# Patient Record
Sex: FEMALE | Race: WHITE | NOT HISPANIC OR LATINO | Employment: OTHER | URBAN - METROPOLITAN AREA
[De-identification: names, ages, dates, MRNs, and addresses within clinical notes are randomized per-mention and may not be internally consistent; named-entity substitution may affect disease eponyms.]

---

## 2019-08-08 ENCOUNTER — HOSPITAL ENCOUNTER (EMERGENCY)
Facility: HOSPITAL | Age: 69
Discharge: HOME | End: 2019-08-08
Attending: EMERGENCY MEDICINE
Payer: MEDICARE

## 2019-08-08 VITALS
TEMPERATURE: 98 F | RESPIRATION RATE: 18 BRPM | DIASTOLIC BLOOD PRESSURE: 98 MMHG | BODY MASS INDEX: 24.84 KG/M2 | WEIGHT: 135 LBS | HEIGHT: 62 IN | OXYGEN SATURATION: 98 % | HEART RATE: 78 BPM | SYSTOLIC BLOOD PRESSURE: 174 MMHG

## 2019-08-08 DIAGNOSIS — R06.02 SHORTNESS OF BREATH: ICD-10-CM

## 2019-08-08 DIAGNOSIS — M54.2 NECK PAIN: ICD-10-CM

## 2019-08-08 PROCEDURE — 99283 EMERGENCY DEPT VISIT LOW MDM: CPT

## 2019-08-08 PROCEDURE — 93005 ELECTROCARDIOGRAM TRACING: CPT

## 2019-08-08 RX ORDER — LORAZEPAM 2 MG/ML
0.5 INJECTION INTRAMUSCULAR
Status: DISCONTINUED | OUTPATIENT
Start: 2019-08-08 | End: 2019-08-08 | Stop reason: HOSPADM

## 2019-08-08 RX ORDER — ACETAMINOPHEN 500 MG
1000 TABLET ORAL
Status: DISCONTINUED | OUTPATIENT
Start: 2019-08-08 | End: 2019-08-08 | Stop reason: HOSPADM

## 2019-08-08 NOTE — ED PROVIDER NOTES
Encounter Date: 8/8/2019       History     Chief Complaint   Patient presents with    Neck Pain     pt c/o L neck, L ankle pain. onset last pm. denies trauma. speech clear. skin w/dr/pk. rr even/unlab. maew     69-year-old female presented emergency department complaining of pain in the left side of the neck and also complains of slight pain in that left ankle area.  Patient denies any trauma.  Patient extremely anxious as her  is in the ICU after having a heart attack.  Patient denies any chest pain or shortness of breath.  Patient denies fever or chills or nausea or vomiting or abdominal pain or weakness or numbness.        Review of patient's allergies indicates:   Allergen Reactions    Demerol [meperidine]      Nausea vomiting, visual problem    Tomato (solanum lycopersicum)      Past Medical History:   Diagnosis Date    Martin esophagus     Colitis     COPD (chronic obstructive pulmonary disease)     Thyroid disease     Vocal cord polyps      Past Surgical History:   Procedure Laterality Date    BACK SURGERY      cervical    CHOLECYSTECTOMY  12-    Dr Price  [facility]    CHOLECYSTECTOMY, LAPAROSCOPIC N/A 12/20/2013    [facility]formed [facility] OR    EGD (ESOPHAGOGASTRODUODENOSCOPY) N/A 12/19/2013    Performed by Lena Smith [facility] at [facility] ENDO    FOOT SURGERY      HYSTERECTOMY      MICROLARYNGOSCOPY N/A 5/10/2016    [facility]    SALIVARY GLAND SURGERY       No family history on file.  Social History     Tobacco Use    Smoking status: Current Every Day Smoker     Packs/day: 1.00     Types: Cigarettes    Smokeless tobacco: Former User     Quit date: 5/1/2016   Substance Use Topics    Alcohol use: No    Drug use: No     Review of Systems   Constitutional: Negative.    HENT: Negative.    Eyes: Negative.    Respiratory: Negative.    Cardiovascular: Negative for chest pain.   Gastrointestinal: Negative.    Endocrine: Negative.     Genitourinary: Negative.    Musculoskeletal: Positive for neck pain.        Pain on the left side of the neck in the sternal mastoid area.  Slight pain in the left ankle area and the area of the soft tissues.  Medially.  No bony pain or tenderness.   Skin: Negative.    Allergic/Immunologic: Negative.    Neurological: Negative.    Hematological: Negative.    Psychiatric/Behavioral: Negative.    All other systems reviewed and are negative.      Physical Exam     Initial Vitals [08/08/19 1341]   BP Pulse Resp Temp SpO2   (!) 174/98 78 18 97.7 °F (36.5 °C) 98 %      MAP       --         Physical Exam    Nursing note and vitals reviewed.  Constitutional: She appears well-developed and well-nourished.   HENT:   Head: Normocephalic and atraumatic.   Nose: Nose normal.   Mouth/Throat: Oropharynx is clear and moist.   Eyes: Conjunctivae and EOM are normal. Pupils are equal, round, and reactive to light.   Neck: Normal range of motion. Neck supple. No thyromegaly present. No tracheal deviation present. No JVD present.   Pain and tenderness on the left lateral side of the neck in the sternal mastoid area and the left upper trapezius area.  Pain is reproducible by palpation and by movement of the head and neck.   Cardiovascular: Normal rate, regular rhythm, normal heart sounds and intact distal pulses.   No murmur heard.  Pulmonary/Chest: Breath sounds normal. No stridor. No respiratory distress. She has no wheezes. She has no rales.   Abdominal: Soft. Bowel sounds are normal.   Musculoskeletal: Normal range of motion. She exhibits no edema.   Minimal tenderness on the medial aspect of the left ankle inferior to the ankle joint.  Extremities neurovascularly intact.   Neurological: She is alert and oriented to person, place, and time. She has normal strength. No cranial nerve deficit. GCS score is 15. GCS eye subscore is 4. GCS verbal subscore is 5. GCS motor subscore is 6.   Skin: Skin is warm. Capillary refill takes less  than 2 seconds.   Psychiatric: She has a normal mood and affect. Thought content normal.         ED Course   Procedures  Labs Reviewed   CBC W/ AUTO DIFFERENTIAL   COMPREHENSIVE METABOLIC PANEL   TROPONIN I   B-TYPE NATRIURETIC PEPTIDE   PROTIME-INR     EKG Readings: (Independently Interpreted)   Initial Reading: No STEMI. Rhythm: Normal Sinus Rhythm. Ectopy: No Ectopy. Conduction: Normal.     ECG Results          EKG 12-lead (In process)  Result time 08/08/19 14:02:55    In process by Interface, [facility] (08/08/19 14:02:55)                 Narrative:    Test Reason : M54.2,    Vent. Rate : 073 BPM     Atrial Rate : 073 BPM     P-R Int : 156 ms          QRS Dur : 074 ms      QT Int : 434 ms       P-R-T Axes : 067 023 042 degrees     QTc Int : 478 ms    Normal sinus rhythm  Possible Left atrial enlargement  Low voltage QRS  Borderline Abnormal ECG  When compared with ECG of 03-MAY-2015 03:21,  Nonspecific T wave abnormality no longer evident in Anterior leads    Referred By: [id number]   SELF           Confirmed By:                             Imaging Results    None          Medical Decision Making:   Differential Diagnosis:   Patient presented to emergency department with neck pain which appears to be musculoskeletal pain and ankle pain appears to be musculoskeletal as well. No bony tenderness detected.  Given patient's age and risk factor screening cardiac workup to be done however patient refused any further workup and understands the risks and complications of leaving against medical advise and leaving against medical advice.  Patient likely has musculoskeletal pain and anxious as her  is in the ICU after a heart attack.  Patient advised to return to emergency department if symptoms do not improve.                      Clinical Impression:       ICD-10-CM ICD-9-CM   1. Neck pain M54.2 723.1   2. Shortness of breath R06.02 786.05                                Ayaka Mims MD  08/08/19 8154

## 2019-08-08 NOTE — DISCHARGE INSTRUCTIONS
Your leaving against medical advise and return to emergency department for worsening symptoms or any problems

## 2019-08-10 ENCOUNTER — HOSPITAL ENCOUNTER (EMERGENCY)
Facility: HOSPITAL | Age: 69
Discharge: LEFT BEFORE DISCHARGE | End: 2019-08-10
Attending: EMERGENCY MEDICINE
Payer: MEDICARE

## 2019-08-10 VITALS
TEMPERATURE: 98 F | HEART RATE: 69 BPM | RESPIRATION RATE: 18 BRPM | SYSTOLIC BLOOD PRESSURE: 185 MMHG | OXYGEN SATURATION: 98 % | DIASTOLIC BLOOD PRESSURE: 69 MMHG

## 2019-08-10 DIAGNOSIS — R07.9 CHEST PAIN: Primary | ICD-10-CM

## 2019-08-10 LAB
ALBUMIN SERPL BCP-MCNC: 4.2 G/DL (ref 3.5–5.2)
ALP SERPL-CCNC: 85 U/L (ref 55–135)
ALT SERPL W/O P-5'-P-CCNC: 13 U/L (ref 10–44)
ANION GAP SERPL CALC-SCNC: 8 MMOL/L (ref 8–16)
AST SERPL-CCNC: 19 U/L (ref 10–40)
BASOPHILS # BLD AUTO: 0.04 K/UL (ref 0–0.2)
BASOPHILS NFR BLD: 0.7 % (ref 0–1.9)
BILIRUB SERPL-MCNC: 0.4 MG/DL (ref 0.1–1)
BNP SERPL-MCNC: 25 PG/ML (ref 0–99)
BUN SERPL-MCNC: 15 MG/DL (ref 8–23)
CALCIUM SERPL-MCNC: 9.9 MG/DL (ref 8.7–10.5)
CHLORIDE SERPL-SCNC: 105 MMOL/L (ref 95–110)
CO2 SERPL-SCNC: 30 MMOL/L (ref 23–29)
CREAT SERPL-MCNC: 0.9 MG/DL (ref 0.5–1.4)
DIFFERENTIAL METHOD: ABNORMAL
EOSINOPHIL # BLD AUTO: 0.1 K/UL (ref 0–0.5)
EOSINOPHIL NFR BLD: 1.8 % (ref 0–8)
ERYTHROCYTE [DISTWIDTH] IN BLOOD BY AUTOMATED COUNT: 13 % (ref 11.5–14.5)
EST. GFR  (AFRICAN AMERICAN): >60 ML/MIN/1.73 M^2
EST. GFR  (NON AFRICAN AMERICAN): >60 ML/MIN/1.73 M^2
GLUCOSE SERPL-MCNC: 92 MG/DL (ref 70–110)
HCT VFR BLD AUTO: 46.1 % (ref 37–48.5)
HGB BLD-MCNC: 15.1 G/DL (ref 12–16)
IMM GRANULOCYTES # BLD AUTO: 0.02 K/UL (ref 0–0.04)
IMM GRANULOCYTES NFR BLD AUTO: 0.4 % (ref 0–0.5)
INR PPP: 1.1
LYMPHOCYTES # BLD AUTO: 1.7 K/UL (ref 1–4.8)
LYMPHOCYTES NFR BLD: 29.9 % (ref 18–48)
MCH RBC QN AUTO: 31.6 PG (ref 27–31)
MCHC RBC AUTO-ENTMCNC: 32.8 G/DL (ref 32–36)
MCV RBC AUTO: 96 FL (ref 82–98)
MONOCYTES # BLD AUTO: 0.5 K/UL (ref 0.3–1)
MONOCYTES NFR BLD: 8.7 % (ref 4–15)
NEUTROPHILS # BLD AUTO: 3.3 K/UL (ref 1.8–7.7)
NEUTROPHILS NFR BLD: 58.5 % (ref 38–73)
NRBC BLD-RTO: 0 /100 WBC
PLATELET # BLD AUTO: 181 K/UL (ref 150–350)
PMV BLD AUTO: 9.4 FL (ref 9.2–12.9)
POTASSIUM SERPL-SCNC: 3.5 MMOL/L (ref 3.5–5.1)
PROT SERPL-MCNC: 7.9 G/DL (ref 6–8.4)
PROTHROMBIN TIME: 13.7 SEC (ref 11.7–14)
RBC # BLD AUTO: 4.78 M/UL (ref 4–5.4)
SODIUM SERPL-SCNC: 143 MMOL/L (ref 136–145)
TROPONIN I SERPL DL<=0.01 NG/ML-MCNC: <0.03 NG/ML (ref 0.02–0.04)
WBC # BLD AUTO: 5.61 K/UL (ref 3.9–12.7)

## 2019-08-10 PROCEDURE — 93005 ELECTROCARDIOGRAM TRACING: CPT

## 2019-08-10 PROCEDURE — 25000003 PHARM REV CODE 250: Performed by: EMERGENCY MEDICINE

## 2019-08-10 PROCEDURE — 83880 ASSAY OF NATRIURETIC PEPTIDE: CPT

## 2019-08-10 PROCEDURE — 85610 PROTHROMBIN TIME: CPT

## 2019-08-10 PROCEDURE — 84484 ASSAY OF TROPONIN QUANT: CPT

## 2019-08-10 PROCEDURE — 99285 EMERGENCY DEPT VISIT HI MDM: CPT

## 2019-08-10 PROCEDURE — 80053 COMPREHEN METABOLIC PANEL: CPT

## 2019-08-10 PROCEDURE — 85025 COMPLETE CBC W/AUTO DIFF WBC: CPT

## 2019-08-10 RX ORDER — LORAZEPAM 0.5 MG/1
TABLET ORAL
COMMUNITY
End: 2019-09-19 | Stop reason: SDUPTHER

## 2019-08-10 RX ORDER — ASPIRIN 325 MG
325 TABLET ORAL
Status: COMPLETED | OUTPATIENT
Start: 2019-08-10 | End: 2019-08-10

## 2019-08-10 RX ADMIN — ASPIRIN 325 MG ORAL TABLET 325 MG: 325 PILL ORAL at 05:08

## 2019-08-10 RX ADMIN — ALUMINUM HYDROXIDE, MAGNESIUM HYDROXIDE, AND SIMETHICONE 50 ML: 200; 200; 20 SUSPENSION ORAL at 06:08

## 2019-08-10 NOTE — ED PROVIDER NOTES
Encounter Date: 8/10/2019       History     Chief Complaint   Patient presents with    Chest Pain     X 1 DAY     69-year-old female with a past medical history of COPD, hypertension presents today complaining of chest pain.  Patient reports that she has been under lot of stress lately, her  had a massive heart attack and is currently in the hospital.  Patient reports that she is having substernal chest pain located over the left precordium with radiation to the neck.  Patient rates pain as 4/10 when present but she reports currently no chest pain present.  Patient reports last stress test was 3 years ago performed by Dr. Camarillo        Review of patient's allergies indicates:   Allergen Reactions    Demerol [meperidine]      Nausea vomiting, visual problem    Tomato (solanum lycopersicum)      Past Medical History:   Diagnosis Date    Martin esophagus     Colitis     COPD (chronic obstructive pulmonary disease)     Thyroid disease     Vocal cord polyps      Past Surgical History:   Procedure Laterality Date    ABDOMINAL AORTIC ANEURYSM REPAIR      BACK SURGERY      cervical    [facility]LECYSTECT[facility]Y  12-    [facility]  [facility]    CHOLECYSTECTOMY, LAPAROSCOPIC N/A 12/20/2013    [facility]    EGD (ESOPHAGOGASTRODUODENOSCOPY) N/A 12/19/2013    Performed by [facility] Smith [facility] at [facility] ENDO    FOOT SURGERY      HYSTERECTOMY      MICROLARYNGOSCOPY N/A 5/10/2016    [facility]    SALIVARY GLAND SURGERY       No family history on file.  Social History     Tobacco Use    Smoking status: Current Every Day Smoker     Packs/day: 1.00     Types: Cigarettes    Smokeless tobacco: Former User     Quit date: 5/1/2016   Substance Use Topics    Alcohol use: No    Drug use: No     Review of Systems   Constitutional: Negative for fever.   HENT: Negative for congestion, rhinorrhea, sore throat and trouble swallowing.    Eyes: Negative for visual  disturbance.   Respiratory: Negative for cough, chest tightness, shortness of breath and wheezing.    Cardiovascular: Positive for chest pain. Negative for palpitations and leg swelling.   Gastrointestinal: Negative for abdominal distention, abdominal pain, constipation, diarrhea, nausea and vomiting.   Genitourinary: Negative for difficulty urinating, dysuria, flank pain and frequency.   Musculoskeletal: Negative for arthralgias, back pain, joint swelling and neck pain.   Skin: Negative for color change and rash.   Neurological: Negative for dizziness, syncope, speech difficulty, weakness, numbness and headaches.   All other systems reviewed and are negative.      Physical Exam     Initial Vitals   BP Pulse Resp Temp SpO2   08/10/19 1821 08/10/19 1756 08/10/19 1756 08/10/19 1821 08/10/19 1756   (!) 185/69 76 (!) 29 97.9 °F (36.6 °C) 96 %      MAP       --                Physical Exam    Nursing note and vitals reviewed.  Constitutional: She appears well-developed and well-nourished. She is not diaphoretic. No distress.   HENT:   Head: Normocephalic and atraumatic.   Right Ear: External ear normal.   Left Ear: External ear normal.   Nose: Nose normal.   Mouth/Throat: Oropharynx is clear and moist. No oropharyngeal exudate.   Eyes: Conjunctivae and EOM are normal. Pupils are equal, round, and reactive to light. Right eye exhibits no discharge. Left eye exhibits no discharge. No scleral icterus.   Neck: Normal range of motion. Neck supple. No thyromegaly present. No tracheal deviation present. No JVD present.   Cardiovascular: Normal rate, regular rhythm, normal heart sounds and intact distal pulses. Exam reveals no gallop and no friction rub.    No murmur heard.  Pulmonary/Chest: Breath sounds normal. No stridor. No respiratory distress. She has no wheezes. She has no rhonchi. She has no rales. She exhibits no tenderness.   Abdominal: Soft. Bowel sounds are normal. She exhibits no distension and no mass. There is no  tenderness. There is no rebound and no guarding.   Musculoskeletal: Normal range of motion. She exhibits no edema or tenderness.   Lymphadenopathy:     She has no cervical adenopathy.   Neurological: She is alert and oriented to person, place, and time. She has normal strength. She displays normal reflexes. No cranial nerve deficit or sensory deficit.   Skin: Skin is warm and dry. No rash and no abscess noted. No erythema. No pallor.         ED Course   Procedures  Labs Reviewed   CBC W/ AUTO DIFFERENTIAL - Abnormal; Notable for the following components:       Result Value    Mean Corpuscular Hemoglobin 31.6 (*)     All other components within normal limits   COMPREHENSIVE METABOLIC PANEL - Abnormal; Notable for the following components:    CO2 30 (*)     All other components within normal limits   B-TYPE NATRIURETIC PEPTIDE   TROPONIN I   PROTIME-INR          Imaging Results          X-Ray Chest PA And Lateral (Final result)  Result time 08/10/19 17:50:20    Final result by Phu Jensen Jr., MD (08/10/19 17:50:20)                 Impression:      Manifestation longstanding COPD with evidence of mild central vascular prominence.  Bibasilar atelectasis is noted and stable.      Electronically signed by: Phu Jensen MD  Date:    08/10/2019  Time:    17:50             Narrative:    EXAMINATION:  XR CHEST PA AND LATERAL    CLINICAL HISTORY:  Chest Pain;    TECHNIQUE:  PA and lateral views of the chest were performed.    COMPARISON:  Comparison study is 05/14/2019.    FINDINGS:  Hyperventilatory changes of the lungs are identified secondary to longstanding COPD.  Minimal central vascular prominence.  Bibasilar pulmonary scar formation is noted.  Descending thoracic aorta is tortuous.  No evidence of confluent infiltrate or significant pleural effusion.  The upper lungs are emphysematous.  The bones of the chest are normal in radiographic appearance.  Tracheal lumen midline.                                  Medical Decision Making:   History:   Old Medical Records: I decided to obtain old medical records.  Initial Assessment:   Emergent evaluation of a 69-year-old female presenting with chest pain patient has a history of hypertension and hyperlipidemia patient is a smoker patient has a family history of coronary disease, patient differential includes ACS, electrolyte abnormality, endocrine dysfunction, infection              Attending Attestation:             Attending ED Notes:   Patient's labs show no significant acute abnormality, patient is currently having no active chest pain however given her risk factors I advised patient that she would require admission to hospital for trending of cardiac enzymes however patient declined stating that she would rather go home.  Patient is alert and oriented times for she appears to have capacity to make her own medical decisions patient understands that by leaving there is a risk of death disability and deterioration.  Patient has elected to sign out of the hospital against medical advice she shows no signs of clinical intoxication and understands that she can return immediately to the ER for any worsening or for any further concerns.             Clinical Impression:       ICD-10-CM ICD-9-CM   1. Chest pain R07.9 786.50                                Joe Cadena MD  08/10/19 2040

## 2019-08-21 DIAGNOSIS — Z12.39 SCREENING BREAST EXAMINATION: ICD-10-CM

## 2019-08-21 DIAGNOSIS — M89.9 BONE DISEASE: Primary | ICD-10-CM

## 2019-09-11 ENCOUNTER — CLINICAL SUPPORT (OUTPATIENT)
Dept: URGENT CARE | Facility: CLINIC | Age: 69
End: 2019-09-11
Payer: MEDICARE

## 2019-09-11 ENCOUNTER — HOSPITAL ENCOUNTER (EMERGENCY)
Facility: HOSPITAL | Age: 69
Discharge: HOME | End: 2019-09-11
Admitting: EMERGENCY MEDICINE
Payer: MEDICARE

## 2019-09-11 ENCOUNTER — TELEPHONE (OUTPATIENT)
Dept: FAMILY MEDICINE | Facility: CLINIC | Age: 69
End: 2019-09-11

## 2019-09-11 VITALS
BODY MASS INDEX: 24.27 KG/M2 | OXYGEN SATURATION: 97 % | TEMPERATURE: 98 F | HEART RATE: 84 BPM | HEIGHT: 63 IN | RESPIRATION RATE: 12 BRPM | DIASTOLIC BLOOD PRESSURE: 90 MMHG | WEIGHT: 137 LBS | SYSTOLIC BLOOD PRESSURE: 161 MMHG

## 2019-09-11 VITALS
RESPIRATION RATE: 16 BRPM | WEIGHT: 137 LBS | BODY MASS INDEX: 25.21 KG/M2 | HEART RATE: 90 BPM | TEMPERATURE: 98 F | OXYGEN SATURATION: 99 % | HEIGHT: 62 IN | DIASTOLIC BLOOD PRESSURE: 82 MMHG | SYSTOLIC BLOOD PRESSURE: 164 MMHG
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
## 2019-09-11 PROCEDURE — 99204 PR OFFICE/OUTPT VISIT, NEW, LEVL IV, 45-59 MIN: ICD-10-PCS | Mod: 25,S$GLB,, | Performed by: NURSE PRACTITIONER

## 2019-09-11 PROCEDURE — 90714 TD VACCINE GREATER THAN OR EQUAL TO 7YO WITH PRESERVATIVE IM: ICD-10-PCS | Mod: S$GLB,,, | Performed by: NURSE PRACTITIONER

## 2019-09-11 PROCEDURE — 90714 TD VACC NO PRESV 7 YRS+ IM: CPT | Mod: S$GLB,,, | Performed by: NURSE PRACTITIONER

## 2019-09-11 PROCEDURE — 99281 EMR DPT VST MAYX REQ PHY/QHP: CPT

## 2019-09-11 PROCEDURE — 90471 TD VACCINE GREATER THAN OR EQUAL TO 7YO WITH PRESERVATIVE IM: ICD-10-PCS | Mod: S$GLB,,, | Performed by: NURSE PRACTITIONER

## 2019-09-11 PROCEDURE — 99204 OFFICE O/P NEW MOD 45 MIN: CPT | Mod: 25,S$GLB,, | Performed by: NURSE PRACTITIONER

## 2019-09-11 PROCEDURE — 90471 IMMUNIZATION ADMIN: CPT | Mod: S$GLB,,, | Performed by: NURSE PRACTITIONER

## 2019-09-11 RX ORDER — MUPIROCIN 20 MG/G
OINTMENT TOPICAL
Qty: 22 G | Refills: 1 | Status: SHIPPED | OUTPATIENT
Start: 2019-09-11 | End: 2020-02-21

## 2019-09-11 RX ORDER — AMOXICILLIN AND CLAVULANATE POTASSIUM 875; 125 MG/1; MG/1
1 TABLET, FILM COATED ORAL 2 TIMES DAILY
Qty: 20 TABLET | Refills: 0 | Status: SHIPPED | OUTPATIENT
Start: 2019-09-11 | End: 2019-09-21

## 2019-09-11 NOTE — PROGRESS NOTES
"Subjective:       Patient ID: Joslyn Dubon is a 69 y.o. female.    Vitals:  height is 5' 3" (1.6 m) and weight is 62.1 kg (137 lb). Her oral temperature is 98.1 °F (36.7 °C). Her blood pressure is 161/90 (abnormal) and her pulse is 84. Her respiration is 12 and oxygen saturation is 97%.     Chief Complaint: Animal Bite    Pt presents for cat bite to left lower leg. Pt states she was bit by her cat last night on the left leg. She states the cat is domesticated and stays inside. She is not sure of her TD status. She is having mild discomfort to the bite wound.       Constitution: Negative for chills, fatigue and fever.   HENT: Negative for congestion and sore throat.    Neck: Negative for painful lymph nodes.   Cardiovascular: Negative for chest pain and leg swelling.   Eyes: Negative for double vision and blurred vision.   Respiratory: Negative for cough and shortness of breath.    Gastrointestinal: Negative for nausea, vomiting and diarrhea.   Genitourinary: Negative for dysuria, frequency, urgency and history of kidney stones.   Musculoskeletal: Negative for joint pain, joint swelling, muscle cramps and muscle ache.   Skin: Positive for puncture wound. Negative for color change, pale, rash and bruising.   Allergic/Immunologic: Negative for seasonal allergies.   Neurological: Negative for dizziness, history of vertigo, light-headedness, passing out and headaches.   Hematologic/Lymphatic: Negative for swollen lymph nodes.   Psychiatric/Behavioral: Negative for nervous/anxious, sleep disturbance and depression. The patient is not nervous/anxious.        Objective:      Physical Exam   Constitutional: She is oriented to person, place, and time. She appears well-developed and well-nourished. She is cooperative.  Non-toxic appearance. She does not appear ill. No distress.   HENT:   Head: Normocephalic and atraumatic.   Right Ear: Hearing, tympanic membrane, external ear and ear canal normal.   Left Ear: Hearing, " tympanic membrane, external ear and ear canal normal.   Nose: Nose normal. No mucosal edema, rhinorrhea or nasal deformity. No epistaxis. Right sinus exhibits no maxillary sinus tenderness and no frontal sinus tenderness. Left sinus exhibits no maxillary sinus tenderness and no frontal sinus tenderness.   Mouth/Throat: Uvula is midline, oropharynx is clear and moist and mucous membranes are normal. No trismus in the jaw. Normal dentition. No uvula swelling. No posterior oropharyngeal erythema.   Eyes: Conjunctivae and lids are normal. Right eye exhibits no discharge. Left eye exhibits no discharge. No scleral icterus.   Sclera clear bilat   Neck: Trachea normal, normal range of motion, full passive range of motion without pain and phonation normal. Neck supple.   Cardiovascular: Normal rate, regular rhythm, normal heart sounds, intact distal pulses and normal pulses.   Pulmonary/Chest: Effort normal and breath sounds normal. No respiratory distress.   Abdominal: Soft. Normal appearance and bowel sounds are normal. She exhibits no distension, no pulsatile midline mass and no mass. There is no tenderness.   Musculoskeletal: Normal range of motion. She exhibits no edema or deformity.   Neurological: She is alert and oriented to person, place, and time. She exhibits normal muscle tone. Coordination normal.   Skin: Skin is warm, dry and intact. She is not diaphoretic. No pallor.   2 small superficial puncture wounds to left lateral lower leg, no erythema or drainage   Psychiatric: She has a normal mood and affect. Her speech is normal and behavior is normal. Judgment and thought content normal. Cognition and memory are normal.   Nursing note and vitals reviewed.      Assessment:       1. Cat bite, initial encounter        Plan:         Cat bite, initial encounter    Other orders  -     amoxicillin-clavulanate 875-125mg (AUGMENTIN) 875-125 mg per tablet; Take 1 tablet by mouth 2 (two) times daily. for 10 days  Dispense:  20 tablet; Refill: 0  -     mupirocin (BACTROBAN) 2 % ointment; Apply to affected area 3 times daily  Dispense: 22 g; Refill: 1  -     (In Office Administered) Td Vaccine

## 2019-09-11 NOTE — TELEPHONE ENCOUNTER
----- Message from Alyx Lechuga sent at 9/11/2019 12:26 PM CDT -----  vm- pt cat bit her and she needs a tetnus shot  [phone]

## 2019-09-11 NOTE — PATIENT INSTRUCTIONS
Cat Bite    A cat bite can cause a wound deep enough to break the skin. In such cases, the wound is cleaned and then sometimes closed. If the wound is closed it is usually not closed completely. This is so that fluid can drain if the wound becomes infected. Often the wound is left open to heal. In addition to wound care, a tetanus shot may be given, if needed.  Home care  · Wash your hands well with soap and warm water before and after caring for the wound. This helps lower the risk of infection.  · Care for the wound as directed. If a dressing was applied to the wound, be sure to change it as directed.  · If the wound bleeds, place a clean, soft cloth on the wound. Then firmly apply pressure until the bleeding stops. This may take up to 5 minutes. Do not release the pressure and look at the wound during this time.  · Always get medical attention for cat bites on the hand. They are highly likely to become infected.  · Most wounds heal within 10 days. But an infection can occur even with proper treatment. So be sure to check the wound daily for signs of infection (see below).  · Antibiotics may be prescribed. These help prevent or treat infection. If youre given antibiotics, take them as directed. Also be sure to complete the medicines.  Rabies prevention  Rabies is a virus that can be carried in certain animals. These can include domestic animals such as cats and dogs. Pets fully vaccinated against rabies (2 shots) are at very low risk of infection. But because human rabies is almost always fatal, any biting pet should be confined for 10 days as an extra precaution. In general, if there is a risk for rabies, the following steps may need to be taken:  · If someones pet cat has bitten you, it should be kept in a secure area for the next 10 days to watch for signs of illness. (If the pet owner wont allow this, contact your local animal control center.) If the cat becomes ill or dies during that time, contact your  local animal control center at once so the animal may be tested for rabies. If the cat stays healthy for the next 10 days, there is no danger of rabies in the animal or you.  · If a stray cat bit you, contact your local animal control center. They can give information on capture, quarantine, and animal rabies testing.  · If you cant find the animal that bit you in the next 2 days, and if rabies exists in your area, you may need to receive the rabies vaccine series. Call your healthcare provider right away. Or return to the emergency department promptly.  · All animal bites should be reported to the local animal control center. If you were not given a form to fill out, you can report this yourself.  Follow-up care  Follow up with your healthcare provider, or as directed.  When to seek medical advice  Call your healthcare provider right away if any of these occur:  · Signs of infection:  ¨ Spreading redness or warmth from the wound  ¨ Increased pain or swelling  ¨ Fever of 100.4ºF (38ºC) or higher, or as directed by your healthcare provider  ¨ Colored fluid or pus draining from the wound  ¨ Enlarged lymph nodes above the area that was bitten, such as lymph nodes in the armpit if you were bitten on the hand or arm. This may be a sign of cat-scratch disease (cat-scratch fever).  · Signs of rabies infection:  ¨ Headache  ¨ Confusion  ¨ Strange behavior  ¨ Increased salivating or drooling  ¨ Seizure  · Decreased ability to move any body part near the bite area  · Bleeding that can't be stopped after 5 minutes of firm pressure  Date Last Reviewed: 3/23/2015  © 4331-8644 [employer]. [address] [place] [address], [place] [address]. All rights reserved. This information is not intended as a substitute for professional medical care. Always follow your healthcare professional's instructions.

## 2019-09-14 ENCOUNTER — HOSPITAL ENCOUNTER (OUTPATIENT)
Facility: HOSPITAL | Age: 69
Discharge: LEFT BEFORE DISCHARGE | End: 2019-09-15
Attending: EMERGENCY MEDICINE | Admitting: FAMILY MEDICINE
Payer: MEDICARE

## 2019-09-14 DIAGNOSIS — R07.9 CHEST PAIN: ICD-10-CM

## 2019-09-14 PROBLEM — J44.9 COPD (CHRONIC OBSTRUCTIVE PULMONARY DISEASE): Chronic | Status: ACTIVE | Noted: 2019-09-14

## 2019-09-14 PROBLEM — E07.9 THYROID DISEASE: Chronic | Status: ACTIVE | Noted: 2019-09-14

## 2019-09-14 LAB
ALBUMIN SERPL BCP-MCNC: 3.8 G/DL (ref 3.5–5.2)
ALP SERPL-CCNC: 73 U/L (ref 55–135)
ALT SERPL W/O P-5'-P-CCNC: 14 U/L (ref 10–44)
ANION GAP SERPL CALC-SCNC: 9 MMOL/L (ref 8–16)
AST SERPL-CCNC: 19 U/L (ref 10–40)
BASOPHILS # BLD AUTO: 0.03 K/UL (ref 0–0.2)
BASOPHILS NFR BLD: 0.5 % (ref 0–1.9)
BILIRUB SERPL-MCNC: 0.6 MG/DL (ref 0.1–1)
BNP SERPL-MCNC: 28 PG/ML (ref 0–99)
BUN SERPL-MCNC: 15 MG/DL (ref 8–23)
CALCIUM SERPL-MCNC: 9.2 MG/DL (ref 8.7–10.5)
CHLORIDE SERPL-SCNC: 106 MMOL/L (ref 95–110)
CO2 SERPL-SCNC: 25 MMOL/L (ref 23–29)
CREAT SERPL-MCNC: 0.8 MG/DL (ref 0.5–1.4)
DIFFERENTIAL METHOD: ABNORMAL
EOSINOPHIL # BLD AUTO: 0.1 K/UL (ref 0–0.5)
EOSINOPHIL NFR BLD: 2.3 % (ref 0–8)
ERYTHROCYTE [DISTWIDTH] IN BLOOD BY AUTOMATED COUNT: 13.3 % (ref 11.5–14.5)
EST. GFR  (AFRICAN AMERICAN): >60 ML/MIN/1.73 M^2
EST. GFR  (NON AFRICAN AMERICAN): >60 ML/MIN/1.73 M^2
GLUCOSE SERPL-MCNC: 92 MG/DL (ref 70–110)
HCT VFR BLD AUTO: 43.1 % (ref 37–48.5)
HGB BLD-MCNC: 14.3 G/DL (ref 12–16)
IMM GRANULOCYTES # BLD AUTO: 0.01 K/UL (ref 0–0.04)
IMM GRANULOCYTES NFR BLD AUTO: 0.2 % (ref 0–0.5)
INR PPP: 1
LYMPHOCYTES # BLD AUTO: 1.9 K/UL (ref 1–4.8)
LYMPHOCYTES NFR BLD: 33.3 % (ref 18–48)
MCH RBC QN AUTO: 31.6 PG (ref 27–31)
MCHC RBC AUTO-ENTMCNC: 33.2 G/DL (ref 32–36)
MCV RBC AUTO: 95 FL (ref 82–98)
MONOCYTES # BLD AUTO: 0.5 K/UL (ref 0.3–1)
MONOCYTES NFR BLD: 9.4 % (ref 4–15)
NEUTROPHILS # BLD AUTO: 3.1 K/UL (ref 1.8–7.7)
NEUTROPHILS NFR BLD: 54.3 % (ref 38–73)
NRBC BLD-RTO: 0 /100 WBC
PLATELET # BLD AUTO: 171 K/UL (ref 150–350)
PMV BLD AUTO: 9.4 FL (ref 9.2–12.9)
POTASSIUM SERPL-SCNC: 3.7 MMOL/L (ref 3.5–5.1)
PROT SERPL-MCNC: 7.4 G/DL (ref 6–8.4)
PROTHROMBIN TIME: 13 SEC (ref 11.7–14)
RBC # BLD AUTO: 4.52 M/UL (ref 4–5.4)
SODIUM SERPL-SCNC: 140 MMOL/L (ref 136–145)
TROPONIN I SERPL DL<=0.01 NG/ML-MCNC: <0.03 NG/ML (ref 0.02–0.04)
WBC # BLD AUTO: 5.73 K/UL (ref 3.9–12.7)

## 2019-09-14 PROCEDURE — 93005 ELECTROCARDIOGRAM TRACING: CPT

## 2019-09-14 PROCEDURE — 84484 ASSAY OF TROPONIN QUANT: CPT

## 2019-09-14 PROCEDURE — 81001 URINALYSIS AUTO W/SCOPE: CPT

## 2019-09-14 PROCEDURE — 80053 COMPREHEN METABOLIC PANEL: CPT

## 2019-09-14 PROCEDURE — 85025 COMPLETE CBC W/AUTO DIFF WBC: CPT

## 2019-09-14 PROCEDURE — G0378 HOSPITAL OBSERVATION PER HR: HCPCS

## 2019-09-14 PROCEDURE — 83880 ASSAY OF NATRIURETIC PEPTIDE: CPT

## 2019-09-14 PROCEDURE — 99285 EMERGENCY DEPT VISIT HI MDM: CPT | Mod: 25

## 2019-09-14 PROCEDURE — 85610 PROTHROMBIN TIME: CPT

## 2019-09-14 PROCEDURE — 25000003 PHARM REV CODE 250: Performed by: NURSE PRACTITIONER

## 2019-09-14 RX ORDER — SODIUM,POTASSIUM PHOSPHATES 280-250MG
2 POWDER IN PACKET (EA) ORAL
Status: DISCONTINUED | OUTPATIENT
Start: 2019-09-15 | End: 2019-09-15 | Stop reason: HOSPADM

## 2019-09-14 RX ORDER — NAPROXEN SODIUM 220 MG/1
81 TABLET, FILM COATED ORAL DAILY
Status: DISCONTINUED | OUTPATIENT
Start: 2019-09-15 | End: 2019-09-15 | Stop reason: HOSPADM

## 2019-09-14 RX ORDER — GLUCAGON 1 MG
1 KIT INJECTION
Status: DISCONTINUED | OUTPATIENT
Start: 2019-09-15 | End: 2019-09-15 | Stop reason: HOSPADM

## 2019-09-14 RX ORDER — FAMOTIDINE 20 MG/1
20 TABLET, FILM COATED ORAL 2 TIMES DAILY
Status: DISCONTINUED | OUTPATIENT
Start: 2019-09-15 | End: 2019-09-15 | Stop reason: HOSPADM

## 2019-09-14 RX ORDER — LEVOTHYROXINE SODIUM 100 UG/1
100 TABLET ORAL
Status: DISCONTINUED | OUTPATIENT
Start: 2019-09-15 | End: 2019-09-15 | Stop reason: HOSPADM

## 2019-09-14 RX ORDER — SODIUM CHLORIDE 0.9 % (FLUSH) 0.9 %
2 SYRINGE (ML) INJECTION
Status: DISCONTINUED | OUTPATIENT
Start: 2019-09-15 | End: 2019-09-15 | Stop reason: HOSPADM

## 2019-09-14 RX ORDER — ENOXAPARIN SODIUM 100 MG/ML
40 INJECTION SUBCUTANEOUS EVERY 12 HOURS
Status: DISCONTINUED | OUTPATIENT
Start: 2019-09-15 | End: 2019-09-15

## 2019-09-14 RX ORDER — POLYETHYLENE GLYCOL 3350 17 G/17G
17 POWDER, FOR SOLUTION ORAL DAILY
Status: DISCONTINUED | OUTPATIENT
Start: 2019-09-15 | End: 2019-09-15 | Stop reason: HOSPADM

## 2019-09-14 RX ORDER — ACETAMINOPHEN 325 MG/1
650 TABLET ORAL EVERY 4 HOURS PRN
Status: DISCONTINUED | OUTPATIENT
Start: 2019-09-15 | End: 2019-09-15 | Stop reason: HOSPADM

## 2019-09-14 RX ORDER — ONDANSETRON 2 MG/ML
4 INJECTION INTRAMUSCULAR; INTRAVENOUS EVERY 8 HOURS PRN
Status: DISCONTINUED | OUTPATIENT
Start: 2019-09-15 | End: 2019-09-15 | Stop reason: HOSPADM

## 2019-09-14 RX ORDER — IBUPROFEN 200 MG
24 TABLET ORAL
Status: DISCONTINUED | OUTPATIENT
Start: 2019-09-15 | End: 2019-09-15 | Stop reason: HOSPADM

## 2019-09-14 RX ORDER — LISINOPRIL 10 MG/1
10 TABLET ORAL DAILY
Status: DISCONTINUED | OUTPATIENT
Start: 2019-09-15 | End: 2019-09-15 | Stop reason: HOSPADM

## 2019-09-14 RX ORDER — LANOLIN ALCOHOL/MO/W.PET/CERES
800 CREAM (GRAM) TOPICAL
Status: DISCONTINUED | OUTPATIENT
Start: 2019-09-15 | End: 2019-09-15 | Stop reason: HOSPADM

## 2019-09-14 RX ORDER — ACETAMINOPHEN 325 MG/1
650 TABLET ORAL EVERY 8 HOURS PRN
Status: DISCONTINUED | OUTPATIENT
Start: 2019-09-15 | End: 2019-09-15 | Stop reason: HOSPADM

## 2019-09-14 RX ORDER — ATORVASTATIN CALCIUM 40 MG/1
40 TABLET, FILM COATED ORAL DAILY
Status: DISCONTINUED | OUTPATIENT
Start: 2019-09-15 | End: 2019-09-15 | Stop reason: HOSPADM

## 2019-09-14 RX ORDER — NITROGLYCERIN 0.4 MG/1
0.4 TABLET SUBLINGUAL EVERY 5 MIN PRN
Status: DISCONTINUED | OUTPATIENT
Start: 2019-09-15 | End: 2019-09-15 | Stop reason: HOSPADM

## 2019-09-14 RX ORDER — ASPIRIN 325 MG
325 TABLET ORAL
Status: COMPLETED | OUTPATIENT
Start: 2019-09-14 | End: 2019-09-14

## 2019-09-14 RX ORDER — IPRATROPIUM BROMIDE AND ALBUTEROL SULFATE 2.5; .5 MG/3ML; MG/3ML
3 SOLUTION RESPIRATORY (INHALATION) EVERY 6 HOURS PRN
Status: DISCONTINUED | OUTPATIENT
Start: 2019-09-15 | End: 2019-09-14

## 2019-09-14 RX ORDER — POTASSIUM CHLORIDE 20 MEQ/15ML
40 SOLUTION ORAL
Status: DISCONTINUED | OUTPATIENT
Start: 2019-09-15 | End: 2019-09-15 | Stop reason: HOSPADM

## 2019-09-14 RX ORDER — IBUPROFEN 200 MG
16 TABLET ORAL
Status: DISCONTINUED | OUTPATIENT
Start: 2019-09-15 | End: 2019-09-15 | Stop reason: HOSPADM

## 2019-09-14 RX ORDER — LISINOPRIL 10 MG/1
10 TABLET ORAL DAILY PRN
COMMUNITY
End: 2020-02-21 | Stop reason: SDUPTHER

## 2019-09-14 RX ORDER — IPRATROPIUM BROMIDE AND ALBUTEROL SULFATE 2.5; .5 MG/3ML; MG/3ML
3 SOLUTION RESPIRATORY (INHALATION) EVERY 6 HOURS PRN
Status: DISCONTINUED | OUTPATIENT
Start: 2019-09-15 | End: 2019-09-15 | Stop reason: HOSPADM

## 2019-09-14 RX ADMIN — ASPIRIN 325 MG ORAL TABLET 325 MG: 325 PILL ORAL at 11:09

## 2019-09-15 ENCOUNTER — CLINICAL SUPPORT (OUTPATIENT)
Dept: CARDIOLOGY | Facility: HOSPITAL | Age: 69
End: 2019-09-15
Attending: FAMILY MEDICINE
Payer: MEDICARE

## 2019-09-15 VITALS — BODY MASS INDEX: 25.15 KG/M2 | HEIGHT: 62 IN | WEIGHT: 136.69 LBS

## 2019-09-15 VITALS
HEART RATE: 58 BPM | HEIGHT: 62 IN | TEMPERATURE: 98 F | SYSTOLIC BLOOD PRESSURE: 143 MMHG | WEIGHT: 136 LBS | DIASTOLIC BLOOD PRESSURE: 83 MMHG | OXYGEN SATURATION: 98 % | RESPIRATION RATE: 18 BRPM | BODY MASS INDEX: 25.03 KG/M2

## 2019-09-15 PROBLEM — Z72.0 TOBACCO ABUSE: Chronic | Status: ACTIVE | Noted: 2019-09-15

## 2019-09-15 PROBLEM — I10 HYPERTENSION: Chronic | Status: ACTIVE | Noted: 2019-09-15

## 2019-09-15 LAB
ANION GAP SERPL CALC-SCNC: 7 MMOL/L (ref 8–16)
BACTERIA #/AREA URNS HPF: NEGATIVE /HPF
BILIRUB UR QL STRIP: NEGATIVE
BSA FOR ECHO PROCEDURE: 1.65 M2
BUN SERPL-MCNC: 13 MG/DL (ref 8–23)
CALCIUM SERPL-MCNC: 8.9 MG/DL (ref 8.7–10.5)
CHLORIDE SERPL-SCNC: 107 MMOL/L (ref 95–110)
CHOLEST SERPL-MCNC: 156 MG/DL (ref 120–199)
CHOLEST/HDLC SERPL: 3.2 {RATIO} (ref 2–5)
CLARITY UR: CLEAR
CO2 SERPL-SCNC: 26 MMOL/L (ref 23–29)
COLOR UR: COLORLESS
CREAT SERPL-MCNC: 0.8 MG/DL (ref 0.5–1.4)
CV STRESS BASE HR: 64 BPM
DIASTOLIC BLOOD PRESSURE: 98 MMHG
ERYTHROCYTE [DISTWIDTH] IN BLOOD BY AUTOMATED COUNT: 13.2 % (ref 11.5–14.5)
EST. GFR  (AFRICAN AMERICAN): >60 ML/MIN/1.73 M^2
EST. GFR  (NON AFRICAN AMERICAN): >60 ML/MIN/1.73 M^2
GLUCOSE SERPL-MCNC: 94 MG/DL (ref 70–110)
GLUCOSE UR QL STRIP: NEGATIVE
HCT VFR BLD AUTO: 41.5 % (ref 37–48.5)
HDLC SERPL-MCNC: 49 MG/DL (ref 40–75)
HDLC SERPL: 31.4 % (ref 20–50)
HGB BLD-MCNC: 13.6 G/DL (ref 12–16)
HGB UR QL STRIP: ABNORMAL
HYALINE CASTS #/AREA URNS LPF: 1 /LPF
KETONES UR QL STRIP: NEGATIVE
LDLC SERPL CALC-MCNC: 94.2 MG/DL (ref 63–159)
LEUKOCYTE ESTERASE UR QL STRIP: NEGATIVE
MAGNESIUM SERPL-MCNC: 1.9 MG/DL (ref 1.6–2.6)
MCH RBC QN AUTO: 31.6 PG (ref 27–31)
MCHC RBC AUTO-ENTMCNC: 32.8 G/DL (ref 32–36)
MCV RBC AUTO: 96 FL (ref 82–98)
MICROSCOPIC COMMENT: ABNORMAL
NITRITE UR QL STRIP: NEGATIVE
NONHDLC SERPL-MCNC: 107 MG/DL
OHS CV CPX 85 PERCENT MAX PREDICTED HEART RATE MALE: 122
OHS CV CPX MAX PREDICTED HEART RATE: 144
OHS CV CPX PATIENT IS FEMALE: 1
OHS CV CPX PATIENT IS MALE: 0
OHS CV CPX PEAK DIASTOLIC BLOOD PRESSURE: 79 MMHG
OHS CV CPX PEAK HEAR RATE: 128 BPM
OHS CV CPX PEAK RATE PRESSURE PRODUCT: NORMAL
OHS CV CPX PEAK SYSTOLIC BLOOD PRESSURE: 160 MMHG
OHS CV CPX PERCENT MAX PREDICTED HEART RATE ACHIEVED: 89
OHS CV CPX RATE PRESSURE PRODUCT PRESENTING: NORMAL
PH UR STRIP: 7 [PH] (ref 5–8)
PHOSPHATE SERPL-MCNC: 3.6 MG/DL (ref 2.7–4.5)
PLATELET # BLD AUTO: 154 K/UL (ref 150–350)
PMV BLD AUTO: 9.5 FL (ref 9.2–12.9)
POTASSIUM SERPL-SCNC: 3.8 MMOL/L (ref 3.5–5.1)
PROT UR QL STRIP: NEGATIVE
RBC # BLD AUTO: 4.31 M/UL (ref 4–5.4)
RBC #/AREA URNS HPF: 5 /HPF (ref 0–4)
SODIUM SERPL-SCNC: 140 MMOL/L (ref 136–145)
SP GR UR STRIP: 1 (ref 1–1.03)
SQUAMOUS #/AREA URNS HPF: 1 /HPF
STRESS ECHO POST EXERCISE DUR MIN: 6 MINUTES
STRESS ECHO POST EXERCISE DUR SEC: 10 SECONDS
SYSTOLIC BLOOD PRESSURE: 160 MMHG
T4 FREE SERPL-MCNC: 1.11 NG/DL (ref 0.71–1.51)
TRIGL SERPL-MCNC: 64 MG/DL (ref 30–150)
TROPONIN I SERPL DL<=0.01 NG/ML-MCNC: <0.03 NG/ML (ref 0.02–0.04)
TROPONIN I SERPL DL<=0.01 NG/ML-MCNC: <0.03 NG/ML (ref 0.02–0.04)
TSH SERPL DL<=0.005 MIU/L-ACNC: 1.22 UIU/ML (ref 0.34–5.6)
URN SPEC COLLECT METH UR: ABNORMAL
UROBILINOGEN UR STRIP-ACNC: NEGATIVE EU/DL
WBC # BLD AUTO: 5.18 K/UL (ref 3.9–12.7)
WBC #/AREA URNS HPF: 1 /HPF (ref 0–5)

## 2019-09-15 PROCEDURE — G0378 HOSPITAL OBSERVATION PER HR: HCPCS

## 2019-09-15 PROCEDURE — 83735 ASSAY OF MAGNESIUM: CPT

## 2019-09-15 PROCEDURE — 99900035 HC TECH TIME PER 15 MIN (STAT)

## 2019-09-15 PROCEDURE — 84484 ASSAY OF TROPONIN QUANT: CPT | Mod: 91

## 2019-09-15 PROCEDURE — 84439 ASSAY OF FREE THYROXINE: CPT

## 2019-09-15 PROCEDURE — 25000003 PHARM REV CODE 250: Performed by: FAMILY MEDICINE

## 2019-09-15 PROCEDURE — 84100 ASSAY OF PHOSPHORUS: CPT

## 2019-09-15 PROCEDURE — 63600175 PHARM REV CODE 636 W HCPCS: Performed by: FAMILY MEDICINE

## 2019-09-15 PROCEDURE — 80048 BASIC METABOLIC PNL TOTAL CA: CPT

## 2019-09-15 PROCEDURE — 94761 N-INVAS EAR/PLS OXIMETRY MLT: CPT

## 2019-09-15 PROCEDURE — 93351 STRESS TTE COMPLETE: CPT

## 2019-09-15 PROCEDURE — 84443 ASSAY THYROID STIM HORMONE: CPT

## 2019-09-15 PROCEDURE — 83036 HEMOGLOBIN GLYCOSYLATED A1C: CPT

## 2019-09-15 PROCEDURE — 85027 COMPLETE CBC AUTOMATED: CPT

## 2019-09-15 PROCEDURE — 80061 LIPID PANEL: CPT

## 2019-09-15 PROCEDURE — 36415 COLL VENOUS BLD VENIPUNCTURE: CPT

## 2019-09-15 RX ORDER — ENOXAPARIN SODIUM 100 MG/ML
40 INJECTION SUBCUTANEOUS
Status: DISCONTINUED | OUTPATIENT
Start: 2019-09-15 | End: 2019-09-15 | Stop reason: HOSPADM

## 2019-09-15 RX ORDER — NITROGLYCERIN 0.4 MG/1
0.4 TABLET SUBLINGUAL ONCE
Status: COMPLETED | OUTPATIENT
Start: 2019-09-15 | End: 2019-09-15

## 2019-09-15 RX ORDER — DOBUTAMINE HYDROCHLORIDE 200 MG/100ML
10 INJECTION INTRAVENOUS CONTINUOUS
Status: DISCONTINUED | OUTPATIENT
Start: 2019-09-15 | End: 2019-09-16 | Stop reason: HOSPADM

## 2019-09-15 RX ORDER — LORAZEPAM 0.5 MG/1
0.5 TABLET ORAL ONCE
Status: COMPLETED | OUTPATIENT
Start: 2019-09-15 | End: 2019-09-15

## 2019-09-15 RX ORDER — LISINOPRIL 5 MG/1
5 TABLET ORAL DAILY
Status: DISCONTINUED | OUTPATIENT
Start: 2019-09-15 | End: 2019-09-15 | Stop reason: SDUPTHER

## 2019-09-15 RX ORDER — MORPHINE SULFATE 2 MG/ML
1 INJECTION, SOLUTION INTRAMUSCULAR; INTRAVENOUS EVERY 6 HOURS PRN
Status: DISCONTINUED | OUTPATIENT
Start: 2019-09-15 | End: 2019-09-15 | Stop reason: HOSPADM

## 2019-09-15 RX ADMIN — DOBUTAMINE IN DEXTROSE 10 MCG/KG/MIN: 200 INJECTION, SOLUTION INTRAVENOUS at 10:09

## 2019-09-15 RX ADMIN — LORAZEPAM 0.5 MG: 0.5 TABLET ORAL at 02:09

## 2019-09-15 RX ADMIN — ASPIRIN 81 MG 81 MG: 81 TABLET ORAL at 11:09

## 2019-09-15 RX ADMIN — NITROGLYCERIN 0.4 MG: 0.4 TABLET, ORALLY DISINTEGRATING SUBLINGUAL at 12:09

## 2019-09-15 RX ADMIN — LISINOPRIL 5 MG: 5 TABLET ORAL at 12:09

## 2019-09-15 RX ADMIN — LISINOPRIL 10 MG: 10 TABLET ORAL at 11:09

## 2019-09-15 RX ADMIN — ACETAMINOPHEN 650 MG: 325 TABLET ORAL at 12:09

## 2019-09-15 NOTE — NURSING
Notified Dr. Ortiz of pt leaving AMA. She stated she was told in heart center that her stress was negative. I explained to her that I had the MD paged but that we would get her out of here as soon as we can. She had her ride already here. She stated she was not going to wait and asked I remove her IV. Educated pt on the risk of not being evaluated and discharged properly with review of meds. She stated understanding of all possible risk of leaving.

## 2019-09-15 NOTE — H&P
[facility] Medicine  History & Physical    Patient Name: Joslyn Dubon  MRN: [id number]  Admission Date: 9/14/2019  Attending Physician: Jasbir Rutherford MD   Primary Care Provider: Jasbir Graham MD         Patient information was obtained from patient, relative(s) and ER records.     Subjective:     Principal Problem:Chest pain    Chief Complaint:   Chief Complaint   Patient presents with    Chest Pain     with hypertension    Shortness of Breath    Dizziness    Nausea        HPI: 69-year-old female history of COPD without need for inhalers, Martin's esophagus, hypertension, hypothyroidism comes in for chest pain. Squeezing-like chest pain for the past 4 weeks and has been coming to the ED for evaluation however left AGAINST MEDICAL ADVICE due to normal troponins and EKGs. Reports that her  was in the ICU and patient was to concerned about her  to care for herself at that time. Reports that she's been having substernal pressure-like chest pain rating to her neck into her face and feeling like she's having warmth in her face. No exacerbating or alleviating factors. Would occur randomly without cause. Stated that due to stresses from her  she thought it was related to that. Reports that today she continued to have another episode spontaneously and her daughter became concerned and brought her to the ED for further evaluation. Patient is agreeable to having further testing done at this time. Continues to have some chest pain.    In the ED, patient was found to have blood pressure 183/91. Initial trop neg and EKG unremarkable. Reports taking half her blood pressure medication today. Continues to have mild chest pain. Nitroglycerin has not been attempted for the patient yet. Patient received ASA.    Past Medical History:   Diagnosis Date    Martin esophagus     Colitis     COPD (chronic obstructive pulmonary disease)     Thyroid disease     Vocal cord polyps         Past Surgical History:   Procedure Laterality Date    ABDOMINAL AORTIC ANEURYSM REPAIR      BACK SURGERY      cervical    CHOLECYSTECTOMY  12-    Dr Price  [facility]    CHOLECYSTECTOMY, LAPAROSCOPIC N/A 12/20/2013    [facility]    EGD (ESOPHAGOGASTRODUODENOSCOPY) N/A 12/19/2013    [facility] by Lena Smith [facility] at [facility] ENDO    FOOT SURGERY      HYSTERECTOMY      MICROLARYNGOSCOPY N/A 5/10/2016    [facility]    SALIVARY GLAND SURGERY         Review of patient's allergies indicates:   Allergen Reactions    Demerol [meperidine]      Nausea vomiting, visual problem    Tomato (solanum lycopersicum)        No current facility-administered medications on file prior to encounter.      Current Outpatient Medications on File Prior to Encounter   Medication Sig    levothyroxine (SYNTHROID) 75 MCG tablet Take 100 mcg by mouth before breakfast.     lisinopril 10 MG tablet Take 10 mg by mouth once daily.     ranitidine (ZANTAC) 300 MG tablet Take 300 mg by mouth every evening.    amoxicillin-clavulanate 875-125mg (AUGMENTIN) 875-125 mg per tablet Take 1 tablet by mouth 2 (two) times daily. for 10 days    CALCIUM CARBONATE/VITAMIN D3 (CALCIUM 500 + D, D3, ORAL) Take 1 tablet by mouth once daily.    hydrocodone-acetaminophen 10-325mg (NORCO)  mg Tab Take by mouth every 6 (six) hours as needed.     LORazepam (ATIVAN) 0.5 MG tablet lorazepam 0.5 mg tablet   TAKE 1 TABLET TWICE A DAY AS NEEDED FOR ANXIETY    mupirocin (BACTROBAN) 2 % ointment Apply to affected area 3 times daily    omeprazole (PRILOSEC) 40 MG capsule Take 40 mg by mouth once daily.     Family History     None        Tobacco Use    Smoking status: Current Every Day Smoker     Packs/day: 1.00     Types: Cigarettes    Smokeless tobacco: Former User     Quit date: 5/1/2016   Substance and Sexual Activity    Alcohol use: No    Drug use: No    Sexual activity: Yes      Partners: Male     Review of Systems   Constitutional: Negative for chills, fatigue, fever and unexpected weight change.   HENT: Negative for ear pain, rhinorrhea, sneezing and sore throat.    Eyes: Negative for visual disturbance.   Respiratory: Negative for cough, chest tightness and shortness of breath.    Cardiovascular: Negative for chest pain.   Gastrointestinal: Negative for abdominal pain, constipation, diarrhea, nausea and vomiting.   Endocrine: Negative for polyuria.   Genitourinary: Negative for dysuria and hematuria.   Neurological: Negative for seizures and headaches.     Objective:     Vital Signs (Most Recent):  Temp: 98.2 °F (36.8 °C) (09/14/19 2207)  Pulse: 78 (09/14/19 2330)  Resp: (!) 21 (09/14/19 2330)  BP: (!) 184/99 (09/14/19 2330)  SpO2: 100 % (09/14/19 2330) Vital Signs (24h Range):  Temp:  [98.2 °F (36.8 °C)] 98.2 °F (36.8 °C)  Pulse:  [67-78] 78  Resp:  [17-21] 21  SpO2:  [96 %-100 %] 100 %  BP: (171-184)/(90-99) 184/99     Weight: 62.1 kg (137 lb)  Body mass index is 25.06 kg/m².    Physical Exam   Constitutional: She is oriented to person, place, and time. She appears well-developed and well-nourished. No distress.   HENT:   Head: Normocephalic and atraumatic.   Right Ear: External ear normal.   Left Ear: External ear normal.   Nose: Nose normal.   Mouth/Throat: Oropharynx is clear and moist. No oropharyngeal exudate.   Eyes: Pupils are equal, round, and reactive to light. Conjunctivae and EOM are normal. Right eye exhibits no discharge. Left eye exhibits no discharge. No scleral icterus.   Neck: Normal range of motion. Neck supple. No thyromegaly present.   Cardiovascular: Normal rate, regular rhythm, normal heart sounds and intact distal pulses. Exam reveals no gallop and no friction rub.   No murmur heard.  Pulmonary/Chest: Effort normal and breath sounds normal. No respiratory distress. She has no wheezes. She has no rales. She exhibits no tenderness.   Abdominal: Soft. Bowel sounds  are normal. She exhibits no distension and no mass. There is no tenderness. There is no rebound and no guarding. No hernia.   Musculoskeletal: Normal range of motion. She exhibits no edema or tenderness.   Lymphadenopathy:     She has no cervical adenopathy.   Neurological: She is alert and oriented to person, place, and time.   Skin: Skin is warm. She is not diaphoretic.   Psychiatric: She has a normal mood and affect. Her behavior is normal. Judgment and thought content normal.   Anxious appearing   Nursing note and vitals reviewed.        CRANIAL NERVES     CN III, IV, VI   Pupils are equal, round, and reactive to light.  Extraocular motions are normal.        Significant Labs:   CBC:   Recent Labs   Lab 09/14/19 2238   WBC 5.73   HGB 14.3   HCT 43.1        CMP:   Recent Labs   Lab 09/14/19 2238      K 3.7      CO2 25   GLU 92   BUN 15   CREATININE 0.8   CALCIUM 9.2   PROT 7.4   ALBUMIN 3.8   BILITOT 0.6   ALKPHOS 73   AST 19   ALT 14   ANIONGAP 9   EGFRNONAA >60.0     Cardiac Markers:   Recent Labs   Lab 09/14/19 2238   BNP 28     Troponin:   Recent Labs   Lab 09/14/19 2238   TROPONINI <0.030     EKG  My personal interpretation: No acute ST changes noted.    Assessment/Plan:     Active Hospital Problems    Diagnosis  POA    *Chest pain [R07.9]  Yes    Tobacco abuse [Z72.0]  Yes     Chronic    Hypertension [I10]  Yes     Chronic    COPD (chronic obstructive pulmonary disease) [J44.9]  Yes     Chronic    Thyroid disease [E07.9]  Yes     Chronic      Resolved Hospital Problems   No resolved problems to display.       * Chest pain  trend CE  ASA, statin  lipid, HbA1C  stress test in AM   home meds    Tobacco abuse  - 1/2 PPD, since 15 yo. 30 pack years  - cessation counseled      COPD (chronic obstructive pulmonary disease)  - comfortable on room air  - no inhalers at home      VTE Risk Mitigation (From admission, onward)        Ordered     enoxaparin injection 40 mg  Every 12 hours       09/14/19 2344     IP VTE HIGH RISK PATIENT  Once      09/14/19 2344             Jasbir Rutherford MD  Department of Hospital Medicine   [facility]  Date of service: 09/15/2019 12:05 AM

## 2019-09-15 NOTE — ED NOTES
Hospitalist Dr. Rutherford at  Bedside for exam and admission.  Pt remains very anxious.  C/o left chest pain 6/10 that now radiates to back.  SR on monitor.   Resps regular and not labored

## 2019-09-15 NOTE — ED PROVIDER NOTES
Encounter Date: 9/14/2019       History     Chief Complaint   Patient presents with    Chest Pain     with hypertension    Shortness of Breath    Dizziness    Nausea     HPI     Seen and evaluated.  Presented with a chief complaint of chest pain. Has not had a recent stress test.  Reports pain is substernal pressure and radiates up to her neck.  Symptoms of moderate persistent ongoing.  No alleviating or exacerbating factors.  No associated diaphoresis nausea vomiting.    Review of patient's allergies indicates:   Allergen Reactions    Demerol [meperidine]      Nausea vomiting, visual problem    Tomato (solanum lycopersicum)      Past Medical History:   Diagnosis Date    Martin esophagus     Colitis     COPD (chronic obstructive pulmonary disease)     Thyroid disease     Vocal cord polyps      Past Surgical History:   Procedure Laterality Date    ABDOMINAL AORTIC ANEURYSM REPAIR      BACK SURGERY      cervical    [facility]LECYST[facility]  12-    [facility]  [facility]    CHOLECYSTECTOMY, LAPAROSCOPIC N/A 12/20/2013    [facility] at [facility] OR    EGD (ESOPHAGOGASTRODUODENOSCOPY) N/A 12/19/2013    Performed by Lena Smith [facility] at [facility] ENDO    FOOT SURGERY      HYSTERECTOMY      MICROLARYNGOSCOPY N/A 5/10/2016    [facility] at [facility] OR    SALIVARY GLAND SURGERY       History reviewed. No pertinent family history.  Social History     Tobacco Use    Smoking status: Current Every Day Smoker     Packs/day: 1.00     Types: Cigarettes    Smokeless tobacco: Former User     Quit date: 5/1/2016   Substance Use Topics    Alcohol use: No    Drug use: No     Review of Systems   Constitutional: Negative for fever.   HENT: Negative for sore throat.    Respiratory: Negative for shortness of breath.    Cardiovascular: Negative for chest pain.   Gastrointestinal: Negative for nausea.   Genitourinary: Negative for dysuria.   Musculoskeletal: Negative for back pain.   Skin: Negative  for rash.   Neurological: Negative for weakness.   Hematological: Does not bruise/bleed easily.   All other systems reviewed and are negative.      Physical Exam     Initial Vitals [09/14/19 2207]   BP Pulse Resp Temp SpO2   (!) 183/91 67 20 98.2 °F (36.8 °C) 97 %      MAP       --         Physical Exam    Nursing note and vitals reviewed.  Constitutional: Vital signs are normal. She appears well-developed and well-nourished.   HENT:   Head: Normocephalic and atraumatic.   Eyes: Conjunctivae are normal.   Neck: Neck supple.   Cardiovascular: Normal rate and regular rhythm.   Pulmonary/Chest: Breath sounds normal. No respiratory distress.   Abdominal: Soft. Normal appearance.   Musculoskeletal: Normal range of motion.   Neurological: She is alert and oriented to person, place, and time.   Skin: Skin is warm and dry.   Psychiatric: She has a normal mood and affect.         ED Course   Procedures  Labs Reviewed   CBC W/ AUTO DIFFERENTIAL - Abnormal; Notable for the following components:       Result Value    Mean Corpuscular Hemoglobin 31.6 (*)     All other components within normal limits   URINALYSIS - Abnormal; Notable for the following components:    Color, UA Colorless (*)     Specific [place], UA 1.000 (*)     Occult Blood UA 1+ (*)     All other components within normal limits   URINALYSIS MICROSCOPIC - Abnormal; Notable for the following components:    RBC, UA 5 (*)     All other components within normal limits   COMPREHENSIVE METABOLIC PANEL   B-TYPE NATRIURETIC PEPTIDE   TROPONIN I   PROTIME-INR        ECG Results          EKG 12-lead (In process)  Result time 09/15/19 07:56:12    In process by Interface, Lab In [facility] (09/15/19 07:56:12)                 Narrative:    Test Reason : R07.9,    Vent. Rate : 067 BPM     Atrial Rate : 067 BPM     P-R Int : 156 ms          QRS Dur : 074 ms      QT Int : 408 ms       P-R-T Axes : 055 023 061 degrees     QTc Int : 431 ms    Normal sinus rhythm  Possible Left atrial  enlargement  Nonspecific T wave abnormality  Abnormal ECG  When compared with ECG of 10-AUG-2019 16:53,  Criteria for Inferior infarct are no longer Present    Referred By: [email or web address]   SELF           Confirmed By:                             Imaging Results          X-Ray Chest AP Portable (Final result)  Result time 09/15/19 05:40:42    Final result by Willian Barroso MD (09/15/19 05:40:42)                 Impression:      1. No acute radiographic abnormalities.      Electronically signed by: Willian Barroso MD  Date:    09/15/2019  Time:    05:40             Narrative:    EXAMINATION:  XR CHEST AP PORTABLE    CLINICAL HISTORY:  Chest pain    COMPARISON:  August 10, 2019    FINDINGS:  Heart size is normal.  The mediastinum is unremarkable.  The lungs are clear.                                 Medical Decision Making:   Initial Assessment:   Seen and evaluated.  Presented with chief complaint of chest pain.  Would have moderate to high heart score.  Considered observation given right cardiovascular disease risk.  Discussed care with hospital medicine they will observe the patient for further evaluation and treatment.  Considering stress test as well.                       Clinical Impression:       ICD-10-CM ICD-9-CM   1. Chest pain R07.9 786.50                                Tacos Esqueda Jr., MD  09/15/19 1758

## 2019-09-15 NOTE — ED NOTES
C/o left chest pressure 3/10.  Began at 7 pm tonight.   Does not radiate.  Denies SOB or nausea.  SR on monitor.  Color pink, warm, dry.  Resps regular and not labored.   Is anxious.  Family member at bedside

## 2019-09-15 NOTE — HPI
69-year-old female history of COPD without need for inhalers, Martin's esophagus, hypertension, hypothyroidism comes in for chest pain. Squeezing-like chest pain for the past 4 weeks and has been coming to the ED for evaluation however left AGAINST MEDICAL ADVICE due to normal troponins and EKGs. Reports that her  was in the ICU and patient was to concerned about her  to care for herself at that time. Reports that she's been having substernal pressure-like chest pain rating to her neck into her face and feeling like she's having warmth in her face. No exacerbating or alleviating factors. Would occur randomly without cause. Stated that due to stresses from her  she thought it was related to that. Reports that today she continued to have another episode spontaneously and her daughter became concerned and brought her to the ED for further evaluation. Patient is agreeable to having further testing done at this time. Continues to have some chest pain.    In the ED, patient was found to have blood pressure 183/91. Initial trop neg and EKG unremarkable. Reports taking half her blood pressure medication today. Continues to have mild chest pain. Nitroglycerin has not been attempted for the patient yet. Patient received ASA.

## 2019-09-15 NOTE — DISCHARGE SUMMARY
[facility] Medicine  Discharge Summary      Patient Name: Joslyn Dubon  MRN: [id number]  Admission Date: 9/14/2019  Hospital Length of Stay: 0 days  Discharge Date and Time: 9/15/2019 12:29 PM  Attending Physician: No att. providers found   Discharging Provider: Tara Ortiz DO  Primary Care Provider: Jasbir Graham MD      HPI:   69-year-old female history of COPD without need for inhalers, Martin's esophagus, hypertension, hypothyroidism comes in for chest pain. Squeezing-like chest pain for the past 4 weeks and has been coming to the ED for evaluation however left AGAINST MEDICAL ADVICE due to normal troponins and EKGs. Reports that her  was in the ICU and patient was to concerned about her  to care for herself at that time. Reports that she's been having substernal pressure-like chest pain rating to her neck into her face and feeling like she's having warmth in her face. No exacerbating or alleviating factors. Would occur randomly without cause. Stated that due to stresses from her  she thought it was related to that. Reports that today she continued to have another episode spontaneously and her daughter became concerned and brought her to the ED for further evaluation. Patient is agreeable to having further testing done at this time. Continues to have some chest pain.    In the ED, patient was found to have blood pressure 183/91. Initial trop neg and EKG unremarkable. Reports taking half her blood pressure medication today. Continues to have mild chest pain. Nitroglycerin has not been attempted for the patient yet. Patient received ASA.      Hospital Course:   Patient was admitted for chest pain workup.  Troponins and EKGs negative.  Patient was seen in the morning prior to stress test.  She endorsed anxiety about 's medical condition but had no other complaints.  And denied chest pain, chest pressure, palpitation, shortness of breath, nausea,  "vomiting, diaphoresis.  Patient then was taken for stress echo.  She left the hospital against medical advice (AMA) before negative stress echo findings could be discussed with patient.    Exam on day of discharge.  BP (!) 143/83   Pulse (!) 58   Temp 97.5 °F (36.4 °C) (Oral)   Resp 18   Ht 5' 2" (1.575 m)   Wt 61.7 kg (136 lb 0.4 oz)   SpO2 98%   Breastfeeding? No   BMI 24.88 kg/m²   Gen: nad, afvss  CV: rrr no mrg  Resp: CTA B/l  AB: +bs soft ntnd  Skin: dry, warm    Consults:   Consults (From admission, onward)        Status Ordering Provider     Inpatient consult to Hospitalist  Once     Provider:  Jasbir Rutherford MD    Acknowledged MASOUD VARGAS JR          No new Assessment & Plan notes have been filed under this hospital service since the last note was generated.  Service: Hospital Medicine    Final Active Diagnoses:    Diagnosis Date Noted POA    PRINCIPAL PROBLEM:  Chest pain [R07.9] 09/23/2013 Yes    Tobacco abuse [Z72.0] 09/15/2019 Yes     Chronic    Hypertension [I10] 09/15/2019 Yes     Chronic    COPD (chronic obstructive pulmonary disease) [J44.9] 09/14/2019 Yes     Chronic    Thyroid disease [E07.9] 09/14/2019 Yes     Chronic      Problems Resolved During this Admission:       Discharged Condition: against medical advice    Disposition: Left Against Medical Adv*    Follow Up:  Follow-up Information     Call Jasbir Graham MD.    Specialty:  Family Medicine  Contact information:  [address]  [address]  [place] [address]  [phone]                 Patient Instructions:   No discharge procedures on file.    Significant Diagnostic Studies: Labs:   Troponin   Recent Labs   Lab 09/15/19  0949   TROPONINI <0.030    and All labs within the past 24 hours have been reviewed    Pending Diagnostic Studies:     Procedure Component Value Units Date/Time    Hemoglobin A1c [[id number]] Collected:  09/15/19 0346    Order Status:  Sent Lab Status:  In process " Updated:  09/15/19 0428    Specimen:  Blood          Medications:  Reconciled Home Medications:      Medication List      CONTINUE taking these medications    amoxicillin-clavulanate 875-125mg 875-125 mg per tablet  Commonly known as:  AUGMENTIN  Take 1 tablet by mouth 2 (two) times daily. for 10 days     CALCIUM 500 + D (D3) ORAL  Take 1 tablet by mouth once daily.     HYDROcodone-acetaminophen  mg per tablet  Commonly known as:  NORCO  Take by mouth every 6 (six) hours as needed.     levothyroxine 75 MCG tablet  Commonly known as:  SYNTHROID  Take 100 mcg by mouth before breakfast.     lisinopril 10 MG tablet  Take 10 mg by mouth once daily.     LORazepam 0.5 MG tablet  Commonly known as:  ATIVAN  lorazepam 0.5 mg tablet   TAKE 1 TABLET TWICE A DAY AS NEEDED FOR ANXIETY     mupirocin 2 % ointment  Commonly known as:  BACTROBAN  Apply to affected area 3 times daily     omeprazole 40 MG capsule  Commonly known as:  PRILOSEC  Take 40 mg by mouth once daily.     ranitidine 300 MG tablet  Commonly known as:  ZANTAC  Take 300 mg by mouth every evening.            Indwelling Lines/Drains at time of discharge:   Lines/Drains/Airways          None          Time spent on the discharge of patient: 10 minutes  Patient was seen and examined on the date of discharge and determined to be suitable for discharge.         Tara Ortiz DO  Department of Hospital Medicine  [facility]

## 2019-09-15 NOTE — SUBJECTIVE & OBJECTIVE
Past Medical History:   Diagnosis Date    Martin esophagus     Colitis     COPD (chronic obstructive pulmonary disease)     Thyroid disease     Vocal cord polyps        Past Surgical History:   Procedure Laterality Date    ABDOMINAL AORTIC ANEURYSM REPAIR      BACK SURGERY      cervical    CHOLECYSTECTOMY  12-    Dr Albert ROSENT[facility]    CHOLECYSTECTOMY, LAPAROSCOPIC N/A 12/20/2013    Performed by Marcio Price [facility] at [facility] OR    EGD (ESOPHAGOGASTRODUODENOSCOPY) N/A 12/19/2013    Performed by Lena Smith MD at [facility] ENDO    FOOT SURGERY      HYSTERECTOMY      MICROLARYNGOSCOPY N/A 5/10/2016    [facility]    SALIVARY GLAND SURGERY         Review of patient's allergies indicates:   Allergen Reactions    Demerol [meperidine]      Nausea vomiting, visual problem    Tomato (solanum lycopersicum)        No current facility-administered medications on file prior to encounter.      Current Outpatient Medications on File Prior to Encounter   Medication Sig    levothyroxine (SYNTHROID) 75 MCG tablet Take 100 mcg by mouth before breakfast.     lisinopril 10 MG tablet Take 10 mg by mouth once daily.     ranitidine (ZANTAC) 300 MG tablet Take 300 mg by mouth every evening.    amoxicillin-clavulanate 875-125mg (AUGMENTIN) 875-125 mg per tablet Take 1 tablet by mouth 2 (two) times daily. for 10 days    CALCIUM CARBONATE/VITAMIN D3 (CALCIUM 500 + D, D3, ORAL) Take 1 tablet by mouth once daily.    hydrocodone-acetaminophen 10-325mg (NORCO)  mg Tab Take by mouth every 6 (six) hours as needed.     LORazepam (ATIVAN) 0.5 MG tablet lorazepam 0.5 mg tablet   TAKE 1 TABLET TWICE A DAY AS NEEDED FOR ANXIETY    mupirocin (BACTROBAN) 2 % ointment Apply to affected area 3 times daily    omeprazole (PRILOSEC) 40 MG capsule Take 40 mg by mouth once daily.     Family History     None        Tobacco Use    Smoking status: Current Every Day Smoker     Packs/day: 1.00     Types:  Cigarettes    Smokeless tobacco: Former User     Quit date: 5/1/2016   Substance and Sexual Activity    Alcohol use: No    Drug use: No    Sexual activity: Yes     Partners: Male     Review of Systems   Constitutional: Negative for chills, fatigue, fever and unexpected weight change.   HENT: Negative for ear pain, rhinorrhea, sneezing and sore throat.    Eyes: Negative for visual disturbance.   Respiratory: Negative for cough, chest tightness and shortness of breath.    Cardiovascular: Negative for chest pain.   Gastrointestinal: Negative for abdominal pain, constipation, diarrhea, nausea and vomiting.   Endocrine: Negative for polyuria.   Genitourinary: Negative for dysuria and hematuria.   Neurological: Negative for seizures and headaches.     Objective:     Vital Signs (Most Recent):  Temp: 98.2 °F (36.8 °C) (09/14/19 2207)  Pulse: 78 (09/14/19 2330)  Resp: (!) 21 (09/14/19 2330)  BP: (!) 184/99 (09/14/19 2330)  SpO2: 100 % (09/14/19 2330) Vital Signs (24h Range):  Temp:  [98.2 °F (36.8 °C)] 98.2 °F (36.8 °C)  Pulse:  [67-78] 78  Resp:  [17-21] 21  SpO2:  [96 %-100 %] 100 %  BP: (171-184)/(90-99) 184/99     Weight: 62.1 kg (137 lb)  Body mass index is 25.06 kg/m².    Physical Exam   Constitutional: She is oriented to person, place, and time. She appears well-developed and well-nourished. No distress.   HENT:   Head: Normocephalic and atraumatic.   Right Ear: External ear normal.   Left Ear: External ear normal.   Nose: Nose normal.   Mouth/Throat: Oropharynx is clear and moist. No oropharyngeal exudate.   Eyes: Pupils are equal, round, and reactive to light. Conjunctivae and EOM are normal. Right eye exhibits no discharge. Left eye exhibits no discharge. No scleral icterus.   Neck: Normal range of motion. Neck supple. No thyromegaly present.   Cardiovascular: Normal rate, regular rhythm, normal heart sounds and intact distal pulses. Exam reveals no gallop and no friction rub.   No murmur  heard.  Pulmonary/Chest: Effort normal and breath sounds normal. No respiratory distress. She has no wheezes. She has no rales. She exhibits no tenderness.   Abdominal: Soft. Bowel sounds are normal. She exhibits no distension and no mass. There is no tenderness. There is no rebound and no guarding. No hernia.   Musculoskeletal: Normal range of motion. She exhibits no edema or tenderness.   Lymphadenopathy:     She has no cervical adenopathy.   Neurological: She is alert and oriented to person, place, and time.   Skin: Skin is warm. She is not diaphoretic.   Psychiatric: She has a normal mood and affect. Her behavior is normal. Judgment and thought content normal.   Anxious appearing   Nursing note and vitals reviewed.        CRANIAL NERVES     CN III, IV, VI   Pupils are equal, round, and reactive to light.  Extraocular motions are normal.        Significant Labs:   CBC:   Recent Labs   Lab 09/14/19 2238   WBC 5.73   HGB 14.3   HCT 43.1        CMP:   Recent Labs   Lab 09/14/19 2238      K 3.7      CO2 25   GLU 92   BUN 15   CREATININE 0.8   CALCIUM 9.2   PROT 7.4   ALBUMIN 3.8   BILITOT 0.6   ALKPHOS 73   AST 19   ALT 14   ANIONGAP 9   EGFRNONAA >60.0     Cardiac Markers:   Recent Labs   Lab 09/14/19 2238   BNP 28     Troponin:   Recent Labs   Lab 09/14/19 2238   TROPONINI <0.030

## 2019-09-15 NOTE — NURSING NOTE
Pt attempted to walk on treadmill for stress echo, unable to achieve target hr due to right foot pain and dizziness; converted test to dobutamine stress echo

## 2019-09-15 NOTE — PLAN OF CARE
09/15/19 0732   Patient Assessment/Suction   Level of Consciousness (AVPU) alert   Respiratory Effort Normal;Unlabored   All Lung Fields Breath Sounds clear   PRE-TX-O2   O2 Device (Oxygen Therapy) room air   SpO2 98 %   Pulse Oximetry Type Intermittent   $ Pulse Oximetry - Multiple Charge Pulse Oximetry - Multiple   Pulse (!) 58   Resp 18   Aerosol Therapy   $ Aerosol Therapy Charges PRN treatment not required   Respiratory Treatment Status (SVN) PRN treatment not required

## 2019-09-16 LAB
ESTIMATED AVG GLUCOSE: 105 MG/DL (ref 68–131)
HBA1C MFR BLD HPLC: 5.3 % (ref 4.5–6.2)

## 2019-09-17 ENCOUNTER — CLINICAL SUPPORT (OUTPATIENT)
Dept: URGENT CARE | Facility: CLINIC | Age: 69
End: 2019-09-17
Payer: MEDICARE

## 2019-09-17 VITALS
HEART RATE: 77 BPM | RESPIRATION RATE: 18 BRPM | DIASTOLIC BLOOD PRESSURE: 103 MMHG | WEIGHT: 137 LBS | OXYGEN SATURATION: 94 % | HEIGHT: 62 IN | TEMPERATURE: 98 F | BODY MASS INDEX: 25.21 KG/M2 | SYSTOLIC BLOOD PRESSURE: 176 MMHG

## 2019-09-17 DIAGNOSIS — L03.114 CELLULITIS OF LEFT UPPER ARM: Primary | ICD-10-CM

## 2019-09-17 PROCEDURE — 99214 PR OFFICE/OUTPT VISIT, EST, LEVL IV, 30-39 MIN: ICD-10-PCS | Mod: S$GLB,,, | Performed by: NURSE PRACTITIONER

## 2019-09-17 PROCEDURE — 99214 OFFICE O/P EST MOD 30 MIN: CPT | Mod: S$GLB,,, | Performed by: NURSE PRACTITIONER

## 2019-09-17 RX ORDER — SULFAMETHOXAZOLE AND TRIMETHOPRIM 800; 160 MG/1; MG/1
1 TABLET ORAL 2 TIMES DAILY
Qty: 14 TABLET | Refills: 0 | Status: SHIPPED | OUTPATIENT
Start: 2019-09-17 | End: 2019-09-24

## 2019-09-17 NOTE — PROGRESS NOTES
"Subjective:       Patient ID: Joslyn Dubon is a 69 y.o. female.    Vitals:  height is 5' 2" (1.575 m) and weight is 62.1 kg (137 lb). Her oral temperature is 97.6 °F (36.4 °C). Her blood pressure is 176/103 (abnormal) and her pulse is 77. Her respiration is 18 and oxygen saturation is 94% (abnormal).     Chief Complaint: Rash    Pt reports getting a tetanus shot last week and noticed the redness and swelling today    Rash   This is a new problem. The current episode started today. The problem has been gradually worsening since onset. The rash is characterized by redness and swelling. She was exposed to nothing. Pertinent negatives include no congestion, cough, diarrhea, fatigue, fever, shortness of breath, sore throat or vomiting. Past treatments include nothing. The treatment provided no relief.       Constitution: Negative for chills, fatigue and fever.   HENT: Negative for congestion and sore throat.    Neck: Negative for painful lymph nodes.   Cardiovascular: Negative for chest pain and leg swelling.   Eyes: Negative for double vision and blurred vision.   Respiratory: Negative for cough and shortness of breath.    Gastrointestinal: Negative for nausea, vomiting and diarrhea.   Genitourinary: Negative for dysuria, frequency, urgency and history of kidney stones.   Musculoskeletal: Negative for joint pain, joint swelling, muscle cramps and muscle ache.   Skin: Positive for rash and erythema. Negative for color change, pale and bruising.   Allergic/Immunologic: Negative for seasonal allergies.   Neurological: Negative for dizziness, history of vertigo, light-headedness, passing out and headaches.   Hematologic/Lymphatic: Negative for swollen lymph nodes.   Psychiatric/Behavioral: Negative for nervous/anxious, sleep disturbance and depression. The patient is not nervous/anxious.        Objective:      Physical Exam   Constitutional: She is oriented to person, place, and time. She appears well-developed and " well-nourished. She is cooperative.  Non-toxic appearance. She does not appear ill. No distress.   HENT:   Head: Normocephalic and atraumatic.   Right Ear: Hearing, tympanic membrane, external ear and ear canal normal.   Left Ear: Hearing, tympanic membrane, external ear and ear canal normal.   Nose: Nose normal. No mucosal edema, rhinorrhea or nasal deformity. No epistaxis. Right sinus exhibits no maxillary sinus tenderness and no frontal sinus tenderness. Left sinus exhibits no maxillary sinus tenderness and no frontal sinus tenderness.   Mouth/Throat: Uvula is midline, oropharynx is clear and moist and mucous membranes are normal. No trismus in the jaw. Normal dentition. No uvula swelling. No posterior oropharyngeal erythema.   Eyes: Conjunctivae and lids are normal. Right eye exhibits no discharge. Left eye exhibits no discharge. No scleral icterus.   Sclera clear bilat   Neck: Trachea normal, normal range of motion, full passive range of motion without pain and phonation normal. Neck supple.   Cardiovascular: Normal rate, regular rhythm, normal heart sounds, intact distal pulses and normal pulses.   Pulmonary/Chest: Effort normal and breath sounds normal. No respiratory distress.   Abdominal: Soft. Normal appearance and bowel sounds are normal. She exhibits no distension, no pulsatile midline mass and no mass. There is no tenderness.   Musculoskeletal: Normal range of motion. She exhibits no edema or deformity.   Neurological: She is alert and oriented to person, place, and time. She exhibits normal muscle tone. Coordination normal.   Skin: Skin is warm, dry and intact. She is not diaphoretic. There is erythema. No pallor.        Psychiatric: She has a normal mood and affect. Her speech is normal and behavior is normal. Judgment and thought content normal. Cognition and memory are normal.   Nursing note and vitals reviewed.      Assessment:       1. Cellulitis of left upper arm        Plan:         Cellulitis  of left upper arm    Other orders  -     sulfamethoxazole-trimethoprim 800-160mg (BACTRIM DS) 800-160 mg Tab; Take 1 tablet by mouth 2 (two) times daily. for 7 days  Dispense: 14 tablet; Refill: 0

## 2019-09-18 NOTE — PATIENT INSTRUCTIONS
Cellulitis  Cellulitis is an infection of the deep layers of skin. A break in the skin, such as a cut or scratch, can let bacteria under the skin. If the bacteria get to deep layers of the skin, it can be serious. If not treated, cellulitis can get into the bloodstream and lymph nodes. The infection can then spread throughout the body. This causes serious illness.  Cellulitis causes the affected skin to become red, swollen, warm, and sore. The reddened areas have a visible border. An open sore may leak fluid (pus). You may have a fever, chills, and pain.  Cellulitis is treated with antibiotics taken for 7 to 10 days. An open sore may be cleaned and covered with cool wet gauze. Symptoms should get better 1 to 2 days after treatment is started. Make sure to take all the antibiotics for the full number of days until they are gone. Keep taking the medicine even if your symptoms go away.  Home care  Follow these tips:  · Limit the use of the part of your body with cellulitis.   · If the infection is on your leg, keep your leg raised while sitting. This will help to reduce swelling.  · Take all of the antibiotic medicine exactly as directed until it is gone. Do not miss any doses, especially during the first 7 days. Dont stop taking the medicine when your symptoms get better.  · Keep the affected area clean and dry.  · Wash your hands with soap and warm water before and after touching your skin. Anyone else who touches your skin should also wash his or her hands. Don't share towels.  Follow-up care  Follow up with your healthcare provider, or as advised. If your infection does not go away on the first antibiotic, your healthcare provider will prescribe a different one.  When to seek medical advice  Call your healthcare provider right away if any of these occur:  · Red areas that spread  · Swelling or pain that gets worse  · Fluid leaking from the skin (pus)  · Fever higher of 100.4º F (38.0º C) or higher after 2 days  on antibiotics  Date Last Reviewed: 9/1/2016  © 7383-0720 [employer] [address] [place] [address], [place] [address]. All rights reserved. This information is not intended as a substitute for professional medical care. Always follow your healthcare professional's instructions.

## 2019-09-19 ENCOUNTER — CLINICAL SUPPORT (OUTPATIENT)
Dept: CARDIAC REHAB | Facility: HOSPITAL | Age: 69
End: 2019-09-19
Payer: MEDICARE

## 2019-09-19 ENCOUNTER — OFFICE VISIT (OUTPATIENT)
Dept: FAMILY MEDICINE | Facility: CLINIC | Age: 69
End: 2019-09-19
Payer: MEDICARE

## 2019-09-19 VITALS
HEART RATE: 68 BPM | BODY MASS INDEX: 25.86 KG/M2 | SYSTOLIC BLOOD PRESSURE: 142 MMHG | DIASTOLIC BLOOD PRESSURE: 86 MMHG | OXYGEN SATURATION: 97 % | WEIGHT: 137 LBS | HEIGHT: 61 IN

## 2019-09-19 DIAGNOSIS — R07.9 CHEST PAIN, UNSPECIFIED TYPE: Primary | ICD-10-CM

## 2019-09-19 DIAGNOSIS — F41.9 ANXIETY: ICD-10-CM

## 2019-09-19 DIAGNOSIS — I10 ESSENTIAL HYPERTENSION: Chronic | ICD-10-CM

## 2019-09-19 DIAGNOSIS — E03.9 ACQUIRED HYPOTHYROIDISM: ICD-10-CM

## 2019-09-19 DIAGNOSIS — J44.9 COPD (CHRONIC OBSTRUCTIVE PULMONARY DISEASE): Primary | ICD-10-CM

## 2019-09-19 PROCEDURE — 99496 TRANSITIONAL CARE MANAGE SERVICE 7 DAY DISCHARGE: ICD-10-PCS | Mod: S$GLB,,, | Performed by: NURSE PRACTITIONER

## 2019-09-19 PROCEDURE — 1111F PR DISCHARGE MEDS RECONCILED W/ CURRENT OUTPATIENT MED LIST: ICD-10-PCS | Mod: S$GLB,,, | Performed by: NURSE PRACTITIONER

## 2019-09-19 PROCEDURE — 99496 TRANSJ CARE MGMT HIGH F2F 7D: CPT | Mod: S$GLB,,, | Performed by: NURSE PRACTITIONER

## 2019-09-19 PROCEDURE — G0424 PULMONARY REHAB W EXER: HCPCS

## 2019-09-19 PROCEDURE — 1111F DSCHRG MED/CURRENT MED MERGE: CPT | Mod: S$GLB,,, | Performed by: NURSE PRACTITIONER

## 2019-09-19 RX ORDER — LEVOTHYROXINE SODIUM 100 UG/1
100 TABLET ORAL
Qty: 90 TABLET | Refills: 1 | Status: SHIPPED | OUTPATIENT
Start: 2019-09-19 | End: 2019-10-16 | Stop reason: SDUPTHER

## 2019-09-19 RX ORDER — LEVOTHYROXINE SODIUM 100 UG/1
TABLET ORAL
COMMUNITY
End: 2019-09-19 | Stop reason: SDUPTHER

## 2019-09-19 RX ORDER — LORAZEPAM 0.5 MG/1
0.5 TABLET ORAL EVERY 12 HOURS PRN
Qty: 60 TABLET | Refills: 1 | Status: SHIPPED | OUTPATIENT
Start: 2019-09-19 | End: 2021-03-04 | Stop reason: SDUPTHER

## 2019-09-19 NOTE — PROGRESS NOTES
SUBJECTIVE:    Patient ID: Joslyn Dubon is a 69 y.o. female.    Chief Complaint: Hospital Follow Up (Hypertension)    Pt here for ER f/u- presented to [facility] ER on 9/14/19 with c/o's chest pain/tightness- this was actually the 3rd time she'd been to ER with similar c/o's however previous times she left AMA after admission was recommended. BP was elevated on admit and pt was c/oing of left upper chest tightness. This time she agreed to stay overnight and underwent stress test with Dr. Goodman which was negative. Actually left 9/15/19 before hospitalist could write discharge orders. No new meds on discharge.  Reports since discharge no further left CP- thinks it was GI related. States she's been compliant with taking her lisinopril. Her  has been attending cardiac rehab after recent MI/cardiac arrest and pt was talking with nurse at cardiac rehab- they have now gotten her approved for cardiac rehab which she just started. Reports her BP has improved after she exercises at rehab.  Reports few days ago developed redness/swelling to left upper arm- went to urgent care and diagnosed with early cellulitis- started on bactrim and reports redness/swelling has improved. No f/c or pain at site.  Admits still under a lot of stress and worry regarding 's illness- asking for refill of ativan to use prn.  Still smoking, about 1/3ppd-  has quit smoking but she's been unable to quit d/t her stress level      Admission on 09/14/2019, Discharged on 09/15/2019   Component Date Value Ref Range Status    WBC 09/14/2019 5.73  3.90 - 12.70 K/uL Final    RBC 09/14/2019 4.52  4.00 - 5.40 M/uL Final    Hemoglobin 09/14/2019 14.3  12.0 - 16.0 g/dL Final    Hematocrit 09/14/2019 43.1  37.0 - 48.5 % Final    Mean Corpuscular Volume 09/14/2019 95  82 - 98 fL Final    Mean Corpuscular Hemoglobin 09/14/2019 31.6* 27.0 - 31.0 pg Final    Mean Corpuscular Hemoglobin Conc 09/14/2019 33.2  32.0 - 36.0 g/dL Final    RDW  09/14/2019 13.3  11.5 - 14.5 % Final    Platelets 09/14/2019 171  150 - 350 K/uL Final    MPV 09/14/2019 9.4  9.2 - 12.9 fL Final    Immature Granulocytes 09/14/2019 0.2  0.0 - 0.5 % Final    Gran # (ANC) 09/14/2019 3.1  1.8 - 7.7 K/uL Final    Immature Grans (Abs) 09/14/2019 0.01  0.00 - 0.04 K/uL Final    Lymph # 09/14/2019 1.9  1.0 - 4.8 K/uL Final    Mono # 09/14/2019 0.5  0.3 - 1.0 K/uL Final    Eos # 09/14/2019 0.1  0.0 - 0.5 K/uL Final    Baso # 09/14/2019 0.03  0.00 - 0.20 K/uL Final    nRBC 09/14/2019 0  0 /100 WBC Final    Gran% 09/14/2019 54.3  38.0 - 73.0 % Final    Lymph% 09/14/2019 33.3  18.0 - 48.0 % Final    Mono% 09/14/2019 9.4  4.0 - 15.0 % Final    Eosinophil% 09/14/2019 2.3  0.0 - 8.0 % Final    Basophil% 09/14/2019 0.5  0.0 - 1.9 % Final    Differential Method 09/14/2019 Automated   Final    Sodium 09/14/2019 140  136 - 145 mmol/L Final    Potassium 09/14/2019 3.7  3.5 - 5.1 mmol/L Final    Chloride 09/14/2019 106  95 - 110 mmol/L Final    CO2 09/14/2019 25  23 - 29 mmol/L Final    Glucose 09/14/2019 92  70 - 110 mg/dL Final    BUN, Bld 09/14/2019 15  8 - 23 mg/dL Final    Creatinine 09/14/2019 0.8  0.5 - 1.4 mg/dL Final    Calcium 09/14/2019 9.2  8.7 - 10.5 mg/dL Final    Total Protein 09/14/2019 7.4  6.0 - 8.4 g/dL Final    Albumin 09/14/2019 3.8  3.5 - 5.2 g/dL Final    Total Bilirubin 09/14/2019 0.6  0.1 - 1.0 mg/dL Final    Alkaline Phosphatase 09/14/2019 73  55 - 135 U/L Final    AST 09/14/2019 19  10 - 40 U/L Final    ALT 09/14/2019 14  10 - 44 U/L Final    Anion Gap 09/14/2019 9  8 - 16 mmol/L Final    eGFR if African American 09/14/2019 >60.0  >60 mL/min/1.73 m^2 Final    eGFR if non African American 09/14/2019 >60.0  >60 mL/min/1.73 m^2 Final    BNP 09/14/2019 28  0 - 99 pg/mL Final    Troponin I 09/14/2019 <0.030  0.020 - 0.040 ng/mL Final    PT 09/14/2019 13.0  11.7 - 14.0 sec Final    INR 09/14/2019 1.0   Final    Hemoglobin A1C 09/15/2019 5.3   4.5 - 6.2 % Final    Estimated Avg Glucose 09/15/2019 105  68 - 131 mg/dL Final    TSH 09/15/2019 1.220  0.340 - 5.600 uIU/mL Final    Free T4 09/15/2019 1.11  0.71 - 1.51 ng/dL Final    BSA 09/15/2019 1.65  m2 Final    Systolic blood pressure 09/15/2019 160  mmHg Final    Diastolic blood pressure 09/15/2019 98  mmHg Final    HR at rest 09/15/2019 64  bpm Final    RPP 09/15/2019 10,240   Final    Peak HR 09/15/2019 128  bpm Final    Peak Systolic BP 09/15/2019 160  mmHg Final    Peak Diatolic BP 09/15/2019 79  mmHg Final    Peak RPP 09/15/2019 20,480   Final    Max Predicted HR 09/15/2019 144   Final    85% Max Predicted HR 09/15/2019 122   Final    % Max HR Achieved 09/15/2019 89   Final    OHS CV CPX PATIENT IS MALE 09/15/2019 0   Final    OHS CV CPX PATIENT IS FEMALE 09/15/2019 1   Final    Exercise duration (sec) 09/15/2019 10  seconds Final    Exercise duration (min) 09/15/2019 6  minutes Final    Specimen UA 09/14/2019 Urine, Clean Catch   Final    Color, UA 09/14/2019 Colorless* Yellow, Straw, Priya Final    Appearance, UA 09/14/2019 Clear  Clear Final    pH, UA 09/14/2019 7.0  5.0 - 8.0 Final    Specific Gravity, UA 09/14/2019 1.000* 1.005 - 1.030 Final    Protein, UA 09/14/2019 Negative  Negative Final    Glucose, UA 09/14/2019 Negative  Negative Final    Ketones, UA 09/14/2019 Negative  Negative Final    Bilirubin (UA) 09/14/2019 Negative  Negative Final    Occult Blood UA 09/14/2019 1+* Negative Final    Nitrite, UA 09/14/2019 Negative  Negative Final    Urobilinogen, UA 09/14/2019 Negative  Negative EU/dL Final    Leukocytes, UA 09/14/2019 Negative  Negative Final    RBC, UA 09/14/2019 5* 0 - 4 /hpf Final    WBC, UA 09/14/2019 1  0 - 5 /hpf Final    Bacteria 09/14/2019 Negative  None-Occ /hpf Final    Squam Epithel, UA 09/14/2019 1  /hpf Final    Hyaline Casts, UA 09/14/2019 1  0-1/lpf /lpf Final    Microscopic Comment 09/14/2019 SEE COMMENT   Final    Sodium  09/15/2019 140  136 - 145 mmol/L Final    Potassium 09/15/2019 3.8  3.5 - 5.1 mmol/L Final    Chloride 09/15/2019 107  95 - 110 mmol/L Final    CO2 09/15/2019 26  23 - 29 mmol/L Final    Glucose 09/15/2019 94  70 - 110 mg/dL Final    BUN, Bld 09/15/2019 13  8 - 23 mg/dL Final    Creatinine 09/15/2019 0.8  0.5 - 1.4 mg/dL Final    Calcium 09/15/2019 8.9  8.7 - 10.5 mg/dL Final    Anion Gap 09/15/2019 7* 8 - 16 mmol/L Final    eGFR if African American 09/15/2019 >60.0  >60 mL/min/1.73 m^2 Final    eGFR if non African American 09/15/2019 >60.0  >60 mL/min/1.73 m^2 Final    Magnesium 09/15/2019 1.9  1.6 - 2.6 mg/dL Final    Phosphorus 09/15/2019 3.6  2.7 - 4.5 mg/dL Final    WBC 09/15/2019 5.18  3.90 - 12.70 K/uL Final    RBC 09/15/2019 4.31  4.00 - 5.40 M/uL Final    Hemoglobin 09/15/2019 13.6  12.0 - 16.0 g/dL Final    Hematocrit 09/15/2019 41.5  37.0 - 48.5 % Final    Mean Corpuscular Volume 09/15/2019 96  82 - 98 fL Final    Mean Corpuscular Hemoglobin 09/15/2019 31.6* 27.0 - 31.0 pg Final    Mean Corpuscular Hemoglobin Conc 09/15/2019 32.8  32.0 - 36.0 g/dL Final    RDW 09/15/2019 13.2  11.5 - 14.5 % Final    Platelets 09/15/2019 154  150 - 350 K/uL Final    MPV 09/15/2019 9.5  9.2 - 12.9 fL Final    Troponin I 09/15/2019 <0.030  0.020 - 0.040 ng/mL Final    Cholesterol 09/15/2019 156  120 - 199 mg/dL Final    Triglycerides 09/15/2019 64  30 - 150 mg/dL Final    HDL 09/15/2019 49  40 - 75 mg/dL Final    LDL Cholesterol 09/15/2019 94.2  63.0 - 159.0 mg/dL Final    Hdl/Cholesterol Ratio 09/15/2019 31.4  20.0 - 50.0 % Final    Total Cholesterol/HDL Ratio 09/15/2019 3.2  2.0 - 5.0 Final    Non-HDL Cholesterol 09/15/2019 107  mg/dL Final    Troponin I 09/15/2019 <0.030  0.020 - 0.040 ng/mL Final   Admission on 08/10/2019, Discharged on 08/10/2019   Component Date Value Ref Range Status    WBC 08/10/2019 5.61  3.90 - 12.70 K/uL Final    RBC 08/10/2019 4.78  4.00 - 5.40 M/uL Final     Hemoglobin 08/10/2019 15.1  12.0 - 16.0 g/dL Final    Hematocrit 08/10/2019 46.1  37.0 - 48.5 % Final    Mean Corpuscular Volume 08/10/2019 96  82 - 98 fL Final    Mean Corpuscular Hemoglobin 08/10/2019 31.6* 27.0 - 31.0 pg Final    Mean Corpuscular Hemoglobin Conc 08/10/2019 32.8  32.0 - 36.0 g/dL Final    RDW 08/10/2019 13.0  11.5 - 14.5 % Final    Platelets 08/10/2019 181  150 - 350 K/uL Final    MPV 08/10/2019 9.4  9.2 - 12.9 fL Final    Immature Granulocytes 08/10/2019 0.4  0.0 - 0.5 % Final    Gran # (ANC) 08/10/2019 3.3  1.8 - 7.7 K/uL Final    Immature Grans (Abs) 08/10/2019 0.02  0.00 - 0.04 K/uL Final    Lymph # 08/10/2019 1.7  1.0 - 4.8 K/uL Final    Mono # 08/10/2019 0.5  0.3 - 1.0 K/uL Final    Eos # 08/10/2019 0.1  0.0 - 0.5 K/uL Final    Baso # 08/10/2019 0.04  0.00 - 0.20 K/uL Final    nRBC 08/10/2019 0  0 /100 WBC Final    Gran% 08/10/2019 58.5  38.0 - 73.0 % Final    Lymph% 08/10/2019 29.9  18.0 - 48.0 % Final    Mono% 08/10/2019 8.7  4.0 - 15.0 % Final    Eosinophil% 08/10/2019 1.8  0.0 - 8.0 % Final    Basophil% 08/10/2019 0.7  0.0 - 1.9 % Final    Differential Method 08/10/2019 Automated   Final    Sodium 08/10/2019 143  136 - 145 mmol/L Final    Potassium 08/10/2019 3.5  3.5 - 5.1 mmol/L Final    Chloride 08/10/2019 105  95 - 110 mmol/L Final    CO2 08/10/2019 30* 23 - 29 mmol/L Final    Glucose 08/10/2019 92  70 - 110 mg/dL Final    BUN, Bld 08/10/2019 15  8 - 23 mg/dL Final    Creatinine 08/10/2019 0.9  0.5 - 1.4 mg/dL Final    Calcium 08/10/2019 9.9  8.7 - 10.5 mg/dL Final    Total Protein 08/10/2019 7.9  6.0 - 8.4 g/dL Final    Albumin 08/10/2019 4.2  3.5 - 5.2 g/dL Final    Total Bilirubin 08/10/2019 0.4  0.1 - 1.0 mg/dL Final    Alkaline Phosphatase 08/10/2019 85  55 - 135 U/L Final    AST 08/10/2019 19  10 - 40 U/L Final    ALT 08/10/2019 13  10 - 44 U/L Final    Anion Gap 08/10/2019 8  8 - 16 mmol/L Final    eGFR if African American 08/10/2019 >60.0   >60 mL/min/1.73 m^2 Final    eGFR if non African American 08/10/2019 >60.0  >60 mL/min/1.73 m^2 Final    BNP 08/10/2019 25  0 - 99 pg/mL Final    Troponin I 08/10/2019 <0.030  0.020 - 0.040 ng/mL Final    PT 08/10/2019 13.7  11.7 - 14.0 sec Final    INR 08/10/2019 1.1   Final       Past Medical History:   Diagnosis Date    Martin esophagus     Colitis     COPD (chronic obstructive pulmonary disease)     Thyroid disease     Vocal cord polyps      Past Surgical History:   Procedure Laterality Date    ABDOMINAL AORTIC ANEURYSM REPAIR      BACK SURGERY      cervical    CHOLECYSTECTOMY  12-    Dr Price  [facility]    CHOLECYSTECTOMY, LAPAROSCOPIC N/A 12/20/2013    [facility]    EGD (ESOPHAGOGASTRODUODENOSCOPY) N/A 12/19/2013    Performed by Lena Smith [facility] at [facility] ENDO    FOOT SURGERY      HYSTERECTOMY      MICROLARYNGOSCOPY N/A 5/10/2016    [facility] OR    SALIVARY GLAND SURGERY       History reviewed. No pertinent family history.    Marital Status:   Alcohol History:  reports that she does not drink alcohol.  Tobacco History:  reports that she has been smoking cigarettes.  She has been smoking about 1.00 pack per day. She quit smokeless tobacco use about 3 years ago.  Drug History:  reports that she does not use drugs.    Review of patient's allergies indicates:   Allergen Reactions    Bisacodyl Nausea And Vomiting    Demerol [meperidine]      Nausea vomiting, visual problem    Sodium,potassium,mag sulfates Nausea And Vomiting       Current Outpatient Medications:     levothyroxine (SYNTHROID) 100 MCG tablet, Take 1 tablet (100 mcg total) by mouth before breakfast., Disp: 90 tablet, Rfl: 1    lisinopril 10 MG tablet, Take 10 mg by mouth once daily. , Disp: , Rfl:     LORazepam (ATIVAN) 0.5 MG tablet, Take 1 tablet (0.5 mg total) by mouth every 12 (twelve) hours as needed for Anxiety., Disp: 60 tablet, Rfl: 1     "ranitidine (ZANTAC) 300 MG tablet, Take 300 mg by mouth every evening., Disp: , Rfl:     sulfamethoxazole-trimethoprim 800-160mg (BACTRIM DS) 800-160 mg Tab, Take 1 tablet by mouth 2 (two) times daily. for 7 days, Disp: 14 tablet, Rfl: 0    amoxicillin-clavulanate 875-125mg (AUGMENTIN) 875-125 mg per tablet, Take 1 tablet by mouth 2 (two) times daily. for 10 days, Disp: 20 tablet, Rfl: 0    CALCIUM CARBONATE/VITAMIN D3 (CALCIUM 500 + D, D3, ORAL), Take 1 tablet by mouth once daily., Disp: , Rfl:     hydrocodone-acetaminophen 10-325mg (NORCO)  mg Tab, Take by mouth every 6 (six) hours as needed. , Disp: , Rfl:     mupirocin (BACTROBAN) 2 % ointment, Apply to affected area 3 times daily, Disp: 22 g, Rfl: 1    omeprazole (PRILOSEC) 40 MG capsule, Take 40 mg by mouth once daily., Disp: , Rfl:     Review of Systems   Constitutional: Negative for appetite change, chills and fever.   HENT: Negative for sore throat.    Respiratory: Negative for cough, shortness of breath and wheezing.    Cardiovascular: Positive for chest pain (see HPI). Negative for palpitations and leg swelling.   Gastrointestinal: Negative for abdominal pain, constipation and diarrhea.   Genitourinary: Negative for dysuria and hematuria.   Musculoskeletal: Positive for back pain (chronic lower back pain). Negative for gait problem.   Neurological: Negative for dizziness, syncope and numbness.   Psychiatric/Behavioral: Negative for dysphoric mood. The patient is nervous/anxious.           Objective:      Vitals:    09/19/19 1509 09/19/19 1547   BP: (!) 144/86 (!) 142/86   Pulse: 68    SpO2: 97%    Weight: 62.1 kg (137 lb)    Height: 5' 1" (1.549 m)      Physical Exam   Constitutional: She is oriented to person, place, and time. She appears well-developed and well-nourished.   HENT:   Head: Normocephalic and atraumatic.   Right Ear: Tympanic membrane and ear canal normal.   Left Ear: Tympanic membrane and ear canal normal.   Mouth/Throat: " Mucous membranes are normal. No posterior oropharyngeal erythema.   Neck: Neck supple. Carotid bruit is not present.   Cardiovascular: Normal rate and regular rhythm. Exam reveals no gallop and no friction rub.   No murmur heard.  Pulses:       Dorsalis pedis pulses are 2+ on the right side, and 2+ on the left side.   Pulmonary/Chest: No respiratory distress. She has no wheezes. She has no rales.   Slightly diminished otherwise clear     Abdominal: Soft.   Neurological: She is alert and oriented to person, place, and time. She has normal strength. Gait normal.   Skin: Skin is warm and dry. No rash noted. There is erythema (mild raised erythematous rash to left upper arm- distribution suggestive of possible contact rash d/t BP cuff).   Psychiatric: She has a normal mood and affect.   Nursing note and vitals reviewed.        Assessment:       1. Chest pain, unspecified type    2. Essential hypertension    3. Anxiety    4. Acquired hypothyroidism           Plan:       Chest pain, unspecified type  -stable/resolved since discharge    Essential hypertension  - BP still slightly above goal though improved- continue with cardiac rehab with BP monitoring    Anxiety  -     LORazepam (ATIVAN) 0.5 MG tablet; Take 1 tablet (0.5 mg total) by mouth every 12 (twelve) hours as needed for Anxiety.  Dispense: 60 tablet; Refill: 1  -pt cautioned on risks/side effects of benzo and stressed importance of taking sparingly and avoiding other sedating substances such as ETOH or pain med in combination    Acquired hypothyroidism  -     levothyroxine (SYNTHROID) 100 MCG tablet; Take 1 tablet (100 mcg total) by mouth before breakfast.  Dispense: 90 tablet; Refill: 1  -controlled on hospital labs    Follow up for as scheduled in Feb with Dr. Graham.        9/19/2019 Joslyn Lemus NP

## 2019-09-19 NOTE — PLAN OF CARE
09/19/19 1527   GILBERT Message   Medicare Outpatient and Observation Notification regarding financial responsibility Explained to patient/caregiver  (I made 1st attempt to get signature and [facility](10:45[facility]). I made 2nd attempt at 1430 and patient was gone. I called patient at [phone] and spoke with her. I eaxplained GILBERT and she verbalized understanding. )   Date GILBERT was signed 09/19/19   Time GILBERT was signed [id number]

## 2019-09-24 ENCOUNTER — CLINICAL SUPPORT (OUTPATIENT)
Dept: CARDIAC REHAB | Facility: HOSPITAL | Age: 69
End: 2019-09-24
Payer: MEDICARE

## 2019-09-24 DIAGNOSIS — J44.9 COPD (CHRONIC OBSTRUCTIVE PULMONARY DISEASE): Chronic | ICD-10-CM

## 2019-09-24 PROCEDURE — G0424 PULMONARY REHAB W EXER: HCPCS

## 2019-09-26 ENCOUNTER — CLINICAL SUPPORT (OUTPATIENT)
Dept: CARDIAC REHAB | Facility: HOSPITAL | Age: 69
End: 2019-09-26
Payer: MEDICARE

## 2019-09-26 DIAGNOSIS — J44.9 COPD (CHRONIC OBSTRUCTIVE PULMONARY DISEASE): Primary | ICD-10-CM

## 2019-09-26 PROCEDURE — G0424 PULMONARY REHAB W EXER: HCPCS

## 2019-10-03 ENCOUNTER — CLINICAL SUPPORT (OUTPATIENT)
Dept: CARDIAC REHAB | Facility: HOSPITAL | Age: 69
End: 2019-10-03
Payer: MEDICARE

## 2019-10-03 DIAGNOSIS — J44.9 COPD (CHRONIC OBSTRUCTIVE PULMONARY DISEASE): Primary | ICD-10-CM

## 2019-10-03 PROCEDURE — G0424 PULMONARY REHAB W EXER: HCPCS

## 2019-10-08 ENCOUNTER — CLINICAL SUPPORT (OUTPATIENT)
Dept: CARDIAC REHAB | Facility: HOSPITAL | Age: 69
End: 2019-10-08
Payer: MEDICARE

## 2019-10-08 DIAGNOSIS — J44.9 COPD (CHRONIC OBSTRUCTIVE PULMONARY DISEASE): Chronic | ICD-10-CM

## 2019-10-08 PROCEDURE — G0424 PULMONARY REHAB W EXER: HCPCS

## 2019-10-10 ENCOUNTER — CLINICAL SUPPORT (OUTPATIENT)
Dept: CARDIAC REHAB | Facility: HOSPITAL | Age: 69
End: 2019-10-10
Attending: FAMILY MEDICINE
Payer: MEDICARE

## 2019-10-10 DIAGNOSIS — J44.9 COPD (CHRONIC OBSTRUCTIVE PULMONARY DISEASE): Primary | ICD-10-CM

## 2019-10-10 PROCEDURE — G0424 PULMONARY REHAB W EXER: HCPCS

## 2019-10-16 ENCOUNTER — OFFICE VISIT (OUTPATIENT)
Dept: FAMILY MEDICINE | Facility: CLINIC | Age: 69
End: 2019-10-16
Payer: MEDICARE

## 2019-10-16 VITALS
WEIGHT: 136 LBS | SYSTOLIC BLOOD PRESSURE: 134 MMHG | BODY MASS INDEX: 25.68 KG/M2 | HEART RATE: 76 BPM | TEMPERATURE: 98 F | DIASTOLIC BLOOD PRESSURE: 84 MMHG | HEIGHT: 61 IN

## 2019-10-16 DIAGNOSIS — J06.9 UPPER RESPIRATORY TRACT INFECTION, UNSPECIFIED TYPE: Primary | ICD-10-CM

## 2019-10-16 DIAGNOSIS — E03.9 ACQUIRED HYPOTHYROIDISM: ICD-10-CM

## 2019-10-16 DIAGNOSIS — M51.36 DDD (DEGENERATIVE DISC DISEASE), LUMBAR: Primary | ICD-10-CM

## 2019-10-16 PROCEDURE — 99213 PR OFFICE/OUTPT VISIT, EST, LEVL III, 20-29 MIN: ICD-10-PCS | Mod: 25,S$GLB,, | Performed by: NURSE PRACTITIONER

## 2019-10-16 PROCEDURE — 96372 PR INJECTION,THERAP/PROPH/DIAG2ST, IM OR SUBCUT: ICD-10-PCS | Mod: S$GLB,,, | Performed by: NURSE PRACTITIONER

## 2019-10-16 PROCEDURE — 99213 OFFICE O/P EST LOW 20 MIN: CPT | Mod: 25,S$GLB,, | Performed by: NURSE PRACTITIONER

## 2019-10-16 PROCEDURE — 96372 THER/PROPH/DIAG INJ SC/IM: CPT | Mod: S$GLB,,, | Performed by: NURSE PRACTITIONER

## 2019-10-16 RX ORDER — DEXAMETHASONE SODIUM PHOSPHATE 4 MG/ML
8 INJECTION, SOLUTION INTRA-ARTICULAR; INTRALESIONAL; INTRAMUSCULAR; INTRAVENOUS; SOFT TISSUE ONCE
Status: COMPLETED | OUTPATIENT
Start: 2019-10-16 | End: 2019-10-16

## 2019-10-16 RX ORDER — HYDROCODONE BITARTRATE AND ACETAMINOPHEN 10; 325 MG/1; MG/1
1 TABLET ORAL EVERY 12 HOURS PRN
Qty: 30 TABLET | Refills: 0 | Status: SHIPPED | OUTPATIENT
Start: 2019-10-16 | End: 2020-02-21 | Stop reason: SDUPTHER

## 2019-10-16 RX ORDER — LEVOTHYROXINE SODIUM 100 UG/1
100 TABLET ORAL
Qty: 90 TABLET | Refills: 1 | Status: SHIPPED | OUTPATIENT
Start: 2019-10-16 | End: 2020-02-21 | Stop reason: SDUPTHER

## 2019-10-16 RX ORDER — FLUTICASONE PROPIONATE 50 MCG
1 SPRAY, SUSPENSION (ML) NASAL DAILY
Qty: 1 BOTTLE | Refills: 2 | Status: SHIPPED | OUTPATIENT
Start: 2019-10-16 | End: 2020-08-28

## 2019-10-16 RX ADMIN — DEXAMETHASONE SODIUM PHOSPHATE 8 MG: 4 INJECTION, SOLUTION INTRA-ARTICULAR; INTRALESIONAL; INTRAMUSCULAR; INTRAVENOUS; SOFT TISSUE at 09:10

## 2019-10-16 NOTE — PROGRESS NOTES
Patient ID: Joslyn Dubon is a 69 y.o. female.    Chief Complaint: Cough (x3 days// SW) and Sinus Problem    Pt here for sick visit- reports past several days has had increase in post nasal drip and frequent cough- clear sputum. Denies f/c or wheezing. States symptoms worse at night when she lays down. Mild sore throat and HA  Still smoking but less than 1/2ppd  Going to cardiac rehab and has been feeling good up until 3 days ago. States hasn't been taking lisinopril every day because BP was running on the low end and it was causing dizziness          Past Medical History:   Diagnosis Date    Martin esophagus     Colitis     COPD (chronic obstructive pulmonary disease)     Thyroid disease     Vocal cord polyps      Past Surgical History:   Procedure Laterality Date    ABDOMINAL AORTIC ANEURYSM REPAIR      BACK SURGERY      cervical    CHOLECYSTECTOMY  12-    Dr Albert CORLEY    FOOT SURGERY      HYSTERECTOMY      SALIVARY GLAND SURGERY           Tobacco History:  reports that she has been smoking cigarettes. She has been smoking about 1.00 pack per day. She quit smokeless tobacco use about 3 years ago.      Review of patient's allergies indicates:   Allergen Reactions    Bisacodyl Nausea And Vomiting     Other reaction(s): Vomiting    Demerol [meperidine]      Nausea vomiting, visual problem    Sodium,potassium,mag sulfates Nausea And Vomiting       Current Outpatient Medications:     levothyroxine (SYNTHROID) 100 MCG tablet, Take 1 tablet (100 mcg total) by mouth before breakfast., Disp: 90 tablet, Rfl: 1    CALCIUM CARBONATE/VITAMIN D3 (CALCIUM 500 + D, D3, ORAL), Take 1 tablet by mouth once daily., Disp: , Rfl:     fluticasone propionate (FLONASE) 50 mcg/actuation nasal spray, 1 spray (50 mcg total) by Each Nostril route once daily., Disp: 1 Bottle, Rfl: 2    hydrocodone-acetaminophen 10-325mg (NORCO)  mg Tab, Take by mouth every 6 (six) hours as needed. , Disp: , Rfl:      "lisinopril 10 MG tablet, Take 10 mg by mouth once daily. , Disp: , Rfl:     LORazepam (ATIVAN) 0.5 MG tablet, Take 1 tablet (0.5 mg total) by mouth every 12 (twelve) hours as needed for Anxiety., Disp: 60 tablet, Rfl: 1    mupirocin (BACTROBAN) 2 % ointment, Apply to affected area 3 times daily, Disp: 22 g, Rfl: 1    omeprazole (PRILOSEC) 40 MG capsule, Take 40 mg by mouth once daily., Disp: , Rfl:     ranitidine (ZANTAC) 300 MG tablet, Take 300 mg by mouth every evening., Disp: , Rfl:   No current facility-administered medications for this visit.     Review of Systems   Constitutional: Negative for chills and fever.   HENT: Positive for postnasal drip and sore throat. Negative for ear pain and trouble swallowing.    Respiratory: Positive for cough. Negative for shortness of breath and wheezing.    Cardiovascular: Negative for chest pain, palpitations and leg swelling.   Gastrointestinal: Negative for abdominal pain, nausea and vomiting.   Musculoskeletal: Positive for back pain.   Neurological: Positive for numbness (burning pain/numbness to right foot plantar aspect under 2nd,,3rd and 4th toes). Negative for dizziness.          Objective:      Vitals:    10/16/19 0841   BP: 134/84   Pulse: 76   Temp: 98.2 °F (36.8 °C)   Weight: 61.7 kg (136 lb)   Height: 5' 1" (1.549 m)     Physical Exam   Constitutional: She is oriented to person, place, and time. She appears well-developed and well-nourished.   HENT:   Right Ear: Tympanic membrane and ear canal normal.   Left Ear: Tympanic membrane and ear canal normal.   Mouth/Throat: Oropharynx is clear and moist. No oropharyngeal exudate.   Mild pharyngeal erythema     Cardiovascular: Normal rate and regular rhythm.   No murmur heard.  Pulmonary/Chest: Effort normal and breath sounds normal. She has no wheezes. She has no rales.   Abdominal: Soft. She exhibits no distension.   Lymphadenopathy:     She has no cervical adenopathy.   Neurological: She is alert and oriented " to person, place, and time.   Skin: Skin is warm and dry.         Assessment:       1. Upper respiratory tract infection, unspecified type    2. Acquired hypothyroidism           Plan:       Upper respiratory tract infection, unspecified type  -     fluticasone propionate (FLONASE) 50 mcg/actuation nasal spray; 1 spray (50 mcg total) by Each Nostril route once daily.  Dispense: 1 Bottle; Refill: 2  -     dexamethasone injection 8 mg  -pt advised likely viral, No symptoms to suggest bacterial infection.  Will treat with IM steroid and recommended over-the-counter antihistamine and will add nasal steroid.  Patient advised to call for any worsening or development of shortness of breath or wheezing    Acquired hypothyroidism  -     levothyroxine (SYNTHROID) 100 MCG tablet; Take 1 tablet (100 mcg total) by mouth before breakfast.  Dispense: 90 tablet; Refill: 1  Well controlled on last labs    Follow up if symptoms worsen or fail to improve, for as scheduled.        10/16/2019 Joslyn Lemus NP

## 2019-10-17 ENCOUNTER — TELEPHONE (OUTPATIENT)
Dept: FAMILY MEDICINE | Facility: CLINIC | Age: 69
End: 2019-10-17

## 2019-10-17 ENCOUNTER — HOSPITAL ENCOUNTER (EMERGENCY)
Facility: HOSPITAL | Age: 69
Discharge: HOME | End: 2019-10-17
Attending: EMERGENCY MEDICINE
Payer: MEDICARE

## 2019-10-17 VITALS
SYSTOLIC BLOOD PRESSURE: 162 MMHG | HEART RATE: 61 BPM | OXYGEN SATURATION: 98 % | TEMPERATURE: 98 F | HEIGHT: 62 IN | RESPIRATION RATE: 18 BRPM | BODY MASS INDEX: 25.03 KG/M2 | DIASTOLIC BLOOD PRESSURE: 81 MMHG | WEIGHT: 136 LBS

## 2019-10-17 DIAGNOSIS — T78.40XA ALLERGIC REACTION, INITIAL ENCOUNTER: Primary | ICD-10-CM

## 2019-10-17 DIAGNOSIS — R53.1 WEAKNESS: ICD-10-CM

## 2019-10-17 LAB
ALBUMIN SERPL BCP-MCNC: 3.7 G/DL (ref 3.5–5.2)
ALP SERPL-CCNC: 84 U/L (ref 55–135)
ALT SERPL W/O P-5'-P-CCNC: 13 U/L (ref 10–44)
ANION GAP SERPL CALC-SCNC: 9 MMOL/L (ref 8–16)
AST SERPL-CCNC: 19 U/L (ref 10–40)
BASOPHILS # BLD AUTO: 0.05 K/UL (ref 0–0.2)
BASOPHILS NFR BLD: 0.5 % (ref 0–1.9)
BILIRUB SERPL-MCNC: 0.6 MG/DL (ref 0.1–1)
BUN SERPL-MCNC: 16 MG/DL (ref 8–23)
CALCIUM SERPL-MCNC: 9.4 MG/DL (ref 8.7–10.5)
CHLORIDE SERPL-SCNC: 106 MMOL/L (ref 95–110)
CO2 SERPL-SCNC: 27 MMOL/L (ref 23–29)
CREAT SERPL-MCNC: 0.7 MG/DL (ref 0.5–1.4)
DIFFERENTIAL METHOD: ABNORMAL
EOSINOPHIL # BLD AUTO: 0.1 K/UL (ref 0–0.5)
EOSINOPHIL NFR BLD: 0.5 % (ref 0–8)
ERYTHROCYTE [DISTWIDTH] IN BLOOD BY AUTOMATED COUNT: 13.6 % (ref 11.5–14.5)
EST. GFR  (AFRICAN AMERICAN): >60 ML/MIN/1.73 M^2
EST. GFR  (NON AFRICAN AMERICAN): >60 ML/MIN/1.73 M^2
GLUCOSE SERPL-MCNC: 91 MG/DL (ref 70–110)
HCT VFR BLD AUTO: 45.1 % (ref 37–48.5)
HGB BLD-MCNC: 14.9 G/DL (ref 12–16)
IMM GRANULOCYTES # BLD AUTO: 0.02 K/UL (ref 0–0.04)
IMM GRANULOCYTES NFR BLD AUTO: 0.2 % (ref 0–0.5)
LYMPHOCYTES # BLD AUTO: 2.4 K/UL (ref 1–4.8)
LYMPHOCYTES NFR BLD: 23.1 % (ref 18–48)
MCH RBC QN AUTO: 31.8 PG (ref 27–31)
MCHC RBC AUTO-ENTMCNC: 33 G/DL (ref 32–36)
MCV RBC AUTO: 96 FL (ref 82–98)
MONOCYTES # BLD AUTO: 0.8 K/UL (ref 0.3–1)
MONOCYTES NFR BLD: 7.5 % (ref 4–15)
NEUTROPHILS # BLD AUTO: 7.2 K/UL (ref 1.8–7.7)
NEUTROPHILS NFR BLD: 68.2 % (ref 38–73)
NRBC BLD-RTO: 0 /100 WBC
PLATELET # BLD AUTO: 225 K/UL (ref 150–350)
PMV BLD AUTO: 9.3 FL (ref 9.2–12.9)
POTASSIUM SERPL-SCNC: 3.4 MMOL/L (ref 3.5–5.1)
PROT SERPL-MCNC: 7.7 G/DL (ref 6–8.4)
RBC # BLD AUTO: 4.69 M/UL (ref 4–5.4)
SODIUM SERPL-SCNC: 142 MMOL/L (ref 136–145)
TROPONIN I SERPL DL<=0.01 NG/ML-MCNC: <0.03 NG/ML (ref 0.02–0.04)
WBC # BLD AUTO: 10.53 K/UL (ref 3.9–12.7)

## 2019-10-17 PROCEDURE — 63600175 PHARM REV CODE 636 W HCPCS: Mod: GZ | Performed by: EMERGENCY MEDICINE

## 2019-10-17 PROCEDURE — 96375 TX/PRO/DX INJ NEW DRUG ADDON: CPT

## 2019-10-17 PROCEDURE — 85025 COMPLETE CBC W/AUTO DIFF WBC: CPT

## 2019-10-17 PROCEDURE — S0028 INJECTION, FAMOTIDINE, 20 MG: HCPCS | Performed by: EMERGENCY MEDICINE

## 2019-10-17 PROCEDURE — 96374 THER/PROPH/DIAG INJ IV PUSH: CPT

## 2019-10-17 PROCEDURE — 93005 ELECTROCARDIOGRAM TRACING: CPT

## 2019-10-17 PROCEDURE — 99284 EMERGENCY DEPT VISIT MOD MDM: CPT | Mod: 25

## 2019-10-17 PROCEDURE — 25000003 PHARM REV CODE 250: Performed by: EMERGENCY MEDICINE

## 2019-10-17 PROCEDURE — 80053 COMPREHEN METABOLIC PANEL: CPT

## 2019-10-17 PROCEDURE — 84484 ASSAY OF TROPONIN QUANT: CPT

## 2019-10-17 RX ORDER — DIPHENHYDRAMINE HYDROCHLORIDE 50 MG/ML
25 INJECTION INTRAMUSCULAR; INTRAVENOUS
Status: COMPLETED | OUTPATIENT
Start: 2019-10-17 | End: 2019-10-17

## 2019-10-17 RX ORDER — FAMOTIDINE 20 MG/50ML
20 INJECTION, SOLUTION INTRAVENOUS ONCE
Status: COMPLETED | OUTPATIENT
Start: 2019-10-17 | End: 2019-10-17

## 2019-10-17 RX ORDER — FAMOTIDINE 10 MG/ML
20 INJECTION INTRAVENOUS
Status: DISCONTINUED | OUTPATIENT
Start: 2019-10-17 | End: 2019-10-17

## 2019-10-17 RX ADMIN — DIPHENHYDRAMINE HYDROCHLORIDE 25 MG: 50 INJECTION INTRAMUSCULAR; INTRAVENOUS at 02:10

## 2019-10-17 RX ADMIN — SODIUM CHLORIDE 1000 ML: 0.9 INJECTION, SOLUTION INTRAVENOUS at 02:10

## 2019-10-17 RX ADMIN — FAMOTIDINE 20 MG: 20 INJECTION, SOLUTION INTRAVENOUS at 02:10

## 2019-10-17 NOTE — TELEPHONE ENCOUNTER
Received a call from the pt that the pharmacy will not fill her pain medication unless they receive a dx code.    Spoke with pharmacist and gave her the dx of M51.36. The insurance is not covering her the medication for more than 7 days.    Spoke with pt and let her know the above.

## 2019-10-17 NOTE — DISCHARGE INSTRUCTIONS
Stop using Flonase.  Take Pepcid and Benadryl as needed.  Drink lots of fluids.  Return to emergency department for worsening symptoms or any problems

## 2019-10-17 NOTE — ED NOTES
"Pt states went to MD yesterday for "sinus drip", was given steroid shot and Flonase. Didn't get much sleep last night due to "being wirey". After using Flonase today, got hot and "flushy" to face, took Benadryl 25MG and feels better but still a little flush.  "

## 2019-10-17 NOTE — ED PROVIDER NOTES
Encounter Date: 10/17/2019       History     Chief Complaint   Patient presents with    ALLERGIC RXN     GIVEN STEROID SHOT YESTERDAY, AND GIVEN FLONASE, TODAY PT STATES HER FACE IS ON FIRE. BENADRYL PTA     69-year-old female presented emergency department with flushing of the face and redness and itching of the face after using Flonase.  Patient received a steroid shot for sinusitis yesterday.  Patient start using Flonase and after that felt he diffuse redness and hives and patient took Benadryl prior to arrival.  Patient said she is already feeling slightly better since she took the Benadryl.  Patient was feeling anxious and agitated prior to taking Benadryl however she states she feels better at this time.  Patient denies any chest pain or abdominal pain or weakness or numbness.  Patient denies any difficulty breathing or swallowing.  Patient denies any swelling of the throat        Review of patient's allergies indicates:   Allergen Reactions    Bisacodyl Nausea And Vomiting     Other reaction(s): Vomiting    Demerol [meperidine]      Nausea vomiting, visual problem     Past Medical History:   Diagnosis Date    Martin esophagus     Colitis     COPD (chronic obstructive pulmonary disease)     Thyroid disease     Vocal cord polyps      Past Surgical History:   Procedure Laterality Date    ABDOMINAL AORTIC ANEURYSM REPAIR      BACK SURGERY      cervical    [facility]    FOOT SURGERY      HYSTERECTOMY      SALIVARY GLAND SURGERY       No family history on file.  Social History     Tobacco Use    Smoking status: Current Every Day Smoker     Packs/day: 1.00     Types: Cigarettes    Smokeless tobacco: Former User     Quit date: 5/1/2016   Substance Use Topics    Alcohol use: No    Drug use: No     Review of Systems   Constitutional: Negative.    HENT: Negative.    Eyes: Negative.    Respiratory: Negative.    Cardiovascular: Negative.  Negative for chest pain.    Gastrointestinal: Negative.    Endocrine: Negative.    Genitourinary: Negative.    Musculoskeletal: Negative.    Skin: Positive for rash.        Hives and redness on the face and upper body   Allergic/Immunologic: Negative.    Neurological: Negative.    Hematological: Negative.    Psychiatric/Behavioral: Negative.    All other systems reviewed and are negative.      Physical Exam     Initial Vitals [10/17/19 1250]   BP Pulse Resp Temp SpO2   (!) 185/87 67 18 98.4 °F (36.9 °C) 97 %      MAP       --         Physical Exam    Nursing note and vitals reviewed.  Constitutional: She appears well-developed and well-nourished.   HENT:   Head: Normocephalic and atraumatic.   Nose: Nose normal.   Mouth/Throat: Oropharynx is clear and moist.   Eyes: Conjunctivae and EOM are normal. Pupils are equal, round, and reactive to light.   Neck: Normal range of motion. Neck supple. No thyromegaly present. No tracheal deviation present. No JVD present.   Cardiovascular: Normal rate, regular rhythm, normal heart sounds and intact distal pulses.   No murmur heard.  Pulmonary/Chest: Breath sounds normal. No stridor. No respiratory distress. She has no wheezes. She has no rales.   Abdominal: Soft. Bowel sounds are normal.   Musculoskeletal: Normal range of motion. She exhibits no edema.   Neurological: She is alert and oriented to person, place, and time.   Skin: Skin is warm. Capillary refill takes less than 2 seconds. Rash noted.   Rash in have some face and upper body which is improving.  Blanchable rash.   Psychiatric: She has a normal mood and affect. Thought content normal.         ED Course   Procedures  Labs Reviewed   CBC W/ AUTO DIFFERENTIAL   COMPREHENSIVE METABOLIC PANEL   TROPONIN I     EKG Readings: (Independently Interpreted)   Initial Reading: No STEMI. Rhythm: Normal Sinus Rhythm. Ectopy: No Ectopy.     ECG Results          EKG 12-lead (In process)  Result time 10/17/19 14:18:33    In process by Interface, Lab In [facility]  (10/17/19 14:18:33)                 Narrative:    Test Reason : R53.1,    Vent. Rate : 060 BPM     Atrial Rate : 060 BPM     P-R Int : 154 ms          QRS Dur : 076 ms      QT Int : 458 ms       P-R-T Axes : 069 035 050 degrees     QTc Int : 458 ms    Normal sinus rhythm  Possible Inferior infarct ,age undetermined  Abnormal ECG  When compared with ECG of 14-SEP-2019 22:09,  No significant change was found    Referred By: [id number]   SELF           Confirmed By:                             Imaging Results    None          Medical Decision Making:   Differential Diagnosis:   69-year-old female presented emergency department after an allergic reaction likely to Flonase.  Patient has mild symptoms of sinusitis.  Patient does not have any maxillary tenderness.  Screening labs reviewed.  EKG unremarkable.  Patient already feeling better prior to arrival as she took Benadryl.  Patient was agitated all day yesterday after receiving a steroid shot.  Patient treated with Benadryl and Pepcid.  As feeling better discharged with instructions and follow up with primary care.  Patient advised to only use Benadryl and Pepcid as needed for sinus symptoms and also rash  Clinical Tests:   Lab Tests: Reviewed  Medical Tests: Reviewed                      Clinical Impression:       ICD-10-CM ICD-9-CM   1. Allergic reaction, initial encounter T78.40XA 995.3   2. Weakness R53.1 780.79                                Ayaka Mims MD  10/17/19 7767

## 2019-10-18 ENCOUNTER — HOSPITAL ENCOUNTER (EMERGENCY)
Facility: HOSPITAL | Age: 69
Discharge: HOME | End: 2019-10-19
Attending: EMERGENCY MEDICINE | Admitting: INTERNAL MEDICINE
Payer: MEDICARE

## 2019-10-18 DIAGNOSIS — R07.9 CHEST PAIN: ICD-10-CM

## 2019-10-18 DIAGNOSIS — I10 HYPERTENSION, UNSPECIFIED TYPE: ICD-10-CM

## 2019-10-18 DIAGNOSIS — R07.9 CHEST PAIN, UNSPECIFIED TYPE: Primary | ICD-10-CM

## 2019-10-18 LAB
BASOPHILS # BLD AUTO: 0.04 K/UL (ref 0–0.2)
BASOPHILS NFR BLD: 0.5 % (ref 0–1.9)
D DIMER PPP IA.FEU-MCNC: 1.82 UG/ML FEU
DIFFERENTIAL METHOD: ABNORMAL
EOSINOPHIL # BLD AUTO: 0.1 K/UL (ref 0–0.5)
EOSINOPHIL NFR BLD: 1.5 % (ref 0–8)
ERYTHROCYTE [DISTWIDTH] IN BLOOD BY AUTOMATED COUNT: 13.7 % (ref 11.5–14.5)
HCT VFR BLD AUTO: 44 % (ref 37–48.5)
HGB BLD-MCNC: 14.7 G/DL (ref 12–16)
IMM GRANULOCYTES # BLD AUTO: 0.04 K/UL (ref 0–0.04)
IMM GRANULOCYTES NFR BLD AUTO: 0.5 % (ref 0–0.5)
INR PPP: 1
LYMPHOCYTES # BLD AUTO: 2.6 K/UL (ref 1–4.8)
LYMPHOCYTES NFR BLD: 34.3 % (ref 18–48)
MCH RBC QN AUTO: 31.9 PG (ref 27–31)
MCHC RBC AUTO-ENTMCNC: 33.4 G/DL (ref 32–36)
MCV RBC AUTO: 95 FL (ref 82–98)
MONOCYTES # BLD AUTO: 0.7 K/UL (ref 0.3–1)
MONOCYTES NFR BLD: 8.8 % (ref 4–15)
NEUTROPHILS # BLD AUTO: 4.1 K/UL (ref 1.8–7.7)
NEUTROPHILS NFR BLD: 54.4 % (ref 38–73)
NRBC BLD-RTO: 0 /100 WBC
PLATELET # BLD AUTO: 210 K/UL (ref 150–350)
PMV BLD AUTO: 9.1 FL (ref 9.2–12.9)
PROTHROMBIN TIME: 12.5 SEC (ref 10.6–14.8)
RBC # BLD AUTO: 4.61 M/UL (ref 4–5.4)
WBC # BLD AUTO: 7.53 K/UL (ref 3.9–12.7)

## 2019-10-18 PROCEDURE — 99285 EMERGENCY DEPT VISIT HI MDM: CPT | Mod: 25

## 2019-10-18 PROCEDURE — 81001 URINALYSIS AUTO W/SCOPE: CPT

## 2019-10-18 PROCEDURE — 84439 ASSAY OF FREE THYROXINE: CPT

## 2019-10-18 PROCEDURE — 85610 PROTHROMBIN TIME: CPT

## 2019-10-18 PROCEDURE — 96365 THER/PROPH/DIAG IV INF INIT: CPT

## 2019-10-18 PROCEDURE — 84443 ASSAY THYROID STIM HORMONE: CPT

## 2019-10-18 PROCEDURE — 84484 ASSAY OF TROPONIN QUANT: CPT

## 2019-10-18 PROCEDURE — 85025 COMPLETE CBC W/AUTO DIFF WBC: CPT

## 2019-10-18 PROCEDURE — 83735 ASSAY OF MAGNESIUM: CPT

## 2019-10-18 PROCEDURE — 93005 ELECTROCARDIOGRAM TRACING: CPT

## 2019-10-18 PROCEDURE — 85379 FIBRIN DEGRADATION QUANT: CPT

## 2019-10-18 PROCEDURE — 80053 COMPREHEN METABOLIC PANEL: CPT

## 2019-10-18 PROCEDURE — 96375 TX/PRO/DX INJ NEW DRUG ADDON: CPT

## 2019-10-18 PROCEDURE — 83880 ASSAY OF NATRIURETIC PEPTIDE: CPT

## 2019-10-18 RX ORDER — ASPIRIN 325 MG
325 TABLET ORAL
Status: COMPLETED | OUTPATIENT
Start: 2019-10-18 | End: 2019-10-19

## 2019-10-19 VITALS
TEMPERATURE: 98 F | HEART RATE: 70 BPM | OXYGEN SATURATION: 97 % | SYSTOLIC BLOOD PRESSURE: 138 MMHG | RESPIRATION RATE: 16 BRPM | WEIGHT: 136 LBS | DIASTOLIC BLOOD PRESSURE: 77 MMHG | BODY MASS INDEX: 25.03 KG/M2 | HEIGHT: 62 IN

## 2019-10-19 LAB
ALBUMIN SERPL BCP-MCNC: 3.7 G/DL (ref 3.5–5.2)
ALP SERPL-CCNC: 85 U/L (ref 55–135)
ALT SERPL W/O P-5'-P-CCNC: 12 U/L (ref 10–44)
ANION GAP SERPL CALC-SCNC: 11 MMOL/L (ref 8–16)
AST SERPL-CCNC: 20 U/L (ref 10–40)
BACTERIA #/AREA URNS HPF: NEGATIVE /HPF
BILIRUB SERPL-MCNC: 0.6 MG/DL (ref 0.1–1)
BILIRUB UR QL STRIP: NEGATIVE
BNP SERPL-MCNC: 66 PG/ML (ref 0–99)
BUN SERPL-MCNC: 15 MG/DL (ref 8–23)
CALCIUM SERPL-MCNC: 8.8 MG/DL (ref 8.7–10.5)
CHLORIDE SERPL-SCNC: 106 MMOL/L (ref 95–110)
CLARITY UR: CLEAR
CO2 SERPL-SCNC: 24 MMOL/L (ref 23–29)
COLOR UR: YELLOW
CREAT SERPL-MCNC: 0.7 MG/DL (ref 0.5–1.4)
EST. GFR  (AFRICAN AMERICAN): >60 ML/MIN/1.73 M^2
EST. GFR  (NON AFRICAN AMERICAN): >60 ML/MIN/1.73 M^2
GLUCOSE SERPL-MCNC: 113 MG/DL (ref 70–110)
GLUCOSE UR QL STRIP: NEGATIVE
HGB UR QL STRIP: ABNORMAL
HYALINE CASTS #/AREA URNS LPF: 4 /LPF
KETONES UR QL STRIP: NEGATIVE
LEUKOCYTE ESTERASE UR QL STRIP: NEGATIVE
MAGNESIUM SERPL-MCNC: 1.9 MG/DL (ref 1.6–2.6)
MICROSCOPIC COMMENT: ABNORMAL
NITRITE UR QL STRIP: NEGATIVE
PH UR STRIP: 6 [PH] (ref 5–8)
POTASSIUM SERPL-SCNC: 3.4 MMOL/L (ref 3.5–5.1)
PROT SERPL-MCNC: 7.5 G/DL (ref 6–8.4)
PROT UR QL STRIP: NEGATIVE
RBC #/AREA URNS HPF: 24 /HPF (ref 0–4)
SODIUM SERPL-SCNC: 141 MMOL/L (ref 136–145)
SP GR UR STRIP: 1.02 (ref 1–1.03)
SQUAMOUS #/AREA URNS HPF: 12 /HPF
T4 FREE SERPL-MCNC: 0.91 NG/DL (ref 0.71–1.51)
TROPONIN I SERPL DL<=0.01 NG/ML-MCNC: <0.03 NG/ML (ref 0.02–0.04)
TSH SERPL DL<=0.005 MIU/L-ACNC: 8.67 UIU/ML (ref 0.34–5.6)
URN SPEC COLLECT METH UR: ABNORMAL
UROBILINOGEN UR STRIP-ACNC: NEGATIVE EU/DL
WBC #/AREA URNS HPF: 6 /HPF (ref 0–5)

## 2019-10-19 PROCEDURE — 25000003 PHARM REV CODE 250: Performed by: EMERGENCY MEDICINE

## 2019-10-19 PROCEDURE — 25500020 PHARM REV CODE 255: Performed by: EMERGENCY MEDICINE

## 2019-10-19 PROCEDURE — 63600175 PHARM REV CODE 636 W HCPCS: Performed by: EMERGENCY MEDICINE

## 2019-10-19 RX ORDER — ONDANSETRON 2 MG/ML
4 INJECTION INTRAMUSCULAR; INTRAVENOUS
Status: COMPLETED | OUTPATIENT
Start: 2019-10-19 | End: 2019-10-19

## 2019-10-19 RX ORDER — ACETAMINOPHEN 10 MG/ML
1000 INJECTION, SOLUTION INTRAVENOUS EVERY 8 HOURS
Status: DISCONTINUED | OUTPATIENT
Start: 2019-10-19 | End: 2019-10-19 | Stop reason: HOSPADM

## 2019-10-19 RX ADMIN — IOHEXOL 100 ML: 350 INJECTION, SOLUTION INTRAVENOUS at 12:10

## 2019-10-19 RX ADMIN — ONDANSETRON 4 MG: 2 INJECTION INTRAMUSCULAR; INTRAVENOUS at 01:10

## 2019-10-19 RX ADMIN — ACETAMINOPHEN 1000 MG: 10 INJECTION, SOLUTION INTRAVENOUS at 01:10

## 2019-10-19 RX ADMIN — ASPIRIN 325 MG ORAL TABLET 325 MG: 325 PILL ORAL at 12:10

## 2019-10-19 RX ADMIN — NITROGLYCERIN 1 INCH: 20 OINTMENT TOPICAL at 12:10

## 2019-10-19 NOTE — ED NOTES
CT scan is negative for PE.  Dr. Cadena at bedside to recheck.  Pt wishing to go home.    AMA form signed per patient to refuse admission to hospital for further treatment of her chest pain.   VS stable.  Is pain free now and less anxious.  Pt called  for ride home

## 2019-10-19 NOTE — ED NOTES
Dr. Cadena at bedside for exam.  EKG in progress.   SR on monitor.  Reports left chest pressure  5/10.  Is very anxious.  Denies SOB.

## 2019-10-19 NOTE — ED NOTES
Back from CT in stable condition.   SR on monitor.  Reports left chest pain still coming and going.  4/10 at this moment.   Also reports nausea but denies SOB.  Requesting med for headache.

## 2019-10-19 NOTE — ED PROVIDER NOTES
Encounter Date: 10/18/2019       History     Chief Complaint   Patient presents with    Chest Pain     c/o chest pain that started at 10 30 pm tonight with nausea and dizziness.   Is very anxious.   Seen here yesterday for allergic reaction to flonase.   Denies SOB     69 Year old female presents complaining of chest pain, patient also complains of nausea and dizziness, patient seen yesterday for hives secondary to allergic reaction to Flonase.  Patient reports that she received steroid shot and she believes this may be causing her blood pressure to be elevated.  Patient reports chest pain as substernal pressure located over the left precordium.  Patient rates pain as 4/10.  Patient denies weakness or numbness patient denies shortness of breath patient denies fever        Review of patient's allergies indicates:   Allergen Reactions    Bisacodyl Nausea And Vomiting     Other reaction(s): Vomiting    Demerol [meperidine]      Nausea vomiting, visual problem    Flonase [fluticasone propionate] Hives     Past Medical History:   Diagnosis Date    Anxiety     Martin esophagus     Colitis     COPD (chronic obstructive pulmonary disease)     GERD (gastroesophageal reflux disease)     Hypertension     Thyroid disease     Vocal cord polyps      Past Surgical History:   Procedure Laterality Date    ABDOMINAL AORTIC ANEURYSM REPAIR      BACK SURGERY      cervical    CHOLECYSTECTOMY  12-    Dr Albert CORLEY    FOOT SURGERY      HYSTERECTOMY      SALIVARY GLAND SURGERY       History reviewed. No pertinent family history.  Social History     Tobacco Use    Smoking status: Current Every Day Smoker     Packs/day: 0.50     Types: Cigarettes    Smokeless tobacco: Former User     Quit date: 5/1/2016   Substance Use Topics    Alcohol use: No    Drug use: No     Review of Systems   Constitutional: Negative for fever.   HENT: Negative for congestion, rhinorrhea, sore throat and trouble swallowing.    Eyes:  Negative for visual disturbance.   Respiratory: Negative for cough, chest tightness, shortness of breath and wheezing.    Cardiovascular: Positive for chest pain. Negative for palpitations and leg swelling.   Gastrointestinal: Negative for abdominal distention, abdominal pain, constipation, diarrhea, nausea and vomiting.   Genitourinary: Negative for difficulty urinating, dysuria, flank pain and frequency.   Musculoskeletal: Negative for arthralgias, back pain, joint swelling and neck pain.   Skin: Negative for color change and rash.   Neurological: Positive for dizziness. Negative for syncope, speech difficulty, weakness, numbness and headaches.   All other systems reviewed and are negative.      Physical Exam     Initial Vitals [10/18/19 2306]   BP Pulse Resp Temp SpO2   (!) 188/95 74 16 97.3 °F (36.3 °C) 97 %      MAP       --         Physical Exam    Nursing note and vitals reviewed.  Constitutional: She appears well-developed and well-nourished. She is not diaphoretic. No distress.   HENT:   Head: Normocephalic and atraumatic.   Right Ear: External ear normal.   Left Ear: External ear normal.   Nose: Nose normal.   Mouth/Throat: Oropharynx is clear and moist. No oropharyngeal exudate.   Eyes: Conjunctivae and EOM are normal. Pupils are equal, round, and reactive to light. Right eye exhibits no discharge. Left eye exhibits no discharge. No scleral icterus.   Neck: Normal range of motion. Neck supple. No thyromegaly present. No tracheal deviation present. No JVD present.   Cardiovascular: Normal rate, regular rhythm, normal heart sounds and intact distal pulses. Exam reveals no gallop and no friction rub.    No murmur heard.  Pulmonary/Chest: Breath sounds normal. No stridor. No respiratory distress. She has no wheezes. She has no rhonchi. She has no rales. She exhibits no tenderness.   Abdominal: Soft. Bowel sounds are normal. She exhibits no distension and no mass. There is no tenderness. There is no rebound  and no guarding.   Musculoskeletal: Normal range of motion. She exhibits no edema or tenderness.   Lymphadenopathy:     She has no cervical adenopathy.   Neurological: She is alert and oriented to person, place, and time. She has normal strength. She displays normal reflexes. No cranial nerve deficit or sensory deficit.   Skin: Skin is warm and dry. No rash and no abscess noted. No erythema. No pallor.         ED Course   Procedures  Labs Reviewed   CBC W/ AUTO DIFFERENTIAL - Abnormal; Notable for the following components:       Result Value    Mean Corpuscular Hemoglobin 31.9 (*)     MPV 9.1 (*)     All other components within normal limits   COMPREHENSIVE METABOLIC PANEL - Abnormal; Notable for the following components:    Potassium 3.4 (*)     Glucose 113 (*)     All other components within normal limits   D DIMER, QUANTITATIVE - Abnormal; Notable for the following components:    D-Dimer 1.82 (*)     All other components within normal limits   TSH - Abnormal; Notable for the following components:    TSH 8.670 (*)     All other components within normal limits   URINALYSIS - Abnormal; Notable for the following components:    Occult Blood UA 2+ (*)     All other components within normal limits   URINALYSIS MICROSCOPIC - Abnormal; Notable for the following components:    RBC, UA 24 (*)     WBC, UA 6 (*)     Hyaline Casts, UA 4 (*)     All other components within normal limits   B-TYPE NATRIURETIC PEPTIDE   MAGNESIUM   PROTIME-INR   TROPONIN I   T4, FREE          Imaging Results          CTA Chest Non-Coronary - PE Study (Final result)  Result time 10/19/19 01:03:32    Final result by Tomasz Blankenship MD (10/19/19 01:03:32)                 Narrative:        Exam: CTA OF THE CHEST WITH CONTRAST    Clinical data: Acute chest pain. PE suspected. Positive D-dimer. Intermediate  probability.    Technique: Axial CT angiography images through the lungs were acquired with  contrast and imaged using soft tissue and lung  algorithms. Coronal and 3D volume  reconstructions were performed. Reformatted/3D-MPR images were performed.  Radiation dose: CTDIvol = 19 mGy, DLP = 193.40 mGy x cm.    Prior studies: No prior studies submitted.    Findings:    Lungs: There is mild dependent atelectasis in both lower lobes (right more than  left). Mild bilateral emphysematous changes. No pulmonary infiltrate identified.  No pulmonary mass identified. No pleural effusions identified. No pneumothorax.  The airway is clear.    Soft Tissues: No mediastinal, axillary or supraclavicular adenopathy is  identified.    Vascular: No filling defect within the pulmonary arteries to the segmental  branch level.  Ectatic aorta. Grossly unremarkable sized heart.  No definite  abnormality seen on 3D reformatted images.    Bony structures: No acute or destructive abnormality    Upper Abdomen: Limited visualization of the solid upper abdominal organs is  grossly unremarkable.    IMPRESSION:    1. No evidence of pulmonary thromboembolism.  2. Mild dependent atelectasis in both lower lobes (right more than left).  3. Mild bilateral emphysematous changes.      Recommendation:  Follow up as clinically indicated.    All CT scans at this facility utilize dose modulation, iterative reconstruction,  and/or weight based dosing when appropriate to reduce radiation dose to as low  as reasonably achievable.        Electronically Signed by MATTHEW KRAFT MD at 10/19/2019 3:30:38 AM                             CT Head Without Contrast (Final result)  Result time 10/19/19 00:59:24    Final result by Polo Wallace MD (10/19/19 00:59:24)                 Narrative:        Exam: CT OF THE BRAIN WITHOUT CONTRAST    Clinical data: Headache, basilar or orbital.    Technique: Contiguous axial images are obtained from the skull base to vertex  without intravenous contrast.  Radiation dose: CTDIvol = 57.5 mGy, DLP = 918.6  mGy x cm.    Prior studies: No prior studies  submitted.    Findings:    No acute intracranial abnormality is present. No evidence of acute cortical  infarction, hemorrhage, mass or mass effect. No hydrocephalus or abnormal  extra-axial fluid collections are present. The posterior fossa is unremarkable.    The skull base and calvarium are intact. The included portions of the paranasal  sinuses and mastoid air cells are clear.    IMPRESSION:    No acute intracranial abnormality is present.    Recommendation: Follow up as clinically indicated.    All CT scans at this facility utilize dose modulation, iterative reconstruction,  and/or weight based dosing when appropriate to reduce radiation dose to as low  as reasonably achievable.      Electronically Signed by JEREL CALLOWAY MD at 10/19/2019 3:13:39 AM                             X-Ray Chest AP Portable (Final result)  Result time 10/18/19 23:47:44    Final result by Darian Mesa MD (10/18/19 23:47:44)                 Impression:      No acute pulmonary process.      Electronically signed by: Darian Mesa MD  Date:    10/18/2019  Time:    23:47             Narrative:    EXAMINATION:  XR CHEST AP PORTABLE    CLINICAL HISTORY:  chest pain;    COMPARISON:  09/14/2019    FINDINGS:  Cardiomediastinal silhouette is stable compared to prior.  No confluent airspace disease.  No large pleural effusion or pneumothorax.  No acute osseous abnormality.                                 Medical Decision Making:   History:   Old Medical Records: I decided to obtain old medical records.  Initial Assessment:   69-year-old female presents complaining of chest pain, lightheadedness and nausea differential diagnosis includes hypertensive urgency, hypertensive emergency, ACS, electrolyte abnormality, endocrine dysfunction, PE              Attending Attestation:             Attending ED Notes:   Patient has normal cardiac enzymes, patient however has positive D-dimer, CTA chest pending.  Blood pressure has decreased to  157/88 will continue to monitor closely    Patient's chest pain has resolved, CTA of chest shows no sign of PE, CT scan of head is within normal limits, patient advised admission to hospital given chest pain and likely hypertensive urgency however patient declined stating that she recently had a negative stress test and that she does not wish to stay in the hospital she reports that she is feeling better and would like to go home.  Patient is alert and oriented x4 patient appears to have capacity to make her own medical decisions patient shows no sign of clinical intoxication patient understands that by leaving there is a risk of death, disability, deterioration patient understands that she can change her mind at any time and return.  Patient is advised to follow up with PCP in the next 1-2 days for re-evaluation.  She is cautioned to return immediately to the ER for any worsening or further concerns.             Clinical Impression:       ICD-10-CM ICD-9-CM   1. Chest pain, unspecified type R07.9 786.50   2. Chest pain R07.9 786.50   3. Hypertension, unspecified type I10 401.9                                Joe Cadena MD  10/19/19 0243

## 2019-10-21 ENCOUNTER — TELEPHONE (OUTPATIENT)
Dept: FAMILY MEDICINE | Facility: CLINIC | Age: 69
End: 2019-10-21

## 2019-10-21 NOTE — TELEPHONE ENCOUNTER
----- Message from Khang Davila sent at 10/21/2019 10:52 AM CDT -----  Contact: Joslyn Dubon  Pt went to ER Friday and Saturday at [facility] and would like to get scheduled for a follow up appt.   Pt# [phone]

## 2019-10-29 ENCOUNTER — CLINICAL SUPPORT (OUTPATIENT)
Dept: CARDIAC REHAB | Facility: HOSPITAL | Age: 69
End: 2019-10-29
Attending: FAMILY MEDICINE
Payer: MEDICARE

## 2019-10-29 DIAGNOSIS — J44.9 COPD (CHRONIC OBSTRUCTIVE PULMONARY DISEASE): Chronic | ICD-10-CM

## 2019-10-29 PROCEDURE — G0424 PULMONARY REHAB W EXER: HCPCS

## 2019-11-07 ENCOUNTER — CLINICAL SUPPORT (OUTPATIENT)
Dept: CARDIAC REHAB | Facility: HOSPITAL | Age: 69
End: 2019-11-07
Attending: FAMILY MEDICINE
Payer: MEDICARE

## 2019-11-07 DIAGNOSIS — J44.9 COPD (CHRONIC OBSTRUCTIVE PULMONARY DISEASE): Primary | ICD-10-CM

## 2019-11-07 PROCEDURE — G0424 PULMONARY REHAB W EXER: HCPCS

## 2019-11-14 DIAGNOSIS — Z12.31 VISIT FOR SCREENING MAMMOGRAM: Primary | ICD-10-CM

## 2020-01-17 ENCOUNTER — CLINICAL SUPPORT (OUTPATIENT)
Dept: FAMILY MEDICINE | Facility: CLINIC | Age: 70
End: 2020-01-17
Payer: MEDICARE

## 2020-01-17 ENCOUNTER — HOSPITAL ENCOUNTER (EMERGENCY)
Facility: HOSPITAL | Age: 70
Discharge: HOME | End: 2020-01-18
Attending: EMERGENCY MEDICINE
Payer: MEDICARE

## 2020-01-17 DIAGNOSIS — Z23 NEED FOR VACCINATION: ICD-10-CM

## 2020-01-17 DIAGNOSIS — Z23 NEED FOR IMMUNIZATION AGAINST INFLUENZA: Primary | ICD-10-CM

## 2020-01-17 DIAGNOSIS — R68.89 RIGORS: Primary | ICD-10-CM

## 2020-01-17 DIAGNOSIS — M79.10 MYALGIA: ICD-10-CM

## 2020-01-17 PROCEDURE — G0009 PNEUMOCOCCAL POLYSACCHARIDE VACCINE 23-VALENT =>2YO SQ IM: ICD-10-PCS | Mod: S$GLB,,, | Performed by: FAMILY MEDICINE

## 2020-01-17 PROCEDURE — 99283 EMERGENCY DEPT VISIT LOW MDM: CPT

## 2020-01-17 PROCEDURE — G0008 FLU VACCINE - HIGH DOSE (65+) PRESERVATIVE FREE IM: ICD-10-PCS | Mod: S$GLB,,, | Performed by: FAMILY MEDICINE

## 2020-01-17 PROCEDURE — 90662 FLU VACCINE - HIGH DOSE (65+) PRESERVATIVE FREE IM: ICD-10-PCS | Mod: S$GLB,,, | Performed by: FAMILY MEDICINE

## 2020-01-17 PROCEDURE — G0009 ADMIN PNEUMOCOCCAL VACCINE: HCPCS | Mod: S$GLB,,, | Performed by: FAMILY MEDICINE

## 2020-01-17 PROCEDURE — 90732 PPSV23 VACC 2 YRS+ SUBQ/IM: CPT | Mod: S$GLB,,, | Performed by: FAMILY MEDICINE

## 2020-01-17 PROCEDURE — 90732 PNEUMOCOCCAL POLYSACCHARIDE VACCINE 23-VALENT =>2YO SQ IM: ICD-10-PCS | Mod: S$GLB,,, | Performed by: FAMILY MEDICINE

## 2020-01-17 PROCEDURE — G0008 ADMIN INFLUENZA VIRUS VAC: HCPCS | Mod: S$GLB,,, | Performed by: FAMILY MEDICINE

## 2020-01-17 PROCEDURE — 90662 IIV NO PRSV INCREASED AG IM: CPT | Mod: S$GLB,,, | Performed by: FAMILY MEDICINE

## 2020-01-18 VITALS
TEMPERATURE: 98 F | HEIGHT: 62 IN | WEIGHT: 142 LBS | DIASTOLIC BLOOD PRESSURE: 94 MMHG | BODY MASS INDEX: 26.13 KG/M2 | OXYGEN SATURATION: 99 % | HEART RATE: 104 BPM | SYSTOLIC BLOOD PRESSURE: 177 MMHG | RESPIRATION RATE: 20 BRPM

## 2020-01-18 PROCEDURE — 25000003 PHARM REV CODE 250: Performed by: EMERGENCY MEDICINE

## 2020-01-18 RX ORDER — ONDANSETRON 4 MG/1
4 TABLET, ORALLY DISINTEGRATING ORAL
Status: COMPLETED | OUTPATIENT
Start: 2020-01-18 | End: 2020-01-18

## 2020-01-18 RX ORDER — NAPROXEN 250 MG/1
500 TABLET ORAL
Status: COMPLETED | OUTPATIENT
Start: 2020-01-18 | End: 2020-01-18

## 2020-01-18 RX ORDER — NAPROXEN 500 MG/1
500 TABLET ORAL 2 TIMES DAILY WITH MEALS
Qty: 10 TABLET | Refills: 0 | Status: SHIPPED | OUTPATIENT
Start: 2020-01-18 | End: 2020-01-23

## 2020-01-18 RX ADMIN — NAPROXEN 500 MG: 250 TABLET ORAL at 12:01

## 2020-01-18 RX ADMIN — ONDANSETRON 4 MG: 4 TABLET, ORALLY DISINTEGRATING ORAL at 12:01

## 2020-01-18 NOTE — ED NOTES
Pt presents to ED with shakes, left leg achy and arms achy x few hours after getting flu shot this morning   No other sx    got same shot and having same sx per pt     resp even and easy   Skin warm and dry

## 2020-01-18 NOTE — ED PROVIDER NOTES
Encounter Date: 1/17/2020    SCRIBE #1 NOTE: Laisha CARMONA, rabia scribing for, and in the presence of, Dr. Hitchcock.       History     Chief Complaint   Patient presents with    Medication Reaction     Received flu shot and pnemonia shot today, woke up with body shakes and R leg pain       Time seen by provider: 12:09 AM on 01/18/2020    Joslyn Dubon is a 69 y.o. female  who presents to the ED with c/o body shakes and left leg pain after receiving an influenza and pneumonia vaccination in her arms earlier today. She states her left leg pain starts at her knee and radiates down the front of her leg, to her toes. She denies having any of this prior to tonight. She notes tingling in her toes, nausea that has since resolved. No leg swelling, hx of DVT. She is not on blood thinners. Patient smokes 0.5 ppd. PMHx of COPD, anxiety, sciatic, and HTN. PSHx of abdominal aortic aneurysm repair. Drug allergy to Bisacodyl.     The history is provided by the patient.     Review of patient's allergies indicates:   Allergen Reactions    Bisacodyl Nausea And Vomiting     Other reaction(s): Vomiting    Demerol [meperidine]      Nausea vomiting, visual problem    Flonase [fluticasone propionate] Hives     Past Medical History:   Diagnosis Date    Anxiety     Martin esophagus     Colitis     COPD (chronic obstructive pulmonary disease)     GERD (gastroesophageal reflux disease)     Hypertension     Thyroid disease     Vocal cord polyps      Past Surgical History:   Procedure Laterality Date    ABDOMINAL AORTIC ANEURYSM REPAIR      BACK SURGERY      cervical    CHOLECYSTECTOMY  12-    Dr Price  [facility]    FOOT SURGERY      HYSTERECTOMY      SALIVARY GLAND SURGERY       No family history on file.  Social History     Tobacco Use    Smoking status: Current Every Day Smoker     Packs/day: 0.50     Types: Cigarettes    Smokeless tobacco: Former User     Quit date: 5/1/2016   Substance Use Topics    Alcohol use:  No    Drug use: No     Review of Systems   Constitutional: Negative for fever.   HENT: Negative for congestion.    Eyes: Negative for visual disturbance.   Respiratory: Negative for wheezing.    Cardiovascular: Negative for chest pain.   Gastrointestinal: Negative for abdominal pain and nausea.   Genitourinary: Negative for dysuria.   Musculoskeletal: Positive for myalgias. Negative for joint swelling.   Skin: Negative for rash.   Neurological: Negative for syncope.   Hematological: Does not bruise/bleed easily.   Psychiatric/Behavioral: Negative for confusion.       Physical Exam     Initial Vitals [01/17/20 2346]   BP Pulse Resp Temp SpO2   (!) 190/99 104 20 (!) 94.4 °F (34.7 °C) 99 %      MAP       --         Physical Exam    Nursing note and vitals reviewed.  Constitutional: She appears well-nourished.   HENT:   Head: Normocephalic and atraumatic.   Eyes: Conjunctivae and EOM are normal.   Neck: Normal range of motion. Neck supple. No thyroid mass present.   Cardiovascular: Normal rate, regular rhythm and normal heart sounds. Exam reveals no gallop and no friction rub.    No murmur heard.  Pulmonary/Chest: Breath sounds normal. She has no wheezes. She has no rhonchi. She has no rales.   Abdominal: Soft. Normal appearance and bowel sounds are normal. There is no tenderness.   Neurological: She is alert and oriented to person, place, and time. She has normal strength. No cranial nerve deficit or sensory deficit.   Skin: Skin is warm and dry. No rash noted. No erythema.   Psychiatric: She has a normal mood and affect. Her speech is normal. Cognition and memory are normal.         ED Course   Procedures  Labs Reviewed - No data to display       Imaging Results    None          Medical Decision Making:   History:   Old Medical Records: I decided to obtain old medical records.  ED Management:  This patient was interviewed and assessed emergently.  I suspect she may be having a reaction to multiple immunizations  earlier today.  At this time she denies any chest pain or difficulty breathing.  She was provided Naprosyn and Zofran with improvement. She had a stable period of ED observation and I do not think additional lab testing or imaging warranted for his brief period of transient complaint.  I have low suspicion for coincidental contributing pathology including DVT, PE, atypical ACS, anaphylaxis, anaphylactoid reaction.  She is asked to monitor symptoms at home and follow up with his primary care doctor as soon as possible regarding further resolution of symptoms. She is asked return to the ER immediately for any new, concerning, or worsening symptoms.  Patient was agreeable with this plan for follow-up and was discharged in stable condition.            Scribe Attestation:   Scribe #1: I performed the above scribed service and the documentation accurately describes the services I performed. I attest to the accuracy of the note.                   I, Dr. Steve Hitchcock, personally performed the services described in this documentation. All medical record entries made by the scribe were at my direction and in my presence.  I have reviewed the chart and agree that the record reflects my personal performance and is accurate and complete. Steve Hitchcock MD.  6:29 AM 01/18/2020          Clinical Impression:       ICD-10-CM ICD-9-CM   1. Rigors R68.89 780.99   2. Myalgia M79.10 729.1         Disposition:   Disposition: Discharged  Condition: Stable                     Steve Hitchcock MD  01/18/20 0629

## 2020-02-04 DIAGNOSIS — Z12.31 ENCOUNTER FOR SCREENING MAMMOGRAM FOR MALIGNANT NEOPLASM OF BREAST: Primary | ICD-10-CM

## 2020-02-12 ENCOUNTER — HOSPITAL ENCOUNTER (OUTPATIENT)
Dept: RADIOLOGY | Facility: HOSPITAL | Age: 70
Discharge: HOME | End: 2020-02-12
Attending: FAMILY MEDICINE
Payer: MEDICARE

## 2020-02-12 VITALS — HEIGHT: 62 IN | BODY MASS INDEX: 25.97 KG/M2 | WEIGHT: 141.13 LBS

## 2020-02-12 DIAGNOSIS — I10 ESSENTIAL HYPERTENSION: ICD-10-CM

## 2020-02-12 DIAGNOSIS — E03.9 ACQUIRED HYPOTHYROIDISM: Primary | ICD-10-CM

## 2020-02-12 DIAGNOSIS — Z12.31 ENCOUNTER FOR SCREENING MAMMOGRAM FOR MALIGNANT NEOPLASM OF BREAST: ICD-10-CM

## 2020-02-12 PROCEDURE — 77067 SCR MAMMO BI INCL CAD: CPT | Mod: TC,PO

## 2020-02-17 ENCOUNTER — TELEPHONE (OUTPATIENT)
Dept: FAMILY MEDICINE | Facility: CLINIC | Age: 70
End: 2020-02-17

## 2020-02-17 NOTE — TELEPHONE ENCOUNTER
----- Message from Jasbir Graham MD sent at 2/15/2020  8:17 PM CST -----  Call patient.  Mammogram was normal.  Repeat mammogram in 1 year.

## 2020-02-18 LAB
ALBUMIN SERPL-MCNC: 4.3 G/DL (ref 3.6–5.1)
ALBUMIN/GLOB SERPL: 1.2 (CALC) (ref 1–2.5)
ALP SERPL-CCNC: 100 U/L (ref 37–153)
ALT SERPL-CCNC: 13 U/L (ref 6–29)
AST SERPL-CCNC: 19 U/L (ref 10–35)
BILIRUB SERPL-MCNC: 0.6 MG/DL (ref 0.2–1.2)
BUN SERPL-MCNC: 14 MG/DL (ref 7–25)
BUN/CREAT SERPL: NORMAL (CALC) (ref 6–22)
CALCIUM SERPL-MCNC: 10 MG/DL (ref 8.6–10.4)
CHLORIDE SERPL-SCNC: 103 MMOL/L (ref 98–110)
CHOLEST SERPL-MCNC: 189 MG/DL
CHOLEST/HDLC SERPL: 3.2 (CALC)
CO2 SERPL-SCNC: 29 MMOL/L (ref 20–32)
CREAT SERPL-MCNC: 0.8 MG/DL (ref 0.6–0.93)
GFRSERPLBLD MDRD-ARVRAT: 74 ML/MIN/1.73M2
GLOBULIN SER CALC-MCNC: 3.5 G/DL (CALC) (ref 1.9–3.7)
GLUCOSE SERPL-MCNC: 90 MG/DL (ref 65–99)
HDLC SERPL-MCNC: 59 MG/DL
LDLC SERPL CALC-MCNC: 109 MG/DL (CALC)
NONHDLC SERPL-MCNC: 130 MG/DL (CALC)
POTASSIUM SERPL-SCNC: 5.2 MMOL/L (ref 3.5–5.3)
PROT SERPL-MCNC: 7.8 G/DL (ref 6.1–8.1)
SODIUM SERPL-SCNC: 138 MMOL/L (ref 135–146)
TRIGL SERPL-MCNC: 105 MG/DL
TSH SERPL-ACNC: 0.46 MIU/L (ref 0.4–4.5)

## 2020-02-21 ENCOUNTER — OFFICE VISIT (OUTPATIENT)
Dept: FAMILY MEDICINE | Facility: CLINIC | Age: 70
End: 2020-02-21
Payer: MEDICARE

## 2020-02-21 VITALS
DIASTOLIC BLOOD PRESSURE: 70 MMHG | BODY MASS INDEX: 26.13 KG/M2 | SYSTOLIC BLOOD PRESSURE: 108 MMHG | HEIGHT: 62 IN | HEART RATE: 64 BPM | WEIGHT: 142 LBS

## 2020-02-21 DIAGNOSIS — E03.9 ACQUIRED HYPOTHYROIDISM: Primary | ICD-10-CM

## 2020-02-21 DIAGNOSIS — E07.9 THYROID DISEASE: Chronic | ICD-10-CM

## 2020-02-21 DIAGNOSIS — J43.1 PANLOBULAR EMPHYSEMA: Chronic | ICD-10-CM

## 2020-02-21 DIAGNOSIS — K21.9 GASTROESOPHAGEAL REFLUX DISEASE WITHOUT ESOPHAGITIS: ICD-10-CM

## 2020-02-21 DIAGNOSIS — F41.9 ANXIETY: ICD-10-CM

## 2020-02-21 DIAGNOSIS — M51.36 DDD (DEGENERATIVE DISC DISEASE), LUMBAR: ICD-10-CM

## 2020-02-21 DIAGNOSIS — I10 ESSENTIAL HYPERTENSION: ICD-10-CM

## 2020-02-21 DIAGNOSIS — Z72.0 TOBACCO ABUSE: Chronic | ICD-10-CM

## 2020-02-21 PROCEDURE — 99214 OFFICE O/P EST MOD 30 MIN: CPT | Mod: S$GLB,,, | Performed by: FAMILY MEDICINE

## 2020-02-21 PROCEDURE — 3078F DIAST BP <80 MM HG: CPT | Mod: S$GLB,,, | Performed by: FAMILY MEDICINE

## 2020-02-21 PROCEDURE — 1101F PT FALLS ASSESS-DOCD LE1/YR: CPT | Mod: S$GLB,,, | Performed by: FAMILY MEDICINE

## 2020-02-21 PROCEDURE — 1159F MED LIST DOCD IN RCRD: CPT | Mod: S$GLB,,, | Performed by: FAMILY MEDICINE

## 2020-02-21 PROCEDURE — 1159F PR MEDICATION LIST DOCUMENTED IN MEDICAL RECORD: ICD-10-PCS | Mod: S$GLB,,, | Performed by: FAMILY MEDICINE

## 2020-02-21 PROCEDURE — 99214 PR OFFICE/OUTPT VISIT, EST, LEVL IV, 30-39 MIN: ICD-10-PCS | Mod: S$GLB,,, | Performed by: FAMILY MEDICINE

## 2020-02-21 PROCEDURE — 3078F PR MOST RECENT DIASTOLIC BLOOD PRESSURE < 80 MM HG: ICD-10-PCS | Mod: S$GLB,,, | Performed by: FAMILY MEDICINE

## 2020-02-21 PROCEDURE — 3074F SYST BP LT 130 MM HG: CPT | Mod: S$GLB,,, | Performed by: FAMILY MEDICINE

## 2020-02-21 PROCEDURE — 1101F PR PT FALLS ASSESS DOC 0-1 FALLS W/OUT INJ PAST YR: ICD-10-PCS | Mod: S$GLB,,, | Performed by: FAMILY MEDICINE

## 2020-02-21 PROCEDURE — 3074F PR MOST RECENT SYSTOLIC BLOOD PRESSURE < 130 MM HG: ICD-10-PCS | Mod: S$GLB,,, | Performed by: FAMILY MEDICINE

## 2020-02-21 RX ORDER — LISINOPRIL 10 MG/1
10 TABLET ORAL DAILY PRN
Qty: 90 TABLET | Refills: 1 | Status: ON HOLD | OUTPATIENT
Start: 2020-02-21 | End: 2020-12-05 | Stop reason: SDUPTHER

## 2020-02-21 RX ORDER — LEVOTHYROXINE SODIUM 100 UG/1
100 TABLET ORAL
Qty: 90 TABLET | Refills: 1 | Status: SHIPPED | OUTPATIENT
Start: 2020-02-21 | End: 2020-08-28 | Stop reason: SDUPTHER

## 2020-02-21 RX ORDER — HYDROCODONE BITARTRATE AND ACETAMINOPHEN 10; 325 MG/1; MG/1
1 TABLET ORAL EVERY 12 HOURS PRN
Qty: 30 TABLET | Refills: 0 | Status: SHIPPED | OUTPATIENT
Start: 2020-02-21 | End: 2020-02-26

## 2020-02-21 RX ORDER — OMEPRAZOLE 40 MG/1
40 CAPSULE, DELAYED RELEASE ORAL DAILY PRN
Qty: 90 CAPSULE | Refills: 1 | Status: SHIPPED | OUTPATIENT
Start: 2020-02-21 | End: 2020-08-28

## 2020-02-21 NOTE — PROGRESS NOTES
SUBJECTIVE:    Patient ID: Joslyn Dubon is a 69 y.o. female.    Chief Complaint: Follow-up (brought 2 bottles // states she only needs refills on those. 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  She states her back feels much better since she retired and is no longer standing up 8 hr a day.  Her left sciatica still flares up occasionally.  She rarely takes any hydrocodone and does break them in half when new needed.  She is active and is able to do house chores, grocery shopping, and baby-sitting her great grandchildren.  This requires occasional heavy lifting.    She takes her lisinopril every other day and her blood pressures are still maintained well.  She had a mammogram recently which was normal..  She declines to do any further colonoscopies as she had an allergic reaction to the bowel prep on her last 1.  She agrees to do stool for blood testing.      Orders Only on 02/12/2020   Component Date Value Ref Range Status    Cholesterol 02/17/2020 189  <200 mg/dL Final    HDL 02/17/2020 59  > OR = 50 mg/dL Final    Triglycerides 02/17/2020 105  <150 mg/dL Final    LDL Cholesterol 02/17/2020 109* mg/dL (calc) Final    Hdl/Cholesterol Ratio 02/17/2020 3.2  <5.0 (calc) Final    Non HDL Chol. (LDL+VLDL) 02/17/2020 130* <130 mg/dL (calc) Final    Glucose 02/17/2020 90  65 - 99 mg/dL Final    BUN, Bld 02/17/2020 14  7 - 25 mg/dL Final    Creatinine 02/17/2020 0.80  0.60 - 0.93 mg/dL Final    eGFR if non African American 02/17/2020 74  > OR = 60 mL/min/1.73m2 Final    eGFR if  02/17/2020 86  > OR = 60 mL/min/1.73m2 Final    BUN/Creatinine Ratio 02/17/2020 NOT APPLICABLE  6 - 22 (calc) Final    Sodium 02/17/2020 138  135 - 146 mmol/L Final    Potassium 02/17/2020 5.2  3.5 - 5.3 mmol/L Final    Chloride 02/17/2020 103  98 - 110 mmol/L Final    CO2 02/17/2020 29  20 - 32 mmol/L Final    Calcium 02/17/2020 10.0  8.6 - 10.4  mg/dL Final    Total Protein 02/17/2020 7.8  6.1 - 8.1 g/dL Final    Albumin 02/17/2020 4.3  3.6 - 5.1 g/dL Final    Globulin, Total 02/17/2020 3.5  1.9 - 3.7 g/dL (calc) Final    Albumin/Globulin Ratio 02/17/2020 1.2  1.0 - 2.5 (calc) Final    Total Bilirubin 02/17/2020 0.6  0.2 - 1.2 mg/dL Final    Alkaline Phosphatase 02/17/2020 100  37 - 153 U/L Final    AST 02/17/2020 19  10 - 35 U/L Final    ALT 02/17/2020 13  6 - 29 U/L Final    TSH w/reflex to FT4 02/17/2020 0.46  0.40 - 4.50 mIU/L Final   Admission on 10/18/2019, Discharged on 10/19/2019   Component Date Value Ref Range Status    BNP 10/18/2019 66  0 - 99 pg/mL Final    WBC 10/18/2019 7.53  3.90 - 12.70 K/uL Final    RBC 10/18/2019 4.61  4.00 - 5.40 M/uL Final    Hemoglobin 10/18/2019 14.7  12.0 - 16.0 g/dL Final    Hematocrit 10/18/2019 44.0  37.0 - 48.5 % Final    Mean Corpuscular Volume 10/18/2019 95  82 - 98 fL Final    Mean Corpuscular Hemoglobin 10/18/2019 31.9* 27.0 - 31.0 pg Final    Mean Corpuscular Hemoglobin Conc 10/18/2019 33.4  32.0 - 36.0 g/dL Final    RDW 10/18/2019 13.7  11.5 - 14.5 % Final    Platelets 10/18/2019 210  150 - 350 K/uL Final    MPV 10/18/2019 9.1* 9.2 - 12.9 fL Final    Immature Granulocytes 10/18/2019 0.5  0.0 - 0.5 % Final    Gran # (ANC) 10/18/2019 4.1  1.8 - 7.7 K/uL Final    Immature Grans (Abs) 10/18/2019 0.04  0.00 - 0.04 K/uL Final    Lymph # 10/18/2019 2.6  1.0 - 4.8 K/uL Final    Mono # 10/18/2019 0.7  0.3 - 1.0 K/uL Final    Eos # 10/18/2019 0.1  0.0 - 0.5 K/uL Final    Baso # 10/18/2019 0.04  0.00 - 0.20 K/uL Final    nRBC 10/18/2019 0  0 /100 WBC Final    Gran% 10/18/2019 54.4  38.0 - 73.0 % Final    Lymph% 10/18/2019 34.3  18.0 - 48.0 % Final    Mono% 10/18/2019 8.8  4.0 - 15.0 % Final    Eosinophil% 10/18/2019 1.5  0.0 - 8.0 % Final    Basophil% 10/18/2019 0.5  0.0 - 1.9 % Final    Differential Method 10/18/2019 Automated   Final    Sodium 10/18/2019 141  136 - 145 mmol/L Final     Potassium 10/18/2019 3.4* 3.5 - 5.1 mmol/L Final    Chloride 10/18/2019 106  95 - 110 mmol/L Final    CO2 10/18/2019 24  23 - 29 mmol/L Final    Glucose 10/18/2019 113* 70 - 110 mg/dL Final    BUN, Bld 10/18/2019 15  8 - 23 mg/dL Final    Creatinine 10/18/2019 0.7  0.5 - 1.4 mg/dL Final    Calcium 10/18/2019 8.8  8.7 - 10.5 mg/dL Final    Total Protein 10/18/2019 7.5  6.0 - 8.4 g/dL Final    Albumin 10/18/2019 3.7  3.5 - 5.2 g/dL Final    Total Bilirubin 10/18/2019 0.6  0.1 - 1.0 mg/dL Final    Alkaline Phosphatase 10/18/2019 85  55 - 135 U/L Final    AST 10/18/2019 20  10 - 40 U/L Final    ALT 10/18/2019 12  10 - 44 U/L Final    Anion Gap 10/18/2019 11  8 - 16 mmol/L Final    eGFR if African American 10/18/2019 >60.0  >60 mL/min/1.73 m^2 Final    eGFR if non African American 10/18/2019 >60.0  >60 mL/min/1.73 m^2 Final    D-Dimer 10/18/2019 1.82* <0.50 ug/mL FEU Final    Magnesium 10/18/2019 1.9  1.6 - 2.6 mg/dL Final    PT 10/18/2019 12.5  10.6 - 14.8 sec Final    INR 10/18/2019 1.0   Final    Troponin I 10/18/2019 <0.030  0.020 - 0.040 ng/mL Final    TSH 10/18/2019 8.670* 0.340 - 5.600 uIU/mL Final    Specimen UA 10/18/2019 Urine, Clean Catch   Final    Color, UA 10/18/2019 Yellow  Yellow, Straw, Priya Final    Appearance, UA 10/18/2019 Clear  Clear Final    pH, UA 10/18/2019 6.0  5.0 - 8.0 Final    Specific [place], UA 10/18/2019 1.020  1.005 - 1.030 Final    Protein, UA 10/18/2019 Negative  Negative Final    Glucose, UA 10/18/2019 Negative  Negative Final    Ketones, UA 10/18/2019 Negative  Negative Final    Bilirubin (UA) 10/18/2019 Negative  Negative Final    Occult Blood UA 10/18/2019 2+* Negative Final    Nitrite, UA 10/18/2019 Negative  Negative Final    Urobilinogen, UA 10/18/2019 Negative  Negative EU/dL Final    Leukocytes, UA 10/18/2019 Negative  Negative Final    Free T4 10/18/2019 0.91  0.71 - 1.51 ng/dL Final    RBC, UA 10/18/2019 24* 0 - 4 /hpf Final    WBC,  UA 10/18/2019 6* 0 - 5 /hpf Final    Bacteria 10/18/2019 Negative  None-Occ /hpf Final    Squam Epithel, UA 10/18/2019 12  /hpf Final    Hyaline Casts, UA 10/18/2019 4* 0-1/lpf /lpf Final    Microscopic Comment 10/18/2019 SEE COMMENT   Final   Admission on 10/17/2019, Discharged on 10/17/2019   Component Date Value Ref Range Status    WBC 10/17/2019 10.53  3.90 - 12.70 K/uL Final    RBC 10/17/2019 4.69  4.00 - 5.40 M/uL Final    Hemoglobin 10/17/2019 14.9  12.0 - 16.0 g/dL Final    Hematocrit 10/17/2019 45.1  37.0 - 48.5 % Final    Mean Corpuscular Volume 10/17/2019 96  82 - 98 fL Final    Mean Corpuscular Hemoglobin 10/17/2019 31.8* 27.0 - 31.0 pg Final    Mean Corpuscular Hemoglobin Conc 10/17/2019 33.0  32.0 - 36.0 g/dL Final    RDW 10/17/2019 13.6  11.5 - 14.5 % Final    Platelets 10/17/2019 225  150 - 350 K/uL Final    MPV 10/17/2019 9.3  9.2 - 12.9 fL Final    Immature Granulocytes 10/17/2019 0.2  0.0 - 0.5 % Final    Gran # (ANC) 10/17/2019 7.2  1.8 - 7.7 K/uL Final    Immature Grans (Abs) 10/17/2019 0.02  0.00 - 0.04 K/uL Final    Lymph # 10/17/2019 2.4  1.0 - 4.8 K/uL Final    Mono # 10/17/2019 0.8  0.3 - 1.0 K/uL Final    Eos # 10/17/2019 0.1  0.0 - 0.5 K/uL Final    Baso # 10/17/2019 0.05  0.00 - 0.20 K/uL Final    nRBC 10/17/2019 0  0 /100 WBC Final    Gran% 10/17/2019 68.2  38.0 - 73.0 % Final    Lymph% 10/17/2019 23.1  18.0 - 48.0 % Final    Mono% 10/17/2019 7.5  4.0 - 15.0 % Final    Eosinophil% 10/17/2019 0.5  0.0 - 8.0 % Final    Basophil% 10/17/2019 0.5  0.0 - 1.9 % Final    Differential Method 10/17/2019 Automated   Final    Sodium 10/17/2019 142  136 - 145 mmol/L Final    Potassium 10/17/2019 3.4* 3.5 - 5.1 mmol/L Final    Chloride 10/17/2019 106  95 - 110 mmol/L Final    CO2 10/17/2019 27  23 - 29 mmol/L Final    Glucose 10/17/2019 91  70 - 110 mg/dL Final    BUN, Bld 10/17/2019 16  8 - 23 mg/dL Final    Creatinine 10/17/2019 0.7  0.5 - 1.4 mg/dL Final     Calcium 10/17/2019 9.4  8.7 - 10.5 mg/dL Final    Total Protein 10/17/2019 7.7  6.0 - 8.4 g/dL Final    Albumin 10/17/2019 3.7  3.5 - 5.2 g/dL Final    Total Bilirubin 10/17/2019 0.6  0.1 - 1.0 mg/dL Final    Alkaline Phosphatase 10/17/2019 84  55 - 135 U/L Final    AST 10/17/2019 19  10 - 40 U/L Final    ALT 10/17/2019 13  10 - 44 U/L Final    Anion Gap 10/17/2019 9  8 - 16 mmol/L Final    eGFR if African American 10/17/2019 >60.0  >60 mL/min/1.73 m^2 Final    eGFR if non African American 10/17/2019 >60.0  >60 mL/min/1.73 m^2 Final    Troponin I 10/17/2019 <0.030  0.020 - 0.040 ng/mL Final   Admission on 09/14/2019, Discharged on 09/15/2019   Component Date Value Ref Range Status    WBC 09/14/2019 5.73  3.90 - 12.70 K/uL Final    RBC 09/14/2019 4.52  4.00 - 5.40 M/uL Final    Hemoglobin 09/14/2019 14.3  12.0 - 16.0 g/dL Final    Hematocrit 09/14/2019 43.1  37.0 - 48.5 % Final    Mean Corpuscular Volume 09/14/2019 95  82 - 98 fL Final    Mean Corpuscular Hemoglobin 09/14/2019 31.6* 27.0 - 31.0 pg Final    Mean Corpuscular Hemoglobin Conc 09/14/2019 33.2  32.0 - 36.0 g/dL Final    RDW 09/14/2019 13.3  11.5 - 14.5 % Final    Platelets 09/14/2019 171  150 - 350 K/uL Final    MPV 09/14/2019 9.4  9.2 - 12.9 fL Final    Immature Granulocytes 09/14/2019 0.2  0.0 - 0.5 % Final    Gran # (ANC) 09/14/2019 3.1  1.8 - 7.7 K/uL Final    Immature Grans (Abs) 09/14/2019 0.01  0.00 - 0.04 K/uL Final    Lymph # 09/14/2019 1.9  1.0 - 4.8 K/uL Final    Mono # 09/14/2019 0.5  0.3 - 1.0 K/uL Final    Eos # 09/14/2019 0.1  0.0 - 0.5 K/uL Final    Baso # 09/14/2019 0.03  0.00 - 0.20 K/uL Final    nRBC 09/14/2019 0  0 /100 WBC Final    Gran% 09/14/2019 54.3  38.0 - 73.0 % Final    Lymph% 09/14/2019 33.3  18.0 - 48.0 % Final    Mono% 09/14/2019 9.4  4.0 - 15.0 % Final    Eosinophil% 09/14/2019 2.3  0.0 - 8.0 % Final    Basophil% 09/14/2019 0.5  0.0 - 1.9 % Final    Differential Method 09/14/2019 Automated    Final    Sodium 09/14/2019 140  136 - 145 mmol/L Final    Potassium 09/14/2019 3.7  3.5 - 5.1 mmol/L Final    Chloride 09/14/2019 106  95 - 110 mmol/L Final    CO2 09/14/2019 25  23 - 29 mmol/L Final    Glucose 09/14/2019 92  70 - 110 mg/dL Final    BUN, Bld 09/14/2019 15  8 - 23 mg/dL Final    Creatinine 09/14/2019 0.8  0.5 - 1.4 mg/dL Final    Calcium 09/14/2019 9.2  8.7 - 10.5 mg/dL Final    Total Protein 09/14/2019 7.4  6.0 - 8.4 g/dL Final    Albumin 09/14/2019 3.8  3.5 - 5.2 g/dL Final    Total Bilirubin 09/14/2019 0.6  0.1 - 1.0 mg/dL Final    Alkaline Phosphatase 09/14/2019 73  55 - 135 U/L Final    AST 09/14/2019 19  10 - 40 U/L Final    ALT 09/14/2019 14  10 - 44 U/L Final    Anion Gap 09/14/2019 9  8 - 16 mmol/L Final    eGFR if African American 09/14/2019 >60.0  >60 mL/min/1.73 m^2 Final    eGFR if non African American 09/14/2019 >60.0  >60 mL/min/1.73 m^2 Final    BNP 09/14/2019 28  0 - 99 pg/mL Final    Troponin I 09/14/2019 <0.030  0.020 - 0.040 ng/mL Final    PT 09/14/2019 13.0  11.7 - 14.0 sec Final    INR 09/14/2019 1.0   Final    Hemoglobin A1C 09/15/2019 5.3  4.5 - 6.2 % Final    Estimated Avg Glucose 09/15/2019 105  68 - 131 mg/dL Final    TSH 09/15/2019 1.220  0.340 - 5.600 uIU/mL Final    Free T4 09/15/2019 1.11  0.71 - 1.51 ng/dL Final    BSA 09/15/2019 1.65  m2 Final    Systolic blood pressure 09/15/2019 160  mmHg Final    Diastolic blood pressure 09/15/2019 98  mmHg Final    HR at rest 09/15/2019 64  bpm Final    RPP 09/15/2019 10,240   Final    Peak HR 09/15/2019 128  bpm Final    Peak Systolic BP 09/15/2019 160  mmHg Final    Peak Diatolic BP 09/15/2019 79  mmHg Final    Peak RPP 09/15/2019 20,480   Final    Max Predicted HR 09/15/2019 144   Final    85% Max Predicted HR 09/15/2019 122   Final    % Max HR Achieved 09/15/2019 89   Final    OHS CV CPX PATIENT IS MALE 09/15/2019 0   Final    OHS CV CPX PATIENT IS FEMALE 09/15/2019 1   Final    Exercise  duration (sec) 09/15/2019 10  seconds Final    Exercise duration (min) 09/15/2019 6  minutes Final    Specimen UA 09/14/2019 Urine, Clean Catch   Final    Color, UA 09/14/2019 Colorless* Yellow, Straw, Priya Final    Appearance, UA 09/14/2019 Clear  Clear Final    pH, UA 09/14/2019 7.0  5.0 - 8.0 Final    Specific Gravity, UA 09/14/2019 1.000* 1.005 - 1.030 Final    Protein, UA 09/14/2019 Negative  Negative Final    Glucose, UA 09/14/2019 Negative  Negative Final    Ketones, UA 09/14/2019 Negative  Negative Final    Bilirubin (UA) 09/14/2019 Negative  Negative Final    Occult Blood UA 09/14/2019 1+* Negative Final    Nitrite, UA 09/14/2019 Negative  Negative Final    Urobilinogen, UA 09/14/2019 Negative  Negative EU/dL Final    Leukocytes, UA 09/14/2019 Negative  Negative Final    RBC, UA 09/14/2019 5* 0 - 4 /hpf Final    WBC, UA 09/14/2019 1  0 - 5 /hpf Final    Bacteria 09/14/2019 Negative  None-Occ /hpf Final    Squam Epithel, UA 09/14/2019 1  /hpf Final    Hyaline Casts, UA 09/14/2019 1  0-1/lpf /lpf Final    Microscopic Comment 09/14/2019 SEE COMMENT   Final    Sodium 09/15/2019 140  136 - 145 mmol/L Final    Potassium 09/15/2019 3.8  3.5 - 5.1 mmol/L Final    Chloride 09/15/2019 107  95 - 110 mmol/L Final    CO2 09/15/2019 26  23 - 29 mmol/L Final    Glucose 09/15/2019 94  70 - 110 mg/dL Final    BUN, Bld 09/15/2019 13  8 - 23 mg/dL Final    Creatinine 09/15/2019 0.8  0.5 - 1.4 mg/dL Final    Calcium 09/15/2019 8.9  8.7 - 10.5 mg/dL Final    Anion Gap 09/15/2019 7* 8 - 16 mmol/L Final    eGFR if African American 09/15/2019 >60.0  >60 mL/min/1.73 m^2 Final    eGFR if non African American 09/15/2019 >60.0  >60 mL/min/1.73 m^2 Final    Magnesium 09/15/2019 1.9  1.6 - 2.6 mg/dL Final    Phosphorus 09/15/2019 3.6  2.7 - 4.5 mg/dL Final    WBC 09/15/2019 5.18  3.90 - 12.70 K/uL Final    RBC 09/15/2019 4.31  4.00 - 5.40 M/uL Final    Hemoglobin 09/15/2019 13.6  12.0 - 16.0 g/dL  Final    Hematocrit 09/15/2019 41.5  37.0 - 48.5 % Final    Mean Corpuscular Volume 09/15/2019 96  82 - 98 fL Final    Mean Corpuscular Hemoglobin 09/15/2019 31.6* 27.0 - 31.0 pg Final    Mean Corpuscular Hemoglobin Conc 09/15/2019 32.8  32.0 - 36.0 g/dL Final    RDW 09/15/2019 13.2  11.5 - 14.5 % Final    Platelets 09/15/2019 154  150 - 350 K/uL Final    MPV 09/15/2019 9.5  9.2 - 12.9 fL Final    Troponin I 09/15/2019 <0.030  0.020 - 0.040 ng/mL Final    Cholesterol 09/15/2019 156  120 - 199 mg/dL Final    Triglycerides 09/15/2019 64  30 - 150 mg/dL Final    HDL 09/15/2019 49  40 - 75 mg/dL Final    LDL Cholesterol 09/15/2019 94.2  63.0 - 159.0 mg/dL Final    Hdl/Cholesterol Ratio 09/15/2019 31.4  20.0 - 50.0 % Final    Total Cholesterol/HDL Ratio 09/15/2019 3.2  2.0 - 5.0 Final    Non-HDL Cholesterol 09/15/2019 107  mg/dL Final    Troponin I 09/15/2019 <0.030  0.020 - 0.040 ng/mL Final       Past Medical History:   Diagnosis Date    Anxiety     Martin esophagus     Colitis     COPD (chronic obstructive pulmonary disease)     GERD (gastroesophageal reflux disease)     Hypertension     Thyroid disease     Vocal cord polyps      Past Surgical History:   Procedure Laterality Date    ABDOMINAL AORTIC ANEURYSM REPAIR      BACK SURGERY      cervical    CHOLECYSTECTOMY  12-    Dr Price  [facility]    FOOT SURGERY      HYSTERECTOMY      SALIVARY GLAND SURGERY       History reviewed. No pertinent family history.    Marital Status:   Alcohol History:  reports that she does not drink alcohol.  Tobacco History:  reports that she has been smoking cigarettes. She has been smoking about 0.50 packs per day. She quit smokeless tobacco use about 3 years ago.  Drug History:  reports that she does not use drugs.    Review of patient's allergies indicates:   Allergen Reactions    Bisacodyl Nausea And Vomiting     Other reaction(s): Vomiting    Demerol [meperidine]      Nausea vomiting, visual  problem    Flonase [fluticasone propionate] Hives       Current Outpatient Medications:     levothyroxine (SYNTHROID) 100 MCG tablet, Take 1 tablet (100 mcg total) by mouth before breakfast., Disp: 90 tablet, Rfl: 1    lisinopril 10 MG tablet, Take 1 tablet (10 mg total) by mouth daily as needed (SBP>160)., Disp: 90 tablet, Rfl: 1    fluticasone propionate (FLONASE) 50 mcg/actuation nasal spray, 1 spray (50 mcg total) by Each Nostril route once daily., Disp: 1 Bottle, Rfl: 2    HYDROcodone-acetaminophen (NORCO)  mg per tablet, Take 1 tablet by mouth every 12 (twelve) hours as needed., Disp: 30 tablet, Rfl: 0    LORazepam (ATIVAN) 0.5 MG tablet, Take 1 tablet (0.5 mg total) by mouth every 12 (twelve) hours as needed for Anxiety. (Patient not taking: Reported on 2/21/2020), Disp: 60 tablet, Rfl: 1    omeprazole (PRILOSEC) 40 MG capsule, Take 1 capsule (40 mg total) by mouth daily as needed., Disp: 90 capsule, Rfl: 1    Review of Systems   Constitutional: Negative for appetite change, chills, fatigue, fever and unexpected weight change.   HENT: Negative for congestion (sinus  congestion), ear pain, sinus pain, sore throat and trouble swallowing.    Eyes: Negative for pain, discharge and visual disturbance.   Respiratory: Negative for apnea, cough (clear phlegm ), shortness of breath and wheezing.         Smokes 1/2 ppd   Cardiovascular: Positive for palpitations. Negative for chest pain and leg swelling.   Gastrointestinal: Negative for abdominal pain, blood in stool, constipation, diarrhea, nausea and vomiting.   Endocrine: Negative for heat intolerance, polydipsia and polyuria.   Genitourinary: Negative for difficulty urinating, dyspareunia, dysuria, frequency, hematuria and menstrual problem.        Nocturia  2-3 x  nite   Musculoskeletal: Negative for arthralgias, back pain (occas  flares up .uses  tylenol or  Norco..no longer  needs Reba's), gait problem, joint swelling and myalgias.  "  Allergic/Immunologic: Negative for environmental allergies, food allergies and immunocompromised state.   Neurological: Negative for dizziness, tremors, seizures, numbness (left  sciatica occas) and headaches.   Psychiatric/Behavioral: Negative for behavioral problems, confusion, hallucinations and suicidal ideas. The patient is not nervous/anxious.           Objective:      Vitals:    02/21/20 0937   BP: 108/70   Pulse: 64   Weight: 64.4 kg (142 lb)   Height: 5' 2" (1.575 m)     Body mass index is 25.97 kg/m².  Physical Exam   Constitutional: She is oriented to person, place, and time. She appears well-developed and well-nourished.   Elderly female in no apparent distress   HENT:   Head: Normocephalic and atraumatic.   Right Ear: External ear normal.   Left Ear: External ear normal.   Nose: Nose normal.   Mouth/Throat: Oropharynx is clear and moist.   Eyes: Pupils are equal, round, and reactive to light. EOM are normal.   Neck: Normal range of motion. Neck supple. Carotid bruit is not present. No thyromegaly present.   Cardiovascular: Normal rate, regular rhythm, normal heart sounds and intact distal pulses.   No murmur heard.  Pulmonary/Chest: Effort normal and breath sounds normal. She has no wheezes. She has no rales.   Abdominal: Soft. Bowel sounds are normal. She exhibits no distension. There is no hepatosplenomegaly. There is no tenderness.   Musculoskeletal: Normal range of motion. She exhibits no tenderness or deformity.        Lumbar back: Normal. She exhibits no pain and no spasm.   Bends 60 degrees at  waist, shoulders have good range of motion, knees good range of motion without crepitance.  She walks with no limp.   Lymphadenopathy:     She has no cervical adenopathy.   Neurological: She is alert and oriented to person, place, and time. No cranial nerve deficit. Coordination normal.   Skin: Skin is warm and dry. No rash noted.   Psychiatric: She has a normal mood and affect. Her behavior is normal. " Judgment and thought content normal.   Nursing note and vitals reviewed.        Assessment:       1. Acquired hypothyroidism    2. Anxiety    3. Gastroesophageal reflux disease without esophagitis    4. DDD (degenerative disc disease), lumbar    5. Essential hypertension    6. Panlobular emphysema    7. Thyroid disease    8. Tobacco abuse         Plan:       Acquired hypothyroidism  -     levothyroxine (SYNTHROID) 100 MCG tablet; Take 1 tablet (100 mcg total) by mouth before breakfast.  Dispense: 90 tablet; Refill: 1  TSH is at goal on current regimen.  Anxiety    Gastroesophageal reflux disease without esophagitis  -     omeprazole (PRILOSEC) 40 MG capsule; Take 1 capsule (40 mg total) by mouth daily as needed.  Dispense: 90 capsule; Refill: 1  Refill omeprazole for p.r.n. use  DDD (degenerative disc disease), lumbar  -     HYDROcodone-acetaminophen (NORCO)  mg per tablet; Take 1 tablet by mouth every 12 (twelve) hours as needed.  Dispense: 30 tablet; Refill: 0  Refill hydrocodone  Essential hypertension  -     lisinopril 10 MG tablet; Take 1 tablet (10 mg total) by mouth daily as needed (SBP>160).  Dispense: 90 tablet; Refill: 1  Continue blood pressure medication every other day.  Continue to monitor your blood pressure at home  Panlobular emphysema  Patient advised smoking cessation or reduction is advisable.  Her  had stop smoking and she is making efforts to do so  Thyroid disease    Tobacco abuse  Still smokes half pack cigarettes daily.  I suggested the smoking cessation program offered by the hospital.    Follow up in about 6 months (around 8/21/2020).

## 2020-02-24 ENCOUNTER — HOSPITAL ENCOUNTER (EMERGENCY)
Facility: HOSPITAL | Age: 70
Discharge: LEFT BEFORE DISCHARGE | End: 2020-02-25
Attending: EMERGENCY MEDICINE
Payer: MEDICARE

## 2020-02-24 DIAGNOSIS — R42 DIZZINESS: ICD-10-CM

## 2020-02-24 DIAGNOSIS — R42 VERTIGO: Primary | ICD-10-CM

## 2020-02-24 LAB
ANION GAP SERPL CALC-SCNC: 10 MMOL/L (ref 8–16)
BASOPHILS # BLD AUTO: 0.03 K/UL (ref 0–0.2)
BASOPHILS NFR BLD: 0.5 % (ref 0–1.9)
BUN SERPL-MCNC: 13 MG/DL (ref 8–23)
CALCIUM SERPL-MCNC: 9.8 MG/DL (ref 8.7–10.5)
CHLORIDE SERPL-SCNC: 110 MMOL/L (ref 95–110)
CO2 SERPL-SCNC: 21 MMOL/L (ref 23–29)
CREAT SERPL-MCNC: 0.8 MG/DL (ref 0.5–1.4)
DIFFERENTIAL METHOD: ABNORMAL
EOSINOPHIL # BLD AUTO: 0.2 K/UL (ref 0–0.5)
EOSINOPHIL NFR BLD: 2.5 % (ref 0–8)
ERYTHROCYTE [DISTWIDTH] IN BLOOD BY AUTOMATED COUNT: 12.6 % (ref 11.5–14.5)
EST. GFR  (AFRICAN AMERICAN): >60 ML/MIN/1.73 M^2
EST. GFR  (NON AFRICAN AMERICAN): >60 ML/MIN/1.73 M^2
GLUCOSE SERPL-MCNC: 98 MG/DL (ref 70–110)
HCT VFR BLD AUTO: 47.6 % (ref 37–48.5)
HGB BLD-MCNC: 15.8 G/DL (ref 12–16)
IMM GRANULOCYTES # BLD AUTO: 0.01 K/UL (ref 0–0.04)
LYMPHOCYTES # BLD AUTO: 2.3 K/UL (ref 1–4.8)
LYMPHOCYTES NFR BLD: 35.8 % (ref 18–48)
MCH RBC QN AUTO: 32.2 PG (ref 27–31)
MCHC RBC AUTO-ENTMCNC: 33.2 G/DL (ref 32–36)
MCV RBC AUTO: 97 FL (ref 82–98)
MONOCYTES # BLD AUTO: 0.5 K/UL (ref 0.3–1)
MONOCYTES NFR BLD: 8.5 % (ref 4–15)
NEUTROPHILS # BLD AUTO: 3.3 K/UL (ref 1.8–7.7)
NEUTROPHILS NFR BLD: 52.5 % (ref 38–73)
NRBC BLD-RTO: 0 /100 WBC
PLATELET # BLD AUTO: 180 K/UL (ref 150–350)
PMV BLD AUTO: 9.3 FL (ref 9.2–12.9)
POTASSIUM SERPL-SCNC: 4 MMOL/L (ref 3.5–5.1)
RBC # BLD AUTO: 4.9 M/UL (ref 4–5.4)
SODIUM SERPL-SCNC: 141 MMOL/L (ref 136–145)
WBC # BLD AUTO: 6.36 K/UL (ref 3.9–12.7)

## 2020-02-24 PROCEDURE — 85025 COMPLETE CBC W/AUTO DIFF WBC: CPT

## 2020-02-24 PROCEDURE — 36415 COLL VENOUS BLD VENIPUNCTURE: CPT

## 2020-02-24 PROCEDURE — 93010 EKG 12-LEAD: ICD-10-PCS | Mod: ,,, | Performed by: INTERNAL MEDICINE

## 2020-02-24 PROCEDURE — 80048 BASIC METABOLIC PNL TOTAL CA: CPT

## 2020-02-24 PROCEDURE — 93005 ELECTROCARDIOGRAM TRACING: CPT

## 2020-02-24 PROCEDURE — 99284 EMERGENCY DEPT VISIT MOD MDM: CPT | Mod: 25

## 2020-02-24 PROCEDURE — 93010 ELECTROCARDIOGRAM REPORT: CPT | Mod: ,,, | Performed by: INTERNAL MEDICINE

## 2020-02-24 NOTE — LETTER
Patient: Joslyn Dubon  YOB: 1950  Date: 2/25/2020 Time: 1:14 AM  Location: [facility]    Leaving the [facility] Against Medical Advice    Chart #:[id number]    This will certify that I, the undersigned,    ______________________________________________________________________    A patient in the above named medical center, having requested discharge and removal from the medical [place] against the advice of my attending physician(s), hereby release [facility], its physicians, officers and employees, severally and individually, from any and all liability of any nature whatsoever for any injury or harm or complication of any kind that may result directly or indirectly, by reason of my terminating my stay as a patient at [facility] and my departure from [facility], and hereby waive any and all rights of action I may now have or later acquire as a result of my voluntary departure from [facility] and the termination of my stay as a patient therein.    This release is made with the full knowledge of the danger that may result from the action which I am taking.      Date:_______________________                         ___________________________                                                                                    Patient/Legal Representative    Witness:        ____________________________                          ___________________________  Nurse                                                                        Physician

## 2020-02-25 VITALS
HEART RATE: 64 BPM | RESPIRATION RATE: 20 BRPM | TEMPERATURE: 97 F | DIASTOLIC BLOOD PRESSURE: 82 MMHG | BODY MASS INDEX: 25.97 KG/M2 | OXYGEN SATURATION: 96 % | WEIGHT: 142 LBS | SYSTOLIC BLOOD PRESSURE: 174 MMHG

## 2020-02-25 RX ORDER — MECLIZINE HYDROCHLORIDE 25 MG/1
25 TABLET ORAL 3 TIMES DAILY PRN
Qty: 8 TABLET | Refills: 0 | Status: SHIPPED | OUTPATIENT
Start: 2020-02-25 | End: 2020-08-28

## 2020-02-25 NOTE — ED PROVIDER NOTES
Encounter Date: 2/24/2020    SCRIBE #1 NOTE: I, Aisha Bentley, am scribing for, and in the presence of, Macario Santana MD.       History     Chief Complaint   Patient presents with    Dizziness     with headache, started today     Time seen by provider: 10:15 PM on 02/24/2020    Joslyn Dubon is a 69 y.o. female with a history of COPD, anxiety, and hypertension who presents to the ED with an onset of dizziness and hypertension. She states that her blood pressure was WNL x 12 hours PTA when she took her medication. She notes that x 4 hours PTA she felt dizzy upon standing, described as a near syncopal epidode, and her bp was elevated at home. She adds that she felt well throughout the say but experienced a gradual headache and increased anxiety x 4 hours PTA. She adds that she took two 600mg Tylenol with some relief of headache symptoms. She notes that her left arm feels heavy but denies symptoms of weakness, chest pain, vomiting, or any other symptoms at this time. PMHx also includes chronic back pain, hypothyroidism, and current smoke (.5 ppd). PSHx includes AAA repair but no neuro procedures.     The history is provided by the patient.     Review of patient's allergies indicates:   Allergen Reactions    Bisacodyl Nausea And Vomiting     Other reaction(s): Vomiting    Demerol [meperidine]      Nausea vomiting, visual problem    Flonase [fluticasone propionate] Hives     Past Medical History:   Diagnosis Date    Anxiety     Martin esophagus     Colitis     COPD (chronic obstructive pulmonary disease)     GERD (gastroesophageal reflux disease)     Hypertension     Thyroid disease     Vocal cord polyps      Past Surgical History:   Procedure Laterality Date    ABDOMINAL AORTIC ANEURYSM REPAIR      BACK SURGERY      cervical    CHOLECYSTECTOMY  12-    Dr Albert CORLEY    FOOT SURGERY      HYSTERECTOMY      SALIVARY GLAND SURGERY       History reviewed. No pertinent family history.  Social  History     Tobacco Use    Smoking status: Current Every Day Smoker     Packs/day: 0.50     Types: Cigarettes    Smokeless tobacco: Former User     Quit date: 5/1/2016   Substance Use Topics    Alcohol use: No    Drug use: No     Review of Systems   Constitutional: Negative for fever.   HENT: Negative for sore throat.    Respiratory: Negative for shortness of breath.    Cardiovascular: Negative for chest pain.   Gastrointestinal: Positive for nausea. Negative for vomiting.   Genitourinary: Negative for dysuria.   Musculoskeletal: Negative for back pain.   Skin: Negative for rash.   Neurological: Positive for dizziness and headaches. Negative for syncope and weakness.   Hematological: Does not bruise/bleed easily.       Physical Exam     Initial Vitals [02/24/20 2154]   BP Pulse Resp Temp SpO2   (!) 185/96 83 20 97.4 °F (36.3 °C) 96 %      MAP       --         Physical Exam    Nursing note and vitals reviewed.  Constitutional: She appears well-developed and well-nourished. She is not diaphoretic. No distress.   Patient able to walk with out assistance. No ataxia.    HENT:   Head: Normocephalic and atraumatic.   Mouth/Throat: Oropharynx is clear and moist.   Eyes: Conjunctivae and EOM are normal. Pupils are equal, round, and reactive to light.   Neck: Normal range of motion. Neck supple.   Cardiovascular: Normal rate, regular rhythm, normal heart sounds and intact distal pulses. Exam reveals no gallop and no friction rub.    No murmur heard.  Pulmonary/Chest: Breath sounds normal. She has no wheezes. She has no rhonchi. She has no rales.   Abdominal: Soft. Bowel sounds are normal. She exhibits no distension. There is no tenderness.   Musculoskeletal: Normal range of motion.   Neurological: She is alert and oriented to person, place, and time. She has normal strength. No cranial nerve deficit or sensory deficit. She displays a negative Romberg sign. GCS eye subscore is 4. GCS verbal subscore is 5. GCS motor  subscore is 6.   Positive [place]-Hallpike Maneuver on the right. Affordable hints exam with pt able to remain focused on midline. Cranial nerves II through XII grossly intact. 5/5 motor strength to all 4 extremities. Sensation is intact. Finger-to-nose intact. Speech and cognition is normal. No focal neurologic deficit.     Skin: Skin is warm. No rash noted. No erythema.   Psychiatric: She has a normal mood and affect. Her speech is normal.         ED Course   Procedures  Labs Reviewed   CBC W/ AUTO DIFFERENTIAL - Abnormal; Notable for the following components:       Result Value    Mean Corpuscular Hemoglobin 32.2 (*)     All other components within normal limits   BASIC METABOLIC PANEL - Abnormal; Notable for the following components:    CO2 21 (*)     All other components within normal limits          Imaging Results          CT Head Without Contrast (Final result)  Result time 02/24/20 23:20:41    Final result by Ace Roman MD (02/24/20 23:20:41)                 Impression:      No acute intracranial process.  MRI may be obtained for further evaluation.    Changes of chronic small vessel ischemic disease and cerebral volume loss.      Electronically signed by: Ace Roman MD  Date:    02/24/2020  Time:    23:20             Narrative:    EXAMINATION:  CT HEAD WITHOUT CONTRAST    CLINICAL HISTORY:  Dizziness;    TECHNIQUE:  Low dose axial images were obtained through the head.  Coronal and sagittal reformations were also performed. Contrast was not administered.    COMPARISON:  CT head dated 10/19/2019.    FINDINGS:  The subcutaneous tissues are unremarkable.  The bony calvarium is intact.  The paranasal sinuses are unremarkable.  The mastoid air cells are clear.  The orbits and intraorbital contents are unremarkable.    The craniocervical junction is unremarkable.  The midline structures are unremarkable.  There are no extra-axial fluid collections.  There is no evidence of intracranial hemorrhage.  The  ventricles and sulci are prominent, suggestive of cerebral volume loss.  There are scattered hypodensities within the periventricular and subcortical white matter.  The gray-white differentiation is maintained.  There is no evidence of mass effect.                            (radiology reading, visualized by me)       Medical Decision Making:   History:   Old Medical Records: I decided to obtain old medical records.  Independently Interpreted Test(s):   I have ordered and independently interpreted EKG Reading(s) - see prior notes  Clinical Tests:   Lab Tests: Ordered and Reviewed  Radiological Study: Ordered and Reviewed  Medical Tests: Ordered and Reviewed            Scribe Attestation:   Scribe #1: I performed the above scribed service and the documentation accurately describes the services I performed. I attest to the accuracy of the note.    I, Dr. Macario Santana, personally performed the services described in this documentation. All medical record entries made by the scribe were at my direction and in my presence.  I have reviewed the chart and agree that the record reflects my personal performance and is accurate and complete. Macario Santana MD.  3:06 AM 02/25/2020    Joslyn Dubon is a 69 y.o. female presenting with vertigo.  Patient has positive [place]-Hallpike on the right. HINTS exam is equivocal with patient recommended further observation and MRI to rule out central neurologic process such as posterior CVA.  She is able to walk without obvious ataxia but has multiple risk factors including hypertension and tobacco use.  Peripheral cause of vertigo considered with ENT follow-up discussed.  Patient was fully informed of the risks of missed CVA as well as the need for admission.  She did ultimately agree after lengthy discussion only to change her mind once again after assessment by Dru Daniel from Internal Medicine.  Patient did have concerns about claustrophobia and potentially plan MRI for the  morning I fully addressed by discussing potential anxiolysis prior to procedure.    Advised patient that they need admission to the hospital.  Patient refuses admission to the hospital.  Patient is alert and oriented to person, place, and situation.  I explained the risks of worsening condition possibly leading to death in layman's terms to the patient.  Patient voices these risks back to me.   Patient is not altered and is not under the influence.  Patient is welcome to return to the hospital at any time if he chooses to do so.  I will discharge the patient AGAINST MEDICAL ADVICE.    I have recommended close PCP reassessment in the next 1-2 days as well as referral for outpatient ENT follow-up as well.  Meclizine as needed prescribed for home for symptomatic therapy.  Return precautions also reviewed in detail.        ED Course as of Feb 25 0013 Mon Feb 24, 2020 2325 EKG:  NSR, rate of 64, normal intervals.  No arrhythmia.  There are no acute ST or T wave changes suggestive of acute ischemia or infarction.      [MR]      ED Course User Index  [MR] Macario Santana MD          Clinical Impression:       ICD-10-CM ICD-9-CM   1. Vertigo R42 780.4   2. Dizziness R42 780.4         Disposition:   Disposition: Admitted                     Macario Santana MD  02/25/20 3038

## 2020-02-25 NOTE — ED NOTES
"Pt. Requests to leave AMA, physician educated pt. And  on risks, pt. And barb verbalize understanding, AMA signed, pt. States "I no longer feel dizzy"., NADN, will continue to monitor.  "

## 2020-02-26 ENCOUNTER — TELEPHONE (OUTPATIENT)
Dept: FAMILY MEDICINE | Facility: CLINIC | Age: 70
End: 2020-02-26

## 2020-02-26 DIAGNOSIS — M51.36 DDD (DEGENERATIVE DISC DISEASE), LUMBAR: ICD-10-CM

## 2020-02-26 RX ORDER — HYDROCODONE BITARTRATE AND ACETAMINOPHEN 10; 325 MG/1; MG/1
1 TABLET ORAL EVERY 12 HOURS PRN
Qty: 14 TABLET | Refills: 0 | Status: SHIPPED | OUTPATIENT
Start: 2020-02-26 | End: 2020-06-17 | Stop reason: SDUPTHER

## 2020-02-26 NOTE — TELEPHONE ENCOUNTER
Spoke with the pharmacist, states she has not had this filled since October so they needed a dx code. And acute or chronic. I let them know per OV note that it is for chronic sciatica, used PRN. Spoke with pt and let her know.

## 2020-02-26 NOTE — TELEPHONE ENCOUNTER
----- Message from Juana Emmanuel sent at 2/26/2020 10:24 AM CST -----  Pt is saying [facility] has an issue with her hydrocodone with the pt getting them   Pt [phone]

## 2020-02-26 NOTE — TELEPHONE ENCOUNTER
Pt called back and LMOR letting me know that the insurance would only pay for a 14 day supply, she offered to pay cash but they refused. Stated the needed a new rx.

## 2020-04-28 ENCOUNTER — TELEPHONE (OUTPATIENT)
Dept: FAMILY MEDICINE | Facility: CLINIC | Age: 70
End: 2020-04-28

## 2020-04-28 DIAGNOSIS — Z12.9 SCREENING FOR CANCER: ICD-10-CM

## 2020-04-28 DIAGNOSIS — Z87.891 PERSONAL HISTORY OF NICOTINE DEPENDENCE: ICD-10-CM

## 2020-04-28 DIAGNOSIS — R91.1 PULMONARY NODULE: Primary | ICD-10-CM

## 2020-04-28 NOTE — TELEPHONE ENCOUNTER
Spoke to patient that LDCT is due. She said she would rather hold off on this test for now, but to send order and she will schedule for the end of next month. Also sent new link for portal per pt request and informed her of option for VV from [employer] [facility] if need be. Updated remind me.  Order pended. Please answer questions and sign order.

## 2020-04-28 NOTE — TELEPHONE ENCOUNTER
----- Message from [facility], RT sent at 3/23/2020  3:33 PM CDT -----  Regarding: From [facility] action: LDCT due  From [facility] action dated 3/21/2019: Repeat LDCT lung CA screening due in 1 year. Postponed 1 month due to Covid-19

## 2020-06-01 ENCOUNTER — HOSPITAL ENCOUNTER (OUTPATIENT)
Dept: RADIOLOGY | Facility: HOSPITAL | Age: 70
Discharge: HOME | End: 2020-06-01
Attending: FAMILY MEDICINE
Payer: MEDICARE

## 2020-06-01 DIAGNOSIS — Z12.9 SCREENING FOR CANCER: ICD-10-CM

## 2020-06-01 DIAGNOSIS — Z87.891 PERSONAL HISTORY OF NICOTINE DEPENDENCE: ICD-10-CM

## 2020-06-01 PROCEDURE — G0297 LDCT FOR LUNG CA SCREEN: HCPCS | Mod: TC,PO

## 2020-06-08 ENCOUNTER — TELEPHONE (OUTPATIENT)
Dept: FAMILY MEDICINE | Facility: CLINIC | Age: 70
End: 2020-06-08

## 2020-06-08 NOTE — TELEPHONE ENCOUNTER
----- Message from Jasbir Graham MD sent at 6/6/2020  7:51 PM CDT -----  Call patient.  Her CT scan of the chest shows that her tiny pulmonary nodules have not changed in size.  This is a good sign.  We can discuss repeating the CT scan in 1 year.

## 2020-06-08 NOTE — PROGRESS NOTES
Spoke to patient with results verbatim per Dr Graham. Verbalized understanding. Remind me created. Patient is inquiring about the cystic lesion mentioned on her report in her liver. Wants to know if needs to see anyone regarding this. The report said this is a chronic lesion.

## 2020-06-08 NOTE — PROGRESS NOTES
"Spoke to patient's  with information verbatim per Dr Graham. He will let Ms Jorgensen know.    Maria Del Carmen Randall MA 6 minutes ago (4:42 PM)  Per Dr. Graham "liver cyst is okay. Not a problem for her. No need for specialist for cyst."      "

## 2020-06-17 DIAGNOSIS — M51.36 DDD (DEGENERATIVE DISC DISEASE), LUMBAR: ICD-10-CM

## 2020-06-17 RX ORDER — HYDROCODONE BITARTRATE AND ACETAMINOPHEN 10; 325 MG/1; MG/1
1 TABLET ORAL EVERY 12 HOURS PRN
Qty: 14 TABLET | Refills: 0 | Status: SHIPPED | OUTPATIENT
Start: 2020-06-17 | End: 2020-07-20 | Stop reason: SDUPTHER

## 2020-06-17 NOTE — TELEPHONE ENCOUNTER
"----- Message from Aline Koenig MA sent at 6/17/2020  9:58 AM CDT -----  VM @ 9:43a - Pt left a message requesting that Dr. Graham call her in some "pain pills".  Stated they "are building a house and believe she pulled something and now can't move." Please contact pt to advise.    Pt - [phone]    "

## 2020-06-17 NOTE — TELEPHONE ENCOUNTER
Spoke with pt, left side of her neck, shoulder, and back is hurting very bad after building and painting. She is out of hydrocodone - last filled in March and only takes PRN. States she understands she will only be able to get a 7 day supply due to insurance and states that is fine with her.     Printed.

## 2020-07-20 ENCOUNTER — TELEPHONE (OUTPATIENT)
Dept: FAMILY MEDICINE | Facility: CLINIC | Age: 70
End: 2020-07-20

## 2020-07-20 DIAGNOSIS — M51.36 DDD (DEGENERATIVE DISC DISEASE), LUMBAR: ICD-10-CM

## 2020-07-20 RX ORDER — HYDROCODONE BITARTRATE AND ACETAMINOPHEN 10; 325 MG/1; MG/1
1 TABLET ORAL EVERY 12 HOURS PRN
Qty: 50 TABLET | Refills: 0 | Status: SHIPPED | OUTPATIENT
Start: 2020-07-20 | End: 2020-08-28 | Stop reason: SDUPTHER

## 2020-07-20 NOTE — TELEPHONE ENCOUNTER
----- Message from Brit Grimm sent at 7/20/2020 11:41 AM CDT -----    11:37   The patient is having severe back pain. She had a pain prescription # 14. Can she get another pain prescription. [facility] on [address]. Pt's # [phone] gh

## 2020-07-20 NOTE — TELEPHONE ENCOUNTER
"Spoke with pt, states this is the same issue that she has been having for years but she stopped getting the shots with the back doctor because they weren't really working. She needed an operation but she was refusing due to her age. She has been overdoing it at home with moving and stuff so she is in a lot more pain that usual. She cannot straighten up or bend. She does plan on making another appt with the back dr for another shot at some point even though it was not working because she doesn't know what else to do. States she has just been "having bad days" and would like a prescription sent but will come into the office if she has to. She would like Dr. Graham to look at this message not Joslyn just because he knows her case and knows that she doesn't ask for pain meds unless she really needs them.  "

## 2020-08-19 ENCOUNTER — TELEPHONE (OUTPATIENT)
Dept: FAMILY MEDICINE | Facility: CLINIC | Age: 70
End: 2020-08-19

## 2020-08-19 DIAGNOSIS — E07.9 THYROID DISEASE: ICD-10-CM

## 2020-08-19 DIAGNOSIS — I10 ESSENTIAL HYPERTENSION: ICD-10-CM

## 2020-08-19 DIAGNOSIS — Z79.899 ENCOUNTER FOR LONG-TERM (CURRENT) USE OF OTHER MEDICATIONS: ICD-10-CM

## 2020-08-19 DIAGNOSIS — Z00.00 ROUTINE GENERAL MEDICAL EXAMINATION AT A HEALTH CARE FACILITY: Primary | ICD-10-CM

## 2020-08-19 NOTE — TELEPHONE ENCOUNTER
----- Message from Alyx Lechuga sent at 8/19/2020  9:58 AM CDT -----  - pt would like to talk to nurse about her appt on the 28th of august. Would like to know if she needs lab work done  [phone]

## 2020-08-24 ENCOUNTER — TELEPHONE (OUTPATIENT)
Dept: FAMILY MEDICINE | Facility: CLINIC | Age: 70
End: 2020-08-24

## 2020-08-24 NOTE — TELEPHONE ENCOUNTER
----- Message from Alyx Lechuga sent at 8/24/2020  1:02 PM CDT -----  - pt has an appt on Friday at 9 and she wants to come in the office not virtual, she is having problems with her foot  [phone]

## 2020-08-24 NOTE — TELEPHONE ENCOUNTER
"Spoke with pt, wants to come into the office for Dr. Graham to take a look at her feet. She also mentioned having some chest "discomfort" which she believes is acid reflux. I asked about any further sx. Pt denies other sx, denies SOB. Pt aware if she starts developing any other sx, trouble breathing, or pains in her chest to call here to be seen by someone else sooner or to head to the ER if after hours.  "

## 2020-08-26 ENCOUNTER — HOSPITAL ENCOUNTER (EMERGENCY)
Facility: HOSPITAL | Age: 70
Discharge: LEFT BEFORE DISCHARGE | End: 2020-08-26
Attending: EMERGENCY MEDICINE
Payer: MEDICARE

## 2020-08-26 VITALS
RESPIRATION RATE: 18 BRPM | HEIGHT: 62 IN | BODY MASS INDEX: 23.92 KG/M2 | HEART RATE: 64 BPM | DIASTOLIC BLOOD PRESSURE: 82 MMHG | SYSTOLIC BLOOD PRESSURE: 169 MMHG | OXYGEN SATURATION: 96 % | TEMPERATURE: 98 F | WEIGHT: 130 LBS

## 2020-08-26 DIAGNOSIS — M79.89 LEG SWELLING: ICD-10-CM

## 2020-08-26 DIAGNOSIS — Z53.29 LEFT AGAINST MEDICAL ADVICE: Primary | ICD-10-CM

## 2020-08-26 DIAGNOSIS — R07.9 CHEST PAIN: ICD-10-CM

## 2020-08-26 LAB
ALBUMIN SERPL BCP-MCNC: 3.7 G/DL (ref 3.5–5.2)
ALBUMIN SERPL-MCNC: 4.3 G/DL (ref 3.6–5.1)
ALBUMIN/CREAT UR: 11 MCG/MG CREAT
ALBUMIN/GLOB SERPL: 1.2 (CALC) (ref 1–2.5)
ALP SERPL-CCNC: 92 U/L (ref 55–135)
ALP SERPL-CCNC: 94 U/L (ref 37–153)
ALT SERPL W/O P-5'-P-CCNC: 18 U/L (ref 10–44)
ALT SERPL-CCNC: 15 U/L (ref 6–29)
AMPHET+METHAMPHET UR QL: NEGATIVE
ANION GAP SERPL CALC-SCNC: 9 MMOL/L (ref 8–16)
APTT PPP: 27.5 SEC (ref 23.6–33.3)
AST SERPL-CCNC: 17 U/L (ref 10–35)
AST SERPL-CCNC: 20 U/L (ref 10–40)
BACTERIA #/AREA URNS HPF: NEGATIVE /HPF
BARBITURATES UR QL SCN>200 NG/ML: NEGATIVE
BASOPHILS # BLD AUTO: 0.04 K/UL (ref 0–0.2)
BASOPHILS NFR BLD: 0.6 % (ref 0–1.9)
BENZODIAZ UR QL SCN>200 NG/ML: NEGATIVE
BILIRUB SERPL-MCNC: 0.5 MG/DL (ref 0.2–1.2)
BILIRUB SERPL-MCNC: 0.6 MG/DL (ref 0.1–1)
BILIRUB UR QL STRIP: NEGATIVE
BNP SERPL-MCNC: 43 PG/ML (ref 0–99)
BUN SERPL-MCNC: 12 MG/DL (ref 7–25)
BUN SERPL-MCNC: 14 MG/DL (ref 8–23)
BUN/CREAT SERPL: NORMAL (CALC) (ref 6–22)
BZE UR QL SCN: NEGATIVE
CALCIUM SERPL-MCNC: 10 MG/DL (ref 8.6–10.4)
CALCIUM SERPL-MCNC: 9.5 MG/DL (ref 8.7–10.5)
CANNABINOIDS UR QL SCN: NEGATIVE
CHLORIDE SERPL-SCNC: 103 MMOL/L (ref 95–110)
CHLORIDE SERPL-SCNC: 105 MMOL/L (ref 98–110)
CHOLEST SERPL-MCNC: 208 MG/DL
CHOLEST/HDLC SERPL: 3.5 (CALC)
CK SERPL-CCNC: 62 U/L (ref 20–180)
CLARITY UR: CLEAR
CO2 SERPL-SCNC: 28 MMOL/L (ref 23–29)
CO2 SERPL-SCNC: 31 MMOL/L (ref 20–32)
COLOR UR: COLORLESS
CREAT SERPL-MCNC: 0.85 MG/DL (ref 0.6–0.93)
CREAT SERPL-MCNC: 1 MG/DL (ref 0.5–1.4)
CREAT UR-MCNC: 27 MG/DL (ref 15–325)
CREAT UR-MCNC: 89 MG/DL (ref 20–275)
DIFFERENTIAL METHOD: ABNORMAL
EOSINOPHIL # BLD AUTO: 0.1 K/UL (ref 0–0.5)
EOSINOPHIL NFR BLD: 1.7 % (ref 0–8)
ERYTHROCYTE [DISTWIDTH] IN BLOOD BY AUTOMATED COUNT: 13.3 % (ref 11.5–14.5)
EST. GFR  (AFRICAN AMERICAN): >60 ML/MIN/1.73 M^2
EST. GFR  (NON AFRICAN AMERICAN): 56.4 ML/MIN/1.73 M^2
GFRSERPLBLD MDRD-ARVRAT: 68 ML/MIN/1.73M2
GLOBULIN SER CALC-MCNC: 3.5 G/DL (CALC) (ref 1.9–3.7)
GLUCOSE SERPL-MCNC: 86 MG/DL (ref 65–99)
GLUCOSE SERPL-MCNC: 98 MG/DL (ref 70–110)
GLUCOSE UR QL STRIP: NEGATIVE
HCT VFR BLD AUTO: 45.9 % (ref 37–48.5)
HDLC SERPL-MCNC: 59 MG/DL
HGB BLD-MCNC: 15.3 G/DL (ref 12–16)
HGB UR QL STRIP: ABNORMAL
HYALINE CASTS #/AREA URNS LPF: 5 /LPF
IMM GRANULOCYTES # BLD AUTO: 0.02 K/UL (ref 0–0.04)
IMM GRANULOCYTES NFR BLD AUTO: 0.3 % (ref 0–0.5)
INR PPP: 1
KETONES UR QL STRIP: NEGATIVE
LDLC SERPL CALC-MCNC: 129 MG/DL (CALC)
LEUKOCYTE ESTERASE UR QL STRIP: NEGATIVE
LIPASE SERPL-CCNC: 51 U/L (ref 4–60)
LYMPHOCYTES # BLD AUTO: 2.5 K/UL (ref 1–4.8)
LYMPHOCYTES NFR BLD: 37.3 % (ref 18–48)
MAGNESIUM SERPL-MCNC: 1.9 MG/DL (ref 1.6–2.6)
MCH RBC QN AUTO: 31.9 PG (ref 27–31)
MCHC RBC AUTO-ENTMCNC: 33.3 G/DL (ref 32–36)
MCV RBC AUTO: 96 FL (ref 82–98)
MICROALBUMIN UR-MCNC: 1 MG/DL
MICROSCOPIC COMMENT: ABNORMAL
MONOCYTES # BLD AUTO: 0.6 K/UL (ref 0.3–1)
MONOCYTES NFR BLD: 8.8 % (ref 4–15)
NEUTROPHILS # BLD AUTO: 3.4 K/UL (ref 1.8–7.7)
NEUTROPHILS NFR BLD: 51.3 % (ref 38–73)
NITRITE UR QL STRIP: NEGATIVE
NONHDLC SERPL-MCNC: 149 MG/DL (CALC)
NRBC BLD-RTO: 0 /100 WBC
OPIATES UR QL SCN: NORMAL
PCP UR QL SCN>25 NG/ML: NEGATIVE
PH UR STRIP: 6 [PH] (ref 5–8)
PLATELET # BLD AUTO: 193 K/UL (ref 150–350)
PMV BLD AUTO: 9.4 FL (ref 9.2–12.9)
POTASSIUM SERPL-SCNC: 3.9 MMOL/L (ref 3.5–5.1)
POTASSIUM SERPL-SCNC: 4.7 MMOL/L (ref 3.5–5.3)
PROT SERPL-MCNC: 7.1 G/DL (ref 6–8.4)
PROT SERPL-MCNC: 7.8 G/DL (ref 6.1–8.1)
PROT UR QL STRIP: NEGATIVE
PROTHROMBIN TIME: 13.1 SEC (ref 10.6–14.8)
RBC # BLD AUTO: 4.8 M/UL (ref 4–5.4)
RBC #/AREA URNS HPF: 5 /HPF (ref 0–4)
SARS-COV-2 RDRP RESP QL NAA+PROBE: NEGATIVE
SODIUM SERPL-SCNC: 140 MMOL/L (ref 136–145)
SODIUM SERPL-SCNC: 143 MMOL/L (ref 135–146)
SP GR UR STRIP: 1 (ref 1–1.03)
SQUAMOUS #/AREA URNS HPF: 4 /HPF
TOXICOLOGY INFORMATION: NORMAL
TRIGL SERPL-MCNC: 96 MG/DL
TROPONIN I SERPL DL<=0.01 NG/ML-MCNC: <0.03 NG/ML
TSH SERPL DL<=0.005 MIU/L-ACNC: 3.06 UIU/ML (ref 0.34–5.6)
URN SPEC COLLECT METH UR: ABNORMAL
UROBILINOGEN UR STRIP-ACNC: NEGATIVE EU/DL
WBC # BLD AUTO: 6.62 K/UL (ref 3.9–12.7)
WBC #/AREA URNS HPF: 3 /HPF (ref 0–5)

## 2020-08-26 PROCEDURE — 83880 ASSAY OF NATRIURETIC PEPTIDE: CPT

## 2020-08-26 PROCEDURE — C9113 INJ PANTOPRAZOLE SODIUM, VIA: HCPCS | Performed by: EMERGENCY MEDICINE

## 2020-08-26 PROCEDURE — 63600175 PHARM REV CODE 636 W HCPCS: Performed by: EMERGENCY MEDICINE

## 2020-08-26 PROCEDURE — 25000003 PHARM REV CODE 250: Performed by: EMERGENCY MEDICINE

## 2020-08-26 PROCEDURE — 82550 ASSAY OF CK (CPK): CPT

## 2020-08-26 PROCEDURE — 83690 ASSAY OF LIPASE: CPT

## 2020-08-26 PROCEDURE — 84484 ASSAY OF TROPONIN QUANT: CPT

## 2020-08-26 PROCEDURE — 85610 PROTHROMBIN TIME: CPT

## 2020-08-26 PROCEDURE — 83735 ASSAY OF MAGNESIUM: CPT

## 2020-08-26 PROCEDURE — 99285 EMERGENCY DEPT VISIT HI MDM: CPT | Mod: 25

## 2020-08-26 PROCEDURE — 93005 ELECTROCARDIOGRAM TRACING: CPT | Performed by: INTERNAL MEDICINE

## 2020-08-26 PROCEDURE — 80307 DRUG TEST PRSMV CHEM ANLYZR: CPT

## 2020-08-26 PROCEDURE — 84443 ASSAY THYROID STIM HORMONE: CPT

## 2020-08-26 PROCEDURE — 85730 THROMBOPLASTIN TIME PARTIAL: CPT

## 2020-08-26 PROCEDURE — 81001 URINALYSIS AUTO W/SCOPE: CPT

## 2020-08-26 PROCEDURE — 80053 COMPREHEN METABOLIC PANEL: CPT

## 2020-08-26 PROCEDURE — 85025 COMPLETE CBC W/AUTO DIFF WBC: CPT

## 2020-08-26 PROCEDURE — U0002 COVID-19 LAB TEST NON-CDC: HCPCS

## 2020-08-26 PROCEDURE — 96374 THER/PROPH/DIAG INJ IV PUSH: CPT

## 2020-08-26 RX ORDER — PANTOPRAZOLE SODIUM 40 MG/10ML
40 INJECTION, POWDER, LYOPHILIZED, FOR SOLUTION INTRAVENOUS
Status: COMPLETED | OUTPATIENT
Start: 2020-08-26 | End: 2020-08-26

## 2020-08-26 RX ORDER — CALCIUM CARBONATE 200(500)MG
500 TABLET,CHEWABLE ORAL
Status: COMPLETED | OUTPATIENT
Start: 2020-08-26 | End: 2020-08-26

## 2020-08-26 RX ORDER — MAG HYDROX/ALUMINUM HYD/SIMETH 200-200-20
30 SUSPENSION, ORAL (FINAL DOSE FORM) ORAL
Status: COMPLETED | OUTPATIENT
Start: 2020-08-26 | End: 2020-08-26

## 2020-08-26 RX ORDER — MAG HYDROX/ALUMINUM HYD/SIMETH 200-200-20
5 SUSPENSION, ORAL (FINAL DOSE FORM) ORAL
Status: DISCONTINUED | OUTPATIENT
Start: 2020-08-26 | End: 2020-08-26

## 2020-08-26 RX ADMIN — NITROGLYCERIN 0.5 INCH: 20 OINTMENT TOPICAL at 02:08

## 2020-08-26 RX ADMIN — PANTOPRAZOLE SODIUM 40 MG: 40 INJECTION, POWDER, LYOPHILIZED, FOR SOLUTION INTRAVENOUS at 01:08

## 2020-08-26 RX ADMIN — ALUMINUM HYDROXIDE, MAGNESIUM HYDROXIDE, AND SIMETHICONE 30 ML: 200; 200; 20 SUSPENSION ORAL at 01:08

## 2020-08-26 RX ADMIN — CALCIUM CARBONATE (ANTACID) CHEW TAB 500 MG 500 MG: 500 CHEW TAB at 02:08

## 2020-08-26 NOTE — DISCHARGE INSTRUCTIONS
You are leaving against medical advice.  You are risking heart attack, chronic pain, brain damage, paralysis, permanent disability, blood vessel  damage and death.  Please return at any time if you change your mind regarding our care and recommendations.  Please follow-up closely outpatient with your cardiologist, gastroenterologist and primary care physician.

## 2020-08-26 NOTE — ED PROVIDER NOTES
Encounter Date: 8/26/2020       History     Chief Complaint   Patient presents with    Chest Pain    Shortness of Breath     This is a 70-year-old female who presents complaining of chest discomfort.  The patient states that over the past several days she has been having intermittent burning discomfort to the anterior chest.  The pain has radiated at times into the throat area.  She feels like she is having acid reflux and the food is slightly regurgitating up at times.  She states the symptoms are worse at night with lying flat and do improve when she sits up.  Food does make the symptoms worse and not eating causes some improvement.  She has not been taking her antacid medicine on a regular basis.  She denies any pleuritic pain or discomfort.  She denies any abdominal pain vomiting or diarrhea.  She denies any melena hematochezia or GI bleeding.  She denies any coughing fever chills body aches or any loss of sense of taste or smell.  Symptoms have been moderate in intensity.  She also reports she has had intermittent right lower extremity swelling in the ankle area but denies any specific pain or injury.  She does not have any current pain or swelling in that area.  She denies any other problems or complaints.        Review of patient's allergies indicates:   Allergen Reactions    Bisacodyl Nausea And Vomiting     Other reaction(s): Vomiting    Demerol [meperidine]      Nausea vomiting, visual problem    Flonase [fluticasone propionate] Hives     Past Medical History:   Diagnosis Date    Anxiety     Martin esophagus     Colitis     COPD (chronic obstructive pulmonary disease)     GERD (gastroesophageal reflux disease)     Hypertension     Thyroid disease     Vocal cord polyps      Past Surgical History:   Procedure Laterality Date    ABDOMINAL AORTIC ANEURYSM REPAIR      BACK SURGERY      cervical    CHOLECYSTECTOMY  12-    Dr Albert CORLEY    FOOT SURGERY      HYSTERECTOMY      SALIVARY  GLAND SURGERY       No family history on file.  Social History     Tobacco Use    Smoking status: Current Every Day Smoker     Packs/day: 0.50     Types: Cigarettes    Smokeless tobacco: Former User     Quit date: 5/1/2016   Substance Use Topics    Alcohol use: No    Drug use: No     Review of Systems   Constitutional: Negative.  Negative for activity change, appetite change, chills, fatigue and fever.   HENT: Negative.  Negative for congestion, dental problem, ear pain, rhinorrhea, sinus pressure, sinus pain, sore throat and trouble swallowing.    Eyes: Negative.  Negative for photophobia, pain, redness and visual disturbance.   Respiratory: Positive for shortness of breath. Negative for cough, chest tightness and wheezing.    Cardiovascular: Positive for chest pain. Negative for palpitations and leg swelling.   Gastrointestinal: Positive for nausea. Negative for abdominal distention, abdominal pain, anal bleeding, blood in stool, constipation, diarrhea and vomiting.   Endocrine: Negative.    Genitourinary: Negative.  Negative for decreased urine volume, difficulty urinating, dysuria, flank pain, frequency, hematuria, pelvic pain and urgency.   Musculoskeletal: Negative.  Negative for arthralgias, back pain, gait problem, joint swelling, myalgias, neck pain and neck stiffness.   Skin: Negative.  Negative for color change, pallor and rash.   Neurological: Negative.  Negative for dizziness, tremors, seizures, syncope, facial asymmetry, speech difficulty, weakness, light-headedness, numbness and headaches.   Hematological: Negative.  Does not bruise/bleed easily.   Psychiatric/Behavioral: Negative.  Negative for confusion.   All other systems reviewed and are negative.      Physical Exam     Initial Vitals [08/26/20 0042]   BP Pulse Resp Temp SpO2   (!) 196/96 74 18 97.7 °F (36.5 °C) 97 %      MAP       --         Physical Exam    Nursing note and vitals reviewed.  Constitutional: She is active and cooperative.   Non-toxic appearance. She does not have a sickly appearance. She does not appear ill. No distress.   HENT:   Head: Normocephalic and atraumatic.   Right Ear: Tympanic membrane normal.   Left Ear: Tympanic membrane normal.   Nose: Nose normal.   Mouth/Throat: Uvula is midline, oropharynx is clear and moist and mucous membranes are normal. No oral lesions. No uvula swelling. No oropharyngeal exudate, posterior oropharyngeal edema or posterior oropharyngeal erythema.   Eyes: Conjunctivae, EOM and lids are normal. Pupils are equal, round, and reactive to light. No scleral icterus.   Neck: Trachea normal, normal range of motion, full passive range of motion without pain and phonation normal. Neck supple. No thyroid mass present. No stridor present. No spinous process tenderness and no muscular tenderness present. No edema, no erythema and normal range of motion present. No neck rigidity. No JVD present.   Cardiovascular: Normal rate, regular rhythm, normal heart sounds, intact distal pulses and normal pulses. Exam reveals no gallop and no friction rub.    No murmur heard.  Pulmonary/Chest: Effort normal and breath sounds normal. No accessory muscle usage. No tachypnea. No respiratory distress. She has no wheezes. She has no rhonchi. She has no rales.   Abdominal: Soft. Normal appearance and bowel sounds are normal. She exhibits no shifting dullness, no distension, no abdominal bruit, no pulsatile midline mass and no mass. There is no hepatosplenomegaly. There is no abdominal tenderness. There is no rigidity, no rebound, no guarding, no CVA tenderness, no tenderness at McBurney's point and negative Christianson's sign. No hernia.   Abdomen is completely soft and nontender throughout on several reassessments.   Musculoskeletal: Normal range of motion. No tenderness or edema.      Comments: Pulses are 2+ throughout, cap refill is less than 2 sec throughout, extremities are nontender throughout with full range of motion. There is  no spinal tenderness to palpation.   Neurological: She is alert and oriented to person, place, and time. She has normal strength. She displays normal reflexes. No cranial nerve deficit or sensory deficit.   No focal deficits.   Skin: Skin is warm, dry and intact. Capillary refill takes less than 2 seconds. No ecchymosis, no petechiae and no rash noted. No erythema. No pallor.   Psychiatric: She has a normal mood and affect. Her speech is normal and behavior is normal. Judgment and thought content normal. Cognition and memory are normal.         ED Course   Procedures  Labs Reviewed   CBC W/ AUTO DIFFERENTIAL - Abnormal; Notable for the following components:       Result Value    Mean Corpuscular Hemoglobin 31.9 (*)     All other components within normal limits   COMPREHENSIVE METABOLIC PANEL - Abnormal; Notable for the following components:    eGFR if non  56.4 (*)     All other components within normal limits   URINALYSIS - Abnormal; Notable for the following components:    Color, UA Colorless (*)     Occult Blood UA 2+ (*)     All other components within normal limits    Narrative:     Specimen Source->Urine   URINALYSIS MICROSCOPIC - Abnormal; Notable for the following components:    RBC, UA 5 (*)     Hyaline Casts, UA 5 (*)     All other components within normal limits    Narrative:     Specimen Source->Urine   APTT   B-TYPE NATRIURETIC PEPTIDE   DRUG SCREEN PANEL, URINE EMERGENCY    Narrative:     Specimen Source->Urine   LIPASE   MAGNESIUM   PROTIME-INR   TROPONIN I   TSH   CK   SARS-COV-2 RNA AMPLIFICATION, QUAL          Imaging Results          US Lower Extremity Veins Bilateral (Final result)  Result time 08/26/20 01:18:00    Final result by Jessica Corral MD (08/26/20 01:18:00)                 Narrative:    EXAM:  US Duplex Bilateral Lower Extremities Veins    CLINICAL HISTORY:  The patient is 70 years old and is Female; bilateral lower extremity swelling x2 days    TECHNIQUE:  Real-time  duplex ultrasound scan of the bilateral lower extremity veins integrating B-mode two-dimensional vascular structure, Doppler spectral analysis, color flow Doppler imaging and compression.    COMPARISON:  No relevant prior studies available.    FINDINGS:  RIGHT DEEP VEINS:  Unremarkable.  No DVT in the right common femoral, femoral, proximal deep femoral or popliteal veins.  The veins demonstrate normal color flow, are normally compressible, with normal phasic flow and/or augmentation response.  RIGHT SUPERFICIAL VEINS:  Unremarkable.  No thrombus in the visualized right great saphenous vein.    LEFT DEEP VEINS:  Unremarkable.  No DVT in the left common femoral, femoral, proximal deep femoral or popliteal veins.  The veins demonstrate normal color flow, are normally compressible, with normal phasic flow and/or augmentation response.  LEFT SUPERFICIAL VEINS:  Unremarkable.  No thrombus in the visualized left great saphenous vein.    SOFT TISSUES:  No acute findings.  No popliteal cyst.    IMPRESSION:  Normal bilateral lower extremity duplex venous ultrasound.    Electronically signed by:  Jessica Corral MD  8/26/2020 2:02 AM CDT Workstation: [id number]                             X-Ray Chest 1 View (In process)                  Medical Decision Making:   Clinical Tests:   Lab Tests: Reviewed  Radiological Study: Reviewed  Medical Tests: Reviewed  ED Management:  The patient feels much better.  She has had complete relief of her symptoms.  This did seem to correspond with administration of nitroglycerin paste.  She stated that she got moderate improvement with Protonix and the Maalox but the last treatment modalities seem to be most beneficial.  Abdominal reassessed is soft and nontender in all quadrants.  There is no distension.  She does have a history of an abdominal aortic aneurysm with repair in the past.  She states the symptoms are dissimilar to those previous symptoms.  She denies melena or hematochezia.  She  denies syncope or near syncope.  I have advised the need for admission and further diagnostic testing.  She is refusing admission.  She is awake alert and oriented x4.  She has a normal mental status exam.  She has no evidence of intoxication.  She is demonstrating adequate decision making capabilities.  Risk of leaving against medical advice have been explained in detail including heart attack, permanent disability, chronic pain, brain damage and death.  Patient voices understanding.  I have urged the patient to return at any time if she changes her mind regarding our care.  Have also urged her to follow up closely outpatient with her primary care physician, GI physician Dr. Smith as well as Cardiology.  The patient is leaving against medical advice and is demonstrating adequate decision making capabilities to make this decision and voices understanding the risk.                                 Clinical Impression:       ICD-10-CM ICD-9-CM   1. Left against medical advice  Z53.20 V64.2   2. Chest pain  R07.9 786.50   3. Leg swelling  M79.89 729.81                                Sanjuana Rodríguez MD  08/26/20 0322

## 2020-08-27 ENCOUNTER — TELEPHONE (OUTPATIENT)
Dept: FAMILY MEDICINE | Facility: CLINIC | Age: 70
End: 2020-08-27

## 2020-08-27 NOTE — TELEPHONE ENCOUNTER
----- Message from Brit Grimm sent at 8/27/2020  2:11 PM CDT -----  VM 1:58 Returning a call pt's # [phone] GH

## 2020-08-27 NOTE — TELEPHONE ENCOUNTER
I dont see where anyone called her but she does have an appt tomorrow. Maybe they were calling to confirm her appt.

## 2020-08-28 ENCOUNTER — OFFICE VISIT (OUTPATIENT)
Dept: FAMILY MEDICINE | Facility: CLINIC | Age: 70
End: 2020-08-28
Payer: MEDICARE

## 2020-08-28 VITALS
BODY MASS INDEX: 26.13 KG/M2 | WEIGHT: 142 LBS | HEART RATE: 76 BPM | SYSTOLIC BLOOD PRESSURE: 128 MMHG | DIASTOLIC BLOOD PRESSURE: 88 MMHG | HEIGHT: 62 IN

## 2020-08-28 DIAGNOSIS — M51.36 DDD (DEGENERATIVE DISC DISEASE), LUMBAR: Primary | ICD-10-CM

## 2020-08-28 DIAGNOSIS — E07.9 THYROID DISEASE: Chronic | ICD-10-CM

## 2020-08-28 DIAGNOSIS — I10 ESSENTIAL HYPERTENSION: Chronic | ICD-10-CM

## 2020-08-28 DIAGNOSIS — K22.70 BARRETT'S ESOPHAGUS WITHOUT DYSPLASIA: ICD-10-CM

## 2020-08-28 DIAGNOSIS — Z78.0 MENOPAUSE: ICD-10-CM

## 2020-08-28 DIAGNOSIS — K21.9 GASTROESOPHAGEAL REFLUX DISEASE WITHOUT ESOPHAGITIS: ICD-10-CM

## 2020-08-28 DIAGNOSIS — E03.9 ACQUIRED HYPOTHYROIDISM: ICD-10-CM

## 2020-08-28 DIAGNOSIS — Z72.0 TOBACCO ABUSE: Chronic | ICD-10-CM

## 2020-08-28 DIAGNOSIS — J43.1 PANLOBULAR EMPHYSEMA: Chronic | ICD-10-CM

## 2020-08-28 PROCEDURE — 99214 PR OFFICE/OUTPT VISIT, EST, LEVL IV, 30-39 MIN: ICD-10-PCS | Mod: S$GLB,,, | Performed by: FAMILY MEDICINE

## 2020-08-28 PROCEDURE — 3074F SYST BP LT 130 MM HG: CPT | Mod: S$GLB,,, | Performed by: FAMILY MEDICINE

## 2020-08-28 PROCEDURE — 3079F PR MOST RECENT DIASTOLIC BLOOD PRESSURE 80-89 MM HG: ICD-10-PCS | Mod: S$GLB,,, | Performed by: FAMILY MEDICINE

## 2020-08-28 PROCEDURE — 3008F PR BODY MASS INDEX (BMI) DOCUMENTED: ICD-10-PCS | Mod: S$GLB,,, | Performed by: FAMILY MEDICINE

## 2020-08-28 PROCEDURE — 1101F PR PT FALLS ASSESS DOC 0-1 FALLS W/OUT INJ PAST YR: ICD-10-PCS | Mod: S$GLB,,, | Performed by: FAMILY MEDICINE

## 2020-08-28 PROCEDURE — 3079F DIAST BP 80-89 MM HG: CPT | Mod: S$GLB,,, | Performed by: FAMILY MEDICINE

## 2020-08-28 PROCEDURE — 3074F PR MOST RECENT SYSTOLIC BLOOD PRESSURE < 130 MM HG: ICD-10-PCS | Mod: S$GLB,,, | Performed by: FAMILY MEDICINE

## 2020-08-28 PROCEDURE — 3008F BODY MASS INDEX DOCD: CPT | Mod: S$GLB,,, | Performed by: FAMILY MEDICINE

## 2020-08-28 PROCEDURE — 1159F MED LIST DOCD IN RCRD: CPT | Mod: S$GLB,,, | Performed by: FAMILY MEDICINE

## 2020-08-28 PROCEDURE — 1101F PT FALLS ASSESS-DOCD LE1/YR: CPT | Mod: S$GLB,,, | Performed by: FAMILY MEDICINE

## 2020-08-28 PROCEDURE — 99214 OFFICE O/P EST MOD 30 MIN: CPT | Mod: S$GLB,,, | Performed by: FAMILY MEDICINE

## 2020-08-28 PROCEDURE — 1159F PR MEDICATION LIST DOCUMENTED IN MEDICAL RECORD: ICD-10-PCS | Mod: S$GLB,,, | Performed by: FAMILY MEDICINE

## 2020-08-28 RX ORDER — RABEPRAZOLE SODIUM 20 MG/1
20 TABLET, DELAYED RELEASE ORAL DAILY
COMMUNITY
Start: 2020-08-26 | End: 2020-08-28 | Stop reason: SDUPTHER

## 2020-08-28 RX ORDER — RABEPRAZOLE SODIUM 20 MG/1
20 TABLET, DELAYED RELEASE ORAL DAILY
Qty: 90 TABLET | Refills: 1 | Status: ON HOLD | OUTPATIENT
Start: 2020-08-28 | End: 2020-12-04

## 2020-08-28 RX ORDER — LEVOTHYROXINE SODIUM 100 UG/1
100 TABLET ORAL
Qty: 90 TABLET | Refills: 1 | Status: SHIPPED | OUTPATIENT
Start: 2020-08-28 | End: 2021-03-04 | Stop reason: SDUPTHER

## 2020-08-28 RX ORDER — HYDROCODONE BITARTRATE AND ACETAMINOPHEN 10; 325 MG/1; MG/1
1 TABLET ORAL EVERY 12 HOURS PRN
Qty: 50 TABLET | Refills: 0 | Status: SHIPPED | OUTPATIENT
Start: 2020-08-28 | End: 2021-03-04 | Stop reason: SDUPTHER

## 2020-08-28 NOTE — PROGRESS NOTES
SUBJECTIVE:    Patient ID: Joslyn Dubon is a 70 y.o. female.    Chief Complaint: Follow-up (6mth, brought bottles, DEXA ordered, right foot swollen// SW)    This 70-year-old female complains of back pain and soreness after moving heavy furniture and moving to a new home recently.  She was seen in the emergency room within the last month for burning in her chest.  Her troponins and EKGs were negative.  Her leg ultrasound was negative for DVT.  It was thought she had GERD or anxiety.  She was placed on AcipHex and is doing much better.  SHe has been diagnosed with Martin's esophagus by her GI physician.    She complains of feet hurting and significant calluses on the bottoms of her feet.  She wears sandals a lot and has difficulty finding shoes to fit her feet.    She has significant anxiety and stress with the recent move and downsizing her home.  She uses p.r.n. lorazepam.      Admission on 08/26/2020, Discharged on 08/26/2020   Component Date Value Ref Range Status    aPTT 08/26/2020 27.5  23.6 - 33.3 sec Final    BNP 08/26/2020 43  0 - 99 pg/mL Final    WBC 08/26/2020 6.62  3.90 - 12.70 K/uL Final    RBC 08/26/2020 4.80  4.00 - 5.40 M/uL Final    Hemoglobin 08/26/2020 15.3  12.0 - 16.0 g/dL Final    Hematocrit 08/26/2020 45.9  37.0 - 48.5 % Final    Mean Corpuscular Volume 08/26/2020 96  82 - 98 fL Final    Mean Corpuscular Hemoglobin 08/26/2020 31.9* 27.0 - 31.0 pg Final    Mean Corpuscular Hemoglobin Conc 08/26/2020 33.3  32.0 - 36.0 g/dL Final    RDW 08/26/2020 13.3  11.5 - 14.5 % Final    Platelets 08/26/2020 193  150 - 350 K/uL Final    MPV 08/26/2020 9.4  9.2 - 12.9 fL Final    Immature Granulocytes 08/26/2020 0.3  0.0 - 0.5 % Final    Gran # (ANC) 08/26/2020 3.4  1.8 - 7.7 K/uL Final    Immature Grans (Abs) 08/26/2020 0.02  0.00 - 0.04 K/uL Final    Lymph # 08/26/2020 2.5  1.0 - 4.8 K/uL Final    Mono # 08/26/2020 0.6  0.3 - 1.0 K/uL Final    Eos # 08/26/2020 0.1  0.0 - 0.5 K/uL  Final    Baso # 08/26/2020 0.04  0.00 - 0.20 K/uL Final    nRBC 08/26/2020 0  0 /100 WBC Final    Gran% 08/26/2020 51.3  38.0 - 73.0 % Final    Lymph% 08/26/2020 37.3  18.0 - 48.0 % Final    Mono% 08/26/2020 8.8  4.0 - 15.0 % Final    Eosinophil% 08/26/2020 1.7  0.0 - 8.0 % Final    Basophil% 08/26/2020 0.6  0.0 - 1.9 % Final    Differential Method 08/26/2020 Automated   Final    Sodium 08/26/2020 140  136 - 145 mmol/L Final    Potassium 08/26/2020 3.9  3.5 - 5.1 mmol/L Final    Chloride 08/26/2020 103  95 - 110 mmol/L Final    CO2 08/26/2020 28  23 - 29 mmol/L Final    Glucose 08/26/2020 98  70 - 110 mg/dL Final    BUN, Bld 08/26/2020 14  8 - 23 mg/dL Final    Creatinine 08/26/2020 1.0  0.5 - 1.4 mg/dL Final    Calcium 08/26/2020 9.5  8.7 - 10.5 mg/dL Final    Total Protein 08/26/2020 7.1  6.0 - 8.4 g/dL Final    Albumin 08/26/2020 3.7  3.5 - 5.2 g/dL Final    Total Bilirubin 08/26/2020 0.6  0.1 - 1.0 mg/dL Final    Alkaline Phosphatase 08/26/2020 92  55 - 135 U/L Final    AST 08/26/2020 20  10 - 40 U/L Final    ALT 08/26/2020 18  10 - 44 U/L Final    Anion Gap 08/26/2020 9  8 - 16 mmol/L Final    eGFR if African American 08/26/2020 >60.0  >60 mL/min/1.73 m^2 Final    eGFR if non African American 08/26/2020 56.4* >60 mL/min/1.73 m^2 Final    Benzodiazepines 08/26/2020 Negative   Final    Cocaine (Metab.) 08/26/2020 Negative   Final    Opiate Scrn, Ur 08/26/2020 Presumptive Positive   Final    Barbiturate Screen, Ur 08/26/2020 Negative   Final    Amphetamine Screen, Ur 08/26/2020 Negative   Final    THC 08/26/2020 Negative   Final    Phencyclidine 08/26/2020 Negative   Final    Creatinine, Random Ur 08/26/2020 27.0  15.0 - 325.0 mg/dL Final    Toxicology Information 08/26/2020 SEE COMMENT   Final    Lipase 08/26/2020 51  4 - 60 U/L Final    Magnesium 08/26/2020 1.9  1.6 - 2.6 mg/dL Final    PT 08/26/2020 13.1  10.6 - 14.8 sec Final    INR 08/26/2020 1.0   Final    Troponin I  08/26/2020 <0.030  <=0.040 ng/mL Final    TSH 08/26/2020 3.060  0.340 - 5.600 uIU/mL Final    Specimen UA 08/26/2020 Urine, Clean Catch   Final    Color, UA 08/26/2020 Colorless* Yellow, Straw, Priya Final    Appearance, UA 08/26/2020 Clear  Clear Final    pH, UA 08/26/2020 6.0  5.0 - 8.0 Final    Specific [place], UA 08/26/2020 1.005  1.005 - 1.030 Final    Protein, UA 08/26/2020 Negative  Negative Final    Glucose, UA 08/26/2020 Negative  Negative Final    Ketones, UA 08/26/2020 Negative  Negative Final    Bilirubin (UA) 08/26/2020 Negative  Negative Final    Occult Blood UA 08/26/2020 2+* Negative Final    Nitrite, UA 08/26/2020 Negative  Negative Final    Urobilinogen, UA 08/26/2020 Negative  Negative EU/dL Final    Leukocytes, UA 08/26/2020 Negative  Negative Final    CPK 08/26/2020 62  20 - 180 U/L Final    SARS-CoV-2 RNA, Amplification, Qual 08/26/2020 Negative  Negative Final    RBC, UA 08/26/2020 5* 0 - 4 /hpf Final    WBC, UA 08/26/2020 3  0 - 5 /hpf Final    Bacteria 08/26/2020 Negative  None-Occ /hpf Final    Squam Epithel, UA 08/26/2020 4  /hpf Final    Hyaline Casts, UA 08/26/2020 5* 0-1/lpf /lpf Final    Microscopic Comment 08/26/2020 SEE COMMENT   Final   Telephone on 08/19/2020   Component Date Value Ref Range Status    Glucose 08/25/2020 86  65 - 99 mg/dL Final    BUN, Bld 08/25/2020 12  7 - 25 mg/dL Final    Creatinine 08/25/2020 0.85  0.60 - 0.93 mg/dL Final    eGFR if non African American 08/25/2020 68  > OR = 60 mL/min/1.73m2 Final    eGFR if African American 08/25/2020 79  > OR = 60 mL/min/1.73m2 Final    BUN/Creatinine Ratio 08/25/2020 NOT APPLICABLE  6 - 22 (calc) Final    Sodium 08/25/2020 143  135 - 146 mmol/L Final    Potassium 08/25/2020 4.7  3.5 - 5.3 mmol/L Final    Chloride 08/25/2020 105  98 - 110 mmol/L Final    CO2 08/25/2020 31  20 - 32 mmol/L Final    Calcium 08/25/2020 10.0  8.6 - 10.4 mg/dL Final    Total Protein 08/25/2020 7.8  6.1 - 8.1 g/dL  Final    Albumin 08/25/2020 4.3  3.6 - 5.1 g/dL Final    Globulin, Total 08/25/2020 3.5  1.9 - 3.7 g/dL (calc) Final    Albumin/Globulin Ratio 08/25/2020 1.2  1.0 - 2.5 (calc) Final    Total Bilirubin 08/25/2020 0.5  0.2 - 1.2 mg/dL Final    Alkaline Phosphatase 08/25/2020 94  37 - 153 U/L Final    AST 08/25/2020 17  10 - 35 U/L Final    ALT 08/25/2020 15  6 - 29 U/L Final    Cholesterol 08/25/2020 208* <200 mg/dL Final    HDL 08/25/2020 59  > OR = 50 mg/dL Final    Triglycerides 08/25/2020 96  <150 mg/dL Final    LDL Cholesterol 08/25/2020 129* mg/dL (calc) Final    Hdl/Cholesterol Ratio 08/25/2020 3.5  <5.0 (calc) Final    Non HDL Chol. (LDL+VLDL) 08/25/2020 149* <130 mg/dL (calc) Final    Creatinine, Random Ur 08/25/2020 89  20 - 275 mg/dL Final    Microalb, Ur 08/25/2020 1.0  See Note: mg/dL Final    Microalb Creat Ratio 08/25/2020 11  <30 mcg/mg creat Final       Past Medical History:   Diagnosis Date    Anxiety     Martin esophagus     Colitis     COPD (chronic obstructive pulmonary disease)     GERD (gastroesophageal reflux disease)     Hypertension     Thyroid disease     Vocal cord polyps      Social History     Socioeconomic History    Marital status:      Spouse name: Not on file    Number of children: Not on file    Years of education: Not on file    Highest education level: Not on file   Occupational History    Not on file   Social Needs    Financial resource strain: Not on file    Food insecurity     Worry: Not on file     Inability: Not on file    Transportation needs     Medical: Not on file     Non-medical: Not on file   Tobacco Use    Smoking status: Current Every Day Smoker     Packs/day: 0.50     Types: Cigarettes    Smokeless tobacco: Former User     Quit date: 5/1/2016   Substance and Sexual Activity    Alcohol use: No    Drug use: No    Sexual activity: Yes     Partners: Male   Lifestyle    Physical activity     Days per week: Not on file      Minutes per session: Not on file    Stress: Not at all   Relationships    Social connections     Talks on phone: Not on file     Gets together: Not on file     Attends Hinduism service: Not on file     Active member of club or organization: Not on file     Attends meetings of clubs or organizations: Not on file     Relationship status: Not on file   Other Topics Concern    Not on file   Social History Narrative    Not on file     Past Surgical History:   Procedure Laterality Date    ABDOMINAL AORTIC ANEURYSM REPAIR      BACK SURGERY      cervical    CHOLECYSTECTOMY  12-    Dr Albert [facility]    FOOT SURGERY      HYSTERECTOMY      SALIVARY GLAND SURGERY       History reviewed. No pertinent family history.    Review of patient's allergies indicates:   Allergen Reactions    Bisacodyl Nausea And Vomiting     Other reaction(s): Vomiting    Demerol [meperidine]      Nausea vomiting, visual problem    Flonase [fluticasone propionate] Hives       Current Outpatient Medications:     HYDROcodone-acetaminophen (NORCO)  mg per tablet, Take 1 tablet by mouth every 12 (twelve) hours as needed., Disp: 50 tablet, Rfl: 0    levothyroxine (SYNTHROID) 100 MCG tablet, Take 1 tablet (100 mcg total) by mouth before breakfast., Disp: 90 tablet, Rfl: 1    lisinopril 10 MG tablet, Take 1 tablet (10 mg total) by mouth daily as needed (SBP>160)., Disp: 90 tablet, Rfl: 1    LORazepam (ATIVAN) 0.5 MG tablet, Take 1 tablet (0.5 mg total) by mouth every 12 (twelve) hours as needed for Anxiety., Disp: 60 tablet, Rfl: 1    RABEprazole (ACIPHEX) 20 mg tablet, Take 1 tablet (20 mg total) by mouth once daily., Disp: 90 tablet, Rfl: 1    Review of Systems   Constitutional: Negative for appetite change, chills, fatigue, fever and unexpected weight change.   HENT: Negative for congestion, ear pain, sinus pain, sore throat and trouble swallowing.    Eyes: Negative for pain, discharge and visual disturbance.   Respiratory:  "Negative for apnea, cough, shortness of breath and wheezing.    Cardiovascular: Negative for chest pain, palpitations and leg swelling.   Gastrointestinal: Negative for abdominal pain, blood in stool, constipation, diarrhea, nausea and vomiting.        Has significant GERD and Martin's esophagus.  AcipHex at 20 mg daily has been started   Endocrine: Negative for heat intolerance, polydipsia and polyuria.   Genitourinary: Negative for difficulty urinating, dyspareunia, dysuria, frequency, hematuria and menstrual problem.        Slower  Flow , cant  Empty completely.   Musculoskeletal: Negative for arthralgias, back pain (back pains , treated  w/ hydrocodone/ left sised  sciatica  worse than right ), gait problem, joint swelling and myalgias.   Allergic/Immunologic: Negative for environmental allergies, food allergies and immunocompromised state.   Neurological: Negative for dizziness, tremors, seizures, numbness and headaches.   Psychiatric/Behavioral: Negative for behavioral problems, confusion, hallucinations and suicidal ideas. The patient is not nervous/anxious.           Objective:      Vitals:    08/28/20 0851   BP: 128/88   Pulse: 76   Weight: 64.4 kg (142 lb)   Height: 5' 2" (1.575 m)     Physical Exam  Vitals signs and nursing note reviewed.   Constitutional:       Appearance: She is well-developed.   HENT:      Head: Normocephalic and atraumatic.      Right Ear: External ear normal.      Left Ear: External ear normal.      Nose: Nose normal.   Eyes:      Pupils: Pupils are equal, round, and reactive to light.   Neck:      Musculoskeletal: Normal range of motion and neck supple.      Thyroid: No thyromegaly.      Vascular: No carotid bruit.   Cardiovascular:      Rate and Rhythm: Normal rate and regular rhythm.      Heart sounds: Normal heart sounds. No murmur.   Pulmonary:      Effort: Pulmonary effort is normal.      Breath sounds: Normal breath sounds. No wheezing or rales.   Abdominal:      General: " Bowel sounds are normal. There is no distension.      Palpations: Abdomen is soft.      Tenderness: There is no abdominal tenderness.   Musculoskeletal: Normal range of motion.         General: No tenderness or deformity.      Lumbar back: Normal. She exhibits no pain and no spasm.      Comments: Bends 60 degrees at  waist, lower lumbar muscles are tender to palpation are tight with spasm.  She has a slow steady gait.  Knees and shoulders have full range of motion.   Feet:      Right foot:      Skin integrity: Callus present.      Left foot:      Skin integrity: Callus present.   Lymphadenopathy:      Cervical: No cervical adenopathy.   Skin:     General: Skin is warm and dry.      Findings: No rash.   Neurological:      Mental Status: She is alert and oriented to person, place, and time.      Cranial Nerves: No cranial nerve deficit.      Coordination: Coordination normal.      Gait: Gait normal.   Psychiatric:         Behavior: Behavior normal.         Thought Content: Thought content normal.         Judgment: Judgment normal.           Assessment:       1. DDD (degenerative disc disease), lumbar    2. Acquired hypothyroidism    3. Menopause    4. Martin's esophagus without dysplasia    5. Panlobular emphysema    6. Essential hypertension    7. Thyroid disease    8. Gastroesophageal reflux disease without esophagitis    9. Tobacco abuse         Plan:       DDD (degenerative disc disease), lumbar  -     HYDROcodone-acetaminophen (NORCO)  mg per tablet; Take 1 tablet by mouth every 12 (twelve) hours as needed.  Dispense: 50 tablet; Refill: 0  Recent moved to a smaller home has flared upper lumbar disc disease.  Will refill her hydrocodone.  Acquired hypothyroidism  -     levothyroxine (SYNTHROID) 100 MCG tablet; Take 1 tablet (100 mcg total) by mouth before breakfast.  Dispense: 90 tablet; Refill: 1  Continue levothyroxine  Menopause  -     DXA Bone Density Spine And Hip; Future; Expected date:  08/28/2020  DEXA scan ordered  Martin's esophagus without dysplasia  -     RABEprazole (ACIPHEX) 20 mg tablet; Take 1 tablet (20 mg total) by mouth once daily.  Dispense: 90 tablet; Refill: 1  Stop omeprazole and  change to rabeprazole  Panlobular emphysema  COPD stable  Essential hypertension  Blood pressure well controlled with current regimen  Thyroid disease    Gastroesophageal reflux disease without esophagitis    Tobacco abuse  Still smoking    No follow-ups on file.        8/28/2020 Jasbir Graham

## 2020-09-11 ENCOUNTER — TELEPHONE (OUTPATIENT)
Dept: FAMILY MEDICINE | Facility: CLINIC | Age: 70
End: 2020-09-11

## 2020-09-11 NOTE — TELEPHONE ENCOUNTER
----- Message from Zoë Connolly sent at 9/11/2020 10:51 AM CDT -----  Regarding: Dexa  Good morning.  We have attempted to schedule patient regarding her Dexa 5 times, negative contact.

## 2020-09-11 NOTE — TELEPHONE ENCOUNTER
Spoke to pt regarding message below. Pt stated she hasn't received a call that she will give them a call today to schedule

## 2020-10-08 ENCOUNTER — CLINICAL SUPPORT (OUTPATIENT)
Dept: FAMILY MEDICINE | Facility: CLINIC | Age: 70
End: 2020-10-08
Payer: MEDICARE

## 2020-10-08 DIAGNOSIS — Z23 NEED FOR INFLUENZA VACCINATION: Primary | ICD-10-CM

## 2020-10-08 PROCEDURE — 90662 FLU VACCINE - QUADRIVALENT - HIGH DOSE (65+) PRESERVATIVE FREE IM: ICD-10-PCS | Mod: S$GLB,,, | Performed by: FAMILY MEDICINE

## 2020-10-08 PROCEDURE — G0008 ADMIN INFLUENZA VIRUS VAC: HCPCS | Mod: S$GLB,,, | Performed by: FAMILY MEDICINE

## 2020-10-08 PROCEDURE — 90662 IIV NO PRSV INCREASED AG IM: CPT | Mod: S$GLB,,, | Performed by: FAMILY MEDICINE

## 2020-10-08 PROCEDURE — G0008 FLU VACCINE - QUADRIVALENT - HIGH DOSE (65+) PRESERVATIVE FREE IM: ICD-10-PCS | Mod: S$GLB,,, | Performed by: FAMILY MEDICINE

## 2020-11-10 ENCOUNTER — HOSPITAL ENCOUNTER (EMERGENCY)
Facility: HOSPITAL | Age: 70
Discharge: HOME | End: 2020-11-10
Attending: EMERGENCY MEDICINE
Payer: MEDICARE

## 2020-11-10 VITALS
BODY MASS INDEX: 26.13 KG/M2 | RESPIRATION RATE: 16 BRPM | HEART RATE: 74 BPM | DIASTOLIC BLOOD PRESSURE: 88 MMHG | OXYGEN SATURATION: 98 % | HEIGHT: 62 IN | WEIGHT: 142 LBS | SYSTOLIC BLOOD PRESSURE: 174 MMHG | TEMPERATURE: 99 F

## 2020-11-10 DIAGNOSIS — R20.0 LIP NUMBNESS: Primary | ICD-10-CM

## 2020-11-10 PROCEDURE — 99281 EMR DPT VST MAYX REQ PHY/QHP: CPT

## 2020-11-10 NOTE — ED PROVIDER NOTES
Encounter Date: 11/10/2020       History     Chief Complaint   Patient presents with    Mouth Injury     tingling to her lips and tongue this am, resolved now     70-year-old female has a history of COPD, GERD, hypertension, anxiety, colitis Martin's esophagitis, presents with a history that this morning after sitting on her porch and drinking coffee she developed numbness and tingling to her lips and the tip of her tongue.  At no point did she have trouble speaking or swallowing.  There was no history of any swelling to the tongue or lips.  The patient had not taken her normal medication thus far today.  There was no associated headache.  She denies any complaints of any extremity numbness or weakness.  She denies any difficulty walking.  No complaints of any visual changes.  Patient states that the symptoms seemingly lasted 5 minutes and resolved and was gone when she came to the hospital.  At no point did she have any neck pain, chest pain, palpitations, shortness of breath.  No nausea vomiting.  Bowel or bladder functions unremarkable.        Review of patient's allergies indicates:   Allergen Reactions    Bisacodyl Nausea And Vomiting     Other reaction(s): Vomiting    Demerol [meperidine]      Nausea vomiting, visual problem    Flonase [fluticasone propionate] Hives     Past Medical History:   Diagnosis Date    Anxiety     Martin esophagus     Colitis     COPD (chronic obstructive pulmonary disease)     GERD (gastroesophageal reflux disease)     Hypertension     Thyroid disease     Vocal cord polyps      Past Surgical History:   Procedure Laterality Date    ABDOMINAL AORTIC ANEURYSM REPAIR      BACK SURGERY      cervical    CHOLECYSTECTOMY  12-    Dr Albert CORLEY    FOOT SURGERY      HYSTERECTOMY      SALIVARY GLAND SURGERY       No family history on file.  Social History     Tobacco Use    Smoking status: Current Every Day Smoker     Packs/day: 0.50     Types: Cigarettes     Smokeless tobacco: Former User     Quit date: 5/1/2016   Substance Use Topics    Alcohol use: No    Drug use: No     Review of Systems   Constitutional: Negative for activity change, appetite change, diaphoresis and fever.   HENT: Negative for drooling, facial swelling, rhinorrhea, sinus pressure, sinus pain, sore throat and trouble swallowing.    Eyes: Negative for pain and visual disturbance.   Respiratory: Negative for cough, chest tightness, shortness of breath and wheezing.    Cardiovascular: Negative for chest pain and palpitations.   Gastrointestinal: Negative for abdominal distention, abdominal pain, constipation, diarrhea, nausea and vomiting.   Genitourinary: Negative for difficulty urinating and dysuria.   Musculoskeletal: Negative for back pain, gait problem, myalgias and neck pain.   Skin: Negative for rash.   Neurological: Positive for light-headedness and numbness. Negative for dizziness, tremors, seizures, syncope, facial asymmetry, speech difficulty, weakness and headaches.   Hematological: Does not bruise/bleed easily.   Psychiatric/Behavioral: Negative for confusion and sleep disturbance.   All other systems reviewed and are negative.      Physical Exam     Initial Vitals [11/10/20 0700]   BP Pulse Resp Temp SpO2   (!) 198/100 78 20 97.7 °F (36.5 °C) 100 %      MAP       --         Physical Exam    Vitals reviewed.  Constitutional: She appears well-developed and well-nourished. She is not diaphoretic. No distress.   HENT:   Head: Normocephalic and atraumatic.   Right Ear: External ear normal.   Left Ear: External ear normal.   Nose: Nose normal.   Mouth/Throat: Oropharynx is clear and moist. No oropharyngeal exudate.   Gingivitis noted in the mandible.  No evidence of any tongue or lip swelling.  No discoloration as previously alluded to.  Oropharynx is clear.   Eyes: Conjunctivae are normal. Pupils are equal, round, and reactive to light. Right eye exhibits no discharge. Left eye exhibits no  discharge. No scleral icterus.   Neck: Normal range of motion. Neck supple. No JVD present.   Cardiovascular: Normal rate, regular rhythm, normal heart sounds and intact distal pulses. Exam reveals no gallop and no friction rub.    No murmur heard.  Pulmonary/Chest: Breath sounds normal. No respiratory distress. She has no wheezes. She has no rhonchi. She has no rales. She exhibits no tenderness.   Abdominal: Soft. Bowel sounds are normal. She exhibits no distension. There is no abdominal tenderness. There is no rebound and no guarding.   Musculoskeletal: Normal range of motion. No tenderness or edema.   Lymphadenopathy:     She has no cervical adenopathy.   Neurological: She is alert and oriented to person, place, and time. She has normal strength. No cranial nerve deficit or sensory deficit. GCS score is 15. GCS eye subscore is 4. GCS verbal subscore is 5. GCS motor subscore is 6.   No drift.  Heel-shin testing is normal.  Motor strength is symmetrical.  Cranial nerves intact.   Skin: Skin is warm and dry. Capillary refill takes less than 2 seconds. No rash noted. No erythema. No pallor.   Psychiatric: She has a normal mood and affect. Her behavior is normal. Judgment and thought content normal.         ED Course   Procedures  Labs Reviewed - No data to display       Imaging Results    None                       Attending Attestation:             Attending ED Notes:   This patient who has a history of hypertension which is poorly controlled for when recheck several times in the ED it was persistently high, at this time has a completely normal neurological exam.  Patient is not demonstrating any evidence of any angioedema as may be expected from taking lisinopril.  Her symptoms are not consistent with a CVA.  She is advised to follow-up with her primary physician and return to the ER if symptoms recur.  She is also advised to take her blood pressure medication daily as opposed to every other day.  He is also  advised to take 1 baby aspirin daily.                    Clinical Impression:     ICD-10-CM ICD-9-CM   1. Lip numbness  R20.0 782.0                          ED Disposition Condition    Discharge Stable        ED Prescriptions     None        Follow-up Information     Follow up With Specialties Details Why Contact Info    Jasbir Graham MD Family Medicine In 2 days For re-evaluation [address]  [address]  [facility] [address]  [phone]                                         Misael Valdez Jr., MD  11/10/20 0754

## 2020-11-10 NOTE — ED NOTES
" MASK IN PLACE.  PRIVATE ROOM. EVEN AND NON LABORED RESPIRATIONS.  AIRWAY CLEAR.  PULSES REGULAR.  < 3" CAPILLARY REFILL. SKIN WDI.  MAEW.  NON DISTENDED ABDOMEN. ALERT, ORIENTED AND AMBULATORY. NAD AT THIS TIME.  NO MOUTH NUMBNESS OR TINGLING AT THIS TIME. CALL LIGHT IN REACH.  "

## 2020-11-10 NOTE — DISCHARGE INSTRUCTIONS
Take your blood pressure medication daily.  Take a baby aspirin daily.  Return to the emergency room immediately if numbness or swelling of lips and tongue occurs.  Watch for any headache, extremity numbness or weakness, visual changes.

## 2020-12-04 ENCOUNTER — HOSPITAL ENCOUNTER (OUTPATIENT)
Facility: HOSPITAL | Age: 70
Discharge: HOME | End: 2020-12-05
Attending: EMERGENCY MEDICINE | Admitting: INTERNAL MEDICINE
Payer: MEDICARE

## 2020-12-04 DIAGNOSIS — K21.9 GASTROESOPHAGEAL REFLUX DISEASE WITHOUT ESOPHAGITIS: Primary | ICD-10-CM

## 2020-12-04 DIAGNOSIS — R07.9 CHEST PAIN: ICD-10-CM

## 2020-12-04 DIAGNOSIS — I10 ESSENTIAL HYPERTENSION: ICD-10-CM

## 2020-12-04 LAB
ALBUMIN SERPL BCP-MCNC: 4 G/DL (ref 3.5–5.2)
ALP SERPL-CCNC: 82 U/L (ref 55–135)
ALT SERPL W/O P-5'-P-CCNC: 21 U/L (ref 10–44)
ANION GAP SERPL CALC-SCNC: 8 MMOL/L (ref 8–16)
AST SERPL-CCNC: 22 U/L (ref 10–40)
BASOPHILS # BLD AUTO: 0.03 K/UL (ref 0–0.2)
BASOPHILS NFR BLD: 0.5 % (ref 0–1.9)
BILIRUB SERPL-MCNC: 0.5 MG/DL (ref 0.1–1)
BNP SERPL-MCNC: 36 PG/ML (ref 0–99)
BUN SERPL-MCNC: 16 MG/DL (ref 8–23)
CALCIUM SERPL-MCNC: 9.7 MG/DL (ref 8.7–10.5)
CHLORIDE SERPL-SCNC: 106 MMOL/L (ref 95–110)
CO2 SERPL-SCNC: 27 MMOL/L (ref 23–29)
CREAT SERPL-MCNC: 0.8 MG/DL (ref 0.5–1.4)
CREAT SERPL-MCNC: 0.8 MG/DL (ref 0.5–1.4)
DIFFERENTIAL METHOD: ABNORMAL
EOSINOPHIL # BLD AUTO: 0.1 K/UL (ref 0–0.5)
EOSINOPHIL NFR BLD: 2.4 % (ref 0–8)
ERYTHROCYTE [DISTWIDTH] IN BLOOD BY AUTOMATED COUNT: 13.4 % (ref 11.5–14.5)
EST. GFR  (AFRICAN AMERICAN): >60 ML/MIN/1.73 M^2
EST. GFR  (NON AFRICAN AMERICAN): >60 ML/MIN/1.73 M^2
GLUCOSE SERPL-MCNC: 146 MG/DL (ref 70–110)
HCT VFR BLD AUTO: 44.2 % (ref 37–48.5)
HGB BLD-MCNC: 14.4 G/DL (ref 12–16)
IMM GRANULOCYTES # BLD AUTO: 0.02 K/UL (ref 0–0.04)
IMM GRANULOCYTES NFR BLD AUTO: 0.4 % (ref 0–0.5)
INR PPP: 1.1
LYMPHOCYTES # BLD AUTO: 2 K/UL (ref 1–4.8)
LYMPHOCYTES NFR BLD: 36.8 % (ref 18–48)
MAGNESIUM SERPL-MCNC: 2 MG/DL (ref 1.6–2.6)
MCH RBC QN AUTO: 31.5 PG (ref 27–31)
MCHC RBC AUTO-ENTMCNC: 32.6 G/DL (ref 32–36)
MCV RBC AUTO: 97 FL (ref 82–98)
MONOCYTES # BLD AUTO: 0.4 K/UL (ref 0.3–1)
MONOCYTES NFR BLD: 8.1 % (ref 4–15)
NEUTROPHILS # BLD AUTO: 2.8 K/UL (ref 1.8–7.7)
NEUTROPHILS NFR BLD: 51.8 % (ref 38–73)
NRBC BLD-RTO: 0 /100 WBC
PHOSPHATE SERPL-MCNC: 3.1 MG/DL (ref 2.7–4.5)
PLATELET # BLD AUTO: 197 K/UL (ref 150–350)
PMV BLD AUTO: 9.2 FL (ref 9.2–12.9)
POTASSIUM SERPL-SCNC: 3.6 MMOL/L (ref 3.5–5.1)
PROT SERPL-MCNC: 7.1 G/DL (ref 6–8.4)
PROTHROMBIN TIME: 13.5 SEC (ref 10.6–14.8)
RBC # BLD AUTO: 4.57 M/UL (ref 4–5.4)
SAMPLE: NORMAL
SARS-COV-2 RDRP RESP QL NAA+PROBE: NEGATIVE
SODIUM SERPL-SCNC: 141 MMOL/L (ref 136–145)
TROPONIN I SERPL DL<=0.01 NG/ML-MCNC: <0.03 NG/ML
TROPONIN I SERPL DL<=0.01 NG/ML-MCNC: <0.03 NG/ML
WBC # BLD AUTO: 5.46 K/UL (ref 3.9–12.7)

## 2020-12-04 PROCEDURE — 63600175 PHARM REV CODE 636 W HCPCS: Performed by: EMERGENCY MEDICINE

## 2020-12-04 PROCEDURE — 96374 THER/PROPH/DIAG INJ IV PUSH: CPT

## 2020-12-04 PROCEDURE — 84100 ASSAY OF PHOSPHORUS: CPT

## 2020-12-04 PROCEDURE — G0378 HOSPITAL OBSERVATION PER HR: HCPCS

## 2020-12-04 PROCEDURE — 93010 ELECTROCARDIOGRAM REPORT: CPT | Mod: ,,, | Performed by: INTERNAL MEDICINE

## 2020-12-04 PROCEDURE — 84484 ASSAY OF TROPONIN QUANT: CPT | Mod: 91

## 2020-12-04 PROCEDURE — 96372 THER/PROPH/DIAG INJ SC/IM: CPT | Mod: 59

## 2020-12-04 PROCEDURE — 80053 COMPREHEN METABOLIC PANEL: CPT

## 2020-12-04 PROCEDURE — 63600175 PHARM REV CODE 636 W HCPCS: Performed by: INTERNAL MEDICINE

## 2020-12-04 PROCEDURE — 96375 TX/PRO/DX INJ NEW DRUG ADDON: CPT | Mod: 59

## 2020-12-04 PROCEDURE — 83735 ASSAY OF MAGNESIUM: CPT

## 2020-12-04 PROCEDURE — 99285 EMERGENCY DEPT VISIT HI MDM: CPT | Mod: 25

## 2020-12-04 PROCEDURE — U0002 COVID-19 LAB TEST NON-CDC: HCPCS

## 2020-12-04 PROCEDURE — 36415 COLL VENOUS BLD VENIPUNCTURE: CPT

## 2020-12-04 PROCEDURE — 84484 ASSAY OF TROPONIN QUANT: CPT

## 2020-12-04 PROCEDURE — 25500020 PHARM REV CODE 255: Performed by: EMERGENCY MEDICINE

## 2020-12-04 PROCEDURE — 85025 COMPLETE CBC W/AUTO DIFF WBC: CPT

## 2020-12-04 PROCEDURE — 85610 PROTHROMBIN TIME: CPT

## 2020-12-04 PROCEDURE — 83880 ASSAY OF NATRIURETIC PEPTIDE: CPT

## 2020-12-04 PROCEDURE — 25000003 PHARM REV CODE 250: Performed by: EMERGENCY MEDICINE

## 2020-12-04 PROCEDURE — 93005 ELECTROCARDIOGRAM TRACING: CPT | Performed by: INTERNAL MEDICINE

## 2020-12-04 PROCEDURE — 93010 EKG 12-LEAD: ICD-10-PCS | Mod: ,,, | Performed by: INTERNAL MEDICINE

## 2020-12-04 PROCEDURE — C9113 INJ PANTOPRAZOLE SODIUM, VIA: HCPCS | Performed by: INTERNAL MEDICINE

## 2020-12-04 PROCEDURE — 25000003 PHARM REV CODE 250: Performed by: INTERNAL MEDICINE

## 2020-12-04 RX ORDER — IBUPROFEN 200 MG
16 TABLET ORAL
Status: DISCONTINUED | OUTPATIENT
Start: 2020-12-04 | End: 2020-12-05 | Stop reason: HOSPADM

## 2020-12-04 RX ORDER — LEVOTHYROXINE SODIUM 100 UG/1
100 TABLET ORAL
Status: DISCONTINUED | OUTPATIENT
Start: 2020-12-05 | End: 2020-12-05 | Stop reason: HOSPADM

## 2020-12-04 RX ORDER — GLUCAGON 1 MG
1 KIT INJECTION
Status: DISCONTINUED | OUTPATIENT
Start: 2020-12-04 | End: 2020-12-05 | Stop reason: HOSPADM

## 2020-12-04 RX ORDER — OMEPRAZOLE 40 MG/1
40 CAPSULE, DELAYED RELEASE ORAL DAILY
Status: ON HOLD | COMMUNITY
End: 2020-12-05 | Stop reason: SDUPTHER

## 2020-12-04 RX ORDER — SODIUM CHLORIDE 0.9 % (FLUSH) 0.9 %
10 SYRINGE (ML) INJECTION EVERY 6 HOURS PRN
Status: DISCONTINUED | OUTPATIENT
Start: 2020-12-04 | End: 2020-12-05 | Stop reason: HOSPADM

## 2020-12-04 RX ORDER — PANTOPRAZOLE SODIUM 40 MG/10ML
40 INJECTION, POWDER, LYOPHILIZED, FOR SOLUTION INTRAVENOUS ONCE
Status: COMPLETED | OUTPATIENT
Start: 2020-12-04 | End: 2020-12-04

## 2020-12-04 RX ORDER — ASPIRIN 81 MG/1
81 TABLET ORAL DAILY
COMMUNITY
End: 2021-10-12

## 2020-12-04 RX ORDER — IBUPROFEN 200 MG
24 TABLET ORAL
Status: DISCONTINUED | OUTPATIENT
Start: 2020-12-04 | End: 2020-12-05 | Stop reason: HOSPADM

## 2020-12-04 RX ORDER — ACETAMINOPHEN 325 MG/1
650 TABLET ORAL EVERY 4 HOURS PRN
Status: DISCONTINUED | OUTPATIENT
Start: 2020-12-04 | End: 2020-12-05 | Stop reason: HOSPADM

## 2020-12-04 RX ORDER — NAPROXEN SODIUM 220 MG/1
81 TABLET, FILM COATED ORAL DAILY
Status: DISCONTINUED | OUTPATIENT
Start: 2020-12-04 | End: 2020-12-05 | Stop reason: HOSPADM

## 2020-12-04 RX ORDER — HYDROCODONE BITARTRATE AND ACETAMINOPHEN 5; 325 MG/1; MG/1
1 TABLET ORAL EVERY 6 HOURS PRN
Status: DISCONTINUED | OUTPATIENT
Start: 2020-12-04 | End: 2020-12-05 | Stop reason: HOSPADM

## 2020-12-04 RX ORDER — MORPHINE SULFATE 4 MG/ML
4 INJECTION, SOLUTION INTRAMUSCULAR; INTRAVENOUS
Status: COMPLETED | OUTPATIENT
Start: 2020-12-04 | End: 2020-12-04

## 2020-12-04 RX ORDER — ASPIRIN 325 MG
325 TABLET ORAL
Status: COMPLETED | OUTPATIENT
Start: 2020-12-04 | End: 2020-12-04

## 2020-12-04 RX ORDER — ONDANSETRON 2 MG/ML
4 INJECTION INTRAMUSCULAR; INTRAVENOUS
Status: COMPLETED | OUTPATIENT
Start: 2020-12-04 | End: 2020-12-04

## 2020-12-04 RX ORDER — PANTOPRAZOLE SODIUM 40 MG/1
40 TABLET, DELAYED RELEASE ORAL DAILY
Status: DISCONTINUED | OUTPATIENT
Start: 2020-12-05 | End: 2020-12-05 | Stop reason: HOSPADM

## 2020-12-04 RX ORDER — LISINOPRIL 10 MG/1
10 TABLET ORAL DAILY PRN
Status: DISCONTINUED | OUTPATIENT
Start: 2020-12-04 | End: 2020-12-05 | Stop reason: HOSPADM

## 2020-12-04 RX ORDER — ONDANSETRON 4 MG/1
8 TABLET, ORALLY DISINTEGRATING ORAL EVERY 8 HOURS PRN
Status: DISCONTINUED | OUTPATIENT
Start: 2020-12-04 | End: 2020-12-05 | Stop reason: HOSPADM

## 2020-12-04 RX ORDER — ENOXAPARIN SODIUM 100 MG/ML
40 INJECTION SUBCUTANEOUS
Status: DISCONTINUED | OUTPATIENT
Start: 2020-12-04 | End: 2020-12-05 | Stop reason: HOSPADM

## 2020-12-04 RX ORDER — TALC
9 POWDER (GRAM) TOPICAL NIGHTLY PRN
Status: DISCONTINUED | OUTPATIENT
Start: 2020-12-04 | End: 2020-12-05 | Stop reason: HOSPADM

## 2020-12-04 RX ORDER — ROSUVASTATIN CALCIUM 20 MG/1
20 TABLET, COATED ORAL NIGHTLY
Status: DISCONTINUED | OUTPATIENT
Start: 2020-12-04 | End: 2020-12-05 | Stop reason: HOSPADM

## 2020-12-04 RX ADMIN — NITROGLYCERIN 1 INCH: 20 OINTMENT TOPICAL at 04:12

## 2020-12-04 RX ADMIN — MORPHINE SULFATE 4 MG: 4 INJECTION INTRAVENOUS at 04:12

## 2020-12-04 RX ADMIN — ASPIRIN 325 MG ORAL TABLET 325 MG: 325 PILL ORAL at 04:12

## 2020-12-04 RX ADMIN — ONDANSETRON 4 MG: 2 INJECTION INTRAMUSCULAR; INTRAVENOUS at 04:12

## 2020-12-04 RX ADMIN — DICYCLOMINE HYDROCHLORIDE: 10 SOLUTION ORAL at 05:12

## 2020-12-04 RX ADMIN — PANTOPRAZOLE SODIUM 40 MG: 40 INJECTION, POWDER, LYOPHILIZED, FOR SOLUTION INTRAVENOUS at 10:12

## 2020-12-04 RX ADMIN — ONDANSETRON 8 MG: 4 TABLET, ORALLY DISINTEGRATING ORAL at 11:12

## 2020-12-04 RX ADMIN — ENOXAPARIN SODIUM 40 MG: 40 INJECTION SUBCUTANEOUS at 10:12

## 2020-12-04 RX ADMIN — IOHEXOL 100 ML: 350 INJECTION, SOLUTION INTRAVENOUS at 04:12

## 2020-12-04 RX ADMIN — ROSUVASTATIN CALCIUM 20 MG: 20 TABLET, FILM COATED ORAL at 10:12

## 2020-12-04 NOTE — ED PROVIDER NOTES
Encounter Date: 12/4/2020       History     Chief Complaint   Patient presents with    Chest Pain     Patient presents complaining of chest pain.  Patient describes substernal chest discomfort that radiates to her back.  Symptoms have been present intermittently for last 2-3 days.  Nothing really makes it better or worse.  She has not had this before.        Review of patient's allergies indicates:   Allergen Reactions    Bisacodyl Nausea And Vomiting     Other reaction(s): Vomiting    Demerol [meperidine]      Nausea vomiting, visual problem    Flonase [fluticasone propionate] Hives     Past Medical History:   Diagnosis Date    Anxiety     Martin esophagus     Colitis     COPD (chronic obstructive pulmonary disease)     GERD (gastroesophageal reflux disease)     Hypertension     Thyroid disease     Vocal cord polyps      Past Surgical History:   Procedure Laterality Date    ABDOMINAL AORTIC ANEURYSM REPAIR      BACK SURGERY      cervical    [facility]  12[facility]    FOOT SURGERY      HYSTERECTOMY      SALIVARY GLAND SURGERY       No family history on file.  Social History     Tobacco Use    Smoking status: Current Every Day Smoker     Packs/day: 0.50     Types: Cigarettes    Smokeless tobacco: Former User     Quit date: 5/1/2016   Substance Use Topics    Alcohol use: No    Drug use: No     Review of Systems   Cardiovascular: Positive for chest pain.   All other systems reviewed and are negative.      Physical Exam     Initial Vitals   BP Pulse Resp Temp SpO2   12/04/20 1551 12/04/20 1551 12/04/20 1551 12/04/20 1551 12/04/20 1556   (!) 208/93 75 (!) 24 97.6 °F (36.4 °C) 96 %      MAP       --                Physical Exam    Nursing note and vitals reviewed.  Constitutional: She appears well-developed and well-nourished.   HENT:   Head: Normocephalic and atraumatic.   Mouth/Throat: Oropharynx is clear and moist.   Eyes: EOM are normal.   Neck: Normal range of motion. Neck  supple.   Cardiovascular: Normal rate, regular rhythm, normal heart sounds and intact distal pulses.   Pulmonary/Chest: Breath sounds normal. No respiratory distress.   Abdominal: Soft.   Musculoskeletal: Normal range of motion.   Neurological: She is alert and oriented to person, place, and time. She has normal strength.   Skin: Skin is warm and dry. Capillary refill takes less than 2 seconds.   Psychiatric: She has a normal mood and affect. Her behavior is normal. Judgment and thought content normal.         ED Course   Procedures  Labs Reviewed   COMPREHENSIVE METABOLIC PANEL - Abnormal; Notable for the following components:       Result Value    Glucose 146 (*)     All other components within normal limits   CBC W/ AUTO DIFFERENTIAL - Abnormal; Notable for the following components:    MCH 31.5 (*)     All other components within normal limits   MAGNESIUM   PHOSPHORUS   PROTIME-INR   TROPONIN I   B-TYPE NATRIURETIC PEPTIDE   SARS-COV-2 RNA AMPLIFICATION, QUAL   ISTAT CREATININE   POCT CREATININE          Imaging Results          CTA Chest Non-Coronary (PE Study) (Final result)  Result time 12/04/20 16:59:15    Final result by Darian Mesa MD (12/04/20 16:59:15)                 Narrative:    CMS MANDATED QUALITY DATA - CT RADIATION  436    All CT scans at this facility utilize dose modulation, iterative  reconstruction, and/or weight based dosing when appropriate to reduce  radiation dose to as low as reasonably achievable.    Coronal reformatted MIP images were created on an independent  workstation, with images stored in the patient's permanent electronic  medical record.    CTA CHEST NON CORONARY    CLINICAL HISTORY:  70 years Female Aortic disease, nontraumatic    COMPARISON: Chest radiograph same day. CTA chest June 1, 2020    FINDINGS: Thoracic aorta is normal in caliber. No evidence of aortic  dissection. Atherosclerotic calcification and small amount of mural  thrombus involving the aortic arch. No  central pulmonary embolism is  evident.    Central airways are patent. Esophagus is nondilated size mediastinal  lymph nodes.    Mild basilar atelectasis/scarring involving both lungs. Upper lobe  predominant centrilobular emphysematous changes. No confluent airspace  disease. No pleural effusion or pneumothorax.    Bone window images demonstrate no acute or aggressive osseous  abnormality.    Images through the upper abdomen demonstrate a hepatic cyst on prior  cholecystectomy. Calcified granuloma within the spleen. No acute  abdominal pathology. There is some mural thrombus within the abdominal  aorta, incompletely visualized.    IMPRESSION:    Negative for aortic dissection.    No central pulmonary embolism.    No acute pulmonary process.    Atherosclerotic disease of the aorta.    Emphysema.    Electronically Signed by Darian PICKENS on 12/4/2020 5:13 PM                             X-ray Chest AP Portable (Final result)  Result time 12/04/20 16:32:28    Final result by Yokasta Cowan MD (12/04/20 16:32:28)                 Impression:      No acute cardiopulmonary abnormality.      Electronically signed by: Yokasta Cowan MD  Date:    12/04/2020  Time:    16:32             Narrative:    EXAMINATION:  XR CHEST AP PORTABLE    FINDINGS:  Portable chest at 16:36 hours is compared to 08/26/2020 shows normal cardiomediastinal silhouette.    Lungs are clear. Pulmonary vasculature is normal. No acute osseous abnormality.                                 Medical Decision Making:   Initial Assessment:   Patient in no apparent distress  Differential Diagnosis:   Considerations include aortic dissection, pulmonary embolism, acute coronary syndrome, musculoskeletal pain, reflux, esophagitis  Independently Interpreted Test(s):   I have ordered and independently interpreted EKG Reading(s) - see summary below       <> Summary of EKG Reading(s): EKG is regular rate and rhythm no ST changes  Clinical Tests:   Lab  Tests: Ordered and Reviewed  Radiological Study: Ordered and Reviewed  Medical Tests: Reviewed and Ordered  ED Management:  Screening labs are normal.  CTA without evidence of dissection or embolism.  EKG without ST changes.  Will admit patient for further cardiac evaluation and treatment.  She remains clinically stable.                             Clinical Impression:       ICD-10-CM ICD-9-CM   1. Chest pain  R07.9 786.50                          ED Disposition Condition    Admit                             Joe Kuhn MD  12/04/20 8174

## 2020-12-05 VITALS
BODY MASS INDEX: 26.57 KG/M2 | WEIGHT: 144.38 LBS | TEMPERATURE: 98 F | HEART RATE: 63 BPM | OXYGEN SATURATION: 96 % | RESPIRATION RATE: 16 BRPM | SYSTOLIC BLOOD PRESSURE: 145 MMHG | DIASTOLIC BLOOD PRESSURE: 67 MMHG | HEIGHT: 62 IN

## 2020-12-05 PROBLEM — F41.9 ANXIETY: Chronic | Status: ACTIVE | Noted: 2020-12-05

## 2020-12-05 PROBLEM — I71.40 AAA (ABDOMINAL AORTIC ANEURYSM): Chronic | Status: ACTIVE | Noted: 2020-12-05

## 2020-12-05 LAB
ALBUMIN SERPL BCP-MCNC: 3.7 G/DL (ref 3.5–5.2)
ALP SERPL-CCNC: 72 U/L (ref 55–135)
ALT SERPL W/O P-5'-P-CCNC: 18 U/L (ref 10–44)
ANION GAP SERPL CALC-SCNC: 5 MMOL/L (ref 8–16)
AST SERPL-CCNC: 19 U/L (ref 10–40)
BASOPHILS # BLD AUTO: 0.02 K/UL (ref 0–0.2)
BASOPHILS NFR BLD: 0.4 % (ref 0–1.9)
BILIRUB SERPL-MCNC: 0.7 MG/DL (ref 0.1–1)
BUN SERPL-MCNC: 13 MG/DL (ref 8–23)
CALCIUM SERPL-MCNC: 9.1 MG/DL (ref 8.7–10.5)
CHLORIDE SERPL-SCNC: 106 MMOL/L (ref 95–110)
CHOLEST SERPL-MCNC: 180 MG/DL (ref 120–199)
CHOLEST/HDLC SERPL: 3.9 {RATIO} (ref 2–5)
CO2 SERPL-SCNC: 28 MMOL/L (ref 23–29)
CREAT SERPL-MCNC: 0.9 MG/DL (ref 0.5–1.4)
DIFFERENTIAL METHOD: ABNORMAL
EOSINOPHIL # BLD AUTO: 0.1 K/UL (ref 0–0.5)
EOSINOPHIL NFR BLD: 2.8 % (ref 0–8)
ERYTHROCYTE [DISTWIDTH] IN BLOOD BY AUTOMATED COUNT: 13.5 % (ref 11.5–14.5)
EST. GFR  (AFRICAN AMERICAN): >60 ML/MIN/1.73 M^2
EST. GFR  (NON AFRICAN AMERICAN): >60 ML/MIN/1.73 M^2
GLUCOSE SERPL-MCNC: 90 MG/DL (ref 70–110)
HCT VFR BLD AUTO: 41.7 % (ref 37–48.5)
HDLC SERPL-MCNC: 46 MG/DL (ref 40–75)
HDLC SERPL: 25.6 % (ref 20–50)
HGB BLD-MCNC: 13.9 G/DL (ref 12–16)
IMM GRANULOCYTES # BLD AUTO: 0.01 K/UL (ref 0–0.04)
IMM GRANULOCYTES NFR BLD AUTO: 0.2 % (ref 0–0.5)
LDLC SERPL CALC-MCNC: 113 MG/DL (ref 63–159)
LYMPHOCYTES # BLD AUTO: 1.5 K/UL (ref 1–4.8)
LYMPHOCYTES NFR BLD: 29.8 % (ref 18–48)
MAGNESIUM SERPL-MCNC: 2 MG/DL (ref 1.6–2.6)
MCH RBC QN AUTO: 32.3 PG (ref 27–31)
MCHC RBC AUTO-ENTMCNC: 33.3 G/DL (ref 32–36)
MCV RBC AUTO: 97 FL (ref 82–98)
MONOCYTES # BLD AUTO: 0.4 K/UL (ref 0.3–1)
MONOCYTES NFR BLD: 7.9 % (ref 4–15)
NEUTROPHILS # BLD AUTO: 2.9 K/UL (ref 1.8–7.7)
NEUTROPHILS NFR BLD: 58.9 % (ref 38–73)
NONHDLC SERPL-MCNC: 134 MG/DL
NRBC BLD-RTO: 0 /100 WBC
PLATELET # BLD AUTO: 200 K/UL (ref 150–350)
PMV BLD AUTO: 9.1 FL (ref 9.2–12.9)
POTASSIUM SERPL-SCNC: 4.1 MMOL/L (ref 3.5–5.1)
PROT SERPL-MCNC: 6.9 G/DL (ref 6–8.4)
RBC # BLD AUTO: 4.31 M/UL (ref 4–5.4)
SODIUM SERPL-SCNC: 139 MMOL/L (ref 136–145)
TRIGL SERPL-MCNC: 105 MG/DL (ref 30–150)
TROPONIN I SERPL DL<=0.01 NG/ML-MCNC: <0.03 NG/ML
TROPONIN I SERPL DL<=0.01 NG/ML-MCNC: <0.03 NG/ML
WBC # BLD AUTO: 4.93 K/UL (ref 3.9–12.7)

## 2020-12-05 PROCEDURE — 25000003 PHARM REV CODE 250: Performed by: INTERNAL MEDICINE

## 2020-12-05 PROCEDURE — 80053 COMPREHEN METABOLIC PANEL: CPT

## 2020-12-05 PROCEDURE — 99900035 HC TECH TIME PER 15 MIN (STAT)

## 2020-12-05 PROCEDURE — 83735 ASSAY OF MAGNESIUM: CPT

## 2020-12-05 PROCEDURE — 94761 N-INVAS EAR/PLS OXIMETRY MLT: CPT

## 2020-12-05 PROCEDURE — 36415 COLL VENOUS BLD VENIPUNCTURE: CPT

## 2020-12-05 PROCEDURE — G0378 HOSPITAL OBSERVATION PER HR: HCPCS

## 2020-12-05 PROCEDURE — 85025 COMPLETE CBC W/AUTO DIFF WBC: CPT

## 2020-12-05 PROCEDURE — 84484 ASSAY OF TROPONIN QUANT: CPT

## 2020-12-05 PROCEDURE — 83036 HEMOGLOBIN GLYCOSYLATED A1C: CPT

## 2020-12-05 PROCEDURE — 80061 LIPID PANEL: CPT

## 2020-12-05 RX ORDER — ONDANSETRON 4 MG/1
4 TABLET, ORALLY DISINTEGRATING ORAL EVERY 8 HOURS PRN
Qty: 21 TABLET | Refills: 0 | Status: SHIPPED | OUTPATIENT
Start: 2020-12-05 | End: 2023-05-31 | Stop reason: SDUPTHER

## 2020-12-05 RX ORDER — SUCRALFATE 1 G/10ML
1 SUSPENSION ORAL
Qty: 1200 ML | Refills: 0 | Status: SHIPPED | OUTPATIENT
Start: 2020-12-05 | End: 2020-12-07

## 2020-12-05 RX ORDER — LORAZEPAM 2 MG/ML
0.5 INJECTION INTRAMUSCULAR EVERY 12 HOURS PRN
Status: DISCONTINUED | OUTPATIENT
Start: 2020-12-05 | End: 2020-12-05 | Stop reason: HOSPADM

## 2020-12-05 RX ORDER — MAG HYDROX/ALUMINUM HYD/SIMETH 200-200-20
30 SUSPENSION, ORAL (FINAL DOSE FORM) ORAL
Status: DISCONTINUED | OUTPATIENT
Start: 2020-12-05 | End: 2020-12-05 | Stop reason: HOSPADM

## 2020-12-05 RX ORDER — OMEPRAZOLE 40 MG/1
40 CAPSULE, DELAYED RELEASE ORAL
Qty: 60 CAPSULE | Refills: 0 | Status: SHIPPED | OUTPATIENT
Start: 2020-12-05 | End: 2021-06-01

## 2020-12-05 RX ORDER — LISINOPRIL 10 MG/1
20 TABLET ORAL DAILY
Start: 2020-12-05 | End: 2021-03-04 | Stop reason: SDUPTHER

## 2020-12-05 RX ADMIN — DICYCLOMINE HYDROCHLORIDE 50 ML: 10 SOLUTION ORAL at 10:12

## 2020-12-05 RX ADMIN — ALUMINUM HYDROXIDE, MAGNESIUM HYDROXIDE, AND SIMETHICONE 30 ML: 200; 200; 20 SUSPENSION ORAL at 10:12

## 2020-12-05 RX ADMIN — PANTOPRAZOLE SODIUM 40 MG: 40 TABLET, DELAYED RELEASE ORAL at 10:12

## 2020-12-05 RX ADMIN — ASPIRIN 81 MG: 81 TABLET, CHEWABLE ORAL at 10:12

## 2020-12-05 RX ADMIN — LEVOTHYROXINE SODIUM 100 MCG: 100 TABLET ORAL at 05:12

## 2020-12-05 RX ADMIN — ACETAMINOPHEN 650 MG: 325 TABLET, FILM COATED ORAL at 10:12

## 2020-12-05 NOTE — PLAN OF CARE
12/05/20 0908   GILBERT Message   Medicare Outpatient and Observation Notification regarding financial responsibility Given to patient/caregiver;Explained to patient/caregiver;Signed/date by patient/caregiver   Date GILBERT was signed 12/05/20   Time GILBERT was signed 0908

## 2020-12-05 NOTE — PLAN OF CARE
Chart reviewed no needs identified.     12/05/20 1012   Final Note   Assessment Type Final Discharge Note   Anticipated Discharge Disposition Home

## 2020-12-05 NOTE — DISCHARGE SUMMARY
[facility]  Discharge Summary      Patient Name: Joslyn Dubon  MRN: [id number]  Admission Date: 12/4/2020  Hospital Length of Stay: 0 days  Discharge Date and Time:  12/05/2020 10:18 AM  Attending Physician: Corin Doyle MD   Discharging Provider: Corin Doyle MD  Primary Care Provider: Jasbir Graham MD      HPI:   HPI as per MD Christnia  Joslyn Dubon is a 70 y.o. White female with known history of chronic tobacco abuse, GERD, hypertension  who presnted with a primary complaint of Right sided Chest Pain. Patient pain started 3 days ago but today it was worst. She describes the intensity as 10/10. It radiates to her back. Denies any worsening or relieving factors. She has history of heart burn which is uncontrolled. She attributes it to heart burn but denies having similar chest pain before. She had stress echo in [facility] in 2019 which was negative. Patient denies change in vision, hearing, headache, fever, cough, congestion, runny nose, shortness of breath, palpitations, abdominal pain, diarrhea, constipation, vomiting, dysuria, hematuria, joint pain and back pain.      I have reviewed the labs and imaging obtained in emergency room. Patient CBC was unremarkable. CMP was unremarkable. BNP and troponin within reference ranges. I have reviewed the EKG and does not show new ischemic changes. No concerning finding on chest x ray.      Decision to admit was taken and patient was informed about the plan of care.     * No surgery found *      Hospital Course:   Assumed care of this patient on 12/05/20. 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 Smith with last endoscopy about 5 years ago, history of AAA repair. She states she has chronic severe GERD however yesterday developed left sided chest discomfort radiating through  to her back in the scapula area and this was new and prompted ED presentation. She reports last year her  had ' gas pain' and he had cardiac arrest soon after hospital admission and needing stents and therefore she was concerned. Does admit to increase stress socially as well. In the ED blood pressure was noted to be elevated, she states she does take lisinopril, has a blood pressure monitor at home and is controlled at home. Feels it was elevated due to severe pain.  Labs with BNP 36, serial troponin negative, cardiac telemetry with sinus rhythm, had stress echocardiogram 2019 with EF of 60% with EKG and echo portions no evidence of ischemia.  CT presentation without any dissection PE.  EKG no new ischemic changes.  She was initially admitted for ACS.  On 12/05 blood pressure better controlled states chest discomfort is resolved, still having severe reflux symptoms and feels if she belches she would feel much better states this is her baseline eager for discharge.  Did discuss with patient given severe symptoms GERD prior GI history with early changes H pylori current smoker would recommend that she follow-up with her gastroenterology for repeat endoscopy further evaluation.  Did also discussed with patient continue to her blood pressure home potential continued good control with outpatient follow-up.  Medically stable discharge.  Discharge plan medication follow-up as well as precautions were reviewed.  Objection for discharge.  All questions were addressed.      Discharge examination   Sitting in bed, alert and oriented, slightly anxious appearing, no reproducible wall tenderness to palpation, regular heart rhythm, abdomen soft and non tender     Consults:   Consults (From admission, onward)        Status Ordering Provider     Inpatient consult to Cardiology  Once     Provider:  MD Edith Medina MUHAMMAD F.     Inpatient consult to Hospitalist  Once     Provider:  Jesse GERARD  MD [place]    Acknowledged NOEMÍ WALLIS          No new Assessment & Plan notes have been filed under this hospital service since the last note was generated.  Service: Hospital Medicine    Final Active Diagnoses:    Diagnosis Date Noted POA    PRINCIPAL PROBLEM:  Gastroesophageal reflux disease without esophagitis [K21.9] 02/21/2020 Yes    Anxiety [F41.9] 12/05/2020 Yes     Chronic    History of AAA repair [I71.4] 12/05/2020 Yes     Chronic    Hypertension [I10] 09/15/2019 Yes     Chronic    Tobacco abuse [Z72.0] 09/15/2019 Yes     Chronic    COPD (chronic obstructive pulmonary disease) [J44.9] 09/14/2019 Yes     Chronic    Thyroid disease [E07.9] 09/14/2019 Yes     Chronic      Problems Resolved During this Admission:    Diagnosis Date Noted Date Resolved POA    Chest pain [R07.9] 09/23/2013 12/05/2020 Yes       Discharged Condition: good    Disposition: Home or Self Care    Follow Up:  Follow-up Information     Lena Smith MD. Schedule an appointment as soon as possible for a visit in 1 week.    Specialty: Gastroenterology  Why: post hospital follow up, endoscopy  Contact information:  [address] [facility]  [place] [address]  [phone]             Jasbir Graham MD. Schedule an appointment as soon as possible for a visit in 1 week.    Specialty: Family Medicine  Why: post hospital follow up  Contact information:  [address]  [address]  [facility]  [place] [address]  [phone]                 Patient Instructions:      Diet Cardiac     Notify your health care provider if you experience any of the following:  persistent nausea and vomiting or diarrhea     Notify your health care provider if you experience any of the following:  temperature >100.4     Notify your health care provider if you experience any of the following:  severe uncontrolled pain     Notify your health care provider if you experience any of the following:  difficulty breathing or increased cough      Activity as tolerated       Significant Diagnostic Studies: Labs:   BMP:   Recent Labs   Lab 12/04/20  1618 12/05/20  0700   * 90    139   K 3.6 4.1    106   CO2 27 28   BUN 16 13   CREATININE 0.8 0.9   CALCIUM 9.7 9.1   MG 2.0 2.0   , CMP   Recent Labs   Lab 12/04/20  1618 12/05/20  0700    139   K 3.6 4.1    106   CO2 27 28   * 90   BUN 16 13   CREATININE 0.8 0.9   CALCIUM 9.7 9.1   PROT 7.1 6.9   ALBUMIN 4.0 3.7   BILITOT 0.5 0.7   ALKPHOS 82 72   AST 22 19   ALT 21 18   ANIONGAP 8 5*   ESTGFRAFRICA >60.0 >60.0   EGFRNONAA >60.0 >60.0   , CBC   Recent Labs   Lab 12/04/20  1618 12/05/20  0700   WBC 5.46 4.93   HGB 14.4 13.9   HCT 44.2 41.7    200   , INR   Lab Results   Component Value Date    INR 1.1 12/04/2020    INR 1.0 08/26/2020    INR 1.0 10/18/2019   , Lipid Panel   Lab Results   Component Value Date    CHOL 180 12/05/2020    HDL 46 12/05/2020    LDLCALC 113.0 12/05/2020    TRIG 105 12/05/2020    CHOLHDL 25.6 12/05/2020   , Troponin   Recent Labs   Lab 12/04/20  1818 12/04/20  2349 12/05/20  0700   TROPONINI <0.030 <0.030 <0.030   , A1C: No results for input(s): HGBA1C in the last 4320 hours. and All labs within the past 24 hours have been reviewed    Pending Diagnostic Studies:     Procedure Component Value Units Date/Time    Hemoglobin A1c [[id number]] Collected: 12/05/20 0700    Order Status: Sent Lab Status: In process Updated: 12/05/20 0710    Specimen: Blood     Narrative:      Collection has been rescheduled by SAMIRA at 12/05/2020 06:52 Reason:   Unable to collect    Troponin I [[id number]]     Order Status: Sent Lab Status: No result     Specimen: Blood        Cta Chest Non-coronary (pe Study)    Result Date: 12/4/2020  CMS MANDATED QUALITY DATA - CT RADIATION  436 All CT scans at this facility utilize dose modulation, iterative reconstruction, and/or weight based dosing when appropriate to reduce radiation dose to as low as reasonably achievable. Coronal  reformatted MIP images were created on an independent workstation, with images stored in the patient's permanent electronic medical record. CTA CHEST NON CORONARY CLINICAL HISTORY: 70 years Female Aortic disease, nontraumatic COMPARISON: Chest radiograph same day. CTA chest June 1, 2020 FINDINGS: Thoracic aorta is normal in caliber. No evidence of aortic dissection. Atherosclerotic calcification and small amount of mural thrombus involving the aortic arch. No central pulmonary embolism is evident. Central airways are patent. Esophagus is nondilated size mediastinal lymph nodes. Mild basilar atelectasis/scarring involving both lungs. Upper lobe predominant centrilobular emphysematous changes. No confluent airspace disease. No pleural effusion or pneumothorax. Bone window images demonstrate no acute or aggressive osseous abnormality. Images through the upper abdomen demonstrate a hepatic cyst on prior cholecystectomy. Calcified granuloma within the spleen. No acute abdominal pathology. There is some mural thrombus within the abdominal aorta, incompletely visualized. IMPRESSION: Negative for aortic dissection. No central pulmonary embolism. No acute pulmonary process. Atherosclerotic disease of the aorta. Emphysema. Electronically Signed by Darian PICKENS on 12/4/2020 5:13 PM    X-ray Chest Ap Portable    Result Date: 12/4/2020  EXAMINATION: XR CHEST AP PORTABLE FINDINGS: Portable chest at 16:36 hours is compared to 08/26/2020 shows normal cardiomediastinal silhouette. Lungs are clear. Pulmonary vasculature is normal. No acute osseous abnormality.     No acute cardiopulmonary abnormality. Electronically signed by: Yokasta Cowan MD Date:    12/04/2020 Time:    16:32    Medications:  Reconciled Home Medications:      Medication List      START taking these medications    ondansetron 4 MG Tbdl  Commonly known as: ZOFRAN-ODT  Take 1 tablet (4 mg total) by mouth every 8 (eight) hours as needed.     sucralfate 100  mg/mL suspension  Commonly known as: CARAFATE  Take 10 mLs (1 g total) by mouth 4 (four) times daily before meals and nightly.        CHANGE how you take these medications    omeprazole 40 MG capsule  Commonly known as: PRILOSEC  Take 1 capsule (40 mg total) by mouth 2 (two) times daily before meals.  What changed: when to take this        CONTINUE taking these medications    aspirin 81 MG EC tablet  Commonly known as: ECOTRIN  Take 81 mg by mouth once daily.     aspirin-sod bicarb-citric acid 324 mg Tbef  Commonly known as: GAYLA-SELTZER  Take 325 mg by mouth every 6 (six) hours as needed.     HYDROcodone-acetaminophen  mg per tablet  Commonly known as: NORCO  Take 1 tablet by mouth every 12 (twelve) hours as needed.     levothyroxine 100 MCG tablet  Commonly known as: SYNTHROID  Take 1 tablet (100 mcg total) by mouth before breakfast.     lisinopriL 10 MG tablet  Take 2 tablets (20 mg total) by mouth once daily.     LORazepam 0.5 MG tablet  Commonly known as: ATIVAN  Take 1 tablet (0.5 mg total) by mouth every 12 (twelve) hours as needed for Anxiety.            Indwelling Lines/Drains at time of discharge:   Lines/Drains/Airways     None                 Time spent on the discharge of patient: 31 minutes  Patient was seen and examined on the date of discharge and determined to be suitable for discharge.         Corin Doyle MD  Department of Hospital Medicine  [facility]

## 2020-12-05 NOTE — PROGRESS NOTES
No c/o chest pain only nausea this shift. PT rested after zofran 8mg odt was administered. New iv site obtained d/t pain/edema/difficulty flushing. No acute events overnight. Cheyenne AmezquitaRN

## 2020-12-05 NOTE — HOSPITAL COURSE
Assumed care of this patient on 12/05/20. 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 Smith with last endoscopy about 5 years ago, history of AAA repair. She states she has chronic severe GERD however yesterday developed left sided chest discomfort radiating through to her back in the scapula area and this was new and prompted ED presentation. She reports last year her  had ' gas pain' and he had cardiac arrest soon after hospital admission and needing stents and therefore she was concerned. Does admit to increase stress socially as well. In the ED blood pressure was noted to be elevated, she states she does take lisinopril, has a blood pressure monitor at home and is controlled at home. Feels it was elevated due to severe pain.  Labs with BNP 36, serial troponin negative, cardiac telemetry with sinus rhythm, had stress echocardiogram 2019 with EF of 60% with EKG and echo portions no evidence of ischemia.  CT presentation without any dissection PE.  EKG no new ischemic changes.  She was initially admitted for ACS.  On 12/05 blood pressure better controlled states chest discomfort is resolved, still having severe reflux symptoms and feels if she belches she would feel much better states this is her baseline eager for discharge.  Did discuss with patient given severe symptoms GERD prior GI history with early changes H pylori current smoker would recommend that she follow-up with her gastroenterology for repeat endoscopy further evaluation.  Did also discussed with patient continue to her blood pressure home potential continued good control with outpatient follow-up.  Medically stable discharge.  Discharge plan medication follow-up as well as precautions were reviewed.  Objection for discharge.  All questions were addressed.      Discharge examination   Sitting in bed, alert and  oriented, slightly anxious appearing, no reproducible wall tenderness to palpation, regular heart rhythm, abdomen soft and non tender

## 2020-12-05 NOTE — NURSING
Discharge instructions, educational information, and medication changes discussed with pt. Pt verbalized understanding. Stable, no acute distress. Pt states chest discomfort has majorly improved after mylanta administration. Discussed at length that pt should f/u with Dr. Smith. Pt verbalized understanding. Pt refused transport by wheelchair off unit. Pt stated that her daughter-in-law will drive her home. Pt ambulated off unit.

## 2020-12-05 NOTE — HPI
HPI as per MD Christina  Joslyn Dubon is a 70 y.o. White female with known history of chronic tobacco abuse, GERD, hypertension  who presnted with a primary complaint of Right sided Chest Pain. Patient pain started 3 days ago but today it was worst. She describes the intensity as 10/10. It radiates to her back. Denies any worsening or relieving factors. She has history of heart burn which is uncontrolled. She attributes it to heart burn but denies having similar chest pain before. She had stress echo in [facility] in 2019 which was negative. Patient denies change in vision, hearing, headache, fever, cough, congestion, runny nose, shortness of breath, palpitations, abdominal pain, diarrhea, constipation, vomiting, dysuria, hematuria, joint pain and back pain.      I have reviewed the labs and imaging obtained in emergency room. Patient CBC was unremarkable. CMP was unremarkable. BNP and troponin within reference ranges. I have reviewed the EKG and does not show new ischemic changes. No concerning finding on chest x ray.      Decision to admit was taken and patient was informed about the plan of care.

## 2020-12-05 NOTE — H&P
[facility] Medicine History & Physical Examination   Patient Name: Joslyn Dubon  MRN: [id number]  Patient Class: OP- Observation   Admission Date: 12/4/2020  3:45 PM  Length of Stay: 0  Attending Physician: Jesse Christina MD  Primary Care Provider: Jasbir Graham MD  Face-to-Face encounter date: 12/04/2020  Code Status: Full code  Chief Complaint: Chest Pain        Patient information was obtained from patient, past medical records and ER records.   HISTORY OF PRESENT ILLNESS:   Joslyn Dubon is a 70 y.o. White female with known history of chronic tobacco abuse, GERD, hypertension  who presnted with a primary complaint of Right sided Chest Pain. Patient pain started 3 days ago but today it was worst. She describes the intensity as 10/10. It radiates to her back. Denies any worsening or relieving factors. She has history of heart burn which is uncontrolled. She attributes it to heart burn but denies having similar chest pain before. [facility] which was [facility]. Patient denies change in vision, hearing, headache, fever, cough, congestion, runny nose, shortness of breath, palpitations, abdominal pain, diarrhea, constipation, vomiting, dysuria, hematuria, joint pain and back pain.     I have reviewed the labs and imaging obtained in emergency room. Patient CBC was unremarkable. CMP was unremarkable. BNP and troponin within reference ranges. I have reviewed the EKG and does not show new ischemic changes. No concerning finding on chest x ray.     Decision to admit was taken and patient was informed about the plan of care.   REVIEW OF SYSTEMS:   10 Point Review of System was performed and was found to be negative except for that mentioned already in the HPI above.     PAST MEDICAL HISTORY:     Past Medical History:   Diagnosis Date    Anxiety     Martin esophagus     Colitis     COPD (chronic obstructive pulmonary disease)     GERD  (gastroesophageal reflux disease)     Hypertension     Thyroid disease     Vocal cord polyps        PAST SURGICAL HISTORY:     Past Surgical History:   Procedure Laterality Date    ABDOMINAL AORTIC ANEURYSM REPAIR      BACK SURGERY      cervical    CHOLECYSTECTOMY  12-    Dr Albert CORLEY    FOOT SURGERY      HYSTERECTOMY      SALIVARY GLAND SURGERY         ALLERGIES:   Bisacodyl, Demerol [meperidine], and Flonase [fluticasone propionate]    FAMILY HISTORY:   Reviewed and not pertinent    SOCIAL HISTORY:     Social History     Tobacco Use    Smoking status: Current Every Day Smoker     Packs/day: 0.50     Types: Cigarettes    Smokeless tobacco: Former User     Quit date: 5/1/2016   Substance Use Topics    Alcohol use: No        Social History     Substance and Sexual Activity   Sexual Activity Yes    Partners: Male        HOME MEDICATIONS:     Prior to Admission medications    Medication Sig Start Date End Date Taking? Authorizing Provider   aspirin (ECOTRIN) 81 MG EC tablet Take 81 mg by mouth once daily.   Yes Historical Provider   aspirin-sod bicarb-citric acid (GAYLA-SELTZER) 324 mg TbEF Take 325 mg by mouth every 6 (six) hours as needed.   Yes Historical Provider   HYDROcodone-acetaminophen (NORCO)  mg per tablet Take 1 tablet by mouth every 12 (twelve) hours as needed. 8/28/20  Yes Jasbir Graham MD   levothyroxine (SYNTHROID) 100 MCG tablet Take 1 tablet (100 mcg total) by mouth before breakfast. 8/28/20  Yes Jasbir Graham MD   lisinopril 10 MG tablet Take 1 tablet (10 mg total) by mouth daily as needed (SBP>160). 2/21/20  Yes Jasbir Graham MD   omeprazole (PRILOSEC) 40 MG capsule Take 40 mg by mouth once daily.   Yes Historical Provider   RABEprazole (ACIPHEX) 20 mg tablet Take 1 tablet (20 mg total) by mouth once daily. 8/28/20  Yes Jasbir Graham MD   LORazepam (ATIVAN) 0.5 MG tablet Take 1 tablet (0.5 mg total) by mouth every 12 (twelve) hours as needed for Anxiety.  "9/19/19   Joslyn Lemus NP         PHYSICAL EXAM:   BP (!) 169/94   Pulse 65   Temp 97.6 °F (36.4 °C)   Resp (!) 22   Ht 5' 2" (1.575 m)   Wt 63.5 kg (140 lb)   SpO2 95%   BMI 25.61 kg/m²   Vitals Reviewed  General appearance: Well-developed, well-nourished, White female in no apparent distress.  Skin: No Rash.   Neuro: Motor and sensory exams grossly intact. Good tone. Power in all 4 extremities 5/5.   HENT: Atraumatic head. Moist mucous membranes of oral cavity.  Eyes: Normal extraocular movements.   Neck: Supple. No evidence of lymphadenopathy. No thyroidomegaly.  Lungs: Clear to auscultation bilaterally. No wheezing present.   Heart: Regular rate and rhythm. S1 and S2 present with no murmurs/gallop/rub. No pedal edema. No JVD present.   Abdomen: Soft, non-distended, non-tender. No rebound tenderness/guarding. No masses or organomegaly. Bowel sounds are normal. Bladder is not palpable.   Extremities: No cyanosis, clubbing.  Psych/mental status: Alert and oriented. Cooperative. Responds appropriately to questions.   EMERGENCY DEPARTMENT LABS AND IMAGING:     Labs Reviewed   COMPREHENSIVE METABOLIC PANEL - Abnormal; Notable for the following components:       Result Value    Glucose 146 (*)     All other components within normal limits   CBC W/ AUTO DIFFERENTIAL - Abnormal; Notable for the following components:    MCH 31.5 (*)     All other components within normal limits   MAGNESIUM   PHOSPHORUS   PROTIME-INR   TROPONIN I   B-TYPE NATRIURETIC PEPTIDE   SARS-COV-2 RNA AMPLIFICATION, QUAL   TROPONIN I   ISTAT CREATININE   POCT CREATININE       CTA Chest Non-Coronary (PE Study)   Final Result      X-ray Chest AP Portable   Final Result      No acute cardiopulmonary abnormality.         Electronically signed by: Yokasta Cowan MD   Date:    12/04/2020   Time:    16:32      NM Myocardial Perfusion Spect Multi Pharmacologic    (Results Pending)       ASSESSMENT & PLAN:   Joslyn CASH Jagdish is a 70 y.o. " female admitted for    1. Chest pain: Appears abdominal in etiology likely GERD related however pain radiating to the back concerns me and warrants further testing. Orders as following.    Admit to Telemetry  Adult diet for now  NPO at midnight  Serial trop x 3, EKG in am  Cardiology consultation  ASA/Statin/ Nitro if needed  Lovenox for DVT prophylaxis  HbA1c and lipid panel next am  Possible discharge after negative stress test    I have reviewed the laboratory and the EKG myself and it does not show ischemic changes.    2. GERD/Barette Esophagus: IV Protonix    3. COPD: not in exacerbation    4. Hypertension: resume home meds    DVT Prophylaxis: will be placed on lovenox DVT prophylaxis and will be advised to be as mobile as possible and sit in a chair as tolerated.   ________________________________________________________________________________    Discharge Planning and Disposition: No mobility needs. Ambulating well.   Face-to-Face encounter date: 12/04/2020  Encounter included review of the medical records, interviewing and examining the patient face-to-face, discussion with family and other health care providers including emergency medicine physician, admission orders, interpreting lab/test results and formulating a plan of care.   Medical Decision Making during this encounter was  [_] Low Complexity  [_] Moderate Complexity  [x] High Complexity  _________________________________________________________________________________    INPATIENT LIST OF MEDICATIONS     Current Facility-Administered Medications:     aspirin chewable tablet 81 mg, 81 mg, Oral, Daily, Jesse Christina MD    pantoprazole injection 40 mg, 40 mg, Intravenous, Once, Jesse Christina MD    rosuvastatin tablet 20 mg, 20 mg, Oral, QHS, Jesse Christina MD    Current Outpatient Medications:     aspirin (ECOTRIN) 81 MG EC tablet, Take 81 mg by mouth once daily., Disp: , Rfl:     aspirin-sod bicarb-citric acid (JUVENTINO) 324  mg TbEF, Take 325 mg by mouth every 6 (six) hours as needed., Disp: , Rfl:     HYDROcodone-acetaminophen (NORCO)  mg per tablet, Take 1 tablet by mouth every 12 (twelve) hours as needed., Disp: 50 tablet, Rfl: 0    levothyroxine (SYNTHROID) 100 MCG tablet, Take 1 tablet (100 mcg total) by mouth before breakfast., Disp: 90 tablet, Rfl: 1    lisinopril 10 MG tablet, Take 1 tablet (10 mg total) by mouth daily as needed (SBP>160)., Disp: 90 tablet, Rfl: 1    omeprazole (PRILOSEC) 40 MG capsule, Take 40 mg by mouth once daily., Disp: , Rfl:     RABEprazole (ACIPHEX) 20 mg tablet, Take 1 tablet (20 mg total) by mouth once daily., Disp: 90 tablet, Rfl: 1    LORazepam (ATIVAN) 0.5 MG tablet, Take 1 tablet (0.5 mg total) by mouth every 12 (twelve) hours as needed for Anxiety., Disp: 60 tablet, Rfl: 1      Scheduled Meds:   aspirin  81 mg Oral Daily    pantoprazole  40 mg Intravenous Once    rosuvastatin  20 mg Oral QHS     Continuous Infusions:  PRN Meds:.      Jesse Christina  [facility] Hospitalist  12/04/2020

## 2020-12-05 NOTE — PLAN OF CARE
12/05/20 0911   PRE-TX-O2   O2 Device (Oxygen Therapy) room air   SpO2 96 %   Pulse Oximetry Type Intermittent   $ Pulse Oximetry - Multiple Charge Pulse Oximetry - Multiple   Pulse 63   Resp 16   Respiratory Evaluation   $ Care Plan Tech Time 15 min

## 2020-12-07 ENCOUNTER — TELEPHONE (OUTPATIENT)
Dept: FAMILY MEDICINE | Facility: CLINIC | Age: 70
End: 2020-12-07

## 2020-12-07 LAB
ESTIMATED AVG GLUCOSE: 108 MG/DL (ref 68–131)
HBA1C MFR BLD HPLC: 5.4 % (ref 4.5–6.2)

## 2020-12-07 RX ORDER — SUCRALFATE 1 G/1
1 TABLET ORAL 4 TIMES DAILY
Qty: 120 TABLET | Refills: 1 | Status: SHIPPED | OUTPATIENT
Start: 2020-12-07 | End: 2021-01-06

## 2020-12-07 NOTE — TELEPHONE ENCOUNTER
----- Message from Khang Davila sent at 12/7/2020  4:16 PM CST -----  Regarding: Rx alternative  Contact: Joslyn Denson w/ [facility] d/c'd from [facility] and one of her meds was too expensive so an alternative for her would be sucralfate 1gm capsules for 4 times a day instead of the suspension . [facility] will cover the capsules.   Send to [facility] on [address] # [phone] ext. [phone]

## 2020-12-08 ENCOUNTER — OFFICE VISIT (OUTPATIENT)
Dept: FAMILY MEDICINE | Facility: CLINIC | Age: 70
End: 2020-12-08
Payer: MEDICARE

## 2020-12-08 VITALS
BODY MASS INDEX: 27.23 KG/M2 | HEART RATE: 76 BPM | SYSTOLIC BLOOD PRESSURE: 138 MMHG | HEIGHT: 62 IN | WEIGHT: 148 LBS | DIASTOLIC BLOOD PRESSURE: 80 MMHG

## 2020-12-08 DIAGNOSIS — Z72.0 TOBACCO ABUSE: Chronic | ICD-10-CM

## 2020-12-08 DIAGNOSIS — F41.9 ANXIETY: Chronic | ICD-10-CM

## 2020-12-08 DIAGNOSIS — I10 ESSENTIAL HYPERTENSION: Chronic | ICD-10-CM

## 2020-12-08 DIAGNOSIS — I71.40 ABDOMINAL AORTIC ANEURYSM (AAA) WITHOUT RUPTURE: Chronic | ICD-10-CM

## 2020-12-08 DIAGNOSIS — J43.1 PANLOBULAR EMPHYSEMA: Chronic | ICD-10-CM

## 2020-12-08 DIAGNOSIS — Z09 HOSPITAL DISCHARGE FOLLOW-UP: ICD-10-CM

## 2020-12-08 DIAGNOSIS — K21.9 GASTROESOPHAGEAL REFLUX DISEASE WITHOUT ESOPHAGITIS: Primary | ICD-10-CM

## 2020-12-08 PROCEDURE — 99496 TRANSITIONAL CARE MANAGE SERVICE 7 DAY DISCHARGE: ICD-10-PCS | Mod: S$GLB,,, | Performed by: FAMILY MEDICINE

## 2020-12-08 PROCEDURE — 3008F PR BODY MASS INDEX (BMI) DOCUMENTED: ICD-10-PCS | Mod: S$GLB,,, | Performed by: FAMILY MEDICINE

## 2020-12-08 PROCEDURE — 99496 TRANSJ CARE MGMT HIGH F2F 7D: CPT | Mod: S$GLB,,, | Performed by: FAMILY MEDICINE

## 2020-12-08 PROCEDURE — 3008F BODY MASS INDEX DOCD: CPT | Mod: S$GLB,,, | Performed by: FAMILY MEDICINE

## 2020-12-08 NOTE — PROGRESS NOTES
This is a hospital follow-up visit for 70-year-old female that was admitted [facility] with chest pain.  She was under extreme amount of stress taking care of her  who is quite immobile from a knee injury.  She felt stressed about all the extra work she was doing around the house.  Was drinking extra milk at night to settle her stomach and GERD down.  She smokes half pack cigarettes daily.  She had a full cardiac workup at the hospital and returned negative for cardiac disease.  It was felt GERD was playing a component and was sent home on increased omeprazole 40 mg b.i.d..  Hospital course is as follows:    SUBJECTIVE:    Patient ID: Joslyn Dubon is a 70 y.o. female.    Chief Complaint: Hospital Follow Up (no bottles, C-scope declined// SW)    This is a hospital follow-up visit for 70-year-old female admitted [facility] with chest pain.  Cardiac workup was done and came back negative for coronary artery disease.  Hospital course is as follows:    Hospital Course:   Assumed care of this patient on 12/05/20. 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 Smith with last endoscopy about 5 years ago, history of AAA repair. She states she has chronic severe GERD however yesterday developed left sided chest discomfort radiating through to her back in the scapula area and this was new and prompted ED presentation. She reports last year her  had ' gas pain' and he had cardiac arrest soon after hospital admission and needing stents and therefore she was concerned. Does admit to increase stress socially as well. In the ED blood pressure was noted to be elevated, she states she does take lisinopril, has a blood pressure monitor at home and is controlled at home. Feels it was elevated due to severe pain.  Labs with BNP 36, serial troponin negative, cardiac telemetry with sinus rhythm, had stress  echocardiogram 2019 with EF of 60% with EKG and echo portions no evidence of ischemia.  CT presentation without any dissection PE.  EKG no new ischemic changes.  She was initially admitted for ACS.  On 12/05 blood pressure better controlled states chest discomfort is resolved, still having severe reflux symptoms and feels if she belches she would feel much better states this is her baseline eager for discharge.  Did discuss with patient given severe symptoms GERD prior GI history with early changes H pylori current smoker would recommend that she follow-up with her gastroenterology for repeat endoscopy further evaluation.  Did also discussed with patient continue to her blood pressure home potential continued good control with outpatient follow-up.  Medically stable discharge.  Discharge plan medication follow-up as well as precautions were reviewed.  Objection for discharge.  All questions were addressed.      She takes lots of Tums when she has GERD.  Hiatal hernia was discovered on her CTA of chest.      Admission on 12/04/2020, Discharged on 12/05/2020   Component Date Value Ref Range Status    Sodium 12/04/2020 141  136 - 145 mmol/L Final    Potassium 12/04/2020 3.6  3.5 - 5.1 mmol/L Final    Chloride 12/04/2020 106  95 - 110 mmol/L Final    CO2 12/04/2020 27  23 - 29 mmol/L Final    Glucose 12/04/2020 146* 70 - 110 mg/dL Final    BUN 12/04/2020 16  8 - 23 mg/dL Final    Creatinine 12/04/2020 0.8  0.5 - 1.4 mg/dL Final    Calcium 12/04/2020 9.7  8.7 - 10.5 mg/dL Final    Total Protein 12/04/2020 7.1  6.0 - 8.4 g/dL Final    Albumin 12/04/2020 4.0  3.5 - 5.2 g/dL Final    Total Bilirubin 12/04/2020 0.5  0.1 - 1.0 mg/dL Final    Alkaline Phosphatase 12/04/2020 82  55 - 135 U/L Final    AST 12/04/2020 22  10 - 40 U/L Final    ALT 12/04/2020 21  10 - 44 U/L Final    Anion Gap 12/04/2020 8  8 - 16 mmol/L Final    eGFR if African American 12/04/2020 >60.0  >60 mL/min/1.73 m^2 Final    eGFR if non   12/04/2020 >60.0  >60 mL/min/1.73 m^2 Final    Magnesium 12/04/2020 2.0  1.6 - 2.6 mg/dL Final    Phosphorus 12/04/2020 3.1  2.7 - 4.5 mg/dL Final    WBC 12/04/2020 5.46  3.90 - 12.70 K/uL Final    RBC 12/04/2020 4.57  4.00 - 5.40 M/uL Final    Hemoglobin 12/04/2020 14.4  12.0 - 16.0 g/dL Final    Hematocrit 12/04/2020 44.2  37.0 - 48.5 % Final    MCV 12/04/2020 97  82 - 98 fL Final    MCH 12/04/2020 31.5* 27.0 - 31.0 pg Final    MCHC 12/04/2020 32.6  32.0 - 36.0 g/dL Final    RDW 12/04/2020 13.4  11.5 - 14.5 % Final    Platelets 12/04/2020 197  150 - 350 K/uL Final    MPV 12/04/2020 9.2  9.2 - 12.9 fL Final    Immature Granulocytes 12/04/2020 0.4  0.0 - 0.5 % Final    Gran # (ANC) 12/04/2020 2.8  1.8 - 7.7 K/uL Final    Immature Grans (Abs) 12/04/2020 0.02  0.00 - 0.04 K/uL Final    Lymph # 12/04/2020 2.0  1.0 - 4.8 K/uL Final    Mono # 12/04/2020 0.4  0.3 - 1.0 K/uL Final    Eos # 12/04/2020 0.1  0.0 - 0.5 K/uL Final    Baso # 12/04/2020 0.03  0.00 - 0.20 K/uL Final    nRBC 12/04/2020 0  0 /100 WBC Final    Gran % 12/04/2020 51.8  38.0 - 73.0 % Final    Lymph % 12/04/2020 36.8  18.0 - 48.0 % Final    Mono % 12/04/2020 8.1  4.0 - 15.0 % Final    Eosinophil % 12/04/2020 2.4  0.0 - 8.0 % Final    Basophil % 12/04/2020 0.5  0.0 - 1.9 % Final    Differential Method 12/04/2020 Automated   Final    PT 12/04/2020 13.5  10.6 - 14.8 sec Final    INR 12/04/2020 1.1   Final    Troponin I 12/04/2020 <0.030  <=0.040 ng/mL Final    BNP 12/04/2020 36  0 - 99 pg/mL Final    SARS-CoV-2 RNA, Amplification, Qual 12/04/2020 Negative  Negative Final    POC Creatinine 12/04/2020 0.8  0.5 - 1.4 mg/dL Final    Sample 12/04/2020 VENOUS   Final    Troponin I 12/04/2020 <0.030  <=0.040 ng/mL Final    Troponin I 12/04/2020 <0.030  <=0.040 ng/mL Final    Sodium 12/05/2020 139  136 - 145 mmol/L Final    Potassium 12/05/2020 4.1  3.5 - 5.1 mmol/L Final    Chloride 12/05/2020 106  95 - 110  mmol/L Final    CO2 12/05/2020 28  23 - 29 mmol/L Final    Glucose 12/05/2020 90  70 - 110 mg/dL Final    BUN 12/05/2020 13  8 - 23 mg/dL Final    Creatinine 12/05/2020 0.9  0.5 - 1.4 mg/dL Final    Calcium 12/05/2020 9.1  8.7 - 10.5 mg/dL Final    Total Protein 12/05/2020 6.9  6.0 - 8.4 g/dL Final    Albumin 12/05/2020 3.7  3.5 - 5.2 g/dL Final    Total Bilirubin 12/05/2020 0.7  0.1 - 1.0 mg/dL Final    Alkaline Phosphatase 12/05/2020 72  55 - 135 U/L Final    AST 12/05/2020 19  10 - 40 U/L Final    ALT 12/05/2020 18  10 - 44 U/L Final    Anion Gap 12/05/2020 5* 8 - 16 mmol/L Final    eGFR if African American 12/05/2020 >60.0  >60 mL/min/1.73 m^2 Final    eGFR if non African American 12/05/2020 >60.0  >60 mL/min/1.73 m^2 Final    Magnesium 12/05/2020 2.0  1.6 - 2.6 mg/dL Final    Cholesterol 12/05/2020 180  120 - 199 mg/dL Final    Triglycerides 12/05/2020 105  30 - 150 mg/dL Final    HDL 12/05/2020 46  40 - 75 mg/dL Final    LDL Cholesterol 12/05/2020 113.0  63.0 - 159.0 mg/dL Final    HDL/Cholesterol Ratio 12/05/2020 25.6  20.0 - 50.0 % Final    Total Cholesterol/HDL Ratio 12/05/2020 3.9  2.0 - 5.0 Final    Non-HDL Cholesterol 12/05/2020 134  mg/dL Final    Hemoglobin A1C 12/05/2020 5.4  4.5 - 6.2 % Final    Estimated Avg Glucose 12/05/2020 108  68 - 131 mg/dL Final    WBC 12/05/2020 4.93  3.90 - 12.70 K/uL Final    RBC 12/05/2020 4.31  4.00 - 5.40 M/uL Final    Hemoglobin 12/05/2020 13.9  12.0 - 16.0 g/dL Final    Hematocrit 12/05/2020 41.7  37.0 - 48.5 % Final    MCV 12/05/2020 97  82 - 98 fL Final    MCH 12/05/2020 32.3* 27.0 - 31.0 pg Final    MCHC 12/05/2020 33.3  32.0 - 36.0 g/dL Final    RDW 12/05/2020 13.5  11.5 - 14.5 % Final    Platelets 12/05/2020 200  150 - 350 K/uL Final    MPV 12/05/2020 9.1* 9.2 - 12.9 fL Final    Immature Granulocytes 12/05/2020 0.2  0.0 - 0.5 % Final    Gran # (ANC) 12/05/2020 2.9  1.8 - 7.7 K/uL Final    Immature Grans (Abs) 12/05/2020 0.01   0.00 - 0.04 K/uL Final    Lymph # 12/05/2020 1.5  1.0 - 4.8 K/uL Final    Mono # 12/05/2020 0.4  0.3 - 1.0 K/uL Final    Eos # 12/05/2020 0.1  0.0 - 0.5 K/uL Final    Baso # 12/05/2020 0.02  0.00 - 0.20 K/uL Final    nRBC 12/05/2020 0  0 /100 WBC Final    Gran % 12/05/2020 58.9  38.0 - 73.0 % Final    Lymph % 12/05/2020 29.8  18.0 - 48.0 % Final    Mono % 12/05/2020 7.9  4.0 - 15.0 % Final    Eosinophil % 12/05/2020 2.8  0.0 - 8.0 % Final    Basophil % 12/05/2020 0.4  0.0 - 1.9 % Final    Differential Method 12/05/2020 Automated   Final    Troponin I 12/05/2020 <0.030  <=0.040 ng/mL Final   Admission on 08/26/2020, Discharged on 08/26/2020   Component Date Value Ref Range Status    aPTT 08/26/2020 27.5  23.6 - 33.3 sec Final    BNP 08/26/2020 43  0 - 99 pg/mL Final    WBC 08/26/2020 6.62  3.90 - 12.70 K/uL Final    RBC 08/26/2020 4.80  4.00 - 5.40 M/uL Final    Hemoglobin 08/26/2020 15.3  12.0 - 16.0 g/dL Final    Hematocrit 08/26/2020 45.9  37.0 - 48.5 % Final    MCV 08/26/2020 96  82 - 98 fL Final    MCH 08/26/2020 31.9* 27.0 - 31.0 pg Final    MCHC 08/26/2020 33.3  32.0 - 36.0 g/dL Final    RDW 08/26/2020 13.3  11.5 - 14.5 % Final    Platelets 08/26/2020 193  150 - 350 K/uL Final    MPV 08/26/2020 9.4  9.2 - 12.9 fL Final    Immature Granulocytes 08/26/2020 0.3  0.0 - 0.5 % Final    Gran # (ANC) 08/26/2020 3.4  1.8 - 7.7 K/uL Final    Immature Grans (Abs) 08/26/2020 0.02  0.00 - 0.04 K/uL Final    Lymph # 08/26/2020 2.5  1.0 - 4.8 K/uL Final    Mono # 08/26/2020 0.6  0.3 - 1.0 K/uL Final    Eos # 08/26/2020 0.1  0.0 - 0.5 K/uL Final    Baso # 08/26/2020 0.04  0.00 - 0.20 K/uL Final    nRBC 08/26/2020 0  0 /100 WBC Final    Gran % 08/26/2020 51.3  38.0 - 73.0 % Final    Lymph % 08/26/2020 37.3  18.0 - 48.0 % Final    Mono % 08/26/2020 8.8  4.0 - 15.0 % Final    Eosinophil % 08/26/2020 1.7  0.0 - 8.0 % Final    Basophil % 08/26/2020 0.6  0.0 - 1.9 % Final    Differential Method  08/26/2020 Automated   Final    Sodium 08/26/2020 140  136 - 145 mmol/L Final    Potassium 08/26/2020 3.9  3.5 - 5.1 mmol/L Final    Chloride 08/26/2020 103  95 - 110 mmol/L Final    CO2 08/26/2020 28  23 - 29 mmol/L Final    Glucose 08/26/2020 98  70 - 110 mg/dL Final    BUN 08/26/2020 14  8 - 23 mg/dL Final    Creatinine 08/26/2020 1.0  0.5 - 1.4 mg/dL Final    Calcium 08/26/2020 9.5  8.7 - 10.5 mg/dL Final    Total Protein 08/26/2020 7.1  6.0 - 8.4 g/dL Final    Albumin 08/26/2020 3.7  3.5 - 5.2 g/dL Final    Total Bilirubin 08/26/2020 0.6  0.1 - 1.0 mg/dL Final    Alkaline Phosphatase 08/26/2020 92  55 - 135 U/L Final    AST 08/26/2020 20  10 - 40 U/L Final    ALT 08/26/2020 18  10 - 44 U/L Final    Anion Gap 08/26/2020 9  8 - 16 mmol/L Final    eGFR if African American 08/26/2020 >60.0  >60 mL/min/1.73 m^2 Final    eGFR if non African American 08/26/2020 56.4* >60 mL/min/1.73 m^2 Final    Benzodiazepines 08/26/2020 Negative   Final    Cocaine (Metab.) 08/26/2020 Negative   Final    Opiate Scrn, Ur 08/26/2020 Presumptive Positive   Final    Barbiturate Screen, Ur 08/26/2020 Negative   Final    Amphetamine Screen, Ur 08/26/2020 Negative   Final    THC 08/26/2020 Negative   Final    Phencyclidine 08/26/2020 Negative   Final    Creatinine, Urine 08/26/2020 27.0  15.0 - 325.0 mg/dL Final    Toxicology Information 08/26/2020 SEE COMMENT   Final    Lipase 08/26/2020 51  4 - 60 U/L Final    Magnesium 08/26/2020 1.9  1.6 - 2.6 mg/dL Final    PT 08/26/2020 13.1  10.6 - 14.8 sec Final    INR 08/26/2020 1.0   Final    Troponin I 08/26/2020 <0.030  <=0.040 ng/mL Final    TSH 08/26/2020 3.060  0.340 - 5.600 uIU/mL Final    Specimen UA 08/26/2020 Urine, Clean Catch   Final    Color, UA 08/26/2020 Colorless* Yellow, Straw, Priya Final    Appearance, UA 08/26/2020 Clear  Clear Final    pH, UA 08/26/2020 6.0  5.0 - 8.0 Final    Specific Morris, UA 08/26/2020 1.005  1.005 - 1.030 Final     Protein, UA 08/26/2020 Negative  Negative Final    Glucose, UA 08/26/2020 Negative  Negative Final    Ketones, UA 08/26/2020 Negative  Negative Final    Bilirubin (UA) 08/26/2020 Negative  Negative Final    Occult Blood UA 08/26/2020 2+* Negative Final    Nitrite, UA 08/26/2020 Negative  Negative Final    Urobilinogen, UA 08/26/2020 Negative  Negative EU/dL Final    Leukocytes, UA 08/26/2020 Negative  Negative Final    CPK 08/26/2020 62  20 - 180 U/L Final    SARS-CoV-2 RNA, Amplification, Qual 08/26/2020 Negative  Negative Final    RBC, UA 08/26/2020 5* 0 - 4 /hpf Final    WBC, UA 08/26/2020 3  0 - 5 /hpf Final    Bacteria 08/26/2020 Negative  None-Occ /hpf Final    Squam Epithel, UA 08/26/2020 4  /hpf Final    Hyaline Casts, UA 08/26/2020 5* 0-1/lpf /lpf Final    Microscopic Comment 08/26/2020 SEE COMMENT   Final   Telephone on 08/19/2020   Component Date Value Ref Range Status    Glucose 08/25/2020 86  65 - 99 mg/dL Final    BUN 08/25/2020 12  7 - 25 mg/dL Final    Creatinine 08/25/2020 0.85  0.60 - 0.93 mg/dL Final    eGFR if non African American 08/25/2020 68  > OR = 60 mL/min/1.73m2 Final    eGFR if African American 08/25/2020 79  > OR = 60 mL/min/1.73m2 Final    BUN/Creatinine Ratio 08/25/2020 NOT APPLICABLE  6 - 22 (calc) Final    Sodium 08/25/2020 143  135 - 146 mmol/L Final    Potassium 08/25/2020 4.7  3.5 - 5.3 mmol/L Final    Chloride 08/25/2020 105  98 - 110 mmol/L Final    CO2 08/25/2020 31  20 - 32 mmol/L Final    Calcium 08/25/2020 10.0  8.6 - 10.4 mg/dL Final    Total Protein 08/25/2020 7.8  6.1 - 8.1 g/dL Final    Albumin 08/25/2020 4.3  3.6 - 5.1 g/dL Final    Globulin, Total 08/25/2020 3.5  1.9 - 3.7 g/dL (calc) Final    Albumin/Globulin Ratio 08/25/2020 1.2  1.0 - 2.5 (calc) Final    Total Bilirubin 08/25/2020 0.5  0.2 - 1.2 mg/dL Final    Alkaline Phosphatase 08/25/2020 94  37 - 153 U/L Final    AST 08/25/2020 17  10 - 35 U/L Final    ALT 08/25/2020 15  6 - 29  U/L Final    Cholesterol 08/25/2020 208* <200 mg/dL Final    HDL 08/25/2020 59  > OR = 50 mg/dL Final    Triglycerides 08/25/2020 96  <150 mg/dL Final    LDL Cholesterol 08/25/2020 129* mg/dL (calc) Final    HDL/Cholesterol Ratio 08/25/2020 3.5  <5.0 (calc) Final    Non HDL Chol. (LDL+VLDL) 08/25/2020 149* <130 mg/dL (calc) Final    Creatinine, Urine 08/25/2020 89  20 - 275 mg/dL Final    Microalb, Ur 08/25/2020 1.0  See Note: mg/dL Final    Microalb/Creat Ratio 08/25/2020 11  <30 mcg/mg creat Final       Past Medical History:   Diagnosis Date    Anxiety     Martin esophagus     Colitis     COPD (chronic obstructive pulmonary disease)     GERD (gastroesophageal reflux disease)     Hypertension     Thyroid disease     Vocal cord polyps      Social History     Socioeconomic History    Marital status:      Spouse name: Not on file    Number of children: Not on file    Years of education: Not on file    Highest education level: Not on file   Occupational History    Not on file   Social Needs    Financial resource strain: Not on file    Food insecurity     Worry: Not on file     Inability: Not on file    Transportation needs     Medical: Not on file     Non-medical: Not on file   Tobacco Use    Smoking status: Current Every Day Smoker     Packs/day: 0.50     Types: Cigarettes    Smokeless tobacco: Former User     Quit date: 5/1/2016   Substance and Sexual Activity    Alcohol use: No    Drug use: No    Sexual activity: Yes     Partners: Male   Lifestyle    Physical activity     Days per week: Not on file     Minutes per session: Not on file    Stress: Not at all   Relationships    Social connections     Talks on phone: Not on file     Gets together: Not on file     Attends Confucianism service: Not on file     Active member of club or organization: Not on file     Attends meetings of clubs or organizations: Not on file     Relationship status: Not on file   Other Topics Concern     Not on file   Social History Narrative    Not on file     Past Surgical History:   Procedure Laterality Date    ABDOMINAL AORTIC ANEURYSM REPAIR      BACK SURGERY      cervical    [facility]  12[facility]    FOOT SURGERY      HYSTERECTOMY      SALIVARY GLAND SURGERY       History reviewed. No pertinent family history.    Review of patient's allergies indicates:   Allergen Reactions    Bisacodyl Nausea And Vomiting     Other reaction(s): Vomiting    Demerol [meperidine]      Nausea vomiting, visual problem    Flonase [fluticasone propionate] Hives       Current Outpatient Medications:     aspirin (ECOTRIN) 81 MG EC tablet, Take 81 mg by mouth once daily., Disp: , Rfl:     aspirin-sod bicarb-citric acid (GAYLA-SELTZER) 324 mg TbEF, Take 325 mg by mouth every 6 (six) hours as needed., Disp: , Rfl:     HYDROcodone-acetaminophen (NORCO)  mg per tablet, Take 1 tablet by mouth every 12 (twelve) hours as needed., Disp: 50 tablet, Rfl: 0    levothyroxine (SYNTHROID) 100 MCG tablet, Take 1 tablet (100 mcg total) by mouth before breakfast., Disp: 90 tablet, Rfl: 1    lisinopriL 10 MG tablet, Take 2 tablets (20 mg total) by mouth once daily., Disp: , Rfl:     LORazepam (ATIVAN) 0.5 MG tablet, Take 1 tablet (0.5 mg total) by mouth every 12 (twelve) hours as needed for Anxiety., Disp: 60 tablet, Rfl: 1    omeprazole (PRILOSEC) 40 MG capsule, Take 1 capsule (40 mg total) by mouth 2 (two) times daily before meals., Disp: 60 capsule, Rfl: 0    ondansetron (ZOFRAN-ODT) 4 MG TbDL, Take 1 tablet (4 mg total) by mouth every 8 (eight) hours as needed., Disp: 21 tablet, Rfl: 0    sucralfate (CARAFATE) 1 gram tablet, Take 1 tablet (1 g total) by mouth 4 (four) times daily., Disp: 120 tablet, Rfl: 1    Review of Systems   Constitutional: Negative for appetite change, chills, fatigue, fever and unexpected weight change.   HENT: Negative for congestion, ear pain, sinus pain, sore throat and trouble  "swallowing.    Eyes: Negative for pain, discharge and visual disturbance.   Respiratory: Positive for chest tightness. Negative for apnea, cough, shortness of breath and wheezing.         Smokes half pack cigarettes daily   Cardiovascular: Positive for chest pain. Negative for palpitations and leg swelling.   Gastrointestinal: Negative for abdominal pain, blood in stool, constipation, diarrhea, nausea and vomiting.        Has significant GERD and hiatal hernia   Endocrine: Negative for heat intolerance, polydipsia and polyuria.   Genitourinary: Negative for difficulty urinating, dyspareunia, dysuria, frequency, hematuria and menstrual problem.   Musculoskeletal: Negative for arthralgias, back pain, gait problem, joint swelling and myalgias.   Allergic/Immunologic: Negative for environmental allergies, food allergies and immunocompromised state.   Neurological: Negative for dizziness, tremors, seizures, numbness and headaches.   Psychiatric/Behavioral: Negative for behavioral problems, confusion, hallucinations and suicidal ideas. The patient is not nervous/anxious.           Objective:      Vitals:    12/08/20 1202   BP: 138/80   Pulse: 76   Weight: 67.1 kg (148 lb)   Height: 5' 2" (1.575 m)     Physical Exam  Vitals signs and nursing note reviewed.   Constitutional:       Appearance: She is well-developed.   HENT:      Head: Normocephalic and atraumatic.      Right Ear: External ear normal.      Left Ear: External ear normal.      Nose: Nose normal.   Eyes:      Pupils: Pupils are equal, round, and reactive to light.   Neck:      Musculoskeletal: Normal range of motion and neck supple.      Thyroid: No thyromegaly.      Vascular: No carotid bruit.   Cardiovascular:      Rate and Rhythm: Normal rate and regular rhythm.      Heart sounds: Normal heart sounds. No murmur.   Pulmonary:      Effort: Pulmonary effort is normal.      Breath sounds: Normal breath sounds. No wheezing or rales.   Abdominal:      General: " Bowel sounds are normal. There is no distension.      Palpations: Abdomen is soft.      Tenderness: There is no abdominal tenderness.   Musculoskeletal: Normal range of motion.         General: No tenderness or deformity.      Lumbar back: Normal. She exhibits no pain and no spasm.      Comments: Bends 90 degrees at  waist   Lymphadenopathy:      Cervical: No cervical adenopathy.   Skin:     General: Skin is warm and dry.      Findings: No rash.   Neurological:      Mental Status: She is alert and oriented to person, place, and time.      Cranial Nerves: No cranial nerve deficit.      Coordination: Coordination normal.   Psychiatric:         Behavior: Behavior normal.         Thought Content: Thought content normal.         Judgment: Judgment normal.           Assessment:       1. Gastroesophageal reflux disease without esophagitis    2. Hospital discharge follow-up    3. Anxiety    4. Panlobular emphysema    5. Essential hypertension    6. Abdominal aortic aneurysm (AAA) without rupture    7. Tobacco abuse         Plan:       Gastroesophageal reflux disease without esophagitis  Used omeprazole 40 mg b.i.d.  Hospital discharge follow-up  All hospital medications reconciled with home medications  Anxiety  Patient under extra stress due to additional demands of work at home  Panlobular emphysema  Advised to reduce smoking  Essential hypertension  Blood pressure well controlled at this time  Abdominal aortic aneurysm (AAA) without rupture  Dr. Bill Cornelius is following this  Tobacco abuse  Advised to reduce and see smoking    No follow-ups on file.        12/8/2020 Jasbir Graham

## 2021-01-08 ENCOUNTER — TELEPHONE (OUTPATIENT)
Dept: FAMILY MEDICINE | Facility: CLINIC | Age: 71
End: 2021-01-08

## 2021-01-19 ENCOUNTER — OFFICE VISIT (OUTPATIENT)
Dept: FAMILY MEDICINE | Facility: CLINIC | Age: 71
End: 2021-01-19
Payer: MEDICARE

## 2021-01-19 VITALS
HEART RATE: 72 BPM | WEIGHT: 147 LBS | SYSTOLIC BLOOD PRESSURE: 138 MMHG | OXYGEN SATURATION: 98 % | HEIGHT: 62 IN | DIASTOLIC BLOOD PRESSURE: 88 MMHG | BODY MASS INDEX: 27.05 KG/M2

## 2021-01-19 DIAGNOSIS — Z01.818 PREOPERATIVE CLEARANCE: Primary | ICD-10-CM

## 2021-01-19 DIAGNOSIS — I10 ESSENTIAL HYPERTENSION: ICD-10-CM

## 2021-01-19 PROCEDURE — 1159F MED LIST DOCD IN RCRD: CPT | Mod: S$GLB,,, | Performed by: NURSE PRACTITIONER

## 2021-01-19 PROCEDURE — 3008F PR BODY MASS INDEX (BMI) DOCUMENTED: ICD-10-PCS | Mod: S$GLB,,, | Performed by: NURSE PRACTITIONER

## 2021-01-19 PROCEDURE — 99213 OFFICE O/P EST LOW 20 MIN: CPT | Mod: S$GLB,,, | Performed by: NURSE PRACTITIONER

## 2021-01-19 PROCEDURE — 3079F DIAST BP 80-89 MM HG: CPT | Mod: S$GLB,,, | Performed by: NURSE PRACTITIONER

## 2021-01-19 PROCEDURE — 3075F SYST BP GE 130 - 139MM HG: CPT | Mod: S$GLB,,, | Performed by: NURSE PRACTITIONER

## 2021-01-19 PROCEDURE — 3079F PR MOST RECENT DIASTOLIC BLOOD PRESSURE 80-89 MM HG: ICD-10-PCS | Mod: S$GLB,,, | Performed by: NURSE PRACTITIONER

## 2021-01-19 PROCEDURE — 99213 PR OFFICE/OUTPT VISIT, EST, LEVL III, 20-29 MIN: ICD-10-PCS | Mod: S$GLB,,, | Performed by: NURSE PRACTITIONER

## 2021-01-19 PROCEDURE — 3008F BODY MASS INDEX DOCD: CPT | Mod: S$GLB,,, | Performed by: NURSE PRACTITIONER

## 2021-01-19 PROCEDURE — 1159F PR MEDICATION LIST DOCUMENTED IN MEDICAL RECORD: ICD-10-PCS | Mod: S$GLB,,, | Performed by: NURSE PRACTITIONER

## 2021-01-19 PROCEDURE — 3075F PR MOST RECENT SYSTOLIC BLOOD PRESS GE 130-139MM HG: ICD-10-PCS | Mod: S$GLB,,, | Performed by: NURSE PRACTITIONER

## 2021-01-22 ENCOUNTER — IMMUNIZATION (OUTPATIENT)
Dept: FAMILY MEDICINE | Facility: CLINIC | Age: 71
End: 2021-01-22
Payer: MEDICARE

## 2021-01-22 DIAGNOSIS — Z23 NEED FOR VACCINATION: Primary | ICD-10-CM

## 2021-01-22 PROCEDURE — 0001A COVID-19, MRNA, LNP-S, PF, 30 MCG/0.3 ML DOSE VACCINE: CPT | Mod: CV19,,, | Performed by: FAMILY MEDICINE

## 2021-01-22 PROCEDURE — 91300 COVID-19, MRNA, LNP-S, PF, 30 MCG/0.3 ML DOSE VACCINE: ICD-10-PCS | Mod: ,,, | Performed by: FAMILY MEDICINE

## 2021-01-22 PROCEDURE — 0001A COVID-19, MRNA, LNP-S, PF, 30 MCG/0.3 ML DOSE VACCINE: ICD-10-PCS | Mod: CV19,,, | Performed by: FAMILY MEDICINE

## 2021-01-22 PROCEDURE — 91300 COVID-19, MRNA, LNP-S, PF, 30 MCG/0.3 ML DOSE VACCINE: CPT | Mod: ,,, | Performed by: FAMILY MEDICINE

## 2021-01-28 ENCOUNTER — TELEPHONE (OUTPATIENT)
Dept: FAMILY MEDICINE | Facility: CLINIC | Age: 71
End: 2021-01-28

## 2021-02-12 ENCOUNTER — IMMUNIZATION (OUTPATIENT)
Dept: FAMILY MEDICINE | Facility: CLINIC | Age: 71
End: 2021-02-12
Payer: MEDICARE

## 2021-02-12 DIAGNOSIS — Z23 NEED FOR VACCINATION: Primary | ICD-10-CM

## 2021-02-12 PROCEDURE — 0002A COVID-19, MRNA, LNP-S, PF, 30 MCG/0.3 ML DOSE VACCINE: ICD-10-PCS | Mod: CV19,,, | Performed by: FAMILY MEDICINE

## 2021-02-12 PROCEDURE — 91300 COVID-19, MRNA, LNP-S, PF, 30 MCG/0.3 ML DOSE VACCINE: CPT | Mod: ,,, | Performed by: FAMILY MEDICINE

## 2021-02-12 PROCEDURE — 0002A COVID-19, MRNA, LNP-S, PF, 30 MCG/0.3 ML DOSE VACCINE: CPT | Mod: CV19,,, | Performed by: FAMILY MEDICINE

## 2021-02-12 PROCEDURE — 91300 COVID-19, MRNA, LNP-S, PF, 30 MCG/0.3 ML DOSE VACCINE: ICD-10-PCS | Mod: ,,, | Performed by: FAMILY MEDICINE

## 2021-02-23 ENCOUNTER — TELEPHONE (OUTPATIENT)
Dept: FAMILY MEDICINE | Facility: CLINIC | Age: 71
End: 2021-02-23

## 2021-03-04 ENCOUNTER — TELEPHONE (OUTPATIENT)
Dept: FAMILY MEDICINE | Facility: CLINIC | Age: 71
End: 2021-03-04

## 2021-03-04 ENCOUNTER — OFFICE VISIT (OUTPATIENT)
Dept: FAMILY MEDICINE | Facility: CLINIC | Age: 71
End: 2021-03-04
Payer: MEDICARE

## 2021-03-04 VITALS
BODY MASS INDEX: 28.13 KG/M2 | HEART RATE: 72 BPM | DIASTOLIC BLOOD PRESSURE: 84 MMHG | HEIGHT: 61 IN | SYSTOLIC BLOOD PRESSURE: 138 MMHG | WEIGHT: 149 LBS

## 2021-03-04 DIAGNOSIS — J43.1 PANLOBULAR EMPHYSEMA: ICD-10-CM

## 2021-03-04 DIAGNOSIS — F41.9 ANXIETY: ICD-10-CM

## 2021-03-04 DIAGNOSIS — I10 ESSENTIAL HYPERTENSION: ICD-10-CM

## 2021-03-04 DIAGNOSIS — M51.36 DDD (DEGENERATIVE DISC DISEASE), LUMBAR: ICD-10-CM

## 2021-03-04 DIAGNOSIS — L84 FOOT CALLUS: ICD-10-CM

## 2021-03-04 DIAGNOSIS — I71.40 ABDOMINAL AORTIC ANEURYSM (AAA) WITHOUT RUPTURE: ICD-10-CM

## 2021-03-04 DIAGNOSIS — E03.9 ACQUIRED HYPOTHYROIDISM: ICD-10-CM

## 2021-03-04 DIAGNOSIS — E07.9 THYROID DISEASE: Chronic | ICD-10-CM

## 2021-03-04 DIAGNOSIS — K21.9 GASTROESOPHAGEAL REFLUX DISEASE WITHOUT ESOPHAGITIS: ICD-10-CM

## 2021-03-04 DIAGNOSIS — Z72.0 TOBACCO ABUSE: Chronic | ICD-10-CM

## 2021-03-04 DIAGNOSIS — Z12.11 SPECIAL SCREENING FOR MALIGNANT NEOPLASMS, COLON: ICD-10-CM

## 2021-03-04 DIAGNOSIS — M51.16 LUMBAR DISC DISEASE WITH RADICULOPATHY: Primary | ICD-10-CM

## 2021-03-04 DIAGNOSIS — Z78.0 MENOPAUSE: ICD-10-CM

## 2021-03-04 DIAGNOSIS — Z12.31 OTHER SCREENING MAMMOGRAM: ICD-10-CM

## 2021-03-04 PROCEDURE — 3008F PR BODY MASS INDEX (BMI) DOCUMENTED: ICD-10-PCS | Mod: S$GLB,,, | Performed by: FAMILY MEDICINE

## 2021-03-04 PROCEDURE — 1159F MED LIST DOCD IN RCRD: CPT | Mod: S$GLB,,, | Performed by: FAMILY MEDICINE

## 2021-03-04 PROCEDURE — 99214 PR OFFICE/OUTPT VISIT, EST, LEVL IV, 30-39 MIN: ICD-10-PCS | Mod: S$GLB,,, | Performed by: FAMILY MEDICINE

## 2021-03-04 PROCEDURE — 3075F SYST BP GE 130 - 139MM HG: CPT | Mod: S$GLB,,, | Performed by: FAMILY MEDICINE

## 2021-03-04 PROCEDURE — 1159F PR MEDICATION LIST DOCUMENTED IN MEDICAL RECORD: ICD-10-PCS | Mod: S$GLB,,, | Performed by: FAMILY MEDICINE

## 2021-03-04 PROCEDURE — 3079F DIAST BP 80-89 MM HG: CPT | Mod: S$GLB,,, | Performed by: FAMILY MEDICINE

## 2021-03-04 PROCEDURE — 99214 OFFICE O/P EST MOD 30 MIN: CPT | Mod: S$GLB,,, | Performed by: FAMILY MEDICINE

## 2021-03-04 PROCEDURE — 3079F PR MOST RECENT DIASTOLIC BLOOD PRESSURE 80-89 MM HG: ICD-10-PCS | Mod: S$GLB,,, | Performed by: FAMILY MEDICINE

## 2021-03-04 PROCEDURE — 1125F AMNT PAIN NOTED PAIN PRSNT: CPT | Mod: S$GLB,,, | Performed by: FAMILY MEDICINE

## 2021-03-04 PROCEDURE — 3075F PR MOST RECENT SYSTOLIC BLOOD PRESS GE 130-139MM HG: ICD-10-PCS | Mod: S$GLB,,, | Performed by: FAMILY MEDICINE

## 2021-03-04 PROCEDURE — 3008F BODY MASS INDEX DOCD: CPT | Mod: S$GLB,,, | Performed by: FAMILY MEDICINE

## 2021-03-04 PROCEDURE — 1125F PR PAIN SEVERITY QUANTIFIED, PAIN PRESENT: ICD-10-PCS | Mod: S$GLB,,, | Performed by: FAMILY MEDICINE

## 2021-03-04 RX ORDER — LISINOPRIL 10 MG/1
10 TABLET ORAL DAILY
Qty: 90 TABLET | Refills: 1 | Status: SHIPPED | OUTPATIENT
Start: 2021-03-04 | End: 2021-05-13 | Stop reason: SDUPTHER

## 2021-03-04 RX ORDER — SUCRALFATE 1 G/1
TABLET ORAL
Status: ON HOLD | COMMUNITY
Start: 2021-01-06 | End: 2021-05-12 | Stop reason: HOSPADM

## 2021-03-04 RX ORDER — LEVOTHYROXINE SODIUM 100 UG/1
100 TABLET ORAL
Qty: 90 TABLET | Refills: 1 | Status: SHIPPED | OUTPATIENT
Start: 2021-03-04 | End: 2021-05-13 | Stop reason: SDUPTHER

## 2021-03-04 RX ORDER — HYDROCODONE BITARTRATE AND ACETAMINOPHEN 10; 325 MG/1; MG/1
1 TABLET ORAL EVERY 12 HOURS PRN
Qty: 50 TABLET | Refills: 0 | Status: SHIPPED | OUTPATIENT
Start: 2021-03-04 | End: 2021-05-13 | Stop reason: SDUPTHER

## 2021-03-04 RX ORDER — LORAZEPAM 0.5 MG/1
0.5 TABLET ORAL EVERY 12 HOURS PRN
Qty: 60 TABLET | Refills: 1 | Status: SHIPPED | OUTPATIENT
Start: 2021-03-04 | End: 2021-06-01

## 2021-03-17 ENCOUNTER — TELEPHONE (OUTPATIENT)
Dept: FAMILY MEDICINE | Facility: CLINIC | Age: 71
End: 2021-03-17

## 2021-05-10 ENCOUNTER — HOSPITAL ENCOUNTER (EMERGENCY)
Facility: HOSPITAL | Age: 71
Discharge: LEFT BEFORE DISCHARGE | End: 2021-05-10
Attending: EMERGENCY MEDICINE
Payer: MEDICARE

## 2021-05-10 ENCOUNTER — TELEPHONE (OUTPATIENT)
Dept: FAMILY MEDICINE | Facility: CLINIC | Age: 71
End: 2021-05-10

## 2021-05-10 VITALS
OXYGEN SATURATION: 96 % | TEMPERATURE: 98 F | HEART RATE: 67 BPM | RESPIRATION RATE: 16 BRPM | HEIGHT: 62 IN | DIASTOLIC BLOOD PRESSURE: 91 MMHG | WEIGHT: 147 LBS | SYSTOLIC BLOOD PRESSURE: 182 MMHG | BODY MASS INDEX: 27.05 KG/M2

## 2021-05-10 DIAGNOSIS — Z53.29 LEFT AGAINST MEDICAL ADVICE: Primary | ICD-10-CM

## 2021-05-10 DIAGNOSIS — R07.9 CHEST PAIN: ICD-10-CM

## 2021-05-10 LAB
ALBUMIN SERPL BCP-MCNC: 3.9 G/DL (ref 3.5–5.2)
ALP SERPL-CCNC: 87 U/L (ref 55–135)
ALT SERPL W/O P-5'-P-CCNC: 16 U/L (ref 10–44)
ANION GAP SERPL CALC-SCNC: 11 MMOL/L (ref 8–16)
AST SERPL-CCNC: 20 U/L (ref 10–40)
BASOPHILS # BLD AUTO: 0.02 K/UL (ref 0–0.2)
BASOPHILS NFR BLD: 0.4 % (ref 0–1.9)
BILIRUB SERPL-MCNC: 0.7 MG/DL (ref 0.1–1)
BNP SERPL-MCNC: 33 PG/ML (ref 0–99)
BUN SERPL-MCNC: 16 MG/DL (ref 8–23)
CALCIUM SERPL-MCNC: 9.3 MG/DL (ref 8.7–10.5)
CHLORIDE SERPL-SCNC: 105 MMOL/L (ref 95–110)
CO2 SERPL-SCNC: 25 MMOL/L (ref 23–29)
CREAT SERPL-MCNC: 0.8 MG/DL (ref 0.5–1.4)
DIFFERENTIAL METHOD: ABNORMAL
EOSINOPHIL # BLD AUTO: 0.1 K/UL (ref 0–0.5)
EOSINOPHIL NFR BLD: 1.7 % (ref 0–8)
ERYTHROCYTE [DISTWIDTH] IN BLOOD BY AUTOMATED COUNT: 13.2 % (ref 11.5–14.5)
EST. GFR  (AFRICAN AMERICAN): >60 ML/MIN/1.73 M^2
EST. GFR  (NON AFRICAN AMERICAN): >60 ML/MIN/1.73 M^2
GLUCOSE SERPL-MCNC: 104 MG/DL (ref 70–110)
HCT VFR BLD AUTO: 45.2 % (ref 37–48.5)
HGB BLD-MCNC: 15.1 G/DL (ref 12–16)
IMM GRANULOCYTES # BLD AUTO: 0.01 K/UL (ref 0–0.04)
IMM GRANULOCYTES NFR BLD AUTO: 0.2 % (ref 0–0.5)
INR PPP: 1
LYMPHOCYTES # BLD AUTO: 1.6 K/UL (ref 1–4.8)
LYMPHOCYTES NFR BLD: 31.3 % (ref 18–48)
MCH RBC QN AUTO: 31.3 PG (ref 27–31)
MCHC RBC AUTO-ENTMCNC: 33.4 G/DL (ref 32–36)
MCV RBC AUTO: 94 FL (ref 82–98)
MONOCYTES # BLD AUTO: 0.5 K/UL (ref 0.3–1)
MONOCYTES NFR BLD: 9 % (ref 4–15)
NEUTROPHILS # BLD AUTO: 3 K/UL (ref 1.8–7.7)
NEUTROPHILS NFR BLD: 57.4 % (ref 38–73)
NRBC BLD-RTO: 0 /100 WBC
PLATELET # BLD AUTO: 227 K/UL (ref 150–450)
PMV BLD AUTO: 9.1 FL (ref 9.2–12.9)
POTASSIUM SERPL-SCNC: 3.9 MMOL/L (ref 3.5–5.1)
PROT SERPL-MCNC: 7.8 G/DL (ref 6–8.4)
PROTHROMBIN TIME: 12.9 SEC (ref 11.8–14.3)
RBC # BLD AUTO: 4.83 M/UL (ref 4–5.4)
SARS-COV-2 RDRP RESP QL NAA+PROBE: NEGATIVE
SODIUM SERPL-SCNC: 141 MMOL/L (ref 136–145)
TROPONIN I SERPL DL<=0.01 NG/ML-MCNC: <0.03 NG/ML
WBC # BLD AUTO: 5.2 K/UL (ref 3.9–12.7)

## 2021-05-10 PROCEDURE — 99285 EMERGENCY DEPT VISIT HI MDM: CPT | Mod: 25

## 2021-05-10 PROCEDURE — 93010 ELECTROCARDIOGRAM REPORT: CPT | Mod: ,,, | Performed by: INTERNAL MEDICINE

## 2021-05-10 PROCEDURE — 85610 PROTHROMBIN TIME: CPT | Performed by: EMERGENCY MEDICINE

## 2021-05-10 PROCEDURE — 63600175 PHARM REV CODE 636 W HCPCS: Performed by: EMERGENCY MEDICINE

## 2021-05-10 PROCEDURE — 96374 THER/PROPH/DIAG INJ IV PUSH: CPT | Mod: 59

## 2021-05-10 PROCEDURE — 85025 COMPLETE CBC W/AUTO DIFF WBC: CPT | Performed by: EMERGENCY MEDICINE

## 2021-05-10 PROCEDURE — 84484 ASSAY OF TROPONIN QUANT: CPT | Performed by: EMERGENCY MEDICINE

## 2021-05-10 PROCEDURE — 83880 ASSAY OF NATRIURETIC PEPTIDE: CPT | Performed by: EMERGENCY MEDICINE

## 2021-05-10 PROCEDURE — C9113 INJ PANTOPRAZOLE SODIUM, VIA: HCPCS | Performed by: EMERGENCY MEDICINE

## 2021-05-10 PROCEDURE — 25000003 PHARM REV CODE 250: Performed by: EMERGENCY MEDICINE

## 2021-05-10 PROCEDURE — 93005 ELECTROCARDIOGRAM TRACING: CPT | Performed by: INTERNAL MEDICINE

## 2021-05-10 PROCEDURE — 80053 COMPREHEN METABOLIC PANEL: CPT | Performed by: EMERGENCY MEDICINE

## 2021-05-10 PROCEDURE — U0002 COVID-19 LAB TEST NON-CDC: HCPCS | Performed by: EMERGENCY MEDICINE

## 2021-05-10 PROCEDURE — 93010 EKG 12-LEAD: ICD-10-PCS | Mod: ,,, | Performed by: INTERNAL MEDICINE

## 2021-05-10 PROCEDURE — 25500020 PHARM REV CODE 255: Performed by: EMERGENCY MEDICINE

## 2021-05-10 RX ORDER — PANTOPRAZOLE SODIUM 40 MG/10ML
40 INJECTION, POWDER, LYOPHILIZED, FOR SOLUTION INTRAVENOUS
Status: COMPLETED | OUTPATIENT
Start: 2021-05-10 | End: 2021-05-10

## 2021-05-10 RX ORDER — NAPROXEN SODIUM 220 MG/1
324 TABLET, FILM COATED ORAL ONCE
Status: COMPLETED | OUTPATIENT
Start: 2021-05-10 | End: 2021-05-10

## 2021-05-10 RX ADMIN — ASPIRIN 324 MG: 81 TABLET, CHEWABLE ORAL at 04:05

## 2021-05-10 RX ADMIN — PANTOPRAZOLE SODIUM 40 MG: 40 INJECTION, POWDER, LYOPHILIZED, FOR SOLUTION INTRAVENOUS at 05:05

## 2021-05-10 RX ADMIN — NITROGLYCERIN 1 INCH: 20 OINTMENT TOPICAL at 05:05

## 2021-05-10 RX ADMIN — IOHEXOL 100 ML: 350 INJECTION, SOLUTION INTRAVENOUS at 06:05

## 2021-05-11 ENCOUNTER — TELEPHONE (OUTPATIENT)
Dept: FAMILY MEDICINE | Facility: CLINIC | Age: 71
End: 2021-05-11

## 2021-05-12 ENCOUNTER — HOSPITAL ENCOUNTER (OUTPATIENT)
Facility: HOSPITAL | Age: 71
Discharge: HOME | End: 2021-05-12
Attending: EMERGENCY MEDICINE | Admitting: INTERNAL MEDICINE
Payer: MEDICARE

## 2021-05-12 ENCOUNTER — TELEPHONE (OUTPATIENT)
Dept: FAMILY MEDICINE | Facility: CLINIC | Age: 71
End: 2021-05-12

## 2021-05-12 VITALS
DIASTOLIC BLOOD PRESSURE: 87 MMHG | SYSTOLIC BLOOD PRESSURE: 163 MMHG | OXYGEN SATURATION: 94 % | HEIGHT: 62 IN | HEART RATE: 62 BPM | BODY MASS INDEX: 27.79 KG/M2 | RESPIRATION RATE: 17 BRPM | WEIGHT: 151 LBS | TEMPERATURE: 98 F

## 2021-05-12 DIAGNOSIS — I10 ESSENTIAL HYPERTENSION: ICD-10-CM

## 2021-05-12 DIAGNOSIS — R07.9 CHEST PAIN: Primary | ICD-10-CM

## 2021-05-12 DIAGNOSIS — I10 HYPERTENSION, UNSPECIFIED TYPE: ICD-10-CM

## 2021-05-12 LAB
ALBUMIN SERPL BCP-MCNC: 4.1 G/DL (ref 3.5–5.2)
ALP SERPL-CCNC: 91 U/L (ref 55–135)
ALT SERPL W/O P-5'-P-CCNC: 17 U/L (ref 10–44)
ANION GAP SERPL CALC-SCNC: 10 MMOL/L (ref 8–16)
AST SERPL-CCNC: 20 U/L (ref 10–40)
BASOPHILS # BLD AUTO: 0.04 K/UL (ref 0–0.2)
BASOPHILS NFR BLD: 0.6 % (ref 0–1.9)
BILIRUB SERPL-MCNC: 0.8 MG/DL (ref 0.1–1)
BNP SERPL-MCNC: 38 PG/ML (ref 0–99)
BUN SERPL-MCNC: 15 MG/DL (ref 8–23)
CALCIUM SERPL-MCNC: 9.4 MG/DL (ref 8.7–10.5)
CHLORIDE SERPL-SCNC: 108 MMOL/L (ref 95–110)
CO2 SERPL-SCNC: 22 MMOL/L (ref 23–29)
CREAT SERPL-MCNC: 0.7 MG/DL (ref 0.5–1.4)
DIFFERENTIAL METHOD: ABNORMAL
EOSINOPHIL # BLD AUTO: 0.1 K/UL (ref 0–0.5)
EOSINOPHIL NFR BLD: 1.4 % (ref 0–8)
ERYTHROCYTE [DISTWIDTH] IN BLOOD BY AUTOMATED COUNT: 13.2 % (ref 11.5–14.5)
EST. GFR  (AFRICAN AMERICAN): >60 ML/MIN/1.73 M^2
EST. GFR  (NON AFRICAN AMERICAN): >60 ML/MIN/1.73 M^2
GLUCOSE SERPL-MCNC: 96 MG/DL (ref 70–110)
HCT VFR BLD AUTO: 45.2 % (ref 37–48.5)
HGB BLD-MCNC: 15.1 G/DL (ref 12–16)
IMM GRANULOCYTES # BLD AUTO: 0.01 K/UL (ref 0–0.04)
IMM GRANULOCYTES NFR BLD AUTO: 0.1 % (ref 0–0.5)
INR PPP: 0.9
LIPASE SERPL-CCNC: 35 U/L (ref 4–60)
LYMPHOCYTES # BLD AUTO: 2.3 K/UL (ref 1–4.8)
LYMPHOCYTES NFR BLD: 32.5 % (ref 18–48)
MCH RBC QN AUTO: 31.1 PG (ref 27–31)
MCHC RBC AUTO-ENTMCNC: 33.4 G/DL (ref 32–36)
MCV RBC AUTO: 93 FL (ref 82–98)
MONOCYTES # BLD AUTO: 0.6 K/UL (ref 0.3–1)
MONOCYTES NFR BLD: 8.1 % (ref 4–15)
NEUTROPHILS # BLD AUTO: 4 K/UL (ref 1.8–7.7)
NEUTROPHILS NFR BLD: 57.3 % (ref 38–73)
NRBC BLD-RTO: 0 /100 WBC
PLATELET # BLD AUTO: 220 K/UL (ref 150–450)
PMV BLD AUTO: 9.4 FL (ref 9.2–12.9)
POTASSIUM SERPL-SCNC: 4.5 MMOL/L (ref 3.5–5.1)
PROT SERPL-MCNC: 8 G/DL (ref 6–8.4)
PROTHROMBIN TIME: 12.1 SEC (ref 11.8–14.3)
RBC # BLD AUTO: 4.86 M/UL (ref 4–5.4)
SODIUM SERPL-SCNC: 140 MMOL/L (ref 136–145)
TROPONIN I SERPL DL<=0.01 NG/ML-MCNC: <0.03 NG/ML
WBC # BLD AUTO: 6.95 K/UL (ref 3.9–12.7)

## 2021-05-12 PROCEDURE — 93010 ELECTROCARDIOGRAM REPORT: CPT | Mod: ,,, | Performed by: INTERNAL MEDICINE

## 2021-05-12 PROCEDURE — 25000003 PHARM REV CODE 250: Performed by: STUDENT IN AN ORGANIZED HEALTH CARE EDUCATION/TRAINING PROGRAM

## 2021-05-12 PROCEDURE — 36415 COLL VENOUS BLD VENIPUNCTURE: CPT | Performed by: NURSE PRACTITIONER

## 2021-05-12 PROCEDURE — C9113 INJ PANTOPRAZOLE SODIUM, VIA: HCPCS | Performed by: NURSE PRACTITIONER

## 2021-05-12 PROCEDURE — 96375 TX/PRO/DX INJ NEW DRUG ADDON: CPT

## 2021-05-12 PROCEDURE — 25000003 PHARM REV CODE 250: Performed by: EMERGENCY MEDICINE

## 2021-05-12 PROCEDURE — G0378 HOSPITAL OBSERVATION PER HR: HCPCS

## 2021-05-12 PROCEDURE — 85025 COMPLETE CBC W/AUTO DIFF WBC: CPT | Performed by: STUDENT IN AN ORGANIZED HEALTH CARE EDUCATION/TRAINING PROGRAM

## 2021-05-12 PROCEDURE — 93010 EKG 12-LEAD: ICD-10-PCS | Mod: ,,, | Performed by: INTERNAL MEDICINE

## 2021-05-12 PROCEDURE — 25000003 PHARM REV CODE 250: Performed by: INTERNAL MEDICINE

## 2021-05-12 PROCEDURE — 25000003 PHARM REV CODE 250: Performed by: NURSE PRACTITIONER

## 2021-05-12 PROCEDURE — 63600175 PHARM REV CODE 636 W HCPCS: Performed by: NURSE PRACTITIONER

## 2021-05-12 PROCEDURE — 85610 PROTHROMBIN TIME: CPT | Performed by: STUDENT IN AN ORGANIZED HEALTH CARE EDUCATION/TRAINING PROGRAM

## 2021-05-12 PROCEDURE — 63600175 PHARM REV CODE 636 W HCPCS: Performed by: STUDENT IN AN ORGANIZED HEALTH CARE EDUCATION/TRAINING PROGRAM

## 2021-05-12 PROCEDURE — 84484 ASSAY OF TROPONIN QUANT: CPT | Mod: 91 | Performed by: STUDENT IN AN ORGANIZED HEALTH CARE EDUCATION/TRAINING PROGRAM

## 2021-05-12 PROCEDURE — 99285 EMERGENCY DEPT VISIT HI MDM: CPT | Mod: 25

## 2021-05-12 PROCEDURE — 96374 THER/PROPH/DIAG INJ IV PUSH: CPT

## 2021-05-12 PROCEDURE — 83880 ASSAY OF NATRIURETIC PEPTIDE: CPT | Performed by: STUDENT IN AN ORGANIZED HEALTH CARE EDUCATION/TRAINING PROGRAM

## 2021-05-12 PROCEDURE — 80053 COMPREHEN METABOLIC PANEL: CPT | Performed by: STUDENT IN AN ORGANIZED HEALTH CARE EDUCATION/TRAINING PROGRAM

## 2021-05-12 PROCEDURE — 84484 ASSAY OF TROPONIN QUANT: CPT | Mod: 91 | Performed by: NURSE PRACTITIONER

## 2021-05-12 PROCEDURE — 25000242 PHARM REV CODE 250 ALT 637 W/ HCPCS: Performed by: STUDENT IN AN ORGANIZED HEALTH CARE EDUCATION/TRAINING PROGRAM

## 2021-05-12 PROCEDURE — 93005 ELECTROCARDIOGRAM TRACING: CPT | Performed by: INTERNAL MEDICINE

## 2021-05-12 PROCEDURE — 83690 ASSAY OF LIPASE: CPT | Performed by: NURSE PRACTITIONER

## 2021-05-12 RX ORDER — ROSUVASTATIN CALCIUM 40 MG/1
40 TABLET, COATED ORAL NIGHTLY
Qty: 90 TABLET | Refills: 3 | Status: SHIPPED | OUTPATIENT
Start: 2021-05-12 | End: 2021-06-01

## 2021-05-12 RX ORDER — MORPHINE SULFATE 4 MG/ML
4 INJECTION, SOLUTION INTRAMUSCULAR; INTRAVENOUS
Status: COMPLETED | OUTPATIENT
Start: 2021-05-12 | End: 2021-05-12

## 2021-05-12 RX ORDER — NAPROXEN SODIUM 220 MG/1
324 TABLET, FILM COATED ORAL ONCE
Status: DISCONTINUED | OUTPATIENT
Start: 2021-05-12 | End: 2021-05-12

## 2021-05-12 RX ORDER — NITROGLYCERIN 0.4 MG/1
0.4 TABLET SUBLINGUAL EVERY 5 MIN PRN
Status: DISCONTINUED | OUTPATIENT
Start: 2021-05-12 | End: 2021-05-12 | Stop reason: HOSPADM

## 2021-05-12 RX ORDER — ASPIRIN 325 MG
325 TABLET ORAL
Status: COMPLETED | OUTPATIENT
Start: 2021-05-12 | End: 2021-05-12

## 2021-05-12 RX ORDER — MORPHINE SULFATE 2 MG/ML
1 INJECTION, SOLUTION INTRAMUSCULAR; INTRAVENOUS EVERY 6 HOURS PRN
Status: DISCONTINUED | OUTPATIENT
Start: 2021-05-12 | End: 2021-05-12

## 2021-05-12 RX ORDER — NITROGLYCERIN 0.4 MG/1
0.4 TABLET SUBLINGUAL EVERY 5 MIN PRN
Status: DISCONTINUED | OUTPATIENT
Start: 2021-05-12 | End: 2021-05-12

## 2021-05-12 RX ORDER — SUCRALFATE 1 G/1
1 TABLET ORAL
Status: DISCONTINUED | OUTPATIENT
Start: 2021-05-12 | End: 2021-05-12 | Stop reason: HOSPADM

## 2021-05-12 RX ORDER — SUCRALFATE 1 G/1
1 TABLET ORAL
Qty: 120 TABLET | Refills: 0 | Status: SHIPPED | OUTPATIENT
Start: 2021-05-12 | End: 2021-06-11

## 2021-05-12 RX ORDER — LISINOPRIL 5 MG/1
10 TABLET ORAL DAILY
Status: DISCONTINUED | OUTPATIENT
Start: 2021-05-12 | End: 2021-05-12 | Stop reason: HOSPADM

## 2021-05-12 RX ORDER — LORAZEPAM 0.5 MG/1
0.5 TABLET ORAL EVERY 12 HOURS PRN
Status: DISCONTINUED | OUTPATIENT
Start: 2021-05-12 | End: 2021-05-12 | Stop reason: HOSPADM

## 2021-05-12 RX ORDER — METOPROLOL TARTRATE 25 MG/1
25 TABLET, FILM COATED ORAL 2 TIMES DAILY
Qty: 60 TABLET | Refills: 11 | Status: SHIPPED | OUTPATIENT
Start: 2021-05-12 | End: 2021-05-14 | Stop reason: SDUPTHER

## 2021-05-12 RX ORDER — ASPIRIN 81 MG/1
81 TABLET ORAL DAILY
Status: DISCONTINUED | OUTPATIENT
Start: 2021-05-12 | End: 2021-05-12

## 2021-05-12 RX ORDER — ASPIRIN 325 MG
325 TABLET ORAL DAILY
Status: DISCONTINUED | OUTPATIENT
Start: 2021-05-13 | End: 2021-05-12 | Stop reason: HOSPADM

## 2021-05-12 RX ORDER — METOPROLOL TARTRATE 25 MG/1
25 TABLET, FILM COATED ORAL 2 TIMES DAILY
Status: DISCONTINUED | OUTPATIENT
Start: 2021-05-12 | End: 2021-05-12 | Stop reason: HOSPADM

## 2021-05-12 RX ORDER — ONDANSETRON 4 MG/1
4 TABLET, ORALLY DISINTEGRATING ORAL ONCE
Status: COMPLETED | OUTPATIENT
Start: 2021-05-12 | End: 2021-05-12

## 2021-05-12 RX ORDER — LEVOTHYROXINE SODIUM 100 UG/1
100 TABLET ORAL
Status: DISCONTINUED | OUTPATIENT
Start: 2021-05-12 | End: 2021-05-12 | Stop reason: HOSPADM

## 2021-05-12 RX ORDER — PANTOPRAZOLE SODIUM 40 MG/10ML
40 INJECTION, POWDER, LYOPHILIZED, FOR SOLUTION INTRAVENOUS DAILY
Status: DISCONTINUED | OUTPATIENT
Start: 2021-05-12 | End: 2021-05-12

## 2021-05-12 RX ORDER — HYDROCODONE BITARTRATE AND ACETAMINOPHEN 10; 325 MG/1; MG/1
1 TABLET ORAL EVERY 12 HOURS PRN
Status: DISCONTINUED | OUTPATIENT
Start: 2021-05-12 | End: 2021-05-12 | Stop reason: HOSPADM

## 2021-05-12 RX ORDER — ROSUVASTATIN CALCIUM 20 MG/1
40 TABLET, COATED ORAL NIGHTLY
Status: DISCONTINUED | OUTPATIENT
Start: 2021-05-12 | End: 2021-05-12 | Stop reason: HOSPADM

## 2021-05-12 RX ORDER — PANTOPRAZOLE SODIUM 40 MG/10ML
40 INJECTION, POWDER, LYOPHILIZED, FOR SOLUTION INTRAVENOUS 2 TIMES DAILY
Status: DISCONTINUED | OUTPATIENT
Start: 2021-05-12 | End: 2021-05-12 | Stop reason: HOSPADM

## 2021-05-12 RX ADMIN — ASPIRIN 325 MG ORAL TABLET 325 MG: 325 PILL ORAL at 02:05

## 2021-05-12 RX ADMIN — SUCRALFATE 1 G: 1 TABLET ORAL at 08:05

## 2021-05-12 RX ADMIN — SUCRALFATE 1 G: 1 TABLET ORAL at 11:05

## 2021-05-12 RX ADMIN — ONDANSETRON 4 MG: 4 TABLET, ORALLY DISINTEGRATING ORAL at 12:05

## 2021-05-12 RX ADMIN — DICYCLOMINE HYDROCHLORIDE 50 ML: 10 SOLUTION ORAL at 08:05

## 2021-05-12 RX ADMIN — DICYCLOMINE HYDROCHLORIDE 50 ML: 10 SOLUTION ORAL at 03:05

## 2021-05-12 RX ADMIN — PANTOPRAZOLE SODIUM 40 MG: 40 INJECTION, POWDER, FOR SOLUTION INTRAVENOUS at 05:05

## 2021-05-12 RX ADMIN — LEVOTHYROXINE SODIUM 100 MCG: 100 TABLET ORAL at 08:05

## 2021-05-12 RX ADMIN — NITROGLYCERIN 0.4 MG: 0.4 TABLET SUBLINGUAL at 02:05

## 2021-05-12 RX ADMIN — NITROGLYCERIN 0.4 MG: 0.4 TABLET SUBLINGUAL at 05:05

## 2021-05-12 RX ADMIN — LISINOPRIL 10 MG: 5 TABLET ORAL at 08:05

## 2021-05-12 RX ADMIN — MORPHINE SULFATE 4 MG: 4 INJECTION INTRAVENOUS at 04:05

## 2021-05-13 ENCOUNTER — OFFICE VISIT (OUTPATIENT)
Dept: FAMILY MEDICINE | Facility: CLINIC | Age: 71
End: 2021-05-13
Payer: MEDICARE

## 2021-05-13 VITALS
BODY MASS INDEX: 27.79 KG/M2 | DIASTOLIC BLOOD PRESSURE: 98 MMHG | WEIGHT: 151 LBS | SYSTOLIC BLOOD PRESSURE: 170 MMHG | HEIGHT: 62 IN

## 2021-05-13 DIAGNOSIS — Z12.31 SCREENING MAMMOGRAM FOR HIGH-RISK PATIENT: ICD-10-CM

## 2021-05-13 DIAGNOSIS — E03.9 ACQUIRED HYPOTHYROIDISM: ICD-10-CM

## 2021-05-13 DIAGNOSIS — R07.89 CHEST WALL PAIN: Primary | ICD-10-CM

## 2021-05-13 DIAGNOSIS — K21.00 GASTROESOPHAGEAL REFLUX DISEASE WITH ESOPHAGITIS WITHOUT HEMORRHAGE: ICD-10-CM

## 2021-05-13 DIAGNOSIS — I10 ESSENTIAL HYPERTENSION: ICD-10-CM

## 2021-05-13 DIAGNOSIS — Z09 HOSPITAL DISCHARGE FOLLOW-UP: ICD-10-CM

## 2021-05-13 DIAGNOSIS — Z12.11 SPECIAL SCREENING FOR MALIGNANT NEOPLASMS, COLON: ICD-10-CM

## 2021-05-13 DIAGNOSIS — Z78.0 MENOPAUSE: ICD-10-CM

## 2021-05-13 DIAGNOSIS — M51.36 DDD (DEGENERATIVE DISC DISEASE), LUMBAR: ICD-10-CM

## 2021-05-13 PROCEDURE — 3008F PR BODY MASS INDEX (BMI) DOCUMENTED: ICD-10-PCS | Mod: S$GLB,,, | Performed by: FAMILY MEDICINE

## 2021-05-13 PROCEDURE — 3077F SYST BP >= 140 MM HG: CPT | Mod: S$GLB,,, | Performed by: FAMILY MEDICINE

## 2021-05-13 PROCEDURE — 3080F PR MOST RECENT DIASTOLIC BLOOD PRESSURE >= 90 MM HG: ICD-10-PCS | Mod: S$GLB,,, | Performed by: FAMILY MEDICINE

## 2021-05-13 PROCEDURE — 3288F PR FALLS RISK ASSESSMENT DOCUMENTED: ICD-10-PCS | Mod: S$GLB,,, | Performed by: FAMILY MEDICINE

## 2021-05-13 PROCEDURE — 1101F PT FALLS ASSESS-DOCD LE1/YR: CPT | Mod: S$GLB,,, | Performed by: FAMILY MEDICINE

## 2021-05-13 PROCEDURE — 3077F PR MOST RECENT SYSTOLIC BLOOD PRESSURE >= 140 MM HG: ICD-10-PCS | Mod: S$GLB,,, | Performed by: FAMILY MEDICINE

## 2021-05-13 PROCEDURE — 3288F FALL RISK ASSESSMENT DOCD: CPT | Mod: S$GLB,,, | Performed by: FAMILY MEDICINE

## 2021-05-13 PROCEDURE — 99214 OFFICE O/P EST MOD 30 MIN: CPT | Mod: S$GLB,,, | Performed by: FAMILY MEDICINE

## 2021-05-13 PROCEDURE — 99214 PR OFFICE/OUTPT VISIT, EST, LEVL IV, 30-39 MIN: ICD-10-PCS | Mod: S$GLB,,, | Performed by: FAMILY MEDICINE

## 2021-05-13 PROCEDURE — 3080F DIAST BP >= 90 MM HG: CPT | Mod: S$GLB,,, | Performed by: FAMILY MEDICINE

## 2021-05-13 PROCEDURE — 3008F BODY MASS INDEX DOCD: CPT | Mod: S$GLB,,, | Performed by: FAMILY MEDICINE

## 2021-05-13 PROCEDURE — 1101F PR PT FALLS ASSESS DOC 0-1 FALLS W/OUT INJ PAST YR: ICD-10-PCS | Mod: S$GLB,,, | Performed by: FAMILY MEDICINE

## 2021-05-13 RX ORDER — LEVOTHYROXINE SODIUM 100 UG/1
100 TABLET ORAL
Qty: 90 TABLET | Refills: 1 | Status: SHIPPED | OUTPATIENT
Start: 2021-05-13 | End: 2021-09-29 | Stop reason: SDUPTHER

## 2021-05-13 RX ORDER — HYDROCODONE BITARTRATE AND ACETAMINOPHEN 10; 325 MG/1; MG/1
1 TABLET ORAL EVERY 12 HOURS PRN
Qty: 50 TABLET | Refills: 0 | Status: SHIPPED | OUTPATIENT
Start: 2021-05-13 | End: 2021-09-21 | Stop reason: SDUPTHER

## 2021-05-13 RX ORDER — METHYLPREDNISOLONE 4 MG/1
TABLET ORAL
Qty: 1 PACKAGE | Refills: 0 | Status: SHIPPED | OUTPATIENT
Start: 2021-05-13 | End: 2021-06-01

## 2021-05-13 RX ORDER — PANTOPRAZOLE SODIUM 40 MG/1
40 TABLET, DELAYED RELEASE ORAL 2 TIMES DAILY
Qty: 60 TABLET | Refills: 2 | Status: SHIPPED | OUTPATIENT
Start: 2021-05-13 | End: 2021-09-29 | Stop reason: SDUPTHER

## 2021-05-13 RX ORDER — LISINOPRIL 10 MG/1
10 TABLET ORAL DAILY
Qty: 90 TABLET | Refills: 1 | Status: SHIPPED | OUTPATIENT
Start: 2021-05-13 | End: 2021-06-01 | Stop reason: SDUPTHER

## 2021-05-14 RX ORDER — METOPROLOL TARTRATE 25 MG/1
25 TABLET, FILM COATED ORAL 2 TIMES DAILY
Qty: 60 TABLET | Refills: 2 | Status: SHIPPED | OUTPATIENT
Start: 2021-05-14 | End: 2021-08-19 | Stop reason: SDUPTHER

## 2021-05-19 ENCOUNTER — TELEPHONE (OUTPATIENT)
Dept: FAMILY MEDICINE | Facility: CLINIC | Age: 71
End: 2021-05-19

## 2021-05-24 RX ORDER — ONDANSETRON 4 MG/1
4 TABLET, ORALLY DISINTEGRATING ORAL EVERY 12 HOURS PRN
Qty: 30 TABLET | Refills: 1 | Status: SHIPPED | OUTPATIENT
Start: 2021-05-24 | End: 2021-10-12

## 2021-06-01 ENCOUNTER — OFFICE VISIT (OUTPATIENT)
Dept: FAMILY MEDICINE | Facility: CLINIC | Age: 71
End: 2021-06-01
Payer: MEDICARE

## 2021-06-01 VITALS
DIASTOLIC BLOOD PRESSURE: 86 MMHG | HEIGHT: 62 IN | WEIGHT: 149 LBS | HEART RATE: 60 BPM | SYSTOLIC BLOOD PRESSURE: 142 MMHG | BODY MASS INDEX: 27.42 KG/M2

## 2021-06-01 DIAGNOSIS — M54.6 ACUTE RIGHT-SIDED THORACIC BACK PAIN: ICD-10-CM

## 2021-06-01 DIAGNOSIS — E03.9 ACQUIRED HYPOTHYROIDISM: ICD-10-CM

## 2021-06-01 DIAGNOSIS — K21.00 GASTROESOPHAGEAL REFLUX DISEASE WITH ESOPHAGITIS WITHOUT HEMORRHAGE: ICD-10-CM

## 2021-06-01 DIAGNOSIS — I10 ESSENTIAL HYPERTENSION: Primary | ICD-10-CM

## 2021-06-01 DIAGNOSIS — J43.9 PULMONARY EMPHYSEMA, UNSPECIFIED EMPHYSEMA TYPE: ICD-10-CM

## 2021-06-01 PROCEDURE — 1159F PR MEDICATION LIST DOCUMENTED IN MEDICAL RECORD: ICD-10-PCS | Mod: S$GLB,,, | Performed by: NURSE PRACTITIONER

## 2021-06-01 PROCEDURE — 3288F FALL RISK ASSESSMENT DOCD: CPT | Mod: S$GLB,,, | Performed by: NURSE PRACTITIONER

## 2021-06-01 PROCEDURE — 1159F MED LIST DOCD IN RCRD: CPT | Mod: S$GLB,,, | Performed by: NURSE PRACTITIONER

## 2021-06-01 PROCEDURE — 1101F PR PT FALLS ASSESS DOC 0-1 FALLS W/OUT INJ PAST YR: ICD-10-PCS | Mod: S$GLB,,, | Performed by: NURSE PRACTITIONER

## 2021-06-01 PROCEDURE — 3288F PR FALLS RISK ASSESSMENT DOCUMENTED: ICD-10-PCS | Mod: S$GLB,,, | Performed by: NURSE PRACTITIONER

## 2021-06-01 PROCEDURE — 99214 PR OFFICE/OUTPT VISIT, EST, LEVL IV, 30-39 MIN: ICD-10-PCS | Mod: S$GLB,,, | Performed by: NURSE PRACTITIONER

## 2021-06-01 PROCEDURE — 3077F SYST BP >= 140 MM HG: CPT | Mod: S$GLB,,, | Performed by: NURSE PRACTITIONER

## 2021-06-01 PROCEDURE — 1170F FXNL STATUS ASSESSED: CPT | Mod: S$GLB,,, | Performed by: NURSE PRACTITIONER

## 2021-06-01 PROCEDURE — 3008F BODY MASS INDEX DOCD: CPT | Mod: S$GLB,,, | Performed by: NURSE PRACTITIONER

## 2021-06-01 PROCEDURE — 1101F PT FALLS ASSESS-DOCD LE1/YR: CPT | Mod: S$GLB,,, | Performed by: NURSE PRACTITIONER

## 2021-06-01 PROCEDURE — 1125F PR PAIN SEVERITY QUANTIFIED, PAIN PRESENT: ICD-10-PCS | Mod: S$GLB,,, | Performed by: NURSE PRACTITIONER

## 2021-06-01 PROCEDURE — 3079F PR MOST RECENT DIASTOLIC BLOOD PRESSURE 80-89 MM HG: ICD-10-PCS | Mod: S$GLB,,, | Performed by: NURSE PRACTITIONER

## 2021-06-01 PROCEDURE — 3079F DIAST BP 80-89 MM HG: CPT | Mod: S$GLB,,, | Performed by: NURSE PRACTITIONER

## 2021-06-01 PROCEDURE — 99214 OFFICE O/P EST MOD 30 MIN: CPT | Mod: S$GLB,,, | Performed by: NURSE PRACTITIONER

## 2021-06-01 PROCEDURE — 3008F PR BODY MASS INDEX (BMI) DOCUMENTED: ICD-10-PCS | Mod: S$GLB,,, | Performed by: NURSE PRACTITIONER

## 2021-06-01 PROCEDURE — 1125F AMNT PAIN NOTED PAIN PRSNT: CPT | Mod: S$GLB,,, | Performed by: NURSE PRACTITIONER

## 2021-06-01 PROCEDURE — 1170F PR FUNCTIONAL STATUS ASSESSED: ICD-10-PCS | Mod: S$GLB,,, | Performed by: NURSE PRACTITIONER

## 2021-06-01 PROCEDURE — 3077F PR MOST RECENT SYSTOLIC BLOOD PRESSURE >= 140 MM HG: ICD-10-PCS | Mod: S$GLB,,, | Performed by: NURSE PRACTITIONER

## 2021-06-01 RX ORDER — LISINOPRIL 10 MG/1
10 TABLET ORAL 2 TIMES DAILY
Qty: 180 TABLET | Refills: 1
Start: 2021-06-01 | End: 2021-09-29 | Stop reason: SDUPTHER

## 2021-06-07 ENCOUNTER — OFFICE VISIT (OUTPATIENT)
Dept: FAMILY MEDICINE | Facility: CLINIC | Age: 71
End: 2021-06-07
Payer: MEDICARE

## 2021-06-07 ENCOUNTER — TELEPHONE (OUTPATIENT)
Dept: FAMILY MEDICINE | Facility: CLINIC | Age: 71
End: 2021-06-07

## 2021-06-07 VITALS
WEIGHT: 149 LBS | DIASTOLIC BLOOD PRESSURE: 98 MMHG | BODY MASS INDEX: 27.42 KG/M2 | HEART RATE: 84 BPM | SYSTOLIC BLOOD PRESSURE: 152 MMHG | HEIGHT: 62 IN

## 2021-06-07 DIAGNOSIS — N64.4 BREAST PAIN, RIGHT: Primary | ICD-10-CM

## 2021-06-07 DIAGNOSIS — M54.14 THORACIC RADICULOPATHY: ICD-10-CM

## 2021-06-07 PROCEDURE — 99213 OFFICE O/P EST LOW 20 MIN: CPT | Mod: S$GLB,,, | Performed by: NURSE PRACTITIONER

## 2021-06-07 PROCEDURE — 1125F PR PAIN SEVERITY QUANTIFIED, PAIN PRESENT: ICD-10-PCS | Mod: S$GLB,,, | Performed by: NURSE PRACTITIONER

## 2021-06-07 PROCEDURE — 3008F BODY MASS INDEX DOCD: CPT | Mod: S$GLB,,, | Performed by: NURSE PRACTITIONER

## 2021-06-07 PROCEDURE — 3008F PR BODY MASS INDEX (BMI) DOCUMENTED: ICD-10-PCS | Mod: S$GLB,,, | Performed by: NURSE PRACTITIONER

## 2021-06-07 PROCEDURE — 1125F AMNT PAIN NOTED PAIN PRSNT: CPT | Mod: S$GLB,,, | Performed by: NURSE PRACTITIONER

## 2021-06-07 PROCEDURE — 1101F PR PT FALLS ASSESS DOC 0-1 FALLS W/OUT INJ PAST YR: ICD-10-PCS | Mod: S$GLB,,, | Performed by: NURSE PRACTITIONER

## 2021-06-07 PROCEDURE — 1159F MED LIST DOCD IN RCRD: CPT | Mod: S$GLB,,, | Performed by: NURSE PRACTITIONER

## 2021-06-07 PROCEDURE — 99213 PR OFFICE/OUTPT VISIT, EST, LEVL III, 20-29 MIN: ICD-10-PCS | Mod: S$GLB,,, | Performed by: NURSE PRACTITIONER

## 2021-06-07 PROCEDURE — 1101F PT FALLS ASSESS-DOCD LE1/YR: CPT | Mod: S$GLB,,, | Performed by: NURSE PRACTITIONER

## 2021-06-07 PROCEDURE — 1159F PR MEDICATION LIST DOCUMENTED IN MEDICAL RECORD: ICD-10-PCS | Mod: S$GLB,,, | Performed by: NURSE PRACTITIONER

## 2021-06-07 PROCEDURE — 3288F FALL RISK ASSESSMENT DOCD: CPT | Mod: S$GLB,,, | Performed by: NURSE PRACTITIONER

## 2021-06-07 PROCEDURE — 3288F PR FALLS RISK ASSESSMENT DOCUMENTED: ICD-10-PCS | Mod: S$GLB,,, | Performed by: NURSE PRACTITIONER

## 2021-06-07 RX ORDER — GABAPENTIN 100 MG/1
CAPSULE ORAL
Qty: 60 CAPSULE | Refills: 2 | Status: SHIPPED | OUTPATIENT
Start: 2021-06-07 | End: 2021-10-12

## 2021-06-08 ENCOUNTER — TELEPHONE (OUTPATIENT)
Dept: FAMILY MEDICINE | Facility: CLINIC | Age: 71
End: 2021-06-08

## 2021-06-14 ENCOUNTER — TELEPHONE (OUTPATIENT)
Dept: FAMILY MEDICINE | Facility: CLINIC | Age: 71
End: 2021-06-14

## 2021-06-16 ENCOUNTER — TELEPHONE (OUTPATIENT)
Dept: FAMILY MEDICINE | Facility: CLINIC | Age: 71
End: 2021-06-16

## 2021-07-06 ENCOUNTER — OFFICE VISIT (OUTPATIENT)
Dept: FAMILY MEDICINE | Facility: CLINIC | Age: 71
End: 2021-07-06
Payer: MEDICARE

## 2021-07-06 ENCOUNTER — TELEPHONE (OUTPATIENT)
Dept: FAMILY MEDICINE | Facility: CLINIC | Age: 71
End: 2021-07-06

## 2021-07-06 VITALS
HEART RATE: 76 BPM | WEIGHT: 149 LBS | DIASTOLIC BLOOD PRESSURE: 78 MMHG | OXYGEN SATURATION: 96 % | SYSTOLIC BLOOD PRESSURE: 130 MMHG | BODY MASS INDEX: 27.42 KG/M2 | HEIGHT: 62 IN

## 2021-07-06 DIAGNOSIS — J43.1 PANLOBULAR EMPHYSEMA: Primary | Chronic | ICD-10-CM

## 2021-07-06 DIAGNOSIS — Z72.0 TOBACCO ABUSE: Chronic | ICD-10-CM

## 2021-07-06 DIAGNOSIS — I10 ESSENTIAL HYPERTENSION: Chronic | ICD-10-CM

## 2021-07-06 DIAGNOSIS — K21.9 GASTROESOPHAGEAL REFLUX DISEASE WITHOUT ESOPHAGITIS: ICD-10-CM

## 2021-07-06 DIAGNOSIS — M51.16 LUMBAR DISC DISEASE WITH RADICULOPATHY: ICD-10-CM

## 2021-07-06 DIAGNOSIS — G62.9 NEUROPATHY: ICD-10-CM

## 2021-07-06 DIAGNOSIS — E07.9 THYROID DISEASE: Chronic | ICD-10-CM

## 2021-07-06 DIAGNOSIS — I71.40 ABDOMINAL AORTIC ANEURYSM (AAA) WITHOUT RUPTURE: Chronic | ICD-10-CM

## 2021-07-06 PROCEDURE — 3075F PR MOST RECENT SYSTOLIC BLOOD PRESS GE 130-139MM HG: ICD-10-PCS | Mod: S$GLB,,, | Performed by: FAMILY MEDICINE

## 2021-07-06 PROCEDURE — 99214 PR OFFICE/OUTPT VISIT, EST, LEVL IV, 30-39 MIN: ICD-10-PCS | Mod: S$GLB,,, | Performed by: FAMILY MEDICINE

## 2021-07-06 PROCEDURE — 3078F PR MOST RECENT DIASTOLIC BLOOD PRESSURE < 80 MM HG: ICD-10-PCS | Mod: S$GLB,,, | Performed by: FAMILY MEDICINE

## 2021-07-06 PROCEDURE — 3078F DIAST BP <80 MM HG: CPT | Mod: S$GLB,,, | Performed by: FAMILY MEDICINE

## 2021-07-06 PROCEDURE — 1159F MED LIST DOCD IN RCRD: CPT | Mod: S$GLB,,, | Performed by: FAMILY MEDICINE

## 2021-07-06 PROCEDURE — 1159F PR MEDICATION LIST DOCUMENTED IN MEDICAL RECORD: ICD-10-PCS | Mod: S$GLB,,, | Performed by: FAMILY MEDICINE

## 2021-07-06 PROCEDURE — 3008F PR BODY MASS INDEX (BMI) DOCUMENTED: ICD-10-PCS | Mod: S$GLB,,, | Performed by: FAMILY MEDICINE

## 2021-07-06 PROCEDURE — 3008F BODY MASS INDEX DOCD: CPT | Mod: S$GLB,,, | Performed by: FAMILY MEDICINE

## 2021-07-06 PROCEDURE — 3075F SYST BP GE 130 - 139MM HG: CPT | Mod: S$GLB,,, | Performed by: FAMILY MEDICINE

## 2021-07-06 PROCEDURE — 99214 OFFICE O/P EST MOD 30 MIN: CPT | Mod: S$GLB,,, | Performed by: FAMILY MEDICINE

## 2021-08-18 ENCOUNTER — TELEPHONE (OUTPATIENT)
Dept: FAMILY MEDICINE | Facility: CLINIC | Age: 71
End: 2021-08-18

## 2021-08-19 RX ORDER — METOPROLOL TARTRATE 25 MG/1
25 TABLET, FILM COATED ORAL 2 TIMES DAILY
Qty: 60 TABLET | Refills: 2 | Status: SHIPPED | OUTPATIENT
Start: 2021-08-19 | End: 2021-09-29 | Stop reason: SDUPTHER

## 2021-08-25 ENCOUNTER — HOSPITAL ENCOUNTER (OUTPATIENT)
Dept: RADIOLOGY | Facility: HOSPITAL | Age: 71
Discharge: HOME | End: 2021-08-25
Attending: NURSE PRACTITIONER
Payer: MEDICARE

## 2021-08-25 DIAGNOSIS — N64.4 BREAST PAIN, RIGHT: ICD-10-CM

## 2021-08-25 PROCEDURE — 77066 DX MAMMO INCL CAD BI: CPT | Mod: TC,PO

## 2021-09-21 DIAGNOSIS — M51.36 DDD (DEGENERATIVE DISC DISEASE), LUMBAR: ICD-10-CM

## 2021-09-21 RX ORDER — HYDROCODONE BITARTRATE AND ACETAMINOPHEN 10; 325 MG/1; MG/1
1 TABLET ORAL EVERY 12 HOURS PRN
Qty: 50 TABLET | Refills: 0 | Status: SHIPPED | OUTPATIENT
Start: 2021-09-21 | End: 2022-01-06 | Stop reason: SDUPTHER

## 2021-09-29 ENCOUNTER — TELEPHONE (OUTPATIENT)
Dept: FAMILY MEDICINE | Facility: CLINIC | Age: 71
End: 2021-09-29

## 2021-09-29 ENCOUNTER — OFFICE VISIT (OUTPATIENT)
Dept: FAMILY MEDICINE | Facility: CLINIC | Age: 71
End: 2021-09-29
Payer: MEDICARE

## 2021-09-29 VITALS
DIASTOLIC BLOOD PRESSURE: 94 MMHG | WEIGHT: 150 LBS | SYSTOLIC BLOOD PRESSURE: 160 MMHG | HEART RATE: 72 BPM | BODY MASS INDEX: 27.6 KG/M2 | HEIGHT: 62 IN

## 2021-09-29 DIAGNOSIS — I10 UNCONTROLLED HYPERTENSION: Primary | ICD-10-CM

## 2021-09-29 DIAGNOSIS — K21.00 GASTROESOPHAGEAL REFLUX DISEASE WITH ESOPHAGITIS WITHOUT HEMORRHAGE: ICD-10-CM

## 2021-09-29 DIAGNOSIS — E03.9 ACQUIRED HYPOTHYROIDISM: ICD-10-CM

## 2021-09-29 DIAGNOSIS — R05.9 COUGH: ICD-10-CM

## 2021-09-29 LAB
CTP QC/QA: YES
EKG 12-LEAD: NORMAL
PR INTERVAL: NORMAL
PRT AXES: NORMAL
QRS DURATION: NORMAL
QT/QTC: NORMAL
SARS-COV-2 RDRP RESP QL NAA+PROBE: NEGATIVE
VENTRICULAR RATE: NORMAL

## 2021-09-29 PROCEDURE — 1159F PR MEDICATION LIST DOCUMENTED IN MEDICAL RECORD: ICD-10-PCS | Mod: S$GLB,,, | Performed by: NURSE PRACTITIONER

## 2021-09-29 PROCEDURE — 93000 POCT EKG 12-LEAD: ICD-10-PCS | Mod: S$GLB,,, | Performed by: NURSE PRACTITIONER

## 2021-09-29 PROCEDURE — 3080F PR MOST RECENT DIASTOLIC BLOOD PRESSURE >= 90 MM HG: ICD-10-PCS | Mod: S$GLB,,, | Performed by: NURSE PRACTITIONER

## 2021-09-29 PROCEDURE — 4010F PR ACE/ARB THEARPY RXD/TAKEN: ICD-10-PCS | Mod: S$GLB,,, | Performed by: NURSE PRACTITIONER

## 2021-09-29 PROCEDURE — 1160F RVW MEDS BY RX/DR IN RCRD: CPT | Mod: S$GLB,,, | Performed by: NURSE PRACTITIONER

## 2021-09-29 PROCEDURE — U0002: ICD-10-PCS | Mod: QW,S$GLB,, | Performed by: NURSE PRACTITIONER

## 2021-09-29 PROCEDURE — 3077F PR MOST RECENT SYSTOLIC BLOOD PRESSURE >= 140 MM HG: ICD-10-PCS | Mod: S$GLB,,, | Performed by: NURSE PRACTITIONER

## 2021-09-29 PROCEDURE — 3080F DIAST BP >= 90 MM HG: CPT | Mod: S$GLB,,, | Performed by: NURSE PRACTITIONER

## 2021-09-29 PROCEDURE — 99214 OFFICE O/P EST MOD 30 MIN: CPT | Mod: 25,S$GLB,, | Performed by: NURSE PRACTITIONER

## 2021-09-29 PROCEDURE — U0002 COVID-19 LAB TEST NON-CDC: HCPCS | Mod: QW,S$GLB,, | Performed by: NURSE PRACTITIONER

## 2021-09-29 PROCEDURE — 99214 PR OFFICE/OUTPT VISIT, EST, LEVL IV, 30-39 MIN: ICD-10-PCS | Mod: 25,S$GLB,, | Performed by: NURSE PRACTITIONER

## 2021-09-29 PROCEDURE — 1159F MED LIST DOCD IN RCRD: CPT | Mod: S$GLB,,, | Performed by: NURSE PRACTITIONER

## 2021-09-29 PROCEDURE — 4010F ACE/ARB THERAPY RXD/TAKEN: CPT | Mod: S$GLB,,, | Performed by: NURSE PRACTITIONER

## 2021-09-29 PROCEDURE — 3008F BODY MASS INDEX DOCD: CPT | Mod: S$GLB,,, | Performed by: NURSE PRACTITIONER

## 2021-09-29 PROCEDURE — 1160F PR REVIEW ALL MEDS BY PRESCRIBER/CLIN PHARMACIST DOCUMENTED: ICD-10-PCS | Mod: S$GLB,,, | Performed by: NURSE PRACTITIONER

## 2021-09-29 PROCEDURE — 3077F SYST BP >= 140 MM HG: CPT | Mod: S$GLB,,, | Performed by: NURSE PRACTITIONER

## 2021-09-29 PROCEDURE — 3008F PR BODY MASS INDEX (BMI) DOCUMENTED: ICD-10-PCS | Mod: S$GLB,,, | Performed by: NURSE PRACTITIONER

## 2021-09-29 PROCEDURE — 93000 ELECTROCARDIOGRAM COMPLETE: CPT | Mod: S$GLB,,, | Performed by: NURSE PRACTITIONER

## 2021-09-29 RX ORDER — METOPROLOL TARTRATE 25 MG/1
25 TABLET, FILM COATED ORAL 2 TIMES DAILY
Qty: 180 TABLET | Refills: 1 | Status: SHIPPED | OUTPATIENT
Start: 2021-09-29 | End: 2022-01-06 | Stop reason: SDUPTHER

## 2021-09-29 RX ORDER — LISINOPRIL 20 MG/1
20 TABLET ORAL 2 TIMES DAILY
Qty: 180 TABLET | Refills: 1 | Status: SHIPPED | OUTPATIENT
Start: 2021-09-29 | End: 2022-01-06 | Stop reason: SDUPTHER

## 2021-09-29 RX ORDER — PANTOPRAZOLE SODIUM 40 MG/1
40 TABLET, DELAYED RELEASE ORAL 2 TIMES DAILY
Qty: 180 TABLET | Refills: 1 | Status: SHIPPED | OUTPATIENT
Start: 2021-09-29 | End: 2022-01-06 | Stop reason: SDUPTHER

## 2021-09-29 RX ORDER — LEVOTHYROXINE SODIUM 100 UG/1
100 TABLET ORAL
Qty: 90 TABLET | Refills: 1 | Status: SHIPPED | OUTPATIENT
Start: 2021-09-29 | End: 2022-01-06 | Stop reason: SDUPTHER

## 2021-10-05 LAB
ALBUMIN SERPL-MCNC: 4.1 G/DL (ref 3.6–5.1)
ALBUMIN/CREAT UR: 9 MCG/MG CREAT
ALBUMIN/GLOB SERPL: 1.3 (CALC) (ref 1–2.5)
ALP SERPL-CCNC: 83 U/L (ref 37–153)
ALT SERPL-CCNC: 15 U/L (ref 6–29)
AST SERPL-CCNC: 18 U/L (ref 10–35)
BASOPHILS # BLD AUTO: 42 CELLS/UL (ref 0–200)
BASOPHILS NFR BLD AUTO: 0.8 %
BILIRUB SERPL-MCNC: 0.5 MG/DL (ref 0.2–1.2)
BUN SERPL-MCNC: 15 MG/DL (ref 7–25)
BUN/CREAT SERPL: NORMAL (CALC) (ref 6–22)
CALCIUM SERPL-MCNC: 9.5 MG/DL (ref 8.6–10.4)
CHLORIDE SERPL-SCNC: 103 MMOL/L (ref 98–110)
CHOLEST SERPL-MCNC: 194 MG/DL
CHOLEST/HDLC SERPL: 3.5 (CALC)
CO2 SERPL-SCNC: 29 MMOL/L (ref 20–32)
CREAT SERPL-MCNC: 0.84 MG/DL (ref 0.6–0.93)
CREAT UR-MCNC: 113 MG/DL (ref 20–275)
EOSINOPHIL # BLD AUTO: 88 CELLS/UL (ref 15–500)
EOSINOPHIL NFR BLD AUTO: 1.7 %
ERYTHROCYTE [DISTWIDTH] IN BLOOD BY AUTOMATED COUNT: 13 % (ref 11–15)
GLOBULIN SER CALC-MCNC: 3.2 G/DL (CALC) (ref 1.9–3.7)
GLUCOSE SERPL-MCNC: 84 MG/DL (ref 65–99)
HCT VFR BLD AUTO: 46.5 % (ref 35–45)
HDLC SERPL-MCNC: 56 MG/DL
HGB BLD-MCNC: 15.3 G/DL (ref 11.7–15.5)
LDLC SERPL CALC-MCNC: 118 MG/DL (CALC)
LYMPHOCYTES # BLD AUTO: 1726 CELLS/UL (ref 850–3900)
LYMPHOCYTES NFR BLD AUTO: 33.2 %
MCH RBC QN AUTO: 31.6 PG (ref 27–33)
MCHC RBC AUTO-ENTMCNC: 32.9 G/DL (ref 32–36)
MCV RBC AUTO: 96.1 FL (ref 80–100)
MICROALBUMIN UR-MCNC: 1 MG/DL
MONOCYTES # BLD AUTO: 390 CELLS/UL (ref 200–950)
MONOCYTES NFR BLD AUTO: 7.5 %
NEUTROPHILS # BLD AUTO: 2954 CELLS/UL (ref 1500–7800)
NEUTROPHILS NFR BLD AUTO: 56.8 %
NONHDLC SERPL-MCNC: 138 MG/DL (CALC)
PLATELET # BLD AUTO: 194 THOUSAND/UL (ref 140–400)
PMV BLD REES-ECKER: 9.7 FL (ref 7.5–12.5)
POTASSIUM SERPL-SCNC: 4.2 MMOL/L (ref 3.5–5.3)
PROT SERPL-MCNC: 7.3 G/DL (ref 6.1–8.1)
RBC # BLD AUTO: 4.84 MILLION/UL (ref 3.8–5.1)
SODIUM SERPL-SCNC: 140 MMOL/L (ref 135–146)
TRIGL SERPL-MCNC: 95 MG/DL
TSH SERPL-ACNC: 1.44 MIU/L (ref 0.4–4.5)
WBC # BLD AUTO: 5.2 THOUSAND/UL (ref 3.8–10.8)

## 2021-10-12 ENCOUNTER — OFFICE VISIT (OUTPATIENT)
Dept: FAMILY MEDICINE | Facility: CLINIC | Age: 71
End: 2021-10-12
Payer: MEDICARE

## 2021-10-12 VITALS
HEART RATE: 60 BPM | BODY MASS INDEX: 27.79 KG/M2 | DIASTOLIC BLOOD PRESSURE: 86 MMHG | SYSTOLIC BLOOD PRESSURE: 132 MMHG | WEIGHT: 151 LBS | HEIGHT: 62 IN

## 2021-10-12 DIAGNOSIS — G62.9 NEUROPATHY: ICD-10-CM

## 2021-10-12 DIAGNOSIS — Z51.81 ENCOUNTER FOR THERAPEUTIC DRUG MONITORING: ICD-10-CM

## 2021-10-12 DIAGNOSIS — M51.16 LUMBAR DISC DISEASE WITH RADICULOPATHY: Primary | ICD-10-CM

## 2021-10-12 DIAGNOSIS — K21.9 GASTROESOPHAGEAL REFLUX DISEASE WITHOUT ESOPHAGITIS: ICD-10-CM

## 2021-10-12 DIAGNOSIS — J43.1 PANLOBULAR EMPHYSEMA: Chronic | ICD-10-CM

## 2021-10-12 DIAGNOSIS — F41.9 ANXIETY: Chronic | ICD-10-CM

## 2021-10-12 DIAGNOSIS — Z23 NEED FOR INFLUENZA VACCINATION: ICD-10-CM

## 2021-10-12 DIAGNOSIS — Z12.11 SPECIAL SCREENING FOR MALIGNANT NEOPLASMS, COLON: ICD-10-CM

## 2021-10-12 DIAGNOSIS — Z79.899 ENCOUNTER FOR LONG-TERM (CURRENT) USE OF OTHER MEDICATIONS: ICD-10-CM

## 2021-10-12 DIAGNOSIS — Z78.0 MENOPAUSE: ICD-10-CM

## 2021-10-12 DIAGNOSIS — I10 PRIMARY HYPERTENSION: ICD-10-CM

## 2021-10-12 PROCEDURE — 3288F PR FALLS RISK ASSESSMENT DOCUMENTED: ICD-10-PCS | Mod: S$GLB,,, | Performed by: FAMILY MEDICINE

## 2021-10-12 PROCEDURE — 4010F PR ACE/ARB THEARPY RXD/TAKEN: ICD-10-PCS | Mod: S$GLB,,, | Performed by: FAMILY MEDICINE

## 2021-10-12 PROCEDURE — 99214 PR OFFICE/OUTPT VISIT, EST, LEVL IV, 30-39 MIN: ICD-10-PCS | Mod: 25,S$GLB,, | Performed by: FAMILY MEDICINE

## 2021-10-12 PROCEDURE — 1159F PR MEDICATION LIST DOCUMENTED IN MEDICAL RECORD: ICD-10-PCS | Mod: S$GLB,,, | Performed by: FAMILY MEDICINE

## 2021-10-12 PROCEDURE — 4010F ACE/ARB THERAPY RXD/TAKEN: CPT | Mod: S$GLB,,, | Performed by: FAMILY MEDICINE

## 2021-10-12 PROCEDURE — 3288F FALL RISK ASSESSMENT DOCD: CPT | Mod: S$GLB,,, | Performed by: FAMILY MEDICINE

## 2021-10-12 PROCEDURE — 3075F SYST BP GE 130 - 139MM HG: CPT | Mod: S$GLB,,, | Performed by: FAMILY MEDICINE

## 2021-10-12 PROCEDURE — 3008F PR BODY MASS INDEX (BMI) DOCUMENTED: ICD-10-PCS | Mod: S$GLB,,, | Performed by: FAMILY MEDICINE

## 2021-10-12 PROCEDURE — 3079F PR MOST RECENT DIASTOLIC BLOOD PRESSURE 80-89 MM HG: ICD-10-PCS | Mod: S$GLB,,, | Performed by: FAMILY MEDICINE

## 2021-10-12 PROCEDURE — 1101F PR PT FALLS ASSESS DOC 0-1 FALLS W/OUT INJ PAST YR: ICD-10-PCS | Mod: S$GLB,,, | Performed by: FAMILY MEDICINE

## 2021-10-12 PROCEDURE — 3008F BODY MASS INDEX DOCD: CPT | Mod: S$GLB,,, | Performed by: FAMILY MEDICINE

## 2021-10-12 PROCEDURE — 3075F PR MOST RECENT SYSTOLIC BLOOD PRESS GE 130-139MM HG: ICD-10-PCS | Mod: S$GLB,,, | Performed by: FAMILY MEDICINE

## 2021-10-12 PROCEDURE — 3066F NEPHROPATHY DOC TX: CPT | Mod: S$GLB,,, | Performed by: FAMILY MEDICINE

## 2021-10-12 PROCEDURE — 3061F NEG MICROALBUMINURIA REV: CPT | Mod: S$GLB,,, | Performed by: FAMILY MEDICINE

## 2021-10-12 PROCEDURE — 1159F MED LIST DOCD IN RCRD: CPT | Mod: S$GLB,,, | Performed by: FAMILY MEDICINE

## 2021-10-12 PROCEDURE — G0008 FLU VACCINE - QUADRIVALENT - HIGH DOSE (65+) PRESERVATIVE FREE IM: ICD-10-PCS | Mod: S$GLB,,, | Performed by: FAMILY MEDICINE

## 2021-10-12 PROCEDURE — 3079F DIAST BP 80-89 MM HG: CPT | Mod: S$GLB,,, | Performed by: FAMILY MEDICINE

## 2021-10-12 PROCEDURE — 99214 OFFICE O/P EST MOD 30 MIN: CPT | Mod: 25,S$GLB,, | Performed by: FAMILY MEDICINE

## 2021-10-12 PROCEDURE — 90662 IIV NO PRSV INCREASED AG IM: CPT | Mod: S$GLB,,, | Performed by: FAMILY MEDICINE

## 2021-10-12 PROCEDURE — 3066F PR DOCUMENTATION OF TREATMENT FOR NEPHROPATHY: ICD-10-PCS | Mod: S$GLB,,, | Performed by: FAMILY MEDICINE

## 2021-10-12 PROCEDURE — 1126F PR PAIN SEVERITY QUANTIFIED, NO PAIN PRESENT: ICD-10-PCS | Mod: S$GLB,,, | Performed by: FAMILY MEDICINE

## 2021-10-12 PROCEDURE — 1160F RVW MEDS BY RX/DR IN RCRD: CPT | Mod: S$GLB,,, | Performed by: FAMILY MEDICINE

## 2021-10-12 PROCEDURE — 90662 FLU VACCINE - QUADRIVALENT - HIGH DOSE (65+) PRESERVATIVE FREE IM: ICD-10-PCS | Mod: S$GLB,,, | Performed by: FAMILY MEDICINE

## 2021-10-12 PROCEDURE — 1101F PT FALLS ASSESS-DOCD LE1/YR: CPT | Mod: S$GLB,,, | Performed by: FAMILY MEDICINE

## 2021-10-12 PROCEDURE — 3061F PR NEG MICROALBUMINURIA RESULT DOCUMENTED/REVIEW: ICD-10-PCS | Mod: S$GLB,,, | Performed by: FAMILY MEDICINE

## 2021-10-12 PROCEDURE — 1160F PR REVIEW ALL MEDS BY PRESCRIBER/CLIN PHARMACIST DOCUMENTED: ICD-10-PCS | Mod: S$GLB,,, | Performed by: FAMILY MEDICINE

## 2021-10-12 PROCEDURE — 1126F AMNT PAIN NOTED NONE PRSNT: CPT | Mod: S$GLB,,, | Performed by: FAMILY MEDICINE

## 2021-10-12 PROCEDURE — G0008 ADMIN INFLUENZA VIRUS VAC: HCPCS | Mod: S$GLB,,, | Performed by: FAMILY MEDICINE

## 2021-10-13 PROBLEM — I10 PRIMARY HYPERTENSION: Status: ACTIVE | Noted: 2019-09-15

## 2021-10-13 LAB
ALBUMIN/CREAT UR: 11 MCG/MG CREAT
CREAT UR-MCNC: 165 MG/DL (ref 20–275)
MICROALBUMIN UR-MCNC: 1.8 MG/DL

## 2021-10-15 ENCOUNTER — HOSPITAL ENCOUNTER (OUTPATIENT)
Dept: RADIOLOGY | Facility: HOSPITAL | Age: 71
Discharge: HOME | End: 2021-10-15
Attending: FAMILY MEDICINE
Payer: MEDICARE

## 2021-10-15 DIAGNOSIS — Z78.0 MENOPAUSE: ICD-10-CM

## 2021-10-15 PROCEDURE — 77080 DXA BONE DENSITY AXIAL: CPT | Mod: TC,PO

## 2021-10-18 ENCOUNTER — TELEPHONE (OUTPATIENT)
Dept: FAMILY MEDICINE | Facility: CLINIC | Age: 71
End: 2021-10-18

## 2021-11-22 ENCOUNTER — TELEPHONE (OUTPATIENT)
Dept: FAMILY MEDICINE | Facility: CLINIC | Age: 71
End: 2021-11-22
Payer: MEDICARE

## 2021-11-22 ENCOUNTER — HOSPITAL ENCOUNTER (EMERGENCY)
Facility: HOSPITAL | Age: 71
Discharge: HOME | End: 2021-11-22
Attending: EMERGENCY MEDICINE
Payer: MEDICARE

## 2021-11-22 VITALS
HEIGHT: 62 IN | BODY MASS INDEX: 27.6 KG/M2 | TEMPERATURE: 98 F | SYSTOLIC BLOOD PRESSURE: 146 MMHG | OXYGEN SATURATION: 98 % | HEART RATE: 55 BPM | WEIGHT: 150 LBS | RESPIRATION RATE: 22 BRPM | DIASTOLIC BLOOD PRESSURE: 73 MMHG

## 2021-11-22 DIAGNOSIS — R07.9 CHEST PAIN: ICD-10-CM

## 2021-11-22 DIAGNOSIS — R07.9 CHEST PAIN, UNSPECIFIED TYPE: Primary | ICD-10-CM

## 2021-11-22 DIAGNOSIS — I10 HYPERTENSION, UNSPECIFIED TYPE: ICD-10-CM

## 2021-11-22 LAB
ALBUMIN SERPL BCP-MCNC: 4.1 G/DL (ref 3.5–5.2)
ALP SERPL-CCNC: 76 U/L (ref 55–135)
ALT SERPL W/O P-5'-P-CCNC: 16 U/L (ref 10–44)
ANION GAP SERPL CALC-SCNC: 11 MMOL/L (ref 8–16)
AST SERPL-CCNC: 21 U/L (ref 10–40)
BASOPHILS # BLD AUTO: 0.04 K/UL (ref 0–0.2)
BASOPHILS NFR BLD: 0.5 % (ref 0–1.9)
BILIRUB SERPL-MCNC: 0.8 MG/DL (ref 0.1–1)
BNP SERPL-MCNC: 55 PG/ML (ref 0–99)
BUN SERPL-MCNC: 18 MG/DL (ref 8–23)
CALCIUM SERPL-MCNC: 9.5 MG/DL (ref 8.7–10.5)
CHLORIDE SERPL-SCNC: 102 MMOL/L (ref 95–110)
CO2 SERPL-SCNC: 23 MMOL/L (ref 23–29)
CREAT SERPL-MCNC: 1 MG/DL (ref 0.5–1.4)
DIFFERENTIAL METHOD: ABNORMAL
EOSINOPHIL # BLD AUTO: 0.1 K/UL (ref 0–0.5)
EOSINOPHIL NFR BLD: 1.9 % (ref 0–8)
ERYTHROCYTE [DISTWIDTH] IN BLOOD BY AUTOMATED COUNT: 13.5 % (ref 11.5–14.5)
EST. GFR  (AFRICAN AMERICAN): >60 ML/MIN/1.73 M^2
EST. GFR  (NON AFRICAN AMERICAN): 56 ML/MIN/1.73 M^2
GLUCOSE SERPL-MCNC: 93 MG/DL (ref 70–110)
HCT VFR BLD AUTO: 46.8 % (ref 37–48.5)
HGB BLD-MCNC: 15.5 G/DL (ref 12–16)
IMM GRANULOCYTES # BLD AUTO: 0.02 K/UL (ref 0–0.04)
IMM GRANULOCYTES NFR BLD AUTO: 0.3 % (ref 0–0.5)
INR PPP: 1
LYMPHOCYTES # BLD AUTO: 3.2 K/UL (ref 1–4.8)
LYMPHOCYTES NFR BLD: 42.1 % (ref 18–48)
MCH RBC QN AUTO: 31.8 PG (ref 27–31)
MCHC RBC AUTO-ENTMCNC: 33.1 G/DL (ref 32–36)
MCV RBC AUTO: 96 FL (ref 82–98)
MONOCYTES # BLD AUTO: 0.7 K/UL (ref 0.3–1)
MONOCYTES NFR BLD: 9.6 % (ref 4–15)
NEUTROPHILS # BLD AUTO: 3.4 K/UL (ref 1.8–7.7)
NEUTROPHILS NFR BLD: 45.6 % (ref 38–73)
NRBC BLD-RTO: 0 /100 WBC
PLATELET # BLD AUTO: 181 K/UL (ref 150–450)
PMV BLD AUTO: 9.6 FL (ref 9.2–12.9)
POTASSIUM SERPL-SCNC: 4.1 MMOL/L (ref 3.5–5.1)
PROT SERPL-MCNC: 8.1 G/DL (ref 6–8.4)
PROTHROMBIN TIME: 12.4 SEC (ref 11.4–13.7)
RBC # BLD AUTO: 4.88 M/UL (ref 4–5.4)
SODIUM SERPL-SCNC: 136 MMOL/L (ref 136–145)
TROPONIN I SERPL DL<=0.01 NG/ML-MCNC: <0.03 NG/ML
WBC # BLD AUTO: 7.48 K/UL (ref 3.9–12.7)

## 2021-11-22 PROCEDURE — 99284 EMERGENCY DEPT VISIT MOD MDM: CPT | Mod: 25

## 2021-11-22 PROCEDURE — 93010 EKG 12-LEAD: ICD-10-PCS | Mod: ,,, | Performed by: INTERNAL MEDICINE

## 2021-11-22 PROCEDURE — 80053 COMPREHEN METABOLIC PANEL: CPT | Performed by: EMERGENCY MEDICINE

## 2021-11-22 PROCEDURE — 25000003 PHARM REV CODE 250: Performed by: EMERGENCY MEDICINE

## 2021-11-22 PROCEDURE — 84484 ASSAY OF TROPONIN QUANT: CPT | Performed by: EMERGENCY MEDICINE

## 2021-11-22 PROCEDURE — 85025 COMPLETE CBC W/AUTO DIFF WBC: CPT | Performed by: EMERGENCY MEDICINE

## 2021-11-22 PROCEDURE — 85610 PROTHROMBIN TIME: CPT | Performed by: EMERGENCY MEDICINE

## 2021-11-22 PROCEDURE — 83880 ASSAY OF NATRIURETIC PEPTIDE: CPT | Performed by: EMERGENCY MEDICINE

## 2021-11-22 PROCEDURE — 93010 ELECTROCARDIOGRAM REPORT: CPT | Mod: ,,, | Performed by: INTERNAL MEDICINE

## 2021-11-22 PROCEDURE — 93005 ELECTROCARDIOGRAM TRACING: CPT | Performed by: INTERNAL MEDICINE

## 2021-11-22 RX ORDER — LORAZEPAM 2 MG/ML
0.5 INJECTION INTRAMUSCULAR
Status: DISCONTINUED | OUTPATIENT
Start: 2021-11-22 | End: 2021-11-22 | Stop reason: HOSPADM

## 2021-11-22 RX ORDER — NICARDIPINE HYDROCHLORIDE 0.2 MG/ML
2.5 INJECTION INTRAVENOUS CONTINUOUS
Status: DISCONTINUED | OUTPATIENT
Start: 2021-11-22 | End: 2021-11-22 | Stop reason: HOSPADM

## 2021-11-22 RX ORDER — NAPROXEN SODIUM 220 MG/1
324 TABLET, FILM COATED ORAL ONCE
Status: DISCONTINUED | OUTPATIENT
Start: 2021-11-22 | End: 2021-11-22

## 2021-11-22 RX ORDER — ASPIRIN 325 MG
325 TABLET ORAL
Status: COMPLETED | OUTPATIENT
Start: 2021-11-22 | End: 2021-11-22

## 2021-11-22 RX ADMIN — ASPIRIN 325 MG ORAL TABLET 325 MG: 325 PILL ORAL at 01:11

## 2021-11-22 RX ADMIN — DICYCLOMINE HYDROCHLORIDE 50 ML: 10 SOLUTION ORAL at 01:11

## 2021-11-23 ENCOUNTER — TELEPHONE (OUTPATIENT)
Dept: CARDIOLOGY | Facility: CLINIC | Age: 71
End: 2021-11-23

## 2021-11-23 ENCOUNTER — OFFICE VISIT (OUTPATIENT)
Dept: CARDIOLOGY | Facility: CLINIC | Age: 71
End: 2021-11-23
Payer: MEDICARE

## 2021-11-23 VITALS
BODY MASS INDEX: 27.79 KG/M2 | WEIGHT: 151 LBS | HEIGHT: 62 IN | HEART RATE: 85 BPM | OXYGEN SATURATION: 98 % | RESPIRATION RATE: 16 BRPM | SYSTOLIC BLOOD PRESSURE: 118 MMHG | DIASTOLIC BLOOD PRESSURE: 64 MMHG

## 2021-11-23 DIAGNOSIS — K21.9 GASTROESOPHAGEAL REFLUX DISEASE WITHOUT ESOPHAGITIS: ICD-10-CM

## 2021-11-23 DIAGNOSIS — R94.31 NONSPECIFIC ABNORMAL ELECTROCARDIOGRAM (ECG) (EKG): ICD-10-CM

## 2021-11-23 DIAGNOSIS — R07.2 PRECORDIAL PAIN: Primary | ICD-10-CM

## 2021-11-23 DIAGNOSIS — F17.200 CURRENT SMOKER: ICD-10-CM

## 2021-11-23 DIAGNOSIS — I10 ESSENTIAL HYPERTENSION: ICD-10-CM

## 2021-11-23 PROCEDURE — 3066F PR DOCUMENTATION OF TREATMENT FOR NEPHROPATHY: ICD-10-PCS | Mod: CPTII,S$GLB,, | Performed by: INTERNAL MEDICINE

## 2021-11-23 PROCEDURE — 99205 OFFICE O/P NEW HI 60 MIN: CPT | Mod: S$GLB,,, | Performed by: INTERNAL MEDICINE

## 2021-11-23 PROCEDURE — 99205 PR OFFICE/OUTPT VISIT, NEW, LEVL V, 60-74 MIN: ICD-10-PCS | Mod: S$GLB,,, | Performed by: INTERNAL MEDICINE

## 2021-11-23 PROCEDURE — 4010F PR ACE/ARB THEARPY RXD/TAKEN: ICD-10-PCS | Mod: CPTII,S$GLB,, | Performed by: INTERNAL MEDICINE

## 2021-11-23 PROCEDURE — 93000 EKG 12-LEAD: ICD-10-PCS | Mod: S$GLB,,, | Performed by: INTERNAL MEDICINE

## 2021-11-23 PROCEDURE — 3061F PR NEG MICROALBUMINURIA RESULT DOCUMENTED/REVIEW: ICD-10-PCS | Mod: CPTII,S$GLB,, | Performed by: INTERNAL MEDICINE

## 2021-11-23 PROCEDURE — 3061F NEG MICROALBUMINURIA REV: CPT | Mod: CPTII,S$GLB,, | Performed by: INTERNAL MEDICINE

## 2021-11-23 PROCEDURE — 4010F ACE/ARB THERAPY RXD/TAKEN: CPT | Mod: CPTII,S$GLB,, | Performed by: INTERNAL MEDICINE

## 2021-11-23 PROCEDURE — 93000 ELECTROCARDIOGRAM COMPLETE: CPT | Mod: S$GLB,,, | Performed by: INTERNAL MEDICINE

## 2021-11-23 PROCEDURE — 3066F NEPHROPATHY DOC TX: CPT | Mod: CPTII,S$GLB,, | Performed by: INTERNAL MEDICINE

## 2021-11-24 DIAGNOSIS — I71.40 ABDOMINAL AORTIC ANEURYSM (AAA) WITHOUT RUPTURE: Primary | ICD-10-CM

## 2021-12-06 ENCOUNTER — HOSPITAL ENCOUNTER (OUTPATIENT)
Dept: RADIOLOGY | Facility: HOSPITAL | Age: 71
Discharge: HOME | End: 2021-12-06
Attending: PHYSICIAN ASSISTANT
Payer: MEDICARE

## 2021-12-06 ENCOUNTER — TELEPHONE (OUTPATIENT)
Dept: FAMILY MEDICINE | Facility: CLINIC | Age: 71
End: 2021-12-06
Payer: MEDICARE

## 2021-12-06 ENCOUNTER — OFFICE VISIT (OUTPATIENT)
Dept: FAMILY MEDICINE | Facility: CLINIC | Age: 71
End: 2021-12-06
Payer: MEDICARE

## 2021-12-06 VITALS
DIASTOLIC BLOOD PRESSURE: 80 MMHG | SYSTOLIC BLOOD PRESSURE: 134 MMHG | HEART RATE: 72 BPM | OXYGEN SATURATION: 97 % | TEMPERATURE: 98 F | HEIGHT: 62 IN | WEIGHT: 151 LBS | BODY MASS INDEX: 27.79 KG/M2

## 2021-12-06 DIAGNOSIS — E07.9 THYROID DISEASE: Chronic | ICD-10-CM

## 2021-12-06 DIAGNOSIS — Z72.0 TOBACCO ABUSE: Chronic | ICD-10-CM

## 2021-12-06 DIAGNOSIS — G62.9 NEUROPATHY: ICD-10-CM

## 2021-12-06 DIAGNOSIS — R05.9 COUGH IN ADULT: ICD-10-CM

## 2021-12-06 DIAGNOSIS — M51.16 LUMBAR DISC DISEASE WITH RADICULOPATHY: ICD-10-CM

## 2021-12-06 DIAGNOSIS — K21.9 GASTROESOPHAGEAL REFLUX DISEASE WITHOUT ESOPHAGITIS: ICD-10-CM

## 2021-12-06 DIAGNOSIS — J44.9 CHRONIC OBSTRUCTIVE PULMONARY DISEASE, UNSPECIFIED COPD TYPE: Chronic | ICD-10-CM

## 2021-12-06 DIAGNOSIS — J40 BRONCHITIS: ICD-10-CM

## 2021-12-06 DIAGNOSIS — Z20.822 COVID-19 RULED OUT: ICD-10-CM

## 2021-12-06 DIAGNOSIS — F41.9 ANXIETY: Chronic | ICD-10-CM

## 2021-12-06 DIAGNOSIS — I10 ESSENTIAL HYPERTENSION: ICD-10-CM

## 2021-12-06 DIAGNOSIS — J32.4 PANSINUSITIS, UNSPECIFIED CHRONICITY: Primary | ICD-10-CM

## 2021-12-06 LAB
CTP QC/QA: YES
SARS-COV-2 RDRP RESP QL NAA+PROBE: NEGATIVE

## 2021-12-06 PROCEDURE — 96372 THER/PROPH/DIAG INJ SC/IM: CPT | Mod: S$GLB,,, | Performed by: PHYSICIAN ASSISTANT

## 2021-12-06 PROCEDURE — 3066F PR DOCUMENTATION OF TREATMENT FOR NEPHROPATHY: ICD-10-PCS | Mod: S$GLB,,, | Performed by: PHYSICIAN ASSISTANT

## 2021-12-06 PROCEDURE — 4010F PR ACE/ARB THEARPY RXD/TAKEN: ICD-10-PCS | Mod: S$GLB,,, | Performed by: PHYSICIAN ASSISTANT

## 2021-12-06 PROCEDURE — U0002: ICD-10-PCS | Mod: QW,S$GLB,, | Performed by: PHYSICIAN ASSISTANT

## 2021-12-06 PROCEDURE — 99214 OFFICE O/P EST MOD 30 MIN: CPT | Mod: 25,S$GLB,, | Performed by: PHYSICIAN ASSISTANT

## 2021-12-06 PROCEDURE — 71046 X-RAY EXAM CHEST 2 VIEWS: CPT | Mod: TC,PO

## 2021-12-06 PROCEDURE — 3066F NEPHROPATHY DOC TX: CPT | Mod: S$GLB,,, | Performed by: PHYSICIAN ASSISTANT

## 2021-12-06 PROCEDURE — 96372 PR INJECTION,THERAP/PROPH/DIAG2ST, IM OR SUBCUT: ICD-10-PCS | Mod: S$GLB,,, | Performed by: PHYSICIAN ASSISTANT

## 2021-12-06 PROCEDURE — 3061F PR NEG MICROALBUMINURIA RESULT DOCUMENTED/REVIEW: ICD-10-PCS | Mod: S$GLB,,, | Performed by: PHYSICIAN ASSISTANT

## 2021-12-06 PROCEDURE — 99214 PR OFFICE/OUTPT VISIT, EST, LEVL IV, 30-39 MIN: ICD-10-PCS | Mod: 25,S$GLB,, | Performed by: PHYSICIAN ASSISTANT

## 2021-12-06 PROCEDURE — U0002 COVID-19 LAB TEST NON-CDC: HCPCS | Mod: QW,S$GLB,, | Performed by: PHYSICIAN ASSISTANT

## 2021-12-06 PROCEDURE — 4010F ACE/ARB THERAPY RXD/TAKEN: CPT | Mod: S$GLB,,, | Performed by: PHYSICIAN ASSISTANT

## 2021-12-06 PROCEDURE — 3061F NEG MICROALBUMINURIA REV: CPT | Mod: S$GLB,,, | Performed by: PHYSICIAN ASSISTANT

## 2021-12-06 RX ORDER — BENZONATATE 100 MG/1
100 CAPSULE ORAL 3 TIMES DAILY PRN
Qty: 30 CAPSULE | Refills: 0 | Status: SHIPPED | OUTPATIENT
Start: 2021-12-06 | End: 2021-12-16

## 2021-12-06 RX ORDER — DEXAMETHASONE SODIUM PHOSPHATE 4 MG/ML
8 INJECTION, SOLUTION INTRA-ARTICULAR; INTRALESIONAL; INTRAMUSCULAR; INTRAVENOUS; SOFT TISSUE
Status: COMPLETED | OUTPATIENT
Start: 2021-12-06 | End: 2021-12-06

## 2021-12-06 RX ORDER — DOXYCYCLINE 100 MG/1
100 CAPSULE ORAL 2 TIMES DAILY
Qty: 20 CAPSULE | Refills: 0 | Status: SHIPPED | OUTPATIENT
Start: 2021-12-06 | End: 2021-12-16

## 2021-12-06 RX ADMIN — DEXAMETHASONE SODIUM PHOSPHATE 8 MG: 4 INJECTION, SOLUTION INTRA-ARTICULAR; INTRALESIONAL; INTRAMUSCULAR; INTRAVENOUS; SOFT TISSUE at 12:12

## 2021-12-07 ENCOUNTER — HOSPITAL ENCOUNTER (EMERGENCY)
Facility: HOSPITAL | Age: 71
Discharge: HOME | End: 2021-12-08
Attending: EMERGENCY MEDICINE
Payer: MEDICARE

## 2021-12-07 DIAGNOSIS — T78.40XA ALLERGIC REACTION, INITIAL ENCOUNTER: Primary | ICD-10-CM

## 2021-12-07 PROCEDURE — 25000003 PHARM REV CODE 250: Performed by: EMERGENCY MEDICINE

## 2021-12-07 PROCEDURE — 96374 THER/PROPH/DIAG INJ IV PUSH: CPT

## 2021-12-07 PROCEDURE — 96375 TX/PRO/DX INJ NEW DRUG ADDON: CPT

## 2021-12-07 PROCEDURE — 99284 EMERGENCY DEPT VISIT MOD MDM: CPT | Mod: 25

## 2021-12-07 PROCEDURE — 63600175 PHARM REV CODE 636 W HCPCS: Performed by: EMERGENCY MEDICINE

## 2021-12-07 RX ORDER — DIPHENHYDRAMINE HYDROCHLORIDE 50 MG/ML
25 INJECTION INTRAMUSCULAR; INTRAVENOUS
Status: COMPLETED | OUTPATIENT
Start: 2021-12-07 | End: 2021-12-07

## 2021-12-07 RX ORDER — FAMOTIDINE 10 MG/ML
20 INJECTION INTRAVENOUS
Status: COMPLETED | OUTPATIENT
Start: 2021-12-07 | End: 2021-12-07

## 2021-12-07 RX ADMIN — DIPHENHYDRAMINE HYDROCHLORIDE 25 MG: 50 INJECTION INTRAMUSCULAR; INTRAVENOUS at 11:12

## 2021-12-07 RX ADMIN — FAMOTIDINE 20 MG: 10 INJECTION INTRAVENOUS at 11:12

## 2021-12-08 ENCOUNTER — TELEPHONE (OUTPATIENT)
Dept: FAMILY MEDICINE | Facility: CLINIC | Age: 71
End: 2021-12-08
Payer: MEDICARE

## 2021-12-08 VITALS
BODY MASS INDEX: 27.6 KG/M2 | HEART RATE: 58 BPM | WEIGHT: 150 LBS | TEMPERATURE: 98 F | OXYGEN SATURATION: 98 % | HEIGHT: 62 IN | DIASTOLIC BLOOD PRESSURE: 77 MMHG | RESPIRATION RATE: 22 BRPM | SYSTOLIC BLOOD PRESSURE: 169 MMHG

## 2021-12-08 RX ORDER — GUAIFENESIN/DEXTROMETHORPHAN 100-10MG/5
5 SYRUP ORAL 4 TIMES DAILY PRN
Qty: 120 ML | Refills: 0 | Status: SHIPPED | OUTPATIENT
Start: 2021-12-08 | End: 2021-12-18

## 2021-12-08 RX ORDER — EPINEPHRINE 0.3 MG/.3ML
1 INJECTION SUBCUTANEOUS
Qty: 1 EACH | Refills: 1 | Status: SHIPPED | OUTPATIENT
Start: 2021-12-08 | End: 2023-07-17 | Stop reason: SDUPTHER

## 2022-01-03 ENCOUNTER — CLINICAL SUPPORT (OUTPATIENT)
Dept: CARDIOLOGY | Facility: HOSPITAL | Age: 72
End: 2022-01-03
Attending: INTERNAL MEDICINE
Payer: MEDICARE

## 2022-01-03 ENCOUNTER — HOSPITAL ENCOUNTER (OUTPATIENT)
Facility: HOSPITAL | Age: 72
Discharge: HOME | End: 2022-01-03
Attending: EMERGENCY MEDICINE | Admitting: INTERNAL MEDICINE
Payer: MEDICARE

## 2022-01-03 ENCOUNTER — HOSPITAL ENCOUNTER (OUTPATIENT)
Dept: RADIOLOGY | Facility: HOSPITAL | Age: 72
Discharge: HOME | End: 2022-01-03
Attending: INTERNAL MEDICINE
Payer: MEDICARE

## 2022-01-03 VITALS
HEART RATE: 62 BPM | OXYGEN SATURATION: 95 % | RESPIRATION RATE: 20 BRPM | BODY MASS INDEX: 28.11 KG/M2 | TEMPERATURE: 98 F | SYSTOLIC BLOOD PRESSURE: 173 MMHG | WEIGHT: 152.75 LBS | DIASTOLIC BLOOD PRESSURE: 83 MMHG | HEIGHT: 62 IN

## 2022-01-03 DIAGNOSIS — R07.9 CHEST PAIN: ICD-10-CM

## 2022-01-03 DIAGNOSIS — R07.2 PRECORDIAL PAIN: Primary | ICD-10-CM

## 2022-01-03 LAB
ALBUMIN SERPL BCP-MCNC: 4 G/DL (ref 3.5–5.2)
ALP SERPL-CCNC: 81 U/L (ref 55–135)
ALT SERPL W/O P-5'-P-CCNC: 23 U/L (ref 10–44)
ANION GAP SERPL CALC-SCNC: 11 MMOL/L (ref 8–16)
AST SERPL-CCNC: 22 U/L (ref 10–40)
BASOPHILS # BLD AUTO: 0.04 K/UL (ref 0–0.2)
BASOPHILS NFR BLD: 0.7 % (ref 0–1.9)
BILIRUB SERPL-MCNC: 0.7 MG/DL (ref 0.1–1)
BNP SERPL-MCNC: 94 PG/ML (ref 0–99)
BUN SERPL-MCNC: 13 MG/DL (ref 8–23)
CALCIUM SERPL-MCNC: 9.4 MG/DL (ref 8.7–10.5)
CHLORIDE SERPL-SCNC: 105 MMOL/L (ref 95–110)
CK MB SERPL-MCNC: 1.4 NG/ML (ref 0.1–6.5)
CO2 SERPL-SCNC: 25 MMOL/L (ref 23–29)
CREAT SERPL-MCNC: 0.9 MG/DL (ref 0.5–1.4)
CV STRESS BASE HR: 59 BPM
DIASTOLIC BLOOD PRESSURE: 95 MMHG
DIFFERENTIAL METHOD: ABNORMAL
EOSINOPHIL # BLD AUTO: 0.2 K/UL (ref 0–0.5)
EOSINOPHIL NFR BLD: 2.8 % (ref 0–8)
ERYTHROCYTE [DISTWIDTH] IN BLOOD BY AUTOMATED COUNT: 13 % (ref 11.5–14.5)
EST. GFR  (AFRICAN AMERICAN): >60 ML/MIN/1.73 M^2
EST. GFR  (NON AFRICAN AMERICAN): >60 ML/MIN/1.73 M^2
GLUCOSE SERPL-MCNC: 98 MG/DL (ref 70–110)
HCT VFR BLD AUTO: 45.5 % (ref 37–48.5)
HGB BLD-MCNC: 15.4 G/DL (ref 12–16)
IMM GRANULOCYTES # BLD AUTO: 0.02 K/UL (ref 0–0.04)
IMM GRANULOCYTES NFR BLD AUTO: 0.4 % (ref 0–0.5)
INR PPP: 1.1
LYMPHOCYTES # BLD AUTO: 1.8 K/UL (ref 1–4.8)
LYMPHOCYTES NFR BLD: 32 % (ref 18–48)
MCH RBC QN AUTO: 32.3 PG (ref 27–31)
MCHC RBC AUTO-ENTMCNC: 33.8 G/DL (ref 32–36)
MCV RBC AUTO: 95 FL (ref 82–98)
MONOCYTES # BLD AUTO: 0.5 K/UL (ref 0.3–1)
MONOCYTES NFR BLD: 8.2 % (ref 4–15)
NEUTROPHILS # BLD AUTO: 3.2 K/UL (ref 1.8–7.7)
NEUTROPHILS NFR BLD: 55.9 % (ref 38–73)
NRBC BLD-RTO: 0 /100 WBC
OHS CV CPX 1 MINUTE RECOVERY HEART RATE: 102 BPM
OHS CV CPX 85 PERCENT MAX PREDICTED HEART RATE MALE: 120
OHS CV CPX MAX PREDICTED HEART RATE: 142
OHS CV CPX PATIENT IS FEMALE: 1
OHS CV CPX PATIENT IS MALE: 0
OHS CV CPX PEAK DIASTOLIC BLOOD PRESSURE: 114 MMHG
OHS CV CPX PEAK HEAR RATE: 104 BPM
OHS CV CPX PEAK RATE PRESSURE PRODUCT: NORMAL
OHS CV CPX PEAK SYSTOLIC BLOOD PRESSURE: 162 MMHG
OHS CV CPX PERCENT MAX PREDICTED HEART RATE ACHIEVED: 73
OHS CV CPX RATE PRESSURE PRODUCT PRESENTING: 8201
PLATELET # BLD AUTO: 212 K/UL (ref 150–450)
PMV BLD AUTO: 9.4 FL (ref 9.2–12.9)
POTASSIUM SERPL-SCNC: 3.9 MMOL/L (ref 3.5–5.1)
PROT SERPL-MCNC: 7.5 G/DL (ref 6–8.4)
PROTHROMBIN TIME: 13.1 SEC (ref 11.4–13.7)
RBC # BLD AUTO: 4.77 M/UL (ref 4–5.4)
SARS-COV-2 RDRP RESP QL NAA+PROBE: NEGATIVE
SODIUM SERPL-SCNC: 141 MMOL/L (ref 136–145)
SYSTOLIC BLOOD PRESSURE: 139 MMHG
TROPONIN I SERPL DL<=0.01 NG/ML-MCNC: <0.03 NG/ML
WBC # BLD AUTO: 5.71 K/UL (ref 3.9–12.7)

## 2022-01-03 PROCEDURE — 63600175 PHARM REV CODE 636 W HCPCS: Performed by: INTERNAL MEDICINE

## 2022-01-03 PROCEDURE — 82553 CREATINE MB FRACTION: CPT | Performed by: INTERNAL MEDICINE

## 2022-01-03 PROCEDURE — 93010 EKG 12-LEAD: ICD-10-PCS | Mod: ,,, | Performed by: INTERNAL MEDICINE

## 2022-01-03 PROCEDURE — 83880 ASSAY OF NATRIURETIC PEPTIDE: CPT | Performed by: EMERGENCY MEDICINE

## 2022-01-03 PROCEDURE — G0378 HOSPITAL OBSERVATION PER HR: HCPCS

## 2022-01-03 PROCEDURE — 93016 CV STRESS TEST SUPVJ ONLY: CPT | Mod: ,,, | Performed by: INTERNAL MEDICINE

## 2022-01-03 PROCEDURE — 93016 NUCLEAR STRESS TEST (CUPID ONLY): ICD-10-PCS | Mod: ,,, | Performed by: INTERNAL MEDICINE

## 2022-01-03 PROCEDURE — 25000003 PHARM REV CODE 250: Performed by: INTERNAL MEDICINE

## 2022-01-03 PROCEDURE — 84484 ASSAY OF TROPONIN QUANT: CPT | Mod: 91 | Performed by: INTERNAL MEDICINE

## 2022-01-03 PROCEDURE — 80053 COMPREHEN METABOLIC PANEL: CPT | Performed by: EMERGENCY MEDICINE

## 2022-01-03 PROCEDURE — 93018 CV STRESS TEST I&R ONLY: CPT | Mod: ,,, | Performed by: INTERNAL MEDICINE

## 2022-01-03 PROCEDURE — 84484 ASSAY OF TROPONIN QUANT: CPT | Mod: 91 | Performed by: EMERGENCY MEDICINE

## 2022-01-03 PROCEDURE — 93010 ELECTROCARDIOGRAM REPORT: CPT | Mod: ,,, | Performed by: INTERNAL MEDICINE

## 2022-01-03 PROCEDURE — 93005 ELECTROCARDIOGRAM TRACING: CPT | Performed by: INTERNAL MEDICINE

## 2022-01-03 PROCEDURE — 96374 THER/PROPH/DIAG INJ IV PUSH: CPT

## 2022-01-03 PROCEDURE — U0002 COVID-19 LAB TEST NON-CDC: HCPCS | Performed by: EMERGENCY MEDICINE

## 2022-01-03 PROCEDURE — 84484 ASSAY OF TROPONIN QUANT: CPT | Performed by: EMERGENCY MEDICINE

## 2022-01-03 PROCEDURE — 85610 PROTHROMBIN TIME: CPT | Performed by: EMERGENCY MEDICINE

## 2022-01-03 PROCEDURE — 99285 EMERGENCY DEPT VISIT HI MDM: CPT | Mod: 25

## 2022-01-03 PROCEDURE — 85025 COMPLETE CBC W/AUTO DIFF WBC: CPT | Performed by: EMERGENCY MEDICINE

## 2022-01-03 PROCEDURE — A9502 TC99M TETROFOSMIN: HCPCS

## 2022-01-03 PROCEDURE — 93018 NUCLEAR STRESS TEST (CUPID ONLY): ICD-10-PCS | Mod: ,,, | Performed by: INTERNAL MEDICINE

## 2022-01-03 PROCEDURE — 93017 CV STRESS TEST TRACING ONLY: CPT

## 2022-01-03 PROCEDURE — 25000003 PHARM REV CODE 250: Performed by: EMERGENCY MEDICINE

## 2022-01-03 RX ORDER — ALPRAZOLAM 0.5 MG/1
0.5 TABLET ORAL 3 TIMES DAILY PRN
Status: DISCONTINUED | OUTPATIENT
Start: 2022-01-03 | End: 2022-01-03 | Stop reason: HOSPADM

## 2022-01-03 RX ORDER — ASPIRIN 325 MG
325 TABLET ORAL
Status: COMPLETED | OUTPATIENT
Start: 2022-01-03 | End: 2022-01-03

## 2022-01-03 RX ORDER — REGADENOSON 0.08 MG/ML
0.4 INJECTION, SOLUTION INTRAVENOUS
Status: COMPLETED | OUTPATIENT
Start: 2022-01-03 | End: 2022-01-03

## 2022-01-03 RX ORDER — PANTOPRAZOLE SODIUM 40 MG/1
40 TABLET, DELAYED RELEASE ORAL 2 TIMES DAILY
Status: DISCONTINUED | OUTPATIENT
Start: 2022-01-03 | End: 2022-01-03 | Stop reason: HOSPADM

## 2022-01-03 RX ORDER — ACETAMINOPHEN 325 MG/1
650 TABLET ORAL EVERY 4 HOURS PRN
Status: DISCONTINUED | OUTPATIENT
Start: 2022-01-03 | End: 2022-01-03 | Stop reason: HOSPADM

## 2022-01-03 RX ORDER — MAG HYDROX/ALUMINUM HYD/SIMETH 200-200-20
30 SUSPENSION, ORAL (FINAL DOSE FORM) ORAL EVERY 6 HOURS PRN
Status: DISCONTINUED | OUTPATIENT
Start: 2022-01-03 | End: 2022-01-03

## 2022-01-03 RX ORDER — NITROGLYCERIN 0.4 MG/1
0.4 TABLET SUBLINGUAL EVERY 5 MIN PRN
Status: DISCONTINUED | OUTPATIENT
Start: 2022-01-03 | End: 2022-01-03 | Stop reason: HOSPADM

## 2022-01-03 RX ORDER — HYOSCYAMINE SULFATE 0.5 MG/ML
0.25 INJECTION, SOLUTION SUBCUTANEOUS ONCE
Status: COMPLETED | OUTPATIENT
Start: 2022-01-03 | End: 2022-01-03

## 2022-01-03 RX ORDER — ENOXAPARIN SODIUM 100 MG/ML
40 INJECTION SUBCUTANEOUS EVERY 24 HOURS
Status: DISCONTINUED | OUTPATIENT
Start: 2022-01-03 | End: 2022-01-03 | Stop reason: HOSPADM

## 2022-01-03 RX ORDER — SODIUM CHLORIDE 9 MG/ML
INJECTION, SOLUTION INTRAVENOUS
Status: COMPLETED | OUTPATIENT
Start: 2022-01-03 | End: 2022-01-03

## 2022-01-03 RX ORDER — MORPHINE SULFATE 2 MG/ML
2 INJECTION, SOLUTION INTRAMUSCULAR; INTRAVENOUS EVERY 4 HOURS PRN
Status: DISCONTINUED | OUTPATIENT
Start: 2022-01-03 | End: 2022-01-03 | Stop reason: HOSPADM

## 2022-01-03 RX ORDER — HYDROCODONE BITARTRATE AND ACETAMINOPHEN 10; 325 MG/1; MG/1
1 TABLET ORAL EVERY 8 HOURS PRN
Status: DISCONTINUED | OUTPATIENT
Start: 2022-01-03 | End: 2022-01-03

## 2022-01-03 RX ORDER — HYDROCODONE BITARTRATE AND ACETAMINOPHEN 10; 325 MG/1; MG/1
1 TABLET ORAL EVERY 8 HOURS PRN
Status: DISCONTINUED | OUTPATIENT
Start: 2022-01-03 | End: 2022-01-03 | Stop reason: HOSPADM

## 2022-01-03 RX ORDER — LISINOPRIL 20 MG/1
20 TABLET ORAL 2 TIMES DAILY
Status: DISCONTINUED | OUTPATIENT
Start: 2022-01-03 | End: 2022-01-03 | Stop reason: HOSPADM

## 2022-01-03 RX ORDER — MAG HYDROX/ALUMINUM HYD/SIMETH 200-200-20
30 SUSPENSION, ORAL (FINAL DOSE FORM) ORAL ONCE AS NEEDED
COMMUNITY

## 2022-01-03 RX ORDER — METOPROLOL TARTRATE 25 MG/1
25 TABLET, FILM COATED ORAL 2 TIMES DAILY
Status: DISCONTINUED | OUTPATIENT
Start: 2022-01-03 | End: 2022-01-03 | Stop reason: HOSPADM

## 2022-01-03 RX ORDER — HYDROCHLOROTHIAZIDE 25 MG/1
25 TABLET ORAL ONCE
Status: DISCONTINUED | OUTPATIENT
Start: 2022-01-03 | End: 2022-01-03 | Stop reason: HOSPADM

## 2022-01-03 RX ORDER — MAG HYDROX/ALUMINUM HYD/SIMETH 200-200-20
30 SUSPENSION, ORAL (FINAL DOSE FORM) ORAL EVERY 6 HOURS PRN
Status: DISCONTINUED | OUTPATIENT
Start: 2022-01-03 | End: 2022-01-03 | Stop reason: HOSPADM

## 2022-01-03 RX ORDER — SUCRALFATE 1 G/10ML
1 SUSPENSION ORAL EVERY 6 HOURS
Status: DISCONTINUED | OUTPATIENT
Start: 2022-01-03 | End: 2022-01-03 | Stop reason: HOSPADM

## 2022-01-03 RX ADMIN — DICYCLOMINE HYDROCHLORIDE 50 ML: 10 SOLUTION ORAL at 01:01

## 2022-01-03 RX ADMIN — REGADENOSON 0.4 MG: 0.08 INJECTION, SOLUTION INTRAVENOUS at 11:01

## 2022-01-03 RX ADMIN — ACETAMINOPHEN 650 MG: 325 TABLET, FILM COATED ORAL at 10:01

## 2022-01-03 RX ADMIN — ASPIRIN 325 MG ORAL TABLET 325 MG: 325 PILL ORAL at 06:01

## 2022-01-03 RX ADMIN — PANTOPRAZOLE SODIUM 40 MG: 40 TABLET, DELAYED RELEASE ORAL at 06:01

## 2022-01-03 RX ADMIN — NITROGLYCERIN 1 INCH: 20 OINTMENT TOPICAL at 06:01

## 2022-01-03 RX ADMIN — HYOSCYAMINE SULFATE 0.25 MG: 0.5 INJECTION, SOLUTION SUBCUTANEOUS at 01:01

## 2022-01-03 RX ADMIN — METOPROLOL TARTRATE 25 MG: 25 TABLET, FILM COATED ORAL at 01:01

## 2022-01-03 RX ADMIN — LISINOPRIL 20 MG: 20 TABLET ORAL at 01:01

## 2022-01-03 RX ADMIN — SODIUM CHLORIDE: 0.9 INJECTION, SOLUTION INTRAVENOUS at 06:01

## 2022-01-03 RX ADMIN — SUCRALFATE 1 G: 1 SUSPENSION ORAL at 06:01

## 2022-01-03 NOTE — ED PROVIDER NOTES
Encounter Date: 1/3/2022       History     Chief Complaint   Patient presents with    Chest Pain     Lt sided, woke her from sleep around 4am.  Described as pressure.  Associated burning sensation to Lt neck    Nausea     71-year-old female who has a history of COPD, GERD, colitis, hypertension, anxiety and Martin's esophagus, presents emergency room with complaints of having intermittent bouts of precordial chest pain that she describes as pressure-like in nature for the last 2 months.  The patient states the pain is not necessarily associated with exertion.  She has had some radiation to the left neck.  The patient's pain is described as pressure-like in nature.  She does smoke a half pack cigarettes daily.  She is not diabetic.  She has not taken aspirin or nitroglycerin.  Patient does admit to nausea associated with the shortness of breath.  She had seen Dr. Banegas in the past and had a scheduled for workup in February of this year.  Her only other significant history is that she had a AAA repair by Dr. Cornelius.        Review of patient's allergies indicates:   Allergen Reactions    Bisacodyl Nausea And Vomiting     Other reaction(s): Vomiting    Demerol [meperidine]      Nausea vomiting, visual problem    Flonase [fluticasone propionate] Hives    Morpholine analogues Other (See Comments)     hypotension     Past Medical History:   Diagnosis Date    Anxiety     Martin esophagus     Colitis     COPD (chronic obstructive pulmonary disease)     GERD (gastroesophageal reflux disease)     Hypertension     Thyroid disease     Vocal cord polyps      Past Surgical History:   Procedure Laterality Date    ABDOMINAL AORTIC ANEURYSM REPAIR      BACK SURGERY      cervical    CATARACT EXTRACTION Right 02/2021    CHOLECYSTECTOMY  12-    Dr Albert CORLEY    FOOT SURGERY      HYSTERECTOMY      SALIVARY GLAND SURGERY       Family History   Problem Relation Age of Onset    Cancer Mother     Heart  failure Father     Emphysema Sister     No Known Problems Brother     Cancer Sister      Social History     Tobacco Use    Smoking status: Current Every Day Smoker     Packs/day: 0.50     Types: Cigarettes    Smokeless tobacco: Former User     Quit date: 5/1/2016   Substance Use Topics    Alcohol use: Never    Drug use: Never     Review of Systems   Constitutional: Negative for diaphoresis.   HENT: Negative for congestion, sore throat and trouble swallowing.    Respiratory: Positive for shortness of breath. Negative for cough.    Cardiovascular: Positive for chest pain. Negative for palpitations and leg swelling.   Gastrointestinal: Negative for abdominal pain, nausea and vomiting.   Genitourinary: Negative for difficulty urinating.   Musculoskeletal: Positive for neck pain. Negative for back pain.   Skin: Negative.    Neurological: Negative for dizziness and syncope.   All other systems reviewed and are negative.      Physical Exam     Initial Vitals [01/03/22 0547]   BP Pulse Resp Temp SpO2   (!) 184/89 68 (!) 22 97.7 °F (36.5 °C) 97 %      MAP       --         Physical Exam    Vitals reviewed.  Constitutional: She appears well-developed and well-nourished. She is not diaphoretic. No distress.   Anxious and apparently uncomfortable   HENT:   Head: Normocephalic and atraumatic.   Nose: Nose normal.   Mouth/Throat: Oropharynx is clear and moist. No oropharyngeal exudate.   Eyes: Conjunctivae are normal. Pupils are equal, round, and reactive to light. Right eye exhibits no discharge. Left eye exhibits no discharge. No scleral icterus.   Neck: Neck supple. No thyromegaly present. No tracheal deviation present. No JVD present.   Normal range of motion.  Cardiovascular: Normal rate, regular rhythm, normal heart sounds and intact distal pulses. Exam reveals no gallop and no friction rub.    No murmur heard.  Pulmonary/Chest: Breath sounds normal. No stridor. No respiratory distress. She has no wheezes. She has no  rhonchi. She has no rales. She exhibits no tenderness.   Abdominal: Abdomen is soft. Bowel sounds are normal. She exhibits no distension and no mass. There is no abdominal tenderness. There is no rebound and no guarding.   Musculoskeletal:         General: No tenderness or edema. Normal range of motion.      Cervical back: Normal range of motion and neck supple.     Lymphadenopathy:     She has no cervical adenopathy.   Neurological: She is alert and oriented to person, place, and time. She has normal strength. GCS score is 15. GCS eye subscore is 4. GCS verbal subscore is 5. GCS motor subscore is 6.   Skin: Skin is warm and dry. Capillary refill takes less than 2 seconds. No rash noted. No erythema. No pallor.   Psychiatric: She has a normal mood and affect. Her behavior is normal. Judgment and thought content normal.         ED Course   Procedures  Labs Reviewed   CBC W/ AUTO DIFFERENTIAL - Abnormal; Notable for the following components:       Result Value    MCH 32.3 (*)     All other components within normal limits   COMPREHENSIVE METABOLIC PANEL   TROPONIN I   PROTIME-INR   SARS-COV-2 RNA AMPLIFICATION, QUAL   B-TYPE NATRIURETIC PEPTIDE   TROPONIN I   CK-MB   TROPONIN I   CK-MB   TROPONIN I          Imaging Results          NM Myocardial Perfusion Spect Multi Pharmacologic (Final result)  Result time 01/03/22 13:10:53    Final result by Diego Orr IV, MD (01/03/22 13:10:53)                 Narrative:    Myocardial Perfusion Imaging with SPECT Gated acquisition and Ejection Fraction single day protocol    CLINICAL INFORMATION:  Chest pain.    PROCEDURE:    Lexiscan is utilized as a pharmacologic stress agent. At peak stress, 26.2 millicuries of technetium Myoview were administered intravenously.  The rest portion of the study is accomplished on the same day, utilizing 10.2 millicuries of technetium Myoview intravenously.    FINDINGS:    There is minimal decrease in tracer accumulation that the ventricular  apex, matched on rest and stress imaging. The distribution of tracer activity appears otherwise unremarkable. There are no findings of pharmacologically induced reversibility.  The chamber size is within normal limits.  There are no wall motion abnormalities and the estimated ejection fraction is 90%.    IMPRESSION:    1.  NO FINDINGS OF PHARMACOLOGICALLY INDUCED REVERSIBILITY.    2.  ESTIMATED EJECTION FRACTION OF 90%.    Electronically signed by:  Diego Orr MD  1/3/2022 1:10 PM CST Workstation: [id number]                             X-Ray Chest AP Portable (Final result)  Result time 01/03/22 06:12:05    Final result by Diego Orr IV, MD (01/03/22 06:12:05)                 Narrative:    Chest, single view    HISTORY: Chest pain.    Comparison 12/6/2021.    The cardiac silhouette and pulmonary vasculature are within normal limits. There is arteriosclerosis and mild tortuosity of the aorta and branches.    The lungs are appropriately expanded. There are no confluent areas of airspace disease or significant volume loss. There is no effusion. Monitoring electrodes overlie the chest.    IMPRESSION:    No acute cardiopulmonary disease.    Electronically signed by:  Diego Orr MD  1/3/2022 6:12 AM CST Workstation: [id number]                               Medications   aluminum-magnesium hydroxide-simethicone 200-200-20 mg/5 mL suspension 30 mL (has no administration in time range)   lisinopriL tablet 20 mg (20 mg Oral Given 1/3/22 1314)   metoprolol tartrate (LOPRESSOR) tablet 25 mg (25 mg Oral Given 1/3/22 1314)   pantoprazole EC tablet 40 mg (has no administration in time range)   acetaminophen tablet 650 mg (650 mg Oral Given 1/3/22 1033)   enoxaparin injection 40 mg (has no administration in time range)   sucralfate 100 mg/mL suspension 1 g (has no administration in time range)   nitroGLYCERIN SL tablet 0.4 mg (has no administration in time range)   morphine injection 2 mg (has no administration in time  range)   ALPRAZolam tablet 0.5 mg (has no administration in time range)   HYDROcodone-acetaminophen  mg per tablet 1 tablet (has no administration in time range)   aspirin tablet 325 mg (325 mg Oral Given 1/3/22 0640)   nitroGLYCERIN 2% TD oint ointment 1 inch (1 inch Topical (Top) Given 1/3/22 0640)   0.9%  NaCl infusion ( Intravenous New Bag 1/3/22 0640)   GI cocktail (mylanta 30 mL, LIDOcaine 2 % viscous 10 mL, dicyclomine 10 mL) 50 mL (50 mLs Oral Given 1/3/22 1314)   hyoscyamine injection 0.25 mg (0.25 mg Intravenous Given 1/3/22 1315)                Attending Attestation:             Attending ED Notes:   This 71-year-old female who has a history of cardiac risk factors, presented with complaints of precordial chest pain with radiation to the left neck, at this time has unremarkable chest x-ray.  Screening labs were reviewed and the initial troponin is normal.  During the ED course the patient's EKG was reviewed no evidence of any acute ischemia injury.  During the course the patient received aspirin topical nitrates with resolution of her chest pain.  In light of those findings the patient is having a 2nd troponin ordered but hospital Medicine is consulted for an observation admission.               Clinical Impression:   Final diagnoses:  [R07.9] Chest pain  [R07.2] Precordial pain (Primary)          ED Disposition Condition    Observation               Misael Valdez Jr., MD  01/03/22 5180

## 2022-01-03 NOTE — ASSESSMENT & PLAN NOTE
Serial cardiac enzymes/EKG/tele Monitoring  Lexiscan stress Test  Has to r/o GI causes  Add carafate with PPI  Maalox PRN basis

## 2022-01-03 NOTE — H&P
Formerly [facility] - Emergency Dept  Hospital Medicine  History & Physical    Patient Name: Joslyn Dubon  MRN: [id number]  Patient Class: OP- Observation  Admission Date: 1/3/2022  Attending Physician: Jeffrey Cabello MD   Primary Care Provider: Jasbir Graham MD         Patient information was obtained from patient and ER records.     Subjective:     Principal Problem:Chest pain    Chief Complaint:   Chief Complaint   Patient presents with    Chest Pain     Lt sided, woke her from sleep around 4am.  Described as pressure.  Associated burning sensation to Lt neck    Nausea        HPI: 71 year old patient getting admitted with chest pain  Pt has been suffering from severe chest pain and abdominal pain for months  Describes as burning /precordial area with no aggravating /Relieving Factors  Today pain radiated to L Jaw area and pt came to ER and got admitted       Past Medical History:   Diagnosis Date    Anxiety     Martin esophagus     Colitis     COPD (chronic obstructive pulmonary disease)     GERD (gastroesophageal reflux disease)     Hypertension     Thyroid disease     Vocal cord polyps        Past Surgical History:   Procedure Laterality Date    ABDOMINAL AORTIC ANEURYSM REPAIR      BACK SURGERY      cervical    CATARACT EXTRACTION Right 02/2021    CHOLECYSTECTOMY  12-    Dr Price  [facility]    FOOT SURGERY      HYSTERECTOMY      SALIVARY GLAND SURGERY         Review of patient's allergies indicates:   Allergen Reactions    Bisacodyl Nausea And Vomiting     Other reaction(s): Vomiting    Demerol [meperidine]      Nausea vomiting, visual problem    Flonase [fluticasone propionate] Hives    Morpholine analogues Other (See Comments)     hypotension       No current facility-administered medications on file prior to encounter.     Current Outpatient Medications on File Prior to Encounter   Medication Sig    aluminum-magnesium hydroxide-simethicone (MAALOX) 200-200-20 mg/5 mL Susp  Take 30 mLs by mouth once as needed.    HYDROcodone-acetaminophen (NORCO)  mg per tablet Take 1 tablet by mouth every 12 (twelve) hours as needed.    levothyroxine (SYNTHROID) 100 MCG tablet Take 1 tablet (100 mcg total) by mouth before breakfast.    lisinopriL (PRINIVIL,ZESTRIL) 20 MG tablet Take 1 tablet (20 mg total) by mouth 2 (two) times daily.    metoprolol tartrate (LOPRESSOR) 25 MG tablet Take 1 tablet (25 mg total) by mouth 2 (two) times daily.    pantoprazole (PROTONIX) 40 MG tablet Take 1 tablet (40 mg total) by mouth 2 (two) times daily.    simethicone (GAS-X ORAL) Take 1 tablet by mouth as needed.    EPINEPHrine (EPIPEN) 0.3 mg/0.3 mL AtIn Inject 0.3 mLs (0.3 mg total) into the muscle as needed (anaphylaxis).    ondansetron (ZOFRAN-ODT) 4 MG TbDL Take 1 tablet (4 mg total) by mouth every 8 (eight) hours as needed.     Family History     Problem Relation (Age of Onset)    Cancer Mother, Sister    Emphysema Sister    Heart failure Father    No Known Problems Brother        Tobacco Use    Smoking status: Current Every Day Smoker     Packs/day: 0.50     Types: Cigarettes    Smokeless tobacco: Former User     Quit date: 5/1/2016   Substance and Sexual Activity    Alcohol use: Never    Drug use: Never    Sexual activity: Yes     Partners: Male     Review of Systems   Constitutional: Negative for activity change and appetite change.   HENT: Negative for congestion and dental problem.    Eyes: Negative for discharge and itching.   Respiratory: Negative for shortness of breath.    Cardiovascular: Positive for chest pain.   Gastrointestinal: Negative for abdominal distention and abdominal pain.   Endocrine: Negative for cold intolerance.   Genitourinary: Negative for difficulty urinating and dysuria.   Musculoskeletal: Negative for arthralgias and back pain.   Skin: Negative for color change.   Neurological: Negative for dizziness and facial asymmetry.   Hematological: Negative for adenopathy.    Psychiatric/Behavioral: Negative for agitation and behavioral problems.     Objective:     Vital Signs (Most Recent):  Temp: 97.7 °F (36.5 °C) (01/03/22 0547)  Pulse: (!) 58 (01/03/22 1030)  Resp: (!) 21 (01/03/22 1030)  BP: (!) 163/88 (01/03/22 1314)  SpO2: 97 % (01/03/22 1030) Vital Signs (24h Range):  Temp:  [97.7 °F (36.5 °C)] 97.7 °F (36.5 °C)  Pulse:  [58-68] 58  Resp:  [14-22] 21  SpO2:  [95 %-97 %] 97 %  BP: (141-184)/(71-89) 163/88     Weight: 68 kg (150 lb)  Body mass index is 27.44 kg/m².    Physical Exam  Vitals and nursing note reviewed.   Constitutional:       General: She is not in acute distress.  HENT:      Head: Atraumatic.      Right Ear: External ear normal.      Left Ear: External ear normal.      Nose: Nose normal.      Mouth/Throat:      Mouth: Mucous membranes are moist.   Eyes:      General: No scleral icterus.     Extraocular Movements: EOM normal.   Cardiovascular:      Rate and Rhythm: Normal rate.   Pulmonary:      Effort: Pulmonary effort is normal.   Abdominal:      General: There is no distension.   Musculoskeletal:         General: No edema. Normal range of motion.      Cervical back: Normal range of motion.   Skin:     General: Skin is warm.   Neurological:      Mental Status: She is alert and oriented to person, place, and time.   Psychiatric:         Mood and Affect: Mood and affect normal.         Behavior: Behavior normal.           CRANIAL NERVES     CN III, IV, VI   Extraocular motions are normal.        Significant Labs:   All pertinent labs within the past 24 hours have been reviewed.  CBC:   Recent Labs   Lab 01/03/22  0552   WBC 5.71   HGB 15.4   HCT 45.5        CMP:   Recent Labs   Lab 01/03/22  0552      K 3.9      CO2 25   GLU 98   BUN 13   CREATININE 0.9   CALCIUM 9.4   PROT 7.5   ALBUMIN 4.0   BILITOT 0.7   ALKPHOS 81   AST 22   ALT 23   ANIONGAP 11   EGFRNONAA >60.0       Significant Imaging: I have reviewed all pertinent imaging results/findings  within the past 24 hours.    Assessment/Plan:     * Chest pain  Serial cardiac enzymes/EKG/tele Monitoring  Lexiscan stress Test  Has to r/o GI causes  Add carafate with PPI  Maalox PRN basis      Anxiety  Xanax PRN basis      Primary hypertension  Continue antihypertensives      COPD (chronic obstructive pulmonary disease)  Stable         VTE Risk Mitigation (From admission, onward)         Ordered     enoxaparin injection 40 mg  Daily         01/03/22 1012     IP VTE HIGH RISK PATIENT  Once         01/03/22 1012     Place sequential compression device  Until discontinued         01/03/22 1012                   Jeffrey Cabello MD  Department of Hospital Medicine   Formerly [facility], later [facility] - Emergency Dept

## 2022-01-03 NOTE — SUBJECTIVE & OBJECTIVE
Past Medical History:   Diagnosis Date    Anxiety     Martin esophagus     Colitis     COPD (chronic obstructive pulmonary disease)     GERD (gastroesophageal reflux disease)     Hypertension     Thyroid disease     Vocal cord polyps        Past Surgical History:   Procedure Laterality Date    ABDOMINAL AORTIC ANEURYSM REPAIR      BACK SURGERY      cervical    CATARACT EXTRACTION Right 02/2021    [facility]    FOOT SURGERY      HYSTERECTOMY      SALIVARY GLAND SURGERY         Review of patient's allergies indicates:   Allergen Reactions    Bisacodyl Nausea And Vomiting     Other reaction(s): Vomiting    Demerol [meperidine]      Nausea vomiting, visual problem    Flonase [fluticasone propionate] Hives    Morpholine analogues Other (See Comments)     hypotension       No current facility-administered medications on file prior to encounter.     Current Outpatient Medications on File Prior to Encounter   Medication Sig    aluminum-magnesium hydroxide-simethicone (MAALOX) 200-200-20 mg/5 mL Susp Take 30 mLs by mouth once as needed.    HYDROcodone-acetaminophen (NORCO)  mg per tablet Take 1 tablet by mouth every 12 (twelve) hours as needed.    levothyroxine (SYNTHROID) 100 MCG tablet Take 1 tablet (100 mcg total) by mouth before breakfast.    lisinopriL (PRINIVIL,ZESTRIL) 20 MG tablet Take 1 tablet (20 mg total) by mouth 2 (two) times daily.    metoprolol tartrate (LOPRESSOR) 25 MG tablet Take 1 tablet (25 mg total) by mouth 2 (two) times daily.    pantoprazole (PROTONIX) 40 MG tablet Take 1 tablet (40 mg total) by mouth 2 (two) times daily.    simethicone (GAS-X ORAL) Take 1 tablet by mouth as needed.    EPINEPHrine (EPIPEN) 0.3 mg/0.3 mL AtIn Inject 0.3 mLs (0.3 mg total) into the muscle as needed (anaphylaxis).    ondansetron (ZOFRAN-ODT) 4 MG TbDL Take 1 tablet (4 mg total) by mouth every 8 (eight) hours as needed.     Family History     Problem  Relation (Age of Onset)    Cancer Mother, Sister    Emphysema Sister    Heart failure Father    No Known Problems Brother        Tobacco Use    Smoking status: Current Every Day Smoker     Packs/day: 0.50     Types: Cigarettes    Smokeless tobacco: Former User     Quit date: 5/1/2016   Substance and Sexual Activity    Alcohol use: Never    Drug use: Never    Sexual activity: Yes     Partners: Male     Review of Systems   Constitutional: Negative for activity change and appetite change.   HENT: Negative for congestion and dental problem.    Eyes: Negative for discharge and itching.   Respiratory: Negative for shortness of breath.    Cardiovascular: Positive for chest pain.   Gastrointestinal: Negative for abdominal distention and abdominal pain.   Endocrine: Negative for cold intolerance.   Genitourinary: Negative for difficulty urinating and dysuria.   Musculoskeletal: Negative for arthralgias and back pain.   Skin: Negative for color change.   Neurological: Negative for dizziness and facial asymmetry.   Hematological: Negative for adenopathy.   Psychiatric/Behavioral: Negative for agitation and behavioral problems.     Objective:     Vital Signs (Most Recent):  Temp: 97.7 °F (36.5 °C) (01/03/22 0547)  Pulse: (!) 58 (01/03/22 1030)  Resp: (!) 21 (01/03/22 1030)  BP: (!) 163/88 (01/03/22 1314)  SpO2: 97 % (01/03/22 1030) Vital Signs (24h Range):  Temp:  [97.7 °F (36.5 °C)] 97.7 °F (36.5 °C)  Pulse:  [58-68] 58  Resp:  [14-22] 21  SpO2:  [95 %-97 %] 97 %  BP: (141-184)/(71-89) 163/88     Weight: 68 kg (150 lb)  Body mass index is 27.44 kg/m².    Physical Exam  Vitals and nursing note reviewed.   Constitutional:       General: She is not in acute distress.  HENT:      Head: Atraumatic.      Right Ear: External ear normal.      Left Ear: External ear normal.      Nose: Nose normal.      Mouth/Throat:      Mouth: Mucous membranes are moist.   Eyes:      General: No scleral icterus.     Extraocular Movements: EOM  normal.   Cardiovascular:      Rate and Rhythm: Normal rate.   Pulmonary:      Effort: Pulmonary effort is normal.   Abdominal:      General: There is no distension.   Musculoskeletal:         General: No edema. Normal range of motion.      Cervical back: Normal range of motion.   Skin:     General: Skin is warm.   Neurological:      Mental Status: She is alert and oriented to person, place, and time.   Psychiatric:         Mood and Affect: Mood and affect normal.         Behavior: Behavior normal.           CRANIAL NERVES     CN III, IV, VI   Extraocular motions are normal.        Significant Labs:   All pertinent labs within the past 24 hours have been reviewed.  CBC:   Recent Labs   Lab 01/03/22  0552   WBC 5.71   HGB 15.4   HCT 45.5        CMP:   Recent Labs   Lab 01/03/22  0552      K 3.9      CO2 25   GLU 98   BUN 13   CREATININE 0.9   CALCIUM 9.4   PROT 7.5   ALBUMIN 4.0   BILITOT 0.7   ALKPHOS 81   AST 22   ALT 23   ANIONGAP 11   EGFRNONAA >60.0       Significant Imaging: I have reviewed all pertinent imaging results/findings within the past 24 hours.

## 2022-01-03 NOTE — HPI
71 year old patient getting admitted with chest pain  Pt has been suffering from severe chest pain and abdominal pain for months  Describes as burning /precordial area with no aggravating /Relieving Factors  Today pain radiated to L Jaw area and pt came to ER and got admitted

## 2022-01-04 NOTE — HOSPITAL COURSE
Pt admitted with chest pain  Serial Cardiac enzymes/EKG/tele Monitoring were all normal  Lexiscan stress Myoview was normal  Pts Symptoms most likely from GI tract and she will benefit from having OP appt with GI MD  Later pt was discharged to home

## 2022-01-04 NOTE — PLAN OF CARE
01/03/22 2142   Final Note   Assessment Type Final Discharge Note   Anticipated Discharge Disposition Home   What phone number can be called within the next 1-3 days to see how you are doing after discharge? [phone]       Discharge orders reviewed. No case management/discharge planning needs noted.

## 2022-01-04 NOTE — DISCHARGE SUMMARY
[facility] Medicine  Discharge Summary      Patient Name: Joslyn Dubon  MRN: [id number]  Patient Class: OP- Observation  Admission Date: 1/3/2022  Hospital Length of Stay: 0 days  Discharge Date and Time:  01/03/2022 8:25 PM  Attending Physician: No att. providers found   Discharging Provider: Jeffrey Cabello MD  Primary Care Provider: Jasbir Graham MD      HPI:   71 year old patient getting admitted with chest pain  Pt has been suffering from severe chest pain and abdominal pain for months  Describes as burning /precordial area with no aggravating /Relieving Factors  Today pain radiated to L Jaw area and pt came to ER and got admitted       * No surgery found *      Hospital Course:   Pt admitted with chest pain  Serial Cardiac enzymes/EKG/tele Monitoring were all normal  Lexiscan stress Myoview was normal  Pts Symptoms most likely from GI tract and she will benefit from having OP appt with GI MD  Later pt was discharged to home        Goals of Care Treatment Preferences:  Code Status: Full Code      Consults:   Consults (From admission, onward)        Status Ordering Provider     Inpatient consult to Hospitalist  Once        Provider:  Jeffrye Cabello MD    Acknowledged JEFFREY CABELLO          No new Assessment & Plan notes have been filed under this hospital service since the last note was generated.  Service: Hospital Medicine    Final Active Diagnoses:    Diagnosis Date Noted POA    Anxiety [F41.9] 12/05/2020 Yes     Chronic    Primary hypertension [I10] 09/15/2019 Yes    COPD (chronic obstructive pulmonary disease) [J44.9] 09/14/2019 Yes     Chronic      Problems Resolved During this Admission:    Diagnosis Date Noted Date Resolved POA    PRINCIPAL PROBLEM:  Chest pain [R07.9] 05/12/2021 01/03/2022 Yes       Discharged Condition: good    Disposition: Home or Self Care    Follow Up:   Follow-up Information     Jasbir Graham MD In 1 week.    Specialties: Family Medicine, Home Health  Services, Hospice Services  Contact information:  [address] [facility]  [address]  [facility]  [place] [address]  [phone]                       Patient Instructions:   No discharge procedures on file.    Significant Diagnostic Studies: Labs:   CMP   Recent Labs   Lab 01/03/22  0552      K 3.9      CO2 25   GLU 98   BUN 13   CREATININE 0.9   CALCIUM 9.4   PROT 7.5   ALBUMIN 4.0   BILITOT 0.7   ALKPHOS 81   AST 22   ALT 23   ANIONGAP 11   ESTGFRAFRICA >60.0   EGFRNONAA >60.0    and CBC   Recent Labs   Lab 01/03/22  0552   WBC 5.71   HGB 15.4   HCT 45.5          Pending Diagnostic Studies:     None         Medications:  Reconciled Home Medications:      Medication List      CONTINUE taking these medications    aluminum-magnesium hydroxide-simethicone 200-200-20 mg/5 mL Susp  Commonly known as: MAALOX  Take 30 mLs by mouth once as needed.     EPINEPHrine 0.3 mg/0.3 mL Atin  Commonly known as: EPIPEN  Inject 0.3 mLs (0.3 mg total) into the muscle as needed (anaphylaxis).     GAS-X ORAL  Take 1 tablet by mouth as needed.     HYDROcodone-acetaminophen  mg per tablet  Commonly known as: NORCO  Take 1 tablet by mouth every 12 (twelve) hours as needed.     levothyroxine 100 MCG tablet  Commonly known as: SYNTHROID  Take 1 tablet (100 mcg total) by mouth before breakfast.     lisinopriL 20 MG tablet  Commonly known as: PRINIVIL,ZESTRIL  Take 1 tablet (20 mg total) by mouth 2 (two) times daily.     metoprolol tartrate 25 MG tablet  Commonly known as: LOPRESSOR  Take 1 tablet (25 mg total) by mouth 2 (two) times daily.     ondansetron 4 MG Tbdl  Commonly known as: ZOFRAN-ODT  Take 1 tablet (4 mg total) by mouth every 8 (eight) hours as needed.     pantoprazole 40 MG tablet  Commonly known as: PROTONIX  Take 1 tablet (40 mg total) by mouth 2 (two) times daily.            Indwelling Lines/Drains at time of discharge:   Lines/Drains/Airways     None               Physical Exam    Cardiovascular: Normal rate.   Neurological: She is alert.     Time spent on the discharge of patient: 23  minutes         Jeffrey Cabello MD  Department of Hospital Medicine  [facility]

## 2022-01-05 ENCOUNTER — TELEPHONE (OUTPATIENT)
Dept: FAMILY MEDICINE | Facility: CLINIC | Age: 72
End: 2022-01-05
Payer: MEDICARE

## 2022-01-05 ENCOUNTER — PATIENT OUTREACH (OUTPATIENT)
Dept: FAMILY MEDICINE | Facility: CLINIC | Age: 72
End: 2022-01-05
Payer: MEDICARE

## 2022-01-05 NOTE — TELEPHONE ENCOUNTER
Call patient - needs post-hospital phone call within 2 business days and hospital follow up visit scheduled within 7-14 days.

## 2022-01-05 NOTE — PROGRESS NOTES
Discharge Information     Discharge Date:   1/3/21  Primary Discharge Diagnosis: chest pain      Discharge Summary:  Reviewed      Medication & Order Review     Were medication changes made or new medications added?   Yes    If so, has the patient filled the prescriptions?  Yes     Was Home Health ordered? No    If so, has Home Health contacted patient and/or initiated services?  No    Name of Home Health Agency? N/A    Durable Medical Equipment ordered?  No     If so, has the DME provider contacted patient and delivered equipment?  N/A    Follow Up               Any problems since discharge? No    How is the patient feeling since returning home?      Have you set up recommended follow up appointments? cardiology    Schedule Hospital Follow-up appointment within 7-14 days (preferably 7).      Notes:  Spoke with pt, not having chest pain anymore but states she is still having some issues she will discuss with Dr. Graham tomorrow.       Maria Del Carmen Randall

## 2022-01-06 ENCOUNTER — OFFICE VISIT (OUTPATIENT)
Dept: FAMILY MEDICINE | Facility: CLINIC | Age: 72
End: 2022-01-06
Payer: MEDICARE

## 2022-01-06 VITALS
WEIGHT: 153 LBS | HEIGHT: 62 IN | SYSTOLIC BLOOD PRESSURE: 130 MMHG | BODY MASS INDEX: 28.16 KG/M2 | DIASTOLIC BLOOD PRESSURE: 82 MMHG | HEART RATE: 68 BPM

## 2022-01-06 DIAGNOSIS — M51.36 DDD (DEGENERATIVE DISC DISEASE), LUMBAR: ICD-10-CM

## 2022-01-06 DIAGNOSIS — Z72.0 TOBACCO ABUSE: Chronic | ICD-10-CM

## 2022-01-06 DIAGNOSIS — R07.89 CHEST PRESSURE: Primary | ICD-10-CM

## 2022-01-06 DIAGNOSIS — I10 UNCONTROLLED HYPERTENSION: ICD-10-CM

## 2022-01-06 DIAGNOSIS — Z79.899 ENCOUNTER FOR LONG-TERM (CURRENT) USE OF OTHER MEDICATIONS: ICD-10-CM

## 2022-01-06 DIAGNOSIS — K21.9 GASTROESOPHAGEAL REFLUX DISEASE WITHOUT ESOPHAGITIS: ICD-10-CM

## 2022-01-06 DIAGNOSIS — I10 PRIMARY HYPERTENSION: ICD-10-CM

## 2022-01-06 DIAGNOSIS — I71.40 ABDOMINAL AORTIC ANEURYSM (AAA) WITHOUT RUPTURE: Chronic | ICD-10-CM

## 2022-01-06 DIAGNOSIS — Z51.81 ENCOUNTER FOR THERAPEUTIC DRUG MONITORING: ICD-10-CM

## 2022-01-06 DIAGNOSIS — J44.9 CHRONIC OBSTRUCTIVE PULMONARY DISEASE, UNSPECIFIED COPD TYPE: Chronic | ICD-10-CM

## 2022-01-06 DIAGNOSIS — K21.00 GASTROESOPHAGEAL REFLUX DISEASE WITH ESOPHAGITIS WITHOUT HEMORRHAGE: ICD-10-CM

## 2022-01-06 DIAGNOSIS — M51.16 LUMBAR DISC DISEASE WITH RADICULOPATHY: ICD-10-CM

## 2022-01-06 DIAGNOSIS — E03.9 ACQUIRED HYPOTHYROIDISM: ICD-10-CM

## 2022-01-06 PROCEDURE — 3075F SYST BP GE 130 - 139MM HG: CPT | Mod: S$GLB,,, | Performed by: FAMILY MEDICINE

## 2022-01-06 PROCEDURE — 3288F FALL RISK ASSESSMENT DOCD: CPT | Mod: S$GLB,,, | Performed by: FAMILY MEDICINE

## 2022-01-06 PROCEDURE — 4010F ACE/ARB THERAPY RXD/TAKEN: CPT | Mod: S$GLB,,, | Performed by: FAMILY MEDICINE

## 2022-01-06 PROCEDURE — 1159F PR MEDICATION LIST DOCUMENTED IN MEDICAL RECORD: ICD-10-PCS | Mod: S$GLB,,, | Performed by: FAMILY MEDICINE

## 2022-01-06 PROCEDURE — 3288F PR FALLS RISK ASSESSMENT DOCUMENTED: ICD-10-PCS | Mod: S$GLB,,, | Performed by: FAMILY MEDICINE

## 2022-01-06 PROCEDURE — 1101F PR PT FALLS ASSESS DOC 0-1 FALLS W/OUT INJ PAST YR: ICD-10-PCS | Mod: S$GLB,,, | Performed by: FAMILY MEDICINE

## 2022-01-06 PROCEDURE — 1159F MED LIST DOCD IN RCRD: CPT | Mod: S$GLB,,, | Performed by: FAMILY MEDICINE

## 2022-01-06 PROCEDURE — 3079F DIAST BP 80-89 MM HG: CPT | Mod: S$GLB,,, | Performed by: FAMILY MEDICINE

## 2022-01-06 PROCEDURE — 3075F PR MOST RECENT SYSTOLIC BLOOD PRESS GE 130-139MM HG: ICD-10-PCS | Mod: S$GLB,,, | Performed by: FAMILY MEDICINE

## 2022-01-06 PROCEDURE — 3079F PR MOST RECENT DIASTOLIC BLOOD PRESSURE 80-89 MM HG: ICD-10-PCS | Mod: S$GLB,,, | Performed by: FAMILY MEDICINE

## 2022-01-06 PROCEDURE — 3008F PR BODY MASS INDEX (BMI) DOCUMENTED: ICD-10-PCS | Mod: S$GLB,,, | Performed by: FAMILY MEDICINE

## 2022-01-06 PROCEDURE — 99495 TRANSJ CARE MGMT MOD F2F 14D: CPT | Mod: S$GLB,,, | Performed by: FAMILY MEDICINE

## 2022-01-06 PROCEDURE — 4010F PR ACE/ARB THEARPY RXD/TAKEN: ICD-10-PCS | Mod: S$GLB,,, | Performed by: FAMILY MEDICINE

## 2022-01-06 PROCEDURE — 3008F BODY MASS INDEX DOCD: CPT | Mod: S$GLB,,, | Performed by: FAMILY MEDICINE

## 2022-01-06 PROCEDURE — 99495 TCM SERVICES (MODERATE COMPLEXITY): ICD-10-PCS | Mod: S$GLB,,, | Performed by: FAMILY MEDICINE

## 2022-01-06 PROCEDURE — 1101F PT FALLS ASSESS-DOCD LE1/YR: CPT | Mod: S$GLB,,, | Performed by: FAMILY MEDICINE

## 2022-01-06 RX ORDER — LISINOPRIL 20 MG/1
20 TABLET ORAL 2 TIMES DAILY
Qty: 180 TABLET | Refills: 1 | Status: SHIPPED | OUTPATIENT
Start: 2022-01-06 | End: 2022-04-07 | Stop reason: SDUPTHER

## 2022-01-06 RX ORDER — METOPROLOL TARTRATE 25 MG/1
25 TABLET, FILM COATED ORAL 2 TIMES DAILY
Qty: 180 TABLET | Refills: 1 | Status: SHIPPED | OUTPATIENT
Start: 2022-01-06 | End: 2022-04-07 | Stop reason: SDUPTHER

## 2022-01-06 RX ORDER — LEVOTHYROXINE SODIUM 100 UG/1
100 TABLET ORAL
Qty: 90 TABLET | Refills: 1 | Status: SHIPPED | OUTPATIENT
Start: 2022-01-06 | End: 2022-04-07 | Stop reason: SDUPTHER

## 2022-01-06 RX ORDER — PANTOPRAZOLE SODIUM 40 MG/1
40 TABLET, DELAYED RELEASE ORAL 2 TIMES DAILY
Qty: 180 TABLET | Refills: 1 | Status: SHIPPED | OUTPATIENT
Start: 2022-01-06 | End: 2023-07-17

## 2022-01-06 RX ORDER — HYDROCODONE BITARTRATE AND ACETAMINOPHEN 10; 325 MG/1; MG/1
1 TABLET ORAL EVERY 12 HOURS PRN
Qty: 50 TABLET | Refills: 0 | Status: SHIPPED | OUTPATIENT
Start: 2022-01-06 | End: 2022-04-07 | Stop reason: SDUPTHER

## 2022-01-06 RX ORDER — ONDANSETRON 4 MG/1
4 TABLET, FILM COATED ORAL EVERY 8 HOURS PRN
Qty: 30 TABLET | Refills: 3 | Status: SHIPPED | OUTPATIENT
Start: 2022-01-06 | End: 2022-07-29 | Stop reason: SDUPTHER

## 2022-01-07 LAB
AMPHETAMINES UR QL: NEGATIVE NG/ML
BARBITURATES UR QL: NEGATIVE NG/ML
BENZODIAZ UR QL: NEGATIVE NG/ML
BZE UR QL: NEGATIVE NG/ML
CREAT UR-MCNC: 40.8 MG/DL
METHADONE UR QL: NEGATIVE NG/ML
NOTES AND COMMENTS: NORMAL
OPIATES UR QL: NEGATIVE NG/ML
OXIDANTS UR QL: NEGATIVE MCG/ML
OXYCODONE UR QL: NEGATIVE NG/ML
PCP UR QL: NEGATIVE NG/ML
PH UR: 5.4 [PH] (ref 4.5–9)

## 2022-01-07 NOTE — PROGRESS NOTES
SUBJECTIVE:    Patient ID: Joslyn Dubon is a 71 y.o. female.    Chief Complaint: Hospital Follow Up (No bottles // UDS ordered //discuss Colonoscopy//need refills //abc)    This 71-year-old female is here for hospital follow-up visit.  She had hospital admission when she had left-sided chest pain and left neck pains.  She was admitted and had a cardiac workup.  Troponins were negative nuclear stress test came back negative for ischemia.  Chest pain was nonexertional but severe.  She has no shortness of breath.    Hospital Course:   Pt admitted with chest pain  Serial Cardiac enzymes/EKG/tele Monitoring were all normal  Lexiscan stress Myoview was normal  Pts Symptoms most likely from GI tract and she will benefit from having OP appt with GI MD  Later pt was discharged to home      She recently had a abdominal aneurysm ultrasound and was stable according to Dr. Cornelius.  She saw Dr. Banegas 1 month ago for cardiac evaluation.    She had ER visits with a red swollen face when on doxycycline and or Decadron.    He has not been taking her pantoprazole for a while.  She has been using Maalox for GERD.      Admit Date: 1/3/22   Discharge Date: 1/3/22  Discharge Facility: Hospital    Medication Reconciliation:  Medications changed/added/deleted.  Pantoprazole 40 mg b.i.d.  New Prescriptions filled after discharge: yes  Discharge summary reviewed:  yes  Pending test results at discharge reviewed:   no  Follow up appointments scheduled:  yes   with Gastroenterology   Follow up labs/tests ordered:   no  Home Health ordered on discharge:   no  Home Health company name:   DME ordered at discharge:   not applicable  How patient is feeling since discharge from the hospital?  Patient still has intermittent chest pain.     Patient follow up phone call documented on separate encounter.    Admission on 01/03/2022, Discharged on 01/03/2022   Component Date Value Ref Range Status    WBC 01/03/2022 5.71  3.90 - 12.70 K/uL Final     RBC 01/03/2022 4.77  4.00 - 5.40 M/uL Final    Hemoglobin 01/03/2022 15.4  12.0 - 16.0 g/dL Final    Hematocrit 01/03/2022 45.5  37.0 - 48.5 % Final    MCV 01/03/2022 95  82 - 98 fL Final    MCH 01/03/2022 32.3* 27.0 - 31.0 pg Final    MCHC 01/03/2022 33.8  32.0 - 36.0 g/dL Final    RDW 01/03/2022 13.0  11.5 - 14.5 % Final    Platelets 01/03/2022 212  150 - 450 K/uL Final    MPV 01/03/2022 9.4  9.2 - 12.9 fL Final    Immature Granulocytes 01/03/2022 0.4  0.0 - 0.5 % Final    Gran # (ANC) 01/03/2022 3.2  1.8 - 7.7 K/uL Final    Immature Grans (Abs) 01/03/2022 0.02  0.00 - 0.04 K/uL Final    Lymph # 01/03/2022 1.8  1.0 - 4.8 K/uL Final    Mono # 01/03/2022 0.5  0.3 - 1.0 K/uL Final    Eos # 01/03/2022 0.2  0.0 - 0.5 K/uL Final    Baso # 01/03/2022 0.04  0.00 - 0.20 K/uL Final    nRBC 01/03/2022 0  0 /100 WBC Final    Gran % 01/03/2022 55.9  38.0 - 73.0 % Final    Lymph % 01/03/2022 32.0  18.0 - 48.0 % Final    Mono % 01/03/2022 8.2  4.0 - 15.0 % Final    Eosinophil % 01/03/2022 2.8  0.0 - 8.0 % Final    Basophil % 01/03/2022 0.7  0.0 - 1.9 % Final    Differential Method 01/03/2022 Automated   Final    Sodium 01/03/2022 141  136 - 145 mmol/L Final    Potassium 01/03/2022 3.9  3.5 - 5.1 mmol/L Final    Chloride 01/03/2022 105  95 - 110 mmol/L Final    CO2 01/03/2022 25  23 - 29 mmol/L Final    Glucose 01/03/2022 98  70 - 110 mg/dL Final    BUN 01/03/2022 13  8 - 23 mg/dL Final    Creatinine 01/03/2022 0.9  0.5 - 1.4 mg/dL Final    Calcium 01/03/2022 9.4  8.7 - 10.5 mg/dL Final    Total Protein 01/03/2022 7.5  6.0 - 8.4 g/dL Final    Albumin 01/03/2022 4.0  3.5 - 5.2 g/dL Final    Total Bilirubin 01/03/2022 0.7  0.1 - 1.0 mg/dL Final    Alkaline Phosphatase 01/03/2022 81  55 - 135 U/L Final    AST 01/03/2022 22  10 - 40 U/L Final    ALT 01/03/2022 23  10 - 44 U/L Final    Anion Gap 01/03/2022 11  8 - 16 mmol/L Final    eGFR if African American 01/03/2022 >60.0  >60 mL/min/1.73  m^2 Final    eGFR if non African American 01/03/2022 >60.0  >60 mL/min/1.73 m^2 Final    Troponin I 01/03/2022 <0.030  <=0.040 ng/mL Final    PT 01/03/2022 13.1  11.4 - 13.7 sec Final    INR 01/03/2022 1.1   Final    SARS-CoV-2 RNA, Amplification, Qual 01/03/2022 Negative  Negative Final    BNP 01/03/2022 94  0 - 99 pg/mL Final    Troponin I 01/03/2022 <0.030  <=0.040 ng/mL Final    CPK MB 01/03/2022 1.4  0.1 - 6.5 ng/mL Final    85% Max Predicted HR 01/03/2022 120   Final    Max Predicted HR 01/03/2022 142   Final    OHS CV CPX PATIENT IS MALE 01/03/2022 0.0   Final    OHS CV CPX PATIENT IS FEMALE 01/03/2022 1.0   Final    HR at rest 01/03/2022 59  bpm Final    Systolic blood pressure 01/03/2022 139  mmHg Final    Diastolic blood pressure 01/03/2022 95  mmHg Final    RPP 01/03/2022 8,201   Final    Peak HR 01/03/2022 104  bpm Final    Peak Systolic BP 01/03/2022 162  mmHg Final    Peak Diatolic BP 01/03/2022 114  mmHg Final    Peak RPP 01/03/2022 16,848   Final    % Max HR Achieved 01/03/2022 73   Final    1 Minute Recovery HR 01/03/2022 102  bpm Final    Troponin I 01/03/2022 <0.030  <=0.040 ng/mL Final   Office Visit on 12/06/2021   Component Date Value Ref Range Status    POC Rapid COVID 12/06/2021 Negative  Negative Final     Acceptable 12/06/2021 Yes   Final   Admission on 11/22/2021, Discharged on 11/22/2021   Component Date Value Ref Range Status    WBC 11/22/2021 7.48  3.90 - 12.70 K/uL Final    RBC 11/22/2021 4.88  4.00 - 5.40 M/uL Final    Hemoglobin 11/22/2021 15.5  12.0 - 16.0 g/dL Final    Hematocrit 11/22/2021 46.8  37.0 - 48.5 % Final    MCV 11/22/2021 96  82 - 98 fL Final    MCH 11/22/2021 31.8* 27.0 - 31.0 pg Final    MCHC 11/22/2021 33.1  32.0 - 36.0 g/dL Final    RDW 11/22/2021 13.5  11.5 - 14.5 % Final    Platelets 11/22/2021 181  150 - 450 K/uL Final    MPV 11/22/2021 9.6  9.2 - 12.9 fL Final    Immature Granulocytes 11/22/2021 0.3  0.0 - 0.5 %  Final    Gran # (ANC) 11/22/2021 3.4  1.8 - 7.7 K/uL Final    Immature Grans (Abs) 11/22/2021 0.02  0.00 - 0.04 K/uL Final    Lymph # 11/22/2021 3.2  1.0 - 4.8 K/uL Final    Mono # 11/22/2021 0.7  0.3 - 1.0 K/uL Final    Eos # 11/22/2021 0.1  0.0 - 0.5 K/uL Final    Baso # 11/22/2021 0.04  0.00 - 0.20 K/uL Final    nRBC 11/22/2021 0  0 /100 WBC Final    Gran % 11/22/2021 45.6  38.0 - 73.0 % Final    Lymph % 11/22/2021 42.1  18.0 - 48.0 % Final    Mono % 11/22/2021 9.6  4.0 - 15.0 % Final    Eosinophil % 11/22/2021 1.9  0.0 - 8.0 % Final    Basophil % 11/22/2021 0.5  0.0 - 1.9 % Final    Differential Method 11/22/2021 Automated   Final    Sodium 11/22/2021 136  136 - 145 mmol/L Final    Potassium 11/22/2021 4.1  3.5 - 5.1 mmol/L Final    Chloride 11/22/2021 102  95 - 110 mmol/L Final    CO2 11/22/2021 23  23 - 29 mmol/L Final    Glucose 11/22/2021 93  70 - 110 mg/dL Final    BUN 11/22/2021 18  8 - 23 mg/dL Final    Creatinine 11/22/2021 1.0  0.5 - 1.4 mg/dL Final    Calcium 11/22/2021 9.5  8.7 - 10.5 mg/dL Final    Total Protein 11/22/2021 8.1  6.0 - 8.4 g/dL Final    Albumin 11/22/2021 4.1  3.5 - 5.2 g/dL Final    Total Bilirubin 11/22/2021 0.8  0.1 - 1.0 mg/dL Final    Alkaline Phosphatase 11/22/2021 76  55 - 135 U/L Final    AST 11/22/2021 21  10 - 40 U/L Final    ALT 11/22/2021 16  10 - 44 U/L Final    Anion Gap 11/22/2021 11  8 - 16 mmol/L Final    eGFR if African American 11/22/2021 >60.0  >60 mL/min/1.73 m^2 Final    eGFR if non  11/22/2021 56.0* >60 mL/min/1.73 m^2 Final    BNP 11/22/2021 55  0 - 99 pg/mL Final    Troponin I 11/22/2021 <0.030  <=0.040 ng/mL Final    PT 11/22/2021 12.4  11.4 - 13.7 sec Final    INR 11/22/2021 1.0   Final   Office Visit on 10/12/2021   Component Date Value Ref Range Status    Amphetamines 01/06/2022 NEGATIVE  <500 ng/mL Final    Barbiturates 01/06/2022 NEGATIVE  <300 ng/mL Final    Benzodiazepines 01/06/2022 NEGATIVE  <100  ng/mL Final    Cocaine Metabolites 01/06/2022 NEGATIVE  <150 ng/mL Final    Methadone 01/06/2022 NEGATIVE  <100 ng/mL Final    Opiates 01/06/2022 NEGATIVE  <100 ng/mL Final    Oxycodone 01/06/2022 NEGATIVE  <100 ng/mL Final    Phencyclidine 01/06/2022 NEGATIVE  <25 ng/mL Final    Creatinine 01/06/2022 40.8  > or = 20.0 mg/dL Final    pH 01/06/2022 5.4  4.5 - 9.0 Final    Oxidants, Urine (Tox) 01/06/2022 NEGATIVE  <200 mcg/mL Final    Notes and Comments 01/06/2022    Final   Office Visit on 09/29/2021   Component Date Value Ref Range Status    POC Rapid COVID 09/29/2021 Negative  Negative Final     Acceptable 09/29/2021 Yes   Final       Past Medical History:   Diagnosis Date    Anxiety     Martin esophagus     Colitis     COPD (chronic obstructive pulmonary disease)     GERD (gastroesophageal reflux disease)     Hypertension     Thyroid disease     Vocal cord polyps      Past Surgical History:   Procedure Laterality Date    ABDOMINAL AORTIC ANEURYSM REPAIR      BACK SURGERY      cervical    CATARACT EXTRACTION Right 02/2021    CHOLECYST[facility]  12-    [facility]    FOOT SURGERY      HYSTERECTOMY      SALIVARY GLAND SURGERY       Family History   Problem Relation Age of Onset    Cancer Mother     Heart failure Father     Emphysema Sister     No Known Problems Brother     Cancer Sister        Marital Status:   Alcohol History:  reports no history of alcohol use.  Tobacco History:  reports that she has been smoking cigarettes. She has been smoking about 0.50 packs per day. She quit smokeless tobacco use about 5 years ago.  Drug History:  reports no history of drug use.    Review of patient's allergies indicates:   Allergen Reactions    Bisacodyl Nausea And Vomiting     Other reaction(s): Vomiting    Demerol [meperidine]      Nausea vomiting, visual problem    Doxycycline Hives    Flonase [fluticasone propionate] Hives    Morpholine analogues Other  (See Comments)     hypotension       Current Outpatient Medications:     simethicone (GAS-X ORAL), Take 1 tablet by mouth as needed., Disp: , Rfl:     aluminum-magnesium hydroxide-simethicone (MAALOX) 200-200-20 mg/5 mL Susp, Take 30 mLs by mouth once as needed., Disp: , Rfl:     EPINEPHrine (EPIPEN) 0.3 mg/0.3 mL AtIn, Inject 0.3 mLs (0.3 mg total) into the muscle as needed (anaphylaxis). (Patient not taking: Reported on 1/6/2022), Disp: 1 each, Rfl: 1    HYDROcodone-acetaminophen (NORCO)  mg per tablet, Take 1 tablet by mouth every 12 (twelve) hours as needed., Disp: 50 tablet, Rfl: 0    levothyroxine (SYNTHROID) 100 MCG tablet, Take 1 tablet (100 mcg total) by mouth before breakfast., Disp: 90 tablet, Rfl: 1    lisinopriL (PRINIVIL,ZESTRIL) 20 MG tablet, Take 1 tablet (20 mg total) by mouth 2 (two) times daily., Disp: 180 tablet, Rfl: 1    metoprolol tartrate (LOPRESSOR) 25 MG tablet, Take 1 tablet (25 mg total) by mouth 2 (two) times daily., Disp: 180 tablet, Rfl: 1    ondansetron (ZOFRAN) 4 MG tablet, Take 1 tablet (4 mg total) by mouth every 8 (eight) hours as needed for Nausea., Disp: 30 tablet, Rfl: 3    ondansetron (ZOFRAN-ODT) 4 MG TbDL, Take 1 tablet (4 mg total) by mouth every 8 (eight) hours as needed. (Patient not taking: Reported on 1/6/2022), Disp: 21 tablet, Rfl: 0    pantoprazole (PROTONIX) 40 MG tablet, Take 1 tablet (40 mg total) by mouth 2 (two) times daily., Disp: 180 tablet, Rfl: 1    Review of Systems   Constitutional: Negative for appetite change, chills, fatigue, fever and unexpected weight change.   HENT: Negative for congestion, ear pain, sinus pain, sore throat and trouble swallowing.    Eyes: Negative for pain, discharge and visual disturbance.   Respiratory: Negative for apnea, cough, shortness of breath and wheezing.    Cardiovascular: Positive for chest pain. Negative for palpitations and leg swelling.   Gastrointestinal: Negative for abdominal pain, blood in stool,  "constipation, diarrhea, nausea and vomiting.   Endocrine: Negative for heat intolerance, polydipsia and polyuria.   Genitourinary: Negative for difficulty urinating, dyspareunia, dysuria, frequency, hematuria and menstrual problem.   Musculoskeletal: Positive for back pain, neck pain and neck stiffness. Negative for arthralgias, gait problem, joint swelling and myalgias.   Allergic/Immunologic: Negative for environmental allergies, food allergies and immunocompromised state.   Neurological: Negative for dizziness, tremors, seizures, numbness and headaches.   Psychiatric/Behavioral: Negative for behavioral problems, confusion, hallucinations and suicidal ideas. The patient is not nervous/anxious.         Objective:      Vitals:    01/06/22 0935   BP: 130/82   Pulse: 68   Weight: 69.4 kg (153 lb)   Height: 5' 2" (1.575 m)     Physical Exam  Vitals and nursing note reviewed.   Constitutional:       General: She is not in acute distress.     Appearance: Normal appearance. She is well-developed and well-nourished. She is not toxic-appearing.   HENT:      Head: Normocephalic and atraumatic.      Right Ear: Tympanic membrane and external ear normal.      Left Ear: Tympanic membrane and external ear normal.      Nose: Nose normal.      Mouth/Throat:      Mouth: Oropharynx is clear and moist.   Eyes:      Extraocular Movements: EOM normal.      Pupils: Pupils are equal, round, and reactive to light.   Neck:      Thyroid: No thyromegaly.      Vascular: No carotid bruit.      Comments: Full range of motion of the neck.  No pain with motion  Cardiovascular:      Rate and Rhythm: Normal rate and regular rhythm.      Pulses: Intact distal pulses.      Heart sounds: Normal heart sounds. No murmur heard.      Pulmonary:      Effort: Pulmonary effort is normal.      Breath sounds: Normal breath sounds. No wheezing or rales.   Abdominal:      General: Bowel sounds are normal. There is no distension.      Palpations: Abdomen is soft. " There is no hepatosplenomegaly.      Tenderness: There is no abdominal tenderness.   Musculoskeletal:         General: No tenderness or deformity. Normal range of motion.      Cervical back: Normal range of motion and neck supple.      Lumbar back: Normal. No pain or spasms.      Comments: Bends 90 degrees at  waist, shoulders knees have good range of motion no pitting edema to lower extremities   Lymphadenopathy:      Cervical: No cervical adenopathy.   Skin:     General: Skin is warm and dry.      Findings: No rash.   Neurological:      Mental Status: She is alert and oriented to person, place, and time.      Cranial Nerves: No cranial nerve deficit.      Coordination: Coordination normal.   Psychiatric:         Mood and Affect: Mood and affect normal.         Behavior: Behavior normal.         Thought Content: Thought content normal.         Judgment: Judgment normal.         Assessment:       1. Chest pressure    2. Encounter for long-term (current) use of other medications    3. Encounter for therapeutic drug monitoring    4. Lumbar disc disease with radiculopathy    5. DDD (degenerative disc disease), lumbar    6. Acquired hypothyroidism    7. Uncontrolled hypertension    8. Gastroesophageal reflux disease with esophagitis without hemorrhage    9. Chronic obstructive pulmonary disease, unspecified COPD type    10. Abdominal aortic aneurysm (AAA) without rupture    11. Primary hypertension    12. Gastroesophageal reflux disease without esophagitis    13. Tobacco abuse         Plan:       Chest pressure  After negative cardiac workup in the hospital, it is likely that her chest pain is coming from esophageal sources.  She has restart her pantoprazole 40 mg b.i.d. and refer back to Dr. Smith for EGD and evaluation.  Encounter for long-term (current) use of other medications  -     Cancel: DRUG MONITOR, PANEL 4, W/CONF, URINE  -     DRUG MONITOR, PANEL 4, W/CONF, URINE; Future; Expected date:  01/06/2022    Encounter for therapeutic drug monitoring  -     Cancel: DRUG MONITOR, PANEL 4, W/CONF, URINE  -     DRUG MONITOR, PANEL 4, W/CONF, URINE; Future; Expected date: 01/06/2022    Lumbar disc disease with radiculopathy  -     Cancel: DRUG MONITOR, PANEL 4, W/CONF, URINE  -     DRUG MONITOR, PANEL 4, W/CONF, URINE; Future; Expected date: 01/06/2022    DDD (degenerative disc disease), lumbar  -     HYDROcodone-acetaminophen (NORCO)  mg per tablet; Take 1 tablet by mouth every 12 (twelve) hours as needed.  Dispense: 50 tablet; Refill: 0    Acquired hypothyroidism  Comments:  requesting refill, pt advised she needs to have labs drawn before f/u visit in 2 weeks  Orders:  -     levothyroxine (SYNTHROID) 100 MCG tablet; Take 1 tablet (100 mcg total) by mouth before breakfast.  Dispense: 90 tablet; Refill: 1        Orders:  -     lisinopriL (PRINIVIL,ZESTRIL) 20 MG tablet; Take 1 tablet (20 mg total) by mouth 2 (two) times daily.  Dispense: 180 tablet; Refill: 1  -     metoprolol tartrate (LOPRESSOR) 25 MG tablet; Take 1 tablet (25 mg total) by mouth 2 (two) times daily.  Dispense: 180 tablet; Refill: 1    Gastroesophageal reflux disease with esophagitis without hemorrhage  Comments:  stable  Orders:  -     pantoprazole (PROTONIX) 40 MG tablet; Take 1 tablet (40 mg total) by mouth 2 (two) times daily.  Dispense: 180 tablet; Refill: 1  -     ondansetron (ZOFRAN) 4 MG tablet; Take 1 tablet (4 mg total) by mouth every 8 (eight) hours as needed for Nausea.  Dispense: 30 tablet; Refill: 3    Chronic obstructive pulmonary disease, unspecified COPD type    Abdominal aortic aneurysm (AAA) without rupture   Stable per a recent ultrasound  Primary hypertension  Blood pressure normal today  Gastroesophageal reflux disease without esophagitis  Pantoprazole 40 mg b.i.d.  Tobacco abuse  Still smoking case only    Follow up in about 3 months (around 4/6/2022), or gerd.

## 2022-01-10 ENCOUNTER — TELEPHONE (OUTPATIENT)
Dept: FAMILY MEDICINE | Facility: CLINIC | Age: 72
End: 2022-01-10
Payer: MEDICARE

## 2022-01-10 NOTE — TELEPHONE ENCOUNTER
----- Message from Jasbir Graham MD sent at 1/9/2022  4:12 PM CST -----  Urine drug screen found no pain medicine in your system.  Continue current medications

## 2022-02-02 ENCOUNTER — OFFICE VISIT (OUTPATIENT)
Dept: FAMILY MEDICINE | Facility: CLINIC | Age: 72
End: 2022-02-02
Payer: MEDICARE

## 2022-02-02 ENCOUNTER — TELEPHONE (OUTPATIENT)
Dept: FAMILY MEDICINE | Facility: CLINIC | Age: 72
End: 2022-02-02
Payer: MEDICARE

## 2022-02-02 VITALS
TEMPERATURE: 98 F | OXYGEN SATURATION: 97 % | BODY MASS INDEX: 28.46 KG/M2 | SYSTOLIC BLOOD PRESSURE: 124 MMHG | HEART RATE: 66 BPM | HEIGHT: 62 IN | DIASTOLIC BLOOD PRESSURE: 60 MMHG | WEIGHT: 154.63 LBS

## 2022-02-02 DIAGNOSIS — H00.019 HORDEOLUM EXTERNUM, UNSPECIFIED LATERALITY: Primary | ICD-10-CM

## 2022-02-02 DIAGNOSIS — K12.0 APHTHOUS ULCER OF MOUTH: ICD-10-CM

## 2022-02-02 DIAGNOSIS — I10 PRIMARY HYPERTENSION: ICD-10-CM

## 2022-02-02 DIAGNOSIS — K21.9 GASTROESOPHAGEAL REFLUX DISEASE WITHOUT ESOPHAGITIS: ICD-10-CM

## 2022-02-02 DIAGNOSIS — J44.9 CHRONIC OBSTRUCTIVE PULMONARY DISEASE, UNSPECIFIED COPD TYPE: ICD-10-CM

## 2022-02-02 DIAGNOSIS — E03.9 ACQUIRED HYPOTHYROIDISM: ICD-10-CM

## 2022-02-02 DIAGNOSIS — M51.36 DDD (DEGENERATIVE DISC DISEASE), LUMBAR: ICD-10-CM

## 2022-02-02 PROCEDURE — 4010F PR ACE/ARB THEARPY RXD/TAKEN: ICD-10-PCS | Mod: S$GLB,,, | Performed by: PHYSICIAN ASSISTANT

## 2022-02-02 PROCEDURE — 4010F ACE/ARB THERAPY RXD/TAKEN: CPT | Mod: S$GLB,,, | Performed by: PHYSICIAN ASSISTANT

## 2022-02-02 PROCEDURE — 1101F PR PT FALLS ASSESS DOC 0-1 FALLS W/OUT INJ PAST YR: ICD-10-PCS | Mod: S$GLB,,, | Performed by: PHYSICIAN ASSISTANT

## 2022-02-02 PROCEDURE — 3008F BODY MASS INDEX DOCD: CPT | Mod: S$GLB,,, | Performed by: PHYSICIAN ASSISTANT

## 2022-02-02 PROCEDURE — 1101F PT FALLS ASSESS-DOCD LE1/YR: CPT | Mod: S$GLB,,, | Performed by: PHYSICIAN ASSISTANT

## 2022-02-02 PROCEDURE — 3074F PR MOST RECENT SYSTOLIC BLOOD PRESSURE < 130 MM HG: ICD-10-PCS | Mod: S$GLB,,, | Performed by: PHYSICIAN ASSISTANT

## 2022-02-02 PROCEDURE — 3288F PR FALLS RISK ASSESSMENT DOCUMENTED: ICD-10-PCS | Mod: S$GLB,,, | Performed by: PHYSICIAN ASSISTANT

## 2022-02-02 PROCEDURE — 3078F PR MOST RECENT DIASTOLIC BLOOD PRESSURE < 80 MM HG: ICD-10-PCS | Mod: S$GLB,,, | Performed by: PHYSICIAN ASSISTANT

## 2022-02-02 PROCEDURE — 99214 PR OFFICE/OUTPT VISIT, EST, LEVL IV, 30-39 MIN: ICD-10-PCS | Mod: S$GLB,,, | Performed by: PHYSICIAN ASSISTANT

## 2022-02-02 PROCEDURE — 1159F PR MEDICATION LIST DOCUMENTED IN MEDICAL RECORD: ICD-10-PCS | Mod: S$GLB,,, | Performed by: PHYSICIAN ASSISTANT

## 2022-02-02 PROCEDURE — 3078F DIAST BP <80 MM HG: CPT | Mod: S$GLB,,, | Performed by: PHYSICIAN ASSISTANT

## 2022-02-02 PROCEDURE — 99214 OFFICE O/P EST MOD 30 MIN: CPT | Mod: S$GLB,,, | Performed by: PHYSICIAN ASSISTANT

## 2022-02-02 PROCEDURE — 3288F FALL RISK ASSESSMENT DOCD: CPT | Mod: S$GLB,,, | Performed by: PHYSICIAN ASSISTANT

## 2022-02-02 PROCEDURE — 1160F RVW MEDS BY RX/DR IN RCRD: CPT | Mod: S$GLB,,, | Performed by: PHYSICIAN ASSISTANT

## 2022-02-02 PROCEDURE — 1159F MED LIST DOCD IN RCRD: CPT | Mod: S$GLB,,, | Performed by: PHYSICIAN ASSISTANT

## 2022-02-02 PROCEDURE — 3074F SYST BP LT 130 MM HG: CPT | Mod: S$GLB,,, | Performed by: PHYSICIAN ASSISTANT

## 2022-02-02 PROCEDURE — 1160F PR REVIEW ALL MEDS BY PRESCRIBER/CLIN PHARMACIST DOCUMENTED: ICD-10-PCS | Mod: S$GLB,,, | Performed by: PHYSICIAN ASSISTANT

## 2022-02-02 PROCEDURE — 3008F PR BODY MASS INDEX (BMI) DOCUMENTED: ICD-10-PCS | Mod: S$GLB,,, | Performed by: PHYSICIAN ASSISTANT

## 2022-02-02 RX ORDER — CEPHALEXIN 500 MG/1
500 TABLET ORAL 2 TIMES DAILY
Qty: 10 TABLET | Refills: 0 | Status: SHIPPED | OUTPATIENT
Start: 2022-02-02 | End: 2022-02-07

## 2022-02-02 RX ORDER — OMEPRAZOLE 40 MG/1
40 CAPSULE, DELAYED RELEASE ORAL EVERY MORNING
COMMUNITY
End: 2023-03-31 | Stop reason: SDUPTHER

## 2022-02-02 NOTE — PATIENT INSTRUCTIONS
Patient Education       Stye Discharge Instructions   About this topic   A stye is a bump on the eyelid near your eyelashes that is red and hurts. The bump is caused by a small gland that gets irritated and often infected. You can have a stye on the outside or inside of your eyelid. The stye can cause your eye to:  · Be watery  · Feel scratchy  · Have drainage  · Form a crust  · Be sensitive to light  What care is needed at home?   · Ask your doctor what you need to do when you go home. Make sure you ask questions if you do not understand everything the doctor says.  · Use a warm compress to the eye for 15 minutes 4 times a day to help drainage and ease discomfort. The compress should be as warm as you can tolerate comfortably. Be careful not to burn your eye.  · Do not squeeze or try to drain or pop a stye. This can infect the whole eyelid or other parts of the eye.  · Be sure to wash your hands before and after touching your eye.  · Wear glasses instead of contact lens while you have a stye. Ask your doctor if you need to sterilize your contact lens or get new ones after you have a stye.  · Dont wear eye makeup when you have a stye.  What follow-up care is needed?   · Your doctor may ask you to come back to the office to check on your progress. Be sure to keep these visits.  What drugs may be needed?   Most of the time, drugs are not needed. In some cases, the doctor may order drugs to:  · Help healing  · Prevent or heal an infection  What problems could happen?   · More severe infection  · Surgery if the problem does not go away  What can be done to prevent this health problem?   · Use a no tears shampoo and warm water to wash your eyelids with care.  · Replace old make up. Throw away mascara, liquid eyeliner, and eye shadow 3 months after first using them.  · Remove makeup before sleep each night.  · Do not share towels or washcloths.  · Wash hands before putting in and taking out contact lenses  When do I need  to call the doctor?   · Signs of infection. These include a fever of 100.4°F (38°C) or higher; chills.  · Swelling and redness around one or both eyes  · More eye drainage  · Hard to see or move one or both eyes  · Pain and discomfort around the eyes  · Increased tears for more than a week  Teach Back: Helping You Understand   The Teach Back Method helps you understand the information we are giving you. After you talk with the staff, tell them in your own words what you learned. This helps to make sure the staff has described each thing clearly. It also helps to explain things that may have been confusing. Before going home, make sure you can do these:  · I can tell you about my condition  · I can tell you how to care for my eye.  · I can tell you what I will do if I have swelling or redness around my eye, more eye drainage, or I cant see clearly.  Where can I learn more?   [employer]  https://familydoctor.org/condition/sty/   [facility] Choices  https://www.nhs.uk/conditions/stye/   Last Reviewed Date   2020-04-30  Consumer Information Use and Disclaimer   This information is not specific medical advice and does not replace information you receive from your health care provider. This is only a brief summary of general information. It does NOT include all information about conditions, illnesses, injuries, tests, procedures, treatments, therapies, discharge instructions or life-style choices that may apply to you. You must talk with your health care provider for complete information about your health and treatment options. This information should not be used to decide whether or not to accept your health care providers advice, instructions or recommendations. Only your health care provider has the knowledge and training to provide advice that is right for you.  Copyright   Copyright © 2021 [employer] and its affiliates and/or licensors. All rights reserved.

## 2022-02-02 NOTE — TELEPHONE ENCOUNTER
----- Message from Laquita Barillas sent at 2/2/2022  8:09 AM CST -----  VM 02/02/22@8:04 AM:patient called and stated that she would like to be seen today she stated that she has a stye on both eyes and also she has ulcers under her bottom lip and she is not sure what is going on please give her a call at [phone]

## 2022-02-02 NOTE — PROGRESS NOTES
"  SUBJECTIVE:    Patient ID: Joslyn Dubon is a 71 y.o. female.    Chief Complaint: Stye (No bottles, reviewed from list, stye on both eyes x 2 weeks, ulcer inside of lip x 1 day//dp)    Pt is a 71 y.o. female who presents today for a sick visit with 2 CC's. First, she reports a "style" located on the upper eyelid of both eyes. The right eye style came on acutely 2 weeks ago and has been constantly present since. The left eye style erupted this morning and was noticed upon wakening. She has been applying OTC "style medication t.i.d" and doing hot compresses t.i.d. She denies any visual field deficients, eye pain, or eye discharge.    Next, she reports an "ulcer" located on the inside of the upper lip that started acutely this morning as well. The ulcer is accompanied with an intermitted stinging sensation that is exacerbated when eating. She has not attempted anything at this time to alleviate it. She denies any mouth trauma such as eating or drinking hot foods.     Admission on 01/03/2022, Discharged on 01/03/2022   Component Date Value Ref Range Status    WBC 01/03/2022 5.71  3.90 - 12.70 K/uL Final    RBC 01/03/2022 4.77  4.00 - 5.40 M/uL Final    Hemoglobin 01/03/2022 15.4  12.0 - 16.0 g/dL Final    Hematocrit 01/03/2022 45.5  37.0 - 48.5 % Final    MCV 01/03/2022 95  82 - 98 fL Final    MCH 01/03/2022 32.3* 27.0 - 31.0 pg Final    MCHC 01/03/2022 33.8  32.0 - 36.0 g/dL Final    RDW 01/03/2022 13.0  11.5 - 14.5 % Final    Platelets 01/03/2022 212  150 - 450 K/uL Final    MPV 01/03/2022 9.4  9.2 - 12.9 fL Final    Immature Granulocytes 01/03/2022 0.4  0.0 - 0.5 % Final    Gran # (ANC) 01/03/2022 3.2  1.8 - 7.7 K/uL Final    Immature Grans (Abs) 01/03/2022 0.02  0.00 - 0.04 K/uL Final    Lymph # 01/03/2022 1.8  1.0 - 4.8 K/uL Final    Mono # 01/03/2022 0.5  0.3 - 1.0 K/uL Final    Eos # 01/03/2022 0.2  0.0 - 0.5 K/uL Final    Baso # 01/03/2022 0.04  0.00 - 0.20 K/uL Final    nRBC 01/03/2022 0 "  0 /100 WBC Final    Gran % 01/03/2022 55.9  38.0 - 73.0 % Final    Lymph % 01/03/2022 32.0  18.0 - 48.0 % Final    Mono % 01/03/2022 8.2  4.0 - 15.0 % Final    Eosinophil % 01/03/2022 2.8  0.0 - 8.0 % Final    Basophil % 01/03/2022 0.7  0.0 - 1.9 % Final    Differential Method 01/03/2022 Automated   Final    Sodium 01/03/2022 141  136 - 145 mmol/L Final    Potassium 01/03/2022 3.9  3.5 - 5.1 mmol/L Final    Chloride 01/03/2022 105  95 - 110 mmol/L Final    CO2 01/03/2022 25  23 - 29 mmol/L Final    Glucose 01/03/2022 98  70 - 110 mg/dL Final    BUN 01/03/2022 13  8 - 23 mg/dL Final    Creatinine 01/03/2022 0.9  0.5 - 1.4 mg/dL Final    Calcium 01/03/2022 9.4  8.7 - 10.5 mg/dL Final    Total Protein 01/03/2022 7.5  6.0 - 8.4 g/dL Final    Albumin 01/03/2022 4.0  3.5 - 5.2 g/dL Final    Total Bilirubin 01/03/2022 0.7  0.1 - 1.0 mg/dL Final    Alkaline Phosphatase 01/03/2022 81  55 - 135 U/L Final    AST 01/03/2022 22  10 - 40 U/L Final    ALT 01/03/2022 23  10 - 44 U/L Final    Anion Gap 01/03/2022 11  8 - 16 mmol/L Final    eGFR if African American 01/03/2022 >60.0  >60 mL/min/1.73 m^2 Final    eGFR if non African American 01/03/2022 >60.0  >60 mL/min/1.73 m^2 Final    Troponin I 01/03/2022 <0.030  <=0.040 ng/mL Final    PT 01/03/2022 13.1  11.4 - 13.7 sec Final    INR 01/03/2022 1.1   Final    SARS-CoV-2 RNA, Amplification, Qual 01/03/2022 Negative  Negative Final    BNP 01/03/2022 94  0 - 99 pg/mL Final    Troponin I 01/03/2022 <0.030  <=0.040 ng/mL Final    CPK MB 01/03/2022 1.4  0.1 - 6.5 ng/mL Final    85% Max Predicted HR 01/03/2022 120   Final    Max Predicted HR 01/03/2022 142   Final    OHS CV CPX PATIENT IS MALE 01/03/2022 0.0   Final    OHS CV CPX PATIENT IS FEMALE 01/03/2022 1.0   Final    HR at rest 01/03/2022 59  bpm Final    Systolic blood pressure 01/03/2022 139  mmHg Final    Diastolic blood pressure 01/03/2022 95  mmHg Final    RPP 01/03/2022 8,201   Final     Peak HR 01/03/2022 104  bpm Final    Peak Systolic BP 01/03/2022 162  mmHg Final    Peak Diatolic BP 01/03/2022 114  mmHg Final    Peak RPP 01/03/2022 16,848   Final    % Max HR Achieved 01/03/2022 73   Final    1 Minute Recovery HR 01/03/2022 102  bpm Final    Troponin I 01/03/2022 <0.030  <=0.040 ng/mL Final   Office Visit on 12/06/2021   Component Date Value Ref Range Status    POC Rapid COVID 12/06/2021 Negative  Negative Final     Acceptable 12/06/2021 Yes   Final   Admission on 11/22/2021, Discharged on 11/22/2021   Component Date Value Ref Range Status    WBC 11/22/2021 7.48  3.90 - 12.70 K/uL Final    RBC 11/22/2021 4.88  4.00 - 5.40 M/uL Final    Hemoglobin 11/22/2021 15.5  12.0 - 16.0 g/dL Final    Hematocrit 11/22/2021 46.8  37.0 - 48.5 % Final    MCV 11/22/2021 96  82 - 98 fL Final    MCH 11/22/2021 31.8* 27.0 - 31.0 pg Final    MCHC 11/22/2021 33.1  32.0 - 36.0 g/dL Final    RDW 11/22/2021 13.5  11.5 - 14.5 % Final    Platelets 11/22/2021 181  150 - 450 K/uL Final    MPV 11/22/2021 9.6  9.2 - 12.9 fL Final    Immature Granulocytes 11/22/2021 0.3  0.0 - 0.5 % Final    Gran # (ANC) 11/22/2021 3.4  1.8 - 7.7 K/uL Final    Immature Grans (Abs) 11/22/2021 0.02  0.00 - 0.04 K/uL Final    Lymph # 11/22/2021 3.2  1.0 - 4.8 K/uL Final    Mono # 11/22/2021 0.7  0.3 - 1.0 K/uL Final    Eos # 11/22/2021 0.1  0.0 - 0.5 K/uL Final    Baso # 11/22/2021 0.04  0.00 - 0.20 K/uL Final    nRBC 11/22/2021 0  0 /100 WBC Final    Gran % 11/22/2021 45.6  38.0 - 73.0 % Final    Lymph % 11/22/2021 42.1  18.0 - 48.0 % Final    Mono % 11/22/2021 9.6  4.0 - 15.0 % Final    Eosinophil % 11/22/2021 1.9  0.0 - 8.0 % Final    Basophil % 11/22/2021 0.5  0.0 - 1.9 % Final    Differential Method 11/22/2021 Automated   Final    Sodium 11/22/2021 136  136 - 145 mmol/L Final    Potassium 11/22/2021 4.1  3.5 - 5.1 mmol/L Final    Chloride 11/22/2021 102  95 - 110 mmol/L Final    CO2  11/22/2021 23  23 - 29 mmol/L Final    Glucose 11/22/2021 93  70 - 110 mg/dL Final    BUN 11/22/2021 18  8 - 23 mg/dL Final    Creatinine 11/22/2021 1.0  0.5 - 1.4 mg/dL Final    Calcium 11/22/2021 9.5  8.7 - 10.5 mg/dL Final    Total Protein 11/22/2021 8.1  6.0 - 8.4 g/dL Final    Albumin 11/22/2021 4.1  3.5 - 5.2 g/dL Final    Total Bilirubin 11/22/2021 0.8  0.1 - 1.0 mg/dL Final    Alkaline Phosphatase 11/22/2021 76  55 - 135 U/L Final    AST 11/22/2021 21  10 - 40 U/L Final    ALT 11/22/2021 16  10 - 44 U/L Final    Anion Gap 11/22/2021 11  8 - 16 mmol/L Final    eGFR if African American 11/22/2021 >60.0  >60 mL/min/1.73 m^2 Final    eGFR if non  11/22/2021 56.0* >60 mL/min/1.73 m^2 Final    BNP 11/22/2021 55  0 - 99 pg/mL Final    Troponin I 11/22/2021 <0.030  <=0.040 ng/mL Final    PT 11/22/2021 12.4  11.4 - 13.7 sec Final    INR 11/22/2021 1.0   Final   Office Visit on 10/12/2021   Component Date Value Ref Range Status    Amphetamines 01/06/2022 NEGATIVE  <500 ng/mL Final    Barbiturates 01/06/2022 NEGATIVE  <300 ng/mL Final    Benzodiazepines 01/06/2022 NEGATIVE  <100 ng/mL Final    Cocaine Metabolites 01/06/2022 NEGATIVE  <150 ng/mL Final    Methadone 01/06/2022 NEGATIVE  <100 ng/mL Final    Opiates 01/06/2022 NEGATIVE  <100 ng/mL Final    Oxycodone 01/06/2022 NEGATIVE  <100 ng/mL Final    Phencyclidine 01/06/2022 NEGATIVE  <25 ng/mL Final    Creatinine 01/06/2022 40.8  > or = 20.0 mg/dL Final    pH 01/06/2022 5.4  4.5 - 9.0 Final    Oxidants, Urine (Tox) 01/06/2022 NEGATIVE  <200 mcg/mL Final    Notes and Comments 01/06/2022    Final   Office Visit on 09/29/2021   Component Date Value Ref Range Status    POC Rapid COVID 09/29/2021 Negative  Negative Final     Acceptable 09/29/2021 Yes   Final       Past Medical History:   Diagnosis Date    Anxiety     Martin esophagus     Colitis     COPD (chronic obstructive pulmonary disease)     GERD  (gastroesophageal reflux disease)     Hypertension     Thyroid disease     Vocal cord polyps      Past Surgical History:   Procedure Laterality Date    ABDOMINAL AORTIC ANEURYSM REPAIR      BACK SURGERY      cervical    CATARACT EXTRACTION Right 02/2021    CHOLECYSTECTOMY  12-    Dr Price  [facility]    FOOT SURGERY      HYSTERECTOMY      SALIVARY GLAND SURGERY       Family History   Problem Relation Age of Onset    Cancer Mother     Heart failure Father     Emphysema Sister     No Known Problems Brother     Cancer Sister        Marital Status:   Alcohol History:  reports no history of alcohol use.  Tobacco History:  reports that she has been smoking cigarettes. She has been smoking about 0.50 packs per day. She quit smokeless tobacco use about 5 years ago.  Drug History:  reports no history of drug use.    Health Maintenance Topics with due status: Not Due       Topic Last Completion Date    TETANUS VACCINE 09/11/2019    Mammogram 08/25/2021    Lipid Panel 10/04/2021    DEXA SCAN 10/15/2021     Immunization History   Administered Date(s) Administered    COVID-19, MRNA, LN-S, PF (Pfizer) (Purple Cap) 01/22/2021, 02/12/2021    Influenza - High Dose - PF (65 years and older) 09/23/2015, 10/18/2016, 09/21/2017, 01/17/2020    Influenza - Quadrivalent - High Dose - PF (65 years and older) 10/08/2020, 10/12/2021    Influenza - Trivalent (ADULT) 09/23/2014    Pneumococcal Conjugate - 13 Valent 10/18/2016    Pneumococcal Polysaccharide - 23 Valent 01/17/2020    Td (ADULT) 09/11/2019       Review of patient's allergies indicates:   Allergen Reactions    Bisacodyl Nausea And Vomiting     Other reaction(s): Vomiting    Demerol [meperidine]      Nausea vomiting, visual problem    Doxycycline Hives    Flonase [fluticasone propionate] Hives    Morphine     Morpholine analogues Other (See Comments)     hypotension       Current Outpatient Medications:     aluminum-magnesium  hydroxide-simethicone (MAALOX) 200-200-20 mg/5 mL Susp, Take 30 mLs by mouth once as needed., Disp: , Rfl:     HYDROcodone-acetaminophen (NORCO)  mg per tablet, Take 1 tablet by mouth every 12 (twelve) hours as needed., Disp: 50 tablet, Rfl: 0    levothyroxine (SYNTHROID) 100 MCG tablet, Take 1 tablet (100 mcg total) by mouth before breakfast., Disp: 90 tablet, Rfl: 1    lisinopriL (PRINIVIL,ZESTRIL) 20 MG tablet, Take 1 tablet (20 mg total) by mouth 2 (two) times daily., Disp: 180 tablet, Rfl: 1    metoprolol tartrate (LOPRESSOR) 25 MG tablet, Take 1 tablet (25 mg total) by mouth 2 (two) times daily., Disp: 180 tablet, Rfl: 1    omeprazole (PRILOSEC) 40 MG capsule, omeprazole 40 mg capsule,delayed release  Take 1 capsule every day by oral route in the morning for 30 days., Disp: , Rfl:     ondansetron (ZOFRAN) 4 MG tablet, Take 1 tablet (4 mg total) by mouth every 8 (eight) hours as needed for Nausea., Disp: 30 tablet, Rfl: 3    ondansetron (ZOFRAN-ODT) 4 MG TbDL, Take 1 tablet (4 mg total) by mouth every 8 (eight) hours as needed., Disp: 21 tablet, Rfl: 0    pantoprazole (PROTONIX) 40 MG tablet, Take 1 tablet (40 mg total) by mouth 2 (two) times daily., Disp: 180 tablet, Rfl: 1    simethicone (GAS-X ORAL), Take 1 tablet by mouth as needed., Disp: , Rfl:     (Magic mouthwash) 1:1:1 diphenhydramine(Benadryl) 12.5mg/5ml liq, aluminum & magnesium hydroxide-simethicone (Maalox), LIDOcaine viscous 2%, Swish and spit 5 mLs every 4 (four) hours as needed. for mouth sores, Disp: 450 mL, Rfl: 0    cephalexin (KEFTAB) 500 mg tablet, Take 1 tablet (500 mg total) by mouth 2 (two) times a day. for 5 days, Disp: 10 tablet, Rfl: 0    EPINEPHrine (EPIPEN) 0.3 mg/0.3 mL AtIn, Inject 0.3 mLs (0.3 mg total) into the muscle as needed (anaphylaxis). (Patient not taking: Reported on 2/2/2022), Disp: 1 each, Rfl: 1    Review of Systems   Constitutional: Negative for activity change, chills, fatigue and fever.   HENT:  "Positive for mouth sores ("Ulcer on the inside of the upper lip."). Negative for congestion, ear discharge, ear pain and sore throat.    Eyes: Negative for pain, discharge, redness, itching and visual disturbance.        "A stye on both eyes."   Respiratory: Negative for cough, shortness of breath and wheezing.    Cardiovascular: Negative for chest pain and palpitations.   Gastrointestinal: Negative for abdominal pain, constipation, diarrhea, nausea and vomiting.   Skin: Negative for rash.   Neurological: Negative for dizziness, syncope, weakness, light-headedness and headaches.          Objective:      Vitals:    02/02/22 1132   BP: 124/60   Pulse: 66   Temp: 98.3 °F (36.8 °C)   SpO2: 97%   Weight: 70.1 kg (154 lb 9.6 oz)   Height: 5' 2" (1.575 m)     Physical Exam  Vitals and nursing note reviewed.   Constitutional:       General: She is not in acute distress.     Appearance: Normal appearance. She is not ill-appearing, toxic-appearing or diaphoretic.   HENT:      Head: Normocephalic and atraumatic.      Right Ear: Tympanic membrane, ear canal and external ear normal. There is no impacted cerumen.      Left Ear: Tympanic membrane, ear canal and external ear normal. There is no impacted cerumen.      Nose: Nose normal. No rhinorrhea.      Mouth/Throat:      Lips: Pink. No lesions.      Mouth: Mucous membranes are moist. Oral lesions present.      Dentition: Has dentures. No gingival swelling or gum lesions.      Tongue: No lesions. Tongue does not deviate from midline.      Palate: No mass and lesions.      Pharynx: Oropharynx is clear. Uvula midline. No pharyngeal swelling, oropharyngeal exudate or posterior oropharyngeal erythema.      Tonsils: No tonsillar exudate.      Comments: 5mm aphthous ulcer noted on the inside of the upper lip.  Slight TTP with a tongue depressor.   No bleeding, drainage, or fluctuance noted.  Eyes:      General: No visual field deficit or scleral icterus.        Right eye: Hordeolum " present. No discharge.         Left eye: Hordeolum present.No discharge.      Extraocular Movements: Extraocular movements intact.      Right eye: Normal extraocular motion.      Left eye: Normal extraocular motion.      Conjunctiva/sclera: Conjunctivae normal.      Right eye: Right conjunctiva is not injected. No exudate or hemorrhage.     Left eye: Left conjunctiva is not injected. No exudate or hemorrhage.    Cardiovascular:      Rate and Rhythm: Normal rate and regular rhythm.      Pulses: Normal pulses.      Heart sounds: Normal heart sounds. No murmur heard.  No friction rub. No gallop.    Pulmonary:      Effort: Pulmonary effort is normal. No respiratory distress.      Breath sounds: Normal breath sounds. No wheezing, rhonchi or rales.   Abdominal:      General: There is no distension.      Palpations: Abdomen is soft.      Tenderness: There is no abdominal tenderness. There is no guarding or rebound.   Musculoskeletal:      Cervical back: Normal range of motion and neck supple.      Right lower leg: No edema.      Left lower leg: No edema.   Skin:     General: Skin is warm and dry.      Capillary Refill: Capillary refill takes less than 2 seconds.      Coloration: Skin is not jaundiced.      Findings: No rash.   Neurological:      Mental Status: She is alert. Mental status is at baseline.      Cranial Nerves: No cranial nerve deficit.      Gait: Gait normal.   Psychiatric:         Mood and Affect: Mood normal.         Behavior: Behavior normal. Behavior is cooperative.           Assessment:       1. Hordeolum externum, unspecified laterality    2. Aphthous ulcer of mouth    3. Primary hypertension    4. DDD (degenerative disc disease), lumbar    5. Acquired hypothyroidism    6. Gastroesophageal reflux disease without esophagitis    7. Chronic obstructive pulmonary disease, unspecified COPD type           Plan:       Hordeolum externum, unspecified laterality  Comments:  Due to prolong course of 2 weeks,  start Cephalexin 500mg b.i.d x 5 days.  Continue warm compresses frequently.   She reports having an appt with her ENT this coming Friday - continue to follow up as scheduled.   Orders:  -     cephalexin (KEFTAB) 500 mg tablet; Take 1 tablet (500 mg total) by mouth 2 (two) times a day. for 5 days  Dispense: 10 tablet; Refill: 0    Aphthous ulcer of mouth  Comments:  Start Magic mouthwash q.4h  Avoid hot foods and liquids.   Orders:  -     (Magic mouthwash) 1:1:1 diphenhydramine(Benadryl) 12.5mg/5ml liq, aluminum & magnesium hydroxide-simethicone (Maalox), LIDOcaine viscous 2%; Swish and spit 5 mLs every 4 (four) hours as needed. for mouth sores  Dispense: 450 mL; Refill: 0    Primary hypertension  Comments:  BP today: 124/60 mmHg. Currently stable and well controlled. Continue as is.    DDD (degenerative disc disease), lumbar    Acquired hypothyroidism    Gastroesophageal reflux disease without esophagitis    Chronic obstructive pulmonary disease, unspecified COPD type      Follow up if symptoms worsen or fail to improve, for Stye and Aphthous Ulcer.        2/2/2022 Jax García PA-C

## 2022-02-08 DIAGNOSIS — K11.22 ACUTE RECURRENT SIALOADENITIS: Primary | ICD-10-CM

## 2022-02-15 ENCOUNTER — TELEPHONE (OUTPATIENT)
Dept: FAMILY MEDICINE | Facility: CLINIC | Age: 72
End: 2022-02-15
Payer: MEDICARE

## 2022-02-15 ENCOUNTER — TELEPHONE (OUTPATIENT)
Dept: CARDIOLOGY | Facility: CLINIC | Age: 72
End: 2022-02-15
Payer: MEDICARE

## 2022-02-15 NOTE — TELEPHONE ENCOUNTER
----- Message from Alyx Lechuga sent at 2/15/2022  3:53 PM CST -----  Vm- pt is having problems with urination. Went to er but it was to crowded. Her back is hurting by her kidney. Not sure is she has an infection. The eye doctor gave her some antibiotics and she is not sure if they will work for that. Can she come do a ua or get in tomorrow   [phone]

## 2022-02-15 NOTE — TELEPHONE ENCOUNTER
----- Message from Sandrita Pink sent at 2/15/2022  9:58 AM CST -----  Type: Needs Medical Advice  Who Called:  Patient  Best Call Back Number:   Additional Information: Calling to cancel all of her tests today as she was told she does not need them.

## 2022-02-16 ENCOUNTER — OFFICE VISIT (OUTPATIENT)
Dept: FAMILY MEDICINE | Facility: CLINIC | Age: 72
End: 2022-02-16
Payer: MEDICARE

## 2022-02-16 VITALS
WEIGHT: 156 LBS | HEIGHT: 62 IN | SYSTOLIC BLOOD PRESSURE: 116 MMHG | BODY MASS INDEX: 28.71 KG/M2 | HEART RATE: 64 BPM | DIASTOLIC BLOOD PRESSURE: 82 MMHG | TEMPERATURE: 98 F

## 2022-02-16 DIAGNOSIS — N20.0 RENAL STONE: ICD-10-CM

## 2022-02-16 DIAGNOSIS — R39.9 UTI SYMPTOMS: Primary | ICD-10-CM

## 2022-02-16 DIAGNOSIS — N30.01 ACUTE CYSTITIS WITH HEMATURIA: ICD-10-CM

## 2022-02-16 LAB
BILIRUB UR QL STRIP: NEGATIVE
GLUCOSE UR QL STRIP: NEGATIVE
KETONES UR QL STRIP: NEGATIVE
LEUKOCYTE ESTERASE UR QL STRIP: NEGATIVE
PH, POC UA: 5.5
POC BLOOD, URINE: POSITIVE
POC NITRATES, URINE: NEGATIVE
PROT UR QL STRIP: NEGATIVE
SP GR UR STRIP: 1.01 (ref 1–1.03)
UROBILINOGEN UR STRIP-ACNC: NEGATIVE (ref 0.1–1.1)

## 2022-02-16 PROCEDURE — 1160F PR REVIEW ALL MEDS BY PRESCRIBER/CLIN PHARMACIST DOCUMENTED: ICD-10-PCS | Mod: S$GLB,,, | Performed by: NURSE PRACTITIONER

## 2022-02-16 PROCEDURE — 3008F PR BODY MASS INDEX (BMI) DOCUMENTED: ICD-10-PCS | Mod: S$GLB,,, | Performed by: NURSE PRACTITIONER

## 2022-02-16 PROCEDURE — 3288F PR FALLS RISK ASSESSMENT DOCUMENTED: ICD-10-PCS | Mod: S$GLB,,, | Performed by: NURSE PRACTITIONER

## 2022-02-16 PROCEDURE — 81003 POCT URINALYSIS, DIPSTICK, AUTOMATED, W/O SCOPE: ICD-10-PCS | Mod: QW,S$GLB,, | Performed by: NURSE PRACTITIONER

## 2022-02-16 PROCEDURE — 3008F BODY MASS INDEX DOCD: CPT | Mod: S$GLB,,, | Performed by: NURSE PRACTITIONER

## 2022-02-16 PROCEDURE — 3079F PR MOST RECENT DIASTOLIC BLOOD PRESSURE 80-89 MM HG: ICD-10-PCS | Mod: S$GLB,,, | Performed by: NURSE PRACTITIONER

## 2022-02-16 PROCEDURE — 99213 OFFICE O/P EST LOW 20 MIN: CPT | Mod: S$GLB,,, | Performed by: NURSE PRACTITIONER

## 2022-02-16 PROCEDURE — 4010F PR ACE/ARB THEARPY RXD/TAKEN: ICD-10-PCS | Mod: S$GLB,,, | Performed by: NURSE PRACTITIONER

## 2022-02-16 PROCEDURE — 1101F PR PT FALLS ASSESS DOC 0-1 FALLS W/OUT INJ PAST YR: ICD-10-PCS | Mod: S$GLB,,, | Performed by: NURSE PRACTITIONER

## 2022-02-16 PROCEDURE — 3074F SYST BP LT 130 MM HG: CPT | Mod: S$GLB,,, | Performed by: NURSE PRACTITIONER

## 2022-02-16 PROCEDURE — 81003 URINALYSIS AUTO W/O SCOPE: CPT | Mod: QW,S$GLB,, | Performed by: NURSE PRACTITIONER

## 2022-02-16 PROCEDURE — 3288F FALL RISK ASSESSMENT DOCD: CPT | Mod: S$GLB,,, | Performed by: NURSE PRACTITIONER

## 2022-02-16 PROCEDURE — 3079F DIAST BP 80-89 MM HG: CPT | Mod: S$GLB,,, | Performed by: NURSE PRACTITIONER

## 2022-02-16 PROCEDURE — 1159F MED LIST DOCD IN RCRD: CPT | Mod: S$GLB,,, | Performed by: NURSE PRACTITIONER

## 2022-02-16 PROCEDURE — 99213 PR OFFICE/OUTPT VISIT, EST, LEVL III, 20-29 MIN: ICD-10-PCS | Mod: S$GLB,,, | Performed by: NURSE PRACTITIONER

## 2022-02-16 PROCEDURE — 3074F PR MOST RECENT SYSTOLIC BLOOD PRESSURE < 130 MM HG: ICD-10-PCS | Mod: S$GLB,,, | Performed by: NURSE PRACTITIONER

## 2022-02-16 PROCEDURE — 1101F PT FALLS ASSESS-DOCD LE1/YR: CPT | Mod: S$GLB,,, | Performed by: NURSE PRACTITIONER

## 2022-02-16 PROCEDURE — 4010F ACE/ARB THERAPY RXD/TAKEN: CPT | Mod: S$GLB,,, | Performed by: NURSE PRACTITIONER

## 2022-02-16 PROCEDURE — 1159F PR MEDICATION LIST DOCUMENTED IN MEDICAL RECORD: ICD-10-PCS | Mod: S$GLB,,, | Performed by: NURSE PRACTITIONER

## 2022-02-16 PROCEDURE — 1160F RVW MEDS BY RX/DR IN RCRD: CPT | Mod: S$GLB,,, | Performed by: NURSE PRACTITIONER

## 2022-02-16 RX ORDER — DOXYCYCLINE HYCLATE 100 MG
100 TABLET ORAL 2 TIMES DAILY
COMMUNITY
Start: 2022-02-14 | End: 2023-07-17

## 2022-02-16 RX ORDER — CIPROFLOXACIN 500 MG/1
500 TABLET ORAL EVERY 12 HOURS
Qty: 10 TABLET | Refills: 0 | Status: SHIPPED | OUTPATIENT
Start: 2022-02-16 | End: 2022-02-21

## 2022-02-16 NOTE — PROGRESS NOTES
SUBJECTIVE:    Patient ID: Joslyn Dubon is a 71 y.o. female.    Chief Complaint: Urinary Tract Infection (No bottles// C/o urinary urgency and frequency. Lower back pain since Sunday-MJ)    Pt presents with c/o UTI symptoms. Started over the weekend with right sided back pain and urinary hesitancy. Started drinking more water and hesitancy has improved though does have urgency, frequency, and decreased output. Still with back pain. She does also report a history of kidney stones that were passable and did not need intervention. She denies any fevers or chills.         Office Visit on 02/16/2022   Component Date Value Ref Range Status    POC Blood, Urine 02/16/2022 Positive* Negative Final    POC Bilirubin, Urine 02/16/2022 Negative  Negative Final    POC Urobilinogen, Urine 02/16/2022 Negative  0.1 - 1.1 Final    POC Ketones, Urine 02/16/2022 Negative  Negative Final    POC Protein, Urine 02/16/2022 Negative  Negative Final    POC Nitrates, Urine 02/16/2022 Negative  Negative Final    POC Glucose, Urine 02/16/2022 Negative  Negative Final    pH, UA 02/16/2022 5.5   Final    POC Specific Gravity, Urine 02/16/2022 1.015  1.003 - 1.029 Final    POC Leukocytes, Urine 02/16/2022 Negative  Negative Final   Admission on 01/03/2022, Discharged on 01/03/2022   Component Date Value Ref Range Status    WBC 01/03/2022 5.71  3.90 - 12.70 K/uL Final    RBC 01/03/2022 4.77  4.00 - 5.40 M/uL Final    Hemoglobin 01/03/2022 15.4  12.0 - 16.0 g/dL Final    Hematocrit 01/03/2022 45.5  37.0 - 48.5 % Final    MCV 01/03/2022 95  82 - 98 fL Final    MCH 01/03/2022 32.3* 27.0 - 31.0 pg Final    MCHC 01/03/2022 33.8  32.0 - 36.0 g/dL Final    RDW 01/03/2022 13.0  11.5 - 14.5 % Final    Platelets 01/03/2022 212  150 - 450 K/uL Final    MPV 01/03/2022 9.4  9.2 - 12.9 fL Final    Immature Granulocytes 01/03/2022 0.4  0.0 - 0.5 % Final    Gran # (ANC) 01/03/2022 3.2  1.8 - 7.7 K/uL Final    Immature Grans (Abs)  01/03/2022 0.02  0.00 - 0.04 K/uL Final    Lymph # 01/03/2022 1.8  1.0 - 4.8 K/uL Final    Mono # 01/03/2022 0.5  0.3 - 1.0 K/uL Final    Eos # 01/03/2022 0.2  0.0 - 0.5 K/uL Final    Baso # 01/03/2022 0.04  0.00 - 0.20 K/uL Final    nRBC 01/03/2022 0  0 /100 WBC Final    Gran % 01/03/2022 55.9  38.0 - 73.0 % Final    Lymph % 01/03/2022 32.0  18.0 - 48.0 % Final    Mono % 01/03/2022 8.2  4.0 - 15.0 % Final    Eosinophil % 01/03/2022 2.8  0.0 - 8.0 % Final    Basophil % 01/03/2022 0.7  0.0 - 1.9 % Final    Differential Method 01/03/2022 Automated   Final    Sodium 01/03/2022 141  136 - 145 mmol/L Final    Potassium 01/03/2022 3.9  3.5 - 5.1 mmol/L Final    Chloride 01/03/2022 105  95 - 110 mmol/L Final    CO2 01/03/2022 25  23 - 29 mmol/L Final    Glucose 01/03/2022 98  70 - 110 mg/dL Final    BUN 01/03/2022 13  8 - 23 mg/dL Final    Creatinine 01/03/2022 0.9  0.5 - 1.4 mg/dL Final    Calcium 01/03/2022 9.4  8.7 - 10.5 mg/dL Final    Total Protein 01/03/2022 7.5  6.0 - 8.4 g/dL Final    Albumin 01/03/2022 4.0  3.5 - 5.2 g/dL Final    Total Bilirubin 01/03/2022 0.7  0.1 - 1.0 mg/dL Final    Alkaline Phosphatase 01/03/2022 81  55 - 135 U/L Final    AST 01/03/2022 22  10 - 40 U/L Final    ALT 01/03/2022 23  10 - 44 U/L Final    Anion Gap 01/03/2022 11  8 - 16 mmol/L Final    eGFR if African American 01/03/2022 >60.0  >60 mL/min/1.73 m^2 Final    eGFR if non African American 01/03/2022 >60.0  >60 mL/min/1.73 m^2 Final    Troponin I 01/03/2022 <0.030  <=0.040 ng/mL Final    PT 01/03/2022 13.1  11.4 - 13.7 sec Final    INR 01/03/2022 1.1   Final    SARS-CoV-2 RNA, Amplification, Qual 01/03/2022 Negative  Negative Final    BNP 01/03/2022 94  0 - 99 pg/mL Final    Troponin I 01/03/2022 <0.030  <=0.040 ng/mL Final    CPK MB 01/03/2022 1.4  0.1 - 6.5 ng/mL Final    85% Max Predicted HR 01/03/2022 120   Final    Max Predicted HR 01/03/2022 142   Final    OHS CV CPX PATIENT IS MALE  01/03/2022 0.0   Final    [facility] CV CPX PATIENT IS FEMALE 01/03/2022 1.0   Final    HR at rest 01/03/2022 59  bpm Final    Systolic blood pressure 01/03/2022 139  mmHg Final    Diastolic blood pressure 01/03/2022 95  mmHg Final    RPP 01/03/2022 8,201   Final    Peak HR 01/03/2022 104  bpm Final    Peak Systolic BP 01/03/2022 162  mmHg Final    Peak Diatolic BP 01/03/2022 114  mmHg Final    Peak RPP 01/03/2022 16,848   Final    % Max HR Achieved 01/03/2022 73   Final    1 Minute Recovery HR 01/03/2022 102  bpm Final    Troponin I 01/03/2022 <0.030  <=0.040 ng/mL Final   Office Visit on 12/06/2021   Component Date Value Ref Range Status    POC Rapid COVID 12/06/2021 Negative  Negative Final     Acceptable 12/06/2021 Yes   Final   Admission on 11/22/2021, Discharged on 11/22/2021   Component Date Value Ref Range Status    WBC 11/22/2021 7.48  3.90 - 12.70 K/uL Final    RBC 11/22/2021 4.88  4.00 - 5.40 M/uL Final    Hemoglobin 11/22/2021 15.5  12.0 - 16.0 g/dL Final    Hematocrit 11/22/2021 46.8  37.0 - 48.5 % Final    MCV 11/22/2021 96  82 - 98 fL Final    MCH 11/22/2021 31.8* 27.0 - 31.0 pg Final    MCHC 11/22/2021 33.1  32.0 - 36.0 g/dL Final    RDW 11/22/2021 13.5  11.5 - 14.5 % Final    Platelets 11/22/2021 181  150 - 450 K/uL Final    MPV 11/22/2021 9.6  9.2 - 12.9 fL Final    Immature Granulocytes 11/22/2021 0.3  0.0 - 0.5 % Final    Gran # (ANC) 11/22/2021 3.4  1.8 - 7.7 K/uL Final    Immature Grans (Abs) 11/22/2021 0.02  0.00 - 0.04 K/uL Final    Lymph # 11/22/2021 3.2  1.0 - 4.8 K/uL Final    Mono # 11/22/2021 0.7  0.3 - 1.0 K/uL Final    Eos # 11/22/2021 0.1  0.0 - 0.5 K/uL Final    Baso # 11/22/2021 0.04  0.00 - 0.20 K/uL Final    nRBC 11/22/2021 0  0 /100 WBC Final    Gran % 11/22/2021 45.6  38.0 - 73.0 % Final    Lymph % 11/22/2021 42.1  18.0 - 48.0 % Final    Mono % 11/22/2021 9.6  4.0 - 15.0 % Final    Eosinophil % 11/22/2021 1.9  0.0 - 8.0 % Final     Basophil % 11/22/2021 0.5  0.0 - 1.9 % Final    Differential Method 11/22/2021 Automated   Final    Sodium 11/22/2021 136  136 - 145 mmol/L Final    Potassium 11/22/2021 4.1  3.5 - 5.1 mmol/L Final    Chloride 11/22/2021 102  95 - 110 mmol/L Final    CO2 11/22/2021 23  23 - 29 mmol/L Final    Glucose 11/22/2021 93  70 - 110 mg/dL Final    BUN 11/22/2021 18  8 - 23 mg/dL Final    Creatinine 11/22/2021 1.0  0.5 - 1.4 mg/dL Final    Calcium 11/22/2021 9.5  8.7 - 10.5 mg/dL Final    Total Protein 11/22/2021 8.1  6.0 - 8.4 g/dL Final    Albumin 11/22/2021 4.1  3.5 - 5.2 g/dL Final    Total Bilirubin 11/22/2021 0.8  0.1 - 1.0 mg/dL Final    Alkaline Phosphatase 11/22/2021 76  55 - 135 U/L Final    AST 11/22/2021 21  10 - 40 U/L Final    ALT 11/22/2021 16  10 - 44 U/L Final    Anion Gap 11/22/2021 11  8 - 16 mmol/L Final    eGFR if African American 11/22/2021 >60.0  >60 mL/min/1.73 m^2 Final    eGFR if non  11/22/2021 56.0* >60 mL/min/1.73 m^2 Final    BNP 11/22/2021 55  0 - 99 pg/mL Final    Troponin I 11/22/2021 <0.030  <=0.040 ng/mL Final    PT 11/22/2021 12.4  11.4 - 13.7 sec Final    INR 11/22/2021 1.0   Final   Office Visit on 10/12/2021   Component Date Value Ref Range Status    Amphetamines 01/06/2022 NEGATIVE  <500 ng/mL Final    Barbiturates 01/06/2022 NEGATIVE  <300 ng/mL Final    Benzodiazepines 01/06/2022 NEGATIVE  <100 ng/mL Final    Cocaine Metabolites 01/06/2022 NEGATIVE  <150 ng/mL Final    Methadone 01/06/2022 NEGATIVE  <100 ng/mL Final    Opiates 01/06/2022 NEGATIVE  <100 ng/mL Final    Oxycodone 01/06/2022 NEGATIVE  <100 ng/mL Final    Phencyclidine 01/06/2022 NEGATIVE  <25 ng/mL Final    Creatinine 01/06/2022 40.8  > or = 20.0 mg/dL Final    pH 01/06/2022 5.4  4.5 - 9.0 Final    Oxidants, Urine (Tox) 01/06/2022 NEGATIVE  <200 mcg/mL Final    Notes and Comments 01/06/2022    Final   Office Visit on 09/29/2021   Component Date Value Ref Range Status     POC Rapid COVID 09/29/2021 Negative  Negative Final     Acceptable 09/29/2021 Yes   Final       Past Medical History:   Diagnosis Date    Anxiety     Martin esophagus     Colitis     COPD (chronic obstructive pulmonary disease)     GERD (gastroesophageal reflux disease)     Hypertension     Thyroid disease     Vocal cord polyps      Past Surgical History:   Procedure Laterality Date    ABDOMINAL AORTIC ANEURYSM REPAIR      BACK SURGERY      cervical    CATARACT EXTRACTION Right 02/2021    CHOLECYSTECTOMY  12-    Dr Price  [facility]    FOOT SURGERY      HYSTERECTOMY      SALIVARY GLAND SURGERY       Family History   Problem Relation Age of Onset    Cancer Mother     Heart failure Father     Emphysema Sister     No Known Problems Brother     Cancer Sister        Marital Status:   Alcohol History:  reports no history of alcohol use.  Tobacco History:  reports that she has been smoking cigarettes. She has been smoking about 0.50 packs per day. She quit smokeless tobacco use about 5 years ago.  Drug History:  reports no history of drug use.    Review of patient's allergies indicates:   Allergen Reactions    Bisacodyl Nausea And Vomiting     Other reaction(s): Vomiting    Demerol [meperidine]      Nausea vomiting, visual problem    Doxycycline Hives    Flonase [fluticasone propionate] Hives    Morphine     Morpholine analogues Other (See Comments)     hypotension       Current Outpatient Medications:     (Magic mouthwash) 1:1:1 diphenhydramine(Benadryl) 12.5mg/5ml liq, aluminum & magnesium hydroxide-simethicone (Maalox), LIDOcaine viscous 2%, Swish and spit 5 mLs every 4 (four) hours as needed. for mouth sores, Disp: 450 mL, Rfl: 0    aluminum-magnesium hydroxide-simethicone (MAALOX) 200-200-20 mg/5 mL Susp, Take 30 mLs by mouth once as needed., Disp: , Rfl:     ciprofloxacin HCl (CIPRO) 500 MG tablet, Take 1 tablet (500 mg total) by mouth every 12 (twelve) hours. for  5 days, Disp: 10 tablet, Rfl: 0    doxycycline (VIBRA-TABS) 100 MG tablet, Take 100 mg by mouth 2 (two) times daily., Disp: , Rfl:     EPINEPHrine (EPIPEN) 0.3 mg/0.3 mL AtIn, Inject 0.3 mLs (0.3 mg total) into the muscle as needed (anaphylaxis). (Patient not taking: Reported on 2/2/2022), Disp: 1 each, Rfl: 1    HYDROcodone-acetaminophen (NORCO)  mg per tablet, Take 1 tablet by mouth every 12 (twelve) hours as needed., Disp: 50 tablet, Rfl: 0    levothyroxine (SYNTHROID) 100 MCG tablet, Take 1 tablet (100 mcg total) by mouth before breakfast., Disp: 90 tablet, Rfl: 1    lisinopriL (PRINIVIL,ZESTRIL) 20 MG tablet, Take 1 tablet (20 mg total) by mouth 2 (two) times daily., Disp: 180 tablet, Rfl: 1    metoprolol tartrate (LOPRESSOR) 25 MG tablet, Take 1 tablet (25 mg total) by mouth 2 (two) times daily., Disp: 180 tablet, Rfl: 1    omeprazole (PRILOSEC) 40 MG capsule, omeprazole 40 mg capsule,delayed release  Take 1 capsule every day by oral route in the morning for 30 days., Disp: , Rfl:     ondansetron (ZOFRAN) 4 MG tablet, Take 1 tablet (4 mg total) by mouth every 8 (eight) hours as needed for Nausea., Disp: 30 tablet, Rfl: 3    ondansetron (ZOFRAN-ODT) 4 MG TbDL, Take 1 tablet (4 mg total) by mouth every 8 (eight) hours as needed., Disp: 21 tablet, Rfl: 0    pantoprazole (PROTONIX) 40 MG tablet, Take 1 tablet (40 mg total) by mouth 2 (two) times daily., Disp: 180 tablet, Rfl: 1    simethicone (GAS-X ORAL), Take 1 tablet by mouth as needed., Disp: , Rfl:     Review of Systems   Constitutional: Negative for activity change, fatigue and fever.   HENT: Negative.    Respiratory: Negative for cough and shortness of breath.    Cardiovascular: Negative.    Gastrointestinal: Negative.    Genitourinary: Positive for decreased urine volume, dysuria, flank pain, frequency and urgency.   Musculoskeletal: Positive for back pain.   Neurological: Negative.           Objective:      Vitals:    02/16/22 0901   BP:  "116/82   Pulse: 64   Temp: 98 °F (36.7 °C)   Weight: 70.8 kg (156 lb)   Height: 5' 2" (1.575 m)     Body mass index is 28.53 kg/m².  Physical Exam  Constitutional:       Appearance: Normal appearance.   HENT:      Head: Normocephalic and atraumatic.   Cardiovascular:      Rate and Rhythm: Normal rate.      Pulses: Normal pulses.   Pulmonary:      Effort: Pulmonary effort is normal. No respiratory distress.   Skin:     General: Skin is warm.   Neurological:      Mental Status: She is alert and oriented to person, place, and time.   Psychiatric:         Mood and Affect: Mood normal.           Assessment:       1. UTI symptoms    2. Acute cystitis with hematuria    3. Renal stone         Plan:       UTI symptoms  -     POCT Urinalysis, Dipstick, Automated, W/O Scope  -     Urine culture; Future; Expected date: 02/16/2022    Acute cystitis with hematuria  -     ciprofloxacin HCl (CIPRO) 500 MG tablet; Take 1 tablet (500 mg total) by mouth every 12 (twelve) hours. for 5 days  Dispense: 10 tablet; Refill: 0    Renal stone  -     US Retroperitoneal Complete; Future; Expected date: 02/16/2022    Pt currently on Doxycycline for stye. Will order Cipro but hold off until US completed. UA negative except for gross hematuria. Given complaints, cannot r/o renal stones. Will get US and go from there.     Follow up if symptoms worsen or fail to improve.              "

## 2022-02-17 ENCOUNTER — HOSPITAL ENCOUNTER (OUTPATIENT)
Dept: RADIOLOGY | Facility: HOSPITAL | Age: 72
Discharge: HOME | End: 2022-02-17
Attending: NURSE PRACTITIONER
Payer: MEDICARE

## 2022-02-17 ENCOUNTER — TELEPHONE (OUTPATIENT)
Dept: FAMILY MEDICINE | Facility: CLINIC | Age: 72
End: 2022-02-17
Payer: MEDICARE

## 2022-02-17 DIAGNOSIS — N20.0 RENAL STONE: ICD-10-CM

## 2022-02-17 DIAGNOSIS — R10.11 RIGHT UPPER QUADRANT ABDOMINAL PAIN: ICD-10-CM

## 2022-02-17 PROCEDURE — 76770 US EXAM ABDO BACK WALL COMP: CPT | Mod: TC,PO

## 2022-02-17 NOTE — TELEPHONE ENCOUNTER
Hold off on antibiotic. I still think it's a stone causing it but it's not showing up on the US so I need a CT to confirm because depending on size I need to get her to Urology

## 2022-02-17 NOTE — TELEPHONE ENCOUNTER
Spoke to pt verbatim per Omayra. Pt is very concerned. Pt states she still has pain in her back. Pt want to know if she should take the antibiotic, what could cause the dilation, how should she fix it, etc. Please advise.

## 2022-02-17 NOTE — TELEPHONE ENCOUNTER
----- Message from Alyx Lechuga sent at 2/17/2022  2:01 PM CST -----  - pt would like to talk to nurse to let her know what the results of her u/s was. She is not sure if she should start the medication or not   [phone]

## 2022-02-18 NOTE — TELEPHONE ENCOUNTER
Order is placed. She can reach out to them for sooner appointment. Also her urine to grow back some bacteria so I want her to start the Cipro.

## 2022-02-18 NOTE — TELEPHONE ENCOUNTER
Pt is currently taking Doxy due to a stye on her eye. She would like to know if she should stop the doxy to take the cipro.

## 2022-02-19 LAB — BACTERIA UR CULT: ABNORMAL

## 2022-02-21 ENCOUNTER — TELEPHONE (OUTPATIENT)
Dept: FAMILY MEDICINE | Facility: CLINIC | Age: 72
End: 2022-02-21
Payer: MEDICARE

## 2022-02-21 DIAGNOSIS — N30.01 ACUTE CYSTITIS WITH HEMATURIA: Primary | ICD-10-CM

## 2022-02-21 NOTE — TELEPHONE ENCOUNTER
----- Message from Alyx Lechuga sent at 2/21/2022  3:20 PM CST -----  Vm- pt was given cipro and she broke out in hives and very sick. Would like to know what to do        none

## 2022-02-21 NOTE — TELEPHONE ENCOUNTER
Spoke to pt. She states she was given cipro Friday, she started with Hives, and SOB. She has stopped antibiotic. She would like a different abx.

## 2022-02-22 RX ORDER — AMOXICILLIN AND CLAVULANATE POTASSIUM 875; 125 MG/1; MG/1
1 TABLET, FILM COATED ORAL 2 TIMES DAILY
Qty: 14 TABLET | Refills: 0 | Status: SHIPPED | OUTPATIENT
Start: 2022-02-22 | End: 2022-03-01

## 2022-02-22 NOTE — TELEPHONE ENCOUNTER
Spoke to pt. She is agreeable to Augmentin. Pt also thinks she passed 3 tiny stones she seen in urine. States side pain has resolved.

## 2022-03-30 ENCOUNTER — TELEPHONE (OUTPATIENT)
Dept: FAMILY MEDICINE | Facility: CLINIC | Age: 72
End: 2022-03-30
Payer: MEDICARE

## 2022-03-30 NOTE — TELEPHONE ENCOUNTER
----- Message from Laquita Barillas sent at 3/30/2022  9:37 AM CDT -----  Patient would like to know if she need to have labs done before her appointment please give her a call at [phone]

## 2022-04-07 ENCOUNTER — OFFICE VISIT (OUTPATIENT)
Dept: FAMILY MEDICINE | Facility: CLINIC | Age: 72
End: 2022-04-07
Payer: MEDICARE

## 2022-04-07 VITALS
DIASTOLIC BLOOD PRESSURE: 80 MMHG | HEIGHT: 62 IN | WEIGHT: 157 LBS | BODY MASS INDEX: 28.89 KG/M2 | HEART RATE: 60 BPM | SYSTOLIC BLOOD PRESSURE: 130 MMHG

## 2022-04-07 DIAGNOSIS — Z72.0 TOBACCO ABUSE: Chronic | ICD-10-CM

## 2022-04-07 DIAGNOSIS — I10 PRIMARY HYPERTENSION: ICD-10-CM

## 2022-04-07 DIAGNOSIS — M51.16 LUMBAR DISC DISEASE WITH RADICULOPATHY: Primary | ICD-10-CM

## 2022-04-07 DIAGNOSIS — M51.36 DDD (DEGENERATIVE DISC DISEASE), LUMBAR: ICD-10-CM

## 2022-04-07 DIAGNOSIS — I10 ESSENTIAL HYPERTENSION: ICD-10-CM

## 2022-04-07 DIAGNOSIS — E03.9 ACQUIRED HYPOTHYROIDISM: ICD-10-CM

## 2022-04-07 DIAGNOSIS — I10 UNCONTROLLED HYPERTENSION: ICD-10-CM

## 2022-04-07 DIAGNOSIS — I71.40 ABDOMINAL AORTIC ANEURYSM (AAA) WITHOUT RUPTURE: Chronic | ICD-10-CM

## 2022-04-07 DIAGNOSIS — F41.9 ANXIETY: Chronic | ICD-10-CM

## 2022-04-07 DIAGNOSIS — K21.9 GASTROESOPHAGEAL REFLUX DISEASE WITHOUT ESOPHAGITIS: ICD-10-CM

## 2022-04-07 DIAGNOSIS — F17.200 CURRENT SMOKER: ICD-10-CM

## 2022-04-07 PROCEDURE — 3075F SYST BP GE 130 - 139MM HG: CPT | Mod: S$GLB,,, | Performed by: FAMILY MEDICINE

## 2022-04-07 PROCEDURE — 4010F PR ACE/ARB THEARPY RXD/TAKEN: ICD-10-PCS | Mod: S$GLB,,, | Performed by: FAMILY MEDICINE

## 2022-04-07 PROCEDURE — 3288F PR FALLS RISK ASSESSMENT DOCUMENTED: ICD-10-PCS | Mod: S$GLB,,, | Performed by: FAMILY MEDICINE

## 2022-04-07 PROCEDURE — 3288F FALL RISK ASSESSMENT DOCD: CPT | Mod: S$GLB,,, | Performed by: FAMILY MEDICINE

## 2022-04-07 PROCEDURE — 3075F PR MOST RECENT SYSTOLIC BLOOD PRESS GE 130-139MM HG: ICD-10-PCS | Mod: S$GLB,,, | Performed by: FAMILY MEDICINE

## 2022-04-07 PROCEDURE — 3079F DIAST BP 80-89 MM HG: CPT | Mod: S$GLB,,, | Performed by: FAMILY MEDICINE

## 2022-04-07 PROCEDURE — 1159F PR MEDICATION LIST DOCUMENTED IN MEDICAL RECORD: ICD-10-PCS | Mod: S$GLB,,, | Performed by: FAMILY MEDICINE

## 2022-04-07 PROCEDURE — 1101F PR PT FALLS ASSESS DOC 0-1 FALLS W/OUT INJ PAST YR: ICD-10-PCS | Mod: S$GLB,,, | Performed by: FAMILY MEDICINE

## 2022-04-07 PROCEDURE — 1101F PT FALLS ASSESS-DOCD LE1/YR: CPT | Mod: S$GLB,,, | Performed by: FAMILY MEDICINE

## 2022-04-07 PROCEDURE — 3008F BODY MASS INDEX DOCD: CPT | Mod: S$GLB,,, | Performed by: FAMILY MEDICINE

## 2022-04-07 PROCEDURE — 4010F ACE/ARB THERAPY RXD/TAKEN: CPT | Mod: S$GLB,,, | Performed by: FAMILY MEDICINE

## 2022-04-07 PROCEDURE — 3008F PR BODY MASS INDEX (BMI) DOCUMENTED: ICD-10-PCS | Mod: S$GLB,,, | Performed by: FAMILY MEDICINE

## 2022-04-07 PROCEDURE — 99214 PR OFFICE/OUTPT VISIT, EST, LEVL IV, 30-39 MIN: ICD-10-PCS | Mod: S$GLB,,, | Performed by: FAMILY MEDICINE

## 2022-04-07 PROCEDURE — 99214 OFFICE O/P EST MOD 30 MIN: CPT | Mod: S$GLB,,, | Performed by: FAMILY MEDICINE

## 2022-04-07 PROCEDURE — 3079F PR MOST RECENT DIASTOLIC BLOOD PRESSURE 80-89 MM HG: ICD-10-PCS | Mod: S$GLB,,, | Performed by: FAMILY MEDICINE

## 2022-04-07 PROCEDURE — 1159F MED LIST DOCD IN RCRD: CPT | Mod: S$GLB,,, | Performed by: FAMILY MEDICINE

## 2022-04-07 RX ORDER — LEVOTHYROXINE SODIUM 100 UG/1
100 TABLET ORAL
Qty: 90 TABLET | Refills: 1 | Status: SHIPPED | OUTPATIENT
Start: 2022-04-07 | End: 2023-01-31 | Stop reason: SDUPTHER

## 2022-04-07 RX ORDER — METOPROLOL TARTRATE 25 MG/1
25 TABLET, FILM COATED ORAL 2 TIMES DAILY
Qty: 180 TABLET | Refills: 1 | Status: SHIPPED | OUTPATIENT
Start: 2022-04-07 | End: 2022-12-07 | Stop reason: SDUPTHER

## 2022-04-07 RX ORDER — LISINOPRIL 20 MG/1
20 TABLET ORAL 2 TIMES DAILY
Qty: 180 TABLET | Refills: 1 | Status: SHIPPED | OUTPATIENT
Start: 2022-04-07 | End: 2023-01-31 | Stop reason: SDUPTHER

## 2022-04-07 RX ORDER — HYDROCODONE BITARTRATE AND ACETAMINOPHEN 10; 325 MG/1; MG/1
1 TABLET ORAL EVERY 12 HOURS PRN
Qty: 50 TABLET | Refills: 0 | Status: SHIPPED | OUTPATIENT
Start: 2022-04-07 | End: 2022-07-29 | Stop reason: SDUPTHER

## 2022-04-07 NOTE — PROGRESS NOTES
SUBJECTIVE:    Patient ID: Joslyn Dubon is a 71 y.o. female.    Chief Complaint: Follow-up (No bottles // chronic back pain // Mammogram done //declined Colonoscopy //abc ) and Medication Refill    71-year-old female here for six-month checkup.  She she complains of left hip pains.  It throbs like a toothache when she is active.  She is not limping.  She does have leg cramps on some nights.    She knows she has a bump on her right upper eyelid.  Ophthalmology is Dr. Carissa Ng.  She has an appointment on 04/14/2022    She is interested in stopping smoking.      Office Visit on 02/16/2022   Component Date Value Ref Range Status    POC Blood, Urine 02/16/2022 Positive (A) Negative Final    POC Bilirubin, Urine 02/16/2022 Negative  Negative Final    POC Urobilinogen, Urine 02/16/2022 Negative  0.1 - 1.1 Final    POC Ketones, Urine 02/16/2022 Negative  Negative Final    POC Protein, Urine 02/16/2022 Negative  Negative Final    POC Nitrates, Urine 02/16/2022 Negative  Negative Final    POC Glucose, Urine 02/16/2022 Negative  Negative Final    pH, UA 02/16/2022 5.5   Final    POC Specific Gravity, Urine 02/16/2022 1.015  1.003 - 1.029 Final    POC Leukocytes, Urine 02/16/2022 Negative  Negative Final    Urine Culture, Routine 02/16/2022  (A)  Final   Admission on 01/03/2022, Discharged on 01/03/2022   Component Date Value Ref Range Status    WBC 01/03/2022 5.71  3.90 - 12.70 K/uL Final    RBC 01/03/2022 4.77  4.00 - 5.40 M/uL Final    Hemoglobin 01/03/2022 15.4  12.0 - 16.0 g/dL Final    Hematocrit 01/03/2022 45.5  37.0 - 48.5 % Final    MCV 01/03/2022 95  82 - 98 fL Final    MCH 01/03/2022 32.3 (A) 27.0 - 31.0 pg Final    MCHC 01/03/2022 33.8  32.0 - 36.0 g/dL Final    RDW 01/03/2022 13.0  11.5 - 14.5 % Final    Platelets 01/03/2022 212  150 - 450 K/uL Final    MPV 01/03/2022 9.4  9.2 - 12.9 fL Final    Immature Granulocytes 01/03/2022 0.4  0.0 - 0.5 % Final    Gran # (ANC) 01/03/2022  3.2  1.8 - 7.7 K/uL Final    Immature Grans (Abs) 01/03/2022 0.02  0.00 - 0.04 K/uL Final    Lymph # 01/03/2022 1.8  1.0 - 4.8 K/uL Final    Mono # 01/03/2022 0.5  0.3 - 1.0 K/uL Final    Eos # 01/03/2022 0.2  0.0 - 0.5 K/uL Final    Baso # 01/03/2022 0.04  0.00 - 0.20 K/uL Final    nRBC 01/03/2022 0  0 /100 WBC Final    Gran % 01/03/2022 55.9  38.0 - 73.0 % Final    Lymph % 01/03/2022 32.0  18.0 - 48.0 % Final    Mono % 01/03/2022 8.2  4.0 - 15.0 % Final    Eosinophil % 01/03/2022 2.8  0.0 - 8.0 % Final    Basophil % 01/03/2022 0.7  0.0 - 1.9 % Final    Differential Method 01/03/2022 Automated   Final    Sodium 01/03/2022 141  136 - 145 mmol/L Final    Potassium 01/03/2022 3.9  3.5 - 5.1 mmol/L Final    Chloride 01/03/2022 105  95 - 110 mmol/L Final    CO2 01/03/2022 25  23 - 29 mmol/L Final    Glucose 01/03/2022 98  70 - 110 mg/dL Final    BUN 01/03/2022 13  8 - 23 mg/dL Final    Creatinine 01/03/2022 0.9  0.5 - 1.4 mg/dL Final    Calcium 01/03/2022 9.4  8.7 - 10.5 mg/dL Final    Total Protein 01/03/2022 7.5  6.0 - 8.4 g/dL Final    Albumin 01/03/2022 4.0  3.5 - 5.2 g/dL Final    Total Bilirubin 01/03/2022 0.7  0.1 - 1.0 mg/dL Final    Alkaline Phosphatase 01/03/2022 81  55 - 135 U/L Final    AST 01/03/2022 22  10 - 40 U/L Final    ALT 01/03/2022 23  10 - 44 U/L Final    Anion Gap 01/03/2022 11  8 - 16 mmol/L Final    eGFR if African American 01/03/2022 >60.0  >60 mL/min/1.73 m^2 Final    eGFR if non African American 01/03/2022 >60.0  >60 mL/min/1.73 m^2 Final    Troponin I 01/03/2022 <0.030  <=0.040 ng/mL Final    PT 01/03/2022 13.1  11.4 - 13.7 sec Final    INR 01/03/2022 1.1   Final    SARS-CoV-2 RNA, Amplification, Qual 01/03/2022 Negative  Negative Final    BNP 01/03/2022 94  0 - 99 pg/mL Final    Troponin I 01/03/2022 <0.030  <=0.040 ng/mL Final    CPK MB 01/03/2022 1.4  0.1 - 6.5 ng/mL Final    85% Max Predicted HR 01/03/2022 120   Final    Max Predicted HR 01/03/2022  142   Final    OHS CV CPX PATIENT IS MALE 01/03/2022 0.0   Final    OHS CV CPX PATIENT IS FEMALE 01/03/2022 1.0   Final    HR at rest 01/03/2022 59  bpm Final    Systolic blood pressure 01/03/2022 139  mmHg Final    Diastolic blood pressure 01/03/2022 95  mmHg Final    RPP 01/03/2022 8,201   Final    Peak HR 01/03/2022 104  bpm Final    Peak Systolic BP 01/03/2022 162  mmHg Final    Peak Diatolic BP 01/03/2022 114  mmHg Final    Peak RPP 01/03/2022 16,848   Final    % Max HR Achieved 01/03/2022 73   Final    1 Minute Recovery HR 01/03/2022 102  bpm Final    Troponin I 01/03/2022 <0.030  <=0.040 ng/mL Final   Office Visit on 12/06/2021   Component Date Value Ref Range Status    POC Rapid COVID 12/06/2021 Negative  Negative Final     Acceptable 12/06/2021 Yes   Final   Admission on 11/22/2021, Discharged on 11/22/2021   Component Date Value Ref Range Status    WBC 11/22/2021 7.48  3.90 - 12.70 K/uL Final    RBC 11/22/2021 4.88  4.00 - 5.40 M/uL Final    Hemoglobin 11/22/2021 15.5  12.0 - 16.0 g/dL Final    Hematocrit 11/22/2021 46.8  37.0 - 48.5 % Final    MCV 11/22/2021 96  82 - 98 fL Final    MCH 11/22/2021 31.8 (A) 27.0 - 31.0 pg Final    MCHC 11/22/2021 33.1  32.0 - 36.0 g/dL Final    RDW 11/22/2021 13.5  11.5 - 14.5 % Final    Platelets 11/22/2021 181  150 - 450 K/uL Final    MPV 11/22/2021 9.6  9.2 - 12.9 fL Final    Immature Granulocytes 11/22/2021 0.3  0.0 - 0.5 % Final    Gran # (ANC) 11/22/2021 3.4  1.8 - 7.7 K/uL Final    Immature Grans (Abs) 11/22/2021 0.02  0.00 - 0.04 K/uL Final    Lymph # 11/22/2021 3.2  1.0 - 4.8 K/uL Final    Mono # 11/22/2021 0.7  0.3 - 1.0 K/uL Final    Eos # 11/22/2021 0.1  0.0 - 0.5 K/uL Final    Baso # 11/22/2021 0.04  0.00 - 0.20 K/uL Final    nRBC 11/22/2021 0  0 /100 WBC Final    Gran % 11/22/2021 45.6  38.0 - 73.0 % Final    Lymph % 11/22/2021 42.1  18.0 - 48.0 % Final    Mono % 11/22/2021 9.6  4.0 - 15.0 % Final     Eosinophil % 11/22/2021 1.9  0.0 - 8.0 % Final    Basophil % 11/22/2021 0.5  0.0 - 1.9 % Final    Differential Method 11/22/2021 Automated   Final    Sodium 11/22/2021 136  136 - 145 mmol/L Final    Potassium 11/22/2021 4.1  3.5 - 5.1 mmol/L Final    Chloride 11/22/2021 102  95 - 110 mmol/L Final    CO2 11/22/2021 23  23 - 29 mmol/L Final    Glucose 11/22/2021 93  70 - 110 mg/dL Final    BUN 11/22/2021 18  8 - 23 mg/dL Final    Creatinine 11/22/2021 1.0  0.5 - 1.4 mg/dL Final    Calcium 11/22/2021 9.5  8.7 - 10.5 mg/dL Final    Total Protein 11/22/2021 8.1  6.0 - 8.4 g/dL Final    Albumin 11/22/2021 4.1  3.5 - 5.2 g/dL Final    Total Bilirubin 11/22/2021 0.8  0.1 - 1.0 mg/dL Final    Alkaline Phosphatase 11/22/2021 76  55 - 135 U/L Final    AST 11/22/2021 21  10 - 40 U/L Final    ALT 11/22/2021 16  10 - 44 U/L Final    Anion Gap 11/22/2021 11  8 - 16 mmol/L Final    eGFR if African American 11/22/2021 >60.0  >60 mL/min/1.73 m^2 Final    eGFR if non African American 11/22/2021 56.0 (A) >60 mL/min/1.73 m^2 Final    BNP 11/22/2021 55  0 - 99 pg/mL Final    Troponin I 11/22/2021 <0.030  <=0.040 ng/mL Final    PT 11/22/2021 12.4  11.4 - 13.7 sec Final    INR 11/22/2021 1.0   Final   Office Visit on 10/12/2021   Component Date Value Ref Range Status    Amphetamines 01/06/2022 NEGATIVE  <500 ng/mL Final    Barbiturates 01/06/2022 NEGATIVE  <300 ng/mL Final    Benzodiazepines 01/06/2022 NEGATIVE  <100 ng/mL Final    Cocaine Metabolites 01/06/2022 NEGATIVE  <150 ng/mL Final    Methadone 01/06/2022 NEGATIVE  <100 ng/mL Final    Opiates 01/06/2022 NEGATIVE  <100 ng/mL Final    Oxycodone 01/06/2022 NEGATIVE  <100 ng/mL Final    Phencyclidine 01/06/2022 NEGATIVE  <25 ng/mL Final    Creatinine 01/06/2022 40.8  > or = 20.0 mg/dL Final    pH 01/06/2022 5.4  4.5 - 9.0 Final    Oxidants, Urine (Tox) 01/06/2022 NEGATIVE  <200 mcg/mL Final    Notes and Comments 01/06/2022    Final       Past Medical  History:   Diagnosis Date    Anxiety     Martin esophagus     Colitis     COPD (chronic obstructive pulmonary disease)     GERD (gastroesophageal reflux disease)     Hypertension     Thyroid disease     Vocal cord polyps      Social History     Socioeconomic History    Marital status:    Tobacco Use    Smoking status: Current Every Day Smoker     Packs/day: 0.50     Types: Cigarettes    Smokeless tobacco: Former User     Quit date: 5/1/2016   Substance and Sexual Activity    Alcohol use: Never    Drug use: Never    Sexual activity: Yes     Partners: Male     Past Surgical History:   Procedure Laterality Date    ABDOMINAL AORTIC ANEURYSM REPAIR      BACK SURGERY      cervical    CATARACT EXTRACTION Right 02/2021    [facility]  12[facility]    FOOT SURGERY      HYSTERECTOMY      SALIVARY GLAND SURGERY       Family History   Problem Relation Age of Onset    Cancer Mother     Heart failure Father     Emphysema Sister     No Known Problems Brother     Cancer Sister        Review of patient's allergies indicates:   Allergen Reactions    Bisacodyl Nausea And Vomiting     Other reaction(s): Vomiting    Cipro [ciprofloxacin hcl]     Demerol [meperidine]      Nausea vomiting, visual problem    Doxycycline Hives    Flonase [fluticasone propionate] Hives    Morphine     Morpholine analogues Other (See Comments)     hypotension       Current Outpatient Medications:     aluminum-magnesium hydroxide-simethicone (MAALOX) 200-200-20 mg/5 mL Susp, Take 30 mLs by mouth once as needed., Disp: , Rfl:     omeprazole (PRILOSEC) 40 MG capsule, omeprazole 40 mg capsule,delayed release  Take 1 capsule every day by oral route in the morning for 30 days., Disp: , Rfl:     ondansetron (ZOFRAN) 4 MG tablet, Take 1 tablet (4 mg total) by mouth every 8 (eight) hours as needed for Nausea., Disp: 30 tablet, Rfl: 3    ondansetron (ZOFRAN-ODT) 4 MG TbDL, Take 1 tablet (4 mg total) by  mouth every 8 (eight) hours as needed., Disp: 21 tablet, Rfl: 0    simethicone (GAS-X ORAL), Take 1 tablet by mouth as needed., Disp: , Rfl:     (Magic mouthwash) 1:1:1 diphenhydramine(Benadryl) 12.5mg/5ml liq, aluminum & magnesium hydroxide-simethicone (Maalox), LIDOcaine viscous 2%, Swish and spit 5 mLs every 4 (four) hours as needed. for mouth sores (Patient not taking: Reported on 4/7/2022), Disp: 450 mL, Rfl: 0    doxycycline (VIBRA-TABS) 100 MG tablet, Take 100 mg by mouth 2 (two) times daily., Disp: , Rfl:     EPINEPHrine (EPIPEN) 0.3 mg/0.3 mL AtIn, Inject 0.3 mLs (0.3 mg total) into the muscle as needed (anaphylaxis). (Patient not taking: No sig reported), Disp: 1 each, Rfl: 1    HYDROcodone-acetaminophen (NORCO)  mg per tablet, Take 1 tablet by mouth every 12 (twelve) hours as needed., Disp: 50 tablet, Rfl: 0    levothyroxine (SYNTHROID) 100 MCG tablet, Take 1 tablet (100 mcg total) by mouth before breakfast., Disp: 90 tablet, Rfl: 1    lisinopriL (PRINIVIL,ZESTRIL) 20 MG tablet, Take 1 tablet (20 mg total) by mouth 2 (two) times daily., Disp: 180 tablet, Rfl: 1    metoprolol tartrate (LOPRESSOR) 25 MG tablet, Take 1 tablet (25 mg total) by mouth 2 (two) times daily., Disp: 180 tablet, Rfl: 1    pantoprazole (PROTONIX) 40 MG tablet, Take 1 tablet (40 mg total) by mouth 2 (two) times daily. (Patient not taking: Reported on 4/7/2022), Disp: 180 tablet, Rfl: 1    Review of Systems   Constitutional: Negative for appetite change, chills, fatigue, fever and unexpected weight change.   HENT: Negative for congestion, ear pain, sinus pain, sore throat and trouble swallowing.    Eyes: Negative for pain, discharge and visual disturbance.   Respiratory: Negative for apnea, cough, shortness of breath and wheezing.    Cardiovascular: Positive for chest pain (gas pains occas). Negative for palpitations and leg swelling.   Gastrointestinal: Negative for abdominal pain, blood in stool, constipation,  "diarrhea, nausea and vomiting.   Endocrine: Negative for heat intolerance, polydipsia and polyuria.   Genitourinary: Positive for dysuria (UTI last month, improved  w/ antibiotics). Negative for difficulty urinating, dyspareunia, frequency, hematuria and menstrual problem.   Musculoskeletal: Positive for back pain. Negative for gait problem, joint swelling and myalgias. Arthralgias: left  hip pains  w/ inactivity,reclining.   Skin:        Rt upper lid chalazion   Allergic/Immunologic: Negative for environmental allergies, food allergies and immunocompromised state.   Neurological: Negative for dizziness, tremors, seizures, numbness and headaches.   Psychiatric/Behavioral: Negative for behavioral problems, confusion, hallucinations and suicidal ideas. The patient is not nervous/anxious.           Objective:      Vitals:    04/07/22 0923   BP: 130/80   Pulse: 60   Weight: 71.2 kg (157 lb)   Height: 5' 2" (1.575 m)     Physical Exam  Vitals and nursing note reviewed.   Constitutional:       General: She is not in acute distress.     Appearance: Normal appearance. She is well-developed and normal weight. She is not toxic-appearing.   HENT:      Head: Normocephalic and atraumatic.      Right Ear: Tympanic membrane and external ear normal.      Left Ear: Tympanic membrane and external ear normal.      Nose: Nose normal.      Mouth/Throat:      Pharynx: Oropharynx is clear.   Eyes:      Pupils: Pupils are equal, round, and reactive to light.   Neck:      Thyroid: No thyromegaly.      Vascular: No carotid bruit.   Cardiovascular:      Rate and Rhythm: Normal rate and regular rhythm.      Heart sounds: Normal heart sounds. No murmur heard.  Pulmonary:      Effort: Pulmonary effort is normal.      Breath sounds: Normal breath sounds. No wheezing or rales.   Abdominal:      General: Bowel sounds are normal. There is no distension.      Palpations: Abdomen is soft.      Tenderness: There is no abdominal tenderness. "   Musculoskeletal:         General: No tenderness or deformity. Normal range of motion.      Cervical back: Normal range of motion and neck supple.      Lumbar back: Normal. No spasms.      Comments: Bends 45 degrees at  waist, shoulders have good range of motion knees are crepitant bilaterally.  No pitting edema to lower extremities.   Lymphadenopathy:      Cervical: No cervical adenopathy.   Skin:     General: Skin is warm and dry.      Findings: No rash.   Neurological:      Mental Status: She is alert and oriented to person, place, and time.      Cranial Nerves: No cranial nerve deficit.      Coordination: Coordination normal.   Psychiatric:         Behavior: Behavior normal.         Thought Content: Thought content normal.         Judgment: Judgment normal.           Assessment:       1. Lumbar disc disease with radiculopathy    2. DDD (degenerative disc disease), lumbar    3. Acquired hypothyroidism    4. Uncontrolled hypertension    5. Anxiety    6. Abdominal aortic aneurysm (AAA) without rupture    7. Essential hypertension    8. Primary hypertension    9. Tobacco abuse    10. Current smoker    11. Gastroesophageal reflux disease without esophagitis         Plan:       Lumbar disc disease with radiculopathy  Will continue current pain medications with Norco  mg  DDD (degenerative disc disease), lumbar  -     HYDROcodone-acetaminophen (NORCO)  mg per tablet; Take 1 tablet by mouth every 12 (twelve) hours as needed.  Dispense: 50 tablet; Refill: 0    Acquired hypothyroidism  Comments:  requesting refill, pt advised she needs to have labs drawn before f/u visit in 3 months  Orders:  -     levothyroxine (SYNTHROID) 100 MCG tablet; Take 1 tablet (100 mcg total) by mouth before breakfast.  Dispense: 90 tablet; Refill: 1  -     TSH w/reflex to FT4; Future; Expected date: 04/07/2022    Uncontrolled hypertension    Orders:  -     lisinopriL (PRINIVIL,ZESTRIL) 20 MG tablet; Take 1 tablet (20 mg total) by  mouth 2 (two) times daily.  Dispense: 180 tablet; Refill: 1  -     metoprolol tartrate (LOPRESSOR) 25 MG tablet; Take 1 tablet (25 mg total) by mouth 2 (two) times daily.  Dispense: 180 tablet; Refill: 1  -     CBC Auto Differential; Future; Expected date: 04/07/2022  -     Comprehensive Metabolic Panel; Future; Expected date: 04/07/2022  -     Lipid Panel; Future; Expected date: 04/07/2022  -     TSH w/reflex to FT4; Future; Expected date: 04/07/2022  Patient has been very compliant with her medications and now her blood pressure looks normal.  Anxiety    Abdominal aortic aneurysm (AAA) without rupture    Essential hypertension    Primary hypertension  Labs due in 3 months.  Tobacco abuse    Current smoker  Patient will consider using Chantix in the near future.  Gastroesophageal reflux disease without esophagitis  Consider using tonic water at night for leg cramps.    Follow up in about 3 months (around 7/7/2022).        4/10/2022 Jasbir Graham

## 2022-07-18 ENCOUNTER — TELEPHONE (OUTPATIENT)
Dept: FAMILY MEDICINE | Facility: CLINIC | Age: 72
End: 2022-07-18

## 2022-07-22 LAB
ALBUMIN SERPL-MCNC: 4 G/DL (ref 3.6–5.1)
ALBUMIN/GLOB SERPL: 1.3 (CALC) (ref 1–2.5)
ALP SERPL-CCNC: 84 U/L (ref 37–153)
ALT SERPL-CCNC: 16 U/L (ref 6–29)
AST SERPL-CCNC: 19 U/L (ref 10–35)
BASOPHILS # BLD AUTO: 40 CELLS/UL (ref 0–200)
BASOPHILS NFR BLD AUTO: 0.8 %
BILIRUB SERPL-MCNC: 0.5 MG/DL (ref 0.2–1.2)
BUN SERPL-MCNC: 15 MG/DL (ref 7–25)
BUN/CREAT SERPL: NORMAL (CALC) (ref 6–22)
CALCIUM SERPL-MCNC: 9.9 MG/DL (ref 8.6–10.4)
CHLORIDE SERPL-SCNC: 104 MMOL/L (ref 98–110)
CHOLEST SERPL-MCNC: 200 MG/DL
CHOLEST/HDLC SERPL: 3.7 (CALC)
CO2 SERPL-SCNC: 28 MMOL/L (ref 20–32)
CREAT SERPL-MCNC: 0.87 MG/DL (ref 0.6–1)
EGFR: 70 ML/MIN/1.73M2
EOSINOPHIL # BLD AUTO: 100 CELLS/UL (ref 15–500)
EOSINOPHIL NFR BLD AUTO: 2 %
ERYTHROCYTE [DISTWIDTH] IN BLOOD BY AUTOMATED COUNT: 13.1 % (ref 11–15)
GLOBULIN SER CALC-MCNC: 3.2 G/DL (CALC) (ref 1.9–3.7)
GLUCOSE SERPL-MCNC: 91 MG/DL (ref 65–99)
HCT VFR BLD AUTO: 47.4 % (ref 35–45)
HDLC SERPL-MCNC: 54 MG/DL
HGB BLD-MCNC: 15.4 G/DL (ref 11.7–15.5)
LDLC SERPL CALC-MCNC: 120 MG/DL (CALC)
LYMPHOCYTES # BLD AUTO: 1690 CELLS/UL (ref 850–3900)
LYMPHOCYTES NFR BLD AUTO: 33.8 %
MCH RBC QN AUTO: 31.5 PG (ref 27–33)
MCHC RBC AUTO-ENTMCNC: 32.5 G/DL (ref 32–36)
MCV RBC AUTO: 96.9 FL (ref 80–100)
MONOCYTES # BLD AUTO: 430 CELLS/UL (ref 200–950)
MONOCYTES NFR BLD AUTO: 8.6 %
NEUTROPHILS # BLD AUTO: 2740 CELLS/UL (ref 1500–7800)
NEUTROPHILS NFR BLD AUTO: 54.8 %
NONHDLC SERPL-MCNC: 146 MG/DL (CALC)
PLATELET # BLD AUTO: 204 THOUSAND/UL (ref 140–400)
PMV BLD REES-ECKER: 9.8 FL (ref 7.5–12.5)
POTASSIUM SERPL-SCNC: 4.7 MMOL/L (ref 3.5–5.3)
PROT SERPL-MCNC: 7.2 G/DL (ref 6.1–8.1)
RBC # BLD AUTO: 4.89 MILLION/UL (ref 3.8–5.1)
SODIUM SERPL-SCNC: 140 MMOL/L (ref 135–146)
TRIGL SERPL-MCNC: 151 MG/DL
TSH SERPL-ACNC: 0.59 MIU/L (ref 0.4–4.5)
WBC # BLD AUTO: 5 THOUSAND/UL (ref 3.8–10.8)

## 2022-07-29 ENCOUNTER — OFFICE VISIT (OUTPATIENT)
Dept: FAMILY MEDICINE | Facility: CLINIC | Age: 72
End: 2022-07-29
Payer: MEDICARE

## 2022-07-29 VITALS
SYSTOLIC BLOOD PRESSURE: 128 MMHG | DIASTOLIC BLOOD PRESSURE: 76 MMHG | BODY MASS INDEX: 29.19 KG/M2 | WEIGHT: 158.63 LBS | HEIGHT: 62 IN | OXYGEN SATURATION: 95 % | HEART RATE: 60 BPM

## 2022-07-29 DIAGNOSIS — M51.16 LUMBAR DISC DISEASE WITH RADICULOPATHY: Primary | ICD-10-CM

## 2022-07-29 DIAGNOSIS — G62.9 NEUROPATHY: ICD-10-CM

## 2022-07-29 DIAGNOSIS — I10 ESSENTIAL HYPERTENSION: ICD-10-CM

## 2022-07-29 DIAGNOSIS — F41.9 ANXIETY: ICD-10-CM

## 2022-07-29 DIAGNOSIS — K21.00 GASTROESOPHAGEAL REFLUX DISEASE WITH ESOPHAGITIS WITHOUT HEMORRHAGE: ICD-10-CM

## 2022-07-29 DIAGNOSIS — Z12.31 OTHER SCREENING MAMMOGRAM: ICD-10-CM

## 2022-07-29 DIAGNOSIS — I10 PRIMARY HYPERTENSION: ICD-10-CM

## 2022-07-29 DIAGNOSIS — Z12.31 SCREENING MAMMOGRAM FOR BREAST CANCER: ICD-10-CM

## 2022-07-29 DIAGNOSIS — J44.1 CHRONIC OBSTRUCTIVE PULMONARY DISEASE WITH ACUTE EXACERBATION: ICD-10-CM

## 2022-07-29 DIAGNOSIS — M51.36 DDD (DEGENERATIVE DISC DISEASE), LUMBAR: ICD-10-CM

## 2022-07-29 DIAGNOSIS — K21.9 GASTROESOPHAGEAL REFLUX DISEASE WITHOUT ESOPHAGITIS: ICD-10-CM

## 2022-07-29 DIAGNOSIS — E07.9 THYROID DISEASE: Chronic | ICD-10-CM

## 2022-07-29 DIAGNOSIS — I71.40 ABDOMINAL AORTIC ANEURYSM (AAA) WITHOUT RUPTURE: Chronic | ICD-10-CM

## 2022-07-29 DIAGNOSIS — R60.9 EDEMA, UNSPECIFIED TYPE: ICD-10-CM

## 2022-07-29 DIAGNOSIS — F17.200 CURRENT SMOKER: ICD-10-CM

## 2022-07-29 PROCEDURE — 3074F SYST BP LT 130 MM HG: CPT | Mod: CPTII,S$GLB,, | Performed by: FAMILY MEDICINE

## 2022-07-29 PROCEDURE — 99214 OFFICE O/P EST MOD 30 MIN: CPT | Mod: S$GLB,,, | Performed by: FAMILY MEDICINE

## 2022-07-29 PROCEDURE — 3288F FALL RISK ASSESSMENT DOCD: CPT | Mod: CPTII,S$GLB,, | Performed by: FAMILY MEDICINE

## 2022-07-29 PROCEDURE — 3074F PR MOST RECENT SYSTOLIC BLOOD PRESSURE < 130 MM HG: ICD-10-PCS | Mod: CPTII,S$GLB,, | Performed by: FAMILY MEDICINE

## 2022-07-29 PROCEDURE — 3288F PR FALLS RISK ASSESSMENT DOCUMENTED: ICD-10-PCS | Mod: CPTII,S$GLB,, | Performed by: FAMILY MEDICINE

## 2022-07-29 PROCEDURE — 99214 PR OFFICE/OUTPT VISIT, EST, LEVL IV, 30-39 MIN: ICD-10-PCS | Mod: S$GLB,,, | Performed by: FAMILY MEDICINE

## 2022-07-29 PROCEDURE — 3008F PR BODY MASS INDEX (BMI) DOCUMENTED: ICD-10-PCS | Mod: CPTII,S$GLB,, | Performed by: FAMILY MEDICINE

## 2022-07-29 PROCEDURE — 1159F PR MEDICATION LIST DOCUMENTED IN MEDICAL RECORD: ICD-10-PCS | Mod: CPTII,S$GLB,, | Performed by: FAMILY MEDICINE

## 2022-07-29 PROCEDURE — 1101F PT FALLS ASSESS-DOCD LE1/YR: CPT | Mod: CPTII,S$GLB,, | Performed by: FAMILY MEDICINE

## 2022-07-29 PROCEDURE — 4010F ACE/ARB THERAPY RXD/TAKEN: CPT | Mod: CPTII,S$GLB,, | Performed by: FAMILY MEDICINE

## 2022-07-29 PROCEDURE — 4010F PR ACE/ARB THEARPY RXD/TAKEN: ICD-10-PCS | Mod: CPTII,S$GLB,, | Performed by: FAMILY MEDICINE

## 2022-07-29 PROCEDURE — 3078F PR MOST RECENT DIASTOLIC BLOOD PRESSURE < 80 MM HG: ICD-10-PCS | Mod: CPTII,S$GLB,, | Performed by: FAMILY MEDICINE

## 2022-07-29 PROCEDURE — 1159F MED LIST DOCD IN RCRD: CPT | Mod: CPTII,S$GLB,, | Performed by: FAMILY MEDICINE

## 2022-07-29 PROCEDURE — 3008F BODY MASS INDEX DOCD: CPT | Mod: CPTII,S$GLB,, | Performed by: FAMILY MEDICINE

## 2022-07-29 PROCEDURE — 3078F DIAST BP <80 MM HG: CPT | Mod: CPTII,S$GLB,, | Performed by: FAMILY MEDICINE

## 2022-07-29 PROCEDURE — 1101F PR PT FALLS ASSESS DOC 0-1 FALLS W/OUT INJ PAST YR: ICD-10-PCS | Mod: CPTII,S$GLB,, | Performed by: FAMILY MEDICINE

## 2022-07-29 RX ORDER — HYDROCODONE BITARTRATE AND ACETAMINOPHEN 10; 325 MG/1; MG/1
1 TABLET ORAL EVERY 12 HOURS PRN
Qty: 50 TABLET | Refills: 0 | Status: SHIPPED | OUTPATIENT
Start: 2022-07-29 | End: 2023-01-31 | Stop reason: SDUPTHER

## 2022-07-29 RX ORDER — ALPRAZOLAM 0.25 MG/1
0.25 TABLET ORAL 2 TIMES DAILY PRN
Qty: 30 TABLET | Refills: 1 | Status: SHIPPED | OUTPATIENT
Start: 2022-07-29 | End: 2024-02-15

## 2022-07-29 RX ORDER — HYDROCHLOROTHIAZIDE 25 MG/1
25 TABLET ORAL DAILY PRN
Qty: 30 TABLET | Refills: 3 | Status: SHIPPED | OUTPATIENT
Start: 2022-07-29 | End: 2023-01-31 | Stop reason: SDUPTHER

## 2022-07-29 RX ORDER — ONDANSETRON 4 MG/1
4 TABLET, FILM COATED ORAL EVERY 8 HOURS PRN
Qty: 30 TABLET | Refills: 3 | Status: SHIPPED | OUTPATIENT
Start: 2022-07-29 | End: 2023-08-22 | Stop reason: SDUPTHER

## 2022-07-29 NOTE — PROGRESS NOTES
SUBJECTIVE:    Patient ID: Joslyn Dubon is a 72 y.o. female.    Chief Complaint: Follow-up (No bottles//Pt is here for a 3 month check up and medication refill//Pt want to discuss swelling in her right foot x 3 months.//Decline colonoscopy.//Pt states she had a  mammogram done this year with [facility].//ANNA )    This 72-year-old female is here for her 3 month checkup.  She complains of significant back aches after house cleaning.  She has to rest for 3 days after a 1 day of work around the house.  Hydrocodone seems to help manage the pain.  She does complain of left thighs sciatica.    Her feet are getting puffy right greater than left foot.  She does not take any diuretics.    She continues to smoke half pack cigarettes per day and has shortness of breath and occasional wheezing.  She would like to resume her Trelegy inhaler.      Office Visit on 04/07/2022   Component Date Value Ref Range Status    WBC 07/21/2022 5.0  3.8 - 10.8 Thousand/uL Final    RBC 07/21/2022 4.89  3.80 - 5.10 Million/uL Final    Hemoglobin 07/21/2022 15.4  11.7 - 15.5 g/dL Final    Hematocrit 07/21/2022 47.4 (A) 35.0 - 45.0 % Final    MCV 07/21/2022 96.9  80.0 - 100.0 fL Final    MCH 07/21/2022 31.5  27.0 - 33.0 pg Final    MCHC 07/21/2022 32.5  32.0 - 36.0 g/dL Final    RDW 07/21/2022 13.1  11.0 - 15.0 % Final    Platelets 07/21/2022 204  140 - 400 Thousand/uL Final    MPV 07/21/2022 9.8  7.5 - 12.5 fL Final    Neutrophils, Abs 07/21/2022 2,740  1,500 - 7,800 cells/uL Final    Lymph # 07/21/2022 1,690  850 - 3,900 cells/uL Final    Mono # 07/21/2022 430  200 - 950 cells/uL Final    Eos # 07/21/2022 100  15 - 500 cells/uL Final    Baso # 07/21/2022 40  0 - 200 cells/uL Final    Neutrophils Relative 07/21/2022 54.8  % Final    Lymph % 07/21/2022 33.8  % Final    Mono % 07/21/2022 8.6  % Final    Eosinophil % 07/21/2022 2.0  % Final    Basophil % 07/21/2022 0.8  % Final    Glucose 07/21/2022 91  65 - 99 mg/dL Final     BUN 07/21/2022 15  7 - 25 mg/dL Final    Creatinine 07/21/2022 0.87  0.60 - 1.00 mg/dL Final    EGFR 07/21/2022 70  > OR = 60 mL/min/1.73m2 Final    BUN/Creatinine Ratio 07/21/2022 NOT APPLICABLE  6 - 22 (calc) Final    Sodium 07/21/2022 140  135 - 146 mmol/L Final    Potassium 07/21/2022 4.7  3.5 - 5.3 mmol/L Final    Chloride 07/21/2022 104  98 - 110 mmol/L Final    CO2 07/21/2022 28  20 - 32 mmol/L Final    Calcium 07/21/2022 9.9  8.6 - 10.4 mg/dL Final    Total Protein 07/21/2022 7.2  6.1 - 8.1 g/dL Final    Albumin 07/21/2022 4.0  3.6 - 5.1 g/dL Final    Globulin, Total 07/21/2022 3.2  1.9 - 3.7 g/dL (calc) Final    Albumin/Globulin Ratio 07/21/2022 1.3  1.0 - 2.5 (calc) Final    Total Bilirubin 07/21/2022 0.5  0.2 - 1.2 mg/dL Final    Alkaline Phosphatase 07/21/2022 84  37 - 153 U/L Final    AST 07/21/2022 19  10 - 35 U/L Final    ALT 07/21/2022 16  6 - 29 U/L Final    Cholesterol 07/21/2022 200 (A) <200 mg/dL Final    HDL 07/21/2022 54  > OR = 50 mg/dL Final    Triglycerides 07/21/2022 151 (A) <150 mg/dL Final    LDL Cholesterol 07/21/2022 120 (A) mg/dL (calc) Final    HDL/Cholesterol Ratio 07/21/2022 3.7  <5.0 (calc) Final    Non HDL Chol. (LDL+VLDL) 07/21/2022 146 (A) <130 mg/dL (calc) Final    TSH w/reflex to FT4 07/21/2022 0.59  0.40 - 4.50 mIU/L Final   Office Visit on 02/16/2022   Component Date Value Ref Range Status    POC Blood, Urine 02/16/2022 Positive (A) Negative Final    POC Bilirubin, Urine 02/16/2022 Negative  Negative Final    POC Urobilinogen, Urine 02/16/2022 Negative  0.1 - 1.1 Final    POC Ketones, Urine 02/16/2022 Negative  Negative Final    POC Protein, Urine 02/16/2022 Negative  Negative Final    POC Nitrates, Urine 02/16/2022 Negative  Negative Final    POC Glucose, Urine 02/16/2022 Negative  Negative Final    pH, UA 02/16/2022 5.5   Final    POC Specific Gravity, Urine 02/16/2022 1.015  1.003 - 1.029 Final    POC Leukocytes, Urine 02/16/2022 Negative   Negative Final    Urine Culture, Routine 02/16/2022  (A)  Final       Past Medical History:   Diagnosis Date    Anxiety     Martin esophagus     Colitis     COPD (chronic obstructive pulmonary disease)     GERD (gastroesophageal reflux disease)     Hypertension     Thyroid disease     Vocal cord polyps      Social History     Socioeconomic History    Marital status:    Tobacco Use    Smoking status: Current Every Day Smoker     Packs/day: 0.50     Types: Cigarettes    Smokeless tobacco: Former User     Quit date: 5/1/2016   Substance and Sexual Activity    Alcohol use: Never    Drug use: Never    Sexual activity: Yes     Partners: Male     Past Surgical History:   Procedure Laterality Date    ABDOMINAL AORTIC ANEURYSM REPAIR      BACK SURGERY      cervical    CATARACT EXTRACTION Right 02/2021    CHOLECYSTECTOMY  12-    Dr Pirce  [facility]    FOOT SURGERY      HYSTERECTOMY      SALIVARY GLAND SURGERY       Family History   Problem Relation Age of Onset    Cancer Mother     Heart failure Father     Emphysema Sister     No Known Problems Brother     Cancer Sister        Review of patient's allergies indicates:   Allergen Reactions    Bisacodyl Nausea And Vomiting     Other reaction(s): Vomiting    Cipro [ciprofloxacin hcl]     Demerol [meperidine]      Nausea vomiting, visual problem    Doxycycline Hives    Flonase [fluticasone propionate] Hives    Morphine     Morpholine analogues Other (See Comments)     hypotension       Current Outpatient Medications:     (Magic mouthwash) 1:1:1 diphenhydramine(Benadryl) 12.5mg/5ml liq, aluminum & magnesium hydroxide-simethicone (Maalox), LIDOcaine viscous 2%, Swish and spit 5 mLs every 4 (four) hours as needed. for mouth sores (Patient not taking: Reported on 4/7/2022), Disp: 450 mL, Rfl: 0    ALPRAZolam (XANAX) 0.25 MG tablet, Take 1 tablet (0.25 mg total) by mouth 2 (two) times daily as needed for Anxiety., Disp: 30 tablet, Rfl: 1     aluminum-magnesium hydroxide-simethicone (MAALOX) 200-200-20 mg/5 mL Susp, Take 30 mLs by mouth once as needed., Disp: , Rfl:     doxycycline (VIBRA-TABS) 100 MG tablet, Take 100 mg by mouth 2 (two) times daily., Disp: , Rfl:     EPINEPHrine (EPIPEN) 0.3 mg/0.3 mL AtIn, Inject 0.3 mLs (0.3 mg total) into the muscle as needed (anaphylaxis). (Patient not taking: No sig reported), Disp: 1 each, Rfl: 1    hydroCHLOROthiazide (HYDRODIURIL) 25 MG tablet, Take 1 tablet (25 mg total) by mouth daily as needed (fluid)., Disp: 30 tablet, Rfl: 3    HYDROcodone-acetaminophen (NORCO)  mg per tablet, Take 1 tablet by mouth every 12 (twelve) hours as needed., Disp: 50 tablet, Rfl: 0    levothyroxine (SYNTHROID) 100 MCG tablet, Take 1 tablet (100 mcg total) by mouth before breakfast., Disp: 90 tablet, Rfl: 1    lisinopriL (PRINIVIL,ZESTRIL) 20 MG tablet, Take 1 tablet (20 mg total) by mouth 2 (two) times daily., Disp: 180 tablet, Rfl: 1    metoprolol tartrate (LOPRESSOR) 25 MG tablet, Take 1 tablet (25 mg total) by mouth 2 (two) times daily., Disp: 180 tablet, Rfl: 1    omeprazole (PRILOSEC) 40 MG capsule, omeprazole 40 mg capsule,delayed release  Take 1 capsule every day by oral route in the morning for 30 days., Disp: , Rfl:     ondansetron (ZOFRAN) 4 MG tablet, Take 1 tablet (4 mg total) by mouth every 8 (eight) hours as needed for Nausea., Disp: 30 tablet, Rfl: 3    ondansetron (ZOFRAN-ODT) 4 MG TbDL, Take 1 tablet (4 mg total) by mouth every 8 (eight) hours as needed., Disp: 21 tablet, Rfl: 0    pantoprazole (PROTONIX) 40 MG tablet, Take 1 tablet (40 mg total) by mouth 2 (two) times daily. (Patient not taking: Reported on 4/7/2022), Disp: 180 tablet, Rfl: 1    simethicone (GAS-X ORAL), Take 1 tablet by mouth as needed., Disp: , Rfl:     Review of Systems   Constitutional: Negative for appetite change, chills, fatigue, fever and unexpected weight change.   HENT: Negative for congestion, ear pain, sinus pain,  "sore throat and trouble swallowing.    Eyes: Negative for pain, discharge and visual disturbance.   Respiratory: Positive for shortness of breath. Negative for apnea, cough and wheezing.    Cardiovascular: Negative for chest pain, palpitations and leg swelling.   Gastrointestinal: Negative for abdominal pain, blood in stool, constipation, diarrhea, nausea and vomiting.   Endocrine: Negative for heat intolerance, polydipsia and polyuria.   Genitourinary: Negative for difficulty urinating, dyspareunia, dysuria, frequency, hematuria and menstrual problem.   Musculoskeletal: Positive for arthralgias and back pain. Negative for gait problem, joint swelling and myalgias.   Allergic/Immunologic: Negative for environmental allergies, food allergies and immunocompromised state.   Neurological: Positive for numbness (left  sciatica). Negative for dizziness, tremors, seizures and headaches.   Psychiatric/Behavioral: Positive for sleep disturbance. Negative for behavioral problems, confusion, hallucinations and suicidal ideas. The patient is nervous/anxious.           Objective:      Vitals:    07/29/22 0906   BP: 128/76   Pulse: 60   SpO2: 95%   Weight: 71.9 kg (158 lb 9.6 oz)   Height: 5' 2" (1.575 m)     Physical Exam  Vitals and nursing note reviewed.   Constitutional:       General: She is not in acute distress.     Appearance: Normal appearance. She is well-developed. She is not toxic-appearing.   HENT:      Head: Normocephalic and atraumatic.      Right Ear: Tympanic membrane and external ear normal.      Left Ear: Tympanic membrane and external ear normal.      Nose: Nose normal.      Mouth/Throat:      Pharynx: Oropharynx is clear.   Eyes:      Pupils: Pupils are equal, round, and reactive to light.   Neck:      Thyroid: No thyromegaly.      Vascular: No carotid bruit.   Cardiovascular:      Rate and Rhythm: Normal rate and regular rhythm.      Heart sounds: Normal heart sounds. No murmur heard.  Pulmonary:      " Effort: Pulmonary effort is normal.      Breath sounds: Wheezing (Mild expiratory wheezes bilaterally.) present. No rales.   Abdominal:      General: Bowel sounds are normal. There is no distension.      Palpations: Abdomen is soft.      Tenderness: There is no abdominal tenderness.   Musculoskeletal:         General: No tenderness or deformity. Normal range of motion.      Cervical back: Normal range of motion and neck supple.      Lumbar back: Normal. No spasms.      Comments: Bends 60 degrees at  Waist, back muscles are somewhat stiff and tight.  Knees have good range of motion shoulders good range of motion no crepitance.  Trace pedal edema on the right foot   Lymphadenopathy:      Cervical: No cervical adenopathy.   Skin:     General: Skin is warm and dry.      Findings: No rash.   Neurological:      Mental Status: She is alert and oriented to person, place, and time. Mental status is at baseline.      Cranial Nerves: No cranial nerve deficit.      Coordination: Coordination normal.   Psychiatric:         Mood and Affect: Mood normal.         Behavior: Behavior normal.         Thought Content: Thought content normal.         Judgment: Judgment normal.           Assessment:       1. Lumbar disc disease with radiculopathy    2. Screening mammogram for breast cancer    3. DDD (degenerative disc disease), lumbar    4. Gastroesophageal reflux disease with esophagitis without hemorrhage    5. Anxiety    6. Edema, unspecified type    7. Other screening mammogram    8. Neuropathy    9. Chronic obstructive pulmonary disease with acute exacerbation    10. Essential hypertension    11. Abdominal aortic aneurysm (AAA) without rupture    12. Primary hypertension    13. Thyroid disease    14. Gastroesophageal reflux disease without esophagitis    15. Current smoker         Plan:       Lumbar disc disease with radiculopathy  Continue p.r.n. hydrocodone for pain.  She does not want to go back to epidural steroids and physical  therapy at this time.  Screening mammogram for breast cancer  Mammogram ordered  DDD (degenerative disc disease), lumbar  -     HYDROcodone-acetaminophen (NORCO)  mg per tablet; Take 1 tablet by mouth every 12 (twelve) hours as needed.  Dispense: 50 tablet; Refill: 0    Gastroesophageal reflux disease with esophagitis without hemorrhage  Comments:  stable  Orders:  -     ondansetron (ZOFRAN) 4 MG tablet; Take 1 tablet (4 mg total) by mouth every 8 (eight) hours as needed for Nausea.  Dispense: 30 tablet; Refill: 3    Anxiety  -     ALPRAZolam (XANAX) 0.25 MG tablet; Take 1 tablet (0.25 mg total) by mouth 2 (two) times daily as needed for Anxiety.  Dispense: 30 tablet; Refill: 1  Trial of alprazolam for anxiety  Edema, unspecified type  -     hydroCHLOROthiazide (HYDRODIURIL) 25 MG tablet; Take 1 tablet (25 mg total) by mouth daily as needed (fluid).  Dispense: 30 tablet; Refill: 3  Add HCTZ 25 mg p.r.n. fluid  Other screening mammogram  -     Mammo Digital Screening Bilat w/ Man; Future; Expected date: 07/30/2022    Neuropathy    Chronic obstructive pulmonary disease with acute exacerbation  Restart Trelegy inhaler 1 puff daily  Essential hypertension  Continue current medications  Abdominal aortic aneurysm (AAA) without rupture  Cholesterol 200 HDL 54  .   Primary hypertension    Thyroid disease    Gastroesophageal reflux disease without esophagitis    Current smoker  Encourage patient to reduce of her quit smoking    Follow up in about 6 months (around 1/29/2023).        7/30/2022 Jasbir Graham

## 2022-07-30 PROBLEM — J44.1 CHRONIC OBSTRUCTIVE PULMONARY DISEASE WITH ACUTE EXACERBATION: Status: ACTIVE | Noted: 2019-09-14

## 2022-08-26 ENCOUNTER — HOSPITAL ENCOUNTER (OUTPATIENT)
Dept: RADIOLOGY | Facility: HOSPITAL | Age: 72
Discharge: HOME | End: 2022-08-26
Attending: FAMILY MEDICINE
Payer: MEDICARE

## 2022-08-26 DIAGNOSIS — Z12.31 OTHER SCREENING MAMMOGRAM: ICD-10-CM

## 2022-08-26 PROCEDURE — 77067 SCR MAMMO BI INCL CAD: CPT | Mod: TC,PO

## 2022-08-29 ENCOUNTER — TELEPHONE (OUTPATIENT)
Dept: FAMILY MEDICINE | Facility: CLINIC | Age: 72
End: 2022-08-29

## 2022-08-29 NOTE — TELEPHONE ENCOUNTER
Spoke to patient with normal mammogram result and will repeat in 1 year. Verbalized understanding.

## 2022-08-29 NOTE — TELEPHONE ENCOUNTER
----- Message from Jasbir Graham MD sent at 8/27/2022  4:55 PM CDT -----  Call patient.  Mammogram was normal.  Repeat mammogram in 1 year.

## 2022-10-24 ENCOUNTER — CLINICAL SUPPORT (OUTPATIENT)
Dept: FAMILY MEDICINE | Facility: CLINIC | Age: 72
End: 2022-10-24
Payer: MEDICARE

## 2022-10-24 DIAGNOSIS — Z23 NEED FOR INFLUENZA VACCINATION: Primary | ICD-10-CM

## 2022-10-24 PROCEDURE — 90662 FLU VACCINE - QUADRIVALENT - HIGH DOSE (65+) PRESERVATIVE FREE IM: ICD-10-PCS | Mod: S$GLB,,, | Performed by: FAMILY MEDICINE

## 2022-10-24 PROCEDURE — 90662 IIV NO PRSV INCREASED AG IM: CPT | Mod: S$GLB,,, | Performed by: FAMILY MEDICINE

## 2022-10-24 PROCEDURE — G0008 FLU VACCINE - QUADRIVALENT - HIGH DOSE (65+) PRESERVATIVE FREE IM: ICD-10-PCS | Mod: S$GLB,,, | Performed by: FAMILY MEDICINE

## 2022-10-24 PROCEDURE — G0008 ADMIN INFLUENZA VIRUS VAC: HCPCS | Mod: S$GLB,,, | Performed by: FAMILY MEDICINE

## 2022-12-07 DIAGNOSIS — I10 UNCONTROLLED HYPERTENSION: ICD-10-CM

## 2022-12-07 RX ORDER — METOPROLOL TARTRATE 25 MG/1
25 TABLET, FILM COATED ORAL 2 TIMES DAILY
Qty: 180 TABLET | Refills: 1 | Status: SHIPPED | OUTPATIENT
Start: 2022-12-07 | End: 2023-01-31 | Stop reason: SDUPTHER

## 2022-12-07 NOTE — TELEPHONE ENCOUNTER
----- Message from Laquita Barillas sent at 12/7/2022  2:37 PM CST -----  Patient called and stated that she need a refill of her metoprolol tartrate called into [facility] on [address] if any questions please give her a call at [phone]

## 2023-01-18 ENCOUNTER — TELEPHONE (OUTPATIENT)
Dept: FAMILY MEDICINE | Facility: CLINIC | Age: 73
End: 2023-01-18

## 2023-01-18 DIAGNOSIS — I10 ESSENTIAL HYPERTENSION: ICD-10-CM

## 2023-01-18 DIAGNOSIS — E07.9 THYROID DISEASE: ICD-10-CM

## 2023-01-18 DIAGNOSIS — Z79.899 ENCOUNTER FOR LONG-TERM (CURRENT) USE OF OTHER MEDICATIONS: Primary | ICD-10-CM

## 2023-01-21 LAB
ALBUMIN SERPL-MCNC: 3.9 G/DL (ref 3.6–5.1)
ALBUMIN/CREAT UR: 4 MCG/MG CREAT
ALBUMIN/GLOB SERPL: 1.1 (CALC) (ref 1–2.5)
ALP SERPL-CCNC: 89 U/L (ref 37–153)
ALT SERPL-CCNC: 16 U/L (ref 6–29)
APPEARANCE UR: CLEAR
AST SERPL-CCNC: 17 U/L (ref 10–35)
BACTERIA #/AREA URNS HPF: ABNORMAL /HPF
BACTERIA UR CULT: ABNORMAL
BASOPHILS # BLD AUTO: 30 CELLS/UL (ref 0–200)
BASOPHILS NFR BLD AUTO: 0.6 %
BILIRUB SERPL-MCNC: 0.4 MG/DL (ref 0.2–1.2)
BILIRUB UR QL STRIP: NEGATIVE
BUN SERPL-MCNC: 15 MG/DL (ref 7–25)
BUN/CREAT SERPL: NORMAL (CALC) (ref 6–22)
CALCIUM SERPL-MCNC: 9.5 MG/DL (ref 8.6–10.4)
CHLORIDE SERPL-SCNC: 105 MMOL/L (ref 98–110)
CHOLEST SERPL-MCNC: 199 MG/DL
CHOLEST/HDLC SERPL: 3.5 (CALC)
CO2 SERPL-SCNC: 29 MMOL/L (ref 20–32)
COLOR UR: YELLOW
CREAT SERPL-MCNC: 0.79 MG/DL (ref 0.6–1)
CREAT UR-MCNC: 107 MG/DL (ref 20–275)
EGFR: 78 ML/MIN/1.73M2
EOSINOPHIL # BLD AUTO: 130 CELLS/UL (ref 15–500)
EOSINOPHIL NFR BLD AUTO: 2.6 %
ERYTHROCYTE [DISTWIDTH] IN BLOOD BY AUTOMATED COUNT: 13 % (ref 11–15)
GLOBULIN SER CALC-MCNC: 3.4 G/DL (CALC) (ref 1.9–3.7)
GLUCOSE SERPL-MCNC: 85 MG/DL (ref 65–99)
GLUCOSE UR QL STRIP: NEGATIVE
HCT VFR BLD AUTO: 46.9 % (ref 35–45)
HDLC SERPL-MCNC: 57 MG/DL
HGB BLD-MCNC: 15.9 G/DL (ref 11.7–15.5)
HGB UR QL STRIP: ABNORMAL
HYALINE CASTS #/AREA URNS LPF: ABNORMAL /LPF
KETONES UR QL STRIP: NEGATIVE
LDLC SERPL CALC-MCNC: 116 MG/DL (CALC)
LEUKOCYTE ESTERASE UR QL STRIP: NEGATIVE
LYMPHOCYTES # BLD AUTO: 2055 CELLS/UL (ref 850–3900)
LYMPHOCYTES NFR BLD AUTO: 41.1 %
MCH RBC QN AUTO: 32.1 PG (ref 27–33)
MCHC RBC AUTO-ENTMCNC: 33.9 G/DL (ref 32–36)
MCV RBC AUTO: 94.7 FL (ref 80–100)
MICROALBUMIN UR-MCNC: 0.4 MG/DL
MONOCYTES # BLD AUTO: 400 CELLS/UL (ref 200–950)
MONOCYTES NFR BLD AUTO: 8 %
NEUTROPHILS # BLD AUTO: 2385 CELLS/UL (ref 1500–7800)
NEUTROPHILS NFR BLD AUTO: 47.7 %
NITRITE UR QL STRIP: NEGATIVE
NONHDLC SERPL-MCNC: 142 MG/DL (CALC)
PH UR STRIP: 5.5 [PH] (ref 5–8)
PLATELET # BLD AUTO: 196 THOUSAND/UL (ref 140–400)
PMV BLD REES-ECKER: 10 FL (ref 7.5–12.5)
POTASSIUM SERPL-SCNC: 4.2 MMOL/L (ref 3.5–5.3)
PROT SERPL-MCNC: 7.3 G/DL (ref 6.1–8.1)
PROT UR QL STRIP: NEGATIVE
RBC # BLD AUTO: 4.95 MILLION/UL (ref 3.8–5.1)
RBC #/AREA URNS HPF: ABNORMAL /HPF
SERVICE CMNT-IMP: ABNORMAL
SODIUM SERPL-SCNC: 141 MMOL/L (ref 135–146)
SP GR UR STRIP: 1.02 (ref 1–1.03)
SQUAMOUS #/AREA URNS HPF: ABNORMAL /HPF
T4 FREE SERPL-MCNC: 1.5 NG/DL (ref 0.8–1.8)
TRIGL SERPL-MCNC: 138 MG/DL
TSH SERPL-ACNC: 0.25 MIU/L (ref 0.4–4.5)
WBC # BLD AUTO: 5 THOUSAND/UL (ref 3.8–10.8)
WBC #/AREA URNS HPF: ABNORMAL /HPF

## 2023-01-23 ENCOUNTER — TELEPHONE (OUTPATIENT)
Dept: FAMILY MEDICINE | Facility: CLINIC | Age: 73
End: 2023-01-23

## 2023-01-31 ENCOUNTER — OFFICE VISIT (OUTPATIENT)
Dept: FAMILY MEDICINE | Facility: CLINIC | Age: 73
End: 2023-01-31
Payer: MEDICARE

## 2023-01-31 VITALS
SYSTOLIC BLOOD PRESSURE: 132 MMHG | HEART RATE: 65 BPM | HEIGHT: 62 IN | WEIGHT: 160 LBS | BODY MASS INDEX: 29.44 KG/M2 | DIASTOLIC BLOOD PRESSURE: 88 MMHG

## 2023-01-31 DIAGNOSIS — R60.9 EDEMA, UNSPECIFIED TYPE: ICD-10-CM

## 2023-01-31 DIAGNOSIS — M51.36 DDD (DEGENERATIVE DISC DISEASE), LUMBAR: ICD-10-CM

## 2023-01-31 DIAGNOSIS — F17.200 CURRENT SMOKER: ICD-10-CM

## 2023-01-31 DIAGNOSIS — G62.9 NEUROPATHY: ICD-10-CM

## 2023-01-31 DIAGNOSIS — I10 UNCONTROLLED HYPERTENSION: Primary | ICD-10-CM

## 2023-01-31 DIAGNOSIS — I10 ESSENTIAL HYPERTENSION: ICD-10-CM

## 2023-01-31 DIAGNOSIS — E03.9 ACQUIRED HYPOTHYROIDISM: ICD-10-CM

## 2023-01-31 DIAGNOSIS — I71.40 ABDOMINAL AORTIC ANEURYSM (AAA) WITHOUT RUPTURE, UNSPECIFIED PART: Chronic | ICD-10-CM

## 2023-01-31 DIAGNOSIS — I10 PRIMARY HYPERTENSION: ICD-10-CM

## 2023-01-31 DIAGNOSIS — L84 FOOT CALLUS: ICD-10-CM

## 2023-01-31 DIAGNOSIS — M51.16 LUMBAR DISC DISEASE WITH RADICULOPATHY: ICD-10-CM

## 2023-01-31 DIAGNOSIS — H00.11 CHALAZION OF RIGHT UPPER EYELID: ICD-10-CM

## 2023-01-31 DIAGNOSIS — J44.1 CHRONIC OBSTRUCTIVE PULMONARY DISEASE WITH ACUTE EXACERBATION: ICD-10-CM

## 2023-01-31 DIAGNOSIS — F41.9 ANXIETY: ICD-10-CM

## 2023-01-31 PROCEDURE — 3066F PR DOCUMENTATION OF TREATMENT FOR NEPHROPATHY: ICD-10-PCS | Mod: CPTII,S$GLB,, | Performed by: FAMILY MEDICINE

## 2023-01-31 PROCEDURE — 99214 PR OFFICE/OUTPT VISIT, EST, LEVL IV, 30-39 MIN: ICD-10-PCS | Mod: S$GLB,,, | Performed by: FAMILY MEDICINE

## 2023-01-31 PROCEDURE — 3079F PR MOST RECENT DIASTOLIC BLOOD PRESSURE 80-89 MM HG: ICD-10-PCS | Mod: CPTII,S$GLB,, | Performed by: FAMILY MEDICINE

## 2023-01-31 PROCEDURE — 3079F DIAST BP 80-89 MM HG: CPT | Mod: CPTII,S$GLB,, | Performed by: FAMILY MEDICINE

## 2023-01-31 PROCEDURE — 3008F BODY MASS INDEX DOCD: CPT | Mod: CPTII,S$GLB,, | Performed by: FAMILY MEDICINE

## 2023-01-31 PROCEDURE — 3066F NEPHROPATHY DOC TX: CPT | Mod: CPTII,S$GLB,, | Performed by: FAMILY MEDICINE

## 2023-01-31 PROCEDURE — 1159F MED LIST DOCD IN RCRD: CPT | Mod: CPTII,S$GLB,, | Performed by: FAMILY MEDICINE

## 2023-01-31 PROCEDURE — 4010F ACE/ARB THERAPY RXD/TAKEN: CPT | Mod: CPTII,S$GLB,, | Performed by: FAMILY MEDICINE

## 2023-01-31 PROCEDURE — 3008F PR BODY MASS INDEX (BMI) DOCUMENTED: ICD-10-PCS | Mod: CPTII,S$GLB,, | Performed by: FAMILY MEDICINE

## 2023-01-31 PROCEDURE — 1159F PR MEDICATION LIST DOCUMENTED IN MEDICAL RECORD: ICD-10-PCS | Mod: CPTII,S$GLB,, | Performed by: FAMILY MEDICINE

## 2023-01-31 PROCEDURE — 3075F PR MOST RECENT SYSTOLIC BLOOD PRESS GE 130-139MM HG: ICD-10-PCS | Mod: CPTII,S$GLB,, | Performed by: FAMILY MEDICINE

## 2023-01-31 PROCEDURE — 99214 OFFICE O/P EST MOD 30 MIN: CPT | Mod: S$GLB,,, | Performed by: FAMILY MEDICINE

## 2023-01-31 PROCEDURE — 3075F SYST BP GE 130 - 139MM HG: CPT | Mod: CPTII,S$GLB,, | Performed by: FAMILY MEDICINE

## 2023-01-31 PROCEDURE — 3061F PR NEG MICROALBUMINURIA RESULT DOCUMENTED/REVIEW: ICD-10-PCS | Mod: CPTII,S$GLB,, | Performed by: FAMILY MEDICINE

## 2023-01-31 PROCEDURE — 3061F NEG MICROALBUMINURIA REV: CPT | Mod: CPTII,S$GLB,, | Performed by: FAMILY MEDICINE

## 2023-01-31 PROCEDURE — 4010F PR ACE/ARB THEARPY RXD/TAKEN: ICD-10-PCS | Mod: CPTII,S$GLB,, | Performed by: FAMILY MEDICINE

## 2023-01-31 RX ORDER — LISINOPRIL 20 MG/1
20 TABLET ORAL 2 TIMES DAILY
Qty: 180 TABLET | Refills: 3 | Status: SHIPPED | OUTPATIENT
Start: 2023-01-31 | End: 2023-10-18 | Stop reason: SDUPTHER

## 2023-01-31 RX ORDER — ALPRAZOLAM 0.25 MG/1
0.25 TABLET ORAL 2 TIMES DAILY PRN
Qty: 30 TABLET | Refills: 1 | Status: CANCELLED | OUTPATIENT
Start: 2023-01-31 | End: 2023-03-02

## 2023-01-31 RX ORDER — HYDROCODONE BITARTRATE AND ACETAMINOPHEN 10; 325 MG/1; MG/1
1 TABLET ORAL EVERY 12 HOURS PRN
Qty: 50 TABLET | Refills: 0 | Status: SHIPPED | OUTPATIENT
Start: 2023-01-31 | End: 2023-05-31 | Stop reason: SDUPTHER

## 2023-01-31 RX ORDER — LEVOTHYROXINE SODIUM 100 UG/1
100 TABLET ORAL
Qty: 90 TABLET | Refills: 3 | Status: SHIPPED | OUTPATIENT
Start: 2023-01-31 | End: 2023-03-31 | Stop reason: SDUPTHER

## 2023-01-31 RX ORDER — METOPROLOL TARTRATE 25 MG/1
25 TABLET, FILM COATED ORAL 2 TIMES DAILY
Qty: 180 TABLET | Refills: 3 | Status: SHIPPED | OUTPATIENT
Start: 2023-01-31 | End: 2023-11-27 | Stop reason: SDUPTHER

## 2023-01-31 RX ORDER — HYDROCHLOROTHIAZIDE 25 MG/1
25 TABLET ORAL DAILY PRN
Qty: 30 TABLET | Refills: 3 | Status: SHIPPED | OUTPATIENT
Start: 2023-01-31 | End: 2023-11-27 | Stop reason: SDUPTHER

## 2023-01-31 NOTE — PROGRESS NOTES
SUBJECTIVE:    Patient ID: Joslyn Dubon is a 72 y.o. female.    Chief Complaint: Hypothyroidism (No bottles, chronic back , arms pain, declined Colonoscopy (age), Lab-results,abc )    72-year-old female has being seeing Dr. Mindy Ng for lancing of several chalazion to her right upper lid.  Doxycycline was prescribed because severe GI upset.  She was unable to tolerate it .    She complains of right shoulder and upper arm pain greater than left arm pain after mopping dusting and cleaning the house.  She does not get chest pain or shortness of breath walk cleaning the house.  She has daily back aches and intermittent gait problems.  Hydrocodone  taken p.r.n. severe pain    She does not drink alcohol, she smokes half a pack cigarettes daily.    2017-colonoscopy with Dr. Smith-patient states he was a difficult colonoscopy and she declines any further attempts colonoscopy.  EGD is planned for February with Dr. Smith for her hiatal hernia.  History of Martin's esophagus.    Complains of painful feet while walking due to callus problems.    History of microscopic hematuria and blood per vaginal area years ago          Telephone on 01/18/2023   Component Date Value Ref Range Status    WBC 01/20/2023 5.0  3.8 - 10.8 Thousand/uL Final    RBC 01/20/2023 4.95  3.80 - 5.10 Million/uL Final    Hemoglobin 01/20/2023 15.9 (H)  11.7 - 15.5 g/dL Final    Hematocrit 01/20/2023 46.9 (H)  35.0 - 45.0 % Final    MCV 01/20/2023 94.7  80.0 - 100.0 fL Final    MCH 01/20/2023 32.1  27.0 - 33.0 pg Final    MCHC 01/20/2023 33.9  32.0 - 36.0 g/dL Final    RDW 01/20/2023 13.0  11.0 - 15.0 % Final    Platelets 01/20/2023 196  140 - 400 Thousand/uL Final    MPV 01/20/2023 10.0  7.5 - 12.5 fL Final    Neutrophils, Abs 01/20/2023 2,385  1,500 - 7,800 cells/uL Final    Lymph # 01/20/2023 2,055  850 - 3,900 cells/uL Final    Mono # 01/20/2023 400  200 - 950 cells/uL Final    Eos # 01/20/2023 130  15 - 500 cells/uL Final    Baso #  01/20/2023 30  0 - 200 cells/uL Final    Neutrophils Relative 01/20/2023 47.7  % Final    Lymph % 01/20/2023 41.1  % Final    Mono % 01/20/2023 8.0  % Final    Eosinophil % 01/20/2023 2.6  % Final    Basophil % 01/20/2023 0.6  % Final    Glucose 01/20/2023 85  65 - 99 mg/dL Final    BUN 01/20/2023 15  7 - 25 mg/dL Final    Creatinine 01/20/2023 0.79  0.60 - 1.00 mg/dL Final    eGFR 01/20/2023 78  > OR = 60 mL/min/1.73m2 Final    BUN/Creatinine Ratio 01/20/2023 NOT APPLICABLE  6 - 22 (calc) Final    Sodium 01/20/2023 141  135 - 146 mmol/L Final    Potassium 01/20/2023 4.2  3.5 - 5.3 mmol/L Final    Chloride 01/20/2023 105  98 - 110 mmol/L Final    CO2 01/20/2023 29  20 - 32 mmol/L Final    Calcium 01/20/2023 9.5  8.6 - 10.4 mg/dL Final    Total Protein 01/20/2023 7.3  6.1 - 8.1 g/dL Final    Albumin 01/20/2023 3.9  3.6 - 5.1 g/dL Final    Globulin, Total 01/20/2023 3.4  1.9 - 3.7 g/dL (calc) Final    Albumin/Globulin Ratio 01/20/2023 1.1  1.0 - 2.5 (calc) Final    Total Bilirubin 01/20/2023 0.4  0.2 - 1.2 mg/dL Final    Alkaline Phosphatase 01/20/2023 89  37 - 153 U/L Final    AST 01/20/2023 17  10 - 35 U/L Final    ALT 01/20/2023 16  6 - 29 U/L Final    Cholesterol 01/20/2023 199  <200 mg/dL Final    HDL 01/20/2023 57  > OR = 50 mg/dL Final    Triglycerides 01/20/2023 138  <150 mg/dL Final    LDL Cholesterol 01/20/2023 116 (H)  mg/dL (calc) Final    HDL/Cholesterol Ratio 01/20/2023 3.5  <5.0 (calc) Final    Non HDL Chol. (LDL+VLDL) 01/20/2023 142 (H)  <130 mg/dL (calc) Final    Creatinine, Urine 01/20/2023 107  20 - 275 mg/dL Final    Microalb, Ur 01/20/2023 0.4  See Note: mg/dL Final    Microalb/Creat Ratio 01/20/2023 4  <30 mcg/mg creat Final    TSH w/reflex to FT4 01/20/2023 0.25 (L)  0.40 - 4.50 mIU/L Final    T4, Free 01/20/2023 1.5  0.8 - 1.8 ng/dL Final    Color, UA 01/20/2023 YELLOW  YELLOW Final    Appearance, UA 01/20/2023 CLEAR  CLEAR Final    Specific Gravity, UA 01/20/2023 1.021  1.001 - 1.035 Final     pH, UA 01/20/2023 5.5  5.0 - 8.0 Final    Glucose, UA 01/20/2023 NEGATIVE  NEGATIVE Final    Bilirubin, UA 01/20/2023 NEGATIVE  NEGATIVE Final    Ketones, UA 01/20/2023 NEGATIVE  NEGATIVE Final    Occult Blood UA 01/20/2023 TRACE (A)  NEGATIVE Final    Protein, UA 01/20/2023 NEGATIVE  NEGATIVE Final    Nitrite, UA 01/20/2023 NEGATIVE  NEGATIVE Final    Leukocytes, UA 01/20/2023 NEGATIVE  NEGATIVE Final    WBC Casts, UA 01/20/2023 NONE SEEN  < OR = 5 /HPF Final    RBC Casts, UA 01/20/2023 3-10 (A)  < OR = 2 /HPF Final    Squam Epithel, UA 01/20/2023 6-10 (A)  < OR = 5 /HPF Final    Bacteria, UA 01/20/2023 MANY (A)  NONE SEEN /HPF Final    Hyaline Casts, UA 01/20/2023 NONE SEEN  NONE SEEN /LPF Final    Service Cmt: 01/20/2023    Final    Reflexive Urine Culture 01/20/2023    Final       Past Medical History:   Diagnosis Date    Anxiety     Martin esophagus     Colitis     COPD (chronic obstructive pulmonary disease)     GERD (gastroesophageal reflux disease)     Hypertension     Thyroid disease     Vocal cord polyps      Social History     Socioeconomic History    Marital status:    Tobacco Use    Smoking status: Every Day     Packs/day: 0.50     Types: Cigarettes    Smokeless tobacco: Former     Quit date: 5/1/2016   Substance and Sexual Activity    Alcohol use: Never    Drug use: Never    Sexual activity: Yes     Partners: Male     Past Surgical History:   Procedure Laterality Date    ABDOMINAL AORTIC ANEURYSM REPAIR      BACK SURGERY      cervical    CATARACT EXTRACTION Right 02/2021    CHOLECYSTECTOMY  12-    Dr Price  [facility]    FOOT SURGERY      HYSTERECTOMY      SALIVARY GLAND SURGERY       Family History   Problem Relation Age of Onset    Cancer Mother     Heart failure Father     Emphysema Sister     No Known Problems Brother     Cancer Sister        Review of patient's allergies indicates:   Allergen Reactions    Bisacodyl Nausea And Vomiting     Other reaction(s): Vomiting    Cipro  [ciprofloxacin hcl]     Demerol [meperidine]      Nausea vomiting, visual problem    Doxycycline Hives    Flonase [fluticasone propionate] Hives    Morphine     Morpholine analogues Other (See Comments)     hypotension       Current Outpatient Medications:     ALPRAZolam (XANAX) 0.25 MG tablet, Take 1 tablet (0.25 mg total) by mouth 2 (two) times daily as needed for Anxiety., Disp: 30 tablet, Rfl: 1    omeprazole (PRILOSEC) 40 MG capsule, omeprazole 40 mg capsule,delayed release  Take 1 capsule every day by oral route in the morning for 30 days., Disp: , Rfl:     ondansetron (ZOFRAN) 4 MG tablet, Take 1 tablet (4 mg total) by mouth every 8 (eight) hours as needed for Nausea., Disp: 30 tablet, Rfl: 3    simethicone (GAS-X ORAL), Take 1 tablet by mouth as needed., Disp: , Rfl:     (Magic mouthwash) 1:1:1 diphenhydramine(Benadryl) 12.5mg/5ml liq, aluminum & magnesium hydroxide-simethicone (Maalox), LIDOcaine viscous 2%, Swish and spit 5 mLs every 4 (four) hours as needed. for mouth sores (Patient not taking: Reported on 4/7/2022), Disp: 450 mL, Rfl: 0    aluminum-magnesium hydroxide-simethicone (MAALOX) 200-200-20 mg/5 mL Susp, Take 30 mLs by mouth once as needed., Disp: , Rfl:     doxycycline (VIBRA-TABS) 100 MG tablet, Take 100 mg by mouth 2 (two) times daily., Disp: , Rfl:     EPINEPHrine (EPIPEN) 0.3 mg/0.3 mL AtIn, Inject 0.3 mLs (0.3 mg total) into the muscle as needed (anaphylaxis). (Patient not taking: No sig reported), Disp: 1 each, Rfl: 1    hydroCHLOROthiazide (HYDRODIURIL) 25 MG tablet, Take 1 tablet (25 mg total) by mouth daily as needed (fluid)., Disp: 30 tablet, Rfl: 3    HYDROcodone-acetaminophen (NORCO)  mg per tablet, Take 1 tablet by mouth every 12 (twelve) hours as needed., Disp: 50 tablet, Rfl: 0    levothyroxine (SYNTHROID) 100 MCG tablet, Take 1 tablet (100 mcg total) by mouth before breakfast., Disp: 90 tablet, Rfl: 3    lisinopriL (PRINIVIL,ZESTRIL) 20 MG tablet, Take 1 tablet (20 mg  "total) by mouth 2 (two) times daily., Disp: 180 tablet, Rfl: 3    metoprolol tartrate (LOPRESSOR) 25 MG tablet, Take 1 tablet (25 mg total) by mouth 2 (two) times daily., Disp: 180 tablet, Rfl: 3    ondansetron (ZOFRAN-ODT) 4 MG TbDL, Take 1 tablet (4 mg total) by mouth every 8 (eight) hours as needed., Disp: 21 tablet, Rfl: 0    pantoprazole (PROTONIX) 40 MG tablet, Take 1 tablet (40 mg total) by mouth 2 (two) times daily. (Patient not taking: Reported on 4/7/2022), Disp: 180 tablet, Rfl: 1    Review of Systems   Constitutional:  Negative for appetite change, chills, fatigue, fever and unexpected weight change.   HENT:  Negative for congestion, ear pain, sinus pain, sore throat and trouble swallowing.    Eyes:  Negative for pain, discharge and visual disturbance.   Respiratory:  Negative for apnea, cough, shortness of breath and wheezing.    Cardiovascular:  Negative for chest pain, palpitations and leg swelling.   Gastrointestinal:  Negative for abdominal pain, blood in stool, constipation, diarrhea, nausea and vomiting.   Endocrine: Negative for heat intolerance, polydipsia and polyuria.   Genitourinary:  Negative for difficulty urinating, dyspareunia, dysuria, frequency, hematuria and menstrual problem.   Musculoskeletal:  Positive for arthralgias and back pain. Negative for gait problem, joint swelling and myalgias.   Skin:         Calluses noted under the right foot   Allergic/Immunologic: Negative for environmental allergies, food allergies and immunocompromised state.   Neurological:  Negative for dizziness, tremors, seizures, numbness and headaches.   Psychiatric/Behavioral:  Negative for behavioral problems, confusion, hallucinations and suicidal ideas. The patient is nervous/anxious.         Objective:      Vitals:    01/31/23 1019   BP: 132/88   Pulse: 65   Weight: 72.6 kg (160 lb)   Height: 5' 2" (1.575 m)     Physical Exam  Vitals and nursing note reviewed.   Constitutional:       General: She is not " in acute distress.     Appearance: Normal appearance. She is well-developed. She is not toxic-appearing.   HENT:      Head: Normocephalic and atraumatic.      Right Ear: Tympanic membrane and external ear normal.      Left Ear: Tympanic membrane and external ear normal.      Nose: Nose normal.   Eyes:      Pupils: Pupils are equal, round, and reactive to light.   Neck:      Thyroid: No thyromegaly.      Vascular: No carotid bruit.   Cardiovascular:      Rate and Rhythm: Normal rate and regular rhythm.      Heart sounds: Normal heart sounds. No murmur heard.  Pulmonary:      Effort: Pulmonary effort is normal.      Breath sounds: Normal breath sounds. No wheezing or rales.   Abdominal:      General: Bowel sounds are normal. There is no distension.      Palpations: Abdomen is soft.      Tenderness: There is no abdominal tenderness.   Musculoskeletal:         General: No tenderness or deformity. Normal range of motion.      Cervical back: Normal range of motion and neck supple.      Lumbar back: Normal. No spasms.      Comments: Bends 60 degrees at  waist, tight lumbar muscles to palpation.  Shoulders have good range of motion today without difficulty.  Knees are crepitant bilaterally.  No pitting edema to lower extremities.   Lymphadenopathy:      Cervical: No cervical adenopathy.   Skin:     General: Skin is warm and dry.      Findings: No rash.      Comments: She does have thick calluses under 2 spots of her right foot   Neurological:      Mental Status: She is alert and oriented to person, place, and time. Mental status is at baseline.      Cranial Nerves: No cranial nerve deficit.      Coordination: Coordination normal.      Gait: Gait abnormal.   Psychiatric:         Mood and Affect: Mood normal.         Behavior: Behavior normal.         Thought Content: Thought content normal.         Judgment: Judgment normal.         Assessment:       1. Uncontrolled hypertension    2. Anxiety    3. Acquired hypothyroidism     4. DDD (degenerative disc disease), lumbar    5. Edema, unspecified type    6. Chalazion of right upper eyelid    7. Lumbar disc disease with radiculopathy    8. Neuropathy    9. Foot callus    10. Chronic obstructive pulmonary disease with acute exacerbation    11. Essential hypertension    12. Abdominal aortic aneurysm (AAA) without rupture, unspecified part    13. Primary hypertension    14. Current smoker           Plan:       Uncontrolled hypertension  Comments:    Orders:  -     metoprolol tartrate (LOPRESSOR) 25 MG tablet; Take 1 tablet (25 mg total) by mouth 2 (two) times daily.  Dispense: 180 tablet; Refill: 3  -     lisinopriL (PRINIVIL,ZESTRIL) 20 MG tablet; Take 1 tablet (20 mg total) by mouth 2 (two) times daily.  Dispense: 180 tablet; Refill: 3  Pressure well controlled today  Anxiety  Uses Xanax rarely  Acquired hypothyroidism  Comments:  requesting refill, pt advised she needs to have labs drawn before f/u visit in 2 weeks  Orders:  -     levothyroxine (SYNTHROID) 100 MCG tablet; Take 1 tablet (100 mcg total) by mouth before breakfast.  Dispense: 90 tablet; Refill: 3  TSH slightly low but stable.  DDD (degenerative disc disease), lumbar  -     HYDROcodone-acetaminophen (NORCO)  mg per tablet; Take 1 tablet by mouth every 12 (twelve) hours as needed.  Dispense: 50 tablet; Refill: 0  Refill hydrocodone for p.r.n. use  Edema, unspecified type  -     hydroCHLOROthiazide (HYDRODIURIL) 25 MG tablet; Take 1 tablet (25 mg total) by mouth daily as needed (fluid).  Dispense: 30 tablet; Refill: 3    Chalazion of right upper eyelid  Followed by Dr. Ng  Lumbar disc disease with radiculopathy  Managing well conservatively  Neuropathy    Foot callus  Recommend trimming the forefoot calluses down his lows possible  Chronic obstructive pulmonary disease with acute exacerbation    Essential hypertension    Abdominal aortic aneurysm (AAA) without rupture, unspecified part    Primary  hypertension    Current smoker  Patient advised to reduce her smoking.  Patient recommended to get the new COVID vaccine  Follow up in about 6 months (around 7/31/2023).        1/31/2023 Jasbir Graham

## 2023-03-27 ENCOUNTER — HOSPITAL ENCOUNTER (EMERGENCY)
Facility: HOSPITAL | Age: 73
Discharge: HOME | End: 2023-03-27
Attending: EMERGENCY MEDICINE
Payer: MEDICARE

## 2023-03-27 VITALS
DIASTOLIC BLOOD PRESSURE: 74 MMHG | HEIGHT: 62 IN | OXYGEN SATURATION: 96 % | BODY MASS INDEX: 29.44 KG/M2 | HEART RATE: 61 BPM | TEMPERATURE: 98 F | RESPIRATION RATE: 17 BRPM | SYSTOLIC BLOOD PRESSURE: 153 MMHG | WEIGHT: 160 LBS

## 2023-03-27 DIAGNOSIS — R07.9 CHEST PAIN, UNSPECIFIED TYPE: Primary | ICD-10-CM

## 2023-03-27 DIAGNOSIS — R07.9 CHEST PAIN: ICD-10-CM

## 2023-03-27 LAB
ALBUMIN SERPL BCP-MCNC: 3.9 G/DL (ref 3.5–5.2)
ALP SERPL-CCNC: 78 U/L (ref 55–135)
ALT SERPL W/O P-5'-P-CCNC: 22 U/L (ref 10–44)
ANION GAP SERPL CALC-SCNC: 11 MMOL/L (ref 8–16)
AST SERPL-CCNC: 32 U/L (ref 10–40)
BASOPHILS # BLD AUTO: 0.03 K/UL (ref 0–0.2)
BASOPHILS NFR BLD: 0.5 % (ref 0–1.9)
BILIRUB SERPL-MCNC: 0.9 MG/DL (ref 0.1–1)
BNP SERPL-MCNC: 29 PG/ML (ref 0–99)
BUN SERPL-MCNC: 15 MG/DL (ref 8–23)
CALCIUM SERPL-MCNC: 9.3 MG/DL (ref 8.7–10.5)
CHLORIDE SERPL-SCNC: 103 MMOL/L (ref 95–110)
CO2 SERPL-SCNC: 25 MMOL/L (ref 23–29)
CREAT SERPL-MCNC: 0.9 MG/DL (ref 0.5–1.4)
DIFFERENTIAL METHOD: ABNORMAL
EOSINOPHIL # BLD AUTO: 0.1 K/UL (ref 0–0.5)
EOSINOPHIL NFR BLD: 2 % (ref 0–8)
ERYTHROCYTE [DISTWIDTH] IN BLOOD BY AUTOMATED COUNT: 13.5 % (ref 11.5–14.5)
EST. GFR  (NO RACE VARIABLE): >60 ML/MIN/1.73 M^2
GLUCOSE SERPL-MCNC: 96 MG/DL (ref 70–110)
HCT VFR BLD AUTO: 45.7 % (ref 37–48.5)
HGB BLD-MCNC: 15.1 G/DL (ref 12–16)
IMM GRANULOCYTES # BLD AUTO: 0.02 K/UL (ref 0–0.04)
IMM GRANULOCYTES NFR BLD AUTO: 0.3 % (ref 0–0.5)
INR PPP: 1 (ref 0.8–1.2)
LYMPHOCYTES # BLD AUTO: 2.8 K/UL (ref 1–4.8)
LYMPHOCYTES NFR BLD: 43.7 % (ref 18–48)
MCH RBC QN AUTO: 31.7 PG (ref 27–31)
MCHC RBC AUTO-ENTMCNC: 33 G/DL (ref 32–36)
MCV RBC AUTO: 96 FL (ref 82–98)
MONOCYTES # BLD AUTO: 0.6 K/UL (ref 0.3–1)
MONOCYTES NFR BLD: 9.3 % (ref 4–15)
NEUTROPHILS # BLD AUTO: 2.9 K/UL (ref 1.8–7.7)
NEUTROPHILS NFR BLD: 44.2 % (ref 38–73)
NRBC BLD-RTO: 0 /100 WBC
PLATELET # BLD AUTO: 192 K/UL (ref 150–450)
PMV BLD AUTO: 8.9 FL (ref 9.2–12.9)
POTASSIUM SERPL-SCNC: 4.2 MMOL/L (ref 3.5–5.1)
PROT SERPL-MCNC: 7.4 G/DL (ref 6–8.4)
PROTHROMBIN TIME: 10.3 SEC (ref 9–12.5)
RBC # BLD AUTO: 4.76 M/UL (ref 4–5.4)
SODIUM SERPL-SCNC: 139 MMOL/L (ref 136–145)
TROPONIN I SERPL HS-MCNC: 3.9 PG/ML (ref 0–14.9)
WBC # BLD AUTO: 6.45 K/UL (ref 3.9–12.7)

## 2023-03-27 PROCEDURE — 99284 EMERGENCY DEPT VISIT MOD MDM: CPT | Mod: 25

## 2023-03-27 PROCEDURE — 85025 COMPLETE CBC W/AUTO DIFF WBC: CPT

## 2023-03-27 PROCEDURE — 83880 ASSAY OF NATRIURETIC PEPTIDE: CPT

## 2023-03-27 PROCEDURE — 93010 EKG 12-LEAD: ICD-10-PCS | Mod: ,,, | Performed by: INTERNAL MEDICINE

## 2023-03-27 PROCEDURE — 84484 ASSAY OF TROPONIN QUANT: CPT

## 2023-03-27 PROCEDURE — 93005 ELECTROCARDIOGRAM TRACING: CPT | Performed by: INTERNAL MEDICINE

## 2023-03-27 PROCEDURE — 80053 COMPREHEN METABOLIC PANEL: CPT

## 2023-03-27 PROCEDURE — 85610 PROTHROMBIN TIME: CPT

## 2023-03-27 PROCEDURE — 93010 ELECTROCARDIOGRAM REPORT: CPT | Mod: ,,, | Performed by: INTERNAL MEDICINE

## 2023-03-27 NOTE — ED PROVIDER NOTES
Encounter Date: 3/27/2023       History     Chief Complaint   Patient presents with    Chest Pain     Pt describes pain as sharp shooting intermittent pain that woke her from sleep about an hour ago     Chief complaint is chest pain.  The patient describes a sharp pain to the mid left sternal that woke her from sleep.  She would salsa last night went to bed actually took 2 Gas-X pills thinking she may have some problems.  She arrived here and after the pain was there proximally 2225 minutes the pain went away.  She now feels fine and wants to go home.  She does have a history of hypertension smoking and family history.      Review of patient's allergies indicates:   Allergen Reactions    Bisacodyl Nausea And Vomiting     Other reaction(s): Vomiting    Cipro [ciprofloxacin hcl]     Demerol [meperidine]      Nausea vomiting, visual problem    Doxycycline Hives    Flonase [fluticasone propionate] Hives    Morphine     Morpholine analogues Other (See Comments)     hypotension     Past Medical History:   Diagnosis Date    Anxiety     Martin esophagus     Colitis     COPD (chronic obstructive pulmonary disease)     GERD (gastroesophageal reflux disease)     Hypertension     Thyroid disease     Vocal cord polyps      Past Surgical History:   Procedure Laterality Date    ABDOMINAL AORTIC ANEURYSM REPAIR      BACK SURGERY      cervical    CATARACT EXTRACTION Right 02/2021    CHOLECYSTECTOMY  12-    [facility]    FOOT SURGERY      HYSTERECTOMY      SALIVARY GLAND SURGERY       Family History   Problem Relation Age of Onset    Cancer Mother     Heart failure Father     Emphysema Sister     No Known Problems Brother     Cancer Sister      Social History     Tobacco Use    Smoking status: Every Day     Packs/day: 0.50     Types: Cigarettes    Smokeless tobacco: Former     Quit date: 5/1/2016   Substance Use Topics    Alcohol use: Never    Drug use: Never     Review of Systems   Constitutional:  Negative for chills  and fever.   HENT:  Negative for ear pain, rhinorrhea and sore throat.    Eyes:  Negative for pain and visual disturbance.   Respiratory:  Negative for cough and shortness of breath.    Cardiovascular:  Positive for chest pain. Negative for palpitations.   Gastrointestinal:  Negative for abdominal pain, constipation, diarrhea, nausea and vomiting.   Genitourinary:  Negative for dysuria, frequency, hematuria and urgency.   Musculoskeletal:  Negative for back pain, joint swelling and myalgias.   Skin:  Negative for rash.   Neurological:  Negative for dizziness, seizures, weakness and headaches.   Psychiatric/Behavioral:  Negative for dysphoric mood. The patient is not nervous/anxious.      Physical Exam     Initial Vitals [03/27/23 0058]   BP Pulse Resp Temp SpO2   (!) 192/98 63 16 97.6 °F (36.4 °C) 97 %      MAP       --         Physical Exam    Nursing note and vitals reviewed.  Constitutional: She appears well-developed and well-nourished.   HENT:   Head: Normocephalic and atraumatic.   Eyes: Conjunctivae, EOM and lids are normal. Pupils are equal, round, and reactive to light.   Neck: Trachea normal. Neck supple. No thyroid mass and no thyromegaly present.   Normal range of motion.  Cardiovascular:  Normal rate, regular rhythm and normal heart sounds.           Pulmonary/Chest: Effort normal and breath sounds normal.   Abdominal: Abdomen is soft. There is no abdominal tenderness.   Musculoskeletal:         General: Normal range of motion.      Cervical back: Normal range of motion and neck supple.     Neurological: She is alert and oriented to person, place, and time. She has normal strength and normal reflexes. No cranial nerve deficit or sensory deficit.   Skin: Skin is warm and dry.   Psychiatric: She has a normal mood and affect. Her speech is normal and behavior is normal. Judgment and thought content normal.       ED Course   Procedures  Labs Reviewed   CBC W/ AUTO DIFFERENTIAL - Abnormal; Notable for the  following components:       Result Value    MCH 31.7 (*)     MPV 8.9 (*)     All other components within normal limits   COMPREHENSIVE METABOLIC PANEL   B-TYPE NATRIURETIC PEPTIDE   TROPONIN I HIGH SENSITIVITY   PROTIME-INR        ECG Results              EKG 12-lead (Final result)  Result time 03/28/23 12:57:16      Final result by Interface, Lab In [facility] (03/28/23 12:57:16)                   Narrative:    Test Reason : R07.9,    Vent. Rate : 063 BPM     Atrial Rate : 063 BPM     P-R Int : 170 ms          QRS Dur : 074 ms      QT Int : 440 ms       P-R-T Axes : 068 029 070 degrees     QTc Int : 450 ms    Normal sinus rhythm  Low voltage QRS  Borderline Abnormal ECG  When compared with ECG of 03-JAN-2022 05:40,  No significant change was found  Confirmed by Navarro Banegas MD ([id number]) on 3/28/2023 12:57:07 PM    Referred By: [email or web address]   SELF           Confirmed By:Navarro Banegas MD                                  Imaging Results              X-Ray Chest AP Portable (Final result)  Result time 03/27/23 07:29:58      Final result by Willian Barroso MD (03/27/23 07:29:58)                   Narrative:    Chest single view    CLINICAL DATA: Chest pain    FINDINGS: AP view is compared to January 2022. Heart size is normal. Aortic arch is mildly ectatic, chronic and unchanged. No infiltrates or effusions are identified. Osseous structures are unremarkable.    IMPRESSION:  1. No acute process.    Electronically signed by:  Willian Barroso MD  3/27/2023 7:29 AM CDT Workstation: [id number]                                     Medications - No data to display  Medical Decision Making:   ED Management:  The patient has chest pain differential diagnosis includes unstable angina cardiac arrhythmia was acute myocardial infarction pericarditis aortic dissection pulmonary embolus GERD pneumothorax peptic ulcer disease pancreatitis gallbladder days hepatitis among others.    The patient does not want wait for 2nd troponin and she  was sign an AMA form.                        Clinical Impression:   Final diagnoses:  [R07.9] Chest pain  [R07.9] Chest pain, unspecified type (Primary)        ED Disposition Condition    AMA Stable                Perez Aguero MD  03/28/23 0891

## 2023-03-31 DIAGNOSIS — E03.9 ACQUIRED HYPOTHYROIDISM: Primary | ICD-10-CM

## 2023-03-31 DIAGNOSIS — K21.00 GASTROESOPHAGEAL REFLUX DISEASE WITH ESOPHAGITIS WITHOUT HEMORRHAGE: ICD-10-CM

## 2023-03-31 RX ORDER — LEVOTHYROXINE SODIUM 100 UG/1
100 TABLET ORAL
Qty: 90 TABLET | Refills: 3 | Status: SHIPPED | OUTPATIENT
Start: 2023-03-31 | End: 2023-11-27

## 2023-03-31 RX ORDER — OMEPRAZOLE 40 MG/1
40 CAPSULE, DELAYED RELEASE ORAL EVERY MORNING
Qty: 90 CAPSULE | Refills: 3 | Status: SHIPPED | OUTPATIENT
Start: 2023-03-31 | End: 2023-11-27 | Stop reason: SDUPTHER

## 2023-03-31 NOTE — TELEPHONE ENCOUNTER
----- Message from Alyx Lechuga sent at 3/31/2023 11:07 AM CDT -----  - pt needs refill on levothyroxine and omeprazole   [phone]

## 2023-05-02 ENCOUNTER — HOSPITAL ENCOUNTER (EMERGENCY)
Facility: HOSPITAL | Age: 73
Discharge: HOME | End: 2023-05-02
Attending: EMERGENCY MEDICINE
Payer: MEDICARE

## 2023-05-02 VITALS
SYSTOLIC BLOOD PRESSURE: 171 MMHG | HEART RATE: 58 BPM | OXYGEN SATURATION: 97 % | DIASTOLIC BLOOD PRESSURE: 84 MMHG | TEMPERATURE: 98 F | BODY MASS INDEX: 28.72 KG/M2 | RESPIRATION RATE: 18 BRPM | WEIGHT: 157 LBS

## 2023-05-02 DIAGNOSIS — R10.12 LEFT UPPER QUADRANT PAIN: ICD-10-CM

## 2023-05-02 DIAGNOSIS — R10.9 LEFT FLANK PAIN: Primary | ICD-10-CM

## 2023-05-02 LAB
ALBUMIN SERPL BCP-MCNC: 3.9 G/DL (ref 3.5–5.2)
ALP SERPL-CCNC: 79 U/L (ref 55–135)
ALT SERPL W/O P-5'-P-CCNC: 23 U/L (ref 10–44)
ANION GAP SERPL CALC-SCNC: 6 MMOL/L (ref 8–16)
AST SERPL-CCNC: 26 U/L (ref 10–40)
BACTERIA #/AREA URNS HPF: NEGATIVE /HPF
BASOPHILS # BLD AUTO: 0.03 K/UL (ref 0–0.2)
BASOPHILS NFR BLD: 0.6 % (ref 0–1.9)
BILIRUB SERPL-MCNC: 0.8 MG/DL (ref 0.1–1)
BILIRUB UR QL STRIP: NEGATIVE
BUN SERPL-MCNC: 13 MG/DL (ref 8–23)
CALCIUM SERPL-MCNC: 9.7 MG/DL (ref 8.7–10.5)
CHLORIDE SERPL-SCNC: 106 MMOL/L (ref 95–110)
CLARITY UR: ABNORMAL
CO2 SERPL-SCNC: 27 MMOL/L (ref 23–29)
COLOR UR: YELLOW
CREAT SERPL-MCNC: 0.8 MG/DL (ref 0.5–1.4)
DIFFERENTIAL METHOD: ABNORMAL
EOSINOPHIL # BLD AUTO: 0.1 K/UL (ref 0–0.5)
EOSINOPHIL NFR BLD: 1.5 % (ref 0–8)
ERYTHROCYTE [DISTWIDTH] IN BLOOD BY AUTOMATED COUNT: 12.9 % (ref 11.5–14.5)
EST. GFR  (NO RACE VARIABLE): >60 ML/MIN/1.73 M^2
GLUCOSE SERPL-MCNC: 99 MG/DL (ref 70–110)
GLUCOSE UR QL STRIP: NEGATIVE
HCT VFR BLD AUTO: 46 % (ref 37–48.5)
HGB BLD-MCNC: 15.1 G/DL (ref 12–16)
HGB UR QL STRIP: ABNORMAL
HYALINE CASTS #/AREA URNS LPF: 8 /LPF
IMM GRANULOCYTES # BLD AUTO: 0.02 K/UL (ref 0–0.04)
IMM GRANULOCYTES NFR BLD AUTO: 0.4 % (ref 0–0.5)
KETONES UR QL STRIP: NEGATIVE
LEUKOCYTE ESTERASE UR QL STRIP: NEGATIVE
LYMPHOCYTES # BLD AUTO: 1.9 K/UL (ref 1–4.8)
LYMPHOCYTES NFR BLD: 35.1 % (ref 18–48)
MCH RBC QN AUTO: 31.7 PG (ref 27–31)
MCHC RBC AUTO-ENTMCNC: 32.8 G/DL (ref 32–36)
MCV RBC AUTO: 97 FL (ref 82–98)
MICROSCOPIC COMMENT: ABNORMAL
MONOCYTES # BLD AUTO: 0.5 K/UL (ref 0.3–1)
MONOCYTES NFR BLD: 8.5 % (ref 4–15)
NEUTROPHILS # BLD AUTO: 2.9 K/UL (ref 1.8–7.7)
NEUTROPHILS NFR BLD: 53.9 % (ref 38–73)
NITRITE UR QL STRIP: NEGATIVE
NRBC BLD-RTO: 0 /100 WBC
PH UR STRIP: 8 [PH] (ref 5–8)
PLATELET # BLD AUTO: 200 K/UL (ref 150–450)
PMV BLD AUTO: 9.2 FL (ref 9.2–12.9)
POTASSIUM SERPL-SCNC: 4.3 MMOL/L (ref 3.5–5.1)
PROT SERPL-MCNC: 8 G/DL (ref 6–8.4)
PROT UR QL STRIP: NEGATIVE
RBC # BLD AUTO: 4.76 M/UL (ref 4–5.4)
RBC #/AREA URNS HPF: 12 /HPF (ref 0–4)
SODIUM SERPL-SCNC: 139 MMOL/L (ref 136–145)
SP GR UR STRIP: 1.01 (ref 1–1.03)
SQUAMOUS #/AREA URNS HPF: 9 /HPF
URN SPEC COLLECT METH UR: ABNORMAL
UROBILINOGEN UR STRIP-ACNC: NEGATIVE EU/DL
WBC # BLD AUTO: 5.44 K/UL (ref 3.9–12.7)
WBC #/AREA URNS HPF: 2 /HPF (ref 0–5)

## 2023-05-02 PROCEDURE — 99285 EMERGENCY DEPT VISIT HI MDM: CPT | Mod: 25

## 2023-05-02 PROCEDURE — 80053 COMPREHEN METABOLIC PANEL: CPT | Performed by: EMERGENCY MEDICINE

## 2023-05-02 PROCEDURE — 63600175 PHARM REV CODE 636 W HCPCS: Performed by: EMERGENCY MEDICINE

## 2023-05-02 PROCEDURE — 96374 THER/PROPH/DIAG INJ IV PUSH: CPT

## 2023-05-02 PROCEDURE — 36415 COLL VENOUS BLD VENIPUNCTURE: CPT | Performed by: EMERGENCY MEDICINE

## 2023-05-02 PROCEDURE — 81001 URINALYSIS AUTO W/SCOPE: CPT | Performed by: EMERGENCY MEDICINE

## 2023-05-02 PROCEDURE — 85025 COMPLETE CBC W/AUTO DIFF WBC: CPT | Performed by: EMERGENCY MEDICINE

## 2023-05-02 PROCEDURE — 96375 TX/PRO/DX INJ NEW DRUG ADDON: CPT

## 2023-05-02 RX ORDER — KETOROLAC TROMETHAMINE 30 MG/ML
15 INJECTION, SOLUTION INTRAMUSCULAR; INTRAVENOUS
Status: COMPLETED | OUTPATIENT
Start: 2023-05-02 | End: 2023-05-02

## 2023-05-02 RX ORDER — ONDANSETRON 2 MG/ML
4 INJECTION INTRAMUSCULAR; INTRAVENOUS
Status: COMPLETED | OUTPATIENT
Start: 2023-05-02 | End: 2023-05-02

## 2023-05-02 RX ADMIN — ONDANSETRON 4 MG: 2 INJECTION INTRAMUSCULAR; INTRAVENOUS at 11:05

## 2023-05-02 RX ADMIN — KETOROLAC TROMETHAMINE 15 MG: 30 INJECTION, SOLUTION INTRAMUSCULAR; INTRAVENOUS at 11:05

## 2023-05-02 NOTE — DISCHARGE INSTRUCTIONS
Watch for any fever, nausea ,vomiting, worsening pain, bloating, bloody urine, painful urination, diarrhea, shortness of breath and chest pain.  Continue your analgesics.  Please return to the emergency room if symptoms do not improve or worsen

## 2023-05-02 NOTE — ED PROVIDER NOTES
Encounter Date: 5/2/2023       History     Chief Complaint   Patient presents with    Flank Pain     Left flank pain since 5 am today     72-year-old female presents emergency room accompanied by  with a history that she has COPD, GERD, hypertension, thyroid disease, colitis, Martin's esophagus and anxiety, presents with complaints of having pain in her left flank and left upper quadrant since a proximally 5:00 a.m. this morning.  Patient states she is had nausea with dry heaves but no actual vomiting.  Later this morning at 9:00 a.m. she was able to eat grits and eggs.  She also took Pepto-Bismol.  Patient states that the pain is worsened when she is sitting.  She did have a normal stool this morning she described as light brown in appearance.  She denies any dark or bloody urine.  She has had remote history of kidney stones.  Patient admits some difficulty initiating her urinary stream but denies any dysuria.  She additionally states that she is had a AAA that was stented by Dr. Cornelius.  Smokes a half pack cigarettes daily and has allergies to opiates.    Review of patient's allergies indicates:   Allergen Reactions    Bisacodyl Nausea And Vomiting     Other reaction(s): Vomiting    Cipro [ciprofloxacin hcl]     Demerol [meperidine]      Nausea vomiting, visual problem    Doxycycline Hives    Flonase [fluticasone propionate] Hives    Morphine     Morpholine analogues Other (See Comments)     hypotension     Past Medical History:   Diagnosis Date    Anxiety     Martin esophagus     Colitis     COPD (chronic obstructive pulmonary disease)     GERD (gastroesophageal reflux disease)     Hypertension     Thyroid disease     Vocal cord polyps      Past Surgical History:   Procedure Laterality Date    ABDOMINAL AORTIC ANEURYSM REPAIR      BACK SURGERY      cervical    CATARACT EXTRACTION Right 02/2021    CHOLECYSTECTOMY  12-    Dr Albert CORLEY    FOOT SURGERY      HYSTERECTOMY      SALIVARY GLAND SURGERY        Family History   Problem Relation Age of Onset    Cancer Mother     Heart failure Father     Emphysema Sister     No Known Problems Brother     Cancer Sister      Social History     Tobacco Use    Smoking status: Every Day     Packs/day: 0.50     Types: Cigarettes    Smokeless tobacco: Former     Quit date: 5/1/2016   Substance Use Topics    Alcohol use: Never    Drug use: Never     Review of Systems   Constitutional:  Negative for chills, diaphoresis and fever.   HENT: Negative.  Negative for sore throat.    Respiratory: Negative.  Negative for shortness of breath.    Cardiovascular: Negative.  Negative for chest pain.   Gastrointestinal:  Positive for abdominal pain and nausea. Negative for abdominal distention, constipation, diarrhea and vomiting.   Genitourinary:  Positive for flank pain. Negative for dysuria, frequency and hematuria.   Musculoskeletal:  Negative for back pain and neck pain.   Skin:  Negative for color change, pallor and rash.   Neurological:  Negative for weakness, light-headedness and headaches.   Hematological:  Does not bruise/bleed easily.   All other systems reviewed and are negative.    Physical Exam     Initial Vitals [05/02/23 1039]   BP Pulse Resp Temp SpO2   (!) 151/90 62 18 97.7 °F (36.5 °C) 96 %      MAP       --         Physical Exam    Vitals reviewed.  Constitutional: She appears well-developed and well-nourished. She is not diaphoretic. She appears distressed.   HENT:   Head: Normocephalic and atraumatic.   Nose: Nose normal.   Mouth/Throat: Oropharynx is clear and moist. No oropharyngeal exudate.   Eyes: Conjunctivae are normal. Pupils are equal, round, and reactive to light.   Neck: Neck supple. No JVD present.   Normal range of motion.  Cardiovascular:  Normal rate, regular rhythm, normal heart sounds and intact distal pulses.     Exam reveals no gallop and no friction rub.       No murmur heard.  Pulmonary/Chest: Breath sounds normal. No respiratory distress. She has no  wheezes. She has no rhonchi. She has no rales. She exhibits no tenderness.   Abdominal: Abdomen is soft. Bowel sounds are normal. She exhibits no distension. There is no abdominal tenderness. There is no rebound and no guarding.   Musculoskeletal:         General: No tenderness or edema. Normal range of motion.      Cervical back: Normal range of motion and neck supple.     Lymphadenopathy:     She has no cervical adenopathy.   Neurological: She is alert and oriented to person, place, and time. She has normal strength. GCS score is 15. GCS eye subscore is 4. GCS verbal subscore is 5. GCS motor subscore is 6.   Skin: Skin is warm and dry. Capillary refill takes less than 2 seconds. No rash noted. No erythema. No pallor.   Psychiatric: She has a normal mood and affect. Her behavior is normal. Judgment and thought content normal.       ED Course   Procedures  Labs Reviewed   CBC W/ AUTO DIFFERENTIAL   COMPREHENSIVE METABOLIC PANEL   URINALYSIS, REFLEX TO URINE CULTURE          Imaging Results              CT Renal Stone Study ABD Pelvis WO (Final result)  Result time 05/02/23 12:28:52      Final result by Bill Gómez MD (05/02/23 12:28:52)                   Narrative:    CMS MANDATED QUALITY DATA-CT RADIATION DOSE-436  All CT scans at this facility use dose modulation, iterative reconstruction, and or weight-based dosing when appropriate to reduce radiation dose to as low as reasonably achievable. Unless otherwise stated, incidental findings do not require dedicated follow-up imaging.    HISTORY: Left upper quadrant pain    FINDINGS: Noncontrast axial images were obtained. Nonenhanced study is tailored for the detection of urolithiasis, and is insensitive for abnormalities of the solid organs, vasculature and hollow viscera.  Comparison to multiple prior exams.    CT ABDOMEN: Linear subsegmental atelectasis involves both lung bases, which are otherwise clear. There is a circumscribed hepatic hypodensity reflecting  simple cyst, with the unenhanced liver otherwise unremarkable. There are cholecystectomy clips.    The unenhanced spleen is normal in size, and contains coarse calcification, otherwise unremarkable. The unenhanced pancreas, adrenal glands and kidneys are unremarkable, with no renal or ureteral calculi, or hydroureteronephrosis. There are stable postoperative changes of aortic aneurysm repair with aortoiliac endograft. The proximal abdominal aorta is ectatic, unchanged.    There is no bowel obstruction, ascites, or intraperitoneal free air. There are scattered colon diverticula. The appendix is normal.    CT PELVIS: There are a few sigmoid colon diverticula, without evidence of diverticulitis. The unenhanced rectum and urinary bladder are unremarkable, with no distal ureteral or bladder calculi. The uterus is surgically absent. There is no pelvic free fluid, with no enlarged pelvic or inguinal lymph nodes.    The unenhanced extraperitoneal soft tissues are unremarkable. There are no acute fractures or destructive osseous lesions, with intervertebral disc space narrowing and facet arthropathy in the spine.    IMPRESSION:  1. Negative for renal or ureteral calculi, or hydroureteronephrosis.  2. Additional observations as described    Electronically signed by:  Bill Gómez MD  5/2/2023 12:28 PM CDT Workstation: [id number]                                     X-Ray Chest 1 View (Final result)  Result time 05/02/23 12:15:38      Final result by Yokasta Cowan MD (05/02/23 12:15:38)                   Narrative:    XR CHEST 1 VIEW    CLINICAL HISTORY:  72 years Female left upper quadrant pain    COMPARISON: 3/27/2023    FINDINGS: Cardiomediastinal silhouette is within normal limits. Lungs are normally expanded with no airspace consolidation. No pleural effusion or pneumothorax. No acute osseous abnormality.    IMPRESSION: No acute pulmonary process.    Electronically signed by:  Yokasta Cowan MD  5/2/2023 12:15 PM  CDT Workstation: [id number]                                     Medications   ondansetron injection 4 mg (4 mg Intravenous Given 5/2/23 1150)   ketorolac injection 15 mg (15 mg Intravenous Given 5/2/23 1151)                Attending Attestation:             Attending ED Notes:   ED course and MDM:  This 72-year-old female who presented primarily with left upper quadrant abdominal pain left flank pain, had a plain CT of the abdomen and pelvis which did not show any evidence of any obvious etiology to her complaints.  There was no evidence of any stones.  There was diverticulosis but nothing to suggest diverticulitis.  No comments as to any abnormal bowel gas pattern.  During the ED course I did speak to Dr. Mullen, radiology asked as to whether or not a contrast study at this time would be of benefit he said that it would be doubtful.  Screening labs obtained including urinalysis which had 12 red cells per high-power field in 2 white cells CBC had a white count of 5.44 and an H&H of 15 and 46.  Has a normal differential.  Chemistries were normal.  During the ED course the patient had a saline lock established was given 15 mg of Toradol with little or no improvement.  The patient was reassessed and still had some left upper quadrant tenderness but also tenderness in the left flank and paravertebral muscle area.  It was explained to the patient that there is no obvious discernible cause for his symptoms and not suggest an observation admission for further evaluation yet she declines stating that she wants to go home and take analgesics that she has at her residence.    Differential diagnosis includes splenic infarct, radiculopathy from upper lumbar lower thoracic disc, bowel obstruction, diverticular disease, renal calculi with ureterolithiasis.    The patient is counseled as well as  that if her symptoms worsen or persist that she needs to return for further evaluation and potential admission.                  Clinical Impression:   Final diagnoses:  [R10.12] Left upper quadrant pain  [R10.9] Left flank pain (Primary)        ED Disposition Condition    Discharge Stable          ED Prescriptions    None       Follow-up Information    None          Misael Valdez Jr., MD  05/02/23 1280

## 2023-05-31 DIAGNOSIS — M51.36 DDD (DEGENERATIVE DISC DISEASE), LUMBAR: ICD-10-CM

## 2023-05-31 RX ORDER — HYDROCODONE BITARTRATE AND ACETAMINOPHEN 10; 325 MG/1; MG/1
1 TABLET ORAL EVERY 12 HOURS PRN
Qty: 50 TABLET | Refills: 0 | Status: SHIPPED | OUTPATIENT
Start: 2023-05-31 | End: 2023-08-02 | Stop reason: SDUPTHER

## 2023-05-31 RX ORDER — ONDANSETRON 4 MG/1
4 TABLET, ORALLY DISINTEGRATING ORAL EVERY 8 HOURS PRN
Qty: 21 TABLET | Refills: 0 | Status: SHIPPED | OUTPATIENT
Start: 2023-05-31 | End: 2023-10-18 | Stop reason: SDUPTHER

## 2023-05-31 NOTE — TELEPHONE ENCOUNTER
Spoke with patient who states she fell on Saturday. States her knee is getting better where it is busted but she is having pain in her back now because she pulled something. She generally gets a PRN pain medication at her visits and she is just wanting to know if it can be sent early since she is pain.

## 2023-05-31 NOTE — TELEPHONE ENCOUNTER
----- Message from James Youngblood MA sent at 5/31/2023 10:25 AM CDT -----  Regarding: med request  Pt calling for a pain med and zofran because she fell Saturday while gardening and she pulled her back and skinned her knee. [phone]

## 2023-06-13 ENCOUNTER — PATIENT OUTREACH (OUTPATIENT)
Dept: ADMINISTRATIVE | Facility: HOSPITAL | Age: 73
End: 2023-06-13
Payer: MEDICARE

## 2023-06-13 ENCOUNTER — PATIENT MESSAGE (OUTPATIENT)
Dept: ADMINISTRATIVE | Facility: HOSPITAL | Age: 73
End: 2023-06-13
Payer: MEDICARE

## 2023-06-13 NOTE — PROGRESS NOTES
Tobacco History needs to be reviewed with patient.  Is patient still a current smoker.  6/13/23LEFT MESSAGE TO RETURN CALL  ACTIVE PORTAL MESSAGE SENT    Smoking:   Never, Former, Every Day, Some Days, Light Smoker  Types: Cigarettes, Pipe, Cigars, Vaping with nicotine, Vaping without nicotine  Smokeless Tobacco: Never, Former, Current, Unknown    Population Health Chart Review & Patient Outreach Details:     Reason for Outreach Encounter:     [x]  Non-Compliant Report   []  Payor Report ([employer], PHN, BCBS, MSSP, MCIP, [facility], etc.)   []  Pre-Visit Chart Review     Updates Requested / Reviewed:     []  Care Everywhere    []     []  External Sources ([facility], [employer], [employer], etc.)   []  Care Team Updated    Patient Outreach Method:    [x]  Telephone Outreach Completed   [] Successful   [x] Left Voicemail   [] Unable to Contact (wrong number, no voicemail)  [x]  [facility] Outreach Sent  []  Letter Outreach Mailed  []  Fax Sent for External Records  []  External Records Upload    Health Maintenance Topics Addressed and Outreach Outcomes / Actions Taken:        []      Breast Cancer Screening []  Mammo Scheduled      []  External Records Requested     []  Added Reminder to Complete to Upcoming Primary Care Appt Notes     []  Patient Declined     []  Patient Will Call Back to Schedule     []  Patient Will Schedule with External Provider / Order Routed if Applicable             []       Cervical Cancer Screening []  Pap Scheduled      []  External Records Requested     []  Added Reminder to Complete to Upcoming Primary Care Appt Notes     []  Patient Declined     []  Patient Will Call Back to Schedule     []  Patient Will Schedule with External Provider               []          Colorectal Cancer Screening []  Colonoscopy Case Request or Referral Placed     []  External Records Requested     []  Added Reminder to Complete to Upcoming Primary Care Appt Notes     []  Patient Declined     []  Patient Will Call  Back to Schedule     []  Patient Will Schedule with External Provider     []  Fit Kit Mailed (add the SmartPhrase under additional notes)     []  Reminded Patient to Complete Home Test             []      Diabetic Eye Exam []  Eye Camera Scheduled or Optometry Referral Placed     []  External Records Requested     []  Added Reminder to Complete to Upcoming Primary Care Appt Notes     []  Patient Declined     []  Patient Will Call Back to Schedule     []  Patient Will Schedule with External Provider             []      Blood Pressure Control []  Primary Care Follow Up Visit Scheduled     []  Remote Blood Pressure Reading Captured     []  Added Reminder to Complete to Upcoming Primary Care Appt Notes     []  Patient Declined     []  Patient Will Call Back / Patient Will Send Portal Message with Reading     []  Patient Will Call Back to Schedule Provider Visit             []       HbA1c & Other Labs []  Lab Appt Scheduled for Due Labs     []  Primary Care Follow Up Visit Scheduled      []  Reminded Patient to Complete Home Test     []  Added Reminder to Complete to Upcoming Primary Care Appt Notes     []  Patient Declined     []  Patient Will Call Back to Schedule     []  Patient Will Schedule with External Provider / Order Routed if Applicable           []    Schedule Primary Care Appt []  Primary Care Appt Scheduled     []  Patient Declined     []  Patient Will Call Back to Schedule     []  Pt Established with External Provider & Updated Care Team             []      Medication Adherence []  Primary Care Appointment Scheduled     []  Added Reminder to Upcoming Primary Care Appt Notes     []  Patient Reminded to  Prescription     []  Patient Declined, Provider Notified if Needed     []  Sent Provider Message to Review and/or Add Exclusion to Problem List             []      Osteoporosis Screening []  DXA Appointment Scheduled     []  External Records Requested     []  Added Reminder to Complete to Upcoming  Primary Care Appt Notes     []  Patient Declined     []  Patient Will Call Back to Schedule     []  Patient Will Schedule with External Provider / Order Routed if Applicable     Additional Care Coordinator Notes:         Further Action Needed If Patient Returns Outreach:

## 2023-06-22 ENCOUNTER — HOSPITAL ENCOUNTER (OUTPATIENT)
Facility: HOSPITAL | Age: 73
Discharge: LEFT BEFORE DISCHARGE | End: 2023-06-22
Attending: STUDENT IN AN ORGANIZED HEALTH CARE EDUCATION/TRAINING PROGRAM | Admitting: INTERNAL MEDICINE
Payer: MEDICARE

## 2023-06-22 VITALS
WEIGHT: 157 LBS | TEMPERATURE: 98 F | SYSTOLIC BLOOD PRESSURE: 159 MMHG | HEART RATE: 65 BPM | RESPIRATION RATE: 16 BRPM | BODY MASS INDEX: 28.72 KG/M2 | OXYGEN SATURATION: 98 % | DIASTOLIC BLOOD PRESSURE: 74 MMHG

## 2023-06-22 DIAGNOSIS — R07.9 CHEST PAIN: ICD-10-CM

## 2023-06-22 LAB
ALBUMIN SERPL BCP-MCNC: 3.9 G/DL (ref 3.5–5.2)
ALP SERPL-CCNC: 81 U/L (ref 55–135)
ALT SERPL W/O P-5'-P-CCNC: 25 U/L (ref 10–44)
ANION GAP SERPL CALC-SCNC: 7 MMOL/L (ref 8–16)
AST SERPL-CCNC: 23 U/L (ref 10–40)
BASOPHILS # BLD AUTO: 0.02 K/UL (ref 0–0.2)
BASOPHILS NFR BLD: 0.3 % (ref 0–1.9)
BILIRUB SERPL-MCNC: 0.5 MG/DL (ref 0.1–1)
BNP SERPL-MCNC: 23 PG/ML (ref 0–99)
BUN SERPL-MCNC: 19 MG/DL (ref 8–23)
CALCIUM SERPL-MCNC: 9.5 MG/DL (ref 8.7–10.5)
CHLORIDE SERPL-SCNC: 108 MMOL/L (ref 95–110)
CO2 SERPL-SCNC: 26 MMOL/L (ref 23–29)
CREAT SERPL-MCNC: 0.9 MG/DL (ref 0.5–1.4)
DIFFERENTIAL METHOD: ABNORMAL
EOSINOPHIL # BLD AUTO: 0.1 K/UL (ref 0–0.5)
EOSINOPHIL NFR BLD: 1.8 % (ref 0–8)
ERYTHROCYTE [DISTWIDTH] IN BLOOD BY AUTOMATED COUNT: 13.3 % (ref 11.5–14.5)
EST. GFR  (NO RACE VARIABLE): >60 ML/MIN/1.73 M^2
GLUCOSE SERPL-MCNC: 100 MG/DL (ref 70–110)
HCT VFR BLD AUTO: 45.8 % (ref 37–48.5)
HGB BLD-MCNC: 15.2 G/DL (ref 12–16)
IMM GRANULOCYTES # BLD AUTO: 0.02 K/UL (ref 0–0.04)
IMM GRANULOCYTES NFR BLD AUTO: 0.3 % (ref 0–0.5)
INR PPP: 0.9 (ref 0.8–1.2)
LIPASE SERPL-CCNC: 48 U/L (ref 4–60)
LYMPHOCYTES # BLD AUTO: 2 K/UL (ref 1–4.8)
LYMPHOCYTES NFR BLD: 28.1 % (ref 18–48)
MCH RBC QN AUTO: 31.6 PG (ref 27–31)
MCHC RBC AUTO-ENTMCNC: 33.2 G/DL (ref 32–36)
MCV RBC AUTO: 95 FL (ref 82–98)
MONOCYTES # BLD AUTO: 0.6 K/UL (ref 0.3–1)
MONOCYTES NFR BLD: 7.7 % (ref 4–15)
NEUTROPHILS # BLD AUTO: 4.5 K/UL (ref 1.8–7.7)
NEUTROPHILS NFR BLD: 61.8 % (ref 38–73)
NRBC BLD-RTO: 0 /100 WBC
PLATELET # BLD AUTO: 189 K/UL (ref 150–450)
PMV BLD AUTO: 9 FL (ref 9.2–12.9)
POTASSIUM SERPL-SCNC: 4.1 MMOL/L (ref 3.5–5.1)
PROT SERPL-MCNC: 7.7 G/DL (ref 6–8.4)
PROTHROMBIN TIME: 10.5 SEC (ref 9–12.5)
RBC # BLD AUTO: 4.81 M/UL (ref 4–5.4)
SODIUM SERPL-SCNC: 141 MMOL/L (ref 136–145)
TROPONIN I SERPL HS-MCNC: 7.8 PG/ML (ref 0–14.9)
TROPONIN I SERPL HS-MCNC: 9.1 PG/ML (ref 0–14.9)
WBC # BLD AUTO: 7.26 K/UL (ref 3.9–12.7)

## 2023-06-22 PROCEDURE — 85025 COMPLETE CBC W/AUTO DIFF WBC: CPT

## 2023-06-22 PROCEDURE — 99285 EMERGENCY DEPT VISIT HI MDM: CPT | Mod: 25

## 2023-06-22 PROCEDURE — 93010 ELECTROCARDIOGRAM REPORT: CPT | Mod: ,,, | Performed by: SPECIALIST

## 2023-06-22 PROCEDURE — G0378 HOSPITAL OBSERVATION PER HR: HCPCS

## 2023-06-22 PROCEDURE — 85610 PROTHROMBIN TIME: CPT

## 2023-06-22 PROCEDURE — 83690 ASSAY OF LIPASE: CPT | Performed by: STUDENT IN AN ORGANIZED HEALTH CARE EDUCATION/TRAINING PROGRAM

## 2023-06-22 PROCEDURE — 25000003 PHARM REV CODE 250: Performed by: STUDENT IN AN ORGANIZED HEALTH CARE EDUCATION/TRAINING PROGRAM

## 2023-06-22 PROCEDURE — 84484 ASSAY OF TROPONIN QUANT: CPT | Mod: 91

## 2023-06-22 PROCEDURE — 93010 EKG 12-LEAD: ICD-10-PCS | Mod: ,,, | Performed by: SPECIALIST

## 2023-06-22 PROCEDURE — 80053 COMPREHEN METABOLIC PANEL: CPT

## 2023-06-22 PROCEDURE — 96374 THER/PROPH/DIAG INJ IV PUSH: CPT | Mod: 59

## 2023-06-22 PROCEDURE — 25500020 PHARM REV CODE 255: Performed by: STUDENT IN AN ORGANIZED HEALTH CARE EDUCATION/TRAINING PROGRAM

## 2023-06-22 PROCEDURE — 96375 TX/PRO/DX INJ NEW DRUG ADDON: CPT

## 2023-06-22 PROCEDURE — 84484 ASSAY OF TROPONIN QUANT: CPT | Performed by: STUDENT IN AN ORGANIZED HEALTH CARE EDUCATION/TRAINING PROGRAM

## 2023-06-22 PROCEDURE — 83880 ASSAY OF NATRIURETIC PEPTIDE: CPT

## 2023-06-22 PROCEDURE — 93005 ELECTROCARDIOGRAM TRACING: CPT | Performed by: SPECIALIST

## 2023-06-22 PROCEDURE — 63600175 PHARM REV CODE 636 W HCPCS: Performed by: STUDENT IN AN ORGANIZED HEALTH CARE EDUCATION/TRAINING PROGRAM

## 2023-06-22 RX ORDER — GLUCAGON 1 MG
1 KIT INJECTION
Status: DISCONTINUED | OUTPATIENT
Start: 2023-06-22 | End: 2023-06-22 | Stop reason: HOSPADM

## 2023-06-22 RX ORDER — IBUPROFEN 200 MG
16 TABLET ORAL
Status: DISCONTINUED | OUTPATIENT
Start: 2023-06-22 | End: 2023-06-22 | Stop reason: HOSPADM

## 2023-06-22 RX ORDER — MAG HYDROX/ALUMINUM HYD/SIMETH 200-200-20
30 SUSPENSION, ORAL (FINAL DOSE FORM) ORAL ONCE
Status: COMPLETED | OUTPATIENT
Start: 2023-06-22 | End: 2023-06-22

## 2023-06-22 RX ORDER — LANOLIN ALCOHOL/MO/W.PET/CERES
800 CREAM (GRAM) TOPICAL
Status: DISCONTINUED | OUTPATIENT
Start: 2023-06-22 | End: 2023-06-22 | Stop reason: HOSPADM

## 2023-06-22 RX ORDER — HEPARIN SODIUM 5000 [USP'U]/ML
5000 INJECTION, SOLUTION INTRAVENOUS; SUBCUTANEOUS EVERY 8 HOURS
Status: DISCONTINUED | OUTPATIENT
Start: 2023-06-22 | End: 2023-06-22 | Stop reason: HOSPADM

## 2023-06-22 RX ORDER — ONDANSETRON 2 MG/ML
4 INJECTION INTRAMUSCULAR; INTRAVENOUS
Status: COMPLETED | OUTPATIENT
Start: 2023-06-22 | End: 2023-06-22

## 2023-06-22 RX ORDER — SODIUM CHLORIDE 0.9 % (FLUSH) 0.9 %
10 SYRINGE (ML) INJECTION EVERY 12 HOURS PRN
Status: DISCONTINUED | OUTPATIENT
Start: 2023-06-22 | End: 2023-06-22 | Stop reason: HOSPADM

## 2023-06-22 RX ORDER — NALOXONE HCL 0.4 MG/ML
0.02 VIAL (ML) INJECTION
Status: DISCONTINUED | OUTPATIENT
Start: 2023-06-22 | End: 2023-06-22 | Stop reason: HOSPADM

## 2023-06-22 RX ORDER — FAMOTIDINE 10 MG/ML
20 INJECTION INTRAVENOUS
Status: COMPLETED | OUTPATIENT
Start: 2023-06-22 | End: 2023-06-22

## 2023-06-22 RX ORDER — IBUPROFEN 200 MG
24 TABLET ORAL
Status: DISCONTINUED | OUTPATIENT
Start: 2023-06-22 | End: 2023-06-22 | Stop reason: HOSPADM

## 2023-06-22 RX ADMIN — FAMOTIDINE 20 MG: 10 INJECTION, SOLUTION INTRAVENOUS at 02:06

## 2023-06-22 RX ADMIN — ALUMINUM HYDROXIDE, MAGNESIUM HYDROXIDE, AND SIMETHICONE 30 ML: 200; 200; 20 SUSPENSION ORAL at 02:06

## 2023-06-22 RX ADMIN — ONDANSETRON 4 MG: 2 INJECTION INTRAMUSCULAR; INTRAVENOUS at 02:06

## 2023-06-22 RX ADMIN — IOHEXOL 100 ML: 350 INJECTION, SOLUTION INTRAVENOUS at 03:06

## 2023-06-22 NOTE — Clinical Note
Diagnosis: Chest pain [038799]   Future Attending Provider: ROSENDO BARRY [[id number]]   Admitting Provider:: ROSENDO BARRY [[id number]]

## 2023-06-22 NOTE — ED PROVIDER NOTES
"Encounter Date: 6/22/2023       History     Chief Complaint   Patient presents with    Chest Pain     Began tonight. "Pressure", radiating to R arm     72-year-old female with history of COPD thyroid disease, abdominal aortic aneurysm repair presents for acute onset left-sided chest pain radiating down the left arm along with left leg pain awoke her from sleep last night around midnight.  She denies nausea or vomiting.  She denies fevers or chills is constant, nothing makes better or worse.  She denies cough, fevers or chills.  She denies shortness of breath.  She is not tried anything for the pain.    Review of patient's allergies indicates:   Allergen Reactions    Bisacodyl Nausea And Vomiting     Other reaction(s): Vomiting    Cipro [ciprofloxacin hcl]     Demerol [meperidine]      Nausea vomiting, visual problem    Doxycycline Hives    Flonase [fluticasone propionate] Hives    Morphine     Morpholine analogues Other (See Comments)     hypotension     Past Medical History:   Diagnosis Date    Anxiety     Martin esophagus     Colitis     COPD (chronic obstructive pulmonary disease)     GERD (gastroesophageal reflux disease)     Hypertension     Thyroid disease     Vocal cord polyps      Past Surgical History:   Procedure Laterality Date    ABDOMINAL AORTIC ANEURYSM REPAIR      BACK SURGERY      cervical    CATARACT EXTRACTION Right 02/2021    CHOLECYSTECTOMY  12-    Dr Price  [facility]    FOOT SURGERY      HYSTERECTOMY      SALIVARY GLAND SURGERY       Family History   Problem Relation Age of Onset    Cancer Mother     Heart failure Father     Emphysema Sister     No Known Problems Brother     Cancer Sister      Social History     Tobacco Use    Smoking status: Every Day     Packs/day: 0.50     Types: Cigarettes    Smokeless tobacco: Former     Quit date: 5/1/2016   Substance Use Topics    Alcohol use: Never    Drug use: Never     Review of Systems   Constitutional:  Negative for activity change, appetite " change, chills, fever and unexpected weight change.   HENT:  Negative for dental problem and drooling.    Eyes:  Negative for discharge and itching.   Respiratory:  Positive for chest tightness. Negative for cough, shortness of breath, wheezing and stridor.    Cardiovascular:  Positive for chest pain. Negative for palpitations and leg swelling.   Gastrointestinal:  Negative for abdominal distention, abdominal pain, diarrhea and nausea.   Genitourinary:  Negative for difficulty urinating, dysuria, frequency and urgency.   Musculoskeletal:  Negative for back pain, gait problem and joint swelling.   Neurological:  Negative for dizziness, syncope, numbness and headaches.   Psychiatric/Behavioral:  Negative for agitation, behavioral problems and confusion.      Physical Exam     Initial Vitals [06/22/23 0047]   BP Pulse Resp Temp SpO2   (!) 166/79 63 19 98.6 °F (37 °C) 95 %      MAP       --         Physical Exam    Nursing note and vitals reviewed.  Constitutional: She appears well-developed and well-nourished. She is not diaphoretic.   Uncomfortable appearing female   HENT:   Head: Normocephalic and atraumatic.   Mouth/Throat: Oropharynx is clear and moist.   Eyes: EOM are normal. Pupils are equal, round, and reactive to light. Right eye exhibits no discharge. Left eye exhibits no discharge.   Neck: No tracheal deviation present.   Normal range of motion.  Cardiovascular:  Normal rate, regular rhythm and intact distal pulses.           Pulmonary/Chest: No respiratory distress. She has no wheezes. She exhibits no tenderness.   Abdominal: Abdomen is soft. She exhibits no distension. There is no abdominal tenderness.   Musculoskeletal:         General: No tenderness or edema. Normal range of motion.      Cervical back: Normal range of motion.      Comments: Distal pulses 2+ bilaterally     Neurological: She is alert and oriented to person, place, and time. She has normal strength. No cranial nerve deficit or sensory  deficit. GCS eye subscore is 4. GCS verbal subscore is 5. GCS motor subscore is 6.   Skin: Skin is warm and dry. No rash noted.   Psychiatric: She has a normal mood and affect. Her behavior is normal. Thought content normal.       ED Course   Procedures  Labs Reviewed   CBC W/ AUTO DIFFERENTIAL - Abnormal; Notable for the following components:       Result Value    MCH 31.6 (*)     MPV 9.0 (*)     All other components within normal limits   COMPREHENSIVE METABOLIC PANEL - Abnormal; Notable for the following components:    Anion Gap 7 (*)     All other components within normal limits   B-TYPE NATRIURETIC PEPTIDE   TROPONIN I HIGH SENSITIVITY   PROTIME-INR   LIPASE   TROPONIN I HIGH SENSITIVITY   ISTAT CHEM8          Imaging Results              CTA Chest Abdomen Non Coronary (Final result)  Result time 06/22/23 03:52:38      Final result by Hermes Garcia MD (06/22/23 03:52:38)                   Narrative:    EXAM DESCRIPTION: CTA CHEST ABDOMEN NON CORONARY (XPD) 6/22/2023 3:46 AM CDT    CLINICAL HISTORY: 72 years, Female, Aortic aneurysm, known or suspected    COMPARISON: 5/2/2023.    PROCEDURE:    Multiple transaxial tomograms of the thoracic and abdominal aorta from the lung apex to iliac crests utilizing 1 mm slice thickness at 1 mm interval reconstruction after the administration of large bolus of IV contrast for complete opacification of the thoracic, abdominal aorta and proximal iliac arteries.  2-D and 3-D multiplanar reformats, volume rendering technique and maximum intensity projection images were generated and reviewed.  This exam was performed according to our departmental dose-optimization protocol, which includes automated exposure control, adjustment of the mA and/or kV according to patient size and/or use of iterative reconstruction technique.    FINDINGS:  Thoracic aorta:  The thoracic aorta demonstrate intimal calcification involving the aortic arch and portions of the descending thoracic aorta.  The ascending thoracic aorta demonstrate to be within normal limits. No evidence for significant aneurysm. The descending thoracic aorta proximal aspect measuring 2.9 x 2.8 cm on axial image 141. There is normal branching pattern of the great vessels with no evidence for significant stenosis/or occlusion.  Abdominal aorta:  The proximal aspect of the abdominal aorta demonstrated presence of aneurysm measuring 3.7 x 3.7 cm on image 297. There is mural thrombus. There is a status post endograft aortic stent with a no evidence for significant endoleak.  The celiac trunk demonstrate to be patent. Superior mesenteric artery demonstrate to be patent. There is stenosis at the origin of the right renal artery of approximately 60-70%. The left renal artery demonstrate to be unremarkable.  Proximal aspect of both iliac arteries demonstrate to be within normal limits.  Chest:  The lung parenchyma demonstrate minimal dependent atelectatic changes. Mild elevation of the right hemidiaphragm. No significant pulmonary nodules and/or masses.  The trachea mainstem bronchus demonstrate to be unremarkable. There is no pleural effusions. The heart is normal in size. There are no significant coronary artery calcifications. The There is minimal trace of pericardial effusion. The central portions of the pulmonary arteries demonstrate normal opacification with no evidence for significant filling defects that will suggest pulmonary embolus.  There is no significant mediastinal and/or hilar lymphadenopathy. The axillary regions demonstrate to be clear. The bone windows demonstrate no significant skeletal lesions.  Abdomen and pelvis:  The liver demonstrate the presence of subcapsular anterior segment right hepatic lobe cyst measuring 2.8 cm on image 278. Otherwise the liver, lytic the spleen pancreas and adrenal glands demonstrate to be unremarkable.  There is status post cholecystectomy.  The kidneys demonstrate normal uptake of contrast  media. No evidence for nephrolithiasis/or hydronephrosis. The stomach, visualized portions of the small bowel and large bowel demonstrate to unremarkable.  There is no retroperitoneal lymphadenopathy. There is no evidence for significant ascites. Bone windows demonstrate to unremarkable.    IMPRESSION:  Status post endograft aortic stent with a no evidence for significant endoleak. The thoracic aorta demonstrate no evidence for significant aneurysm or dissection.    60-70% stenosis at the origin of the right renal artery.    Minimal trace of pericardial effusion.    2.8 cm right hepatic lobe cyst.    Status post cholecystectomy.    Electronically signed by:  Hermes Garcia MD  6/22/2023 3:52 AM CDT Workstation: [id number]                                     X-Ray Chest AP Portable (In process)                      Medications   ondansetron injection 4 mg (4 mg Intravenous Given 6/22/23 0217)   famotidine (PF) injection 20 mg (20 mg Intravenous Given 6/22/23 0217)   aluminum-magnesium hydroxide-simethicone 200-200-20 mg/5 mL suspension 30 mL (30 mLs Oral Given 6/22/23 0217)   iohexoL (OMNIPAQUE 350) injection 100 mL (100 mLs Intravenous Given 6/22/23 0317)     Medical Decision Making:   Initial Assessment:   72-year-old female with history of abdominal aortic aneurysm status post repair, CAD presents now for left-sided chest pain ran down the left arm and left leg pain vitals notable for hypertension, otherwise within normal limits.  Exam is notable for equal 2+ bilateral upper and lower extremity pulses, normal heart and lung sounds.  Initial EKG without acute ischemic changes, initial troponin within normal limits.  BNP within normal limits, doubt acute heart failure.  CTA chest abdomen pelvis notable for trace pericardial effusion without evidence of thoracic aortic aneurysm min notable for prior endovascular repair of her abdominal aortic aneurysm.  Chest x-ray without acute cardiopulmonary abnormality.  CBC and  CMP within normal limits.  Patient's pain controlled with morphine, Pepcid comes Zofran.  Initial troponin within will get repeat troponin likely admit to hospital medicine for high risk chest pain.  Care assumed by Dr. Ordaz pending repeat tropes.           ED Course as of 06/22/23 0400   Thu Jun 22, 2023   0215 I reviewed the CXR independently of the radiologist.     Chest x-ray was negative for acute cardiopulmonary abnormalities, infiltrates or bony abnormalities.     [BS]   0355 EKG with regular rate, regular rhythm, normal axis, no acute ST elevations or depressions, normal [place], QRS and QT interval. Interpreted by me.   [BS]   0356 CBC auto differential(!) [BS]   0356 Lipase [BS]   0356 Brain natriuretic peptide [BS]   0356 Protime-INR [BS]   0356 Troponin I High Sensitivity #1 [BS]   0356 Comprehensive metabolic panel(!) [BS]   0357 IMPRESSION:  Status post endograft aortic stent with a no evidence for significant endoleak. The thoracic aorta demonstrate no evidence for significant aneurysm or dissection.     60-70% stenosis at the origin of the right renal artery.     Minimal trace of pericardial effusion.     2.8 cm right hepatic lobe cyst.     Status post cholecystectomy. [BS]      ED Course User Index  [BS] Jose Eduardo Zeng MD                 Clinical Impression:   Final diagnoses:  [R07.9] Chest pain               Jose Eduardo Zeng MD  06/22/23 0400

## 2023-06-30 NOTE — PROGRESS NOTES
Called by ER to admit patient. Orders for admit placed prior to seeing patient. Pt lefts AMA prior to my assessment. ER physician Dr Ordaz counseled pt and had pt sign AMA form. Please refer to the scanned AMA form in the computer. No H&P required.

## 2023-07-17 ENCOUNTER — TELEPHONE (OUTPATIENT)
Dept: FAMILY MEDICINE | Facility: CLINIC | Age: 73
End: 2023-07-17

## 2023-07-17 ENCOUNTER — OFFICE VISIT (OUTPATIENT)
Dept: FAMILY MEDICINE | Facility: CLINIC | Age: 73
End: 2023-07-17
Payer: MEDICARE

## 2023-07-17 VITALS
DIASTOLIC BLOOD PRESSURE: 80 MMHG | WEIGHT: 160 LBS | HEART RATE: 81 BPM | HEIGHT: 60 IN | SYSTOLIC BLOOD PRESSURE: 130 MMHG | BODY MASS INDEX: 31.41 KG/M2 | OXYGEN SATURATION: 97 %

## 2023-07-17 DIAGNOSIS — I10 ESSENTIAL HYPERTENSION: Primary | ICD-10-CM

## 2023-07-17 DIAGNOSIS — F17.200 CURRENT SMOKER: ICD-10-CM

## 2023-07-17 DIAGNOSIS — G44.1 OTHER VASCULAR HEADACHE: ICD-10-CM

## 2023-07-17 DIAGNOSIS — R09.89 OTHER SPECIFIED SYMPTOMS AND SIGNS INVOLVING THE CIRCULATORY AND RESPIRATORY SYSTEMS: ICD-10-CM

## 2023-07-17 DIAGNOSIS — Z87.892 HISTORY OF ANAPHYLAXIS: ICD-10-CM

## 2023-07-17 DIAGNOSIS — R42 DIZZINESS: ICD-10-CM

## 2023-07-17 PROCEDURE — 3061F NEG MICROALBUMINURIA REV: CPT | Mod: CPTII,S$GLB,,

## 2023-07-17 PROCEDURE — 1101F PR PT FALLS ASSESS DOC 0-1 FALLS W/OUT INJ PAST YR: ICD-10-PCS | Mod: CPTII,S$GLB,,

## 2023-07-17 PROCEDURE — 1101F PT FALLS ASSESS-DOCD LE1/YR: CPT | Mod: CPTII,S$GLB,,

## 2023-07-17 PROCEDURE — 4010F PR ACE/ARB THEARPY RXD/TAKEN: ICD-10-PCS | Mod: CPTII,S$GLB,,

## 2023-07-17 PROCEDURE — 4010F ACE/ARB THERAPY RXD/TAKEN: CPT | Mod: CPTII,S$GLB,,

## 2023-07-17 PROCEDURE — 3079F DIAST BP 80-89 MM HG: CPT | Mod: CPTII,S$GLB,,

## 2023-07-17 PROCEDURE — 3288F PR FALLS RISK ASSESSMENT DOCUMENTED: ICD-10-PCS | Mod: CPTII,S$GLB,,

## 2023-07-17 PROCEDURE — 1160F PR REVIEW ALL MEDS BY PRESCRIBER/CLIN PHARMACIST DOCUMENTED: ICD-10-PCS | Mod: CPTII,S$GLB,,

## 2023-07-17 PROCEDURE — 3075F PR MOST RECENT SYSTOLIC BLOOD PRESS GE 130-139MM HG: ICD-10-PCS | Mod: CPTII,S$GLB,,

## 2023-07-17 PROCEDURE — 1160F RVW MEDS BY RX/DR IN RCRD: CPT | Mod: CPTII,S$GLB,,

## 2023-07-17 PROCEDURE — 1159F PR MEDICATION LIST DOCUMENTED IN MEDICAL RECORD: ICD-10-PCS | Mod: CPTII,S$GLB,,

## 2023-07-17 PROCEDURE — 1159F MED LIST DOCD IN RCRD: CPT | Mod: CPTII,S$GLB,,

## 2023-07-17 PROCEDURE — 3075F SYST BP GE 130 - 139MM HG: CPT | Mod: CPTII,S$GLB,,

## 2023-07-17 PROCEDURE — 99214 OFFICE O/P EST MOD 30 MIN: CPT | Mod: S$GLB,,,

## 2023-07-17 PROCEDURE — 99214 PR OFFICE/OUTPT VISIT, EST, LEVL IV, 30-39 MIN: ICD-10-PCS | Mod: S$GLB,,,

## 2023-07-17 PROCEDURE — 3061F PR NEG MICROALBUMINURIA RESULT DOCUMENTED/REVIEW: ICD-10-PCS | Mod: CPTII,S$GLB,,

## 2023-07-17 PROCEDURE — 3066F PR DOCUMENTATION OF TREATMENT FOR NEPHROPATHY: ICD-10-PCS | Mod: CPTII,S$GLB,,

## 2023-07-17 PROCEDURE — 3066F NEPHROPATHY DOC TX: CPT | Mod: CPTII,S$GLB,,

## 2023-07-17 PROCEDURE — 3288F FALL RISK ASSESSMENT DOCD: CPT | Mod: CPTII,S$GLB,,

## 2023-07-17 PROCEDURE — 3079F PR MOST RECENT DIASTOLIC BLOOD PRESSURE 80-89 MM HG: ICD-10-PCS | Mod: CPTII,S$GLB,,

## 2023-07-17 RX ORDER — CARBOXYMETHYLCELLULOSE SODIUM AND GLYCERIN 5; 9 MG/ML; MG/ML
1 SOLUTION/ DROPS OPHTHALMIC 4 TIMES DAILY
COMMUNITY
Start: 2023-04-24

## 2023-07-17 NOTE — TELEPHONE ENCOUNTER
----- Message from Laquita Barillas sent at 7/17/2023  9:44 AM CDT -----  Patient called and stated that her blood pressure has been spiking for the last three days and also she has a big bruise  from her left wrist and now it has gone up her arm. She is not sure what happen and she is concern. She would like to see if she can come in. Please give her a call at [phone]

## 2023-07-19 RX ORDER — CLONIDINE HYDROCHLORIDE 0.1 MG/1
TABLET ORAL
Qty: 30 TABLET | Refills: 3 | Status: SHIPPED | OUTPATIENT
Start: 2023-07-19 | End: 2024-02-15

## 2023-07-19 RX ORDER — EPINEPHRINE 0.3 MG/.3ML
1 INJECTION SUBCUTANEOUS
Qty: 1 EACH | Refills: 1 | Status: SHIPPED | OUTPATIENT
Start: 2023-07-19 | End: 2023-10-18 | Stop reason: SDUPTHER

## 2023-07-19 RX ORDER — EPINEPHRINE 0.3 MG/.3ML
1 INJECTION SUBCUTANEOUS
Qty: 1 EACH | Refills: 1 | Status: SHIPPED | OUTPATIENT
Start: 2023-07-19 | End: 2023-07-19

## 2023-07-19 NOTE — ADDENDUM NOTE
Addended by: MCKAYLA BROWN on: 7/19/2023 06:50 PM     Modules accepted: Orders, Level of Service

## 2023-07-19 NOTE — PROGRESS NOTES
SUBJECTIVE:    Patient ID: Joslyn Dubon is a 73 y.o. female.    Chief Complaint: Hypertension (Blood pressure elevated over past 3 days in evening/ rash on left wrist that's radiating upward for 1.5 mos had pain in ring finger and pinky finger not itching/patient states when had CT abd was told there is something in her kidneys/states when blood pressure gets elevated eyes burn and ears feel like they are on fire feels flushed in the face, has felt this after she eats shrimp sometimes//mp)    73-year-old established female patient presents to clinic today with complaint that she feels like her blood pressure has been elevated at home.  She does get elevated readings at home however her blood pressure cuff has not been calibrated with cuff here at the office or evaluated by staff.  Patient also states that she has been having episodes where her face and ears turn red and flushed and her eyes feel like they are hot only after she eats shrimp.  Does not matter how shrimp is prepared can be fried or boiled.  But if she eats shrimp she gets these symptoms and feels like her blood pressure goes up.  Patient denies problems breathing denies swelling in face tongue or throat.  Patient denies having trouble talking.  No wheezing.  Patient also reports that she has not been taking her hydrochlorothiazide because she thought that it was only a fluid pill.  Patient is a current smoker.  Patient does see Dr. Cornelius and Dr. Navarro Banegas.  Patient has a history of a AAA.    Hypertension  Associated symptoms include headaches. Pertinent negatives include no chest pain, palpitations or shortness of breath.     Admission on 06/22/2023, Discharged on 06/22/2023   Component Date Value Ref Range Status    WBC 06/22/2023 7.26  3.90 - 12.70 K/uL Final    RBC 06/22/2023 4.81  4.00 - 5.40 M/uL Final    Hemoglobin 06/22/2023 15.2  12.0 - 16.0 g/dL Final    Hematocrit 06/22/2023 45.8  37.0 - 48.5 % Final    MCV 06/22/2023 95  82 - 98  fL Final    MCH 06/22/2023 31.6 (H)  27.0 - 31.0 pg Final    MCHC 06/22/2023 33.2  32.0 - 36.0 g/dL Final    RDW 06/22/2023 13.3  11.5 - 14.5 % Final    Platelets 06/22/2023 189  150 - 450 K/uL Final    MPV 06/22/2023 9.0 (L)  9.2 - 12.9 fL Final    Immature Granulocytes 06/22/2023 0.3  0.0 - 0.5 % Final    Gran # (ANC) 06/22/2023 4.5  1.8 - 7.7 K/uL Final    Immature Grans (Abs) 06/22/2023 0.02  0.00 - 0.04 K/uL Final    Lymph # 06/22/2023 2.0  1.0 - 4.8 K/uL Final    Mono # 06/22/2023 0.6  0.3 - 1.0 K/uL Final    Eos # 06/22/2023 0.1  0.0 - 0.5 K/uL Final    Baso # 06/22/2023 0.02  0.00 - 0.20 K/uL Final    nRBC 06/22/2023 0  0 /100 WBC Final    Gran % 06/22/2023 61.8  38.0 - 73.0 % Final    Lymph % 06/22/2023 28.1  18.0 - 48.0 % Final    Mono % 06/22/2023 7.7  4.0 - 15.0 % Final    Eosinophil % 06/22/2023 1.8  0.0 - 8.0 % Final    Basophil % 06/22/2023 0.3  0.0 - 1.9 % Final    Differential Method 06/22/2023 Automated   Final    Sodium 06/22/2023 141  136 - 145 mmol/L Final    Potassium 06/22/2023 4.1  3.5 - 5.1 mmol/L Final    Chloride 06/22/2023 108  95 - 110 mmol/L Final    CO2 06/22/2023 26  23 - 29 mmol/L Final    Glucose 06/22/2023 100  70 - 110 mg/dL Final    BUN 06/22/2023 19  8 - 23 mg/dL Final    Creatinine 06/22/2023 0.9  0.5 - 1.4 mg/dL Final    Calcium 06/22/2023 9.5  8.7 - 10.5 mg/dL Final    Total Protein 06/22/2023 7.7  6.0 - 8.4 g/dL Final    Albumin 06/22/2023 3.9  3.5 - 5.2 g/dL Final    Total Bilirubin 06/22/2023 0.5  0.1 - 1.0 mg/dL Final    Alkaline Phosphatase 06/22/2023 81  55 - 135 U/L Final    AST 06/22/2023 23  10 - 40 U/L Final    ALT 06/22/2023 25  10 - 44 U/L Final    eGFR 06/22/2023 >60.0  >60 mL/min/1.73 m^2 Final    Anion Gap 06/22/2023 7 (L)  8 - 16 mmol/L Final    BNP 06/22/2023 23  0 - 99 pg/mL Final    Troponin I High Sensitivity 06/22/2023 9.1  0.0 - 14.9 pg/mL Final    Prothrombin Time 06/22/2023 10.5  9.0 - 12.5 sec Final    INR 06/22/2023 0.9  0.8 - 1.2 Final    Lipase  06/22/2023 48  4 - 60 U/L Final    Troponin I High Sensitivity 06/22/2023 7.8  0.0 - 14.9 pg/mL Final   Admission on 05/02/2023, Discharged on 05/02/2023   Component Date Value Ref Range Status    Specimen UA 05/02/2023 Urine, Clean Catch   Final    Color, UA 05/02/2023 Yellow  Yellow, Straw, Priya Final    Appearance, UA 05/02/2023 Hazy (A)  Clear Final    pH, UA 05/02/2023 8.0  5.0 - 8.0 Final    Specific Gravity, UA 05/02/2023 1.015  1.005 - 1.030 Final    Protein, UA 05/02/2023 Negative  Negative Final    Glucose, UA 05/02/2023 Negative  Negative Final    Ketones, UA 05/02/2023 Negative  Negative Final    Bilirubin (UA) 05/02/2023 Negative  Negative Final    Occult Blood UA 05/02/2023 1+ (A)  Negative Final    Nitrite, UA 05/02/2023 Negative  Negative Final    Urobilinogen, UA 05/02/2023 Negative  Negative EU/dL Final    Leukocytes, UA 05/02/2023 Negative  Negative Final    WBC 05/02/2023 5.44  3.90 - 12.70 K/uL Final    RBC 05/02/2023 4.76  4.00 - 5.40 M/uL Final    Hemoglobin 05/02/2023 15.1  12.0 - 16.0 g/dL Final    Hematocrit 05/02/2023 46.0  37.0 - 48.5 % Final    MCV 05/02/2023 97  82 - 98 fL Final    MCH 05/02/2023 31.7 (H)  27.0 - 31.0 pg Final    MCHC 05/02/2023 32.8  32.0 - 36.0 g/dL Final    RDW 05/02/2023 12.9  11.5 - 14.5 % Final    Platelets 05/02/2023 200  150 - 450 K/uL Final    MPV 05/02/2023 9.2  9.2 - 12.9 fL Final    Immature Granulocytes 05/02/2023 0.4  0.0 - 0.5 % Final    Gran # (ANC) 05/02/2023 2.9  1.8 - 7.7 K/uL Final    Immature Grans (Abs) 05/02/2023 0.02  0.00 - 0.04 K/uL Final    Lymph # 05/02/2023 1.9  1.0 - 4.8 K/uL Final    Mono # 05/02/2023 0.5  0.3 - 1.0 K/uL Final    Eos # 05/02/2023 0.1  0.0 - 0.5 K/uL Final    Baso # 05/02/2023 0.03  0.00 - 0.20 K/uL Final    nRBC 05/02/2023 0  0 /100 WBC Final    Gran % 05/02/2023 53.9  38.0 - 73.0 % Final    Lymph % 05/02/2023 35.1  18.0 - 48.0 % Final    Mono % 05/02/2023 8.5  4.0 - 15.0 % Final    Eosinophil % 05/02/2023 1.5  0.0 - 8.0  % Final    Basophil % 05/02/2023 0.6  0.0 - 1.9 % Final    Differential Method 05/02/2023 Automated   Final    Sodium 05/02/2023 139  136 - 145 mmol/L Final    Potassium 05/02/2023 4.3  3.5 - 5.1 mmol/L Final    Chloride 05/02/2023 106  95 - 110 mmol/L Final    CO2 05/02/2023 27  23 - 29 mmol/L Final    Glucose 05/02/2023 99  70 - 110 mg/dL Final    BUN 05/02/2023 13  8 - 23 mg/dL Final    Creatinine 05/02/2023 0.8  0.5 - 1.4 mg/dL Final    Calcium 05/02/2023 9.7  8.7 - 10.5 mg/dL Final    Total Protein 05/02/2023 8.0  6.0 - 8.4 g/dL Final    Albumin 05/02/2023 3.9  3.5 - 5.2 g/dL Final    Total Bilirubin 05/02/2023 0.8  0.1 - 1.0 mg/dL Final    Alkaline Phosphatase 05/02/2023 79  55 - 135 U/L Final    AST 05/02/2023 26  10 - 40 U/L Final    ALT 05/02/2023 23  10 - 44 U/L Final    Anion Gap 05/02/2023 6 (L)  8 - 16 mmol/L Final    eGFR 05/02/2023 >60.0  >60 mL/min/1.73 m^2 Final    RBC, UA 05/02/2023 12 (H)  0 - 4 /hpf Final    WBC, UA 05/02/2023 2  0 - 5 /hpf Final    Bacteria 05/02/2023 Negative  None-Occ /hpf Final    Squam Epithel, UA 05/02/2023 9  /hpf Final    Hyaline Casts, UA 05/02/2023 8 (A)  0-1/lpf /lpf Final    Microscopic Comment 05/02/2023 SEE COMMENT   Final   Admission on 03/27/2023, Discharged on 03/27/2023   Component Date Value Ref Range Status    WBC 03/27/2023 6.45  3.90 - 12.70 K/uL Final    RBC 03/27/2023 4.76  4.00 - 5.40 M/uL Final    Hemoglobin 03/27/2023 15.1  12.0 - 16.0 g/dL Final    Hematocrit 03/27/2023 45.7  37.0 - 48.5 % Final    MCV 03/27/2023 96  82 - 98 fL Final    MCH 03/27/2023 31.7 (H)  27.0 - 31.0 pg Final    MCHC 03/27/2023 33.0  32.0 - 36.0 g/dL Final    RDW 03/27/2023 13.5  11.5 - 14.5 % Final    Platelets 03/27/2023 192  150 - 450 K/uL Final    MPV 03/27/2023 8.9 (L)  9.2 - 12.9 fL Final    Immature Granulocytes 03/27/2023 0.3  0.0 - 0.5 % Final    Gran # (ANC) 03/27/2023 2.9  1.8 - 7.7 K/uL Final    Immature Grans (Abs) 03/27/2023 0.02  0.00 - 0.04 K/uL Final    Lymph #  03/27/2023 2.8  1.0 - 4.8 K/uL Final    Mono # 03/27/2023 0.6  0.3 - 1.0 K/uL Final    Eos # 03/27/2023 0.1  0.0 - 0.5 K/uL Final    Baso # 03/27/2023 0.03  0.00 - 0.20 K/uL Final    nRBC 03/27/2023 0  0 /100 WBC Final    Gran % 03/27/2023 44.2  38.0 - 73.0 % Final    Lymph % 03/27/2023 43.7  18.0 - 48.0 % Final    Mono % 03/27/2023 9.3  4.0 - 15.0 % Final    Eosinophil % 03/27/2023 2.0  0.0 - 8.0 % Final    Basophil % 03/27/2023 0.5  0.0 - 1.9 % Final    Differential Method 03/27/2023 Automated   Final    Sodium 03/27/2023 139  136 - 145 mmol/L Final    Potassium 03/27/2023 4.2  3.5 - 5.1 mmol/L Final    Chloride 03/27/2023 103  95 - 110 mmol/L Final    CO2 03/27/2023 25  23 - 29 mmol/L Final    Glucose 03/27/2023 96  70 - 110 mg/dL Final    BUN 03/27/2023 15  8 - 23 mg/dL Final    Creatinine 03/27/2023 0.9  0.5 - 1.4 mg/dL Final    Calcium 03/27/2023 9.3  8.7 - 10.5 mg/dL Final    Total Protein 03/27/2023 7.4  6.0 - 8.4 g/dL Final    Albumin 03/27/2023 3.9  3.5 - 5.2 g/dL Final    Total Bilirubin 03/27/2023 0.9  0.1 - 1.0 mg/dL Final    Alkaline Phosphatase 03/27/2023 78  55 - 135 U/L Final    AST 03/27/2023 32  10 - 40 U/L Final    ALT 03/27/2023 22  10 - 44 U/L Final    Anion Gap 03/27/2023 11  8 - 16 mmol/L Final    eGFR 03/27/2023 >60.0  >60 mL/min/1.73 m^2 Final    BNP 03/27/2023 29  0 - 99 pg/mL Final    Troponin I High Sensitivity 03/27/2023 3.9  0.0 - 14.9 pg/mL Final    Prothrombin Time 03/27/2023 10.3  9.0 - 12.5 sec Final    INR 03/27/2023 1.0  0.8 - 1.2 Final   Telephone on 01/18/2023   Component Date Value Ref Range Status    WBC 01/20/2023 5.0  3.8 - 10.8 Thousand/uL Final    RBC 01/20/2023 4.95  3.80 - 5.10 Million/uL Final    Hemoglobin 01/20/2023 15.9 (H)  11.7 - 15.5 g/dL Final    Hematocrit 01/20/2023 46.9 (H)  35.0 - 45.0 % Final    MCV 01/20/2023 94.7  80.0 - 100.0 fL Final    MCH 01/20/2023 32.1  27.0 - 33.0 pg Final    MCHC 01/20/2023 33.9  32.0 - 36.0 g/dL Final    RDW 01/20/2023 13.0  11.0  - 15.0 % Final    Platelets 01/20/2023 196  140 - 400 Thousand/uL Final    MPV 01/20/2023 10.0  7.5 - 12.5 fL Final    Neutrophils, Abs 01/20/2023 2,385  1,500 - 7,800 cells/uL Final    Lymph # 01/20/2023 2,055  850 - 3,900 cells/uL Final    Mono # 01/20/2023 400  200 - 950 cells/uL Final    Eos # 01/20/2023 130  15 - 500 cells/uL Final    Baso # 01/20/2023 30  0 - 200 cells/uL Final    Neutrophils Relative 01/20/2023 47.7  % Final    Lymph % 01/20/2023 41.1  % Final    Mono % 01/20/2023 8.0  % Final    Eosinophil % 01/20/2023 2.6  % Final    Basophil % 01/20/2023 0.6  % Final    Glucose 01/20/2023 85  65 - 99 mg/dL Final    BUN 01/20/2023 15  7 - 25 mg/dL Final    Creatinine 01/20/2023 0.79  0.60 - 1.00 mg/dL Final    eGFR 01/20/2023 78  > OR = 60 mL/min/1.73m2 Final    BUN/Creatinine Ratio 01/20/2023 NOT APPLICABLE  6 - 22 (calc) Final    Sodium 01/20/2023 141  135 - 146 mmol/L Final    Potassium 01/20/2023 4.2  3.5 - 5.3 mmol/L Final    Chloride 01/20/2023 105  98 - 110 mmol/L Final    CO2 01/20/2023 29  20 - 32 mmol/L Final    Calcium 01/20/2023 9.5  8.6 - 10.4 mg/dL Final    Total Protein 01/20/2023 7.3  6.1 - 8.1 g/dL Final    Albumin 01/20/2023 3.9  3.6 - 5.1 g/dL Final    Globulin, Total 01/20/2023 3.4  1.9 - 3.7 g/dL (calc) Final    Albumin/Globulin Ratio 01/20/2023 1.1  1.0 - 2.5 (calc) Final    Total Bilirubin 01/20/2023 0.4  0.2 - 1.2 mg/dL Final    Alkaline Phosphatase 01/20/2023 89  37 - 153 U/L Final    AST 01/20/2023 17  10 - 35 U/L Final    ALT 01/20/2023 16  6 - 29 U/L Final    Cholesterol 01/20/2023 199  <200 mg/dL Final    HDL 01/20/2023 57  > OR = 50 mg/dL Final    Triglycerides 01/20/2023 138  <150 mg/dL Final    LDL Cholesterol 01/20/2023 116 (H)  mg/dL (calc) Final    HDL/Cholesterol Ratio 01/20/2023 3.5  <5.0 (calc) Final    Non HDL Chol. (LDL+VLDL) 01/20/2023 142 (H)  <130 mg/dL (calc) Final    Creatinine, Urine 01/20/2023 107  20 - 275 mg/dL Final    Microalb, Ur 01/20/2023 0.4  See Note:  mg/dL Final    Microalb/Creat Ratio 01/20/2023 4  <30 mcg/mg creat Final    TSH w/reflex to FT4 01/20/2023 0.25 (L)  0.40 - 4.50 mIU/L Final    T4, Free 01/20/2023 1.5  0.8 - 1.8 ng/dL Final    Color, UA 01/20/2023 YELLOW  YELLOW Final    Appearance, UA 01/20/2023 CLEAR  CLEAR Final    Specific Gravity, UA 01/20/2023 1.021  1.001 - 1.035 Final    pH, UA 01/20/2023 5.5  5.0 - 8.0 Final    Glucose, UA 01/20/2023 NEGATIVE  NEGATIVE Final    Bilirubin, UA 01/20/2023 NEGATIVE  NEGATIVE Final    Ketones, UA 01/20/2023 NEGATIVE  NEGATIVE Final    Occult Blood UA 01/20/2023 TRACE (A)  NEGATIVE Final    Protein, UA 01/20/2023 NEGATIVE  NEGATIVE Final    Nitrite, UA 01/20/2023 NEGATIVE  NEGATIVE Final    Leukocytes, UA 01/20/2023 NEGATIVE  NEGATIVE Final    WBC Casts, UA 01/20/2023 NONE SEEN  < OR = 5 /HPF Final    RBC Casts, UA 01/20/2023 3-10 (A)  < OR = 2 /HPF Final    Squam Epithel, UA 01/20/2023 6-10 (A)  < OR = 5 /HPF Final    Bacteria, UA 01/20/2023 MANY (A)  NONE SEEN /HPF Final    Hyaline Casts, UA 01/20/2023 NONE SEEN  NONE SEEN /LPF Final    Service Cmt: 01/20/2023    Final    Reflexive Urine Culture 01/20/2023    Final       Past Medical History:   Diagnosis Date    Anxiety     Martin esophagus     Colitis     COPD (chronic obstructive pulmonary disease)     GERD (gastroesophageal reflux disease)     Hypertension     Thyroid disease     Vocal cord polyps      Social History     Socioeconomic History    Marital status:    Tobacco Use    Smoking status: Every Day     Packs/day: 0.50     Types: Cigarettes    Smokeless tobacco: Former     Quit date: 5/1/2016   Substance and Sexual Activity    Alcohol use: Never    Drug use: Never    Sexual activity: Yes     Partners: Male     Past Surgical History:   Procedure Laterality Date    ABDOMINAL AORTIC ANEURYSM REPAIR      BACK SURGERY      cervical    CATARACT EXTRACTION Right 02/2021    CHOLECYSTECTOMY  12-    Dr Price  [facility]    FOOT SURGERY      HYSTERECTOMY       SALIVARY GLAND SURGERY       Family History   Problem Relation Age of Onset    Cancer Mother     Heart failure Father     Emphysema Sister     No Known Problems Brother     Cancer Sister        Review of patient's allergies indicates:   Allergen Reactions    Bisacodyl Nausea And Vomiting     Other reaction(s): Vomiting    Cipro [ciprofloxacin hcl]     Demerol [meperidine]      Nausea vomiting, visual problem    Doxycycline Hives    Flonase [fluticasone propionate] Hives    Morphine     Morpholine analogues Other (See Comments)     hypotension       Current Outpatient Medications:     HYDROcodone-acetaminophen (NORCO)  mg per tablet, Take 1 tablet by mouth every 12 (twelve) hours as needed., Disp: 50 tablet, Rfl: 0    levothyroxine (SYNTHROID) 100 MCG tablet, Take 1 tablet (100 mcg total) by mouth before breakfast., Disp: 90 tablet, Rfl: 3    lisinopriL (PRINIVIL,ZESTRIL) 20 MG tablet, Take 1 tablet (20 mg total) by mouth 2 (two) times daily., Disp: 180 tablet, Rfl: 3    metoprolol tartrate (LOPRESSOR) 25 MG tablet, Take 1 tablet (25 mg total) by mouth 2 (two) times daily., Disp: 180 tablet, Rfl: 3    omeprazole (PRILOSEC) 40 MG capsule, Take 1 capsule (40 mg total) by mouth every morning., Disp: 90 capsule, Rfl: 3    ondansetron (ZOFRAN) 4 MG tablet, Take 1 tablet (4 mg total) by mouth every 8 (eight) hours as needed for Nausea., Disp: 30 tablet, Rfl: 3    ondansetron (ZOFRAN-ODT) 4 MG TbDL, Take 1 tablet (4 mg total) by mouth every 8 (eight) hours as needed., Disp: 21 tablet, Rfl: 0    REFRESH OPTIVE 0.5-0.9 % Drop, Place 1 drop into both eyes 4 (four) times daily., Disp: , Rfl:     simethicone (GAS-X ORAL), Take 1 tablet by mouth as needed., Disp: , Rfl:     ALPRAZolam (XANAX) 0.25 MG tablet, Take 1 tablet (0.25 mg total) by mouth 2 (two) times daily as needed for Anxiety., Disp: 30 tablet, Rfl: 1    aluminum-magnesium hydroxide-simethicone (MAALOX) 200-200-20 mg/5 mL Susp, Take 30 mLs by mouth once as  needed., Disp: , Rfl:     cloNIDine (CATAPRES) 0.1 MG tablet, Take one tablet as needed up to 3 times a day if blood pressure is greater than 170/110, Disp: 30 tablet, Rfl: 3    EPINEPHrine (EPIPEN) 0.3 mg/0.3 mL AtIn, Inject 0.3 mLs (0.3 mg total) into the muscle as needed (anaphylaxis)., Disp: 1 each, Rfl: 1    hydroCHLOROthiazide (HYDRODIURIL) 25 MG tablet, Take 1 tablet (25 mg total) by mouth daily as needed (fluid). (Patient not taking: Reported on 7/17/2023), Disp: 30 tablet, Rfl: 3    Review of Systems   Constitutional:  Negative for activity change, appetite change, chills, fatigue and fever.   HENT:  Negative for facial swelling, sore throat, trouble swallowing and voice change.    Eyes:  Negative for pain and visual disturbance.   Respiratory:  Negative for cough, shortness of breath and wheezing.    Cardiovascular:  Negative for chest pain, palpitations and leg swelling.   Gastrointestinal:  Negative for abdominal pain, constipation, diarrhea, nausea and vomiting.   Integumentary:  Positive for color change. Negative for rash.   Allergic/Immunologic: Negative for food allergies.   Neurological:  Positive for dizziness, light-headedness and headaches. Negative for syncope, speech difficulty, weakness and numbness.         Objective:      Vitals:    07/17/23 1433   BP: 130/80   Pulse: 81   SpO2: 97%   Weight: 72.6 kg (160 lb)   Height: 5' (1.524 m)     Physical Exam  Vitals and nursing note reviewed.   Constitutional:       General: She is not in acute distress.     Appearance: Normal appearance. She is well-developed.   HENT:      Head: Normocephalic and atraumatic.      Nose: Nose normal.      Mouth/Throat:      Mouth: Mucous membranes are moist.      Pharynx: Oropharynx is clear.   Eyes:      General:         Right eye: No discharge.         Left eye: No discharge.      Conjunctiva/sclera: Conjunctivae normal.      Pupils: Pupils are equal, round, and reactive to light.   Neck:      Thyroid: No  thyromegaly.      Vascular: No carotid bruit.   Cardiovascular:      Rate and Rhythm: Normal rate and regular rhythm.      Pulses: Normal pulses.      Heart sounds: Normal heart sounds. No murmur heard.  Pulmonary:      Effort: Pulmonary effort is normal.      Breath sounds: Normal breath sounds. No wheezing or rales.   Abdominal:      General: Bowel sounds are normal. There is no distension.      Palpations: Abdomen is soft.      Tenderness: There is no abdominal tenderness.   Musculoskeletal:      Cervical back: Neck supple.      Lumbar back: Normal. No spasms.      Right lower leg: No edema.      Left lower leg: No edema.      Comments: Bends 90 degrees at  waist   Skin:     General: Skin is warm and dry.      Capillary Refill: Capillary refill takes less than 2 seconds.      Coloration: Skin is not jaundiced or pale.      Findings: No erythema or rash.   Neurological:      General: No focal deficit present.      Mental Status: She is alert and oriented to person, place, and time.      Cranial Nerves: No cranial nerve deficit.      Coordination: Coordination normal.   Psychiatric:         Mood and Affect: Mood normal.         Behavior: Behavior normal.         Assessment:       1. Anxiety    2. Essential hypertension    3. Current smoker    4. Other vascular headache    5. Dizziness    6. History of anaphylaxis    7. Other specified symptoms and signs involving the circulatory and respiratory systems         Plan:          Essential hypertension  Well-controlled in office 130/80 today.  Patient states that she does not feel like it is well controlled at home.  She is very concerned and afraid that she might have a stroke.  Patient does see a cardiologist and a vascular specialist.  Patient does have significant cardiac history.  Discussed warning signs of stroke.  Discussed appropriate ranges for blood pressure.  Discussed plan of care.  Patient verbalizes understanding and agrees with plan.  -     US Carotid  Bilateral; Future; Expected date: 07/26/2023  -     cloNIDine (CATAPRES) 0.1 MG tablet; Take one tablet as needed up to 3 times a day if blood pressure is greater than 170/110  Dispense: 30 tablet; Refill: 3    Current smoker  Patient states that she does not think she will ever be ready to quit.  -     US Carotid Bilateral; Future; Expected date: 07/26/2023    Other vascular headache  -     US Carotid Bilateral; Future; Expected date: 07/26/2023    Dizziness  -     US Carotid Bilateral; Future; Expected date: 07/26/2023    History of anaphylaxis  -     EPINEPHrine (EPIPEN) 0.3 mg/0.3 mL AtIn; Inject 0.3 mLs (0.3 mg total) into the muscle as needed (anaphylaxis).  Dispense: 1 each; Refill: 1    Other specified symptoms and signs involving the circulatory and respiratory systems  Patient becomes very flush and hot, states her skin turns red when her blood pressure is elevated  -     US Carotid Bilateral; Future; Expected date: 07/26/2023      Follow up if symptoms worsen or fail to improve.        7/19/2023 Alyx Charles

## 2023-07-20 ENCOUNTER — TELEPHONE (OUTPATIENT)
Dept: FAMILY MEDICINE | Facility: CLINIC | Age: 73
End: 2023-07-20

## 2023-07-26 ENCOUNTER — TELEPHONE (OUTPATIENT)
Dept: FAMILY MEDICINE | Facility: CLINIC | Age: 73
End: 2023-07-26

## 2023-07-26 ENCOUNTER — HOSPITAL ENCOUNTER (OUTPATIENT)
Dept: RADIOLOGY | Facility: HOSPITAL | Age: 73
Discharge: HOME | End: 2023-07-26
Payer: MEDICARE

## 2023-07-26 DIAGNOSIS — F17.200 CURRENT SMOKER: ICD-10-CM

## 2023-07-26 DIAGNOSIS — R09.89 OTHER SPECIFIED SYMPTOMS AND SIGNS INVOLVING THE CIRCULATORY AND RESPIRATORY SYSTEMS: ICD-10-CM

## 2023-07-26 DIAGNOSIS — I10 ESSENTIAL HYPERTENSION: ICD-10-CM

## 2023-07-26 DIAGNOSIS — G44.1 OTHER VASCULAR HEADACHE: ICD-10-CM

## 2023-07-26 DIAGNOSIS — R42 DIZZINESS: ICD-10-CM

## 2023-07-26 PROCEDURE — 93880 EXTRACRANIAL BILAT STUDY: CPT | Mod: TC

## 2023-07-26 NOTE — TELEPHONE ENCOUNTER
----- Message from WOLFGANG Larkin sent at 7/26/2023  4:41 PM CDT -----  Please let Mrs. Dubon know that her carotid ultrasound was negative for blockages and it appears she is getting good blood flow to her brain. Check on how her blood pressure is doing. If she is still having symptoms or has concerns, I want her to follow up with Dr. Cornelius or with her cardiologist. Thanks.

## 2023-07-26 NOTE — PROGRESS NOTES
Please let Mrs. Dubon know that her carotid ultrasound was negative for blockages and it appears she is getting good blood flow to her brain. Check on how her blood pressure is doing. If she is still having symptoms or has concerns, I want her to follow up with Dr. Cornelius or with her cardiologist. Thanks.

## 2023-07-26 NOTE — TELEPHONE ENCOUNTER
Spoke to pt verbatim per Alyx. Pt voiced  understanding. Pt states BP today has been great 125/84, 113/82. Pt states she has an upcoming appt on Tuesday with Dr. Graham. Pt states fluid pill was making her dizzy. States she thinks it was causing her BP to bottom out. Pt states since stopping the HCTZ she has felt much better.

## 2023-07-27 LAB
CHOLEST SERPL-MCNC: 193 MG/DL
CHOLEST/HDLC SERPL: 3.9 (CALC)
HDLC SERPL-MCNC: 49 MG/DL
LDLC SERPL CALC-MCNC: 119 MG/DL (CALC)
NONHDLC SERPL-MCNC: 144 MG/DL (CALC)
T4 FREE SERPL-MCNC: 1.5 NG/DL (ref 0.8–1.8)
TRIGL SERPL-MCNC: 140 MG/DL
TSH SERPL-ACNC: 0.2 MIU/L (ref 0.4–4.5)

## 2023-08-02 ENCOUNTER — OFFICE VISIT (OUTPATIENT)
Dept: FAMILY MEDICINE | Facility: CLINIC | Age: 73
End: 2023-08-02
Payer: MEDICARE

## 2023-08-02 VITALS
HEART RATE: 60 BPM | HEIGHT: 60 IN | WEIGHT: 160 LBS | DIASTOLIC BLOOD PRESSURE: 86 MMHG | SYSTOLIC BLOOD PRESSURE: 120 MMHG | BODY MASS INDEX: 31.41 KG/M2

## 2023-08-02 DIAGNOSIS — E07.9 THYROID DISEASE: Chronic | ICD-10-CM

## 2023-08-02 DIAGNOSIS — M51.36 DDD (DEGENERATIVE DISC DISEASE), LUMBAR: ICD-10-CM

## 2023-08-02 DIAGNOSIS — Z12.31 OTHER SCREENING MAMMOGRAM: ICD-10-CM

## 2023-08-02 DIAGNOSIS — F17.200 CURRENT SMOKER: ICD-10-CM

## 2023-08-02 DIAGNOSIS — Z72.0 TOBACCO ABUSE: Chronic | ICD-10-CM

## 2023-08-02 DIAGNOSIS — K21.9 GASTROESOPHAGEAL REFLUX DISEASE WITHOUT ESOPHAGITIS: Primary | ICD-10-CM

## 2023-08-02 DIAGNOSIS — M51.16 LUMBAR DISC DISEASE WITH RADICULOPATHY: ICD-10-CM

## 2023-08-02 DIAGNOSIS — I71.40 ABDOMINAL AORTIC ANEURYSM (AAA) WITHOUT RUPTURE, UNSPECIFIED PART: Chronic | ICD-10-CM

## 2023-08-02 DIAGNOSIS — G62.9 NEUROPATHY: ICD-10-CM

## 2023-08-02 DIAGNOSIS — J44.1 CHRONIC OBSTRUCTIVE PULMONARY DISEASE WITH ACUTE EXACERBATION: ICD-10-CM

## 2023-08-02 DIAGNOSIS — F41.9 ANXIETY: Chronic | ICD-10-CM

## 2023-08-02 DIAGNOSIS — I10 PRIMARY HYPERTENSION: ICD-10-CM

## 2023-08-02 PROCEDURE — 99214 PR OFFICE/OUTPT VISIT, EST, LEVL IV, 30-39 MIN: ICD-10-PCS | Mod: S$GLB,,, | Performed by: FAMILY MEDICINE

## 2023-08-02 PROCEDURE — 1159F MED LIST DOCD IN RCRD: CPT | Mod: CPTII,S$GLB,, | Performed by: FAMILY MEDICINE

## 2023-08-02 PROCEDURE — 1159F PR MEDICATION LIST DOCUMENTED IN MEDICAL RECORD: ICD-10-PCS | Mod: CPTII,S$GLB,, | Performed by: FAMILY MEDICINE

## 2023-08-02 PROCEDURE — 1101F PT FALLS ASSESS-DOCD LE1/YR: CPT | Mod: CPTII,S$GLB,, | Performed by: FAMILY MEDICINE

## 2023-08-02 PROCEDURE — 4010F ACE/ARB THERAPY RXD/TAKEN: CPT | Mod: CPTII,S$GLB,, | Performed by: FAMILY MEDICINE

## 2023-08-02 PROCEDURE — 3288F FALL RISK ASSESSMENT DOCD: CPT | Mod: CPTII,S$GLB,, | Performed by: FAMILY MEDICINE

## 2023-08-02 PROCEDURE — 3074F PR MOST RECENT SYSTOLIC BLOOD PRESSURE < 130 MM HG: ICD-10-PCS | Mod: CPTII,S$GLB,, | Performed by: FAMILY MEDICINE

## 2023-08-02 PROCEDURE — 99214 OFFICE O/P EST MOD 30 MIN: CPT | Mod: S$GLB,,, | Performed by: FAMILY MEDICINE

## 2023-08-02 PROCEDURE — 4010F PR ACE/ARB THEARPY RXD/TAKEN: ICD-10-PCS | Mod: CPTII,S$GLB,, | Performed by: FAMILY MEDICINE

## 2023-08-02 PROCEDURE — 3066F PR DOCUMENTATION OF TREATMENT FOR NEPHROPATHY: ICD-10-PCS | Mod: CPTII,S$GLB,, | Performed by: FAMILY MEDICINE

## 2023-08-02 PROCEDURE — 3074F SYST BP LT 130 MM HG: CPT | Mod: CPTII,S$GLB,, | Performed by: FAMILY MEDICINE

## 2023-08-02 PROCEDURE — 3079F PR MOST RECENT DIASTOLIC BLOOD PRESSURE 80-89 MM HG: ICD-10-PCS | Mod: CPTII,S$GLB,, | Performed by: FAMILY MEDICINE

## 2023-08-02 PROCEDURE — 1101F PR PT FALLS ASSESS DOC 0-1 FALLS W/OUT INJ PAST YR: ICD-10-PCS | Mod: CPTII,S$GLB,, | Performed by: FAMILY MEDICINE

## 2023-08-02 PROCEDURE — 3079F DIAST BP 80-89 MM HG: CPT | Mod: CPTII,S$GLB,, | Performed by: FAMILY MEDICINE

## 2023-08-02 PROCEDURE — 3061F PR NEG MICROALBUMINURIA RESULT DOCUMENTED/REVIEW: ICD-10-PCS | Mod: CPTII,S$GLB,, | Performed by: FAMILY MEDICINE

## 2023-08-02 PROCEDURE — 3061F NEG MICROALBUMINURIA REV: CPT | Mod: CPTII,S$GLB,, | Performed by: FAMILY MEDICINE

## 2023-08-02 PROCEDURE — 3288F PR FALLS RISK ASSESSMENT DOCUMENTED: ICD-10-PCS | Mod: CPTII,S$GLB,, | Performed by: FAMILY MEDICINE

## 2023-08-02 PROCEDURE — 3066F NEPHROPATHY DOC TX: CPT | Mod: CPTII,S$GLB,, | Performed by: FAMILY MEDICINE

## 2023-08-02 RX ORDER — HYDROCODONE BITARTRATE AND ACETAMINOPHEN 10; 325 MG/1; MG/1
1 TABLET ORAL EVERY 12 HOURS PRN
Qty: 50 TABLET | Refills: 0 | Status: SHIPPED | OUTPATIENT
Start: 2023-08-02 | End: 2023-11-27 | Stop reason: SDUPTHER

## 2023-08-02 RX ORDER — FAMOTIDINE 20 MG/1
20 TABLET, FILM COATED ORAL NIGHTLY
Qty: 90 TABLET | Refills: 3 | Status: SHIPPED | OUTPATIENT
Start: 2023-08-02 | End: 2023-11-27 | Stop reason: SDUPTHER

## 2023-08-03 ENCOUNTER — TELEPHONE (OUTPATIENT)
Dept: FAMILY MEDICINE | Facility: CLINIC | Age: 73
End: 2023-08-03

## 2023-08-03 NOTE — PROGRESS NOTES
SUBJECTIVE:    Patient ID: Joslyn Dubon is a 73 y.o. female.    Chief Complaint: Hypothyroidism (No bottles, declined colonoscopy, abc )    73-year-old female with severe GERD and has left chest pain intermittently.  She has pantoprazole 40 mg daily but she finds omeprazole 40 mg daily plus Mylanta works just as well.  EGD showed small hiatal hernia in the past.  Schatzki's ring was present also.    She has some left knee trauma when she fell 2 months ago on some cement.    Smokes half a pack of cigarettes daily.  No significant alcohol.  Frustrated about inability to lose weight.  She is gained about 30 lb in last 3 years.  She states she eats 2 meals per day.  No exercise due to arthritis.    Carotid ultrasound shows no blockages in the carotids    2017-colonoscopy with Dr. [facility] 3 years, she declines any further colonoscopies.    August 2022-mammogram was normal    Her bladder has incomplete emptying and she has nocturia 2-3 times per night    Her back hurts daily, takes half tablet Norco p.r.n..  History of lumbar disc disease and arthritis        Admission on 06/22/2023, Discharged on 06/22/2023   Component Date Value Ref Range Status    WBC 06/22/2023 7.26  3.90 - 12.70 K/uL Final    RBC 06/22/2023 4.81  4.00 - 5.40 M/uL Final    Hemoglobin 06/22/2023 15.2  12.0 - 16.0 g/dL Final    Hematocrit 06/22/2023 45.8  37.0 - 48.5 % Final    MCV 06/22/2023 95  82 - 98 fL Final    MCH 06/22/2023 31.6 (H)  27.0 - 31.0 pg Final    MCHC 06/22/2023 33.2  32.0 - 36.0 g/dL Final    RDW 06/22/2023 13.3  11.5 - 14.5 % Final    Platelets 06/22/2023 189  150 - 450 K/uL Final    MPV 06/22/2023 9.0 (L)  9.2 - 12.9 fL Final    Immature Granulocytes 06/22/2023 0.3  0.0 - 0.5 % Final    Gran # (ANC) 06/22/2023 4.5  1.8 - 7.7 K/uL Final    Immature Grans (Abs) 06/22/2023 0.02  0.00 - 0.04 K/uL Final    Lymph # 06/22/2023 2.0  1.0 - 4.8 K/uL Final    Mono # 06/22/2023 0.6  0.3 - 1.0 K/uL Final    Eos # 06/22/2023 0.1  0.0 -  0.5 K/uL Final    Baso # 06/22/2023 0.02  0.00 - 0.20 K/uL Final    nRBC 06/22/2023 0  0 /100 WBC Final    Gran % 06/22/2023 61.8  38.0 - 73.0 % Final    Lymph % 06/22/2023 28.1  18.0 - 48.0 % Final    Mono % 06/22/2023 7.7  4.0 - 15.0 % Final    Eosinophil % 06/22/2023 1.8  0.0 - 8.0 % Final    Basophil % 06/22/2023 0.3  0.0 - 1.9 % Final    Differential Method 06/22/2023 Automated   Final    Sodium 06/22/2023 141  136 - 145 mmol/L Final    Potassium 06/22/2023 4.1  3.5 - 5.1 mmol/L Final    Chloride 06/22/2023 108  95 - 110 mmol/L Final    CO2 06/22/2023 26  23 - 29 mmol/L Final    Glucose 06/22/2023 100  70 - 110 mg/dL Final    BUN 06/22/2023 19  8 - 23 mg/dL Final    Creatinine 06/22/2023 0.9  0.5 - 1.4 mg/dL Final    Calcium 06/22/2023 9.5  8.7 - 10.5 mg/dL Final    Total Protein 06/22/2023 7.7  6.0 - 8.4 g/dL Final    Albumin 06/22/2023 3.9  3.5 - 5.2 g/dL Final    Total Bilirubin 06/22/2023 0.5  0.1 - 1.0 mg/dL Final    Alkaline Phosphatase 06/22/2023 81  55 - 135 U/L Final    AST 06/22/2023 23  10 - 40 U/L Final    ALT 06/22/2023 25  10 - 44 U/L Final    eGFR 06/22/2023 >60.0  >60 mL/min/1.73 m^2 Final    Anion Gap 06/22/2023 7 (L)  8 - 16 mmol/L Final    BNP 06/22/2023 23  0 - 99 pg/mL Final    Troponin I High Sensitivity 06/22/2023 9.1  0.0 - 14.9 pg/mL Final    Prothrombin Time 06/22/2023 10.5  9.0 - 12.5 sec Final    INR 06/22/2023 0.9  0.8 - 1.2 Final    Lipase 06/22/2023 48  4 - 60 U/L Final    Troponin I High Sensitivity 06/22/2023 7.8  0.0 - 14.9 pg/mL Final   Admission on 05/02/2023, Discharged on 05/02/2023   Component Date Value Ref Range Status    Specimen UA 05/02/2023 Urine, Clean Catch   Final    Color, UA 05/02/2023 Yellow  Yellow, Straw, Priya Final    Appearance, UA 05/02/2023 Hazy (A)  Clear Final    pH, UA 05/02/2023 8.0  5.0 - 8.0 Final    Specific Gravity, UA 05/02/2023 1.015  1.005 - 1.030 Final    Protein, UA 05/02/2023 Negative  Negative Final    Glucose, UA 05/02/2023 Negative   Negative Final    Ketones, UA 05/02/2023 Negative  Negative Final    Bilirubin (UA) 05/02/2023 Negative  Negative Final    Occult Blood UA 05/02/2023 1+ (A)  Negative Final    Nitrite, UA 05/02/2023 Negative  Negative Final    Urobilinogen, UA 05/02/2023 Negative  Negative EU/dL Final    Leukocytes, UA 05/02/2023 Negative  Negative Final    WBC 05/02/2023 5.44  3.90 - 12.70 K/uL Final    RBC 05/02/2023 4.76  4.00 - 5.40 M/uL Final    Hemoglobin 05/02/2023 15.1  12.0 - 16.0 g/dL Final    Hematocrit 05/02/2023 46.0  37.0 - 48.5 % Final    MCV 05/02/2023 97  82 - 98 fL Final    MCH 05/02/2023 31.7 (H)  27.0 - 31.0 pg Final    MCHC 05/02/2023 32.8  32.0 - 36.0 g/dL Final    RDW 05/02/2023 12.9  11.5 - 14.5 % Final    Platelets 05/02/2023 200  150 - 450 K/uL Final    MPV 05/02/2023 9.2  9.2 - 12.9 fL Final    Immature Granulocytes 05/02/2023 0.4  0.0 - 0.5 % Final    Gran # (ANC) 05/02/2023 2.9  1.8 - 7.7 K/uL Final    Immature Grans (Abs) 05/02/2023 0.02  0.00 - 0.04 K/uL Final    Lymph # 05/02/2023 1.9  1.0 - 4.8 K/uL Final    Mono # 05/02/2023 0.5  0.3 - 1.0 K/uL Final    Eos # 05/02/2023 0.1  0.0 - 0.5 K/uL Final    Baso # 05/02/2023 0.03  0.00 - 0.20 K/uL Final    nRBC 05/02/2023 0  0 /100 WBC Final    Gran % 05/02/2023 53.9  38.0 - 73.0 % Final    Lymph % 05/02/2023 35.1  18.0 - 48.0 % Final    Mono % 05/02/2023 8.5  4.0 - 15.0 % Final    Eosinophil % 05/02/2023 1.5  0.0 - 8.0 % Final    Basophil % 05/02/2023 0.6  0.0 - 1.9 % Final    Differential Method 05/02/2023 Automated   Final    Sodium 05/02/2023 139  136 - 145 mmol/L Final    Potassium 05/02/2023 4.3  3.5 - 5.1 mmol/L Final    Chloride 05/02/2023 106  95 - 110 mmol/L Final    CO2 05/02/2023 27  23 - 29 mmol/L Final    Glucose 05/02/2023 99  70 - 110 mg/dL Final    BUN 05/02/2023 13  8 - 23 mg/dL Final    Creatinine 05/02/2023 0.8  0.5 - 1.4 mg/dL Final    Calcium 05/02/2023 9.7  8.7 - 10.5 mg/dL Final    Total Protein 05/02/2023 8.0  6.0 - 8.4 g/dL Final     Albumin 05/02/2023 3.9  3.5 - 5.2 g/dL Final    Total Bilirubin 05/02/2023 0.8  0.1 - 1.0 mg/dL Final    Alkaline Phosphatase 05/02/2023 79  55 - 135 U/L Final    AST 05/02/2023 26  10 - 40 U/L Final    ALT 05/02/2023 23  10 - 44 U/L Final    Anion Gap 05/02/2023 6 (L)  8 - 16 mmol/L Final    eGFR 05/02/2023 >60.0  >60 mL/min/1.73 m^2 Final    RBC, UA 05/02/2023 12 (H)  0 - 4 /hpf Final    WBC, UA 05/02/2023 2  0 - 5 /hpf Final    Bacteria 05/02/2023 Negative  None-Occ /hpf Final    Squam Epithel, UA 05/02/2023 9  /hpf Final    Hyaline Casts, UA 05/02/2023 8 (A)  0-1/lpf /lpf Final    Microscopic Comment 05/02/2023 SEE COMMENT   Final   Admission on 03/27/2023, Discharged on 03/27/2023   Component Date Value Ref Range Status    WBC 03/27/2023 6.45  3.90 - 12.70 K/uL Final    RBC 03/27/2023 4.76  4.00 - 5.40 M/uL Final    Hemoglobin 03/27/2023 15.1  12.0 - 16.0 g/dL Final    Hematocrit 03/27/2023 45.7  37.0 - 48.5 % Final    MCV 03/27/2023 96  82 - 98 fL Final    MCH 03/27/2023 31.7 (H)  27.0 - 31.0 pg Final    MCHC 03/27/2023 33.0  32.0 - 36.0 g/dL Final    RDW 03/27/2023 13.5  11.5 - 14.5 % Final    Platelets 03/27/2023 192  150 - 450 K/uL Final    MPV 03/27/2023 8.9 (L)  9.2 - 12.9 fL Final    Immature Granulocytes 03/27/2023 0.3  0.0 - 0.5 % Final    Gran # (ANC) 03/27/2023 2.9  1.8 - 7.7 K/uL Final    Immature Grans (Abs) 03/27/2023 0.02  0.00 - 0.04 K/uL Final    Lymph # 03/27/2023 2.8  1.0 - 4.8 K/uL Final    Mono # 03/27/2023 0.6  0.3 - 1.0 K/uL Final    Eos # 03/27/2023 0.1  0.0 - 0.5 K/uL Final    Baso # 03/27/2023 0.03  0.00 - 0.20 K/uL Final    nRBC 03/27/2023 0  0 /100 WBC Final    Gran % 03/27/2023 44.2  38.0 - 73.0 % Final    Lymph % 03/27/2023 43.7  18.0 - 48.0 % Final    Mono % 03/27/2023 9.3  4.0 - 15.0 % Final    Eosinophil % 03/27/2023 2.0  0.0 - 8.0 % Final    Basophil % 03/27/2023 0.5  0.0 - 1.9 % Final    Differential Method 03/27/2023 Automated   Final    Sodium 03/27/2023 139  136 - 145  mmol/L Final    Potassium 03/27/2023 4.2  3.5 - 5.1 mmol/L Final    Chloride 03/27/2023 103  95 - 110 mmol/L Final    CO2 03/27/2023 25  23 - 29 mmol/L Final    Glucose 03/27/2023 96  70 - 110 mg/dL Final    BUN 03/27/2023 15  8 - 23 mg/dL Final    Creatinine 03/27/2023 0.9  0.5 - 1.4 mg/dL Final    Calcium 03/27/2023 9.3  8.7 - 10.5 mg/dL Final    Total Protein 03/27/2023 7.4  6.0 - 8.4 g/dL Final    Albumin 03/27/2023 3.9  3.5 - 5.2 g/dL Final    Total Bilirubin 03/27/2023 0.9  0.1 - 1.0 mg/dL Final    Alkaline Phosphatase 03/27/2023 78  55 - 135 U/L Final    AST 03/27/2023 32  10 - 40 U/L Final    ALT 03/27/2023 22  10 - 44 U/L Final    Anion Gap 03/27/2023 11  8 - 16 mmol/L Final    eGFR 03/27/2023 >60.0  >60 mL/min/1.73 m^2 Final    BNP 03/27/2023 29  0 - 99 pg/mL Final    Troponin I High Sensitivity 03/27/2023 3.9  0.0 - 14.9 pg/mL Final    Prothrombin Time 03/27/2023 10.3  9.0 - 12.5 sec Final    INR 03/27/2023 1.0  0.8 - 1.2 Final       Past Medical History:   Diagnosis Date    Anxiety     Martin esophagus     Colitis     COPD (chronic obstructive pulmonary disease)     GERD (gastroesophageal reflux disease)     Hypertension     Thyroid disease     Vocal cord polyps      Social History     Socioeconomic History    Marital status:    Tobacco Use    Smoking status: Every Day     Current packs/day: 0.50     Types: Cigarettes    Smokeless tobacco: Former     Quit date: 5/1/2016   Substance and Sexual Activity    Alcohol use: Never    Drug use: Never    Sexual activity: Yes     Partners: Male     Social Determinants of Health     Stress: No Stress Concern Present (9/19/2019)    [employer] - Occupational Stress Questionnaire     Feeling of Stress : Not at all     Past Surgical History:   Procedure Laterality Date    ABDOMINAL AORTIC ANEURYSM REPAIR      BACK SURGERY      cervical    CATARACT EXTRACTION Right 02/2021    CHOLECYSTECTOMY  12-    Dr Price  [facility]    FOOT  SURGERY      HYSTERECTOMY      SALIVARY GLAND SURGERY       Family History   Problem Relation Age of Onset    Cancer Mother     Heart failure Father     Emphysema Sister     No Known Problems Brother     Cancer Sister        The CVD Risk score (KAREN'Agoino, et al., 2008) failed to calculate for the following reasons:    The 2008 CVD risk score is only valid for ages 30 to 74    Tests to Keep You Healthy    Colon Cancer Screening: ORDERED  Last Blood Pressure <= 139/89 (8/2/2023): Yes  Tobacco Cessation: NO      Review of patient's allergies indicates:   Allergen Reactions    Bisacodyl Nausea And Vomiting     Other reaction(s): Vomiting    Cipro [ciprofloxacin hcl]     Demerol [meperidine]      Nausea vomiting, visual problem    Doxycycline Hives    Flonase [fluticasone propionate] Hives    Morphine     Morpholine analogues Other (See Comments)     hypotension       Current Outpatient Medications:     aluminum-magnesium hydroxide-simethicone (MAALOX) 200-200-20 mg/5 mL Susp, Take 30 mLs by mouth once as needed., Disp: , Rfl:     cloNIDine (CATAPRES) 0.1 MG tablet, Take one tablet as needed up to 3 times a day if blood pressure is greater than 170/110, Disp: 30 tablet, Rfl: 3    hydroCHLOROthiazide (HYDRODIURIL) 25 MG tablet, Take 1 tablet (25 mg total) by mouth daily as needed (fluid)., Disp: 30 tablet, Rfl: 3    levothyroxine (SYNTHROID) 100 MCG tablet, Take 1 tablet (100 mcg total) by mouth before breakfast., Disp: 90 tablet, Rfl: 3    lisinopriL (PRINIVIL,ZESTRIL) 20 MG tablet, Take 1 tablet (20 mg total) by mouth 2 (two) times daily., Disp: 180 tablet, Rfl: 3    metoprolol tartrate (LOPRESSOR) 25 MG tablet, Take 1 tablet (25 mg total) by mouth 2 (two) times daily., Disp: 180 tablet, Rfl: 3    omeprazole (PRILOSEC) 40 MG capsule, Take 1 capsule (40 mg total) by mouth every morning., Disp: 90 capsule, Rfl: 3    ondansetron (ZOFRAN) 4 MG tablet, Take 1 tablet (4 mg total) by mouth every 8 (eight) hours as needed for  Nausea., Disp: 30 tablet, Rfl: 3    ondansetron (ZOFRAN-ODT) 4 MG TbDL, Take 1 tablet (4 mg total) by mouth every 8 (eight) hours as needed., Disp: 21 tablet, Rfl: 0    REFRESH OPTIVE 0.5-0.9 % Drop, Place 1 drop into both eyes 4 (four) times daily., Disp: , Rfl:     simethicone (GAS-X ORAL), Take 1 tablet by mouth as needed., Disp: , Rfl:     ALPRAZolam (XANAX) 0.25 MG tablet, Take 1 tablet (0.25 mg total) by mouth 2 (two) times daily as needed for Anxiety., Disp: 30 tablet, Rfl: 1    EPINEPHrine (EPIPEN) 0.3 mg/0.3 mL AtIn, Inject 0.3 mLs (0.3 mg total) into the muscle as needed (anaphylaxis). (Patient not taking: Reported on 8/2/2023), Disp: 1 each, Rfl: 1    famotidine (PEPCID) 20 MG tablet, Take 1 tablet (20 mg total) by mouth every evening., Disp: 90 tablet, Rfl: 3    HYDROcodone-acetaminophen (NORCO)  mg per tablet, Take 1 tablet by mouth every 12 (twelve) hours as needed., Disp: 50 tablet, Rfl: 0    Review of Systems   Constitutional:  Negative for appetite change, chills, fatigue, fever and unexpected weight change.   HENT:  Negative for ear discharge and ear pain.    Eyes:  Negative for pain, discharge and visual disturbance.   Respiratory:  Negative for apnea, cough, shortness of breath and wheezing.    Cardiovascular:  Positive for chest pain. Negative for palpitations and leg swelling.   Gastrointestinal:  Positive for reflux. Negative for abdominal pain, blood in stool, constipation, diarrhea, nausea and vomiting.   Endocrine: Negative for cold intolerance, heat intolerance and polydipsia.   Genitourinary:  Positive for nocturia (2-3 times per night). Negative for bladder incontinence, dysuria, hematuria and urgency.   Musculoskeletal:  Positive for arthralgias (Left knee pain) and back pain. Negative for gait problem, joint swelling and myalgias.   Neurological:  Negative for dizziness, seizures and numbness.   Psychiatric/Behavioral:  Negative for behavioral problems, hallucinations and sleep  disturbance. The patient is not nervous/anxious.            Objective:      Vitals:    08/02/23 0830   BP: 120/86   Pulse: 60   Weight: 72.6 kg (160 lb)   Height: 5' (1.524 m)     Physical Exam  Vitals and nursing note reviewed.   Constitutional:       General: She is not in acute distress.     Appearance: She is well-developed. She is obese. She is not toxic-appearing.   HENT:      Head: Normocephalic and atraumatic.      Right Ear: Tympanic membrane and external ear normal.      Left Ear: Tympanic membrane and external ear normal.      Nose: Nose normal.      Mouth/Throat:      Pharynx: Oropharynx is clear.   Eyes:      Pupils: Pupils are equal, round, and reactive to light.   Neck:      Thyroid: No thyromegaly.      Vascular: No carotid bruit.   Cardiovascular:      Rate and Rhythm: Normal rate and regular rhythm.      Heart sounds: Normal heart sounds. No murmur heard.  Pulmonary:      Effort: Pulmonary effort is normal.      Breath sounds: Normal breath sounds. No wheezing or rales.   Abdominal:      General: Bowel sounds are normal. There is no distension.      Palpations: Abdomen is soft.      Tenderness: There is no abdominal tenderness.   Musculoskeletal:         General: No tenderness or deformity. Normal range of motion.      Cervical back: Normal range of motion and neck supple.      Lumbar back: Normal. No spasms.      Comments: Bends 90 degrees at  waist, shoulders have good range of motion, knees are crepitant bilaterally.  No pitting edema to lower extremities, slow antalgic gait   Lymphadenopathy:      Cervical: No cervical adenopathy.   Skin:     General: Skin is warm and dry.      Findings: No rash.   Neurological:      Mental Status: She is alert and oriented to person, place, and time.      Cranial Nerves: No cranial nerve deficit.      Coordination: Coordination normal.      Gait: Gait abnormal.   Psychiatric:         Mood and Affect: Mood normal.         Behavior: Behavior normal.          Thought Content: Thought content normal.         Judgment: Judgment normal.           Assessment:       1. Gastroesophageal reflux disease without esophagitis    2. DDD (degenerative disc disease), lumbar    3. Other screening mammogram    4. Lumbar disc disease with radiculopathy    5. Neuropathy    6. Anxiety    7. Chronic obstructive pulmonary disease with acute exacerbation    8. Abdominal aortic aneurysm (AAA) without rupture, unspecified part    9. Primary hypertension    10. Thyroid disease    11. Tobacco abuse    12. Current smoker         Plan:       Gastroesophageal reflux disease without esophagitis  -     famotidine (PEPCID) 20 MG tablet; Take 1 tablet (20 mg total) by mouth every evening.  Dispense: 90 tablet; Refill: 3  Continue omeprazole 40 mg q.a.m., add famotidine 20 mg evening.  Mylanta Okay also  DDD (degenerative disc disease), lumbar  -     HYDROcodone-acetaminophen (NORCO)  mg per tablet; Take 1 tablet by mouth every 12 (twelve) hours as needed.  Dispense: 50 tablet; Refill: 0  Refill Norco 10/325 mg  Other screening mammogram  -     Mammo Digital Screening Bilat w/ Man; Future; Expected date: 09/02/2023  Mammogram due in September  Lumbar disc disease with radiculopathy    Neuropathy    Anxiety    Chronic obstructive pulmonary disease with acute exacerbation    Abdominal aortic aneurysm (AAA) without rupture, unspecified part    Primary hypertension  Blood pressure well controlled, cholesterol 193 HDL 49    Thyroid disease    Tobacco abuse  Patient encouraged to reduce her smoking  Current smoker      Follow up in about 6 months (around 2/2/2024), or OA.        8/2/2023 Jasbir Graham

## 2023-08-03 NOTE — TELEPHONE ENCOUNTER
----- Message from Laquita Barillas sent at 8/3/2023 10:28 AM CDT -----  SIMRAN : patient called and stated that she needed to speak to Saroj, I advised that I can help her and she stated to just have Saroj call her back at [phone]. Gave the message to Saroj in the front office. No patient call back for this message

## 2023-08-16 ENCOUNTER — TELEPHONE (OUTPATIENT)
Dept: DERMATOLOGY | Facility: CLINIC | Age: 73
End: 2023-08-16

## 2023-08-16 NOTE — TELEPHONE ENCOUNTER
----- Message from Alyx Daniel sent at 8/16/2023  8:58 AM CDT -----  Contact: self  Type:  Sooner Appointment Request    Caller is requesting a sooner appointment.  Caller declined first available appointment listed below.  Caller will not accept being placed on the waitlist and is requesting a message be sent to doctor.    Name of Caller:  pt  When is the first available appointment?  N/a  Symptoms:  rash on leg and ankle and it is spreading has had over a month  Best Call Back Number:  [phone]   Additional Information:  please advise

## 2023-08-21 ENCOUNTER — TELEPHONE (OUTPATIENT)
Dept: FAMILY MEDICINE | Facility: CLINIC | Age: 73
End: 2023-08-21

## 2023-08-21 NOTE — TELEPHONE ENCOUNTER
----- Message from Laquita Barillas sent at 8/21/2023  8:55 AM CDT -----  SIMRAN : patient called and just wanted to thank everyone for helping her get her diabetic supplies. No patient call back needed for this message.

## 2023-08-22 ENCOUNTER — TELEPHONE (OUTPATIENT)
Dept: FAMILY MEDICINE | Facility: CLINIC | Age: 73
End: 2023-08-22

## 2023-08-22 ENCOUNTER — OFFICE VISIT (OUTPATIENT)
Dept: FAMILY MEDICINE | Facility: CLINIC | Age: 73
End: 2023-08-22
Attending: FAMILY MEDICINE
Payer: MEDICARE

## 2023-08-22 VITALS
SYSTOLIC BLOOD PRESSURE: 126 MMHG | HEIGHT: 60 IN | BODY MASS INDEX: 31.41 KG/M2 | HEART RATE: 70 BPM | DIASTOLIC BLOOD PRESSURE: 80 MMHG | WEIGHT: 160 LBS

## 2023-08-22 DIAGNOSIS — I10 PRIMARY HYPERTENSION: ICD-10-CM

## 2023-08-22 DIAGNOSIS — U07.1 COVID: Primary | ICD-10-CM

## 2023-08-22 DIAGNOSIS — R11.0 NAUSEA: ICD-10-CM

## 2023-08-22 DIAGNOSIS — L20.84 INTRINSIC ECZEMA: ICD-10-CM

## 2023-08-22 DIAGNOSIS — M51.16 LUMBAR DISC DISEASE WITH RADICULOPATHY: ICD-10-CM

## 2023-08-22 DIAGNOSIS — K21.00 GASTROESOPHAGEAL REFLUX DISEASE WITH ESOPHAGITIS WITHOUT HEMORRHAGE: ICD-10-CM

## 2023-08-22 DIAGNOSIS — T46.4X5A ACE-INHIBITOR COUGH: ICD-10-CM

## 2023-08-22 DIAGNOSIS — R05.8 ACE-INHIBITOR COUGH: ICD-10-CM

## 2023-08-22 LAB
CTP QC/QA: YES
SARS-COV-2 RDRP RESP QL NAA+PROBE: POSITIVE

## 2023-08-22 PROCEDURE — 3066F PR DOCUMENTATION OF TREATMENT FOR NEPHROPATHY: ICD-10-PCS | Mod: CPTII,S$GLB,, | Performed by: FAMILY MEDICINE

## 2023-08-22 PROCEDURE — 1159F MED LIST DOCD IN RCRD: CPT | Mod: CPTII,S$GLB,, | Performed by: FAMILY MEDICINE

## 2023-08-22 PROCEDURE — 99214 PR OFFICE/OUTPT VISIT, EST, LEVL IV, 30-39 MIN: ICD-10-PCS | Mod: S$GLB,,, | Performed by: FAMILY MEDICINE

## 2023-08-22 PROCEDURE — 3074F PR MOST RECENT SYSTOLIC BLOOD PRESSURE < 130 MM HG: ICD-10-PCS | Mod: CPTII,S$GLB,, | Performed by: FAMILY MEDICINE

## 2023-08-22 PROCEDURE — 87635 SARS-COV-2 COVID-19 AMP PRB: CPT | Mod: QW,S$GLB,, | Performed by: FAMILY MEDICINE

## 2023-08-22 PROCEDURE — 3061F NEG MICROALBUMINURIA REV: CPT | Mod: CPTII,S$GLB,, | Performed by: FAMILY MEDICINE

## 2023-08-22 PROCEDURE — 4010F ACE/ARB THERAPY RXD/TAKEN: CPT | Mod: CPTII,S$GLB,, | Performed by: FAMILY MEDICINE

## 2023-08-22 PROCEDURE — 99214 OFFICE O/P EST MOD 30 MIN: CPT | Mod: S$GLB,,, | Performed by: FAMILY MEDICINE

## 2023-08-22 PROCEDURE — 3079F PR MOST RECENT DIASTOLIC BLOOD PRESSURE 80-89 MM HG: ICD-10-PCS | Mod: CPTII,S$GLB,, | Performed by: FAMILY MEDICINE

## 2023-08-22 PROCEDURE — 3061F PR NEG MICROALBUMINURIA RESULT DOCUMENTED/REVIEW: ICD-10-PCS | Mod: CPTII,S$GLB,, | Performed by: FAMILY MEDICINE

## 2023-08-22 PROCEDURE — 1159F PR MEDICATION LIST DOCUMENTED IN MEDICAL RECORD: ICD-10-PCS | Mod: CPTII,S$GLB,, | Performed by: FAMILY MEDICINE

## 2023-08-22 PROCEDURE — 3066F NEPHROPATHY DOC TX: CPT | Mod: CPTII,S$GLB,, | Performed by: FAMILY MEDICINE

## 2023-08-22 PROCEDURE — 3079F DIAST BP 80-89 MM HG: CPT | Mod: CPTII,S$GLB,, | Performed by: FAMILY MEDICINE

## 2023-08-22 PROCEDURE — 3074F SYST BP LT 130 MM HG: CPT | Mod: CPTII,S$GLB,, | Performed by: FAMILY MEDICINE

## 2023-08-22 PROCEDURE — 4010F PR ACE/ARB THEARPY RXD/TAKEN: ICD-10-PCS | Mod: CPTII,S$GLB,, | Performed by: FAMILY MEDICINE

## 2023-08-22 PROCEDURE — 87635: ICD-10-PCS | Mod: QW,S$GLB,, | Performed by: FAMILY MEDICINE

## 2023-08-22 RX ORDER — NIRMATRELVIR AND RITONAVIR 300-100 MG
KIT ORAL
Qty: 30 TABLET | Refills: 0 | Status: ON HOLD | OUTPATIENT
Start: 2023-08-22 | End: 2024-01-06

## 2023-08-22 RX ORDER — LOSARTAN POTASSIUM 100 MG/1
100 TABLET ORAL DAILY
Qty: 90 TABLET | Refills: 3 | Status: ON HOLD | OUTPATIENT
Start: 2023-08-22 | End: 2024-01-06

## 2023-08-22 RX ORDER — CLOBETASOL PROPIONATE 0.5 MG/G
CREAM TOPICAL 2 TIMES DAILY
Qty: 45 G | Refills: 2 | Status: ON HOLD | OUTPATIENT
Start: 2023-08-22 | End: 2024-01-06

## 2023-08-22 RX ORDER — ONDANSETRON 4 MG/1
4 TABLET, FILM COATED ORAL EVERY 8 HOURS PRN
Qty: 30 TABLET | Refills: 3 | Status: SHIPPED | OUTPATIENT
Start: 2023-08-22 | End: 2023-11-27 | Stop reason: SDUPTHER

## 2023-08-22 NOTE — TELEPHONE ENCOUNTER
Spoke with maye, pharmacist at [facility]. Pt has ax to some corticosteroids, but was prescribed Clobetasol. Wanted to make sure of to fill. Per Joslyn guo to fill. Maye notified.

## 2023-08-22 NOTE — PROGRESS NOTES
SUBJECTIVE:    Patient ID: Joslyn Dubon is a 73 y.o. female.    Chief Complaint: Rash (No bottles, nausea since yesterday, headache, declined colonoscopy, rash lower and upper extremities for one month, C-19 ordered, abc )    This 73-year-old female is feeling under the weather.  She has body aches shortness of breath and a slight cough at night.  Her  had fever and diarrhea that started a few days ago.  They have not tested for COVID.    For several weeks she is had a itchy rash to her ankles arms and legs.  Applying cortisone 10 on it.  She does sit out in the sun every day to smoke cigarettes.  She smokes 1 pack cigarettes daily.    She does take lisinopril 20 mg b.i.d. for her hypertension.        Admission on 06/22/2023, Discharged on 06/22/2023   Component Date Value Ref Range Status    WBC 06/22/2023 7.26  3.90 - 12.70 K/uL Final    RBC 06/22/2023 4.81  4.00 - 5.40 M/uL Final    Hemoglobin 06/22/2023 15.2  12.0 - 16.0 g/dL Final    Hematocrit 06/22/2023 45.8  37.0 - 48.5 % Final    MCV 06/22/2023 95  82 - 98 fL Final    MCH 06/22/2023 31.6 (H)  27.0 - 31.0 pg Final    MCHC 06/22/2023 33.2  32.0 - 36.0 g/dL Final    RDW 06/22/2023 13.3  11.5 - 14.5 % Final    Platelets 06/22/2023 189  150 - 450 K/uL Final    MPV 06/22/2023 9.0 (L)  9.2 - 12.9 fL Final    Immature Granulocytes 06/22/2023 0.3  0.0 - 0.5 % Final    Gran # (ANC) 06/22/2023 4.5  1.8 - 7.7 K/uL Final    Immature Grans (Abs) 06/22/2023 0.02  0.00 - 0.04 K/uL Final    Lymph # 06/22/2023 2.0  1.0 - 4.8 K/uL Final    Mono # 06/22/2023 0.6  0.3 - 1.0 K/uL Final    Eos # 06/22/2023 0.1  0.0 - 0.5 K/uL Final    Baso # 06/22/2023 0.02  0.00 - 0.20 K/uL Final    nRBC 06/22/2023 0  0 /100 WBC Final    Gran % 06/22/2023 61.8  38.0 - 73.0 % Final    Lymph % 06/22/2023 28.1  18.0 - 48.0 % Final    Mono % 06/22/2023 7.7  4.0 - 15.0 % Final    Eosinophil % 06/22/2023 1.8  0.0 - 8.0 % Final    Basophil % 06/22/2023 0.3  0.0 - 1.9 % Final     Differential Method 06/22/2023 Automated   Final    Sodium 06/22/2023 141  136 - 145 mmol/L Final    Potassium 06/22/2023 4.1  3.5 - 5.1 mmol/L Final    Chloride 06/22/2023 108  95 - 110 mmol/L Final    CO2 06/22/2023 26  23 - 29 mmol/L Final    Glucose 06/22/2023 100  70 - 110 mg/dL Final    BUN 06/22/2023 19  8 - 23 mg/dL Final    Creatinine 06/22/2023 0.9  0.5 - 1.4 mg/dL Final    Calcium 06/22/2023 9.5  8.7 - 10.5 mg/dL Final    Total Protein 06/22/2023 7.7  6.0 - 8.4 g/dL Final    Albumin 06/22/2023 3.9  3.5 - 5.2 g/dL Final    Total Bilirubin 06/22/2023 0.5  0.1 - 1.0 mg/dL Final    Alkaline Phosphatase 06/22/2023 81  55 - 135 U/L Final    AST 06/22/2023 23  10 - 40 U/L Final    ALT 06/22/2023 25  10 - 44 U/L Final    eGFR 06/22/2023 >60.0  >60 mL/min/1.73 m^2 Final    Anion Gap 06/22/2023 7 (L)  8 - 16 mmol/L Final    BNP 06/22/2023 23  0 - 99 pg/mL Final    Troponin I High Sensitivity 06/22/2023 9.1  0.0 - 14.9 pg/mL Final    Prothrombin Time 06/22/2023 10.5  9.0 - 12.5 sec Final    INR 06/22/2023 0.9  0.8 - 1.2 Final    Lipase 06/22/2023 48  4 - 60 U/L Final    Troponin I High Sensitivity 06/22/2023 7.8  0.0 - 14.9 pg/mL Final   Admission on 05/02/2023, Discharged on 05/02/2023   Component Date Value Ref Range Status    Specimen UA 05/02/2023 Urine, Clean Catch   Final    Color, UA 05/02/2023 Yellow  Yellow, Straw, Priya Final    Appearance, UA 05/02/2023 Hazy (A)  Clear Final    pH, UA 05/02/2023 8.0  5.0 - 8.0 Final    Specific Gravity, UA 05/02/2023 1.015  1.005 - 1.030 Final    Protein, UA 05/02/2023 Negative  Negative Final    Glucose, UA 05/02/2023 Negative  Negative Final    Ketones, UA 05/02/2023 Negative  Negative Final    Bilirubin (UA) 05/02/2023 Negative  Negative Final    Occult Blood UA 05/02/2023 1+ (A)  Negative Final    Nitrite, UA 05/02/2023 Negative  Negative Final    Urobilinogen, UA 05/02/2023 Negative  Negative EU/dL Final    Leukocytes, UA 05/02/2023 Negative  Negative Final    WBC  05/02/2023 5.44  3.90 - 12.70 K/uL Final    RBC 05/02/2023 4.76  4.00 - 5.40 M/uL Final    Hemoglobin 05/02/2023 15.1  12.0 - 16.0 g/dL Final    Hematocrit 05/02/2023 46.0  37.0 - 48.5 % Final    MCV 05/02/2023 97  82 - 98 fL Final    MCH 05/02/2023 31.7 (H)  27.0 - 31.0 pg Final    MCHC 05/02/2023 32.8  32.0 - 36.0 g/dL Final    RDW 05/02/2023 12.9  11.5 - 14.5 % Final    Platelets 05/02/2023 200  150 - 450 K/uL Final    MPV 05/02/2023 9.2  9.2 - 12.9 fL Final    Immature Granulocytes 05/02/2023 0.4  0.0 - 0.5 % Final    Gran # (ANC) 05/02/2023 2.9  1.8 - 7.7 K/uL Final    Immature Grans (Abs) 05/02/2023 0.02  0.00 - 0.04 K/uL Final    Lymph # 05/02/2023 1.9  1.0 - 4.8 K/uL Final    Mono # 05/02/2023 0.5  0.3 - 1.0 K/uL Final    Eos # 05/02/2023 0.1  0.0 - 0.5 K/uL Final    Baso # 05/02/2023 0.03  0.00 - 0.20 K/uL Final    nRBC 05/02/2023 0  0 /100 WBC Final    Gran % 05/02/2023 53.9  38.0 - 73.0 % Final    Lymph % 05/02/2023 35.1  18.0 - 48.0 % Final    Mono % 05/02/2023 8.5  4.0 - 15.0 % Final    Eosinophil % 05/02/2023 1.5  0.0 - 8.0 % Final    Basophil % 05/02/2023 0.6  0.0 - 1.9 % Final    Differential Method 05/02/2023 Automated   Final    Sodium 05/02/2023 139  136 - 145 mmol/L Final    Potassium 05/02/2023 4.3  3.5 - 5.1 mmol/L Final    Chloride 05/02/2023 106  95 - 110 mmol/L Final    CO2 05/02/2023 27  23 - 29 mmol/L Final    Glucose 05/02/2023 99  70 - 110 mg/dL Final    BUN 05/02/2023 13  8 - 23 mg/dL Final    Creatinine 05/02/2023 0.8  0.5 - 1.4 mg/dL Final    Calcium 05/02/2023 9.7  8.7 - 10.5 mg/dL Final    Total Protein 05/02/2023 8.0  6.0 - 8.4 g/dL Final    Albumin 05/02/2023 3.9  3.5 - 5.2 g/dL Final    Total Bilirubin 05/02/2023 0.8  0.1 - 1.0 mg/dL Final    Alkaline Phosphatase 05/02/2023 79  55 - 135 U/L Final    AST 05/02/2023 26  10 - 40 U/L Final    ALT 05/02/2023 23  10 - 44 U/L Final    Anion Gap 05/02/2023 6 (L)  8 - 16 mmol/L Final    eGFR 05/02/2023 >60.0  >60 mL/min/1.73 m^2 Final     RBC, UA 05/02/2023 12 (H)  0 - 4 /hpf Final    WBC, UA 05/02/2023 2  0 - 5 /hpf Final    Bacteria 05/02/2023 Negative  None-Occ /hpf Final    Squam Epithel, UA 05/02/2023 9  /hpf Final    Hyaline Casts, UA 05/02/2023 8 (A)  0-1/lpf /lpf Final    Microscopic Comment 05/02/2023 SEE COMMENT   Final   Admission on 03/27/2023, Discharged on 03/27/2023   Component Date Value Ref Range Status    WBC 03/27/2023 6.45  3.90 - 12.70 K/uL Final    RBC 03/27/2023 4.76  4.00 - 5.40 M/uL Final    Hemoglobin 03/27/2023 15.1  12.0 - 16.0 g/dL Final    Hematocrit 03/27/2023 45.7  37.0 - 48.5 % Final    MCV 03/27/2023 96  82 - 98 fL Final    MCH 03/27/2023 31.7 (H)  27.0 - 31.0 pg Final    MCHC 03/27/2023 33.0  32.0 - 36.0 g/dL Final    RDW 03/27/2023 13.5  11.5 - 14.5 % Final    Platelets 03/27/2023 192  150 - 450 K/uL Final    MPV 03/27/2023 8.9 (L)  9.2 - 12.9 fL Final    Immature Granulocytes 03/27/2023 0.3  0.0 - 0.5 % Final    Gran # (ANC) 03/27/2023 2.9  1.8 - 7.7 K/uL Final    Immature Grans (Abs) 03/27/2023 0.02  0.00 - 0.04 K/uL Final    Lymph # 03/27/2023 2.8  1.0 - 4.8 K/uL Final    Mono # 03/27/2023 0.6  0.3 - 1.0 K/uL Final    Eos # 03/27/2023 0.1  0.0 - 0.5 K/uL Final    Baso # 03/27/2023 0.03  0.00 - 0.20 K/uL Final    nRBC 03/27/2023 0  0 /100 WBC Final    Gran % 03/27/2023 44.2  38.0 - 73.0 % Final    Lymph % 03/27/2023 43.7  18.0 - 48.0 % Final    Mono % 03/27/2023 9.3  4.0 - 15.0 % Final    Eosinophil % 03/27/2023 2.0  0.0 - 8.0 % Final    Basophil % 03/27/2023 0.5  0.0 - 1.9 % Final    Differential Method 03/27/2023 Automated   Final    Sodium 03/27/2023 139  136 - 145 mmol/L Final    Potassium 03/27/2023 4.2  3.5 - 5.1 mmol/L Final    Chloride 03/27/2023 103  95 - 110 mmol/L Final    CO2 03/27/2023 25  23 - 29 mmol/L Final    Glucose 03/27/2023 96  70 - 110 mg/dL Final    BUN 03/27/2023 15  8 - 23 mg/dL Final    Creatinine 03/27/2023 0.9  0.5 - 1.4 mg/dL Final    Calcium 03/27/2023 9.3  8.7 - 10.5 mg/dL Final     Total Protein 03/27/2023 7.4  6.0 - 8.4 g/dL Final    Albumin 03/27/2023 3.9  3.5 - 5.2 g/dL Final    Total Bilirubin 03/27/2023 0.9  0.1 - 1.0 mg/dL Final    Alkaline Phosphatase 03/27/2023 78  55 - 135 U/L Final    AST 03/27/2023 32  10 - 40 U/L Final    ALT 03/27/2023 22  10 - 44 U/L Final    Anion Gap 03/27/2023 11  8 - 16 mmol/L Final    eGFR 03/27/2023 >60.0  >60 mL/min/1.73 m^2 Final    BNP 03/27/2023 29  0 - 99 pg/mL Final    Troponin I High Sensitivity 03/27/2023 3.9  0.0 - 14.9 pg/mL Final    Prothrombin Time 03/27/2023 10.3  9.0 - 12.5 sec Final    INR 03/27/2023 1.0  0.8 - 1.2 Final       Past Medical History:   Diagnosis Date    Anxiety     Martin esophagus     Colitis     COPD (chronic obstructive pulmonary disease)     GERD (gastroesophageal reflux disease)     Hypertension     Thyroid disease     Vocal cord polyps      Social History     Socioeconomic History    Marital status:    Tobacco Use    Smoking status: Every Day     Current packs/day: 0.50     Types: Cigarettes    Smokeless tobacco: Former     Quit date: 5/1/2016   Substance and Sexual Activity    Alcohol use: Never    Drug use: Never    Sexual activity: Yes     Partners: Male     Social Determinants of Health     Stress: No Stress Concern Present (9/19/2019)    [employer] [employer] - Occupational Stress Questionnaire     Feeling of Stress : Not at all     Past Surgical History:   Procedure Laterality Date    ABDOMINAL AORTIC ANEURYSM REPAIR      BACK SURGERY      cervical    CATARACT EXTRACTION Right 02/2021    CHOLECYST[facility]OMY  12-    [facility]    FOOT SURGERY      HYSTERECTOMY      SALIVARY GLAND SURGERY       Family History   Problem Relation Age of Onset    Cancer Mother     Heart failure Father     Emphysema Sister     No Known Problems Brother     Cancer Sister        The CVD Risk score (KAREN'Agostino, et al., 2008) failed to calculate for the following reasons:    The 2008 CVD risk score is only  valid for ages 30 to 74    Tests to Keep You Healthy    Colon Cancer Screening: ORDERED  Last Blood Pressure <= 139/89 (8/22/2023): Yes  Tobacco Cessation: NO      Review of patient's allergies indicates:   Allergen Reactions    Bisacodyl Nausea And Vomiting     Other reaction(s): Vomiting    Cipro [ciprofloxacin hcl]     Demerol [meperidine]      Nausea vomiting, visual problem    Doxycycline Hives    Flonase [fluticasone propionate] Hives    Morphine     Morpholine analogues Other (See Comments)     hypotension       Current Outpatient Medications:     aluminum-magnesium hydroxide-simethicone (MAALOX) 200-200-20 mg/5 mL Susp, Take 30 mLs by mouth once as needed., Disp: , Rfl:     cloNIDine (CATAPRES) 0.1 MG tablet, Take one tablet as needed up to 3 times a day if blood pressure is greater than 170/110, Disp: 30 tablet, Rfl: 3    famotidine (PEPCID) 20 MG tablet, Take 1 tablet (20 mg total) by mouth every evening., Disp: 90 tablet, Rfl: 3    hydroCHLOROthiazide (HYDRODIURIL) 25 MG tablet, Take 1 tablet (25 mg total) by mouth daily as needed (fluid)., Disp: 30 tablet, Rfl: 3    HYDROcodone-acetaminophen (NORCO)  mg per tablet, Take 1 tablet by mouth every 12 (twelve) hours as needed., Disp: 50 tablet, Rfl: 0    levothyroxine (SYNTHROID) 100 MCG tablet, Take 1 tablet (100 mcg total) by mouth before breakfast., Disp: 90 tablet, Rfl: 3    lisinopriL (PRINIVIL,ZESTRIL) 20 MG tablet, Take 1 tablet (20 mg total) by mouth 2 (two) times daily., Disp: 180 tablet, Rfl: 3    metoprolol tartrate (LOPRESSOR) 25 MG tablet, Take 1 tablet (25 mg total) by mouth 2 (two) times daily., Disp: 180 tablet, Rfl: 3    omeprazole (PRILOSEC) 40 MG capsule, Take 1 capsule (40 mg total) by mouth every morning., Disp: 90 capsule, Rfl: 3    ondansetron (ZOFRAN-ODT) 4 MG TbDL, Take 1 tablet (4 mg total) by mouth every 8 (eight) hours as needed., Disp: 21 tablet, Rfl: 0    REFRESH OPTIVE 0.5-0.9 % Drop, Place 1 drop into both eyes 4 (four)  times daily., Disp: , Rfl:     simethicone (GAS-X ORAL), Take 1 tablet by mouth as needed., Disp: , Rfl:     ALPRAZolam (XANAX) 0.25 MG tablet, Take 1 tablet (0.25 mg total) by mouth 2 (two) times daily as needed for Anxiety., Disp: 30 tablet, Rfl: 1    clobetasoL (TEMOVATE) 0.05 % cream, Apply topically 2 (two) times daily., Disp: 45 g, Rfl: 2    EPINEPHrine (EPIPEN) 0.3 mg/0.3 mL AtIn, Inject 0.3 mLs (0.3 mg total) into the muscle as needed (anaphylaxis). (Patient not taking: Reported on 8/2/2023), Disp: 1 each, Rfl: 1    losartan (COZAAR) 100 MG tablet, Take 1 tablet (100 mg total) by mouth once daily., Disp: 90 tablet, Rfl: 3    nirmatrelvir-ritonavir (PAXLOVID) 300 mg (150 mg x 2)-100 mg copackaged tablets (EUA), Take 3 tablets by mouth 2 (two) times daily. Each dose contains 2 nirmatrelvir (pink tablets) and 1 ritonavir (white tablet). Take all 3 tablets together, Disp: 30 tablet, Rfl: 0    ondansetron (ZOFRAN) 4 MG tablet, Take 1 tablet (4 mg total) by mouth every 8 (eight) hours as needed for Nausea., Disp: 30 tablet, Rfl: 3    Review of Systems   Constitutional:  Positive for fatigue. Negative for appetite change, chills, fever and unexpected weight change.   HENT:  Negative for ear discharge and ear pain.    Eyes:  Negative for pain, discharge and visual disturbance.   Respiratory:  Positive for cough and shortness of breath. Negative for apnea and wheezing.    Cardiovascular:  Negative for chest pain, palpitations and leg swelling.   Gastrointestinal:  Negative for abdominal pain, blood in stool, constipation, diarrhea, nausea, vomiting and reflux.   Endocrine: Negative for cold intolerance, heat intolerance and polydipsia.   Genitourinary:  Negative for bladder incontinence, dysuria, hematuria, nocturia and urgency.   Musculoskeletal:  Positive for arthralgias. Negative for gait problem, joint swelling and myalgias.   Integumentary:  Positive for rash.   Neurological:  Negative for dizziness, seizures  and numbness.   Psychiatric/Behavioral:  Negative for behavioral problems and hallucinations. The patient is not nervous/anxious.            Objective:      Vitals:    08/22/23 1344   BP: 126/80   Pulse: 70   Weight: 72.6 kg (160 lb)   Height: 5' (1.524 m)     Physical Exam  Vitals and nursing note reviewed.   Constitutional:       General: She is not in acute distress.     Appearance: She is well-developed. She is obese. She is not toxic-appearing.   HENT:      Head: Normocephalic and atraumatic.      Right Ear: Tympanic membrane and external ear normal.      Left Ear: Tympanic membrane and external ear normal.      Nose: Nose normal.      Mouth/Throat:      Pharynx: Oropharynx is clear.   Eyes:      Pupils: Pupils are equal, round, and reactive to light.   Neck:      Thyroid: No thyromegaly.      Vascular: No carotid bruit.   Cardiovascular:      Rate and Rhythm: Normal rate and regular rhythm.      Heart sounds: Normal heart sounds. No murmur heard.  Pulmonary:      Effort: Pulmonary effort is normal.      Breath sounds: Normal breath sounds. No wheezing or rales.   Abdominal:      General: Bowel sounds are normal. There is no distension.      Palpations: Abdomen is soft.      Tenderness: There is no abdominal tenderness.   Musculoskeletal:         General: No tenderness or deformity. Normal range of motion.      Cervical back: Normal range of motion and neck supple.      Lumbar back: Normal. No spasms.      Comments: Bends 90 degrees at  waist   Lymphadenopathy:      Cervical: No cervical adenopathy.   Skin:     General: Skin is warm and dry.      Findings: No rash.      Comments: Patient has a macular rash involving her elbows, wrists, medial ankles   Neurological:      Mental Status: She is alert and oriented to person, place, and time.      Cranial Nerves: No cranial nerve deficit.      Coordination: Coordination normal.   Psychiatric:         Behavior: Behavior normal.         Thought Content: Thought  content normal.         Judgment: Judgment normal.           Assessment:       1. COVID    2. Gastroesophageal reflux disease with esophagitis without hemorrhage    3. Primary hypertension    4. Lumbar disc disease with radiculopathy    5. Intrinsic eczema    6. Nausea    7. ACE-inhibitor cough         Plan:       COVID  -     nirmatrelvir-ritonavir (PAXLOVID) 300 mg (150 mg x 2)-100 mg copackaged tablets (EUA); Take 3 tablets by mouth 2 (two) times daily. Each dose contains 2 nirmatrelvir (pink tablets) and 1 ritonavir (white tablet). Take all 3 tablets together  Dispense: 30 tablet; Refill: 0  -     POCT COVID-19 Rapid Screening; Future; Expected date: 08/22/2023  COVID test was positive will treat with Paxlovid.  Gastroesophageal reflux disease with esophagitis without hemorrhage  Comments:  stable  Orders:  -     ondansetron (ZOFRAN) 4 MG tablet; Take 1 tablet (4 mg total) by mouth every 8 (eight) hours as needed for Nausea.  Dispense: 30 tablet; Refill: 3    Primary hypertension  -     losartan (COZAAR) 100 MG tablet; Take 1 tablet (100 mg total) by mouth once daily.  Dispense: 90 tablet; Refill: 3  Will switch lisinopril to losartan to avoid the cough side effects  Lumbar disc disease with radiculopathy    Intrinsic eczema  -     clobetasoL (TEMOVATE) 0.05 % cream; Apply topically 2 (two) times daily.  Dispense: 45 g; Refill: 2  Had clobetasol cream he is intolerant of oral or parenteral steroids, add Benadryl for itching, avoid heat or sun  Nausea  -     Cancel: POCT COVID-19 Rapid Screening; Future; Expected date: 08/22/2023    ACE-inhibitor cough  Try losartan instead of lisinopril    No follow-ups on file.        8/22/2023 Jasbir Graham

## 2023-08-22 NOTE — TELEPHONE ENCOUNTER
----- Message from Laquita Barillas sent at 8/22/2023  2:31 PM CDT -----  Maye with  [facility]'s pharmacy called and stated that she need to speak to the nurse about a drug allergy on the patient medication please give her a call at [phone]

## 2023-08-22 NOTE — TELEPHONE ENCOUNTER
----- Message from Laquita Barillas sent at 8/22/2023  8:21 AM CDT -----  Patient called and stated that she called and left a message about being seen for a rash that is all over and she never received a call back. Patient stated that she has to come in because she is itching all over and she does not know what it is,please give her a call at [phone]

## 2023-09-05 ENCOUNTER — HOSPITAL ENCOUNTER (EMERGENCY)
Facility: HOSPITAL | Age: 73
Discharge: HOME | End: 2023-09-05
Attending: EMERGENCY MEDICINE
Payer: MEDICARE

## 2023-09-05 VITALS
WEIGHT: 159 LBS | HEIGHT: 60 IN | OXYGEN SATURATION: 96 % | SYSTOLIC BLOOD PRESSURE: 127 MMHG | BODY MASS INDEX: 31.22 KG/M2 | HEART RATE: 57 BPM | TEMPERATURE: 98 F | RESPIRATION RATE: 16 BRPM | DIASTOLIC BLOOD PRESSURE: 63 MMHG

## 2023-09-05 DIAGNOSIS — R07.9 CHEST PAIN: ICD-10-CM

## 2023-09-05 DIAGNOSIS — M54.12 CERVICAL RADICULOPATHY: Primary | ICD-10-CM

## 2023-09-05 LAB
ALBUMIN SERPL BCP-MCNC: 3.8 G/DL (ref 3.5–5.2)
ALP SERPL-CCNC: 85 U/L (ref 55–135)
ALT SERPL W/O P-5'-P-CCNC: 23 U/L (ref 10–44)
ANION GAP SERPL CALC-SCNC: 5 MMOL/L (ref 8–16)
AST SERPL-CCNC: 19 U/L (ref 10–40)
BASOPHILS # BLD AUTO: 0.03 K/UL (ref 0–0.2)
BASOPHILS NFR BLD: 0.4 % (ref 0–1.9)
BILIRUB SERPL-MCNC: 0.6 MG/DL (ref 0.1–1)
BNP SERPL-MCNC: 38 PG/ML (ref 0–99)
BNP SERPL-MCNC: 38 PG/ML (ref 0–99)
BUN SERPL-MCNC: 14 MG/DL (ref 8–23)
CALCIUM SERPL-MCNC: 9.4 MG/DL (ref 8.7–10.5)
CHLORIDE SERPL-SCNC: 108 MMOL/L (ref 95–110)
CO2 SERPL-SCNC: 25 MMOL/L (ref 23–29)
CREAT SERPL-MCNC: 0.8 MG/DL (ref 0.5–1.4)
DIFFERENTIAL METHOD: ABNORMAL
EOSINOPHIL # BLD AUTO: 0.1 K/UL (ref 0–0.5)
EOSINOPHIL NFR BLD: 1 % (ref 0–8)
ERYTHROCYTE [DISTWIDTH] IN BLOOD BY AUTOMATED COUNT: 13.2 % (ref 11.5–14.5)
EST. GFR  (NO RACE VARIABLE): >60 ML/MIN/1.73 M^2
GLUCOSE SERPL-MCNC: 97 MG/DL (ref 70–110)
HCT VFR BLD AUTO: 45.3 % (ref 37–48.5)
HGB BLD-MCNC: 15.2 G/DL (ref 12–16)
IMM GRANULOCYTES # BLD AUTO: 0.02 K/UL (ref 0–0.04)
IMM GRANULOCYTES NFR BLD AUTO: 0.3 % (ref 0–0.5)
INR PPP: 0.9 (ref 0.8–1.2)
LYMPHOCYTES # BLD AUTO: 1.7 K/UL (ref 1–4.8)
LYMPHOCYTES NFR BLD: 24.6 % (ref 18–48)
MAGNESIUM SERPL-MCNC: 2 MG/DL (ref 1.6–2.6)
MCH RBC QN AUTO: 31.7 PG (ref 27–31)
MCHC RBC AUTO-ENTMCNC: 33.6 G/DL (ref 32–36)
MCV RBC AUTO: 94 FL (ref 82–98)
MONOCYTES # BLD AUTO: 0.6 K/UL (ref 0.3–1)
MONOCYTES NFR BLD: 9.4 % (ref 4–15)
NEUTROPHILS # BLD AUTO: 4.4 K/UL (ref 1.8–7.7)
NEUTROPHILS NFR BLD: 64.3 % (ref 38–73)
NRBC BLD-RTO: 0 /100 WBC
PLATELET # BLD AUTO: 243 K/UL (ref 150–450)
PMV BLD AUTO: 9.1 FL (ref 9.2–12.9)
POTASSIUM SERPL-SCNC: 4.2 MMOL/L (ref 3.5–5.1)
PROT SERPL-MCNC: 8.1 G/DL (ref 6–8.4)
PROTHROMBIN TIME: 10.3 SEC (ref 9–12.5)
RBC # BLD AUTO: 4.8 M/UL (ref 4–5.4)
SODIUM SERPL-SCNC: 138 MMOL/L (ref 136–145)
TROPONIN I SERPL HS-MCNC: 7.3 PG/ML (ref 0–14.9)
WBC # BLD AUTO: 6.79 K/UL (ref 3.9–12.7)

## 2023-09-05 PROCEDURE — 93005 ELECTROCARDIOGRAM TRACING: CPT | Performed by: GENERAL PRACTICE

## 2023-09-05 PROCEDURE — 84484 ASSAY OF TROPONIN QUANT: CPT

## 2023-09-05 PROCEDURE — 85025 COMPLETE CBC W/AUTO DIFF WBC: CPT

## 2023-09-05 PROCEDURE — 83735 ASSAY OF MAGNESIUM: CPT | Performed by: EMERGENCY MEDICINE

## 2023-09-05 PROCEDURE — 93010 ELECTROCARDIOGRAM REPORT: CPT | Mod: ,,, | Performed by: GENERAL PRACTICE

## 2023-09-05 PROCEDURE — 85610 PROTHROMBIN TIME: CPT

## 2023-09-05 PROCEDURE — 80053 COMPREHEN METABOLIC PANEL: CPT

## 2023-09-05 PROCEDURE — 99284 EMERGENCY DEPT VISIT MOD MDM: CPT | Mod: 25

## 2023-09-05 PROCEDURE — 93010 EKG 12-LEAD: ICD-10-PCS | Mod: ,,, | Performed by: GENERAL PRACTICE

## 2023-09-05 PROCEDURE — 96375 TX/PRO/DX INJ NEW DRUG ADDON: CPT

## 2023-09-05 PROCEDURE — 25000003 PHARM REV CODE 250: Performed by: EMERGENCY MEDICINE

## 2023-09-05 PROCEDURE — 83880 ASSAY OF NATRIURETIC PEPTIDE: CPT

## 2023-09-05 PROCEDURE — 63600175 PHARM REV CODE 636 W HCPCS: Performed by: EMERGENCY MEDICINE

## 2023-09-05 PROCEDURE — 99900035 HC TECH TIME PER 15 MIN (STAT)

## 2023-09-05 PROCEDURE — 96365 THER/PROPH/DIAG IV INF INIT: CPT

## 2023-09-05 RX ORDER — PREDNISONE 20 MG/1
40 TABLET ORAL DAILY
Qty: 10 TABLET | Refills: 0 | Status: SHIPPED | OUTPATIENT
Start: 2023-09-05 | End: 2023-09-10

## 2023-09-05 RX ORDER — IPRATROPIUM BROMIDE AND ALBUTEROL SULFATE 2.5; .5 MG/3ML; MG/3ML
3 SOLUTION RESPIRATORY (INHALATION)
Status: DISCONTINUED | OUTPATIENT
Start: 2023-09-05 | End: 2023-09-05 | Stop reason: HOSPADM

## 2023-09-05 RX ORDER — METHYLPREDNISOLONE SOD SUCC 125 MG
125 VIAL (EA) INJECTION
Status: COMPLETED | OUTPATIENT
Start: 2023-09-05 | End: 2023-09-05

## 2023-09-05 RX ORDER — HYDROMORPHONE HYDROCHLORIDE 1 MG/ML
0.5 INJECTION, SOLUTION INTRAMUSCULAR; INTRAVENOUS; SUBCUTANEOUS
Status: COMPLETED | OUTPATIENT
Start: 2023-09-05 | End: 2023-09-05

## 2023-09-05 RX ORDER — ONDANSETRON 2 MG/ML
4 INJECTION INTRAMUSCULAR; INTRAVENOUS
Status: COMPLETED | OUTPATIENT
Start: 2023-09-05 | End: 2023-09-05

## 2023-09-05 RX ADMIN — ONDANSETRON 4 MG: 2 INJECTION INTRAMUSCULAR; INTRAVENOUS at 09:09

## 2023-09-05 RX ADMIN — PROMETHAZINE HYDROCHLORIDE 25 MG: 25 INJECTION INTRAMUSCULAR; INTRAVENOUS at 10:09

## 2023-09-05 RX ADMIN — METHYLPREDNISOLONE SODIUM SUCCINATE 125 MG: 125 INJECTION, POWDER, FOR SOLUTION INTRAMUSCULAR; INTRAVENOUS at 09:09

## 2023-09-05 RX ADMIN — HYDROMORPHONE HYDROCHLORIDE 0.5 MG: 0.5 INJECTION, SOLUTION INTRAMUSCULAR; INTRAVENOUS; SUBCUTANEOUS at 09:09

## 2023-09-06 ENCOUNTER — TELEPHONE (OUTPATIENT)
Dept: FAMILY MEDICINE | Facility: CLINIC | Age: 73
End: 2023-09-06

## 2023-09-06 NOTE — TELEPHONE ENCOUNTER
----- Message from Alyx Lechuga sent at 9/6/2023  3:17 PM CDT -----  Vm-2:16- pt is calling sade back about a f/u with dr. Graham   [phone]

## 2023-09-06 NOTE — TELEPHONE ENCOUNTER
----- Message from Saroj Russell sent at 9/6/2023 11:40 AM CDT -----  Ot needs emergency room 9/5 follow up appt [phone]

## 2023-09-07 ENCOUNTER — OFFICE VISIT (OUTPATIENT)
Dept: FAMILY MEDICINE | Facility: CLINIC | Age: 73
End: 2023-09-07
Attending: FAMILY MEDICINE
Payer: MEDICARE

## 2023-09-07 VITALS
HEART RATE: 68 BPM | SYSTOLIC BLOOD PRESSURE: 138 MMHG | DIASTOLIC BLOOD PRESSURE: 86 MMHG | WEIGHT: 160 LBS | HEIGHT: 60 IN | BODY MASS INDEX: 31.41 KG/M2

## 2023-09-07 DIAGNOSIS — I10 PRIMARY HYPERTENSION: ICD-10-CM

## 2023-09-07 DIAGNOSIS — F17.200 CURRENT SMOKER: ICD-10-CM

## 2023-09-07 DIAGNOSIS — M54.12 CERVICAL RADICULOPATHY: Primary | ICD-10-CM

## 2023-09-07 DIAGNOSIS — M51.16 LUMBAR DISC DISEASE WITH RADICULOPATHY: ICD-10-CM

## 2023-09-07 DIAGNOSIS — G62.9 NEUROPATHY: ICD-10-CM

## 2023-09-07 PROCEDURE — 1159F MED LIST DOCD IN RCRD: CPT | Mod: CPTII,S$GLB,, | Performed by: FAMILY MEDICINE

## 2023-09-07 PROCEDURE — 3075F SYST BP GE 130 - 139MM HG: CPT | Mod: CPTII,S$GLB,, | Performed by: FAMILY MEDICINE

## 2023-09-07 PROCEDURE — 99213 OFFICE O/P EST LOW 20 MIN: CPT | Mod: S$GLB,,, | Performed by: FAMILY MEDICINE

## 2023-09-07 PROCEDURE — 3079F DIAST BP 80-89 MM HG: CPT | Mod: CPTII,S$GLB,, | Performed by: FAMILY MEDICINE

## 2023-09-07 PROCEDURE — 3066F PR DOCUMENTATION OF TREATMENT FOR NEPHROPATHY: ICD-10-PCS | Mod: CPTII,S$GLB,, | Performed by: FAMILY MEDICINE

## 2023-09-07 PROCEDURE — 3061F NEG MICROALBUMINURIA REV: CPT | Mod: CPTII,S$GLB,, | Performed by: FAMILY MEDICINE

## 2023-09-07 PROCEDURE — 3288F FALL RISK ASSESSMENT DOCD: CPT | Mod: CPTII,S$GLB,, | Performed by: FAMILY MEDICINE

## 2023-09-07 PROCEDURE — 4010F PR ACE/ARB THEARPY RXD/TAKEN: ICD-10-PCS | Mod: CPTII,S$GLB,, | Performed by: FAMILY MEDICINE

## 2023-09-07 PROCEDURE — 1101F PR PT FALLS ASSESS DOC 0-1 FALLS W/OUT INJ PAST YR: ICD-10-PCS | Mod: CPTII,S$GLB,, | Performed by: FAMILY MEDICINE

## 2023-09-07 PROCEDURE — 1101F PT FALLS ASSESS-DOCD LE1/YR: CPT | Mod: CPTII,S$GLB,, | Performed by: FAMILY MEDICINE

## 2023-09-07 PROCEDURE — 3079F PR MOST RECENT DIASTOLIC BLOOD PRESSURE 80-89 MM HG: ICD-10-PCS | Mod: CPTII,S$GLB,, | Performed by: FAMILY MEDICINE

## 2023-09-07 PROCEDURE — 3075F PR MOST RECENT SYSTOLIC BLOOD PRESS GE 130-139MM HG: ICD-10-PCS | Mod: CPTII,S$GLB,, | Performed by: FAMILY MEDICINE

## 2023-09-07 PROCEDURE — 3066F NEPHROPATHY DOC TX: CPT | Mod: CPTII,S$GLB,, | Performed by: FAMILY MEDICINE

## 2023-09-07 PROCEDURE — 3288F PR FALLS RISK ASSESSMENT DOCUMENTED: ICD-10-PCS | Mod: CPTII,S$GLB,, | Performed by: FAMILY MEDICINE

## 2023-09-07 PROCEDURE — 4010F ACE/ARB THERAPY RXD/TAKEN: CPT | Mod: CPTII,S$GLB,, | Performed by: FAMILY MEDICINE

## 2023-09-07 PROCEDURE — 99213 PR OFFICE/OUTPT VISIT, EST, LEVL III, 20-29 MIN: ICD-10-PCS | Mod: S$GLB,,, | Performed by: FAMILY MEDICINE

## 2023-09-07 PROCEDURE — 1159F PR MEDICATION LIST DOCUMENTED IN MEDICAL RECORD: ICD-10-PCS | Mod: CPTII,S$GLB,, | Performed by: FAMILY MEDICINE

## 2023-09-07 PROCEDURE — 3061F PR NEG MICROALBUMINURIA RESULT DOCUMENTED/REVIEW: ICD-10-PCS | Mod: CPTII,S$GLB,, | Performed by: FAMILY MEDICINE

## 2023-09-08 NOTE — PROGRESS NOTES
SUBJECTIVE:    Patient ID: Joslyn Dubon is a 73 y.o. female.    Chief Complaint: Neck Pain (Left side neck pain, chest pain ER F/U , no bottles, declined colonoscopy, x-chest results and EKG results, (normal)abc )    73-year-old female who was seen in the emergency room recently with severe left chest and shoulder pains.  It occurred after a several day history of heavy home cleaning including mopping sweeping cleaning ceiling fans and climbing on ladders.  Troponin levels were negative in the ER.  She has a history of a negative nuclear stress test within the last year.  She was treated with IV Solu-Medrol and pain medications and sent home on prednisone tablets.  She is now feeling much better and has very little pain in her neck or her shoulder.    She was positive for COVID 18 days ago.  Cough and congestion have now resolved.  Paxlovid worked well.        Admission on 09/05/2023, Discharged on 09/05/2023   Component Date Value Ref Range Status    WBC 09/05/2023 6.79  3.90 - 12.70 K/uL Final    RBC 09/05/2023 4.80  4.00 - 5.40 M/uL Final    Hemoglobin 09/05/2023 15.2  12.0 - 16.0 g/dL Final    Hematocrit 09/05/2023 45.3  37.0 - 48.5 % Final    MCV 09/05/2023 94  82 - 98 fL Final    MCH 09/05/2023 31.7 (H)  27.0 - 31.0 pg Final    MCHC 09/05/2023 33.6  32.0 - 36.0 g/dL Final    RDW 09/05/2023 13.2  11.5 - 14.5 % Final    Platelets 09/05/2023 243  150 - 450 K/uL Final    MPV 09/05/2023 9.1 (L)  9.2 - 12.9 fL Final    Immature Granulocytes 09/05/2023 0.3  0.0 - 0.5 % Final    Gran # (ANC) 09/05/2023 4.4  1.8 - 7.7 K/uL Final    Immature Grans (Abs) 09/05/2023 0.02  0.00 - 0.04 K/uL Final    Lymph # 09/05/2023 1.7  1.0 - 4.8 K/uL Final    Mono # 09/05/2023 0.6  0.3 - 1.0 K/uL Final    Eos # 09/05/2023 0.1  0.0 - 0.5 K/uL Final    Baso # 09/05/2023 0.03  0.00 - 0.20 K/uL Final    nRBC 09/05/2023 0  0 /100 WBC Final    Gran % 09/05/2023 64.3  38.0 - 73.0 % Final    Lymph % 09/05/2023 24.6  18.0 - 48.0 % Final     Mono % 09/05/2023 9.4  4.0 - 15.0 % Final    Eosinophil % 09/05/2023 1.0  0.0 - 8.0 % Final    Basophil % 09/05/2023 0.4  0.0 - 1.9 % Final    Differential Method 09/05/2023 Automated   Final    Sodium 09/05/2023 138  136 - 145 mmol/L Final    Potassium 09/05/2023 4.2  3.5 - 5.1 mmol/L Final    Chloride 09/05/2023 108  95 - 110 mmol/L Final    CO2 09/05/2023 25  23 - 29 mmol/L Final    Glucose 09/05/2023 97  70 - 110 mg/dL Final    BUN 09/05/2023 14  8 - 23 mg/dL Final    Creatinine 09/05/2023 0.8  0.5 - 1.4 mg/dL Final    Calcium 09/05/2023 9.4  8.7 - 10.5 mg/dL Final    Total Protein 09/05/2023 8.1  6.0 - 8.4 g/dL Final    Albumin 09/05/2023 3.8  3.5 - 5.2 g/dL Final    Total Bilirubin 09/05/2023 0.6  0.1 - 1.0 mg/dL Final    Alkaline Phosphatase 09/05/2023 85  55 - 135 U/L Final    AST 09/05/2023 19  10 - 40 U/L Final    ALT 09/05/2023 23  10 - 44 U/L Final    eGFR 09/05/2023 >60.0  >60 mL/min/1.73 m^2 Final    Anion Gap 09/05/2023 5 (L)  8 - 16 mmol/L Final    BNP 09/05/2023 38  0 - 99 pg/mL Final    Troponin I High Sensitivity 09/05/2023 7.3  0.0 - 14.9 pg/mL Final    Prothrombin Time 09/05/2023 10.3  9.0 - 12.5 sec Final    INR 09/05/2023 0.9  0.8 - 1.2 Final    Magnesium 09/05/2023 2.0  1.6 - 2.6 mg/dL Final    BNP 09/05/2023 38  0 - 99 pg/mL Final   Office Visit on 08/22/2023   Component Date Value Ref Range Status    POC Rapid COVID 08/22/2023 Positive (A)  Negative Final     Acceptable 08/22/2023 Yes   Final   Admission on 06/22/2023, Discharged on 06/22/2023   Component Date Value Ref Range Status    WBC 06/22/2023 7.26  3.90 - 12.70 K/uL Final    RBC 06/22/2023 4.81  4.00 - 5.40 M/uL Final    Hemoglobin 06/22/2023 15.2  12.0 - 16.0 g/dL Final    Hematocrit 06/22/2023 45.8  37.0 - 48.5 % Final    MCV 06/22/2023 95  82 - 98 fL Final    MCH 06/22/2023 31.6 (H)  27.0 - 31.0 pg Final    MCHC 06/22/2023 33.2  32.0 - 36.0 g/dL Final    RDW 06/22/2023 13.3  11.5 - 14.5 % Final    Platelets  06/22/2023 189  150 - 450 K/uL Final    MPV 06/22/2023 9.0 (L)  9.2 - 12.9 fL Final    Immature Granulocytes 06/22/2023 0.3  0.0 - 0.5 % Final    Gran # (ANC) 06/22/2023 4.5  1.8 - 7.7 K/uL Final    Immature Grans (Abs) 06/22/2023 0.02  0.00 - 0.04 K/uL Final    Lymph # 06/22/2023 2.0  1.0 - 4.8 K/uL Final    Mono # 06/22/2023 0.6  0.3 - 1.0 K/uL Final    Eos # 06/22/2023 0.1  0.0 - 0.5 K/uL Final    Baso # 06/22/2023 0.02  0.00 - 0.20 K/uL Final    nRBC 06/22/2023 0  0 /100 WBC Final    Gran % 06/22/2023 61.8  38.0 - 73.0 % Final    Lymph % 06/22/2023 28.1  18.0 - 48.0 % Final    Mono % 06/22/2023 7.7  4.0 - 15.0 % Final    Eosinophil % 06/22/2023 1.8  0.0 - 8.0 % Final    Basophil % 06/22/2023 0.3  0.0 - 1.9 % Final    Differential Method 06/22/2023 Automated   Final    Sodium 06/22/2023 141  136 - 145 mmol/L Final    Potassium 06/22/2023 4.1  3.5 - 5.1 mmol/L Final    Chloride 06/22/2023 108  95 - 110 mmol/L Final    CO2 06/22/2023 26  23 - 29 mmol/L Final    Glucose 06/22/2023 100  70 - 110 mg/dL Final    BUN 06/22/2023 19  8 - 23 mg/dL Final    Creatinine 06/22/2023 0.9  0.5 - 1.4 mg/dL Final    Calcium 06/22/2023 9.5  8.7 - 10.5 mg/dL Final    Total Protein 06/22/2023 7.7  6.0 - 8.4 g/dL Final    Albumin 06/22/2023 3.9  3.5 - 5.2 g/dL Final    Total Bilirubin 06/22/2023 0.5  0.1 - 1.0 mg/dL Final    Alkaline Phosphatase 06/22/2023 81  55 - 135 U/L Final    AST 06/22/2023 23  10 - 40 U/L Final    ALT 06/22/2023 25  10 - 44 U/L Final    eGFR 06/22/2023 >60.0  >60 mL/min/1.73 m^2 Final    Anion Gap 06/22/2023 7 (L)  8 - 16 mmol/L Final    BNP 06/22/2023 23  0 - 99 pg/mL Final    Troponin I High Sensitivity 06/22/2023 9.1  0.0 - 14.9 pg/mL Final    Prothrombin Time 06/22/2023 10.5  9.0 - 12.5 sec Final    INR 06/22/2023 0.9  0.8 - 1.2 Final    Lipase 06/22/2023 48  4 - 60 U/L Final    Troponin I High Sensitivity 06/22/2023 7.8  0.0 - 14.9 pg/mL Final   Admission on 05/02/2023, Discharged on 05/02/2023   Component  Date Value Ref Range Status    Specimen UA 05/02/2023 Urine, Clean Catch   Final    Color, UA 05/02/2023 Yellow  Yellow, Straw, Priya Final    Appearance, UA 05/02/2023 Hazy (A)  Clear Final    pH, UA 05/02/2023 8.0  5.0 - 8.0 Final    Specific Gravity, UA 05/02/2023 1.015  1.005 - 1.030 Final    Protein, UA 05/02/2023 Negative  Negative Final    Glucose, UA 05/02/2023 Negative  Negative Final    Ketones, UA 05/02/2023 Negative  Negative Final    Bilirubin (UA) 05/02/2023 Negative  Negative Final    Occult Blood UA 05/02/2023 1+ (A)  Negative Final    Nitrite, UA 05/02/2023 Negative  Negative Final    Urobilinogen, UA 05/02/2023 Negative  Negative EU/dL Final    Leukocytes, UA 05/02/2023 Negative  Negative Final    WBC 05/02/2023 5.44  3.90 - 12.70 K/uL Final    RBC 05/02/2023 4.76  4.00 - 5.40 M/uL Final    Hemoglobin 05/02/2023 15.1  12.0 - 16.0 g/dL Final    Hematocrit 05/02/2023 46.0  37.0 - 48.5 % Final    MCV 05/02/2023 97  82 - 98 fL Final    MCH 05/02/2023 31.7 (H)  27.0 - 31.0 pg Final    MCHC 05/02/2023 32.8  32.0 - 36.0 g/dL Final    RDW 05/02/2023 12.9  11.5 - 14.5 % Final    Platelets 05/02/2023 200  150 - 450 K/uL Final    MPV 05/02/2023 9.2  9.2 - 12.9 fL Final    Immature Granulocytes 05/02/2023 0.4  0.0 - 0.5 % Final    Gran # (ANC) 05/02/2023 2.9  1.8 - 7.7 K/uL Final    Immature Grans (Abs) 05/02/2023 0.02  0.00 - 0.04 K/uL Final    Lymph # 05/02/2023 1.9  1.0 - 4.8 K/uL Final    Mono # 05/02/2023 0.5  0.3 - 1.0 K/uL Final    Eos # 05/02/2023 0.1  0.0 - 0.5 K/uL Final    Baso # 05/02/2023 0.03  0.00 - 0.20 K/uL Final    nRBC 05/02/2023 0  0 /100 WBC Final    Gran % 05/02/2023 53.9  38.0 - 73.0 % Final    Lymph % 05/02/2023 35.1  18.0 - 48.0 % Final    Mono % 05/02/2023 8.5  4.0 - 15.0 % Final    Eosinophil % 05/02/2023 1.5  0.0 - 8.0 % Final    Basophil % 05/02/2023 0.6  0.0 - 1.9 % Final    Differential Method 05/02/2023 Automated   Final    Sodium 05/02/2023 139  136 - 145 mmol/L Final     Potassium 05/02/2023 4.3  3.5 - 5.1 mmol/L Final    Chloride 05/02/2023 106  95 - 110 mmol/L Final    CO2 05/02/2023 27  23 - 29 mmol/L Final    Glucose 05/02/2023 99  70 - 110 mg/dL Final    BUN 05/02/2023 13  8 - 23 mg/dL Final    Creatinine 05/02/2023 0.8  0.5 - 1.4 mg/dL Final    Calcium 05/02/2023 9.7  8.7 - 10.5 mg/dL Final    Total Protein 05/02/2023 8.0  6.0 - 8.4 g/dL Final    Albumin 05/02/2023 3.9  3.5 - 5.2 g/dL Final    Total Bilirubin 05/02/2023 0.8  0.1 - 1.0 mg/dL Final    Alkaline Phosphatase 05/02/2023 79  55 - 135 U/L Final    AST 05/02/2023 26  10 - 40 U/L Final    ALT 05/02/2023 23  10 - 44 U/L Final    Anion Gap 05/02/2023 6 (L)  8 - 16 mmol/L Final    eGFR 05/02/2023 >60.0  >60 mL/min/1.73 m^2 Final    RBC, UA 05/02/2023 12 (H)  0 - 4 /hpf Final    WBC, UA 05/02/2023 2  0 - 5 /hpf Final    Bacteria 05/02/2023 Negative  None-Occ /hpf Final    Squam Epithel, UA 05/02/2023 9  /hpf Final    Hyaline Casts, UA 05/02/2023 8 (A)  0-1/lpf /lpf Final    Microscopic Comment 05/02/2023 SEE COMMENT   Final   Admission on 03/27/2023, Discharged on 03/27/2023   Component Date Value Ref Range Status    WBC 03/27/2023 6.45  3.90 - 12.70 K/uL Final    RBC 03/27/2023 4.76  4.00 - 5.40 M/uL Final    Hemoglobin 03/27/2023 15.1  12.0 - 16.0 g/dL Final    Hematocrit 03/27/2023 45.7  37.0 - 48.5 % Final    MCV 03/27/2023 96  82 - 98 fL Final    MCH 03/27/2023 31.7 (H)  27.0 - 31.0 pg Final    MCHC 03/27/2023 33.0  32.0 - 36.0 g/dL Final    RDW 03/27/2023 13.5  11.5 - 14.5 % Final    Platelets 03/27/2023 192  150 - 450 K/uL Final    MPV 03/27/2023 8.9 (L)  9.2 - 12.9 fL Final    Immature Granulocytes 03/27/2023 0.3  0.0 - 0.5 % Final    Gran # (ANC) 03/27/2023 2.9  1.8 - 7.7 K/uL Final    Immature Grans (Abs) 03/27/2023 0.02  0.00 - 0.04 K/uL Final    Lymph # 03/27/2023 2.8  1.0 - 4.8 K/uL Final    Mono # 03/27/2023 0.6  0.3 - 1.0 K/uL Final    Eos # 03/27/2023 0.1  0.0 - 0.5 K/uL Final    Baso # 03/27/2023 0.03  0.00  - 0.20 K/uL Final    nRBC 03/27/2023 0  0 /100 WBC Final    Gran % 03/27/2023 44.2  38.0 - 73.0 % Final    Lymph % 03/27/2023 43.7  18.0 - 48.0 % Final    Mono % 03/27/2023 9.3  4.0 - 15.0 % Final    Eosinophil % 03/27/2023 2.0  0.0 - 8.0 % Final    Basophil % 03/27/2023 0.5  0.0 - 1.9 % Final    Differential Method 03/27/2023 Automated   Final    Sodium 03/27/2023 139  136 - 145 mmol/L Final    Potassium 03/27/2023 4.2  3.5 - 5.1 mmol/L Final    Chloride 03/27/2023 103  95 - 110 mmol/L Final    CO2 03/27/2023 25  23 - 29 mmol/L Final    Glucose 03/27/2023 96  70 - 110 mg/dL Final    BUN 03/27/2023 15  8 - 23 mg/dL Final    Creatinine 03/27/2023 0.9  0.5 - 1.4 mg/dL Final    Calcium 03/27/2023 9.3  8.7 - 10.5 mg/dL Final    Total Protein 03/27/2023 7.4  6.0 - 8.4 g/dL Final    Albumin 03/27/2023 3.9  3.5 - 5.2 g/dL Final    Total Bilirubin 03/27/2023 0.9  0.1 - 1.0 mg/dL Final    Alkaline Phosphatase 03/27/2023 78  55 - 135 U/L Final    AST 03/27/2023 32  10 - 40 U/L Final    ALT 03/27/2023 22  10 - 44 U/L Final    Anion Gap 03/27/2023 11  8 - 16 mmol/L Final    eGFR 03/27/2023 >60.0  >60 mL/min/1.73 m^2 Final    BNP 03/27/2023 29  0 - 99 pg/mL Final    Troponin I High Sensitivity 03/27/2023 3.9  0.0 - 14.9 pg/mL Final    Prothrombin Time 03/27/2023 10.3  9.0 - 12.5 sec Final    INR 03/27/2023 1.0  0.8 - 1.2 Final       Past Medical History:   Diagnosis Date    Anxiety     Martin esophagus     Colitis     COPD (chronic obstructive pulmonary disease)     GERD (gastroesophageal reflux disease)     Hypertension     Thyroid disease     Vocal cord polyps      Social History     Socioeconomic History    Marital status:    Tobacco Use    Smoking status: Every Day     Current packs/day: 0.50     Types: Cigarettes    Smokeless tobacco: Former     Quit date: 5/1/2016   Substance and Sexual Activity    Alcohol use: Never    Drug use: Never    Sexual activity: Yes     Partners: Male     Social Determinants of Health      Stress: No Stress Concern Present (9/19/2019)    [employer] - Occupational Stress Questionnaire     Feeling of Stress : Not at all     Past Surgical History:   Procedure Laterality Date    ABDOMINAL AORTIC ANEURYSM REPAIR      BACK SURGERY      cervical    CATARACT EXTRACTION Right 02/2021    CHOLECYSTECTOMY  12-    [facility]    FOOT SURGERY      HYSTERECTOMY      SALIVARY GLAND SURGERY       Family History   Problem Relation Age of Onset    Cancer Mother     Heart failure Father     Emphysema Sister     No Known Problems Brother     Cancer Sister        The CVD Risk score (Betsey, et al., 2008) failed to calculate for the following reasons:    The 2008 CVD risk score is only valid for ages 30 to 74    Tests to Keep You Healthy    Colon Cancer Screening: ORDERED  Last Blood Pressure <= 139/89 (9/7/2023): Yes  Tobacco Cessation: NO      Review of patient's allergies indicates:   Allergen Reactions    Bisacodyl Nausea And Vomiting     Other reaction(s): Vomiting    Cipro [ciprofloxacin hcl]     Demerol [meperidine]      Nausea vomiting, visual problem    Doxycycline Hives    Flonase [fluticasone propionate] Hives    Morphine     Morpholine analogues Other (See Comments)     hypotension       Current Outpatient Medications:     aluminum-magnesium hydroxide-simethicone (MAALOX) 200-200-20 mg/5 mL Susp, Take 30 mLs by mouth once as needed., Disp: , Rfl:     clobetasoL (TEMOVATE) 0.05 % cream, Apply topically 2 (two) times daily., Disp: 45 g, Rfl: 2    cloNIDine (CATAPRES) 0.1 MG tablet, Take one tablet as needed up to 3 times a day if blood pressure is greater than 170/110, Disp: 30 tablet, Rfl: 3    famotidine (PEPCID) 20 MG tablet, Take 1 tablet (20 mg total) by mouth every evening., Disp: 90 tablet, Rfl: 3    hydroCHLOROthiazide (HYDRODIURIL) 25 MG tablet, Take 1 tablet (25 mg total) by mouth daily as needed (fluid)., Disp: 30 tablet, Rfl: 3     HYDROcodone-acetaminophen (NORCO)  mg per tablet, Take 1 tablet by mouth every 12 (twelve) hours as needed., Disp: 50 tablet, Rfl: 0    levothyroxine (SYNTHROID) 100 MCG tablet, Take 1 tablet (100 mcg total) by mouth before breakfast., Disp: 90 tablet, Rfl: 3    lisinopriL (PRINIVIL,ZESTRIL) 20 MG tablet, Take 1 tablet (20 mg total) by mouth 2 (two) times daily., Disp: 180 tablet, Rfl: 3    losartan (COZAAR) 100 MG tablet, Take 1 tablet (100 mg total) by mouth once daily., Disp: 90 tablet, Rfl: 3    metoprolol tartrate (LOPRESSOR) 25 MG tablet, Take 1 tablet (25 mg total) by mouth 2 (two) times daily., Disp: 180 tablet, Rfl: 3    nirmatrelvir-ritonavir (PAXLOVID) 300 mg (150 mg x 2)-100 mg copackaged tablets (EUA), Take 3 tablets by mouth 2 (two) times daily. Each dose contains 2 nirmatrelvir (pink tablets) and 1 ritonavir (white tablet). Take all 3 tablets together, Disp: 30 tablet, Rfl: 0    omeprazole (PRILOSEC) 40 MG capsule, Take 1 capsule (40 mg total) by mouth every morning., Disp: 90 capsule, Rfl: 3    ondansetron (ZOFRAN) 4 MG tablet, Take 1 tablet (4 mg total) by mouth every 8 (eight) hours as needed for Nausea., Disp: 30 tablet, Rfl: 3    ondansetron (ZOFRAN-ODT) 4 MG TbDL, Take 1 tablet (4 mg total) by mouth every 8 (eight) hours as needed., Disp: 21 tablet, Rfl: 0    predniSONE (DELTASONE) 20 MG tablet, Take 2 tablets (40 mg total) by mouth once daily. for 5 days, Disp: 10 tablet, Rfl: 0    REFRESH OPTIVE 0.5-0.9 % Drop, Place 1 drop into both eyes 4 (four) times daily., Disp: , Rfl:     simethicone (GAS-X ORAL), Take 1 tablet by mouth as needed., Disp: , Rfl:     ALPRAZolam (XANAX) 0.25 MG tablet, Take 1 tablet (0.25 mg total) by mouth 2 (two) times daily as needed for Anxiety., Disp: 30 tablet, Rfl: 1    EPINEPHrine (EPIPEN) 0.3 mg/0.3 mL AtIn, Inject 0.3 mLs (0.3 mg total) into the muscle as needed (anaphylaxis). (Patient not taking: Reported on 9/7/2023), Disp: 1 each, Rfl: 1    Review of  Systems        Objective:      Vitals:    09/07/23 0955   BP: 138/86   Pulse: 68   Weight: 72.6 kg (160 lb)   Height: 5' (1.524 m)     Physical Exam  Vitals and nursing note reviewed.   Constitutional:       General: She is not in acute distress.     Appearance: She is well-developed. She is obese. She is not toxic-appearing.   HENT:      Head: Normocephalic and atraumatic.      Right Ear: Tympanic membrane and external ear normal.      Left Ear: Tympanic membrane and external ear normal.      Nose: Nose normal.      Mouth/Throat:      Pharynx: Oropharynx is clear.   Eyes:      Pupils: Pupils are equal, round, and reactive to light.   Neck:      Thyroid: No thyromegaly.      Vascular: No carotid bruit.      Comments: Neck has full range of motion without any pains or spasms.  Cardiovascular:      Rate and Rhythm: Normal rate and regular rhythm.      Heart sounds: Normal heart sounds. No murmur heard.  Pulmonary:      Effort: Pulmonary effort is normal.      Breath sounds: Normal breath sounds. No wheezing or rales.   Abdominal:      General: Bowel sounds are normal. There is no distension.      Palpations: Abdomen is soft.      Tenderness: There is no abdominal tenderness.   Musculoskeletal:         General: No tenderness or deformity. Normal range of motion.      Cervical back: Normal range of motion and neck supple.      Lumbar back: Normal. No spasms.      Comments: Bends 90 degrees at  waist, tender to palpation of the left trapezius muscles and intrascapular muscles.  Left Shoulder has full range of motion at this time.   Lymphadenopathy:      Cervical: No cervical adenopathy.   Skin:     General: Skin is warm and dry.      Findings: No rash.   Neurological:      Mental Status: She is alert and oriented to person, place, and time. Mental status is at baseline.      Cranial Nerves: No cranial nerve deficit.      Coordination: Coordination normal.   Psychiatric:         Behavior: Behavior normal.         Thought  Content: Thought content normal.         Judgment: Judgment normal.           Assessment:       1. Cervical radiculopathy    2. Lumbar disc disease with radiculopathy    3. Neuropathy    4. Primary hypertension    5. Current smoker         Plan:       Cervical radiculopathy  Patient had a flare-up of cervical radiculopathy due to extreme overuse of her neck and shoulder muscles while working overhead on the ceiling fan.  Steroids have provided good pain relief.  Okay to discontinue the prednisone.  Lumbar disc disease with radiculopathy  Chronic low back pains intermittently  Neuropathy    Primary hypertension  Blood pressure well controlled today  Current smoker  Patient encouraged to reduce smoking    Follow up in about 5 months (around 2/7/2024).        9/7/2023 Jasbir Graham

## 2023-09-29 ENCOUNTER — HOSPITAL ENCOUNTER (OUTPATIENT)
Dept: RADIOLOGY | Facility: HOSPITAL | Age: 73
Discharge: HOME | End: 2023-09-29
Attending: FAMILY MEDICINE
Payer: MEDICARE

## 2023-09-29 DIAGNOSIS — Z12.31 OTHER SCREENING MAMMOGRAM: ICD-10-CM

## 2023-09-29 PROCEDURE — 77067 SCR MAMMO BI INCL CAD: CPT | Mod: TC,PO

## 2023-10-02 ENCOUNTER — TELEPHONE (OUTPATIENT)
Dept: FAMILY MEDICINE | Facility: CLINIC | Age: 73
End: 2023-10-02

## 2023-10-02 NOTE — TELEPHONE ENCOUNTER
Called patient in regards to recent mammogram results per Dr Graham , verbalized to patient mammogram is normal. Patient verbalized understanding.

## 2023-10-10 RX ORDER — HYDROXYZINE HYDROCHLORIDE 25 MG/1
25 TABLET, FILM COATED ORAL 2 TIMES DAILY PRN
Qty: 30 TABLET | Refills: 0 | Status: ON HOLD | OUTPATIENT
Start: 2023-10-10 | End: 2024-01-06

## 2023-10-10 NOTE — TELEPHONE ENCOUNTER
----- Message from Alyx Lechuga sent at 10/10/2023 10:59 AM CDT -----  Starting 3 days ago pt is breaking out in hives all over. She has not idea what is happening or what she came in contact with. She is not having trouble breathing but it is getting worse   [phone]

## 2023-10-10 NOTE — TELEPHONE ENCOUNTER
Spoke with patient who states she has a rash for the last 3 days. States when she saw Dr. Graham she had a rash on her legs. Now it's on her whole body. She is wondering if this could be a side effect from covid a few weeks ago. She doesn't know what would be causing this. She stays up all night itching and the cream Dr. Graham gave her isn't working. States it starts off as a bruise and then the bruise turns into a rash. It's going up on her neck. It goes away and then comes back. Reports she didn't change anything in her routine except she did change her dryer sheets to the ones that help with pet hair but she doesn't remember if this started before or after.     Printed

## 2023-10-10 NOTE — TELEPHONE ENCOUNTER
"Per Dr. Graham "try hydroxyzine 25mg po BID prn hives #30" Spoke with patient and let him know.   "

## 2023-10-12 ENCOUNTER — HOSPITAL ENCOUNTER (EMERGENCY)
Facility: HOSPITAL | Age: 73
Discharge: HOME | End: 2023-10-12
Attending: EMERGENCY MEDICINE
Payer: MEDICARE

## 2023-10-12 ENCOUNTER — TELEPHONE (OUTPATIENT)
Dept: ALLERGY | Facility: CLINIC | Age: 73
End: 2023-10-12
Payer: MEDICARE

## 2023-10-12 ENCOUNTER — TELEPHONE (OUTPATIENT)
Dept: FAMILY MEDICINE | Facility: CLINIC | Age: 73
End: 2023-10-12

## 2023-10-12 VITALS
WEIGHT: 150 LBS | RESPIRATION RATE: 18 BRPM | SYSTOLIC BLOOD PRESSURE: 178 MMHG | HEART RATE: 64 BPM | OXYGEN SATURATION: 98 % | TEMPERATURE: 98 F | DIASTOLIC BLOOD PRESSURE: 80 MMHG | BODY MASS INDEX: 29.45 KG/M2 | HEIGHT: 60 IN

## 2023-10-12 VITALS
RESPIRATION RATE: 16 BRPM | TEMPERATURE: 98 F | HEIGHT: 60 IN | BODY MASS INDEX: 31.41 KG/M2 | HEART RATE: 78 BPM | DIASTOLIC BLOOD PRESSURE: 95 MMHG | SYSTOLIC BLOOD PRESSURE: 187 MMHG | OXYGEN SATURATION: 96 % | WEIGHT: 160 LBS

## 2023-10-12 DIAGNOSIS — T78.40XA ALLERGIC REACTION, INITIAL ENCOUNTER: Primary | ICD-10-CM

## 2023-10-12 DIAGNOSIS — R21 RASH AND NONSPECIFIC SKIN ERUPTION: Primary | ICD-10-CM

## 2023-10-12 DIAGNOSIS — L50.9 HIVES: ICD-10-CM

## 2023-10-12 DIAGNOSIS — R21 RASH: Primary | ICD-10-CM

## 2023-10-12 PROCEDURE — 99282 EMERGENCY DEPT VISIT SF MDM: CPT

## 2023-10-12 PROCEDURE — 99283 EMERGENCY DEPT VISIT LOW MDM: CPT

## 2023-10-12 PROCEDURE — 25000003 PHARM REV CODE 250: Performed by: NURSE PRACTITIONER

## 2023-10-12 PROCEDURE — 25000003 PHARM REV CODE 250: Performed by: EMERGENCY MEDICINE

## 2023-10-12 RX ORDER — DIPHENHYDRAMINE HCL 25 MG
25 CAPSULE ORAL
Status: COMPLETED | OUTPATIENT
Start: 2023-10-12 | End: 2023-10-12

## 2023-10-12 RX ORDER — FAMOTIDINE 20 MG/1
20 TABLET, FILM COATED ORAL
Status: COMPLETED | OUTPATIENT
Start: 2023-10-12 | End: 2023-10-12

## 2023-10-12 RX ORDER — EPINEPHRINE 0.3 MG/.3ML
1 INJECTION SUBCUTANEOUS
Qty: 1 EACH | Refills: 3 | Status: SHIPPED | OUTPATIENT
Start: 2023-10-12 | End: 2024-10-11

## 2023-10-12 RX ORDER — DIPHENHYDRAMINE HCL 25 MG
25 CAPSULE ORAL EVERY 6 HOURS
Qty: 8 CAPSULE | Refills: 0 | Status: SHIPPED | OUTPATIENT
Start: 2023-10-12 | End: 2023-10-14

## 2023-10-12 RX ADMIN — DIPHENHYDRAMINE HYDROCHLORIDE 25 MG: 25 CAPSULE ORAL at 02:10

## 2023-10-12 RX ADMIN — DIPHENHYDRAMINE HYDROCHLORIDE 25 MG: 25 CAPSULE ORAL at 11:10

## 2023-10-12 RX ADMIN — FAMOTIDINE 20 MG: 20 TABLET ORAL at 11:10

## 2023-10-12 NOTE — DISCHARGE INSTRUCTIONS
Take the medication as prescribed.  Someone will call you with an appointment in the next 48 hours.  If you do not hear from them tomorrow by noon I would call for an appointment myself.  Return to the ED for any worsening of symptoms or any other concerns.

## 2023-10-12 NOTE — TELEPHONE ENCOUNTER
----- Message from Brit Grimm sent at 10/12/2023  3:11 PM CDT -----  The patient decided to go to the [facility] ER. All they could give her is benadryl. She is allergic to steroids. She broke out in a rash on her chest and her stomach. She called the  . [facility] is not taking new patients. She is leaving 11/08/2023. She needs an allergist that can see her ASAP and that is on her insurance. The rash is all over her body and its spreading to her face. Pt's # [phone] GH

## 2023-10-12 NOTE — TELEPHONE ENCOUNTER
----- Message from Chris Youngblood sent at 10/12/2023  1:56 PM CDT -----  -Pt called at 1:15pm and asked for Maria Del Carmen to give her a call because the pill is not working and she's itching all over and it's getting bad.  [phone]

## 2023-10-12 NOTE — ED PROVIDER NOTES
Encounter Date: 10/12/2023       History     Chief Complaint   Patient presents with    Rash     Total body rash with itching x 2 weeks unrelieved with previously ordered meds.      Presents with generalized rash.  Onset today.  Patient called her doctor and was given Atarax.  She says it does not stop the itching.  Patient states she is allergic to steroids as she breaks out in a rash.  She denies difficulty swallowing or breathing.      Review of patient's allergies indicates:   Allergen Reactions    Bisacodyl Nausea And Vomiting     Other reaction(s): Vomiting    Cipro [ciprofloxacin hcl]     Demerol [meperidine]      Nausea vomiting, visual problem    Doxycycline Hives    Flonase [fluticasone propionate] Hives    Morphine     Morpholine analogues Other (See Comments)     hypotension     Past Medical History:   Diagnosis Date    Anxiety     Martin esophagus     Colitis     COPD (chronic obstructive pulmonary disease)     GERD (gastroesophageal reflux disease)     Hypertension     Thyroid disease     Vocal cord polyps      Past Surgical History:   Procedure Laterality Date    ABDOMINAL AORTIC ANEURYSM REPAIR      BACK SURGERY      cervical    CATARACT EXTRACTION Right 02/2021    CHOLECYSTECTOMY  12-    Dr Price  [facility]    FOOT SURGERY      HYSTERECTOMY      SALIVARY GLAND SURGERY       Family History   Problem Relation Age of Onset    Cancer Mother     Heart failure Father     Emphysema Sister     No Known Problems Brother     Cancer Sister      Social History     Tobacco Use    Smoking status: Every Day     Current packs/day: 0.50     Types: Cigarettes    Smokeless tobacco: Former     Quit date: 5/1/2016   Substance Use Topics    Alcohol use: Never    Drug use: Never     Review of Systems   Constitutional:  Negative for fever.   Respiratory:  Negative for cough, shortness of breath and wheezing.    Cardiovascular:  Negative for chest pain, palpitations and leg swelling.   Gastrointestinal:  Negative for  abdominal pain, diarrhea, nausea and vomiting.   Genitourinary:  Negative for dysuria.   Musculoskeletal:  Negative for back pain.   Skin:  Positive for rash.   Neurological:  Negative for weakness.       Physical Exam     Initial Vitals [10/12/23 1406]   BP Pulse Resp Temp SpO2   (!) 187/95 78 16 98.3 °F (36.8 °C) 96 %      MAP       --         Physical Exam    Constitutional: She appears well-developed and well-nourished.   HENT:   Head: Normocephalic and atraumatic.   Mouth/Throat: Oropharynx is clear and moist.   Airway patent.   Eyes: Conjunctivae are normal.   Neck: Neck supple.   Normal range of motion.  Cardiovascular:  Normal rate, regular rhythm and normal heart sounds.           Pulmonary/Chest: Breath sounds normal. No respiratory distress. She has no wheezes. She has no rhonchi.   Musculoskeletal:         General: Normal range of motion.      Cervical back: Normal range of motion and neck supple.     Neurological: She is alert and oriented to person, place, and time. No sensory deficit. GCS score is 15. GCS eye subscore is 4. GCS verbal subscore is 5. GCS motor subscore is 6.   Skin: Skin is warm and dry. Capillary refill takes less than 2 seconds. Rash noted.   There is a flat rash that it not hives.  Is generalized worse under her breast.  It is not yeast.  Patient reports itching.   Psychiatric: She has a normal mood and affect. Thought content normal.         ED Course   Procedures  Labs Reviewed - No data to display       Imaging Results    None          Medications   diphenhydrAMINE capsule 25 mg (25 mg Oral Given 10/12/23 1416)     Medical Decision Making  Presents with a rash.  Generalized throughout her body.  She denies difficulty swallowing or breathing.  Onset today.  She called her primary care doctor who gave her Atarax.  This is not stop the itching.  Patient reports she is allergic to steroids as she has received them in the past and broke out in a rash.    Amount and/or Complexity of  Data Reviewed  Discussion of management or test interpretation with external provider(s): Patient was given Benadryl.  She is been instructed to take the Benadryl prescription every 6 hours for the next 48 hours.  I have given her an appointment with Dr. Villar the allergist.  At no time while in the ED did this patient appear to be in any acute distress.  She continues to itch.    Risk  OTC drugs.                               Clinical Impression:   Final diagnoses:  [R21] Rash and nonspecific skin eruption (Primary)        ED Disposition Condition    Discharge Stable          ED Prescriptions       Medication Sig Dispense Start Date End Date Auth. Provider    diphenhydrAMINE (BENADRYL) 25 mg capsule Take 1 capsule (25 mg total) by mouth every 6 (six) hours. for 2 days 8 capsule 10/12/2023 10/14/2023 Randi Laguna NP          Follow-up Information       Follow up With Specialties Details Why Contact Info    Alyx Florian MD Allergy, Allergy and Immunology, Immunology, Pediatric Allergy, Pediatric Immunology In 2 days  [address] [facility]  [address] [place] [id number]  [phone]               Randi Laguna NP  10/12/23 2601

## 2023-10-13 NOTE — ED PROVIDER NOTES
Encounter Date: 10/12/2023       History     Chief Complaint   Patient presents with    Allergic Reaction     Pt states she was in the ed earlier for allergic reaction with hives, was dc with benadryl. Pt states she has hives that have not gotten any worse on her entire body, pt states she now feels as though her throat is closing, unable to swallow. Pt states she took dose of 25mg benadryl at 2030.  Pt states she is having pain in her joints. Pt has no sob or trouble speaking      Emergent evaluation of a 73-year-old female with history of Martin's esophagus, colitis, COPD, ongoing tobacco use, GERD, hypertension, hypothyroidism, vocal cord polyps, and anxiety who presents to the ER for 2nd time today due to hives.  Patient reports for 3 weeks she is been having hives to lower extremities she was seen by her primary care physician was given Atarax which she was taking twice daily as well as a topical steroid and anti-itch creams.  She reports that symptoms have worsened and she is now having a rash to the torso breasts and arms and is having arthralgias she was seen earlier today she has been taking Benadryl 25 mg every 6 hours and last took it at 2:30 p.m. on ER at home around 830 she took her 25 mg dose and 30 minutes later felt that she was having throat swelling and had difficulty swallowing she was able to tolerate her secretions she reports this lasted 1 hour and now has completely resolve the rash and hives are still present with pruritus.  She denies nausea vomiting diarrhea abdominal pain weakness dizziness lightheadedness.  She reports she can not take steroids due to breaking on a rash twice.  She was refused epinephrine pen in the past.  She was called a allergist but does not have an appointment till November  She believes she may have an allergy to a change in her drier sheets which she has changed back to her original drier sheets      Review of patient's allergies indicates:   Allergen Reactions     Bisacodyl Nausea And Vomiting     Other reaction(s): Vomiting    Cipro [ciprofloxacin hcl]     Demerol [meperidine]      Nausea vomiting, visual problem    Doxycycline Hives    Flonase [fluticasone propionate] Hives    Morphine     Morpholine analogues Other (See Comments)     hypotension     Past Medical History:   Diagnosis Date    Anxiety     Martin esophagus     Colitis     COPD (chronic obstructive pulmonary disease)     GERD (gastroesophageal reflux disease)     Hypertension     Thyroid disease     Vocal cord polyps      Past Surgical History:   Procedure Laterality Date    ABDOMINAL AORTIC ANEURYSM REPAIR      BACK SURGERY      cervical    CATARACT EXTRACTION Right 02/2021    CHO[facility]CYSTECT[facility]  12-    [facility]    FOOT SURGERY      HYSTERECTOMY      SALIVARY GLAND SURGERY       Family History   Problem Relation Age of Onset    Cancer Mother     Heart failure Father     Emphysema Sister     No Known Problems Brother     Cancer Sister      Social History     Tobacco Use    Smoking status: Every Day     Current packs/day: 0.50     Types: Cigarettes    Smokeless tobacco: Former     Quit date: 5/1/2016   Substance Use Topics    Alcohol use: Never    Drug use: Never     Review of Systems   Constitutional:  Negative for activity change, appetite change, chills, diaphoresis, fatigue and fever.   HENT:  Positive for sore throat. Negative for congestion, drooling, postnasal drip, rhinorrhea, sinus pressure and sinus pain.    Respiratory:  Negative for cough, chest tightness, shortness of breath, wheezing and stridor.    Cardiovascular:  Negative for chest pain and palpitations.   Gastrointestinal:  Negative for abdominal distention, abdominal pain, diarrhea, nausea and vomiting.   Genitourinary:  Negative for flank pain.   Musculoskeletal:  Positive for arthralgias. Negative for back pain, myalgias, neck pain and neck stiffness.   Skin:  Negative for rash.   Neurological:  Negative for dizziness, syncope,  weakness, light-headedness and headaches.   Hematological:  Does not bruise/bleed easily.   Psychiatric/Behavioral:  Negative for confusion. The patient is not nervous/anxious.    All other systems reviewed and are negative.      Physical Exam     Initial Vitals   BP Pulse Resp Temp SpO2   10/12/23 2221 10/12/23 2218 10/12/23 2218 10/12/23 2218 10/12/23 2218   (!) 183/109 60 18 97.5 °F (36.4 °C) 96 %      MAP       --                Physical Exam    Nursing note and vitals reviewed.  Constitutional: She appears well-developed and well-nourished. She is not diaphoretic. No distress.   HENT:   Head: Normocephalic and atraumatic.   Right Ear: External ear normal.   Left Ear: External ear normal.   Nose: Nose normal.   Mouth/Throat: Oropharynx is clear and moist.   Mildly hoarse voice.  No tongue lip intraoral uvular or posterior pharyngeal swelling mild cobblestoning of posterior pharynx   Eyes: Conjunctivae and EOM are normal. Pupils are equal, round, and reactive to light.   Neck: Neck supple. No tracheal deviation present.   Normal range of motion.  Cardiovascular:  Normal rate, regular rhythm, normal heart sounds and intact distal pulses.     Exam reveals no gallop and no friction rub.       No murmur heard.  Pulmonary/Chest: Breath sounds normal. No stridor. No respiratory distress. She has no wheezes. She has no rhonchi. She has no rales. She exhibits no tenderness.   Sats 99% on room air respirations 18 clear breath sounds bilaterally   Abdominal: Abdomen is soft. Bowel sounds are normal. She exhibits no distension and no mass. There is no abdominal tenderness. There is no rebound and no guarding.   Musculoskeletal:         General: No edema. Normal range of motion.      Cervical back: Normal range of motion and neck supple.     Neurological: She is alert and oriented to person, place, and time. She has normal strength. No cranial nerve deficit or sensory deficit.   Skin: Skin is warm and dry. Rash noted. No  erythema. No pallor.   Patient has urticaria to bilateral lower extremities upper extremities abdominal wall low back and breaths   Psychiatric: She has a normal mood and affect. Her behavior is normal. Judgment and thought content normal.         ED Course   Procedures  Labs Reviewed - No data to display       Imaging Results    None          Medications   famotidine tablet 20 mg (has no administration in time range)   diphenhydrAMINE capsule 25 mg (has no administration in time range)     Medical Decision Making  Emergent evaluation of a 73-year-old female with history of Martin's esophagus, colitis, COPD, ongoing tobacco use, GERD, hypertension, hypothyroidism, vocal cord polyps, and anxiety who presents to the ER for 2nd time today due to hives.  Patient reports for 3 weeks she is been having hives to lower extremities she was seen by her primary care physician was given Atarax which she was taking twice daily as well as a topical steroid and anti-itch creams.  She reports that symptoms have worsened and she is now having a rash to the torso breasts and arms she was seen earlier today she has been taking Benadryl 25 mg every 6 hours and last took it at 2:30 p.m. on ER at home around 830 she took her 25 mg dose and 30 minutes later felt that she was having throat swelling and had difficulty swallowing she was able to tolerate her secretions she reports this lasted 1 hour and now has completely resolve the rash and hives are still present with pruritus.  She denies nausea vomiting diarrhea abdominal pain weakness dizziness lightheadedness.  She reports she can not take steroids due to breaking on a rash twice.  She was refused epinephrine pen in the past.  She was called a allergist but does not have an appointment till November  She believes she may have an allergy to a change in her drier sheets which she has changed back to her original drier sheets  On physical exam patient was awake alert oriented with  normal oxygen saturation of 99% clear breath sounds bilaterally respirations 18 mildly hoarse voice patient has history of vocal cord polyps but she reports she feels her voice is more hoarse than usual.  She was also chronic smoker.  Mild cobblestoning of posterior pharynx but no intraoral swelling.  Able to tolerate secretions.  She reports her symptoms have improved.  Soft nontender abdomen a seen erythematous rash to the upper and lower extremities as well as chest bear areas of erythema sitter mildly raised on the chest wall but not typical hives.  MDM    Patient presents for emergent evaluation of acute recurrent allergic reaction with hives arthralgias and throat swelling that is now resolved that poses a threat to life and/or bodily function.   Differential diagnosis includes but was not limited to angioedema, allergic reaction, anaphylaxis.   In the ED patient found to have acute allergic reaction unknown source with hives and arthralgias.      Discharge MDM     Patient was managed in the ED with oral Benadryl 25 mg and Pepcid 20 mg patient will take 50 mg of Benadryl at home every 6 hours which is increased from 25 mg and Pepcid twice daily patient was still refusing steroids and does not want to use an epinephrine pen due to the cost but I have told her I will give her a good Rx card and will write epinephrine which she should consider filling for severe allergic reactions  The response to treatment was good.    Patient was discharged in stable condition.  Detailed return precautions discussed.  Patient was told to follow up with primary care physician or specialist based on their diagnosis  Candy Malcolm MD      Amount and/or Complexity of Data Reviewed  External Data Reviewed: notes.    Risk  OTC drugs.  Prescription drug management.                               Clinical Impression:   Final diagnoses:  [T78.40XA] Allergic reaction, initial encounter (Primary)  [L50.9] Hives        ED Disposition  Condition    Discharge Stable          ED Prescriptions       Medication Sig Dispense Start Date End Date Auth. Provider    EPINEPHrine (EPIPEN) 0.3 mg/0.3 mL AtIn Inject 0.3 mLs (0.3 mg total) into the muscle as needed (Severe allergic reaction symptoms such as shortness of breath, tongue or throat swelling, weakness dizziness severe nausea vomiting). 1 each 10/12/2023 10/11/2024 Candy Malcolm MD          Follow-up Information       Follow up With Specialties Details Why Contact Info Additional Information    Ruchi Araujo MD Allergy, Allergy and Immunology Schedule an appointment as soon as possible for a visit  To see if you can get a closer appointment than in November with alternate allergist [address] [facility] [address]  [facility] [address]  [phone]       [facility] - Emergency Dept Emergency Medicine Go to  If symptoms worsen [address] [facility] [id number]  [phone] 1st floor             Candy Malcolm MD  10/12/23 2062

## 2023-10-18 ENCOUNTER — HOSPITAL ENCOUNTER (INPATIENT)
Facility: HOSPITAL | Age: 73
LOS: 2 days | Discharge: HOME | DRG: 916 | End: 2023-10-20
Attending: STUDENT IN AN ORGANIZED HEALTH CARE EDUCATION/TRAINING PROGRAM | Admitting: STUDENT IN AN ORGANIZED HEALTH CARE EDUCATION/TRAINING PROGRAM
Payer: MEDICARE

## 2023-10-18 DIAGNOSIS — T78.2XXA ANAPHYLACTIC SHOCK: ICD-10-CM

## 2023-10-18 DIAGNOSIS — T78.2XXA ANAPHYLAXIS, INITIAL ENCOUNTER: Primary | ICD-10-CM

## 2023-10-18 LAB
ALBUMIN SERPL BCP-MCNC: 3.4 G/DL (ref 3.5–5.2)
ALLENS TEST: ABNORMAL
ALP SERPL-CCNC: 74 U/L (ref 55–135)
ALT SERPL W/O P-5'-P-CCNC: 17 U/L (ref 10–44)
ANION GAP SERPL CALC-SCNC: 7 MMOL/L (ref 8–16)
AST SERPL-CCNC: 23 U/L (ref 10–40)
BASOPHILS # BLD AUTO: 0.03 K/UL (ref 0–0.2)
BASOPHILS NFR BLD: 0.4 % (ref 0–1.9)
BILIRUB SERPL-MCNC: 0.6 MG/DL (ref 0.1–1)
BUN SERPL-MCNC: 12 MG/DL (ref 8–23)
CALCIUM SERPL-MCNC: 8.9 MG/DL (ref 8.7–10.5)
CHLORIDE SERPL-SCNC: 106 MMOL/L (ref 95–110)
CHOLEST SERPL-MCNC: 151 MG/DL (ref 120–199)
CHOLEST/HDLC SERPL: 4 {RATIO} (ref 2–5)
CO2 SERPL-SCNC: 23 MMOL/L (ref 23–29)
CREAT SERPL-MCNC: 0.7 MG/DL (ref 0.5–1.4)
CREAT SERPL-MCNC: 0.9 MG/DL (ref 0.5–1.4)
DELSYS: ABNORMAL
DIFFERENTIAL METHOD: ABNORMAL
EOSINOPHIL # BLD AUTO: 0.1 K/UL (ref 0–0.5)
EOSINOPHIL NFR BLD: 1.1 % (ref 0–8)
EP: 5
ERYTHROCYTE [DISTWIDTH] IN BLOOD BY AUTOMATED COUNT: 13.4 % (ref 11.5–14.5)
ERYTHROCYTE [SEDIMENTATION RATE] IN BLOOD BY WESTERGREN METHOD: 18 MM/H
EST. GFR  (NO RACE VARIABLE): >60 ML/MIN/1.73 M^2
FIO2: 40
GLUCOSE SERPL-MCNC: 123 MG/DL (ref 70–110)
GLUCOSE SERPL-MCNC: 98 MG/DL (ref 70–110)
HCO3 UR-SCNC: 25.2 MMOL/L (ref 24–28)
HCT VFR BLD AUTO: 46.8 % (ref 37–48.5)
HDLC SERPL-MCNC: 38 MG/DL (ref 40–75)
HDLC SERPL: 25.2 % (ref 20–50)
HGB BLD-MCNC: 16 G/DL (ref 12–16)
IMM GRANULOCYTES # BLD AUTO: 0.02 K/UL (ref 0–0.04)
IMM GRANULOCYTES NFR BLD AUTO: 0.3 % (ref 0–0.5)
INR PPP: 0.9 (ref 0.8–1.2)
IP: 10
LDLC SERPL CALC-MCNC: 90.8 MG/DL (ref 63–159)
LYMPHOCYTES # BLD AUTO: 2.3 K/UL (ref 1–4.8)
LYMPHOCYTES NFR BLD: 30.3 % (ref 18–48)
MCH RBC QN AUTO: 32.6 PG (ref 27–31)
MCHC RBC AUTO-ENTMCNC: 34.2 G/DL (ref 32–36)
MCV RBC AUTO: 95 FL (ref 82–98)
MODE: ABNORMAL
MONOCYTES # BLD AUTO: 0.6 K/UL (ref 0.3–1)
MONOCYTES NFR BLD: 8.5 % (ref 4–15)
NEUTROPHILS # BLD AUTO: 4.4 K/UL (ref 1.8–7.7)
NEUTROPHILS NFR BLD: 59.4 % (ref 38–73)
NONHDLC SERPL-MCNC: 113 MG/DL
NRBC BLD-RTO: 0 /100 WBC
PCO2 BLDA: 45.4 MMHG (ref 35–45)
PH SMN: 7.35 [PH] (ref 7.35–7.45)
PLATELET # BLD AUTO: 192 K/UL (ref 150–450)
PMV BLD AUTO: 9.4 FL (ref 9.2–12.9)
PO2 BLDA: 87 MMHG (ref 40–60)
POC BE: 0 MMOL/L
POC SATURATED O2: 96 % (ref 95–100)
POC TCO2: 27 MMOL/L (ref 24–29)
POTASSIUM SERPL-SCNC: 4.2 MMOL/L (ref 3.5–5.1)
PROT SERPL-MCNC: 6.4 G/DL (ref 6–8.4)
PROTHROMBIN TIME: 10.4 SEC (ref 9–12.5)
RBC # BLD AUTO: 4.91 M/UL (ref 4–5.4)
SAMPLE: ABNORMAL
SAMPLE: NORMAL
SITE: ABNORMAL
SODIUM SERPL-SCNC: 136 MMOL/L (ref 136–145)
SPONT RATE: 21
T4 FREE SERPL-MCNC: 1.4 NG/DL (ref 0.71–1.51)
TRIGL SERPL-MCNC: 111 MG/DL (ref 30–150)
TROPONIN I SERPL HS-MCNC: 7.4 PG/ML (ref 0–14.9)
TSH SERPL DL<=0.005 MIU/L-ACNC: 0.19 UIU/ML (ref 0.34–5.6)
WBC # BLD AUTO: 7.43 K/UL (ref 3.9–12.7)

## 2023-10-18 PROCEDURE — 83520 IMMUNOASSAY QUANT NOS NONAB: CPT | Performed by: STUDENT IN AN ORGANIZED HEALTH CARE EDUCATION/TRAINING PROGRAM

## 2023-10-18 PROCEDURE — 84439 ASSAY OF FREE THYROXINE: CPT | Performed by: STUDENT IN AN ORGANIZED HEALTH CARE EDUCATION/TRAINING PROGRAM

## 2023-10-18 PROCEDURE — 85610 PROTHROMBIN TIME: CPT | Performed by: STUDENT IN AN ORGANIZED HEALTH CARE EDUCATION/TRAINING PROGRAM

## 2023-10-18 PROCEDURE — 82962 GLUCOSE BLOOD TEST: CPT

## 2023-10-18 PROCEDURE — 96374 THER/PROPH/DIAG INJ IV PUSH: CPT

## 2023-10-18 PROCEDURE — 20000000 HC ICU ROOM

## 2023-10-18 PROCEDURE — 99291 CRITICAL CARE FIRST HOUR: CPT

## 2023-10-18 PROCEDURE — 63600175 PHARM REV CODE 636 W HCPCS: Performed by: STUDENT IN AN ORGANIZED HEALTH CARE EDUCATION/TRAINING PROGRAM

## 2023-10-18 PROCEDURE — 99900035 HC TECH TIME PER 15 MIN (STAT)

## 2023-10-18 PROCEDURE — 27000221 HC OXYGEN, UP TO 24 HOURS

## 2023-10-18 PROCEDURE — 93010 ELECTROCARDIOGRAM REPORT: CPT | Mod: ,,, | Performed by: INTERNAL MEDICINE

## 2023-10-18 PROCEDURE — 25000242 PHARM REV CODE 250 ALT 637 W/ HCPCS: Performed by: STUDENT IN AN ORGANIZED HEALTH CARE EDUCATION/TRAINING PROGRAM

## 2023-10-18 PROCEDURE — 99900031 HC PATIENT EDUCATION (STAT)

## 2023-10-18 PROCEDURE — 80053 COMPREHEN METABOLIC PANEL: CPT | Performed by: STUDENT IN AN ORGANIZED HEALTH CARE EDUCATION/TRAINING PROGRAM

## 2023-10-18 PROCEDURE — 93005 ELECTROCARDIOGRAM TRACING: CPT | Performed by: INTERNAL MEDICINE

## 2023-10-18 PROCEDURE — 85025 COMPLETE CBC W/AUTO DIFF WBC: CPT | Performed by: STUDENT IN AN ORGANIZED HEALTH CARE EDUCATION/TRAINING PROGRAM

## 2023-10-18 PROCEDURE — 80061 LIPID PANEL: CPT | Performed by: STUDENT IN AN ORGANIZED HEALTH CARE EDUCATION/TRAINING PROGRAM

## 2023-10-18 PROCEDURE — 94799 UNLISTED PULMONARY SVC/PX: CPT

## 2023-10-18 PROCEDURE — 94640 AIRWAY INHALATION TREATMENT: CPT

## 2023-10-18 PROCEDURE — 84443 ASSAY THYROID STIM HORMONE: CPT | Performed by: STUDENT IN AN ORGANIZED HEALTH CARE EDUCATION/TRAINING PROGRAM

## 2023-10-18 PROCEDURE — 94761 N-INVAS EAR/PLS OXIMETRY MLT: CPT

## 2023-10-18 PROCEDURE — 94660 CPAP INITIATION&MGMT: CPT

## 2023-10-18 PROCEDURE — 84484 ASSAY OF TROPONIN QUANT: CPT | Performed by: STUDENT IN AN ORGANIZED HEALTH CARE EDUCATION/TRAINING PROGRAM

## 2023-10-18 PROCEDURE — 82803 BLOOD GASES ANY COMBINATION: CPT

## 2023-10-18 PROCEDURE — 96375 TX/PRO/DX INJ NEW DRUG ADDON: CPT

## 2023-10-18 PROCEDURE — 25000003 PHARM REV CODE 250: Performed by: STUDENT IN AN ORGANIZED HEALTH CARE EDUCATION/TRAINING PROGRAM

## 2023-10-18 PROCEDURE — 25500020 PHARM REV CODE 255: Performed by: STUDENT IN AN ORGANIZED HEALTH CARE EDUCATION/TRAINING PROGRAM

## 2023-10-18 PROCEDURE — 93010 EKG 12-LEAD: ICD-10-PCS | Mod: ,,, | Performed by: INTERNAL MEDICINE

## 2023-10-18 PROCEDURE — 96372 THER/PROPH/DIAG INJ SC/IM: CPT | Performed by: STUDENT IN AN ORGANIZED HEALTH CARE EDUCATION/TRAINING PROGRAM

## 2023-10-18 RX ORDER — EPINEPHRINE 0.3 MG/.3ML
0.3 INJECTION SUBCUTANEOUS
Status: COMPLETED | OUTPATIENT
Start: 2023-10-18 | End: 2023-10-18

## 2023-10-18 RX ORDER — EPINEPHRINE 0.3 MG/.3ML
INJECTION SUBCUTANEOUS
Status: DISPENSED
Start: 2023-10-18 | End: 2023-10-19

## 2023-10-18 RX ORDER — ENOXAPARIN SODIUM 100 MG/ML
40 INJECTION SUBCUTANEOUS EVERY 24 HOURS
Status: DISCONTINUED | OUTPATIENT
Start: 2023-10-19 | End: 2023-10-20 | Stop reason: HOSPADM

## 2023-10-18 RX ORDER — LEVOTHYROXINE SODIUM 100 UG/1
100 TABLET ORAL
Status: DISCONTINUED | OUTPATIENT
Start: 2023-10-19 | End: 2023-10-20 | Stop reason: HOSPADM

## 2023-10-18 RX ORDER — DEXAMETHASONE SODIUM PHOSPHATE 4 MG/ML
8 INJECTION, SOLUTION INTRA-ARTICULAR; INTRALESIONAL; INTRAMUSCULAR; INTRAVENOUS; SOFT TISSUE
Status: COMPLETED | OUTPATIENT
Start: 2023-10-18 | End: 2023-10-18

## 2023-10-18 RX ORDER — DIPHENHYDRAMINE HYDROCHLORIDE 50 MG/ML
50 INJECTION INTRAMUSCULAR; INTRAVENOUS
Status: COMPLETED | OUTPATIENT
Start: 2023-10-18 | End: 2023-10-18

## 2023-10-18 RX ORDER — IPRATROPIUM BROMIDE AND ALBUTEROL SULFATE 2.5; .5 MG/3ML; MG/3ML
3 SOLUTION RESPIRATORY (INHALATION) CONTINUOUS
Status: DISCONTINUED | OUTPATIENT
Start: 2023-10-18 | End: 2023-10-19

## 2023-10-18 RX ORDER — IPRATROPIUM BROMIDE AND ALBUTEROL SULFATE 2.5; .5 MG/3ML; MG/3ML
3 SOLUTION RESPIRATORY (INHALATION)
Status: COMPLETED | OUTPATIENT
Start: 2023-10-18 | End: 2023-10-18

## 2023-10-18 RX ORDER — ACETAMINOPHEN 325 MG/1
650 TABLET ORAL EVERY 8 HOURS PRN
Status: DISCONTINUED | OUTPATIENT
Start: 2023-10-18 | End: 2023-10-20 | Stop reason: HOSPADM

## 2023-10-18 RX ORDER — ONDANSETRON 2 MG/ML
4 INJECTION INTRAMUSCULAR; INTRAVENOUS EVERY 8 HOURS PRN
Status: DISCONTINUED | OUTPATIENT
Start: 2023-10-18 | End: 2023-10-20 | Stop reason: HOSPADM

## 2023-10-18 RX ORDER — HYDROXYZINE HYDROCHLORIDE 25 MG/1
25 TABLET, FILM COATED ORAL 2 TIMES DAILY PRN
Status: DISCONTINUED | OUTPATIENT
Start: 2023-10-18 | End: 2023-10-20 | Stop reason: HOSPADM

## 2023-10-18 RX ORDER — TALC
6 POWDER (GRAM) TOPICAL NIGHTLY PRN
Status: DISCONTINUED | OUTPATIENT
Start: 2023-10-18 | End: 2023-10-20 | Stop reason: HOSPADM

## 2023-10-18 RX ORDER — LORAZEPAM 2 MG/ML
1 INJECTION INTRAMUSCULAR
Status: COMPLETED | OUTPATIENT
Start: 2023-10-18 | End: 2023-10-18

## 2023-10-18 RX ORDER — FAMOTIDINE 20 MG/1
20 TABLET, FILM COATED ORAL DAILY
Status: DISCONTINUED | OUTPATIENT
Start: 2023-10-19 | End: 2023-10-19

## 2023-10-18 RX ORDER — FAMOTIDINE 10 MG/ML
20 INJECTION INTRAVENOUS
Status: COMPLETED | OUTPATIENT
Start: 2023-10-18 | End: 2023-10-18

## 2023-10-18 RX ORDER — IPRATROPIUM BROMIDE AND ALBUTEROL SULFATE 2.5; .5 MG/3ML; MG/3ML
SOLUTION RESPIRATORY (INHALATION)
Status: DISPENSED
Start: 2023-10-18 | End: 2023-10-19

## 2023-10-18 RX ORDER — LEVOCETIRIZINE DIHYDROCHLORIDE 5 MG/1
5 TABLET, FILM COATED ORAL NIGHTLY
COMMUNITY
Start: 2023-10-17

## 2023-10-18 RX ORDER — SODIUM CHLORIDE 0.9 % (FLUSH) 0.9 %
10 SYRINGE (ML) INJECTION
Status: DISCONTINUED | OUTPATIENT
Start: 2023-10-18 | End: 2023-10-20 | Stop reason: HOSPADM

## 2023-10-18 RX ORDER — PREDNISONE 20 MG/1
20 TABLET ORAL 2 TIMES DAILY
COMMUNITY
Start: 2023-10-17 | End: 2023-11-27 | Stop reason: SDUPTHER

## 2023-10-18 RX ORDER — METOPROLOL TARTRATE 25 MG/1
25 TABLET, FILM COATED ORAL 2 TIMES DAILY
Status: DISCONTINUED | OUTPATIENT
Start: 2023-10-18 | End: 2023-10-20 | Stop reason: HOSPADM

## 2023-10-18 RX ADMIN — IPRATROPIUM BROMIDE AND ALBUTEROL SULFATE 3 ML: 2.5; .5 SOLUTION RESPIRATORY (INHALATION) at 07:10

## 2023-10-18 RX ADMIN — DIPHENHYDRAMINE HYDROCHLORIDE 50 MG: 50 INJECTION INTRAMUSCULAR; INTRAVENOUS at 06:10

## 2023-10-18 RX ADMIN — DEXAMETHASONE SODIUM PHOSPHATE 8 MG: 4 INJECTION, SOLUTION INTRA-ARTICULAR; INTRALESIONAL; INTRAMUSCULAR; INTRAVENOUS; SOFT TISSUE at 06:10

## 2023-10-18 RX ADMIN — LORAZEPAM 1 MG: 2 INJECTION INTRAMUSCULAR; INTRAVENOUS at 06:10

## 2023-10-18 RX ADMIN — IOHEXOL 100 ML: 350 INJECTION, SOLUTION INTRAVENOUS at 07:10

## 2023-10-18 RX ADMIN — EPINEPHRINE 0.3 MG: 0.3 INJECTION SUBCUTANEOUS at 07:10

## 2023-10-18 RX ADMIN — FAMOTIDINE 20 MG: 10 INJECTION INTRAVENOUS at 06:10

## 2023-10-18 RX ADMIN — SODIUM CHLORIDE 1000 ML: 0.9 INJECTION, SOLUTION INTRAVENOUS at 09:10

## 2023-10-18 NOTE — HPI
72 yo female w/ PMHx of Tobacco abuse, Anxiety COPD,Neuropathy, HTN who presents w/ reported acute onset dysphagia, LLE numbness and OS blurry vision.   LKW 17:47.

## 2023-10-18 NOTE — ED PROVIDER NOTES
Encounter Date: 10/18/2023       History     Chief Complaint   Patient presents with    Allergic Reaction     HPI  73 year old F w/ PMH of Martin's esophagus, colitis, COPD not on home oxygen, ongoing tobacco use, GERD, hypertension, hypothyroidism, vocal cord polyps, anxiety, and hx of endograft aortic stent who presents to [facility] ED secondary to allergic reaction evaluation.    Patient reports that about 1 hour prior to arrival she has had difficulty swallowing. She also endorses headache, left eye blurry and left lower extremity numbness.    Patient reports that she has a rash in her body that has been progressing upwards. The rash is pruritic and erythematous. Patient denies taking any new medications, drugs, animal exposure or hx of any food allergies. No new foods. No new skin detergents, soaps, or cosmetics. Patient denies syncope, chest pain, nausea, vomiting, diarrhea. Patient reports she is anxious.     Review of patient's allergies indicates:   Allergen Reactions    Bisacodyl Nausea And Vomiting     Other reaction(s): Vomiting    Cipro [ciprofloxacin hcl]     Demerol [meperidine]      Nausea vomiting, visual problem    Doxycycline Hives    Flonase [fluticasone propionate] Hives    Iodinated contrast media Hives     hypotension    Morphine     Morpholine analogues Other (See Comments)     hypotension     Past Medical History:   Diagnosis Date    Anxiety     Martin esophagus     Colitis     COPD (chronic obstructive pulmonary disease)     GERD (gastroesophageal reflux disease)     Hypertension     Thyroid disease     Vocal cord polyps      Past Surgical History:   Procedure Laterality Date    ABDOMINAL AORTIC ANEURYSM REPAIR      BACK SURGERY      cervical    CATARACT EXTRACTION Right 02/2021    CHOLECYSTECTOMY  12-    Dr Albert CORLEY    FOOT SURGERY      HYSTERECTOMY      SALIVARY GLAND SURGERY       Family History   Problem Relation Age of Onset    Cancer Mother     Heart failure Father      Emphysema Sister     No Known Problems Brother     Cancer Sister      Social History     Tobacco Use    Smoking status: Every Day     Current packs/day: 0.50     Types: Cigarettes    Smokeless tobacco: Former     Quit date: 5/1/2016   Substance Use Topics    Alcohol use: Never    Drug use: Never     Review of Systems   HENT:  Positive for trouble swallowing. Negative for voice change.    Eyes:  Positive for visual disturbance.   Respiratory:  Positive for shortness of breath and wheezing.    Cardiovascular:  Positive for palpitations. Negative for chest pain.   Gastrointestinal:  Negative for abdominal pain, diarrhea, nausea and vomiting.   Musculoskeletal:  Negative for arthralgias and back pain.   Neurological:  Positive for numbness and headaches. Negative for weakness.   Psychiatric/Behavioral:  Negative for agitation and confusion.        Physical Exam     Initial Vitals   BP Pulse Resp Temp SpO2   10/18/23 1837 10/18/23 1837 10/18/23 1837 10/18/23 2114 10/18/23 1837   (!) 178/90 (!) 59 (!) 24 97.7 °F (36.5 °C) 98 %      MAP       --                Physical Exam    Nursing note and vitals reviewed.  Constitutional: She appears well-developed.   HENT:   Head: Normocephalic and atraumatic.   Eyes: EOM are normal.   Patent oropharynx. Tongue is normal size.    Cardiovascular:  Regular rhythm.           tachycardic   Pulmonary/Chest: No stridor. She exhibits no tenderness.   Abdominal: Abdomen is soft. There is no rebound and no guarding.   Musculoskeletal:         General: No edema. Normal range of motion.     Neurological: She is oriented to person, place, and time.   CN 2-12 intact  Sensation equal in bilateral UE, sensation less in LLE compared to RLE  Strength 5/5 on handgrip bilaterally and 5/5 in bilateral LE  Alert and oriented to person, place, and time  Cerebellar testing with slight tremors on left only   Skin: Rash noted.   Erythematous, pruritic rash noted in bilateral UE and LE involving the chest    Psychiatric:   Anxious appearing         ED Course   Critical Care    Date/Time: 10/18/2023 7:13 PM    Performed by: Jose Eduardo Zeng MD  Authorized by: Jose Eduardo Zeng MD  Direct patient critical care time: 15 minutes  Additional history critical care time: 5 minutes  Ordering / reviewing critical care time: 5 minutes  Documentation critical care time: 5 minutes  Total critical care time (exclusive of procedural time) : 30 minutes  Critical care was necessary to treat or prevent imminent or life-threatening deterioration of the following conditions: shock.  Critical care was time spent personally by me on the following activities: examination of patient, obtaining history from patient or surrogate, ordering and performing treatments and interventions, ordering and review of laboratory studies, ordering and review of radiographic studies, pulse oximetry and re-evaluation of patient's condition.        Labs Reviewed   CBC W/ AUTO DIFFERENTIAL - Abnormal; Notable for the following components:       Result Value    MCH 32.6 (*)     All other components within normal limits   COMPREHENSIVE METABOLIC PANEL - Abnormal; Notable for the following components:    Glucose 123 (*)     Albumin 3.4 (*)     Anion Gap 7 (*)     All other components within normal limits    Narrative:     Recoll. [id number] by AS2 at 10/18/2023 19:33, reason: QNS. Nat   TSH - Abnormal; Notable for the following components:    TSH 0.193 (*)     All other components within normal limits    Narrative:     Recoll. [id number] by AS2 at 10/18/2023 19:33, reason: QNS. Nat   LIPID PANEL - Abnormal; Notable for the following components:    HDL 38 (*)     All other components within normal limits    Narrative:     Recoll. [id number] by AS2 at 10/18/2023 19:33, reason: QNS. Nat   ISTAT PROCEDURE - Abnormal; Notable for the following components:    POC PCO2 45.4 (*)     POC PO2 87 (*)     All other components within normal limits   PROTIME-INR    TROPONIN I HIGH SENSITIVITY   T4, FREE    Narrative:     Recoll. [id number] by AS2 at 10/18/2023 19:33, reason: QNS. Nat   TRYPTASE   ISTAT CREATININE   POCT GLUCOSE   POCT GLUCOSE MONITORING CONTINUOUS     EKG Readings: (Independently Interpreted)   66 bpn, NSR, NAD, no pr prolongation, QRS widening, or Qtc prolongation. No acute ischemic findings.        Imaging Results              CTA Head and Neck (xpd) (Final result)  Result time 10/18/23 20:18:48      Final result by Lew Duarte MD (10/18/23 20:18:48)                   Narrative:      EXAM: CTA HEAD AND NECK (XPD)    HISTORY: Neuro deficit, acute, stroke suspected    COMPARISON: None    TECHNIQUE: Multiple axial 1 mm thick images were obtained through the neck and head during rapid IV injection of contrast. Coronal and sagittal MIP reconstructions were produced.    This exam was performed according to our departmental dose-optimization program, which includes automated exposure control, adjustment of the mA and/or kV according to patient size and/or use of iterative reconstruction technique.    Unless otherwise stated, incidental findings do not require dedicated follow-up imaging.    FINDINGS: There is normal branching of the great vessels from the aortic arch that are all widely patent. The there are no stenoses within either common or external or internal carotid artery in the neck. The carotid bifurcations are unremarkable. The vertebral arteries are codominant disc, and are both widely patent throughout their course in the neck and head. There is no evidence of carotid or vertebral artery dissection. Both vertebral arteries supply the basilar artery which is well-developed and widely patent. The there is fetal origin of the left posterior cerebral artery. No stenoses are evident within either posterior cerebral artery. There is minimal calcified atherosclerotic plaque within the internal carotid arteries at the level of the carotid siphons  that results in no luminal narrowing. There are no large vessel significant stenoses. No focal intracranial occlusions are evident. There are no obvious aneurysms. No foci of abnormal enhancement are evident within the brain parenchyma. There are no neck masses. There is no cervical lymphadenopathy. The left subclavian gland appears surgically absent.    IMPRESSION:   Unremarkable CTA of the head and neck. No stenoses are identified in either common or external or internal carotid artery or within either vertebral artery. There are no intracranial occlusions or significant large vessel stenoses.    Electronically signed by:  Lew Duarte MD  10/18/2023 08:18 PM CDT Workstation: [id number]                                     CT HEAD FOR STROKE (Final result)  Result time 10/18/23 19:43:29      Final result by eLw Duarte MD (10/18/23 19:43:29)                   Narrative:      EXAM: CT HEAD FOR STROKE    HISTORY: Neuro deficit, acute, stroke suspected    COMPARISON: CT scan of the head dated 10/19/2019    TECHNIQUE: Multiple axial 3 mm thick images were acquired from the base of the skull to the vertex without IV contrast. The brain and ventricular system are structurally normal.    This exam was performed according to our departmental dose-optimization program, which includes automated exposure control, adjustment of the mA and/or kV according to patient size and/or use of iterative reconstruction technique.    Unless otherwise stated, incidental findings do not require dedicated follow-up imaging.    FINDINGS: The brain and ventricular system are structurally normal. There is no evidence of acute infarct or hemorrhage. There is no mass effect. There is no cerebral edema. There is patchy ill-defined diminished attenuation in the white matter of both cerebral hemispheres that appear somewhat more prominent than on the previous CT scan. This is consistent with sequela of moderate small vessel chronic  ischemic change. Bone window images demonstrate no osseous abnormalities. The paranasal sinuses and mastoid air cells and middle ear cavities are well aerated    IMPRESSION:   Evidence of moderate small vessel chronic ischemic change as described. No definite acute intracranial abnormality is identified.    Electronically signed by:  Lew Duarte MD  10/18/2023 07:43 PM CDT Workstation: [id number]                                     Medications   albuterol-ipratropium (DUO-NEB) 2.5 mg-0.5 mg/3 mL nebulizer solution (  Canceled Entry 10/18/23 1930)   albuterol-ipratropium 2.5 mg-0.5 mg/3 mL nebulizer solution 3 mL ( Nebulization Canceled Entry 10/18/23 2030)   famotidine tablet 20 mg (has no administration in time range)   hydrOXYzine HCL tablet 25 mg (has no administration in time range)   levothyroxine tablet 100 mcg (has no administration in time range)   metoprolol tartrate (LOPRESSOR) tablet 25 mg (has no administration in time range)   sodium chloride 0.9% flush 10 mL (has no administration in time range)   melatonin tablet 6 mg (has no administration in time range)   enoxaparin injection 40 mg (has no administration in time range)   acetaminophen tablet 650 mg (has no administration in time range)   ondansetron injection 4 mg (has no administration in time range)   acetaminophen tablet 650 mg (has no administration in time range)   methylPREDNISolone sodium succinate injection 40 mg (has no administration in time range)   famotidine (PF) injection 20 mg (20 mg Intravenous Given 10/18/23 1845)   diphenhydrAMINE injection 50 mg (50 mg Intravenous Given 10/18/23 1841)   dexAMETHasone injection 8 mg (8 mg Intravenous Given 10/18/23 1842)   LORazepam injection 1 mg (1 mg Intravenous Given 10/18/23 1847)   iohexoL (OMNIPAQUE 350) injection 100 mL (100 mLs Intravenous Given 10/18/23 1917)   albuterol-ipratropium 2.5 mg-0.5 mg/3 mL nebulizer solution 3 mL (3 mLs Nebulization Given 10/18/23 1930)   EPINEPHrine  (EPIPEN) 0.3 mg/0.3 mL pen injection 0.3 mg (0.3 mg Intramuscular Given 10/18/23 1920)   sodium chloride 0.9% bolus 1,000 mL 1,000 mL (1,000 mLs Intravenous New Bag 10/18/23 2109)     Medical Decision Making  Amount and/or Complexity of Data Reviewed  Labs: ordered.  Radiology: ordered.    Risk  Prescription drug management.  Decision regarding hospitalization.               ED Course as of 10/18/23 2222   Wed Oct 18, 2023   1939 Patient returned from CT angiogram, with worsening wheezing, diffuse rash, pruritus and hypotension consistent with anaphylactic shock.  Patient given 0.3 epi IM, with improvement in blood pressure, started on continuous DuoNebs.  Prior to CT, patient treated with steroids, Benadryl, Pepcid for allergic reaction [BS]   2014 Patient reassessed. Wheezing has improved drastically. Patient still mentating well. Able to follow command, perform thumbs up and wiggles toes on command. [JN]   2033  Unremarkable CTA of the head and neck. No stenoses are identified in either common or external or internal carotid artery or within either vertebral artery. There are no intracranial occlusions or significant large vessel stenoses. [JN]      ED Course User Index  [BS] Jose Eduardo Zeng MD  [JN] Hermes Rutherford MD            73 year old F w/ PMH of Martin's esophagus, colitis, COPD not on home oxygen, ongoing tobacco use, GERD, hypertension, hypothyroidism, vocal cord polyps, and anxiety who presents to [facility] ED secondary to allergic reaction evaluation    Upon chart review:  Patient presented to the ED on 10/12/23 secondary to complaints of hives, patient presented 2 times that day.Patient also reported throat swelling and difficulty swallowing then too.Patient received oral benadryl and pepcid . Patient was discharged to  home w/ rx for epipen.   Vitals notable for: bradycardic, tachypneic, elevated blood pressures, sattinga t 98% RA POCT 98  Physical exam notable for: as above  Ddx include  but not limited to: anaphylaxis, COPD, stroke/TIA, hypoglycemia, panic attack    Basic labs ordered. Patient stroke activated. CTH and CTA H&N ordered. Decadron 8 mg, bendaryl 50, pepcid 20 mg, and duonebs ordered. Case discussed with . No epi at this time. Ativan 1 mg ordered for anxiety.    Hermes Rutherford  Emergency medicine PGY3    Upon return form her CTHN, patient complained for worsening breathing. Oropharyx inspected an again see no enlarge/swollen tongue. Posterior oropharyx remains patent w/ no tongue enlargement. Patient with worsening erythematous skin findings, worsening wheezing R>L with warm extremities. Blood pressure immediately cycled revealing blood pressures in the 90s. Patient still mentation appropriately. 1L of NS immediately infused and IM epinephrine 0.3 mg immediately given with positive response of systolic's of 160s. Given concern for possible air trapping and issued with ventilation given wheezing and hx of COPD, VBG ordered, Bipap, continuous nebulizer, and end tidal capnography ordered to monitor and assist in ventilation.     Upon chart review, the patient has received IV contrast in the past, CTA Chest Abdomen on 23 and on 5/10/2021-->unlikely due to contrast.     Workup notable for:  No leukocytosis.  VB.35/45.4/87/25.2-->no acidosis  H/H stable at 16/46.8 Plt wnl  INR wnl  No major electroltye abnormality  Glucose wnl  No WEN  No elevated liver enzymes  LDL elevated at 111  Total cholesterol elevated at 151  HDL low at 38  HST wnl  TSH low, T4 wnl    Imaging:  CTH   -Evidence of moderate small vessel chronic ischemic change as described. No definite acute intracranial abnormality is identified.     CTA H&N:   -Unremarkable CTA of the head and neck. No stenoses are identified in either common or external or internal carotid artery or within either vertebral artery. There are no intracranial occlusions or significant large vessel stenoses.    Given that the patient is  in anaphylactic shock secondary to unknown substance, patient to be admitted to ICU for further management and workup.    Hermes Rutherford  Emergency medicine PGY3         I have reviewed the case with my resident and agree with the history, review of systems, physical exam, assessment, and plan of care as documented. I have also physically saw the patient and performed an independent HPI and exam. I was immediately available for any procedures.  In short, the patient presented 2/2 allergic reaction which developed into anaphylactic shock.  It is unclear what caused the anaphylaxis.  She has a history of hives, recurrent, ongoing for weeks without clear etiology.  She was given Ativan, Decadron, albuterol, IV contrast here.  Earlier today, she an egg roll from a [employer] restaurant and toast.  Anaphylaxis treated with IM epi with improvement in hypotension, Solu-Medrol and continuous DuoNebs for diffuse wheezing, patient's symptoms improved and she was admitted to ICU for anaphylaxis.    Jose Eduardo Zeng    Clinical Impression:   Final diagnoses:  [T78.2XXA] Anaphylaxis, initial encounter (Primary)  [T78.2XXA] Anaphylactic shock       ED Disposition Condition    Admit Stable                Hermes Rutherford MD  Resident  10/18/23 2102       Jose Eduardo Zeng MD  10/18/23 4488

## 2023-10-19 PROBLEM — J44.9 COPD (CHRONIC OBSTRUCTIVE PULMONARY DISEASE): Status: ACTIVE | Noted: 2019-09-14

## 2023-10-19 PROBLEM — H53.8 BLURRY VISION, LEFT EYE: Status: ACTIVE | Noted: 2023-10-19

## 2023-10-19 LAB
ALBUMIN SERPL BCP-MCNC: 3.5 G/DL (ref 3.5–5.2)
ALP SERPL-CCNC: 76 U/L (ref 55–135)
ALT SERPL W/O P-5'-P-CCNC: 15 U/L (ref 10–44)
ANION GAP SERPL CALC-SCNC: 6 MMOL/L (ref 8–16)
AST SERPL-CCNC: 16 U/L (ref 10–40)
BASOPHILS # BLD AUTO: 0 K/UL (ref 0–0.2)
BASOPHILS NFR BLD: 0 % (ref 0–1.9)
BILIRUB SERPL-MCNC: 0.5 MG/DL (ref 0.1–1)
BUN SERPL-MCNC: 10 MG/DL (ref 8–23)
CALCIUM SERPL-MCNC: 8.8 MG/DL (ref 8.7–10.5)
CHLORIDE SERPL-SCNC: 108 MMOL/L (ref 95–110)
CO2 SERPL-SCNC: 23 MMOL/L (ref 23–29)
CREAT SERPL-MCNC: 0.7 MG/DL (ref 0.5–1.4)
DIFFERENTIAL METHOD: ABNORMAL
EOSINOPHIL # BLD AUTO: 0 K/UL (ref 0–0.5)
EOSINOPHIL NFR BLD: 0 % (ref 0–8)
ERYTHROCYTE [DISTWIDTH] IN BLOOD BY AUTOMATED COUNT: 13.3 % (ref 11.5–14.5)
EST. GFR  (NO RACE VARIABLE): >60 ML/MIN/1.73 M^2
GLUCOSE SERPL-MCNC: 149 MG/DL (ref 70–110)
HCT VFR BLD AUTO: 41 % (ref 37–48.5)
HGB BLD-MCNC: 13.8 G/DL (ref 12–16)
IMM GRANULOCYTES # BLD AUTO: 0.03 K/UL (ref 0–0.04)
IMM GRANULOCYTES NFR BLD AUTO: 0.5 % (ref 0–0.5)
LYMPHOCYTES # BLD AUTO: 0.6 K/UL (ref 1–4.8)
LYMPHOCYTES NFR BLD: 9.2 % (ref 18–48)
MCH RBC QN AUTO: 32.6 PG (ref 27–31)
MCHC RBC AUTO-ENTMCNC: 33.7 G/DL (ref 32–36)
MCV RBC AUTO: 97 FL (ref 82–98)
MONOCYTES # BLD AUTO: 0 K/UL (ref 0.3–1)
MONOCYTES NFR BLD: 0.7 % (ref 4–15)
NEUTROPHILS # BLD AUTO: 5.4 K/UL (ref 1.8–7.7)
NEUTROPHILS NFR BLD: 89.6 % (ref 38–73)
NRBC BLD-RTO: 0 /100 WBC
PLATELET # BLD AUTO: 165 K/UL (ref 150–450)
PMV BLD AUTO: 9.5 FL (ref 9.2–12.9)
POTASSIUM SERPL-SCNC: 4 MMOL/L (ref 3.5–5.1)
PROT SERPL-MCNC: 6.5 G/DL (ref 6–8.4)
RBC # BLD AUTO: 4.23 M/UL (ref 4–5.4)
SODIUM SERPL-SCNC: 137 MMOL/L (ref 136–145)
WBC # BLD AUTO: 6.06 K/UL (ref 3.9–12.7)

## 2023-10-19 PROCEDURE — 25000003 PHARM REV CODE 250: Performed by: INTERNAL MEDICINE

## 2023-10-19 PROCEDURE — 36415 COLL VENOUS BLD VENIPUNCTURE: CPT | Performed by: STUDENT IN AN ORGANIZED HEALTH CARE EDUCATION/TRAINING PROGRAM

## 2023-10-19 PROCEDURE — 94640 AIRWAY INHALATION TREATMENT: CPT

## 2023-10-19 PROCEDURE — 94799 UNLISTED PULMONARY SVC/PX: CPT

## 2023-10-19 PROCEDURE — 25000003 PHARM REV CODE 250: Performed by: STUDENT IN AN ORGANIZED HEALTH CARE EDUCATION/TRAINING PROGRAM

## 2023-10-19 PROCEDURE — 94761 N-INVAS EAR/PLS OXIMETRY MLT: CPT

## 2023-10-19 PROCEDURE — 92610 EVALUATE SWALLOWING FUNCTION: CPT

## 2023-10-19 PROCEDURE — 80053 COMPREHEN METABOLIC PANEL: CPT | Performed by: STUDENT IN AN ORGANIZED HEALTH CARE EDUCATION/TRAINING PROGRAM

## 2023-10-19 PROCEDURE — 94760 N-INVAS EAR/PLS OXIMETRY 1: CPT

## 2023-10-19 PROCEDURE — 99900035 HC TECH TIME PER 15 MIN (STAT)

## 2023-10-19 PROCEDURE — 25000242 PHARM REV CODE 250 ALT 637 W/ HCPCS: Performed by: INTERNAL MEDICINE

## 2023-10-19 PROCEDURE — 85025 COMPLETE CBC W/AUTO DIFF WBC: CPT | Performed by: STUDENT IN AN ORGANIZED HEALTH CARE EDUCATION/TRAINING PROGRAM

## 2023-10-19 PROCEDURE — 63600175 PHARM REV CODE 636 W HCPCS: Performed by: STUDENT IN AN ORGANIZED HEALTH CARE EDUCATION/TRAINING PROGRAM

## 2023-10-19 PROCEDURE — 20000000 HC ICU ROOM

## 2023-10-19 PROCEDURE — 99900031 HC PATIENT EDUCATION (STAT)

## 2023-10-19 RX ORDER — CETIRIZINE HYDROCHLORIDE 10 MG/1
10 TABLET ORAL DAILY
Status: DISCONTINUED | OUTPATIENT
Start: 2023-10-19 | End: 2023-10-20 | Stop reason: HOSPADM

## 2023-10-19 RX ORDER — FAMOTIDINE 20 MG/1
20 TABLET, FILM COATED ORAL 2 TIMES DAILY
Status: DISCONTINUED | OUTPATIENT
Start: 2023-10-19 | End: 2023-10-20 | Stop reason: HOSPADM

## 2023-10-19 RX ORDER — PREDNISONE 20 MG/1
60 TABLET ORAL DAILY
Status: DISCONTINUED | OUTPATIENT
Start: 2023-10-20 | End: 2023-10-20 | Stop reason: HOSPADM

## 2023-10-19 RX ORDER — LEVALBUTEROL INHALATION SOLUTION 0.63 MG/3ML
0.63 SOLUTION RESPIRATORY (INHALATION) EVERY 6 HOURS PRN
Status: DISCONTINUED | OUTPATIENT
Start: 2023-10-19 | End: 2023-10-20 | Stop reason: HOSPADM

## 2023-10-19 RX ORDER — MUPIROCIN 20 MG/G
OINTMENT TOPICAL 2 TIMES DAILY
Status: DISCONTINUED | OUTPATIENT
Start: 2023-10-19 | End: 2023-10-20 | Stop reason: HOSPADM

## 2023-10-19 RX ADMIN — LEVALBUTEROL HYDROCHLORIDE 0.63 MG: 0.63 SOLUTION RESPIRATORY (INHALATION) at 10:10

## 2023-10-19 RX ADMIN — METOPROLOL TARTRATE 25 MG: 25 TABLET, FILM COATED ORAL at 09:10

## 2023-10-19 RX ADMIN — MUPIROCIN 1 G: 20 OINTMENT TOPICAL at 11:10

## 2023-10-19 RX ADMIN — ENOXAPARIN SODIUM 40 MG: 40 INJECTION SUBCUTANEOUS at 06:10

## 2023-10-19 RX ADMIN — MUPIROCIN 1 G: 20 OINTMENT TOPICAL at 09:10

## 2023-10-19 RX ADMIN — FAMOTIDINE 20 MG: 20 TABLET ORAL at 09:10

## 2023-10-19 RX ADMIN — METHYLPREDNISOLONE SODIUM SUCCINATE 40 MG: 40 INJECTION, POWDER, FOR SOLUTION INTRAMUSCULAR; INTRAVENOUS at 11:10

## 2023-10-19 RX ADMIN — CETIRIZINE HYDROCHLORIDE 10 MG: 10 TABLET, FILM COATED ORAL at 06:10

## 2023-10-19 RX ADMIN — METHYLPREDNISOLONE SODIUM SUCCINATE 40 MG: 40 INJECTION, POWDER, FOR SOLUTION INTRAMUSCULAR; INTRAVENOUS at 06:10

## 2023-10-19 RX ADMIN — LEVALBUTEROL HYDROCHLORIDE 0.63 MG: 0.63 SOLUTION RESPIRATORY (INHALATION) at 08:10

## 2023-10-19 RX ADMIN — ACETAMINOPHEN 650 MG: 325 TABLET ORAL at 09:10

## 2023-10-19 RX ADMIN — METHYLPREDNISOLONE SODIUM SUCCINATE 40 MG: 40 INJECTION, POWDER, FOR SOLUTION INTRAMUSCULAR; INTRAVENOUS at 12:10

## 2023-10-19 NOTE — PROGRESS NOTES
[facility] Medicine  Progress Note    Patient name: Joslyn Dubon  MRN: [id number]  Admit Date: 10/18/2023   LOS: 1 day     SUBJECTIVE:     Principal problem: Anaphylaxis    Interval History: Joslyn Dubon is a 73 y.o.  female with known history of anxiety, copd, gerd, htn, hypothyroid, barrets esophagus who came in with rash, difficulty swallowing and anxiety.  Initially code stroke given report of left eye vision change, report of numbness to LLE (she denies to me presently) and difficulty swallowing. She got Dexamethasone, Benadryl, Solumedrol in preparation for contrasted CTA head and neck as part of CVA workup.  When she returned from CTA head and neck, she hasd worsening wheezing, rash, pruritus and ED note documents hypotnesion and thus she was treated with 0.3mg IM Epi given convern for Anaphylaxis. She has been having a rash for 3+ weeks and is undergoing an outpatient workup for it.  She was prescribed prednisone and epi pen but has not filled these prescriptions over concerns for reactions (reports rash started after being on steroids). Rash started on lower extremities, has improved there, but has migrated to  upper extremities, chest, face.  She had wheezing earlier today but that has since resolved.  She reports benadryl makes the rash worse and has declined benadryl. She presently is on Zyrtec, Pepcid, solumedrol.  I have transitioned solumedrol to prednisone and will monitor her overnight and ensure she will get an epi pen prior to discharge. It is unclear of me exactly what is the etiology of her symptoms.  I'm not sure if her swallowing difficulty was an early sign of allergic reaction and impending anaphylaxis.  I'm not sure if the contrast dye tipped her over or just a coincidence.  She is still having left eye blurry vision though tells me she has also been told she has a cataract in that eye. She is agreeable for MRI brain in the AM to complete CVA workup.  No  focal deficits on exam other than blurry vision.     Hospital Course:    Scheduled Meds:   cetirizine  10 mg Oral Daily    enoxparin  40 mg Subcutaneous Q24H (prophylaxis, 1700)    famotidine  20 mg Oral BID    levothyroxine  100 mcg Oral Before breakfast    metoprolol tartrate  25 mg Oral BID    mupirocin   Nasal BID    [START ON 10/20/2023] predniSONE  60 mg Oral Daily     Continuous Infusions:  PRN Meds:acetaminophen, acetaminophen, hydrOXYzine HCL, levalbuterol, melatonin, ondansetron, sodium chloride 0.9%    Review of patient's allergies indicates:   Allergen Reactions    Bisacodyl Nausea And Vomiting     Other reaction(s): Vomiting    Cipro [ciprofloxacin hcl]     Demerol [meperidine]      Nausea vomiting, visual problem    Doxycycline Hives    Flonase [fluticasone propionate] Hives    Iodinated contrast media Hives     hypotension    Morphine     Morpholine analogues Other (See Comments)     hypotension       Review of Systems: As per interval history    OBJECTIVE:     Vital Signs (Most Recent)  Temp: 97.6 °F (36.4 °C) (10/19/23 1501)  Pulse: 82 (10/19/23 1615)  Resp: (!) 45 (10/19/23 1545)  BP: 138/70 (10/19/23 1600)  SpO2: (!) 94 % (10/19/23 1615)    Vital Signs Range (Last 24H):  Temp:  [97.6 °F (36.4 °C)-98.1 °F (36.7 °C)]   Pulse:  [65-88]   Resp:  [11-45]   BP: ()/(51-92)   SpO2:  [92 %-100 %]     I & O (Last 24H):  Intake/Output Summary (Last 24 hours) at 10/19/2023 1849  Last data filed at 10/19/2023 1825  Gross per 24 hour   Intake 1479 ml   Output 1325 ml   Net 154 ml       Physical Exam:    Vitals and nursing note reviewed.     Constitutional:       General: Not in acute distress.     Appearance: Well-developed.   HENT:      Head: Normocephalic and atraumatic.   Eyes:      Pupils: Pupils are equal, round, and reactive to light.   Cardiovascular:      Rate and Rhythm: Regular rhythm.   Pulmonary:      Effort: Pulmonary effort is normal.      Breath sounds: Normal breath sounds. No wheezing.    Abdominal:      General: There is no distension.      Palpations: Abdomen is soft.      Tenderness: There is no abdominal tenderness. There is no guarding or rebound.   Musculoskeletal:         General: Normal range of motion.      Cervical back: Normal range of motion.   Skin:     Findings: Erythematous rash to upper arms, chest, neck, cheeks  Neurological:      Mental Status: Alert and oriented to person, place, and time.      Blurry vision left eye     Laboratory:  I have reviewed all pertinent lab results within the past 24 hours.  CBC:   Recent Labs   Lab 10/19/23  0331   WBC 6.06   RBC 4.23   HGB 13.8   HCT 41.0      MCV 97   MCH 32.6*   MCHC 33.7     CMP:   Recent Labs   Lab 10/19/23  0331   *   CALCIUM 8.8   ALBUMIN 3.5   PROT 6.5      K 4.0   CO2 23      BUN 10   CREATININE 0.7   ALKPHOS 76   ALT 15   AST 16   BILITOT 0.5     Microbiology Results (last 7 days)       ** No results found for the last 168 hours. **            Diagnostic Results:      ASSESSMENT/PLAN:         Active Hospital Problems    Diagnosis  POA    *Anaphylaxis [T78.2XXA]  Yes    Blurry vision, left eye [H53.8]  Yes    Rash [R21]  Yes    Essential hypertension [I10]  Yes    Anxiety [F41.9]  Yes     Chronic    Gastroesophageal reflux disease without esophagitis [K21.9]  Yes    Thyroid disease [E07.9]  Yes     Chronic    COPD (chronic obstructive pulmonary disease) [J44.9]  Unknown      Resolved Hospital Problems   No resolved problems to display.         Plan:     -Plan per HPI  -I have transitioned solumedrol to prednisone to see how she will tolerate with plans to discharge on prednisone taper if she does tolerate it well.  Reports worsening rash to steroids in the past.  -Will continue zyrtec, pepcid and will plan to discharge on this. Declines Benadryl as she reports it makes the rash worse.  -Agreeable to MRI of the brain in the AM to complete stroke workup.  There is the continued left eye blurriness.  If  this is negative, she will follow up with her ophthalmologist.   -Nebs ordered for wheezing.  Not currently wheezing on my exam  -Will refer back to AI.  CM consulted to see if we can get her an earlier appointment.  -If she continues to do well, possibly home tomorrow.  I ideally would like to make sure she can secure an epi pen prior to discharge.        VTE Risk Mitigation (From admission, onward)           Ordered     enoxaparin injection 40 mg  Every 24 hours         10/18/23 2041     IP VTE HIGH RISK PATIENT  Once         10/18/23 2041     Place sequential compression device  Until discontinued         10/18/23 2041                      Department Hospital Medicine  [facility]  Kalee Farrell MD  Date of service: 10/19/2023

## 2023-10-19 NOTE — PLAN OF CARE
Problem: SLP  Goal: SLP Goal  Description: 1) Pt will consume Regular (IDDSI 7) textures and  Thin liquids (IDDSI 0) with functional oral phase and no overt s/s penetration/aspiration.    Outcome: Ongoing, Progressing    Clinical swallowing evaluation completed. All po trials consumed with functional oral phase and no overt s/s airway compromise. Missing lower partial. REC Soft & Bite-Sized (IDDSI 6) textures with thin liquids. Will follow and advance diet as tolerated.

## 2023-10-19 NOTE — PLAN OF CARE
Plan of Care and Education reviewed with patient and spouse. Pt evaluated by speech this afternoon and Level 6 diet ordered.   Problem: Adult Inpatient Plan of Care  Goal: Plan of Care Review  Outcome: Ongoing, Progressing  Goal: Patient-Specific Goal (Individualized)  Outcome: Ongoing, Progressing  Goal: Absence of Hospital-Acquired Illness or Injury  Outcome: Ongoing, Progressing  Goal: Optimal Comfort and Wellbeing  Outcome: Ongoing, Progressing  Goal: Readiness for Transition of Care  Outcome: Ongoing, Progressing     Problem: Skin Injury Risk Increased  Goal: Skin Health and Integrity  Outcome: Ongoing, Progressing

## 2023-10-19 NOTE — CONSULTS
74 yo female w/ PMHx of Tobacco abuse, Anxiety COPD,Neuropathy, HTN who presents w/ reported acute onset dysphagia, LLE numbness and OS blurry vision.   LKW 17:47.  Consult received at 18:48. Called cart at 19:00 - patient still in CTH/CTA H/N, nurse states that they would call back once patient is back in the room.   No call back received - appears patient is now being treated for anaphylaxis.     Anny Lopez MD  Vascular Neurology

## 2023-10-19 NOTE — H&P
Patient Name: Joslyn Dubon  MRN: [id number]  Patient Class: IP- Inpatient   Admission Date: 10/18/2023  6:20 PM  Attending Physician: Srinivas Mcnamara MD  Primary Care Provider: Jasbir Graham MD  Face-to-Face encounter date: 10/18/2023  Code Status: Full  MPOA:  Chief Complaint: Allergic Reaction        HISTORY OF PRESENT ILLNESS:     Joslyn Dubon is a 73 y.o.  female with known history of anxiety, copd, gerd, htn, hypothyroid, barrets esophagus who came in with rash, difficulty swallowing and anxiety.  Initially code stroke, received ativan for MRI, became hypotensive with wheezing after and now requiring bipap.  On my exam, she is resting with eyes closed and nods yes or no to questions.  Most of the history is provided by her .  He says she has recently developed a red, pruritic rash that started to her lower extremities, progressed to her abdomen and arms.  She was seen by an allergist and so far testing has been unrevealing.  Patient denies chest pain, palpitations, abdominal pain, nausea, vomiting, diarrhea, constipation,  hematochezia, hematemesis, fever, cough, congestion, runny nose, changes in vision, hearing, numbness, tingling, headache, focal weakness, dysuria, frequency, hematuria.  History was obtained from the patient and collateral obtained from the family present at the bedside and ER physician Sign-out.     REVIEW OF SYSTEMS:     10 Point Review of System was performed and was found to be negative except for that mentioned already in the HPI above.     PAST MEDICAL HISTORY:     Past Medical History:   Diagnosis Date    Anxiety     Martin esophagus     Colitis     COPD (chronic obstructive pulmonary disease)     GERD (gastroesophageal reflux disease)     Hypertension     Thyroid disease     Vocal cord polyps        PAST SURGICAL HISTORY:     Past Surgical History:   Procedure Laterality Date    ABDOMINAL AORTIC ANEURYSM REPAIR      BACK SURGERY      cervical    CATARACT  EXTRACTION Right 02/2021    CHOLECYSTECTOMY  12-    Dr Albert CORLEY    FOOT SURGERY      HYSTERECTOMY      SALIVARY GLAND SURGERY         ALLERGIES:   Bisacodyl, Cipro [ciprofloxacin hcl], Demerol [meperidine], Doxycycline, Flonase [fluticasone propionate], Iodinated contrast media, Morphine, and Morpholine analogues    FAMILY HISTORY:     Family History   Problem Relation Age of Onset    Cancer Mother     Heart failure Father     Emphysema Sister     No Known Problems Brother     Cancer Sister        SOCIAL HISTORY:     Social History     Tobacco Use    Smoking status: Every Day     Current packs/day: 0.50     Types: Cigarettes    Smokeless tobacco: Former     Quit date: 5/1/2016   Substance Use Topics    Alcohol use: Never        Social History     Substance and Sexual Activity   Sexual Activity Yes    Partners: Male        HOME MEDICATIONS:     Prior to Admission medications    Medication Sig Start Date End Date Taking? Authorizing Provider   ALPRAZolam (XANAX) 0.25 MG tablet Take 1 tablet (0.25 mg total) by mouth 2 (two) times daily as needed for Anxiety. 7/29/22 1/31/23  Jasbir Graham MD   aluminum-magnesium hydroxide-simethicone (MAALOX) 200-200-20 mg/5 mL Susp Take 30 mLs by mouth once as needed.    Provider, Cecily   clobetasoL (TEMOVATE) 0.05 % cream Apply topically 2 (two) times daily. 8/22/23   Jasbir Graham MD   cloNIDine (CATAPRES) 0.1 MG tablet Take one tablet as needed up to 3 times a day if blood pressure is greater than 170/110 7/19/23   Alyx Charles APRN-CNP   EPINEPHrine (EPIPEN) 0.3 mg/0.3 mL AtIn Inject 0.3 mLs (0.3 mg total) into the muscle as needed (anaphylaxis).  Patient not taking: Reported on 9/7/2023 7/19/23 7/18/24  Alyx Charles APRN-CNP   EPINEPHrine (EPIPEN) 0.3 mg/0.3 mL AtIn Inject 0.3 mLs (0.3 mg total) into the muscle as needed (Severe allergic reaction symptoms such as shortness of breath, tongue or throat swelling, weakness dizziness severe nausea  vomiting). 10/12/23 10/11/24  Candy Malcolm MD   famotidine (PEPCID) 20 MG tablet Take 1 tablet (20 mg total) by mouth every evening. 8/2/23 8/1/24  Jasbir Graham MD   hydroCHLOROthiazide (HYDRODIURIL) 25 MG tablet Take 1 tablet (25 mg total) by mouth daily as needed (fluid). 1/31/23 1/31/24  Jasbir Graham MD   HYDROcodone-acetaminophen (NORCO)  mg per tablet Take 1 tablet by mouth every 12 (twelve) hours as needed. 8/2/23   Jasbir Graham MD   hydrOXYzine HCL (ATARAX) 25 MG tablet Take 1 tablet (25 mg total) by mouth 2 (two) times daily as needed for Itching. 10/10/23   Jasbir Graham MD   levothyroxine (SYNTHROID) 100 MCG tablet Take 1 tablet (100 mcg total) by mouth before breakfast. 3/31/23   Jasbir Graham MD   lisinopriL (PRINIVIL,ZESTRIL) 20 MG tablet Take 1 tablet (20 mg total) by mouth 2 (two) times daily. 1/31/23   Jasbir Graham MD   losartan (COZAAR) 100 MG tablet Take 1 tablet (100 mg total) by mouth once daily. 8/22/23 8/21/24  Jasbir Graham MD   metoprolol tartrate (LOPRESSOR) 25 MG tablet Take 1 tablet (25 mg total) by mouth 2 (two) times daily. 1/31/23 1/31/24  Jasbir Graham MD   nirmatrelvir-ritonavir (PAXLOVID) 300 mg (150 mg x 2)-100 mg copackaged tablets (EUA) Take 3 tablets by mouth 2 (two) times daily. Each dose contains 2 nirmatrelvir (pink tablets) and 1 ritonavir (white tablet). Take all 3 tablets together 8/22/23   Jasbir Graham MD   omeprazole (PRILOSEC) 40 MG capsule Take 1 capsule (40 mg total) by mouth every morning. 3/31/23   Jasbir Graham MD   ondansetron (ZOFRAN) 4 MG tablet Take 1 tablet (4 mg total) by mouth every 8 (eight) hours as needed for Nausea. 8/22/23   Jasbir Graham MD   ondansetron (ZOFRAN-ODT) 4 MG TbDL Take 1 tablet (4 mg total) by mouth every 8 (eight) hours as needed. 5/31/23   Jasbir Graham MD   REFRESH OPTIVE 0.5-0.9 % Drop Place 1 drop into both eyes 4 (four) times daily. 4/24/23   Provider,  Historical   simethicone (GAS-X ORAL) Take 1 tablet by mouth as needed.    Provider, Historical         PHYSICAL EXAM:     BP (!) 148/79   Pulse 79   Resp 16   Ht 5' (1.524 m)   Wt 72.6 kg (160 lb)   SpO2 100%   BMI 31.25 kg/m²   Vitals Reviewed    General appearance: bipap, sleepy  Skin: red splotchy rash around ankles, hands and forearms, neck and chest is a little red  Neuro: Motor and sensory exams grossly intact.   HENT: Atraumatic head. Moist mucous membranes of oral cavity.  Eyes: Normal extraocular movements.   Lungs: wheezing and crackles present bilaterally.   Heart: Regular rate and rhythm. No pedal edema. No JVD present.   Abdomen: Soft, non-distended, non-tender. No rebound tenderness/guarding. Bowel sounds are normal.   Extremities: No cyanosis, clubbing.  Psych/mental status: drowsy, oriented. Cooperative. Responds appropriately to questions.       EMERGENCY DEPARTMENT LABS AND IMAGING:     Labs Reviewed   CBC W/ AUTO DIFFERENTIAL - Abnormal; Notable for the following components:       Result Value    MCH 32.6 (*)     All other components within normal limits   ISTAT PROCEDURE - Abnormal; Notable for the following components:    POC PCO2 45.4 (*)     POC PO2 87 (*)     All other components within normal limits   PROTIME-INR   TROPONIN I HIGH SENSITIVITY   COMPREHENSIVE METABOLIC PANEL   TSH   LIPID PANEL   TRYPTASE   ISTAT CREATININE   POCT GLUCOSE   POCT GLUCOSE MONITORING CONTINUOUS       CTA Head and Neck (xpd)   Final Result      CT HEAD FOR STROKE   Final Result          ASSESSMENT & PLAN:     Joslyn Dubon is a 73 y.o. female admitted for concern for anaphylactic reacrtion    Active Hospital Problems    Diagnosis    *Anaphylaxis        Plan    Erythematous pruritic rash  Difficulty swallowing  ?anaphylaxis   COPD  Rash is not new and she is being evaluated by allergy outpt  Unclear if difficulty swallowing was allergy related  She has a history of anxiety, no redness erythema or  stridor  on initial ER exam  Stroke activated for swallowing difficulty and CTA oredered  Lorazepam IV given prior to CTA  She was more red and flushed after CTA  She was also hypotensive and wheezing  Suspected anaphylaxis in ER  Treated with steroids, benadryl, epi IM  Bp improved  Placed on bipap  Will monitor in ICU overnight  Has a hx of copd, which could be etiology of wheezing  Continue duonebs  Solumedrol 40 mg IV q8      HTN  Hold bp meds for now    Hypothyroid  Continue synthroid    GERD  Battets esophagus  Continue pepcid 20 mg bid  ?reason for difficulty swallowing   Possibly some reflux while in CTA leading to wheezing and shortness of breath  Speech therapy eval once off bipap  Follow up with GI      Diet: NPO    DVT Prophylaxis: low molecular weight heparin and Encourage ambulation. OOB as tolerated.     Discharge Planning and Disposition:  Home with assistance from family    Expected LOS: 1-2    ________________________________________________________________________________    INPATIENT LIST OF MEDICATIONS     Current Facility-Administered Medications:     acetaminophen tablet 650 mg, 650 mg, Oral, Q8H PRN, Srinivas Mcnamara MD    acetaminophen tablet 650 mg, 650 mg, Oral, Q8H PRN, Srinivas Mcnamara MD    albuterol-ipratropium (DUO-NEB) 2.5 mg-0.5 mg/3 mL nebulizer solution, , , ,     albuterol-ipratropium 2.5 mg-0.5 mg/3 mL nebulizer solution 3 mL, 3 mL, Nebulization, Continuous, Hermes Rutherford MD    [START ON 10/19/2023] enoxaparin injection 40 mg, 40 mg, Subcutaneous, Q24H (prophylaxis, 1700), Srinivas Mcnamara MD    [START ON 10/19/2023] famotidine tablet 20 mg, 20 mg, Oral, Daily, Srinivas Mcnamara MD    hydrOXYzine HCL tablet 25 mg, 25 mg, Oral, BID PRN, Srinivas Mcnamara MD    [START ON 10/19/2023] levothyroxine tablet 100 mcg, 100 mcg, Oral, Before breakfast, Srinivas Mcnamara MD    melatonin tablet 6 mg, 6 mg, Oral, Nightly PRN, Srinivas Mcnamara  MD SUSAN    metoprolol tartrate (LOPRESSOR) tablet 25 mg, 25 mg, Oral, BID, Srinivas Mcnamara MD    ondansetron injection 4 mg, 4 mg, Intravenous, Q8H PRN, Srinivas Mcnamara MD    sodium chloride 0.9% bolus 1,000 mL 1,000 mL, 1,000 mL, Intravenous, Once, Hermes Rutherford MD    sodium chloride 0.9% flush 10 mL, 10 mL, Intravenous, PRN, Srinivas Mcnamara MD    Current Outpatient Medications:     ALPRAZolam (XANAX) 0.25 MG tablet, Take 1 tablet (0.25 mg total) by mouth 2 (two) times daily as needed for Anxiety., Disp: 30 tablet, Rfl: 1    aluminum-magnesium hydroxide-simethicone (MAALOX) 200-200-20 mg/5 mL Susp, Take 30 mLs by mouth once as needed., Disp: , Rfl:     clobetasoL (TEMOVATE) 0.05 % cream, Apply topically 2 (two) times daily., Disp: 45 g, Rfl: 2    cloNIDine (CATAPRES) 0.1 MG tablet, Take one tablet as needed up to 3 times a day if blood pressure is greater than 170/110, Disp: 30 tablet, Rfl: 3    EPINEPHrine (EPIPEN) 0.3 mg/0.3 mL AtIn, Inject 0.3 mLs (0.3 mg total) into the muscle as needed (anaphylaxis). (Patient not taking: Reported on 9/7/2023), Disp: 1 each, Rfl: 1    EPINEPHrine (EPIPEN) 0.3 mg/0.3 mL AtIn, Inject 0.3 mLs (0.3 mg total) into the muscle as needed (Severe allergic reaction symptoms such as shortness of breath, tongue or throat swelling, weakness dizziness severe nausea vomiting)., Disp: 1 each, Rfl: 3    famotidine (PEPCID) 20 MG tablet, Take 1 tablet (20 mg total) by mouth every evening., Disp: 90 tablet, Rfl: 3    hydroCHLOROthiazide (HYDRODIURIL) 25 MG tablet, Take 1 tablet (25 mg total) by mouth daily as needed (fluid)., Disp: 30 tablet, Rfl: 3    HYDROcodone-acetaminophen (NORCO)  mg per tablet, Take 1 tablet by mouth every 12 (twelve) hours as needed., Disp: 50 tablet, Rfl: 0    hydrOXYzine HCL (ATARAX) 25 MG tablet, Take 1 tablet (25 mg total) by mouth 2 (two) times daily as needed for Itching., Disp: 30 tablet, Rfl: 0    levothyroxine (SYNTHROID) 100 MCG  tablet, Take 1 tablet (100 mcg total) by mouth before breakfast., Disp: 90 tablet, Rfl: 3    lisinopriL (PRINIVIL,ZESTRIL) 20 MG tablet, Take 1 tablet (20 mg total) by mouth 2 (two) times daily., Disp: 180 tablet, Rfl: 3    losartan (COZAAR) 100 MG tablet, Take 1 tablet (100 mg total) by mouth once daily., Disp: 90 tablet, Rfl: 3    metoprolol tartrate (LOPRESSOR) 25 MG tablet, Take 1 tablet (25 mg total) by mouth 2 (two) times daily., Disp: 180 tablet, Rfl: 3    nirmatrelvir-ritonavir (PAXLOVID) 300 mg (150 mg x 2)-100 mg copackaged tablets (EUA), Take 3 tablets by mouth 2 (two) times daily. Each dose contains 2 nirmatrelvir (pink tablets) and 1 ritonavir (white tablet). Take all 3 tablets together, Disp: 30 tablet, Rfl: 0    omeprazole (PRILOSEC) 40 MG capsule, Take 1 capsule (40 mg total) by mouth every morning., Disp: 90 capsule, Rfl: 3    ondansetron (ZOFRAN) 4 MG tablet, Take 1 tablet (4 mg total) by mouth every 8 (eight) hours as needed for Nausea., Disp: 30 tablet, Rfl: 3    ondansetron (ZOFRAN-ODT) 4 MG TbDL, Take 1 tablet (4 mg total) by mouth every 8 (eight) hours as needed., Disp: 21 tablet, Rfl: 0    REFRESH OPTIVE 0.5-0.9 % Drop, Place 1 drop into both eyes 4 (four) times daily., Disp: , Rfl:     simethicone (GAS-X ORAL), Take 1 tablet by mouth as needed., Disp: , Rfl:       Scheduled Meds:   albuterol-ipratropium        [START ON 10/19/2023] enoxparin  40 mg Subcutaneous Q24H (prophylaxis, 1700)    [START ON 10/19/2023] famotidine  20 mg Oral Daily    [START ON 10/19/2023] levothyroxine  100 mcg Oral Before breakfast    metoprolol tartrate  25 mg Oral BID    sodium chloride 0.9%  1,000 mL Intravenous Once     Continuous Infusions:   albuterol-ipratropium       PRN Meds:.acetaminophen, acetaminophen, albuterol-ipratropium, hydrOXYzine HCL, melatonin, ondansetron, sodium chloride 0.9%      Srinivas Mcnamara  [facility] Hospitalist  10/18/2023

## 2023-10-19 NOTE — NURSING
Nurses Note -- 4 Eyes      10/18/2023   22:00 PM      Skin assessed during: Admit      [x] No Altered Skin Integrity Present    []Prevention Measures Documented      [] Yes- Altered Skin Integrity Present or Discovered   [] LDA Added if Not in Epic (Describe Wound)   [] New Altered Skin Integrity was Present on Admit and Documented in LDA   [] Wound Image Taken    Wound Care Consulted? No    Attending Nurse:  Pretty Langford RN/Staff Member:  Noni

## 2023-10-19 NOTE — CARE UPDATE
10/18/23 2330   Patient Assessment/Suction   Level of Consciousness (AVPU) alert   Respiratory Effort Normal;Unlabored   Expansion/Accessory Muscles/Retractions no use of accessory muscles   All Lung Fields Breath Sounds coarse   Rhythm/Pattern, Respiratory unlabored   PRE-TX-O2   Device (Oxygen Therapy) BIPAP   $ Is the patient on Low Flow Oxygen? Yes   Oxygen Concentration (%) 40   SpO2 97 %   Pulse Oximetry Type Continuous   $ Pulse Oximetry - Multiple Charge Pulse Oximetry - Multiple   Pulse 75   Resp (!) 23   Ready to Wean/Extubation Screen   FIO2<=50 (chart decimal) 0.4   Preset CPAP/BiPAP Settings   Mode Of Delivery BiPAP   $ Is patient using? Yes   Size of Mask Small   Sized Appropriately? Yes   Equipment Type V60   Ipap 10   EPAP (cm H2O) 5   Pressure Support (cm H2O) 5   Set Rate (Breaths/Min) 18   ITime (sec) 1   Rise Time (sec) 3   Patient CPAP/BiPAP Settings   RR Total (Breaths/Min) 23   Tidal Volume (mL) 481   VE Minute Ventilation (L/min) 11 L/min   Peak Inspiratory Pressure (cm H2O) 12   TiTOT (%) 50   Total Leak (L/Min) 10   Patient Trigger - ST Mode Only (%) 47   CPAP/BiPAP Alarms   High Pressure (cm H2O) 40   Minute Ventilation (L/Min) 3   High RR (breaths/min) 40   Low RR (breaths/min) 8   Apnea (Sec) 20   Education   $ Education BiPAP;15 min   Respiratory Evaluation   $ Care Plan Tech Time 15 min   $ Eval/Re-eval Charges Evaluation

## 2023-10-19 NOTE — PLAN OF CARE
Counts include 234 beds at the [facility]  Initial Discharge Assessment       Primary Care Provider: Jasbir Graham MD    Admission Diagnosis: Anaphylaxis, initial encounter [T78.2XXA]    Admission Date: 10/18/2023  Expected Discharge Date:     Transition of Care Barriers: None      Assessment completed at bedside.  Advanced directives not addressed at this time.  Patient intends to discharge home where she denies HH, Dialysis, coumadin clinc and use of DME. No needs identified.     Payor: [employer] MANAGED MEDICARE / Plan: Mimvi 65 / Product Type: Medicare Advantage /     Extended Emergency Contact Information  Primary Emergency Contact: Mark Dubon  Address: [address] [place] [address]           [place] [address] [facility]  Home Phone: [phone]  Mobile Phone: [phone]  Relation: Spouse   needed? No    Discharge Plan A: Home  Discharge Plan B: Home with family      [id number] - [place] - [address] [facility] [address]  [address] [place] [address]  Phone: [phone] Fax: [phone]      Initial Assessment (most recent)       Adult Discharge Assessment - 10/19/23 1045          Discharge Assessment    Assessment Type Discharge Planning Assessment     Confirmed/corrected address, phone number and insurance Yes     Confirmed Demographics Correct on Facesheet     Source of Information patient     When was your last doctors appointment? --   3 months ago    Communicated TINO with patient/caregiver Yes     Reason For Admission anaphylaxis     People in Home spouse     Facility Arrived From: home     Do you expect to return to your current living situation? Yes     Do you have help at home or someone to help you manage your care at home? Yes     Who are your caregiver(s) and their phone number(s)? Mark Dubon (Spouse)   [phone] (     Prior to hospitilization cognitive status: Alert/Oriented     Current cognitive  status: Alert/Oriented     Equipment Currently Used at Home none     Readmission within 30 days? No     Patient currently being followed by outpatient case management? No     Do you currently have service(s) that help you manage your care at home? No     Do you take prescription medications? Yes     Do you have prescription coverage? Yes     Coverage PHN     Do you have any problems affording any of your prescribed medications? No     Is the patient taking medications as prescribed? yes     Who is going to help you get home at discharge? Mark Dubon (Spouse)   [phone] (     How do you get to doctors appointments? car, drives self     Are you on dialysis? No     Do you take coumadin? No     DME Needed Upon Discharge  none     Discharge Plan discussed with: Spouse/sig other;Patient     Name(s) and Number(s) Mark Dubon (Spouse)   [phone] (     Transition of Care Barriers None     Discharge Plan A Home     Discharge Plan B Home with family

## 2023-10-19 NOTE — PT/OT/SLP EVAL
Speech Language Pathology Evaluation  Bedside Swallow    Patient Name:  Joslyn Dubon   MRN:  [id number]  Admitting Diagnosis: Anaphylaxis    Recommendations:                 General Recommendations:   Dysphagia management  Diet recommendations:  Soft & Bite Sized Diet - IDDSI Level 6, Thin   Aspiration Precautions:  alternate bites/sips as needed, small bites/sips, upright position with all intake, remain upright at least 30 minutes after meals    General Precautions: Standard, aspiration, fall  Communication strategies:  none    Assessment:   Clinical swallowing evaluation completed. Pt presents with chronic dysphonia with h/o vocal cord polyps; current smoker. Oropharyngeal swallow judged to be WFL. All po trials consumed with functional oral phase and no overt s/s airway compromise. Missing lower partial. REC Soft & Bite-Sized (IDDSI 6) textures with thin liquids. Will follow and advance diet as tolerated.     History:     PER HPI: Joslyn Dubon is a 73 y.o.  female with known history of anxiety, copd, gerd, htn, hypothyroid, barrets esophagus who came in with rash, difficulty swallowing and anxiety.  Initially code stroke, received ativan for MRI, became hypotensive with wheezing after and now requiring bipap.  On my exam, she is resting with eyes closed and nods yes or no to questions.  Most of the history is provided by her .  He says she has recently developed a red, pruritic rash that started to her lower extremities, progressed to her abdomen and arms.  She was seen by an allergist and so far testing has been unrevealing.  Patient denies chest pain, palpitations, abdominal pain, nausea, vomiting, diarrhea, constipation,  hematochezia, hematemesis, fever, cough, congestion, runny nose, changes in vision, hearing, numbness, tingling, headache, focal weakness, dysuria, frequency, hematuria.  History was obtained from the patient and collateral obtained from the family present at the bedside and ER  physician Sign-out.     Past Medical History:   Diagnosis Date    Anxiety     Martin esophagus     Colitis     COPD (chronic obstructive pulmonary disease)     GERD (gastroesophageal reflux disease)     Hypertension     Thyroid disease     Vocal cord polyps        Past Surgical History:   Procedure Laterality Date    ABDOMINAL AORTIC ANEURYSM REPAIR      BACK SURGERY      cervical    CATARACT EXTRACTION Right 02/2021    CHOLECYSTECTOMY  12-    Dr Albert CORLEY    FOOT SURGERY      HYSTERECTOMY      SALIVARY GLAND SURGERY         Social History: Patient lives with .    Prior Intubation HX:  NA    Modified Barium Swallow: NA    Imaging:  Imaging Results              CTA Head and Neck (xpd) (Final result)  Result time 10/18/23 20:18:48      Final result by Lew Duarte MD (10/18/23 20:18:48)                   Narrative:      EXAM: CTA HEAD AND NECK (XPD)    HISTORY: Neuro deficit, acute, stroke suspected    COMPARISON: None    TECHNIQUE: Multiple axial 1 mm thick images were obtained through the neck and head during rapid IV injection of contrast. Coronal and sagittal MIP reconstructions were produced.    This exam was performed according to our departmental dose-optimization program, which includes automated exposure control, adjustment of the mA and/or kV according to patient size and/or use of iterative reconstruction technique.    Unless otherwise stated, incidental findings do not require dedicated follow-up imaging.    FINDINGS: There is normal branching of the great vessels from the aortic arch that are all widely patent. The there are no stenoses within either common or external or internal carotid artery in the neck. The carotid bifurcations are unremarkable. The vertebral arteries are codominant disc, and are both widely patent throughout their course in the neck and head. There is no evidence of carotid or vertebral artery dissection. Both vertebral arteries supply the basilar artery  which is well-developed and widely patent. The there is fetal origin of the left posterior cerebral artery. No stenoses are evident within either posterior cerebral artery. There is minimal calcified atherosclerotic plaque within the internal carotid arteries at the level of the carotid siphons that results in no luminal narrowing. There are no large vessel significant stenoses. No focal intracranial occlusions are evident. There are no obvious aneurysms. No foci of abnormal enhancement are evident within the brain parenchyma. There are no neck masses. There is no cervical lymphadenopathy. The left subclavian gland appears surgically absent.    IMPRESSION:   Unremarkable CTA of the head and neck. No stenoses are identified in either common or external or internal carotid artery or within either vertebral artery. There are no intracranial occlusions or significant large vessel stenoses.    Electronically signed by:  Lew Duarte MD  10/18/2023 08:18 PM CDT Workstation: [id number]                                     CT HEAD FOR STROKE (Final result)  Result time 10/18/23 19:43:29      Final result by Lew Duarte MD (10/18/23 19:43:29)                   Narrative:      EXAM: CT HEAD FOR STROKE    HISTORY: Neuro deficit, acute, stroke suspected    COMPARISON: CT scan of the head dated 10/19/2019    TECHNIQUE: Multiple axial 3 mm thick images were acquired from the base of the skull to the vertex without IV contrast. The brain and ventricular system are structurally normal.    This exam was performed according to our departmental dose-optimization program, which includes automated exposure control, adjustment of the mA and/or kV according to patient size and/or use of iterative reconstruction technique.    Unless otherwise stated, incidental findings do not require dedicated follow-up imaging.    FINDINGS: The brain and ventricular system are structurally normal. There is no evidence of acute infarct or  "hemorrhage. There is no mass effect. There is no cerebral edema. There is patchy ill-defined diminished attenuation in the white matter of both cerebral hemispheres that appear somewhat more prominent than on the previous CT scan. This is consistent with sequela of moderate small vessel chronic ischemic change. Bone window images demonstrate no osseous abnormalities. The paranasal sinuses and mastoid air cells and middle ear cavities are well aerated    IMPRESSION:   Evidence of moderate small vessel chronic ischemic change as described. No definite acute intracranial abnormality is identified.    Electronically signed by:  Lew Duarte MD  10/18/2023 07:43 PM CDT Workstation: [id number]                                     Prior diet: Regular/thin.      Subjective     "This is my normal voice."  "I just feel exhausted."     Pain/Comfort:  Pain Rating 1: 0/10    Respiratory Status: Nasal cannula, flow 2 L/min    Objective:   Pt seen in ICU for clinical swallow evaluation. She is AAOx4 and cooperative.  at bedside. She reports acute onset of dysphagia yesterday evening; now resolved. Pt denies prior h/o dysphagia. +h/o vocal cord polyps (2016).     Oral Musculature Evaluation  Oral Musculature: WFL  Dentition: upper dentures (scattered lower dentition. Wears lower partial, but not available at time of evaluation)  Secretion Management: adequate  Mucosal Quality: adequate  Mandibular Strength and Mobility: WFL  Oral Labial Strength and Mobility: WFL  Lingual Strength and Mobility: WFL  Velar Elevation: WFL  Volitional Cough: adequate  Volitional Swallow: able to palpate laryngeal rise  Voice Prior to PO Intake: harsh; chronic. H/O vocal cord polyps. Current smoker.    Bedside Swallow Eval:   Consistencies Assessed:  Thin liquids --via tsp, cup and straw  Puree --applesauce  Mixed consistencies --diced peaches  Solids -devon cracker      Oral Phase:   WFL    Pharyngeal Phase:   Mild globus sensation following " devon curry across 1 of 2 trials  no overt clinical signs/symptoms of aspiration    Compensatory Strategies  Alteration of bites/sips effective in eliminating globus sensation    Treatment: Pt/family educated re: results/recs of evaluation, SLP role and POC. Receptive to information provided.     Goals:   Multidisciplinary Problems       SLP Goals          Problem: SLP    Goal Priority Disciplines Outcome   SLP Goal     SLP Ongoing, Progressing   Description: 1) Pt will consume Regular (IDDSI 7) textures and  Thin liquids (IDDSI 0) with functional oral phase and no overt s/s penetration/aspiration.                         Plan:     Patient to be seen:  3 x/week   Plan of Care expires:  10/26/23  Plan of Care reviewed with:  patient, spouse   SLP Follow-Up:  Yes       Discharge recommendations:  No Therapy Indicated   Barriers to Discharge:  None    Time Tracking:     SLP Treatment Date:   10/19/23  Speech Start Time:  1014  Speech Stop Time:  1026     Speech Total Time (min):  12 min    Billable Minutes: Eval Swallow and Oral Function 12 and Total Time 12    10/19/2023

## 2023-10-19 NOTE — PLAN OF CARE
10/19/23 1127   Post-Acute Status   Hospital Resources/Appts/Education Provided Appointments scheduled and added to AVS     PCP follow up scheduled ant on AVS.

## 2023-10-19 NOTE — PHARMACY MED REC
"              .    Admission Medication History     The home medication history was taken by Claudette Roa.    You may go to "Admission" then "Reconcile Home Medications" tabs to review and/or act upon these items.     The home medication list has been updated by the Pharmacy department.   Please read ALL comments highlighted in yellow.   Please address this information as you see fit.    Feel free to contact us if you have any questions or require assistance.        Medications listed below were obtained from: Patient/family and Analytic software- [employer]  No current facility-administered medications on file prior to encounter.     Current Outpatient Medications on File Prior to Encounter   Medication Sig Dispense Refill    ALPRAZolam (XANAX) 0.25 MG tablet Take 1 tablet (0.25 mg total) by mouth 2 (two) times daily as needed for Anxiety. 30 tablet 1    aluminum-magnesium hydroxide-simethicone (MAALOX) 200-200-20 mg/5 mL Susp Take 30 mLs by mouth once as needed.      clobetasoL (TEMOVATE) 0.05 % cream Apply topically 2 (two) times daily. (Patient taking differently: Apply 1 g topically 2 (two) times daily.) 45 g 2    cloNIDine (CATAPRES) 0.1 MG tablet Take one tablet as needed up to 3 times a day if blood pressure is greater than 170/110 (Patient taking differently: Take 0.1 mg by mouth every 8 (eight) hours as needed. Take one tablet as needed up to 3 times a day if blood pressure is greater than 170/110) 30 tablet 3    famotidine (PEPCID) 20 MG tablet Take 1 tablet (20 mg total) by mouth every evening. 90 tablet 3    hydroCHLOROthiazide (HYDRODIURIL) 25 MG tablet Take 1 tablet (25 mg total) by mouth daily as needed (fluid). 30 tablet 3    HYDROcodone-acetaminophen (NORCO)  mg per tablet Take 1 tablet by mouth every 12 (twelve) hours as needed. (Patient taking differently: Take 1 tablet by mouth every 12 (twelve) hours as needed for Pain.) 50 tablet 0    hydrOXYzine HCL (ATARAX) 25 MG tablet Take 1 tablet " (25 mg total) by mouth 2 (two) times daily as needed for Itching. 30 tablet 0    levocetirizine (XYZAL) 5 MG tablet Take 5 mg by mouth every evening.      levothyroxine (SYNTHROID) 100 MCG tablet Take 1 tablet (100 mcg total) by mouth before breakfast. 90 tablet 3    losartan (COZAAR) 100 MG tablet Take 1 tablet (100 mg total) by mouth once daily. 90 tablet 3    metoprolol tartrate (LOPRESSOR) 25 MG tablet Take 1 tablet (25 mg total) by mouth 2 (two) times daily. 180 tablet 3    omeprazole (PRILOSEC) 40 MG capsule Take 1 capsule (40 mg total) by mouth every morning. 90 capsule 3    ondansetron (ZOFRAN) 4 MG tablet Take 1 tablet (4 mg total) by mouth every 8 (eight) hours as needed for Nausea. 30 tablet 3    REFRESH OPTIVE 0.5-0.9 % Drop Place 1 drop into both eyes 4 (four) times daily.      simethicone (GAS-X ORAL) Take 1 tablet by mouth as needed.      EPINEPHrine (EPIPEN) 0.3 mg/0.3 mL AtIn Inject 0.3 mLs (0.3 mg total) into the muscle as needed (Severe allergic reaction symptoms such as shortness of breath, tongue or throat swelling, weakness dizziness severe nausea vomiting). (Patient not taking: Reported on 10/18/2023) 1 each 3    nirmatrelvir-ritonavir (PAXLOVID) 300 mg (150 mg x 2)-100 mg copackaged tablets (EUA) Take 3 tablets by mouth 2 (two) times daily. Each dose contains 2 nirmatrelvir (pink tablets) and 1 ritonavir (white tablet). Take all 3 tablets together 30 tablet 0    predniSONE (DELTASONE) 20 MG tablet Take 20 mg by mouth 2 (two) times daily.      [DISCONTINUED] EPINEPHrine (EPIPEN) 0.3 mg/0.3 mL AtIn Inject 0.3 mLs (0.3 mg total) into the muscle as needed (anaphylaxis). 1 each 1    [DISCONTINUED] lisinopriL (PRINIVIL,ZESTRIL) 20 MG tablet Take 1 tablet (20 mg total) by mouth 2 (two) times daily. 180 tablet 3    [DISCONTINUED] ondansetron (ZOFRAN-ODT) 4 MG TbDL Take 1 tablet (4 mg total) by mouth every 8 (eight) hours as needed. 21 tablet 0       Potential issues to be addressed PRIOR TO  DISCHARGE  Patient reported not taking the following medications: (Epipen & Prednisone). These medications remain on the home medication list. Please address accordingly.     Claudette Roa  EXT 1921

## 2023-10-20 ENCOUNTER — TELEPHONE (OUTPATIENT)
Dept: FAMILY MEDICINE | Facility: CLINIC | Age: 73
End: 2023-10-20

## 2023-10-20 ENCOUNTER — CLINICAL SUPPORT (OUTPATIENT)
Dept: SMOKING CESSATION | Facility: CLINIC | Age: 73
End: 2023-10-20
Payer: COMMERCIAL

## 2023-10-20 VITALS
TEMPERATURE: 98 F | BODY MASS INDEX: 31.77 KG/M2 | WEIGHT: 161.81 LBS | RESPIRATION RATE: 18 BRPM | OXYGEN SATURATION: 99 % | HEART RATE: 74 BPM | DIASTOLIC BLOOD PRESSURE: 77 MMHG | SYSTOLIC BLOOD PRESSURE: 137 MMHG | HEIGHT: 60 IN

## 2023-10-20 DIAGNOSIS — F17.200 NICOTINE DEPENDENCE: Primary | ICD-10-CM

## 2023-10-20 LAB
ALBUMIN SERPL BCP-MCNC: 3.5 G/DL (ref 3.5–5.2)
ALP SERPL-CCNC: 77 U/L (ref 55–135)
ALT SERPL W/O P-5'-P-CCNC: 16 U/L (ref 10–44)
ANION GAP SERPL CALC-SCNC: 6 MMOL/L (ref 8–16)
AST SERPL-CCNC: 28 U/L (ref 10–40)
BASOPHILS # BLD AUTO: 0.02 K/UL (ref 0–0.2)
BASOPHILS NFR BLD: 0.1 % (ref 0–1.9)
BILIRUB SERPL-MCNC: 0.4 MG/DL (ref 0.1–1)
BUN SERPL-MCNC: 19 MG/DL (ref 8–23)
CALCIUM SERPL-MCNC: 9.3 MG/DL (ref 8.7–10.5)
CHLORIDE SERPL-SCNC: 108 MMOL/L (ref 95–110)
CO2 SERPL-SCNC: 24 MMOL/L (ref 23–29)
CREAT SERPL-MCNC: 0.8 MG/DL (ref 0.5–1.4)
DIFFERENTIAL METHOD: ABNORMAL
EOSINOPHIL # BLD AUTO: 0 K/UL (ref 0–0.5)
EOSINOPHIL NFR BLD: 0 % (ref 0–8)
ERYTHROCYTE [DISTWIDTH] IN BLOOD BY AUTOMATED COUNT: 13.6 % (ref 11.5–14.5)
EST. GFR  (NO RACE VARIABLE): >60 ML/MIN/1.73 M^2
GLUCOSE SERPL-MCNC: 120 MG/DL (ref 70–110)
HCT VFR BLD AUTO: 38.8 % (ref 37–48.5)
HGB BLD-MCNC: 13 G/DL (ref 12–16)
IMM GRANULOCYTES # BLD AUTO: 0.06 K/UL (ref 0–0.04)
IMM GRANULOCYTES NFR BLD AUTO: 0.4 % (ref 0–0.5)
LYMPHOCYTES # BLD AUTO: 1.7 K/UL (ref 1–4.8)
LYMPHOCYTES NFR BLD: 10.8 % (ref 18–48)
MCH RBC QN AUTO: 32.2 PG (ref 27–31)
MCHC RBC AUTO-ENTMCNC: 33.5 G/DL (ref 32–36)
MCV RBC AUTO: 96 FL (ref 82–98)
MONOCYTES # BLD AUTO: 1.1 K/UL (ref 0.3–1)
MONOCYTES NFR BLD: 6.8 % (ref 4–15)
NEUTROPHILS # BLD AUTO: 12.5 K/UL (ref 1.8–7.7)
NEUTROPHILS NFR BLD: 81.9 % (ref 38–73)
NRBC BLD-RTO: 0 /100 WBC
PLATELET # BLD AUTO: 177 K/UL (ref 150–450)
PMV BLD AUTO: 9.7 FL (ref 9.2–12.9)
POTASSIUM SERPL-SCNC: 4.1 MMOL/L (ref 3.5–5.1)
PROT SERPL-MCNC: 6.5 G/DL (ref 6–8.4)
RBC # BLD AUTO: 4.04 M/UL (ref 4–5.4)
SODIUM SERPL-SCNC: 138 MMOL/L (ref 136–145)
WBC # BLD AUTO: 15.33 K/UL (ref 3.9–12.7)

## 2023-10-20 PROCEDURE — 25000003 PHARM REV CODE 250: Performed by: INTERNAL MEDICINE

## 2023-10-20 PROCEDURE — 99406 BEHAV CHNG SMOKING 3-10 MIN: CPT

## 2023-10-20 PROCEDURE — 25000003 PHARM REV CODE 250: Performed by: STUDENT IN AN ORGANIZED HEALTH CARE EDUCATION/TRAINING PROGRAM

## 2023-10-20 PROCEDURE — 63600175 PHARM REV CODE 636 W HCPCS: Performed by: INTERNAL MEDICINE

## 2023-10-20 PROCEDURE — 85025 COMPLETE CBC W/AUTO DIFF WBC: CPT | Performed by: STUDENT IN AN ORGANIZED HEALTH CARE EDUCATION/TRAINING PROGRAM

## 2023-10-20 PROCEDURE — 36415 COLL VENOUS BLD VENIPUNCTURE: CPT | Performed by: STUDENT IN AN ORGANIZED HEALTH CARE EDUCATION/TRAINING PROGRAM

## 2023-10-20 PROCEDURE — 99407 PR TOBACCO USE CESSATION INTENSIVE >10 MINUTES: ICD-10-PCS | Mod: S$GLB,,, | Performed by: INTERNAL MEDICINE

## 2023-10-20 PROCEDURE — 80053 COMPREHEN METABOLIC PANEL: CPT | Performed by: STUDENT IN AN ORGANIZED HEALTH CARE EDUCATION/TRAINING PROGRAM

## 2023-10-20 PROCEDURE — 94761 N-INVAS EAR/PLS OXIMETRY MLT: CPT

## 2023-10-20 PROCEDURE — 99407 BEHAV CHNG SMOKING > 10 MIN: CPT | Mod: S$GLB,,, | Performed by: INTERNAL MEDICINE

## 2023-10-20 RX ORDER — CETIRIZINE HYDROCHLORIDE 10 MG/1
10 TABLET ORAL DAILY
Refills: 0 | Status: ON HOLD
Start: 2023-10-21 | End: 2024-01-06

## 2023-10-20 RX ADMIN — METOPROLOL TARTRATE 25 MG: 25 TABLET, FILM COATED ORAL at 08:10

## 2023-10-20 RX ADMIN — LEVOTHYROXINE SODIUM 100 MCG: 0.1 TABLET ORAL at 05:10

## 2023-10-20 RX ADMIN — CETIRIZINE HYDROCHLORIDE 10 MG: 10 TABLET, FILM COATED ORAL at 08:10

## 2023-10-20 RX ADMIN — MUPIROCIN 1 G: 20 OINTMENT TOPICAL at 08:10

## 2023-10-20 RX ADMIN — PREDNISONE 60 MG: 20 TABLET ORAL at 08:10

## 2023-10-20 RX ADMIN — FAMOTIDINE 20 MG: 20 TABLET ORAL at 08:10

## 2023-10-20 NOTE — NURSING TRANSFER
Nursing Transfer Note      10/20/2023   5:58 AM    Nurse giving handoff:PIERRE Clarke  Nurse receiving handoff:PIERRE Lundberg    Reason patient is being transferred: Stepdown    Transfer To: Cardio    Transfer via wheelchair    Transfer with cardiac monitoring    Transported by PIERRE Clarke    Transfer Vital Signs:  Blood Pressure:154/70  Heart Rate:80  O2:100  Temperature:98.3  Respirations:16    Telemetry: Telemetry  no  Order for Tele Monitor? No    Additional Lines: Oxygen    4eyes on Skin: yes    Medicines sent: yes    Any special needs or follow-up needed: no    Patient belongings transferred with patient: Yes    Chart send with patient: Yes    Notified: spouse    Patient reassessed at: 10/20/23 0600 (date, time)  1  Upon arrival to floor: cardiac monitor applied, patient oriented to room, call bell in reach, and bed in lowest position

## 2023-10-20 NOTE — HOSPITAL COURSE
Joslyn Dubon is a 73 y.o.  female with known history of anxiety, copd, gerd, htn, hypothyroid, barrets esophagus who came in with rash, difficulty swallowing and anxiety.  Initially code stroke given report of left eye vision change, report of numbness to LLE (she denies to me presently) and difficulty swallowing. She got Dexamethasone, Benadryl, Solumedrol in preparation for contrasted CTA head and neck as part of CVA workup.  When she returned from CTA head and neck, she hasd worsening wheezing, rash, pruritus and ED note documents hypotnesion and thus she was treated with 0.3mg IM Epi given convern for Anaphylaxis. She has been having a rash for 3+ weeks and is undergoing an outpatient workup for it.  She was prescribed prednisone and epi pen but has not filled these prescriptions over concerns for reactions (reports rash started after being on steroids). Rash started on lower extremities, has improved there, but has migrated to  upper extremities, chest, face.  She had wheezing earlier today but that has since resolved.  She reports benadryl makes the rash worse and has declined benadryl. She presently is on Zyrtec, Pepcid, solumedrol.  I have transitioned solumedrol to prednisone and will monitor her overnight and ensure she will get an epi pen prior to discharge. It is unclear of me exactly what is the etiology of her symptoms.  I'm not sure if her swallowing difficulty was an early sign of allergic reaction and impending anaphylaxis.  I'm not sure if the contrast dye tipped her over or just a coincidence or the inciting event for suspected anaphylaxis.  She is still having left eye blurry vision intermittently though tells me she has also been told she has a cataract in that eye. She is agreeable for MRI brain in the AM to complete CVA workup.  No focal deficits on exam other than blurry vision. 10/20 Feels well, does not wish to wait for the MRI as vision has normalized and would like to go home. She  looks great on exam and I think this is reasonable for her to follow up with ophthalmology. She already has Rx for prednisone and is on pepcid and has OTC zyrtec at home. She will not require any Rx for me. She will contact her AI MD to make them aware of this admission and see if she needs a sooner follow up.  Her  has obtained the epi pen and we will instruct them on how to administer it.  Examined on day of discharge and alert, NAD, comfortable respirations on room air. Return precautions given.

## 2023-10-20 NOTE — DISCHARGE INSTRUCTIONS
Follow Epi Pen administration instructions if allergic reaction happens.   Nurse demonstrated how to use epi pen as well as printed instructions with diagrams on how to administer injection if allergic reaction happens.

## 2023-10-20 NOTE — TELEPHONE ENCOUNTER
----- Message from Alyx Lechuga sent at 10/20/2023 11:12 AM CDT -----  Pt got discharged today from [facility] and needs a [facility]   [phone]

## 2023-10-20 NOTE — NURSING
Patients monitor box removed and returned. Patient had no Ivs in place that needed to be removed, was refusing. Reviewed discharge AVS instructions patient has no further questions. This nurse demonstrated how to properly administer EPI Pen in case of emergency to patient and patients spouse, they both properly demonstrated how to administer epi pen. Patient refused to be transferred via wheelchair for discharge. Patient walked with spouse out of hospital for discharge without difficulty.

## 2023-10-20 NOTE — CARE UPDATE
10/19/23 2022   Patient Assessment/Suction   Level of Consciousness (AVPU) alert   Respiratory Effort Unlabored   Expansion/Accessory Muscles/Retractions no use of accessory muscles   All Lung Fields Breath Sounds wheezes, expiratory   Rhythm/Pattern, Respiratory no shortness of breath reported   Cough Frequency infrequent   Cough Type no productive sputum   PRE-TX-O2   Device (Oxygen Therapy) room air   SpO2 96 %   Pulse Oximetry Type Continuous   $ Pulse Oximetry - Multiple Charge Pulse Oximetry - Multiple   Pulse 82   Resp 15   Positioning   Head of Bed (HOB) Positioning HOB elevated;HOB at 30 degrees   Aerosol Therapy   $ Aerosol Therapy Charges Aerosol Treatment   Daily Review of Necessity (SVN) completed   Respiratory Treatment Status (SVN) given   Treatment Route (SVN) mask   Patient Position (SVN) semi-Feliciano's   Post Treatment Assessment (SVN) breath sounds improved   Signs of Intolerance (SVN) none   Breath Sounds Post-Respiratory Treatment   Throughout All Fields Post-Treatment All Fields   Throughout All Fields Post-Treatment aeration increased   Post-treatment Heart Rate (beats/min) 77   Post-treatment Resp Rate (breaths/min) 20   Education   $ Education Bronchodilator;15 min   Respiratory Evaluation   $ Care Plan Tech Time 15 min   $ Eval/Re-eval Charges Re-evaluation

## 2023-10-20 NOTE — TELEPHONE ENCOUNTER
She's already scheduled next week. Since she was just discharged reminder created to f/u with patient on Monday.

## 2023-10-20 NOTE — PLAN OF CARE
10/20/23 0856   Patient Assessment/Suction   Level of Consciousness (AVPU) alert   Respiratory Effort Unlabored   PRE-TX-O2   Device (Oxygen Therapy) room air   Pulse Oximetry Type Intermittent   $ Pulse Oximetry - Multiple Charge Pulse Oximetry - Multiple   Pulse 74   Resp 18

## 2023-10-20 NOTE — PLAN OF CARE
10/20/23 1029   Final Note   Assessment Type Final Discharge Note   Anticipated Discharge Disposition Home   What phone number can be called within the next 1-3 days to see how you are doing after discharge? [phone]   Post-Acute Status   Discharge Delays None known at this time     Patient cleared for discharge from case management standpoint.    Follow up appointments scheduled and added to AVS.    Chart and discharge orders reviewed.  Patient discharged home with no further case management needs.

## 2023-10-20 NOTE — PROGRESS NOTES
10/20/23 1030   Tobacco Cessation Intervention   Do you use any type of tobacco product? Yes   Are you interested in quitting use of tobacco products? Ready to quit   Are you interested in Nicotine Replacement for symptom relief? Yes   $ Smoking Cessation Charges Smoking Cessation - Intermediate (Non-CTTS)

## 2023-10-20 NOTE — DISCHARGE SUMMARY
[facility]  Discharge Summary      Patient Name: Joslyn Dubon  MRN: [id number]  ROSSY: [id number]  Patient Class: IP- Inpatient  Admission Date: 10/18/2023  Hospital Length of Stay: 2 days  Discharge Date and Time: 10/20/2023 11:01 AM  Attending Physician: No att. providers found   Discharging Provider: Kalee Farrell MD  Primary Care Provider: Jasbir Graham MD    Primary Care Team: Networked reference to record PCT     HPI:   No notes on file    * No surgery found *      Hospital Course:   Joslyn Dubon is a 73 y.o.  female with known history of anxiety, copd, gerd, htn, hypothyroid, barrets esophagus who came in with rash, difficulty swallowing and anxiety.  Initially code stroke given report of left eye vision change, report of numbness to LLE (she denies to me presently) and difficulty swallowing. She got Dexamethasone, Benadryl, Solumedrol in preparation for contrasted CTA head and neck as part of CVA workup.  When she returned from CTA head and neck, she hasd worsening wheezing, rash, pruritus and ED note documents hypotnesion and thus she was treated with 0.3mg IM Epi given convern for Anaphylaxis. She has been having a rash for 3+ weeks and is undergoing an outpatient workup for it.  She was prescribed prednisone and epi pen but has not filled these prescriptions over concerns for reactions (reports rash started after being on steroids). Rash started on lower extremities, has improved there, but has migrated to  upper extremities, chest, face.  She had wheezing earlier today but that has since resolved.  She reports benadryl makes the rash worse and has declined benadryl. She presently is on Zyrtec, Pepcid, solumedrol.  I have transitioned solumedrol to prednisone and will monitor her overnight and ensure she will get an epi pen prior to discharge. It is unclear of me exactly what is the etiology of her symptoms.  I'm not sure if her swallowing difficulty was an  early sign of allergic reaction and impending anaphylaxis.  I'm not sure if the contrast dye tipped her over or just a coincidence or the inciting event for suspected anaphylaxis.  She is still having left eye blurry vision intermittently though tells me she has also been told she has a cataract in that eye. She is agreeable for MRI brain in the AM to complete CVA workup.  No focal deficits on exam other than blurry vision. 10/20 Feels well, does not wish to wait for the MRI as vision has normalized and would like to go home. She looks great on exam and I think this is reasonable for her to follow up with ophthalmology. She already has Rx for prednisone and is on pepcid and has OTC zyrtec at home. She will not require any Rx for me. She will contact her AI MD to make them aware of this admission and see if she needs a sooner follow up.  Her  has obtained the epi pen and we will instruct them on how to administer it.  Examined on day of discharge and alert, NAD, comfortable respirations on room air. Return precautions given.       Goals of Care Treatment Preferences:  Code Status: Full Code      Consults:   Consults (From admission, onward)        Status Ordering Provider     Consult to Telemedicine - Acute Stroke  Once        Provider:  (Not yet assigned)    Completed KENDAL PENA          No new Assessment & Plan notes have been filed under this hospital service since the last note was generated.  Service: Hospital Medicine    Final Active Diagnoses:    Diagnosis Date Noted POA    PRINCIPAL PROBLEM:  Anaphylaxis [T78.2XXA] 10/18/2023 Yes    Blurry vision, left eye [H53.8] 10/19/2023 Yes    Rash [R21] 10/12/2023 Yes    Essential hypertension [I10] 11/23/2021 Yes    Anxiety [F41.9] 12/05/2020 Yes     Chronic    Gastroesophageal reflux disease without esophagitis [K21.9] 02/21/2020 Yes    Thyroid disease [E07.9] 09/14/2019 Yes     Chronic    COPD (chronic obstructive pulmonary disease) [J44.9]  09/14/2019 Yes      Problems Resolved During this Admission:       Discharged Condition: good    Disposition: Home or Self Care    Follow Up:   Follow-up Information     Jasbir Graham MD. Go on 10/25/2023.    Specialties: Family Medicine, Home Health Services, Hospice Services  Why: 11:40 AM, Hospital follow up  Contact information:  [address] [facility]  [address]  [facility] [address]  [phone]             Allergist Immunologist Follow up.    Why: Please keep follow up appointment                     Patient Instructions:      Diet Cardiac     Notify your health care provider if you experience any of the following:  difficulty breathing or increased cough     Notify your health care provider if you experience any of the following:  worsening rash     Notify your health care provider if you experience any of the following:  persistent dizziness, light-headedness, or visual disturbances     Activity as tolerated       Significant Diagnostic Studies: Labs:   CMP   Recent Labs   Lab 10/18/23  2004 10/19/23  0331 10/20/23  0322    137 138   K 4.2 4.0 4.1    108 108   CO2 23 23 24   * 149* 120*   BUN 12 10 19   CREATININE 0.7 0.7 0.8   CALCIUM 8.9 8.8 9.3   PROT 6.4 6.5 6.5   ALBUMIN 3.4* 3.5 3.5   BILITOT 0.6 0.5 0.4   ALKPHOS 74 76 77   AST 23 16 28   ALT 17 15 16   ANIONGAP 7* 6* 6*    and CBC   Recent Labs   Lab 10/18/23  1848 10/19/23  0331 10/20/23  0322   WBC 7.43 6.06 15.33*   HGB 16.0 13.8 13.0   HCT 46.8 41.0 38.8    165 177       Pending Diagnostic Studies:     Procedure Component Value Units Date/Time    Tryptase [[id number]] Collected: 10/18/23 2109    Order Status: Sent Lab Status: In process Updated: 10/18/23 2114    Specimen: Blood          Medications:  Reconciled Home Medications:      Medication List      START taking these medications    cetirizine 10 MG tablet  Commonly known as: ZYRTEC  Take 1 tablet (10 mg total) by mouth once daily.  Start  taking on: October 21, 2023        CHANGE how you take these medications    cloNIDine 0.1 MG tablet  Commonly known as: CATAPRES  Take one tablet as needed up to 3 times a day if blood pressure is greater than 170/110  What changed:   · how much to take  · how to take this  · when to take this  · reasons to take this        CONTINUE taking these medications    ALPRAZolam 0.25 MG tablet  Commonly known as: XANAX  Take 1 tablet (0.25 mg total) by mouth 2 (two) times daily as needed for Anxiety.     aluminum-magnesium hydroxide-simethicone 200-200-20 mg/5 mL Susp  Commonly known as: MAALOX  Take 30 mLs by mouth once as needed.     clobetasoL 0.05 % cream  Commonly known as: TEMOVATE  Apply topically 2 (two) times daily.     famotidine 20 MG tablet  Commonly known as: PEPCID  Take 1 tablet (20 mg total) by mouth every evening.     GAS-X ORAL  Take 1 tablet by mouth as needed.     hydroCHLOROthiazide 25 MG tablet  Commonly known as: HYDRODIURIL  Take 1 tablet (25 mg total) by mouth daily as needed (fluid).     HYDROcodone-acetaminophen  mg per tablet  Commonly known as: NORCO  Take 1 tablet by mouth every 12 (twelve) hours as needed.     hydrOXYzine HCL 25 MG tablet  Commonly known as: ATARAX  Take 1 tablet (25 mg total) by mouth 2 (two) times daily as needed for Itching.     levocetirizine 5 MG tablet  Commonly known as: XYZAL  Take 5 mg by mouth every evening.     levothyroxine 100 MCG tablet  Commonly known as: SYNTHROID  Take 1 tablet (100 mcg total) by mouth before breakfast.     losartan 100 MG tablet  Commonly known as: COZAAR  Take 1 tablet (100 mg total) by mouth once daily.     metoprolol tartrate 25 MG tablet  Commonly known as: LOPRESSOR  Take 1 tablet (25 mg total) by mouth 2 (two) times daily.     omeprazole 40 MG capsule  Commonly known as: PRILOSEC  Take 1 capsule (40 mg total) by mouth every morning.     ondansetron 4 MG tablet  Commonly known as: ZOFRAN  Take 1 tablet (4 mg total) by mouth every 8  (eight) hours as needed for Nausea.     PAXLOVID 300 mg (150 mg x 2)-100 mg copackaged tablets (EUA)  Generic drug: nirmatrelvir-ritonavir  Take 3 tablets by mouth 2 (two) times daily. Each dose contains 2 nirmatrelvir (pink tablets) and 1 ritonavir (white tablet). Take all 3 tablets together     predniSONE 20 MG tablet  Commonly known as: DELTASONE  Take 20 mg by mouth 2 (two) times daily.     REFRESH OPTIVE 0.5-0.9 % Drop  Generic drug: carboxymethylcellulose-glycern  Place 1 drop into both eyes 4 (four) times daily.        ASK your doctor about these medications    EPINEPHrine 0.3 mg/0.3 mL Atin  Commonly known as: EPIPEN  Inject 0.3 mLs (0.3 mg total) into the muscle as needed (Severe allergic reaction symptoms such as shortness of breath, tongue or throat swelling, weakness dizziness severe nausea vomiting).            Indwelling Lines/Drains at time of discharge:   Lines/Drains/Airways     None                 Time spent on the discharge of patient: 32 minutes         Kalee Farrell MD  Department of Hospital Medicine  [facility]

## 2023-10-20 NOTE — CARE UPDATE
10/19/23 2110   Patient Assessment/Suction   Level of Consciousness (AVPU) alert   Respiratory Effort Normal;Unlabored   Expansion/Accessory Muscles/Retractions no use of accessory muscles   All Lung Fields Breath Sounds diminished   Rhythm/Pattern, Respiratory no shortness of breath reported   Cough Frequency no cough   PRE-TX-O2   Device (Oxygen Therapy) room air   SpO2 96 %   Pulse Oximetry Type Continuous   $ Pulse Oximetry - Single Charge Pulse Oximetry - Single   $ Pulse Oximetry - Multiple Charge Pulse Oximetry - Multiple   Pulse 84   Resp (!) 25   Positioning HOB elevated 45 degrees   Positioning   Body Position position changed independently   Head of Bed (HOB) Positioning HOB at 30-45 degrees   Aerosol Therapy   $ Aerosol Therapy Charges PRN treatment not required   Preset CPAP/BiPAP Settings   Mode Of Delivery BiPAP   $ Is patient using? No/refused   Equipment Type V60   Education   $ Education Bronchodilator;15 min   Respiratory Evaluation   $ Care Plan Tech Time 15 min   $ Eval/Re-eval Charges Re-evaluation

## 2023-10-22 ENCOUNTER — HOSPITAL ENCOUNTER (EMERGENCY)
Facility: HOSPITAL | Age: 73
Discharge: LEFT BEFORE DISCHARGE | End: 2023-10-22
Attending: EMERGENCY MEDICINE
Payer: MEDICARE

## 2023-10-22 VITALS
DIASTOLIC BLOOD PRESSURE: 76 MMHG | BODY MASS INDEX: 31.61 KG/M2 | WEIGHT: 161 LBS | TEMPERATURE: 98 F | HEART RATE: 59 BPM | OXYGEN SATURATION: 96 % | HEIGHT: 60 IN | SYSTOLIC BLOOD PRESSURE: 178 MMHG | RESPIRATION RATE: 21 BRPM

## 2023-10-22 DIAGNOSIS — M54.50 LOW BACK PAIN, UNSPECIFIED BACK PAIN LATERALITY, UNSPECIFIED CHRONICITY, UNSPECIFIED WHETHER SCIATICA PRESENT: Primary | ICD-10-CM

## 2023-10-22 DIAGNOSIS — Z53.29 LEFT AGAINST MEDICAL ADVICE: ICD-10-CM

## 2023-10-22 LAB
ALBUMIN SERPL BCP-MCNC: 4 G/DL (ref 3.5–5.2)
ALP SERPL-CCNC: 80 U/L (ref 55–135)
ALT SERPL W/O P-5'-P-CCNC: 23 U/L (ref 10–44)
ANION GAP SERPL CALC-SCNC: 5 MMOL/L (ref 8–16)
AST SERPL-CCNC: 22 U/L (ref 10–40)
BACTERIA #/AREA URNS HPF: NEGATIVE /HPF
BASOPHILS # BLD AUTO: 0.02 K/UL (ref 0–0.2)
BASOPHILS NFR BLD: 0.2 % (ref 0–1.9)
BILIRUB SERPL-MCNC: 0.3 MG/DL (ref 0.1–1)
BILIRUB UR QL STRIP: NEGATIVE
BUN SERPL-MCNC: 17 MG/DL (ref 8–23)
CALCIUM SERPL-MCNC: 9.1 MG/DL (ref 8.7–10.5)
CHLORIDE SERPL-SCNC: 107 MMOL/L (ref 95–110)
CLARITY UR: CLEAR
CO2 SERPL-SCNC: 25 MMOL/L (ref 23–29)
COLOR UR: YELLOW
CREAT SERPL-MCNC: 0.8 MG/DL (ref 0.5–1.4)
DIFFERENTIAL METHOD: ABNORMAL
EOSINOPHIL # BLD AUTO: 0 K/UL (ref 0–0.5)
EOSINOPHIL NFR BLD: 0.1 % (ref 0–8)
ERYTHROCYTE [DISTWIDTH] IN BLOOD BY AUTOMATED COUNT: 13.4 % (ref 11.5–14.5)
EST. GFR  (NO RACE VARIABLE): >60 ML/MIN/1.73 M^2
GLUCOSE SERPL-MCNC: 118 MG/DL (ref 70–110)
GLUCOSE UR QL STRIP: NEGATIVE
HCT VFR BLD AUTO: 42.6 % (ref 37–48.5)
HGB BLD-MCNC: 14.3 G/DL (ref 12–16)
HGB UR QL STRIP: ABNORMAL
HYALINE CASTS #/AREA URNS LPF: 5 /LPF
IMM GRANULOCYTES # BLD AUTO: 0.06 K/UL (ref 0–0.04)
IMM GRANULOCYTES NFR BLD AUTO: 0.6 % (ref 0–0.5)
KETONES UR QL STRIP: NEGATIVE
LEUKOCYTE ESTERASE UR QL STRIP: NEGATIVE
LYMPHOCYTES # BLD AUTO: 1.2 K/UL (ref 1–4.8)
LYMPHOCYTES NFR BLD: 12 % (ref 18–48)
MCH RBC QN AUTO: 32 PG (ref 27–31)
MCHC RBC AUTO-ENTMCNC: 33.6 G/DL (ref 32–36)
MCV RBC AUTO: 95 FL (ref 82–98)
MICROSCOPIC COMMENT: ABNORMAL
MONOCYTES # BLD AUTO: 0.5 K/UL (ref 0.3–1)
MONOCYTES NFR BLD: 4.5 % (ref 4–15)
NEUTROPHILS # BLD AUTO: 8.4 K/UL (ref 1.8–7.7)
NEUTROPHILS NFR BLD: 82.6 % (ref 38–73)
NITRITE UR QL STRIP: NEGATIVE
NRBC BLD-RTO: 0 /100 WBC
PH UR STRIP: 6 [PH] (ref 5–8)
PLATELET # BLD AUTO: 182 K/UL (ref 150–450)
PMV BLD AUTO: 9.8 FL (ref 9.2–12.9)
POTASSIUM SERPL-SCNC: 3.7 MMOL/L (ref 3.5–5.1)
PROT SERPL-MCNC: 7.3 G/DL (ref 6–8.4)
PROT UR QL STRIP: NEGATIVE
RBC # BLD AUTO: 4.47 M/UL (ref 4–5.4)
RBC #/AREA URNS HPF: 4 /HPF (ref 0–4)
SODIUM SERPL-SCNC: 137 MMOL/L (ref 136–145)
SP GR UR STRIP: 1.01 (ref 1–1.03)
SQUAMOUS #/AREA URNS HPF: 3 /HPF
TRYPTASE SERPL-MCNC: 26.7 UG/L (ref 2.2–13.2)
URN SPEC COLLECT METH UR: ABNORMAL
UROBILINOGEN UR STRIP-ACNC: NEGATIVE EU/DL
WBC # BLD AUTO: 10.2 K/UL (ref 3.9–12.7)
WBC #/AREA URNS HPF: 1 /HPF (ref 0–5)

## 2023-10-22 PROCEDURE — 85025 COMPLETE CBC W/AUTO DIFF WBC: CPT | Performed by: EMERGENCY MEDICINE

## 2023-10-22 PROCEDURE — 80053 COMPREHEN METABOLIC PANEL: CPT | Performed by: EMERGENCY MEDICINE

## 2023-10-22 PROCEDURE — 51798 US URINE CAPACITY MEASURE: CPT

## 2023-10-22 PROCEDURE — 81001 URINALYSIS AUTO W/SCOPE: CPT | Performed by: EMERGENCY MEDICINE

## 2023-10-22 PROCEDURE — 51701 INSERT BLADDER CATHETER: CPT

## 2023-10-22 PROCEDURE — 99284 EMERGENCY DEPT VISIT MOD MDM: CPT | Mod: 25

## 2023-10-22 RX ORDER — FENTANYL CITRATE 50 UG/ML
0.25 INJECTION, SOLUTION INTRAMUSCULAR; INTRAVENOUS ONCE
Status: DISCONTINUED | OUTPATIENT
Start: 2023-10-22 | End: 2023-10-22 | Stop reason: HOSPADM

## 2023-10-22 RX ORDER — ONDANSETRON 2 MG/ML
4 INJECTION INTRAMUSCULAR; INTRAVENOUS
Status: DISCONTINUED | OUTPATIENT
Start: 2023-10-22 | End: 2023-10-22 | Stop reason: HOSPADM

## 2023-10-22 NOTE — ED NOTES
Notified JACKSON Meléndez, that pt refused medication for pain and wanted to leave d/t negative CT for renal stones.

## 2023-10-22 NOTE — ED PROVIDER NOTES
"Encounter Date: 10/22/2023       History     Chief Complaint   Patient presents with    Back Pain     Started this am while she was attempting to urinate, pain is lower back that radiates down her legs     73-year-old female presents emergency department past medical history includes Martin's esophagus, COPD, hypertension, previous abdominal aortic aneurysm repair, previous back surgery states that she recently was seen admitted in the hospital for an allergic reaction she was discharged recently on steroids she states that she is feel better regarding that however states that she went to the restroom this morning to urinate and when she got up off the toilet she developed a severe pain to her right lower back that radiated to her right lower abdomen and is concerned that she may have a "kidney stone".      Review of patient's allergies indicates:   Allergen Reactions    Bisacodyl Nausea And Vomiting     Other reaction(s): Vomiting    Cipro [ciprofloxacin hcl]     Demerol [meperidine]      Nausea vomiting, visual problem    Doxycycline Hives    Flonase [fluticasone propionate] Hives    Iodinated contrast media Hives     hypotension    Morphine     Morpholine analogues Other (See Comments)     hypotension     Past Medical History:   Diagnosis Date    Anxiety     Martin esophagus     Colitis     COPD (chronic obstructive pulmonary disease)     GERD (gastroesophageal reflux disease)     Hypertension     Thyroid disease     Vocal cord polyps      Past Surgical History:   Procedure Laterality Date    ABDOMINAL AORTIC ANEURYSM REPAIR      BACK SURGERY      cervical    CATARACT EXTRACTION Right 02/2021    CHOLECYSTECTOMY  12-    Dr Albert CORLEY    FOOT SURGERY      HYSTERECTOMY      SALIVARY GLAND SURGERY       Family History   Problem Relation Age of Onset    Cancer Mother     Heart failure Father     Emphysema Sister     No Known Problems Brother     Cancer Sister      Social History     Tobacco Use    Smoking " status: Every Day     Current packs/day: 0.50     Types: Cigarettes    Smokeless tobacco: Former     Quit date: 5/1/2016    Tobacco comments:     Pt has smoked since 14 years old. Smokes around .5ppd. Inpatient smoking education done 10/20.   Substance Use Topics    Alcohol use: Never    Drug use: Never     Review of Systems   Constitutional: Negative.    HENT: Negative.     Respiratory: Negative.     Cardiovascular: Negative.    Gastrointestinal: Negative.    Genitourinary: Negative.    Musculoskeletal:  Positive for back pain.   Neurological: Negative.    Hematological: Negative.    Psychiatric/Behavioral: Negative.     All other systems reviewed and are negative.      Physical Exam     Initial Vitals [10/22/23 1132]   BP Pulse Resp Temp SpO2   (!) 172/88 60 20 98.1 °F (36.7 °C) 98 %      MAP       --         Physical Exam    Nursing note and vitals reviewed.  Constitutional: She appears well-developed and well-nourished.   HENT:   Head: Normocephalic and atraumatic.   Cardiovascular:  Normal rate, regular rhythm, normal heart sounds and intact distal pulses.           Pulmonary/Chest: Breath sounds normal.   Abdominal: Abdomen is soft.   Musculoskeletal:      Comments: Pain to the right lower back no obvious rash or vesicular lesions noted negative straight leg raise bilaterally     Neurological: She is alert and oriented to person, place, and time.         ED Course   Procedures  Labs Reviewed   CBC W/ AUTO DIFFERENTIAL - Abnormal; Notable for the following components:       Result Value    MCH 32.0 (*)     Immature Granulocytes 0.6 (*)     Gran # (ANC) 8.4 (*)     Immature Grans (Abs) 0.06 (*)     Gran % 82.6 (*)     Lymph % 12.0 (*)     All other components within normal limits   COMPREHENSIVE METABOLIC PANEL - Abnormal; Notable for the following components:    Glucose 118 (*)     Anion Gap 5 (*)     All other components within normal limits   URINALYSIS, REFLEX TO URINE CULTURE - Abnormal; Notable for the  following components:    Occult Blood UA 1+ (*)     All other components within normal limits    Narrative:     Specimen Source->Urine   URINALYSIS MICROSCOPIC - Abnormal; Notable for the following components:    Hyaline Casts, UA 5 (*)     All other components within normal limits    Narrative:     Specimen Source->Urine          Imaging Results              CT Renal Stone Study ABD Pelvis WO (Final result)  Result time 10/22/23 13:53:53      Final result by Yokasta Cowan MD (10/22/23 13:53:53)                   Narrative:    CMS MANDATED QUALITY DATA - CT RADIATION - 436    All CT scans at this facility utilize dose modulation, iterative reconstruction, and/or weight based dosing when appropriate to reduce radiation dose to as low as reasonably achievable.    HISTORY: Flank pain    COMPARISON: 6/22/2023    FINDINGS: Noncontrast axial images were obtained. Nonenhanced study is tailored for the detection of urolithiasis, and is insensitive for abnormalities of the solid organs, vasculature and hollow viscera.    CT ABDOMEN: There is atelectasis in the lung bases. There is no pericardial effusion.  Liver: Normal in size and contour. There is a stable 3 cm cyst within the left lobe of the liver. There is no biliary duct dilatation  Gallbladder is absent  Spleen is normal in size. There is a focal splenic granuloma.  Pancreas is normal  Adrenal glands are normal  Kidneys: Symmetric in size without hydronephrosis or calculi. There is no perinephric stranding. The ureters are normal in course and caliber.    Bowel: There are no thick-walled or dilated bowel loops. There is colonic diverticulosis without diverticulitis  There is no mesenteric or retroperitoneal adenopathy.  There is an endovascular stent within the infrarenal aorta and iliac arteries.. The stent measures 2.7 x 2.6 cm maximum diameter.  Stable 3.6 x 3.6 cm aneurysm of the proximal abdominal aorta.    CT PELVIS: The bladder is normal. The uterus is  "absent.  There are degenerative changes of the spine.  The musculature is unremarkable. The abdominal wall is normal.    IMPRESSION: No evidence of renal or ureteral stones    Colonic diverticulosis without diverticulitis    Prior cholecystectomy    Stable 3.6 cm proximal abdominal aortic aneurysm with endovascular stent within the infrarenal aorta and iliac arteries    Degenerative changes of the spine    Electronically signed by:  Yokasta Cowan MD  10/22/2023 01:53 PM CDT Workstation: [id number]                                     Medications   fentaNYL 50 mcg/mL injection 0.25 mcg (0.25 mcg Intravenous Not Given 10/22/23 1500)   ondansetron injection 4 mg (4 mg Intravenous Not Given 10/22/23 1500)     Medical Decision Making  73-year-old female presents emergency department past medical history includes Martin's esophagus, COPD, hypertension, previous abdominal aortic aneurysm repair, previous back surgery states that she recently was seen admitted in the hospital for an allergic reaction she was discharged recently on steroids she states that she is feel better regarding that however states that she went to the restroom this morning to urinate and when she got up off the toilet she developed a severe pain to her right lower back that radiated to her right lower abdomen and is concerned that she may have a "kidney stone".    Considerations include but not limited to UTI, ureterolithiasis, pyelonephritis, musculoskeletal strain    73-year-old female presents emergency department secondary to right flank pain that radiated to her right lower abdomen while getting up from the toilet earlier today.  Patient's labs were performed and unremarkable urinalysis reveals 1+ occult blood therefore CT imaging performed without contrast results include,No evidence of renal or ureteral stones.      Colonic diverticulosis without diverticulitis     Prior cholecystectomy     Stable 3.6 cm proximal abdominal aortic aneurysm " with endovascular stent within the infrarenal aorta and iliac arteries     Degenerative changes of the spine    Patient states that she got her results on her my chart angel and told the nurse that she did not wish to stay any longer and wanted to sign out against medical advice patient understands that she may return to the emergency department she changes her mind regarding further evaluation.  Patient was offered IV pain medicine however declines    Amount and/or Complexity of Data Reviewed  Labs: ordered.  Radiology: ordered.    Risk  Prescription drug management.                               Clinical Impression:   Final diagnoses:  [M54.50] Low back pain, unspecified back pain laterality, unspecified chronicity, unspecified whether sciatica present (Primary)  [Z53.29] Left against medical advice        ED Disposition Condition    AMA                 Rika Palomo FNP  10/22/23 1759       Rika Palomo FNP  10/22/23 1759

## 2023-10-23 ENCOUNTER — PATIENT OUTREACH (OUTPATIENT)
Dept: FAMILY MEDICINE | Facility: CLINIC | Age: 73
End: 2023-10-23

## 2023-10-23 ENCOUNTER — TELEPHONE (OUTPATIENT)
Dept: FAMILY MEDICINE | Facility: CLINIC | Age: 73
End: 2023-10-23

## 2023-10-23 NOTE — PROGRESS NOTES
"    Discharge Information     Discharge Date:   10/20    Primary Discharge Diagnosis:  anaphylaxis    Discharge Summary:  Reviewed      Medication & Order Review     Were medication changes made or new medications added?   Yes    If so, has the patient filled the prescriptions?  Yes     Was Home Health ordered? No    If so, has Home Health contacted patient and/or initiated services?  No    Name of Home Health Agency? N/A    Durable Medical Equipment ordered?  No     If so, has the DME provider contacted patient and delivered equipment?  N/A    Follow Up               Any problems since discharge? Yes    How is the patient feeling since returning home?      Have you set up recommended follow up appointments?  (cardiology, surgery, etc.)    Schedule Hospital Follow-up appointment within 7-14 days (preferably 7).      Notes:       Spoke with patient who states she feels "okay" since getting out of the hospital. She did end up going back to the ER but left AMA because "it's a joke, but anyway." States she is going to get scheduled with the allergist because they did tests she hasn't gotten the results back. She is scheduled with Dr. Graham on Wednesday and will bring medications.       Maria Del Carmen Randall     "

## 2023-10-23 NOTE — TELEPHONE ENCOUNTER
"Spoke with patient who states she feels "okay" since getting out of the hospital. She did end up going back to the ER but left AMA because "it's a joke, but anyway." States she is going to get scheduled with the allergist because they did tests she hasn't gotten the results back. She is scheduled with Dr. Graham on Wednesday and will bring medications.   "

## 2023-10-23 NOTE — TELEPHONE ENCOUNTER
----- Message from Maria Del Carmen Randall MA sent at 10/20/2023 11:19 AM CDT -----  10/20/23 Call patient for HFU

## 2023-10-25 ENCOUNTER — OFFICE VISIT (OUTPATIENT)
Dept: FAMILY MEDICINE | Facility: CLINIC | Age: 73
End: 2023-10-25
Attending: FAMILY MEDICINE
Payer: MEDICARE

## 2023-10-25 VITALS
DIASTOLIC BLOOD PRESSURE: 96 MMHG | HEART RATE: 80 BPM | BODY MASS INDEX: 31.02 KG/M2 | HEIGHT: 60 IN | WEIGHT: 158 LBS | SYSTOLIC BLOOD PRESSURE: 166 MMHG

## 2023-10-25 DIAGNOSIS — G62.9 NEUROPATHY: ICD-10-CM

## 2023-10-25 DIAGNOSIS — Z09 HOSPITAL DISCHARGE FOLLOW-UP: ICD-10-CM

## 2023-10-25 DIAGNOSIS — K21.9 GASTROESOPHAGEAL REFLUX DISEASE WITHOUT ESOPHAGITIS: ICD-10-CM

## 2023-10-25 DIAGNOSIS — I10 UNCONTROLLED HYPERTENSION: ICD-10-CM

## 2023-10-25 DIAGNOSIS — T78.2XXS ANAPHYLAXIS, SEQUELA: Primary | ICD-10-CM

## 2023-10-25 DIAGNOSIS — M51.16 LUMBAR DISC DISEASE WITH RADICULOPATHY: ICD-10-CM

## 2023-10-25 DIAGNOSIS — I10 ESSENTIAL HYPERTENSION: ICD-10-CM

## 2023-10-25 DIAGNOSIS — J44.1 CHRONIC OBSTRUCTIVE PULMONARY DISEASE WITH ACUTE EXACERBATION: ICD-10-CM

## 2023-10-25 DIAGNOSIS — I71.40 ABDOMINAL AORTIC ANEURYSM (AAA) WITHOUT RUPTURE, UNSPECIFIED PART: Chronic | ICD-10-CM

## 2023-10-25 PROCEDURE — 3066F PR DOCUMENTATION OF TREATMENT FOR NEPHROPATHY: ICD-10-PCS | Mod: CPTII,S$GLB,, | Performed by: FAMILY MEDICINE

## 2023-10-25 PROCEDURE — 4010F ACE/ARB THERAPY RXD/TAKEN: CPT | Mod: CPTII,S$GLB,, | Performed by: FAMILY MEDICINE

## 2023-10-25 PROCEDURE — 3288F PR FALLS RISK ASSESSMENT DOCUMENTED: ICD-10-PCS | Mod: CPTII,S$GLB,, | Performed by: FAMILY MEDICINE

## 2023-10-25 PROCEDURE — 3061F PR NEG MICROALBUMINURIA RESULT DOCUMENTED/REVIEW: ICD-10-PCS | Mod: CPTII,S$GLB,, | Performed by: FAMILY MEDICINE

## 2023-10-25 PROCEDURE — 3080F DIAST BP >= 90 MM HG: CPT | Mod: CPTII,S$GLB,, | Performed by: FAMILY MEDICINE

## 2023-10-25 PROCEDURE — 1101F PR PT FALLS ASSESS DOC 0-1 FALLS W/OUT INJ PAST YR: ICD-10-PCS | Mod: CPTII,S$GLB,, | Performed by: FAMILY MEDICINE

## 2023-10-25 PROCEDURE — 99496 TRANSITIONAL CARE MANAGE SERVICE 7 DAY DISCHARGE: ICD-10-PCS | Mod: S$GLB,,, | Performed by: FAMILY MEDICINE

## 2023-10-25 PROCEDURE — 3066F NEPHROPATHY DOC TX: CPT | Mod: CPTII,S$GLB,, | Performed by: FAMILY MEDICINE

## 2023-10-25 PROCEDURE — 1101F PT FALLS ASSESS-DOCD LE1/YR: CPT | Mod: CPTII,S$GLB,, | Performed by: FAMILY MEDICINE

## 2023-10-25 PROCEDURE — 3288F FALL RISK ASSESSMENT DOCD: CPT | Mod: CPTII,S$GLB,, | Performed by: FAMILY MEDICINE

## 2023-10-25 PROCEDURE — 1159F PR MEDICATION LIST DOCUMENTED IN MEDICAL RECORD: ICD-10-PCS | Mod: CPTII,S$GLB,, | Performed by: FAMILY MEDICINE

## 2023-10-25 PROCEDURE — 3077F SYST BP >= 140 MM HG: CPT | Mod: CPTII,S$GLB,, | Performed by: FAMILY MEDICINE

## 2023-10-25 PROCEDURE — 1159F MED LIST DOCD IN RCRD: CPT | Mod: CPTII,S$GLB,, | Performed by: FAMILY MEDICINE

## 2023-10-25 PROCEDURE — 1111F DSCHRG MED/CURRENT MED MERGE: CPT | Mod: CPTII,S$GLB,, | Performed by: FAMILY MEDICINE

## 2023-10-25 PROCEDURE — 1111F PR DISCHARGE MEDS RECONCILED W/ CURRENT OUTPATIENT MED LIST: ICD-10-PCS | Mod: CPTII,S$GLB,, | Performed by: FAMILY MEDICINE

## 2023-10-25 PROCEDURE — 4010F PR ACE/ARB THEARPY RXD/TAKEN: ICD-10-PCS | Mod: CPTII,S$GLB,, | Performed by: FAMILY MEDICINE

## 2023-10-25 PROCEDURE — 99496 TRANSJ CARE MGMT HIGH F2F 7D: CPT | Mod: S$GLB,,, | Performed by: FAMILY MEDICINE

## 2023-10-25 PROCEDURE — 3080F PR MOST RECENT DIASTOLIC BLOOD PRESSURE >= 90 MM HG: ICD-10-PCS | Mod: CPTII,S$GLB,, | Performed by: FAMILY MEDICINE

## 2023-10-25 PROCEDURE — 3077F PR MOST RECENT SYSTOLIC BLOOD PRESSURE >= 140 MM HG: ICD-10-PCS | Mod: CPTII,S$GLB,, | Performed by: FAMILY MEDICINE

## 2023-10-25 PROCEDURE — 3061F NEG MICROALBUMINURIA REV: CPT | Mod: CPTII,S$GLB,, | Performed by: FAMILY MEDICINE

## 2023-10-25 RX ORDER — AMLODIPINE BESYLATE 5 MG/1
5 TABLET ORAL DAILY
Qty: 30 TABLET | Refills: 5 | Status: SHIPPED | OUTPATIENT
Start: 2023-10-25 | End: 2023-11-27 | Stop reason: SDUPTHER

## 2023-10-25 RX ORDER — LEVALBUTEROL TARTRATE 45 UG/1
2 AEROSOL, METERED ORAL EVERY 6 HOURS PRN
Qty: 15 G | Refills: 4 | Status: SHIPPED | OUTPATIENT
Start: 2023-10-25 | End: 2024-10-24

## 2023-10-26 NOTE — PROGRESS NOTES
"    Discharge Information     Discharge Date:   10/20    Primary Discharge Diagnosis:  anaphylaxis    Discharge Summary:  Reviewed      Medication & Order Review     Were medication changes made or new medications added?   Yes    If so, has the patient filled the prescriptions?  Yes     Was Home Health ordered? No    If so, has Home Health contacted patient and/or initiated services?  No    Name of Home Health Agency? N/A    Durable Medical Equipment ordered?  No     If so, has the DME provider contacted patient and delivered equipment?  N/A    Follow Up               Any problems since discharge? Yes    How is the patient feeling since returning home?      Have you set up recommended follow up appointments?  (cardiology, surgery, etc.)    Schedule Hospital Follow-up appointment within 7-14 days (preferably 7).      Notes:       Spoke with patient who states she feels "okay" since getting out of the hospital. She did end up going back to the ER but left AMA because "it's a joke, but anyway." States she is going to get scheduled with the allergist because they did tests she hasn't gotten the results back. She is scheduled with Dr. Graham on Wednesday and will bring medications.       Jasbir Graham   SUBJECTIVE:    Patient ID: Joslyn Dubon is a 73 y.o. female.    Chief Complaint: Hospital Follow Up (Brought bottles, upper extremities bruises from the hospital,  CT-test results, EKG-result, review Lab-results, abc/)    This 73-year-old female is here for hospital follow-up visit.  She had an anaphylactic reaction to IV contrast dye which required an ICU stay.    Hospital Course:   Joslyn Dubon is a 73 y.o.  female with known history of anxiety, copd, gerd, htn, hypothyroid, barrets esophagus who came in with rash, difficulty swallowing and anxiety.  Initially code stroke given report of left eye vision change, report of numbness to LLE (she denies to me presently) and difficulty swallowing. She got " Dexamethasone, Benadryl, Solumedrol in preparation for contrasted CTA head and neck as part of CVA workup.  When she returned from CTA head and neck, she hasd worsening wheezing, rash, pruritus and ED note documents hypotnesion and thus she was treated with 0.3mg IM Epi given convern for Anaphylaxis. She has been having a rash for 3+ weeks and is undergoing an outpatient workup for it.  She was prescribed prednisone and epi pen but has not filled these prescriptions over concerns for reactions (reports rash started after being on steroids). Rash started on lower extremities, has improved there, but has migrated to  upper extremities, chest, face.  She had wheezing earlier today but that has since resolved.  She reports benadryl makes the rash worse and has declined benadryl. She presently is on Zyrtec, Pepcid, solumedrol.  I have transitioned solumedrol to prednisone and will monitor her overnight and ensure she will get an epi pen prior to discharge. It is unclear of me exactly what is the etiology of her symptoms.  I'm not sure if her swallowing difficulty was an early sign of allergic reaction and impending anaphylaxis.  I'm not sure if the contrast dye tipped her over or just a coincidence or the inciting event for suspected anaphylaxis.  She is still having left eye blurry vision intermittently though tells me she has also been told she has a cataract in that eye. She is agreeable for MRI brain in the AM to complete CVA workup.  No focal deficits on exam other than blurry vision. 10/20 Feels well, does not wish to wait for the MRI as vision has normalized and would like to go home. She looks great on exam and I think this is reasonable for her to follow up with ophthalmology. She already has Rx for prednisone and is on pepcid and has OTC zyrtec at home. She will not require any Rx for me. She will contact her AI MD to make them aware of this admission and see if she needs a sooner follow up.  Her  has  obtained the epi pen and we will instruct them on how to administer it.  Examined on day of discharge and alert, NAD, comfortable respirations on room air. Return precautions given.      Patient states that her rash and throat swelling resolved quickly with use of EpiPen.  She now has an EpiPen at home.  She was in the hospital with severe right flank pain and had 1+ blood in her urine.  CT scan was negative for kidney stones how.  Her pain resolved spontaneously.  She has a history of renal stones in the past.    Left eye has decreased vision.    She has an allergy office visit planned with  in [place].    Blood pressure still running rather high.        Admit Date: 10/22/23   Discharge Date: 10/22/23    Patient follow up phone call documented on separate encounter.      Admission on 10/22/2023, Discharged on 10/22/2023   Component Date Value Ref Range Status    WBC 10/22/2023 10.20  3.90 - 12.70 K/uL Final    RBC 10/22/2023 4.47  4.00 - 5.40 M/uL Final    Hemoglobin 10/22/2023 14.3  12.0 - 16.0 g/dL Final    Hematocrit 10/22/2023 42.6  37.0 - 48.5 % Final    MCV 10/22/2023 95  82 - 98 fL Final    MCH 10/22/2023 32.0 (H)  27.0 - 31.0 pg Final    MCHC 10/22/2023 33.6  32.0 - 36.0 g/dL Final    RDW 10/22/2023 13.4  11.5 - 14.5 % Final    Platelets 10/22/2023 182  150 - 450 K/uL Final    MPV 10/22/2023 9.8  9.2 - 12.9 fL Final    Immature Granulocytes 10/22/2023 0.6 (H)  0.0 - 0.5 % Final    Gran # (ANC) 10/22/2023 8.4 (H)  1.8 - 7.7 K/uL Final    Immature Grans (Abs) 10/22/2023 0.06 (H)  0.00 - 0.04 K/uL Final    Lymph # 10/22/2023 1.2  1.0 - 4.8 K/uL Final    Mono # 10/22/2023 0.5  0.3 - 1.0 K/uL Final    Eos # 10/22/2023 0.0  0.0 - 0.5 K/uL Final    Baso # 10/22/2023 0.02  0.00 - 0.20 K/uL Final    nRBC 10/22/2023 0  0 /100 WBC Final    Gran % 10/22/2023 82.6 (H)  38.0 - 73.0 % Final    Lymph % 10/22/2023 12.0 (L)  18.0 - 48.0 % Final    Mono % 10/22/2023 4.5  4.0 - 15.0 % Final    Eosinophil %  10/22/2023 0.1  0.0 - 8.0 % Final    Basophil % 10/22/2023 0.2  0.0 - 1.9 % Final    Differential Method 10/22/2023 Automated   Final    Sodium 10/22/2023 137  136 - 145 mmol/L Final    Potassium 10/22/2023 3.7  3.5 - 5.1 mmol/L Final    Chloride 10/22/2023 107  95 - 110 mmol/L Final    CO2 10/22/2023 25  23 - 29 mmol/L Final    Glucose 10/22/2023 118 (H)  70 - 110 mg/dL Final    BUN 10/22/2023 17  8 - 23 mg/dL Final    Creatinine 10/22/2023 0.8  0.5 - 1.4 mg/dL Final    Calcium 10/22/2023 9.1  8.7 - 10.5 mg/dL Final    Total Protein 10/22/2023 7.3  6.0 - 8.4 g/dL Final    Albumin 10/22/2023 4.0  3.5 - 5.2 g/dL Final    Total Bilirubin 10/22/2023 0.3  0.1 - 1.0 mg/dL Final    Alkaline Phosphatase 10/22/2023 80  55 - 135 U/L Final    AST 10/22/2023 22  10 - 40 U/L Final    ALT 10/22/2023 23  10 - 44 U/L Final    eGFR 10/22/2023 >60.0  >60 mL/min/1.73 m^2 Final    Anion Gap 10/22/2023 5 (L)  8 - 16 mmol/L Final    Specimen UA 10/22/2023 Urine, Clean Catch   Final    Color, UA 10/22/2023 Yellow  Yellow, Straw, Priya Final    Appearance, UA 10/22/2023 Clear  Clear Final    pH, UA 10/22/2023 6.0  5.0 - 8.0 Final    Specific Gravity, UA 10/22/2023 1.015  1.005 - 1.030 Final    Protein, UA 10/22/2023 Negative  Negative Final    Glucose, UA 10/22/2023 Negative  Negative Final    Ketones, UA 10/22/2023 Negative  Negative Final    Bilirubin (UA) 10/22/2023 Negative  Negative Final    Occult Blood UA 10/22/2023 1+ (A)  Negative Final    Nitrite, UA 10/22/2023 Negative  Negative Final    Urobilinogen, UA 10/22/2023 Negative  Negative EU/dL Final    Leukocytes, UA 10/22/2023 Negative  Negative Final    RBC, UA 10/22/2023 4  0 - 4 /hpf Final    WBC, UA 10/22/2023 1  0 - 5 /hpf Final    Bacteria 10/22/2023 Negative  None-Occ /hpf Final    Squam Epithel, UA 10/22/2023 3  /hpf Final    Hyaline Casts, UA 10/22/2023 5 (A)  0-1/lpf /lpf Final    Microscopic Comment 10/22/2023 SEE COMMENT   Final   Admission on 10/18/2023, Discharged on  10/20/2023   Component Date Value Ref Range Status    WBC 10/18/2023 7.43  3.90 - 12.70 K/uL Final    RBC 10/18/2023 4.91  4.00 - 5.40 M/uL Final    Hemoglobin 10/18/2023 16.0  12.0 - 16.0 g/dL Final    Hematocrit 10/18/2023 46.8  37.0 - 48.5 % Final    MCV 10/18/2023 95  82 - 98 fL Final    MCH 10/18/2023 32.6 (H)  27.0 - 31.0 pg Final    MCHC 10/18/2023 34.2  32.0 - 36.0 g/dL Final    RDW 10/18/2023 13.4  11.5 - 14.5 % Final    Platelets 10/18/2023 192  150 - 450 K/uL Final    MPV 10/18/2023 9.4  9.2 - 12.9 fL Final    Immature Granulocytes 10/18/2023 0.3  0.0 - 0.5 % Final    Gran # (ANC) 10/18/2023 4.4  1.8 - 7.7 K/uL Final    Immature Grans (Abs) 10/18/2023 0.02  0.00 - 0.04 K/uL Final    Lymph # 10/18/2023 2.3  1.0 - 4.8 K/uL Final    Mono # 10/18/2023 0.6  0.3 - 1.0 K/uL Final    Eos # 10/18/2023 0.1  0.0 - 0.5 K/uL Final    Baso # 10/18/2023 0.03  0.00 - 0.20 K/uL Final    nRBC 10/18/2023 0  0 /100 WBC Final    Gran % 10/18/2023 59.4  38.0 - 73.0 % Final    Lymph % 10/18/2023 30.3  18.0 - 48.0 % Final    Mono % 10/18/2023 8.5  4.0 - 15.0 % Final    Eosinophil % 10/18/2023 1.1  0.0 - 8.0 % Final    Basophil % 10/18/2023 0.4  0.0 - 1.9 % Final    Differential Method 10/18/2023 Automated   Final    Sodium 10/18/2023 136  136 - 145 mmol/L Final    Potassium 10/18/2023 4.2  3.5 - 5.1 mmol/L Final    Chloride 10/18/2023 106  95 - 110 mmol/L Final    CO2 10/18/2023 23  23 - 29 mmol/L Final    Glucose 10/18/2023 123 (H)  70 - 110 mg/dL Final    BUN 10/18/2023 12  8 - 23 mg/dL Final    Creatinine 10/18/2023 0.7  0.5 - 1.4 mg/dL Final    Calcium 10/18/2023 8.9  8.7 - 10.5 mg/dL Final    Total Protein 10/18/2023 6.4  6.0 - 8.4 g/dL Final    Albumin 10/18/2023 3.4 (L)  3.5 - 5.2 g/dL Final    Total Bilirubin 10/18/2023 0.6  0.1 - 1.0 mg/dL Final    Alkaline Phosphatase 10/18/2023 74  55 - 135 U/L Final    AST 10/18/2023 23  10 - 40 U/L Final    ALT 10/18/2023 17  10 - 44 U/L Final    eGFR 10/18/2023 >60.0  >60  mL/min/1.73 m^2 Final    Anion Gap 10/18/2023 7 (L)  8 - 16 mmol/L Final    Prothrombin Time 10/18/2023 10.4  9.0 - 12.5 sec Final    INR 10/18/2023 0.9  0.8 - 1.2 Final    TSH 10/18/2023 0.193 (L)  0.340 - 5.600 uIU/mL Final    Cholesterol 10/18/2023 151  120 - 199 mg/dL Final    Triglycerides 10/18/2023 111  30 - 150 mg/dL Final    HDL 10/18/2023 38 (L)  40 - 75 mg/dL Final    LDL Cholesterol 10/18/2023 90.8  63.0 - 159.0 mg/dL Final    HDL/Cholesterol Ratio 10/18/2023 25.2  20.0 - 50.0 % Final    Total Cholesterol/HDL Ratio 10/18/2023 4.0  2.0 - 5.0 Final    Non-HDL Cholesterol 10/18/2023 113  mg/dL Final    Troponin I High Sensitivity 10/18/2023 7.4  0.0 - 14.9 pg/mL Final    POC Creatinine 10/18/2023 0.9  0.5 - 1.4 mg/dL Final    Sample 10/18/2023 VENOUS   Final    POC Glucose 10/18/2023 98  70 - 110 Final    POC PH 10/18/2023 7.352  7.35 - 7.45 Final    POC PCO2 10/18/2023 45.4 (H)  35 - 45 mmHg Final    POC PO2 10/18/2023 87 (HH)  40 - 60 mmHg Final    POC HCO3 10/18/2023 25.2  24 - 28 mmol/L Final    POC BE 10/18/2023 0  -2 to 2 mmol/L Final    POC SATURATED O2 10/18/2023 96  95 - 100 % Final    POC TCO2 10/18/2023 27  24 - 29 mmol/L Final    Rate 10/18/2023 18   Final    Sample 10/18/2023 VENOUS   Final    Site 10/18/2023 Other   Final    Allens Test 10/18/2023 N/A   Final    DelSys 10/18/2023 CPAP/BiPAP   Final    Mode 10/18/2023 BiPAP   Final    FiO2 10/18/2023 40   Final    Spont Rate 10/18/2023 21   Final    IP 10/18/2023 10   Final    EP 10/18/2023 5   Final    Tryptase 10/18/2023 26.7 (H)  2.2 - 13.2 ug/L Final    Free T4 10/18/2023 1.40  0.71 - 1.51 ng/dL Final    WBC 10/19/2023 6.06  3.90 - 12.70 K/uL Final    RBC 10/19/2023 4.23  4.00 - 5.40 M/uL Final    Hemoglobin 10/19/2023 13.8  12.0 - 16.0 g/dL Final    Hematocrit 10/19/2023 41.0  37.0 - 48.5 % Final    MCV 10/19/2023 97  82 - 98 fL Final    MCH 10/19/2023 32.6 (H)  27.0 - 31.0 pg Final    MCHC 10/19/2023 33.7  32.0 - 36.0 g/dL Final    RDW  10/19/2023 13.3  11.5 - 14.5 % Final    Platelets 10/19/2023 165  150 - 450 K/uL Final    MPV 10/19/2023 9.5  9.2 - 12.9 fL Final    Immature Granulocytes 10/19/2023 0.5  0.0 - 0.5 % Final    Gran # (ANC) 10/19/2023 5.4  1.8 - 7.7 K/uL Final    Immature Grans (Abs) 10/19/2023 0.03  0.00 - 0.04 K/uL Final    Lymph # 10/19/2023 0.6 (L)  1.0 - 4.8 K/uL Final    Mono # 10/19/2023 0.0 (L)  0.3 - 1.0 K/uL Final    Eos # 10/19/2023 0.0  0.0 - 0.5 K/uL Final    Baso # 10/19/2023 0.00  0.00 - 0.20 K/uL Final    nRBC 10/19/2023 0  0 /100 WBC Final    Gran % 10/19/2023 89.6 (H)  38.0 - 73.0 % Final    Lymph % 10/19/2023 9.2 (L)  18.0 - 48.0 % Final    Mono % 10/19/2023 0.7 (L)  4.0 - 15.0 % Final    Eosinophil % 10/19/2023 0.0  0.0 - 8.0 % Final    Basophil % 10/19/2023 0.0  0.0 - 1.9 % Final    Differential Method 10/19/2023 Automated   Final    Sodium 10/19/2023 137  136 - 145 mmol/L Final    Potassium 10/19/2023 4.0  3.5 - 5.1 mmol/L Final    Chloride 10/19/2023 108  95 - 110 mmol/L Final    CO2 10/19/2023 23  23 - 29 mmol/L Final    Glucose 10/19/2023 149 (H)  70 - 110 mg/dL Final    BUN 10/19/2023 10  8 - 23 mg/dL Final    Creatinine 10/19/2023 0.7  0.5 - 1.4 mg/dL Final    Calcium 10/19/2023 8.8  8.7 - 10.5 mg/dL Final    Total Protein 10/19/2023 6.5  6.0 - 8.4 g/dL Final    Albumin 10/19/2023 3.5  3.5 - 5.2 g/dL Final    Total Bilirubin 10/19/2023 0.5  0.1 - 1.0 mg/dL Final    Alkaline Phosphatase 10/19/2023 76  55 - 135 U/L Final    AST 10/19/2023 16  10 - 40 U/L Final    ALT 10/19/2023 15  10 - 44 U/L Final    eGFR 10/19/2023 >60.0  >60 mL/min/1.73 m^2 Final    Anion Gap 10/19/2023 6 (L)  8 - 16 mmol/L Final    WBC 10/20/2023 15.33 (H)  3.90 - 12.70 K/uL Final    RBC 10/20/2023 4.04  4.00 - 5.40 M/uL Final    Hemoglobin 10/20/2023 13.0  12.0 - 16.0 g/dL Final    Hematocrit 10/20/2023 38.8  37.0 - 48.5 % Final    MCV 10/20/2023 96  82 - 98 fL Final    MCH 10/20/2023 32.2 (H)  27.0 - 31.0 pg Final    MCHC 10/20/2023  33.5  32.0 - 36.0 g/dL Final    RDW 10/20/2023 13.6  11.5 - 14.5 % Final    Platelets 10/20/2023 177  150 - 450 K/uL Final    MPV 10/20/2023 9.7  9.2 - 12.9 fL Final    Immature Granulocytes 10/20/2023 0.4  0.0 - 0.5 % Final    Gran # (ANC) 10/20/2023 12.5 (H)  1.8 - 7.7 K/uL Final    Immature Grans (Abs) 10/20/2023 0.06 (H)  0.00 - 0.04 K/uL Final    Lymph # 10/20/2023 1.7  1.0 - 4.8 K/uL Final    Mono # 10/20/2023 1.1 (H)  0.3 - 1.0 K/uL Final    Eos # 10/20/2023 0.0  0.0 - 0.5 K/uL Final    Baso # 10/20/2023 0.02  0.00 - 0.20 K/uL Final    nRBC 10/20/2023 0  0 /100 WBC Final    Gran % 10/20/2023 81.9 (H)  38.0 - 73.0 % Final    Lymph % 10/20/2023 10.8 (L)  18.0 - 48.0 % Final    Mono % 10/20/2023 6.8  4.0 - 15.0 % Final    Eosinophil % 10/20/2023 0.0  0.0 - 8.0 % Final    Basophil % 10/20/2023 0.1  0.0 - 1.9 % Final    Differential Method 10/20/2023 Automated   Final    Sodium 10/20/2023 138  136 - 145 mmol/L Final    Potassium 10/20/2023 4.1  3.5 - 5.1 mmol/L Final    Chloride 10/20/2023 108  95 - 110 mmol/L Final    CO2 10/20/2023 24  23 - 29 mmol/L Final    Glucose 10/20/2023 120 (H)  70 - 110 mg/dL Final    BUN 10/20/2023 19  8 - 23 mg/dL Final    Creatinine 10/20/2023 0.8  0.5 - 1.4 mg/dL Final    Calcium 10/20/2023 9.3  8.7 - 10.5 mg/dL Final    Total Protein 10/20/2023 6.5  6.0 - 8.4 g/dL Final    Albumin 10/20/2023 3.5  3.5 - 5.2 g/dL Final    Total Bilirubin 10/20/2023 0.4  0.1 - 1.0 mg/dL Final    Alkaline Phosphatase 10/20/2023 77  55 - 135 U/L Final    AST 10/20/2023 28  10 - 40 U/L Final    ALT 10/20/2023 16  10 - 44 U/L Final    eGFR 10/20/2023 >60.0  >60 mL/min/1.73 m^2 Final    Anion Gap 10/20/2023 6 (L)  8 - 16 mmol/L Final   Admission on 09/05/2023, Discharged on 09/05/2023   Component Date Value Ref Range Status    WBC 09/05/2023 6.79  3.90 - 12.70 K/uL Final    RBC 09/05/2023 4.80  4.00 - 5.40 M/uL Final    Hemoglobin 09/05/2023 15.2  12.0 - 16.0 g/dL Final    Hematocrit 09/05/2023 45.3  37.0  - 48.5 % Final    MCV 09/05/2023 94  82 - 98 fL Final    MCH 09/05/2023 31.7 (H)  27.0 - 31.0 pg Final    MCHC 09/05/2023 33.6  32.0 - 36.0 g/dL Final    RDW 09/05/2023 13.2  11.5 - 14.5 % Final    Platelets 09/05/2023 243  150 - 450 K/uL Final    MPV 09/05/2023 9.1 (L)  9.2 - 12.9 fL Final    Immature Granulocytes 09/05/2023 0.3  0.0 - 0.5 % Final    Gran # (ANC) 09/05/2023 4.4  1.8 - 7.7 K/uL Final    Immature Grans (Abs) 09/05/2023 0.02  0.00 - 0.04 K/uL Final    Lymph # 09/05/2023 1.7  1.0 - 4.8 K/uL Final    Mono # 09/05/2023 0.6  0.3 - 1.0 K/uL Final    Eos # 09/05/2023 0.1  0.0 - 0.5 K/uL Final    Baso # 09/05/2023 0.03  0.00 - 0.20 K/uL Final    nRBC 09/05/2023 0  0 /100 WBC Final    Gran % 09/05/2023 64.3  38.0 - 73.0 % Final    Lymph % 09/05/2023 24.6  18.0 - 48.0 % Final    Mono % 09/05/2023 9.4  4.0 - 15.0 % Final    Eosinophil % 09/05/2023 1.0  0.0 - 8.0 % Final    Basophil % 09/05/2023 0.4  0.0 - 1.9 % Final    Differential Method 09/05/2023 Automated   Final    Sodium 09/05/2023 138  136 - 145 mmol/L Final    Potassium 09/05/2023 4.2  3.5 - 5.1 mmol/L Final    Chloride 09/05/2023 108  95 - 110 mmol/L Final    CO2 09/05/2023 25  23 - 29 mmol/L Final    Glucose 09/05/2023 97  70 - 110 mg/dL Final    BUN 09/05/2023 14  8 - 23 mg/dL Final    Creatinine 09/05/2023 0.8  0.5 - 1.4 mg/dL Final    Calcium 09/05/2023 9.4  8.7 - 10.5 mg/dL Final    Total Protein 09/05/2023 8.1  6.0 - 8.4 g/dL Final    Albumin 09/05/2023 3.8  3.5 - 5.2 g/dL Final    Total Bilirubin 09/05/2023 0.6  0.1 - 1.0 mg/dL Final    Alkaline Phosphatase 09/05/2023 85  55 - 135 U/L Final    AST 09/05/2023 19  10 - 40 U/L Final    ALT 09/05/2023 23  10 - 44 U/L Final    eGFR 09/05/2023 >60.0  >60 mL/min/1.73 m^2 Final    Anion Gap 09/05/2023 5 (L)  8 - 16 mmol/L Final    BNP 09/05/2023 38  0 - 99 pg/mL Final    Troponin I High Sensitivity 09/05/2023 7.3  0.0 - 14.9 pg/mL Final    Prothrombin Time 09/05/2023 10.3  9.0 - 12.5 sec Final    INR  09/05/2023 0.9  0.8 - 1.2 Final    Magnesium 09/05/2023 2.0  1.6 - 2.6 mg/dL Final    BNP 09/05/2023 38  0 - 99 pg/mL Final   Office Visit on 08/22/2023   Component Date Value Ref Range Status    POC Rapid COVID 08/22/2023 Positive (A)  Negative Final     Acceptable 08/22/2023 Yes   Final   Admission on 06/22/2023, Discharged on 06/22/2023   Component Date Value Ref Range Status    WBC 06/22/2023 7.26  3.90 - 12.70 K/uL Final    RBC 06/22/2023 4.81  4.00 - 5.40 M/uL Final    Hemoglobin 06/22/2023 15.2  12.0 - 16.0 g/dL Final    Hematocrit 06/22/2023 45.8  37.0 - 48.5 % Final    MCV 06/22/2023 95  82 - 98 fL Final    MCH 06/22/2023 31.6 (H)  27.0 - 31.0 pg Final    MCHC 06/22/2023 33.2  32.0 - 36.0 g/dL Final    RDW 06/22/2023 13.3  11.5 - 14.5 % Final    Platelets 06/22/2023 189  150 - 450 K/uL Final    MPV 06/22/2023 9.0 (L)  9.2 - 12.9 fL Final    Immature Granulocytes 06/22/2023 0.3  0.0 - 0.5 % Final    Gran # (ANC) 06/22/2023 4.5  1.8 - 7.7 K/uL Final    Immature Grans (Abs) 06/22/2023 0.02  0.00 - 0.04 K/uL Final    Lymph # 06/22/2023 2.0  1.0 - 4.8 K/uL Final    Mono # 06/22/2023 0.6  0.3 - 1.0 K/uL Final    Eos # 06/22/2023 0.1  0.0 - 0.5 K/uL Final    Baso # 06/22/2023 0.02  0.00 - 0.20 K/uL Final    nRBC 06/22/2023 0  0 /100 WBC Final    Gran % 06/22/2023 61.8  38.0 - 73.0 % Final    Lymph % 06/22/2023 28.1  18.0 - 48.0 % Final    Mono % 06/22/2023 7.7  4.0 - 15.0 % Final    Eosinophil % 06/22/2023 1.8  0.0 - 8.0 % Final    Basophil % 06/22/2023 0.3  0.0 - 1.9 % Final    Differential Method 06/22/2023 Automated   Final    Sodium 06/22/2023 141  136 - 145 mmol/L Final    Potassium 06/22/2023 4.1  3.5 - 5.1 mmol/L Final    Chloride 06/22/2023 108  95 - 110 mmol/L Final    CO2 06/22/2023 26  23 - 29 mmol/L Final    Glucose 06/22/2023 100  70 - 110 mg/dL Final    BUN 06/22/2023 19  8 - 23 mg/dL Final    Creatinine 06/22/2023 0.9  0.5 - 1.4 mg/dL Final    Calcium 06/22/2023 9.5  8.7 - 10.5 mg/dL  Final    Total Protein 06/22/2023 7.7  6.0 - 8.4 g/dL Final    Albumin 06/22/2023 3.9  3.5 - 5.2 g/dL Final    Total Bilirubin 06/22/2023 0.5  0.1 - 1.0 mg/dL Final    Alkaline Phosphatase 06/22/2023 81  55 - 135 U/L Final    AST 06/22/2023 23  10 - 40 U/L Final    ALT 06/22/2023 25  10 - 44 U/L Final    eGFR 06/22/2023 >60.0  >60 mL/min/1.73 m^2 Final    Anion Gap 06/22/2023 7 (L)  8 - 16 mmol/L Final    BNP 06/22/2023 23  0 - 99 pg/mL Final    Troponin I High Sensitivity 06/22/2023 9.1  0.0 - 14.9 pg/mL Final    Prothrombin Time 06/22/2023 10.5  9.0 - 12.5 sec Final    INR 06/22/2023 0.9  0.8 - 1.2 Final    Lipase 06/22/2023 48  4 - 60 U/L Final    Troponin I High Sensitivity 06/22/2023 7.8  0.0 - 14.9 pg/mL Final   Admission on 05/02/2023, Discharged on 05/02/2023   Component Date Value Ref Range Status    Specimen UA 05/02/2023 Urine, Clean Catch   Final    Color, UA 05/02/2023 Yellow  Yellow, Straw, Priya Final    Appearance, UA 05/02/2023 Hazy (A)  Clear Final    pH, UA 05/02/2023 8.0  5.0 - 8.0 Final    Specific Gravity, UA 05/02/2023 1.015  1.005 - 1.030 Final    Protein, UA 05/02/2023 Negative  Negative Final    Glucose, UA 05/02/2023 Negative  Negative Final    Ketones, UA 05/02/2023 Negative  Negative Final    Bilirubin (UA) 05/02/2023 Negative  Negative Final    Occult Blood UA 05/02/2023 1+ (A)  Negative Final    Nitrite, UA 05/02/2023 Negative  Negative Final    Urobilinogen, UA 05/02/2023 Negative  Negative EU/dL Final    Leukocytes, UA 05/02/2023 Negative  Negative Final    WBC 05/02/2023 5.44  3.90 - 12.70 K/uL Final    RBC 05/02/2023 4.76  4.00 - 5.40 M/uL Final    Hemoglobin 05/02/2023 15.1  12.0 - 16.0 g/dL Final    Hematocrit 05/02/2023 46.0  37.0 - 48.5 % Final    MCV 05/02/2023 97  82 - 98 fL Final    MCH 05/02/2023 31.7 (H)  27.0 - 31.0 pg Final    MCHC 05/02/2023 32.8  32.0 - 36.0 g/dL Final    RDW 05/02/2023 12.9  11.5 - 14.5 % Final    Platelets 05/02/2023 200  150 - 450 K/uL Final    MPV  05/02/2023 9.2  9.2 - 12.9 fL Final    Immature Granulocytes 05/02/2023 0.4  0.0 - 0.5 % Final    Gran # (ANC) 05/02/2023 2.9  1.8 - 7.7 K/uL Final    Immature Grans (Abs) 05/02/2023 0.02  0.00 - 0.04 K/uL Final    Lymph # 05/02/2023 1.9  1.0 - 4.8 K/uL Final    Mono # 05/02/2023 0.5  0.3 - 1.0 K/uL Final    Eos # 05/02/2023 0.1  0.0 - 0.5 K/uL Final    Baso # 05/02/2023 0.03  0.00 - 0.20 K/uL Final    nRBC 05/02/2023 0  0 /100 WBC Final    Gran % 05/02/2023 53.9  38.0 - 73.0 % Final    Lymph % 05/02/2023 35.1  18.0 - 48.0 % Final    Mono % 05/02/2023 8.5  4.0 - 15.0 % Final    Eosinophil % 05/02/2023 1.5  0.0 - 8.0 % Final    Basophil % 05/02/2023 0.6  0.0 - 1.9 % Final    Differential Method 05/02/2023 Automated   Final    Sodium 05/02/2023 139  136 - 145 mmol/L Final    Potassium 05/02/2023 4.3  3.5 - 5.1 mmol/L Final    Chloride 05/02/2023 106  95 - 110 mmol/L Final    CO2 05/02/2023 27  23 - 29 mmol/L Final    Glucose 05/02/2023 99  70 - 110 mg/dL Final    BUN 05/02/2023 13  8 - 23 mg/dL Final    Creatinine 05/02/2023 0.8  0.5 - 1.4 mg/dL Final    Calcium 05/02/2023 9.7  8.7 - 10.5 mg/dL Final    Total Protein 05/02/2023 8.0  6.0 - 8.4 g/dL Final    Albumin 05/02/2023 3.9  3.5 - 5.2 g/dL Final    Total Bilirubin 05/02/2023 0.8  0.1 - 1.0 mg/dL Final    Alkaline Phosphatase 05/02/2023 79  55 - 135 U/L Final    AST 05/02/2023 26  10 - 40 U/L Final    ALT 05/02/2023 23  10 - 44 U/L Final    Anion Gap 05/02/2023 6 (L)  8 - 16 mmol/L Final    eGFR 05/02/2023 >60.0  >60 mL/min/1.73 m^2 Final    RBC, UA 05/02/2023 12 (H)  0 - 4 /hpf Final    WBC, UA 05/02/2023 2  0 - 5 /hpf Final    Bacteria 05/02/2023 Negative  None-Occ /hpf Final    Squam Epithel, UA 05/02/2023 9  /hpf Final    Hyaline Casts, UA 05/02/2023 8 (A)  0-1/lpf /lpf Final    Microscopic Comment 05/02/2023 SEE COMMENT   Final       Past Medical History:   Diagnosis Date    Anxiety     Martin esophagus     Colitis     COPD (chronic obstructive pulmonary  disease)     GERD (gastroesophageal reflux disease)     Hypertension     Thyroid disease     Vocal cord polyps      Past Surgical History:   Procedure Laterality Date    ABDOMINAL AORTIC ANEURYSM REPAIR      BACK SURGERY      cervical    CATARACT EXTRACTION Right 02/2021    CHOLECYSTECTOMY  12-    Dr Albert CORLEY    FOOT SURGERY      HYSTERECTOMY      SALIVARY GLAND SURGERY       Family History   Problem Relation Age of Onset    Cancer Mother     Heart failure Father     Emphysema Sister     No Known Problems Brother     Cancer Sister        Marital Status:   Alcohol History:  reports no history of alcohol use.  Tobacco History:  reports that she has been smoking cigarettes. She quit smokeless tobacco use about 7 years ago.  Drug History:  reports no history of drug use.    Review of patient's allergies indicates:   Allergen Reactions    Bisacodyl Nausea And Vomiting     Other reaction(s): Vomiting    Cipro [ciprofloxacin hcl]     Demerol [meperidine]      Nausea vomiting, visual problem    Doxycycline Hives    Flonase [fluticasone propionate] Hives    Iodinated contrast media Hives     hypotension    Morphine     Morpholine analogues Other (See Comments)     hypotension       Current Outpatient Medications:     aluminum-magnesium hydroxide-simethicone (MAALOX) 200-200-20 mg/5 mL Susp, Take 30 mLs by mouth once as needed., Disp: , Rfl:     cetirizine (ZYRTEC) 10 MG tablet, Take 1 tablet (10 mg total) by mouth once daily., Disp: , Rfl: 0    clobetasoL (TEMOVATE) 0.05 % cream, Apply topically 2 (two) times daily. (Patient taking differently: Apply 1 g topically 2 (two) times daily.), Disp: 45 g, Rfl: 2    cloNIDine (CATAPRES) 0.1 MG tablet, Take one tablet as needed up to 3 times a day if blood pressure is greater than 170/110 (Patient taking differently: Take 0.1 mg by mouth every 8 (eight) hours as needed. Take one tablet as needed up to 3 times a day if blood pressure is greater than 170/110), Disp: 30  tablet, Rfl: 3    famotidine (PEPCID) 20 MG tablet, Take 1 tablet (20 mg total) by mouth every evening., Disp: 90 tablet, Rfl: 3    hydroCHLOROthiazide (HYDRODIURIL) 25 MG tablet, Take 1 tablet (25 mg total) by mouth daily as needed (fluid)., Disp: 30 tablet, Rfl: 3    HYDROcodone-acetaminophen (NORCO)  mg per tablet, Take 1 tablet by mouth every 12 (twelve) hours as needed. (Patient taking differently: Take 1 tablet by mouth every 12 (twelve) hours as needed for Pain.), Disp: 50 tablet, Rfl: 0    hydrOXYzine HCL (ATARAX) 25 MG tablet, Take 1 tablet (25 mg total) by mouth 2 (two) times daily as needed for Itching., Disp: 30 tablet, Rfl: 0    levocetirizine (XYZAL) 5 MG tablet, Take 5 mg by mouth every evening., Disp: , Rfl:     levothyroxine (SYNTHROID) 100 MCG tablet, Take 1 tablet (100 mcg total) by mouth before breakfast., Disp: 90 tablet, Rfl: 3    losartan (COZAAR) 100 MG tablet, Take 1 tablet (100 mg total) by mouth once daily., Disp: 90 tablet, Rfl: 3    metoprolol tartrate (LOPRESSOR) 25 MG tablet, Take 1 tablet (25 mg total) by mouth 2 (two) times daily., Disp: 180 tablet, Rfl: 3    nirmatrelvir-ritonavir (PAXLOVID) 300 mg (150 mg x 2)-100 mg copackaged tablets (EUA), Take 3 tablets by mouth 2 (two) times daily. Each dose contains 2 nirmatrelvir (pink tablets) and 1 ritonavir (white tablet). Take all 3 tablets together, Disp: 30 tablet, Rfl: 0    omeprazole (PRILOSEC) 40 MG capsule, Take 1 capsule (40 mg total) by mouth every morning., Disp: 90 capsule, Rfl: 3    ondansetron (ZOFRAN) 4 MG tablet, Take 1 tablet (4 mg total) by mouth every 8 (eight) hours as needed for Nausea., Disp: 30 tablet, Rfl: 3    REFRESH OPTIVE 0.5-0.9 % Drop, Place 1 drop into both eyes 4 (four) times daily., Disp: , Rfl:     simethicone (GAS-X ORAL), Take 1 tablet by mouth as needed., Disp: , Rfl:     ALPRAZolam (XANAX) 0.25 MG tablet, Take 1 tablet (0.25 mg total) by mouth 2 (two) times daily as needed for Anxiety., Disp: 30  tablet, Rfl: 1    amLODIPine (NORVASC) 5 MG tablet, Take 1 tablet (5 mg total) by mouth once daily. For blood pressure, Disp: 30 tablet, Rfl: 5    EPINEPHrine (EPIPEN) 0.3 mg/0.3 mL AtIn, Inject 0.3 mLs (0.3 mg total) into the muscle as needed (Severe allergic reaction symptoms such as shortness of breath, tongue or throat swelling, weakness dizziness severe nausea vomiting). (Patient not taking: Reported on 10/18/2023), Disp: 1 each, Rfl: 3    levalbuterol (XOPENEX HFA) 45 mcg/actuation inhaler, Inhale 2 puffs into the lungs every 6 (six) hours as needed for Wheezing. Rescue, Disp: 15 g, Rfl: 4    predniSONE (DELTASONE) 20 MG tablet, Take 20 mg by mouth 2 (two) times daily., Disp: , Rfl:     Review of Systems   Constitutional:  Negative for appetite change, chills, fatigue, fever and unexpected weight change.   HENT:  Negative for ear discharge and ear pain.    Eyes:  Negative for pain, discharge and visual disturbance.   Respiratory:  Positive for wheezing. Negative for apnea, cough and shortness of breath.    Cardiovascular:  Negative for chest pain, palpitations and leg swelling.   Gastrointestinal:  Negative for abdominal pain, blood in stool, constipation, diarrhea, nausea, vomiting and reflux.   Endocrine: Negative for cold intolerance, heat intolerance and polydipsia.   Genitourinary:  Negative for bladder incontinence, dysuria, hematuria, nocturia and urgency.   Musculoskeletal:  Negative for gait problem, joint swelling and myalgias.   Allergic/Immunologic:        Had anaphylactic reaction with rashes and throat swelling, wheezing, responded well to EpiPen   Neurological:  Negative for dizziness, seizures and numbness.   Psychiatric/Behavioral:  Negative for behavioral problems and hallucinations. The patient is nervous/anxious.          Objective:      Vitals:    10/25/23 1152 10/25/23 1153   BP: (!) 170/100 (!) 166/96   Pulse: 80    Weight: 71.7 kg (158 lb)    Height: 5' (1.524 m)      Physical  Exam  Vitals and nursing note reviewed.   Constitutional:       General: She is not in acute distress.     Appearance: She is well-developed. She is obese. She is not toxic-appearing.   HENT:      Head: Normocephalic and atraumatic.      Right Ear: Tympanic membrane and external ear normal.      Left Ear: Tympanic membrane and external ear normal.      Nose: Nose normal.   Eyes:      Pupils: Pupils are equal, round, and reactive to light.   Neck:      Thyroid: No thyromegaly.      Vascular: No carotid bruit.   Cardiovascular:      Rate and Rhythm: Normal rate and regular rhythm.      Heart sounds: Normal heart sounds. No murmur heard.  Pulmonary:      Effort: Pulmonary effort is normal.      Breath sounds: Wheezing (mild expiratory wheezing) present. No rales.   Abdominal:      General: Bowel sounds are normal. There is no distension.      Palpations: Abdomen is soft.      Tenderness: There is no abdominal tenderness.   Musculoskeletal:         General: No tenderness or deformity. Normal range of motion.      Cervical back: Normal range of motion and neck supple.      Lumbar back: Normal. No spasms.      Comments: Bends 90 degrees at  waist   Lymphadenopathy:      Cervical: No cervical adenopathy.   Skin:     General: Skin is warm and dry.      Findings: No rash.   Neurological:      Mental Status: She is alert and oriented to person, place, and time.      Cranial Nerves: No cranial nerve deficit.      Coordination: Coordination normal.   Psychiatric:         Behavior: Behavior normal.         Thought Content: Thought content normal.         Judgment: Judgment normal.         Assessment:       1. Anaphylaxis, sequela    2. Uncontrolled hypertension    3. Chronic obstructive pulmonary disease with acute exacerbation    4. Lumbar disc disease with radiculopathy    5. Neuropathy    6. Essential hypertension    7. Abdominal aortic aneurysm (AAA) without rupture, unspecified part    8. Gastroesophageal reflux disease  without esophagitis    9. Hospital discharge follow-up         Plan:       Anaphylaxis, sequela  Rash and swelling in the throat is now resolved.  She has an EpiPen at home for emergencies.  Uncontrolled hypertension  -     amLODIPine (NORVASC) 5 MG tablet; Take 1 tablet (5 mg total) by mouth once daily. For blood pressure  Dispense: 30 tablet; Refill: 5  Add amlodipine 5 mg daily.  RTC 4 weeks for blood pressure recheck  Chronic obstructive pulmonary disease with acute exacerbation  -     levalbuterol (XOPENEX HFA) 45 mcg/actuation inhaler; Inhale 2 puffs into the lungs every 6 (six) hours as needed for Wheezing. Rescue  Dispense: 15 g; Refill: 4  Xopenex inhaler for wheezing  Lumbar disc disease with radiculopathy    Neuropathy    Essential hypertension    Abdominal aortic aneurysm (AAA) without rupture, unspecified part    Gastroesophageal reflux disease without esophagitis    Hospital discharge follow-up  Home medicine reconciled with Hospital Medicine    Follow up in about 4 weeks (around 11/22/2023), or htn.

## 2023-11-01 ENCOUNTER — TELEPHONE (OUTPATIENT)
Dept: FAMILY MEDICINE | Facility: CLINIC | Age: 73
End: 2023-11-01

## 2023-11-01 NOTE — TELEPHONE ENCOUNTER
----- Message from Alyx Lechuga sent at 11/1/2023 10:16 AM CDT -----  Vm- 10:08-pt would like to talk to chelly as soon as she can. She is still having problems   [phone]

## 2023-11-07 ENCOUNTER — TELEPHONE (OUTPATIENT)
Dept: FAMILY MEDICINE | Facility: CLINIC | Age: 73
End: 2023-11-07

## 2023-11-07 NOTE — TELEPHONE ENCOUNTER
Spoke with patient and let her know she doesn't need any labs but she states she thought Dr. Graham was going to recheck her thyroid levels. They were just done 2 weeks ago in the hospital. Normal T4. TSH was 0.193    Printed

## 2023-11-07 NOTE — TELEPHONE ENCOUNTER
----- Message from Brit Grimm sent at 11/7/2023 10:33 AM CST -----  The patient has an appointment on 11/27/2023 with Dr. Graham.  She is asking does she need labs? Pt's # [phone] GH

## 2023-11-07 NOTE — TELEPHONE ENCOUNTER
"Spoke to pts  and let him know per dr. Graham " Needs to wait until Dec to repeat TSH' pt  verbalized understanding   "

## 2023-11-27 ENCOUNTER — OFFICE VISIT (OUTPATIENT)
Dept: FAMILY MEDICINE | Facility: CLINIC | Age: 73
End: 2023-11-27
Attending: FAMILY MEDICINE
Payer: MEDICARE

## 2023-11-27 VITALS
HEIGHT: 60 IN | WEIGHT: 162 LBS | BODY MASS INDEX: 31.8 KG/M2 | HEART RATE: 69 BPM | DIASTOLIC BLOOD PRESSURE: 76 MMHG | SYSTOLIC BLOOD PRESSURE: 122 MMHG

## 2023-11-27 DIAGNOSIS — J44.1 CHRONIC OBSTRUCTIVE PULMONARY DISEASE WITH ACUTE EXACERBATION: Primary | ICD-10-CM

## 2023-11-27 DIAGNOSIS — I10 UNCONTROLLED HYPERTENSION: ICD-10-CM

## 2023-11-27 DIAGNOSIS — F41.9 ANXIETY: ICD-10-CM

## 2023-11-27 DIAGNOSIS — K21.00 GASTROESOPHAGEAL REFLUX DISEASE WITH ESOPHAGITIS WITHOUT HEMORRHAGE: ICD-10-CM

## 2023-11-27 DIAGNOSIS — K21.9 GASTROESOPHAGEAL REFLUX DISEASE WITHOUT ESOPHAGITIS: ICD-10-CM

## 2023-11-27 DIAGNOSIS — M51.36 DDD (DEGENERATIVE DISC DISEASE), LUMBAR: ICD-10-CM

## 2023-11-27 DIAGNOSIS — R60.9 EDEMA, UNSPECIFIED TYPE: ICD-10-CM

## 2023-11-27 DIAGNOSIS — M51.16 LUMBAR DISC DISEASE WITH RADICULOPATHY: ICD-10-CM

## 2023-11-27 DIAGNOSIS — E03.9 ACQUIRED HYPOTHYROIDISM: ICD-10-CM

## 2023-11-27 DIAGNOSIS — J20.9 ACUTE BRONCHITIS, UNSPECIFIED ORGANISM: ICD-10-CM

## 2023-11-27 DIAGNOSIS — I10 PRIMARY HYPERTENSION: ICD-10-CM

## 2023-11-27 DIAGNOSIS — I10 ESSENTIAL HYPERTENSION: ICD-10-CM

## 2023-11-27 DIAGNOSIS — F17.200 CURRENT SMOKER: ICD-10-CM

## 2023-11-27 PROCEDURE — 99214 PR OFFICE/OUTPT VISIT, EST, LEVL IV, 30-39 MIN: ICD-10-PCS | Mod: S$GLB,,, | Performed by: FAMILY MEDICINE

## 2023-11-27 PROCEDURE — 3078F PR MOST RECENT DIASTOLIC BLOOD PRESSURE < 80 MM HG: ICD-10-PCS | Mod: CPTII,S$GLB,, | Performed by: FAMILY MEDICINE

## 2023-11-27 PROCEDURE — 3066F NEPHROPATHY DOC TX: CPT | Mod: CPTII,S$GLB,, | Performed by: FAMILY MEDICINE

## 2023-11-27 PROCEDURE — 4010F PR ACE/ARB THEARPY RXD/TAKEN: ICD-10-PCS | Mod: CPTII,S$GLB,, | Performed by: FAMILY MEDICINE

## 2023-11-27 PROCEDURE — 4010F ACE/ARB THERAPY RXD/TAKEN: CPT | Mod: CPTII,S$GLB,, | Performed by: FAMILY MEDICINE

## 2023-11-27 PROCEDURE — 1101F PT FALLS ASSESS-DOCD LE1/YR: CPT | Mod: CPTII,S$GLB,, | Performed by: FAMILY MEDICINE

## 2023-11-27 PROCEDURE — 1159F MED LIST DOCD IN RCRD: CPT | Mod: CPTII,S$GLB,, | Performed by: FAMILY MEDICINE

## 2023-11-27 PROCEDURE — 3074F SYST BP LT 130 MM HG: CPT | Mod: CPTII,S$GLB,, | Performed by: FAMILY MEDICINE

## 2023-11-27 PROCEDURE — 3288F FALL RISK ASSESSMENT DOCD: CPT | Mod: CPTII,S$GLB,, | Performed by: FAMILY MEDICINE

## 2023-11-27 PROCEDURE — 1101F PR PT FALLS ASSESS DOC 0-1 FALLS W/OUT INJ PAST YR: ICD-10-PCS | Mod: CPTII,S$GLB,, | Performed by: FAMILY MEDICINE

## 2023-11-27 PROCEDURE — 3061F NEG MICROALBUMINURIA REV: CPT | Mod: CPTII,S$GLB,, | Performed by: FAMILY MEDICINE

## 2023-11-27 PROCEDURE — 3078F DIAST BP <80 MM HG: CPT | Mod: CPTII,S$GLB,, | Performed by: FAMILY MEDICINE

## 2023-11-27 PROCEDURE — 3288F PR FALLS RISK ASSESSMENT DOCUMENTED: ICD-10-PCS | Mod: CPTII,S$GLB,, | Performed by: FAMILY MEDICINE

## 2023-11-27 PROCEDURE — 3066F PR DOCUMENTATION OF TREATMENT FOR NEPHROPATHY: ICD-10-PCS | Mod: CPTII,S$GLB,, | Performed by: FAMILY MEDICINE

## 2023-11-27 PROCEDURE — 3074F PR MOST RECENT SYSTOLIC BLOOD PRESSURE < 130 MM HG: ICD-10-PCS | Mod: CPTII,S$GLB,, | Performed by: FAMILY MEDICINE

## 2023-11-27 PROCEDURE — 3061F PR NEG MICROALBUMINURIA RESULT DOCUMENTED/REVIEW: ICD-10-PCS | Mod: CPTII,S$GLB,, | Performed by: FAMILY MEDICINE

## 2023-11-27 PROCEDURE — 1159F PR MEDICATION LIST DOCUMENTED IN MEDICAL RECORD: ICD-10-PCS | Mod: CPTII,S$GLB,, | Performed by: FAMILY MEDICINE

## 2023-11-27 PROCEDURE — 99214 OFFICE O/P EST MOD 30 MIN: CPT | Mod: S$GLB,,, | Performed by: FAMILY MEDICINE

## 2023-11-27 RX ORDER — LEVALBUTEROL TARTRATE 45 UG/1
2 AEROSOL, METERED ORAL EVERY 6 HOURS PRN
Qty: 15 G | Refills: 4 | Status: CANCELLED | OUTPATIENT
Start: 2023-11-27 | End: 2024-11-26

## 2023-11-27 RX ORDER — FAMOTIDINE 20 MG/1
20 TABLET, FILM COATED ORAL NIGHTLY
Qty: 90 TABLET | Refills: 3 | Status: SHIPPED | OUTPATIENT
Start: 2023-11-27 | End: 2024-02-15 | Stop reason: HOSPADM

## 2023-11-27 RX ORDER — AMLODIPINE BESYLATE 5 MG/1
5 TABLET ORAL DAILY
Qty: 30 TABLET | Refills: 5 | Status: SHIPPED | OUTPATIENT
Start: 2023-11-27 | End: 2024-11-26

## 2023-11-27 RX ORDER — LEVOTHYROXINE SODIUM 88 UG/1
88 TABLET ORAL
Qty: 30 TABLET | Refills: 3 | Status: SHIPPED | OUTPATIENT
Start: 2023-11-27 | End: 2024-11-26

## 2023-11-27 RX ORDER — HYDROCODONE BITARTRATE AND ACETAMINOPHEN 10; 325 MG/1; MG/1
1 TABLET ORAL EVERY 12 HOURS PRN
Qty: 50 TABLET | Refills: 0 | Status: SHIPPED | OUTPATIENT
Start: 2023-11-27 | End: 2024-02-27 | Stop reason: SDUPTHER

## 2023-11-27 RX ORDER — LEVOTHYROXINE SODIUM 100 UG/1
100 TABLET ORAL
Qty: 90 TABLET | Refills: 3 | Status: CANCELLED | OUTPATIENT
Start: 2023-11-27

## 2023-11-27 RX ORDER — AMOXICILLIN AND CLAVULANATE POTASSIUM 875; 125 MG/1; MG/1
1 TABLET, FILM COATED ORAL 2 TIMES DAILY
Qty: 14 TABLET | Refills: 0 | Status: ON HOLD | OUTPATIENT
Start: 2023-11-27 | End: 2024-01-06

## 2023-11-27 RX ORDER — HYDROXYZINE HYDROCHLORIDE 25 MG/1
25 TABLET, FILM COATED ORAL 2 TIMES DAILY PRN
Qty: 30 TABLET | Refills: 0 | Status: CANCELLED | OUTPATIENT
Start: 2023-11-27

## 2023-11-27 RX ORDER — ONDANSETRON 4 MG/1
4 TABLET, FILM COATED ORAL EVERY 8 HOURS PRN
Qty: 30 TABLET | Refills: 3 | Status: SHIPPED | OUTPATIENT
Start: 2023-11-27

## 2023-11-27 RX ORDER — ALPRAZOLAM 0.25 MG/1
0.25 TABLET ORAL 2 TIMES DAILY PRN
Qty: 30 TABLET | Refills: 1 | Status: CANCELLED | OUTPATIENT
Start: 2023-11-27 | End: 2023-12-27

## 2023-11-27 RX ORDER — METOPROLOL TARTRATE 25 MG/1
25 TABLET, FILM COATED ORAL 2 TIMES DAILY
Qty: 180 TABLET | Refills: 3 | Status: SHIPPED | OUTPATIENT
Start: 2023-11-27 | End: 2024-11-26

## 2023-11-27 RX ORDER — OMEPRAZOLE 40 MG/1
40 CAPSULE, DELAYED RELEASE ORAL EVERY MORNING
Qty: 90 CAPSULE | Refills: 3 | Status: SHIPPED | OUTPATIENT
Start: 2023-11-27

## 2023-11-27 RX ORDER — PREDNISONE 20 MG/1
20 TABLET ORAL 2 TIMES DAILY
Qty: 30 TABLET | Refills: 1 | Status: ON HOLD | OUTPATIENT
Start: 2023-11-27 | End: 2024-01-06

## 2023-11-27 RX ORDER — HYDROCHLOROTHIAZIDE 25 MG/1
25 TABLET ORAL DAILY PRN
Qty: 30 TABLET | Refills: 3 | Status: ON HOLD | OUTPATIENT
Start: 2023-11-27 | End: 2024-01-06

## 2023-11-28 NOTE — PROGRESS NOTES
SUBJECTIVE:    Patient ID: Joslyn Dubon is a 73 y.o. female.    Chief Complaint: Sinus Problem (No bottles, cough for 3 days, off/on, need refills, abc )    This 73-year-old female presents with a several day history of cough sinus congestion and pressure.  Her cough is productive of yellow sputum.  She feels quite fatigued.  Her grandkids are all were sick and she took care of them.  Tylenol seems to help her symptoms.  She is coughing and wheezing quite a bit.    Hypothyroidism-, having trouble cutting her levothyroxine 100 mg in half so would like a lower dose    Blood pressure well controlled currently    Still smoking half a pack of cigarettes daily.        Admission on 10/22/2023, Discharged on 10/22/2023   Component Date Value Ref Range Status    WBC 10/22/2023 10.20  3.90 - 12.70 K/uL Final    RBC 10/22/2023 4.47  4.00 - 5.40 M/uL Final    Hemoglobin 10/22/2023 14.3  12.0 - 16.0 g/dL Final    Hematocrit 10/22/2023 42.6  37.0 - 48.5 % Final    MCV 10/22/2023 95  82 - 98 fL Final    MCH 10/22/2023 32.0 (H)  27.0 - 31.0 pg Final    MCHC 10/22/2023 33.6  32.0 - 36.0 g/dL Final    RDW 10/22/2023 13.4  11.5 - 14.5 % Final    Platelets 10/22/2023 182  150 - 450 K/uL Final    MPV 10/22/2023 9.8  9.2 - 12.9 fL Final    Immature Granulocytes 10/22/2023 0.6 (H)  0.0 - 0.5 % Final    Gran # (ANC) 10/22/2023 8.4 (H)  1.8 - 7.7 K/uL Final    Immature Grans (Abs) 10/22/2023 0.06 (H)  0.00 - 0.04 K/uL Final    Lymph # 10/22/2023 1.2  1.0 - 4.8 K/uL Final    Mono # 10/22/2023 0.5  0.3 - 1.0 K/uL Final    Eos # 10/22/2023 0.0  0.0 - 0.5 K/uL Final    Baso # 10/22/2023 0.02  0.00 - 0.20 K/uL Final    nRBC 10/22/2023 0  0 /100 WBC Final    Gran % 10/22/2023 82.6 (H)  38.0 - 73.0 % Final    Lymph % 10/22/2023 12.0 (L)  18.0 - 48.0 % Final    Mono % 10/22/2023 4.5  4.0 - 15.0 % Final    Eosinophil % 10/22/2023 0.1  0.0 - 8.0 % Final    Basophil % 10/22/2023 0.2  0.0 - 1.9 % Final    Differential Method 10/22/2023 Automated    Final    Sodium 10/22/2023 137  136 - 145 mmol/L Final    Potassium 10/22/2023 3.7  3.5 - 5.1 mmol/L Final    Chloride 10/22/2023 107  95 - 110 mmol/L Final    CO2 10/22/2023 25  23 - 29 mmol/L Final    Glucose 10/22/2023 118 (H)  70 - 110 mg/dL Final    BUN 10/22/2023 17  8 - 23 mg/dL Final    Creatinine 10/22/2023 0.8  0.5 - 1.4 mg/dL Final    Calcium 10/22/2023 9.1  8.7 - 10.5 mg/dL Final    Total Protein 10/22/2023 7.3  6.0 - 8.4 g/dL Final    Albumin 10/22/2023 4.0  3.5 - 5.2 g/dL Final    Total Bilirubin 10/22/2023 0.3  0.1 - 1.0 mg/dL Final    Alkaline Phosphatase 10/22/2023 80  55 - 135 U/L Final    AST 10/22/2023 22  10 - 40 U/L Final    ALT 10/22/2023 23  10 - 44 U/L Final    eGFR 10/22/2023 >60.0  >60 mL/min/1.73 m^2 Final    Anion Gap 10/22/2023 5 (L)  8 - 16 mmol/L Final    Specimen UA 10/22/2023 Urine, Clean Catch   Final    Color, UA 10/22/2023 Yellow  Yellow, Straw, Priya Final    Appearance, UA 10/22/2023 Clear  Clear Final    pH, UA 10/22/2023 6.0  5.0 - 8.0 Final    Specific Gravity, UA 10/22/2023 1.015  1.005 - 1.030 Final    Protein, UA 10/22/2023 Negative  Negative Final    Glucose, UA 10/22/2023 Negative  Negative Final    Ketones, UA 10/22/2023 Negative  Negative Final    Bilirubin (UA) 10/22/2023 Negative  Negative Final    Occult Blood UA 10/22/2023 1+ (A)  Negative Final    Nitrite, UA 10/22/2023 Negative  Negative Final    Urobilinogen, UA 10/22/2023 Negative  Negative EU/dL Final    Leukocytes, UA 10/22/2023 Negative  Negative Final    RBC, UA 10/22/2023 4  0 - 4 /hpf Final    WBC, UA 10/22/2023 1  0 - 5 /hpf Final    Bacteria 10/22/2023 Negative  None-Occ /hpf Final    Squam Epithel, UA 10/22/2023 3  /hpf Final    Hyaline Casts, UA 10/22/2023 5 (A)  0-1/lpf /lpf Final    Microscopic Comment 10/22/2023 SEE COMMENT   Final   Admission on 10/18/2023, Discharged on 10/20/2023   Component Date Value Ref Range Status    WBC 10/18/2023 7.43  3.90 - 12.70 K/uL Final    RBC 10/18/2023 4.91   4.00 - 5.40 M/uL Final    Hemoglobin 10/18/2023 16.0  12.0 - 16.0 g/dL Final    Hematocrit 10/18/2023 46.8  37.0 - 48.5 % Final    MCV 10/18/2023 95  82 - 98 fL Final    MCH 10/18/2023 32.6 (H)  27.0 - 31.0 pg Final    MCHC 10/18/2023 34.2  32.0 - 36.0 g/dL Final    RDW 10/18/2023 13.4  11.5 - 14.5 % Final    Platelets 10/18/2023 192  150 - 450 K/uL Final    MPV 10/18/2023 9.4  9.2 - 12.9 fL Final    Immature Granulocytes 10/18/2023 0.3  0.0 - 0.5 % Final    Gran # (ANC) 10/18/2023 4.4  1.8 - 7.7 K/uL Final    Immature Grans (Abs) 10/18/2023 0.02  0.00 - 0.04 K/uL Final    Lymph # 10/18/2023 2.3  1.0 - 4.8 K/uL Final    Mono # 10/18/2023 0.6  0.3 - 1.0 K/uL Final    Eos # 10/18/2023 0.1  0.0 - 0.5 K/uL Final    Baso # 10/18/2023 0.03  0.00 - 0.20 K/uL Final    nRBC 10/18/2023 0  0 /100 WBC Final    Gran % 10/18/2023 59.4  38.0 - 73.0 % Final    Lymph % 10/18/2023 30.3  18.0 - 48.0 % Final    Mono % 10/18/2023 8.5  4.0 - 15.0 % Final    Eosinophil % 10/18/2023 1.1  0.0 - 8.0 % Final    Basophil % 10/18/2023 0.4  0.0 - 1.9 % Final    Differential Method 10/18/2023 Automated   Final    Sodium 10/18/2023 136  136 - 145 mmol/L Final    Potassium 10/18/2023 4.2  3.5 - 5.1 mmol/L Final    Chloride 10/18/2023 106  95 - 110 mmol/L Final    CO2 10/18/2023 23  23 - 29 mmol/L Final    Glucose 10/18/2023 123 (H)  70 - 110 mg/dL Final    BUN 10/18/2023 12  8 - 23 mg/dL Final    Creatinine 10/18/2023 0.7  0.5 - 1.4 mg/dL Final    Calcium 10/18/2023 8.9  8.7 - 10.5 mg/dL Final    Total Protein 10/18/2023 6.4  6.0 - 8.4 g/dL Final    Albumin 10/18/2023 3.4 (L)  3.5 - 5.2 g/dL Final    Total Bilirubin 10/18/2023 0.6  0.1 - 1.0 mg/dL Final    Alkaline Phosphatase 10/18/2023 74  55 - 135 U/L Final    AST 10/18/2023 23  10 - 40 U/L Final    ALT 10/18/2023 17  10 - 44 U/L Final    eGFR 10/18/2023 >60.0  >60 mL/min/1.73 m^2 Final    Anion Gap 10/18/2023 7 (L)  8 - 16 mmol/L Final    Prothrombin Time 10/18/2023 10.4  9.0 - 12.5 sec Final     INR 10/18/2023 0.9  0.8 - 1.2 Final    TSH 10/18/2023 0.193 (L)  0.340 - 5.600 uIU/mL Final    Cholesterol 10/18/2023 151  120 - 199 mg/dL Final    Triglycerides 10/18/2023 111  30 - 150 mg/dL Final    HDL 10/18/2023 38 (L)  40 - 75 mg/dL Final    LDL Cholesterol 10/18/2023 90.8  63.0 - 159.0 mg/dL Final    HDL/Cholesterol Ratio 10/18/2023 25.2  20.0 - 50.0 % Final    Total Cholesterol/HDL Ratio 10/18/2023 4.0  2.0 - 5.0 Final    Non-HDL Cholesterol 10/18/2023 113  mg/dL Final    Troponin I High Sensitivity 10/18/2023 7.4  0.0 - 14.9 pg/mL Final    POC Creatinine 10/18/2023 0.9  0.5 - 1.4 mg/dL Final    Sample 10/18/2023 VENOUS   Final    POC Glucose 10/18/2023 98  70 - 110 Final    POC PH 10/18/2023 7.352  7.35 - 7.45 Final    POC PCO2 10/18/2023 45.4 (H)  35 - 45 mmHg Final    POC PO2 10/18/2023 87 (HH)  40 - 60 mmHg Final    POC HCO3 10/18/2023 25.2  24 - 28 mmol/L Final    POC BE 10/18/2023 0  -2 to 2 mmol/L Final    POC SATURATED O2 10/18/2023 96  95 - 100 % Final    POC TCO2 10/18/2023 27  24 - 29 mmol/L Final    Rate 10/18/2023 18   Final    Sample 10/18/2023 VENOUS   Final    Site 10/18/2023 Other   Final    Allens Test 10/18/2023 N/A   Final    DelSys 10/18/2023 CPAP/BiPAP   Final    Mode 10/18/2023 BiPAP   Final    FiO2 10/18/2023 40   Final    Spont Rate 10/18/2023 21   Final    IP 10/18/2023 10   Final    EP 10/18/2023 5   Final    Tryptase 10/18/2023 26.7 (H)  2.2 - 13.2 ug/L Final    Free T4 10/18/2023 1.40  0.71 - 1.51 ng/dL Final    WBC 10/19/2023 6.06  3.90 - 12.70 K/uL Final    RBC 10/19/2023 4.23  4.00 - 5.40 M/uL Final    Hemoglobin 10/19/2023 13.8  12.0 - 16.0 g/dL Final    Hematocrit 10/19/2023 41.0  37.0 - 48.5 % Final    MCV 10/19/2023 97  82 - 98 fL Final    MCH 10/19/2023 32.6 (H)  27.0 - 31.0 pg Final    MCHC 10/19/2023 33.7  32.0 - 36.0 g/dL Final    RDW 10/19/2023 13.3  11.5 - 14.5 % Final    Platelets 10/19/2023 165  150 - 450 K/uL Final    MPV 10/19/2023 9.5  9.2 - 12.9 fL Final     Immature Granulocytes 10/19/2023 0.5  0.0 - 0.5 % Final    Gran # (ANC) 10/19/2023 5.4  1.8 - 7.7 K/uL Final    Immature Grans (Abs) 10/19/2023 0.03  0.00 - 0.04 K/uL Final    Lymph # 10/19/2023 0.6 (L)  1.0 - 4.8 K/uL Final    Mono # 10/19/2023 0.0 (L)  0.3 - 1.0 K/uL Final    Eos # 10/19/2023 0.0  0.0 - 0.5 K/uL Final    Baso # 10/19/2023 0.00  0.00 - 0.20 K/uL Final    nRBC 10/19/2023 0  0 /100 WBC Final    Gran % 10/19/2023 89.6 (H)  38.0 - 73.0 % Final    Lymph % 10/19/2023 9.2 (L)  18.0 - 48.0 % Final    Mono % 10/19/2023 0.7 (L)  4.0 - 15.0 % Final    Eosinophil % 10/19/2023 0.0  0.0 - 8.0 % Final    Basophil % 10/19/2023 0.0  0.0 - 1.9 % Final    Differential Method 10/19/2023 Automated   Final    Sodium 10/19/2023 137  136 - 145 mmol/L Final    Potassium 10/19/2023 4.0  3.5 - 5.1 mmol/L Final    Chloride 10/19/2023 108  95 - 110 mmol/L Final    CO2 10/19/2023 23  23 - 29 mmol/L Final    Glucose 10/19/2023 149 (H)  70 - 110 mg/dL Final    BUN 10/19/2023 10  8 - 23 mg/dL Final    Creatinine 10/19/2023 0.7  0.5 - 1.4 mg/dL Final    Calcium 10/19/2023 8.8  8.7 - 10.5 mg/dL Final    Total Protein 10/19/2023 6.5  6.0 - 8.4 g/dL Final    Albumin 10/19/2023 3.5  3.5 - 5.2 g/dL Final    Total Bilirubin 10/19/2023 0.5  0.1 - 1.0 mg/dL Final    Alkaline Phosphatase 10/19/2023 76  55 - 135 U/L Final    AST 10/19/2023 16  10 - 40 U/L Final    ALT 10/19/2023 15  10 - 44 U/L Final    eGFR 10/19/2023 >60.0  >60 mL/min/1.73 m^2 Final    Anion Gap 10/19/2023 6 (L)  8 - 16 mmol/L Final    WBC 10/20/2023 15.33 (H)  3.90 - 12.70 K/uL Final    RBC 10/20/2023 4.04  4.00 - 5.40 M/uL Final    Hemoglobin 10/20/2023 13.0  12.0 - 16.0 g/dL Final    Hematocrit 10/20/2023 38.8  37.0 - 48.5 % Final    MCV 10/20/2023 96  82 - 98 fL Final    MCH 10/20/2023 32.2 (H)  27.0 - 31.0 pg Final    MCHC 10/20/2023 33.5  32.0 - 36.0 g/dL Final    RDW 10/20/2023 13.6  11.5 - 14.5 % Final    Platelets 10/20/2023 177  150 - 450 K/uL Final    MPV  10/20/2023 9.7  9.2 - 12.9 fL Final    Immature Granulocytes 10/20/2023 0.4  0.0 - 0.5 % Final    Gran # (ANC) 10/20/2023 12.5 (H)  1.8 - 7.7 K/uL Final    Immature Grans (Abs) 10/20/2023 0.06 (H)  0.00 - 0.04 K/uL Final    Lymph # 10/20/2023 1.7  1.0 - 4.8 K/uL Final    Mono # 10/20/2023 1.1 (H)  0.3 - 1.0 K/uL Final    Eos # 10/20/2023 0.0  0.0 - 0.5 K/uL Final    Baso # 10/20/2023 0.02  0.00 - 0.20 K/uL Final    nRBC 10/20/2023 0  0 /100 WBC Final    Gran % 10/20/2023 81.9 (H)  38.0 - 73.0 % Final    Lymph % 10/20/2023 10.8 (L)  18.0 - 48.0 % Final    Mono % 10/20/2023 6.8  4.0 - 15.0 % Final    Eosinophil % 10/20/2023 0.0  0.0 - 8.0 % Final    Basophil % 10/20/2023 0.1  0.0 - 1.9 % Final    Differential Method 10/20/2023 Automated   Final    Sodium 10/20/2023 138  136 - 145 mmol/L Final    Potassium 10/20/2023 4.1  3.5 - 5.1 mmol/L Final    Chloride 10/20/2023 108  95 - 110 mmol/L Final    CO2 10/20/2023 24  23 - 29 mmol/L Final    Glucose 10/20/2023 120 (H)  70 - 110 mg/dL Final    BUN 10/20/2023 19  8 - 23 mg/dL Final    Creatinine 10/20/2023 0.8  0.5 - 1.4 mg/dL Final    Calcium 10/20/2023 9.3  8.7 - 10.5 mg/dL Final    Total Protein 10/20/2023 6.5  6.0 - 8.4 g/dL Final    Albumin 10/20/2023 3.5  3.5 - 5.2 g/dL Final    Total Bilirubin 10/20/2023 0.4  0.1 - 1.0 mg/dL Final    Alkaline Phosphatase 10/20/2023 77  55 - 135 U/L Final    AST 10/20/2023 28  10 - 40 U/L Final    ALT 10/20/2023 16  10 - 44 U/L Final    eGFR 10/20/2023 >60.0  >60 mL/min/1.73 m^2 Final    Anion Gap 10/20/2023 6 (L)  8 - 16 mmol/L Final   Admission on 09/05/2023, Discharged on 09/05/2023   Component Date Value Ref Range Status    WBC 09/05/2023 6.79  3.90 - 12.70 K/uL Final    RBC 09/05/2023 4.80  4.00 - 5.40 M/uL Final    Hemoglobin 09/05/2023 15.2  12.0 - 16.0 g/dL Final    Hematocrit 09/05/2023 45.3  37.0 - 48.5 % Final    MCV 09/05/2023 94  82 - 98 fL Final    MCH 09/05/2023 31.7 (H)  27.0 - 31.0 pg Final    MCHC 09/05/2023 33.6   32.0 - 36.0 g/dL Final    RDW 09/05/2023 13.2  11.5 - 14.5 % Final    Platelets 09/05/2023 243  150 - 450 K/uL Final    MPV 09/05/2023 9.1 (L)  9.2 - 12.9 fL Final    Immature Granulocytes 09/05/2023 0.3  0.0 - 0.5 % Final    Gran # (ANC) 09/05/2023 4.4  1.8 - 7.7 K/uL Final    Immature Grans (Abs) 09/05/2023 0.02  0.00 - 0.04 K/uL Final    Lymph # 09/05/2023 1.7  1.0 - 4.8 K/uL Final    Mono # 09/05/2023 0.6  0.3 - 1.0 K/uL Final    Eos # 09/05/2023 0.1  0.0 - 0.5 K/uL Final    Baso # 09/05/2023 0.03  0.00 - 0.20 K/uL Final    nRBC 09/05/2023 0  0 /100 WBC Final    Gran % 09/05/2023 64.3  38.0 - 73.0 % Final    Lymph % 09/05/2023 24.6  18.0 - 48.0 % Final    Mono % 09/05/2023 9.4  4.0 - 15.0 % Final    Eosinophil % 09/05/2023 1.0  0.0 - 8.0 % Final    Basophil % 09/05/2023 0.4  0.0 - 1.9 % Final    Differential Method 09/05/2023 Automated   Final    Sodium 09/05/2023 138  136 - 145 mmol/L Final    Potassium 09/05/2023 4.2  3.5 - 5.1 mmol/L Final    Chloride 09/05/2023 108  95 - 110 mmol/L Final    CO2 09/05/2023 25  23 - 29 mmol/L Final    Glucose 09/05/2023 97  70 - 110 mg/dL Final    BUN 09/05/2023 14  8 - 23 mg/dL Final    Creatinine 09/05/2023 0.8  0.5 - 1.4 mg/dL Final    Calcium 09/05/2023 9.4  8.7 - 10.5 mg/dL Final    Total Protein 09/05/2023 8.1  6.0 - 8.4 g/dL Final    Albumin 09/05/2023 3.8  3.5 - 5.2 g/dL Final    Total Bilirubin 09/05/2023 0.6  0.1 - 1.0 mg/dL Final    Alkaline Phosphatase 09/05/2023 85  55 - 135 U/L Final    AST 09/05/2023 19  10 - 40 U/L Final    ALT 09/05/2023 23  10 - 44 U/L Final    eGFR 09/05/2023 >60.0  >60 mL/min/1.73 m^2 Final    Anion Gap 09/05/2023 5 (L)  8 - 16 mmol/L Final    BNP 09/05/2023 38  0 - 99 pg/mL Final    Troponin I High Sensitivity 09/05/2023 7.3  0.0 - 14.9 pg/mL Final    Prothrombin Time 09/05/2023 10.3  9.0 - 12.5 sec Final    INR 09/05/2023 0.9  0.8 - 1.2 Final    Magnesium 09/05/2023 2.0  1.6 - 2.6 mg/dL Final    BNP 09/05/2023 38  0 - 99 pg/mL Final    Office Visit on 08/22/2023   Component Date Value Ref Range Status    POC Rapid COVID 08/22/2023 Positive (A)  Negative Final     Acceptable 08/22/2023 Yes   Final   Admission on 06/22/2023, Discharged on 06/22/2023   Component Date Value Ref Range Status    WBC 06/22/2023 7.26  3.90 - 12.70 K/uL Final    RBC 06/22/2023 4.81  4.00 - 5.40 M/uL Final    Hemoglobin 06/22/2023 15.2  12.0 - 16.0 g/dL Final    Hematocrit 06/22/2023 45.8  37.0 - 48.5 % Final    MCV 06/22/2023 95  82 - 98 fL Final    MCH 06/22/2023 31.6 (H)  27.0 - 31.0 pg Final    MCHC 06/22/2023 33.2  32.0 - 36.0 g/dL Final    RDW 06/22/2023 13.3  11.5 - 14.5 % Final    Platelets 06/22/2023 189  150 - 450 K/uL Final    MPV 06/22/2023 9.0 (L)  9.2 - 12.9 fL Final    Immature Granulocytes 06/22/2023 0.3  0.0 - 0.5 % Final    Gran # (ANC) 06/22/2023 4.5  1.8 - 7.7 K/uL Final    Immature Grans (Abs) 06/22/2023 0.02  0.00 - 0.04 K/uL Final    Lymph # 06/22/2023 2.0  1.0 - 4.8 K/uL Final    Mono # 06/22/2023 0.6  0.3 - 1.0 K/uL Final    Eos # 06/22/2023 0.1  0.0 - 0.5 K/uL Final    Baso # 06/22/2023 0.02  0.00 - 0.20 K/uL Final    nRBC 06/22/2023 0  0 /100 WBC Final    Gran % 06/22/2023 61.8  38.0 - 73.0 % Final    Lymph % 06/22/2023 28.1  18.0 - 48.0 % Final    Mono % 06/22/2023 7.7  4.0 - 15.0 % Final    Eosinophil % 06/22/2023 1.8  0.0 - 8.0 % Final    Basophil % 06/22/2023 0.3  0.0 - 1.9 % Final    Differential Method 06/22/2023 Automated   Final    Sodium 06/22/2023 141  136 - 145 mmol/L Final    Potassium 06/22/2023 4.1  3.5 - 5.1 mmol/L Final    Chloride 06/22/2023 108  95 - 110 mmol/L Final    CO2 06/22/2023 26  23 - 29 mmol/L Final    Glucose 06/22/2023 100  70 - 110 mg/dL Final    BUN 06/22/2023 19  8 - 23 mg/dL Final    Creatinine 06/22/2023 0.9  0.5 - 1.4 mg/dL Final    Calcium 06/22/2023 9.5  8.7 - 10.5 mg/dL Final    Total Protein 06/22/2023 7.7  6.0 - 8.4 g/dL Final    Albumin 06/22/2023 3.9  3.5 - 5.2 g/dL Final    Total  Bilirubin 06/22/2023 0.5  0.1 - 1.0 mg/dL Final    Alkaline Phosphatase 06/22/2023 81  55 - 135 U/L Final    AST 06/22/2023 23  10 - 40 U/L Final    ALT 06/22/2023 25  10 - 44 U/L Final    eGFR 06/22/2023 >60.0  >60 mL/min/1.73 m^2 Final    Anion Gap 06/22/2023 7 (L)  8 - 16 mmol/L Final    BNP 06/22/2023 23  0 - 99 pg/mL Final    Troponin I High Sensitivity 06/22/2023 9.1  0.0 - 14.9 pg/mL Final    Prothrombin Time 06/22/2023 10.5  9.0 - 12.5 sec Final    INR 06/22/2023 0.9  0.8 - 1.2 Final    Lipase 06/22/2023 48  4 - 60 U/L Final    Troponin I High Sensitivity 06/22/2023 7.8  0.0 - 14.9 pg/mL Final       Past Medical History:   Diagnosis Date    Anxiety     Martin esophagus     Colitis     COPD (chronic obstructive pulmonary disease)     GERD (gastroesophageal reflux disease)     Hypertension     Thyroid disease     Vocal cord polyps      Social History     Socioeconomic History    Marital status:    Tobacco Use    Smoking status: Every Day     Current packs/day: 0.50     Types: Cigarettes    Smokeless tobacco: Former     Quit date: 5/1/2016    Tobacco comments:     Pt has smoked since 14 years old. Smokes around .5ppd. Inpatient smoking education done 10/20.   Substance and Sexual Activity    Alcohol use: Never    Drug use: Never    Sexual activity: Yes     Partners: Male     Social Determinants of Health     Stress: No Stress Concern Present (9/19/2019)    [employer] - Occupational Stress Questionnaire     Feeling of Stress : Not at all     Past Surgical History:   Procedure Laterality Date    ABDOMINAL AORTIC ANEURYSM REPAIR      BACK SURGERY      cervical    CATARACT EXTRACTION Right 02/2021    CHOLECYSTECTOMY  12-    Dr Price  [facility]    FOOT SURGERY      HYSTERECTOMY      SALIVARY GLAND SURGERY       Family History   Problem Relation Age of Onset    Cancer Mother     Heart failure Father     Emphysema Sister     No Known Problems Brother     Cancer Sister        The  CVD Risk score (Betsey, et al., 2008) failed to calculate for the following reasons:    The 2008 CVD risk score is only valid for ages 30 to 74    Tests to Keep You Healthy    Colon Cancer Screening: ORDERED  Last Blood Pressure <= 139/89 (11/27/2023): Yes  Tobacco Cessation: NO      Review of patient's allergies indicates:   Allergen Reactions    Bisacodyl Nausea And Vomiting     Other reaction(s): Vomiting    Cipro [ciprofloxacin hcl]     Demerol [meperidine]      Nausea vomiting, visual problem    Doxycycline Hives    Flonase [fluticasone propionate] Hives    Iodinated contrast media Hives     hypotension    Morphine     Morpholine analogues Other (See Comments)     hypotension       Current Outpatient Medications:     ALPRAZolam (XANAX) 0.25 MG tablet, Take 1 tablet (0.25 mg total) by mouth 2 (two) times daily as needed for Anxiety., Disp: 30 tablet, Rfl: 1    aluminum-magnesium hydroxide-simethicone (MAALOX) 200-200-20 mg/5 mL Susp, Take 30 mLs by mouth once as needed., Disp: , Rfl:     cetirizine (ZYRTEC) 10 MG tablet, Take 1 tablet (10 mg total) by mouth once daily., Disp: , Rfl: 0    clobetasoL (TEMOVATE) 0.05 % cream, Apply topically 2 (two) times daily. (Patient taking differently: Apply 1 g topically 2 (two) times daily.), Disp: 45 g, Rfl: 2    cloNIDine (CATAPRES) 0.1 MG tablet, Take one tablet as needed up to 3 times a day if blood pressure is greater than 170/110 (Patient taking differently: Take 0.1 mg by mouth every 8 (eight) hours as needed. Take one tablet as needed up to 3 times a day if blood pressure is greater than 170/110), Disp: 30 tablet, Rfl: 3    EPINEPHrine (EPIPEN) 0.3 mg/0.3 mL AtIn, Inject 0.3 mLs (0.3 mg total) into the muscle as needed (Severe allergic reaction symptoms such as shortness of breath, tongue or throat swelling, weakness dizziness severe nausea vomiting)., Disp: 1 each, Rfl: 3    hydrOXYzine HCL (ATARAX) 25 MG tablet, Take 1 tablet (25 mg total) by mouth 2 (two)  times daily as needed for Itching., Disp: 30 tablet, Rfl: 0    levalbuterol (XOPENEX HFA) 45 mcg/actuation inhaler, Inhale 2 puffs into the lungs every 6 (six) hours as needed for Wheezing. Rescue, Disp: 15 g, Rfl: 4    levocetirizine (XYZAL) 5 MG tablet, Take 5 mg by mouth every evening., Disp: , Rfl:     nirmatrelvir-ritonavir (PAXLOVID) 300 mg (150 mg x 2)-100 mg copackaged tablets (EUA), Take 3 tablets by mouth 2 (two) times daily. Each dose contains 2 nirmatrelvir (pink tablets) and 1 ritonavir (white tablet). Take all 3 tablets together, Disp: 30 tablet, Rfl: 0    REFRESH OPTIVE 0.5-0.9 % Drop, Place 1 drop into both eyes 4 (four) times daily., Disp: , Rfl:     simethicone (GAS-X ORAL), Take 1 tablet by mouth as needed., Disp: , Rfl:     amLODIPine (NORVASC) 5 MG tablet, Take 1 tablet (5 mg total) by mouth once daily. For blood pressure, Disp: 30 tablet, Rfl: 5    amoxicillin-clavulanate 875-125mg (AUGMENTIN) 875-125 mg per tablet, Take 1 tablet by mouth 2 (two) times daily., Disp: 14 tablet, Rfl: 0    famotidine (PEPCID) 20 MG tablet, Take 1 tablet (20 mg total) by mouth every evening., Disp: 90 tablet, Rfl: 3    hydroCHLOROthiazide (HYDRODIURIL) 25 MG tablet, Take 1 tablet (25 mg total) by mouth daily as needed (fluid)., Disp: 30 tablet, Rfl: 3    HYDROcodone-acetaminophen (NORCO)  mg per tablet, Take 1 tablet by mouth every 12 (twelve) hours as needed for Pain., Disp: 50 tablet, Rfl: 0    levothyroxine (SYNTHROID) 88 MCG tablet, Take 1 tablet (88 mcg total) by mouth before breakfast., Disp: 30 tablet, Rfl: 3    losartan (COZAAR) 100 MG tablet, Take 1 tablet (100 mg total) by mouth once daily., Disp: 90 tablet, Rfl: 3    metoprolol tartrate (LOPRESSOR) 25 MG tablet, Take 1 tablet (25 mg total) by mouth 2 (two) times daily., Disp: 180 tablet, Rfl: 3    omeprazole (PRILOSEC) 40 MG capsule, Take 1 capsule (40 mg total) by mouth every morning., Disp: 90 capsule, Rfl: 3    ondansetron (ZOFRAN) 4 MG tablet,  Take 1 tablet (4 mg total) by mouth every 8 (eight) hours as needed for Nausea., Disp: 30 tablet, Rfl: 3    predniSONE (DELTASONE) 20 MG tablet, Take 1 tablet (20 mg total) by mouth 2 (two) times daily., Disp: 30 tablet, Rfl: 1    Review of Systems   Constitutional:  Positive for fatigue. Negative for appetite change, chills, fever and unexpected weight change.   HENT:  Positive for nasal congestion, postnasal drip, sinus pressure/congestion and sore throat. Negative for ear discharge and ear pain.    Eyes:  Negative for pain, discharge and visual disturbance.   Respiratory:  Positive for cough and wheezing. Negative for apnea and shortness of breath.    Cardiovascular:  Negative for chest pain, palpitations and leg swelling.   Gastrointestinal:  Negative for abdominal pain, blood in stool, constipation, diarrhea, nausea, vomiting and reflux.   Endocrine: Negative for cold intolerance, heat intolerance and polydipsia.   Genitourinary:  Negative for bladder incontinence, dysuria, hematuria, nocturia and urgency.   Musculoskeletal:  Negative for gait problem, joint swelling and myalgias.   Neurological:  Negative for dizziness, seizures and numbness.   Psychiatric/Behavioral:  Negative for behavioral problems and hallucinations. The patient is not nervous/anxious.            Objective:      Vitals:    11/27/23 1338   BP: 122/76   Pulse: 69   Weight: 73.5 kg (162 lb)   Height: 5' (1.524 m)     Physical Exam  Vitals and nursing note reviewed.   Constitutional:       General: She is not in acute distress.     Appearance: She is well-developed. She is obese. She is not toxic-appearing.   HENT:      Head: Normocephalic and atraumatic.      Right Ear: Tympanic membrane and external ear normal.      Left Ear: Tympanic membrane and external ear normal.      Nose: Congestion (very swollen and congested nasal turbinates) present.   Eyes:      Pupils: Pupils are equal, round, and reactive to light.   Neck:      Thyroid: No  thyromegaly.      Vascular: No carotid bruit.   Cardiovascular:      Rate and Rhythm: Normal rate and regular rhythm.      Heart sounds: Normal heart sounds. No murmur heard.  Pulmonary:      Effort: Pulmonary effort is normal.      Breath sounds: Wheezing (bilateral expiratory wheezes in the bases.) present. No rales.   Abdominal:      General: Bowel sounds are normal. There is no distension.      Palpations: Abdomen is soft.      Tenderness: There is no abdominal tenderness.   Musculoskeletal:         General: No tenderness or deformity. Normal range of motion.      Cervical back: Normal range of motion and neck supple.      Lumbar back: Normal. No spasms.      Comments: Bends 90 degrees at  waist   Lymphadenopathy:      Cervical: No cervical adenopathy.   Skin:     General: Skin is warm and dry.      Findings: No rash.   Neurological:      Mental Status: She is alert and oriented to person, place, and time.      Cranial Nerves: No cranial nerve deficit.      Coordination: Coordination normal.   Psychiatric:         Behavior: Behavior normal.         Thought Content: Thought content normal.         Judgment: Judgment normal.           Assessment:       1. Chronic obstructive pulmonary disease with acute exacerbation    2. Uncontrolled hypertension    3. Gastroesophageal reflux disease without esophagitis    4. Edema, unspecified type    5. DDD (degenerative disc disease), lumbar    6. Acquired hypothyroidism    7. Primary hypertension    8. Uncontrolled hypertension    9. Gastroesophageal reflux disease with esophagitis without hemorrhage    10. Gastroesophageal reflux disease with esophagitis without hemorrhage    11. Anxiety    12. Acute bronchitis, unspecified organism    13. Lumbar disc disease with radiculopathy    14. Essential hypertension    15. Current smoker         Plan:       Chronic obstructive pulmonary disease with acute exacerbation  Will treat acute flare-up with Augmentin 875 mg b.i.d. resume  prednisone 20 mg b.i.d., Trelegy inhaler 1 puff daily.  Uncontrolled hypertension  -     amLODIPine (NORVASC) 5 MG tablet; Take 1 tablet (5 mg total) by mouth once daily. For blood pressure  Dispense: 30 tablet; Refill: 5  -     metoprolol tartrate (LOPRESSOR) 25 MG tablet; Take 1 tablet (25 mg total) by mouth 2 (two) times daily.  Dispense: 180 tablet; Refill: 3  Blood pressure well controlled  Gastroesophageal reflux disease without esophagitis  -     famotidine (PEPCID) 20 MG tablet; Take 1 tablet (20 mg total) by mouth every evening.  Dispense: 90 tablet; Refill: 3    Edema, unspecified type  -     hydroCHLOROthiazide (HYDRODIURIL) 25 MG tablet; Take 1 tablet (25 mg total) by mouth daily as needed (fluid).  Dispense: 30 tablet; Refill: 3    DDD (degenerative disc disease), lumbar  -     HYDROcodone-acetaminophen (NORCO)  mg per tablet; Take 1 tablet by mouth every 12 (twelve) hours as needed for Pain.  Dispense: 50 tablet; Refill: 0    Acquired hypothyroidism  Comments:  requesting refill, pt advised she needs to have labs drawn before f/u visit in 2 weeks  Orders:  -     levothyroxine (SYNTHROID) 88 MCG tablet; Take 1 tablet (88 mcg total) by mouth before breakfast.  Dispense: 30 tablet; Refill: 3  Reduce levothyroxine to 88 mcg daily,  Primary hypertension    Uncontrolled hypertension    Orders:  -     amLODIPine (NORVASC) 5 MG tablet; Take 1 tablet (5 mg total) by mouth once daily. For blood pressure  Dispense: 30 tablet; Refill: 5  -     metoprolol tartrate (LOPRESSOR) 25 MG tablet; Take 1 tablet (25 mg total) by mouth 2 (two) times daily.  Dispense: 180 tablet; Refill: 3    Gastroesophageal reflux disease with esophagitis without hemorrhage  -     omeprazole (PRILOSEC) 40 MG capsule; Take 1 capsule (40 mg total) by mouth every morning.  Dispense: 90 capsule; Refill: 3  -     ondansetron (ZOFRAN) 4 MG tablet; Take 1 tablet (4 mg total) by mouth every 8 (eight) hours as needed for Nausea.  Dispense: 30  tablet; Refill: 3    Gastroesophageal reflux disease with esophagitis without hemorrhage  Comments:  stable  Orders:  -     omeprazole (PRILOSEC) 40 MG capsule; Take 1 capsule (40 mg total) by mouth every morning.  Dispense: 90 capsule; Refill: 3  -     ondansetron (ZOFRAN) 4 MG tablet; Take 1 tablet (4 mg total) by mouth every 8 (eight) hours as needed for Nausea.  Dispense: 30 tablet; Refill: 3    Anxiety    Acute bronchitis, unspecified organism  -     predniSONE (DELTASONE) 20 MG tablet; Take 1 tablet (20 mg total) by mouth 2 (two) times daily.  Dispense: 30 tablet; Refill: 1  -     amoxicillin-clavulanate 875-125mg (AUGMENTIN) 875-125 mg per tablet; Take 1 tablet by mouth 2 (two) times daily.  Dispense: 14 tablet; Refill: 0    Lumbar disc disease with radiculopathy    Essential hypertension    Current smoker      Follow up in about 3 months (around 2/27/2024), or copd.        11/27/2023 Jasbir Graham

## 2023-12-11 RX ORDER — FLUCONAZOLE 100 MG/1
100 TABLET ORAL DAILY
Qty: 7 TABLET | Refills: 0 | Status: SHIPPED | OUTPATIENT
Start: 2023-12-11

## 2023-12-11 NOTE — TELEPHONE ENCOUNTER
----- Message from Alyx Lechuga sent at 12/11/2023 10:38 AM CST -----  Vm- 9:52-pt would like something called in for itching in her vagina   [phone]

## 2023-12-12 ENCOUNTER — TELEPHONE (OUTPATIENT)
Dept: OBSTETRICS AND GYNECOLOGY | Facility: CLINIC | Age: 73
End: 2023-12-12
Payer: MEDICARE

## 2023-12-12 NOTE — TELEPHONE ENCOUNTER
----- Message from Akanksha Winter sent at 12/12/2023 12:05 PM CST -----  Regarding: sooner apt  Contact: patient  Type:  Sooner Appointment Request    Caller is requesting a sooner appointment.  Caller declined first available appointment listed below.  Caller will not accept being placed on the waitlist and is requesting a message be sent to doctor.    Name of Caller:  patient  When is the first available appointment?    Symptoms:  reaction to medication possible yeast infection/ found lump  Would the patient rather a call back or a response via [facility]? Call back   Best Call Back Number:  [phone]    Additional Information:  needs to be seen before January please call to schedule thanks!

## 2023-12-12 NOTE — TELEPHONE ENCOUNTER
Contacted pt to schedule appt with Dr. Douglass. Pt states she has a lump about the size of a red grape and possible yeast infection. Appt set for tomorrow 12/13/23 @ 9:40 am. Pt verbalized understanding.

## 2023-12-13 ENCOUNTER — TELEPHONE (OUTPATIENT)
Dept: OBSTETRICS AND GYNECOLOGY | Facility: CLINIC | Age: 73
End: 2023-12-13
Payer: MEDICARE

## 2023-12-13 ENCOUNTER — OFFICE VISIT (OUTPATIENT)
Dept: OBSTETRICS AND GYNECOLOGY | Facility: CLINIC | Age: 73
End: 2023-12-13
Payer: MEDICARE

## 2023-12-13 VITALS
SYSTOLIC BLOOD PRESSURE: 126 MMHG | DIASTOLIC BLOOD PRESSURE: 80 MMHG | HEIGHT: 60 IN | BODY MASS INDEX: 31.77 KG/M2 | WEIGHT: 161.81 LBS

## 2023-12-13 DIAGNOSIS — N95.2 ATROPHIC VAGINITIS: ICD-10-CM

## 2023-12-13 DIAGNOSIS — N81.11 CYSTOCELE, MIDLINE: ICD-10-CM

## 2023-12-13 DIAGNOSIS — N76.0 ACUTE VAGINITIS: Primary | ICD-10-CM

## 2023-12-13 PROCEDURE — 99204 PR OFFICE/OUTPT VISIT, NEW, LEVL IV, 45-59 MIN: ICD-10-PCS | Mod: S$GLB,,, | Performed by: OBSTETRICS & GYNECOLOGY

## 2023-12-13 PROCEDURE — 99999 PR PBB SHADOW E&M-EST. PATIENT-LVL IV: CPT | Mod: PBBFAC,,, | Performed by: OBSTETRICS & GYNECOLOGY

## 2023-12-13 PROCEDURE — 99214 OFFICE O/P EST MOD 30 MIN: CPT | Mod: PBBFAC,PO | Performed by: OBSTETRICS & GYNECOLOGY

## 2023-12-13 PROCEDURE — 3061F PR NEG MICROALBUMINURIA RESULT DOCUMENTED/REVIEW: ICD-10-PCS | Mod: ,,, | Performed by: OBSTETRICS & GYNECOLOGY

## 2023-12-13 PROCEDURE — 99999 PR PBB SHADOW E&M-EST. PATIENT-LVL IV: ICD-10-PCS | Mod: PBBFAC,,, | Performed by: OBSTETRICS & GYNECOLOGY

## 2023-12-13 PROCEDURE — 3066F PR DOCUMENTATION OF TREATMENT FOR NEPHROPATHY: ICD-10-PCS | Mod: CPTII,S$GLB,, | Performed by: OBSTETRICS & GYNECOLOGY

## 2023-12-13 PROCEDURE — 4010F PR ACE/ARB THEARPY RXD/TAKEN: ICD-10-PCS | Mod: CPTII,S$GLB,, | Performed by: OBSTETRICS & GYNECOLOGY

## 2023-12-13 PROCEDURE — 4010F ACE/ARB THERAPY RXD/TAKEN: CPT | Mod: CPTII,S$GLB,, | Performed by: OBSTETRICS & GYNECOLOGY

## 2023-12-13 PROCEDURE — 3066F NEPHROPATHY DOC TX: CPT | Mod: CPTII,S$GLB,, | Performed by: OBSTETRICS & GYNECOLOGY

## 2023-12-13 PROCEDURE — 3061F NEG MICROALBUMINURIA REV: CPT | Mod: ,,, | Performed by: OBSTETRICS & GYNECOLOGY

## 2023-12-13 PROCEDURE — 99204 OFFICE O/P NEW MOD 45 MIN: CPT | Mod: S$GLB,,, | Performed by: OBSTETRICS & GYNECOLOGY

## 2023-12-13 RX ORDER — ESTRADIOL 10 UG/1
10 INSERT VAGINAL
Qty: 24 TABLET | Refills: 3 | Status: SHIPPED | OUTPATIENT
Start: 2023-12-14 | End: 2024-02-15

## 2023-12-13 NOTE — PROGRESS NOTES
"  Subjective:   Chief Complaint:  Vulvar Rash       Patient ID: Joslyn Dubon is a  73 y.o. female.    HRT: None  History of AUGUSTINE with conservation of the ovaries.    Date: 2023    The patient presents today due to the following:  Over the last few weeks she reports a vaginal rash.  This was treated with oral fluconazole by her primary care physician.  In addition, two days ago she palpated a "grape size cyst" at the vaginal introitus.    No pain it was palpated only.  She has had a previous hysterectomy.  No vaginal bleeding.    No history of abnormal Pap smears.    GYN & OB History  No LMP recorded. Patient has had a hysterectomy.     Date of Last Pap: No result found  OB History          2    Para   2    Term   2            AB        Living   2         SAB        IAB        Ectopic        Multiple        Live Births               Obstetric Comments   Vaginal delivery x 2               Allergies:   Review of patient's allergies indicates:   Allergen Reactions    Bisacodyl Nausea And Vomiting     Other reaction(s): Vomiting    Cipro [ciprofloxacin hcl]     Demerol [meperidine]      Nausea vomiting, visual problem    Doxycycline Hives    Flonase [fluticasone propionate] Hives    Iodinated contrast media Hives     hypotension    Morphine     Morpholine analogues Other (See Comments)     hypotension       Past Medical History:   Diagnosis Date    Anxiety     Martin esophagus     Colitis     COPD (chronic obstructive pulmonary disease)     GERD (gastroesophageal reflux disease)     Hypertension     Thyroid disease     Vocal cord polyps        Past Surgical History:   Procedure Laterality Date    ABDOMINAL AORTIC ANEURYSM REPAIR      BACK SURGERY      cervical    CATARACT EXTRACTION Right 2021    CHOLECYSTECTOMY  2013    Dr Price  [facility]    FOOT SURGERY      HYSTERECTOMY      AUGUSTINE for AUB with consevation ovaries    SALIVARY GLAND SURGERY         Medications    Current Outpatient " Medications:     ALPRAZolam (XANAX) 0.25 MG tablet, Take 1 tablet (0.25 mg total) by mouth 2 (two) times daily as needed for Anxiety., Disp: 30 tablet, Rfl: 1    aluminum-magnesium hydroxide-simethicone (MAALOX) 200-200-20 mg/5 mL Susp, Take 30 mLs by mouth once as needed., Disp: , Rfl:     amLODIPine (NORVASC) 5 MG tablet, Take 1 tablet (5 mg total) by mouth once daily. For blood pressure, Disp: 30 tablet, Rfl: 5    cloNIDine (CATAPRES) 0.1 MG tablet, Take one tablet as needed up to 3 times a day if blood pressure is greater than 170/110 (Patient taking differently: Take 0.1 mg by mouth every 8 (eight) hours as needed. Take one tablet as needed up to 3 times a day if blood pressure is greater than 170/110), Disp: 30 tablet, Rfl: 3    EPINEPHrine (EPIPEN) 0.3 mg/0.3 mL AtIn, Inject 0.3 mLs (0.3 mg total) into the muscle as needed (Severe allergic reaction symptoms such as shortness of breath, tongue or throat swelling, weakness dizziness severe nausea vomiting)., Disp: 1 each, Rfl: 3    famotidine (PEPCID) 20 MG tablet, Take 1 tablet (20 mg total) by mouth every evening., Disp: 90 tablet, Rfl: 3    fluconazole (DIFLUCAN) 100 MG tablet, Take 1 tablet (100 mg total) by mouth once daily., Disp: 7 tablet, Rfl: 0    HYDROcodone-acetaminophen (NORCO)  mg per tablet, Take 1 tablet by mouth every 12 (twelve) hours as needed for Pain., Disp: 50 tablet, Rfl: 0    levalbuterol (XOPENEX HFA) 45 mcg/actuation inhaler, Inhale 2 puffs into the lungs every 6 (six) hours as needed for Wheezing. Rescue, Disp: 15 g, Rfl: 4    levothyroxine (SYNTHROID) 88 MCG tablet, Take 1 tablet (88 mcg total) by mouth before breakfast., Disp: 30 tablet, Rfl: 3    metoprolol tartrate (LOPRESSOR) 25 MG tablet, Take 1 tablet (25 mg total) by mouth 2 (two) times daily., Disp: 180 tablet, Rfl: 3    omeprazole (PRILOSEC) 40 MG capsule, Take 1 capsule (40 mg total) by mouth every morning., Disp: 90 capsule, Rfl: 3    ondansetron (ZOFRAN) 4 MG tablet,  Take 1 tablet (4 mg total) by mouth every 8 (eight) hours as needed for Nausea., Disp: 30 tablet, Rfl: 3    REFRESH OPTIVE 0.5-0.9 % Drop, Place 1 drop into both eyes 4 (four) times daily., Disp: , Rfl:     amoxicillin-clavulanate 875-125mg (AUGMENTIN) 875-125 mg per tablet, Take 1 tablet by mouth 2 (two) times daily. (Patient not taking: Reported on 12/13/2023), Disp: 14 tablet, Rfl: 0    cetirizine (ZYRTEC) 10 MG tablet, Take 1 tablet (10 mg total) by mouth once daily. (Patient not taking: Reported on 12/13/2023), Disp: , Rfl: 0    clobetasoL (TEMOVATE) 0.05 % cream, Apply topically 2 (two) times daily. (Patient not taking: Reported on 12/13/2023), Disp: 45 g, Rfl: 2    estradioL (VAGIFEM) 10 mcg Tab, Place 1 tablet (10 mcg total) vaginally twice a week. Start with one tablet per vagina q.h.s. x7 than one tablet twice weekly, Disp: 24 tablet, Rfl: 3    hydroCHLOROthiazide (HYDRODIURIL) 25 MG tablet, Take 1 tablet (25 mg total) by mouth daily as needed (fluid). (Patient not taking: Reported on 12/13/2023), Disp: 30 tablet, Rfl: 3    hydrOXYzine HCL (ATARAX) 25 MG tablet, Take 1 tablet (25 mg total) by mouth 2 (two) times daily as needed for Itching. (Patient not taking: Reported on 12/13/2023), Disp: 30 tablet, Rfl: 0    levocetirizine (XYZAL) 5 MG tablet, Take 5 mg by mouth every evening., Disp: , Rfl:     losartan (COZAAR) 100 MG tablet, Take 1 tablet (100 mg total) by mouth once daily. (Patient not taking: Reported on 12/13/2023), Disp: 90 tablet, Rfl: 3    nirmatrelvir-ritonavir (PAXLOVID) 300 mg (150 mg x 2)-100 mg copackaged tablets (EUA), Take 3 tablets by mouth 2 (two) times daily. Each dose contains 2 nirmatrelvir (pink tablets) and 1 ritonavir (white tablet). Take all 3 tablets together (Patient not taking: Reported on 12/13/2023), Disp: 30 tablet, Rfl: 0    predniSONE (DELTASONE) 20 MG tablet, Take 1 tablet (20 mg total) by mouth 2 (two) times daily. (Patient not taking: Reported on 12/13/2023), Disp: 30  tablet, Rfl: 1    simethicone (GAS-X ORAL), Take 1 tablet by mouth as needed., Disp: , Rfl:      Social History     Tobacco Use    Smoking status: Every Day     Current packs/day: 0.50     Types: Cigarettes    Smokeless tobacco: Former     Quit date: 5/1/2016    Tobacco comments:     Pt has smoked since 14 years old. Smokes around .5ppd. Inpatient smoking education done 10/20.   Substance Use Topics    Alcohol use: Never    Drug use: Never       Family History   Problem Relation Age of Onset    Cancer Mother     Heart failure Father     Emphysema Sister     No Known Problems Brother     Cancer Sister        Review of Systems (at today's evaluation)  Review of Systems   Constitutional:  Negative for fever and unexpected weight change.   Respiratory: Negative.     Cardiovascular:  Negative for chest pain and palpitations.   Gastrointestinal:  Negative for nausea and vomiting.   Genitourinary:  Negative for dysuria, hematuria and urgency.        Gyn as per HPI   Neurological:  Negative for headaches.        Objective:      Vitals:    12/13/23 0939   BP: 126/80     Physical Exam:   Constitutional: She appears well-developed and well-nourished.    HENT:   Head: Normocephalic.     Neck: No thyroid mass present.    Cardiovascular:  Normal rate, regular rhythm and normal heart sounds.             Pulmonary/Chest: Effort normal and breath sounds normal.        Abdominal: Soft. There is no abdominal tenderness.     Genitourinary:    Inguinal canal, right adnexa and left adnexa normal.      Pelvic exam was performed with patient supine.   The external female genitalia was normal.   No external genitalia lesions identified,Right adnexum displays no tenderness and no fullness. Left adnexum displays no tenderness and no fullness. There is erythema and cystocele (On evaluation the palpable abnormality appears to be a grade 1-2 midline cystocele.) in the vagina. No tenderness or bleeding in the vagina. Vaginal atrophy noted. Cervix  is absent.Uterus is absent. Normal urethral meatus.Urethra findings: no tendernessBladder findings: no bladder tenderness          Musculoskeletal:      Right lower leg: No edema.      Left lower leg: No edema.      Lymphadenopathy:     She has no cervical adenopathy. No inguinal adenopathy noted on the right or left side.    Neurological: She is alert.    Skin: Skin is warm and dry.    Psychiatric: Mood normal.         Assessment:        1. Acute vaginitis    2. Cystocele, midline    3. Atrophic vaginitis        Plan:      Acute vaginitis    Cystocele, midline    Atrophic vaginitis  -     estradioL (VAGIFEM) 10 mcg Tab; Place 1 tablet (10 mcg total) vaginally twice a week. Start with one tablet per vagina q.h.s. x7 than one tablet twice weekly  Dispense: 24 tablet; Refill: 3       Follow up in about 3 months (around 3/13/2024) for Follow-up on today's evaluation, as needed / for any GYN related issues.     The above was reviewed discussed with the patient.    We discussed her issues and findings on physical exam.  I explained the fact that a cyst or palpable masses actually a small to medium sized anterior cystocele.  The etiology of cystocele some pelvic relaxation were discussed.  The patient does not feel that intervention such as pessary or surgery as needed for the cystocele.      At this time we will initiate therapy with vaginal estrogen to improve the vaginal environment and hopefully prevent further vaginal issues.  The pros, cons, risks, benefits, alternatives and indications of the medication(s) prescribed, as well as appropriate use and potential side effects were discussed.  Oncologic and cardiovascular issues associated hormone replacement therapy were discussed.    The patient's questions were answered, and she is in agreement with the current plan.     Abrahan Douglass MD  Department OBGYN  [facility]

## 2023-12-13 NOTE — TELEPHONE ENCOUNTER
----- Message from Nabil Manzanares sent at 12/13/2023  1:07 PM CST -----  Contact: pt  Type: Needs Medical Advice  Who Called:  pt  Best Call Back Number: [phone]    Additional Information: Pt is calling the office regarding prescription it is too expensive pt states she doesn't think she going to take it.Please call back and advise.

## 2023-12-13 NOTE — TELEPHONE ENCOUNTER
Contacted pt and she states the hormones are too expensive.  She also said she thought about it and she would like to hold off on using any hormones at this time. Advised pt to contact us if she changes her mind and she voiced understanding.

## 2023-12-28 PROBLEM — F17.200 CURRENT SMOKER: Status: RESOLVED | Noted: 2021-11-23 | Resolved: 2023-12-28

## 2023-12-28 PROBLEM — R21 RASH: Status: RESOLVED | Noted: 2023-10-12 | Resolved: 2023-12-28

## 2023-12-28 PROBLEM — H00.11 CHALAZION OF RIGHT UPPER EYELID: Status: RESOLVED | Noted: 2023-01-31 | Resolved: 2023-12-28

## 2023-12-28 PROBLEM — H53.8 BLURRY VISION, LEFT EYE: Status: RESOLVED | Noted: 2023-10-19 | Resolved: 2023-12-28

## 2023-12-28 PROBLEM — T78.2XXA ANAPHYLAXIS: Status: RESOLVED | Noted: 2023-10-18 | Resolved: 2023-12-28

## 2024-01-01 RX ORDER — METHYLPREDNISOLONE 4 MG/1
TABLET ORAL
Qty: 21 EACH | Refills: 0 | Status: SHIPPED | OUTPATIENT
Start: 2024-01-01 | End: 2024-01-01

## 2024-01-03 ENCOUNTER — HOSPITAL ENCOUNTER (EMERGENCY)
Facility: HOSPITAL | Age: 74
Discharge: HOME | End: 2024-01-03
Attending: EMERGENCY MEDICINE
Payer: MEDICARE

## 2024-01-03 ENCOUNTER — TELEPHONE (OUTPATIENT)
Dept: FAMILY MEDICINE | Facility: CLINIC | Age: 74
End: 2024-01-03
Payer: MEDICARE

## 2024-01-03 VITALS
WEIGHT: 161.38 LBS | SYSTOLIC BLOOD PRESSURE: 140 MMHG | TEMPERATURE: 99 F | OXYGEN SATURATION: 95 % | BODY MASS INDEX: 31.52 KG/M2 | DIASTOLIC BLOOD PRESSURE: 85 MMHG | HEART RATE: 80 BPM | RESPIRATION RATE: 20 BRPM

## 2024-01-03 DIAGNOSIS — J44.1 COPD EXACERBATION: Primary | ICD-10-CM

## 2024-01-03 DIAGNOSIS — J44.1 COPD EXACERBATION: ICD-10-CM

## 2024-01-03 DIAGNOSIS — J10.1 INFLUENZA A: Primary | ICD-10-CM

## 2024-01-03 LAB
ALBUMIN SERPL BCP-MCNC: 3.7 G/DL (ref 3.5–5.2)
ALP SERPL-CCNC: 88 U/L (ref 55–135)
ALT SERPL W/O P-5'-P-CCNC: 27 U/L (ref 10–44)
ANION GAP SERPL CALC-SCNC: 10 MMOL/L (ref 8–16)
AST SERPL-CCNC: 39 U/L (ref 10–40)
BASOPHILS # BLD AUTO: 0.01 K/UL (ref 0–0.2)
BASOPHILS NFR BLD: 0.3 % (ref 0–1.9)
BILIRUB SERPL-MCNC: 0.5 MG/DL (ref 0.1–1)
BUN SERPL-MCNC: 18 MG/DL (ref 8–23)
CALCIUM SERPL-MCNC: 9.1 MG/DL (ref 8.7–10.5)
CHLORIDE SERPL-SCNC: 104 MMOL/L (ref 95–110)
CO2 SERPL-SCNC: 23 MMOL/L (ref 23–29)
CREAT SERPL-MCNC: 1 MG/DL (ref 0.5–1.4)
DIFFERENTIAL METHOD BLD: ABNORMAL
EOSINOPHIL # BLD AUTO: 0 K/UL (ref 0–0.5)
EOSINOPHIL NFR BLD: 0 % (ref 0–8)
ERYTHROCYTE [DISTWIDTH] IN BLOOD BY AUTOMATED COUNT: 13.5 % (ref 11.5–14.5)
EST. GFR  (NO RACE VARIABLE): 59 ML/MIN/1.73 M^2
GLUCOSE SERPL-MCNC: 99 MG/DL (ref 70–110)
HCT VFR BLD AUTO: 41.7 % (ref 37–48.5)
HGB BLD-MCNC: 14.1 G/DL (ref 12–16)
IMM GRANULOCYTES # BLD AUTO: 0.01 K/UL (ref 0–0.04)
IMM GRANULOCYTES NFR BLD AUTO: 0.3 % (ref 0–0.5)
INFLUENZA A, MOLECULAR: POSITIVE
INFLUENZA B, MOLECULAR: NEGATIVE
LYMPHOCYTES # BLD AUTO: 0.8 K/UL (ref 1–4.8)
LYMPHOCYTES NFR BLD: 22.1 % (ref 18–48)
MCH RBC QN AUTO: 31.8 PG (ref 27–31)
MCHC RBC AUTO-ENTMCNC: 33.8 G/DL (ref 32–36)
MCV RBC AUTO: 94 FL (ref 82–98)
MONOCYTES # BLD AUTO: 0.5 K/UL (ref 0.3–1)
MONOCYTES NFR BLD: 15 % (ref 4–15)
NEUTROPHILS # BLD AUTO: 2.2 K/UL (ref 1.8–7.7)
NEUTROPHILS NFR BLD: 62.3 % (ref 38–73)
NRBC BLD-RTO: 0 /100 WBC
PLATELET # BLD AUTO: 136 K/UL (ref 150–450)
PMV BLD AUTO: 9.5 FL (ref 9.2–12.9)
POTASSIUM SERPL-SCNC: 3.8 MMOL/L (ref 3.5–5.1)
PROT SERPL-MCNC: 7.7 G/DL (ref 6–8.4)
RBC # BLD AUTO: 4.43 M/UL (ref 4–5.4)
RSV AG SPEC QL IA: NEGATIVE
SARS-COV-2 RDRP RESP QL NAA+PROBE: NEGATIVE
SODIUM SERPL-SCNC: 137 MMOL/L (ref 136–145)
SPECIMEN SOURCE: ABNORMAL
SPECIMEN SOURCE: NORMAL
WBC # BLD AUTO: 3.53 K/UL (ref 3.9–12.7)

## 2024-01-03 PROCEDURE — 63600175 PHARM REV CODE 636 W HCPCS: Performed by: EMERGENCY MEDICINE

## 2024-01-03 PROCEDURE — 87502 INFLUENZA DNA AMP PROBE: CPT | Performed by: EMERGENCY MEDICINE

## 2024-01-03 PROCEDURE — 99285 EMERGENCY DEPT VISIT HI MDM: CPT | Mod: 25

## 2024-01-03 PROCEDURE — 36415 COLL VENOUS BLD VENIPUNCTURE: CPT | Performed by: EMERGENCY MEDICINE

## 2024-01-03 PROCEDURE — 27000221 HC OXYGEN, UP TO 24 HOURS

## 2024-01-03 PROCEDURE — 25000242 PHARM REV CODE 250 ALT 637 W/ HCPCS: Performed by: EMERGENCY MEDICINE

## 2024-01-03 PROCEDURE — 96374 THER/PROPH/DIAG INJ IV PUSH: CPT

## 2024-01-03 PROCEDURE — 94640 AIRWAY INHALATION TREATMENT: CPT | Mod: XB

## 2024-01-03 PROCEDURE — 87634 RSV DNA/RNA AMP PROBE: CPT | Performed by: EMERGENCY MEDICINE

## 2024-01-03 PROCEDURE — 25000003 PHARM REV CODE 250: Performed by: EMERGENCY MEDICINE

## 2024-01-03 PROCEDURE — 85025 COMPLETE CBC W/AUTO DIFF WBC: CPT | Performed by: EMERGENCY MEDICINE

## 2024-01-03 PROCEDURE — 80053 COMPREHEN METABOLIC PANEL: CPT | Performed by: EMERGENCY MEDICINE

## 2024-01-03 PROCEDURE — U0002 COVID-19 LAB TEST NON-CDC: HCPCS | Performed by: EMERGENCY MEDICINE

## 2024-01-03 RX ORDER — OSELTAMIVIR PHOSPHATE 75 MG/1
75 CAPSULE ORAL 2 TIMES DAILY
Qty: 10 CAPSULE | Refills: 0 | Status: ON HOLD | OUTPATIENT
Start: 2024-01-03 | End: 2024-01-08

## 2024-01-03 RX ORDER — IPRATROPIUM BROMIDE AND ALBUTEROL SULFATE 2.5; .5 MG/3ML; MG/3ML
3 SOLUTION RESPIRATORY (INHALATION) EVERY 6 HOURS PRN
Qty: 30 EACH | Refills: 0 | Status: SHIPPED | OUTPATIENT
Start: 2024-01-03 | End: 2024-01-23

## 2024-01-03 RX ORDER — IPRATROPIUM BROMIDE AND ALBUTEROL SULFATE 2.5; .5 MG/3ML; MG/3ML
3 SOLUTION RESPIRATORY (INHALATION)
Status: COMPLETED | OUTPATIENT
Start: 2024-01-03 | End: 2024-01-03

## 2024-01-03 RX ORDER — ONDANSETRON 2 MG/ML
4 INJECTION INTRAMUSCULAR; INTRAVENOUS
Status: COMPLETED | OUTPATIENT
Start: 2024-01-03 | End: 2024-01-03

## 2024-01-03 RX ADMIN — SODIUM CHLORIDE 500 ML: 9 INJECTION, SOLUTION INTRAVENOUS at 01:01

## 2024-01-03 RX ADMIN — IPRATROPIUM BROMIDE AND ALBUTEROL SULFATE 3 ML: 2.5; .5 SOLUTION RESPIRATORY (INHALATION) at 01:01

## 2024-01-03 RX ADMIN — ONDANSETRON 4 MG: 2 INJECTION INTRAMUSCULAR; INTRAVENOUS at 01:01

## 2024-01-03 NOTE — TELEPHONE ENCOUNTER
Left voice message.   Contacting patient to inquire if she has a nebulizer already at home and to get scheduled for an emergency room follow up appointment.

## 2024-01-03 NOTE — TELEPHONE ENCOUNTER
Alyx Lechuga Staff  Caller: Unspecified (Today, 11:31 AM)  Pt is calling back  [phone]    Spoke with patient and states she does not have a nebulizer and just has the medication. Patient does not know where to send the nebulizer too or preference. Patient has been scheduled for emergency room follow up.     Nebulizer pended for provider review.

## 2024-01-03 NOTE — ED PROVIDER NOTES
Encounter Date: 1/3/2024       History     Chief Complaint   Patient presents with    Shortness of Breath      This patient has a longstanding history of COPD and was exposed to her grandchild positive for influenza.  Patient presents to the emergency department with no reported fever but complains of cough and shortness of breath.  She denies any other symptoms at this time.    The history is provided by the patient.     Review of patient's allergies indicates:   Allergen Reactions    Bisacodyl Nausea And Vomiting     Other reaction(s): Vomiting    Cipro [ciprofloxacin hcl]     Demerol [meperidine]      Nausea vomiting, visual problem    Doxycycline Hives    Flonase [fluticasone propionate] Hives    Iodinated contrast media Hives     hypotension    Morphine     Morpholine analogues Other (See Comments)     hypotension     Past Medical History:   Diagnosis Date    Anxiety     Martin esophagus     Colitis     COPD (chronic obstructive pulmonary disease)     GERD (gastroesophageal reflux disease)     Hypertension     Thyroid disease     Vocal cord polyps      Past Surgical History:   Procedure Laterality Date    ABDOMINAL AORTIC ANEURYSM REPAIR      BACK SURGERY      cervical    CATARACT EXTRACTION Right 02/2021    CHOLECYSTECTOMY  12/20/2013    Dr [facility]    FOOT SURGERY      HYSTERECTOMY  1979    AUGUSTINE for AUB with consevation ovaries    SALIVARY GLAND SURGERY       Family History   Problem Relation Age of Onset    Cancer Mother     Heart failure Father     Emphysema Sister     No Known Problems Brother     Cancer Sister      Social History     Tobacco Use    Smoking status: Every Day     Current packs/day: 0.50     Types: Cigarettes    Smokeless tobacco: Former     Quit date: 5/1/2016    Tobacco comments:     Pt has smoked since 14 years old. Smokes around .5ppd. Inpatient smoking education done 10/20.   Substance Use Topics    Alcohol use: Never    Drug use: Never     Review of Systems   Constitutional:   Positive for fatigue.   HENT: Negative.     Eyes: Negative.    Respiratory:  Positive for cough and shortness of breath.    Cardiovascular: Negative.    Gastrointestinal: Negative.    Endocrine: Negative.    Genitourinary: Negative.    Musculoskeletal: Negative.    Skin: Negative.    Allergic/Immunologic: Negative.    Neurological: Negative.    Hematological: Negative.    Psychiatric/Behavioral: Negative.     All other systems reviewed and are negative.      Physical Exam     Initial Vitals [01/03/24 0031]   BP Pulse Resp Temp SpO2   (!) 140/85 87 (!) 24 99.2 °F (37.3 °C) (!) 91 %      MAP       --         Physical Exam    Nursing note and vitals reviewed.  Constitutional: Vital signs are normal. She appears well-developed. She is active and cooperative.   HENT:   Head: Normocephalic and atraumatic.   Right Ear: Tympanic membrane normal.   Left Ear: Tympanic membrane normal.   Eyes: Conjunctivae, EOM and lids are normal. Pupils are equal, round, and reactive to light.   Neck: Trachea normal and phonation normal. Neck supple. No thyroid mass present. No stridor present.   Normal range of motion.   Full passive range of motion without pain.     Cardiovascular:  Normal rate, regular rhythm, S1 normal, S2 normal, normal heart sounds, intact distal pulses and normal pulses.           Pulmonary/Chest: Effort normal.   Abdominal: Abdomen is soft and protuberant. Bowel sounds are normal. There is no abdominal tenderness.   Musculoskeletal:         General: Normal range of motion.      Cervical back: Full passive range of motion without pain, normal range of motion and neck supple.     Lymphadenopathy:     She has no axillary adenopathy.   Neurological: She is alert and oriented to person, place, and time.   Skin: Skin is warm, dry and intact.   Psychiatric: She has a normal mood and affect. Her speech is normal and behavior is normal. Judgment and thought content normal. Cognition and memory are normal.         ED Course    Procedures  Labs Reviewed   INFLUENZA A & B BY MOLECULAR - Abnormal; Notable for the following components:       Result Value    Influenza A, Molecular Positive (*)     All other components within normal limits    Narrative:      flu a  critical result(s) called and verbal readback obtained from   jacqueline marquez  by CPW1 01/03/2024 01:35   CBC W/ AUTO DIFFERENTIAL - Abnormal; Notable for the following components:    WBC 3.53 (*)     MCH 31.8 (*)     Platelets 136 (*)     Lymph # 0.8 (*)     All other components within normal limits   COMPREHENSIVE METABOLIC PANEL - Abnormal; Notable for the following components:    eGFR 59 (*)     All other components within normal limits   SARS-COV-2 RNA AMPLIFICATION, QUAL   RSV ANTIGEN DETECTION          Imaging Results              X-Ray Chest AP Portable (In process)                   X-Rays:   Independently Interpreted Readings:   Other Readings:   Chest x-ray per my interpretation is unremarkable.    Medications   sodium chloride 0.9% bolus 500 mL 500 mL (500 mLs Intravenous New Bag 1/3/24 0110)   albuterol-ipratropium 2.5 mg-0.5 mg/3 mL nebulizer solution 3 mL (3 mLs Nebulization Given by Other 1/3/24 0121)   ondansetron injection 4 mg (4 mg Intravenous Given 1/3/24 0141)     Medical Decision Making    Patient received a nebulizer treatment.  This patient presented to the emergency department with upper respiratory symptomatology.  The patient's differential included allergic, viral and bacterial etiologies.  Testing was done via swab to confirm presence or absence of treatable URI symptoms to include strep, influenza, and COVID-19 if necessarily ordered.  The patient was educated on there diagnoses and provided with medications to treat their illness.     Amount and/or Complexity of Data Reviewed  Labs:  Decision-making details documented in ED Course.  Radiology:  Decision-making details documented in ED Course.                     Results for orders placed or performed  during the hospital encounter of 01/03/24   Influenza A & B by Molecular    Specimen: Nasopharyngeal Swab   Result Value Ref Range    Influenza A, Molecular Positive (AA) Negative    Influenza B, Molecular Negative Negative    Flu A & B Source Nasal swab    CBC auto differential   Result Value Ref Range    WBC 3.53 (L) 3.90 - 12.70 K/uL    RBC 4.43 4.00 - 5.40 M/uL    Hemoglobin 14.1 12.0 - 16.0 g/dL    Hematocrit 41.7 37.0 - 48.5 %    MCV 94 82 - 98 fL    MCH 31.8 (H) 27.0 - 31.0 pg    MCHC 33.8 32.0 - 36.0 g/dL    RDW 13.5 11.5 - 14.5 %    Platelets 136 (L) 150 - 450 K/uL    MPV 9.5 9.2 - 12.9 fL    Immature Granulocytes 0.3 0.0 - 0.5 %    Gran # (ANC) 2.2 1.8 - 7.7 K/uL    Immature Grans (Abs) 0.01 0.00 - 0.04 K/uL    Lymph # 0.8 (L) 1.0 - 4.8 K/uL    Mono # 0.5 0.3 - 1.0 K/uL    Eos # 0.0 0.0 - 0.5 K/uL    Baso # 0.01 0.00 - 0.20 K/uL    nRBC 0 0 /100 WBC    Gran % 62.3 38.0 - 73.0 %    Lymph % 22.1 18.0 - 48.0 %    Mono % 15.0 4.0 - 15.0 %    Eosinophil % 0.0 0.0 - 8.0 %    Basophil % 0.3 0.0 - 1.9 %    Differential Method Automated    Comprehensive metabolic panel   Result Value Ref Range    Sodium 137 136 - 145 mmol/L    Potassium 3.8 3.5 - 5.1 mmol/L    Chloride 104 95 - 110 mmol/L    CO2 23 23 - 29 mmol/L    Glucose 99 70 - 110 mg/dL    BUN 18 8 - 23 mg/dL    Creatinine 1.0 0.5 - 1.4 mg/dL    Calcium 9.1 8.7 - 10.5 mg/dL    Total Protein 7.7 6.0 - 8.4 g/dL    Albumin 3.7 3.5 - 5.2 g/dL    Total Bilirubin 0.5 0.1 - 1.0 mg/dL    Alkaline Phosphatase 88 55 - 135 U/L    AST 39 10 - 40 U/L    ALT 27 10 - 44 U/L    eGFR 59 (A) >60 mL/min/1.73 m^2    Anion Gap 10 8 - 16 mmol/L   COVID-19 Rapid Screening   Result Value Ref Range    SARS-CoV-2 RNA, Amplification, Qual Negative Negative   RSV Antigen Detection Nasopharyngeal Swab   Result Value Ref Range    RSV Source Nasopharyngeal Swab     RSV Ag by Molecular Method Negative Negative                        Clinical Impression:  Final diagnoses:  [J10.1] Influenza A  (Primary)  [J44.1] COPD exacerbation          ED Disposition Condition    Discharge Stable          ED Prescriptions       Medication Sig Dispense Start Date End Date Auth. Provider    albuterol-ipratropium (DUO-NEB) 2.5 mg-0.5 mg/3 mL nebulizer solution Take 3 mLs by nebulization every 6 (six) hours as needed for Wheezing. Rescue 30 each 1/3/2024 -- Jasbir Flood MD    oseltamivir (TAMIFLU) 75 MG capsule Take 1 capsule (75 mg total) by mouth 2 (two) times daily. for 5 days 10 capsule 1/3/2024 1/8/2024 Jasbir Flood MD          Follow-up Information       Follow up With Specialties Details Why Contact Info    Jasbir Graham MD Family Medicine, Home Health Services, Hospice Services Schedule an appointment as soon as possible for a visit in 3 days  [address] [facility]  [address] [place] [id number]  [phone]               Jasbir Flood MD  01/03/24 0201

## 2024-01-03 NOTE — TELEPHONE ENCOUNTER
----- Message from Alyx Lechuga sent at 1/3/2024 10:24 AM CST -----  Pt was in the er last night and needs a nebulizer sent in   [phone]

## 2024-01-03 NOTE — TELEPHONE ENCOUNTER
----- Message from Radha Garcia sent at 1/3/2024 10:13 AM CST -----  Pt needs to schedule a HFU, discharged for the hospital today at 1am.   [phone]

## 2024-01-04 NOTE — PROVIDER PROGRESS NOTES - EMERGENCY DEPT.
Encounter Date: 1/3/2024    ED Physician Progress Notes        Physician Note:   Spoke with Mrs. Dubon would and advise her of the 1.7 cm lesion on her chest x-ray and need for follow-up with Dr. Graham.

## 2024-01-05 ENCOUNTER — TELEPHONE (OUTPATIENT)
Dept: FAMILY MEDICINE | Facility: CLINIC | Age: 74
End: 2024-01-05
Payer: MEDICARE

## 2024-01-05 NOTE — TELEPHONE ENCOUNTER
----- Message from Alyx Lechuga sent at 1/5/2024  8:10 AM CST -----  Pt does not want to see alyx. She wants to see rissa as the radiologist called her and let her know there is a spot on her lungs   [phone]

## 2024-01-06 ENCOUNTER — HOSPITAL ENCOUNTER (OUTPATIENT)
Facility: HOSPITAL | Age: 74
Discharge: HOME | End: 2024-01-08
Attending: EMERGENCY MEDICINE | Admitting: HOSPITALIST
Payer: MEDICARE

## 2024-01-06 DIAGNOSIS — R09.02 HYPOXIA: ICD-10-CM

## 2024-01-06 DIAGNOSIS — J44.1 COPD EXACERBATION: Primary | ICD-10-CM

## 2024-01-06 PROBLEM — E87.6 HYPOKALEMIA: Status: ACTIVE | Noted: 2024-01-06

## 2024-01-06 PROBLEM — J10.1 INFLUENZA A: Status: ACTIVE | Noted: 2024-01-06

## 2024-01-06 PROBLEM — E03.9 ACQUIRED HYPOTHYROIDISM: Status: ACTIVE | Noted: 2024-01-06

## 2024-01-06 LAB
ALBUMIN SERPL BCP-MCNC: 3.2 G/DL (ref 3.5–5.2)
ALP SERPL-CCNC: 75 U/L (ref 55–135)
ALT SERPL W/O P-5'-P-CCNC: 21 U/L (ref 10–44)
ANION GAP SERPL CALC-SCNC: 11 MMOL/L (ref 8–16)
AST SERPL-CCNC: 37 U/L (ref 10–40)
BASOPHILS NFR BLD: 0 % (ref 0–1.9)
BILIRUB SERPL-MCNC: 0.3 MG/DL (ref 0.1–1)
BUN SERPL-MCNC: 12 MG/DL (ref 8–23)
CALCIUM SERPL-MCNC: 8.9 MG/DL (ref 8.7–10.5)
CHLORIDE SERPL-SCNC: 107 MMOL/L (ref 95–110)
CO2 SERPL-SCNC: 24 MMOL/L (ref 23–29)
CREAT SERPL-MCNC: 0.8 MG/DL (ref 0.5–1.4)
DIFFERENTIAL METHOD BLD: ABNORMAL
EOSINOPHIL NFR BLD: 0 % (ref 0–8)
ERYTHROCYTE [DISTWIDTH] IN BLOOD BY AUTOMATED COUNT: 13.6 % (ref 11.5–14.5)
EST. GFR  (NO RACE VARIABLE): >60 ML/MIN/1.73 M^2
GLUCOSE SERPL-MCNC: 95 MG/DL (ref 70–110)
HCT VFR BLD AUTO: 41.4 % (ref 37–48.5)
HGB BLD-MCNC: 13.6 G/DL (ref 12–16)
IMM GRANULOCYTES # BLD AUTO: ABNORMAL K/UL
IMM GRANULOCYTES NFR BLD AUTO: ABNORMAL %
LYMPHOCYTES NFR BLD: 51 % (ref 18–48)
MCH RBC QN AUTO: 31.5 PG (ref 27–31)
MCHC RBC AUTO-ENTMCNC: 32.9 G/DL (ref 32–36)
MCV RBC AUTO: 96 FL (ref 82–98)
MONOCYTES NFR BLD: 20 % (ref 4–15)
NEUTROPHILS NFR BLD: 29 % (ref 38–73)
NRBC BLD-RTO: 0 /100 WBC
PLATELET # BLD AUTO: 124 K/UL (ref 150–450)
PLATELET BLD QL SMEAR: ABNORMAL
PMV BLD AUTO: 9.8 FL (ref 9.2–12.9)
POTASSIUM SERPL-SCNC: 3.4 MMOL/L (ref 3.5–5.1)
PROT SERPL-MCNC: 7 G/DL (ref 6–8.4)
RBC # BLD AUTO: 4.32 M/UL (ref 4–5.4)
SODIUM SERPL-SCNC: 142 MMOL/L (ref 136–145)
WBC # BLD AUTO: 2.58 K/UL (ref 3.9–12.7)

## 2024-01-06 PROCEDURE — 63700000 PHARM REV CODE 250 ALT 637 W/O HCPCS: Performed by: NURSE PRACTITIONER

## 2024-01-06 PROCEDURE — 36415 COLL VENOUS BLD VENIPUNCTURE: CPT | Performed by: EMERGENCY MEDICINE

## 2024-01-06 PROCEDURE — 27000221 HC OXYGEN, UP TO 24 HOURS

## 2024-01-06 PROCEDURE — 63600175 PHARM REV CODE 636 W HCPCS: Performed by: NURSE PRACTITIONER

## 2024-01-06 PROCEDURE — 99900035 HC TECH TIME PER 15 MIN (STAT)

## 2024-01-06 PROCEDURE — 25000003 PHARM REV CODE 250

## 2024-01-06 PROCEDURE — 25000003 PHARM REV CODE 250: Performed by: NURSE PRACTITIONER

## 2024-01-06 PROCEDURE — 99285 EMERGENCY DEPT VISIT HI MDM: CPT | Mod: 25

## 2024-01-06 PROCEDURE — G0378 HOSPITAL OBSERVATION PER HR: HCPCS

## 2024-01-06 PROCEDURE — 94640 AIRWAY INHALATION TREATMENT: CPT | Mod: XB

## 2024-01-06 PROCEDURE — 96366 THER/PROPH/DIAG IV INF ADDON: CPT

## 2024-01-06 PROCEDURE — 63600175 PHARM REV CODE 636 W HCPCS: Performed by: EMERGENCY MEDICINE

## 2024-01-06 PROCEDURE — 94761 N-INVAS EAR/PLS OXIMETRY MLT: CPT | Mod: XB

## 2024-01-06 PROCEDURE — 96367 TX/PROPH/DG ADDL SEQ IV INF: CPT

## 2024-01-06 PROCEDURE — 25000242 PHARM REV CODE 250 ALT 637 W/ HCPCS

## 2024-01-06 PROCEDURE — 85007 BL SMEAR W/DIFF WBC COUNT: CPT | Performed by: EMERGENCY MEDICINE

## 2024-01-06 PROCEDURE — 94640 AIRWAY INHALATION TREATMENT: CPT

## 2024-01-06 PROCEDURE — 96365 THER/PROPH/DIAG IV INF INIT: CPT

## 2024-01-06 PROCEDURE — 96375 TX/PRO/DX INJ NEW DRUG ADDON: CPT

## 2024-01-06 PROCEDURE — 25000242 PHARM REV CODE 250 ALT 637 W/ HCPCS: Performed by: EMERGENCY MEDICINE

## 2024-01-06 PROCEDURE — 25000242 PHARM REV CODE 250 ALT 637 W/ HCPCS: Performed by: HOSPITALIST

## 2024-01-06 PROCEDURE — 25000003 PHARM REV CODE 250: Performed by: EMERGENCY MEDICINE

## 2024-01-06 PROCEDURE — 80053 COMPREHEN METABOLIC PANEL: CPT | Performed by: EMERGENCY MEDICINE

## 2024-01-06 PROCEDURE — 85027 COMPLETE CBC AUTOMATED: CPT | Performed by: EMERGENCY MEDICINE

## 2024-01-06 PROCEDURE — 63600175 PHARM REV CODE 636 W HCPCS

## 2024-01-06 PROCEDURE — 25000242 PHARM REV CODE 250 ALT 637 W/ HCPCS: Performed by: NURSE PRACTITIONER

## 2024-01-06 RX ORDER — DEXAMETHASONE SODIUM PHOSPHATE 4 MG/ML
8 INJECTION, SOLUTION INTRA-ARTICULAR; INTRALESIONAL; INTRAMUSCULAR; INTRAVENOUS; SOFT TISSUE
Status: DISCONTINUED | OUTPATIENT
Start: 2024-01-06 | End: 2024-01-06

## 2024-01-06 RX ORDER — IPRATROPIUM BROMIDE AND ALBUTEROL SULFATE 2.5; .5 MG/3ML; MG/3ML
9 SOLUTION RESPIRATORY (INHALATION) CONTINUOUS
Status: DISCONTINUED | OUTPATIENT
Start: 2024-01-06 | End: 2024-01-06

## 2024-01-06 RX ORDER — POTASSIUM CHLORIDE 20 MEQ/1
40 TABLET, EXTENDED RELEASE ORAL ONCE
Status: COMPLETED | OUTPATIENT
Start: 2024-01-06 | End: 2024-01-06

## 2024-01-06 RX ORDER — LANOLIN ALCOHOL/MO/W.PET/CERES
800 CREAM (GRAM) TOPICAL
Status: DISCONTINUED | OUTPATIENT
Start: 2024-01-06 | End: 2024-01-08 | Stop reason: HOSPADM

## 2024-01-06 RX ORDER — PANTOPRAZOLE SODIUM 40 MG/1
40 TABLET, DELAYED RELEASE ORAL DAILY
Status: DISCONTINUED | OUTPATIENT
Start: 2024-01-06 | End: 2024-01-08 | Stop reason: HOSPADM

## 2024-01-06 RX ORDER — HYDROCODONE BITARTRATE AND ACETAMINOPHEN 10; 325 MG/1; MG/1
1 TABLET ORAL EVERY 12 HOURS PRN
Status: DISCONTINUED | OUTPATIENT
Start: 2024-01-06 | End: 2024-01-08 | Stop reason: HOSPADM

## 2024-01-06 RX ORDER — LEVALBUTEROL INHALATION SOLUTION 0.63 MG/3ML
0.63 SOLUTION RESPIRATORY (INHALATION) EVERY 6 HOURS
Status: DISCONTINUED | OUTPATIENT
Start: 2024-01-06 | End: 2024-01-08 | Stop reason: HOSPADM

## 2024-01-06 RX ORDER — METOPROLOL TARTRATE 25 MG/1
25 TABLET, FILM COATED ORAL 2 TIMES DAILY
Status: DISCONTINUED | OUTPATIENT
Start: 2024-01-06 | End: 2024-01-08 | Stop reason: HOSPADM

## 2024-01-06 RX ORDER — CETIRIZINE HYDROCHLORIDE 5 MG/1
5 TABLET ORAL DAILY
Status: DISCONTINUED | OUTPATIENT
Start: 2024-01-06 | End: 2024-01-08 | Stop reason: HOSPADM

## 2024-01-06 RX ORDER — AMLODIPINE BESYLATE 5 MG/1
5 TABLET ORAL DAILY
Status: DISCONTINUED | OUTPATIENT
Start: 2024-01-06 | End: 2024-01-08 | Stop reason: HOSPADM

## 2024-01-06 RX ORDER — LEVOTHYROXINE SODIUM 88 UG/1
88 TABLET ORAL
Status: DISCONTINUED | OUTPATIENT
Start: 2024-01-06 | End: 2024-01-08 | Stop reason: HOSPADM

## 2024-01-06 RX ORDER — SODIUM CHLORIDE 0.9 % (FLUSH) 0.9 %
3 SYRINGE (ML) INJECTION
Status: DISCONTINUED | OUTPATIENT
Start: 2024-01-06 | End: 2024-01-08 | Stop reason: HOSPADM

## 2024-01-06 RX ORDER — ARFORMOTEROL TARTRATE 15 UG/2ML
15 SOLUTION RESPIRATORY (INHALATION) 2 TIMES DAILY
Status: DISCONTINUED | OUTPATIENT
Start: 2024-01-06 | End: 2024-01-07

## 2024-01-06 RX ORDER — TALC
6 POWDER (GRAM) TOPICAL NIGHTLY PRN
Status: DISCONTINUED | OUTPATIENT
Start: 2024-01-06 | End: 2024-01-08 | Stop reason: HOSPADM

## 2024-01-06 RX ORDER — FLUCONAZOLE 100 MG/1
100 TABLET ORAL DAILY
Status: DISCONTINUED | OUTPATIENT
Start: 2024-01-06 | End: 2024-01-08 | Stop reason: HOSPADM

## 2024-01-06 RX ORDER — AMOXICILLIN 250 MG
1 CAPSULE ORAL 2 TIMES DAILY PRN
Status: DISCONTINUED | OUTPATIENT
Start: 2024-01-06 | End: 2024-01-08 | Stop reason: HOSPADM

## 2024-01-06 RX ORDER — ONDANSETRON 2 MG/ML
4 INJECTION INTRAMUSCULAR; INTRAVENOUS EVERY 12 HOURS PRN
Status: DISCONTINUED | OUTPATIENT
Start: 2024-01-06 | End: 2024-01-08 | Stop reason: HOSPADM

## 2024-01-06 RX ORDER — ALPRAZOLAM 0.25 MG/1
0.25 TABLET ORAL 2 TIMES DAILY PRN
Status: DISCONTINUED | OUTPATIENT
Start: 2024-01-06 | End: 2024-01-08 | Stop reason: HOSPADM

## 2024-01-06 RX ORDER — ACETAMINOPHEN 325 MG/1
650 TABLET ORAL EVERY 6 HOURS PRN
Status: DISCONTINUED | OUTPATIENT
Start: 2024-01-06 | End: 2024-01-08 | Stop reason: HOSPADM

## 2024-01-06 RX ORDER — FAMOTIDINE 20 MG/1
20 TABLET, FILM COATED ORAL NIGHTLY
Status: DISCONTINUED | OUTPATIENT
Start: 2024-01-06 | End: 2024-01-08 | Stop reason: HOSPADM

## 2024-01-06 RX ORDER — PROCHLORPERAZINE EDISYLATE 5 MG/ML
5 INJECTION INTRAMUSCULAR; INTRAVENOUS EVERY 6 HOURS PRN
Status: DISCONTINUED | OUTPATIENT
Start: 2024-01-06 | End: 2024-01-08 | Stop reason: HOSPADM

## 2024-01-06 RX ORDER — MAGNESIUM SULFATE 1 G/100ML
1 INJECTION INTRAVENOUS
Status: COMPLETED | OUTPATIENT
Start: 2024-01-06 | End: 2024-01-06

## 2024-01-06 RX ORDER — DIPHENHYDRAMINE HYDROCHLORIDE 50 MG/ML
25 INJECTION, SOLUTION INTRAMUSCULAR; INTRAVENOUS EVERY 6 HOURS PRN
Status: DISCONTINUED | OUTPATIENT
Start: 2024-01-06 | End: 2024-01-08 | Stop reason: HOSPADM

## 2024-01-06 RX ORDER — OSELTAMIVIR PHOSPHATE 75 MG/1
75 CAPSULE ORAL 2 TIMES DAILY
Status: DISCONTINUED | OUTPATIENT
Start: 2024-01-06 | End: 2024-01-08 | Stop reason: HOSPADM

## 2024-01-06 RX ORDER — IPRATROPIUM BROMIDE AND ALBUTEROL SULFATE 2.5; .5 MG/3ML; MG/3ML
3 SOLUTION RESPIRATORY (INHALATION)
Status: DISCONTINUED | OUTPATIENT
Start: 2024-01-06 | End: 2024-01-06 | Stop reason: SINTOL

## 2024-01-06 RX ORDER — LEVALBUTEROL INHALATION SOLUTION 0.63 MG/3ML
0.63 SOLUTION RESPIRATORY (INHALATION) EVERY 6 HOURS
Status: DISCONTINUED | OUTPATIENT
Start: 2024-01-06 | End: 2024-01-06

## 2024-01-06 RX ORDER — GUAIFENESIN 600 MG/1
600 TABLET, EXTENDED RELEASE ORAL 2 TIMES DAILY
Status: DISCONTINUED | OUTPATIENT
Start: 2024-01-06 | End: 2024-01-08 | Stop reason: HOSPADM

## 2024-01-06 RX ADMIN — METOPROLOL TARTRATE 25 MG: 25 TABLET, FILM COATED ORAL at 10:01

## 2024-01-06 RX ADMIN — PANTOPRAZOLE SODIUM 40 MG: 40 TABLET, DELAYED RELEASE ORAL at 12:01

## 2024-01-06 RX ADMIN — GUAIFENESIN 600 MG: 600 TABLET, EXTENDED RELEASE ORAL at 02:01

## 2024-01-06 RX ADMIN — ONDANSETRON 4 MG: 2 INJECTION INTRAMUSCULAR; INTRAVENOUS at 10:01

## 2024-01-06 RX ADMIN — OSELTAMIVIR PHOSPHATE 75 MG: 75 CAPSULE ORAL at 06:01

## 2024-01-06 RX ADMIN — PIPERACILLIN AND TAZOBACTAM 4.5 G: 4; .5 INJECTION, POWDER, LYOPHILIZED, FOR SOLUTION INTRAVENOUS; PARENTERAL at 10:01

## 2024-01-06 RX ADMIN — LEVALBUTEROL 0.63 MG: 0.63 SOLUTION RESPIRATORY (INHALATION) at 01:01

## 2024-01-06 RX ADMIN — GUAIFENESIN 600 MG: 600 TABLET, EXTENDED RELEASE ORAL at 10:01

## 2024-01-06 RX ADMIN — METOPROLOL TARTRATE 25 MG: 25 TABLET, FILM COATED ORAL at 09:01

## 2024-01-06 RX ADMIN — LEVOTHYROXINE SODIUM 88 MCG: 0.09 TABLET ORAL at 09:01

## 2024-01-06 RX ADMIN — ARFORMOTEROL TARTRATE 15 MCG: 15 SOLUTION RESPIRATORY (INHALATION) at 07:01

## 2024-01-06 RX ADMIN — HYDROCODONE BITARTRATE AND ACETAMINOPHEN 1 TABLET: 10; 325 TABLET ORAL at 06:01

## 2024-01-06 RX ADMIN — IPRATROPIUM BROMIDE AND ALBUTEROL SULFATE 3 ML: 2.5; .5 SOLUTION RESPIRATORY (INHALATION) at 07:01

## 2024-01-06 RX ADMIN — OSELTAMIVIR PHOSPHATE 75 MG: 75 CAPSULE ORAL at 10:01

## 2024-01-06 RX ADMIN — POTASSIUM CHLORIDE 40 MEQ: 1500 TABLET, EXTENDED RELEASE ORAL at 02:01

## 2024-01-06 RX ADMIN — IPRATROPIUM BROMIDE AND ALBUTEROL SULFATE 9 ML: 2.5; .5 SOLUTION RESPIRATORY (INHALATION) at 04:01

## 2024-01-06 RX ADMIN — PIPERACILLIN AND TAZOBACTAM 4.5 G: 4; .5 INJECTION, POWDER, LYOPHILIZED, FOR SOLUTION INTRAVENOUS; PARENTERAL at 12:01

## 2024-01-06 RX ADMIN — AMLODIPINE BESYLATE 5 MG: 5 TABLET ORAL at 09:01

## 2024-01-06 RX ADMIN — LACTOBACILLUS TAB 2 TABLET: TAB at 02:01

## 2024-01-06 RX ADMIN — CETIRIZINE HYDROCHLORIDE 5 MG: 5 TABLET, FILM COATED ORAL at 09:01

## 2024-01-06 RX ADMIN — MAGNESIUM SULFATE 1 G: 1 INJECTION INTRAVENOUS at 05:01

## 2024-01-06 RX ADMIN — FLUCONAZOLE 100 MG: 100 TABLET ORAL at 02:01

## 2024-01-06 RX ADMIN — LEVALBUTEROL 0.63 MG: 0.63 SOLUTION RESPIRATORY (INHALATION) at 07:01

## 2024-01-06 RX ADMIN — FAMOTIDINE 20 MG: 20 TABLET, FILM COATED ORAL at 10:01

## 2024-01-06 NOTE — ASSESSMENT & PLAN NOTE
Patient's COPD is with exacerbation noted by continued dyspnea, use of accessory muscles for breathing, and worsening of baseline hypoxia currently.  Patient is currently on COPD Pathway. Continue scheduled inhalers Antibiotics and Supplemental oxygen and monitor respiratory status closely.   -----------------------------------------------------------------------------------------------  Did not tolerate azithromycin.  Allergic to Cipro and doxycycline.  Continue IV Zosyn and nebulizers  Add Pulmicort b.i.d.  Continue supplemental O2, weaning as tolerated  No steroids due to intolerance  Change nebulizer to Xopenex due to not tolerating albuterol  Start Mucinex b.i.d.  Monitor

## 2024-01-06 NOTE — ASSESSMENT & PLAN NOTE
Patient has hypokalemia which is Acute and currently uncontrolled. Most recent potassium levels reviewed-   Lab Results   Component Value Date    K 3.4 (L) 01/06/2024   . Will continue potassium replacement per protocol and recheck repeat levels after replacement completed.

## 2024-01-06 NOTE — ASSESSMENT & PLAN NOTE
Patient has chronic hypothyroidism. TFTs reviewed- Shows subclinical hypothyroidism    Lab Results   Component Value Date    TSH 0.193 (L) 10/18/2023    FREET4 1.40 10/18/2023      Will continue chronic levothyroxine and adjust for and clinical changes.

## 2024-01-06 NOTE — HPI
Ms. Dubon is a 73-year-old female with a past medical history of COPD, HTN, GERD, anxiety, Martin's esophagus, and hypothyroidism.  She presented to the ED with complaints of dyspnea.  It started about 3 days ago, when she was also diagnosed with the flu at that time.  She was prescribed Tamiflu however she did not take it because it causes nausea.  The SOB got progressively worse so she came to the hospital.  While in the ED, she was noted to be hypoxic and positive influenza A.  She was placed on supplemental O2, given a nebulizer treatment, and started on Tamiflu.  Her chest x-ray was unremarkable.  She is being admitted for COPD exacerbation.  IV azithromycin was added to her treatment regimen, but it had to be stopped when she developed redness and itching near IV site.  So, she was switched to IV Zosyn.  She states that she is currently breathing better as long as she does not move because then she gets short of breath.  Associated symptoms include a wet cough, feels like she has thick secretions that are unable to come up in her throat.  She reports recently taken amoxicillin, and tolerating, but had to also take Diflucan due to getting a yeast infection.  She states that she is unable to tolerate steroids and gets hives from them.  She currently complains of feeling jittery from the nebulizer treatments.  She also complained of not being able to sleep last night due to the jitteriness.  She denies ever having lung problems and denies having COPD.  She states that she has never had problems with her lungs are breathing until recently.  She is ex-smoker.  She currently denies dizziness, chest pain, palpitations, N/V, numbness, problems walking.

## 2024-01-06 NOTE — ASSESSMENT & PLAN NOTE
Chronic, controlled. Latest blood pressure and vitals reviewed-     Temp:  [97.7 °F (36.5 °C)-98.2 °F (36.8 °C)]   Pulse:  [54-88]   Resp:  [18-28]   BP: (101-163)/(61-83)   SpO2:  [88 %-100 %] .   Home meds for hypertension were reviewed and noted below.   Hypertension Medications               amLODIPine (NORVASC) 5 MG tablet Take 1 tablet (5 mg total) by mouth once daily. For blood pressure    metoprolol tartrate (LOPRESSOR) 25 MG tablet Take 1 tablet (25 mg total) by mouth 2 (two) times daily.            While in the hospital, will manage blood pressure as follows; Continue home antihypertensive regimen    Will utilize p.r.n. blood pressure medication only if patient's blood pressure greater than 180/110 and she develops symptoms such as worsening chest pain or shortness of breath.

## 2024-01-06 NOTE — PLAN OF CARE
[facility] - ED  Initial Discharge Assessment       Primary Care Provider: Jasbir Graham MD    Admission Diagnosis: COPD exacerbation [J44.1]    Admission Date: 1/6/2024  Expected Discharge Date:     Assessment completed with pt and Spouse Clearance Jagdish [phone]  at bedside. Pt confirmed home address and has a home nebulizer. Pt denied HH and confirmed PCP, insurance and pharmacy as [facility]. Pts discharge plan is home and spouse will provide transport. Pt signed observation moon form. CM following for additional needs.   Transition of Care Barriers: None    Payor: [employer] M[employer] [facility] / Plan: [employer] CHOICES / Product Type: Medicare Advantage /     Extended Emergency Contact Information  Primary Emergency Contact: Mark Dubon  Address: [address]           [place], [place] [address] [place] [address]  Home Phone: [phone]  Mobile Phone: [phone]  Relation: Spouse   needed? No    Discharge Plan A: Home with family  Discharge Plan B: Home with family, Home Health      [id number] - [place] - [address] [facility] [address]  [address] [facility] [address]  Phone: [phone] Fax: [phone]    [facility] - [place] - [address]  [address] [facility] [address]  Phone: [phone] Fax: [phone]      Initial Assessment (most recent)       Adult Discharge Assessment - 01/06/24 0933          Discharge Assessment    Assessment Type Discharge Planning Assessment     Confirmed/corrected address, phone number and insurance Yes     Confirmed Demographics Correct on Facesheet     Source of Information patient;family     Does patient/caregiver understand observation status Yes     Communicated TINO with patient/caregiver Yes     Reason For Admission COPD     People in Home spouse     Facility Arrived From: Home     Do you expect to return to your current living  situation? Yes     Do you have help at home or someone to help you manage your care at home? Yes     Who are your caregiver(s) and their phone number(s)? Spouse Clearance Jagdish [phone]     Prior to hospitilization cognitive status: Alert/Oriented     Current cognitive status: Alert/Oriented     Walking or Climbing Stairs Difficulty no     Dressing/Bathing Difficulty no     Home Layout Able to live on 1st floor     Equipment Currently Used at Home nebulizer     Readmission within 30 days? No     Patient currently being followed by outpatient case management? No     Do you currently have service(s) that help you manage your care at home? No     Do you take prescription medications? Yes     Do you have prescription coverage? Yes     Do you have any problems affording any of your prescribed medications? No     Is the patient taking medications as prescribed? yes     Who is going to help you get home at discharge? Spouse Clearance [place] [phone]     How do you get to doctors appointments? car, drives self;family or friend will provide     Are you on dialysis? No     Do you take coumadin? No     Discharge Plan A Home with family     Discharge Plan B Home with family;Home Health     DME Needed Upon Discharge  none     Discharge Plan discussed with: Spouse/sig other;Patient     Name(s) and Number(s) Spouse Clearance Jagdish [phone]     Transition of Care Barriers None        Housing Stability    In the last 12 months, was there a time when you were not able to pay the mortgage or rent on time? No     In the last 12 months, was there a time when you did not have a steady place to sleep or slept in a shelter (including now)? No        Transportation Needs    In the past 12 months, has lack of transportation kept you from medical appointments or from getting medications? No     In the past 12 months, has lack of transportation kept you from meetings, work, or from getting things needed for daily living?  No        Food Insecurity    Within the past 12 months, you worried that your food would run out before you got the money to buy more. Never true     Within the past 12 months, the food you bought just didn't last and you didn't have money to get more. Never true        Social Connections    Are you , , , , never , or living with a partner?         OTHER    Name(s) of People in Home Spouse Clearance Jagdish [phone]

## 2024-01-06 NOTE — ED PROVIDER NOTES
Encounter Date: 1/6/2024       History     Chief Complaint   Patient presents with    Shortness of Breath     Chief complaint:  Shortness of breath    HPI:  73-year-old female presents with a 3 day history of progressively worsening shortness of breath.  She was diagnosed with influenza 3 days ago but did not start Tamiflu because it causes nausea.  She has a remote history of COPD and recently stopped smoking.  She denies any chest discomfort.  She has no history is CHF but does have a history of hypertension.  Past medical history is also significant for anxiety, Martin's esophagus, colitis, thyroid disease and vocal cord polyps.  She has had a AAA repair and back surgery.      Review of patient's allergies indicates:   Allergen Reactions    Bisacodyl Nausea And Vomiting     Other reaction(s): Vomiting    Azithromycin Hives    Cipro [ciprofloxacin hcl]     Demerol [meperidine]      Nausea vomiting, visual problem    Doxycycline Hives    Flonase [fluticasone propionate] Hives    Iodinated contrast media Hives     hypotension    Morphine     Morpholine analogues Other (See Comments)     hypotension     Past Medical History:   Diagnosis Date    Anxiety     Martin esophagus     Colitis     COPD (chronic obstructive pulmonary disease)     GERD (gastroesophageal reflux disease)     Hypertension     Thyroid disease     Vocal cord polyps      Past Surgical History:   Procedure Laterality Date    ABDOMINAL AORTIC ANEURYSM REPAIR      BACK SURGERY      cervical    CATARACT EXTRACTION Right 02/2021    CHOLECYSTECTOMY  12/20/2013    Dr Price  [facility]    FOOT SURGERY      HYSTERECTOMY  1979    AUGUSTINE for AUB with consevation ovaries    SALIVARY GLAND SURGERY       Family History   Problem Relation Age of Onset    Cancer Mother     Heart failure Father     Emphysema Sister     No Known Problems Brother     Cancer Sister      Social History     Tobacco Use    Smoking status: Every Day     Current packs/day: 0.50     Types:  Cigarettes    Smokeless tobacco: Former     Quit date: 5/1/2016    Tobacco comments:     Pt has smoked since 14 years old. Smokes around .5ppd. Inpatient smoking education done 10/20.   Substance Use Topics    Alcohol use: Never    Drug use: Never     Review of Systems   Constitutional:  Negative for activity change, appetite change, chills, fatigue and fever.   Eyes:  Negative for visual disturbance.   Respiratory:  Positive for cough, shortness of breath and wheezing. Negative for apnea.    Cardiovascular:  Negative for chest pain and palpitations.   Gastrointestinal:  Negative for abdominal distention and abdominal pain.   Genitourinary:  Negative for difficulty urinating.   Musculoskeletal:  Negative for neck pain.   Skin:  Negative for pallor and rash.   Neurological:  Negative for headaches.   Hematological:  Does not bruise/bleed easily.   Psychiatric/Behavioral:  Negative for agitation.        Physical Exam     Initial Vitals   BP Pulse Resp Temp SpO2   01/06/24 0430 01/06/24 0430 01/06/24 0430 01/06/24 0438 01/06/24 0430   (!) 163/74 (!) 54 (!) 26 97.7 °F (36.5 °C) (!) 88 %      MAP       --                Physical Exam    Nursing note and vitals reviewed.  Constitutional: She appears well-developed and well-nourished.   HENT:   Head: Normocephalic and atraumatic.   Eyes: Conjunctivae are normal.   Neck: Neck supple.   Normal range of motion.  Cardiovascular:  Normal rate, regular rhythm and normal heart sounds.     Exam reveals no gallop and no friction rub.       No murmur heard.  Pulmonary/Chest: Effort normal. No respiratory distress. She has wheezes. She has rhonchi. She has no rales.   Abdominal: Abdomen is soft. She exhibits no distension. There is no abdominal tenderness.   Musculoskeletal:         General: Normal range of motion.      Cervical back: Normal range of motion and neck supple.     Neurological: She is alert and oriented to person, place, and time.   Skin: Skin is warm and dry. No  erythema.   Psychiatric: She has a normal mood and affect.         ED Course   Critical Care    Date/Time: 1/6/2024 4:20 AM    Performed by: Addy Hilton III, MD  Authorized by: Addy Hilton III, MD  Direct patient critical care time: 120 minutes  Total critical care time (exclusive of procedural time) : 120 minutes  Critical care was necessary to treat or prevent imminent or life-threatening deterioration of the following conditions: respiratory failure.  Critical care was time spent personally by me on the following activities: development of treatment plan with patient or surrogate, discussions with primary provider, evaluation of patient's response to treatment, examination of patient, obtaining history from patient or surrogate, ordering and performing treatments and interventions, ordering and review of laboratory studies, ordering and review of radiographic studies, pulse oximetry, re-evaluation of patient's condition and review of old charts.        Labs Reviewed   CBC W/ AUTO DIFFERENTIAL - Abnormal; Notable for the following components:       Result Value    WBC 2.58 (*)     MCH 31.5 (*)     Platelets 124 (*)     Gran % 29.0 (*)     Lymph % 51.0 (*)     Mono % 20.0 (*)     Platelet Estimate Decreased (*)     All other components within normal limits   COMPREHENSIVE METABOLIC PANEL - Abnormal; Notable for the following components:    Potassium 3.4 (*)     Albumin 3.2 (*)     All other components within normal limits          Imaging Results              X-Ray Chest AP Portable (Final result)  Result time 01/06/24 08:21:26      Final result by Bill Dejesus Jr., MD (01/06/24 08:21:26)                   Impression:      Mild cardiomegaly.  1.8 cm soft tissue density nodule of the left upper lobe.  This may represent an intrapulmonary coin lesion and CT of the chest is recommended.      Electronically signed by: Bill Dejesus MD  Date:    01/06/2024  Time:    08:21               Narrative:     EXAMINATION:  XR CHEST AP PORTABLE    CLINICAL HISTORY:  Hypoxemia    TECHNIQUE:  Single frontal view of the chest was performed.    COMPARISON:  Chest x-ray of January 3, 2024    FINDINGS:  There is mild cardiomegaly.  An intrapulmonary infiltrate or pulmonary edema is not seen.  There remains a 1.8 cm soft tissue density nodule in the left upper lobe and of CT of the chest is again recommended to rule out or in a coin lesion.  The rest the lung fields are clear.  No pneumothorax seen.                                       Medications   oseltamivir capsule 75 mg (75 mg Oral Given 1/6/24 0604)   ALPRAZolam tablet 0.25 mg (has no administration in time range)   amLODIPine tablet 5 mg (5 mg Oral Given 1/6/24 0937)   famotidine tablet 20 mg (has no administration in time range)   cetirizine tablet 5 mg (5 mg Oral Given 1/6/24 0923)   levothyroxine tablet 88 mcg (88 mcg Oral Given 1/6/24 0923)   metoprolol tartrate (LOPRESSOR) tablet 25 mg (25 mg Oral Given 1/6/24 0922)   sodium chloride 0.9% flush 3 mL (has no administration in time range)   ondansetron injection 4 mg (has no administration in time range)   prochlorperazine injection Soln 5 mg (has no administration in time range)   senna-docusate 8.6-50 mg per tablet 1 tablet (has no administration in time range)   piperacillin-tazobactam (ZOSYN) 4.5 g in dextrose 5 % in water (D5W) 100 mL IVPB (MB+) (0 g Intravenous Stopped 1/6/24 1645)   diphenhydrAMINE injection 25 mg (has no administration in time range)   pantoprazole EC tablet 40 mg (40 mg Oral Given 1/6/24 1245)   HYDROcodone-acetaminophen  mg per tablet 1 tablet (1 tablet Oral Given 1/6/24 1804)   guaiFENesin 12 hr tablet 600 mg (600 mg Oral Given 1/6/24 1425)   acetaminophen tablet 650 mg (has no administration in time range)   melatonin tablet 6 mg (has no administration in time range)   fluconazole tablet 100 mg (100 mg Oral Given 1/6/24 1425)   Lactobacillus acidoph-L.bulgar 1 million cell tablet  2 tablet (2 tablets Oral Given 1/6/24 1425)   arformoteroL nebulizer solution 15 mcg (has no administration in time range)   potassium bicarbonate disintegrating tablet 50 mEq (has no administration in time range)   potassium bicarbonate disintegrating tablet 35 mEq (has no administration in time range)   potassium bicarbonate disintegrating tablet 60 mEq (has no administration in time range)   magnesium oxide tablet 800 mg (has no administration in time range)   magnesium oxide tablet 800 mg (has no administration in time range)   levalbuterol nebulizer solution 0.63 mg (0.63 mg Nebulization Incomplete 1/6/24 1946)   magnesium sulfate in dextrose IVPB (premix) 1 g (0 g Intravenous Stopped 1/6/24 0610)   potassium chloride SA CR tablet 40 mEq (40 mEq Oral Given 1/6/24 1425)     Medical Decision Making  73-year-old female presents with a 3 day history of progressively worsening shortness of breath.  Physical exam is consistent with a COPD exacerbation.  Chest x-ray independently interpreted by me fails to identify any evidence of pneumonia or congestive heart failure.  She was recently diagnosed with influenza but did not take prescribed Tamiflu.  She has also refused steroids.  She will be admitted for supplemental oxygen and bronchodilators.    Amount and/or Complexity of Data Reviewed  Labs: ordered.  Radiology: ordered.    Risk  Prescription drug management.  Decision regarding hospitalization.               ED Course as of 01/06/24 2005   Sat Jan 06, 2024   0716 73-year-old female with influenza and likely COPD admitted by Dr. Hilton.  Care transferred at 7:00 a.m.. [JS]      ED Course User Index  [JS] Hermes Zaragoza MD                           Clinical Impression:  Final diagnoses:  [R09.02] Hypoxia  [J44.1] COPD exacerbation (Primary)          ED Disposition Condition    Observation                 Addy Hilton III, MD  01/06/24 7025       Addy Hilton III, MD  01/06/24 4494       Addy Hilton  ISAK BOLAÑOS MD  01/06/24 2005

## 2024-01-06 NOTE — H&P
[facility]  History & Physical    Patient Name: Joslyn Dubon  MRN: [id number]  Patient Class: OP- Observation  Admission Date: 1/6/2024  Attending Physician: Donna Fischer MD   Primary Care Provider: Jasbir Graham MD         Patient information was obtained from patient, past medical records, and ER records.     Subjective:     Principal Problem:COPD exacerbation    Chief Complaint:   Chief Complaint   Patient presents with    Shortness of Breath        HPI: Ms. Dubon is a 73-year-old female with a past medical history of COPD, HTN, GERD, anxiety, Martin's esophagus, and hypothyroidism.  She presented to the ED with complaints of dyspnea.  It started about 3 days ago, when she was also diagnosed with the flu at that time.  She was prescribed Tamiflu however she did not take it because it causes nausea.  The SOB got progressively worse so she came to the hospital.  While in the ED, she was noted to be hypoxic and positive influenza A.  She was placed on supplemental O2, given a nebulizer treatment, and started on Tamiflu.  Her chest x-ray was unremarkable.  She is being admitted for COPD exacerbation.  IV azithromycin was added to her treatment regimen, but it had to be stopped when she developed redness and itching near IV site.  So, she was switched to IV Zosyn.  She states that she is currently breathing better as long as she does not move because then she gets short of breath.  Associated symptoms include a wet cough, feels like she has thick secretions that are unable to come up in her throat.  She reports recently taken amoxicillin, and tolerating, but had to also take Diflucan due to getting a yeast infection.  She states that she is unable to tolerate steroids and gets hives from them.  She currently complains of feeling jittery from the nebulizer treatments.  She also complained of not being able to sleep last night due to the jitteriness.  She  denies ever having lung problems and denies having COPD.  She states that she has never had problems with her lungs are breathing until recently.  She is ex-smoker.  She currently denies dizziness, chest pain, palpitations, N/V, numbness, problems walking.    Past Medical History:   Diagnosis Date    Anxiety     Martin esophagus     Colitis     COPD (chronic obstructive pulmonary disease)     GERD (gastroesophageal reflux disease)     Hypertension     Thyroid disease     Vocal cord polyps        Past Surgical History:   Procedure Laterality Date    ABDOMINAL AORTIC ANEURYSM REPAIR      BACK SURGERY      cervical    CATARACT EXTRACTION Right 02/2021    CHOLECYSTECTOMY  12/20/2013    Dr Price  [facility]    FOOT SURGERY      HYSTERECTOMY  1979    AUGUSTINE for AUB with consevation ovaries    SALIVARY GLAND SURGERY         Review of patient's allergies indicates:   Allergen Reactions    Bisacodyl Nausea And Vomiting     Other reaction(s): Vomiting    Azithromycin Hives    Cipro [ciprofloxacin hcl]     Demerol [meperidine]      Nausea vomiting, visual problem    Doxycycline Hives    Flonase [fluticasone propionate] Hives    Iodinated contrast media Hives     hypotension    Morphine     Morpholine analogues Other (See Comments)     hypotension       No current facility-administered medications on file prior to encounter.     Current Outpatient Medications on File Prior to Encounter   Medication Sig    albuterol-ipratropium (DUO-NEB) 2.5 mg-0.5 mg/3 mL nebulizer solution Take 3 mLs by nebulization every 6 (six) hours as needed for Wheezing. Rescue    ALPRAZolam (XANAX) 0.25 MG tablet Take 1 tablet (0.25 mg total) by mouth 2 (two) times daily as needed for Anxiety.    aluminum-magnesium hydroxide-simethicone (MAALOX) 200-200-20 mg/5 mL Susp Take 30 mLs by mouth once as needed.    amLODIPine (NORVASC) 5 MG tablet Take 1 tablet (5 mg total) by mouth once daily. For blood pressure    amoxicillin-clavulanate 875-125mg (AUGMENTIN) 875-125  mg per tablet Take 1 tablet by mouth 2 (two) times daily. (Patient not taking: Reported on 12/13/2023)    cetirizine (ZYRTEC) 10 MG tablet Take 1 tablet (10 mg total) by mouth once daily. (Patient not taking: Reported on 12/13/2023)    clobetasoL (TEMOVATE) 0.05 % cream Apply topically 2 (two) times daily. (Patient not taking: Reported on 12/13/2023)    cloNIDine (CATAPRES) 0.1 MG tablet Take one tablet as needed up to 3 times a day if blood pressure is greater than 170/110 (Patient taking differently: Take 0.1 mg by mouth every 8 (eight) hours as needed. Take one tablet as needed up to 3 times a day if blood pressure is greater than 170/110)    EPINEPHrine (EPIPEN) 0.3 mg/0.3 mL AtIn Inject 0.3 mLs (0.3 mg total) into the muscle as needed (Severe allergic reaction symptoms such as shortness of breath, tongue or throat swelling, weakness dizziness severe nausea vomiting).    estradioL (VAGIFEM) 10 mcg Tab Place 1 tablet (10 mcg total) vaginally twice a week. Start with one tablet per vagina q.h.s. x7 than one tablet twice weekly    famotidine (PEPCID) 20 MG tablet Take 1 tablet (20 mg total) by mouth every evening.    fluconazole (DIFLUCAN) 100 MG tablet Take 1 tablet (100 mg total) by mouth once daily.    hydroCHLOROthiazide (HYDRODIURIL) 25 MG tablet Take 1 tablet (25 mg total) by mouth daily as needed (fluid). (Patient not taking: Reported on 12/13/2023)    HYDROcodone-acetaminophen (NORCO)  mg per tablet Take 1 tablet by mouth every 12 (twelve) hours as needed for Pain.    hydrOXYzine HCL (ATARAX) 25 MG tablet Take 1 tablet (25 mg total) by mouth 2 (two) times daily as needed for Itching. (Patient not taking: Reported on 12/13/2023)    levalbuterol (XOPENEX HFA) 45 mcg/actuation inhaler Inhale 2 puffs into the lungs every 6 (six) hours as needed for Wheezing. Rescue    levocetirizine (XYZAL) 5 MG tablet Take 5 mg by mouth every evening.    levothyroxine (SYNTHROID) 88 MCG tablet Take 1 tablet (88 mcg  total) by mouth before breakfast.    losartan (COZAAR) 100 MG tablet Take 1 tablet (100 mg total) by mouth once daily. (Patient not taking: Reported on 12/13/2023)    metoprolol tartrate (LOPRESSOR) 25 MG tablet Take 1 tablet (25 mg total) by mouth 2 (two) times daily.    nirmatrelvir-ritonavir (PAXLOVID) 300 mg (150 mg x 2)-100 mg copackaged tablets (EUA) Take 3 tablets by mouth 2 (two) times daily. Each dose contains 2 nirmatrelvir (pink tablets) and 1 ritonavir (white tablet). Take all 3 tablets together (Patient not taking: Reported on 12/13/2023)    omeprazole (PRILOSEC) 40 MG capsule Take 1 capsule (40 mg total) by mouth every morning.    ondansetron (ZOFRAN) 4 MG tablet Take 1 tablet (4 mg total) by mouth every 8 (eight) hours as needed for Nausea.    oseltamivir (TAMIFLU) 75 MG capsule Take 1 capsule (75 mg total) by mouth 2 (two) times daily. for 5 days    predniSONE (DELTASONE) 20 MG tablet Take 1 tablet (20 mg total) by mouth 2 (two) times daily. (Patient not taking: Reported on 12/13/2023)    REFRESH OPTIVE 0.5-0.9 % Drop Place 1 drop into both eyes 4 (four) times daily.    simethicone (GAS-X ORAL) Take 1 tablet by mouth as needed.     Family History       Problem Relation (Age of Onset)    Cancer Mother, Sister    Emphysema Sister    Heart failure Father    No Known Problems Brother          Tobacco Use    Smoking status: Every Day     Current packs/day: 0.50     Types: Cigarettes    Smokeless tobacco: Former     Quit date: 5/1/2016    Tobacco comments:     Pt has smoked since 14 years old. Smokes around .5ppd. Inpatient smoking education done 10/20.   Substance and Sexual Activity    Alcohol use: Never    Drug use: Never    Sexual activity: Yes     Partners: Male     Review of Systems   Constitutional:  Positive for activity change. Negative for chills and fatigue.   HENT:  Positive for voice change (Hoarseness). Negative for congestion, sinus pressure and trouble swallowing.    Respiratory:  Positive  for cough and shortness of breath. Negative for chest tightness.    Cardiovascular:  Negative for chest pain, palpitations and leg swelling.   Gastrointestinal:  Negative for abdominal pain, constipation, diarrhea, nausea and vomiting.   Genitourinary:  Negative for decreased urine volume, dysuria and hematuria.   Musculoskeletal:  Negative for arthralgias, back pain, gait problem and myalgias.   Skin:  Negative for color change and wound.   Neurological:  Negative for dizziness, speech difficulty, weakness and numbness.   Psychiatric/Behavioral:  Negative for agitation, confusion, hallucinations and sleep disturbance. The patient is nervous/anxious.         Jittery     Objective:     Vital Signs (Most Recent):  Temp: 98.2 °F (36.8 °C) (01/06/24 1257)  Pulse: 77 (01/06/24 1257)  Resp: 20 (01/06/24 1257)  BP: 133/74 (01/06/24 1257)  SpO2: (!) 94 % (01/06/24 1257) Vital Signs (24h Range):  Temp:  [97.7 °F (36.5 °C)-98.2 °F (36.8 °C)] 98.2 °F (36.8 °C)  Pulse:  [54-88] 77  Resp:  [18-28] 20  SpO2:  [88 %-100 %] 94 %  BP: (101-163)/(61-83) 133/74     Weight: 75.3 kg (166 lb 0.1 oz)  Body mass index is 30.36 kg/m².     Physical Exam  Constitutional:       General: She is not in acute distress.  HENT:      Head: Normocephalic and atraumatic.      Nose: Nose normal.      Mouth/Throat:      Mouth: Mucous membranes are moist.      Pharynx: Oropharynx is clear.   Eyes:      Extraocular Movements: Extraocular movements intact.      Conjunctiva/sclera: Conjunctivae normal.      Pupils: Pupils are equal, round, and reactive to light.   Cardiovascular:      Rate and Rhythm: Normal rate and regular rhythm.      Pulses: Normal pulses.      Heart sounds: Normal heart sounds.   Pulmonary:      Effort: Respiratory distress (Mild dyspnea with talking) present.      Breath sounds: Decreased air movement (Throughout) present.      Comments: Increased tubular breath sounds.  Wearing O2 via NC  Abdominal:      General: Abdomen is flat.  Bowel sounds are normal. There is no distension.      Palpations: Abdomen is soft.      Tenderness: There is no abdominal tenderness. There is no guarding.   Musculoskeletal:         General: No swelling or tenderness. Normal range of motion.      Cervical back: Normal range of motion and neck supple. No tenderness.      Right lower leg: No edema.      Left lower leg: No edema.   Skin:     General: Skin is warm and dry.      Capillary Refill: Capillary refill takes less than 2 seconds.      Findings: No lesion.   Neurological:      General: No focal deficit present.      Mental Status: She is alert and oriented to person, place, and time.   Psychiatric:         Mood and Affect: Mood is anxious.         Behavior: Behavior normal.         Thought Content: Thought content normal.         Judgment: Judgment normal.              CRANIAL NERVES     CN III, IV, VI   Pupils are equal, round, and reactive to light.       Significant Labs: All pertinent labs within the past 24 hours have been reviewed.  CBC:   Recent Labs   Lab 01/06/24  0501   WBC 2.58*   HGB 13.6   HCT 41.4   *     CMP:   Recent Labs   Lab 01/06/24  0501      K 3.4*      CO2 24   GLU 95   BUN 12   CREATININE 0.8   CALCIUM 8.9   PROT 7.0   ALBUMIN 3.2*   BILITOT 0.3   ALKPHOS 75   AST 37   ALT 21   ANIONGAP 11     nfluenza A, Molecular    1/3/24 Positive Panic      X-Ray Chest AP Portable  Narrative: EXAMINATION:  XR CHEST AP PORTABLE    CLINICAL HISTORY:  Hypoxemia    TECHNIQUE:  Single frontal view of the chest was performed.    COMPARISON:  Chest x-ray of January 3, 2024    FINDINGS:  There is mild cardiomegaly.  An intrapulmonary infiltrate or pulmonary edema is not seen.  There remains a 1.8 cm soft tissue density nodule in the left upper lobe and of CT of the chest is again recommended to rule out or in a coin lesion.  The rest the lung fields are clear.  No pneumothorax seen.  Impression: Mild cardiomegaly.  1.8 cm soft tissue  density nodule of the left upper lobe.  This may represent an intrapulmonary coin lesion and CT of the chest is recommended.    Electronically signed by: Bill Dejesus MD  Date:    01/06/2024  Time:    08:21      Significant Imaging: I have reviewed all pertinent imaging results/findings within the past 24 hours.  Assessment/Plan:     * COPD exacerbation  Patient's COPD is with exacerbation noted by continued dyspnea, use of accessory muscles for breathing, and worsening of baseline hypoxia currently.  Patient is currently on COPD Pathway. Continue scheduled inhalers Antibiotics and Supplemental oxygen and monitor respiratory status closely.   -----------------------------------------------------------------------------------------------  Did not tolerate azithromycin.  Allergic to Cipro and doxycycline.  Continue IV Zosyn and nebulizers  Add Pulmicort b.i.d.  Continue supplemental O2, weaning as tolerated  No steroids due to intolerance  Change nebulizer to Xopenex due to not tolerating albuterol  Start Mucinex b.i.d.  Monitor    Influenza A  Continue Tamiflu b.i.d.  Maintain isolation precautions      Hypokalemia  Patient has hypokalemia which is Acute and currently uncontrolled. Most recent potassium levels reviewed-   Lab Results   Component Value Date    K 3.4 (L) 01/06/2024   . Will continue potassium replacement per protocol and recheck repeat levels after replacement completed.     Acquired hypothyroidism  Patient has chronic hypothyroidism. TFTs reviewed- Shows subclinical hypothyroidism    Lab Results   Component Value Date    TSH 0.193 (L) 10/18/2023    FREET4 1.40 10/18/2023      Will continue chronic levothyroxine and adjust for and clinical changes.          Essential hypertension  Chronic, controlled. Latest blood pressure and vitals reviewed-     Temp:  [97.7 °F (36.5 °C)-98.2 °F (36.8 °C)]   Pulse:  [54-88]   Resp:  [18-28]   BP: (101-163)/(61-83)   SpO2:  [88 %-100 %] .   Home meds for  hypertension were reviewed and noted below.   Hypertension Medications               amLODIPine (NORVASC) 5 MG tablet Take 1 tablet (5 mg total) by mouth once daily. For blood pressure    metoprolol tartrate (LOPRESSOR) 25 MG tablet Take 1 tablet (25 mg total) by mouth 2 (two) times daily.            While in the hospital, will manage blood pressure as follows; Continue home antihypertensive regimen    Will utilize p.r.n. blood pressure medication only if patient's blood pressure greater than 180/110 and she develops symptoms such as worsening chest pain or shortness of breath.    Anxiety  Chronic/currently uncontrolled, possibly due to albuterol nebulizer treatments  Continue home Xanax PRN  Monitor      Tobacco abuse  Quit <1 month ago  Encouraged to continue smoking cessation        VTE Risk Mitigation (From admission, onward)           Ordered     IP VTE HIGH RISK PATIENT  Once         01/06/24 1412     Reason for No Pharmacological VTE Prophylaxis  Once        Question:  Reasons:  Answer:  Thrombocytopenia    01/06/24 1412     Place sequential compression device  Until discontinued         01/06/24 0629                         On 01/06/2024, patient should be placed in hospital observation services under my care in collaboration with [facility].           Mary Dooley NP  Department of Hospital Medicine  [facility]/Surg

## 2024-01-06 NOTE — ED NOTES
Report called Taj MORRIS on 3rd floor. Pt transporting via stretcher to [phone] on tele [phone] with O2 @[facility].

## 2024-01-06 NOTE — PLAN OF CARE
01/06/24 0936   GILBERT Message   Medicare Outpatient and Observation Notification regarding financial responsibility Given to patient/caregiver;Signed/date by patient/caregiver;Explained to patient/caregiver   Date GILBERT was signed 01/06/24   Time GILBERT was signed 0936

## 2024-01-06 NOTE — SUBJECTIVE & OBJECTIVE
Past Medical History:   Diagnosis Date    Anxiety     Martin esophagus     Colitis     COPD (chronic obstructive pulmonary disease)     GERD (gastroesophageal reflux disease)     Hypertension     Thyroid disease     Vocal cord polyps        Past Surgical History:   Procedure Laterality Date    ABDOMINAL AORTIC ANEURYSM REPAIR      BACK SURGERY      cervical    CATARACT EXTRACTION Right 02/2021    CHOLECYSTECTOMY  12/20/2013    Dr Price  [facility]    FOOT SURGERY      HYSTERECTOMY  1979    AUGUSTINE for AUB with consevation ovaries    SALIVARY GLAND SURGERY         Review of patient's allergies indicates:   Allergen Reactions    Bisacodyl Nausea And Vomiting     Other reaction(s): Vomiting    Azithromycin Hives    Cipro [ciprofloxacin hcl]     Demerol [meperidine]      Nausea vomiting, visual problem    Doxycycline Hives    Flonase [fluticasone propionate] Hives    Iodinated contrast media Hives     hypotension    Morphine     Morpholine analogues Other (See Comments)     hypotension       No current facility-administered medications on file prior to encounter.     Current Outpatient Medications on File Prior to Encounter   Medication Sig    albuterol-ipratropium (DUO-NEB) 2.5 mg-0.5 mg/3 mL nebulizer solution Take 3 mLs by nebulization every 6 (six) hours as needed for Wheezing. Rescue    ALPRAZolam (XANAX) 0.25 MG tablet Take 1 tablet (0.25 mg total) by mouth 2 (two) times daily as needed for Anxiety.    aluminum-magnesium hydroxide-simethicone (MAALOX) 200-200-20 mg/5 mL Susp Take 30 mLs by mouth once as needed.    amLODIPine (NORVASC) 5 MG tablet Take 1 tablet (5 mg total) by mouth once daily. For blood pressure    amoxicillin-clavulanate 875-125mg (AUGMENTIN) 875-125 mg per tablet Take 1 tablet by mouth 2 (two) times daily. (Patient not taking: Reported on 12/13/2023)    cetirizine (ZYRTEC) 10 MG tablet Take 1 tablet (10 mg total) by mouth once daily. (Patient not taking: Reported on 12/13/2023)    clobetasoL (TEMOVATE)  0.05 % cream Apply topically 2 (two) times daily. (Patient not taking: Reported on 12/13/2023)    cloNIDine (CATAPRES) 0.1 MG tablet Take one tablet as needed up to 3 times a day if blood pressure is greater than 170/110 (Patient taking differently: Take 0.1 mg by mouth every 8 (eight) hours as needed. Take one tablet as needed up to 3 times a day if blood pressure is greater than 170/110)    EPINEPHrine (EPIPEN) 0.3 mg/0.3 mL AtIn Inject 0.3 mLs (0.3 mg total) into the muscle as needed (Severe allergic reaction symptoms such as shortness of breath, tongue or throat swelling, weakness dizziness severe nausea vomiting).    estradioL (VAGIFEM) 10 mcg Tab Place 1 tablet (10 mcg total) vaginally twice a week. Start with one tablet per vagina q.h.s. x7 than one tablet twice weekly    famotidine (PEPCID) 20 MG tablet Take 1 tablet (20 mg total) by mouth every evening.    fluconazole (DIFLUCAN) 100 MG tablet Take 1 tablet (100 mg total) by mouth once daily.    hydroCHLOROthiazide (HYDRODIURIL) 25 MG tablet Take 1 tablet (25 mg total) by mouth daily as needed (fluid). (Patient not taking: Reported on 12/13/2023)    HYDROcodone-acetaminophen (NORCO)  mg per tablet Take 1 tablet by mouth every 12 (twelve) hours as needed for Pain.    hydrOXYzine HCL (ATARAX) 25 MG tablet Take 1 tablet (25 mg total) by mouth 2 (two) times daily as needed for Itching. (Patient not taking: Reported on 12/13/2023)    levalbuterol (XOPENEX HFA) 45 mcg/actuation inhaler Inhale 2 puffs into the lungs every 6 (six) hours as needed for Wheezing. Rescue    levocetirizine (XYZAL) 5 MG tablet Take 5 mg by mouth every evening.    levothyroxine (SYNTHROID) 88 MCG tablet Take 1 tablet (88 mcg total) by mouth before breakfast.    losartan (COZAAR) 100 MG tablet Take 1 tablet (100 mg total) by mouth once daily. (Patient not taking: Reported on 12/13/2023)    metoprolol tartrate (LOPRESSOR) 25 MG tablet Take 1 tablet (25 mg total) by mouth 2 (two) times  daily.    nirmatrelvir-ritonavir (PAXLOVID) 300 mg (150 mg x 2)-100 mg copackaged tablets (EUA) Take 3 tablets by mouth 2 (two) times daily. Each dose contains 2 nirmatrelvir (pink tablets) and 1 ritonavir (white tablet). Take all 3 tablets together (Patient not taking: Reported on 12/13/2023)    omeprazole (PRILOSEC) 40 MG capsule Take 1 capsule (40 mg total) by mouth every morning.    ondansetron (ZOFRAN) 4 MG tablet Take 1 tablet (4 mg total) by mouth every 8 (eight) hours as needed for Nausea.    oseltamivir (TAMIFLU) 75 MG capsule Take 1 capsule (75 mg total) by mouth 2 (two) times daily. for 5 days    predniSONE (DELTASONE) 20 MG tablet Take 1 tablet (20 mg total) by mouth 2 (two) times daily. (Patient not taking: Reported on 12/13/2023)    REFRESH OPTIVE 0.5-0.9 % Drop Place 1 drop into both eyes 4 (four) times daily.    simethicone (GAS-X ORAL) Take 1 tablet by mouth as needed.     Family History       Problem Relation (Age of Onset)    Cancer Mother, Sister    Emphysema Sister    Heart failure Father    No Known Problems Brother          Tobacco Use    Smoking status: Every Day     Current packs/day: 0.50     Types: Cigarettes    Smokeless tobacco: Former     Quit date: 5/1/2016    Tobacco comments:     Pt has smoked since 14 years old. Smokes around .5ppd. Inpatient smoking education done 10/20.   Substance and Sexual Activity    Alcohol use: Never    Drug use: Never    Sexual activity: Yes     Partners: Male     Review of Systems   Constitutional:  Positive for activity change. Negative for chills and fatigue.   HENT:  Positive for voice change (Hoarseness). Negative for congestion, sinus pressure and trouble swallowing.    Respiratory:  Positive for cough and shortness of breath. Negative for chest tightness.    Cardiovascular:  Negative for chest pain, palpitations and leg swelling.   Gastrointestinal:  Negative for abdominal pain, constipation, diarrhea, nausea and vomiting.   Genitourinary:  Negative  for decreased urine volume, dysuria and hematuria.   Musculoskeletal:  Negative for arthralgias, back pain, gait problem and myalgias.   Skin:  Negative for color change and wound.   Neurological:  Negative for dizziness, speech difficulty, weakness and numbness.   Psychiatric/Behavioral:  Negative for agitation, confusion, hallucinations and sleep disturbance. The patient is nervous/anxious.         Jittery     Objective:     Vital Signs (Most Recent):  Temp: 98.2 °F (36.8 °C) (01/06/24 1257)  Pulse: 77 (01/06/24 1257)  Resp: 20 (01/06/24 1257)  BP: 133/74 (01/06/24 1257)  SpO2: (!) 94 % (01/06/24 1257) Vital Signs (24h Range):  Temp:  [97.7 °F (36.5 °C)-98.2 °F (36.8 °C)] 98.2 °F (36.8 °C)  Pulse:  [54-88] 77  Resp:  [18-28] 20  SpO2:  [88 %-100 %] 94 %  BP: (101-163)/(61-83) 133/74     Weight: 75.3 kg (166 lb 0.1 oz)  Body mass index is 30.36 kg/m².     Physical Exam  Constitutional:       General: She is not in acute distress.  HENT:      Head: Normocephalic and atraumatic.      Nose: Nose normal.      Mouth/Throat:      Mouth: Mucous membranes are moist.      Pharynx: Oropharynx is clear.   Eyes:      Extraocular Movements: Extraocular movements intact.      Conjunctiva/sclera: Conjunctivae normal.      Pupils: Pupils are equal, round, and reactive to light.   Cardiovascular:      Rate and Rhythm: Normal rate and regular rhythm.      Pulses: Normal pulses.      Heart sounds: Normal heart sounds.   Pulmonary:      Effort: Respiratory distress (Mild dyspnea with talking) present.      Breath sounds: Decreased air movement (Throughout) present.      Comments: Increased tubular breath sounds.  Wearing O2 via NC  Abdominal:      General: Abdomen is flat. Bowel sounds are normal. There is no distension.      Palpations: Abdomen is soft.      Tenderness: There is no abdominal tenderness. There is no guarding.   Musculoskeletal:         General: No swelling or tenderness. Normal range of motion.      Cervical back:  Normal range of motion and neck supple. No tenderness.      Right lower leg: No edema.      Left lower leg: No edema.   Skin:     General: Skin is warm and dry.      Capillary Refill: Capillary refill takes less than 2 seconds.      Findings: No lesion.   Neurological:      General: No focal deficit present.      Mental Status: She is alert and oriented to person, place, and time.   Psychiatric:         Mood and Affect: Mood is anxious.         Behavior: Behavior normal.         Thought Content: Thought content normal.         Judgment: Judgment normal.              CRANIAL NERVES     CN III, IV, VI   Pupils are equal, round, and reactive to light.       Significant Labs: All pertinent labs within the past 24 hours have been reviewed.  CBC:   Recent Labs   Lab 01/06/24  0501   WBC 2.58*   HGB 13.6   HCT 41.4   *     CMP:   Recent Labs   Lab 01/06/24  0501      K 3.4*      CO2 24   GLU 95   BUN 12   CREATININE 0.8   CALCIUM 8.9   PROT 7.0   ALBUMIN 3.2*   BILITOT 0.3   ALKPHOS 75   AST 37   ALT 21   ANIONGAP 11     nfluenza A, Molecular    1/3/24 Positive Panic      X-Ray Chest AP Portable  Narrative: EXAMINATION:  XR CHEST AP PORTABLE    CLINICAL HISTORY:  Hypoxemia    TECHNIQUE:  Single frontal view of the chest was performed.    COMPARISON:  Chest x-ray of January 3, 2024    FINDINGS:  There is mild cardiomegaly.  An intrapulmonary infiltrate or pulmonary edema is not seen.  There remains a 1.8 cm soft tissue density nodule in the left upper lobe and of CT of the chest is again recommended to rule out or in a coin lesion.  The rest the lung fields are clear.  No pneumothorax seen.  Impression: Mild cardiomegaly.  1.8 cm soft tissue density nodule of the left upper lobe.  This may represent an intrapulmonary coin lesion and CT of the chest is recommended.    Electronically signed by: Bill Dejesus MD  Date:    01/06/2024  Time:    08:21      Significant Imaging: I have reviewed all pertinent  imaging results/findings within the past 24 hours.

## 2024-01-06 NOTE — ASSESSMENT & PLAN NOTE
Chronic/currently uncontrolled, possibly due to albuterol nebulizer treatments  Continue home Xanax PRN  Monitor

## 2024-01-06 NOTE — PLAN OF CARE
Plan of care reviewed with patient. Patient verbalized complete understanding. Afebrile throughout shift. Antibiotics administered as scheduled. Isolation precautions maintained. All fall precautions maintained. Bed in lowest position, locked, call light within reach. Side rails up x's 2. Slip resistant socks maintained.

## 2024-01-07 PROBLEM — J96.01 ACUTE HYPOXIC RESPIRATORY FAILURE: Status: ACTIVE | Noted: 2024-01-07

## 2024-01-07 PROBLEM — R91.1 COIN LESION OF LUNG: Status: ACTIVE | Noted: 2024-01-07

## 2024-01-07 PROBLEM — R91.8 MASS OF UPPER LOBE OF LEFT LUNG: Status: ACTIVE | Noted: 2024-01-07

## 2024-01-07 LAB
ANION GAP SERPL CALC-SCNC: 10 MMOL/L (ref 8–16)
BASOPHILS NFR BLD: 0 % (ref 0–1.9)
BUN SERPL-MCNC: 9 MG/DL (ref 8–23)
CALCIUM SERPL-MCNC: 8.3 MG/DL (ref 8.7–10.5)
CHLORIDE SERPL-SCNC: 108 MMOL/L (ref 95–110)
CO2 SERPL-SCNC: 25 MMOL/L (ref 23–29)
CREAT SERPL-MCNC: 0.8 MG/DL (ref 0.5–1.4)
DIFFERENTIAL METHOD BLD: ABNORMAL
EOSINOPHIL NFR BLD: 0 % (ref 0–8)
ERYTHROCYTE [DISTWIDTH] IN BLOOD BY AUTOMATED COUNT: 13.7 % (ref 11.5–14.5)
EST. GFR  (NO RACE VARIABLE): >60 ML/MIN/1.73 M^2
GLUCOSE SERPL-MCNC: 87 MG/DL (ref 70–110)
HCT VFR BLD AUTO: 39 % (ref 37–48.5)
HGB BLD-MCNC: 12.9 G/DL (ref 12–16)
IMM GRANULOCYTES # BLD AUTO: ABNORMAL K/UL
IMM GRANULOCYTES NFR BLD AUTO: ABNORMAL %
LYMPHOCYTES NFR BLD: 50 % (ref 18–48)
MAGNESIUM SERPL-MCNC: 1.9 MG/DL (ref 1.6–2.6)
MCH RBC QN AUTO: 31.7 PG (ref 27–31)
MCHC RBC AUTO-ENTMCNC: 33.1 G/DL (ref 32–36)
MCV RBC AUTO: 96 FL (ref 82–98)
MONOCYTES NFR BLD: 11 % (ref 4–15)
NEUTROPHILS NFR BLD: 39 % (ref 38–73)
NRBC BLD-RTO: 0 /100 WBC
PHOSPHATE SERPL-MCNC: 2.6 MG/DL (ref 2.7–4.5)
PLATELET # BLD AUTO: 107 K/UL (ref 150–450)
PLATELET BLD QL SMEAR: ABNORMAL
PMV BLD AUTO: 9.9 FL (ref 9.2–12.9)
POTASSIUM SERPL-SCNC: 3.7 MMOL/L (ref 3.5–5.1)
PROCALCITONIN SERPL IA-MCNC: 0.04 NG/ML
RBC # BLD AUTO: 4.07 M/UL (ref 4–5.4)
SODIUM SERPL-SCNC: 143 MMOL/L (ref 136–145)
WBC # BLD AUTO: 2.69 K/UL (ref 3.9–12.7)

## 2024-01-07 PROCEDURE — 80048 BASIC METABOLIC PNL TOTAL CA: CPT

## 2024-01-07 PROCEDURE — 25000242 PHARM REV CODE 250 ALT 637 W/ HCPCS: Performed by: INTERNAL MEDICINE

## 2024-01-07 PROCEDURE — 27000221 HC OXYGEN, UP TO 24 HOURS

## 2024-01-07 PROCEDURE — 96376 TX/PRO/DX INJ SAME DRUG ADON: CPT

## 2024-01-07 PROCEDURE — 63600175 PHARM REV CODE 636 W HCPCS

## 2024-01-07 PROCEDURE — 83735 ASSAY OF MAGNESIUM: CPT

## 2024-01-07 PROCEDURE — 85007 BL SMEAR W/DIFF WBC COUNT: CPT

## 2024-01-07 PROCEDURE — 97530 THERAPEUTIC ACTIVITIES: CPT

## 2024-01-07 PROCEDURE — G0378 HOSPITAL OBSERVATION PER HR: HCPCS

## 2024-01-07 PROCEDURE — 25000003 PHARM REV CODE 250: Performed by: NURSE PRACTITIONER

## 2024-01-07 PROCEDURE — 36415 COLL VENOUS BLD VENIPUNCTURE: CPT

## 2024-01-07 PROCEDURE — 85027 COMPLETE CBC AUTOMATED: CPT

## 2024-01-07 PROCEDURE — 25000003 PHARM REV CODE 250

## 2024-01-07 PROCEDURE — 63600175 PHARM REV CODE 636 W HCPCS: Performed by: NURSE PRACTITIONER

## 2024-01-07 PROCEDURE — 96366 THER/PROPH/DIAG IV INF ADDON: CPT

## 2024-01-07 PROCEDURE — 84145 PROCALCITONIN (PCT): CPT | Performed by: NURSE PRACTITIONER

## 2024-01-07 PROCEDURE — 25000242 PHARM REV CODE 250 ALT 637 W/ HCPCS: Performed by: NURSE PRACTITIONER

## 2024-01-07 PROCEDURE — 25000003 PHARM REV CODE 250: Performed by: EMERGENCY MEDICINE

## 2024-01-07 PROCEDURE — 36415 COLL VENOUS BLD VENIPUNCTURE: CPT | Performed by: NURSE PRACTITIONER

## 2024-01-07 PROCEDURE — 97161 PT EVAL LOW COMPLEX 20 MIN: CPT

## 2024-01-07 PROCEDURE — 63700000 PHARM REV CODE 250 ALT 637 W/O HCPCS: Performed by: NURSE PRACTITIONER

## 2024-01-07 PROCEDURE — 25000242 PHARM REV CODE 250 ALT 637 W/ HCPCS: Performed by: HOSPITALIST

## 2024-01-07 PROCEDURE — 94761 N-INVAS EAR/PLS OXIMETRY MLT: CPT

## 2024-01-07 PROCEDURE — 99204 OFFICE O/P NEW MOD 45 MIN: CPT | Mod: ,,, | Performed by: INTERNAL MEDICINE

## 2024-01-07 PROCEDURE — 84100 ASSAY OF PHOSPHORUS: CPT

## 2024-01-07 PROCEDURE — 94640 AIRWAY INHALATION TREATMENT: CPT | Mod: XB

## 2024-01-07 RX ORDER — IPRATROPIUM BROMIDE 0.5 MG/2.5ML
0.5 SOLUTION RESPIRATORY (INHALATION) EVERY 6 HOURS
Status: DISCONTINUED | OUTPATIENT
Start: 2024-01-07 | End: 2024-01-08 | Stop reason: HOSPADM

## 2024-01-07 RX ORDER — SODIUM,POTASSIUM PHOSPHATES 280-250MG
2 POWDER IN PACKET (EA) ORAL
Status: DISCONTINUED | OUTPATIENT
Start: 2024-01-07 | End: 2024-01-08 | Stop reason: HOSPADM

## 2024-01-07 RX ADMIN — METOPROLOL TARTRATE 25 MG: 25 TABLET, FILM COATED ORAL at 08:01

## 2024-01-07 RX ADMIN — POTASSIUM & SODIUM PHOSPHATES POWDER PACK 280-160-250 MG 2 PACKET: 280-160-250 PACK at 08:01

## 2024-01-07 RX ADMIN — AMLODIPINE BESYLATE 5 MG: 5 TABLET ORAL at 08:01

## 2024-01-07 RX ADMIN — LEVALBUTEROL 0.63 MG: 0.63 SOLUTION RESPIRATORY (INHALATION) at 08:01

## 2024-01-07 RX ADMIN — LEVALBUTEROL 0.63 MG: 0.63 SOLUTION RESPIRATORY (INHALATION) at 12:01

## 2024-01-07 RX ADMIN — LEVALBUTEROL 0.63 MG: 0.63 SOLUTION RESPIRATORY (INHALATION) at 07:01

## 2024-01-07 RX ADMIN — OSELTAMIVIR PHOSPHATE 75 MG: 75 CAPSULE ORAL at 08:01

## 2024-01-07 RX ADMIN — FLUCONAZOLE 100 MG: 100 TABLET ORAL at 08:01

## 2024-01-07 RX ADMIN — CETIRIZINE HYDROCHLORIDE 5 MG: 5 TABLET, FILM COATED ORAL at 08:01

## 2024-01-07 RX ADMIN — ONDANSETRON 4 MG: 2 INJECTION INTRAMUSCULAR; INTRAVENOUS at 11:01

## 2024-01-07 RX ADMIN — PANTOPRAZOLE SODIUM 40 MG: 40 TABLET, DELAYED RELEASE ORAL at 08:01

## 2024-01-07 RX ADMIN — ACETAMINOPHEN 650 MG: 325 TABLET ORAL at 05:01

## 2024-01-07 RX ADMIN — GUAIFENESIN 600 MG: 600 TABLET, EXTENDED RELEASE ORAL at 08:01

## 2024-01-07 RX ADMIN — ARFORMOTEROL TARTRATE 15 MCG: 15 SOLUTION RESPIRATORY (INHALATION) at 07:01

## 2024-01-07 RX ADMIN — LACTOBACILLUS TAB 2 TABLET: TAB at 08:01

## 2024-01-07 RX ADMIN — PIPERACILLIN AND TAZOBACTAM 4.5 G: 4; .5 INJECTION, POWDER, LYOPHILIZED, FOR SOLUTION INTRAVENOUS; PARENTERAL at 05:01

## 2024-01-07 RX ADMIN — LEVALBUTEROL 0.63 MG: 0.63 SOLUTION RESPIRATORY (INHALATION) at 01:01

## 2024-01-07 RX ADMIN — ACETAMINOPHEN 650 MG: 325 TABLET ORAL at 04:01

## 2024-01-07 RX ADMIN — FAMOTIDINE 20 MG: 20 TABLET, FILM COATED ORAL at 08:01

## 2024-01-07 RX ADMIN — LEVOTHYROXINE SODIUM 88 MCG: 0.09 TABLET ORAL at 05:01

## 2024-01-07 RX ADMIN — IPRATROPIUM BROMIDE 0.5 MG: 0.5 SOLUTION RESPIRATORY (INHALATION) at 08:01

## 2024-01-07 RX ADMIN — PIPERACILLIN AND TAZOBACTAM 4.5 G: 4; .5 INJECTION, POWDER, LYOPHILIZED, FOR SOLUTION INTRAVENOUS; PARENTERAL at 01:01

## 2024-01-07 NOTE — CARE UPDATE
01/07/24 0721   Patient Assessment/Suction   Level of Consciousness (AVPU) alert   Respiratory Effort Normal   Expansion/Accessory Muscles/Retractions expansion symmetric   ELOY Breath Sounds coarse   LLL Breath Sounds wheezes, expiratory;diminished   RUL Breath Sounds coarse   RML Breath Sounds coarse   RLL Breath Sounds wheezes, expiratory;diminished   Rhythm/Pattern, Respiratory pattern regular   Cough Frequency frequent   Cough Type good;congested;nonproductive   PRE-TX-O2   Device (Oxygen Therapy) nasal cannula   $ Is the patient on Low Flow Oxygen? Yes   Flow (L/min) 2   SpO2 98 %   Pulse Oximetry Type Intermittent   $ Pulse Oximetry - Multiple Charge Pulse Oximetry - Multiple   Pulse 72   Resp 18   Aerosol Therapy   $ Aerosol Therapy Charges Aerosol Treatment   Daily Review of Necessity (SVN) completed   Respiratory Treatment Status (SVN) given   Treatment Route (SVN) mask   Patient Position (SVN) semi-Feliciano's   Signs of Intolerance (SVN) none   Breath Sounds Post-Respiratory Treatment   Throughout All Fields Post-Treatment aeration increased;wheezes, expiratory   Post-treatment Heart Rate (beats/min) 71   Post-treatment Resp Rate (breaths/min) 18

## 2024-01-07 NOTE — SUBJECTIVE & OBJECTIVE
Interval History:  Sitting up in bed, awake and alert.  Breathing is better today, but still gets significant shortness of breath with exertion and requiring supplemental O2.  Feels overall weaker today. Tolerating her prescribed meds without side effects.  We will get chest CT to further evaluate coin lesion.      Review of Systems   Constitutional:  Positive for fatigue. Negative for activity change and chills.   HENT:  Positive for voice change (Reports hoarseness started about 2 days ago).    Respiratory:  Positive for cough, chest tightness and shortness of breath.         Still gets short of breath with minimal exertion   Cardiovascular:  Negative for chest pain, palpitations and leg swelling.   Gastrointestinal:  Negative for abdominal pain, constipation, diarrhea, nausea and vomiting.   Genitourinary:  Negative for decreased urine volume, dysuria and hematuria.   Musculoskeletal:  Negative for arthralgias, back pain, gait problem and myalgias.   Skin:  Negative for color change and wound.   Neurological:  Positive for weakness (Generalized). Negative for dizziness, speech difficulty and numbness.   Psychiatric/Behavioral:  Negative for agitation, confusion, hallucinations and sleep disturbance.      Objective:     Vital Signs (Most Recent):  Temp: 97.6 °F (36.4 °C) (01/07/24 0344)  Pulse: 72 (01/07/24 0721)  Resp: 18 (01/07/24 0721)  BP: 121/72 (01/07/24 0344)  SpO2: 98 % (01/07/24 0721) Vital Signs (24h Range):  Temp:  [97.4 °F (36.3 °C)-98.2 °F (36.8 °C)] 97.6 °F (36.4 °C)  Pulse:  [53-82] 72  Resp:  [16-20] 18  SpO2:  [93 %-100 %] 98 %  BP: (113-133)/(72-82) 121/72     Weight: 75.3 kg (166 lb 0.1 oz)  Body mass index is 30.36 kg/m².    Intake/Output Summary (Last 24 hours) at 1/7/2024 1010  Last data filed at 1/7/2024 0620  Gross per 24 hour   Intake 400 ml   Output 500 ml   Net -100 ml         Physical Exam  Constitutional:       General: She is not in acute distress.  HENT:      Head: Normocephalic and  atraumatic.      Nose: Nose normal.      Mouth/Throat:      Mouth: Mucous membranes are moist.      Pharynx: Oropharynx is clear.   Eyes:      Extraocular Movements: Extraocular movements intact.      Conjunctiva/sclera: Conjunctivae normal.      Pupils: Pupils are equal, round, and reactive to light.   Cardiovascular:      Rate and Rhythm: Normal rate and regular rhythm.      Pulses: Normal pulses.      Heart sounds: Normal heart sounds.   Pulmonary:      Effort: Pulmonary effort is normal. No respiratory distress.      Breath sounds: Wheezing (Bilateral end-expiratory wheezes) present.      Comments: Better air movement today.  Wearing supplemental O2 via NC.  Abdominal:      General: Abdomen is flat. Bowel sounds are normal. There is no distension.      Palpations: Abdomen is soft.      Tenderness: There is no abdominal tenderness. There is no guarding.   Musculoskeletal:         General: No swelling or tenderness. Normal range of motion.      Cervical back: Normal range of motion and neck supple. No tenderness.      Right lower leg: No edema.      Left lower leg: No edema.   Skin:     General: Skin is warm and dry.      Capillary Refill: Capillary refill takes less than 2 seconds.      Findings: No lesion.   Neurological:      General: No focal deficit present.      Mental Status: She is alert and oriented to person, place, and time.   Psychiatric:         Mood and Affect: Mood normal.         Behavior: Behavior normal.         Thought Content: Thought content normal.         Judgment: Judgment normal.             Significant Labs: All pertinent labs within the past 24 hours have been reviewed.  CBC:   Recent Labs   Lab 01/06/24  0501 01/07/24  0442   WBC 2.58* 2.69*   HGB 13.6 12.9   HCT 41.4 39.0   * 107*     CMP:   Recent Labs   Lab 01/06/24  0501 01/07/24  0442    143   K 3.4* 3.7    108   CO2 24 25   GLU 95 87   BUN 12 9   CREATININE 0.8 0.8   CALCIUM 8.9 8.3*   PROT 7.0  --    ALBUMIN  3.2*  --    BILITOT 0.3  --    ALKPHOS 75  --    AST 37  --    ALT 21  --    ANIONGAP 11 10     CT Chest Without Contrast  Narrative: EXAMINATION:  CT CHEST WITHOUT CONTRAST    CLINICAL HISTORY:  Abnormal xray - lung nodule, < 1 cm, mod-high risk;Soft tissue mass, chest, US/xray nondiagnostic;1.8 cm soft tissue density nodule of the left upper lobe.;    TECHNIQUE:  Low dose axial images, sagittal and coronal reformations were obtained from the thoracic inlet to the lung bases. Contrast was not administered.    COMPARISON:  CTA chest of June 22, 2023    FINDINGS:  The heart and great vessels are of normal size and contour.  Enlargement or aneurysm is not seen.  A right pretracheal lymph node has a short axis of 7 mm which is within limits.  Adenopathy is not demonstrated.    There is a 1.8 cm mildly spiculated soft tissue density mass of the left upper lobe peripherally best seen series 4 image 155.  In retrospect this measured 3 point 2 mm on the prior CTA of June 22, 2023.  This is consistent with a lung neoplasm.  Other intrapulmonary masses are not seen.  There is bronchiectasis and atelectasis identified in the right lower lung field.  Strands of atelectasis are seen at both lung bases.  Another soft tissue density mass in the lung fields is not seen.  There is atelectasis of the left lower lobe around the ectatic aorta.    The adrenal glands are not enlarged.  A water density cyst is noted of the right lobe of the liver measuring 3.2 cm.  Impression: A 1.8 cm mildly spiculated mass is identified in the left upper lobe corresponding to the chest x-ray findings.  This is consistent with a lung neoplasm.  This should be accessible to percutaneous biopsy.  There is bronchiectasis and atelectasis identified in the posterior segment of the right lower lobe.  There is additional atelectasis adjacent to the ectatic descending thoracic aorta.  A stable cyst of the right lobe of the liver is noted.    This report was  flagged in Epic as abnormal.    Electronically signed by: Bill Dejesus MD  Date:    01/07/2024  Time:    12:43      Significant Imaging: I have reviewed all pertinent imaging results/findings within the past 24 hours.

## 2024-01-07 NOTE — ASSESSMENT & PLAN NOTE
She reports this morning that she quit about 1 week ago  Encouraged to continue smoking cessation

## 2024-01-07 NOTE — PLAN OF CARE
Plan of care reviewed with patient. Patient verbalized complete understanding. Afebrile throughout shift. Antibiotics administered as scheduled. Isolation precautions maintained. Currently on 2L oxygen nasal cannula. All fall precautions maintained. Bed in lowest position, locked, call light within reach. Side rails up x's 2. Slip resistant socks maintained.

## 2024-01-07 NOTE — ASSESSMENT & PLAN NOTE
Patient with Hypoxic Respiratory failure which is Acute.  she is not on home oxygen. Supplemental oxygen was provided and noted- Oxygen Concentration (%):  [28] 28    .   Signs/symptoms of respiratory failure include- tachypnea, increased work of breathing, respiratory distress, and wheezing. Contributing diagnoses includes - COPD and influenza infection  Labs and images were reviewed. Patient Has not had a recent ABG. Will treat underlying causes and adjust management of respiratory failure as follows-     Due to COPD exacerbation/ See COPD exacerbation A/P  We will need desat study prior to discharge to evaluate for home O2 need

## 2024-01-07 NOTE — ASSESSMENT & PLAN NOTE
Patient's COPD is with exacerbation noted by continued dyspnea, use of accessory muscles for breathing, and worsening of baseline hypoxia currently.  Patient is currently on COPD Pathway. Continue scheduled inhalers Antibiotics and Supplemental oxygen and monitor respiratory status closely.   -----------------------------------------------------------------------------------------------  Did not tolerate azithromycin.  Allergic to Cipro and doxycycline.  Continue IV Zosyn, Xopenex nebulizers (does not tolerate albuterol), Pulmicort b.i.d., and Mucinex b.i.d.  Continue supplemental O2, weaning as tolerated  No steroids due to intolerance  Obtain home nebulizer machine

## 2024-01-07 NOTE — HOSPITAL COURSE
Ms. Jagdish cook was treated with scheduled nebulizers, supplemental O2, and IV Zosyn for her COPD exacerbation.  She was noted to have a coin lesion on her CXR, so a chest CT was obtained which showed a 1.8 cm mildly spiculated mass in the left upper lobe corresponding to the chest x-ray findings and is consistent with a lung neoplasm, so pulmonology was consulted for an evaluation and recommendations.  Her overall condition and and she reports that she is breathing much better today, and she has been discharged to home today in stable condition.  Antibiotics were discontinued per pulmonologist.  She had a desat study done and does not qualify for home O2.  She will continue Tamiflu, for which she already has at home.  She will follow up with pulmonology for her COPD management and the lung mass.  She will continue nebulizers at home.  Attempt was made to get her a home nebulizer treatment while here however insurance required a monthly rental fee, so she will obtain her own nebulizer machine and will use her granddaughters in the meantime.  She did not have any adverse events while here.

## 2024-01-07 NOTE — PLAN OF CARE
Problem: Respiratory Compromise COPD (Chronic Obstructive Pulmonary Disease)  Goal: Effective Oxygenation and Ventilation  Outcome: Ongoing, Progressing     Problem: Infection  Goal: Absence of Infection Signs and Symptoms  1/6/2024 2335 by Staci Rodriguez RN  Outcome: Ongoing, Progressing  1/6/2024 2335 by Staci Rodriguez RN  Outcome: Ongoing, Progressing     Problem: Activity and Energy Impairment (Anxiety Signs/Symptoms)  Goal: Optimized Energy Level (Anxiety Signs/Symptoms)  Outcome: Ongoing, Progressing     Problem: Somatic Disturbance (Anxiety Signs/Symptoms)  Goal: Improved Somatic Symptoms (Anxiety Signs/Symptoms)  Outcome: Ongoing, Progressing     Pt remained stable overnight. Respirations even and unlabored on 2L 02 per n/c with no s/s distress noted. Breathing treatments given scheduled. IVF/antibiotics administered per MAR. Calm and cooperative. Safety and rounding maintained throughout shift.

## 2024-01-07 NOTE — ASSESSMENT & PLAN NOTE
Chronic, controlled. Latest blood pressure and vitals reviewed-     Temp:  [97.4 °F (36.3 °C)-98 °F (36.7 °C)]   Pulse:  [53-82]   Resp:  [16-20]   BP: (113-125)/(72-82)   SpO2:  [93 %-100 %] .   Home meds for hypertension were reviewed and noted below.   Hypertension Medications               amLODIPine (NORVASC) 5 MG tablet Take 1 tablet (5 mg total) by mouth once daily. For blood pressure    metoprolol tartrate (LOPRESSOR) 25 MG tablet Take 1 tablet (25 mg total) by mouth 2 (two) times daily.            While in the hospital, will manage blood pressure as follows; Continue home antihypertensive regimen    Will utilize p.r.n. blood pressure medication only if patient's blood pressure greater than 180/110 and she develops symptoms such as worsening chest pain or shortness of breath.

## 2024-01-07 NOTE — PT/OT/SLP EVAL
Physical Therapy Evaluation    Patient Name:  Joslyn Dubon   MRN:  [id number]    Recommendations:     Discharge Recommendations: Low Intensity Therapy   Discharge Equipment Recommendations: none (has cane and walker (rollator))     Assessment:     Joslyn Dubon is a 73 y.o. female admitted with a medical diagnosis of COPD exacerbation.  She presents with the following impairments/functional limitations: weakness, impaired endurance, impaired balance, gait instability, impaired functional mobility, decreased lower extremity function, impaired cardiopulmonary response to activity  .    Rehab Prognosis: Good; patient would benefit from acute skilled PT services to address these deficits and reach maximum level of function.    Recent Surgery: * No surgery found *      Plan:     During this hospitalization, patient to be seen 6 x/week to address the identified rehab impairments via therapeutic activities, gait training, therapeutic exercises and progress toward the following goals:    Plan of Care Expires:  01/15/24    Subjective     Patient/Family Comments/goals: agrees to work with PT, feels more steady using walker    Living Environment:  House with spouse, 5 steps (with rail) to enter  Prior to admission, patients level of function was independent without AD.  Equipment used at home: none.  DME owned (not currently used):  quad cane, rollator .  Upon discharge, patient will have assistance from family.    Objective:     Communicated with RN (Taj) prior to session.  Patient found HOB elevated with peripheral IV, bed alarm, PureWick, oxygen  upon PT entry to room.    General Precautions: Standard, fall, droplet  Orthopedic Precautions:N/A   Braces: N/A  Respiratory Status: Nasal cannula, flow 2 L/min    Functional Mobility training:  Bed Mobility:     Rolling Left:  stand by assistance  Supine to Sit: stand by assistance  Transfers:     Sit to Stand:  contact guard assistance with rolling walker and x 2  reps  Bed to Chair: contact guard assistance using  Step Transfer (initially declined need for walker)  Gait: 3' x 1 55' x 1 with RW with CGA      AM-PAC 6 CLICK MOBILITY  Total Score:18       Treatment & Education:  Mobility training as above with cues for technique  SEATED: LAQ, ankle DF/PF, x 10 reps each    Patient left up in chair with all lines intact, call button in reach, chair alarm on, RN (Taj) notified, and O2 to wall via nc .    GOALS:   Multidisciplinary Problems       Physical Therapy Goals          Problem: Physical Therapy    Goal Priority Disciplines Outcome Goal Variances Interventions   Physical Therapy Goal     PT, PT/OT Ongoing, Progressing     Description: Goals to be met by: 01/15/2024     Patient will increase functional independence with mobility by performin). Supine to sit with Modified [place]  2). Sit to supine with Modified [place]  3). Sit to stand transfer with Modified [place]  4). Gait  x > 150 feet with Modified [place] using Rolling Walker.                          History:     Past Medical History:   Diagnosis Date    Anxiety     Martin esophagus     Colitis     COPD (chronic obstructive pulmonary disease)     GERD (gastroesophageal reflux disease)     Hypertension     Thyroid disease     Vocal cord polyps        Past Surgical History:   Procedure Laterality Date    ABDOMINAL AORTIC ANEURYSM REPAIR      BACK SURGERY      cervical    CATARACT EXTRACTION Right 2021    CHOLECYSTECTOMY  2013    Dr Price  [facility]    FOOT SURGERY      HYSTERECTOMY  1979    AUGUSTINE for AUB with consevation ovaries    SALIVARY GLAND SURGERY         Time Tracking:     PT Received On: 24  PT Start Time: 1037     PT Stop Time: 1058  PT Total Time (min): 21 min     Billable Minutes: Evaluation 10 and Therapeutic Activity 11      2024

## 2024-01-07 NOTE — CONSULTS
2024      Admit Date: 2024  Joslyn Dubon  New Patient Consult    Chief Complaint   Patient presents with    Shortness of Breath       History of Present Illness:  Pt is a 74 yo female with COPD, HTN, GERD, solorzano esophagus, anxiety and hypothyroidism who presented to the ED with dyspnea. She is being treated for influenza A and COPD exacerbation. Pulmonary is consulted for lung nodule. Pt c/o flu like symptoms onset  with weakness and fatigue. She went to the ED on wed and was sent home with tamiflu and nebulizer treatments. Fri 4am pt woke up unable to breathe so returned to ED. She is feeling slightly improved now but still wheezing and requiring nc oxygen. She is a smoker, 1/2 ppd since age 14. She has had one episode of bronchitis in past w/ wheezing but denies any dx of copd. Since having covid 3 mos ago she has noticed more respiratory difficulties. She is able to be active climbing steps in the stadium [place] [employer]. 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 Her sister had lung disease due to smoking,  from pancreatic cancer. Pt states she had prior nodule on her lung 5 yrs ago.      PFSH:  Past Medical History:   Diagnosis Date    Anxiety     Solorzano esophagus     Colitis     COPD (chronic obstructive pulmonary disease)     GERD (gastroesophageal reflux disease)     Hypertension     Thyroid disease     Vocal cord polyps      Past Surgical History:   Procedure Laterality Date    ABDOMINAL AORTIC ANEURYSM REPAIR      BACK SURGERY      cervical    CATARACT EXTRACTION Right 2021    CHOLECYSTECTOMY  2013    Dr Albert CORLEY    FOOT SURGERY      HYSTERECTOMY      AUGUSTINE for AUB with consevation ovaries    SALIVARY GLAND SURGERY       Social History     Tobacco Use    Smoking status: Every Day     Current packs/day: 0.50     Types: Cigarettes    Smokeless tobacco: Former     Quit date: 2016    Tobacco comments:     Pt has smoked since 14 years old. Smokes around .5ppd.  "Inpatient smoking education done 10/20.   Substance Use Topics    Alcohol use: Never    Drug use: Never     Family History   Problem Relation Age of Onset    Cancer Mother     Heart failure Father     Emphysema Sister     No Known Problems Brother     Cancer Sister      Review of patient's allergies indicates:   Allergen Reactions    Bisacodyl Nausea And Vomiting     Other reaction(s): Vomiting    Azithromycin Hives    Cipro [ciprofloxacin hcl]     Demerol [meperidine]      Nausea vomiting, visual problem    Doxycycline Hives    Flonase [fluticasone propionate] Hives    Iodinated contrast media Hives     hypotension    Morphine     Morpholine analogues Other (See Comments)     hypotension       Performance Status:Performance Status:The patient's activity level is no limits with regular activity.    Review of Systems:  a review of eleven systems covering constitutional, Psych, Eye, HEENT, Respiratory, Cardiac, GI, , Musculoskeletal, Endocrine, Dermatologicwas negative except the above mentioned abnormalities and for any pertinent findings as listed below:  Wt is stable         Exam:Comprehensive exam done. /78   Pulse 60   Temp 98 °F (36.7 °C)   Resp 18   Ht 5' 2" (1.575 m)   Wt 75.3 kg (166 lb 0.1 oz)   SpO2 95%   BMI 30.36 kg/m²   Exam included Vitals as listed, and patient's appearance and affect and alertness and mood, oral exam for yeast and hygiene and pharynx lesions and Mallapatti (M) score, neck with inspection for jvd and masses and thyroid abnormalities and lymph nodes (supraclavicular and infraclavicular nodes also examined and noted if abn), chest exam included symmetry and effort and fremitus and percussion and auscultation, cardiac exam included rhythm and gallops and murmur and rubs and jvd and edema, abdominal exam for mass and hepatosplenomegaly and tenderness and hernias and bowel sounds, Musculoskeletal exam with muscle tone and posture and mobility/gait and  strenght, and skin " "for rashes and cyanosis and pallor and turgor, extremity for clubbing.  Findings were normal except as listed below:  Awake, alert, no distress  Very pleasant and talkative  HR regular  Breath sounds bilateral scattered wheezes  Abd soft nontender  No edema/clubbing    Radiographs reviewed: view by direct vision   CT chest 1/7/24- ELOY lobulated lung nodule 1.8cm growing compared to 6/2023 when it was 3mm      Labs     Recent Labs   Lab 01/07/24  0442   WBC 2.69*   HGB 12.9   HCT 39.0   *     Recent Labs   Lab 01/07/24  0442 01/07/24  1251     --    K 3.7  --      --    CO2 25  --    BUN 9  --    CREATININE 0.8  --    GLU 87  --    CALCIUM 8.3*  --    MG 1.9  --    PHOS 2.6*  --    PROCAL  --  0.04   No results for input(s): "PH", "PCO2", "PO2", "HCO3" in the last 24 hours.  Microbiology Results (last 7 days)       ** No results found for the last 168 hours. **            Impression:  Active Hospital Problems    Diagnosis  POA    *COPD exacerbation [J44.1]  Yes    Mass of upper lobe of left lung [R91.8]  Yes    Acute hypoxic respiratory failure [J96.01]  Yes    Influenza A [J10.1]  Yes    Acquired hypothyroidism [E03.9]  Yes    Essential hypertension [I10]  Yes    Anxiety [F41.9]  Yes     Chronic    Tobacco abuse [Z72.0]  Yes     Chronic      Resolved Hospital Problems   No resolved problems to display.               Plan:   Influenza with acute bronchitis  Possible COPD  RLL atelectasis  Smoker  Left upper lobe lung nodule concerning for malignancy  Leukopenia  thrombocytopenia    - continue inhaled bronchodilators  - supplemental O2 to maintain sats >88%  - continue tamiflu  - deescalate zosyn- negative procalcitonin  - consider dc home tomorrow if stable  - ELOY nodule needs biopsy outpatient after acute illness resolved- I will order and f/u    Alyx Hilton MD  Pulmonary & Critical Care Medicine      "

## 2024-01-07 NOTE — PLAN OF CARE
Problem: Physical Therapy  Goal: Physical Therapy Goal  Description: Goals to be met by: 01/15/2024     Patient will increase functional independence with mobility by performin). Supine to sit with Modified [place]  2). Sit to supine with Modified [place]  3). Sit to stand transfer with Modified [place]  4). Gait  x > 150 feet with Modified [place] using Rolling Walker.     Outcome: Ongoing, Progressing

## 2024-01-07 NOTE — PROGRESS NOTES
Pending sale to [facility]  Progress Note    Patient Name: Joslyn Dubon  MRN: [id number]  Patient Class: OP- Observation   Admission Date: 1/6/2024  Length of Stay: 0 days  Attending Physician: Donna Fischer MD  Primary Care Provider: Jasbir Graham MD        Subjective:     Principal Problem:COPD exacerbation        HPI:  Ms. Dubon is a 73-year-old female with a past medical history of COPD, HTN, GERD, anxiety, Martin's esophagus, and hypothyroidism.  She presented to the ED with complaints of dyspnea.  It started about 3 days ago, when she was also diagnosed with the flu at that time.  She was prescribed Tamiflu however she did not take it because it causes nausea.  The SOB got progressively worse so she came to the hospital.  While in the ED, she was noted to be hypoxic and positive influenza A.  She was placed on supplemental O2, given a nebulizer treatment, and started on Tamiflu.  Her chest x-ray was unremarkable.  She is being admitted for COPD exacerbation.  IV azithromycin was added to her treatment regimen, but it had to be stopped when she developed redness and itching near IV site.  So, she was switched to IV Zosyn.  She states that she is currently breathing better as long as she does not move because then she gets short of breath.  Associated symptoms include a wet cough, feels like she has thick secretions that are unable to come up in her throat.  She reports recently taken amoxicillin, and tolerating, but had to also take Diflucan due to getting a yeast infection.  She states that she is unable to tolerate steroids and gets hives from them.  She currently complains of feeling jittery from the nebulizer treatments.  She also complained of not being able to sleep last night due to the jitteriness.  She denies ever having lung problems and denies having COPD.  She states that she has never had problems with her lungs are breathing until recently.  She is  ex-smoker.  She currently denies dizziness, chest pain, palpitations, N/V, numbness, problems walking.    Overview/Hospital Course:  No notes on file    Interval History:  Sitting up in bed, awake and alert.  Breathing is better today, but still gets significant shortness of breath with exertion and requiring supplemental O2.  Feels overall weaker today. Tolerating her prescribed meds without side effects.  We will get chest CT to further evaluate coin lesion.      Review of Systems   Constitutional:  Positive for fatigue. Negative for activity change and chills.   HENT:  Positive for voice change (Reports hoarseness started about 2 days ago).    Respiratory:  Positive for cough, chest tightness and shortness of breath.         Still gets short of breath with minimal exertion   Cardiovascular:  Negative for chest pain, palpitations and leg swelling.   Gastrointestinal:  Negative for abdominal pain, constipation, diarrhea, nausea and vomiting.   Genitourinary:  Negative for decreased urine volume, dysuria and hematuria.   Musculoskeletal:  Negative for arthralgias, back pain, gait problem and myalgias.   Skin:  Negative for color change and wound.   Neurological:  Positive for weakness (Generalized). Negative for dizziness, speech difficulty and numbness.   Psychiatric/Behavioral:  Negative for agitation, confusion, hallucinations and sleep disturbance.      Objective:     Vital Signs (Most Recent):  Temp: 97.6 °F (36.4 °C) (01/07/24 0344)  Pulse: 72 (01/07/24 0721)  Resp: 18 (01/07/24 0721)  BP: 121/72 (01/07/24 0344)  SpO2: 98 % (01/07/24 0721) Vital Signs (24h Range):  Temp:  [97.4 °F (36.3 °C)-98.2 °F (36.8 °C)] 97.6 °F (36.4 °C)  Pulse:  [53-82] 72  Resp:  [16-20] 18  SpO2:  [93 %-100 %] 98 %  BP: (113-133)/(72-82) 121/72     Weight: 75.3 kg (166 lb 0.1 oz)  Body mass index is 30.36 kg/m².    Intake/Output Summary (Last 24 hours) at 1/7/2024 1010  Last data filed at 1/7/2024 0620  Gross per 24 hour   Intake 400  ml   Output 500 ml   Net -100 ml         Physical Exam  Constitutional:       General: She is not in acute distress.  HENT:      Head: Normocephalic and atraumatic.      Nose: Nose normal.      Mouth/Throat:      Mouth: Mucous membranes are moist.      Pharynx: Oropharynx is clear.   Eyes:      Extraocular Movements: Extraocular movements intact.      Conjunctiva/sclera: Conjunctivae normal.      Pupils: Pupils are equal, round, and reactive to light.   Cardiovascular:      Rate and Rhythm: Normal rate and regular rhythm.      Pulses: Normal pulses.      Heart sounds: Normal heart sounds.   Pulmonary:      Effort: Pulmonary effort is normal. No respiratory distress.      Breath sounds: Wheezing (Bilateral end-expiratory wheezes) present.      Comments: Better air movement today.  Wearing supplemental O2 via NC.  Abdominal:      General: Abdomen is flat. Bowel sounds are normal. There is no distension.      Palpations: Abdomen is soft.      Tenderness: There is no abdominal tenderness. There is no guarding.   Musculoskeletal:         General: No swelling or tenderness. Normal range of motion.      Cervical back: Normal range of motion and neck supple. No tenderness.      Right lower leg: No edema.      Left lower leg: No edema.   Skin:     General: Skin is warm and dry.      Capillary Refill: Capillary refill takes less than 2 seconds.      Findings: No lesion.   Neurological:      General: No focal deficit present.      Mental Status: She is alert and oriented to person, place, and time.   Psychiatric:         Mood and Affect: Mood normal.         Behavior: Behavior normal.         Thought Content: Thought content normal.         Judgment: Judgment normal.             Significant Labs: All pertinent labs within the past 24 hours have been reviewed.  CBC:   Recent Labs   Lab 01/06/24  0501 01/07/24  0442   WBC 2.58* 2.69*   HGB 13.6 12.9   HCT 41.4 39.0   * 107*     CMP:   Recent Labs   Lab 01/06/24  0501  01/07/24  0442    143   K 3.4* 3.7    108   CO2 24 25   GLU 95 87   BUN 12 9   CREATININE 0.8 0.8   CALCIUM 8.9 8.3*   PROT 7.0  --    ALBUMIN 3.2*  --    BILITOT 0.3  --    ALKPHOS 75  --    AST 37  --    ALT 21  --    ANIONGAP 11 10     CT Chest Without Contrast  Narrative: EXAMINATION:  CT CHEST WITHOUT CONTRAST    CLINICAL HISTORY:  Abnormal xray - lung nodule, < 1 cm, mod-high risk;Soft tissue mass, chest, US/xray nondiagnostic;1.8 cm soft tissue density nodule of the left upper lobe.;    TECHNIQUE:  Low dose axial images, sagittal and coronal reformations were obtained from the thoracic inlet to the lung bases. Contrast was not administered.    COMPARISON:  CTA chest of June 22, 2023    FINDINGS:  The heart and great vessels are of normal size and contour.  Enlargement or aneurysm is not seen.  A right pretracheal lymph node has a short axis of 7 mm which is within limits.  Adenopathy is not demonstrated.    There is a 1.8 cm mildly spiculated soft tissue density mass of the left upper lobe peripherally best seen series 4 image 155.  In retrospect this measured 3 point 2 mm on the prior CTA of June 22, 2023.  This is consistent with a lung neoplasm.  Other intrapulmonary masses are not seen.  There is bronchiectasis and atelectasis identified in the right lower lung field.  Strands of atelectasis are seen at both lung bases.  Another soft tissue density mass in the lung fields is not seen.  There is atelectasis of the left lower lobe around the ectatic aorta.    The adrenal glands are not enlarged.  A water density cyst is noted of the right lobe of the liver measuring 3.2 cm.  Impression: A 1.8 cm mildly spiculated mass is identified in the left upper lobe corresponding to the chest x-ray findings.  This is consistent with a lung neoplasm.  This should be accessible to percutaneous biopsy.  There is bronchiectasis and atelectasis identified in the posterior segment of the right lower lobe.  There  is additional atelectasis adjacent to the ectatic descending thoracic aorta.  A stable cyst of the right lobe of the liver is noted.    This report was flagged in Epic as abnormal.    Electronically signed by: Bill Dejesus MD  Date:    01/07/2024  Time:    12:43      Significant Imaging: I have reviewed all pertinent imaging results/findings within the past 24 hours.    Assessment/Plan:      * COPD exacerbation  Patient's COPD is with exacerbation noted by continued dyspnea, use of accessory muscles for breathing, and worsening of baseline hypoxia currently.  Patient is currently on COPD Pathway. Continue scheduled inhalers Antibiotics and Supplemental oxygen and monitor respiratory status closely.   -----------------------------------------------------------------------------------------------  Did not tolerate azithromycin.  Allergic to Cipro and doxycycline.  Continue IV Zosyn, Xopenex nebulizers (does not tolerate albuterol), Pulmicort b.i.d., and Mucinex b.i.d.  Continue supplemental O2, weaning as tolerated  No steroids due to intolerance  Obtain home nebulizer machine    Acute hypoxic respiratory failure  Patient with Hypoxic Respiratory failure which is Acute.  she is not on home oxygen. Supplemental oxygen was provided and noted- Oxygen Concentration (%):  [28] 28    .   Signs/symptoms of respiratory failure include- tachypnea, increased work of breathing, respiratory distress, and wheezing. Contributing diagnoses includes - COPD and influenza infection  Labs and images were reviewed. Patient Has not had a recent ABG. Will treat underlying causes and adjust management of respiratory failure as follows-     Due to COPD exacerbation/ See COPD exacerbation A/P  We will need desat study prior to discharge to evaluate for home O2 need    Influenza A  Continue Tamiflu b.i.d.  Maintain isolation precautions      Mass of upper lobe of left lung  Confirmed on chest CT  Consult  pulmonology      Hypokalemia  Patient has hypokalemia which is Acute and currently uncontrolled. Most recent potassium levels reviewed-   Lab Results   Component Value Date    K 3.4 (L) 01/06/2024   . Will continue potassium replacement per protocol and recheck repeat levels after replacement completed.     Acquired hypothyroidism  Patient has chronic hypothyroidism. TFTs reviewed- Shows subclinical hypothyroidism    Lab Results   Component Value Date    TSH 0.193 (L) 10/18/2023    FREET4 1.40 10/18/2023      Will continue chronic levothyroxine and adjust for and clinical changes.          Essential hypertension  Chronic, controlled. Latest blood pressure and vitals reviewed-     Temp:  [97.4 °F (36.3 °C)-98 °F (36.7 °C)]   Pulse:  [53-82]   Resp:  [16-20]   BP: (113-125)/(72-82)   SpO2:  [93 %-100 %] .   Home meds for hypertension were reviewed and noted below.   Hypertension Medications               amLODIPine (NORVASC) 5 MG tablet Take 1 tablet (5 mg total) by mouth once daily. For blood pressure    metoprolol tartrate (LOPRESSOR) 25 MG tablet Take 1 tablet (25 mg total) by mouth 2 (two) times daily.            While in the hospital, will manage blood pressure as follows; Continue home antihypertensive regimen    Will utilize p.r.n. blood pressure medication only if patient's blood pressure greater than 180/110 and she develops symptoms such as worsening chest pain or shortness of breath.    Anxiety  Chronic/better today  Continue home Xanax PRN  Monitor      Tobacco abuse  She reports this morning that she quit about 1 week ago  Encouraged to continue smoking cessation        VTE Risk Mitigation (From admission, onward)           Ordered     IP VTE HIGH RISK PATIENT  Once         01/06/24 1412     Reason for No Pharmacological VTE Prophylaxis  Once        Question:  Reasons:  Answer:  Thrombocytopenia    01/06/24 1412     Place sequential compression device  Until discontinued         01/06/24 5540                     Discharge Planning   TINO: 1/7/2024     Code Status: Full Code   Is the patient medically ready for discharge?:     Reason for patient still in hospital (select all that apply): Patient unstable, Patient trending condition, and Treatment  Discharge Plan A: Home with family                  Mary Dooley NP  Department of Hospital Medicine   [facility]/Surg

## 2024-01-07 NOTE — PLAN OF CARE
Nebulizer orders entered by JACKSON Hernandez     Per Da with DME- pt does not want it, she will purchase one out of pocket else where. She does not want to rent it.. But insurance rent the nebs for 13 mths and then it becomes the pts.     JACKSON Hernandez updated that pt will private purchase nebulizer else where.    01/07/24 1051   Post-Acute Status   Post-Acute Authorization HME   Post-Acute Placement Status Set-up Complete/Auth obtained

## 2024-01-08 VITALS
BODY MASS INDEX: 30.55 KG/M2 | DIASTOLIC BLOOD PRESSURE: 74 MMHG | HEIGHT: 62 IN | TEMPERATURE: 98 F | OXYGEN SATURATION: 94 % | HEART RATE: 62 BPM | RESPIRATION RATE: 18 BRPM | WEIGHT: 166 LBS | SYSTOLIC BLOOD PRESSURE: 149 MMHG

## 2024-01-08 DIAGNOSIS — R91.1 LUNG NODULE: Primary | ICD-10-CM

## 2024-01-08 LAB
ANION GAP SERPL CALC-SCNC: 10 MMOL/L (ref 8–16)
BASOPHILS # BLD AUTO: 0.01 K/UL (ref 0–0.2)
BASOPHILS NFR BLD: 0.3 % (ref 0–1.9)
BUN SERPL-MCNC: 8 MG/DL (ref 8–23)
CALCIUM SERPL-MCNC: 8.6 MG/DL (ref 8.7–10.5)
CHLORIDE SERPL-SCNC: 105 MMOL/L (ref 95–110)
CO2 SERPL-SCNC: 25 MMOL/L (ref 23–29)
CREAT SERPL-MCNC: 0.8 MG/DL (ref 0.5–1.4)
DIFFERENTIAL METHOD BLD: ABNORMAL
EOSINOPHIL # BLD AUTO: 0 K/UL (ref 0–0.5)
EOSINOPHIL NFR BLD: 0.6 % (ref 0–8)
ERYTHROCYTE [DISTWIDTH] IN BLOOD BY AUTOMATED COUNT: 13.5 % (ref 11.5–14.5)
EST. GFR  (NO RACE VARIABLE): >60 ML/MIN/1.73 M^2
GLUCOSE SERPL-MCNC: 87 MG/DL (ref 70–110)
HCT VFR BLD AUTO: 38.9 % (ref 37–48.5)
HGB BLD-MCNC: 12.7 G/DL (ref 12–16)
IMM GRANULOCYTES # BLD AUTO: 0 K/UL (ref 0–0.04)
IMM GRANULOCYTES NFR BLD AUTO: 0 % (ref 0–0.5)
LYMPHOCYTES # BLD AUTO: 1.4 K/UL (ref 1–4.8)
LYMPHOCYTES NFR BLD: 45.8 % (ref 18–48)
MAGNESIUM SERPL-MCNC: 1.9 MG/DL (ref 1.6–2.6)
MCH RBC QN AUTO: 31.5 PG (ref 27–31)
MCHC RBC AUTO-ENTMCNC: 32.6 G/DL (ref 32–36)
MCV RBC AUTO: 97 FL (ref 82–98)
MONOCYTES # BLD AUTO: 0.3 K/UL (ref 0.3–1)
MONOCYTES NFR BLD: 9.7 % (ref 4–15)
NEUTROPHILS # BLD AUTO: 1.4 K/UL (ref 1.8–7.7)
NEUTROPHILS NFR BLD: 43.6 % (ref 38–73)
NRBC BLD-RTO: 0 /100 WBC
PHOSPHATE SERPL-MCNC: 2.1 MG/DL (ref 2.7–4.5)
PLATELET # BLD AUTO: 113 K/UL (ref 150–450)
PMV BLD AUTO: 9.9 FL (ref 9.2–12.9)
POTASSIUM SERPL-SCNC: 3.8 MMOL/L (ref 3.5–5.1)
RBC # BLD AUTO: 4.03 M/UL (ref 4–5.4)
SODIUM SERPL-SCNC: 140 MMOL/L (ref 136–145)
WBC # BLD AUTO: 3.1 K/UL (ref 3.9–12.7)

## 2024-01-08 PROCEDURE — 94618 PULMONARY STRESS TESTING: CPT

## 2024-01-08 PROCEDURE — 80048 BASIC METABOLIC PNL TOTAL CA: CPT

## 2024-01-08 PROCEDURE — 25000003 PHARM REV CODE 250: Performed by: NURSE PRACTITIONER

## 2024-01-08 PROCEDURE — 97530 THERAPEUTIC ACTIVITIES: CPT | Mod: CQ

## 2024-01-08 PROCEDURE — 83735 ASSAY OF MAGNESIUM: CPT

## 2024-01-08 PROCEDURE — 94761 N-INVAS EAR/PLS OXIMETRY MLT: CPT | Mod: XB

## 2024-01-08 PROCEDURE — 84100 ASSAY OF PHOSPHORUS: CPT

## 2024-01-08 PROCEDURE — G0378 HOSPITAL OBSERVATION PER HR: HCPCS

## 2024-01-08 PROCEDURE — 85025 COMPLETE CBC W/AUTO DIFF WBC: CPT

## 2024-01-08 PROCEDURE — 99406 BEHAV CHNG SMOKING 3-10 MIN: CPT

## 2024-01-08 PROCEDURE — 25000003 PHARM REV CODE 250

## 2024-01-08 PROCEDURE — 25000242 PHARM REV CODE 250 ALT 637 W/ HCPCS: Performed by: HOSPITALIST

## 2024-01-08 PROCEDURE — 94640 AIRWAY INHALATION TREATMENT: CPT | Mod: XB

## 2024-01-08 PROCEDURE — 25000003 PHARM REV CODE 250: Performed by: EMERGENCY MEDICINE

## 2024-01-08 PROCEDURE — 63700000 PHARM REV CODE 250 ALT 637 W/O HCPCS: Performed by: NURSE PRACTITIONER

## 2024-01-08 PROCEDURE — 36415 COLL VENOUS BLD VENIPUNCTURE: CPT

## 2024-01-08 PROCEDURE — 25000242 PHARM REV CODE 250 ALT 637 W/ HCPCS: Performed by: INTERNAL MEDICINE

## 2024-01-08 PROCEDURE — 99214 OFFICE O/P EST MOD 30 MIN: CPT | Mod: ,,, | Performed by: INTERNAL MEDICINE

## 2024-01-08 PROCEDURE — 27000221 HC OXYGEN, UP TO 24 HOURS

## 2024-01-08 RX ORDER — OSELTAMIVIR PHOSPHATE 75 MG/1
75 CAPSULE ORAL 2 TIMES DAILY
Qty: 4 CAPSULE | Refills: 0 | OUTPATIENT
Start: 2024-01-08 | End: 2024-01-10

## 2024-01-08 RX ORDER — IPRATROPIUM BROMIDE 0.5 MG/2.5ML
500 SOLUTION RESPIRATORY (INHALATION) 2 TIMES DAILY
Qty: 75 ML | Refills: 0 | Status: SHIPPED | OUTPATIENT
Start: 2024-01-08 | End: 2024-01-23

## 2024-01-08 RX ORDER — GUAIFENESIN 600 MG/1
600 TABLET, EXTENDED RELEASE ORAL 2 TIMES DAILY
Qty: 10 TABLET | Refills: 0
Start: 2024-01-08 | End: 2024-01-13

## 2024-01-08 RX ORDER — POTASSIUM CHLORIDE 750 MG/1
50 TABLET, EXTENDED RELEASE ORAL ONCE
Status: COMPLETED | OUTPATIENT
Start: 2024-01-08 | End: 2024-01-08

## 2024-01-08 RX ADMIN — POTASSIUM & SODIUM PHOSPHATES POWDER PACK 280-160-250 MG 2 PACKET: 280-160-250 PACK at 05:01

## 2024-01-08 RX ADMIN — LACTOBACILLUS TAB 2 TABLET: TAB at 08:01

## 2024-01-08 RX ADMIN — IPRATROPIUM BROMIDE 0.5 MG: 0.5 SOLUTION RESPIRATORY (INHALATION) at 07:01

## 2024-01-08 RX ADMIN — FLUCONAZOLE 100 MG: 100 TABLET ORAL at 08:01

## 2024-01-08 RX ADMIN — CETIRIZINE HYDROCHLORIDE 5 MG: 5 TABLET, FILM COATED ORAL at 09:01

## 2024-01-08 RX ADMIN — LEVALBUTEROL 0.63 MG: 0.63 SOLUTION RESPIRATORY (INHALATION) at 12:01

## 2024-01-08 RX ADMIN — AMLODIPINE BESYLATE 5 MG: 5 TABLET ORAL at 08:01

## 2024-01-08 RX ADMIN — GUAIFENESIN 600 MG: 600 TABLET, EXTENDED RELEASE ORAL at 08:01

## 2024-01-08 RX ADMIN — IPRATROPIUM BROMIDE 0.5 MG: 0.5 SOLUTION RESPIRATORY (INHALATION) at 12:01

## 2024-01-08 RX ADMIN — LEVALBUTEROL 0.63 MG: 0.63 SOLUTION RESPIRATORY (INHALATION) at 07:01

## 2024-01-08 RX ADMIN — LEVOTHYROXINE SODIUM 88 MCG: 0.09 TABLET ORAL at 05:01

## 2024-01-08 RX ADMIN — OSELTAMIVIR PHOSPHATE 75 MG: 75 CAPSULE ORAL at 08:01

## 2024-01-08 RX ADMIN — METOPROLOL TARTRATE 25 MG: 25 TABLET, FILM COATED ORAL at 08:01

## 2024-01-08 RX ADMIN — PANTOPRAZOLE SODIUM 40 MG: 40 TABLET, DELAYED RELEASE ORAL at 08:01

## 2024-01-08 RX ADMIN — POTASSIUM CHLORIDE 50 MEQ: 750 TABLET, EXTENDED RELEASE ORAL at 06:01

## 2024-01-08 NOTE — PLAN OF CARE
Problem: Infection  Goal: Absence of Infection Signs and Symptoms  Outcome: Ongoing, Progressing     Problem: Activity and Energy Impairment (Anxiety Signs/Symptoms)  Goal: Optimized Energy Level (Anxiety Signs/Symptoms)  Outcome: Ongoing, Progressing     Problem: Somatic Disturbance (Anxiety Signs/Symptoms)  Goal: Improved Somatic Symptoms (Anxiety Signs/Symptoms)  Outcome: Ongoing, Progressing    Pt remained stable this shift. Currently on 2L 02 per n/c. Cardiac monitoring in place. Safety and rounding maintained throughout shift.

## 2024-01-08 NOTE — CARE UPDATE
01/08/24 0721   Patient Assessment/Suction   Level of Consciousness (AVPU) alert   Respiratory Effort Unlabored   Expansion/Accessory Muscles/Retractions no use of accessory muscles;no retractions   All Lung Fields Breath Sounds diminished   PRE-TX-O2   Device (Oxygen Therapy) nasal cannula   $ Is the patient on Low Flow Oxygen? Yes   Flow (L/min) 2   SpO2 96 %   Pulse Oximetry Type Intermittent   $ Pulse Oximetry - Multiple Charge Pulse Oximetry - Multiple   Pulse 69   Resp 18   Aerosol Therapy   $ Aerosol Therapy Charges Aerosol Treatment   Respiratory Treatment Status (SVN) given   Treatment Route (SVN) mask;oxygen   Patient Position (SVN) sitting on edge of bed   Signs of Intolerance (SVN) none   Breath Sounds Post-Respiratory Treatment   Throughout All Fields Post-Treatment All Fields   Throughout All Fields Post-Treatment wheezes, expiratory   Post-treatment Heart Rate (beats/min) 67   Post-treatment Resp Rate (breaths/min) 16

## 2024-01-08 NOTE — PLAN OF CARE
Patient cleared for discharge from case management standpoint.    TYLER scheduled hospital follow ups and placed on AVS.  Per TYLER Rain note, patient declined nebulizer.       01/08/24 1042   Final Note   Assessment Type Final Discharge Note   Anticipated Discharge Disposition Home   What phone number can be called within the next 1-3 days to see how you are doing after discharge? [phone]   Hospital Resources/Appts/Education Provided Provided patient/caregiver with written discharge plan information;Provided education on problems/symptoms using teachback;Appointments scheduled and added to AVS   Post-Acute Status   Post-Acute Authorization Other   HME Status Patient declined/refused   Discharge Delays None known at this time

## 2024-01-08 NOTE — CARE UPDATE
01/08/24 0818   Home Oxygen Qualification   $ Home O2 Qualification Pulmonary Stress Test/6 min walk   Room Air SpO2 At Rest 95 %   Room Air SpO2 During Ambulation 92 %   SpO2 Post Ambulation 94 %   Post Ambulation Heart Rate 64 bpm   Home O2 Eval Comments Patient does not qualify at this time.

## 2024-01-08 NOTE — PT/OT/SLP PROGRESS
"Physical Therapy Treatment    Patient Name:  Joslyn Dubon   MRN:  [id number]    Recommendations:     Discharge Recommendations: Low Intensity Therapy  Discharge Equipment Recommendations: none (has cane and walker (rollator))  Barriers to discharge:  gait instability    Assessment:     Joslyn Dubon is a 73 y.o. female admitted with a medical diagnosis of COPD exacerbation.  She presents with the following impairments/functional limitations: weakness, impaired endurance, impaired functional mobility, gait instability, impaired balance, impaired cardiopulmonary response to activity.    Pt presents eager to discharge home but agreeable to therapy session.    Ambulated 50' in room with no AD and supv-SBA, with one mild LOB during a turn that required reaching to wall for stability.     Pt advised to use rollator as needed for fall prevention until mobility improves to baseline.      Rehab Prognosis: Good; patient would benefit from acute skilled PT services to address these deficits and reach maximum level of function.    Recent Surgery: * No surgery found *      Plan:     During this hospitalization, patient to be seen 6 x/week to address the identified rehab impairments via therapeutic activities, therapeutic exercises, gait training and progress toward the following goals:    Plan of Care Expires:  01/15/24    Subjective     Chief Complaint: "I thought I was going to die. I'm finally stopping smoking for good."   Patient/Family Comments/goals: dc home today  Pain/Comfort:  Pain Rating 1: 0/10  Pain Rating Post-Intervention 1: 0/10      Objective:     Communicated with nurse prior to session.  Patient found sitting edge of bed with peripheral IV upon PT entry to room.     General Precautions: Standard, fall, droplet  Orthopedic Precautions: N/A  Braces: N/A  Respiratory Status: Room air     Functional Mobility:  Transfers:     Sit to Stand:  4x STS from EOB with supv with no AD  Gait: 50' no AD, supv-SBA, with " one LOB during turn that was self-corrected by reaching to wall for stability       AM-PAC 6 CLICK MOBILITY          Treatment & Education:  -Pt educ: fall prevention, rollator as needed, frequent mobility post discharge  -3x STS    Patient left sitting edge of bed with all lines intact, call button in reach, nurse notified, and spouse present..    GOALS:   Multidisciplinary Problems       Physical Therapy Goals          Problem: Physical Therapy    Goal Priority Disciplines Outcome Goal Variances Interventions   Physical Therapy Goal     PT, PT/OT Ongoing, Progressing     Description: Goals to be met by: 01/15/2024     Patient will increase functional independence with mobility by performin). Supine to sit with Modified [place]  2). Sit to supine with Modified [place]  3). Sit to stand transfer with Modified [place]  4). Gait  x > 150 feet with Modified [place] using Rolling Walker.                          Time Tracking:     PT Received On: 24  PT Start Time: 901     PT Stop Time: 912  PT Total Time (min): 11 min     Billable Minutes: Therapeutic Activity 11    Treatment Type: Treatment  PT/PTA: PTA     Number of PTA visits since last PT visit: 2024

## 2024-01-08 NOTE — CARE UPDATE
01/08/24 1019   Tobacco Cessation Intervention   Do you use any type of tobacco product? Yes   Are you interested in quitting use of tobacco products? Thinking about quitting   Are you interested in Nicotine Replacement for symptom relief? No   $ Smoking Cessation Charges Smoking Cessation - Intermediate (CTTS)

## 2024-01-08 NOTE — DISCHARGE SUMMARY
[facility] Medicine  Discharge Summary      Patient Name: Joslyn Dubon  MRN: [id number]  [facility]: [id number]  Patient Class: OP- Observation  Admission Date: 1/6/2024  Hospital Length of Stay: 0 days  Discharge Date and Time: 1/8/2024 10:54 AM  Attending Physician: No att. providers found   Discharging Provider: Mary Dooley NP  Primary Care Provider: Jasbir Graham MD    Primary Care Team: Networked reference to record PCT     HPI:   Ms. Dubon is a 73-year-old female with a past medical history of COPD, HTN, GERD, anxiety, Martin's esophagus, and hypothyroidism.  She presented to the ED with complaints of dyspnea.  It started about 3 days ago, when she was also diagnosed with the flu at that time.  She was prescribed Tamiflu however she did not take it because it causes nausea.  The SOB got progressively worse so she came to the hospital.  While in the ED, she was noted to be hypoxic and positive influenza A.  She was placed on supplemental O2, given a nebulizer treatment, and started on Tamiflu.  Her chest x-ray was unremarkable.  She is being admitted for COPD exacerbation.  IV azithromycin was added to her treatment regimen, but it had to be stopped when she developed redness and itching near IV site.  So, she was switched to IV Zosyn.  She states that she is currently breathing better as long as she does not move because then she gets short of breath.  Associated symptoms include a wet cough, feels like she has thick secretions that are unable to come up in her throat.  She reports recently taken amoxicillin, and tolerating, but had to also take Diflucan due to getting a yeast infection.  She states that she is unable to tolerate steroids and gets hives from them.  She currently complains of feeling jittery from the nebulizer treatments.  She also complained of not being able to sleep last night due to the jitteriness.  She denies ever having lung problems and denies  having COPD.  She states that she has never had problems with her lungs are breathing until recently.  She is ex-smoker.  She currently denies dizziness, chest pain, palpitations, N/V, numbness, problems walking.    * No surgery found *      Hospital Course:   Ms. Jagdish cook was treated with scheduled nebulizers, supplemental O2, and IV Zosyn for her COPD exacerbation.  She was noted to have a coin lesion on her CXR, so a chest CT was obtained which showed a 1.8 cm mildly spiculated mass in the left upper lobe corresponding to the chest x-ray findings and is consistent with a lung neoplasm, so pulmonology was consulted for an evaluation and recommendations.  Her overall condition and and she reports that she is breathing much better today, and she has been discharged to home today in stable condition.  Antibiotics were discontinued per pulmonologist.  She had a desat study done and does not qualify for home O2.  She will continue Tamiflu, for which she already has at home.  She will follow up with pulmonology for her COPD management and the lung mass.  She will continue nebulizers at home.  Attempt was made to get her a home nebulizer treatment while here however insurance required a monthly rental fee, so she will obtain her own nebulizer machine and will use her granddaughters in the meantime.  She did not have any adverse events while here.     Goals of Care Treatment Preferences:  Code Status: Full Code      Consults:   Consults (From admission, onward)          Status Ordering Provider     Inpatient consult to Pulmonology  Once        Provider:  Alyx Hilton MD    Completed DAVID HAMM     Inpatient consult to Social Work/Case Management  Once        Provider:  (Not yet assigned)    Completed DAVID HAMM            No new Assessment & Plan notes have been filed under this hospital service since the last note was generated.  Service: Hospital Medicine    Final Active Diagnoses:    Diagnosis  "Date Noted POA    PRINCIPAL PROBLEM:  COPD exacerbation [J44.1] 09/14/2019 Yes    Acute hypoxic respiratory failure [J96.01] 01/07/2024 Yes    Influenza A [J10.1] 01/06/2024 Yes    Mass of upper lobe of left lung [R91.8] 01/07/2024 Yes    Acquired hypothyroidism [E03.9] 01/06/2024 Yes    Essential hypertension [I10] 11/23/2021 Yes    Anxiety [F41.9] 12/05/2020 Yes     Chronic    Tobacco abuse [Z72.0] 09/15/2019 Yes     Chronic      Problems Resolved During this Admission:       Discharged Condition: stable    Disposition: Home or Self Care    Follow Up:   Follow-up Information       Jasbir Graham MD Follow up.    Specialties: Family Medicine, Home Health Services, Hospice Services  Why: Call to schedule a hospital follow up appointment within one week.  Contact information:  [address]  [address]  [place] [address]  [phone]               Alyx Hilton MD Follow up on 1/23/2024.    Specialties: Pulmonary Disease, Critical Care Medicine  Why: at 11:00am with JACKSON Moraes  Contact information:  [address]  [address]  [place] [address]  [phone]               Lena Smith MD Follow up on 2/15/2024.    Specialty: Gastroenterology  Why: at 11:30am  office request patient to contact if want virtual appointment  Contact information:  [address]  [place] [address]  [phone]                           Patient Instructions:      HME - OTHER     Order Specific Question Answer Comments   Type of Equipment: Nebulizer Machine    Height: 5' 2" (1.575 m)    Weight: 75.3 kg (166 lb 0.1 oz)    Does patient have medical equipment at home? nebulizer      NEBULIZER FOR HOME USE     Order Specific Question Answer Comments   Height: 5' 2" (1.575 m)    Weight: 75.3 kg (166 lb 0.1 oz)    Does patient have medical equipment at home? nebulizer    Length of need (1-99 months): 99      Diet Adult Regular     Notify your health care provider if you experience any of the following:  temperature >100.4 "     Notify your health care provider if you experience any of the following:  persistent nausea and vomiting or diarrhea     Notify your health care provider if you experience any of the following:  severe uncontrolled pain     Notify your health care provider if you experience any of the following:  difficulty breathing or increased cough     Notify your health care provider if you experience any of the following:  severe persistent headache     Notify your health care provider if you experience any of the following:  persistent dizziness, light-headedness, or visual disturbances     Notify your health care provider if you experience any of the following:  increased confusion or weakness     Activity as tolerated       Significant Diagnostic Studies: Labs: CMP   Recent Labs   Lab 01/08/24 0415      K 3.8      CO2 25   GLU 87   BUN 8   CREATININE 0.8   CALCIUM 8.6*   ANIONGAP 10   , CBC   Recent Labs   Lab 01/08/24 0415   WBC 3.10*   HGB 12.7   HCT 38.9   *   , All labs within the past 24 hours have been reviewed, and     Component Ref Range & Units 6 d ago   Influenza A, Molecular Negative Positive Panic    Comment: flu a  critical result(s) called and verbal readback obtained from  jacqueline marquez  by [email or web address] 01/03/2024 01:35   Influenza B, Molecular Negative Negative   Flu A & B Source  Nasal swab   Resulting Agency  [facility]     CT Chest Without Contrast  Narrative: EXAMINATION:  CT CHEST WITHOUT CONTRAST    CLINICAL HISTORY:  Abnormal xray - lung nodule, < 1 cm, mod-high risk;Soft tissue mass, chest, US/xray nondiagnostic;1.8 cm soft tissue density nodule of the left upper lobe.;    TECHNIQUE:  Low dose axial images, sagittal and coronal reformations were obtained from the thoracic inlet to the lung bases. Contrast was not administered.    COMPARISON:  CTA chest of June 22, 2023    FINDINGS:  The heart and great vessels are of normal size and contour.  Enlargement or  aneurysm is not seen.  A right pretracheal lymph node has a short axis of 7 mm which is within limits.  Adenopathy is not demonstrated.    There is a 1.8 cm mildly spiculated soft tissue density mass of the left upper lobe peripherally best seen series 4 image 155.  In retrospect this measured 3 point 2 mm on the prior CTA of June 22, 2023.  This is consistent with a lung neoplasm.  Other intrapulmonary masses are not seen.  There is bronchiectasis and atelectasis identified in the right lower lung field.  Strands of atelectasis are seen at both lung bases.  Another soft tissue density mass in the lung fields is not seen.  There is atelectasis of the left lower lobe around the ectatic aorta.    The adrenal glands are not enlarged.  A water density cyst is noted of the right lobe of the liver measuring 3.2 cm.  Impression: A 1.8 cm mildly spiculated mass is identified in the left upper lobe corresponding to the chest x-ray findings.  This is consistent with a lung neoplasm.  This should be accessible to percutaneous biopsy.  There is bronchiectasis and atelectasis identified in the posterior segment of the right lower lobe.  There is additional atelectasis adjacent to the ectatic descending thoracic aorta.  A stable cyst of the right lobe of the liver is noted.    This report was flagged in Epic as abnormal.    Electronically signed by: Bill Dejesus MD  Date:    01/07/2024  Time:    12:43            Pending Diagnostic Studies:       None           Medications:  Reconciled Home Medications:      Medication List        START taking these medications      guaiFENesin 600 mg 12 hr tablet  Commonly known as: MUCINEX  Take 1 tablet (600 mg total) by mouth 2 (two) times daily. for 5 days     ipratropium 0.02 % nebulizer solution  Commonly known as: ATROVENT  Take 2.5 mLs (500 mcg total) by nebulization 2 (two) times a day. Rescue            CHANGE how you take these medications      cloNIDine 0.1 MG tablet  Commonly  known as: CATAPRES  Take one tablet as needed up to 3 times a day if blood pressure is greater than 170/110  What changed:   how much to take  how to take this  when to take this  reasons to take this     oseltamivir 75 MG capsule  Commonly known as: TAMIFLU  Take 1 capsule (75 mg total) by mouth 2 (two) times daily. Take x2 more days for 2 days  What changed: additional instructions            CONTINUE taking these medications      albuterol-ipratropium 2.5 mg-0.5 mg/3 mL nebulizer solution  Commonly known as: DUO-NEB  Take 3 mLs by nebulization every 6 (six) hours as needed for Wheezing. Rescue     ALPRAZolam 0.25 MG tablet  Commonly known as: XANAX  Take 1 tablet (0.25 mg total) by mouth 2 (two) times daily as needed for Anxiety.     aluminum-magnesium hydroxide-simethicone 200-200-20 mg/5 mL Susp  Commonly known as: MAALOX  Take 30 mLs by mouth once as needed.     amLODIPine 5 MG tablet  Commonly known as: NORVASC  Take 1 tablet (5 mg total) by mouth once daily. For blood pressure     EPINEPHrine 0.3 mg/0.3 mL Atin  Commonly known as: EPIPEN  Inject 0.3 mLs (0.3 mg total) into the muscle as needed (Severe allergic reaction symptoms such as shortness of breath, tongue or throat swelling, weakness dizziness severe nausea vomiting).     estradioL 10 mcg Tab  Commonly known as: VAGIFEM  Place 1 tablet (10 mcg total) vaginally twice a week. Start with one tablet per vagina q.h.s. x7 than one tablet twice weekly     famotidine 20 MG tablet  Commonly known as: PEPCID  Take 1 tablet (20 mg total) by mouth every evening.     fluconazole 100 MG tablet  Commonly known as: DIFLUCAN  Take 1 tablet (100 mg total) by mouth once daily.     GAS-X ORAL  Take 1 tablet by mouth as needed.     HYDROcodone-acetaminophen  mg per tablet  Commonly known as: NORCO  Take 1 tablet by mouth every 12 (twelve) hours as needed for Pain.     levalbuterol 45 mcg/actuation inhaler  Commonly known as: XOPENEX HFA  Inhale 2 puffs into the  lungs every 6 (six) hours as needed for Wheezing. Rescue     levocetirizine 5 MG tablet  Commonly known as: XYZAL  Take 5 mg by mouth every evening.     levothyroxine 88 MCG tablet  Commonly known as: SYNTHROID  Take 1 tablet (88 mcg total) by mouth before breakfast.     metoprolol tartrate 25 MG tablet  Commonly known as: LOPRESSOR  Take 1 tablet (25 mg total) by mouth 2 (two) times daily.     omeprazole 40 MG capsule  Commonly known as: PRILOSEC  Take 1 capsule (40 mg total) by mouth every morning.     ondansetron 4 MG tablet  Commonly known as: ZOFRAN  Take 1 tablet (4 mg total) by mouth every 8 (eight) hours as needed for Nausea.     REFRESH OPTIVE 0.5-0.9 % Drop  Generic drug: carboxymethylcellulose-glycern  Place 1 drop into both eyes 4 (four) times daily.              Indwelling Lines/Drains at time of discharge:   Lines/Drains/Airways       None                   Time spent on the discharge of patient: 42 minutes         Mary Dooley NP  Department of Hospital Medicine  [facility]/Surg

## 2024-01-09 ENCOUNTER — PATIENT OUTREACH (OUTPATIENT)
Dept: ADMINISTRATIVE | Facility: CLINIC | Age: 74
End: 2024-01-09
Payer: MEDICARE

## 2024-01-09 RX ORDER — ARFORMOTEROL TARTRATE 15 UG/2ML
15 SOLUTION RESPIRATORY (INHALATION) 2 TIMES DAILY
Qty: 30 EACH | Refills: 0 | Status: SHIPPED | OUTPATIENT
Start: 2024-01-09 | End: 2024-01-23

## 2024-01-09 RX ORDER — ARFORMOTEROL TARTRATE 15 UG/2ML
15 SOLUTION RESPIRATORY (INHALATION) 2 TIMES DAILY
Qty: 30 EACH | Refills: 0 | OUTPATIENT
Start: 2024-01-09 | End: 2025-01-08

## 2024-01-09 NOTE — PROGRESS NOTES
C3 nurse spoke with Joslyn Dubon for a TCC post hospital discharge follow up call. The patient does not have a scheduled HOSFU appointment with Jasbir Graham MD within 5-7 days post hospital discharge date 01/08/24. Pt stated that she spoke to Dr Graham yesterday and will follow up with him after pulmonology appt.

## 2024-01-19 ENCOUNTER — HOSPITAL ENCOUNTER (EMERGENCY)
Facility: HOSPITAL | Age: 74
Discharge: HOME | End: 2024-01-19
Attending: EMERGENCY MEDICINE
Payer: MEDICARE

## 2024-01-19 VITALS
BODY MASS INDEX: 30.55 KG/M2 | SYSTOLIC BLOOD PRESSURE: 130 MMHG | HEART RATE: 75 BPM | DIASTOLIC BLOOD PRESSURE: 66 MMHG | HEIGHT: 62 IN | RESPIRATION RATE: 18 BRPM | WEIGHT: 166 LBS | OXYGEN SATURATION: 100 % | TEMPERATURE: 98 F

## 2024-01-19 DIAGNOSIS — R06.02 SHORTNESS OF BREATH: ICD-10-CM

## 2024-01-19 DIAGNOSIS — R06.02 SOB (SHORTNESS OF BREATH): ICD-10-CM

## 2024-01-19 LAB
ALBUMIN SERPL BCP-MCNC: 3.8 G/DL (ref 3.5–5.2)
ALP SERPL-CCNC: 114 U/L (ref 55–135)
ALT SERPL W/O P-5'-P-CCNC: 26 U/L (ref 10–44)
ANION GAP SERPL CALC-SCNC: 11 MMOL/L (ref 8–16)
AST SERPL-CCNC: 24 U/L (ref 10–40)
BASOPHILS # BLD AUTO: 0.04 K/UL (ref 0–0.2)
BASOPHILS NFR BLD: 0.6 % (ref 0–1.9)
BILIRUB SERPL-MCNC: 0.3 MG/DL (ref 0.1–1)
BNP SERPL-MCNC: 67 PG/ML (ref 0–99)
BUN SERPL-MCNC: 14 MG/DL (ref 8–23)
CALCIUM SERPL-MCNC: 9.9 MG/DL (ref 8.7–10.5)
CHLORIDE SERPL-SCNC: 105 MMOL/L (ref 95–110)
CO2 SERPL-SCNC: 24 MMOL/L (ref 23–29)
CREAT SERPL-MCNC: 0.9 MG/DL (ref 0.5–1.4)
DIFFERENTIAL METHOD BLD: ABNORMAL
EOSINOPHIL # BLD AUTO: 0.1 K/UL (ref 0–0.5)
EOSINOPHIL NFR BLD: 1.2 % (ref 0–8)
ERYTHROCYTE [DISTWIDTH] IN BLOOD BY AUTOMATED COUNT: 13.2 % (ref 11.5–14.5)
EST. GFR  (NO RACE VARIABLE): >60 ML/MIN/1.73 M^2
GLUCOSE SERPL-MCNC: 91 MG/DL (ref 70–110)
HCT VFR BLD AUTO: 44.3 % (ref 37–48.5)
HGB BLD-MCNC: 14.5 G/DL (ref 12–16)
IMM GRANULOCYTES # BLD AUTO: 0.03 K/UL (ref 0–0.04)
IMM GRANULOCYTES NFR BLD AUTO: 0.4 % (ref 0–0.5)
LACTATE SERPL-SCNC: 1.7 MMOL/L (ref 0.5–2.2)
LYMPHOCYTES # BLD AUTO: 2 K/UL (ref 1–4.8)
LYMPHOCYTES NFR BLD: 28.8 % (ref 18–48)
MCH RBC QN AUTO: 31.7 PG (ref 27–31)
MCHC RBC AUTO-ENTMCNC: 32.7 G/DL (ref 32–36)
MCV RBC AUTO: 97 FL (ref 82–98)
MONOCYTES # BLD AUTO: 0.7 K/UL (ref 0.3–1)
MONOCYTES NFR BLD: 9.5 % (ref 4–15)
NEUTROPHILS # BLD AUTO: 4.1 K/UL (ref 1.8–7.7)
NEUTROPHILS NFR BLD: 59.5 % (ref 38–73)
NRBC BLD-RTO: 0 /100 WBC
PLATELET # BLD AUTO: 263 K/UL (ref 150–450)
PMV BLD AUTO: 8.7 FL (ref 9.2–12.9)
POTASSIUM SERPL-SCNC: 4 MMOL/L (ref 3.5–5.1)
PROT SERPL-MCNC: 8.3 G/DL (ref 6–8.4)
RBC # BLD AUTO: 4.58 M/UL (ref 4–5.4)
SODIUM SERPL-SCNC: 140 MMOL/L (ref 136–145)
TROPONIN I SERPL DL<=0.01 NG/ML-MCNC: <0.006 NG/ML (ref 0–0.03)
WBC # BLD AUTO: 6.85 K/UL (ref 3.9–12.7)

## 2024-01-19 PROCEDURE — 84484 ASSAY OF TROPONIN QUANT: CPT | Performed by: NURSE PRACTITIONER

## 2024-01-19 PROCEDURE — 36415 COLL VENOUS BLD VENIPUNCTURE: CPT | Performed by: NURSE PRACTITIONER

## 2024-01-19 PROCEDURE — 99285 EMERGENCY DEPT VISIT HI MDM: CPT | Mod: 25

## 2024-01-19 PROCEDURE — 83880 ASSAY OF NATRIURETIC PEPTIDE: CPT | Performed by: NURSE PRACTITIONER

## 2024-01-19 PROCEDURE — 93010 ELECTROCARDIOGRAM REPORT: CPT | Mod: ,,, | Performed by: INTERNAL MEDICINE

## 2024-01-19 PROCEDURE — 25000242 PHARM REV CODE 250 ALT 637 W/ HCPCS: Performed by: EMERGENCY MEDICINE

## 2024-01-19 PROCEDURE — 83605 ASSAY OF LACTIC ACID: CPT | Performed by: NURSE PRACTITIONER

## 2024-01-19 PROCEDURE — 80053 COMPREHEN METABOLIC PANEL: CPT | Performed by: NURSE PRACTITIONER

## 2024-01-19 PROCEDURE — 93005 ELECTROCARDIOGRAM TRACING: CPT

## 2024-01-19 PROCEDURE — 85025 COMPLETE CBC W/AUTO DIFF WBC: CPT | Performed by: NURSE PRACTITIONER

## 2024-01-19 PROCEDURE — 94644 CONT INHLJ TX 1ST HOUR: CPT

## 2024-01-19 RX ORDER — IPRATROPIUM BROMIDE AND ALBUTEROL SULFATE 2.5; .5 MG/3ML; MG/3ML
3 SOLUTION RESPIRATORY (INHALATION)
Status: DISCONTINUED | OUTPATIENT
Start: 2024-01-19 | End: 2024-01-19

## 2024-01-19 RX ORDER — LEVALBUTEROL 1.25 MG/.5ML
1.25 SOLUTION, CONCENTRATE RESPIRATORY (INHALATION)
Status: COMPLETED | OUTPATIENT
Start: 2024-01-19 | End: 2024-01-19

## 2024-01-19 RX ADMIN — LEVALBUTEROL 1.25 MG: 1.25 SOLUTION, CONCENTRATE RESPIRATORY (INHALATION) at 04:01

## 2024-01-19 NOTE — ED PROVIDER NOTES
Encounter Date: 1/19/2024       History     Chief Complaint   Patient presents with    Shortness of Breath     Worsened this AM -- recently admitted for same. Reports minimal improvement with breathing treatments.     73-year-old female presents to the ER with shortness of breath and coughing since this morning.  No runny nose no sneezing no fevers no sore throat.  Recently admitted for suspected COPD exacerbation and influenza several weeks ago.  She denies having a formal diagnosis of COPD, she states she has never had pulmonary function tests.  Has a spiculated lung nodule on recent chest x-ray, has been referred to pulmonology.  Did 1 albuterol neb in the morning and 1 albuterol inhaler this afternoon but no improvement in the coughing and shortness of breath.  She states she feels like she can not get enough air in, but has no trouble getting it out.  No chest heaviness or pressure no pleuritic chest pain no fevers no chills.    The history is provided by the patient and the spouse.     Review of patient's allergies indicates:   Allergen Reactions    Bisacodyl Nausea And Vomiting     Other reaction(s): Vomiting    Azithromycin Hives    Cipro [ciprofloxacin hcl]     Demerol [meperidine]      Nausea vomiting, visual problem    Doxycycline Hives    Flonase [fluticasone propionate] Hives    Iodinated contrast media Hives     hypotension    Morphine     Morpholine analogues Other (See Comments)     hypotension     Past Medical History:   Diagnosis Date    Anxiety     Martin esophagus     Colitis     COPD (chronic obstructive pulmonary disease)     GERD (gastroesophageal reflux disease)     Hypertension     Thyroid disease     Vocal cord polyps      Past Surgical History:   Procedure Laterality Date    ABDOMINAL AORTIC ANEURYSM REPAIR      BACK SURGERY      cervical    CATARACT EXTRACTION Right 02/2021    CHOLECY[facility]  12/20/2013    [facility]    FOOT SURGERY      HYSTERECTOMY  1979    AUGUSTINE for AUB with  consevation ovaries    SALIVARY GLAND SURGERY       Family History   Problem Relation Age of Onset    Cancer Mother     Heart failure Father     Emphysema Sister     No Known Problems Brother     Cancer Sister      Social History     Tobacco Use    Smoking status: Every Day     Current packs/day: 0.50     Types: Cigarettes    Smokeless tobacco: Former     Quit date: 5/1/2016    Tobacco comments:     Pt has smoked since 14 years old. Smokes around .5ppd. Inpatient smoking education done 10/20.   Substance Use Topics    Alcohol use: Never    Drug use: Never     Review of Systems   Constitutional:  Negative for fever.   HENT:  Negative for sore throat.    Respiratory:  Positive for cough and shortness of breath.    Cardiovascular:  Negative for chest pain.   Gastrointestinal:  Negative for nausea.   Genitourinary:  Negative for dysuria.   Musculoskeletal:  Negative for back pain.   Skin:  Negative for rash.   Neurological:  Negative for weakness.   Hematological:  Does not bruise/bleed easily.   All other systems reviewed and are negative.      Physical Exam     Initial Vitals   BP Pulse Resp Temp SpO2   01/19/24 1354 01/19/24 1350 01/19/24 1350 01/19/24 1350 01/19/24 1350   135/70 68 (!) 22 97.5 °F (36.4 °C) 97 %      MAP       --                Physical Exam    Nursing note and vitals reviewed.  Constitutional: She appears well-developed and well-nourished. She is not diaphoretic. No distress.   HENT:   Head: Normocephalic and atraumatic.   Eyes: EOM are normal.   Neck: Neck supple.   Normal range of motion.  Cardiovascular:  Normal rate, regular rhythm and normal heart sounds.     Exam reveals no gallop and no friction rub.       No murmur heard.  Pulmonary/Chest: Breath sounds normal. No respiratory distress. She has no wheezes. She has no rhonchi. She has no rales.   Occasional coughing   Musculoskeletal:         General: Normal range of motion.      Cervical back: Normal range of motion and neck supple.      Neurological: She is alert and oriented to person, place, and time.   Skin: Skin is warm and dry.   Psychiatric: She has a normal mood and affect. Her behavior is normal. Judgment and thought content normal.         ED Course   Procedures  Labs Reviewed   CBC W/ AUTO DIFFERENTIAL - Abnormal; Notable for the following components:       Result Value    MCH 31.7 (*)     MPV 8.7 (*)     All other components within normal limits   COMPREHENSIVE METABOLIC PANEL   B-TYPE NATRIURETIC PEPTIDE   TROPONIN I   LACTIC ACID, PLASMA          Imaging Results               X-Ray Chest AP Portable (Final result)  Result time 01/19/24 14:47:50      Final result by Bill Dejesus Jr., MD (01/19/24 14:47:50)                   Impression:      1.8 cm soft tissue density mass of the left upper lobe identified on the prior chest x-ray and chest CT a possible neoplasm.  Mild aortic ectasia.  Otherwise negative chest x-ray.    This report was flagged in Epic as abnormal.      Electronically signed by: Bill Dejesus MD  Date:    01/19/2024  Time:    14:47               Narrative:    EXAMINATION:  XR CHEST AP PORTABLE    CLINICAL HISTORY:  Shortness of breath    TECHNIQUE:  Single frontal view of the chest was performed.    COMPARISON:  CT chest of January 7, 2024 and chest x-ray of January 6, 2024.    FINDINGS:  There is mild aortic ectasia.  The cardiac size and contours within normal limits.  Again seen is a 1.8 cm soft tissue density mass of the left upper lobe.  Other masses or infiltrates in the lung fields are not seen.  No pneumothorax or pleural effusion is noted.                                       Medications   levalbuterol nebulizer solution 1.25 mg (1.25 mg Nebulization Given 1/19/24 1640)     Medical Decision Making  73-year-old female with a history of likely COPD, long-time smoker presents to the ER with coughing and shortness of breath that began earlier today.  She has a history of COPD but notes that she has  never had formal pulmonary function tests.  Patient feels back to normal after 3 Xopenex nebs.  Labs are unremarkable.  Chest x-ray is consistent with prior, she has a known pulmonary lesion likely lung cancer.  I see no reason for antibiotics, she has a possible allergy to steroids so no steroids will be prescribed.    Repeat pulmonary exam notes very faint wheezing bilaterally.  Patient voices readiness to go home, ambulatory sats 95%.    Amount and/or Complexity of Data Reviewed  External Data Reviewed: notes.  Labs:  Decision-making details documented in ED Course.  Radiology:  Decision-making details documented in ED Course.    Risk  Prescription drug management.               ED Course as of 01/19/24 1830 Fri Jan 19, 2024   1605 X-Ray Chest AP Portable(!) [EF]   1605 BP: 135/70 [EF]   1605 Temp: 97.5 °F (36.4 °C) [EF]   1605 Temp Source: Oral [EF]   1605 Pulse: 68 [EF]   1605 Resp(!): 22 [EF]   1605 SpO2: 97 % [EF]   1641 Sinus rhythm 63 beats per minute normal axis PVCs no ST elevation or depression independently interpreted [EF]   1647 WBC: 6.85 [EF]   1647 Hemoglobin: 14.5 [EF]   1647 Platelet Count: 263 [EF]   1703 Troponin I: <0.006 [EF]   1703 BNP: 67 [EF]   1744 Feels much better [EF]      ED Course User Index  [EF] Chris Richards MD                           Clinical Impression:  Final diagnoses:  [R06.02] Shortness of breath  [R06.02] SOB (shortness of breath)          ED Disposition Condition    Discharge Stable          ED Prescriptions    None       Follow-up Information       Follow up With Specialties Details Why Contact Info Additional Information    [facility]  If symptoms worsen, As needed [address] [place] [address] [facility] [id number] 1st floor    Keep scheduled appointment with Chris Mendes MD  01/19/24 1830

## 2024-01-19 NOTE — FIRST PROVIDER EVALUATION
Emergency Department TeleTriage Encounter Note      CHIEF COMPLAINT    Chief Complaint   Patient presents with    Shortness of Breath     Worsened this AM -- recently admitted for same. Reports minimal improvement with breathing treatments.       VITAL SIGNS   Initial Vitals   BP Pulse Resp Temp SpO2   01/19/24 1354 01/19/24 1350 01/19/24 1350 01/19/24 1350 01/19/24 1350   135/70 68 (!) 22 97.5 °F (36.4 °C) 97 %      MAP       --                   ALLERGIES    Review of patient's allergies indicates:   Allergen Reactions    Bisacodyl Nausea And Vomiting     Other reaction(s): Vomiting    Azithromycin Hives    Cipro [ciprofloxacin hcl]     Demerol [meperidine]      Nausea vomiting, visual problem    Doxycycline Hives    Flonase [fluticasone propionate] Hives    Iodinated contrast media Hives     hypotension    Morphine     Morpholine analogues Other (See Comments)     hypotension       PROVIDER TRIAGE NOTE  Verbal consent for the teletriage evaluation was given by the patient at the start of the evaluation.  All efforts will be made to maintain patient's privacy during the evaluation.      This is a teletriage evaluation of a 73 y.o. female presenting to the ED with c/o SOB that started this AM.  Was recently admitted for influenza.  Also reports cough; denies CP. Limited physical exam via telehealth: The patient is awake, alert, answering questions appropriately and is not in respiratory distress.  As the Teletriage provider, I performed an initial assessment and ordered appropriate labs and imaging studies, if any, to facilitate the patient's care once placed in the ED. Once a room is available, care and a full evaluation will be completed by an alternate ED provider.  Any additional orders and the final disposition will be determined by that provider.  All imaging and labs will not be followed-up by the Teletriage Team, including myself.          ORDERS  Labs Reviewed - No data to display    ED Orders (720h ago,  onward)      Start Ordered     Status Ordering Provider    01/19/24 1400 01/19/24 1359  BNP  Once         Ordered AUDREY HAGEN    01/19/24 1400 01/19/24 1359  Troponin I  STAT         Ordered AUDREY HAGEN    01/19/24 1400 01/19/24 1359  Lactic Acid, Plasma  STAT         Ordered AUDREY HAGEN    01/19/24 1400 01/19/24 1359  Saline lock IV  Once         Ordered AUDREY HAGEN    01/19/24 1400 01/19/24 1359  X-Ray Chest AP Portable  1 time imaging         Ordered AUDREY HAGEN    01/19/24 1359 01/19/24 1359  CBC Auto Differential  STAT         Ordered AUDREY HAGEN    01/19/24 1359 01/19/24 1359  Comprehensive Metabolic Panel  STAT         Ordered XIAO AUDREY VEE    01/19/24 1359 01/19/24 1359  Pulse Oximetry Continuous  Continuous         Ordered AUDREY HAGEN    01/19/24 1359 01/19/24 1359  Cardiac Monitoring - Adult  Continuous         Ordered AUDREY HAGEN    01/19/24 1359 01/19/24 1359  EKG 12-lead  Once         Ordered AUDREY HAGEN              Virtual Visit Note: The provider triage portion of this emergency department evaluation and documentation was performed via [employer], a HIPAA-compliant telemedicine application, in concert with a tele-presenter in the room. A face to face patient evaluation with one of my colleagues will occur once the patient is placed in an emergency department room.      DISCLAIMER: This note was prepared with [employer] voice recognition transcription software. Garbled syntax, mangled pronouns, and other bizarre constructions may be attributed to that software system.

## 2024-01-23 ENCOUNTER — OFFICE VISIT (OUTPATIENT)
Dept: PULMONOLOGY | Facility: CLINIC | Age: 74
End: 2024-01-23
Payer: MEDICARE

## 2024-01-23 VITALS
WEIGHT: 163.69 LBS | OXYGEN SATURATION: 96 % | HEART RATE: 68 BPM | DIASTOLIC BLOOD PRESSURE: 84 MMHG | HEIGHT: 62 IN | BODY MASS INDEX: 30.12 KG/M2 | SYSTOLIC BLOOD PRESSURE: 127 MMHG

## 2024-01-23 DIAGNOSIS — R91.1 LUNG NODULE: Primary | ICD-10-CM

## 2024-01-23 DIAGNOSIS — U09.9 POST-COVID CHRONIC COUGH: ICD-10-CM

## 2024-01-23 DIAGNOSIS — R05.3 POST-COVID CHRONIC COUGH: ICD-10-CM

## 2024-01-23 DIAGNOSIS — J43.2 CENTRILOBULAR EMPHYSEMA: ICD-10-CM

## 2024-01-23 DIAGNOSIS — R06.02 SOB (SHORTNESS OF BREATH): ICD-10-CM

## 2024-01-23 DIAGNOSIS — Z87.891 PERSONAL HISTORY OF NICOTINE DEPENDENCE: ICD-10-CM

## 2024-01-23 PROCEDURE — 99999 PR PBB SHADOW E&M-EST. PATIENT-LVL III: CPT | Mod: PBBFAC,,, | Performed by: NURSE PRACTITIONER

## 2024-01-23 PROCEDURE — 99214 OFFICE O/P EST MOD 30 MIN: CPT | Mod: S$GLB,,, | Performed by: NURSE PRACTITIONER

## 2024-01-23 NOTE — PROGRESS NOTES
1/23/2024    Joslyn Dubon  New Patient Consult    Chief Complaint   Patient presents with    Shortness of Breath    Pulmonary Nodules     Found in Hospital stay        HPI:  01/23/2024: Hx: COPD, Personal History of Nicotine Dependence  In office today with .   Denies being seen by prior Pulmonologist. Denies current use of maintenance inhaler therapy, supplemental oxygen, CPAP use. Denies personal history of cancer, PE, current anticoagulation use.  Diagnosed with suspected COVID in October 2023 - states has been sick ever since. Did not require hospitalization at that time but was severely ill. Patient presented to the ER at [facility] in October 2023 - had anaphylactic reaction secondary to contrast administration for CT? Ever since, now has an EpiPen that she carries with her.  In November 2023 was seen per PCP for persistent cough - prescribed Augmentin at that time - completed regimen.  On 01/03/2024 presented to [facility] ER and tested FLU positive, discharged home and prescribed Tamiful, which she reports completion.  On 1/6/2024 presented back to [facility] ER for SOB - admitted overnight for COPD exacerbation, dc home on 1/8.  While inpatient underwent CT Chest revealing 1.8 spiculated lung nodule to the left upper lung. Dr. Hilton with Pulm was consulted in which a Needle biopsy was ordered and is scheduled for 1/29.  States she was told many years ago she has COPD but never officially diagnosed or tested. Also PCP has been following lung nodules for approx 7 years now.   Shortness of breath: Gradual progression since COVID diagnosis in Oct 2023 - can't walk throughout the home without getting short winded. Wheezing noted with deep inspiration. Denies chest tightness. Feels as if she can't inhale.  Cough: Precipitated with deep inspiration - productive with clear thick mucous. Worse at night time with laying flat.   Prescribed Trelegy inhaler per PCP - has not tried. Using Levalbuterol  inhaler approx BID PRN, Levalbuterol neb treatments BID with reported improvement.             Social Hx: Lives with  - one cat in the home. Retired . Positive Asbestosis exposure, Smoking Hx: Former smoker, quit on 1-1-2024 - started at 15 YO, typical 0.5 ppd use.   Family Hx: No Lung Cancer, No COPD, Sister with Asthma, Mother and Father with TB  Medical Hx: No previous pneumonia ; No previous shoulder/chest surgery        The Chief Complaint is: New to me      PFSH:  Past Medical History:   Diagnosis Date    Anxiety     Martin esophagus     Colitis     COPD (chronic obstructive pulmonary disease)     GERD (gastroesophageal reflux disease)     Hypertension     Thyroid disease     Vocal cord polyps          Past Surgical History:   Procedure Laterality Date    ABDOMINAL AORTIC ANEURYSM REPAIR      BACK SURGERY      cervical    CATARACT EXTRACTION Right 02/2021    [facility]20[facility]    FOOT SURGERY      HYSTERECTOMY  1979    AUGUSTINE for AUB with consevation ovaries    SALIVARY GLAND SURGERY       Social History     Tobacco Use    Smoking status: Every Day     Current packs/day: 0.50     Types: Cigarettes    Smokeless tobacco: Former     Quit date: 5/1/2016    Tobacco comments:     Pt has smoked since 14 years old. Smokes around .5ppd. Inpatient smoking education done 10/20.   Substance Use Topics    Alcohol use: Never    Drug use: Never     Family History   Problem Relation Age of Onset    Cancer Mother     Heart failure Father     Emphysema Sister     No Known Problems Brother     Cancer Sister      Review of patient's allergies indicates:   Allergen Reactions    Bisacodyl Nausea And Vomiting     Other reaction(s): Vomiting    Azithromycin Hives    Cipro [ciprofloxacin hcl]     Demerol [meperidine]      Nausea vomiting, visual problem    Doxycycline Hives    Flonase [fluticasone propionate] Hives    Iodinated contrast media Hives     hypotension    Morphine     Morpholine  "analogues Other (See Comments)     hypotension         I have reviewed past medical, family, and social history.     Performance Status:The patient's activity level is functions out of house.        Review of Systems   Constitutional:  Negative for activity change, appetite change, chills, diaphoresis, fatigue, fever and unexpected weight change.   HENT:  Positive for congestion, postnasal drip and trouble swallowing. Negative for rhinorrhea, sinus pressure, sinus pain and sore throat.    Eyes:  Negative for photophobia and visual disturbance.   Respiratory:  Positive for cough, shortness of breath and wheezing. Negative for choking and chest tightness.    Cardiovascular:  Positive for leg swelling. Negative for chest pain and palpitations.   Gastrointestinal:  Positive for constipation. Negative for abdominal distention, abdominal pain, blood in stool, diarrhea, nausea and vomiting.   Genitourinary:  Negative for difficulty urinating, dysuria, flank pain and hematuria.   Musculoskeletal:  Positive for back pain. Negative for gait problem, joint swelling and neck pain.   Skin:  Negative for rash and wound.   Allergic/Immunologic: Negative for environmental allergies and food allergies.        Possible Shellfish?   Neurological:  Negative for dizziness, seizures, syncope, weakness, light-headedness, numbness and headaches.   Hematological:  Does not bruise/bleed easily.   Psychiatric/Behavioral:  Positive for sleep disturbance. Negative for confusion. The patient is not nervous/anxious.          Exam:Comprehensive exam done. /84 (BP Location: Right forearm, Patient Position: Sitting, BP Method: Medium (Automatic))   Pulse 68   Ht 5' 2" (1.575 m)   Wt 74.2 kg (163 lb 11.1 oz)   SpO2 96%   BMI 29.94 kg/m²   Exam included Vitals as listed  Constitutional: She is oriented to person, place, and time. She appears well-developed. No distress.   Nose: Nose normal.   Mouth/Throat: Uvula is midline, oropharynx is " clear and moist and mucous membranes are normal. No dental caries. No oropharyngeal exudate, posterior oropharyngeal edema, posterior oropharyngeal erythema or tonsillar abscesses.    Eyes: Pupils are equal, round, and reactive to light.   Neck: No JVD present. No thyromegaly present.   Cardiovascular: Normal rate, regular rhythm and normal heart sounds. Exam reveals no gallop and no friction rub.   No murmur heard.  Pulmonary/Chest: Effort normal and breath sounds normal. No accessory muscle usage or stridor. No apnea and no tachypnea. No respiratory distress, decreased breath sounds, wheezes, rhonchi, rales, or tenderness. Faint rhonchi to bilateral upper lobes. On room air, in no acute distress.   Abdominal: Soft. She exhibits no mass. There is no tenderness. No hepatosplenomegaly, hernias and normoactive bowel sounds  Musculoskeletal: Normal range of motion. exhibits no edema.   Neurological:  alert and oriented to person, place, and time. not disoriented.   Skin: Skin is warm and dry. Capillary refill takes less 2 sec. No cyanosis or erythema. No pallor. Nails show no clubbing.   Psychiatric: normal mood and affect. behavior is normal. Judgment and thought content normal.       Radiographs (ct chest and cxr) reviewed: was done by direct view   Patient imaging studies were reviewed and interpreted independently. My personal interpretation of most recent images include:  CT Chest - 1/7/2024 - 18 mm ELOY nodule. Bronchiectasis to lower lobes. Emphysema throughout bilateral upper lobes. Atelectasis to RLL.            Labs Patient's labs were reviewed including CBC and CMP  Lab Results   Component Value Date    WBC 6.85 01/19/2024    RBC 4.58 01/19/2024    HGB 14.5 01/19/2024    HCT 44.3 01/19/2024    MCV 97 01/19/2024    MCH 31.7 (H) 01/19/2024    MCHC 32.7 01/19/2024    RDW 13.2 01/19/2024     01/19/2024    MPV 8.7 (L) 01/19/2024    GRAN 4.1 01/19/2024    GRAN 59.5 01/19/2024    LYMPH 2.0 01/19/2024    LYMPH  28.8 01/19/2024    MONO 0.7 01/19/2024    MONO 9.5 01/19/2024    EOS 0.1 01/19/2024    BASO 0.04 01/19/2024    EOSINOPHIL 1.2 01/19/2024    BASOPHIL 0.6 01/19/2024   CMP  Sodium   Date Value Ref Range Status   01/19/2024 140 136 - 145 mmol/L Final     Potassium   Date Value Ref Range Status   01/19/2024 4.0 3.5 - 5.1 mmol/L Final     Chloride   Date Value Ref Range Status   01/19/2024 105 95 - 110 mmol/L Final     CO2   Date Value Ref Range Status   01/19/2024 24 23 - 29 mmol/L Final     Glucose   Date Value Ref Range Status   01/19/2024 91 70 - 110 mg/dL Final     BUN   Date Value Ref Range Status   01/19/2024 14 8 - 23 mg/dL Final     Creatinine   Date Value Ref Range Status   01/19/2024 0.9 0.5 - 1.4 mg/dL Final   03/19/2013 0.8 0.5 - 1.4 mg/dL Final     Calcium   Date Value Ref Range Status   01/19/2024 9.9 8.7 - 10.5 mg/dL Final   03/19/2013 9.9 8.7 - 10.5 mg/dL Final     Total Protein   Date Value Ref Range Status   01/19/2024 8.3 6.0 - 8.4 g/dL Final     Albumin   Date Value Ref Range Status   01/19/2024 3.8 3.5 - 5.2 g/dL Final     Total Bilirubin   Date Value Ref Range Status   01/19/2024 0.3 0.1 - 1.0 mg/dL Final     Comment:     For infants and newborns, interpretation of results should be based  on gestational age, weight and in agreement with clinical  observations.    Premature Infant recommended reference ranges:  Up to 24 hours.............<8.0 mg/dL  Up to 48 hours............<12.0 mg/dL  3-5 days..................<15.0 mg/dL  6-29 days.................<15.0 mg/dL       Alkaline Phosphatase   Date Value Ref Range Status   01/19/2024 114 55 - 135 U/L Final   08/31/2012 107 23 - 119 UNIT/L Final     AST   Date Value Ref Range Status   01/19/2024 24 10 - 40 U/L Final   08/31/2012 21 10 - 30 UNIT/L Final     ALT   Date Value Ref Range Status   01/19/2024 26 10 - 44 U/L Final     Anion Gap   Date Value Ref Range Status   01/19/2024 11 8 - 16 mmol/L Final   03/19/2013 12 5 - 15 meq/L Final     eGFR   Date  Value Ref Range Status   01/19/2024 >60 >60 mL/min/1.73 m^2 Final   01/20/2023 78 > OR = 60 mL/min/1.73m2 Final     Comment:     The eGFR is based on the CKD-EPI 2021 equation. To calculate   the new eGFR from a previous Creatinine or Cystatin C  result, go to https://www.kidney.org/professionals/  kdoqi/gfr%5Fcalculator           PFT will be done and results to be reviewed  Pulmonary Functions Testing Results:        Plan:  Clinical impression is ambiguous and will need repeated evaluation wrt will require conference with MD.    Joslyn was seen today for shortness of breath and pulmonary nodules.    Diagnoses and all orders for this visit:    Lung nodule    SOB (shortness of breath)  -     Complete PFT with bronchodilator; Future  -     Six Minute Walk Test to qualify for Home Oxygen; Future    Personal history of nicotine dependence    Post-COVID chronic cough    Centrilobular emphysema  -     Complete PFT with bronchodilator; Future  -     Six Minute Walk Test to qualify for Home Oxygen; Future        Follow up in about 2 months (around 3/23/2024), or if symptoms worsen or fail to improve.    Discussed with patient above for education the following:      Patient Instructions   CT Chest shows emphysema throughout bilateral upper lobes - this is consistent with COPD.    CT Chest also shows an 18 mm nodule to the LEFT upper lobe - this has grown from a 3 mm nodule in June 2023.    Proceed with Needle Biopsy at this time. It's scheduled for 01/29/2024 at 11:00. This was ordered per Dr. Alyx Hilton.     If pathology results show cancer - will refer to Oncology.    Anticipate need for PET scan in the future.    I ordered a Lung Function Test to evaluate lung strength. Please complete prior to next appointment.   Get this done in the meantime, if you are a surgical candidate, surgeon will want these results. This test will also tell us the extent and severity of your COPD.    Six minute walk test to evaluate the need  for supplemental oxygen.    Can start Trelegy. This is a COPD maintenance inhaler. Take one puff once per day. This inhaler contains an inhaled steroid component. Rinse mouth after each use due to risk for thrush development. If mouth or tongue develops white sores please contact the clinic and I will order a prescription mouth wash.     Continue Levalbuterol (Xopenex) as needed for shortness of breath, wheezing, cough.    CONGRATS ON SMOKING CESSATION!    Continue current prescription medication regiment. Keep follow up appointment as scheduled. Please call the office if you have any questions or concerns.

## 2024-01-23 NOTE — PATIENT INSTRUCTIONS
CT Chest shows emphysema throughout bilateral upper lobes - this is consistent with COPD.    CT Chest also shows an 18 mm nodule to the LEFT upper lobe - this has grown from a 3 mm nodule in June 2023.    Proceed with Needle Biopsy at this time. It's scheduled for 01/29/2024 at 11:00. This was ordered per Dr. Alyx Hilton.     If pathology results show cancer - will refer to Oncology.    Anticipate need for PET scan in the future.    I ordered a Lung Function Test to evaluate lung strength. Please complete prior to next appointment.   Get this done in the meantime, if you are a surgical candidate, surgeon will want these results. This test will also tell us the extent and severity of your COPD.    Six minute walk test to evaluate the need for supplemental oxygen.    Can start Trelegy. This is a COPD maintenance inhaler. Take one puff once per day. This inhaler contains an inhaled steroid component. Rinse mouth after each use due to risk for thrush development. If mouth or tongue develops white sores please contact the clinic and I will order a prescription mouth wash.     Continue Levalbuterol (Xopenex) as needed for shortness of breath, wheezing, cough.    CONGRATS ON SMOKING CESSATION!    Continue current prescription medication regiment. Keep follow up appointment as scheduled. Please call the office if you have any questions or concerns.

## 2024-01-23 NOTE — H&P (VIEW-ONLY)
1/23/2024    Joslyn Dubon  New Patient Consult    Chief Complaint   Patient presents with    Shortness of Breath    Pulmonary Nodules     Found in Hospital stay        HPI:  01/23/2024: Hx: COPD, Personal History of Nicotine Dependence  In office today with .   Denies being seen by prior Pulmonologist. Denies current use of maintenance inhaler therapy, supplemental oxygen, CPAP use. Denies personal history of cancer, PE, current anticoagulation use.  Diagnosed with suspected COVID in October 2023 - states has been sick ever since. Did not require hospitalization at that time but was severely ill. Patient presented to the ER at [facility] in October 2023 - had anaphylactic reaction secondary to contrast administration for CT? Ever since, now has an EpiPen that she carries with her.  In November 2023 was seen per PCP for persistent cough - prescribed Augmentin at that time - completed regimen.  On 01/03/2024 presented to [facility] ER and tested FLU positive, discharged home and prescribed Tamiful, which she reports completion.  On 1/6/2024 presented back to [facility] ER for SOB - admitted overnight for COPD exacerbation, dc home on 1/8.  While inpatient underwent CT Chest revealing 1.8 spiculated lung nodule to the left upper lung. Dr. Hilton with Pulm was consulted in which a Needle biopsy was ordered and is scheduled for 1/29.  States she was told many years ago she has COPD but never officially diagnosed or tested. Also PCP has been following lung nodules for approx 7 years now.   Shortness of breath: Gradual progression since COVID diagnosis in Oct 2023 - can't walk throughout the home without getting short winded. Wheezing noted with deep inspiration. Denies chest tightness. Feels as if she can't inhale.  Cough: Precipitated with deep inspiration - productive with clear thick mucous. Worse at night time with laying flat.   Prescribed Trelegy inhaler per PCP - has not tried. Using Levalbuterol  inhaler approx BID PRN, Levalbuterol neb treatments BID with reported improvement.             Social Hx: Lives with  - one cat in the home. Retired . Positive Asbestosis exposure, Smoking Hx: Former smoker, quit on 1-1-2024 - started at 13 YO, typical 0.5 ppd use.   Family Hx: No Lung Cancer, No COPD, Sister with Asthma, Mother and Father with TB  Medical Hx: No previous pneumonia ; No previous shoulder/chest surgery        The Chief Complaint is: New to me      PFSH:  Past Medical History:   Diagnosis Date    Anxiety     Martin esophagus     Colitis     COPD (chronic obstructive pulmonary disease)     GERD (gastroesophageal reflux disease)     Hypertension     Thyroid disease     Vocal cord polyps          Past Surgical History:   Procedure Laterality Date    ABDOMINAL AORTIC ANEURYSM REPAIR      BACK SURGERY      cervical    CATARACT EXTRACTION Right 02/2021    CHOLECYSTECTOMY  12/20/2013    [facility]    FOOT SURGERY      HYSTERECTOMY  1979    AUGUSTINE for AUB with consevation ovaries    SALIVARY GLAND SURGERY       Social History     Tobacco Use    Smoking status: Every Day     Current packs/day: 0.50     Types: Cigarettes    Smokeless tobacco: Former     Quit date: 5/1/2016    Tobacco comments:     Pt has smoked since 14 years old. Smokes around .5ppd. Inpatient smoking education done 10/20.   Substance Use Topics    Alcohol use: Never    Drug use: Never     Family History   Problem Relation Age of Onset    Cancer Mother     Heart failure Father     Emphysema Sister     No Known Problems Brother     Cancer Sister      Review of patient's allergies indicates:   Allergen Reactions    Bisacodyl Nausea And Vomiting     Other reaction(s): Vomiting    Azithromycin Hives    Cipro [ciprofloxacin hcl]     Demerol [meperidine]      Nausea vomiting, visual problem    Doxycycline Hives    Flonase [fluticasone propionate] Hives    Iodinated contrast media Hives     hypotension    Morphine     Morpholine  "analogues Other (See Comments)     hypotension         I have reviewed past medical, family, and social history.     Performance Status:The patient's activity level is functions out of house.        Review of Systems   Constitutional:  Negative for activity change, appetite change, chills, diaphoresis, fatigue, fever and unexpected weight change.   HENT:  Positive for congestion, postnasal drip and trouble swallowing. Negative for rhinorrhea, sinus pressure, sinus pain and sore throat.    Eyes:  Negative for photophobia and visual disturbance.   Respiratory:  Positive for cough, shortness of breath and wheezing. Negative for choking and chest tightness.    Cardiovascular:  Positive for leg swelling. Negative for chest pain and palpitations.   Gastrointestinal:  Positive for constipation. Negative for abdominal distention, abdominal pain, blood in stool, diarrhea, nausea and vomiting.   Genitourinary:  Negative for difficulty urinating, dysuria, flank pain and hematuria.   Musculoskeletal:  Positive for back pain. Negative for gait problem, joint swelling and neck pain.   Skin:  Negative for rash and wound.   Allergic/Immunologic: Negative for environmental allergies and food allergies.        Possible Shellfish?   Neurological:  Negative for dizziness, seizures, syncope, weakness, light-headedness, numbness and headaches.   Hematological:  Does not bruise/bleed easily.   Psychiatric/Behavioral:  Positive for sleep disturbance. Negative for confusion. The patient is not nervous/anxious.          Exam:Comprehensive exam done. /84 (BP Location: Right forearm, Patient Position: Sitting, BP Method: Medium (Automatic))   Pulse 68   Ht 5' 2" (1.575 m)   Wt 74.2 kg (163 lb 11.1 oz)   SpO2 96%   BMI 29.94 kg/m²   Exam included Vitals as listed  Constitutional: She is oriented to person, place, and time. She appears well-developed. No distress.   Nose: Nose normal.   Mouth/Throat: Uvula is midline, oropharynx is " clear and moist and mucous membranes are normal. No dental caries. No oropharyngeal exudate, posterior oropharyngeal edema, posterior oropharyngeal erythema or tonsillar abscesses.    Eyes: Pupils are equal, round, and reactive to light.   Neck: No JVD present. No thyromegaly present.   Cardiovascular: Normal rate, regular rhythm and normal heart sounds. Exam reveals no gallop and no friction rub.   No murmur heard.  Pulmonary/Chest: Effort normal and breath sounds normal. No accessory muscle usage or stridor. No apnea and no tachypnea. No respiratory distress, decreased breath sounds, wheezes, rhonchi, rales, or tenderness. Faint rhonchi to bilateral upper lobes. On room air, in no acute distress.   Abdominal: Soft. She exhibits no mass. There is no tenderness. No hepatosplenomegaly, hernias and normoactive bowel sounds  Musculoskeletal: Normal range of motion. exhibits no edema.   Neurological:  alert and oriented to person, place, and time. not disoriented.   Skin: Skin is warm and dry. Capillary refill takes less 2 sec. No cyanosis or erythema. No pallor. Nails show no clubbing.   Psychiatric: normal mood and affect. behavior is normal. Judgment and thought content normal.       Radiographs (ct chest and cxr) reviewed: was done by direct view   Patient imaging studies were reviewed and interpreted independently. My personal interpretation of most recent images include:  CT Chest - 1/7/2024 - 18 mm ELOY nodule. Bronchiectasis to lower lobes. Emphysema throughout bilateral upper lobes. Atelectasis to RLL.            Labs Patient's labs were reviewed including CBC and CMP  Lab Results   Component Value Date    WBC 6.85 01/19/2024    RBC 4.58 01/19/2024    HGB 14.5 01/19/2024    HCT 44.3 01/19/2024    MCV 97 01/19/2024    MCH 31.7 (H) 01/19/2024    MCHC 32.7 01/19/2024    RDW 13.2 01/19/2024     01/19/2024    MPV 8.7 (L) 01/19/2024    GRAN 4.1 01/19/2024    GRAN 59.5 01/19/2024    LYMPH 2.0 01/19/2024    LYMPH  28.8 01/19/2024    MONO 0.7 01/19/2024    MONO 9.5 01/19/2024    EOS 0.1 01/19/2024    BASO 0.04 01/19/2024    EOSINOPHIL 1.2 01/19/2024    BASOPHIL 0.6 01/19/2024   CMP  Sodium   Date Value Ref Range Status   01/19/2024 140 136 - 145 mmol/L Final     Potassium   Date Value Ref Range Status   01/19/2024 4.0 3.5 - 5.1 mmol/L Final     Chloride   Date Value Ref Range Status   01/19/2024 105 95 - 110 mmol/L Final     CO2   Date Value Ref Range Status   01/19/2024 24 23 - 29 mmol/L Final     Glucose   Date Value Ref Range Status   01/19/2024 91 70 - 110 mg/dL Final     BUN   Date Value Ref Range Status   01/19/2024 14 8 - 23 mg/dL Final     Creatinine   Date Value Ref Range Status   01/19/2024 0.9 0.5 - 1.4 mg/dL Final   03/19/2013 0.8 0.5 - 1.4 mg/dL Final     Calcium   Date Value Ref Range Status   01/19/2024 9.9 8.7 - 10.5 mg/dL Final   03/19/2013 9.9 8.7 - 10.5 mg/dL Final     Total Protein   Date Value Ref Range Status   01/19/2024 8.3 6.0 - 8.4 g/dL Final     Albumin   Date Value Ref Range Status   01/19/2024 3.8 3.5 - 5.2 g/dL Final     Total Bilirubin   Date Value Ref Range Status   01/19/2024 0.3 0.1 - 1.0 mg/dL Final     Comment:     For infants and newborns, interpretation of results should be based  on gestational age, weight and in agreement with clinical  observations.    Premature Infant recommended reference ranges:  Up to 24 hours.............<8.0 mg/dL  Up to 48 hours............<12.0 mg/dL  3-5 days..................<15.0 mg/dL  6-29 days.................<15.0 mg/dL       Alkaline Phosphatase   Date Value Ref Range Status   01/19/2024 114 55 - 135 U/L Final   08/31/2012 107 23 - 119 UNIT/L Final     AST   Date Value Ref Range Status   01/19/2024 24 10 - 40 U/L Final   08/31/2012 21 10 - 30 UNIT/L Final     ALT   Date Value Ref Range Status   01/19/2024 26 10 - 44 U/L Final     Anion Gap   Date Value Ref Range Status   01/19/2024 11 8 - 16 mmol/L Final   03/19/2013 12 5 - 15 meq/L Final     eGFR   Date  Value Ref Range Status   01/19/2024 >60 >60 mL/min/1.73 m^2 Final   01/20/2023 78 > OR = 60 mL/min/1.73m2 Final     Comment:     The eGFR is based on the CKD-EPI 2021 equation. To calculate   the new eGFR from a previous Creatinine or Cystatin C  result, go to https://www.kidney.org/professionals/  kdoqi/gfr%5Fcalculator           PFT will be done and results to be reviewed  Pulmonary Functions Testing Results:        Plan:  Clinical impression is ambiguous and will need repeated evaluation wrt will require conference with MD.    Joslyn was seen today for shortness of breath and pulmonary nodules.    Diagnoses and all orders for this visit:    Lung nodule    SOB (shortness of breath)  -     Complete PFT with bronchodilator; Future  -     Six Minute Walk Test to qualify for Home Oxygen; Future    Personal history of nicotine dependence    Post-COVID chronic cough    Centrilobular emphysema  -     Complete PFT with bronchodilator; Future  -     Six Minute Walk Test to qualify for Home Oxygen; Future        Follow up in about 2 months (around 3/23/2024), or if symptoms worsen or fail to improve.    Discussed with patient above for education the following:      Patient Instructions   CT Chest shows emphysema throughout bilateral upper lobes - this is consistent with COPD.    CT Chest also shows an 18 mm nodule to the LEFT upper lobe - this has grown from a 3 mm nodule in June 2023.    Proceed with Needle Biopsy at this time. It's scheduled for 01/29/2024 at 11:00. This was ordered per Dr. Alyx Hilton.     If pathology results show cancer - will refer to Oncology.    Anticipate need for PET scan in the future.    I ordered a Lung Function Test to evaluate lung strength. Please complete prior to next appointment.   Get this done in the meantime, if you are a surgical candidate, surgeon will want these results. This test will also tell us the extent and severity of your COPD.    Six minute walk test to evaluate the need  for supplemental oxygen.    Can start Trelegy. This is a COPD maintenance inhaler. Take one puff once per day. This inhaler contains an inhaled steroid component. Rinse mouth after each use due to risk for thrush development. If mouth or tongue develops white sores please contact the clinic and I will order a prescription mouth wash.     Continue Levalbuterol (Xopenex) as needed for shortness of breath, wheezing, cough.    CONGRATS ON SMOKING CESSATION!    Continue current prescription medication regiment. Keep follow up appointment as scheduled. Please call the office if you have any questions or concerns.

## 2024-01-24 ENCOUNTER — TELEPHONE (OUTPATIENT)
Dept: INTERVENTIONAL RADIOLOGY/VASCULAR | Facility: HOSPITAL | Age: 74
End: 2024-01-24

## 2024-01-24 NOTE — NURSING
Radiology Pre-Procedural Instructions- [facility]    Radiology Nurse contacted patient to provide instructions for scheduled CT Lung Biopsy at 1100 on 01/29/24.   Patient instructed to arrive no later than 0930 am to outpatient registration.   Registration will direct patient to outpatient lab for pre-op lab work if required.  Following labs, patient needs to check in at the surgery waiting room desk located on the second floor.   Patient is not currently taking anticoagulants at this time.  Patient instructed to remain NPO after midnight with the exception of approved essential meds taken with a small sip of water.  Instructed patient to bathe the night before and the morning of their procedure with an anti bacterial soap or Hibiclens.   Patient is aware of their responsibility to make post transportation arrangements home by a responsible adult.   Patient educated on all additional pre-op instructions per policy and verbalized understanding.

## 2024-01-26 ENCOUNTER — PATIENT MESSAGE (OUTPATIENT)
Dept: PULMONOLOGY | Facility: CLINIC | Age: 74
End: 2024-01-26

## 2024-01-26 ENCOUNTER — HOSPITAL ENCOUNTER (OUTPATIENT)
Dept: PULMONOLOGY | Facility: HOSPITAL | Age: 74
Discharge: HOME | End: 2024-01-26
Attending: NURSE PRACTITIONER
Payer: MEDICARE

## 2024-01-26 DIAGNOSIS — J43.2 CENTRILOBULAR EMPHYSEMA: Primary | ICD-10-CM

## 2024-01-26 DIAGNOSIS — J43.2 CENTRILOBULAR EMPHYSEMA: ICD-10-CM

## 2024-01-26 DIAGNOSIS — R06.02 SOB (SHORTNESS OF BREATH): ICD-10-CM

## 2024-01-26 LAB
DLCO SINGLE BREATH LLN: 13.58
DLCO SINGLE BREATH PRE REF: 44.5 %
DLCO SINGLE BREATH REF: 19.31
DLCOC SBVA LLN: 2.66
DLCOC SBVA REF: 4.19
DLCOC SINGLE BREATH LLN: 13.58
DLCOC SINGLE BREATH REF: 19.31
DLCOVA LLN: 2.66
DLCOVA PRE REF: 69.6 %
DLCOVA PRE: 2.92 ML/(MIN*MMHG*L) (ref 2.66–5.73)
DLCOVA REF: 4.19
ERV LLN: -16449.45
ERV PRE REF: 123.1 %
ERV REF: 0.55
FEF 25 75 CHG: 4.8 %
FEF 25 75 LLN: 0.7
FEF 25 75 POST REF: 55.6 %
FEF 25 75 PRE REF: 53.1 %
FEF 25 75 REF: 1.61
FET100 CHG: 6.3 %
FEV1 CHG: 5.2 %
FEV1 FVC CHG: 1.5 %
FEV1 FVC LLN: 64
FEV1 FVC POST REF: 91 %
FEV1 FVC PRE REF: 89.6 %
FEV1 FVC REF: 78
FEV1 LLN: 1.37
FEV1 POST REF: 78.2 %
FEV1 PRE REF: 74.4 %
FEV1 REF: 1.92
FRCPLETH LLN: 1.78
FRCPLETH PREREF: 95.2 %
FRCPLETH REF: 2.6
FVC CHG: 3.6 %
FVC LLN: 1.78
FVC POST REF: 85.2 %
FVC PRE REF: 82.2 %
FVC REF: 2.49
IVC PRE: 1.88 L (ref 1.78–3.24)
IVC SINGLE BREATH LLN: 1.78
IVC SINGLE BREATH PRE REF: 75.4 %
IVC SINGLE BREATH REF: 2.49
MVV LLN: 59
MVV PRE REF: 62.1 %
MVV REF: 70
PEF CHG: 19.4 %
PEF LLN: 3.34
PEF POST REF: 87.1 %
PEF PRE REF: 73 %
PEF REF: 4.94
POST FEF 25 75: 0.9 L/S (ref 0.7–2.94)
POST FET 100: 9.46 SEC
POST FEV1 FVC: 70.72 % (ref 63.66–89.92)
POST FEV1: 1.5 L (ref 1.37–2.44)
POST FVC: 2.12 L (ref 1.78–3.24)
POST PEF: 4.3 L/S (ref 3.34–6.55)
PRE DLCO: 8.59 ML/(MIN*MMHG) (ref 13.58–25.04)
PRE ERV: 0.68 L (ref -16449.45–16450.55)
PRE FEF 25 75: 0.86 L/S (ref 0.7–2.94)
PRE FET 100: 8.9 SEC
PRE FEV1 FVC: 69.66 % (ref 63.66–89.92)
PRE FEV1: 1.43 L (ref 1.37–2.44)
PRE FRC PL: 2.48 L (ref 1.78–3.43)
PRE FVC: 2.05 L (ref 1.78–3.24)
PRE MVV: 43.39 L/MIN (ref 59.35–80.29)
PRE PEF: 3.61 L/S (ref 3.34–6.55)
PRE RV: 1.8 L (ref 1.47–2.63)
PRE TLC: 3.84 L (ref 3.62–5.59)
RAW LLN: 3.06
RAW PRE REF: 204.9 %
RAW PRE: 6.27 CMH2O*S/L (ref 3.06–3.06)
RAW REF: 3.06
RV LLN: 1.47
RV PRE REF: 87.7 %
RV REF: 2.05
RVTLC LLN: 35
RVTLC PRE REF: 105.2 %
RVTLC PRE: 46.76 % (ref 34.87–54.05)
RVTLC REF: 44
TLC LLN: 3.62
TLC PRE REF: 83.5 %
TLC REF: 4.6
VA PRE: 2.94 L (ref 4.45–4.45)
VA SINGLE BREATH LLN: 4.45
VA SINGLE BREATH PRE REF: 66.1 %
VA SINGLE BREATH REF: 4.45
VC LLN: 1.78
VC PRE REF: 82.2 %
VC PRE: 2.05 L (ref 1.78–3.24)
VC REF: 2.49

## 2024-01-26 PROCEDURE — 94618 PULMONARY STRESS TESTING: CPT

## 2024-01-26 PROCEDURE — 94729 DIFFUSING CAPACITY: CPT | Mod: 26,,, | Performed by: INTERNAL MEDICINE

## 2024-01-26 PROCEDURE — 94060 EVALUATION OF WHEEZING: CPT | Mod: 26,59,, | Performed by: INTERNAL MEDICINE

## 2024-01-26 PROCEDURE — 94726 PLETHYSMOGRAPHY LUNG VOLUMES: CPT

## 2024-01-26 PROCEDURE — 94060 EVALUATION OF WHEEZING: CPT

## 2024-01-26 PROCEDURE — 94618 PULMONARY STRESS TESTING: CPT | Mod: 26,,, | Performed by: INTERNAL MEDICINE

## 2024-01-26 PROCEDURE — 94729 DIFFUSING CAPACITY: CPT

## 2024-01-26 PROCEDURE — 94726 PLETHYSMOGRAPHY LUNG VOLUMES: CPT | Mod: 26,,, | Performed by: INTERNAL MEDICINE

## 2024-01-26 RX ORDER — ALBUTEROL SULFATE 2.5 MG/.5ML
SOLUTION RESPIRATORY (INHALATION)
Status: DISCONTINUED
Start: 2024-01-26 | End: 2024-01-26 | Stop reason: WASHOUT

## 2024-01-26 RX ORDER — LEVALBUTEROL INHALATION SOLUTION 1.25 MG/3ML
1.25 SOLUTION RESPIRATORY (INHALATION)
Status: DISCONTINUED | OUTPATIENT
Start: 2024-01-26 | End: 2024-05-17 | Stop reason: CLARIF

## 2024-01-26 RX ORDER — LEVALBUTEROL 1.25 MG/.5ML
SOLUTION, CONCENTRATE RESPIRATORY (INHALATION)
Status: COMPLETED
Start: 2024-01-26 | End: 2024-01-26

## 2024-01-29 ENCOUNTER — HOSPITAL ENCOUNTER (OUTPATIENT)
Dept: RADIOLOGY | Facility: HOSPITAL | Age: 74
Discharge: HOME | End: 2024-01-29
Attending: INTERNAL MEDICINE
Payer: MEDICARE

## 2024-01-29 VITALS
OXYGEN SATURATION: 95 % | TEMPERATURE: 99 F | HEART RATE: 69 BPM | WEIGHT: 160 LBS | HEIGHT: 62 IN | RESPIRATION RATE: 19 BRPM | SYSTOLIC BLOOD PRESSURE: 126 MMHG | DIASTOLIC BLOOD PRESSURE: 87 MMHG | BODY MASS INDEX: 29.44 KG/M2

## 2024-01-29 DIAGNOSIS — R91.1 LUNG NODULE: Primary | ICD-10-CM

## 2024-01-29 RX ORDER — SODIUM CHLORIDE 9 MG/ML
INJECTION, SOLUTION INTRAVENOUS CONTINUOUS
Status: DISCONTINUED | OUTPATIENT
Start: 2024-01-29 | End: 2024-01-30 | Stop reason: HOSPADM

## 2024-01-29 RX ORDER — LIDOCAINE HYDROCHLORIDE 10 MG/ML
1 INJECTION, SOLUTION EPIDURAL; INFILTRATION; INTRACAUDAL; PERINEURAL ONCE AS NEEDED
Status: DISCONTINUED | OUTPATIENT
Start: 2024-01-29 | End: 2024-01-30 | Stop reason: HOSPADM

## 2024-01-29 RX ORDER — LIDOCAINE HYDROCHLORIDE 10 MG/ML
INJECTION INFILTRATION; PERINEURAL
Status: DISPENSED
Start: 2024-01-29 | End: 2024-01-29

## 2024-01-31 ENCOUNTER — PATIENT MESSAGE (OUTPATIENT)
Dept: PULMONOLOGY | Facility: CLINIC | Age: 74
End: 2024-01-31
Payer: MEDICARE

## 2024-02-07 ENCOUNTER — TELEPHONE (OUTPATIENT)
Dept: FAMILY MEDICINE | Facility: CLINIC | Age: 74
End: 2024-02-07
Payer: MEDICARE

## 2024-02-07 NOTE — TELEPHONE ENCOUNTER
----- Message from Chris Youngblood sent at 2/7/2024  8:29 AM CST -----  Pt has an appt 2/16 and needs to reschedule because he doesn't work on Friday's. She only wants to see Dr. Graham.  [phone]

## 2024-02-08 ENCOUNTER — TELEPHONE (OUTPATIENT)
Dept: INTERVENTIONAL RADIOLOGY/VASCULAR | Facility: HOSPITAL | Age: 74
End: 2024-02-08

## 2024-02-08 NOTE — NURSING
Radiology Pre-Procedural Instructions- [facility]    Radiology Nurse contacted patient to provide instructions for scheduled CT Lung Biopsy at 1100 am on 02/14/2024.   Patient instructed to arrive no later than 1000 am to outpatient registration.   Registration will direct patient to outpatient lab for pre-op lab work if required.  Following labs, patient needs to check in at the surgery waiting room desk located on the second floor.   Patient is not currently taking anticoagulants at this time.  Patient instructed to remain NPO after midnight with the exception of approved essential meds taken with a small sip of water.  Instructed patient to bathe the night before and the morning of their procedure with an anti bacterial soap or Hibiclens.   Patient is aware of their responsibility to make post transportation arrangements home by a responsible adult.   Patient educated on all additional pre-op instructions per policy and verbalized understanding.

## 2024-02-12 ENCOUNTER — TELEPHONE (OUTPATIENT)
Dept: FAMILY MEDICINE | Facility: CLINIC | Age: 74
End: 2024-02-12
Payer: MEDICARE

## 2024-02-12 NOTE — TELEPHONE ENCOUNTER
----- Message from Alyx Lechuga sent at 2/12/2024  8:06 AM CST -----  Pt would like to be seen today for a rash on both arms that is driving her crazy. She has to have a lung biopsy Wednesday and wants us to look at it before the biopsy   [phone]

## 2024-02-12 NOTE — TELEPHONE ENCOUNTER
Spoke with patient and states that the rash is on both her arms from hand to elbow. Patient was informed that we do not have any availability today and Dr. Graham is out of the office and was recommend to seek further evaluation at an Urgent Care. Patient states she will be discussing further at the hospital when going in for her biopsy.

## 2024-02-14 ENCOUNTER — PATIENT MESSAGE (OUTPATIENT)
Dept: PULMONOLOGY | Facility: CLINIC | Age: 74
End: 2024-02-14
Payer: MEDICARE

## 2024-02-14 ENCOUNTER — HOSPITAL ENCOUNTER (OUTPATIENT)
Dept: RADIOLOGY | Facility: HOSPITAL | Age: 74
Discharge: HOME | End: 2024-02-14
Attending: PHYSICIAN ASSISTANT
Payer: MEDICARE

## 2024-02-14 ENCOUNTER — HOSPITAL ENCOUNTER (OUTPATIENT)
Dept: RADIOLOGY | Facility: HOSPITAL | Age: 74
Discharge: HOME | End: 2024-02-14
Attending: INTERNAL MEDICINE
Payer: MEDICARE

## 2024-02-14 VITALS
DIASTOLIC BLOOD PRESSURE: 77 MMHG | TEMPERATURE: 98 F | OXYGEN SATURATION: 96 % | RESPIRATION RATE: 12 BRPM | BODY MASS INDEX: 29.44 KG/M2 | SYSTOLIC BLOOD PRESSURE: 155 MMHG | HEART RATE: 91 BPM | HEIGHT: 62 IN | WEIGHT: 160 LBS

## 2024-02-14 DIAGNOSIS — R91.1 LUNG NODULE: ICD-10-CM

## 2024-02-14 PROCEDURE — 71045 X-RAY EXAM CHEST 1 VIEW: CPT | Mod: 26,76,, | Performed by: RADIOLOGY

## 2024-02-14 PROCEDURE — 27202663 CT BIOPSY LUNG W/ GUIDANCE

## 2024-02-14 PROCEDURE — 71045 X-RAY EXAM CHEST 1 VIEW: CPT | Mod: TC

## 2024-02-14 PROCEDURE — 88305 TISSUE EXAM BY PATHOLOGIST: CPT | Mod: TC | Performed by: PATHOLOGY

## 2024-02-14 PROCEDURE — 32408 CORE NDL BX LNG/MED PERQ: CPT

## 2024-02-14 PROCEDURE — 71045 X-RAY EXAM CHEST 1 VIEW: CPT | Mod: 26,,, | Performed by: RADIOLOGY

## 2024-02-14 PROCEDURE — 32408 CORE NDL BX LNG/MED PERQ: CPT | Mod: ,,, | Performed by: RADIOLOGY

## 2024-02-14 PROCEDURE — 99152 MOD SED SAME PHYS/QHP 5/>YRS: CPT

## 2024-02-14 PROCEDURE — 63600175 PHARM REV CODE 636 W HCPCS: Performed by: RADIOLOGY

## 2024-02-14 RX ORDER — MIDAZOLAM HYDROCHLORIDE 1 MG/ML
INJECTION INTRAMUSCULAR; INTRAVENOUS
Status: COMPLETED | OUTPATIENT
Start: 2024-02-14 | End: 2024-02-14

## 2024-02-14 RX ORDER — FENTANYL CITRATE 50 UG/ML
INJECTION, SOLUTION INTRAMUSCULAR; INTRAVENOUS
Status: COMPLETED | OUTPATIENT
Start: 2024-02-14 | End: 2024-02-14

## 2024-02-14 RX ORDER — LIDOCAINE HYDROCHLORIDE 10 MG/ML
INJECTION INFILTRATION; PERINEURAL
Status: DISCONTINUED
Start: 2024-02-14 | End: 2024-02-14 | Stop reason: HOSPADM

## 2024-02-14 RX ORDER — SODIUM CHLORIDE 9 MG/ML
INJECTION, SOLUTION INTRAVENOUS CONTINUOUS
Status: DISCONTINUED | OUTPATIENT
Start: 2024-02-14 | End: 2024-02-15 | Stop reason: HOSPADM

## 2024-02-14 RX ORDER — FENTANYL CITRATE 50 UG/ML
INJECTION, SOLUTION INTRAMUSCULAR; INTRAVENOUS
Status: DISCONTINUED
Start: 2024-02-14 | End: 2024-02-14 | Stop reason: HOSPADM

## 2024-02-14 RX ORDER — MIDAZOLAM HYDROCHLORIDE 1 MG/ML
INJECTION, SOLUTION INTRAMUSCULAR; INTRAVENOUS
Status: DISCONTINUED
Start: 2024-02-14 | End: 2024-02-14 | Stop reason: HOSPADM

## 2024-02-14 RX ORDER — ACETAMINOPHEN 325 MG/1
650 TABLET ORAL ONCE AS NEEDED
Status: DISCONTINUED | OUTPATIENT
Start: 2024-02-14 | End: 2024-02-15 | Stop reason: HOSPADM

## 2024-02-14 RX ORDER — LIDOCAINE HYDROCHLORIDE 10 MG/ML
1 INJECTION, SOLUTION EPIDURAL; INFILTRATION; INTRACAUDAL; PERINEURAL ONCE AS NEEDED
Status: DISCONTINUED | OUTPATIENT
Start: 2024-02-14 | End: 2024-02-15 | Stop reason: HOSPADM

## 2024-02-14 RX ADMIN — FENTANYL CITRATE 25 MCG: 0.05 INJECTION, SOLUTION INTRAMUSCULAR; INTRAVENOUS at 11:02

## 2024-02-14 RX ADMIN — MIDAZOLAM HYDROCHLORIDE 1 MG: 1 INJECTION, SOLUTION INTRAMUSCULAR; INTRAVENOUS at 11:02

## 2024-02-14 NOTE — PLAN OF CARE
Pt arrived to room, family at bs. Pt c/o pain. Left chest with Vaseline [place] and Tegaderm with very minimal drainage noted.

## 2024-02-14 NOTE — PLAN OF CARE
Pt prepped for procedure. Consents at bedside. Pt belongings left in room with .  set up with text alerts.     Rash noted to bilateral upper arms. Red, inflammed, and itchy per pt. Pt stated a few days ago. Tried to get appt with PCP and unable to. Radiology nurseDonna notified. Ok to proceed with procedure.

## 2024-02-14 NOTE — INTERVAL H&P NOTE
The patient has been examined and the H&P has been reviewed:    I concur with the findings and no changes have occurred since H&P was written.     Pt with prutici YENY upper extremity rash. Large erythematous wheals consistent with urticaria type reaction.     General: no distress  Lungs: no respiratory distress  CV: regular rate  Neuro: alert, oriented  ASA 2  Mallampati 2  The procedure CT guided left lung biopsy was discussed in detail, including risks and alternatives. The patient voices understanding and all questions were answered. The patient agrees to proceed as planned.

## 2024-02-14 NOTE — TELEPHONE ENCOUNTER
Please review  does pt need to see Dr Hiltno or can she just follow up with you on biopsy results?

## 2024-02-14 NOTE — NURSING
Procedure explained and pt consented in DSU by provider.   Pt arrived via stretcher to CT for CT Guided Lung Biopsy.   AAOx4,- Time out performed.     Pt received Fentanyl 50 mcg and Versed 2 mg IV. Vital signs monitored and remained stable for duration of procedure. Biopsies collected By Radiologist.   Specimens submitted to lab for Pathology.   Bandage applied to pts chest . CDI- Report given to PIERRE Wisdom  Pt transported to DSU  STAT Post- Chest CT - No pneumothorax noted, some bleeding   STAT Post- Chest  Xray - completed and awaiting dictation.  Timed chest Xray ordered and due at 1455 and must be cleared by radiologist prior to pts discharge.

## 2024-02-14 NOTE — DISCHARGE INSTRUCTIONS
General Information:    1.  Do not drink alcoholic beverages including beer for 24 hours or as long as you are on pain medication..  2.  Do not drive a motor vehicle, operate machinery or power tools, or signs legal papers for 24 hours or as long as you are on pain medication.   3.  You may experience light-headedness, dizziness, and sleepiness following surgery. Please do not stay alone. A responsible adult should be with you for this 24 hour period.  4.  Go home and rest.    5. Progress slowly to a normal diet unless instructed.  Otherwise, begin with liquids such as soft drinks, then soup and crackers working up to solid foods. Drink plenty of nonalcoholic fluids.  6.  Certain anesthetics and pain medications produce nausea and vomiting in certain       individuals. If nausea becomes a problem at home, call you doctor.    7. A nurse will be calling you sometime after surgery. Do not be alarmed. This is our way of finding out how you are doing.    8. Several times every hour while you are awake, take 2-3 deep breaths and cough. If you had stomach surgery hold a pillow or rolled towel firmly against your stomach before you cough. This will help with any pain the cough might cause.  9. Several times every hour while you are awake, pump and flex your feet 5-6 times and do foot circles. This will help prevent blood clots.    10.Call your doctor for severe pain, bleeding, fever, or signs or symptoms of infection (pain, swelling, redness, foul odor, drainage).

## 2024-02-15 ENCOUNTER — HOSPITAL ENCOUNTER (OUTPATIENT)
Facility: HOSPITAL | Age: 74
Discharge: LEFT BEFORE DISCHARGE | End: 2024-02-15
Attending: EMERGENCY MEDICINE | Admitting: STUDENT IN AN ORGANIZED HEALTH CARE EDUCATION/TRAINING PROGRAM
Payer: MEDICARE

## 2024-02-15 ENCOUNTER — TELEPHONE (OUTPATIENT)
Dept: HEPATOLOGY | Facility: CLINIC | Age: 74
End: 2024-02-15
Payer: MEDICARE

## 2024-02-15 ENCOUNTER — PATIENT MESSAGE (OUTPATIENT)
Dept: PULMONOLOGY | Facility: CLINIC | Age: 74
End: 2024-02-15
Payer: MEDICARE

## 2024-02-15 VITALS
HEIGHT: 62 IN | HEART RATE: 68 BPM | DIASTOLIC BLOOD PRESSURE: 73 MMHG | OXYGEN SATURATION: 97 % | SYSTOLIC BLOOD PRESSURE: 130 MMHG | WEIGHT: 160 LBS | TEMPERATURE: 98 F | RESPIRATION RATE: 21 BRPM | BODY MASS INDEX: 29.44 KG/M2

## 2024-02-15 DIAGNOSIS — R91.8 LUNG MASS: Primary | ICD-10-CM

## 2024-02-15 DIAGNOSIS — T78.40XA ALLERGIC REACTION, INITIAL ENCOUNTER: Primary | ICD-10-CM

## 2024-02-15 DIAGNOSIS — K21.9 GASTROESOPHAGEAL REFLUX DISEASE WITHOUT ESOPHAGITIS: ICD-10-CM

## 2024-02-15 DIAGNOSIS — J44.9 CHRONIC OBSTRUCTIVE PULMONARY DISEASE, UNSPECIFIED COPD TYPE: ICD-10-CM

## 2024-02-15 DIAGNOSIS — L50.9 URTICARIA: ICD-10-CM

## 2024-02-15 PROCEDURE — 96372 THER/PROPH/DIAG INJ SC/IM: CPT | Performed by: EMERGENCY MEDICINE

## 2024-02-15 PROCEDURE — 99285 EMERGENCY DEPT VISIT HI MDM: CPT | Mod: 25

## 2024-02-15 PROCEDURE — 96374 THER/PROPH/DIAG INJ IV PUSH: CPT

## 2024-02-15 PROCEDURE — 63600175 PHARM REV CODE 636 W HCPCS: Performed by: EMERGENCY MEDICINE

## 2024-02-15 PROCEDURE — G0378 HOSPITAL OBSERVATION PER HR: HCPCS

## 2024-02-15 PROCEDURE — 25000003 PHARM REV CODE 250: Performed by: EMERGENCY MEDICINE

## 2024-02-15 PROCEDURE — 96375 TX/PRO/DX INJ NEW DRUG ADDON: CPT

## 2024-02-15 RX ORDER — SODIUM,POTASSIUM PHOSPHATES 280-250MG
2 POWDER IN PACKET (EA) ORAL
Status: CANCELLED | OUTPATIENT
Start: 2024-02-15

## 2024-02-15 RX ORDER — METHYLPREDNISOLONE SOD SUCC 125 MG
125 VIAL (EA) INJECTION
Status: COMPLETED | OUTPATIENT
Start: 2024-02-15 | End: 2024-02-15

## 2024-02-15 RX ORDER — ENOXAPARIN SODIUM 100 MG/ML
40 INJECTION SUBCUTANEOUS EVERY 24 HOURS
Status: CANCELLED | OUTPATIENT
Start: 2024-02-15

## 2024-02-15 RX ORDER — LEVOTHYROXINE SODIUM 88 UG/1
88 TABLET ORAL
Status: CANCELLED | OUTPATIENT
Start: 2024-02-15

## 2024-02-15 RX ORDER — DIPHENHYDRAMINE HYDROCHLORIDE 50 MG/ML
50 INJECTION INTRAMUSCULAR; INTRAVENOUS
Status: COMPLETED | OUTPATIENT
Start: 2024-02-15 | End: 2024-02-15

## 2024-02-15 RX ORDER — ONDANSETRON HYDROCHLORIDE 2 MG/ML
8 INJECTION, SOLUTION INTRAVENOUS EVERY 6 HOURS PRN
Status: CANCELLED | OUTPATIENT
Start: 2024-02-15

## 2024-02-15 RX ORDER — DIPHENHYDRAMINE HCL 50 MG
50 CAPSULE ORAL EVERY 6 HOURS PRN
Status: DISCONTINUED | OUTPATIENT
Start: 2024-02-15 | End: 2024-02-15 | Stop reason: HOSPADM

## 2024-02-15 RX ORDER — ACETAMINOPHEN 325 MG/1
650 TABLET ORAL EVERY 8 HOURS PRN
Status: CANCELLED | OUTPATIENT
Start: 2024-02-15

## 2024-02-15 RX ORDER — SIMETHICONE 80 MG
1 TABLET,CHEWABLE ORAL 4 TIMES DAILY PRN
Status: CANCELLED | OUTPATIENT
Start: 2024-02-15

## 2024-02-15 RX ORDER — PREDNISONE 20 MG/1
40 TABLET ORAL DAILY
Qty: 10 TABLET | Refills: 0 | Status: SHIPPED | OUTPATIENT
Start: 2024-02-15 | End: 2024-02-20

## 2024-02-15 RX ORDER — HYDROCHLOROTHIAZIDE 25 MG/1
25 TABLET ORAL DAILY PRN
COMMUNITY
Start: 2024-02-14 | End: 2024-04-26

## 2024-02-15 RX ORDER — NALOXONE HCL 0.4 MG/ML
0.02 VIAL (ML) INJECTION
Status: CANCELLED | OUTPATIENT
Start: 2024-02-15

## 2024-02-15 RX ORDER — ACETAMINOPHEN 325 MG/1
650 TABLET ORAL EVERY 4 HOURS PRN
Status: CANCELLED | OUTPATIENT
Start: 2024-02-15

## 2024-02-15 RX ORDER — FAMOTIDINE 20 MG/1
20 TABLET, FILM COATED ORAL NIGHTLY
Status: CANCELLED | OUTPATIENT
Start: 2024-02-15

## 2024-02-15 RX ORDER — AMOXICILLIN 250 MG
1 CAPSULE ORAL 2 TIMES DAILY
Status: CANCELLED | OUTPATIENT
Start: 2024-02-15

## 2024-02-15 RX ORDER — IBUPROFEN 200 MG
16 TABLET ORAL
Status: CANCELLED | OUTPATIENT
Start: 2024-02-15

## 2024-02-15 RX ORDER — AMLODIPINE BESYLATE 5 MG/1
5 TABLET ORAL DAILY
Status: CANCELLED | OUTPATIENT
Start: 2024-02-15

## 2024-02-15 RX ORDER — GLUCAGON 1 MG
1 KIT INJECTION
Status: CANCELLED | OUTPATIENT
Start: 2024-02-15

## 2024-02-15 RX ORDER — IPRATROPIUM BROMIDE AND ALBUTEROL SULFATE 2.5; .5 MG/3ML; MG/3ML
3 SOLUTION RESPIRATORY (INHALATION)
Status: DISCONTINUED | OUTPATIENT
Start: 2024-02-15 | End: 2024-02-15 | Stop reason: HOSPADM

## 2024-02-15 RX ORDER — SODIUM CHLORIDE 0.9 % (FLUSH) 0.9 %
3 SYRINGE (ML) INJECTION EVERY 12 HOURS PRN
Status: CANCELLED | OUTPATIENT
Start: 2024-02-15

## 2024-02-15 RX ORDER — EPINEPHRINE 0.3 MG/.3ML
0.3 INJECTION SUBCUTANEOUS
Status: DISCONTINUED | OUTPATIENT
Start: 2024-02-15 | End: 2024-02-15 | Stop reason: HOSPADM

## 2024-02-15 RX ORDER — FAMOTIDINE 20 MG/1
20 TABLET, FILM COATED ORAL 2 TIMES DAILY
Qty: 20 TABLET | Refills: 0 | Status: SHIPPED | OUTPATIENT
Start: 2024-02-15 | End: 2025-02-14

## 2024-02-15 RX ORDER — FAMOTIDINE 10 MG/ML
20 INJECTION INTRAVENOUS
Status: COMPLETED | OUTPATIENT
Start: 2024-02-15 | End: 2024-02-15

## 2024-02-15 RX ORDER — METOPROLOL TARTRATE 25 MG/1
25 TABLET, FILM COATED ORAL 2 TIMES DAILY
Status: CANCELLED | OUTPATIENT
Start: 2024-02-15

## 2024-02-15 RX ORDER — ALUMINUM HYDROXIDE, MAGNESIUM HYDROXIDE, AND SIMETHICONE 1200; 120; 1200 MG/30ML; MG/30ML; MG/30ML
30 SUSPENSION ORAL 4 TIMES DAILY PRN
Status: CANCELLED | OUTPATIENT
Start: 2024-02-15

## 2024-02-15 RX ORDER — TALC
9 POWDER (GRAM) TOPICAL NIGHTLY PRN
Status: CANCELLED | OUTPATIENT
Start: 2024-02-15

## 2024-02-15 RX ORDER — LANOLIN ALCOHOL/MO/W.PET/CERES
800 CREAM (GRAM) TOPICAL
Status: CANCELLED | OUTPATIENT
Start: 2024-02-15

## 2024-02-15 RX ORDER — DIPHENHYDRAMINE HCL 25 MG
25 CAPSULE ORAL EVERY 6 HOURS PRN
Qty: 20 CAPSULE | Refills: 0 | Status: SHIPPED | OUTPATIENT
Start: 2024-02-15

## 2024-02-15 RX ORDER — IBUPROFEN 200 MG
24 TABLET ORAL
Status: CANCELLED | OUTPATIENT
Start: 2024-02-15

## 2024-02-15 RX ADMIN — METHYLPREDNISOLONE SODIUM SUCCINATE 125 MG: 125 INJECTION, POWDER, FOR SOLUTION INTRAMUSCULAR; INTRAVENOUS at 01:02

## 2024-02-15 RX ADMIN — DIPHENHYDRAMINE HYDROCHLORIDE 50 MG: 50 INJECTION INTRAMUSCULAR; INTRAVENOUS at 01:02

## 2024-02-15 RX ADMIN — FAMOTIDINE 20 MG: 10 INJECTION, SOLUTION INTRAVENOUS at 01:02

## 2024-02-15 NOTE — Clinical Note
Diagnosis: Allergic reaction, initial encounter [395319]   Future Attending Provider: MORELIA BURCH [[id number]]   Place in Observation: [facility] [[id number]]

## 2024-02-15 NOTE — ED PROVIDER NOTES
Encounter Date: 2/15/2024       History     Chief Complaint   Patient presents with    Allergic Reaction     Pt. Had biopsy of her lung earlier today.  Went home and took hydrocodone at home and is now feeling itchiness and SOB.       HPI patient is a 73-year-old woman who has a history of COPD and previous anaphylactic reaction presents to the emergency department for evaluation of pruritic rash on her arms and increased shortness of breath that occurred after taking hydrocodone today.  Patient states that she had a lung biopsy today and took a hydrocodone shortly before the symptoms occurred.  She states that she also began to have a rash 4 days ago after taking hydrocodone and had not taken it since until today.  She reports that last year she was in the ICU for anaphylactic shock  Review of patient's allergies indicates:   Allergen Reactions    Bisacodyl Nausea And Vomiting     Other reaction(s): Vomiting    Iodinated contrast media Hives and Shortness Of Breath     hypotension    Morphine Other (See Comments)     hypotension    Azithromycin Hives    Cipro [ciprofloxacin hcl]     Demerol [meperidine]      Nausea vomiting, visual problem    Doxycycline Hives    Flonase [fluticasone propionate] Hives    Morpholine analogues Other (See Comments)     hypotension     Past Medical History:   Diagnosis Date    Anxiety     Martin esophagus     Colitis     COPD (chronic obstructive pulmonary disease)     GERD (gastroesophageal reflux disease)     Hypertension     Thyroid disease     Vocal cord polyps      Past Surgical History:   Procedure Laterality Date    ABDOMINAL AORTIC ANEURYSM REPAIR      BACK SURGERY      cervical    CATARACT EXTRACTION Right 02/2021    CHOLECYSTECTOMY  12/20/2013    Dr Price  [facility]    FOOT SURGERY      HYSTERECTOMY  1979    AUGUSTINE for AUB with consevation ovaries    SALIVARY GLAND SURGERY       Family History   Problem Relation Age of Onset    Cancer Mother     Heart failure Father     Emphysema  Sister     No Known Problems Brother     Cancer Sister      Social History     Tobacco Use    Smoking status: Every Day     Current packs/day: 0.50     Types: Cigarettes    Smokeless tobacco: Former     Quit date: 5/1/2016    Tobacco comments:     Pt has smoked since 14 years old. Smokes around .5ppd. Inpatient smoking education done 10/20.   Substance Use Topics    Alcohol use: Never    Drug use: Never     Review of Systems   Constitutional:  Negative for fever.   HENT:  Negative for sore throat.    Respiratory:  Positive for shortness of breath and wheezing.    Cardiovascular:  Negative for chest pain.   Gastrointestinal:  Negative for nausea.   Genitourinary:  Negative for dysuria.   Musculoskeletal:  Negative for back pain.   Skin:  Positive for rash.   Neurological:  Negative for weakness.   Hematological:  Does not bruise/bleed easily.       Physical Exam     Initial Vitals   BP Pulse Resp Temp SpO2   02/15/24 0051 02/15/24 0051 02/15/24 0051 02/15/24 0106 02/15/24 0051   131/68 84 (!) 24 98 °F (36.7 °C) 95 %      MAP       --                Physical Exam    Constitutional: Vital signs are normal. She appears well-developed and well-nourished.  Non-toxic appearance. No distress.   HENT:   Head: Normocephalic and atraumatic.   Eyes: EOM are normal. Pupils are equal, round, and reactive to light.   Neck: Neck supple. No JVD present.   Normal range of motion.  Cardiovascular:  Normal rate, regular rhythm, normal heart sounds and intact distal pulses.     Exam reveals no gallop and no friction rub.       No murmur heard.  Pulmonary/Chest: She has wheezes. She has no rhonchi. She has no rales.   Abdominal: Abdomen is soft. Bowel sounds are normal. There is no abdominal tenderness. There is no rebound and no guarding.   Musculoskeletal:         General: Normal range of motion.      Cervical back: Normal range of motion and neck supple. No rigidity.     Neurological: She is alert and oriented to person, place, and  time. She has normal strength and normal reflexes. No cranial nerve deficit or sensory deficit. She exhibits normal muscle tone. Coordination normal. GCS eye subscore is 4. GCS verbal subscore is 5. GCS motor subscore is 6.   Skin: Skin is warm and dry. Rash (urticarial rash on bilateral upper arms.) noted.   Psychiatric: She has a normal mood and affect. Her speech is normal and behavior is normal. She is not actively hallucinating.         ED Course   Procedures  Labs Reviewed - No data to display       Imaging Results    None          Medications   EPINEPHrine (EPIPEN) 0.3 mg/0.3 mL pen injection 0.3 mg (0.3 mg Intramuscular Not Given 2/15/24 0125)   albuterol-ipratropium 2.5 mg-0.5 mg/3 mL nebulizer solution 3 mL (has no administration in time range)   diphenhydrAMINE capsule 50 mg (has no administration in time range)   diphenhydrAMINE injection 50 mg (50 mg Intravenous Given 2/15/24 0127)   methylPREDNISolone sodium succinate injection 125 mg (125 mg Intravenous Given 2/15/24 0127)   famotidine (PF) injection 20 mg (20 mg Intravenous Given 2/15/24 0127)     Medical Decision Making  73-year-old woman with COPD and previous anaphylactic shock presents emergency department for evaluation.  Otic urticarial rash on her arms with some bruising which appears to be from scratching.  Patient does have wheezing on examination therefore technically meets criteria for anaphylaxis, no hypotension.  I do not think she is now anaphylactic shock at this time.  Patient could also have wheezing due to her underlying COPD and just having an allergic/urticarial reaction which I believe is 2/2hydrocodone given the timing of when she takes it.  She is given Benadryl Pepcid and Solu-Medrol in the ED as well as ordered epi but refused epi.  I explained her the reason for given this and still refuses.  I recommended observation given that she is still itching and having urticarial rash as well as wheezing in order breathing  treatments.  Discussed Hospital Medicine who agrees to observe the patient.  Patient ended up not wanting to stay and leaves against medical advice.  She is ordered steroids Pepcid and Benadryl.  She is to follow up with PCP she knows she can return.    Risk  OTC drugs.  Prescription drug management.                                      Clinical Impression:  Final diagnoses:  [T78.40XA] Allergic reaction, initial encounter (Primary)  [L50.9] Urticaria  [J44.9] Chronic obstructive pulmonary disease, unspecified COPD type          ED Disposition Condition    AMStanley Cedeno MD  02/15/24 0696

## 2024-02-15 NOTE — LETTER
Patient: Joslyn Dubon  YOB: 1950  Date: 2/15/2024 Time: 5:10 AM  Location: [facility]    Leaving the Hospital Against Medical Advice    Chart #:[id number]    This will certify that I, the undersigned,    ______________________________________________________________________    A patient in the above named medical center, having requested discharge and removal from the medical center against the advice of my attending physician(s), hereby release [facility], its physicians, officers and employees, severally and individually, from any and all liability of any nature whatsoever for any injury or harm or complication of any kind that may result directly or indirectly, by reason of my terminating my stay as a patient at [facility] and my departure from [facility], and hereby waive any and all rights of action I may now have or later acquire as a result of my voluntary departure from [facility] and the termination of my stay as a patient therein.    This release is made with the full knowledge of the danger that may result from the action which I am taking.      Date:_______________________                         ___________________________                                                                                    Patient/Legal Representative    Witness:        ____________________________                          ___________________________  Nurse                                                                        Physician

## 2024-02-15 NOTE — TELEPHONE ENCOUNTER
"Reviewed path and imaging  Lesion has grown from 3mm in 6/2023 to 17mm 1/2024 and now 21mm in 2/2024 on IR biopsy films.  Path with "benign lung tissue" suspected to be a false negative- discussed with pt I do not believe this is a benign lesion.  PET scan is recommended then may recommend surgical resection next (if she is willing).  Discussed extensively with her and answered questions. She is emotional and has fear of chemo, radiation, surgery but is starting to consider and needs time to think about options. I will f/u with pt if she has not called back by 2/21/24.  "

## 2024-02-16 ENCOUNTER — PATIENT MESSAGE (OUTPATIENT)
Dept: PULMONOLOGY | Facility: CLINIC | Age: 74
End: 2024-02-16
Payer: MEDICARE

## 2024-02-19 ENCOUNTER — PATIENT MESSAGE (OUTPATIENT)
Dept: PULMONOLOGY | Facility: CLINIC | Age: 74
End: 2024-02-19
Payer: MEDICARE

## 2024-02-27 ENCOUNTER — TELEPHONE (OUTPATIENT)
Dept: FAMILY MEDICINE | Facility: CLINIC | Age: 74
End: 2024-02-27

## 2024-02-27 ENCOUNTER — OFFICE VISIT (OUTPATIENT)
Dept: FAMILY MEDICINE | Facility: CLINIC | Age: 74
End: 2024-02-27
Payer: MEDICARE

## 2024-02-27 VITALS
SYSTOLIC BLOOD PRESSURE: 120 MMHG | HEIGHT: 62 IN | BODY MASS INDEX: 30.73 KG/M2 | WEIGHT: 167 LBS | DIASTOLIC BLOOD PRESSURE: 80 MMHG | HEART RATE: 66 BPM

## 2024-02-27 DIAGNOSIS — J96.01 ACUTE HYPOXIC RESPIRATORY FAILURE: ICD-10-CM

## 2024-02-27 DIAGNOSIS — T78.40XD ALLERGIC REACTION, SUBSEQUENT ENCOUNTER: ICD-10-CM

## 2024-02-27 DIAGNOSIS — R91.8 MASS OF UPPER LOBE OF LEFT LUNG: Primary | ICD-10-CM

## 2024-02-27 DIAGNOSIS — J44.1 COPD EXACERBATION: ICD-10-CM

## 2024-02-27 DIAGNOSIS — J43.1 PANLOBULAR EMPHYSEMA: ICD-10-CM

## 2024-02-27 DIAGNOSIS — M51.36 DDD (DEGENERATIVE DISC DISEASE), LUMBAR: ICD-10-CM

## 2024-02-27 DIAGNOSIS — I10 PRIMARY HYPERTENSION: ICD-10-CM

## 2024-02-27 DIAGNOSIS — M51.16 LUMBAR DISC DISEASE WITH RADICULOPATHY: ICD-10-CM

## 2024-02-27 DIAGNOSIS — I71.40 ABDOMINAL AORTIC ANEURYSM (AAA) WITHOUT RUPTURE, UNSPECIFIED PART: Chronic | ICD-10-CM

## 2024-02-27 PROCEDURE — 99214 OFFICE O/P EST MOD 30 MIN: CPT | Mod: S$GLB,,, | Performed by: FAMILY MEDICINE

## 2024-02-27 RX ORDER — HYDROCODONE BITARTRATE AND ACETAMINOPHEN 10; 325 MG/1; MG/1
1 TABLET ORAL EVERY 12 HOURS PRN
Qty: 50 TABLET | Refills: 0 | Status: SHIPPED | OUTPATIENT
Start: 2024-02-27 | End: 2024-05-30 | Stop reason: SDUPTHER

## 2024-02-27 RX ORDER — PREDNISONE 10 MG/1
10 TABLET ORAL 2 TIMES DAILY
Qty: 30 TABLET | Refills: 0 | Status: SHIPPED | OUTPATIENT
Start: 2024-02-27 | End: 2024-05-16

## 2024-02-27 RX ORDER — HYDROCODONE BITARTRATE AND ACETAMINOPHEN 10; 325 MG/1; MG/1
1 TABLET ORAL EVERY 12 HOURS PRN
Qty: 50 TABLET | Refills: 0 | Status: SHIPPED | OUTPATIENT
Start: 2024-02-27 | End: 2024-02-27 | Stop reason: SDUPTHER

## 2024-02-27 NOTE — TELEPHONE ENCOUNTER
----- Message from Radha Garcia sent at 2/27/2024  8:13 AM CST -----  Pt needs to schedule a 3 month f/u.    [phone]

## 2024-02-27 NOTE — PROGRESS NOTES
SUBJECTIVE:    Patient ID: Joslyn Dubon is a 73 y.o. female.    Chief Complaint: Hypothyroidism (No bottles, declined flu vaccine, review lung biopsy results, rashes, itching, discuss colonoscopy,abc )    73-year-old female who was found to have a left upper lobe spiculated lesion at 1.8 cm, she saw Dr. Hilton pulmonology and underwent left upper lobe biopsy.  The biopsy returned benign lung tissue.  But Dr. Hilton is still worried and wants to get a PET scan for surveillance.  Patient is unsure about these decisions and wants reassurance.  The lesion now grown up to 2 cm in size.    She did quit smoking on 01/01/2024!  She now coughs less and has less wheezing.  CT scan did show some pulmonary atelectasis at the bottom of both lungs.  She does feel short of breath and wears home O2 at 2 L as needed.    No fevers chills night sweats hemoptysis or chest pain.    For the last month or so she has had a red dermatitis pruritic rash on her upper arms and legs.  It has responded to antihistamines and prednisone.  She is gaining weight and has gained 7 lb since our last visit.    Chronic lower back pain from lumbar disc disease.        Lab Visit on 01/29/2024   Component Date Value Ref Range Status    WBC 01/29/2024 6.36  3.90 - 12.70 K/uL Final    RBC 01/29/2024 4.80  4.00 - 5.40 M/uL Final    Hemoglobin 01/29/2024 15.1  12.0 - 16.0 g/dL Final    Hematocrit 01/29/2024 45.2  37.0 - 48.5 % Final    MCV 01/29/2024 94  82 - 98 fL Final    MCH 01/29/2024 31.5 (H)  27.0 - 31.0 pg Final    MCHC 01/29/2024 33.4  32.0 - 36.0 g/dL Final    RDW 01/29/2024 13.3  11.5 - 14.5 % Final    Platelets 01/29/2024 217  150 - 450 K/uL Final    MPV 01/29/2024 9.2  9.2 - 12.9 fL Final    Immature Granulocytes 01/29/2024 0.5  0.0 - 0.5 % Final    Gran # (ANC) 01/29/2024 3.6  1.8 - 7.7 K/uL Final    Immature Grans (Abs) 01/29/2024 0.03  0.00 - 0.04 K/uL Final    Lymph # 01/29/2024 2.1  1.0 - 4.8 K/uL Final    Mono # 01/29/2024 0.5  0.3 -  1.0 K/uL Final    Eos # 01/29/2024 0.1  0.0 - 0.5 K/uL Final    Baso # 01/29/2024 0.04  0.00 - 0.20 K/uL Final    nRBC 01/29/2024 0  0 /100 WBC Final    Gran % 01/29/2024 56.0  38.0 - 73.0 % Final    Lymph % 01/29/2024 32.2  18.0 - 48.0 % Final    Mono % 01/29/2024 8.5  4.0 - 15.0 % Final    Eosinophil % 01/29/2024 2.2  0.0 - 8.0 % Final    Basophil % 01/29/2024 0.6  0.0 - 1.9 % Final    Differential Method 01/29/2024 Automated   Final    Prothrombin Time 01/29/2024 10.5  9.0 - 12.5 sec Final    INR 01/29/2024 1.0  0.8 - 1.2 Final   Hospital Outpatient Visit on 01/26/2024   Component Date Value Ref Range Status    Post FVC 01/26/2024 2.12  1.78 - 3.24 L Preliminary    Post FEV1 01/26/2024 1.50  1.37 - 2.44 L Preliminary    Post FEV1 FVC 01/26/2024 70.72  63.66 - 89.92 % Preliminary    Post FEF 25 75 01/26/2024 0.90  0.70 - 2.94 L/s Preliminary    Post PEF 01/26/2024 4.30  3.34 - 6.55 L/s Preliminary    Post  01/26/2024 9.46  sec Preliminary    Pre DLCO 01/26/2024 8.59 (L)  13.58 - 25.04 ml/(min*mmHg) Preliminary    DLCOVA PRE 01/26/2024 2.92  2.66 - 5.73 ml/(min*mmHg*L) Preliminary    VA PRE 01/26/2024 2.94 (L)  4.45 - 4.45 L Preliminary    IVC PRE 01/26/2024 1.88  1.78 - 3.24 L Preliminary    Pre TLC 01/26/2024 3.84  3.62 - 5.59 L Preliminary    VC PRE 01/26/2024 2.05  1.78 - 3.24 L Preliminary    Pre FRC PL 01/26/2024 2.48  1.78 - 3.43 L Preliminary    Pre ERV 01/26/2024 0.68  -41410.45 - 01134.55 L Preliminary    Pre RV 01/26/2024 1.80  1.47 - 2.63 L Preliminary    RVTLC PRE 01/26/2024 46.76  34.87 - 54.05 % Preliminary    Raw PRE 01/26/2024 6.27 (H)  3.06 - 3.06 cmH2O*s/L Preliminary    Pre FVC 01/26/2024 2.05  1.78 - 3.24 L Preliminary    Pre FEV1 01/26/2024 1.43  1.37 - 2.44 L Preliminary    Pre FEV1 FVC 01/26/2024 69.66  63.66 - 89.92 % Preliminary    Pre FEF 25 75 01/26/2024 0.86  0.70 - 2.94 L/s Preliminary    Pre PEF 01/26/2024 3.61  3.34 - 6.55 L/s Preliminary    Pre  01/26/2024 8.90  sec  Preliminary    Pre MVV 01/26/2024 43.39 (L)  59.35 - 80.29 L/min Preliminary    FVC Ref 01/26/2024 2.49   Preliminary    FVC LLN 01/26/2024 1.78   Preliminary    FVC Pre Ref 01/26/2024 82.2  % Preliminary    FVC Post Ref 01/26/2024 85.2  % Preliminary    FVC Chg 01/26/2024 3.6  % Preliminary    FEV1 Ref 01/26/2024 1.92   Preliminary    FEV1 LLN 01/26/2024 1.37   Preliminary    FEV1 Pre Ref 01/26/2024 74.4  % Preliminary    FEV1 Post Ref 01/26/2024 78.2  % Preliminary    FEV1 Chg 01/26/2024 5.2  % Preliminary    FEV1 FVC Ref 01/26/2024 78   Preliminary    FEV1 FVC LLN 01/26/2024 64   Preliminary    FEV1 FVC Pre Ref 01/26/2024 89.6  % Preliminary    FEV1 FVC Post Ref 01/26/2024 91.0  % Preliminary    FEV1 FVC Chg 01/26/2024 1.5  % Preliminary    FEF 25 75 Ref 01/26/2024 1.61   Preliminary    FEF 25 75 LLN 01/26/2024 0.70   Preliminary    FEF 25 75 Pre Ref 01/26/2024 53.1  % Preliminary    FEF 25 75 Post Ref 01/26/2024 55.6  % Preliminary    FEF 25 75 Chg 01/26/2024 4.8  % Preliminary    PEF Ref 01/26/2024 4.94   Preliminary    PEF LLN 01/26/2024 3.34   Preliminary    PEF Pre Ref 01/26/2024 73.0  % Preliminary    PEF Post Ref 01/26/2024 87.1  % Preliminary    PEF Chg 01/26/2024 19.4  % Preliminary    RDS047 Chg 01/26/2024 6.3  % Preliminary    MVV Ref 01/26/2024 70   Preliminary    MVV LLN 01/26/2024 59   Preliminary    MVV Pre Ref 01/26/2024 62.1  % Preliminary    TLC Ref 01/26/2024 4.60   Preliminary    TLC LLN 01/26/2024 3.62   Preliminary    TLC Pre Ref 01/26/2024 83.5  % Preliminary    VC Ref 01/26/2024 2.49   Preliminary    VC LLN 01/26/2024 1.78   Preliminary    VC Pre Ref 01/26/2024 82.2  % Preliminary    FRCpleth Ref 01/26/2024 2.60   Preliminary    FRCpleth LLN 01/26/2024 1.78   Preliminary    FRCpleth PreRef 01/26/2024 95.2  % Preliminary    ERV Ref 01/26/2024 0.55   Preliminary    ERV LLN 01/26/2024 -51502.45   Preliminary    ERV Pre Ref 01/26/2024 123.1  % Preliminary    RV Ref 01/26/2024 2.05    Preliminary    RV LLN 01/26/2024 1.47   Preliminary    RV Pre Ref 01/26/2024 87.7  % Preliminary    RVTLC Ref 01/26/2024 44   Preliminary    RVTLC LLN 01/26/2024 35   Preliminary    RVTLC Pre Ref 01/26/2024 105.2  % Preliminary    Raw Ref 01/26/2024 3.06   Preliminary    Raw LLN 01/26/2024 3.06   Preliminary    Raw Pre Ref 01/26/2024 204.9  % Preliminary    DLCO Single Breath Ref 01/26/2024 19.31   Preliminary    DLCO Single Breath LLN 01/26/2024 13.58   Preliminary    DLCO Single Breath Pre Ref 01/26/2024 44.5  % Preliminary    DLCOc Single Breath Ref 01/26/2024 19.31   Preliminary    DLCOc Single Breath LLN 01/26/2024 13.58   Preliminary    DLCOVA Ref 01/26/2024 4.19   Preliminary    DLCOVA LLN 01/26/2024 2.66   Preliminary    DLCOVA Pre Ref 01/26/2024 69.6  % Preliminary    DLCOc SBVA Ref 01/26/2024 4.19   Preliminary    DLCOc SBVA LLN 01/26/2024 2.66   Preliminary    VA Single Breath Ref 01/26/2024 4.45   Preliminary    VA Single Breath LLN 01/26/2024 4.45   Preliminary    VA Single Breath Pre Ref 01/26/2024 66.1  % Preliminary    IVC Single Breath Ref 01/26/2024 2.49   Preliminary    IVC Single Breath LLN 01/26/2024 1.78   Preliminary    IVC Single Breath Pre Ref 01/26/2024 75.4  % Preliminary   Admission on 01/19/2024, Discharged on 01/19/2024   Component Date Value Ref Range Status    WBC 01/19/2024 6.85  3.90 - 12.70 K/uL Final    RBC 01/19/2024 4.58  4.00 - 5.40 M/uL Final    Hemoglobin 01/19/2024 14.5  12.0 - 16.0 g/dL Final    Hematocrit 01/19/2024 44.3  37.0 - 48.5 % Final    MCV 01/19/2024 97  82 - 98 fL Final    MCH 01/19/2024 31.7 (H)  27.0 - 31.0 pg Final    MCHC 01/19/2024 32.7  32.0 - 36.0 g/dL Final    RDW 01/19/2024 13.2  11.5 - 14.5 % Final    Platelets 01/19/2024 263  150 - 450 K/uL Final    MPV 01/19/2024 8.7 (L)  9.2 - 12.9 fL Final    Immature Granulocytes 01/19/2024 0.4  0.0 - 0.5 % Final    Gran # (ANC) 01/19/2024 4.1  1.8 - 7.7 K/uL Final    Immature Grans (Abs) 01/19/2024 0.03  0.00 -  0.04 K/uL Final    Lymph # 01/19/2024 2.0  1.0 - 4.8 K/uL Final    Mono # 01/19/2024 0.7  0.3 - 1.0 K/uL Final    Eos # 01/19/2024 0.1  0.0 - 0.5 K/uL Final    Baso # 01/19/2024 0.04  0.00 - 0.20 K/uL Final    nRBC 01/19/2024 0  0 /100 WBC Final    Gran % 01/19/2024 59.5  38.0 - 73.0 % Final    Lymph % 01/19/2024 28.8  18.0 - 48.0 % Final    Mono % 01/19/2024 9.5  4.0 - 15.0 % Final    Eosinophil % 01/19/2024 1.2  0.0 - 8.0 % Final    Basophil % 01/19/2024 0.6  0.0 - 1.9 % Final    Differential Method 01/19/2024 Automated   Final    Sodium 01/19/2024 140  136 - 145 mmol/L Final    Potassium 01/19/2024 4.0  3.5 - 5.1 mmol/L Final    Chloride 01/19/2024 105  95 - 110 mmol/L Final    CO2 01/19/2024 24  23 - 29 mmol/L Final    Glucose 01/19/2024 91  70 - 110 mg/dL Final    BUN 01/19/2024 14  8 - 23 mg/dL Final    Creatinine 01/19/2024 0.9  0.5 - 1.4 mg/dL Final    Calcium 01/19/2024 9.9  8.7 - 10.5 mg/dL Final    Total Protein 01/19/2024 8.3  6.0 - 8.4 g/dL Final    Albumin 01/19/2024 3.8  3.5 - 5.2 g/dL Final    Total Bilirubin 01/19/2024 0.3  0.1 - 1.0 mg/dL Final    Alkaline Phosphatase 01/19/2024 114  55 - 135 U/L Final    AST 01/19/2024 24  10 - 40 U/L Final    ALT 01/19/2024 26  10 - 44 U/L Final    eGFR 01/19/2024 >60  >60 mL/min/1.73 m^2 Final    Anion Gap 01/19/2024 11  8 - 16 mmol/L Final    BNP 01/19/2024 67  0 - 99 pg/mL Final    Troponin I 01/19/2024 <0.006  0.000 - 0.026 ng/mL Final    Lactate (Lactic Acid) 01/19/2024 1.7  0.5 - 2.2 mmol/L Final   Admission on 01/06/2024, Discharged on 01/08/2024   Component Date Value Ref Range Status    WBC 01/06/2024 2.58 (L)  3.90 - 12.70 K/uL Final    RBC 01/06/2024 4.32  4.00 - 5.40 M/uL Final    Hemoglobin 01/06/2024 13.6  12.0 - 16.0 g/dL Final    Hematocrit 01/06/2024 41.4  37.0 - 48.5 % Final    MCV 01/06/2024 96  82 - 98 fL Final    MCH 01/06/2024 31.5 (H)  27.0 - 31.0 pg Final    MCHC 01/06/2024 32.9  32.0 - 36.0 g/dL Final    RDW 01/06/2024 13.6  11.5 - 14.5 %  Final    Platelets 01/06/2024 124 (L)  150 - 450 K/uL Final    MPV 01/06/2024 9.8  9.2 - 12.9 fL Final    Immature Granulocytes 01/06/2024 CANCELED  % Final-Edited    Immature Grans (Abs) 01/06/2024 CANCELED  K/uL Final-Edited    nRBC 01/06/2024 0  0 /100 WBC Final    Gran % 01/06/2024 29.0 (L)  38.0 - 73.0 % Final    Lymph % 01/06/2024 51.0 (H)  18.0 - 48.0 % Final    Mono % 01/06/2024 20.0 (H)  4.0 - 15.0 % Final    Eosinophil % 01/06/2024 0.0  0.0 - 8.0 % Final    Basophil % 01/06/2024 0.0  0.0 - 1.9 % Final    Platelet Estimate 01/06/2024 Decreased (A)   Final    Differential Method 01/06/2024 Manual   Corrected    Sodium 01/06/2024 142  136 - 145 mmol/L Final    Potassium 01/06/2024 3.4 (L)  3.5 - 5.1 mmol/L Final    Chloride 01/06/2024 107  95 - 110 mmol/L Final    CO2 01/06/2024 24  23 - 29 mmol/L Final    Glucose 01/06/2024 95  70 - 110 mg/dL Final    BUN 01/06/2024 12  8 - 23 mg/dL Final    Creatinine 01/06/2024 0.8  0.5 - 1.4 mg/dL Final    Calcium 01/06/2024 8.9  8.7 - 10.5 mg/dL Final    Total Protein 01/06/2024 7.0  6.0 - 8.4 g/dL Final    Albumin 01/06/2024 3.2 (L)  3.5 - 5.2 g/dL Final    Total Bilirubin 01/06/2024 0.3  0.1 - 1.0 mg/dL Final    Alkaline Phosphatase 01/06/2024 75  55 - 135 U/L Final    AST 01/06/2024 37  10 - 40 U/L Final    ALT 01/06/2024 21  10 - 44 U/L Final    eGFR 01/06/2024 >60  >60 mL/min/1.73 m^2 Final    Anion Gap 01/06/2024 11  8 - 16 mmol/L Final    Sodium 01/07/2024 143  136 - 145 mmol/L Final    Potassium 01/07/2024 3.7  3.5 - 5.1 mmol/L Final    Chloride 01/07/2024 108  95 - 110 mmol/L Final    CO2 01/07/2024 25  23 - 29 mmol/L Final    Glucose 01/07/2024 87  70 - 110 mg/dL Final    BUN 01/07/2024 9  8 - 23 mg/dL Final    Creatinine 01/07/2024 0.8  0.5 - 1.4 mg/dL Final    Calcium 01/07/2024 8.3 (L)  8.7 - 10.5 mg/dL Final    Anion Gap 01/07/2024 10  8 - 16 mmol/L Final    eGFR 01/07/2024 >60  >60 mL/min/1.73 m^2 Final    Magnesium 01/07/2024 1.9  1.6 - 2.6 mg/dL Final     Phosphorus 01/07/2024 2.6 (L)  2.7 - 4.5 mg/dL Final    WBC 01/07/2024 2.69 (L)  3.90 - 12.70 K/uL Final    RBC 01/07/2024 4.07  4.00 - 5.40 M/uL Final    Hemoglobin 01/07/2024 12.9  12.0 - 16.0 g/dL Final    Hematocrit 01/07/2024 39.0  37.0 - 48.5 % Final    MCV 01/07/2024 96  82 - 98 fL Final    MCH 01/07/2024 31.7 (H)  27.0 - 31.0 pg Final    MCHC 01/07/2024 33.1  32.0 - 36.0 g/dL Final    RDW 01/07/2024 13.7  11.5 - 14.5 % Final    Platelets 01/07/2024 107 (L)  150 - 450 K/uL Final    MPV 01/07/2024 9.9  9.2 - 12.9 fL Final    Immature Granulocytes 01/07/2024 CANCELED  % Final-Edited    Immature Grans (Abs) 01/07/2024 CANCELED  K/uL Final-Edited    nRBC 01/07/2024 0  0 /100 WBC Final    Gran % 01/07/2024 39.0  38.0 - 73.0 % Final    Lymph % 01/07/2024 50.0 (H)  18.0 - 48.0 % Final    Mono % 01/07/2024 11.0  4.0 - 15.0 % Final    Eosinophil % 01/07/2024 0.0  0.0 - 8.0 % Final    Basophil % 01/07/2024 0.0  0.0 - 1.9 % Final    Platelet Estimate 01/07/2024 Decreased (A)   Final    Differential Method 01/07/2024 Manual   Corrected    Procalcitonin 01/07/2024 0.04  <0.25 ng/mL Final    Sodium 01/08/2024 140  136 - 145 mmol/L Final    Potassium 01/08/2024 3.8  3.5 - 5.1 mmol/L Final    Chloride 01/08/2024 105  95 - 110 mmol/L Final    CO2 01/08/2024 25  23 - 29 mmol/L Final    Glucose 01/08/2024 87  70 - 110 mg/dL Final    BUN 01/08/2024 8  8 - 23 mg/dL Final    Creatinine 01/08/2024 0.8  0.5 - 1.4 mg/dL Final    Calcium 01/08/2024 8.6 (L)  8.7 - 10.5 mg/dL Final    Anion Gap 01/08/2024 10  8 - 16 mmol/L Final    eGFR 01/08/2024 >60  >60 mL/min/1.73 m^2 Final    Magnesium 01/08/2024 1.9  1.6 - 2.6 mg/dL Final    Phosphorus 01/08/2024 2.1 (L)  2.7 - 4.5 mg/dL Final    WBC 01/08/2024 3.10 (L)  3.90 - 12.70 K/uL Final    RBC 01/08/2024 4.03  4.00 - 5.40 M/uL Final    Hemoglobin 01/08/2024 12.7  12.0 - 16.0 g/dL Final    Hematocrit 01/08/2024 38.9  37.0 - 48.5 % Final    MCV 01/08/2024 97  82 - 98 fL Final    MCH  01/08/2024 31.5 (H)  27.0 - 31.0 pg Final    MCHC 01/08/2024 32.6  32.0 - 36.0 g/dL Final    RDW 01/08/2024 13.5  11.5 - 14.5 % Final    Platelets 01/08/2024 113 (L)  150 - 450 K/uL Final    MPV 01/08/2024 9.9  9.2 - 12.9 fL Final    Immature Granulocytes 01/08/2024 0.0  0.0 - 0.5 % Final    Gran # (ANC) 01/08/2024 1.4 (L)  1.8 - 7.7 K/uL Final    Immature Grans (Abs) 01/08/2024 0.00  0.00 - 0.04 K/uL Final    Lymph # 01/08/2024 1.4  1.0 - 4.8 K/uL Final    Mono # 01/08/2024 0.3  0.3 - 1.0 K/uL Final    Eos # 01/08/2024 0.0  0.0 - 0.5 K/uL Final    Baso # 01/08/2024 0.01  0.00 - 0.20 K/uL Final    nRBC 01/08/2024 0  0 /100 WBC Final    Gran % 01/08/2024 43.6  38.0 - 73.0 % Final    Lymph % 01/08/2024 45.8  18.0 - 48.0 % Final    Mono % 01/08/2024 9.7  4.0 - 15.0 % Final    Eosinophil % 01/08/2024 0.6  0.0 - 8.0 % Final    Basophil % 01/08/2024 0.3  0.0 - 1.9 % Final    Differential Method 01/08/2024 Automated   Final   Admission on 01/03/2024, Discharged on 01/03/2024   Component Date Value Ref Range Status    WBC 01/03/2024 3.53 (L)  3.90 - 12.70 K/uL Final    RBC 01/03/2024 4.43  4.00 - 5.40 M/uL Final    Hemoglobin 01/03/2024 14.1  12.0 - 16.0 g/dL Final    Hematocrit 01/03/2024 41.7  37.0 - 48.5 % Final    MCV 01/03/2024 94  82 - 98 fL Final    MCH 01/03/2024 31.8 (H)  27.0 - 31.0 pg Final    MCHC 01/03/2024 33.8  32.0 - 36.0 g/dL Final    RDW 01/03/2024 13.5  11.5 - 14.5 % Final    Platelets 01/03/2024 136 (L)  150 - 450 K/uL Final    MPV 01/03/2024 9.5  9.2 - 12.9 fL Final    Immature Granulocytes 01/03/2024 0.3  0.0 - 0.5 % Final    Gran # (ANC) 01/03/2024 2.2  1.8 - 7.7 K/uL Final    Immature Grans (Abs) 01/03/2024 0.01  0.00 - 0.04 K/uL Final    Lymph # 01/03/2024 0.8 (L)  1.0 - 4.8 K/uL Final    Mono # 01/03/2024 0.5  0.3 - 1.0 K/uL Final    Eos # 01/03/2024 0.0  0.0 - 0.5 K/uL Final    Baso # 01/03/2024 0.01  0.00 - 0.20 K/uL Final    nRBC 01/03/2024 0  0 /100 WBC Final    Gran % 01/03/2024 62.3  38.0 -  73.0 % Final    Lymph % 01/03/2024 22.1  18.0 - 48.0 % Final    Mono % 01/03/2024 15.0  4.0 - 15.0 % Final    Eosinophil % 01/03/2024 0.0  0.0 - 8.0 % Final    Basophil % 01/03/2024 0.3  0.0 - 1.9 % Final    Differential Method 01/03/2024 Automated   Final    Sodium 01/03/2024 137  136 - 145 mmol/L Final    Potassium 01/03/2024 3.8  3.5 - 5.1 mmol/L Final    Chloride 01/03/2024 104  95 - 110 mmol/L Final    CO2 01/03/2024 23  23 - 29 mmol/L Final    Glucose 01/03/2024 99  70 - 110 mg/dL Final    BUN 01/03/2024 18  8 - 23 mg/dL Final    Creatinine 01/03/2024 1.0  0.5 - 1.4 mg/dL Final    Calcium 01/03/2024 9.1  8.7 - 10.5 mg/dL Final    Total Protein 01/03/2024 7.7  6.0 - 8.4 g/dL Final    Albumin 01/03/2024 3.7  3.5 - 5.2 g/dL Final    Total Bilirubin 01/03/2024 0.5  0.1 - 1.0 mg/dL Final    Alkaline Phosphatase 01/03/2024 88  55 - 135 U/L Final    AST 01/03/2024 39  10 - 40 U/L Final    ALT 01/03/2024 27  10 - 44 U/L Final    eGFR 01/03/2024 59 (A)  >60 mL/min/1.73 m^2 Final    Anion Gap 01/03/2024 10  8 - 16 mmol/L Final    Influenza A, Molecular 01/03/2024 Positive (AA)  Negative Final    Influenza B, Molecular 01/03/2024 Negative  Negative Final    Flu A & B Source 01/03/2024 Nasal swab   Final    SARS-CoV-2 RNA, Amplification, Qual 01/03/2024 Negative  Negative Final    RSV Source 01/03/2024 Nasopharyngeal Swab   Final    RSV Ag by Molecular Method 01/03/2024 Negative  Negative Final   Admission on 10/22/2023, Discharged on 10/22/2023   Component Date Value Ref Range Status    WBC 10/22/2023 10.20  3.90 - 12.70 K/uL Final    RBC 10/22/2023 4.47  4.00 - 5.40 M/uL Final    Hemoglobin 10/22/2023 14.3  12.0 - 16.0 g/dL Final    Hematocrit 10/22/2023 42.6  37.0 - 48.5 % Final    MCV 10/22/2023 95  82 - 98 fL Final    MCH 10/22/2023 32.0 (H)  27.0 - 31.0 pg Final    MCHC 10/22/2023 33.6  32.0 - 36.0 g/dL Final    RDW 10/22/2023 13.4  11.5 - 14.5 % Final    Platelets 10/22/2023 182  150 - 450 K/uL Final    MPV  10/22/2023 9.8  9.2 - 12.9 fL Final    Immature Granulocytes 10/22/2023 0.6 (H)  0.0 - 0.5 % Final    Gran # (ANC) 10/22/2023 8.4 (H)  1.8 - 7.7 K/uL Final    Immature Grans (Abs) 10/22/2023 0.06 (H)  0.00 - 0.04 K/uL Final    Lymph # 10/22/2023 1.2  1.0 - 4.8 K/uL Final    Mono # 10/22/2023 0.5  0.3 - 1.0 K/uL Final    Eos # 10/22/2023 0.0  0.0 - 0.5 K/uL Final    Baso # 10/22/2023 0.02  0.00 - 0.20 K/uL Final    nRBC 10/22/2023 0  0 /100 WBC Final    Gran % 10/22/2023 82.6 (H)  38.0 - 73.0 % Final    Lymph % 10/22/2023 12.0 (L)  18.0 - 48.0 % Final    Mono % 10/22/2023 4.5  4.0 - 15.0 % Final    Eosinophil % 10/22/2023 0.1  0.0 - 8.0 % Final    Basophil % 10/22/2023 0.2  0.0 - 1.9 % Final    Differential Method 10/22/2023 Automated   Final    Sodium 10/22/2023 137  136 - 145 mmol/L Final    Potassium 10/22/2023 3.7  3.5 - 5.1 mmol/L Final    Chloride 10/22/2023 107  95 - 110 mmol/L Final    CO2 10/22/2023 25  23 - 29 mmol/L Final    Glucose 10/22/2023 118 (H)  70 - 110 mg/dL Final    BUN 10/22/2023 17  8 - 23 mg/dL Final    Creatinine 10/22/2023 0.8  0.5 - 1.4 mg/dL Final    Calcium 10/22/2023 9.1  8.7 - 10.5 mg/dL Final    Total Protein 10/22/2023 7.3  6.0 - 8.4 g/dL Final    Albumin 10/22/2023 4.0  3.5 - 5.2 g/dL Final    Total Bilirubin 10/22/2023 0.3  0.1 - 1.0 mg/dL Final    Alkaline Phosphatase 10/22/2023 80  55 - 135 U/L Final    AST 10/22/2023 22  10 - 40 U/L Final    ALT 10/22/2023 23  10 - 44 U/L Final    eGFR 10/22/2023 >60.0  >60 mL/min/1.73 m^2 Final    Anion Gap 10/22/2023 5 (L)  8 - 16 mmol/L Final    Specimen UA 10/22/2023 Urine, Clean Catch   Final    Color, UA 10/22/2023 Yellow  Yellow, Straw, Priya Final    Appearance, UA 10/22/2023 Clear  Clear Final    pH, UA 10/22/2023 6.0  5.0 - 8.0 Final    Specific Gravity, UA 10/22/2023 1.015  1.005 - 1.030 Final    Protein, UA 10/22/2023 Negative  Negative Final    Glucose, UA 10/22/2023 Negative  Negative Final    Ketones, UA 10/22/2023 Negative   Negative Final    Bilirubin (UA) 10/22/2023 Negative  Negative Final    Occult Blood UA 10/22/2023 1+ (A)  Negative Final    Nitrite, UA 10/22/2023 Negative  Negative Final    Urobilinogen, UA 10/22/2023 Negative  Negative EU/dL Final    Leukocytes, UA 10/22/2023 Negative  Negative Final    RBC, UA 10/22/2023 4  0 - 4 /hpf Final    WBC, UA 10/22/2023 1  0 - 5 /hpf Final    Bacteria 10/22/2023 Negative  None-Occ /hpf Final    Squam Epithel, UA 10/22/2023 3  /hpf Final    Hyaline Casts, UA 10/22/2023 5 (A)  0-1/lpf /lpf Final    Microscopic Comment 10/22/2023 SEE COMMENT   Final   Admission on 10/18/2023, Discharged on 10/20/2023   Component Date Value Ref Range Status    WBC 10/18/2023 7.43  3.90 - 12.70 K/uL Final    RBC 10/18/2023 4.91  4.00 - 5.40 M/uL Final    Hemoglobin 10/18/2023 16.0  12.0 - 16.0 g/dL Final    Hematocrit 10/18/2023 46.8  37.0 - 48.5 % Final    MCV 10/18/2023 95  82 - 98 fL Final    MCH 10/18/2023 32.6 (H)  27.0 - 31.0 pg Final    MCHC 10/18/2023 34.2  32.0 - 36.0 g/dL Final    RDW 10/18/2023 13.4  11.5 - 14.5 % Final    Platelets 10/18/2023 192  150 - 450 K/uL Final    MPV 10/18/2023 9.4  9.2 - 12.9 fL Final    Immature Granulocytes 10/18/2023 0.3  0.0 - 0.5 % Final    Gran # (ANC) 10/18/2023 4.4  1.8 - 7.7 K/uL Final    Immature Grans (Abs) 10/18/2023 0.02  0.00 - 0.04 K/uL Final    Lymph # 10/18/2023 2.3  1.0 - 4.8 K/uL Final    Mono # 10/18/2023 0.6  0.3 - 1.0 K/uL Final    Eos # 10/18/2023 0.1  0.0 - 0.5 K/uL Final    Baso # 10/18/2023 0.03  0.00 - 0.20 K/uL Final    nRBC 10/18/2023 0  0 /100 WBC Final    Gran % 10/18/2023 59.4  38.0 - 73.0 % Final    Lymph % 10/18/2023 30.3  18.0 - 48.0 % Final    Mono % 10/18/2023 8.5  4.0 - 15.0 % Final    Eosinophil % 10/18/2023 1.1  0.0 - 8.0 % Final    Basophil % 10/18/2023 0.4  0.0 - 1.9 % Final    Differential Method 10/18/2023 Automated   Final    Sodium 10/18/2023 136  136 - 145 mmol/L Final    Potassium 10/18/2023 4.2  3.5 - 5.1 mmol/L Final     Chloride 10/18/2023 106  95 - 110 mmol/L Final    CO2 10/18/2023 23  23 - 29 mmol/L Final    Glucose 10/18/2023 123 (H)  70 - 110 mg/dL Final    BUN 10/18/2023 12  8 - 23 mg/dL Final    Creatinine 10/18/2023 0.7  0.5 - 1.4 mg/dL Final    Calcium 10/18/2023 8.9  8.7 - 10.5 mg/dL Final    Total Protein 10/18/2023 6.4  6.0 - 8.4 g/dL Final    Albumin 10/18/2023 3.4 (L)  3.5 - 5.2 g/dL Final    Total Bilirubin 10/18/2023 0.6  0.1 - 1.0 mg/dL Final    Alkaline Phosphatase 10/18/2023 74  55 - 135 U/L Final    AST 10/18/2023 23  10 - 40 U/L Final    ALT 10/18/2023 17  10 - 44 U/L Final    eGFR 10/18/2023 >60.0  >60 mL/min/1.73 m^2 Final    Anion Gap 10/18/2023 7 (L)  8 - 16 mmol/L Final    Prothrombin Time 10/18/2023 10.4  9.0 - 12.5 sec Final    INR 10/18/2023 0.9  0.8 - 1.2 Final    TSH 10/18/2023 0.193 (L)  0.340 - 5.600 uIU/mL Final    Cholesterol 10/18/2023 151  120 - 199 mg/dL Final    Triglycerides 10/18/2023 111  30 - 150 mg/dL Final    HDL 10/18/2023 38 (L)  40 - 75 mg/dL Final    LDL Cholesterol 10/18/2023 90.8  63.0 - 159.0 mg/dL Final    HDL/Cholesterol Ratio 10/18/2023 25.2  20.0 - 50.0 % Final    Total Cholesterol/HDL Ratio 10/18/2023 4.0  2.0 - 5.0 Final    Non-HDL Cholesterol 10/18/2023 113  mg/dL Final    Troponin I High Sensitivity 10/18/2023 7.4  0.0 - 14.9 pg/mL Final    POC Creatinine 10/18/2023 0.9  0.5 - 1.4 mg/dL Final    Sample 10/18/2023 VENOUS   Final    POC Glucose 10/18/2023 98  70 - 110 Final    POC PH 10/18/2023 7.352  7.35 - 7.45 Final    POC PCO2 10/18/2023 45.4 (H)  35 - 45 mmHg Final    POC PO2 10/18/2023 87 (HH)  40 - 60 mmHg Final    POC HCO3 10/18/2023 25.2  24 - 28 mmol/L Final    POC BE 10/18/2023 0  -2 to 2 mmol/L Final    POC SATURATED O2 10/18/2023 96  95 - 100 % Final    POC TCO2 10/18/2023 27  24 - 29 mmol/L Final    Rate 10/18/2023 18   Final    Sample 10/18/2023 VENOUS   Final    Site 10/18/2023 Other   Final    Allens Test 10/18/2023 N/A   Final    DelSys 10/18/2023  CPAP/BiPAP   Final    Mode 10/18/2023 BiPAP   Final    FiO2 10/18/2023 40   Final    Spont Rate 10/18/2023 21   Final    IP 10/18/2023 10   Final    EP 10/18/2023 5   Final    Tryptase 10/18/2023 26.7 (H)  2.2 - 13.2 ug/L Final    Free T4 10/18/2023 1.40  0.71 - 1.51 ng/dL Final    WBC 10/19/2023 6.06  3.90 - 12.70 K/uL Final    RBC 10/19/2023 4.23  4.00 - 5.40 M/uL Final    Hemoglobin 10/19/2023 13.8  12.0 - 16.0 g/dL Final    Hematocrit 10/19/2023 41.0  37.0 - 48.5 % Final    MCV 10/19/2023 97  82 - 98 fL Final    MCH 10/19/2023 32.6 (H)  27.0 - 31.0 pg Final    MCHC 10/19/2023 33.7  32.0 - 36.0 g/dL Final    RDW 10/19/2023 13.3  11.5 - 14.5 % Final    Platelets 10/19/2023 165  150 - 450 K/uL Final    MPV 10/19/2023 9.5  9.2 - 12.9 fL Final    Immature Granulocytes 10/19/2023 0.5  0.0 - 0.5 % Final    Gran # (ANC) 10/19/2023 5.4  1.8 - 7.7 K/uL Final    Immature Grans (Abs) 10/19/2023 0.03  0.00 - 0.04 K/uL Final    Lymph # 10/19/2023 0.6 (L)  1.0 - 4.8 K/uL Final    Mono # 10/19/2023 0.0 (L)  0.3 - 1.0 K/uL Final    Eos # 10/19/2023 0.0  0.0 - 0.5 K/uL Final    Baso # 10/19/2023 0.00  0.00 - 0.20 K/uL Final    nRBC 10/19/2023 0  0 /100 WBC Final    Gran % 10/19/2023 89.6 (H)  38.0 - 73.0 % Final    Lymph % 10/19/2023 9.2 (L)  18.0 - 48.0 % Final    Mono % 10/19/2023 0.7 (L)  4.0 - 15.0 % Final    Eosinophil % 10/19/2023 0.0  0.0 - 8.0 % Final    Basophil % 10/19/2023 0.0  0.0 - 1.9 % Final    Differential Method 10/19/2023 Automated   Final    Sodium 10/19/2023 137  136 - 145 mmol/L Final    Potassium 10/19/2023 4.0  3.5 - 5.1 mmol/L Final    Chloride 10/19/2023 108  95 - 110 mmol/L Final    CO2 10/19/2023 23  23 - 29 mmol/L Final    Glucose 10/19/2023 149 (H)  70 - 110 mg/dL Final    BUN 10/19/2023 10  8 - 23 mg/dL Final    Creatinine 10/19/2023 0.7  0.5 - 1.4 mg/dL Final    Calcium 10/19/2023 8.8  8.7 - 10.5 mg/dL Final    Total Protein 10/19/2023 6.5  6.0 - 8.4 g/dL Final    Albumin 10/19/2023 3.5  3.5 - 5.2  g/dL Final    Total Bilirubin 10/19/2023 0.5  0.1 - 1.0 mg/dL Final    Alkaline Phosphatase 10/19/2023 76  55 - 135 U/L Final    AST 10/19/2023 16  10 - 40 U/L Final    ALT 10/19/2023 15  10 - 44 U/L Final    eGFR 10/19/2023 >60.0  >60 mL/min/1.73 m^2 Final    Anion Gap 10/19/2023 6 (L)  8 - 16 mmol/L Final    WBC 10/20/2023 15.33 (H)  3.90 - 12.70 K/uL Final    RBC 10/20/2023 4.04  4.00 - 5.40 M/uL Final    Hemoglobin 10/20/2023 13.0  12.0 - 16.0 g/dL Final    Hematocrit 10/20/2023 38.8  37.0 - 48.5 % Final    MCV 10/20/2023 96  82 - 98 fL Final    MCH 10/20/2023 32.2 (H)  27.0 - 31.0 pg Final    MCHC 10/20/2023 33.5  32.0 - 36.0 g/dL Final    RDW 10/20/2023 13.6  11.5 - 14.5 % Final    Platelets 10/20/2023 177  150 - 450 K/uL Final    MPV 10/20/2023 9.7  9.2 - 12.9 fL Final    Immature Granulocytes 10/20/2023 0.4  0.0 - 0.5 % Final    Gran # (ANC) 10/20/2023 12.5 (H)  1.8 - 7.7 K/uL Final    Immature Grans (Abs) 10/20/2023 0.06 (H)  0.00 - 0.04 K/uL Final    Lymph # 10/20/2023 1.7  1.0 - 4.8 K/uL Final    Mono # 10/20/2023 1.1 (H)  0.3 - 1.0 K/uL Final    Eos # 10/20/2023 0.0  0.0 - 0.5 K/uL Final    Baso # 10/20/2023 0.02  0.00 - 0.20 K/uL Final    nRBC 10/20/2023 0  0 /100 WBC Final    Gran % 10/20/2023 81.9 (H)  38.0 - 73.0 % Final    Lymph % 10/20/2023 10.8 (L)  18.0 - 48.0 % Final    Mono % 10/20/2023 6.8  4.0 - 15.0 % Final    Eosinophil % 10/20/2023 0.0  0.0 - 8.0 % Final    Basophil % 10/20/2023 0.1  0.0 - 1.9 % Final    Differential Method 10/20/2023 Automated   Final    Sodium 10/20/2023 138  136 - 145 mmol/L Final    Potassium 10/20/2023 4.1  3.5 - 5.1 mmol/L Final    Chloride 10/20/2023 108  95 - 110 mmol/L Final    CO2 10/20/2023 24  23 - 29 mmol/L Final    Glucose 10/20/2023 120 (H)  70 - 110 mg/dL Final    BUN 10/20/2023 19  8 - 23 mg/dL Final    Creatinine 10/20/2023 0.8  0.5 - 1.4 mg/dL Final    Calcium 10/20/2023 9.3  8.7 - 10.5 mg/dL Final    Total Protein 10/20/2023 6.5  6.0 - 8.4 g/dL Final     Albumin 10/20/2023 3.5  3.5 - 5.2 g/dL Final    Total Bilirubin 10/20/2023 0.4  0.1 - 1.0 mg/dL Final    Alkaline Phosphatase 10/20/2023 77  55 - 135 U/L Final    AST 10/20/2023 28  10 - 40 U/L Final    ALT 10/20/2023 16  10 - 44 U/L Final    eGFR 10/20/2023 >60.0  >60 mL/min/1.73 m^2 Final    Anion Gap 10/20/2023 6 (L)  8 - 16 mmol/L Final   Admission on 09/05/2023, Discharged on 09/05/2023   Component Date Value Ref Range Status    WBC 09/05/2023 6.79  3.90 - 12.70 K/uL Final    RBC 09/05/2023 4.80  4.00 - 5.40 M/uL Final    Hemoglobin 09/05/2023 15.2  12.0 - 16.0 g/dL Final    Hematocrit 09/05/2023 45.3  37.0 - 48.5 % Final    MCV 09/05/2023 94  82 - 98 fL Final    MCH 09/05/2023 31.7 (H)  27.0 - 31.0 pg Final    MCHC 09/05/2023 33.6  32.0 - 36.0 g/dL Final    RDW 09/05/2023 13.2  11.5 - 14.5 % Final    Platelets 09/05/2023 243  150 - 450 K/uL Final    MPV 09/05/2023 9.1 (L)  9.2 - 12.9 fL Final    Immature Granulocytes 09/05/2023 0.3  0.0 - 0.5 % Final    Gran # (ANC) 09/05/2023 4.4  1.8 - 7.7 K/uL Final    Immature Grans (Abs) 09/05/2023 0.02  0.00 - 0.04 K/uL Final    Lymph # 09/05/2023 1.7  1.0 - 4.8 K/uL Final    Mono # 09/05/2023 0.6  0.3 - 1.0 K/uL Final    Eos # 09/05/2023 0.1  0.0 - 0.5 K/uL Final    Baso # 09/05/2023 0.03  0.00 - 0.20 K/uL Final    nRBC 09/05/2023 0  0 /100 WBC Final    Gran % 09/05/2023 64.3  38.0 - 73.0 % Final    Lymph % 09/05/2023 24.6  18.0 - 48.0 % Final    Mono % 09/05/2023 9.4  4.0 - 15.0 % Final    Eosinophil % 09/05/2023 1.0  0.0 - 8.0 % Final    Basophil % 09/05/2023 0.4  0.0 - 1.9 % Final    Differential Method 09/05/2023 Automated   Final    Sodium 09/05/2023 138  136 - 145 mmol/L Final    Potassium 09/05/2023 4.2  3.5 - 5.1 mmol/L Final    Chloride 09/05/2023 108  95 - 110 mmol/L Final    CO2 09/05/2023 25  23 - 29 mmol/L Final    Glucose 09/05/2023 97  70 - 110 mg/dL Final    BUN 09/05/2023 14  8 - 23 mg/dL Final    Creatinine 09/05/2023 0.8  0.5 - 1.4 mg/dL Final     Calcium 09/05/2023 9.4  8.7 - 10.5 mg/dL Final    Total Protein 09/05/2023 8.1  6.0 - 8.4 g/dL Final    Albumin 09/05/2023 3.8  3.5 - 5.2 g/dL Final    Total Bilirubin 09/05/2023 0.6  0.1 - 1.0 mg/dL Final    Alkaline Phosphatase 09/05/2023 85  55 - 135 U/L Final    AST 09/05/2023 19  10 - 40 U/L Final    ALT 09/05/2023 23  10 - 44 U/L Final    eGFR 09/05/2023 >60.0  >60 mL/min/1.73 m^2 Final    Anion Gap 09/05/2023 5 (L)  8 - 16 mmol/L Final    BNP 09/05/2023 38  0 - 99 pg/mL Final    Troponin I High Sensitivity 09/05/2023 7.3  0.0 - 14.9 pg/mL Final    Prothrombin Time 09/05/2023 10.3  9.0 - 12.5 sec Final    INR 09/05/2023 0.9  0.8 - 1.2 Final    Magnesium 09/05/2023 2.0  1.6 - 2.6 mg/dL Final    BNP 09/05/2023 38  0 - 99 pg/mL Final       Past Medical History:   Diagnosis Date    Anxiety     Martin esophagus     Colitis     COPD (chronic obstructive pulmonary disease)     GERD (gastroesophageal reflux disease)     Hypertension     Thyroid disease     Vocal cord polyps      Social History     Socioeconomic History    Marital status:    Tobacco Use    Smoking status: Every Day     Current packs/day: 0.50     Types: Cigarettes    Smokeless tobacco: Former     Quit date: 5/1/2016    Tobacco comments:     Pt has smoked since 14 years old. Smokes around .5ppd. Inpatient smoking education done 10/20.   Substance and Sexual Activity    Alcohol use: Never    Drug use: Never    Sexual activity: Yes     Partners: Male     Social Determinants of Health     Food Insecurity: No Food Insecurity (1/6/2024)    Hunger Vital Sign     Worried About Running Out of Food in the Last Year: Never true     Ran Out of Food in the Last Year: Never true   Transportation Needs: No Transportation Needs (1/6/2024)    PRAPARE - Transportation     Lack of Transportation (Medical): No     Lack of Transportation (Non-Medical): No   Stress: No Stress Concern Present (9/19/2019)    [employer] - Occupational  Stress Questionnaire     Feeling of Stress : Not at all   Social Connections: Unknown (1/6/2024)    Social Connection and Isolation Panel [NHANES]     Marital Status:    Housing Stability: Unknown (1/6/2024)    Housing Stability Vital Sign     Unable to Pay for Housing in the Last Year: No     Unstable Housing in the Last Year: No     Past Surgical History:   Procedure Laterality Date    ABDOMINAL AORTIC ANEURYSM REPAIR      BACK SURGERY      cervical    CATARACT EXTRACTION Right 02/2021    CHOLECYSTECTOMY  12/20/2013    Dr Price  [facility]    FOOT SURGERY      HYSTERECTOMY  1979    AUGUSTINE for AUB with consevation ovaries    SALIVARY GLAND SURGERY       Family History   Problem Relation Age of Onset    Cancer Mother     Heart failure Father     Emphysema Sister     No Known Problems Brother     Cancer Sister        The CVD Risk score (Betsey, et al., 2008) failed to calculate for the following reasons:    The 2008 CVD risk score is only valid for ages 30 to 74    Tests to Keep You Healthy    Colon Cancer Screening: ORDERED  Last Blood Pressure <= 139/89 (2/27/2024): Yes  Tobacco Cessation: NO      Review of patient's allergies indicates:   Allergen Reactions    Bisacodyl Nausea And Vomiting     Other reaction(s): Vomiting    Iodinated contrast media Hives and Shortness Of Breath     hypotension    Morphine Other (See Comments)     hypotension    Azithromycin Hives    Cipro [ciprofloxacin hcl]     Demerol [meperidine]      Nausea vomiting, visual problem    Doxycycline Hives    Flonase [fluticasone propionate] Hives    Morpholine analogues Other (See Comments)     hypotension       Current Outpatient Medications:     aluminum-magnesium hydroxide-simethicone (MAALOX) 200-200-20 mg/5 mL Susp, Take 30 mLs by mouth once as needed., Disp: , Rfl:     amLODIPine (NORVASC) 5 MG tablet, Take 1 tablet (5 mg total) by mouth once daily. For blood pressure, Disp: 30 tablet, Rfl: 5    diphenhydrAMINE (BENADRYL) 25 mg capsule,  Take 1 capsule (25 mg total) by mouth every 6 (six) hours as needed for Itching or Allergies., Disp: 20 capsule, Rfl: 0    EPINEPHrine (EPIPEN) 0.3 mg/0.3 mL AtIn, Inject 0.3 mLs (0.3 mg total) into the muscle as needed (Severe allergic reaction symptoms such as shortness of breath, tongue or throat swelling, weakness dizziness severe nausea vomiting)., Disp: 1 each, Rfl: 3    famotidine (PEPCID) 20 MG tablet, Take 1 tablet (20 mg total) by mouth 2 (two) times daily., Disp: 20 tablet, Rfl: 0    fluconazole (DIFLUCAN) 100 MG tablet, Take 1 tablet (100 mg total) by mouth once daily., Disp: 7 tablet, Rfl: 0    hydroCHLOROthiazide (HYDRODIURIL) 25 MG tablet, Take 25 mg by mouth daily as needed., Disp: , Rfl:     levalbuterol (XOPENEX HFA) 45 mcg/actuation inhaler, Inhale 2 puffs into the lungs every 6 (six) hours as needed for Wheezing. Rescue, Disp: 15 g, Rfl: 4    levocetirizine (XYZAL) 5 MG tablet, Take 5 mg by mouth every evening., Disp: , Rfl:     levothyroxine (SYNTHROID) 88 MCG tablet, Take 1 tablet (88 mcg total) by mouth before breakfast., Disp: 30 tablet, Rfl: 3    metoprolol tartrate (LOPRESSOR) 25 MG tablet, Take 1 tablet (25 mg total) by mouth 2 (two) times daily., Disp: 180 tablet, Rfl: 3    omeprazole (PRILOSEC) 40 MG capsule, Take 1 capsule (40 mg total) by mouth every morning., Disp: 90 capsule, Rfl: 3    ondansetron (ZOFRAN) 4 MG tablet, Take 1 tablet (4 mg total) by mouth every 8 (eight) hours as needed for Nausea., Disp: 30 tablet, Rfl: 3    REFRESH OPTIVE 0.5-0.9 % Drop, Place 1 drop into both eyes 4 (four) times daily., Disp: , Rfl:     simethicone (GAS-X ORAL), Take 1 tablet by mouth as needed., Disp: , Rfl:     HYDROcodone-acetaminophen (NORCO)  mg per tablet, Take 1 tablet by mouth every 12 (twelve) hours as needed for Pain., Disp: 50 tablet, Rfl: 0    predniSONE (DELTASONE) 10 MG tablet, Take 1 tablet (10 mg total) by mouth 2 (two) times daily., Disp: 30 tablet, Rfl: 0    Current  "Facility-Administered Medications:     levalbuterol nebulizer solution 1.25 mg, 1.25 mg, Nebulization, 1 time in Clinic/HOD, Aleisha Garcia NP    Review of Systems   Constitutional:  Negative for appetite change, chills, fatigue, fever and unexpected weight change.   HENT:  Negative for ear discharge and ear pain.    Eyes:  Negative for pain, discharge and visual disturbance.   Respiratory:  Positive for shortness of breath. Negative for apnea, cough and wheezing.    Cardiovascular:  Negative for chest pain, palpitations and leg swelling.   Gastrointestinal:  Negative for abdominal pain, blood in stool, constipation, diarrhea, nausea, vomiting and reflux.   Endocrine: Negative for cold intolerance, heat intolerance and polydipsia.   Genitourinary:  Negative for bladder incontinence, dysuria, hematuria, nocturia and urgency.   Musculoskeletal:  Positive for back pain. Negative for gait problem, joint swelling and myalgias.   Neurological:  Negative for dizziness, seizures and numbness.   Psychiatric/Behavioral:  Negative for behavioral problems and hallucinations. The patient is not nervous/anxious.            Objective:      Vitals:    02/27/24 0738   BP: 120/80   Pulse: 66   Weight: 75.8 kg (167 lb)   Height: 5' 2" (1.575 m)     Physical Exam  Vitals and nursing note reviewed.   Constitutional:       General: She is not in acute distress.     Appearance: She is well-developed. She is obese. She is not toxic-appearing.   HENT:      Head: Normocephalic and atraumatic.      Right Ear: Tympanic membrane and external ear normal.      Left Ear: Tympanic membrane and external ear normal.      Nose: Nose normal.      Mouth/Throat:      Pharynx: Oropharynx is clear.   Eyes:      Pupils: Pupils are equal, round, and reactive to light.   Neck:      Thyroid: No thyromegaly.      Vascular: No carotid bruit.   Cardiovascular:      Rate and Rhythm: Normal rate and regular rhythm.      Heart sounds: Normal heart sounds. No murmur " heard.  Pulmonary:      Effort: Pulmonary effort is normal.      Breath sounds: Normal breath sounds. No wheezing or rales.   Abdominal:      General: Bowel sounds are normal. There is no distension.      Palpations: Abdomen is soft.      Tenderness: There is no abdominal tenderness.   Musculoskeletal:         General: No tenderness or deformity. Normal range of motion.      Cervical back: Normal range of motion and neck supple.      Lumbar back: Normal. No spasms.      Comments: Bends 90 degrees at  waist, shoulders and knees good range of motion no pitting edema to lower extremities   Lymphadenopathy:      Cervical: No cervical adenopathy.   Skin:     General: Skin is warm and dry.      Findings: Rash (Patient has red dermatitis on her upper arms and wrist areas, it is erythematous somewhat raised.  Her left thigh has this as well.) present.   Neurological:      Mental Status: She is alert and oriented to person, place, and time.      Cranial Nerves: No cranial nerve deficit.      Coordination: Coordination normal.   Psychiatric:         Mood and Affect: Mood is anxious.         Behavior: Behavior normal.         Thought Content: Thought content normal.         Judgment: Judgment normal.           Assessment:       1. Mass of upper lobe of left lung    2. DDD (degenerative disc disease), lumbar    3. COPD exacerbation    4. Lumbar disc disease with radiculopathy    5. Panlobular emphysema    6. Abdominal aortic aneurysm (AAA) without rupture, unspecified part    7. Primary hypertension    8. Acute hypoxic respiratory failure    9. Allergic reaction, subsequent encounter         Plan:       Mass of upper lobe of left lung  Patient's lung biopsy returned only benign tissue.  PET scan is scheduled on 03/14/2024, further decisions will be made after PET scan results are known.  DDD (degenerative disc disease), lumbar  -     HYDROcodone-acetaminophen (NORCO)  mg per tablet; Take 1 tablet by mouth every 12  (twelve) hours as needed for Pain.  Dispense: 50 tablet; Refill: 0  Refill pain medication for lumbar disc  COPD exacerbation  -     predniSONE (DELTASONE) 10 MG tablet; Take 1 tablet (10 mg total) by mouth 2 (two) times daily.  Dispense: 30 tablet; Refill: 0    Lumbar disc disease with radiculopathy    Panlobular emphysema    Abdominal aortic aneurysm (AAA) without rupture, unspecified part    Primary hypertension  Blood pressure well controlled  Acute hypoxic respiratory failure    Allergic reaction, subsequent encounter  Use Allegra, if needed and if dermatitis is severe may add prednisone 10 mg    No follow-ups on file.        2/27/2024 Jasbir Graham

## 2024-02-27 NOTE — TELEPHONE ENCOUNTER
Patient brought back prescription stating will need to state with greater then seven days medically necessary.    Spoke with patient and informed that providers are able to send in the prescriptions electronically and or if she would like to  another paper copy to take to the pharmacy. States if we could send in electronically to [facility] pharmacy [facility] on [address] [place]    Prescription pended for provider review.

## 2024-02-27 NOTE — TELEPHONE ENCOUNTER
----- Message from Chris Youngblood sent at 2/27/2024  2:22 PM CST -----  Pt was seen today and the pharmacist said that Dr. Graham needs to rewrite the prescription for her pain medicine. She said she will come by tomorrow and pick it up. I gave to Marla.  [phone]

## 2024-03-14 ENCOUNTER — HOSPITAL ENCOUNTER (OUTPATIENT)
Dept: RADIOLOGY | Facility: HOSPITAL | Age: 74
Discharge: HOME | End: 2024-03-14
Attending: INTERNAL MEDICINE
Payer: MEDICARE

## 2024-03-14 ENCOUNTER — TELEPHONE (OUTPATIENT)
Dept: HEPATOLOGY | Facility: CLINIC | Age: 74
End: 2024-03-14
Payer: MEDICARE

## 2024-03-14 ENCOUNTER — PATIENT MESSAGE (OUTPATIENT)
Dept: PULMONOLOGY | Facility: CLINIC | Age: 74
End: 2024-03-14
Payer: MEDICARE

## 2024-03-14 ENCOUNTER — PATIENT MESSAGE (OUTPATIENT)
Dept: FAMILY MEDICINE | Facility: CLINIC | Age: 74
End: 2024-03-14
Payer: MEDICARE

## 2024-03-14 VITALS — HEIGHT: 62 IN | WEIGHT: 167 LBS | BODY MASS INDEX: 30.73 KG/M2

## 2024-03-14 DIAGNOSIS — R91.8 LUNG MASS: Primary | ICD-10-CM

## 2024-03-14 DIAGNOSIS — R91.8 LUNG MASS: ICD-10-CM

## 2024-03-14 LAB — GLUCOSE SERPL-MCNC: 91 MG/DL (ref 70–110)

## 2024-03-14 PROCEDURE — 82962 GLUCOSE BLOOD TEST: CPT | Mod: PO

## 2024-03-14 PROCEDURE — A9552 F18 FDG: HCPCS | Mod: PO | Performed by: INTERNAL MEDICINE

## 2024-03-14 PROCEDURE — 78815 PET IMAGE W/CT SKULL-THIGH: CPT | Mod: TC,PS,PO

## 2024-03-14 RX ORDER — FLUDEOXYGLUCOSE F18 500 MCI/ML
12.1 INJECTION INTRAVENOUS
Status: COMPLETED | OUTPATIENT
Start: 2024-03-14 | End: 2024-03-14

## 2024-03-14 RX ADMIN — FLUDEOXYGLUCOSE F-18 12.1 MILLICURIE: 500 INJECTION INTRAVENOUS at 06:03

## 2024-03-14 NOTE — TELEPHONE ENCOUNTER
PET with markedly avid ELOY lesion growing to 2.4cm now-   I called and discussed with pt I suspect this is a lung primary cancer and would like to see if she can have surgical resection asap. She is open to this and agrees to see surgeon and oncology. We will push her pulmonary visit back about 4wks and referrals placed to Onc and Thoracic surg today. Advised her to call back with any questions/concerns.  Sent message to PCP regarding parotid gland and thyroid abnormalities for assistance ordering further tests for these.  Message sent to Dr. Burger thoracic surg for further recs

## 2024-03-19 ENCOUNTER — TELEPHONE (OUTPATIENT)
Dept: PULMONOLOGY | Facility: CLINIC | Age: 74
End: 2024-03-19
Payer: MEDICARE

## 2024-03-19 NOTE — TELEPHONE ENCOUNTER
----- Message from Alyx Hilton MD sent at 3/19/2024 12:03 PM CDT -----  Regarding: RE: Pt Advice  Contact: [phone]  Dr Burger wants her to see Dr Siegel for potential bronchoscopy, lung biopsy first. Keep appt tomorrow with Dr. Siegel    ----- Message -----  From: Nakita Gilbert LPN  Sent: 3/18/2024   4:33 PM CDT  To: Alyx Hilton MD  Subject: FW: Pt Advice                                    I don't see a referral for Dr. Burger.  Please advise.    ----- Message -----  From: Dave Roa  Sent: 3/18/2024   1:29 PM CDT  To: [facility] Staff  Subject: Pt Advice                                        Pt calling to get clarification regarding appt scheduled. She states she doesn't understand why she was scheduled with two Pulmonary providers. She is needing to see Dr. Burger and an Oncology doctor. Dr. Burger has referred her to another Pulmonologist. Please call [phone]

## 2024-03-19 NOTE — TELEPHONE ENCOUNTER
Spoke to patient today.  I informed her that She needs to keep that appt with Dr. Siegel per Dr. Burger's recommendation. Pt verbalized understanding.

## 2024-03-19 NOTE — TELEPHONE ENCOUNTER
Spoke to patient.  Informed her of what Dr. Hilton stated.  She is so hesitant about the situation, but stated that she will keep the appt with Dr. Siegel.  Verbalized understanding.

## 2024-03-20 ENCOUNTER — OFFICE VISIT (OUTPATIENT)
Dept: PULMONOLOGY | Facility: CLINIC | Age: 74
End: 2024-03-20
Payer: MEDICARE

## 2024-03-20 VITALS — DIASTOLIC BLOOD PRESSURE: 82 MMHG | SYSTOLIC BLOOD PRESSURE: 147 MMHG | TEMPERATURE: 98 F | HEART RATE: 63 BPM

## 2024-03-20 DIAGNOSIS — J96.11 CHRONIC HYPOXIC RESPIRATORY FAILURE: ICD-10-CM

## 2024-03-20 DIAGNOSIS — R91.1 SOLITARY PULMONARY NODULE: Primary | ICD-10-CM

## 2024-03-20 DIAGNOSIS — R91.8 MASS OF UPPER LOBE OF LEFT LUNG: ICD-10-CM

## 2024-03-20 DIAGNOSIS — J43.1 PANLOBULAR EMPHYSEMA: ICD-10-CM

## 2024-03-20 PROCEDURE — 99215 OFFICE O/P EST HI 40 MIN: CPT | Mod: S$GLB,,, | Performed by: INTERNAL MEDICINE

## 2024-03-20 PROCEDURE — 99999 PR PBB SHADOW E&M-EST. PATIENT-LVL III: CPT | Mod: PBBFAC,,, | Performed by: INTERNAL MEDICINE

## 2024-03-20 PROCEDURE — G2211 COMPLEX E/M VISIT ADD ON: HCPCS | Mod: S$GLB,,, | Performed by: INTERNAL MEDICINE

## 2024-03-20 NOTE — PROGRESS NOTES
Subjective:       Patient ID: Joslyn Dubon is a 73 y.o. female.    Chief Complaint: Consult from Dr. iHlton and Jennyfer for diagnostic and staging bronchoscopy     72 yo former smoker who quit in January when she presented with shortness of breath.  CT at the time concerning for mass.  She was treated for a COPD exacerbation.  Has had a percutaneous biopsy (see first image on the right) which was on target but non-diagnostic for malignancy or alternative dx.  Does endorse a pruritic blanching rash for which she has taken steroids without improvement.        Review of Systems   Constitutional:  Positive for weight loss. Negative for night sweats.   Respiratory:  Negative for wheezing and dyspnea on extertion.    Cardiovascular:  Negative for chest pain.   Endocrine:  Negative for cold intolerance and heat intolerance.    Musculoskeletal:  Negative for arthralgias.   Neurological:  Negative for headaches.   Hematological:  Negative for adenopathy.   Psychiatric/Behavioral:  Negative for confusion.        Objective:      Vitals:    03/20/24 1113   BP: (!) 147/82   BP Location: Left arm   Patient Position: Sitting   BP Method: Medium (Automatic)   Pulse: 63   Temp: 97.5 °F (36.4 °C)   TempSrc: Oral      Physical Exam   Constitutional: She is oriented to person, place, and time. She appears well-developed and well-nourished.   HENT:   Head: Normocephalic.   Cardiovascular: Normal rate and regular rhythm.   Pulmonary/Chest: She has no wheezes. She has no rales.   Musculoskeletal:      Cervical back: Normal range of motion and neck supple.   Lymphadenopathy: No supraclavicular adenopathy is present.     She has no cervical adenopathy.   Neurological: She is alert and oriented to person, place, and time.   Skin: Skin is warm and dry.   Psychiatric: She has a normal mood and affect. Her behavior is normal.     Personal Diagnostic Review      CTA 6/23  with   Presence of pulmonary nodule        3/14/2024     7:03 AM  "2/27/2024     7:38 AM 2/15/2024     4:32 AM 2/15/2024     3:31 AM 2/15/2024     3:06 AM 2/15/2024     2:26 AM 2/15/2024     2:04 AM   Pulmonary Function Tests   SpO2   97 % 97 % 97 % 99 % 99 %   Height 5' 2" (1.575 m) 5' 2" (1.575 m)        Weight 75.8 kg (167 lb) 75.8 kg (167 lb)        BMI (Calculated) 30.5 30.5              Assessment:       1. Solitary pulmonary nodule    2. Panlobular emphysema    3. Mass of upper lobe of left lung    4. Chronic hypoxic respiratory failure        Outpatient Encounter Medications as of 3/20/2024   Medication Sig Dispense Refill    aluminum-magnesium hydroxide-simethicone (MAALOX) 200-200-20 mg/5 mL Susp Take 30 mLs by mouth once as needed.      amLODIPine (NORVASC) 5 MG tablet Take 1 tablet (5 mg total) by mouth once daily. For blood pressure 30 tablet 5    diphenhydrAMINE (BENADRYL) 25 mg capsule Take 1 capsule (25 mg total) by mouth every 6 (six) hours as needed for Itching or Allergies. 20 capsule 0    EPINEPHrine (EPIPEN) 0.3 mg/0.3 mL AtIn Inject 0.3 mLs (0.3 mg total) into the muscle as needed (Severe allergic reaction symptoms such as shortness of breath, tongue or throat swelling, weakness dizziness severe nausea vomiting). 1 each 3    famotidine (PEPCID) 20 MG tablet Take 1 tablet (20 mg total) by mouth 2 (two) times daily. 20 tablet 0    fluconazole (DIFLUCAN) 100 MG tablet Take 1 tablet (100 mg total) by mouth once daily. 7 tablet 0    hydroCHLOROthiazide (HYDRODIURIL) 25 MG tablet Take 25 mg by mouth daily as needed.      HYDROcodone-acetaminophen (NORCO)  mg per tablet Take 1 tablet by mouth every 12 (twelve) hours as needed for Pain. 50 tablet 0    levalbuterol (XOPENEX HFA) 45 mcg/actuation inhaler Inhale 2 puffs into the lungs every 6 (six) hours as needed for Wheezing. Rescue 15 g 4    levocetirizine (XYZAL) 5 MG tablet Take 5 mg by mouth every evening.      levothyroxine (SYNTHROID) 88 MCG tablet Take 1 tablet (88 mcg total) by mouth before breakfast. 30 " tablet 3    metoprolol tartrate (LOPRESSOR) 25 MG tablet Take 1 tablet (25 mg total) by mouth 2 (two) times daily. 180 tablet 3    omeprazole (PRILOSEC) 40 MG capsule Take 1 capsule (40 mg total) by mouth every morning. 90 capsule 3    ondansetron (ZOFRAN) 4 MG tablet Take 1 tablet (4 mg total) by mouth every 8 (eight) hours as needed for Nausea. 30 tablet 3    predniSONE (DELTASONE) 10 MG tablet Take 1 tablet (10 mg total) by mouth 2 (two) times daily. 30 tablet 0    REFRESH OPTIVE 0.5-0.9 % Drop Place 1 drop into both eyes 4 (four) times daily.      simethicone (GAS-X ORAL) Take 1 tablet by mouth as needed.      umeclidinium-vilanteroL (ANORO ELLIPTA) 62.5-25 mcg/actuation DsDv Inhale 1 puff into the lungs once daily. Controller 1 each 11     Facility-Administered Encounter Medications as of 3/20/2024   Medication Dose Route Frequency Provider Last Rate Last Admin    levalbuterol nebulizer solution 1.25 mg  1.25 mg Nebulization 1 time in Clinic/HOD Aleisha Garcia NP         Orders Placed This Encounter   Procedures    CT Chest Without Contrast     Standing Status:   Future     Standing Expiration Date:   3/20/2025     Scheduling Instructions:      Chest Navigational Bronchoscopy     Order Specific Question:   May the Radiologist modify the order per protocol to meet the clinical needs of the patient?     Answer:   No     Order Specific Question:   Reason:     Answer:   Chest Navigational Bronchoscopy    Pulse Oximetry Q4H     Standing Status:   Standing     Number of Occurrences:   20    Case Request Operating Room: BRONCHOSCOPY, NAVIGATIONAL     Standing Status:   Standing     Number of Occurrences:   1     Order Specific Question:   Medical Necessity:     Answer:   Medically Urgent [101]     Order Specific Question:   CPT Code:     Answer:   FL BRONCHOSCOPY,BIOPSY [17291]     Order Specific Question:   CPT Code:     Answer:   FL BRONCH W/ EBUS, SAMPLING 3 OR MORE NODES, INCL GUIDE [93905]     Order Specific  Question:   CPT Code:     Answer:   CA BRONCH W/ EBUS, DIAG OR THERA INTERVENTION PERIPHERAL LESION(S), INCL GUID, ADD ON CODE [74237]     Order Specific Question:   CPT Code:     Answer:   CA BRONCHOSCOPY,COMPUTER ASSIST/IMAGE-GUIDED NAVIGATION [06607]     Order Specific Question:   Post-Procedure Disposition:     Answer:   Amb Surgery/DOSC [2]     Order Specific Question:   Is an on-site pathologist required for this procedure?     Answer:   N/A     Plan:     Problem List Items Addressed This Visit       COPD (chronic obstructive pulmonary disease)    Overview     Start anoro daily for control and albuterol as needed.          Mass of upper lobe of left lung    Overview     Percutaneous biopsy central to the lesion but non-diagnostic.  Will need navigation planning CT    Diagnostic and staging bronchoscopy has been scheduled.    I have explained the risks, benefits and alternatives of the procedure in detail.  The patient voices understanding and all questions have been answered.  The patient agrees to proceed as planned.          Chronic hypoxic respiratory failure    Overview     Walk with desaturation to 88% on room air with activity.  Benefits from POC of choice.           Other Visit Diagnoses       Solitary pulmonary nodule    -  Primary    Relevant Orders    CT Chest Without Contrast    Pulse Oximetry Q4H    Case Request Operating Room: BRONCHOSCOPY, NAVIGATIONAL (Completed)        45 minutes of total time spent on the encounter, which includes face to face time and non-face to face time preparing to see the patient (eg, review of tests), Obtaining and/or reviewing separately obtained history, Documenting clinical information in the electronic or other health record, Independently interpreting results (not separately reported) and communicating results to the patient/family/caregiver, or Care coordination (not separately reported).  Reviewed all images and explained the importance of definitive dx to rule  out malignancy.      Visit today included increased complexity associated with the care of the episodic problem Enlarging lung mass in a former smoker with COPD addressed and managing the longitudinal care of the patient due to the serious and/or complex managed problem(s) See note above. .

## 2024-03-20 NOTE — H&P (VIEW-ONLY)
Subjective:       Patient ID: Joslyn Dubon is a 73 y.o. female.    Chief Complaint: Consult from Dr. Hilton and Jennyfer for diagnostic and staging bronchoscopy     72 yo former smoker who quit in January when she presented with shortness of breath.  CT at the time concerning for mass.  She was treated for a COPD exacerbation.  Has had a percutaneous biopsy (see first image on the right) which was on target but non-diagnostic for malignancy or alternative dx.  Does endorse a pruritic blanching rash for which she has taken steroids without improvement.        Review of Systems   Constitutional:  Positive for weight loss. Negative for night sweats.   Respiratory:  Negative for wheezing and dyspnea on extertion.    Cardiovascular:  Negative for chest pain.   Endocrine:  Negative for cold intolerance and heat intolerance.    Musculoskeletal:  Negative for arthralgias.   Neurological:  Negative for headaches.   Hematological:  Negative for adenopathy.   Psychiatric/Behavioral:  Negative for confusion.        Objective:      Vitals:    03/20/24 1113   BP: (!) 147/82   BP Location: Left arm   Patient Position: Sitting   BP Method: Medium (Automatic)   Pulse: 63   Temp: 97.5 °F (36.4 °C)   TempSrc: Oral      Physical Exam   Constitutional: She is oriented to person, place, and time. She appears well-developed and well-nourished.   HENT:   Head: Normocephalic.   Cardiovascular: Normal rate and regular rhythm.   Pulmonary/Chest: She has no wheezes. She has no rales.   Musculoskeletal:      Cervical back: Normal range of motion and neck supple.   Lymphadenopathy: No supraclavicular adenopathy is present.     She has no cervical adenopathy.   Neurological: She is alert and oriented to person, place, and time.   Skin: Skin is warm and dry.   Psychiatric: She has a normal mood and affect. Her behavior is normal.     Personal Diagnostic Review      CTA 6/23  with   Presence of pulmonary nodule        3/14/2024     7:03 AM  "2/27/2024     7:38 AM 2/15/2024     4:32 AM 2/15/2024     3:31 AM 2/15/2024     3:06 AM 2/15/2024     2:26 AM 2/15/2024     2:04 AM   Pulmonary Function Tests   SpO2   97 % 97 % 97 % 99 % 99 %   Height 5' 2" (1.575 m) 5' 2" (1.575 m)        Weight 75.8 kg (167 lb) 75.8 kg (167 lb)        BMI (Calculated) 30.5 30.5              Assessment:       1. Solitary pulmonary nodule    2. Panlobular emphysema    3. Mass of upper lobe of left lung    4. Chronic hypoxic respiratory failure        Outpatient Encounter Medications as of 3/20/2024   Medication Sig Dispense Refill    aluminum-magnesium hydroxide-simethicone (MAALOX) 200-200-20 mg/5 mL Susp Take 30 mLs by mouth once as needed.      amLODIPine (NORVASC) 5 MG tablet Take 1 tablet (5 mg total) by mouth once daily. For blood pressure 30 tablet 5    diphenhydrAMINE (BENADRYL) 25 mg capsule Take 1 capsule (25 mg total) by mouth every 6 (six) hours as needed for Itching or Allergies. 20 capsule 0    EPINEPHrine (EPIPEN) 0.3 mg/0.3 mL AtIn Inject 0.3 mLs (0.3 mg total) into the muscle as needed (Severe allergic reaction symptoms such as shortness of breath, tongue or throat swelling, weakness dizziness severe nausea vomiting). 1 each 3    famotidine (PEPCID) 20 MG tablet Take 1 tablet (20 mg total) by mouth 2 (two) times daily. 20 tablet 0    fluconazole (DIFLUCAN) 100 MG tablet Take 1 tablet (100 mg total) by mouth once daily. 7 tablet 0    hydroCHLOROthiazide (HYDRODIURIL) 25 MG tablet Take 25 mg by mouth daily as needed.      HYDROcodone-acetaminophen (NORCO)  mg per tablet Take 1 tablet by mouth every 12 (twelve) hours as needed for Pain. 50 tablet 0    levalbuterol (XOPENEX HFA) 45 mcg/actuation inhaler Inhale 2 puffs into the lungs every 6 (six) hours as needed for Wheezing. Rescue 15 g 4    levocetirizine (XYZAL) 5 MG tablet Take 5 mg by mouth every evening.      levothyroxine (SYNTHROID) 88 MCG tablet Take 1 tablet (88 mcg total) by mouth before breakfast. 30 " tablet 3    metoprolol tartrate (LOPRESSOR) 25 MG tablet Take 1 tablet (25 mg total) by mouth 2 (two) times daily. 180 tablet 3    omeprazole (PRILOSEC) 40 MG capsule Take 1 capsule (40 mg total) by mouth every morning. 90 capsule 3    ondansetron (ZOFRAN) 4 MG tablet Take 1 tablet (4 mg total) by mouth every 8 (eight) hours as needed for Nausea. 30 tablet 3    predniSONE (DELTASONE) 10 MG tablet Take 1 tablet (10 mg total) by mouth 2 (two) times daily. 30 tablet 0    REFRESH OPTIVE 0.5-0.9 % Drop Place 1 drop into both eyes 4 (four) times daily.      simethicone (GAS-X ORAL) Take 1 tablet by mouth as needed.      umeclidinium-vilanteroL (ANORO ELLIPTA) 62.5-25 mcg/actuation DsDv Inhale 1 puff into the lungs once daily. Controller 1 each 11     Facility-Administered Encounter Medications as of 3/20/2024   Medication Dose Route Frequency Provider Last Rate Last Admin    levalbuterol nebulizer solution 1.25 mg  1.25 mg Nebulization 1 time in Clinic/HOD Aleisha Garcia NP         Orders Placed This Encounter   Procedures    CT Chest Without Contrast     Standing Status:   Future     Standing Expiration Date:   3/20/2025     Scheduling Instructions:      Chest Navigational Bronchoscopy     Order Specific Question:   May the Radiologist modify the order per protocol to meet the clinical needs of the patient?     Answer:   No     Order Specific Question:   Reason:     Answer:   Chest Navigational Bronchoscopy    Pulse Oximetry Q4H     Standing Status:   Standing     Number of Occurrences:   20    Case Request Operating Room: BRONCHOSCOPY, NAVIGATIONAL     Standing Status:   Standing     Number of Occurrences:   1     Order Specific Question:   Medical Necessity:     Answer:   Medically Urgent [101]     Order Specific Question:   CPT Code:     Answer:   SC BRONCHOSCOPY,BIOPSY [34085]     Order Specific Question:   CPT Code:     Answer:   SC BRONCH W/ EBUS, SAMPLING 3 OR MORE NODES, INCL GUIDE [55466]     Order Specific  Question:   CPT Code:     Answer:   CO BRONCH W/ EBUS, DIAG OR THERA INTERVENTION PERIPHERAL LESION(S), INCL GUID, ADD ON CODE [14956]     Order Specific Question:   CPT Code:     Answer:   CO BRONCHOSCOPY,COMPUTER ASSIST/IMAGE-GUIDED NAVIGATION [09304]     Order Specific Question:   Post-Procedure Disposition:     Answer:   Amb Surgery/DOSC [2]     Order Specific Question:   Is an on-site pathologist required for this procedure?     Answer:   N/A     Plan:     Problem List Items Addressed This Visit       COPD (chronic obstructive pulmonary disease)    Overview     Start anoro daily for control and albuterol as needed.          Mass of upper lobe of left lung    Overview     Percutaneous biopsy central to the lesion but non-diagnostic.  Will need navigation planning CT    Diagnostic and staging bronchoscopy has been scheduled.    I have explained the risks, benefits and alternatives of the procedure in detail.  The patient voices understanding and all questions have been answered.  The patient agrees to proceed as planned.          Chronic hypoxic respiratory failure    Overview     Walk with desaturation to 88% on room air with activity.  Benefits from POC of choice.           Other Visit Diagnoses       Solitary pulmonary nodule    -  Primary    Relevant Orders    CT Chest Without Contrast    Pulse Oximetry Q4H    Case Request Operating Room: BRONCHOSCOPY, NAVIGATIONAL (Completed)        45 minutes of total time spent on the encounter, which includes face to face time and non-face to face time preparing to see the patient (eg, review of tests), Obtaining and/or reviewing separately obtained history, Documenting clinical information in the electronic or other health record, Independently interpreting results (not separately reported) and communicating results to the patient/family/caregiver, or Care coordination (not separately reported).  Reviewed all images and explained the importance of definitive dx to rule  out malignancy.      Visit today included increased complexity associated with the care of the episodic problem Enlarging lung mass in a former smoker with COPD addressed and managing the longitudinal care of the patient due to the serious and/or complex managed problem(s) See note above. .

## 2024-03-21 ENCOUNTER — PATIENT MESSAGE (OUTPATIENT)
Dept: PULMONOLOGY | Facility: CLINIC | Age: 74
End: 2024-03-21
Payer: MEDICARE

## 2024-04-05 ENCOUNTER — TELEPHONE (OUTPATIENT)
Dept: PULMONOLOGY | Facility: CLINIC | Age: 74
End: 2024-04-05
Payer: MEDICARE

## 2024-04-05 NOTE — TELEPHONE ENCOUNTER
Phone call to patient to confirm appointment and instructions given in clinic for EBUS/Robotic procedure scheduled for 04/19/24.   Patient instructed to arrive to the 2nd floor [facility] check in desk at 0900 on 04/19/24 with designated .  NPO after midnight the night prior to scheduled procedure .Patient stated not currently taking any GLP1 or antiplatelet/anticoags.  Patient verbalizes understanding of all above instructions given.

## 2024-04-08 PROBLEM — J96.01 ACUTE HYPOXIC RESPIRATORY FAILURE: Status: RESOLVED | Noted: 2024-01-07 | Resolved: 2024-04-08

## 2024-04-12 ENCOUNTER — HOSPITAL ENCOUNTER (OUTPATIENT)
Dept: RADIOLOGY | Facility: HOSPITAL | Age: 74
Discharge: HOME | End: 2024-04-12
Attending: INTERNAL MEDICINE
Payer: MEDICARE

## 2024-04-12 DIAGNOSIS — R91.1 SOLITARY PULMONARY NODULE: ICD-10-CM

## 2024-04-12 PROCEDURE — 71250 CT THORAX DX C-: CPT | Mod: TC

## 2024-04-12 PROCEDURE — 71250 CT THORAX DX C-: CPT | Mod: 26,,, | Performed by: RADIOLOGY

## 2024-04-15 DIAGNOSIS — E03.9 ACQUIRED HYPOTHYROIDISM: ICD-10-CM

## 2024-04-15 RX ORDER — LEVOTHYROXINE SODIUM 88 UG/1
88 TABLET ORAL
Qty: 30 TABLET | Refills: 3 | Status: SHIPPED | OUTPATIENT
Start: 2024-04-15 | End: 2024-05-09 | Stop reason: SDUPTHER

## 2024-04-19 ENCOUNTER — ANESTHESIA (OUTPATIENT)
Dept: SURGERY | Facility: HOSPITAL | Age: 74
End: 2024-04-19
Payer: MEDICARE

## 2024-04-19 ENCOUNTER — ANESTHESIA EVENT (OUTPATIENT)
Dept: SURGERY | Facility: HOSPITAL | Age: 74
End: 2024-04-19
Payer: MEDICARE

## 2024-04-19 ENCOUNTER — HOSPITAL ENCOUNTER (OUTPATIENT)
Facility: HOSPITAL | Age: 74
Discharge: HOME | End: 2024-04-19
Attending: INTERNAL MEDICINE | Admitting: INTERNAL MEDICINE
Payer: MEDICARE

## 2024-04-19 VITALS
TEMPERATURE: 98 F | OXYGEN SATURATION: 97 % | HEART RATE: 58 BPM | SYSTOLIC BLOOD PRESSURE: 138 MMHG | DIASTOLIC BLOOD PRESSURE: 73 MMHG | RESPIRATION RATE: 20 BRPM

## 2024-04-19 DIAGNOSIS — R91.1 SOLITARY PULMONARY NODULE: ICD-10-CM

## 2024-04-19 DIAGNOSIS — R91.8 MASS OF UPPER LOBE OF LEFT LUNG: Primary | ICD-10-CM

## 2024-04-19 LAB
GRAM STN SPEC: NORMAL
GRAM STN SPEC: NORMAL

## 2024-04-19 PROCEDURE — 27201423 OPTIME MED/SURG SUP & DEVICES STERILE SUPPLY: Performed by: INTERNAL MEDICINE

## 2024-04-19 PROCEDURE — 25000003 PHARM REV CODE 250: Performed by: NURSE ANESTHETIST, CERTIFIED REGISTERED

## 2024-04-19 PROCEDURE — 87070 CULTURE OTHR SPECIMN AEROBIC: CPT | Performed by: INTERNAL MEDICINE

## 2024-04-19 PROCEDURE — 31654 BRONCH EBUS IVNTJ PERPH LES: CPT | Mod: ,,, | Performed by: INTERNAL MEDICINE

## 2024-04-19 PROCEDURE — 63600175 PHARM REV CODE 636 W HCPCS: Performed by: NURSE ANESTHETIST, CERTIFIED REGISTERED

## 2024-04-19 PROCEDURE — 31652 BRONCH EBUS SAMPLNG 1/2 NODE: CPT | Mod: ,,, | Performed by: INTERNAL MEDICINE

## 2024-04-19 PROCEDURE — 31628 BRONCHOSCOPY/LUNG BX EACH: CPT | Mod: 51,,, | Performed by: INTERNAL MEDICINE

## 2024-04-19 PROCEDURE — 87102 FUNGUS ISOLATION CULTURE: CPT | Performed by: INTERNAL MEDICINE

## 2024-04-19 PROCEDURE — 36000709 HC OR TIME LEV III EA ADD 15 MIN: Performed by: INTERNAL MEDICINE

## 2024-04-19 PROCEDURE — 37000008 HC ANESTHESIA 1ST 15 MINUTES: Performed by: INTERNAL MEDICINE

## 2024-04-19 PROCEDURE — D9220A PRA ANESTHESIA: Mod: ANES,,, | Performed by: ANESTHESIOLOGY

## 2024-04-19 PROCEDURE — 87075 CULTR BACTERIA EXCEPT BLOOD: CPT | Performed by: INTERNAL MEDICINE

## 2024-04-19 PROCEDURE — 36000708 HC OR TIME LEV III 1ST 15 MIN: Performed by: INTERNAL MEDICINE

## 2024-04-19 PROCEDURE — 87116 MYCOBACTERIA CULTURE: CPT | Performed by: INTERNAL MEDICINE

## 2024-04-19 PROCEDURE — 87076 CULTURE ANAEROBE IDENT EACH: CPT | Performed by: INTERNAL MEDICINE

## 2024-04-19 PROCEDURE — C1726 CATH, BAL DIL, NON-VASCULAR: HCPCS | Performed by: INTERNAL MEDICINE

## 2024-04-19 PROCEDURE — 37000009 HC ANESTHESIA EA ADD 15 MINS: Performed by: INTERNAL MEDICINE

## 2024-04-19 PROCEDURE — 71000044 HC DOSC ROUTINE RECOVERY FIRST HOUR: Performed by: INTERNAL MEDICINE

## 2024-04-19 PROCEDURE — 87206 SMEAR FLUORESCENT/ACID STAI: CPT | Performed by: INTERNAL MEDICINE

## 2024-04-19 PROCEDURE — D9220A PRA ANESTHESIA: Mod: CRNA,,, | Performed by: NURSE ANESTHETIST, CERTIFIED REGISTERED

## 2024-04-19 PROCEDURE — 31629 BRONCHOSCOPY/NEEDLE BX EACH: CPT | Mod: 51,,, | Performed by: INTERNAL MEDICINE

## 2024-04-19 PROCEDURE — 31627 NAVIGATIONAL BRONCHOSCOPY: CPT | Mod: ,,, | Performed by: INTERNAL MEDICINE

## 2024-04-19 PROCEDURE — 71000015 HC POSTOP RECOV 1ST HR: Performed by: INTERNAL MEDICINE

## 2024-04-19 PROCEDURE — 87205 SMEAR GRAM STAIN: CPT | Performed by: INTERNAL MEDICINE

## 2024-04-19 RX ORDER — PHENYLEPHRINE HYDROCHLORIDE 10 MG/ML
INJECTION INTRAVENOUS
Status: DISCONTINUED | OUTPATIENT
Start: 2024-04-19 | End: 2024-04-19

## 2024-04-19 RX ORDER — FENTANYL CITRATE 50 UG/ML
INJECTION, SOLUTION INTRAMUSCULAR; INTRAVENOUS
Status: DISCONTINUED | OUTPATIENT
Start: 2024-04-19 | End: 2024-04-19

## 2024-04-19 RX ORDER — ONDANSETRON HYDROCHLORIDE 2 MG/ML
4 INJECTION, SOLUTION INTRAVENOUS DAILY PRN
Status: DISCONTINUED | OUTPATIENT
Start: 2024-04-19 | End: 2024-04-19 | Stop reason: HOSPADM

## 2024-04-19 RX ORDER — PROPOFOL 10 MG/ML
VIAL (ML) INTRAVENOUS
Status: DISCONTINUED | OUTPATIENT
Start: 2024-04-19 | End: 2024-04-19

## 2024-04-19 RX ORDER — ACETAMINOPHEN 10 MG/ML
INJECTION, SOLUTION INTRAVENOUS
Status: DISCONTINUED | OUTPATIENT
Start: 2024-04-19 | End: 2024-04-19

## 2024-04-19 RX ORDER — ROCURONIUM BROMIDE 10 MG/ML
INJECTION, SOLUTION INTRAVENOUS
Status: DISCONTINUED | OUTPATIENT
Start: 2024-04-19 | End: 2024-04-19

## 2024-04-19 RX ORDER — ONDANSETRON HYDROCHLORIDE 2 MG/ML
INJECTION, SOLUTION INTRAVENOUS
Status: DISCONTINUED | OUTPATIENT
Start: 2024-04-19 | End: 2024-04-19

## 2024-04-19 RX ORDER — ACETAMINOPHEN 325 MG/1
650 TABLET ORAL
Status: DISCONTINUED | OUTPATIENT
Start: 2024-04-19 | End: 2024-04-19 | Stop reason: HOSPADM

## 2024-04-19 RX ORDER — FAMOTIDINE 10 MG/ML
INJECTION INTRAVENOUS
Status: DISCONTINUED | OUTPATIENT
Start: 2024-04-19 | End: 2024-04-19

## 2024-04-19 RX ORDER — LIDOCAINE HYDROCHLORIDE 40 MG/ML
SOLUTION TOPICAL
Status: DISCONTINUED | OUTPATIENT
Start: 2024-04-19 | End: 2024-04-19

## 2024-04-19 RX ORDER — MIDAZOLAM HYDROCHLORIDE 1 MG/ML
INJECTION INTRAMUSCULAR; INTRAVENOUS
Status: DISCONTINUED | OUTPATIENT
Start: 2024-04-19 | End: 2024-04-19

## 2024-04-19 RX ORDER — FENTANYL CITRATE 50 UG/ML
25 INJECTION, SOLUTION INTRAMUSCULAR; INTRAVENOUS EVERY 5 MIN PRN
Status: DISCONTINUED | OUTPATIENT
Start: 2024-04-19 | End: 2024-04-19 | Stop reason: HOSPADM

## 2024-04-19 RX ORDER — DEXAMETHASONE SODIUM PHOSPHATE 4 MG/ML
INJECTION, SOLUTION INTRA-ARTICULAR; INTRALESIONAL; INTRAMUSCULAR; INTRAVENOUS; SOFT TISSUE
Status: DISCONTINUED | OUTPATIENT
Start: 2024-04-19 | End: 2024-04-19

## 2024-04-19 RX ORDER — LIDOCAINE HYDROCHLORIDE 20 MG/ML
INJECTION INTRAVENOUS
Status: DISCONTINUED | OUTPATIENT
Start: 2024-04-19 | End: 2024-04-19

## 2024-04-19 RX ADMIN — FENTANYL CITRATE 25 MCG: 50 INJECTION, SOLUTION INTRAMUSCULAR; INTRAVENOUS at 12:04

## 2024-04-19 RX ADMIN — ROCURONIUM BROMIDE 20 MG: 10 INJECTION, SOLUTION INTRAVENOUS at 11:04

## 2024-04-19 RX ADMIN — SUGAMMADEX 200 MG: 100 INJECTION, SOLUTION INTRAVENOUS at 12:04

## 2024-04-19 RX ADMIN — PROPOFOL 150 MG: 10 INJECTION, EMULSION INTRAVENOUS at 11:04

## 2024-04-19 RX ADMIN — ONDANSETRON 4 MG: 2 INJECTION INTRAMUSCULAR; INTRAVENOUS at 11:04

## 2024-04-19 RX ADMIN — FENTANYL CITRATE 25 MCG: 50 INJECTION, SOLUTION INTRAMUSCULAR; INTRAVENOUS at 11:04

## 2024-04-19 RX ADMIN — FAMOTIDINE 20 MG: 10 INJECTION, SOLUTION INTRAVENOUS at 12:04

## 2024-04-19 RX ADMIN — LIDOCAINE HYDROCHLORIDE 3 MG: 40 SOLUTION TOPICAL at 11:04

## 2024-04-19 RX ADMIN — ACETAMINOPHEN 1000 MG: 10 INJECTION, SOLUTION INTRAVENOUS at 12:04

## 2024-04-19 RX ADMIN — SODIUM CHLORIDE: 9 INJECTION, SOLUTION INTRAVENOUS at 11:04

## 2024-04-19 RX ADMIN — ROCURONIUM BROMIDE 40 MG: 10 INJECTION, SOLUTION INTRAVENOUS at 11:04

## 2024-04-19 RX ADMIN — MIDAZOLAM HYDROCHLORIDE 0.5 MG: 2 INJECTION, SOLUTION INTRAMUSCULAR; INTRAVENOUS at 11:04

## 2024-04-19 RX ADMIN — DEXAMETHASONE SODIUM PHOSPHATE 4 MG: 4 INJECTION, SOLUTION INTRAMUSCULAR; INTRAVENOUS at 11:04

## 2024-04-19 RX ADMIN — PHENYLEPHRINE HYDROCHLORIDE 100 MCG: 10 INJECTION INTRAVENOUS at 12:04

## 2024-04-19 RX ADMIN — LIDOCAINE HYDROCHLORIDE 40 MG: 20 INJECTION INTRAVENOUS at 11:04

## 2024-04-19 NOTE — INTERVAL H&P NOTE
The patient has been examined and the H&P has been reviewed:    I concur with the findings and changes have been noted since the H&P was written: Has urticaria on her right arm and neck currently     Anesthesia/Surgery risks, benefits and alternative options discussed and understood by patient/family.          Proceed with robotic bronchoscopy and EBUS    I have explained the risks, benefits and alternatives of the procedure in detail.  The patient voices understanding and all questions have been answered.  The patient agrees to proceed as planned.

## 2024-04-19 NOTE — ANESTHESIA PROCEDURE NOTES
Intubation    Date/Time: 4/19/2024 11:12 AM    Performed by: Juan Preciado CRNA  Authorized by: Ketan Jean MD    Intubation:     Induction:  Intravenous    Intubated:  Postinduction    Mask Ventilation:  Easy mask    Attempts:  1    Attempted By:  CRNA    Method of Intubation:  Video laryngoscopy    Blade:  Dodd 3    Laryngeal View Grade: Grade I - full view of cords      Difficult Airway Encountered?: No      Airway Device:  Oral endotracheal tube    Airway Device Size:  8.0    Style/Cuff Inflation:  Cuffed    Placement Verified By:  Capnometry    Complicating Factors:  None    Findings Post-Intubation:  BS equal bilateral

## 2024-04-19 NOTE — TRANSFER OF CARE
Anesthesia Transfer of Care Note    Patient: Joslyn Dubon    Procedure(s) Performed: Procedure(s) (LRB):  ROBOTIC BRONCHOSCOPY (N/A)  ENDOBRONCHIAL ULTRASOUND (EBUS) (N/A)    Patient location: [facility]    Anesthesia Type: general    Transport from OR: Transported from OR on 6-10 L/min O2 by face mask with adequate spontaneous ventilation    Post pain: adequate analgesia    Post assessment: no apparent anesthetic complications and tolerated procedure well    Post vital signs: stable    Level of consciousness: awake and alert    Nausea/Vomiting: no nausea/vomiting    Complications: none    Transfer of care protocol was followed    Last vitals: Visit Vitals  BP (!) 174/78 (BP Location: Right arm, Patient Position: Lying)   Pulse 67   Temp 36.4 °C (97.5 °F) (Temporal)   Resp 18   SpO2 97%

## 2024-04-19 NOTE — ANESTHESIA PREPROCEDURE EVALUATION
"                                                                                                             2024  Joslyn Dubon is a 73 y.o., female.  Pre-operative evaluation for BRONCHOSCOPY, NAVIGATIONAL (N/A)    Chief Complaint: spiculated lung lesion    PMH:  Former smoker  COPD  Obesity  Rash since July.    HTN Episodes of hypotension when treated for rash and for chest pain.   Anxiety      Martin esophagus      GERD      Hypothyroidism      Vocal cord polyps      Past Surgical History:   Procedure Laterality Date    ABDOMINAL AORTIC ANEURYSM REPAIR      BACK SURGERY      cervical    CATARACT EXTRACTION Right 2021    [facility]    FOOT SURGERY      HYSTERECTOMY      AUGUSTINE for AUB with consevation ovaries    SALIVARY GLAND SURGERY           Vital Signs Range (Last 24H):  Temp:  [36.4 °C (97.5 °F)]   Pulse:  [67]   Resp:  [18]   BP: (174)/(78)   SpO2:  [97 %]       CBC:   No results for input(s): "WBC", "RBC", "HGB", "HCT", "PLT", "MCV", "MCH", "MCHC" in the last 720 hours.    CMP: No results for input(s): "NA", "K", "CL", "CO2", "BUN", "CREATININE", "GLU", "MG", "PHOS", "CALCIUM", "ALBUMIN", "PROT", "ALKPHOS", "ALT", "AST", "BILITOT" in the last 720 hours.    INR:  No results for input(s): "PT", "INR", "PROTIME", "APTT" in the last 720 hours.      Diagnostic Studies:      EKD Echo: Stress ECHO   There were no arrhythmias during stress.  Left ventricular systolic function is normal. The estimated ejection fraction is 60%  Normal right ventricular systolic function.  The stress echo portion of this study is negative for myocardial ischemia.  The patient reached the end of the protocol.  The EKG portion of this study is negative for myocardial ischemia.     Pre-op Assessment    I have reviewed the Patient Summary Reports.     I have reviewed the Nursing Notes. I have reviewed the NPO Status.   I have reviewed the Medications.     Review of " Systems  Anesthesia Hx:  No problems with previous Anesthesia                Social:  Former Smoker           Physical Exam  General: Well nourished, Cooperative, Alert and Oriented    Airway:  Mallampati: II / I  Mouth Opening: Small, but > 3cm  TM Distance: Normal  Tongue: Normal  Neck ROM: Extension Decreased  Neck: Girth Increased    Dental:  Partial Dentures    Chest/Lungs:  Normal Respiratory Rate        Anesthesia Plan  Type of Anesthesia, risks & benefits discussed:    Anesthesia Type: Gen ETT  Intra-op Monitoring Plan: Standard ASA Monitors  Post Op Pain Control Plan: multimodal analgesia  Induction:  IV  Airway Plan: Video  Informed Consent: Informed consent signed with the Patient and all parties understand the risks and agree with anesthesia plan.  All questions answered.   ASA Score: 3  Day of Surgery Review of History & Physical: H&P Update referred to the surgeon/provider.  Anesthesia Plan Notes: TIVA- observe for extension of rash    Ready For Surgery From Anesthesia Perspective.     .

## 2024-04-19 NOTE — DISCHARGE SUMMARY
[facility] - Surgery (2nd Fl)  Discharge Note  Short Stay    Procedure(s) (LRB):  ROBOTIC BRONCHOSCOPY (N/A)  ENDOBRONCHIAL ULTRASOUND (EBUS) (N/A)      OUTCOME: Patient tolerated treatment/procedure well without complication and is now ready for discharge.    DISPOSITION: Home or Self Care    FINAL DIAGNOSIS:  Left lung mass, Mediasternal lymphadenopathy    FOLLOWUP: Will call patient with results    DISCHARGE INSTRUCTIONS:    Discharge Procedure Orders   Diet general     Call MD for:  temperature >101     Call MD for:  coughing up blood greater than 3 tablespoons in volume     Call MD for:  chest pain     Call MD for:  difficulty breathing or shortness of breath     Call MD for:  development of yellow/green sputum        TIME SPENT ON DISCHARGE: 15 minutes

## 2024-04-20 NOTE — ANESTHESIA POSTPROCEDURE EVALUATION
Anesthesia Post Evaluation    Patient: Joslyn Dubon    Procedure(s) Performed: Procedure(s) (LRB):  ROBOTIC BRONCHOSCOPY (N/A)  ENDOBRONCHIAL ULTRASOUND (EBUS) (N/A)    Final Anesthesia Type: general      Patient location during evaluation: PACU  Patient participation: Yes- Able to Participate  Level of consciousness: awake and alert and oriented  Post-procedure vital signs: reviewed and stable  Pain management: adequate  Airway patency: patent    PONV status at discharge: No PONV  Anesthetic complications: no      Cardiovascular status: hemodynamically stable  Respiratory status: unassisted, spontaneous ventilation and room air  Hydration status: euvolemic  Follow-up not needed.              Vitals Value Taken Time   /73 04/19/24 1345   Temp 36.8 °C (98.2 °F) 04/19/24 1345   Pulse 59 04/19/24 1349   Resp 21 04/19/24 1349   SpO2 97 % 04/19/24 1349   Vitals shown include unfiled device data.      No case tracking events are documented in the log.      Pain/Traci Score: Traci Score: 10 (4/19/2024  1:30 PM)

## 2024-04-21 ENCOUNTER — PATIENT MESSAGE (OUTPATIENT)
Dept: PULMONOLOGY | Facility: CLINIC | Age: 74
End: 2024-04-21
Payer: MEDICARE

## 2024-04-22 LAB — BACTERIA SPEC AEROBE CULT: NO GROWTH

## 2024-04-23 LAB — BACTERIA SPEC ANAEROBE CULT: ABNORMAL

## 2024-04-24 ENCOUNTER — TELEPHONE (OUTPATIENT)
Dept: PULMONOLOGY | Facility: CLINIC | Age: 74
End: 2024-04-24
Payer: MEDICARE

## 2024-04-24 ENCOUNTER — PATIENT MESSAGE (OUTPATIENT)
Dept: PULMONOLOGY | Facility: CLINIC | Age: 74
End: 2024-04-24
Payer: MEDICARE

## 2024-04-24 DIAGNOSIS — C34.92 NON-SMALL CELL CARCINOMA OF LEFT LUNG: Primary | ICD-10-CM

## 2024-04-25 ENCOUNTER — TELEPHONE (OUTPATIENT)
Dept: HEMATOLOGY/ONCOLOGY | Facility: CLINIC | Age: 74
End: 2024-04-25
Payer: MEDICARE

## 2024-04-25 ENCOUNTER — PATIENT MESSAGE (OUTPATIENT)
Dept: PULMONOLOGY | Facility: CLINIC | Age: 74
End: 2024-04-25
Payer: MEDICARE

## 2024-04-25 DIAGNOSIS — C34.92 NON-SMALL CELL CARCINOMA OF LEFT LUNG: Primary | ICD-10-CM

## 2024-04-25 LAB
COMMENT: ABNORMAL
FINAL PATHOLOGIC DIAGNOSIS: ABNORMAL
Lab: ABNORMAL
MICROSCOPIC EXAM: ABNORMAL
SUPPLEMENTAL DIAGNOSIS: ABNORMAL

## 2024-04-25 NOTE — TELEPHONE ENCOUNTER
"Mrs Dubon,  They are still waiting on a couple of stains to determine exactly what type of cancer this is. Currently, it is being described as "poorly differentiated".  You are seeing Dr. Burger on Friday who is the best thoracic surgeon.  I am still waiting on which fabulous oncologist or radiation oncologist is available then.  We have an excellent team here and I would trust that they would take care of you just the same way.  Dr Siegel    "

## 2024-04-25 NOTE — PROGRESS NOTES
History & Physical    Subjective     History of Present Illness:  Patient is a 73 y.o. female former smoker (quit 3 months ago) with COPD (home oxygen), solorzano's esophagus, h/o AAA repair (2020), HTN, hypothyroidism, lumbar and cervical radiculopathy, and colitis who presents to clinic for evaluation of ELOY NSCLC - high grade/poorly differentiated carcinoma. CT Chest 01/07/24 revealed a 1.8 cm spiculated ELOY nodule. Underwent CT-guided biopsy 02/14/24; pathology: ELOY negative. Follow up PET/CT 03/14/24 redemonstrated the known ELOY nodule measuring 2.4 x 2.2 cm with SUV max 14.7. Referred to Dr. Siegel for robotic bronchoscopy with EBUS 04/19/24; pathology: ELOY positive for high grade/poorly differentiated carcinoma. Planning CT measuring 3.1cm. LN station 7 negative for malignancy. Today patient reports exertional dyspnea which is worse since COVID and flu in the fall of last year. Breathing continues to get worse. She has qualified for home oxygen which she uses on occasion.  She gets out of breath while walking from her bedroom into the bathroom.    PSH: repair of AAA (2020), AUGUSTINE, cervical neck surgery, robotic bronchoscopy  Meds: amlodipine, famotidine, HCTZ, levothyroxine, metoprolol, omeprazole, zofran    Chief Complaint   Patient presents with    Consult       Review of patient's allergies indicates:   Allergen Reactions    Bisacodyl Nausea And Vomiting     Other reaction(s): Vomiting    Iodinated contrast media Hives and Shortness Of Breath     hypotension    Morphine Other (See Comments)     hypotension    Azithromycin Hives    Cipro [ciprofloxacin hcl]     Demerol [meperidine]      Nausea vomiting, visual problem    Doxycycline Hives    Flonase [fluticasone propionate] Hives    Morpholine analogues Other (See Comments)     hypotension       Current Outpatient Medications   Medication Sig Dispense Refill    aluminum-magnesium hydroxide-simethicone (MAALOX) 200-200-20 mg/5 mL Susp Take 30 mLs by mouth once as  needed.      amLODIPine (NORVASC) 5 MG tablet Take 1 tablet (5 mg total) by mouth once daily. For blood pressure 30 tablet 5    diphenhydrAMINE (BENADRYL) 25 mg capsule Take 1 capsule (25 mg total) by mouth every 6 (six) hours as needed for Itching or Allergies. 20 capsule 0    EPINEPHrine (EPIPEN) 0.3 mg/0.3 mL AtIn Inject 0.3 mLs (0.3 mg total) into the muscle as needed (Severe allergic reaction symptoms such as shortness of breath, tongue or throat swelling, weakness dizziness severe nausea vomiting). 1 each 3    famotidine (PEPCID) 20 MG tablet Take 1 tablet (20 mg total) by mouth 2 (two) times daily. 20 tablet 0    HYDROcodone-acetaminophen (NORCO)  mg per tablet Take 1 tablet by mouth every 12 (twelve) hours as needed for Pain. 50 tablet 0    levalbuterol (XOPENEX HFA) 45 mcg/actuation inhaler Inhale 2 puffs into the lungs every 6 (six) hours as needed for Wheezing. Rescue 15 g 4    levocetirizine (XYZAL) 5 MG tablet Take 5 mg by mouth every evening.      levothyroxine (SYNTHROID) 88 MCG tablet Take 1 tablet (88 mcg total) by mouth before breakfast. 30 tablet 3    metoprolol tartrate (LOPRESSOR) 25 MG tablet Take 1 tablet (25 mg total) by mouth 2 (two) times daily. 180 tablet 3    omeprazole (PRILOSEC) 40 MG capsule Take 1 capsule (40 mg total) by mouth every morning. 90 capsule 3    ondansetron (ZOFRAN) 4 MG tablet Take 1 tablet (4 mg total) by mouth every 8 (eight) hours as needed for Nausea. 30 tablet 3    predniSONE (DELTASONE) 10 MG tablet Take 1 tablet (10 mg total) by mouth 2 (two) times daily. 30 tablet 0    REFRESH OPTIVE 0.5-0.9 % Drop Place 1 drop into both eyes 4 (four) times daily.      simethicone (GAS-X ORAL) Take 1 tablet by mouth as needed.      umeclidinium-vilanteroL (ANORO ELLIPTA) 62.5-25 mcg/actuation DsDv Inhale 1 puff into the lungs once daily. Controller 1 each 11     Current Facility-Administered Medications   Medication Dose Route Frequency Provider Last Rate Last Admin     levalbuterol nebulizer solution 1.25 mg  1.25 mg Nebulization 1 time in Clinic/HOD Aleisha Garcia NP           Past Medical History:   Diagnosis Date    Anxiety     Martin esophagus     Colitis     COPD (chronic obstructive pulmonary disease)     GERD (gastroesophageal reflux disease)     Hypertension     Thyroid disease     Vocal cord polyps      Past Surgical History:   Procedure Laterality Date    ABDOMINAL AORTIC ANEURYSM REPAIR      BACK SURGERY      cervical    CATARACT EXTRACTION Right 02/2021    CHOLECYSTECTOMY  12/20/2013    Dr Albert CORLEY    ENDOBRONCHIAL ULTRASOUND N/A 4/19/2024    Procedure: ENDOBRONCHIAL ULTRASOUND (EBUS);  Surgeon: Kizzy Siegel MD;  Location: [facility] OR [address] [place] [address];  Service: Pulmonary;  Laterality: N/A;    FOOT SURGERY      HYSTERECTOMY  1979    AUGUSTINE for AUB with consevation ovaries    ROBOTIC BRONCHOSCOPY N/A 4/19/2024    Procedure: ROBOTIC BRONCHOSCOPY;  Surgeon: Kizzy Siegel MD;  Location: [facility] OR [address] [place] [address];  Service: Pulmonary;  Laterality: N/A;    SALIVARY GLAND SURGERY       Family History   Problem Relation Name Age of Onset    Cancer Mother      Heart failure Father      Emphysema Sister      No Known Problems Brother      Cancer Sister       Social History     Tobacco Use    Smoking status: Every Day     Current packs/day: 0.50     Types: Cigarettes    Smokeless tobacco: Former     Quit date: 5/1/2016    Tobacco comments:     Pt has smoked since 14 years old. Smokes around .5ppd. Inpatient smoking education done 10/20.   Substance Use Topics    Alcohol use: Never    Drug use: Never        Review of Systems:  Review of Systems   Constitutional:  Negative for activity change, appetite change and fatigue.   Respiratory:  Negative for cough and stridor.    Genitourinary:  Negative for difficulty urinating.   Skin:  Negative for color change and rash.   Neurological:  Negative for dizziness.   Hematological:  Negative for adenopathy.   Psychiatric/Behavioral:  Negative for agitation.  "The patient is not nervous/anxious.           Objective     Vital Signs (Most Recent)  Pulse: 70 (04/26/24 0854)  BP: (!) 143/93 (04/26/24 0854)  SpO2: 95 % (04/26/24 0854)  5' 2" (1.575 m)  79.9 kg (176 lb 2.4 oz)     Physical Exam:  Physical Exam  Constitutional:       Appearance: Normal appearance.   HENT:      Head: Atraumatic.   Eyes:      Extraocular Movements: Extraocular movements intact.   Cardiovascular:      Rate and Rhythm: Normal rate and regular rhythm.      Pulses: Normal pulses.      Heart sounds: Normal heart sounds.   Pulmonary:      Effort: Pulmonary effort is normal.      Breath sounds: Normal breath sounds.   Abdominal:      Palpations: Abdomen is soft.   Musculoskeletal:      Cervical back: Normal range of motion and neck supple.   Skin:     General: Skin is warm and dry.   Neurological:      General: No focal deficit present.      Mental Status: She is alert and oriented to person, place, and time.   Psychiatric:         Mood and Affect: Mood normal.         Behavior: Behavior normal.         Diagnostic Results:    NM Stress ECHO 01/2022:     The EKG portion of this study is negative for ischemia.    The patient reported chest pain during the stress test.    There were no arrhythmias during stress.    The nuclear portion of this study will be reported separately.    6MWT 01/26/24:       PFTs 01/26/24: FEV 1.43L 74% DLCO 44%    CT Chest 01/07/24:   The heart and great vessels are of normal size and contour.  Enlargement or aneurysm is not seen.  A right pretracheal lymph node has a short axis of 7 mm which is within limits.  Adenopathy is not demonstrated.  There is a 1.8 cm mildly spiculated soft tissue density mass of the left upper lobe peripherally best seen series 4 image 155.  In retrospect this measured 3 point 2 mm on the prior CTA of June 22, 2023.  This is consistent with a lung neoplasm.  Other intrapulmonary masses are not seen.  There is bronchiectasis and atelectasis identified in " the right lower lung field.  Strands of atelectasis are seen at both lung bases.  Another soft tissue density mass in the lung fields is not seen.  There is atelectasis of the left lower lobe around the ectatic aorta.  The adrenal glands are not enlarged.  A water density cyst is noted of the right lobe of the liver measuring 3.2 cm.    PET/CT 03/14/24:  CT imaging through the brain shows no acute intracranial pathology. Periventricular white matter hypoattenuation compatible with microangiopathic change. Mastoid air cells and paranasal sinuses are clear.  10 mm soft tissue nodule within the inferior aspect of the left parotid gland is hypermetabolic with SUV max of 4.6. Physiologic oropharyngeal FDG activity. No pathologically enlarged/hypermetabolic lymph nodes within the right neck. Atherosclerotic calcification of bilateral cervical carotid arteries, noting partial retropharyngeal course.  Marked hypermetabolism of the thyroid gland bilaterally, noting slight asymmetric enlargement of the right lobe relative to the left.  2.4 x 2.2 cm left upper lobe mass has increased in size compared to prior, and is markedly hypermetabolic (SUV max 14.7). This is concerning for primary pulmonary malignancy. No other FDG avid pulmonary nodule or mass. No pleural effusion or airspace disease.  Normal size mediastinal lymph nodes, not FDG avid above background. Atherosclerotic calcification of the aorta. Descending thoracic aorta is enlarged measuring up to 3.3 cm in diameter.  Hepatic cyst noted. No FDG avid hepatic lesion. Gallbladder is absent. No intrahepatic or extrahepatic bile duct dilation. Calcified granuloma in the spleen. Pancreas is unremarkable. No adrenal lesion. No renal abnormality. Ureters are normal in caliber. Urinary bladder shows no focal abnormality.  Stomach is collapsed. No evidence of acute pathology involving the gastrointestinal tract. Diverticula of the distal colon.    Chest CT 4/12/24:  Enlarging  3.1 x 2.3 cm pleural base mass in the anterolateral left upper lobe suspicious for pulmonary malignancy  No evidence of new metastatic disease  Prior cholecystectomy       Assessment and Plan     Patient is a 73 y.o. female current smoker  with solorzano's esophagus, h/o AAA repair (2020), COPD, HTN, hypothyroidism, lumbar and cervical radiculopathy, and colitis who presents to clinic for evaluation of ELOY NSCLC - high grade/poorly differentiated carcinoma, [id number], stage IB.   Severely reduced DLCO. Poor functional candidate 2/2 respiratory insufficiency.   The patient will likely not tolerate the single lung anesthesia required to allow for lung resection.  She may also be at an increased perioperative risk of acute on chronic respiratory failure      PLAN:    Severely reduced DLCO and desaturation during 6MWT raise concern for ability to tolerate single lung anesthesia pose a risk for chronic respiratory insufficiency. For these reasons, she will be better severed with evaluation for SBRT. Will place a referral for [place] which is closer to home.

## 2024-04-25 NOTE — NURSING
Patient notified of the scheduled appointment with Dr. Burger for 04/26/24.  Agreeable  to date and time.  Oncology Navigation   Intake  Date of Diagnosis: 04/25/24  Cancer Type: Thoracic  Type of Referral: Internal  Date of Referral: 04/25/24  Initial Nurse Navigator Contact: 04/25/24  Referral to Initial Contact Timeline (days): 0  Date Worked: 04/25/24  First Appointment Available: 04/26/24  Appointment Date: 04/26/24  First Available Date vs. Scheduled Date (days): 0  Multiple appointments: No     Treatment                              Acuity   Needed: 0  Verbalizes Financial Concerns: 0  Transportation: 0  Verbalizes the need for more education: 1  Navigation Acuity: 1     Follow Up  No follow-ups on file.

## 2024-04-26 ENCOUNTER — PATIENT MESSAGE (OUTPATIENT)
Dept: CARDIOTHORACIC SURGERY | Facility: CLINIC | Age: 74
End: 2024-04-26

## 2024-04-26 ENCOUNTER — OFFICE VISIT (OUTPATIENT)
Dept: CARDIOTHORACIC SURGERY | Facility: CLINIC | Age: 74
End: 2024-04-26
Payer: MEDICARE

## 2024-04-26 VITALS
OXYGEN SATURATION: 95 % | WEIGHT: 176.13 LBS | DIASTOLIC BLOOD PRESSURE: 93 MMHG | HEART RATE: 70 BPM | HEIGHT: 62 IN | BODY MASS INDEX: 32.41 KG/M2 | SYSTOLIC BLOOD PRESSURE: 143 MMHG

## 2024-04-26 DIAGNOSIS — R91.1 PULMONARY NODULE, LEFT: ICD-10-CM

## 2024-04-26 DIAGNOSIS — C34.92 NSCLC OF LEFT LUNG: Primary | ICD-10-CM

## 2024-04-26 DIAGNOSIS — R06.09 EXERTIONAL DYSPNEA: ICD-10-CM

## 2024-04-26 DIAGNOSIS — Z99.81 OXYGEN DEPENDENT: ICD-10-CM

## 2024-04-26 PROCEDURE — 3288F FALL RISK ASSESSMENT DOCD: CPT | Mod: CPTII,S$GLB,, | Performed by: THORACIC SURGERY (CARDIOTHORACIC VASCULAR SURGERY)

## 2024-04-26 PROCEDURE — 99999 PR PBB SHADOW E&M-EST. PATIENT-LVL IV: CPT | Mod: PBBFAC,,, | Performed by: THORACIC SURGERY (CARDIOTHORACIC VASCULAR SURGERY)

## 2024-04-26 PROCEDURE — 1159F MED LIST DOCD IN RCRD: CPT | Mod: CPTII,S$GLB,, | Performed by: THORACIC SURGERY (CARDIOTHORACIC VASCULAR SURGERY)

## 2024-04-26 PROCEDURE — 3077F SYST BP >= 140 MM HG: CPT | Mod: CPTII,S$GLB,, | Performed by: THORACIC SURGERY (CARDIOTHORACIC VASCULAR SURGERY)

## 2024-04-26 PROCEDURE — 1101F PT FALLS ASSESS-DOCD LE1/YR: CPT | Mod: CPTII,S$GLB,, | Performed by: THORACIC SURGERY (CARDIOTHORACIC VASCULAR SURGERY)

## 2024-04-26 PROCEDURE — 1126F AMNT PAIN NOTED NONE PRSNT: CPT | Mod: CPTII,S$GLB,, | Performed by: THORACIC SURGERY (CARDIOTHORACIC VASCULAR SURGERY)

## 2024-04-26 PROCEDURE — 99205 OFFICE O/P NEW HI 60 MIN: CPT | Mod: S$GLB,,, | Performed by: THORACIC SURGERY (CARDIOTHORACIC VASCULAR SURGERY)

## 2024-04-26 PROCEDURE — 3080F DIAST BP >= 90 MM HG: CPT | Mod: CPTII,S$GLB,, | Performed by: THORACIC SURGERY (CARDIOTHORACIC VASCULAR SURGERY)

## 2024-05-01 ENCOUNTER — DOCUMENTATION ONLY (OUTPATIENT)
Dept: HEMATOLOGY/ONCOLOGY | Facility: CLINIC | Age: 74
End: 2024-05-01

## 2024-05-01 ENCOUNTER — OFFICE VISIT (OUTPATIENT)
Dept: RADIATION ONCOLOGY | Facility: CLINIC | Age: 74
End: 2024-05-01
Payer: MEDICARE

## 2024-05-01 VITALS
DIASTOLIC BLOOD PRESSURE: 75 MMHG | SYSTOLIC BLOOD PRESSURE: 124 MMHG | OXYGEN SATURATION: 95 % | RESPIRATION RATE: 16 BRPM | BODY MASS INDEX: 32.04 KG/M2 | WEIGHT: 175.19 LBS | HEART RATE: 66 BPM | TEMPERATURE: 98 F

## 2024-05-01 DIAGNOSIS — C34.92 NON-SMALL CELL CARCINOMA OF LEFT LUNG: ICD-10-CM

## 2024-05-01 LAB
DNA RANGE(S) EXAMINED NAR: NORMAL
GENE DIS ANL INTERP-IMP: NORMAL
GENE DIS ASSESSED: NORMAL
GENE MUT TESTED BLD/T: 10 M/MB
MSI CA SPEC-IMP: NORMAL
PD-L1 BY 22C3 TISS IMSTN DOC: NEGATIVE
REASON FOR STUDY: NORMAL
TEMPUS FUSIONADDENDUM: NORMAL
TEMPUS LCA: NORMAL
TEMPUS PD-L1 (22C3) COMBINED POSITIVE SCORE: 1
TEMPUS PD-L1 (22C3) TUMOR PROPORTION SCORE: <1 %
TEMPUS PERTINENTNEGATIVES: NORMAL
TEMPUS PORTAL: NORMAL
TEMPUS TRIAL1: NORMAL
TEMPUS TRIAL2: NORMAL
TEMPUS TRIAL3: NORMAL
TEMPUS TRIALCOUNT: 3

## 2024-05-01 PROCEDURE — 3288F FALL RISK ASSESSMENT DOCD: CPT | Mod: CPTII,S$GLB,, | Performed by: RADIOLOGY

## 2024-05-01 PROCEDURE — 99205 OFFICE O/P NEW HI 60 MIN: CPT | Mod: S$GLB,,, | Performed by: RADIOLOGY

## 2024-05-01 PROCEDURE — 1126F AMNT PAIN NOTED NONE PRSNT: CPT | Mod: CPTII,S$GLB,, | Performed by: RADIOLOGY

## 2024-05-01 PROCEDURE — 3078F DIAST BP <80 MM HG: CPT | Mod: CPTII,S$GLB,, | Performed by: RADIOLOGY

## 2024-05-01 PROCEDURE — 1101F PT FALLS ASSESS-DOCD LE1/YR: CPT | Mod: CPTII,S$GLB,, | Performed by: RADIOLOGY

## 2024-05-01 PROCEDURE — G2211 COMPLEX E/M VISIT ADD ON: HCPCS | Mod: S$GLB,,, | Performed by: RADIOLOGY

## 2024-05-01 PROCEDURE — 3074F SYST BP LT 130 MM HG: CPT | Mod: CPTII,S$GLB,, | Performed by: RADIOLOGY

## 2024-05-01 PROCEDURE — 1159F MED LIST DOCD IN RCRD: CPT | Mod: CPTII,S$GLB,, | Performed by: RADIOLOGY

## 2024-05-01 NOTE — PROGRESS NOTES
Joslyn Dubon  [id number]  1950 5/1/2024  Kizzy Siegel Md  [address] [place] [address]    Dx: Stage IB  [[id number]]  NSCLC of ELOY    HISTORY OF PRESENT ILLNESS:   Ms. Dubon is a 73F  former smoker (quit 3 months ago) with COPD (home oxygen), solorzano's esophagus, h/o AAA repair (2020), HTN, hypothyroidism, lumbar and cervical radiculopathy, and colitis who presents to clinic for evaluation of EOLY NSCLC - high grade/poorly differentiated carcinoma.     CT Chest 01/07/24 revealed a 1.8 cm spiculated ELOY nodule. Underwent CT-guided biopsy 02/14/24; pathology: ELOY negative. Follow up PET/CT 03/14/24 redemonstrated the known ELOY nodule measuring 2.4 x 2.2 cm with SUV max 14.7. EBUS 04/19/24 path - ELOY positive for high grade/poorly differentiated carcinoma. Most recent CT measuring 3.1cm. LN station 7 negative for malignancy.      She has been deemed medically inoperable and thus has been referred to [facility] for eval/discussion re: RT tx options. She reports residual RAPHAEL more than before since having covid/flu earlier this year, but denies f/c/n/v/d/cp/sob(at rest), hemoptysis, or pain.    REVIEW OF SYSTEMS:  A complete ROS was performed and the patient denies any acute changes/concerns other than per HPI.    Past Medical History:   Diagnosis Date    Anxiety     Solorzano esophagus     Colitis     COPD (chronic obstructive pulmonary disease)     GERD (gastroesophageal reflux disease)     Hypertension     Thyroid disease     Vocal cord polyps      Past Surgical History:   Procedure Laterality Date    ABDOMINAL AORTIC ANEURYSM REPAIR      BACK SURGERY      cervical    CATARACT EXTRACTION Right 02/2021    CHOLECYSTECTOMY  12/20/2013    Dr Albert CORLEY    ENDOBRONCHIAL ULTRASOUND N/A 4/19/2024    Procedure: ENDOBRONCHIAL ULTRASOUND (EBUS);  Surgeon: Kizzy Siegel MD;  Location: [facility] OR [address] [place] [address];  Service: Pulmonary;  Laterality: N/A;    FOOT SURGERY      HYSTERECTOMY  1979    AUGUSTINE for AUB with  consevation ovaries    ROBOTIC BRONCHOSCOPY N/A 4/19/2024    Procedure: ROBOTIC BRONCHOSCOPY;  Surgeon: Kizzy Siegel MD;  Location: [facility] OR [address] [place] [address];  Service: Pulmonary;  Laterality: N/A;    SALIVARY GLAND SURGERY       Social History     Socioeconomic History    Marital status:    Tobacco Use    Smoking status: Every Day     Current packs/day: 0.50     Types: Cigarettes    Smokeless tobacco: Former     Quit date: 5/1/2016    Tobacco comments:     Pt has smoked since 14 years old. Smokes around .5ppd. Inpatient smoking education done 10/20.   Substance and Sexual Activity    Alcohol use: Never    Drug use: Never    Sexual activity: Yes     Partners: Male     Social Determinants of Health     Financial Resource Strain: Low Risk  (3/15/2024)    Overall Financial Resource Strain (CARDIA)     Difficulty of Paying Living Expenses: Not hard at all   Food Insecurity: No Food Insecurity (3/15/2024)    Hunger Vital Sign     Worried About Running Out of Food in the Last Year: Never true     Ran Out of Food in the Last Year: Never true   Transportation Needs: No Transportation Needs (3/15/2024)    PRAPARE - Transportation     Lack of Transportation (Medical): No     Lack of Transportation (Non-Medical): No   Physical Activity: Inactive (3/15/2024)    Exercise Vital Sign     Days of Exercise per Week: 0 days     Minutes of Exercise per Session: 0 min   Stress: Stress Concern Present (3/15/2024)    [employer] - Occupational Stress Questionnaire     Feeling of Stress : Very much   Housing Stability: Low Risk  (3/15/2024)    Housing Stability Vital Sign     Unable to Pay for Housing in the Last Year: No     Number of Places Lived in the Last Year: 1     Unstable Housing in the Last Year: No     Family History   Problem Relation Name Age of Onset    Cancer Mother      Heart failure Father      Emphysema Sister      No Known Problems Brother      Cancer Sister         PRIOR HISTORY OF  CHEMOTHERAPY OR RADIOTHERAPY: Please see HPI for patients prior oncologic history.    Medication List with Changes/Refills   Current Medications    ALUMINUM-MAGNESIUM HYDROXIDE-SIMETHICONE (MAALOX) 200-200-20 MG/5 ML SUSP    Take 30 mLs by mouth once as needed.    AMLODIPINE (NORVASC) 5 MG TABLET    Take 1 tablet (5 mg total) by mouth once daily. For blood pressure    DIPHENHYDRAMINE (BENADRYL) 25 MG CAPSULE    Take 1 capsule (25 mg total) by mouth every 6 (six) hours as needed for Itching or Allergies.    EPINEPHRINE (EPIPEN) 0.3 MG/0.3 ML ATIN    Inject 0.3 mLs (0.3 mg total) into the muscle as needed (Severe allergic reaction symptoms such as shortness of breath, tongue or throat swelling, weakness dizziness severe nausea vomiting).    FAMOTIDINE (PEPCID) 20 MG TABLET    Take 1 tablet (20 mg total) by mouth 2 (two) times daily.    HYDROCODONE-ACETAMINOPHEN (NORCO)  MG PER TABLET    Take 1 tablet by mouth every 12 (twelve) hours as needed for Pain.    LEVALBUTEROL (XOPENEX HFA) 45 MCG/ACTUATION INHALER    Inhale 2 puffs into the lungs every 6 (six) hours as needed for Wheezing. Rescue    LEVOCETIRIZINE (XYZAL) 5 MG TABLET    Take 5 mg by mouth every evening.    LEVOTHYROXINE (SYNTHROID) 88 MCG TABLET    Take 1 tablet (88 mcg total) by mouth before breakfast.    METOPROLOL TARTRATE (LOPRESSOR) 25 MG TABLET    Take 1 tablet (25 mg total) by mouth 2 (two) times daily.    OMEPRAZOLE (PRILOSEC) 40 MG CAPSULE    Take 1 capsule (40 mg total) by mouth every morning.    ONDANSETRON (ZOFRAN) 4 MG TABLET    Take 1 tablet (4 mg total) by mouth every 8 (eight) hours as needed for Nausea.    PREDNISONE (DELTASONE) 10 MG TABLET    Take 1 tablet (10 mg total) by mouth 2 (two) times daily.    REFRESH OPTIVE 0.5-0.9 % DROP    Place 1 drop into both eyes 4 (four) times daily.    SIMETHICONE (GAS-X ORAL)    Take 1 tablet by mouth as needed.    UMECLIDINIUM-VILANTEROL (ANORO ELLIPTA) 62.5-25 MCG/ACTUATION DSDV    Inhale 1 puff  into the lungs once daily. Controller     Review of patient's allergies indicates:   Allergen Reactions    Bisacodyl Nausea And Vomiting     Other reaction(s): Vomiting    Iodinated contrast media Hives and Shortness Of Breath     hypotension    Morphine Other (See Comments)     hypotension    Azithromycin Hives    Cipro [ciprofloxacin hcl]     Demerol [meperidine]      Nausea vomiting, visual problem    Doxycycline Hives    Flonase [fluticasone propionate] Hives    Morpholine analogues Other (See Comments)     hypotension       QUALITY OF LIFE: 80%- Normal Activity with Effort: Some Symptoms of Disease    There were no vitals filed for this visit.  There is no height or weight on file to calculate BMI.    PHYSICAL EXAM:  GENERAL: alert; in no apparent distress.   HEAD: normocephalic, atraumatic.  EYES: pupils are equal, round, reactive to light and accommodation. Sclera anicteric. Conjunctiva not injected.   NOSE/THROAT: no nasal erythema or rhinorrhea. Oropharynx pink, without erythema, ulcerations or thrush.   NECK: no cervical motion rigidity; supple with no masses.  CHEST: coarse to auscultation bilaterally; no wheezes, crackles or rubs. Patient is speaking comfortably on room air with normal work of breathing without using accessory muscles of respiration.  CARDIOVASCULAR: regular rate and rhythm; no murmurs, rubs or gallops.  ABDOMEN: soft, nontender, nondistended. Bowel sounds present.   MUSCULOSKELETAL: no tenderness to palpation along the spine or scapulae. Normal range of motion.  NEUROLOGIC: cranial nerves II-XII intact bilaterally. Strength 5/5 in bilateral upper and lower extremities. No sensory deficits appreciated. Reflexes globally intact. No cerebellar signs. Normal gait.  LYMPHATIC: no cervical, supraclavicular or axillary adenopathy appreciated bilaterally.   EXTREMITIES: no clubbing, cyanosis, edema.  SKIN: no erythema, rashes, ulcerations noted.     REVIEW OF IMAGING/PATHOLOGY/LABS: Please see  HPI. All images reviewed personally by dictating physician.       ASSESSMENT: Joslyn Dubon is a 73 y.o. female with stage IB  [[id number]]  NSCLC of ELOY    PLAN:  After complete review of the patient's chart/hx and eval/discussion w/ the patient today, I explained that I agree with Dr. Burger's recommendation for SBRT, and would utilize a 5 fxn regimen to 5097-1495 cGy over ~ 2 weeks.    I spent over one hour in consultation with the patient discussing the rationales, risks, benefits, and alternatives to thoracic SBRT in this setting, as well as the potential toxicities, including but not limited to fatigue, skin irritation/erythema/desquamation, chronic chest wall pain, hemoptysis due to bronchial wall damage, spinal cord myelopathy, pericarditis, pneumothorax, pneumonitis requiring steroid therapy, respiratory distress, pulmonary fibrosis possibly leading to oxygen therapy dependence, esophagitis causing dysphagia/odynophagia or chronic TE fistula, increased lifetime risk of cardiac disease/events, brachial plexopathy causing pain/weakness/paralysis/parasthesias of LUE, and secondary malignancy.  I carefully explained the process of simulation and treatment delivery with weekly physician visits.    The patient verbalized understanding of the above plan, risks, benefits, and details, and informed consent was obtained.  We will proceed with RTP-CT imaging with plans to initiate RT w/in the next 1-2 weeks.  Contact info was exchanged, and the patient was encouraged to contact our clinic with any questions, needs, or concerns.    DISPOSITION: RTC FOR CT SIM    I have personally seen and evaluated this patient. Greater than 50% of this time was spent discussing coordination of care and/or counseling.    PHYSICIAN: Antony Rodriguez III, MD    Thank you for the opportunity to meet and consult with Joslyn Dubon.   Please feel free to contact me to discuss the above recommendation further.

## 2024-05-02 NOTE — PATIENT INSTRUCTIONS
Instructions given on chest radiation and skin care.  Brochures given on Radiation for Cancer and SBRT Radiation for . Cancer.  Patient verbalized understanding.

## 2024-05-09 ENCOUNTER — OFFICE VISIT (OUTPATIENT)
Dept: HEMATOLOGY/ONCOLOGY | Facility: CLINIC | Age: 74
End: 2024-05-09
Payer: MEDICARE

## 2024-05-09 ENCOUNTER — PATIENT MESSAGE (OUTPATIENT)
Dept: HEMATOLOGY/ONCOLOGY | Facility: CLINIC | Age: 74
End: 2024-05-09

## 2024-05-09 ENCOUNTER — PATIENT MESSAGE (OUTPATIENT)
Dept: PULMONOLOGY | Facility: CLINIC | Age: 74
End: 2024-05-09
Payer: MEDICARE

## 2024-05-09 ENCOUNTER — PATIENT MESSAGE (OUTPATIENT)
Dept: RADIATION ONCOLOGY | Facility: CLINIC | Age: 74
End: 2024-05-09

## 2024-05-09 VITALS
BODY MASS INDEX: 32.43 KG/M2 | HEART RATE: 66 BPM | SYSTOLIC BLOOD PRESSURE: 148 MMHG | TEMPERATURE: 98 F | RESPIRATION RATE: 17 BRPM | DIASTOLIC BLOOD PRESSURE: 81 MMHG | WEIGHT: 177.31 LBS

## 2024-05-09 DIAGNOSIS — C34.92 NON-SMALL CELL CARCINOMA OF LEFT LUNG: Primary | ICD-10-CM

## 2024-05-09 DIAGNOSIS — E03.9 ACQUIRED HYPOTHYROIDISM: ICD-10-CM

## 2024-05-09 DIAGNOSIS — R91.8 LUNG MASS: ICD-10-CM

## 2024-05-09 PROCEDURE — 3077F SYST BP >= 140 MM HG: CPT | Mod: CPTII,S$GLB,, | Performed by: INTERNAL MEDICINE

## 2024-05-09 PROCEDURE — 3288F FALL RISK ASSESSMENT DOCD: CPT | Mod: CPTII,S$GLB,, | Performed by: INTERNAL MEDICINE

## 2024-05-09 PROCEDURE — 3079F DIAST BP 80-89 MM HG: CPT | Mod: CPTII,S$GLB,, | Performed by: INTERNAL MEDICINE

## 2024-05-09 PROCEDURE — 99205 OFFICE O/P NEW HI 60 MIN: CPT | Mod: S$GLB,,, | Performed by: INTERNAL MEDICINE

## 2024-05-09 PROCEDURE — 1159F MED LIST DOCD IN RCRD: CPT | Mod: CPTII,S$GLB,, | Performed by: INTERNAL MEDICINE

## 2024-05-09 PROCEDURE — 1126F AMNT PAIN NOTED NONE PRSNT: CPT | Mod: CPTII,S$GLB,, | Performed by: INTERNAL MEDICINE

## 2024-05-09 PROCEDURE — 1101F PT FALLS ASSESS-DOCD LE1/YR: CPT | Mod: CPTII,S$GLB,, | Performed by: INTERNAL MEDICINE

## 2024-05-09 RX ORDER — LEVOTHYROXINE SODIUM 88 UG/1
88 TABLET ORAL
Qty: 30 TABLET | Refills: 3 | Status: SHIPPED | OUTPATIENT
Start: 2024-05-09 | End: 2024-05-30 | Stop reason: SDUPTHER

## 2024-05-09 NOTE — PROGRESS NOTES
INITIAL [facility] HEM/ONC CONSULTATION      Subjective:       Patient ID: Joslyn Dubon is a 73 y.o. female.    3/14/2024-PET:  2.4cm ELOY mass-SUV 14.7  Normal size MS nodes with no increased FDG activity    10mm soft tissue nodule in left parotid-see report    4/19/2024-EBUS:  ELOY-NSCLC--poorly differentiated-not further described  Station 7 LN negative  [place]-negative for:  EGFR/KRAS/BRAF/ALK/ROS1/RET/MET/ERBB2  TMB: 10  MSI Stable  PDL1; <1--negative    4/26/2024-Seen by Thoracic oncology-not a surgical candidate due to pre-existing lung disease    Currently on SBRT x 5 and will complete 5/17/2024        Chief Complaint: No chief complaint on file.  Lung cancer     Ms. Dubon is a 72yo female who presented with a rash on her neck in October which caused her to see medical care.  CT scan was done of the chest which showed a ELOY lesion.  It is notable that she has a very significant allergic reaction to the iodine contrast.  She underwent PET scanning shown above and she was sent to Dr. Hilton.  Her initital biopsy was non-diagnostic and she therefoe underwent EBUS which did show poorly differentiated lung cancer.  She was referred to surgery who deemed her not to be a surgical candidate due to poor lung function and she was referred to radiation oncology.  She has started this therapy and will complete this this week.      She is referred to me for thoughts considering adjuvant therapy after SBRT.        5/8/2024-[facility] thoracic tumor board recommendations:  BOARD RECOMMENDATIONS:   The patient is a very poor surgical candidate.  While the tumor histology is suggestive of a large cell neuroendocrine tumor, the behavior is more like small-cell lung cancer.  Obtain Brain MRI. Recommend SBRT x5 treatment followed definitive systemic therapy with cisplatin/etoposide.      Past Medical History:   Diagnosis Date    Anxiety     Martin esophagus     Colitis     COPD (chronic obstructive pulmonary disease)     GERD  (gastroesophageal reflux disease)     Hypertension     Lung cancer     Thyroid disease     Vocal cord polyps        Past Surgical History:   Procedure Laterality Date    ABDOMINAL AORTIC ANEURYSM REPAIR      BACK SURGERY      cervical    CATARACT EXTRACTION Right 2021    CHOLECYSTECTOMY  2013    Dr Albert CORLEY    ENDOBRONCHIAL ULTRASOUND N/A 2024    Procedure: ENDOBRONCHIAL ULTRASOUND (EBUS);  Surgeon: Kizzy Siegel MD;  Location: [facility] OR [address] [place] [address];  Service: Pulmonary;  Laterality: N/A;    FOOT SURGERY      HYSTERECTOMY  1979    AUGUSTINE for AUB with consevation ovaries    ROBOTIC BRONCHOSCOPY N/A 2024    Procedure: ROBOTIC BRONCHOSCOPY;  Surgeon: Kizzy Siegel MD;  Location: [facility] OR [address] [place] [address];  Service: Pulmonary;  Laterality: N/A;    SALIVARY GLAND SURGERY         Social History     Socioeconomic History    Marital status:    Tobacco Use    Smoking status: Former     Types: Cigarettes     Start date: 2024     Quit date: 1964     Years since quittin.3    Smokeless tobacco: Never    Tobacco comments:     Pt has smoked since 14 years old. Smokes around .5ppd. Inpatient smoking education done 10/20.     Pt quit smoking on 24.  She has never used smokeless tobacco   Substance and Sexual Activity    Alcohol use: Never    Drug use: Never    Sexual activity: Yes     Partners: Male     Social Determinants of Health     Financial Resource Strain: Low Risk  (3/15/2024)    Overall Financial Resource Strain (CARDIA)     Difficulty of Paying Living Expenses: Not hard at all   Food Insecurity: No Food Insecurity (3/15/2024)    Hunger Vital Sign     Worried About Running Out of Food in the Last Year: Never true     Ran Out of Food in the Last Year: Never true   Transportation Needs: No Transportation Needs (3/15/2024)    PRAPARE - Transportation     Lack of Transportation (Medical): No     Lack of Transportation (Non-Medical): No   Physical Activity: Inactive (3/15/2024)    Exercise Vital  Sign     Days of Exercise per Week: 0 days     Minutes of Exercise per Session: 0 min   Stress: Stress Concern Present (3/15/2024)    [employer] [place] [employer] - Occupational Stress Questionnaire     Feeling of Stress : Very much   Housing Stability: Low Risk  (3/15/2024)    Housing Stability Vital Sign     Unable to Pay for Housing in the Last Year: No     Number of Places Lived in the Last Year: 1     Unstable Housing in the Last Year: No       Family History   Problem Relation Name Age of Onset    Bladder Cancer Mother      Heart failure Father      Emphysema Sister      Pancreatic cancer Sister      No Known Problems Brother         Review of patient's allergies indicates:   Allergen Reactions    Bisacodyl Nausea And Vomiting     Other reaction(s): Vomiting    Iodinated contrast media Hives and Shortness Of Breath     hypotension    Morphine Other (See Comments)     hypotension    Azithromycin Hives    Cipro [ciprofloxacin hcl]     Demerol [meperidine]      Nausea vomiting, visual problem    Doxycycline Hives    Flonase [fluticasone propionate] Hives    Morpholine analogues Other (See Comments)     hypotension       Current Outpatient Medications:     aluminum-magnesium hydroxide-simethicone (MAALOX) 200-200-20 mg/5 mL Susp, Take 30 mLs by mouth once as needed. (Patient not taking: Reported on 5/1/2024), Disp: , Rfl:     amLODIPine (NORVASC) 5 MG tablet, Take 1 tablet (5 mg total) by mouth once daily. For blood pressure, Disp: 30 tablet, Rfl: 5    diphenhydrAMINE (BENADRYL) 25 mg capsule, Take 1 capsule (25 mg total) by mouth every 6 (six) hours as needed for Itching or Allergies., Disp: 20 capsule, Rfl: 0    EPINEPHrine (EPIPEN) 0.3 mg/0.3 mL AtIn, Inject 0.3 mLs (0.3 mg total) into the muscle as needed (Severe allergic reaction symptoms such as shortness of breath, tongue or throat swelling, weakness dizziness severe nausea vomiting)., Disp: 1 each, Rfl: 3    famotidine (PEPCID) 20 MG tablet,  Take 1 tablet (20 mg total) by mouth 2 (two) times daily., Disp: 20 tablet, Rfl: 0    HYDROcodone-acetaminophen (NORCO)  mg per tablet, Take 1 tablet by mouth every 12 (twelve) hours as needed for Pain., Disp: 50 tablet, Rfl: 0    levalbuterol (XOPENEX HFA) 45 mcg/actuation inhaler, Inhale 2 puffs into the lungs every 6 (six) hours as needed for Wheezing. Rescue, Disp: 15 g, Rfl: 4    levocetirizine (XYZAL) 5 MG tablet, Take 5 mg by mouth every evening. (Patient not taking: Reported on 5/1/2024), Disp: , Rfl:     levothyroxine (SYNTHROID) 88 MCG tablet, Take 1 tablet (88 mcg total) by mouth before breakfast. (Patient not taking: Reported on 5/1/2024), Disp: 30 tablet, Rfl: 3    metoprolol tartrate (LOPRESSOR) 25 MG tablet, Take 1 tablet (25 mg total) by mouth 2 (two) times daily., Disp: 180 tablet, Rfl: 3    omeprazole (PRILOSEC) 40 MG capsule, Take 1 capsule (40 mg total) by mouth every morning., Disp: 90 capsule, Rfl: 3    ondansetron (ZOFRAN) 4 MG tablet, Take 1 tablet (4 mg total) by mouth every 8 (eight) hours as needed for Nausea., Disp: 30 tablet, Rfl: 3    predniSONE (DELTASONE) 10 MG tablet, Take 1 tablet (10 mg total) by mouth 2 (two) times daily. (Patient not taking: Reported on 5/1/2024), Disp: 30 tablet, Rfl: 0    REFRESH OPTIVE 0.5-0.9 % Drop, Place 1 drop into both eyes 4 (four) times daily., Disp: , Rfl:     simethicone (GAS-X ORAL), Take 1 tablet by mouth as needed. (Patient not taking: Reported on 5/1/2024), Disp: , Rfl:     umeclidinium-vilanteroL (ANORO ELLIPTA) 62.5-25 mcg/actuation DsDv, Inhale 1 puff into the lungs once daily. Controller, Disp: 1 each, Rfl: 11    Current Facility-Administered Medications:     levalbuterol nebulizer solution 1.25 mg, 1.25 mg, Nebulization, 1 time in Clinic/HOD, Radha, Aleisha, NP    All medications and past history have been reviewed.    Review of Systems   Constitutional:  Negative for appetite change.   HENT:  Negative for mouth sores.    Eyes:  Negative  for visual disturbance.   Respiratory:  Positive for shortness of breath. Negative for cough.    Cardiovascular:  Negative for chest pain.   Gastrointestinal:  Negative for abdominal pain and diarrhea.   Genitourinary:  Positive for frequency.   Musculoskeletal:  Positive for back pain.   Skin:  Positive for rash.   Neurological:  Positive for headaches.   Hematological:  Negative for adenopathy.   Psychiatric/Behavioral:  The patient is nervous/anxious.        Objective:        BP (!) 148/81   Pulse 66   Temp 97.7 °F (36.5 °C)   Resp 17   Wt 80.4 kg (177 lb 4.8 oz)   BMI 32.43 kg/m²     Physical Exam  Constitutional:       Appearance: Normal appearance.   HENT:      Head: Normocephalic and atraumatic.   Eyes:      General: No scleral icterus.     Conjunctiva/sclera: Conjunctivae normal.   Cardiovascular:      Rate and Rhythm: Normal rate.   Pulmonary:      Effort: Pulmonary effort is normal.   Abdominal:      General: Abdomen is flat.   Neurological:      General: No focal deficit present.      Mental Status: She is alert and oriented to person, place, and time.   Psychiatric:         Mood and Affect: Mood normal.         Behavior: Behavior normal.           Lab  No results found for this or any previous visit (from the past 336 hour(s)).  CMP  Sodium   Date Value Ref Range Status   01/19/2024 140 136 - 145 mmol/L Final     Potassium   Date Value Ref Range Status   01/19/2024 4.0 3.5 - 5.1 mmol/L Final     Chloride   Date Value Ref Range Status   01/19/2024 105 95 - 110 mmol/L Final     CO2   Date Value Ref Range Status   01/19/2024 24 23 - 29 mmol/L Final     Glucose   Date Value Ref Range Status   01/19/2024 91 70 - 110 mg/dL Final     BUN   Date Value Ref Range Status   01/19/2024 14 8 - 23 mg/dL Final     Creatinine   Date Value Ref Range Status   01/19/2024 0.9 0.5 - 1.4 mg/dL Final   03/19/2013 0.8 0.5 - 1.4 mg/dL Final     Calcium   Date Value Ref Range Status   01/19/2024 9.9 8.7 - 10.5 mg/dL Final    03/19/2013 9.9 8.7 - 10.5 mg/dL Final     Total Protein   Date Value Ref Range Status   01/19/2024 8.3 6.0 - 8.4 g/dL Final     Albumin   Date Value Ref Range Status   01/19/2024 3.8 3.5 - 5.2 g/dL Final     Total Bilirubin   Date Value Ref Range Status   01/19/2024 0.3 0.1 - 1.0 mg/dL Final     Comment:     For infants and newborns, interpretation of results should be based  on gestational age, weight and in agreement with clinical  observations.    Premature Infant recommended reference ranges:  Up to 24 hours.............<8.0 mg/dL  Up to 48 hours............<12.0 mg/dL  3-5 days..................<15.0 mg/dL  6-29 days.................<15.0 mg/dL       Alkaline Phosphatase   Date Value Ref Range Status   01/19/2024 114 55 - 135 U/L Final   08/31/2012 107 23 - 119 UNIT/L Final     AST   Date Value Ref Range Status   01/19/2024 24 10 - 40 U/L Final   08/31/2012 21 10 - 30 UNIT/L Final     ALT   Date Value Ref Range Status   01/19/2024 26 10 - 44 U/L Final     Anion Gap   Date Value Ref Range Status   01/19/2024 11 8 - 16 mmol/L Final   03/19/2013 12 5 - 15 meq/L Final     eGFR if    Date Value Ref Range Status   01/03/2022 >60.0 >60 mL/min/1.73 m^2 Final     eGFR if non    Date Value Ref Range Status   01/03/2022 >60.0 >60 mL/min/1.73 m^2 Final     Comment:     Calculation used to obtain the estimated glomerular filtration  rate (eGFR) is the CKD-EPI equation.            Specimen (24h ago, onward)      None                  All lab results and imaging results have been reviewed and discussed with the patient.     Assessment:       1. Non-small cell carcinoma of left lung    2. Lung mass      Problem List Items Addressed This Visit       Non-small cell carcinoma of left lung - Primary     I had a long discussion with Ms. Dubon and reviewed the new data from [facility] tumor board which defines this cancer more as a neuroendocrine base.  I discussed the use of chemotherapy, and/or  immune therapy.  I reviewed the risks and benefits in detail and at this point she is adamant that she does not want any of this therapy.  I reviewed that these treatments could certainly improve her ability to survive this cancer and that her risk of death is higher without it.  Again, she refuses this approach.      Plan will be active surveillance and will plan CT imaging in the next few months.  Will be happy to participate in this process.            Other Visit Diagnoses       Lung mass               Cancer Staging   Non-small cell carcinoma of left lung  Staging form: Lung, AJCC 8th Edition  - Clinical stage from 5/1/2024: Stage IB (cT2a, cN0, cM0) - Signed by Antony Rodriguez III, MD on 5/1/2024        Plan:         Follow up in about 4 weeks (around 6/6/2024).       The plan was discussed with the patient and all questions/concerns have been answered to the patient's satisfaction.

## 2024-05-09 NOTE — ASSESSMENT & PLAN NOTE
I had a long discussion with Ms. Dubon and reviewed the new data from [facility] tumor board which defines this cancer more as a neuroendocrine base.  I discussed the use of chemotherapy, and/or immune therapy.  I reviewed the risks and benefits in detail and at this point she is adamant that she does not want any of this therapy.  I reviewed that these treatments could certainly improve her ability to survive this cancer and that her risk of death is higher without it.  Again, she refuses this approach.      Plan will be active surveillance and will plan CT imaging in the next few months.  Will be happy to participate in this process.

## 2024-05-16 ENCOUNTER — LAB VISIT (OUTPATIENT)
Dept: LAB | Facility: HOSPITAL | Age: 74
End: 2024-05-16
Attending: NURSE PRACTITIONER
Payer: MEDICARE

## 2024-05-16 ENCOUNTER — OFFICE VISIT (OUTPATIENT)
Facility: CLINIC | Age: 74
End: 2024-05-16
Payer: MEDICARE

## 2024-05-16 VITALS
SYSTOLIC BLOOD PRESSURE: 155 MMHG | TEMPERATURE: 98 F | RESPIRATION RATE: 18 BRPM | HEART RATE: 66 BPM | DIASTOLIC BLOOD PRESSURE: 77 MMHG | BODY MASS INDEX: 32.37 KG/M2 | WEIGHT: 177 LBS

## 2024-05-16 DIAGNOSIS — C34.92 NON-SMALL CELL CARCINOMA OF LEFT LUNG: ICD-10-CM

## 2024-05-16 DIAGNOSIS — C34.92 NON-SMALL CELL CARCINOMA OF LEFT LUNG: Primary | ICD-10-CM

## 2024-05-16 LAB
ALBUMIN SERPL BCP-MCNC: 4.2 G/DL (ref 3.5–5.2)
ALP SERPL-CCNC: 95 U/L (ref 55–135)
ALT SERPL W/O P-5'-P-CCNC: 16 U/L (ref 10–44)
ANION GAP SERPL CALC-SCNC: 8 MMOL/L (ref 8–16)
AST SERPL-CCNC: 19 U/L (ref 10–40)
BASOPHILS # BLD AUTO: 0.02 K/UL (ref 0–0.2)
BASOPHILS NFR BLD: 0.4 % (ref 0–1.9)
BILIRUB SERPL-MCNC: 0.3 MG/DL (ref 0.1–1)
BUN SERPL-MCNC: 16 MG/DL (ref 8–23)
CALCIUM SERPL-MCNC: 9.8 MG/DL (ref 8.7–10.5)
CHLORIDE SERPL-SCNC: 103 MMOL/L (ref 95–110)
CO2 SERPL-SCNC: 27 MMOL/L (ref 23–29)
CREAT SERPL-MCNC: 1 MG/DL (ref 0.5–1.4)
DIFFERENTIAL METHOD BLD: ABNORMAL
EOSINOPHIL # BLD AUTO: 0.2 K/UL (ref 0–0.5)
EOSINOPHIL NFR BLD: 3.9 % (ref 0–8)
ERYTHROCYTE [DISTWIDTH] IN BLOOD BY AUTOMATED COUNT: 13.2 % (ref 11.5–14.5)
EST. GFR  (NO RACE VARIABLE): 58.8 ML/MIN/1.73 M^2
GLUCOSE SERPL-MCNC: 92 MG/DL (ref 70–110)
HCT VFR BLD AUTO: 42 % (ref 37–48.5)
HGB BLD-MCNC: 13.8 G/DL (ref 12–16)
IMM GRANULOCYTES # BLD AUTO: 0.02 K/UL (ref 0–0.04)
IMM GRANULOCYTES NFR BLD AUTO: 0.4 % (ref 0–0.5)
LYMPHOCYTES # BLD AUTO: 1.4 K/UL (ref 1–4.8)
LYMPHOCYTES NFR BLD: 28.5 % (ref 18–48)
MAGNESIUM SERPL-MCNC: 1.9 MG/DL (ref 1.6–2.6)
MCH RBC QN AUTO: 31.2 PG (ref 27–31)
MCHC RBC AUTO-ENTMCNC: 32.9 G/DL (ref 32–36)
MCV RBC AUTO: 95 FL (ref 82–98)
MONOCYTES # BLD AUTO: 0.6 K/UL (ref 0.3–1)
MONOCYTES NFR BLD: 11.3 % (ref 4–15)
NEUTROPHILS # BLD AUTO: 2.7 K/UL (ref 1.8–7.7)
NEUTROPHILS NFR BLD: 55.5 % (ref 38–73)
NRBC BLD-RTO: 0 /100 WBC
PLATELET # BLD AUTO: 204 K/UL (ref 150–450)
PMV BLD AUTO: 8.6 FL (ref 9.2–12.9)
POTASSIUM SERPL-SCNC: 4.3 MMOL/L (ref 3.5–5.1)
PROT SERPL-MCNC: 8 G/DL (ref 6–8.4)
RBC # BLD AUTO: 4.43 M/UL (ref 4–5.4)
SODIUM SERPL-SCNC: 138 MMOL/L (ref 136–145)
WBC # BLD AUTO: 4.87 K/UL (ref 3.9–12.7)

## 2024-05-16 PROCEDURE — 83735 ASSAY OF MAGNESIUM: CPT | Performed by: NURSE PRACTITIONER

## 2024-05-16 PROCEDURE — 1126F AMNT PAIN NOTED NONE PRSNT: CPT | Mod: CPTII,S$GLB,, | Performed by: NURSE PRACTITIONER

## 2024-05-16 PROCEDURE — 3077F SYST BP >= 140 MM HG: CPT | Mod: CPTII,S$GLB,, | Performed by: NURSE PRACTITIONER

## 2024-05-16 PROCEDURE — 1160F RVW MEDS BY RX/DR IN RCRD: CPT | Mod: CPTII,S$GLB,, | Performed by: NURSE PRACTITIONER

## 2024-05-16 PROCEDURE — 99215 OFFICE O/P EST HI 40 MIN: CPT | Mod: S$GLB,,, | Performed by: NURSE PRACTITIONER

## 2024-05-16 PROCEDURE — 85025 COMPLETE CBC W/AUTO DIFF WBC: CPT | Performed by: NURSE PRACTITIONER

## 2024-05-16 PROCEDURE — 99999 PR PBB SHADOW E&M-EST. PATIENT-LVL IV: CPT | Mod: PBBFAC,,, | Performed by: NURSE PRACTITIONER

## 2024-05-16 PROCEDURE — 3078F DIAST BP <80 MM HG: CPT | Mod: CPTII,S$GLB,, | Performed by: NURSE PRACTITIONER

## 2024-05-16 PROCEDURE — 80053 COMPREHEN METABOLIC PANEL: CPT | Performed by: NURSE PRACTITIONER

## 2024-05-16 PROCEDURE — 1159F MED LIST DOCD IN RCRD: CPT | Mod: CPTII,S$GLB,, | Performed by: NURSE PRACTITIONER

## 2024-05-16 PROCEDURE — 36415 COLL VENOUS BLD VENIPUNCTURE: CPT | Performed by: NURSE PRACTITIONER

## 2024-05-16 RX ORDER — ONDANSETRON HYDROCHLORIDE 8 MG/1
8 TABLET, FILM COATED ORAL EVERY 8 HOURS PRN
Qty: 30 TABLET | Refills: 5 | Status: SHIPPED | OUTPATIENT
Start: 2024-05-16

## 2024-05-16 RX ORDER — PROMETHAZINE HYDROCHLORIDE 25 MG/1
25 TABLET ORAL EVERY 6 HOURS PRN
Qty: 30 TABLET | Refills: 5 | Status: SHIPPED | OUTPATIENT
Start: 2024-05-16

## 2024-05-16 RX ORDER — POLYETHYLENE GLYCOL 3350 17 G/17G
17 POWDER, FOR SOLUTION ORAL DAILY
Qty: 510 G | Refills: 5 | Status: SHIPPED | OUTPATIENT
Start: 2024-05-16

## 2024-05-16 RX ORDER — LIDOCAINE AND PRILOCAINE 25; 25 MG/G; MG/G
CREAM TOPICAL
Qty: 30 G | Refills: 5 | Status: SHIPPED | OUTPATIENT
Start: 2024-05-16

## 2024-05-16 NOTE — PROGRESS NOTES
FOLLOW-UP APPOINTMENT    PATIENT:   Joslyn Dubon  :    1950  MR#:    [id number]  DATE OF VISIT:  2024      Chief Complaint: Chemo School    HPI:   Ms. Joslyn Dubon presents today for chemotherapy education.  She will be starting treatment with Cisplatin and Etopside for the above diagnosis.         2024     2:03 PM 2024     7:45 AM 2023     1:37 PM 10/25/2023    11:57 AM 2023     9:52 AM 2023     1:40 PM 2023     8:22 AM   Depression Patient Health Questionnaire   Over the last two weeks how often have you been bothered by little interest or pleasure in doing things Not at all Several days Not at all Several days Not at all Not at all Not at all   Over the last two weeks how often have you been bothered by feeling down, depressed or hopeless Not at all Several days Not at all Several days Not at all Not at all Not at all   PHQ-2 Total Score 0 2 0 2 0 0 0         Review of Systems   Constitutional:  Positive for fatigue and unexpected weight change.   HENT:   Negative for hearing loss and tinnitus.    Respiratory:  Positive for cough and shortness of breath. Negative for hemoptysis.    Cardiovascular:  Positive for leg swelling. Negative for chest pain.   Gastrointestinal:  Positive for constipation and nausea. Negative for abdominal distention and diarrhea.   Genitourinary:  Negative for dysuria.    Musculoskeletal:  Negative for arthralgias and myalgias.   Skin:  Positive for itching.   Neurological:  Positive for headaches.   Hematological:  Negative for adenopathy.   Psychiatric/Behavioral:  The patient is nervous/anxious.        Oncology History   Non-small cell carcinoma of left lung   2024 Initial Diagnosis    Non-small cell carcinoma of left lung     2024 Cancer Staged    Staging form: Lung, AJCC 8th Edition  - Clinical stage from 2024: Stage IB (cT2a, cN0, cM0)     2024 -  Chemotherapy    Treatment Summary   Plan Name: OP CISPLATIN 75MG/M2  ETOPOSIDE 100MG/M2 Q3W  Treatment Goal: Curative  Status: Active  Start Date: 5/22/2024 (Planned)  End Date: 9/6/2024 (Planned)  Provider: Macario Iyer MD  Chemotherapy: CISplatin (Platinol) in sodium chloride 0.9% 500 mL chemo infusion, 75 mg/m2, Intravenous, Clinic/HOD 1 time, 0 of 6 cycles  etoposide (VEPESID) in sodium chloride 0.9% 500 mL chemo infusion, 100 mg/m2, Intravenous, Clinic/HOD 1 time, 0 of 6 cycles         Patient Active Problem List   Diagnosis    COPD (chronic obstructive pulmonary disease)    Thyroid disease    Tobacco abuse    Primary hypertension    Gastroesophageal reflux disease without esophagitis    Anxiety    History of AAA repair    Lumbar disc disease with radiculopathy    Foot callus    Neuropathy    Essential hypertension    Nonspecific abnormal electrocardiogram (ECG) (EKG)    ACE-inhibitor cough    Cervical radiculopathy    Influenza A    Acquired hypothyroidism    Hypokalemia    [place] lesion of lung    Mass of upper lobe of left lung    Allergic reaction    Chronic hypoxic respiratory failure    Non-small cell carcinoma of left lung       Past Medical History:   Diagnosis Date    Anxiety     Martin esophagus     Colitis     COPD (chronic obstructive pulmonary disease)     GERD (gastroesophageal reflux disease)     Hypertension     Lung cancer     Thyroid disease     Vocal cord polyps        Past Surgical History:   Procedure Laterality Date    ABDOMINAL AORTIC ANEURYSM REPAIR      BACK SURGERY      cervical    CATARACT EXTRACTION Right 02/2021    CHOLECYSTECTOMY  12/20/2013    [facility]    ENDOBRONCHIAL ULTRASOUND N/A 4/19/2024    Procedure: ENDOBRONCHIAL ULTRASOUND (EBUS);  Surgeon: Kizzy Siegel MD;  Location: [address];  Service: Pulmonary;  Laterality: N/A;    FOOT SURGERY      HYSTERECTOMY  1979    AUGUSTINE for AUB with consevation ovaries    ROBOTIC BRONCHOSCOPY N/A 4/19/2024    Procedure: ROBOTIC BRONCHOSCOPY;  Surgeon: Kizzy Siegel MD;  Location: [facility] OR  2ND FLR;  Service: Pulmonary;  Laterality: N/A;    SALIVARY GLAND SURGERY         Social History     Socioeconomic History    Marital status:    Tobacco Use    Smoking status: Former     Types: Cigarettes     Start date: 2024     Quit date: 1964     Years since quittin.4    Smokeless tobacco: Never    Tobacco comments:     Pt has smoked since 14 years old. Smokes around .5ppd. Inpatient smoking education done 10/20.     Pt quit smoking on 24.  She has never used smokeless tobacco   Substance and Sexual Activity    Alcohol use: Never    Drug use: Never    Sexual activity: Yes     Partners: Male     Social Determinants of Health     Financial Resource Strain: Low Risk  (3/15/2024)    Overall Financial Resource Strain (CARDIA)     Difficulty of Paying Living Expenses: Not hard at all   Food Insecurity: No Food Insecurity (3/15/2024)    Hunger Vital Sign     Worried About Running Out of Food in the Last Year: Never true     Ran Out of Food in the Last Year: Never true   Transportation Needs: No Transportation Needs (3/15/2024)    PRAPARE - Transportation     Lack of Transportation (Medical): No     Lack of Transportation (Non-Medical): No   Physical Activity: Inactive (3/15/2024)    Exercise Vital Sign     Days of Exercise per Week: 0 days     Minutes of Exercise per Session: 0 min   Stress: Stress Concern Present (3/15/2024)    [employer] [place] [employer] [employer] - Occupational Stress Questionnaire     Feeling of Stress : Very much   Housing Stability: Low Risk  (3/15/2024)    Housing Stability Vital Sign     Unable to Pay for Housing in the Last Year: No     Number of Places Lived in the Last Year: 1     Unstable Housing in the Last Year: No       Family History   Problem Relation Name Age of Onset    Bladder Cancer Mother      Heart failure Father      Emphysema Sister      Pancreatic cancer Sister      No Known Problems Brother           Current Outpatient Medications:     amLODIPine  (NORVASC) 5 MG tablet, Take 1 tablet (5 mg total) by mouth once daily. For blood pressure, Disp: 30 tablet, Rfl: 5    diphenhydrAMINE (BENADRYL) 25 mg capsule, Take 1 capsule (25 mg total) by mouth every 6 (six) hours as needed for Itching or Allergies., Disp: 20 capsule, Rfl: 0    EPINEPHrine (EPIPEN) 0.3 mg/0.3 mL AtIn, Inject 0.3 mLs (0.3 mg total) into the muscle as needed (Severe allergic reaction symptoms such as shortness of breath, tongue or throat swelling, weakness dizziness severe nausea vomiting)., Disp: 1 each, Rfl: 3    famotidine (PEPCID) 20 MG tablet, Take 1 tablet (20 mg total) by mouth 2 (two) times daily., Disp: 20 tablet, Rfl: 0    HYDROcodone-acetaminophen (NORCO)  mg per tablet, Take 1 tablet by mouth every 12 (twelve) hours as needed for Pain., Disp: 50 tablet, Rfl: 0    levalbuterol (XOPENEX HFA) 45 mcg/actuation inhaler, Inhale 2 puffs into the lungs every 6 (six) hours as needed for Wheezing. Rescue, Disp: 15 g, Rfl: 4    levothyroxine (SYNTHROID) 88 MCG tablet, Take 1 tablet (88 mcg total) by mouth before breakfast., Disp: 30 tablet, Rfl: 3    LIDOcaine-prilocaine (EMLA) cream, Apply topically as needed., Disp: 30 g, Rfl: 5    metoprolol tartrate (LOPRESSOR) 25 MG tablet, Take 1 tablet (25 mg total) by mouth 2 (two) times daily., Disp: 180 tablet, Rfl: 3    omeprazole (PRILOSEC) 40 MG capsule, Take 1 capsule (40 mg total) by mouth every morning., Disp: 90 capsule, Rfl: 3    ondansetron (ZOFRAN) 4 MG tablet, Take 1 tablet (4 mg total) by mouth every 8 (eight) hours as needed for Nausea., Disp: 30 tablet, Rfl: 3    ondansetron (ZOFRAN) 8 MG tablet, Take 1 tablet (8 mg total) by mouth every 8 (eight) hours as needed for Nausea., Disp: 30 tablet, Rfl: 5    polyethylene glycol (GLYCOLAX) 17 gram/dose powder, Take 17 g by mouth once daily., Disp: 510 g, Rfl: 5    promethazine (PHENERGAN) 25 MG tablet, Take 1 tablet (25 mg total) by mouth every 6 (six) hours as needed for Nausea., Disp: 30  tablet, Rfl: 5    REFRESH OPTIVE 0.5-0.9 % Drop, Place 1 drop into both eyes 4 (four) times daily., Disp: , Rfl:     simethicone (GAS-X ORAL), Take 1 tablet by mouth as needed. (Patient not taking: Reported on 5/1/2024), Disp: , Rfl:     umeclidinium-vilanteroL (ANORO ELLIPTA) 62.5-25 mcg/actuation DsDv, Inhale 1 puff into the lungs once daily. Controller, Disp: 1 each, Rfl: 11    Current Facility-Administered Medications:     levalbuterol nebulizer solution 1.25 mg, 1.25 mg, Nebulization, 1 time in Clinic/HOD, Radha, Aleisha, NP    Review of patient's allergies indicates:   Allergen Reactions    Bisacodyl Nausea And Vomiting     Other reaction(s): Vomiting    Iodinated contrast media Hives and Shortness Of Breath     hypotension    Morphine Other (See Comments)     hypotension    Azithromycin Hives    Cipro [ciprofloxacin hcl]     Demerol [meperidine]      Nausea vomiting, visual problem    Doxycycline Hives    Flonase [fluticasone propionate] Hives    Morpholine analogues Other (See Comments)     hypotension       Physcial Examination  VITAL SIGNS:    Body surface area is 1.87 meters squared.   Pain Assessment  Vitals:    05/16/24 1354   BP: (!) 155/77   Pulse: 66   Resp: 18   Temp: 97.8 °F (36.6 °C)   Weight: 80.3 kg (177 lb)   PainSc: 0-No pain        Wt Readings from Last 5 Encounters:   05/16/24 80.3 kg (177 lb)   05/09/24 80.4 kg (177 lb 4.8 oz)   05/01/24 79.5 kg (175 lb 3.2 oz)   04/26/24 79.9 kg (176 lb 2.4 oz)   03/14/24 75.8 kg (167 lb)       GENERAL:  Joslyn Dubon is healthy-appearing 73 y.o. female, in no distress.   EYES:   Pupils equal, round, reactive.  Conjunctivae, sclera and lids normal.  HEENT: Head normocephalic and atraumatic, without alopecia.  Oropharynx is unremarkable.  No icterus, jaundice, stomatitis, mucositis, or ulceration is noted.  Ears are clear and unremarkable.  Nose, nares, and septum are unremarkable.    NECK:   No masses.  Thyroid and trachea are normal.     BREASTS:  Deferred.  RESPIRATORY: Clear to auscultation bilaterally.  Symmetrically effortless expansion.  No wheezing and no stridor.    CV: Heart reveals regular rate and rhythm without murmur, rub, or gallops.  ABDOMEN: Soft, non-tender.  No masses, no hernias, and no rebound or rigidity are noted.  /RECTAL:  Deferred.  LYMPHATICS: No preauricular, submandibular, cervical, supraclavicular, axillary, lymphadenopathy.  MUSCULOSKELETAL:Fair musculature, no atrophy.  No arthritic changes.  No edema or cyanosis. Back is without gross abnormal curvature.   NEUROLOGICAL: Cranial nerves II-XII grossly intact.  Motor and sensory exam intact.  SKIN:   No lesions, bruises, petechiae or rashes.  Good turgor.    PSYCHIATRIC: Patient is alert and oriented to time, place and person.  Mood and affect are appropriate.         Laboratory and Radiology   Lab Results   Component Value Date    WBC 4.87 05/16/2024    RBC 4.43 05/16/2024    HGB 13.8 05/16/2024    HCT 42.0 05/16/2024    MCV 95 05/16/2024    MCH 31.2 (H) 05/16/2024    MCHC 32.9 05/16/2024    RDW 13.2 05/16/2024     05/16/2024    MPV 8.6 (L) 05/16/2024    GRAN 2.7 05/16/2024    GRAN 55.5 05/16/2024    LYMPH 1.4 05/16/2024    LYMPH 28.5 05/16/2024    MONO 0.6 05/16/2024    MONO 11.3 05/16/2024    EOS 0.2 05/16/2024    BASO 0.02 05/16/2024    EOSINOPHIL 3.9 05/16/2024    BASOPHIL 0.4 05/16/2024     BMP  Lab Results   Component Value Date     01/19/2024    K 4.0 01/19/2024     01/19/2024    CO2 24 01/19/2024    BUN 14 01/19/2024    CREATININE 0.9 01/19/2024    CALCIUM 9.9 01/19/2024    ANIONGAP 11 01/19/2024    ESTGFRAFRICA >60.0 01/03/2022    EGFRNONAA >60.0 01/03/2022     Lab Results   Component Value Date    ALT 26 01/19/2024    AST 24 01/19/2024    ALKPHOS 114 01/19/2024    BILITOT 0.3 01/19/2024     Results for orders placed or performed during the hospital encounter of 04/12/24 (from the past 2160 hour(s))   CT Chest Without Contrast    Narrative     CMS MANDATED QUALITY DATA - CT RADIATION - 436    All CT scans at this facility utilize dose modulation, iterative reconstruction, and/or weight based dosing when appropriate to reduce radiation dose to as low as reasonably achievable.    EXAMINATION:  CT CHEST WITHOUT CONTRAST    CLINICAL HISTORY:  Abnormal xray - lung nodule, >= 1 cm;Chest Navigational Bronchoscopy;    Solitary pulmonary nodule    TECHNIQUE:  CT thorax without contrast    COMPARISON:  01/07/2024 CT chest and PET-CT dated 03/14/2024    FINDINGS:  CT THORAX:    There is an enlarging 3.1 x 2.3 cm pleural base mass in the anterolateral left upper lobe suspicious for malignancy.    There are no additional pulmonary nodules, infiltrates or pleural effusions.  The trachea and bronchi are normal.    Base of the neck is unremarkable.    The heart is normal in size.  The aorta is normal in caliber with mild vascular calcification    There is no hilar, mediastinal or axillary adenopathy.    The esophagus is normal    Chest wall is unremarkable    Visualized portion of the upper abdomen demonstrates a simple hepatic cyst.  The gallbladder is absent.  The adrenal glands are normal.    There are no acute osseous abnormalities.      Impression    Enlarging 3.1 x 2.3 cm pleural base mass in the anterolateral left upper lobe suspicious for pulmonary malignancy    No evidence of new metastatic disease    Prior cholecystectomy      Electronically signed by: Yokasta Cowan  Date:    04/12/2024  Time:    15:15     No results found for this or any previous visit (from the past 2160 hour(s)).  No results found for this or any previous visit (from the past 2160 hour(s)).    Pathology  Pathology Results  (Last 10 years)      None            TITLE: PLAN OF CARE FOR THE CHEMOTHERAPY PATIENT / TEACHING PROTOCOL    PURPOSE: To involve the patient / significant other in the plan of care and to provide teaching to the significant other & patient receiving  chemotherapy.    LEVEL: Independent.    CONTENT: The Plan of Care for the chemotherapy patient is individualized and appropriate to the patients needs, strengths, limitations, & goals.  Education includes information regarding chemotherapy side effects, the treatment itself, and self-care  Activities.    GOAL / OUTCOME STANDARDS    PHYSIOLOGIC: The client will remain free or experience minimal side effects or toxicities throughout the chemotherapy treatment period.     PSYCHOLOGIC: The client/significant others will demonstrate positive coping mechanisms in relation to chemotherapy and its side effects.      COGINITIVE: The client/significant others will verbalize understanding of self-care measure to avoid/minimize side effects of the chemotherapy regimen.    EVALUATION / COMMENT KEY:    V = Audiovisual/Video  S = Successfully meets outcome  N = Needs further instruction  NA = Not applicable to the patient  P = Previous knowledge  U = Unable to comprehend  * = See progress notes          PLAN OF CARE  INFORMATION TO BE DELIVERED / NURSING INTERVENTIONS DATE EVALUATION   Assessment of client/caregiver,         knowledge of cancer diagnosis,         and chemotherapy as a treatment. 1a. Evaluate patient/caregiver learning ability    b. Plan teaching sessions with patient/caregiver according to needs and present anxiety level/ability to learn.    c. Provide Chemotherapy Education Packet,        Mouth Care Protocol,         Specific Patient Education Sheets. 05/16/2024 S   Individual chemotherapy treatment         plan. 2a. Review of Chemotherapy Education handout from [employer]            05/16/2024   S   Knowledge Deficit & Self-Management of general side effects common to all chemotherapy:  Nausea/Vomiting  b.   Diarrhea  Mouth Care  Dental care  Constipation  Hair Loss  Potential for infection  Fatigue   3a. Reinforce that the majority of side effects from chemotherapy are reversible and are  controlled both in  the hospital and at home        (blood counts recover, hair grows back).   b.  Refer to the following for reinforcement of         information post-treatment:  Mouth Care Protocol.  Bowel Protocol for constipation or diarrhea.  3.  Drug Specific Chemotherapy Information Sheets for each medication patient receiving.    05/16/2024     S     PLAN OF CARE  INFORMATION TO BE DELIVERED / NURSING INTERVENTIONS DATE EVALUATION   h. Potential for bleeding         i. Potential anemia/fatigue         j. Potential sunburn         k. Birth control measures  l. Safety measures post treatment 4.  Chemotherapy Home Care Instruction  and Safety Information Sheet.  A. patient/caregivers to thoroughly cook shellfish (shrimp, crab, etc) to decrease the chance of infection.    B.  Use sunscreen and protective clothing while in the sun.   05/16/2024      Knowledge deficit & Self Management of Drug Specific  Side Effects.    BLADDER EFFECTS        (Hemorrhagic Cystitis)                  Preventable with adequate hydration; occurs 2-3 days or more post treatment.   1.  Instruct patient to:  a.   Void at least every 2 hours; increase intake.  b.   DO NOT hold urine; go when urge is felt.  c.    Empty bladder at bedtime and on         awakening.  d.   Observe for color changes (red to tea           colored), amount and frequency changes.  e.   Notify oncologist of any abnormalities           in urine or voiding or if you cannot               drink adequate fluids.   05/16/2024   S   b.   CHANGES IN URINE        COLOR:      1.   Instruct patient:  a.   Most evident in first 2-3 voidings after           administration.  Lasts less than 24 hours.  If urine is discolored 2 or more days post- treatment, notify oncologist.      05/16/2024 S   c.    KIDNEY EFFECTS           (Nephrotoxicity)   1.  Instruct patient to:  a.   Drink 8-16 glasses of fluid/day the day   pre-treatment and 3-4 days post-treatment to maintain hydration; the best way to  minimize kidney problems.  b.   Notify oncologist immediately if unable to drink fluids or if changes are noted in urinary elimination.     05/16/2024   S   PULMONARY TOXICITY    Instruct patient to report symptoms such as shortness of breath, chest pain, shallow breathing, or chest wall discomfort to physician.  Reinforce preventative measures used by the health care team.  Baseline and periodic PFT and chest x-ray.   05/16/2024   S     PLAN OF CARE INFORMATION TO BE DELIVERED / NURSING INTERVENTIONS DATE EVALUATION   NERVE & MUSCLE EFFECTS (neurotoxocity; neuropathy, possible visual/hearing changes)        Instruct patient to:    Report numbness or tingling of the hands/feet, loss of fine motor movement (buttoning shirt, tying shoelaces), or gait changes to your oncologist.  If numbness/tingling are present:  protect feet with shoes at all times.  Use gloves for washing dishes/gardening & potholders in kitchen.       05/16/2024   S   CARDIOTOXICITY  Decreased effectiveness of             cardiac function. Effective are                  cumulative and irreversible.                                    CARDIAC ARRYTHMIAS              4   Instruct:  Heart function may be tested before treatment and perdiocally during treatment.  Notify oncologist of irregular pulse, palpitations, shortness of breath, or swelling in lower extremities/feet.          Can cause arrhythmias on infusion that resolve once infusion discontinued. Instruct nurse if any irregularity felt.    05/16/2024   S   EXTRAVASTION  Occurs when vesicants leak outside of vein and cause damage to the skin and underlying tissues.   Reinforce preventive measures used to avoid complications.  Fresh IV site or central line monitored continuously with vesicant IVP.  Continuous infusion via central line site and blood return monitored periodically around the clock.  Instruct to:  Notify nurse of any discomfort, burning, stinging, etc. at IV site during chemotherapy  administration.  Notify oncologist of any redness, pain, or swelling at IV site after discharge from hospital.   05/16/2024   S   HYPERSENSITIVITY can happen with any medication.   Instruct patient:  Nurse is with them during the initial part of treatment and will be close by to monitor.  Pre-medication ordered by the oncologist must be taken on time. If doses are missed, treatment will need to be re-scheduled.  Skin redness, itching, or hives appearing after discharge should be reported to oncologist. 05/16/2024   S       PLAN OF CARE INFORMATION TO BE DELIVERED / NURSING INTERVENTIONS DATE EVALUATION   FLU-LIKE SYNDROME      Instruct patient symptoms are hard to prevent and may include fever, shaking chills, muscle and body aches.  Taking prescribed medications from physician if needed.  Adequate fluids are important.    Reinforce the need to call if temperature is         elevated to 100.4 or more  05/16/2024   S   HAND-FOOT SYNDROME  causes painful, symmetric swelling and redness of palms and soles                  Instruct patient to report any numbness or tingling in the hands or feet.  Explain prevention techniques, such as     Use heavy moisturizers to lessen skin dryness and itching, but to avoid if skin is cracked or broken  Bathe in tepid water, use non-perfumed soap, and wash gently. Baths with oatmeal or diluted baking soda may be soothing.  Avoid tight fitting shoes and repetitive actions, such as rubbing hands or applying pressure to hands/feet.  Review measures to take should syndrome occur:  Cold compresses and elevation for          edema  Pain medications and other measures as ordered by oncologist.   4.   Syndrome resolves few weeks after therapy. 05/16/2024   S   5. DISCHARGE PLANNING /        EDUCATION 1.    Explain importance of compliance with follow- up  tests (CBC, CMP).  2.    Verify patient/caregiver know:  a.    Oncologists office phone number.  b.    Dates of follow-up  appointments.  c.    Prescriptions given for nausea  3.   Review side effects to monitor and notify          oncologist about.  4.   Reinforce the need for patient and caregivers to:  a.    Review information given.  b.    Call oncologists office with questions          or symptoms  5.   Provide [facility] [place] Brochure make referrals if needed for financial or .   05/16/2024   S     PROGRESS NOTES: I met with the patient her  and daughter today for chemotherapy education. she will be starting treatment with Cisplatin and Etoposide . We discussed the mechanism of action, potential side effects of this treatment as well as ways she can manage them at home. Some of these side effects include but or not limited to fever, nausea, vomiting, decreased appetite, fatigue, weakness, cytopenias, myalgia/arthralgia, constipation, diarrhea, bleeding, headache, shortness of breath, nail changes, taste change, hair thinning/loss, mood disturbances, or edema. We also discussed dietary modifications she should make although this will be discussed in more detail with the dietician. she was provided with anti-emetic medication, a copy of all of the information we discussed today as well as our contact information. she will be provided a schedule on his first day of treatment. We will obtain labs on a weekly basis and the patient will follow-up with the physician for toxicity monitoring throughout treatment. All questions were answered and an informed consent was obtained. she was reminded to certainly contact us sooner if needed.  Attached to the patients folder and discussed with the patient the 24 hour/ 7 days a week after hours telephone number for the physician.  Patient notified to call anytime 24/7 because their is a physician on call for any problems that may arise.  Patient also notified to report to [facility] if they can not get in touch with a physician after hours.  Discussed the five  wishes booklet with the patient and their family.           Assessment/Plan     ICD-10-CM ICD-9-CM   1. Non-small cell carcinoma of left lung  C34.92 162.9          Medications Ordered:  Zofran 8mg 1 tab PO Q8h prn nausea  Phenergan 25mg PO Q4-6h prn nausea  Emla cream: Apply to port site 30min prior to treatment and cover with saran wrap      Standing Labs Ordered:  CBC weekly  CMP weekly  Mag weekly    Follow up in about 2 weeks (around 5/30/2024) for with Dr. Iyer and 5 with me.       Electronically signed by: Britany Byrd, MSN, APRN, AGNP-C, OCN

## 2024-05-16 NOTE — PROGRESS NOTES
FOLLOW-UP APPOINTMENT    PATIENT:   Joslyn Dubon  :    1950  MR#:    [id number]  DATE OF VISIT:  2024      Chief Complaint: Chemo School    HPI:   Ms. Joslyn Dubon presents today for chemotherapy education.  She will be starting treatment with Cisplatin and Etopside for the above diagnosis.         2024     2:03 PM 2024     7:45 AM 2023     1:37 PM 10/25/2023    11:57 AM 2023     9:52 AM 2023     1:40 PM 2023     8:22 AM   Depression Patient Health Questionnaire   Over the last two weeks how often have you been bothered by little interest or pleasure in doing things Not at all Several days Not at all Several days Not at all Not at all Not at all   Over the last two weeks how often have you been bothered by feeling down, depressed or hopeless Not at all Several days Not at all Several days Not at all Not at all Not at all   PHQ-2 Total Score 0 2 0 2 0 0 0         Review of Systems   Constitutional:  Positive for fatigue and unexpected weight change.   HENT:   Negative for hearing loss and tinnitus.    Respiratory:  Positive for cough and shortness of breath. Negative for hemoptysis.    Cardiovascular:  Positive for leg swelling. Negative for chest pain.   Gastrointestinal:  Positive for constipation and nausea. Negative for abdominal distention and diarrhea.   Genitourinary:  Negative for dysuria.    Musculoskeletal:  Negative for arthralgias and myalgias.   Skin:  Positive for itching.   Neurological:  Positive for headaches.   Hematological:  Negative for adenopathy.   Psychiatric/Behavioral:  The patient is nervous/anxious.        Oncology History   Non-small cell carcinoma of left lung   2024 Initial Diagnosis    Non-small cell carcinoma of left lung     2024 Cancer Staged    Staging form: Lung, AJCC 8th Edition  - Clinical stage from 2024: Stage IB (cT2a, cN0, cM0)     2024 -  Chemotherapy    Treatment Summary   Plan Name: OP CISPLATIN 75MG/M2  ETOPOSIDE 100MG/M2 Q3W  Treatment Goal: Curative  Status: Active  Start Date: 5/22/2024 (Planned)  End Date: 9/6/2024 (Planned)  Provider: Macario Iyer MD  Chemotherapy: CISplatin (Platinol) in sodium chloride 0.9% 500 mL chemo infusion, 75 mg/m2, Intravenous, Clinic/HOD 1 time, 0 of 6 cycles  etoposide (VEPESID) in sodium chloride 0.9% 500 mL chemo infusion, 100 mg/m2, Intravenous, Clinic/HOD 1 time, 0 of 6 cycles         Patient Active Problem List   Diagnosis    COPD (chronic obstructive pulmonary disease)    Thyroid disease    Tobacco abuse    Primary hypertension    Gastroesophageal reflux disease without esophagitis    Anxiety    History of AAA repair    Lumbar disc disease with radiculopathy    Foot callus    Neuropathy    Essential hypertension    Nonspecific abnormal electrocardiogram (ECG) (EKG)    ACE-inhibitor cough    Cervical radiculopathy    Influenza A    Acquired hypothyroidism    Hypokalemia    Sullivan lesion of lung    Mass of upper lobe of left lung    Allergic reaction    Chronic hypoxic respiratory failure    Non-small cell carcinoma of left lung       Past Medical History:   Diagnosis Date    Anxiety     Martin esophagus     Colitis     COPD (chronic obstructive pulmonary disease)     GERD (gastroesophageal reflux disease)     Hypertension     Lung cancer     Thyroid disease     Vocal cord polyps        Past Surgical History:   Procedure Laterality Date    ABDOMINAL AORTIC ANEURYSM REPAIR      BACK SURGERY      cervical    CATARACT EXTRACTION Right 02/2021    CHOLECYSTECTOMY  12/20/2013    Dr [facility]    ENDOBRONCHIAL ULTRASOUND N/A 4/19/2024    Procedure: ENDOBRONCHIAL ULTRASOUND (EBUS);  Surgeon: Kizzy Siegel MD;  Location: [address];  Service: Pulmonary;  Laterality: N/A;    FOOT SURGERY      HYSTERECTOMY  1979    AUGUSTINE for AUB with consevation ovaries    ROBOTIC BRONCHOSCOPY N/A 4/19/2024    Procedure: ROBOTIC BRONCHOSCOPY;  Surgeon: Kizzy Siegel MD;  Location: [facility] OR  2ND FLR;  Service: Pulmonary;  Laterality: N/A;    SALIVARY GLAND SURGERY         Social History     Socioeconomic History    Marital status:    Tobacco Use    Smoking status: Former     Types: Cigarettes     Start date: 2024     Quit date: 1964     Years since quittin.4    Smokeless tobacco: Never    Tobacco comments:     Pt has smoked since 14 years old. Smokes around .5ppd. Inpatient smoking education done 10/20.     Pt quit smoking on 24.  She has never used smokeless tobacco   Substance and Sexual Activity    Alcohol use: Never    Drug use: Never    Sexual activity: Yes     Partners: Male     Social Determinants of Health     Financial Resource Strain: Low Risk  (3/15/2024)    Overall Financial Resource Strain (CARDIA)     Difficulty of Paying Living Expenses: Not hard at all   Food Insecurity: No Food Insecurity (3/15/2024)    Hunger Vital Sign     Worried About Running Out of Food in the Last Year: Never true     Ran Out of Food in the Last Year: Never true   Transportation Needs: No Transportation Needs (3/15/2024)    PRAPARE - Transportation     Lack of Transportation (Medical): No     Lack of Transportation (Non-Medical): No   Physical Activity: Inactive (3/15/2024)    Exercise Vital Sign     Days of Exercise per Week: 0 days     Minutes of Exercise per Session: 0 min   Stress: Stress Concern Present (3/15/2024)    [employer] [place] [employer] - Occupational Stress Questionnaire     Feeling of Stress : Very much   Housing Stability: Low Risk  (3/15/2024)    Housing Stability Vital Sign     Unable to Pay for Housing in the Last Year: No     Number of Places Lived in the Last Year: 1     Unstable Housing in the Last Year: No       Family History   Problem Relation Name Age of Onset    Bladder Cancer Mother      Heart failure Father      Emphysema Sister      Pancreatic cancer Sister      No Known Problems Brother           Current Outpatient Medications:     amLODIPine  (NORVASC) 5 MG tablet, Take 1 tablet (5 mg total) by mouth once daily. For blood pressure, Disp: 30 tablet, Rfl: 5    diphenhydrAMINE (BENADRYL) 25 mg capsule, Take 1 capsule (25 mg total) by mouth every 6 (six) hours as needed for Itching or Allergies., Disp: 20 capsule, Rfl: 0    EPINEPHrine (EPIPEN) 0.3 mg/0.3 mL AtIn, Inject 0.3 mLs (0.3 mg total) into the muscle as needed (Severe allergic reaction symptoms such as shortness of breath, tongue or throat swelling, weakness dizziness severe nausea vomiting)., Disp: 1 each, Rfl: 3    famotidine (PEPCID) 20 MG tablet, Take 1 tablet (20 mg total) by mouth 2 (two) times daily., Disp: 20 tablet, Rfl: 0    HYDROcodone-acetaminophen (NORCO)  mg per tablet, Take 1 tablet by mouth every 12 (twelve) hours as needed for Pain., Disp: 50 tablet, Rfl: 0    levalbuterol (XOPENEX HFA) 45 mcg/actuation inhaler, Inhale 2 puffs into the lungs every 6 (six) hours as needed for Wheezing. Rescue, Disp: 15 g, Rfl: 4    levothyroxine (SYNTHROID) 88 MCG tablet, Take 1 tablet (88 mcg total) by mouth before breakfast., Disp: 30 tablet, Rfl: 3    LIDOcaine-prilocaine (EMLA) cream, Apply topically as needed., Disp: 30 g, Rfl: 5    metoprolol tartrate (LOPRESSOR) 25 MG tablet, Take 1 tablet (25 mg total) by mouth 2 (two) times daily., Disp: 180 tablet, Rfl: 3    omeprazole (PRILOSEC) 40 MG capsule, Take 1 capsule (40 mg total) by mouth every morning., Disp: 90 capsule, Rfl: 3    ondansetron (ZOFRAN) 4 MG tablet, Take 1 tablet (4 mg total) by mouth every 8 (eight) hours as needed for Nausea., Disp: 30 tablet, Rfl: 3    ondansetron (ZOFRAN) 8 MG tablet, Take 1 tablet (8 mg total) by mouth every 8 (eight) hours as needed for Nausea., Disp: 30 tablet, Rfl: 5    polyethylene glycol (GLYCOLAX) 17 gram/dose powder, Take 17 g by mouth once daily., Disp: 510 g, Rfl: 5    promethazine (PHENERGAN) 25 MG tablet, Take 1 tablet (25 mg total) by mouth every 6 (six) hours as needed for Nausea., Disp: 30  tablet, Rfl: 5    REFRESH OPTIVE 0.5-0.9 % Drop, Place 1 drop into both eyes 4 (four) times daily., Disp: , Rfl:     simethicone (GAS-X ORAL), Take 1 tablet by mouth as needed. (Patient not taking: Reported on 5/1/2024), Disp: , Rfl:     umeclidinium-vilanteroL (ANORO ELLIPTA) 62.5-25 mcg/actuation DsDv, Inhale 1 puff into the lungs once daily. Controller, Disp: 1 each, Rfl: 11    Current Facility-Administered Medications:     levalbuterol nebulizer solution 1.25 mg, 1.25 mg, Nebulization, 1 time in Clinic/HOD, Aleisha Garcia, NP    Review of patient's allergies indicates:   Allergen Reactions    Bisacodyl Nausea And Vomiting     Other reaction(s): Vomiting    Iodinated contrast media Hives and Shortness Of Breath     hypotension    Morphine Other (See Comments)     hypotension    Azithromycin Hives    Cipro [ciprofloxacin hcl]     Demerol [meperidine]      Nausea vomiting, visual problem    Doxycycline Hives    Flonase [fluticasone propionate] Hives    Morpholine analogues Other (See Comments)     hypotension       Physcial Examination  VITAL SIGNS:    Body surface area is 1.87 meters squared.   Pain Assessment  Vitals:    05/16/24 1354   BP: (!) 155/77   Pulse: 66   Resp: 18   Temp: 97.8 °F (36.6 °C)   Weight: 80.3 kg (177 lb)   PainSc: 0-No pain     Quality:{PAIN CHARACTERISTICS:43580}  Onset: {desc; pain onset:46327}  Duration: {TIME; PAIN:40601}  What relieves the pain? {Pain (activities that relieve):69214}  What cause or increases the pain? {Causes; Pain:96286}  Plan: {PAIN MANAGEMENT PLAN:00189}     Wt Readings from Last 5 Encounters:   05/16/24 80.3 kg (177 lb)   05/09/24 80.4 kg (177 lb 4.8 oz)   05/01/24 79.5 kg (175 lb 3.2 oz)   04/26/24 79.9 kg (176 lb 2.4 oz)   03/14/24 75.8 kg (167 lb)       GENERAL:  Joslyn Dubon is healthy-appearing 73 y.o. female, in no distress.   EYES:   Pupils equal, round, reactive.  Conjunctivae, sclera and lids normal.  HEENT: Head normocephalic and atraumatic, without  alopecia.  Oropharynx is unremarkable.  No icterus, jaundice, stomatitis, mucositis, or ulceration is noted.  Ears are clear and unremarkable.  Nose, nares, and septum are unremarkable.    NECK:   No masses.  Thyroid and trachea are normal.    BREASTS:  Deferred.  RESPIRATORY: Clear to auscultation bilaterally.  Symmetrically effortless expansion.  No wheezing and no stridor.    CV: Heart reveals regular rate and rhythm without murmur, rub, or gallops.  ABDOMEN: Soft, non-tender.  No masses, no hernias, and no rebound or rigidity are noted.  /RECTAL:  Deferred.  LYMPHATICS: No preauricular, submandibular, cervical, supraclavicular, axillary, lymphadenopathy.  MUSCULOSKELETAL:Fair musculature, no atrophy.  No arthritic changes.  No edema or cyanosis. Back is without gross abnormal curvature.   NEUROLOGICAL: Cranial nerves II-XII grossly intact.  Motor and sensory exam intact.  SKIN:   No lesions, bruises, petechiae or rashes.  Good turgor.    PSYCHIATRIC: Patient is alert and oriented to time, place and person.  Mood and affect are appropriate.         Laboratory and Radiology   Lab Results   Component Value Date    WBC 4.87 05/16/2024    RBC 4.43 05/16/2024    HGB 13.8 05/16/2024    HCT 42.0 05/16/2024    MCV 95 05/16/2024    MCH 31.2 (H) 05/16/2024    MCHC 32.9 05/16/2024    RDW 13.2 05/16/2024     05/16/2024    MPV 8.6 (L) 05/16/2024    GRAN 2.7 05/16/2024    GRAN 55.5 05/16/2024    LYMPH 1.4 05/16/2024    LYMPH 28.5 05/16/2024    MONO 0.6 05/16/2024    MONO 11.3 05/16/2024    EOS 0.2 05/16/2024    BASO 0.02 05/16/2024    EOSINOPHIL 3.9 05/16/2024    BASOPHIL 0.4 05/16/2024     BMP  Lab Results   Component Value Date     01/19/2024    K 4.0 01/19/2024     01/19/2024    CO2 24 01/19/2024    BUN 14 01/19/2024    CREATININE 0.9 01/19/2024    CALCIUM 9.9 01/19/2024    ANIONGAP 11 01/19/2024    ESTGFRAFRICA >60.0 01/03/2022    EGFRNONAA >60.0 01/03/2022     Lab Results   Component Value Date    ALT  26 01/19/2024    AST 24 01/19/2024    ALKPHOS 114 01/19/2024    BILITOT 0.3 01/19/2024     Results for orders placed or performed during the hospital encounter of 04/12/24 (from the past 2160 hour(s))   CT Chest Without Contrast    Narrative    CMS MANDATED QUALITY DATA - CT RADIATION - 436    All CT scans at this facility utilize dose modulation, iterative reconstruction, and/or weight based dosing when appropriate to reduce radiation dose to as low as reasonably achievable.    EXAMINATION:  CT CHEST WITHOUT CONTRAST    CLINICAL HISTORY:  Abnormal xray - lung nodule, >= 1 cm;Chest Navigational Bronchoscopy;    Solitary pulmonary nodule    TECHNIQUE:  CT thorax without contrast    COMPARISON:  01/07/2024 CT chest and PET-CT dated 03/14/2024    FINDINGS:  CT THORAX:    There is an enlarging 3.1 x 2.3 cm pleural base mass in the anterolateral left upper lobe suspicious for malignancy.    There are no additional pulmonary nodules, infiltrates or pleural effusions.  The trachea and bronchi are normal.    Base of the neck is unremarkable.    The heart is normal in size.  The aorta is normal in caliber with mild vascular calcification    There is no hilar, mediastinal or axillary adenopathy.    The esophagus is normal    Chest wall is unremarkable    Visualized portion of the upper abdomen demonstrates a simple hepatic cyst.  The gallbladder is absent.  The adrenal glands are normal.    There are no acute osseous abnormalities.      Impression    Enlarging 3.1 x 2.3 cm pleural base mass in the anterolateral left upper lobe suspicious for pulmonary malignancy    No evidence of new metastatic disease    Prior cholecystectomy      Electronically signed by: Yokasta Cowan  Date:    04/12/2024  Time:    15:15     No results found for this or any previous visit (from the past 2160 hour(s)).  No results found for this or any previous visit (from the past 2160 hour(s)).    Pathology  Pathology Results  (Last 10 years)      None             TITLE: PLAN OF CARE FOR THE CHEMOTHERAPY PATIENT / TEACHING PROTOCOL    PURPOSE: To involve the patient / significant other in the plan of care and to provide teaching to the significant other & patient receiving chemotherapy.    LEVEL: Independent.    CONTENT: The Plan of Care for the chemotherapy patient is individualized and appropriate to the patients needs, strengths, limitations, & goals.  Education includes information regarding chemotherapy side effects, the treatment itself, and self-care  Activities.    GOAL / OUTCOME STANDARDS    PHYSIOLOGIC: The client will remain free or experience minimal side effects or toxicities throughout the chemotherapy treatment period.     PSYCHOLOGIC: The client/significant others will demonstrate positive coping mechanisms in relation to chemotherapy and its side effects.      COGINITIVE: The client/significant others will verbalize understanding of self-care measure to avoid/minimize side effects of the chemotherapy regimen.    EVALUATION / COMMENT KEY:    V = Audiovisual/Video  S = Successfully meets outcome  N = Needs further instruction  NA = Not applicable to the patient  P = Previous knowledge  U = Unable to comprehend  * = See progress notes          PLAN OF CARE  INFORMATION TO BE DELIVERED / NURSING INTERVENTIONS DATE EVALUATION   Assessment of client/caregiver,         knowledge of cancer diagnosis,         and chemotherapy as a treatment. 1a. Evaluate patient/caregiver learning ability    b. Plan teaching sessions with patient/caregiver according to needs and present anxiety level/ability to learn.    c. Provide Chemotherapy Education Packet,        Mouth Care Protocol,         Specific Patient Education Sheets. 05/16/2024 S   Individual chemotherapy treatment         plan. 2a. Review of Chemotherapy Education handout from [employer]            05/16/2024   S   Knowledge Deficit & Self-Management of general side effects common to all  chemotherapy:  Nausea/Vomiting  b.   Diarrhea  Mouth Care  Dental care  Constipation  Hair Loss  Potential for infection  Fatigue   3a. Reinforce that the majority of side effects from chemotherapy are reversible and are  controlled both in the hospital and at home        (blood counts recover, hair grows back).   b.  Refer to the following for reinforcement of         information post-treatment:  Mouth Care Protocol.  Bowel Protocol for constipation or diarrhea.  3.  Drug Specific Chemotherapy Information Sheets for each medication patient receiving.    05/16/2024     S     PLAN OF CARE  INFORMATION TO BE DELIVERED / NURSING INTERVENTIONS DATE EVALUATION   h. Potential for bleeding         i. Potential anemia/fatigue         j. Potential sunburn         k. Birth control measures  l. Safety measures post treatment 4.  Chemotherapy Home Care Instruction  and Safety Information Sheet.  A. patient/caregivers to thoroughly cook shellfish (shrimp, crab, etc) to decrease the chance of infection.    B.  Use sunscreen and protective clothing while in the sun.   05/16/2024      Knowledge deficit & Self Management of Drug Specific  Side Effects.    BLADDER EFFECTS        (Hemorrhagic Cystitis)                  Preventable with adequate hydration; occurs 2-3 days or more post treatment.   1.  Instruct patient to:  a.   Void at least every 2 hours; increase intake.  b.   DO NOT hold urine; go when urge is felt.  c.    Empty bladder at bedtime and on         awakening.  d.   Observe for color changes (red to tea           colored), amount and frequency changes.  e.   Notify oncologist of any abnormalities           in urine or voiding or if you cannot               drink adequate fluids.   05/16/2024   S   b.   CHANGES IN URINE        COLOR:      1.   Instruct patient:  a.   Most evident in first 2-3 voidings after           administration.  Lasts less than 24 hours.  If urine is discolored 2 or more days post- treatment, notify  oncologist.      05/16/2024 S   c.    KIDNEY EFFECTS           (Nephrotoxicity)   1.  Instruct patient to:  a.   Drink 8-16 glasses of fluid/day the day   pre-treatment and 3-4 days post-treatment to maintain hydration; the best way to minimize kidney problems.  b.   Notify oncologist immediately if unable to drink fluids or if changes are noted in urinary elimination.     05/16/2024   S   PULMONARY TOXICITY    Instruct patient to report symptoms such as shortness of breath, chest pain, shallow breathing, or chest wall discomfort to physician.  Reinforce preventative measures used by the health care team.  Baseline and periodic PFT and chest x-ray.   05/16/2024   S     PLAN OF CARE INFORMATION TO BE DELIVERED / NURSING INTERVENTIONS DATE EVALUATION   NERVE & MUSCLE EFFECTS (neurotoxocity; neuropathy, possible visual/hearing changes)        Instruct patient to:    Report numbness or tingling of the hands/feet, loss of fine motor movement (buttoning shirt, tying shoelaces), or gait changes to your oncologist.  If numbness/tingling are present:  protect feet with shoes at all times.  Use gloves for washing dishes/gardening & potholders in kitchen.       05/16/2024   S   CARDIOTOXICITY  Decreased effectiveness of             cardiac function. Effective are                  cumulative and irreversible.                                    CARDIAC ARRYTHMIAS              4   Instruct:  Heart function may be tested before treatment and perdiocally during treatment.  Notify oncologist of irregular pulse, palpitations, shortness of breath, or swelling in lower extremities/feet.          Can cause arrhythmias on infusion that resolve once infusion discontinued. Instruct nurse if any irregularity felt.    05/16/2024   S   EXTRAVASTION  Occurs when vesicants leak outside of vein and cause damage to the skin and underlying tissues.   Reinforce preventive measures used to avoid complications.  Fresh IV site or central line  monitored continuously with vesicant IVP.  Continuous infusion via central line site and blood return monitored periodically around the clock.  Instruct to:  Notify nurse of any discomfort, burning, stinging, etc. at IV site during chemotherapy administration.  Notify oncologist of any redness, pain, or swelling at IV site after discharge from hospital.   05/16/2024   S   HYPERSENSITIVITY can happen with any medication.   Instruct patient:  Nurse is with them during the initial part of treatment and will be close by to monitor.  Pre-medication ordered by the oncologist must be taken on time. If doses are missed, treatment will need to be re-scheduled.  Skin redness, itching, or hives appearing after discharge should be reported to oncologist. 05/16/2024   S       PLAN OF CARE INFORMATION TO BE DELIVERED / NURSING INTERVENTIONS DATE EVALUATION   FLU-LIKE SYNDROME      Instruct patient symptoms are hard to prevent and may include fever, shaking chills, muscle and body aches.  Taking prescribed medications from physician if needed.  Adequate fluids are important.    Reinforce the need to call if temperature is         elevated to 100.4 or more  05/16/2024   S   HAND-FOOT SYNDROME  causes painful, symmetric swelling and redness of palms and soles                  Instruct patient to report any numbness or tingling in the hands or feet.  Explain prevention techniques, such as     Use heavy moisturizers to lessen skin dryness and itching, but to avoid if skin is cracked or broken  Bathe in tepid water, use non-perfumed soap, and wash gently. Baths with oatmeal or diluted baking soda may be soothing.  Avoid tight fitting shoes and repetitive actions, such as rubbing hands or applying pressure to hands/feet.  Review measures to take should syndrome occur:  Cold compresses and elevation for          edema  Pain medications and other measures as ordered by oncologist.   4.   Syndrome resolves few weeks after therapy.  05/16/2024   S   5. DISCHARGE PLANNING /        EDUCATION 1.    Explain importance of compliance with follow- up  tests (CBC, CMP).  2.    Verify patient/caregiver know:  a.    Oncologists office phone number.  b.    Dates of follow-up appointments.  c.    Prescriptions given for nausea  3.   Review side effects to monitor and notify          oncologist about.  4.   Reinforce the need for patient and caregivers to:  a.    Review information given.  b.    Call oncologists office with questions          or symptoms  5.   Provide [facility] [place] Brochure make referrals if needed for financial or .   05/16/2024   S     PROGRESS NOTES: I met with the patient her  and daughter today for chemotherapy education. she will be starting treatment with Cisplatin and Etoposide . We discussed the mechanism of action, potential side effects of this treatment as well as ways she can manage them at home. Some of these side effects include but or not limited to fever, nausea, vomiting, decreased appetite, fatigue, weakness, cytopenias, myalgia/arthralgia, constipation, diarrhea, bleeding, headache, shortness of breath, nail changes, taste change, hair thinning/loss, mood disturbances, or edema. We also discussed dietary modifications she should make although this will be discussed in more detail with the dietician. she was provided with anti-emetic medication, a copy of all of the information we discussed today as well as our contact information. she will be provided a schedule on his first day of treatment. We will obtain labs on a weekly basis and the patient will follow-up with the physician for toxicity monitoring throughout treatment. All questions were answered and an informed consent was obtained. she was reminded to certainly contact us sooner if needed.  Attached to the patients folder and discussed with the patient the 24 hour/ 7 days a week after hours telephone number for the physician.   Patient notified to call anytime 24/7 because their is a physician on call for any problems that may arise.  Patient also notified to report to [facility] if they can not get in touch with a physician after hours.  Discussed the five wishes booklet with the patient and their family.           Assessment/Plan     ICD-10-CM ICD-9-CM   1. Non-small cell carcinoma of left lung  C34.92 162.9          Medications Ordered:  Zofran 8mg 1 tab PO Q8h prn nausea  Phenergan 25mg PO Q4-6h prn nausea  Emla cream: Apply to port site 30min prior to treatment and cover with saran wrap      Standing Labs Ordered:  CBC weekly  CMP weekly  Mag weekly    Follow up in about 2 weeks (around 5/30/2024) for with Dr. Iyer and 5 with [email or web address]       Electronically signed by: Britany Byrd, MSN, APRN, AGNP-C, OCN

## 2024-05-16 NOTE — PROGRESS NOTES
FOLLOW-UP APPOINTMENT    PATIENT:   Joslyn Dubon  :    1950  MR#:    [id number]  DATE OF VISIT:  2024      Chief Complaint: Chemo School    HPI:   Ms. Joslyn Dubon presents today for chemotherapy education.  She will be starting treatment with Cisplatin and Etopside for the above diagnosis.         2024     2:03 PM 2024     7:45 AM 2023     1:37 PM 10/25/2023    11:57 AM 2023     9:52 AM 2023     1:40 PM 2023     8:22 AM   Depression Patient Health Questionnaire   Over the last two weeks how often have you been bothered by little interest or pleasure in doing things Not at all Several days Not at all Several days Not at all Not at all Not at all   Over the last two weeks how often have you been bothered by feeling down, depressed or hopeless Not at all Several days Not at all Several days Not at all Not at all Not at all   PHQ-2 Total Score 0 2 0 2 0 0 0         Review of Systems   Constitutional:  Positive for fatigue and unexpected weight change.   HENT:   Negative for hearing loss and tinnitus.    Respiratory:  Positive for cough and shortness of breath. Negative for hemoptysis.    Cardiovascular:  Positive for leg swelling. Negative for chest pain.   Gastrointestinal:  Positive for constipation and nausea. Negative for abdominal distention and diarrhea.   Genitourinary:  Negative for dysuria.    Musculoskeletal:  Negative for arthralgias and myalgias.   Skin:  Positive for itching.   Neurological:  Positive for headaches.   Hematological:  Negative for adenopathy.   Psychiatric/Behavioral:  The patient is nervous/anxious.        Oncology History   Non-small cell carcinoma of left lung   2024 Initial Diagnosis    Non-small cell carcinoma of left lung     2024 Cancer Staged    Staging form: Lung, AJCC 8th Edition  - Clinical stage from 2024: Stage IB (cT2a, cN0, cM0)     2024 -  Chemotherapy    Treatment Summary   Plan Name: OP CISPLATIN 75MG/M2  ETOPOSIDE 100MG/M2 Q3W  Treatment Goal: Curative  Status: Active  Start Date: 5/22/2024 (Planned)  End Date: 9/6/2024 (Planned)  Provider: Macario Iyer MD  Chemotherapy: CISplatin (Platinol) in sodium chloride 0.9% 500 mL chemo infusion, 75 mg/m2, Intravenous, Clinic/HOD 1 time, 0 of 6 cycles  etoposide (VEPESID) in sodium chloride 0.9% 500 mL chemo infusion, 100 mg/m2, Intravenous, Clinic/HOD 1 time, 0 of 6 cycles         Patient Active Problem List   Diagnosis    COPD (chronic obstructive pulmonary disease)    Thyroid disease    Tobacco abuse    Primary hypertension    Gastroesophageal reflux disease without esophagitis    Anxiety    History of AAA repair    Lumbar disc disease with radiculopathy    Foot callus    Neuropathy    Essential hypertension    Nonspecific abnormal electrocardiogram (ECG) (EKG)    ACE-inhibitor cough    Cervical radiculopathy    Influenza A    Acquired hypothyroidism    Hypokalemia    [place] lesion of lung    Mass of upper lobe of left lung    Allergic reaction    Chronic hypoxic respiratory failure    Non-small cell carcinoma of left lung       Past Medical History:   Diagnosis Date    Anxiety     Martin esophagus     Colitis     COPD (chronic obstructive pulmonary disease)     GERD (gastroesophageal reflux disease)     Hypertension     Lung cancer     Thyroid disease     Vocal cord polyps        Past Surgical History:   Procedure Laterality Date    ABDOMINAL AORTIC ANEURYSM REPAIR      BACK SURGERY      cervical    CATARACT EXTRACTION Right 02/2021    CHOLECYSTECT[facility]  12/20/2013    [facility]    ENDOBRONCHIAL ULTRASOUND N/A 4/19/2024    Procedure: ENDOBRONCHIAL ULTRASOUND (EBUS);  Surgeon: Kizzy Siegel MD;  Location: [address];  Service: Pulmonary;  Laterality: N/A;    FOOT SURGERY      HYSTERECTOMY  1979    AUGUSTNIE for AUB with consevation ovaries    ROBOTIC BRONCHOSCOPY N/A 4/19/2024    Procedure: ROBOTIC BRONCHOSCOPY;  Surgeon: Kizzy Siegel MD;  Location: [facility] OR  2ND FLR;  Service: Pulmonary;  Laterality: N/A;    SALIVARY GLAND SURGERY         Social History     Socioeconomic History    Marital status:    Tobacco Use    Smoking status: Former     Types: Cigarettes     Start date: 2024     Quit date: 1964     Years since quittin.4    Smokeless tobacco: Never    Tobacco comments:     Pt has smoked since 14 years old. Smokes around .5ppd. Inpatient smoking education done 10/20.     Pt quit smoking on 24.  She has never used smokeless tobacco   Substance and Sexual Activity    Alcohol use: Never    Drug use: Never    Sexual activity: Yes     Partners: Male     Social Determinants of Health     Financial Resource Strain: Low Risk  (3/15/2024)    Overall Financial Resource Strain (CARDIA)     Difficulty of Paying Living Expenses: Not hard at all   Food Insecurity: No Food Insecurity (3/15/2024)    Hunger Vital Sign     Worried About Running Out of Food in the Last Year: Never true     Ran Out of Food in the Last Year: Never true   Transportation Needs: No Transportation Needs (3/15/2024)    PRAPARE - Transportation     Lack of Transportation (Medical): No     Lack of Transportation (Non-Medical): No   Physical Activity: Inactive (3/15/2024)    Exercise Vital Sign     Days of Exercise per Week: 0 days     Minutes of Exercise per Session: 0 min   Stress: Stress Concern Present (3/15/2024)    [employer] - Occupational Stress Questionnaire     Feeling of Stress : Very much   Housing Stability: Low Risk  (3/15/2024)    Housing Stability Vital Sign     Unable to Pay for Housing in the Last Year: No     Number of Places Lived in the Last Year: 1     Unstable Housing in the Last Year: No       Family History   Problem Relation Name Age of Onset    Bladder Cancer Mother      Heart failure Father      Emphysema Sister      Pancreatic cancer Sister      No Known Problems Brother           Current Outpatient Medications:     amLODIPine  (NORVASC) 5 MG tablet, Take 1 tablet (5 mg total) by mouth once daily. For blood pressure, Disp: 30 tablet, Rfl: 5    diphenhydrAMINE (BENADRYL) 25 mg capsule, Take 1 capsule (25 mg total) by mouth every 6 (six) hours as needed for Itching or Allergies., Disp: 20 capsule, Rfl: 0    EPINEPHrine (EPIPEN) 0.3 mg/0.3 mL AtIn, Inject 0.3 mLs (0.3 mg total) into the muscle as needed (Severe allergic reaction symptoms such as shortness of breath, tongue or throat swelling, weakness dizziness severe nausea vomiting)., Disp: 1 each, Rfl: 3    famotidine (PEPCID) 20 MG tablet, Take 1 tablet (20 mg total) by mouth 2 (two) times daily., Disp: 20 tablet, Rfl: 0    HYDROcodone-acetaminophen (NORCO)  mg per tablet, Take 1 tablet by mouth every 12 (twelve) hours as needed for Pain., Disp: 50 tablet, Rfl: 0    levalbuterol (XOPENEX HFA) 45 mcg/actuation inhaler, Inhale 2 puffs into the lungs every 6 (six) hours as needed for Wheezing. Rescue, Disp: 15 g, Rfl: 4    levothyroxine (SYNTHROID) 88 MCG tablet, Take 1 tablet (88 mcg total) by mouth before breakfast., Disp: 30 tablet, Rfl: 3    LIDOcaine-prilocaine (EMLA) cream, Apply topically as needed., Disp: 30 g, Rfl: 5    metoprolol tartrate (LOPRESSOR) 25 MG tablet, Take 1 tablet (25 mg total) by mouth 2 (two) times daily., Disp: 180 tablet, Rfl: 3    omeprazole (PRILOSEC) 40 MG capsule, Take 1 capsule (40 mg total) by mouth every morning., Disp: 90 capsule, Rfl: 3    ondansetron (ZOFRAN) 4 MG tablet, Take 1 tablet (4 mg total) by mouth every 8 (eight) hours as needed for Nausea., Disp: 30 tablet, Rfl: 3    ondansetron (ZOFRAN) 8 MG tablet, Take 1 tablet (8 mg total) by mouth every 8 (eight) hours as needed for Nausea., Disp: 30 tablet, Rfl: 5    polyethylene glycol (GLYCOLAX) 17 gram/dose powder, Take 17 g by mouth once daily., Disp: 510 g, Rfl: 5    promethazine (PHENERGAN) 25 MG tablet, Take 1 tablet (25 mg total) by mouth every 6 (six) hours as needed for Nausea., Disp: 30  tablet, Rfl: 5    REFRESH OPTIVE 0.5-0.9 % Drop, Place 1 drop into both eyes 4 (four) times daily., Disp: , Rfl:     simethicone (GAS-X ORAL), Take 1 tablet by mouth as needed. (Patient not taking: Reported on 5/1/2024), Disp: , Rfl:     umeclidinium-vilanteroL (ANORO ELLIPTA) 62.5-25 mcg/actuation DsDv, Inhale 1 puff into the lungs once daily. Controller, Disp: 1 each, Rfl: 11    Current Facility-Administered Medications:     levalbuterol nebulizer solution 1.25 mg, 1.25 mg, Nebulization, 1 time in Clinic/HOD, Aleisha Garcia, NP    Review of patient's allergies indicates:   Allergen Reactions    Bisacodyl Nausea And Vomiting     Other reaction(s): Vomiting    Iodinated contrast media Hives and Shortness Of Breath     hypotension    Morphine Other (See Comments)     hypotension    Azithromycin Hives    Cipro [ciprofloxacin hcl]     Demerol [meperidine]      Nausea vomiting, visual problem    Doxycycline Hives    Flonase [fluticasone propionate] Hives    Morpholine analogues Other (See Comments)     hypotension       Physcial Examination  VITAL SIGNS:    Body surface area is 1.87 meters squared.   Pain Assessment  Vitals:    05/16/24 1354   BP: (!) 155/77   Pulse: 66   Resp: 18   Temp: 97.8 °F (36.6 °C)   Weight: 80.3 kg (177 lb)   PainSc: 0-No pain     Quality:{PAIN CHARACTERISTICS:06766}  Onset: {desc; pain onset:28783}  Duration: {TIME; PAIN:33239}  What relieves the pain? {Pain (activities that relieve):79147}  What cause or increases the pain? {Causes; Pain:86692}  Plan: {PAIN MANAGEMENT PLAN:06228}     Wt Readings from Last 5 Encounters:   05/16/24 80.3 kg (177 lb)   05/09/24 80.4 kg (177 lb 4.8 oz)   05/01/24 79.5 kg (175 lb 3.2 oz)   04/26/24 79.9 kg (176 lb 2.4 oz)   03/14/24 75.8 kg (167 lb)       GENERAL:  Joslyn Dubon is healthy-appearing 73 y.o. female, in no distress.   EYES:   Pupils equal, round, reactive.  Conjunctivae, sclera and lids normal.  HEENT: Head normocephalic and atraumatic, without  alopecia.  Oropharynx is unremarkable.  No icterus, jaundice, stomatitis, mucositis, or ulceration is noted.  Ears are clear and unremarkable.  Nose, nares, and septum are unremarkable.    NECK:   No masses.  Thyroid and trachea are normal.    BREASTS:  Deferred.  RESPIRATORY: Clear to auscultation bilaterally.  Symmetrically effortless expansion.  No wheezing and no stridor.    CV: Heart reveals regular rate and rhythm without murmur, rub, or gallops.  ABDOMEN: Soft, non-tender.  No masses, no hernias, and no rebound or rigidity are noted.  /RECTAL:  Deferred.  LYMPHATICS: No preauricular, submandibular, cervical, supraclavicular, axillary, lymphadenopathy.  MUSCULOSKELETAL:Fair musculature, no atrophy.  No arthritic changes.  No edema or cyanosis. Back is without gross abnormal curvature.   NEUROLOGICAL: Cranial nerves II-XII grossly intact.  Motor and sensory exam intact.  SKIN:   No lesions, bruises, petechiae or rashes.  Good turgor.    PSYCHIATRIC: Patient is alert and oriented to time, place and person.  Mood and affect are appropriate.         Laboratory and Radiology   Lab Results   Component Value Date    WBC 4.87 05/16/2024    RBC 4.43 05/16/2024    HGB 13.8 05/16/2024    HCT 42.0 05/16/2024    MCV 95 05/16/2024    MCH 31.2 (H) 05/16/2024    MCHC 32.9 05/16/2024    RDW 13.2 05/16/2024     05/16/2024    MPV 8.6 (L) 05/16/2024    GRAN 2.7 05/16/2024    GRAN 55.5 05/16/2024    LYMPH 1.4 05/16/2024    LYMPH 28.5 05/16/2024    MONO 0.6 05/16/2024    MONO 11.3 05/16/2024    EOS 0.2 05/16/2024    BASO 0.02 05/16/2024    EOSINOPHIL 3.9 05/16/2024    BASOPHIL 0.4 05/16/2024     BMP  Lab Results   Component Value Date     01/19/2024    K 4.0 01/19/2024     01/19/2024    CO2 24 01/19/2024    BUN 14 01/19/2024    CREATININE 0.9 01/19/2024    CALCIUM 9.9 01/19/2024    ANIONGAP 11 01/19/2024    ESTGFRAFRICA >60.0 01/03/2022    EGFRNONAA >60.0 01/03/2022     Lab Results   Component Value Date    ALT  26 01/19/2024    AST 24 01/19/2024    ALKPHOS 114 01/19/2024    BILITOT 0.3 01/19/2024     Results for orders placed or performed during the hospital encounter of 04/12/24 (from the past 2160 hour(s))   CT Chest Without Contrast    Narrative    CMS MANDATED QUALITY DATA - CT RADIATION - 436    All CT scans at this facility utilize dose modulation, iterative reconstruction, and/or weight based dosing when appropriate to reduce radiation dose to as low as reasonably achievable.    EXAMINATION:  CT CHEST WITHOUT CONTRAST    CLINICAL HISTORY:  Abnormal xray - lung nodule, >= 1 cm;Chest Navigational Bronchoscopy;    Solitary pulmonary nodule    TECHNIQUE:  CT thorax without contrast    COMPARISON:  01/07/2024 CT chest and PET-CT dated 03/14/2024    FINDINGS:  CT THORAX:    There is an enlarging 3.1 x 2.3 cm pleural base mass in the anterolateral left upper lobe suspicious for malignancy.    There are no additional pulmonary nodules, infiltrates or pleural effusions.  The trachea and bronchi are normal.    Base of the neck is unremarkable.    The heart is normal in size.  The aorta is normal in caliber with mild vascular calcification    There is no hilar, mediastinal or axillary adenopathy.    The esophagus is normal    Chest wall is unremarkable    Visualized portion of the upper abdomen demonstrates a simple hepatic cyst.  The gallbladder is absent.  The adrenal glands are normal.    There are no acute osseous abnormalities.      Impression    Enlarging 3.1 x 2.3 cm pleural base mass in the anterolateral left upper lobe suspicious for pulmonary malignancy    No evidence of new metastatic disease    Prior cholecystectomy      Electronically signed by: Yokasta Cowan  Date:    04/12/2024  Time:    15:15     No results found for this or any previous visit (from the past 2160 hour(s)).  No results found for this or any previous visit (from the past 2160 hour(s)).    Pathology  Pathology Results  (Last 10 years)      None             TITLE: PLAN OF CARE FOR THE CHEMOTHERAPY PATIENT / TEACHING PROTOCOL    PURPOSE: To involve the patient / significant other in the plan of care and to provide teaching to the significant other & patient receiving chemotherapy.    LEVEL: Independent.    CONTENT: The Plan of Care for the chemotherapy patient is individualized and appropriate to the patients needs, strengths, limitations, & goals.  Education includes information regarding chemotherapy side effects, the treatment itself, and self-care  Activities.    GOAL / OUTCOME STANDARDS    PHYSIOLOGIC: The client will remain free or experience minimal side effects or toxicities throughout the chemotherapy treatment period.     PSYCHOLOGIC: The client/significant others will demonstrate positive coping mechanisms in relation to chemotherapy and its side effects.      COGINITIVE: The client/significant others will verbalize understanding of self-care measure to avoid/minimize side effects of the chemotherapy regimen.    EVALUATION / COMMENT KEY:    V = Audiovisual/Video  S = Successfully meets outcome  N = Needs further instruction  NA = Not applicable to the patient  P = Previous knowledge  U = Unable to comprehend  * = See progress notes          PLAN OF CARE  INFORMATION TO BE DELIVERED / NURSING INTERVENTIONS DATE EVALUATION   Assessment of client/caregiver,         knowledge of cancer diagnosis,         and chemotherapy as a treatment. 1a. Evaluate patient/caregiver learning ability    b. Plan teaching sessions with patient/caregiver according to needs and present anxiety level/ability to learn.    c. Provide Chemotherapy Education Packet,        Mouth Care Protocol,         Specific Patient Education Sheets. 05/16/2024 S   Individual chemotherapy treatment         plan. 2a. Review of Chemotherapy Education handout from [place]            05/16/2024   S   Knowledge Deficit & Self-Management of general side effects common to all  chemotherapy:  Nausea/Vomiting  b.   Diarrhea  Mouth Care  Dental care  Constipation  Hair Loss  Potential for infection  Fatigue   3a. Reinforce that the majority of side effects from chemotherapy are reversible and are  controlled both in the hospital and at home        (blood counts recover, hair grows back).   b.  Refer to the following for reinforcement of         information post-treatment:  Mouth Care Protocol.  Bowel Protocol for constipation or diarrhea.  3.  Drug Specific Chemotherapy Information Sheets for each medication patient receiving.    05/16/2024     S     PLAN OF CARE  INFORMATION TO BE DELIVERED / NURSING INTERVENTIONS DATE EVALUATION   h. Potential for bleeding         i. Potential anemia/fatigue         j. Potential sunburn         k. Birth control measures  l. Safety measures post treatment 4.  Chemotherapy Home Care Instruction  and Safety Information Sheet.  A. patient/caregivers to thoroughly cook shellfish (shrimp, crab, etc) to decrease the chance of infection.    B.  Use sunscreen and protective clothing while in the sun.   05/16/2024      Knowledge deficit & Self Management of Drug Specific  Side Effects.    BLADDER EFFECTS        (Hemorrhagic Cystitis)                  Preventable with adequate hydration; occurs 2-3 days or more post treatment.   1.  Instruct patient to:  a.   Void at least every 2 hours; increase intake.  b.   DO NOT hold urine; go when urge is felt.  c.    Empty bladder at bedtime and on         awakening.  d.   Observe for color changes (red to tea           colored), amount and frequency changes.  e.   Notify oncologist of any abnormalities           in urine or voiding or if you cannot               drink adequate fluids.   05/16/2024   S   b.   CHANGES IN URINE        COLOR:      1.   Instruct patient:  a.   Most evident in first 2-3 voidings after           administration.  Lasts less than 24 hours.  If urine is discolored 2 or more days post- treatment, notify  oncologist.      05/16/2024 S   c.    KIDNEY EFFECTS           (Nephrotoxicity)   1.  Instruct patient to:  a.   Drink 8-16 glasses of fluid/day the day   pre-treatment and 3-4 days post-treatment to maintain hydration; the best way to minimize kidney problems.  b.   Notify oncologist immediately if unable to drink fluids or if changes are noted in urinary elimination.     05/16/2024   S   PULMONARY TOXICITY    Instruct patient to report symptoms such as shortness of breath, chest pain, shallow breathing, or chest wall discomfort to physician.  Reinforce preventative measures used by the health care team.  Baseline and periodic PFT and chest x-ray.   05/16/2024   S     PLAN OF CARE INFORMATION TO BE DELIVERED / NURSING INTERVENTIONS DATE EVALUATION   NERVE & MUSCLE EFFECTS (neurotoxocity; neuropathy, possible visual/hearing changes)        Instruct patient to:    Report numbness or tingling of the hands/feet, loss of fine motor movement (buttoning shirt, tying shoelaces), or gait changes to your oncologist.  If numbness/tingling are present:  protect feet with shoes at all times.  Use gloves for washing dishes/gardening & potholders in kitchen.       05/16/2024   S   CARDIOTOXICITY  Decreased effectiveness of             cardiac function. Effective are                  cumulative and irreversible.                                    CARDIAC ARRYTHMIAS              4   Instruct:  Heart function may be tested before treatment and perdiocally during treatment.  Notify oncologist of irregular pulse, palpitations, shortness of breath, or swelling in lower extremities/feet.          Can cause arrhythmias on infusion that resolve once infusion discontinued. Instruct nurse if any irregularity felt.    05/16/2024   S   EXTRAVASTION  Occurs when vesicants leak outside of vein and cause damage to the skin and underlying tissues.   Reinforce preventive measures used to avoid complications.  Fresh IV site or central line  monitored continuously with vesicant IVP.  Continuous infusion via central line site and blood return monitored periodically around the clock.  Instruct to:  Notify nurse of any discomfort, burning, stinging, etc. at IV site during chemotherapy administration.  Notify oncologist of any redness, pain, or swelling at IV site after discharge from hospital.   05/16/2024   S   HYPERSENSITIVITY can happen with any medication.   Instruct patient:  Nurse is with them during the initial part of treatment and will be close by to monitor.  Pre-medication ordered by the oncologist must be taken on time. If doses are missed, treatment will need to be re-scheduled.  Skin redness, itching, or hives appearing after discharge should be reported to oncologist. 05/16/2024   S       PLAN OF CARE INFORMATION TO BE DELIVERED / NURSING INTERVENTIONS DATE EVALUATION   FLU-LIKE SYNDROME      Instruct patient symptoms are hard to prevent and may include fever, shaking chills, muscle and body aches.  Taking prescribed medications from physician if needed.  Adequate fluids are important.    Reinforce the need to call if temperature is         elevated to 100.4 or more  05/16/2024   S   HAND-FOOT SYNDROME  causes painful, symmetric swelling and redness of palms and soles                  Instruct patient to report any numbness or tingling in the hands or feet.  Explain prevention techniques, such as     Use heavy moisturizers to lessen skin dryness and itching, but to avoid if skin is cracked or broken  Bathe in tepid water, use non-perfumed soap, and wash gently. Baths with oatmeal or diluted baking soda may be soothing.  Avoid tight fitting shoes and repetitive actions, such as rubbing hands or applying pressure to hands/feet.  Review measures to take should syndrome occur:  Cold compresses and elevation for          edema  Pain medications and other measures as ordered by oncologist.   4.   Syndrome resolves few weeks after therapy.  05/16/2024   S   5. DISCHARGE PLANNING /        EDUCATION 1.    Explain importance of compliance with follow- up  tests (CBC, CMP).  2.    Verify patient/caregiver know:  a.    Oncologists office phone number.  b.    Dates of follow-up appointments.  c.    Prescriptions given for nausea  3.   Review side effects to monitor and notify          oncologist about.  4.   Reinforce the need for patient and caregivers to:  a.    Review information given.  b.    Call oncologists office with questions          or symptoms  5.   Provide Cancer [facility] [employer] make referrals if needed for financial or .   05/16/2024   S     PROGRESS NOTES: I met with the patient her  and daughter today for chemotherapy education. she will be starting treatment with Cisplatin and Etoposide . We discussed the mechanism of action, potential side effects of this treatment as well as ways she can manage them at home. Some of these side effects include but or not limited to fever, nausea, vomiting, decreased appetite, fatigue, weakness, cytopenias, myalgia/arthralgia, constipation, diarrhea, bleeding, headache, shortness of breath, nail changes, taste change, hair thinning/loss, mood disturbances, or edema. We also discussed dietary modifications she should make although this will be discussed in more detail with the dietician. she was provided with anti-emetic medication, a copy of all of the information we discussed today as well as our contact information. she will be provided a schedule on his first day of treatment. We will obtain labs on a weekly basis and the patient will follow-up with the physician for toxicity monitoring throughout treatment. All questions were answered and an informed consent was obtained. she was reminded to certainly contact us sooner if needed.  Attached to the patients folder and discussed with the patient the 24 hour/ 7 days a week after hours telephone number for the physician.   Patient notified to call anytime 24/7 because their is a physician on call for any problems that may arise.  Patient also notified to report to [facility] if they can not get in touch with a physician after hours.  Discussed the five wishes booklet with the patient and their family.           Assessment/Plan     ICD-10-CM ICD-9-CM   1. Non-small cell carcinoma of left lung  C34.92 162.9          Medications Ordered:  Zofran 8mg 1 tab PO Q8h prn nausea  Phenergan 25mg PO Q4-6h prn nausea  Emla cream: Apply to port site 30min prior to treatment and cover with saran wrap      Standing Labs Ordered:  CBC weekly  CMP weekly  Mag weekly    Follow up in about 2 weeks (around 5/30/2024) for with Dr. [id number] [phone] [id number] [email or web address]       Electronically signed by: Britany Byrd, MSN, APRN, AGNP-C, OCN

## 2024-05-16 NOTE — PROGRESS NOTES
FOLLOW-UP APPOINTMENT    PATIENT:   Joslyn Dubon  :    1950  MR#:    [id number]  DATE OF VISIT:  2024      Chief Complaint: Chemo School    HPI:   Ms. Joslyn Dubon presents today for chemotherapy education.  She will be starting treatment with Cisplatin and Etopside for the above diagnosis.         2024     2:03 PM 2024     7:45 AM 2023     1:37 PM 10/25/2023    11:57 AM 2023     9:52 AM 2023     1:40 PM 2023     8:22 AM   Depression Patient Health Questionnaire   Over the last two weeks how often have you been bothered by little interest or pleasure in doing things Not at all Several days Not at all Several days Not at all Not at all Not at all   Over the last two weeks how often have you been bothered by feeling down, depressed or hopeless Not at all Several days Not at all Several days Not at all Not at all Not at all   PHQ-2 Total Score 0 2 0 2 0 0 0         Review of Systems   Constitutional:  Positive for fatigue and unexpected weight change.   HENT:   Negative for hearing loss and tinnitus.    Respiratory:  Positive for cough and shortness of breath. Negative for hemoptysis.    Cardiovascular:  Positive for leg swelling. Negative for chest pain.   Gastrointestinal:  Positive for constipation and nausea. Negative for abdominal distention and diarrhea.   Genitourinary:  Negative for dysuria.    Musculoskeletal:  Negative for arthralgias and myalgias.   Skin:  Positive for itching.   Neurological:  Positive for headaches.   Hematological:  Negative for adenopathy.   Psychiatric/Behavioral:  The patient is nervous/anxious.        Oncology History   Non-small cell carcinoma of left lung   2024 Initial Diagnosis    Non-small cell carcinoma of left lung     2024 Cancer Staged    Staging form: Lung, AJCC 8th Edition  - Clinical stage from 2024: Stage IB (cT2a, cN0, cM0)     2024 -  Chemotherapy    Treatment Summary   Plan Name: OP CISPLATIN 75MG/M2  ETOPOSIDE 100MG/M2 Q3W  Treatment Goal: Curative  Status: Active  Start Date: 5/22/2024 (Planned)  End Date: 9/6/2024 (Planned)  Provider: Macario Iyer MD  Chemotherapy: CISplatin (Platinol) in sodium chloride 0.9% 500 mL chemo infusion, 75 mg/m2, Intravenous, Clinic/HOD 1 time, 0 of 6 cycles  etoposide (VEPESID) in sodium chloride 0.9% 500 mL chemo infusion, 100 mg/m2, Intravenous, Clinic/HOD 1 time, 0 of 6 cycles         Patient Active Problem List   Diagnosis    COPD (chronic obstructive pulmonary disease)    Thyroid disease    Tobacco abuse    Primary hypertension    Gastroesophageal reflux disease without esophagitis    Anxiety    History of AAA repair    Lumbar disc disease with radiculopathy    Foot callus    Neuropathy    Essential hypertension    Nonspecific abnormal electrocardiogram (ECG) (EKG)    ACE-inhibitor cough    Cervical radiculopathy    Influenza A    Acquired hypothyroidism    Hypokalemia    Marston lesion of lung    Mass of upper lobe of left lung    Allergic reaction    Chronic hypoxic respiratory failure    Non-small cell carcinoma of left lung       Past Medical History:   Diagnosis Date    Anxiety     Martin esophagus     Colitis     COPD (chronic obstructive pulmonary disease)     GERD (gastroesophageal reflux disease)     Hypertension     Lung cancer     Thyroid disease     Vocal cord polyps        Past Surgical History:   Procedure Laterality Date    ABDOMINAL AORTIC ANEURYSM REPAIR      BACK SURGERY      cervical    CATARACT EXTRACTION Right 02/2021    CHOLECY[facility]  [facility]/20/2013    [facility]    ENDOBRONCHIAL ULTRASOUND N/A 4/19/2024    Procedure: ENDOBRONCHIAL ULTRASOUND (EBUS);  Surgeon: Kizzy Siegel MD;  Location: [address];  Service: Pulmonary;  Laterality: N/A;    FOOT SURGERY      HYSTERECTOMY  1979    AUGUSTINE for AUB with consevation ovaries    ROBOTIC BRONCHOSCOPY N/A 4/19/2024    Procedure: ROBOTIC BRONCHOSCOPY;  Surgeon: Kizzy Siegel MD;  Location: [facility] OR  2ND FLR;  Service: Pulmonary;  Laterality: N/A;    SALIVARY GLAND SURGERY         Social History     Socioeconomic History    Marital status:    Tobacco Use    Smoking status: Former     Types: Cigarettes     Start date: 2024     Quit date: 1964     Years since quittin.4    Smokeless tobacco: Never    Tobacco comments:     Pt has smoked since 14 years old. Smokes around .5ppd. Inpatient smoking education done 10/20.     Pt quit smoking on 24.  She has never used smokeless tobacco   Substance and Sexual Activity    Alcohol use: Never    Drug use: Never    Sexual activity: Yes     Partners: Male     Social Determinants of Health     Financial Resource Strain: Low Risk  (3/15/2024)    Overall Financial Resource Strain (CARDIA)     Difficulty of Paying Living Expenses: Not hard at all   Food Insecurity: No Food Insecurity (3/15/2024)    Hunger Vital Sign     Worried About Running Out of Food in the Last Year: Never true     Ran Out of Food in the Last Year: Never true   Transportation Needs: No Transportation Needs (3/15/2024)    PRAPARE - Transportation     Lack of Transportation (Medical): No     Lack of Transportation (Non-Medical): No   Physical Activity: Inactive (3/15/2024)    Exercise Vital Sign     Days of Exercise per Week: 0 days     Minutes of Exercise per Session: 0 min   Stress: Stress Concern Present (3/15/2024)    [employer] - Occupational Stress Questionnaire     Feeling of Stress : Very much   Housing Stability: Low Risk  (3/15/2024)    Housing Stability Vital Sign     Unable to Pay for Housing in the Last Year: No     Number of Places Lived in the Last Year: 1     Unstable Housing in the Last Year: No       Family History   Problem Relation Name Age of Onset    Bladder Cancer Mother      Heart failure Father      Emphysema Sister      Pancreatic cancer Sister      No Known Problems Brother           Current Outpatient Medications:     amLODIPine  (NORVASC) 5 MG tablet, Take 1 tablet (5 mg total) by mouth once daily. For blood pressure, Disp: 30 tablet, Rfl: 5    diphenhydrAMINE (BENADRYL) 25 mg capsule, Take 1 capsule (25 mg total) by mouth every 6 (six) hours as needed for Itching or Allergies., Disp: 20 capsule, Rfl: 0    EPINEPHrine (EPIPEN) 0.3 mg/0.3 mL AtIn, Inject 0.3 mLs (0.3 mg total) into the muscle as needed (Severe allergic reaction symptoms such as shortness of breath, tongue or throat swelling, weakness dizziness severe nausea vomiting)., Disp: 1 each, Rfl: 3    famotidine (PEPCID) 20 MG tablet, Take 1 tablet (20 mg total) by mouth 2 (two) times daily., Disp: 20 tablet, Rfl: 0    HYDROcodone-acetaminophen (NORCO)  mg per tablet, Take 1 tablet by mouth every 12 (twelve) hours as needed for Pain., Disp: 50 tablet, Rfl: 0    levalbuterol (XOPENEX HFA) 45 mcg/actuation inhaler, Inhale 2 puffs into the lungs every 6 (six) hours as needed for Wheezing. Rescue, Disp: 15 g, Rfl: 4    levothyroxine (SYNTHROID) 88 MCG tablet, Take 1 tablet (88 mcg total) by mouth before breakfast., Disp: 30 tablet, Rfl: 3    LIDOcaine-prilocaine (EMLA) cream, Apply topically as needed., Disp: 30 g, Rfl: 5    metoprolol tartrate (LOPRESSOR) 25 MG tablet, Take 1 tablet (25 mg total) by mouth 2 (two) times daily., Disp: 180 tablet, Rfl: 3    omeprazole (PRILOSEC) 40 MG capsule, Take 1 capsule (40 mg total) by mouth every morning., Disp: 90 capsule, Rfl: 3    ondansetron (ZOFRAN) 4 MG tablet, Take 1 tablet (4 mg total) by mouth every 8 (eight) hours as needed for Nausea., Disp: 30 tablet, Rfl: 3    ondansetron (ZOFRAN) 8 MG tablet, Take 1 tablet (8 mg total) by mouth every 8 (eight) hours as needed for Nausea., Disp: 30 tablet, Rfl: 5    polyethylene glycol (GLYCOLAX) 17 gram/dose powder, Take 17 g by mouth once daily., Disp: 510 g, Rfl: 5    promethazine (PHENERGAN) 25 MG tablet, Take 1 tablet (25 mg total) by mouth every 6 (six) hours as needed for Nausea., Disp: 30  tablet, Rfl: 5    REFRESH OPTIVE 0.5-0.9 % Drop, Place 1 drop into both eyes 4 (four) times daily., Disp: , Rfl:     simethicone (GAS-X ORAL), Take 1 tablet by mouth as needed. (Patient not taking: Reported on 5/1/2024), Disp: , Rfl:     umeclidinium-vilanteroL (ANORO ELLIPTA) 62.5-25 mcg/actuation DsDv, Inhale 1 puff into the lungs once daily. Controller, Disp: 1 each, Rfl: 11    Current Facility-Administered Medications:     levalbuterol nebulizer solution 1.25 mg, 1.25 mg, Nebulization, 1 time in Clinic/HOD, Aleisha Garcia, NP    Review of patient's allergies indicates:   Allergen Reactions    Bisacodyl Nausea And Vomiting     Other reaction(s): Vomiting    Iodinated contrast media Hives and Shortness Of Breath     hypotension    Morphine Other (See Comments)     hypotension    Azithromycin Hives    Cipro [ciprofloxacin hcl]     Demerol [meperidine]      Nausea vomiting, visual problem    Doxycycline Hives    Flonase [fluticasone propionate] Hives    Morpholine analogues Other (See Comments)     hypotension       Physcial Examination  VITAL SIGNS:    Body surface area is 1.87 meters squared.   Pain Assessment  Vitals:    05/16/24 1354   BP: (!) 155/77   Pulse: 66   Resp: 18   Temp: 97.8 °F (36.6 °C)   Weight: 80.3 kg (177 lb)   PainSc: 0-No pain     Quality:{PAIN CHARACTERISTICS:60052}  Onset: {desc; pain onset:15986}  Duration: {TIME; PAIN:87078}  What relieves the pain? {Pain (activities that relieve):55983}  What cause or increases the pain? {Causes; Pain:35548}  Plan: {PAIN MANAGEMENT PLAN:82745}     Wt Readings from Last 5 Encounters:   05/16/24 80.3 kg (177 lb)   05/09/24 80.4 kg (177 lb 4.8 oz)   05/01/24 79.5 kg (175 lb 3.2 oz)   04/26/24 79.9 kg (176 lb 2.4 oz)   03/14/24 75.8 kg (167 lb)       GENERAL:  Joslyn Duobn is healthy-appearing 73 y.o. female, in no distress.   EYES:   Pupils equal, round, reactive.  Conjunctivae, sclera and lids normal.  HEENT: Head normocephalic and atraumatic, without  alopecia.  Oropharynx is unremarkable.  No icterus, jaundice, stomatitis, mucositis, or ulceration is noted.  Ears are clear and unremarkable.  Nose, nares, and septum are unremarkable.    NECK:   No masses.  Thyroid and trachea are normal.    BREASTS:  Deferred.  RESPIRATORY: Clear to auscultation bilaterally.  Symmetrically effortless expansion.  No wheezing and no stridor.    CV: Heart reveals regular rate and rhythm without murmur, rub, or gallops.  ABDOMEN: Soft, non-tender.  No masses, no hernias, and no rebound or rigidity are noted.  /RECTAL:  Deferred.  LYMPHATICS: No preauricular, submandibular, cervical, supraclavicular, axillary, lymphadenopathy.  MUSCULOSKELETAL:Fair musculature, no atrophy.  No arthritic changes.  No edema or cyanosis. Back is without gross abnormal curvature.   NEUROLOGICAL: Cranial nerves II-XII grossly intact.  Motor and sensory exam intact.  SKIN:   No lesions, bruises, petechiae or rashes.  Good turgor.    PSYCHIATRIC: Patient is alert and oriented to time, place and person.  Mood and affect are appropriate.         Laboratory and Radiology   Lab Results   Component Value Date    WBC 4.87 05/16/2024    RBC 4.43 05/16/2024    HGB 13.8 05/16/2024    HCT 42.0 05/16/2024    MCV 95 05/16/2024    MCH 31.2 (H) 05/16/2024    MCHC 32.9 05/16/2024    RDW 13.2 05/16/2024     05/16/2024    MPV 8.6 (L) 05/16/2024    GRAN 2.7 05/16/2024    GRAN 55.5 05/16/2024    LYMPH 1.4 05/16/2024    LYMPH 28.5 05/16/2024    MONO 0.6 05/16/2024    MONO 11.3 05/16/2024    EOS 0.2 05/16/2024    BASO 0.02 05/16/2024    EOSINOPHIL 3.9 05/16/2024    BASOPHIL 0.4 05/16/2024     BMP  Lab Results   Component Value Date     01/19/2024    K 4.0 01/19/2024     01/19/2024    CO2 24 01/19/2024    BUN 14 01/19/2024    CREATININE 0.9 01/19/2024    CALCIUM 9.9 01/19/2024    ANIONGAP 11 01/19/2024    ESTGFRAFRICA >60.0 01/03/2022    EGFRNONAA >60.0 01/03/2022     Lab Results   Component Value Date    ALT  26 01/19/2024    AST 24 01/19/2024    ALKPHOS 114 01/19/2024    BILITOT 0.3 01/19/2024     Results for orders placed or performed during the hospital encounter of 04/12/24 (from the past 2160 hour(s))   CT Chest Without Contrast    Narrative    CMS MANDATED QUALITY DATA - CT RADIATION - 436    All CT scans at this facility utilize dose modulation, iterative reconstruction, and/or weight based dosing when appropriate to reduce radiation dose to as low as reasonably achievable.    EXAMINATION:  CT CHEST WITHOUT CONTRAST    CLINICAL HISTORY:  Abnormal xray - lung nodule, >= 1 cm;Chest Navigational Bronchoscopy;    Solitary pulmonary nodule    TECHNIQUE:  CT thorax without contrast    COMPARISON:  01/07/2024 CT chest and PET-CT dated 03/14/2024    FINDINGS:  CT THORAX:    There is an enlarging 3.1 x 2.3 cm pleural base mass in the anterolateral left upper lobe suspicious for malignancy.    There are no additional pulmonary nodules, infiltrates or pleural effusions.  The trachea and bronchi are normal.    Base of the neck is unremarkable.    The heart is normal in size.  The aorta is normal in caliber with mild vascular calcification    There is no hilar, mediastinal or axillary adenopathy.    The esophagus is normal    Chest wall is unremarkable    Visualized portion of the upper abdomen demonstrates a simple hepatic cyst.  The gallbladder is absent.  The adrenal glands are normal.    There are no acute osseous abnormalities.      Impression    Enlarging 3.1 x 2.3 cm pleural base mass in the anterolateral left upper lobe suspicious for pulmonary malignancy    No evidence of new metastatic disease    Prior cholecystectomy      Electronically signed by: Yokasta Cowan  Date:    04/12/2024  Time:    15:15     No results found for this or any previous visit (from the past 2160 hour(s)).  No results found for this or any previous visit (from the past 2160 hour(s)).    Pathology  Pathology Results  (Last 10 years)      None             TITLE: PLAN OF CARE FOR THE CHEMOTHERAPY PATIENT / TEACHING PROTOCOL    PURPOSE: To involve the patient / significant other in the plan of care and to provide teaching to the significant other & patient receiving chemotherapy.    LEVEL: Independent.    CONTENT: The Plan of Care for the chemotherapy patient is individualized and appropriate to the patients needs, strengths, limitations, & goals.  Education includes information regarding chemotherapy side effects, the treatment itself, and self-care  Activities.    GOAL / OUTCOME STANDARDS    PHYSIOLOGIC: The client will remain free or experience minimal side effects or toxicities throughout the chemotherapy treatment period.     PSYCHOLOGIC: The client/significant others will demonstrate positive coping mechanisms in relation to chemotherapy and its side effects.      COGINITIVE: The client/significant others will verbalize understanding of self-care measure to avoid/minimize side effects of the chemotherapy regimen.    EVALUATION / COMMENT KEY:    V = Audiovisual/Video  S = Successfully meets outcome  N = Needs further instruction  NA = Not applicable to the patient  P = Previous knowledge  U = Unable to comprehend  * = See progress notes          PLAN OF CARE  INFORMATION TO BE DELIVERED / NURSING INTERVENTIONS DATE EVALUATION   Assessment of client/caregiver,         knowledge of cancer diagnosis,         and chemotherapy as a treatment. 1a. Evaluate patient/caregiver learning ability    b. Plan teaching sessions with patient/caregiver according to needs and present anxiety level/ability to learn.    c. Provide Chemotherapy Education Packet,        Mouth Care Protocol,         Specific Patient Education Sheets. 05/16/2024 S   Individual chemotherapy treatment         plan. 2a. Review of Chemotherapy Education handout from [facility]            05/16/2024   S   Knowledge Deficit & Self-Management of general side effects common to all  chemotherapy:  Nausea/Vomiting  b.   Diarrhea  Mouth Care  Dental care  Constipation  Hair Loss  Potential for infection  Fatigue   3a. Reinforce that the majority of side effects from chemotherapy are reversible and are  controlled both in the hospital and at home        (blood counts recover, hair grows back).   b.  Refer to the following for reinforcement of         information post-treatment:  Mouth Care Protocol.  Bowel Protocol for constipation or diarrhea.  3.  Drug Specific Chemotherapy Information Sheets for each medication patient receiving.    05/16/2024     S     PLAN OF CARE  INFORMATION TO BE DELIVERED / NURSING INTERVENTIONS DATE EVALUATION   h. Potential for bleeding         i. Potential anemia/fatigue         j. Potential sunburn         k. Birth control measures  l. Safety measures post treatment 4.  Chemotherapy Home Care Instruction  and Safety Information Sheet.  A. patient/caregivers to thoroughly cook shellfish (shrimp, crab, etc) to decrease the chance of infection.    B.  Use sunscreen and protective clothing while in the sun.   05/16/2024      Knowledge deficit & Self Management of Drug Specific  Side Effects.    BLADDER EFFECTS        (Hemorrhagic Cystitis)                  Preventable with adequate hydration; occurs 2-3 days or more post treatment.   1.  Instruct patient to:  a.   Void at least every 2 hours; increase intake.  b.   DO NOT hold urine; go when urge is felt.  c.    Empty bladder at bedtime and on         awakening.  d.   Observe for color changes (red to tea           colored), amount and frequency changes.  e.   Notify oncologist of any abnormalities           in urine or voiding or if you cannot               drink adequate fluids.   05/16/2024   S   b.   CHANGES IN URINE        COLOR:      1.   Instruct patient:  a.   Most evident in first 2-3 voidings after           administration.  Lasts less than 24 hours.  If urine is discolored 2 or more days post- treatment, notify  oncologist.      05/16/2024 S   c.    KIDNEY EFFECTS           (Nephrotoxicity)   1.  Instruct patient to:  a.   Drink 8-16 glasses of fluid/day the day   pre-treatment and 3-4 days post-treatment to maintain hydration; the best way to minimize kidney problems.  b.   Notify oncologist immediately if unable to drink fluids or if changes are noted in urinary elimination.     05/16/2024   S   PULMONARY TOXICITY    Instruct patient to report symptoms such as shortness of breath, chest pain, shallow breathing, or chest wall discomfort to physician.  Reinforce preventative measures used by the health care team.  Baseline and periodic PFT and chest x-ray.   05/16/2024   S     PLAN OF CARE INFORMATION TO BE DELIVERED / NURSING INTERVENTIONS DATE EVALUATION   NERVE & MUSCLE EFFECTS (neurotoxocity; neuropathy, possible visual/hearing changes)        Instruct patient to:    Report numbness or tingling of the hands/feet, loss of fine motor movement (buttoning shirt, tying shoelaces), or gait changes to your oncologist.  If numbness/tingling are present:  protect feet with shoes at all times.  Use gloves for washing dishes/gardening & potholders in kitchen.       05/16/2024   S   CARDIOTOXICITY  Decreased effectiveness of             cardiac function. Effective are                  cumulative and irreversible.                                    CARDIAC ARRYTHMIAS              4   Instruct:  Heart function may be tested before treatment and perdiocally during treatment.  Notify oncologist of irregular pulse, palpitations, shortness of breath, or swelling in lower extremities/feet.          Can cause arrhythmias on infusion that resolve once infusion discontinued. Instruct nurse if any irregularity felt.    05/16/2024   S   EXTRAVASTION  Occurs when vesicants leak outside of vein and cause damage to the skin and underlying tissues.   Reinforce preventive measures used to avoid complications.  Fresh IV site or central line  monitored continuously with vesicant IVP.  Continuous infusion via central line site and blood return monitored periodically around the clock.  Instruct to:  Notify nurse of any discomfort, burning, stinging, etc. at IV site during chemotherapy administration.  Notify oncologist of any redness, pain, or swelling at IV site after discharge from hospital.   05/16/2024   S   HYPERSENSITIVITY can happen with any medication.   Instruct patient:  Nurse is with them during the initial part of treatment and will be close by to monitor.  Pre-medication ordered by the oncologist must be taken on time. If doses are missed, treatment will need to be re-scheduled.  Skin redness, itching, or hives appearing after discharge should be reported to oncologist. 05/16/2024   S       PLAN OF CARE INFORMATION TO BE DELIVERED / NURSING INTERVENTIONS DATE EVALUATION   FLU-LIKE SYNDROME      Instruct patient symptoms are hard to prevent and may include fever, shaking chills, muscle and body aches.  Taking prescribed medications from physician if needed.  Adequate fluids are important.    Reinforce the need to call if temperature is         elevated to 100.4 or more  05/16/2024   S   HAND-FOOT SYNDROME  causes painful, symmetric swelling and redness of palms and soles                  Instruct patient to report any numbness or tingling in the hands or feet.  Explain prevention techniques, such as     Use heavy moisturizers to lessen skin dryness and itching, but to avoid if skin is cracked or broken  Bathe in tepid water, use non-perfumed soap, and wash gently. Baths with oatmeal or diluted baking soda may be soothing.  Avoid tight fitting shoes and repetitive actions, such as rubbing hands or applying pressure to hands/feet.  Review measures to take should syndrome occur:  Cold compresses and elevation for          edema  Pain medications and other measures as ordered by oncologist.   4.   Syndrome resolves few weeks after therapy.  05/16/2024   S   5. DISCHARGE PLANNING /        EDUCATION 1.    Explain importance of compliance with follow- up  tests (CBC, CMP).  2.    Verify patient/caregiver know:  a.    Oncologists office phone number.  b.    Dates of follow-up appointments.  c.    Prescriptions given for nausea  3.   Review side effects to monitor and notify          oncologist about.  4.   Reinforce the need for patient and caregivers to:  a.    Review information given.  b.    Call oncologists office with questions          or symptoms  5.   Provide [facility] [employer]ure make referrals if needed for financial or .   05/16/2024   S     PROGRESS NOTES: I met with the patient her  and daughter today for chemotherapy education. she will be starting treatment with Cisplatin and Etoposide . We discussed the mechanism of action, potential side effects of this treatment as well as ways she can manage them at home. Some of these side effects include but or not limited to fever, nausea, vomiting, decreased appetite, fatigue, weakness, cytopenias, myalgia/arthralgia, constipation, diarrhea, bleeding, headache, shortness of breath, nail changes, taste change, hair thinning/loss, mood disturbances, or edema. We also discussed dietary modifications she should make although this will be discussed in more detail with the dietician. she was provided with anti-emetic medication, a copy of all of the information we discussed today as well as our contact information. she will be provided a schedule on his first day of treatment. We will obtain labs on a weekly basis and the patient will follow-up with the physician for toxicity monitoring throughout treatment. All questions were answered and an informed consent was obtained. she was reminded to certainly contact us sooner if needed.  Attached to the patients folder and discussed with the patient the 24 hour/ 7 days a week after hours telephone number for the physician.   Patient notified to call anytime 24/7 because their is a physician on call for any problems that may arise.  Patient also notified to report to [facility] if they can not get in touch with a physician after hours.  Discussed the five wishes booklet with the patient and their family.           Assessment/Plan     ICD-10-CM ICD-9-CM   1. Non-small cell carcinoma of left lung  C34.92 162.9          Medications Ordered:  Zofran 8mg 1 tab PO Q8h prn nausea  Phenergan 25mg PO Q4-6h prn nausea  Emla cream: Apply to port site 30min prior to treatment and cover with saran wrap      Standing Labs Ordered:  CBC weekly  CMP weekly  Mag weekly    Follow up in about 2 weeks (around 5/30/2024) for with Dr. Iyer and 5 with me.       Electronically signed by: Britany Byrd, MSN, APRN, AGNP-C, OCN

## 2024-05-17 ENCOUNTER — TREATMENT (OUTPATIENT)
Dept: RADIATION ONCOLOGY | Facility: CLINIC | Age: 74
End: 2024-05-17
Payer: MEDICARE

## 2024-05-17 ENCOUNTER — PATIENT MESSAGE (OUTPATIENT)
Facility: CLINIC | Age: 74
End: 2024-05-17

## 2024-05-17 DIAGNOSIS — I87.2 VENOUS INSUFFICIENCY: Primary | ICD-10-CM

## 2024-05-17 PROCEDURE — 77373 STRTCTC BDY RAD THER TX DLVR: CPT | Mod: S$GLB,,, | Performed by: RADIOLOGY

## 2024-05-17 PROCEDURE — 77336 RADIATION PHYSICS CONSULT: CPT | Mod: S$GLB,,, | Performed by: RADIOLOGY

## 2024-05-17 RX ORDER — CEFAZOLIN SODIUM 2 G/50ML
2 SOLUTION INTRAVENOUS
Status: CANCELLED | OUTPATIENT
Start: 2024-05-17

## 2024-05-20 ENCOUNTER — ANESTHESIA EVENT (OUTPATIENT)
Dept: SURGERY | Facility: HOSPITAL | Age: 74
End: 2024-05-20
Payer: MEDICARE

## 2024-05-20 ENCOUNTER — PATIENT MESSAGE (OUTPATIENT)
Dept: PULMONOLOGY | Facility: CLINIC | Age: 74
End: 2024-05-20
Payer: MEDICARE

## 2024-05-20 ENCOUNTER — PATIENT MESSAGE (OUTPATIENT)
Facility: CLINIC | Age: 74
End: 2024-05-20
Payer: MEDICARE

## 2024-05-20 ENCOUNTER — HOSPITAL ENCOUNTER (OUTPATIENT)
Facility: HOSPITAL | Age: 74
Discharge: HOME | End: 2024-05-20
Attending: SURGERY | Admitting: SURGERY
Payer: MEDICARE

## 2024-05-20 ENCOUNTER — ANESTHESIA (OUTPATIENT)
Dept: SURGERY | Facility: HOSPITAL | Age: 74
End: 2024-05-20
Payer: MEDICARE

## 2024-05-20 VITALS
OXYGEN SATURATION: 98 % | DIASTOLIC BLOOD PRESSURE: 77 MMHG | HEART RATE: 70 BPM | RESPIRATION RATE: 16 BRPM | SYSTOLIC BLOOD PRESSURE: 120 MMHG | TEMPERATURE: 98 F | BODY MASS INDEX: 32.57 KG/M2 | WEIGHT: 177 LBS | HEIGHT: 62 IN

## 2024-05-20 DIAGNOSIS — C34.90 LUNG CANCER: ICD-10-CM

## 2024-05-20 DIAGNOSIS — I87.2 VENOUS INSUFFICIENCY: ICD-10-CM

## 2024-05-20 LAB — FUNGUS SPEC CULT: NORMAL

## 2024-05-20 PROCEDURE — 36000706: Performed by: SURGERY

## 2024-05-20 PROCEDURE — 63600175 PHARM REV CODE 636 W HCPCS: Performed by: SURGERY

## 2024-05-20 PROCEDURE — 77001 FLUOROGUIDE FOR VEIN DEVICE: CPT | Mod: 26,,, | Performed by: SURGERY

## 2024-05-20 PROCEDURE — D9220A PRA ANESTHESIA: Mod: ANES,,, | Performed by: ANESTHESIOLOGY

## 2024-05-20 PROCEDURE — D9220A PRA ANESTHESIA: Mod: CRNA,,, | Performed by: STUDENT IN AN ORGANIZED HEALTH CARE EDUCATION/TRAINING PROGRAM

## 2024-05-20 PROCEDURE — 63600175 PHARM REV CODE 636 W HCPCS: Performed by: STUDENT IN AN ORGANIZED HEALTH CARE EDUCATION/TRAINING PROGRAM

## 2024-05-20 PROCEDURE — 71000015 HC POSTOP RECOV 1ST HR: Performed by: SURGERY

## 2024-05-20 PROCEDURE — 25000003 PHARM REV CODE 250: Performed by: STUDENT IN AN ORGANIZED HEALTH CARE EDUCATION/TRAINING PROGRAM

## 2024-05-20 PROCEDURE — C9290 INJ, BUPIVACAINE LIPOSOME: HCPCS

## 2024-05-20 PROCEDURE — 36561 INSERT TUNNELED CV CATH: CPT | Mod: RT,,, | Performed by: SURGERY

## 2024-05-20 PROCEDURE — 25000003 PHARM REV CODE 250: Performed by: SURGERY

## 2024-05-20 PROCEDURE — 71000033 HC RECOVERY, INTIAL HOUR: Performed by: SURGERY

## 2024-05-20 PROCEDURE — 63600175 PHARM REV CODE 636 W HCPCS

## 2024-05-20 PROCEDURE — 37000008 HC ANESTHESIA 1ST 15 MINUTES: Performed by: SURGERY

## 2024-05-20 PROCEDURE — C1769 GUIDE WIRE: HCPCS | Performed by: SURGERY

## 2024-05-20 PROCEDURE — C1788 PORT, INDWELLING, IMP: HCPCS | Performed by: SURGERY

## 2024-05-20 PROCEDURE — 36000707: Performed by: SURGERY

## 2024-05-20 PROCEDURE — 37000009 HC ANESTHESIA EA ADD 15 MINS: Performed by: SURGERY

## 2024-05-20 DEVICE — KIT POWERPORT SINGLE 8FR: Type: IMPLANTABLE DEVICE | Site: CHEST | Status: FUNCTIONAL

## 2024-05-20 RX ORDER — CEFAZOLIN SODIUM 1 G/3ML
INJECTION, POWDER, FOR SOLUTION INTRAMUSCULAR; INTRAVENOUS
Status: DISCONTINUED | OUTPATIENT
Start: 2024-05-20 | End: 2024-05-20

## 2024-05-20 RX ORDER — SODIUM CHLORIDE, SODIUM LACTATE, POTASSIUM CHLORIDE, CALCIUM CHLORIDE 600; 310; 30; 20 MG/100ML; MG/100ML; MG/100ML; MG/100ML
INJECTION, SOLUTION INTRAVENOUS CONTINUOUS PRN
Status: DISCONTINUED | OUTPATIENT
Start: 2024-05-20 | End: 2024-05-20

## 2024-05-20 RX ORDER — ACETAMINOPHEN 10 MG/ML
INJECTION, SOLUTION INTRAVENOUS
Status: DISCONTINUED | OUTPATIENT
Start: 2024-05-20 | End: 2024-05-20

## 2024-05-20 RX ORDER — FENTANYL CITRATE 50 UG/ML
INJECTION, SOLUTION INTRAMUSCULAR; INTRAVENOUS
Status: DISCONTINUED | OUTPATIENT
Start: 2024-05-20 | End: 2024-05-20

## 2024-05-20 RX ORDER — SUCCINYLCHOLINE CHLORIDE 20 MG/ML
INJECTION INTRAMUSCULAR; INTRAVENOUS
Status: DISCONTINUED | OUTPATIENT
Start: 2024-05-20 | End: 2024-05-20

## 2024-05-20 RX ORDER — DIPHENHYDRAMINE HYDROCHLORIDE 50 MG/ML
12.5 INJECTION INTRAMUSCULAR; INTRAVENOUS EVERY 6 HOURS PRN
Status: DISCONTINUED | OUTPATIENT
Start: 2024-05-20 | End: 2024-05-20 | Stop reason: HOSPADM

## 2024-05-20 RX ORDER — FAMOTIDINE 10 MG/ML
INJECTION INTRAVENOUS
Status: DISCONTINUED | OUTPATIENT
Start: 2024-05-20 | End: 2024-05-20

## 2024-05-20 RX ORDER — EPHEDRINE SULFATE 50 MG/ML
INJECTION, SOLUTION INTRAVENOUS
Status: DISCONTINUED | OUTPATIENT
Start: 2024-05-20 | End: 2024-05-20

## 2024-05-20 RX ORDER — ROCURONIUM BROMIDE 10 MG/ML
INJECTION, SOLUTION INTRAVENOUS
Status: DISCONTINUED | OUTPATIENT
Start: 2024-05-20 | End: 2024-05-20

## 2024-05-20 RX ORDER — ONDANSETRON HYDROCHLORIDE 2 MG/ML
4 INJECTION, SOLUTION INTRAVENOUS DAILY PRN
Status: DISCONTINUED | OUTPATIENT
Start: 2024-05-20 | End: 2024-05-20 | Stop reason: HOSPADM

## 2024-05-20 RX ORDER — HEPARIN SODIUM 1000 [USP'U]/ML
INJECTION, SOLUTION INTRAVENOUS; SUBCUTANEOUS
Status: DISCONTINUED | OUTPATIENT
Start: 2024-05-20 | End: 2024-05-20 | Stop reason: HOSPADM

## 2024-05-20 RX ORDER — ONDANSETRON HYDROCHLORIDE 2 MG/ML
INJECTION, SOLUTION INTRAVENOUS
Status: DISCONTINUED | OUTPATIENT
Start: 2024-05-20 | End: 2024-05-20

## 2024-05-20 RX ORDER — BUPIVACAINE HYDROCHLORIDE AND EPINEPHRINE 2.5; 5 MG/ML; UG/ML
INJECTION, SOLUTION EPIDURAL; INFILTRATION; INTRACAUDAL; PERINEURAL
Status: DISCONTINUED | OUTPATIENT
Start: 2024-05-20 | End: 2024-05-20 | Stop reason: HOSPADM

## 2024-05-20 RX ORDER — DEXAMETHASONE SODIUM PHOSPHATE 4 MG/ML
INJECTION, SOLUTION INTRA-ARTICULAR; INTRALESIONAL; INTRAMUSCULAR; INTRAVENOUS; SOFT TISSUE
Status: DISCONTINUED | OUTPATIENT
Start: 2024-05-20 | End: 2024-05-20

## 2024-05-20 RX ORDER — FENTANYL CITRATE 50 UG/ML
25 INJECTION, SOLUTION INTRAMUSCULAR; INTRAVENOUS EVERY 5 MIN PRN
Status: DISCONTINUED | OUTPATIENT
Start: 2024-05-20 | End: 2024-05-20 | Stop reason: HOSPADM

## 2024-05-20 RX ORDER — LIDOCAINE HYDROCHLORIDE 20 MG/ML
JELLY TOPICAL
Status: DISCONTINUED | OUTPATIENT
Start: 2024-05-20 | End: 2024-05-20

## 2024-05-20 RX ORDER — PROPOFOL 10 MG/ML
VIAL (ML) INTRAVENOUS
Status: DISCONTINUED | OUTPATIENT
Start: 2024-05-20 | End: 2024-05-20

## 2024-05-20 RX ORDER — LIDOCAINE HYDROCHLORIDE 20 MG/ML
INJECTION, SOLUTION EPIDURAL; INFILTRATION; INTRACAUDAL; PERINEURAL
Status: DISCONTINUED | OUTPATIENT
Start: 2024-05-20 | End: 2024-05-20

## 2024-05-20 RX ORDER — CEFAZOLIN SODIUM 2 G/50ML
2 SOLUTION INTRAVENOUS
Status: DISCONTINUED | OUTPATIENT
Start: 2024-05-20 | End: 2024-05-20 | Stop reason: HOSPADM

## 2024-05-20 RX ADMIN — SODIUM CHLORIDE, SODIUM LACTATE, POTASSIUM CHLORIDE, AND CALCIUM CHLORIDE: .6; .31; .03; .02 INJECTION, SOLUTION INTRAVENOUS at 09:05

## 2024-05-20 RX ADMIN — EPHEDRINE SULFATE 20 MG: 50 INJECTION INTRAVENOUS at 10:05

## 2024-05-20 RX ADMIN — DEXAMETHASONE SODIUM PHOSPHATE 4 MG: 4 INJECTION, SOLUTION INTRAMUSCULAR; INTRAVENOUS at 10:05

## 2024-05-20 RX ADMIN — LIDOCAINE HYDROCHLORIDE 100 MG: 20 INJECTION, SOLUTION INTRAVENOUS at 09:05

## 2024-05-20 RX ADMIN — FENTANYL CITRATE 50 MCG: 50 INJECTION, SOLUTION INTRAMUSCULAR; INTRAVENOUS at 09:05

## 2024-05-20 RX ADMIN — PROPOFOL 100 MG: 10 INJECTION, EMULSION INTRAVENOUS at 09:05

## 2024-05-20 RX ADMIN — LIDOCAINE HYDROCHLORIDE 4 ML: 20 JELLY TOPICAL at 09:05

## 2024-05-20 RX ADMIN — CEFAZOLIN 2 G: 330 INJECTION, POWDER, FOR SOLUTION INTRAMUSCULAR; INTRAVENOUS at 10:05

## 2024-05-20 RX ADMIN — ONDANSETRON 4 MG: 2 INJECTION INTRAMUSCULAR; INTRAVENOUS at 09:05

## 2024-05-20 RX ADMIN — ROCURONIUM BROMIDE 10 MG: 10 INJECTION, SOLUTION INTRAVENOUS at 10:05

## 2024-05-20 RX ADMIN — EPHEDRINE SULFATE 10 MG: 50 INJECTION INTRAVENOUS at 10:05

## 2024-05-20 RX ADMIN — Medication 200 MG: at 09:05

## 2024-05-20 RX ADMIN — ROCURONIUM BROMIDE 5 MG: 10 INJECTION, SOLUTION INTRAVENOUS at 09:05

## 2024-05-20 RX ADMIN — ACETAMINOPHEN 1000 MG: 10 INJECTION, SOLUTION INTRAVENOUS at 10:05

## 2024-05-20 RX ADMIN — FAMOTIDINE 20 MG: 10 INJECTION, SOLUTION INTRAVENOUS at 09:05

## 2024-05-20 NOTE — TRANSFER OF CARE
"Anesthesia Transfer of Care Note    Patient: Joslyn Dubon    Procedure(s) Performed: Procedure(s) (LRB):  [id number] (Right)    Patient location: PACU    Anesthesia Type: general    Transport from OR: Transported from OR on room air with adequate spontaneous ventilation    Post pain: adequate analgesia    Post assessment: no apparent anesthetic complications and tolerated procedure well    Post vital signs: stable    Level of consciousness: awake and alert    Nausea/Vomiting: vomiting (small amount of Bile Emesis prior to extubation)    Complications: none    Transfer of care protocol was followed      Last vitals: Visit Vitals  /88 (BP Location: Right arm, Patient Position: Lying)   Pulse 65   Temp 37.1 °C (98.7 °F) (Oral)   Resp 16   Ht 5' 2" (1.575 m)   Wt 80.3 kg (177 lb)   Breastfeeding No   BMI 32.37 kg/m²     "

## 2024-05-20 NOTE — ANESTHESIA PREPROCEDURE EVALUATION
05/20/2024  Joslyn Dubon is a 73 y.o., female.    Patient Active Problem List   Diagnosis    COPD (chronic obstructive pulmonary disease)    Thyroid disease    Tobacco abuse    Primary hypertension    Gastroesophageal reflux disease without esophagitis    Anxiety    History of AAA repair    Lumbar disc disease with radiculopathy    Foot callus    Neuropathy    Essential hypertension    Nonspecific abnormal electrocardiogram (ECG) (EKG)    ACE-inhibitor cough    Cervical radiculopathy    Influenza A    Acquired hypothyroidism    Hypokalemia    [place] lesion of lung    Mass of upper lobe of left lung    Allergic reaction    Chronic hypoxic respiratory failure    Non-small cell carcinoma of left lung       Past Surgical History:   Procedure Laterality Date    ABDOMINAL AORTIC ANEURYSM REPAIR      BACK SURGERY      cervical    CATARACT EXTRACTION Right 02/2021    CHOLECYSTECTOMY  12/20/2013    Dr Albert CORLEY    ENDOBRONCHIAL ULTRASOUND N/A 4/19/2024    Procedure: ENDOBRONCHIAL ULTRASOUND (EBUS);  Surgeon: Kizzy Siegel MD;  Location: [facility] [place] [facility] OR [address] [place] [address];  Service: Pulmonary;  Laterality: N/A;    FOOT SURGERY      HYSTERECTOMY  1979    AUGUSTINE for AUB with consevation ovaries    ROBOTIC BRONCHOSCOPY N/A 4/19/2024    Procedure: ROBOTIC BRONCHOSCOPY;  Surgeon: Kizzy Siegel MD;  Location: [facility] [place] [facility] OR [address] [place] [address];  Service: Pulmonary;  Laterality: N/A;    SALIVARY GLAND SURGERY          Tobacco Use:  The patient  reports that she quit smoking about 60 years ago. Her smoking use included cigarettes. She started smoking about 4 months ago. She has never used smokeless tobacco.     Results for orders placed or performed during the hospital encounter of 01/19/24   EKG 12-lead    Collection Time: 01/19/24  2:56 PM    Narrative    Test Reason : R06.02,    Vent. Rate : 063 BPM     Atrial Rate : 063 BPM     P-R Int : 160 ms           QRS Dur : 074 ms      QT Int : 446 ms       P-R-T Axes : 063 067 086 degrees     QTc Int : 456 ms    Sinus rhythm with frequent Premature ventricular complexes  Low voltage QRS  Nonspecific T wave abnormality  Abnormal ECG  When compared with ECG of 03-JAN-2024 01:01,  Nonspecific T wave abnormality, worse in Anterior-lateral leads  Confirmed by Navarro Banegas MD ([id number]) on 1/23/2024 2:36:24 PM    Referred By: [email or web address]   SELF           Confirmed By:Navarro Banegas MD             Lab Results   Component Value Date    WBC 4.87 05/16/2024    HGB 13.8 05/16/2024    HCT 42.0 05/16/2024    MCV 95 05/16/2024     05/16/2024     BMP  Lab Results   Component Value Date     05/16/2024    K 4.3 05/16/2024     05/16/2024    CO2 27 05/16/2024    BUN 16 05/16/2024    CREATININE 1.0 05/16/2024    CALCIUM 9.8 05/16/2024    ANIONGAP 8 05/16/2024    GLU 92 05/16/2024    GLU 91 01/19/2024    GLU 87 01/08/2024       No results found for this or any previous visit.           Pre-op Assessment    I have reviewed the Patient Summary Reports.     I have reviewed the Nursing Notes. I have reviewed the NPO Status.   I have reviewed the Medications.     Review of Systems  Anesthesia Hx:  No problems with previous Anesthesia   Neg history of prior surgery.          Denies Family Hx of Anesthesia complications.    Denies Personal Hx of Anesthesia complications.                    Social:  Former Smoker, No Alcohol Use       Hematology/Oncology:  Hematology Normal                     Current/Recent Cancer.            Oncology Comments: Lung     EENT/Dental:  EENT/Dental Normal  Hx of vocal cord polyps          Cardiovascular:     Hypertension               AAA repair                         Pulmonary:   COPD, moderate      PRN O2               Renal/:  Renal/ Normal                 Hepatic/GI:     GERD             Musculoskeletal:  Arthritis   Radiculopathy       Spine Disorders: cervical and lumbar Disc disease, Degenerative  disease and Chronic Pain           Neurological:  Neurology Normal                                      Endocrine:  Endocrine Normal            Dermatological:  Skin Normal    Psych:   anxiety                 Physical Exam  General: Well nourished and Alert    Airway:  Mallampati: II   Mouth Opening: Normal  TM Distance: Normal  Tongue: Normal  Neck ROM: Normal ROM    Dental:  Intact    Chest/Lungs:  Clear to auscultation, Normal Respiratory Rate    Heart:  Rate: Normal  Rhythm: Regular Rhythm  Sounds: Normal        Anesthesia Plan  Type of Anesthesia, risks & benefits discussed:    Anesthesia Type: Gen ETT  Intra-op Monitoring Plan: Standard ASA Monitors  Post Op Pain Control Plan: multimodal analgesia  Induction:  IV and rapid sequence  Airway Plan: Video, Post-Induction  Informed Consent: Informed consent signed with the Patient and all parties understand the risks and agree with anesthesia plan.  All questions answered. Patient consented to blood products? Yes  ASA Score: 3  Anesthesia Plan Notes: Tylenol,   Pepcid, Zofran, Benadryl 6.25 mg    Ready For Surgery From Anesthesia Perspective.     .

## 2024-05-20 NOTE — ANESTHESIA PROCEDURE NOTES
Intubation    Date/Time: 5/20/2024 9:56 AM    Performed by: Gricelda Burks CRNA  Authorized by: German Deluca Jr., MD    Intubation:     Induction:  Intravenous    Intubated:  Postinduction    Mask Ventilation:  N/a (RSI)    Attempts:  1    Attempted By:  CRNA    Method of Intubation:  Video laryngoscopy    Blade:  Dodd 3    Laryngeal View Grade: Grade I - full view of cords      Difficult Airway Encountered?: No      Complications:  None    Airway Device:  Oral endotracheal tube    Airway Device Size:  7.5    Style/Cuff Inflation:  Cuffed    Secured at:  The lips    Placement Verified By:  Capnometry    Complicating Factors:  None    Findings Post-Intubation:  BS equal bilateral and atraumatic/condition of teeth unchanged

## 2024-05-20 NOTE — PLAN OF CARE
Pt AAOX3, Vital signs stable, pt tolerating oral liquids and voiding without difficulty. Right chest port incision with dermabond clean, dry and intact. Pt ready for discharge

## 2024-05-20 NOTE — ANESTHESIA POSTPROCEDURE EVALUATION
Anesthesia Post Evaluation    Patient: Joslyn Dubon    Procedure(s) Performed: Procedure(s) (LRB):  [id number] (Right)    Final Anesthesia Type: general      Patient location during evaluation: PACU  Patient participation: Yes- Able to Participate  Level of consciousness: awake and alert  Post-procedure vital signs: reviewed and stable  Pain management: adequate  Airway patency: patent    PONV status at discharge: No PONV  Anesthetic complications: no      Cardiovascular status: blood pressure returned to baseline and stable  Respiratory status: unassisted and room air  Hydration status: euvolemic  Follow-up needed   Comments:                 Vitals Value Taken Time   /61 05/20/24 1130   Temp 36.3 °C (97.4 °F) 05/20/24 1130   Pulse 76 05/20/24 1136   Resp 19 05/20/24 1136   SpO2 95 % 05/20/24 1135   Vitals shown include unfiled device data.      Event Time   Out of Recovery 11:37:58         Pain/Traci Score: Traci Score: 10 (5/20/2024 11:45 AM)

## 2024-05-20 NOTE — H&P
[facility]  General Surgery  History & Physical    Patient Name: Joslyn Dubon  MRN: [id number]  Admission Date: 5/20/2024  Attending Physician: Simon Wilson III,*   Primary Care Provider: Jasbir Graham MD    Patient information was obtained from patient.     Subjective:     Chief Complaint/Reason for Admission:  Port-A-Cath placement    History of Present Illness:    3/14/2024-PET:  2.4cm ELOY mass-SUV 14.7  Normal size MS nodes with no increased FDG activity     10mm soft tissue nodule in left parotid-see report     4/19/2024-EBUS:  ELOY-NSCLC--poorly differentiated-not further described  Station 7 LN negative  [place]us-negative for:  EGFR/KRAS/BRAF/ALK/ROS1/RET/MET/ERBB2  TMB: 10  MSI Stable  PDL1; <1--negative     4/26/2024-Seen by Thoracic oncology-not a surgical candidate due to pre-existing lung disease     Currently on SBRT x 5 and will complete 5/17/2024         Patient is 74yo female who presented with a rash on her neck in October which caused her to see medical care.  CT scan was done of the chest which showed a ELOY lesion. She underwent PET scanning shown above and she was sent to Dr. Hilton.  Her initital biopsy was non-diagnostic and she therefoe underwent EBUS which did show poorly differentiated lung cancer.  She was referred to surgery who deemed her not to be a surgical candidate due to poor lung function and she was referred to radiation oncology.  She has completed radiation therapy as of Friday.  She is starting chemotherapy this coming Wednesday.  She was referred in consultation for Port-A-Cath placement.  No previous history of Port-A-Cath in the past.           [facility]  BOARD RECOMMENDATIONS:   The patient is a very poor surgical candidate.  While the tumor histology is suggestive of a large cell neuroendocrine tumor, the behavior is more like small-cell lung cancer.  Obtain Brain MRI. Recommend SBRT x5 treatment  followed definitive systemic therapy with cisplatin/etoposide.    No current facility-administered medications on file prior to encounter.     Current Outpatient Medications on File Prior to Encounter   Medication Sig    amLODIPine (NORVASC) 5 MG tablet Take 1 tablet (5 mg total) by mouth once daily. For blood pressure    famotidine (PEPCID) 20 MG tablet Take 1 tablet (20 mg total) by mouth 2 (two) times daily.    HYDROcodone-acetaminophen (NORCO)  mg per tablet Take 1 tablet by mouth every 12 (twelve) hours as needed for Pain.    levalbuterol (XOPENEX HFA) 45 mcg/actuation inhaler Inhale 2 puffs into the lungs every 6 (six) hours as needed for Wheezing. Rescue    levothyroxine (SYNTHROID) 88 MCG tablet Take 1 tablet (88 mcg total) by mouth before breakfast.    metoprolol tartrate (LOPRESSOR) 25 MG tablet Take 1 tablet (25 mg total) by mouth 2 (two) times daily.    omeprazole (PRILOSEC) 40 MG capsule Take 1 capsule (40 mg total) by mouth every morning.    ondansetron (ZOFRAN) 4 MG tablet Take 1 tablet (4 mg total) by mouth every 8 (eight) hours as needed for Nausea.    polyethylene glycol (GLYCOLAX) 17 gram/dose powder Take 17 g by mouth once daily.    REFRESH OPTIVE 0.5-0.9 % Drop Place 1 drop into both eyes 4 (four) times daily.    simethicone (GAS-X ORAL) Take 1 tablet by mouth as needed.    umeclidinium-vilanteroL (ANORO ELLIPTA) 62.5-25 mcg/actuation DsDv Inhale 1 puff into the lungs once daily. Controller    diphenhydrAMINE (BENADRYL) 25 mg capsule Take 1 capsule (25 mg total) by mouth every 6 (six) hours as needed for Itching or Allergies.    EPINEPHrine (EPIPEN) 0.3 mg/0.3 mL AtIn Inject 0.3 mLs (0.3 mg total) into the muscle as needed (Severe allergic reaction symptoms such as shortness of breath, tongue or throat swelling, weakness dizziness severe nausea vomiting).    LIDOcaine-prilocaine (EMLA) cream Apply topically as needed.    ondansetron (ZOFRAN) 8 MG tablet Take 1 tablet (8 mg total) by mouth  every 8 (eight) hours as needed for Nausea.    promethazine (PHENERGAN) 25 MG tablet Take 1 tablet (25 mg total) by mouth every 6 (six) hours as needed for Nausea.       Review of patient's allergies indicates:   Allergen Reactions    Bisacodyl Nausea And Vomiting     Other reaction(s): Vomiting    Iodinated contrast media Hives and Shortness Of Breath     hypotension    Morphine Other (See Comments)     hypotension    Azithromycin Hives    Cipro [ciprofloxacin hcl]     Demerol [meperidine]      Nausea vomiting, visual problem    Doxycycline Hives    Flonase [fluticasone propionate] Hives    Morpholine analogues Other (See Comments)     hypotension       Past Medical History:   Diagnosis Date    Anxiety     Martin esophagus     Colitis     COPD (chronic obstructive pulmonary disease)     GERD (gastroesophageal reflux disease)     Hypertension     Lung cancer     Thyroid disease     Vocal cord polyps      Past Surgical History:   Procedure Laterality Date    ABDOMINAL AORTIC ANEURYSM REPAIR      BACK SURGERY      cervical    CATARACT EXTRACTION Right 2021    CHOLECYSTECTOMY  2013    Dr Albert CORLEY    ENDOBRONCHIAL ULTRASOUND N/A 2024    Procedure: ENDOBRONCHIAL ULTRASOUND (EBUS);  Surgeon: Kizzy Siegel MD;  Location: [facility] OR [address] [place] [address];  Service: Pulmonary;  Laterality: N/A;    FOOT SURGERY      HYSTERECTOMY      AUGUSTINE for AUB with consevation ovaries    ROBOTIC BRONCHOSCOPY N/A 2024    Procedure: ROBOTIC BRONCHOSCOPY;  Surgeon: Kizzy Siegel MD;  Location: [facility] OR [address] [place] [address];  Service: Pulmonary;  Laterality: N/A;    SALIVARY GLAND SURGERY       Family History       Problem Relation (Age of Onset)    Bladder Cancer Mother    Emphysema Sister    Heart failure Father    No Known Problems Brother    Pancreatic cancer Sister          Tobacco Use    Smoking status: Former     Types: Cigarettes     Start date: 2024     Quit date: 1964     Years since quittin.4    Smokeless tobacco: Never     Tobacco comments:     Pt has smoked since 14 years old. Smokes around .5ppd. Inpatient smoking education done 10/20.     Pt quit smoking on 01/01/24.  She has never used smokeless tobacco   Substance and Sexual Activity    Alcohol use: Never    Drug use: Never    Sexual activity: Yes     Partners: Male     Review of Systems  Objective:     Vital Signs (Most Recent):  Temp: 98.7 °F (37.1 °C) (05/20/24 0749)  Pulse: 65 (05/20/24 0749)  Resp: 16 (05/20/24 0749)  BP: 134/88 (05/20/24 0749) Vital Signs (24h Range):  Temp:  [98.7 °F (37.1 °C)] 98.7 °F (37.1 °C)  Pulse:  [65] 65  Resp:  [16] 16  BP: (134)/(88) 134/88     Weight: 80.3 kg (177 lb)  Body mass index is 32.37 kg/m².    Physical Exam  Constitutional:       General: She is not in acute distress.     Appearance: She is obese. She is not toxic-appearing.   Pulmonary:      Effort: Pulmonary effort is normal. No tachypnea, bradypnea or respiratory distress.   Abdominal:      General: There is no distension.      Palpations: Abdomen is soft.      Tenderness: There is no abdominal tenderness.   Neurological:      Mental Status: She is alert and oriented to person, place, and time.   Psychiatric:         Behavior: Behavior is cooperative.         Significant Labs:  I have reviewed all pertinent lab results within the past 24 hours.    Significant Diagnostics:  I have reviewed all pertinent imaging results/findings within the past 24 hours.    Assessment/Plan:     Venous insufficiency  Lung cancer - large cell  VTE Risk Mitigation (From admission, onward)           Ordered     IP VTE HIGH RISK PATIENT  Once         05/20/24 0720                She will go for Port-A-Cath today.  Risks and benefits of the procedure discussed with the patient.    Simon Wilson III, MD  General Surgery  [facility]

## 2024-05-21 NOTE — BRIEF OP NOTE
[facility]  Brief Operative Note    SUMMARY     Surgery Date: 5/20/2024     Surgeons and Role:     * Simon Wilson III, MD - Primary    Assisting Surgeon: None    Pre-op Diagnosis:  Venous insufficiency [I87.2], lung cancer     Post-op Diagnosis:  Post-Op Diagnosis Codes:     * Venous insufficiency [I87.2], lung cancer     Procedure(s) (LRB):  [id number] (Right)right subclavian placement using Seldinger technique and intraoperative fluoroscopy    Anesthesia: General    Implants:  Implant Name Type Inv. Item Serial No.  Lot No. LRB No. Used Action   KIT POWERPORT SINGLE 8FR - [id number]  KIT POWERPORT SINGLE 8FR  [employer] [id number] Right 1 Implanted       Operative Findings: The patient was taken to operating room and transferred to the operating room table in the supine position.  Patient was given general anesthesia and intubated.  Bilateral neck and chest were sterilely prepped and draped.  The patient was put in Trendelenburg position.  Large gauge needle was used to percutaneously access the right subclavian vein.   A guide wire was placed through the needle and into the venous system.  Under fluoroscopy, the guide wire was seen in the venous system.  The patient was put back in the flat position.   An incision was made at the percutaneous stick site and a subcutaneous pocket was made inferiorly.  Under Seldinger technique a venous sheath was placed in into the venous system over the guide wire.  The catheter was then placed into the venous system through the sheath.   The sheath was then peeled away and discarded.   Under fluoroscopy, the catheter tip was positioned into the superior vena cava.  The catheter was cut to size.   The port was attached to the catheter.  The port was accessed and it flushed and aspirated freely.  The port was then placed into the subcutaneous pocket and sutured to the chest wall.  The incision was then closed in 2 layers, first with a deep  dermal layer of Vicryl followed by a subcuticular 4-0 Monocryl to close the skin.  After that, procedure was finished.  Patient was extubated and transferred to the recovery room in stable condition.  Chest x-ray will be performed in the recovery room.     Estimated Blood Loss: 5 mL  Complications none     Estimated Blood Loss has been documented.         Specimens:   Specimen (24h ago, onward)      None            [id number]

## 2024-05-21 NOTE — OP NOTE
Surgery Date: 5/20/2024     Surgeons and Role:     * Simon Wilson III, MD - Primary    Assisting Surgeon: None    Pre-op Diagnosis:  Venous insufficiency [I87.2], lung cancer     Post-op Diagnosis:  Post-Op Diagnosis Codes:     * Venous insufficiency [I87.2], lung cancer     Procedure(s) (LRB):  [id number] (Right)right subclavian placement using Seldinger technique and intraoperative fluoroscopy    Anesthesia: General    Implants:  Implant Name Type Inv. Item Serial No.  Lot No. LRB No. Used Action   KIT POWERPORT SINGLE 8FR - [id number]  KIT POWERPORT SINGLE 8FR  [employer] [id number] Right 1 Implanted       Operative Findings: The patient was taken to operating room and transferred to the operating room table in the supine position.  Patient was given general anesthesia and intubated.  Bilateral neck and chest were sterilely prepped and draped.  The patient was put in Trendelenburg position.  Large gauge needle was used to percutaneously access the right subclavian vein.   A guide wire was placed through the needle and into the venous system.  Under fluoroscopy, the guide wire was seen in the venous system.  The patient was put back in the flat position.   An incision was made at the percutaneous stick site and a subcutaneous pocket was made inferiorly.  Under Seldinger technique a venous sheath was placed in into the venous system over the guide wire.  The catheter was then placed into the venous system through the sheath.   The sheath was then peeled away and discarded.   Under fluoroscopy, the catheter tip was positioned into the superior vena cava.  The catheter was cut to size.   The port was attached to the catheter.  The port was accessed and it flushed and aspirated freely.  The port was then placed into the subcutaneous pocket and sutured to the chest wall.  The incision was then closed in 2 layers, first with a deep dermal layer of Vicryl followed by a subcuticular 4-0 Monocryl  to close the skin.  After that, procedure was finished.  Patient was extubated and transferred to the recovery room in stable condition.  Chest x-ray will be performed in the recovery room.     Estimated Blood Loss: 5 mL  Complications none     Estimated Blood Loss has been documented.         Specimens:   Specimen (24h ago, onward)      None            [id number]

## 2024-05-21 NOTE — DISCHARGE SUMMARY
Discharge Summary  General Surgery      Admit Date: 5/20/2024    Discharge Date :5/20/2024    Attending Physician: Simon Wilson III     Discharge Physician: Simon Wilson III    Discharged Condition: good    Discharge Diagnosis: Venous insufficiency [I87.2]    Treatments/Procedures: Procedure(s) (LRB):  [id number] (Right)    Hospital Course: Uneventful; Discharged home from [facility]    Significant Diagnostic Studies: none    Disposition: Home or Self Care    Diet: Regular    Follow up: Office 10-14 days    Activity: No heavy lifting till seen in office.    Patient Instructions:   Discharge Medication List as of 5/20/2024 11:36 AM        CONTINUE these medications which have NOT CHANGED    Details   amLODIPine (NORVASC) 5 MG tablet Take 1 tablet (5 mg total) by mouth once daily. For blood pressure, Starting Mon 11/27/2023, Until Tue 11/26/2024, Normal      diphenhydrAMINE (BENADRYL) 25 mg capsule Take 1 capsule (25 mg total) by mouth every 6 (six) hours as needed for Itching or Allergies., Starting Thu 2/15/2024, Normal      EPINEPHrine (EPIPEN) 0.3 mg/0.3 mL AtIn Inject 0.3 mLs (0.3 mg total) into the muscle as needed (Severe allergic reaction symptoms such as shortness of breath, tongue or throat swelling, weakness dizziness severe nausea vomiting)., Starting Thu 10/12/2023, Until Fri 10/11/2024 at 2359, Print      famotidine (PEPCID) 20 MG tablet Take 1 tablet (20 mg total) by mouth 2 (two) times daily., Starting Thu 2/15/2024, Until Fri 2/14/2025, Normal      HYDROcodone-acetaminophen (NORCO)  mg per tablet Take 1 tablet by mouth every 12 (twelve) hours as needed for Pain., Starting Tue 2/27/2024, Normal      levalbuterol (XOPENEX HFA) 45 mcg/actuation inhaler Inhale 2 puffs into the lungs every 6 (six) hours as needed for Wheezing. Rescue, Starting Wed 10/25/2023, Until Thu 10/24/2024 at 2359, Normal      levothyroxine (SYNTHROID) 88 MCG tablet Take 1 tablet (88 mcg total) by mouth  before breakfast., Starting Thu 5/9/2024, Until Fri 5/9/2025, Normal      LIDOcaine-prilocaine (EMLA) cream Apply topically as needed., Starting Thu 5/16/2024, Normal      metoprolol tartrate (LOPRESSOR) 25 MG tablet Take 1 tablet (25 mg total) by mouth 2 (two) times daily., Starting Mon 11/27/2023, Until Tue 11/26/2024, Normal      omeprazole (PRILOSEC) 40 MG capsule Take 1 capsule (40 mg total) by mouth every morning., Starting Mon 11/27/2023, Normal      !! ondansetron (ZOFRAN) 4 MG tablet Take 1 tablet (4 mg total) by mouth every 8 (eight) hours as needed for Nausea., Starting Mon 11/27/2023, Normal      !! ondansetron (ZOFRAN) 8 MG tablet Take 1 tablet (8 mg total) by mouth every 8 (eight) hours as needed for Nausea., Starting Thu 5/16/2024, Normal      polyethylene glycol (GLYCOLAX) 17 gram/dose powder Take 17 g by mouth once daily., Starting Thu 5/16/2024, Normal      promethazine (PHENERGAN) 25 MG tablet Take 1 tablet (25 mg total) by mouth every 6 (six) hours as needed for Nausea., Starting Thu 5/16/2024, Normal      REFRESH OPTIVE 0.5-0.9 % Drop Place 1 drop into both eyes 4 (four) times daily., Starting Mon 4/24/2023, Historical Med      simethicone (GAS-X ORAL) Take 1 tablet by mouth as needed., Historical Med      umeclidinium-vilanteroL (ANORO ELLIPTA) 62.5-25 mcg/actuation DsDv Inhale 1 puff into the lungs once daily. Controller, Starting Wed 3/20/2024, Normal       !! - Potential duplicate medications found. Please discuss with provider.          Discharge Procedure Orders   Diet Adult Regular     Lifting restrictions   Order Comments: No lifting over twenty pounds for six weeks     Remove dressing in 48 hours

## 2024-05-22 ENCOUNTER — TELEPHONE (OUTPATIENT)
Dept: FAMILY MEDICINE | Facility: CLINIC | Age: 74
End: 2024-05-22
Payer: MEDICARE

## 2024-05-22 DIAGNOSIS — I10 PRIMARY HYPERTENSION: Primary | ICD-10-CM

## 2024-05-22 DIAGNOSIS — Z79.899 ENCOUNTER FOR LONG-TERM (CURRENT) USE OF OTHER MEDICATIONS: ICD-10-CM

## 2024-05-22 NOTE — TELEPHONE ENCOUNTER
Needs micro at visit. Order entered. Does Dr Graham want to repeat TSH? It was abnormal in October, 0.193, and we have her on Levothyroxine.

## 2024-05-26 ENCOUNTER — PATIENT MESSAGE (OUTPATIENT)
Facility: CLINIC | Age: 74
End: 2024-05-26
Payer: MEDICARE

## 2024-05-26 ENCOUNTER — PATIENT MESSAGE (OUTPATIENT)
Dept: FAMILY MEDICINE | Facility: CLINIC | Age: 74
End: 2024-05-26
Payer: MEDICARE

## 2024-05-27 ENCOUNTER — DOCUMENTATION ONLY (OUTPATIENT)
Facility: CLINIC | Age: 74
End: 2024-05-27
Payer: MEDICARE

## 2024-05-27 ENCOUNTER — INFUSION (OUTPATIENT)
Dept: INFUSION THERAPY | Facility: HOSPITAL | Age: 74
End: 2024-05-27
Attending: INTERNAL MEDICINE
Payer: MEDICARE

## 2024-05-27 ENCOUNTER — SOCIAL WORK (OUTPATIENT)
Dept: HEMATOLOGY/ONCOLOGY | Facility: CLINIC | Age: 74
End: 2024-05-27
Payer: MEDICARE

## 2024-05-27 VITALS
HEIGHT: 62 IN | WEIGHT: 176.69 LBS | RESPIRATION RATE: 16 BRPM | TEMPERATURE: 97 F | OXYGEN SATURATION: 94 % | HEART RATE: 72 BPM | SYSTOLIC BLOOD PRESSURE: 129 MMHG | DIASTOLIC BLOOD PRESSURE: 84 MMHG | BODY MASS INDEX: 32.52 KG/M2

## 2024-05-27 DIAGNOSIS — C34.92 NON-SMALL CELL CARCINOMA OF LEFT LUNG: Primary | ICD-10-CM

## 2024-05-27 PROCEDURE — 96417 CHEMO IV INFUS EACH ADDL SEQ: CPT

## 2024-05-27 PROCEDURE — A4216 STERILE WATER/SALINE, 10 ML: HCPCS | Performed by: INTERNAL MEDICINE

## 2024-05-27 PROCEDURE — 96367 TX/PROPH/DG ADDL SEQ IV INF: CPT

## 2024-05-27 PROCEDURE — 63600175 PHARM REV CODE 636 W HCPCS: Performed by: INTERNAL MEDICINE

## 2024-05-27 PROCEDURE — 25000003 PHARM REV CODE 250: Performed by: INTERNAL MEDICINE

## 2024-05-27 PROCEDURE — 96366 THER/PROPH/DIAG IV INF ADDON: CPT

## 2024-05-27 PROCEDURE — 96413 CHEMO IV INFUSION 1 HR: CPT

## 2024-05-27 RX ORDER — HEPARIN 100 UNIT/ML
500 SYRINGE INTRAVENOUS
Status: CANCELLED | OUTPATIENT
Start: 2024-05-27

## 2024-05-27 RX ORDER — HEPARIN 100 UNIT/ML
500 SYRINGE INTRAVENOUS
Status: CANCELLED | OUTPATIENT
Start: 2024-05-28

## 2024-05-27 RX ORDER — SODIUM CHLORIDE 0.9 % (FLUSH) 0.9 %
10 SYRINGE (ML) INJECTION
Status: DISCONTINUED | OUTPATIENT
Start: 2024-05-27 | End: 2024-05-27 | Stop reason: HOSPADM

## 2024-05-27 RX ORDER — SODIUM CHLORIDE 0.9 % (FLUSH) 0.9 %
10 SYRINGE (ML) INJECTION
Status: CANCELLED | OUTPATIENT
Start: 2024-05-27

## 2024-05-27 RX ORDER — SODIUM CHLORIDE 0.9 % (FLUSH) 0.9 %
10 SYRINGE (ML) INJECTION
Status: CANCELLED | OUTPATIENT
Start: 2024-05-29

## 2024-05-27 RX ORDER — SODIUM CHLORIDE 0.9 % (FLUSH) 0.9 %
10 SYRINGE (ML) INJECTION
Status: CANCELLED | OUTPATIENT
Start: 2024-05-28

## 2024-05-27 RX ORDER — HEPARIN 100 UNIT/ML
500 SYRINGE INTRAVENOUS
Status: CANCELLED | OUTPATIENT
Start: 2024-05-29

## 2024-05-27 RX ORDER — HEPARIN 100 UNIT/ML
500 SYRINGE INTRAVENOUS
Status: DISCONTINUED | OUTPATIENT
Start: 2024-05-27 | End: 2024-05-27 | Stop reason: HOSPADM

## 2024-05-27 RX ADMIN — HEPARIN 500 UNITS: 100 SYRINGE at 01:05

## 2024-05-27 RX ADMIN — FOSAPREPITANT 150 MG: 150 INJECTION, POWDER, LYOPHILIZED, FOR SOLUTION INTRAVENOUS at 09:05

## 2024-05-27 RX ADMIN — CISPLATIN 140 MG: 1 INJECTION, SOLUTION INTRAVENOUS at 10:05

## 2024-05-27 RX ADMIN — SODIUM CHLORIDE 188 MG: 9 INJECTION, SOLUTION INTRAVENOUS at 11:05

## 2024-05-27 RX ADMIN — PALONOSETRON HYDROCHLORIDE 0.25 MG: 0.25 INJECTION INTRAVENOUS at 10:05

## 2024-05-27 RX ADMIN — POTASSIUM CHLORIDE 500 ML/HR: 2 INJECTION, SOLUTION, CONCENTRATE INTRAVENOUS at 07:05

## 2024-05-27 RX ADMIN — SODIUM CHLORIDE, PRESERVATIVE FREE 10 ML: 5 INJECTION INTRAVENOUS at 01:05

## 2024-05-27 NOTE — PROGRESS NOTES
CHEMO SCHOOL- NUTRITION    Joslyn Dubon is a 73 y.o. female with lung cancer. Pt will be receiving cisplatin, etoposide. I met with Mrs. Dubon for chemo school today. Pt reports excellent appetite and intake due to steroids which has resulted in 20# weight gain since diagnosis. She reports she is trying to stay hydrated but doesn't care for water. She has been struggling with constipation. She was eating Activia and reports relief but she recently developed a rash after eating it. She is taking a probiotic and enemas now. She is not currently taking any herbal or antioxidant supplements. She denies having any nutrition related questions or concerns at the current time.     CW: 176#.     Food Insecurity:  Patient is worried whether their food would run out before they have more money to buy more: Never  The food they bought just didn't last and they didn't have money to buy more: Never      Plan:   Reviewed chemo school packet & provided copy to pt.   Discussed importance of maintaining wt & staying hydrated.   Reviewed food safety guidelines recommended during treatment.   Discussed alternate sources of hydration  Recommended Sennakot and increased fiber with increased fluid intake to relieve constipation  Provided RD contact info & encouraged pt to call with any questions/concerns.   Will f/u as needed.       Electronically signed by: Wanda Butterfield MBA, NNEKAN, LDN

## 2024-05-27 NOTE — PROGRESS NOTES
Joslyn Dubon is a 73 year old diagnosed with lung cancer.  I met with patient during her first chemo infusion to complete new patient orientation and to complete the NCCN Distress Screening; patient indicated a rating of 0.  Patient denied needing psychosocial support at this time.  I provided patient with the [facility] community events flyer and my contact information in the event supportive services arise in the future.

## 2024-05-27 NOTE — PLAN OF CARE
Problem: Fall Injury Risk  Goal: Absence of Fall and Fall-Related Injury  Outcome: Progressing  Intervention: Promote Injury-Free Environment  Flowsheets (Taken 5/27/2024 0800)  Safety Promotion/Fall Prevention:   Fall Risk reviewed with patient/family   family to remain at bedside   supervised activity   room near unit station   instructed to call staff for mobility   in recliner, wheels locked

## 2024-05-28 ENCOUNTER — INFUSION (OUTPATIENT)
Dept: INFUSION THERAPY | Facility: HOSPITAL | Age: 74
End: 2024-05-28
Attending: INTERNAL MEDICINE
Payer: MEDICARE

## 2024-05-28 VITALS
OXYGEN SATURATION: 95 % | WEIGHT: 179.81 LBS | TEMPERATURE: 98 F | HEART RATE: 66 BPM | HEIGHT: 62 IN | RESPIRATION RATE: 18 BRPM | BODY MASS INDEX: 33.09 KG/M2 | SYSTOLIC BLOOD PRESSURE: 128 MMHG | DIASTOLIC BLOOD PRESSURE: 77 MMHG

## 2024-05-28 DIAGNOSIS — C34.92 NON-SMALL CELL CARCINOMA OF LEFT LUNG: Primary | ICD-10-CM

## 2024-05-28 PROCEDURE — 63600175 PHARM REV CODE 636 W HCPCS: Performed by: INTERNAL MEDICINE

## 2024-05-28 PROCEDURE — 25000003 PHARM REV CODE 250: Performed by: INTERNAL MEDICINE

## 2024-05-28 PROCEDURE — 96413 CHEMO IV INFUSION 1 HR: CPT

## 2024-05-28 PROCEDURE — A4216 STERILE WATER/SALINE, 10 ML: HCPCS | Performed by: INTERNAL MEDICINE

## 2024-05-28 RX ORDER — HEPARIN 100 UNIT/ML
500 SYRINGE INTRAVENOUS
Status: DISCONTINUED | OUTPATIENT
Start: 2024-05-28 | End: 2024-05-28 | Stop reason: HOSPADM

## 2024-05-28 RX ORDER — SODIUM CHLORIDE 0.9 % (FLUSH) 0.9 %
10 SYRINGE (ML) INJECTION
Status: DISCONTINUED | OUTPATIENT
Start: 2024-05-28 | End: 2024-05-28 | Stop reason: HOSPADM

## 2024-05-28 RX ADMIN — SODIUM CHLORIDE, PRESERVATIVE FREE 10 ML: 5 INJECTION INTRAVENOUS at 12:05

## 2024-05-28 RX ADMIN — SODIUM CHLORIDE 1000 ML: 9 INJECTION, SOLUTION INTRAVENOUS at 10:05

## 2024-05-28 RX ADMIN — HEPARIN 500 UNITS: 100 SYRINGE at 12:05

## 2024-05-28 RX ADMIN — SODIUM CHLORIDE 190 MG: 9 INJECTION, SOLUTION INTRAVENOUS at 10:05

## 2024-05-28 NOTE — PLAN OF CARE
Problem: Fatigue  Goal: Improved Activity Tolerance  Outcome: Progressing  Intervention: Promote Improved Energy  Flowsheets (Taken 5/28/2024 1039)  Fatigue Management:   frequent rest breaks encouraged   fatigue-related activity identified  Sleep/Rest Enhancement: regular sleep/rest pattern promoted  Environmental Support:   environmental consistency promoted   rest periods encouraged

## 2024-05-29 ENCOUNTER — INFUSION (OUTPATIENT)
Dept: INFUSION THERAPY | Facility: HOSPITAL | Age: 74
End: 2024-05-29
Attending: INTERNAL MEDICINE
Payer: MEDICARE

## 2024-05-29 ENCOUNTER — TELEPHONE (OUTPATIENT)
Facility: CLINIC | Age: 74
End: 2024-05-29
Payer: MEDICARE

## 2024-05-29 VITALS
OXYGEN SATURATION: 96 % | BODY MASS INDEX: 33.53 KG/M2 | TEMPERATURE: 97 F | WEIGHT: 182.19 LBS | DIASTOLIC BLOOD PRESSURE: 87 MMHG | HEIGHT: 62 IN | RESPIRATION RATE: 18 BRPM | HEART RATE: 66 BPM | SYSTOLIC BLOOD PRESSURE: 134 MMHG

## 2024-05-29 DIAGNOSIS — C34.92 NON-SMALL CELL CARCINOMA OF LEFT LUNG: Primary | ICD-10-CM

## 2024-05-29 PROCEDURE — 25000003 PHARM REV CODE 250: Performed by: INTERNAL MEDICINE

## 2024-05-29 PROCEDURE — A4216 STERILE WATER/SALINE, 10 ML: HCPCS | Performed by: INTERNAL MEDICINE

## 2024-05-29 PROCEDURE — 63600175 PHARM REV CODE 636 W HCPCS: Performed by: INTERNAL MEDICINE

## 2024-05-29 PROCEDURE — 96413 CHEMO IV INFUSION 1 HR: CPT

## 2024-05-29 RX ORDER — SODIUM CHLORIDE 0.9 % (FLUSH) 0.9 %
10 SYRINGE (ML) INJECTION
Status: DISCONTINUED | OUTPATIENT
Start: 2024-05-29 | End: 2024-05-29 | Stop reason: HOSPADM

## 2024-05-29 RX ORDER — HEPARIN 100 UNIT/ML
500 SYRINGE INTRAVENOUS
Status: DISCONTINUED | OUTPATIENT
Start: 2024-05-29 | End: 2024-05-29 | Stop reason: HOSPADM

## 2024-05-29 RX ADMIN — Medication 10 ML: at 09:05

## 2024-05-29 RX ADMIN — SODIUM CHLORIDE: 9 INJECTION, SOLUTION INTRAVENOUS at 08:05

## 2024-05-29 RX ADMIN — SODIUM CHLORIDE 190 MG: 9 INJECTION, SOLUTION INTRAVENOUS at 08:05

## 2024-05-29 RX ADMIN — HEPARIN 500 UNITS: 100 SYRINGE at 09:05

## 2024-05-29 NOTE — PLAN OF CARE
Problem: Fall Injury Risk  Goal: Absence of Fall and Fall-Related Injury  Outcome: Progressing  Intervention: Identify and Manage Contributors  Flowsheets (Taken 5/29/2024 0747)  Self-Care Promotion: independence encouraged  Medication Review/Management: medications reviewed  Intervention: Promote Injury-Free Environment  Flowsheets (Taken 5/29/2024 0747)  Safety Promotion/Fall Prevention: medications reviewed

## 2024-05-30 ENCOUNTER — OFFICE VISIT (OUTPATIENT)
Dept: FAMILY MEDICINE | Facility: CLINIC | Age: 74
End: 2024-05-30
Payer: MEDICARE

## 2024-05-30 VITALS
DIASTOLIC BLOOD PRESSURE: 70 MMHG | OXYGEN SATURATION: 99 % | BODY MASS INDEX: 33.99 KG/M2 | HEART RATE: 87 BPM | HEIGHT: 61 IN | SYSTOLIC BLOOD PRESSURE: 130 MMHG | WEIGHT: 180 LBS

## 2024-05-30 DIAGNOSIS — I10 PRIMARY HYPERTENSION: ICD-10-CM

## 2024-05-30 DIAGNOSIS — K21.9 GASTROESOPHAGEAL REFLUX DISEASE WITHOUT ESOPHAGITIS: ICD-10-CM

## 2024-05-30 DIAGNOSIS — R60.0 LOCALIZED EDEMA: ICD-10-CM

## 2024-05-30 DIAGNOSIS — Z72.0 TOBACCO ABUSE: Chronic | ICD-10-CM

## 2024-05-30 DIAGNOSIS — G62.9 NEUROPATHY: ICD-10-CM

## 2024-05-30 DIAGNOSIS — L72.3 SEBACEOUS CYST: ICD-10-CM

## 2024-05-30 DIAGNOSIS — I71.40 ABDOMINAL AORTIC ANEURYSM (AAA) WITHOUT RUPTURE, UNSPECIFIED PART: Chronic | ICD-10-CM

## 2024-05-30 DIAGNOSIS — I10 UNCONTROLLED HYPERTENSION: ICD-10-CM

## 2024-05-30 DIAGNOSIS — T46.4X5A ACE-INHIBITOR COUGH: ICD-10-CM

## 2024-05-30 DIAGNOSIS — M51.36 DDD (DEGENERATIVE DISC DISEASE), LUMBAR: ICD-10-CM

## 2024-05-30 DIAGNOSIS — E03.9 ACQUIRED HYPOTHYROIDISM: ICD-10-CM

## 2024-05-30 DIAGNOSIS — J43.1 PANLOBULAR EMPHYSEMA: ICD-10-CM

## 2024-05-30 DIAGNOSIS — R05.8 ACE-INHIBITOR COUGH: ICD-10-CM

## 2024-05-30 DIAGNOSIS — M54.12 CERVICAL RADICULOPATHY: ICD-10-CM

## 2024-05-30 DIAGNOSIS — C34.92 NON-SMALL CELL CARCINOMA OF LEFT LUNG: Primary | ICD-10-CM

## 2024-05-30 DIAGNOSIS — I10 ESSENTIAL HYPERTENSION: ICD-10-CM

## 2024-05-30 DIAGNOSIS — F41.9 ANXIETY: Chronic | ICD-10-CM

## 2024-05-30 DIAGNOSIS — M51.16 LUMBAR DISC DISEASE WITH RADICULOPATHY: ICD-10-CM

## 2024-05-30 PROCEDURE — 3075F SYST BP GE 130 - 139MM HG: CPT | Mod: CPTII,S$GLB,, | Performed by: FAMILY MEDICINE

## 2024-05-30 PROCEDURE — 1159F MED LIST DOCD IN RCRD: CPT | Mod: CPTII,S$GLB,, | Performed by: FAMILY MEDICINE

## 2024-05-30 PROCEDURE — 99214 OFFICE O/P EST MOD 30 MIN: CPT | Mod: S$GLB,,, | Performed by: FAMILY MEDICINE

## 2024-05-30 PROCEDURE — 3288F FALL RISK ASSESSMENT DOCD: CPT | Mod: CPTII,S$GLB,, | Performed by: FAMILY MEDICINE

## 2024-05-30 PROCEDURE — 3078F DIAST BP <80 MM HG: CPT | Mod: CPTII,S$GLB,, | Performed by: FAMILY MEDICINE

## 2024-05-30 PROCEDURE — 1101F PT FALLS ASSESS-DOCD LE1/YR: CPT | Mod: CPTII,S$GLB,, | Performed by: FAMILY MEDICINE

## 2024-05-30 RX ORDER — FUROSEMIDE 20 MG/1
20 TABLET ORAL DAILY PRN
Qty: 30 TABLET | Refills: 1 | Status: SHIPPED | OUTPATIENT
Start: 2024-05-30 | End: 2025-05-30

## 2024-05-30 RX ORDER — LEVOTHYROXINE SODIUM 88 UG/1
88 TABLET ORAL
Qty: 30 TABLET | Refills: 3 | Status: SHIPPED | OUTPATIENT
Start: 2024-05-30 | End: 2024-06-10 | Stop reason: DRUGHIGH

## 2024-05-30 RX ORDER — METOPROLOL TARTRATE 25 MG/1
25 TABLET, FILM COATED ORAL 2 TIMES DAILY
Qty: 180 TABLET | Refills: 3 | Status: SHIPPED | OUTPATIENT
Start: 2024-05-30 | End: 2025-05-30

## 2024-05-30 RX ORDER — HYDROCODONE BITARTRATE AND ACETAMINOPHEN 10; 325 MG/1; MG/1
1 TABLET ORAL EVERY 12 HOURS PRN
Qty: 50 TABLET | Refills: 0 | Status: SHIPPED | OUTPATIENT
Start: 2024-05-30

## 2024-05-30 RX ORDER — AMLODIPINE BESYLATE 5 MG/1
5 TABLET ORAL DAILY
Qty: 30 TABLET | Refills: 5 | Status: SHIPPED | OUTPATIENT
Start: 2024-05-30 | End: 2025-05-30

## 2024-05-31 LAB
ALBUMIN SERPL-MCNC: 4.2 G/DL (ref 3.6–5.1)
ALBUMIN/GLOB SERPL: 1.3 (CALC) (ref 1–2.5)
ALP SERPL-CCNC: 104 U/L (ref 37–153)
ALT SERPL-CCNC: 19 U/L (ref 6–29)
AST SERPL-CCNC: 20 U/L (ref 10–35)
BASOPHILS # BLD AUTO: 21 CELLS/UL (ref 0–200)
BASOPHILS NFR BLD AUTO: 0.3 %
BILIRUB SERPL-MCNC: 0.5 MG/DL (ref 0.2–1.2)
BUN SERPL-MCNC: 16 MG/DL (ref 7–25)
BUN/CREAT SERPL: 14 (CALC) (ref 6–22)
CALCIUM SERPL-MCNC: 9.6 MG/DL (ref 8.6–10.4)
CHLORIDE SERPL-SCNC: 104 MMOL/L (ref 98–110)
CO2 SERPL-SCNC: 28 MMOL/L (ref 20–32)
CREAT SERPL-MCNC: 1.11 MG/DL (ref 0.6–1)
EGFR: 52 ML/MIN/1.73M2
EOSINOPHIL # BLD AUTO: 62 CELLS/UL (ref 15–500)
EOSINOPHIL NFR BLD AUTO: 0.9 %
ERYTHROCYTE [DISTWIDTH] IN BLOOD BY AUTOMATED COUNT: 13.1 % (ref 11–15)
GLOBULIN SER CALC-MCNC: 3.2 G/DL (CALC) (ref 1.9–3.7)
GLUCOSE SERPL-MCNC: 76 MG/DL (ref 65–99)
HCT VFR BLD AUTO: 41.6 % (ref 35–45)
HGB BLD-MCNC: 13.7 G/DL (ref 11.7–15.5)
LYMPHOCYTES # BLD AUTO: 1470 CELLS/UL (ref 850–3900)
LYMPHOCYTES NFR BLD AUTO: 21.3 %
MAGNESIUM SERPL-MCNC: 2 MG/DL (ref 1.5–2.5)
MCH RBC QN AUTO: 31.8 PG (ref 27–33)
MCHC RBC AUTO-ENTMCNC: 32.9 G/DL (ref 32–36)
MCV RBC AUTO: 96.5 FL (ref 80–100)
MONOCYTES # BLD AUTO: 110 CELLS/UL (ref 200–950)
MONOCYTES NFR BLD AUTO: 1.6 %
NEUTROPHILS # BLD AUTO: 5237 CELLS/UL (ref 1500–7800)
NEUTROPHILS NFR BLD AUTO: 75.9 %
PLATELET # BLD AUTO: 184 THOUSAND/UL (ref 140–400)
PMV BLD REES-ECKER: 9.6 FL (ref 7.5–12.5)
POTASSIUM SERPL-SCNC: 5.2 MMOL/L (ref 3.5–5.3)
PROT SERPL-MCNC: 7.4 G/DL (ref 6.1–8.1)
RBC # BLD AUTO: 4.31 MILLION/UL (ref 3.8–5.1)
SODIUM SERPL-SCNC: 142 MMOL/L (ref 135–146)
WBC # BLD AUTO: 6.9 THOUSAND/UL (ref 3.8–10.8)

## 2024-06-01 PROBLEM — L72.3 SEBACEOUS CYST: Status: ACTIVE | Noted: 2024-06-01

## 2024-06-01 NOTE — PROGRESS NOTES
SUBJECTIVE:    Patient ID: Joslyn Dubon is a 73 y.o. female.    Chief Complaint: Follow-up (No bottles//Coming for routine follow up, but found palpaple lump on left side of abdomen, painful if pressed//colonoscopy, RSV, and Shingles vaccines declined. 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  Currently seeing [facility] pulmonary oncologist Dr. Siegel.  She was deemed not to be a surgical candidate for this lung tumor secondary to advanced COPD.  She is status post 5 radiation treatments.  She is now signed up for a total of 6 chemotherapy regimens.  These chemo rounds will last 3 days of treatment every 3 weeks.  Her 1st round of chemo had slight nausea but no other major side effects.    Dr. Wilson surgeon placed a right subclavian Port-A-Cath.    COPD-now breathing better with Anoro inhaler and albuterol rescue inhaler, has home oxygen at 2 L that she uses rarely.    She noticed a small lump on her stomach and is curious what this is,    Dr. Willis oncology following her case here in [place]..  [facility]    Anxiety, has lorazepam 0.5 mg to use p.r.n.    Follow-up  Associated symptoms include nausea. Pertinent negatives include no abdominal pain, chest pain, chills, coughing, fatigue, fever, joint swelling, myalgias, numbness or vomiting.       Telephone on 05/29/2024   Component Date Value Ref Range Status    WBC 05/30/2024 6.9  3.8 - 10.8 Thousand/uL Final    RBC 05/30/2024 4.31  3.80 - 5.10 Million/uL Final    Hemoglobin 05/30/2024 13.7  11.7 - 15.5 g/dL Final    Hematocrit 05/30/2024 41.6  35.0 - 45.0 % Final    MCV 05/30/2024 96.5  80.0 - 100.0 fL Final    MCH 05/30/2024 31.8  27.0 - 33.0 pg Final    MCHC 05/30/2024 32.9  32.0 - 36.0 g/dL Final    RDW 05/30/2024 13.1  11.0 - 15.0 % Final    Platelets 05/30/2024 184  140 - 400 Thousand/uL Final    MPV 05/30/2024 9.6   7.5 - 12.5 fL Final    Neutrophils, Abs 05/30/2024 5,237  1,500 - 7,800 cells/uL Final    Lymph # 05/30/2024 1,470  850 - 3,900 cells/uL Final    Mono # 05/30/2024 110 (L)  200 - 950 cells/uL Final    Eos # 05/30/2024 62  15 - 500 cells/uL Final    Baso # 05/30/2024 21  0 - 200 cells/uL Final    Neutrophils Relative 05/30/2024 75.9  % Final    Lymph % 05/30/2024 21.3  % Final    Mono % 05/30/2024 1.6  % Final    Eosinophil % 05/30/2024 0.9  % Final    Basophil % 05/30/2024 0.3  % Final    Glucose 05/30/2024 76  65 - 99 mg/dL Final    BUN 05/30/2024 16  7 - 25 mg/dL Final    Creatinine 05/30/2024 1.11 (H)  0.60 - 1.00 mg/dL Final    eGFR 05/30/2024 52 (L)  > OR = 60 mL/min/1.73m2 Final    BUN/Creatinine Ratio 05/30/2024 14  6 - 22 (calc) Final    Sodium 05/30/2024 142  135 - 146 mmol/L Final    Potassium 05/30/2024 5.2  3.5 - 5.3 mmol/L Final    Chloride 05/30/2024 104  98 - 110 mmol/L Final    CO2 05/30/2024 28  20 - 32 mmol/L Final    Calcium 05/30/2024 9.6  8.6 - 10.4 mg/dL Final    Total Protein 05/30/2024 7.4  6.1 - 8.1 g/dL Final    Albumin 05/30/2024 4.2  3.6 - 5.1 g/dL Final    Globulin, Total 05/30/2024 3.2  1.9 - 3.7 g/dL (calc) Final    Albumin/Globulin Ratio 05/30/2024 1.3  1.0 - 2.5 (calc) Final    Total Bilirubin 05/30/2024 0.5  0.2 - 1.2 mg/dL Final    Alkaline Phosphatase 05/30/2024 104  37 - 153 U/L Final    AST 05/30/2024 20  10 - 35 U/L Final    ALT 05/30/2024 19  6 - 29 U/L Final    Magnesium 05/30/2024 2.0  1.5 - 2.5 mg/dL Final   Lab Visit on 05/16/2024   Component Date Value Ref Range Status    WBC 05/16/2024 4.87  3.90 - 12.70 K/uL Final    RBC 05/16/2024 4.43  4.00 - 5.40 M/uL Final    Hemoglobin 05/16/2024 13.8  12.0 - 16.0 g/dL Final    Hematocrit 05/16/2024 42.0  37.0 - 48.5 % Final    MCV 05/16/2024 95  82 - 98 fL Final    MCH 05/16/2024 31.2 (H)  27.0 - 31.0 pg Final    MCHC 05/16/2024 32.9  32.0 - 36.0 g/dL Final    RDW 05/16/2024 13.2  11.5 - 14.5 % Final    Platelets 05/16/2024 204   150 - 450 K/uL Final    MPV 05/16/2024 8.6 (L)  9.2 - 12.9 fL Final    Immature Granulocytes 05/16/2024 0.4  0.0 - 0.5 % Final    Gran # (ANC) 05/16/2024 2.7  1.8 - 7.7 K/uL Final    Immature Grans (Abs) 05/16/2024 0.02  0.00 - 0.04 K/uL Final    Lymph # 05/16/2024 1.4  1.0 - 4.8 K/uL Final    Mono # 05/16/2024 0.6  0.3 - 1.0 K/uL Final    Eos # 05/16/2024 0.2  0.0 - 0.5 K/uL Final    Baso # 05/16/2024 0.02  0.00 - 0.20 K/uL Final    nRBC 05/16/2024 0  0 /100 WBC Final    Gran % 05/16/2024 55.5  38.0 - 73.0 % Final    Lymph % 05/16/2024 28.5  18.0 - 48.0 % Final    Mono % 05/16/2024 11.3  4.0 - 15.0 % Final    Eosinophil % 05/16/2024 3.9  0.0 - 8.0 % Final    Basophil % 05/16/2024 0.4  0.0 - 1.9 % Final    Differential Method 05/16/2024 Automated   Final    Sodium 05/16/2024 138  136 - 145 mmol/L Final    Potassium 05/16/2024 4.3  3.5 - 5.1 mmol/L Final    Chloride 05/16/2024 103  95 - 110 mmol/L Final    CO2 05/16/2024 27  23 - 29 mmol/L Final    Glucose 05/16/2024 92  70 - 110 mg/dL Final    BUN 05/16/2024 16  8 - 23 mg/dL Final    Creatinine 05/16/2024 1.0  0.5 - 1.4 mg/dL Final    Calcium 05/16/2024 9.8  8.7 - 10.5 mg/dL Final    Total Protein 05/16/2024 8.0  6.0 - 8.4 g/dL Final    Albumin 05/16/2024 4.2  3.5 - 5.2 g/dL Final    Total Bilirubin 05/16/2024 0.3  0.1 - 1.0 mg/dL Final    Alkaline Phosphatase 05/16/2024 95  55 - 135 U/L Final    AST 05/16/2024 19  10 - 40 U/L Final    ALT 05/16/2024 16  10 - 44 U/L Final    eGFR 05/16/2024 58.8 (A)  >60 mL/min/1.73 m^2 Final    Anion Gap 05/16/2024 8  8 - 16 mmol/L Final    Magnesium 05/16/2024 1.9  1.6 - 2.6 mg/dL Final   Orders Only on 04/25/2024   Component Date Value Ref Range Status    Reason for Study 05/01/2024 To identify somatic and germline mutations relevant to patient's cancer.   Final    Genetic Diseases Assessed 05/01/2024 Cancer   Final    Description of Ranges of DNA Seque* 05/01/2024 648 gene panel   Final    Overall Interpretation 05/01/2024  inconclusive   Final    MSI 05/01/2024 Stable   Final    TMB 05/01/2024 10.0  m/MB Final    [facility] Portal 05/01/2024 [email or web address]   Final    PD-L1 Interpretation by 22C3 05/01/2024 negative   Final    PD-L1 (22C3) Combined Positive Sco* 05/01/2024 1   Final    PD-L1 (22C3) Tumor Proportion Score 05/01/2024 <1  % Final    Fusion Addendum Issue Type 05/01/2024    Final                    Value:NEGATIVE  Negative - This addendum is being issued to report that no gene fusions or altered splicing variants from RNA sequencing analysis were found. This report is being issued to report the results of gene rearrangement and altered splicing analysis from RNA sequencing. No gene rearrangements nor reportable altered splicing events were identified from RNA sequencing.      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      [facility]: Clinical Trial Match 2 05/01/2024    Final                    Value:Clinical Trial NCT ID: [id number]  Clinical Trial Title: FOG-001 in Locally Advanced or Metastatic Solid Tumors  Clinical Trial URL: https://clinicaltrials.gov/ct2/show/FPB20325338  Clinical Phase: Phase 1/Phase 2  Matched criteria: APC p.E650* mutation      [facility]: Clinical Trial Match 3 05/01/2024    Final                    Value:Clinical Trial NCT ID: [id number]  Clinical Trial Title: Study Of ATRN-119 In Patients  With Advanced Solid Tumors  Clinical Trial URL: https://clinicaltrials.gov/ct2/show/MWR18603983  Clinical Phase: Phase 1/Phase 2  Matched criteria: RB1 p.E672* mutation, TP53 p.M1? mutation     Admission on 04/19/2024, Discharged on 04/19/2024   Component Date Value Ref Range Status    Anaerobic Culture 04/19/2024  (A)   Final                    Value:CUTIBACTERIUM ACNES  Rare      Aerobic Bacterial Culture 04/19/2024 No growth   Final    AFB Culture & Smear 04/19/2024 Culture in progress   Preliminary    AFB Culture & Smear 04/19/2024 Culture in progress   Preliminary    AFB CULTURE STAIN 04/19/2024 No acid fast bacilli seen.   Final    Gram Stain Result 04/19/2024 Rare WBC's   Final    Gram Stain Result 04/19/2024 No organisms seen   Final    Fungus (Mycology) Culture 04/19/2024 No fungus isolated after 4 weeks   Final    Final Pathologic Diagnosis 04/19/2024  (A)   Corrected                    Value:1. Lung, left upper lobe (aspiration and cellblock):  Non-small cell carcinoma.  See comment    2. Lung, left upper lobe (transbronchial biopsy):  Non-small cell carcinoma.  See comment    3. Lymph node, station 7 (aspiration and cellblock):    Negative for malignant cells  Abundant lymphocytes and lymphoid tangles, consistent with lymph node sampling      Supplemental Diagnosis 04/19/2024  (A)   Corrected                    Value:Immunostain PAX8 (performed with appropriate controls) is negative. There's no change in the original diagnosis  (only this immunostain has been interpreted and reported by Dr. Florez)      Microscopic Exam 04/19/2024  (A)   Corrected                    Value:1. Cellblock contains minimal to moderate tumor material, predominantly necrosis.    2. Cellblock contains viable tumor material (30%), and necrosis (70%).  AE1/3:  Positive, dot like  TTF:  Negative  P 40:  Negative  Synaptophysin: Negative  Chromogranin:  Negative  Ki-67:  High, near 90% of cells  Performed with appropriate  controls.    3. Cellblock contains lymphoid material, negative for tumor.        Comment 04/19/2024  (A)   Corrected                    Value:Smears and cellblock show a high-grade/poorly differentiated carcinoma, likely represents non-small cell lung carcinoma despite TTF (lung marker) being negative. Additional immunohistochemistry (PAX8) is pending.  Clinical/imaging correlation required.    [facility] ordered (block 2).     Barbra Florez and Vernon have reviewed pertinent portions of parts 1 and 2 and concur with the interpretation.      Disclaimer 04/19/2024 Screening was performed at [facility], [address], [place] [address]. (A)   Corrected   Hospital Outpatient Visit on 03/14/2024   Component Date Value Ref Range Status    POC Glucose 03/14/2024 91  70 - 110 Final   Lab Visit on 01/29/2024   Component Date Value Ref Range Status    WBC 01/29/2024 6.36  3.90 - 12.70 K/uL Final    RBC 01/29/2024 4.80  4.00 - 5.40 M/uL Final    Hemoglobin 01/29/2024 15.1  12.0 - 16.0 g/dL Final    Hematocrit 01/29/2024 45.2  37.0 - 48.5 % Final    MCV 01/29/2024 94  82 - 98 fL Final    MCH 01/29/2024 31.5 (H)  27.0 - 31.0 pg Final    MCHC 01/29/2024 33.4  32.0 - 36.0 g/dL Final    RDW 01/29/2024 13.3  11.5 - 14.5 % Final    Platelets 01/29/2024 217  150 - 450 K/uL Final    MPV 01/29/2024 9.2  9.2 - 12.9 fL Final    Immature Granulocytes 01/29/2024 0.5  0.0 - 0.5 % Final    Gran # (ANC) 01/29/2024 3.6  1.8 - 7.7 K/uL Final    Immature Grans (Abs) 01/29/2024 0.03  0.00 - 0.04 K/uL Final    Lymph # 01/29/2024 2.1  1.0 - 4.8 K/uL Final    Mono # 01/29/2024 0.5  0.3 - 1.0 K/uL Final    Eos # 01/29/2024 0.1  0.0 - 0.5 K/uL Final    Baso # 01/29/2024 0.04  0.00 - 0.20 K/uL Final    nRBC 01/29/2024 0  0 /100 WBC Final    Gran % 01/29/2024 56.0  38.0 - 73.0 % Final    Lymph % 01/29/2024 32.2  18.0 - 48.0 % Final    Mono % 01/29/2024 8.5  4.0 - 15.0 % Final    Eosinophil % 01/29/2024  2.2  0.0 - 8.0 % Final    Basophil % 01/29/2024 0.6  0.0 - 1.9 % Final    Differential Method 01/29/2024 Automated   Final    Prothrombin Time 01/29/2024 10.5  9.0 - 12.5 sec Final    INR 01/29/2024 1.0  0.8 - 1.2 Final   Hospital Outpatient Visit on 01/26/2024   Component Date Value Ref Range Status    Post FVC 01/26/2024 2.12  1.78 - 3.24 L Preliminary    Post FEV1 01/26/2024 1.50  1.37 - 2.44 L Preliminary    Post FEV1 FVC 01/26/2024 70.72  63.66 - 89.92 % Preliminary    Post FEF 25 75 01/26/2024 0.90  0.70 - 2.94 L/s Preliminary    Post PEF 01/26/2024 4.30  3.34 - 6.55 L/s Preliminary    Post  01/26/2024 9.46  sec Preliminary    Pre DLCO 01/26/2024 8.59 (L)  13.58 - 25.04 ml/(min*mmHg) Preliminary    DLCOVA PRE 01/26/2024 2.92  2.66 - 5.73 ml/(min*mmHg*L) Preliminary    VA PRE 01/26/2024 2.94 (L)  4.45 - 4.45 L Preliminary    IVC PRE 01/26/2024 1.88  1.78 - 3.24 L Preliminary    Pre TLC 01/26/2024 3.84  3.62 - 5.59 L Preliminary    VC PRE 01/26/2024 2.05  1.78 - 3.24 L Preliminary    Pre FRC PL 01/26/2024 2.48  1.78 - 3.43 L Preliminary    Pre ERV 01/26/2024 0.68  -84242.45 - 15814.55 L Preliminary    Pre RV 01/26/2024 1.80  1.47 - 2.63 L Preliminary    RVTLC PRE 01/26/2024 46.76  34.87 - 54.05 % Preliminary    Raw PRE 01/26/2024 6.27 (H)  3.06 - 3.06 cmH2O*s/L Preliminary    Pre FVC 01/26/2024 2.05  1.78 - 3.24 L Preliminary    Pre FEV1 01/26/2024 1.43  1.37 - 2.44 L Preliminary    Pre FEV1 FVC 01/26/2024 69.66  63.66 - 89.92 % Preliminary    Pre FEF 25 75 01/26/2024 0.86  0.70 - 2.94 L/s Preliminary    Pre PEF 01/26/2024 3.61  3.34 - 6.55 L/s Preliminary    Pre  01/26/2024 8.90  sec Preliminary    Pre MVV 01/26/2024 43.39 (L)  59.35 - 80.29 L/min Preliminary    FVC Ref 01/26/2024 2.49   Preliminary    FVC LLN 01/26/2024 1.78   Preliminary    FVC Pre Ref 01/26/2024 82.2  % Preliminary    FVC Post Ref 01/26/2024 85.2  % Preliminary    FVC Chg 01/26/2024 3.6  % Preliminary    FEV1 Ref 01/26/2024  1.92   Preliminary    FEV1 LLN 01/26/2024 1.37   Preliminary    FEV1 Pre Ref 01/26/2024 74.4  % Preliminary    FEV1 Post Ref 01/26/2024 78.2  % Preliminary    FEV1 Chg 01/26/2024 5.2  % Preliminary    FEV1 FVC Ref 01/26/2024 78   Preliminary    FEV1 FVC LLN 01/26/2024 64   Preliminary    FEV1 FVC Pre Ref 01/26/2024 89.6  % Preliminary    FEV1 FVC Post Ref 01/26/2024 91.0  % Preliminary    FEV1 FVC Chg 01/26/2024 1.5  % Preliminary    FEF 25 75 Ref 01/26/2024 1.61   Preliminary    FEF 25 75 LLN 01/26/2024 0.70   Preliminary    FEF 25 75 Pre Ref 01/26/2024 53.1  % Preliminary    FEF 25 75 Post Ref 01/26/2024 55.6  % Preliminary    FEF 25 75 Chg 01/26/2024 4.8  % Preliminary    PEF Ref 01/26/2024 4.94   Preliminary    PEF LLN 01/26/2024 3.34   Preliminary    PEF Pre Ref 01/26/2024 73.0  % Preliminary    PEF Post Ref 01/26/2024 87.1  % Preliminary    PEF Chg 01/26/2024 19.4  % Preliminary    GYH810 Chg 01/26/2024 6.3  % Preliminary    MVV Ref 01/26/2024 70   Preliminary    MVV LLN 01/26/2024 59   Preliminary    MVV Pre Ref 01/26/2024 62.1  % Preliminary    TLC Ref 01/26/2024 4.60   Preliminary    TLC LLN 01/26/2024 3.62   Preliminary    TLC Pre Ref 01/26/2024 83.5  % Preliminary    VC Ref 01/26/2024 2.49   Preliminary    VC LLN 01/26/2024 1.78   Preliminary    VC Pre Ref 01/26/2024 82.2  % Preliminary    FRCpleth Ref 01/26/2024 2.60   Preliminary    FRCpleth LLN 01/26/2024 1.78   Preliminary    FRCpleth PreRef 01/26/2024 95.2  % Preliminary    ERV Ref 01/26/2024 0.55   Preliminary    ERV LLN 01/26/2024 -30760.45   Preliminary    ERV Pre Ref 01/26/2024 123.1  % Preliminary    RV Ref 01/26/2024 2.05   Preliminary    RV LLN 01/26/2024 1.47   Preliminary    RV Pre Ref 01/26/2024 87.7  % Preliminary    RVTLC Ref 01/26/2024 44   Preliminary    RVTLC LLN 01/26/2024 35   Preliminary    RVTLC Pre Ref 01/26/2024 105.2  % Preliminary    Raw Ref 01/26/2024 3.06   Preliminary    Raw LLN 01/26/2024 3.06   Preliminary    Raw Pre Ref  01/26/2024 204.9  % Preliminary    DLCO Single Breath Ref 01/26/2024 19.31   Preliminary    DLCO Single Breath LLN 01/26/2024 13.58   Preliminary    DLCO Single Breath Pre Ref 01/26/2024 44.5  % Preliminary    DLCOc Single Breath Ref 01/26/2024 19.31   Preliminary    DLCOc Single Breath LLN 01/26/2024 13.58   Preliminary    DLCOVA Ref 01/26/2024 4.19   Preliminary    DLCOVA LLN 01/26/2024 2.66   Preliminary    DLCOVA Pre Ref 01/26/2024 69.6  % Preliminary    DLCOc SBVA Ref 01/26/2024 4.19   Preliminary    DLCOc SBVA LLN 01/26/2024 2.66   Preliminary    VA Single Breath Ref 01/26/2024 4.45   Preliminary    VA Single Breath LLN 01/26/2024 4.45   Preliminary    VA Single Breath Pre Ref 01/26/2024 66.1  % Preliminary    IVC Single Breath Ref 01/26/2024 2.49   Preliminary    IVC Single Breath LLN 01/26/2024 1.78   Preliminary    IVC Single Breath Pre Ref 01/26/2024 75.4  % Preliminary   Admission on 01/19/2024, Discharged on 01/19/2024   Component Date Value Ref Range Status    WBC 01/19/2024 6.85  3.90 - 12.70 K/uL Final    RBC 01/19/2024 4.58  4.00 - 5.40 M/uL Final    Hemoglobin 01/19/2024 14.5  12.0 - 16.0 g/dL Final    Hematocrit 01/19/2024 44.3  37.0 - 48.5 % Final    MCV 01/19/2024 97  82 - 98 fL Final    MCH 01/19/2024 31.7 (H)  27.0 - 31.0 pg Final    MCHC 01/19/2024 32.7  32.0 - 36.0 g/dL Final    RDW 01/19/2024 13.2  11.5 - 14.5 % Final    Platelets 01/19/2024 263  150 - 450 K/uL Final    MPV 01/19/2024 8.7 (L)  9.2 - 12.9 fL Final    Immature Granulocytes 01/19/2024 0.4  0.0 - 0.5 % Final    Gran # (ANC) 01/19/2024 4.1  1.8 - 7.7 K/uL Final    Immature Grans (Abs) 01/19/2024 0.03  0.00 - 0.04 K/uL Final    Lymph # 01/19/2024 2.0  1.0 - 4.8 K/uL Final    Mono # 01/19/2024 0.7  0.3 - 1.0 K/uL Final    Eos # 01/19/2024 0.1  0.0 - 0.5 K/uL Final    Baso # 01/19/2024 0.04  0.00 - 0.20 K/uL Final    nRBC 01/19/2024 0  0 /100 WBC Final    Gran % 01/19/2024 59.5  38.0 - 73.0 % Final    Lymph % 01/19/2024 28.8  18.0 -  48.0 % Final    Mono % 01/19/2024 9.5  4.0 - 15.0 % Final    Eosinophil % 01/19/2024 1.2  0.0 - 8.0 % Final    Basophil % 01/19/2024 0.6  0.0 - 1.9 % Final    Differential Method 01/19/2024 Automated   Final    Sodium 01/19/2024 140  136 - 145 mmol/L Final    Potassium 01/19/2024 4.0  3.5 - 5.1 mmol/L Final    Chloride 01/19/2024 105  95 - 110 mmol/L Final    CO2 01/19/2024 24  23 - 29 mmol/L Final    Glucose 01/19/2024 91  70 - 110 mg/dL Final    BUN 01/19/2024 14  8 - 23 mg/dL Final    Creatinine 01/19/2024 0.9  0.5 - 1.4 mg/dL Final    Calcium 01/19/2024 9.9  8.7 - 10.5 mg/dL Final    Total Protein 01/19/2024 8.3  6.0 - 8.4 g/dL Final    Albumin 01/19/2024 3.8  3.5 - 5.2 g/dL Final    Total Bilirubin 01/19/2024 0.3  0.1 - 1.0 mg/dL Final    Alkaline Phosphatase 01/19/2024 114  55 - 135 U/L Final    AST 01/19/2024 24  10 - 40 U/L Final    ALT 01/19/2024 26  10 - 44 U/L Final    eGFR 01/19/2024 >60  >60 mL/min/1.73 m^2 Final    Anion Gap 01/19/2024 11  8 - 16 mmol/L Final    BNP 01/19/2024 67  0 - 99 pg/mL Final    Troponin I 01/19/2024 <0.006  0.000 - 0.026 ng/mL Final    Lactate (Lactic Acid) 01/19/2024 1.7  0.5 - 2.2 mmol/L Final   Admission on 01/06/2024, Discharged on 01/08/2024   Component Date Value Ref Range Status    WBC 01/06/2024 2.58 (L)  3.90 - 12.70 K/uL Final    RBC 01/06/2024 4.32  4.00 - 5.40 M/uL Final    Hemoglobin 01/06/2024 13.6  12.0 - 16.0 g/dL Final    Hematocrit 01/06/2024 41.4  37.0 - 48.5 % Final    MCV 01/06/2024 96  82 - 98 fL Final    MCH 01/06/2024 31.5 (H)  27.0 - 31.0 pg Final    MCHC 01/06/2024 32.9  32.0 - 36.0 g/dL Final    RDW 01/06/2024 13.6  11.5 - 14.5 % Final    Platelets 01/06/2024 124 (L)  150 - 450 K/uL Final    MPV 01/06/2024 9.8  9.2 - 12.9 fL Final    Immature Granulocytes 01/06/2024 CANCELED  % Final-Edited    Immature Grans (Abs) 01/06/2024 CANCELED  K/uL Final-Edited    nRBC 01/06/2024 0  0 /100 WBC Final    Gran % 01/06/2024 29.0 (L)  38.0 - 73.0 % Final    Lymph %  01/06/2024 51.0 (H)  18.0 - 48.0 % Final    Mono % 01/06/2024 20.0 (H)  4.0 - 15.0 % Final    Eosinophil % 01/06/2024 0.0  0.0 - 8.0 % Final    Basophil % 01/06/2024 0.0  0.0 - 1.9 % Final    Platelet Estimate 01/06/2024 Decreased (A)   Final    Differential Method 01/06/2024 Manual   Corrected    Sodium 01/06/2024 142  136 - 145 mmol/L Final    Potassium 01/06/2024 3.4 (L)  3.5 - 5.1 mmol/L Final    Chloride 01/06/2024 107  95 - 110 mmol/L Final    CO2 01/06/2024 24  23 - 29 mmol/L Final    Glucose 01/06/2024 95  70 - 110 mg/dL Final    BUN 01/06/2024 12  8 - 23 mg/dL Final    Creatinine 01/06/2024 0.8  0.5 - 1.4 mg/dL Final    Calcium 01/06/2024 8.9  8.7 - 10.5 mg/dL Final    Total Protein 01/06/2024 7.0  6.0 - 8.4 g/dL Final    Albumin 01/06/2024 3.2 (L)  3.5 - 5.2 g/dL Final    Total Bilirubin 01/06/2024 0.3  0.1 - 1.0 mg/dL Final    Alkaline Phosphatase 01/06/2024 75  55 - 135 U/L Final    AST 01/06/2024 37  10 - 40 U/L Final    ALT 01/06/2024 21  10 - 44 U/L Final    eGFR 01/06/2024 >60  >60 mL/min/1.73 m^2 Final    Anion Gap 01/06/2024 11  8 - 16 mmol/L Final    Sodium 01/07/2024 143  136 - 145 mmol/L Final    Potassium 01/07/2024 3.7  3.5 - 5.1 mmol/L Final    Chloride 01/07/2024 108  95 - 110 mmol/L Final    CO2 01/07/2024 25  23 - 29 mmol/L Final    Glucose 01/07/2024 87  70 - 110 mg/dL Final    BUN 01/07/2024 9  8 - 23 mg/dL Final    Creatinine 01/07/2024 0.8  0.5 - 1.4 mg/dL Final    Calcium 01/07/2024 8.3 (L)  8.7 - 10.5 mg/dL Final    Anion Gap 01/07/2024 10  8 - 16 mmol/L Final    eGFR 01/07/2024 >60  >60 mL/min/1.73 m^2 Final    Magnesium 01/07/2024 1.9  1.6 - 2.6 mg/dL Final    Phosphorus 01/07/2024 2.6 (L)  2.7 - 4.5 mg/dL Final    WBC 01/07/2024 2.69 (L)  3.90 - 12.70 K/uL Final    RBC 01/07/2024 4.07  4.00 - 5.40 M/uL Final    Hemoglobin 01/07/2024 12.9  12.0 - 16.0 g/dL Final    Hematocrit 01/07/2024 39.0  37.0 - 48.5 % Final    MCV 01/07/2024 96  82 - 98 fL Final    MCH 01/07/2024 31.7 (H)   27.0 - 31.0 pg Final    MCHC 01/07/2024 33.1  32.0 - 36.0 g/dL Final    RDW 01/07/2024 13.7  11.5 - 14.5 % Final    Platelets 01/07/2024 107 (L)  150 - 450 K/uL Final    MPV 01/07/2024 9.9  9.2 - 12.9 fL Final    Immature Granulocytes 01/07/2024 CANCELED  % Final-Edited    Immature Grans (Abs) 01/07/2024 CANCELED  K/uL Final-Edited    nRBC 01/07/2024 0  0 /100 WBC Final    Gran % 01/07/2024 39.0  38.0 - 73.0 % Final    Lymph % 01/07/2024 50.0 (H)  18.0 - 48.0 % Final    Mono % 01/07/2024 11.0  4.0 - 15.0 % Final    Eosinophil % 01/07/2024 0.0  0.0 - 8.0 % Final    Basophil % 01/07/2024 0.0  0.0 - 1.9 % Final    Platelet Estimate 01/07/2024 Decreased (A)   Final    Differential Method 01/07/2024 Manual   Corrected    Procalcitonin 01/07/2024 0.04  <0.25 ng/mL Final    Sodium 01/08/2024 140  136 - 145 mmol/L Final    Potassium 01/08/2024 3.8  3.5 - 5.1 mmol/L Final    Chloride 01/08/2024 105  95 - 110 mmol/L Final    CO2 01/08/2024 25  23 - 29 mmol/L Final    Glucose 01/08/2024 87  70 - 110 mg/dL Final    BUN 01/08/2024 8  8 - 23 mg/dL Final    Creatinine 01/08/2024 0.8  0.5 - 1.4 mg/dL Final    Calcium 01/08/2024 8.6 (L)  8.7 - 10.5 mg/dL Final    Anion Gap 01/08/2024 10  8 - 16 mmol/L Final    eGFR 01/08/2024 >60  >60 mL/min/1.73 m^2 Final    Magnesium 01/08/2024 1.9  1.6 - 2.6 mg/dL Final    Phosphorus 01/08/2024 2.1 (L)  2.7 - 4.5 mg/dL Final    WBC 01/08/2024 3.10 (L)  3.90 - 12.70 K/uL Final    RBC 01/08/2024 4.03  4.00 - 5.40 M/uL Final    Hemoglobin 01/08/2024 12.7  12.0 - 16.0 g/dL Final    Hematocrit 01/08/2024 38.9  37.0 - 48.5 % Final    MCV 01/08/2024 97  82 - 98 fL Final    MCH 01/08/2024 31.5 (H)  27.0 - 31.0 pg Final    MCHC 01/08/2024 32.6  32.0 - 36.0 g/dL Final    RDW 01/08/2024 13.5  11.5 - 14.5 % Final    Platelets 01/08/2024 113 (L)  150 - 450 K/uL Final    MPV 01/08/2024 9.9  9.2 - 12.9 fL Final    Immature Granulocytes 01/08/2024 0.0  0.0 - 0.5 % Final    Gran # (ANC) 01/08/2024 1.4 (L)  1.8 -  7.7 K/uL Final    Immature Grans (Abs) 01/08/2024 0.00  0.00 - 0.04 K/uL Final    Lymph # 01/08/2024 1.4  1.0 - 4.8 K/uL Final    Mono # 01/08/2024 0.3  0.3 - 1.0 K/uL Final    Eos # 01/08/2024 0.0  0.0 - 0.5 K/uL Final    Baso # 01/08/2024 0.01  0.00 - 0.20 K/uL Final    nRBC 01/08/2024 0  0 /100 WBC Final    Gran % 01/08/2024 43.6  38.0 - 73.0 % Final    Lymph % 01/08/2024 45.8  18.0 - 48.0 % Final    Mono % 01/08/2024 9.7  4.0 - 15.0 % Final    Eosinophil % 01/08/2024 0.6  0.0 - 8.0 % Final    Basophil % 01/08/2024 0.3  0.0 - 1.9 % Final    Differential Method 01/08/2024 Automated   Final   Admission on 01/03/2024, Discharged on 01/03/2024   Component Date Value Ref Range Status    WBC 01/03/2024 3.53 (L)  3.90 - 12.70 K/uL Final    RBC 01/03/2024 4.43  4.00 - 5.40 M/uL Final    Hemoglobin 01/03/2024 14.1  12.0 - 16.0 g/dL Final    Hematocrit 01/03/2024 41.7  37.0 - 48.5 % Final    MCV 01/03/2024 94  82 - 98 fL Final    MCH 01/03/2024 31.8 (H)  27.0 - 31.0 pg Final    MCHC 01/03/2024 33.8  32.0 - 36.0 g/dL Final    RDW 01/03/2024 13.5  11.5 - 14.5 % Final    Platelets 01/03/2024 136 (L)  150 - 450 K/uL Final    MPV 01/03/2024 9.5  9.2 - 12.9 fL Final    Immature Granulocytes 01/03/2024 0.3  0.0 - 0.5 % Final    Gran # (ANC) 01/03/2024 2.2  1.8 - 7.7 K/uL Final    Immature Grans (Abs) 01/03/2024 0.01  0.00 - 0.04 K/uL Final    Lymph # 01/03/2024 0.8 (L)  1.0 - 4.8 K/uL Final    Mono # 01/03/2024 0.5  0.3 - 1.0 K/uL Final    Eos # 01/03/2024 0.0  0.0 - 0.5 K/uL Final    Baso # 01/03/2024 0.01  0.00 - 0.20 K/uL Final    nRBC 01/03/2024 0  0 /100 WBC Final    Gran % 01/03/2024 62.3  38.0 - 73.0 % Final    Lymph % 01/03/2024 22.1  18.0 - 48.0 % Final    Mono % 01/03/2024 15.0  4.0 - 15.0 % Final    Eosinophil % 01/03/2024 0.0  0.0 - 8.0 % Final    Basophil % 01/03/2024 0.3  0.0 - 1.9 % Final    Differential Method 01/03/2024 Automated   Final    Sodium 01/03/2024 137  136 - 145 mmol/L Final    Potassium 01/03/2024 3.8   3.5 - 5.1 mmol/L Final    Chloride 2024 104  95 - 110 mmol/L Final    CO2 2024 23  23 - 29 mmol/L Final    Glucose 2024 99  70 - 110 mg/dL Final    BUN 2024 18  8 - 23 mg/dL Final    Creatinine 2024 1.0  0.5 - 1.4 mg/dL Final    Calcium 2024 9.1  8.7 - 10.5 mg/dL Final    Total Protein 2024 7.7  6.0 - 8.4 g/dL Final    Albumin 2024 3.7  3.5 - 5.2 g/dL Final    Total Bilirubin 2024 0.5  0.1 - 1.0 mg/dL Final    Alkaline Phosphatase 2024 88  55 - 135 U/L Final    AST 2024 39  10 - 40 U/L Final    ALT 2024 27  10 - 44 U/L Final    eGFR 2024 59 (A)  >60 mL/min/1.73 m^2 Final    Anion Gap 2024 10  8 - 16 mmol/L Final    Influenza A, Molecular 2024 Positive (AA)  Negative Final    Influenza B, Molecular 2024 Negative  Negative Final    Flu A & B Source 2024 Nasal swab   Final    SARS-CoV-2 RNA, Amplification, Qual 2024 Negative  Negative Final    RSV Source 2024 Nasopharyngeal Swab   Final    RSV Ag by Molecular Method 2024 Negative  Negative Final   There may be more visits with results that are not included.       Past Medical History:   Diagnosis Date    Anxiety     Martin esophagus     Colitis     COPD (chronic obstructive pulmonary disease)     GERD (gastroesophageal reflux disease)     Hypertension     Lung cancer     Thyroid disease     Vocal cord polyps      Social History     Socioeconomic History    Marital status:    Tobacco Use    Smoking status: Former     Types: Cigarettes     Start date: 2024     Quit date: 1964     Years since quittin.4    Smokeless tobacco: Never    Tobacco comments:     Pt has smoked since 14 years old. Smokes around .5ppd. Inpatient smoking education done 10/20.     Pt quit smoking on 24.  She has never used smokeless tobacco   Substance and Sexual Activity    Alcohol use: Never    Drug use: Never    Sexual activity: Yes     Partners: Male      Social Determinants of Health     Financial Resource Strain: Low Risk  (3/15/2024)    Overall Financial Resource Strain (CARDIA)     Difficulty of Paying Living Expenses: Not hard at all   Food Insecurity: No Food Insecurity (3/15/2024)    Hunger Vital Sign     Worried About Running Out of Food in the Last Year: Never true     Ran Out of Food in the Last Year: Never true   Transportation Needs: No Transportation Needs (3/15/2024)    PRAPARE - Transportation     Lack of Transportation (Medical): No     Lack of Transportation (Non-Medical): No   Physical Activity: Inactive (3/15/2024)    Exercise Vital Sign     Days of Exercise per Week: 0 days     Minutes of Exercise per Session: 0 min   Stress: Stress Concern Present (3/15/2024)    [employer] - Occupational Stress Questionnaire     Feeling of Stress : Very much   Housing Stability: Low Risk  (3/15/2024)    Housing Stability Vital Sign     Unable to Pay for Housing in the Last Year: No     Number of Places Lived in the Last Year: 1     Unstable Housing in the Last Year: No     Past Surgical History:   Procedure Laterality Date    ABDOMINAL AORTIC ANEURYSM REPAIR      BACK SURGERY      cervical    CATARACT EXTRACTION Right 02/2021    CHOLECYSTECTOMY  12/20/2013    Dr Albert CORLEY    ENDOBRONCHIAL ULTRASOUND N/A 4/19/2024    Procedure: ENDOBRONCHIAL ULTRASOUND (EBUS);  Surgeon: Kizzy Siegel MD;  Location: [address];  Service: Pulmonary;  Laterality: N/A;    FOOT SURGERY      HYSTERECTOMY  1979    AUGUSTINE for AUB with consevation ovaries    INSERTION OF TUNNELED CENTRAL VENOUS CATHETER (CVC) WITH SUBCUTANEOUS PORT Right 5/20/2024    Procedure: HYHWTNUID-ZDAV-L-CATH;  Surgeon: Simon Wilson III, MD;  Location: [facility] OR;  Service: General;  Laterality: Right;    ROBOTIC BRONCHOSCOPY N/A 4/19/2024    Procedure: ROBOTIC BRONCHOSCOPY;  Surgeon: Kizzy Siegel MD;  Location: [facility] OR [address] [place] [address];  Service: Pulmonary;  Laterality: N/A;     SALIVARY GLAND SURGERY       Family History   Problem Relation Name Age of Onset    Bladder Cancer Mother      Heart failure Father      Emphysema Sister      Pancreatic cancer Sister      No Known Problems Brother         The CVD Risk score (KAREN'Rejiino, et al., 2008) failed to calculate for the following reasons:    The 2008 CVD risk score is only valid for ages 30 to 74    Tests to Keep You Healthy    Colon Cancer Screening: ORDERED  Last Blood Pressure <= 139/89 (5/30/2024): Yes  Tobacco Cessation: Yes      Review of patient's allergies indicates:   Allergen Reactions    Bisacodyl Nausea And Vomiting     Other reaction(s): Vomiting    Iodinated contrast media Hives and Shortness Of Breath     hypotension    Morphine Other (See Comments)     hypotension    Azithromycin Hives    Cipro [ciprofloxacin hcl]     Demerol [meperidine]      Nausea vomiting, visual problem    Doxycycline Hives    Flonase [fluticasone propionate] Hives    Morpholine analogues Other (See Comments)     hypotension       Current Outpatient Medications:     diphenhydrAMINE (BENADRYL) 25 mg capsule, Take 1 capsule (25 mg total) by mouth every 6 (six) hours as needed for Itching or Allergies., Disp: 20 capsule, Rfl: 0    EPINEPHrine (EPIPEN) 0.3 mg/0.3 mL AtIn, Inject 0.3 mLs (0.3 mg total) into the muscle as needed (Severe allergic reaction symptoms such as shortness of breath, tongue or throat swelling, weakness dizziness severe nausea vomiting)., Disp: 1 each, Rfl: 3    famotidine (PEPCID) 20 MG tablet, Take 1 tablet (20 mg total) by mouth 2 (two) times daily., Disp: 20 tablet, Rfl: 0    levalbuterol (XOPENEX HFA) 45 mcg/actuation inhaler, Inhale 2 puffs into the lungs every 6 (six) hours as needed for Wheezing. Rescue, Disp: 15 g, Rfl: 4    LIDOcaine-prilocaine (EMLA) cream, Apply topically as needed., Disp: 30 g, Rfl: 5    omeprazole (PRILOSEC) 40 MG capsule, Take 1 capsule (40 mg total) by mouth every morning., Disp: 90 capsule, Rfl: 3     ondansetron (ZOFRAN) 4 MG tablet, Take 1 tablet (4 mg total) by mouth every 8 (eight) hours as needed for Nausea., Disp: 30 tablet, Rfl: 3    ondansetron (ZOFRAN) 8 MG tablet, Take 1 tablet (8 mg total) by mouth every 8 (eight) hours as needed for Nausea., Disp: 30 tablet, Rfl: 5    polyethylene glycol (GLYCOLAX) 17 gram/dose powder, Take 17 g by mouth once daily., Disp: 510 g, Rfl: 5    promethazine (PHENERGAN) 25 MG tablet, Take 1 tablet (25 mg total) by mouth every 6 (six) hours as needed for Nausea., Disp: 30 tablet, Rfl: 5    REFRESH OPTIVE 0.5-0.9 % Drop, Place 1 drop into both eyes 4 (four) times daily., Disp: , Rfl:     simethicone (GAS-X ORAL), Take 1 tablet by mouth as needed., Disp: , Rfl:     umeclidinium-vilanteroL (ANORO ELLIPTA) 62.5-25 mcg/actuation DsDv, Inhale 1 puff into the lungs once daily. Controller, Disp: 1 each, Rfl: 11    amLODIPine (NORVASC) 5 MG tablet, Take 1 tablet (5 mg total) by mouth once daily. For blood pressure, Disp: 30 tablet, Rfl: 5    furosemide (LASIX) 20 MG tablet, Take 1 tablet (20 mg total) by mouth daily as needed (edema)., Disp: 30 tablet, Rfl: 1    HYDROcodone-acetaminophen (NORCO)  mg per tablet, Take 1 tablet by mouth every 12 (twelve) hours as needed for Pain., Disp: 50 tablet, Rfl: 0    levothyroxine (SYNTHROID) 88 MCG tablet, Take 1 tablet (88 mcg total) by mouth before breakfast., Disp: 30 tablet, Rfl: 3    metoprolol tartrate (LOPRESSOR) 25 MG tablet, Take 1 tablet (25 mg total) by mouth 2 (two) times daily., Disp: 180 tablet, Rfl: 3    Review of Systems   Constitutional:  Negative for appetite change, chills, fatigue, fever and unexpected weight change.   HENT:  Negative for ear discharge and ear pain.    Eyes:  Negative for pain, discharge and visual disturbance.   Respiratory:  Positive for shortness of breath and wheezing. Negative for apnea and cough.    Cardiovascular:  Negative for chest pain, palpitations and leg swelling.   Gastrointestinal:   "Positive for nausea. Negative for abdominal pain, blood in stool, constipation, diarrhea, vomiting and reflux.   Endocrine: Negative for cold intolerance, heat intolerance and polydipsia.   Genitourinary:  Negative for bladder incontinence, dysuria, hematuria, nocturia and urgency.   Musculoskeletal:  Negative for gait problem, joint swelling and myalgias.   Neurological:  Negative for dizziness, seizures and numbness.   Psychiatric/Behavioral:  Negative for behavioral problems and hallucinations. The patient is not nervous/anxious.            Objective:      Vitals:    05/30/24 0751   BP: 130/70   Pulse: 87   SpO2: 99%   Weight: 81.6 kg (180 lb)   Height: 5' 1" (1.549 m)     Physical Exam  Vitals and nursing note reviewed.   Constitutional:       General: She is not in acute distress.     Appearance: She is well-developed. She is obese. She is not toxic-appearing.   HENT:      Head: Normocephalic and atraumatic.      Right Ear: Tympanic membrane and external ear normal.      Left Ear: Tympanic membrane and external ear normal.      Nose: Nose normal.      Mouth/Throat:      Pharynx: Oropharynx is clear.   Eyes:      Pupils: Pupils are equal, round, and reactive to light.   Neck:      Thyroid: No thyromegaly.      Vascular: No carotid bruit.   Cardiovascular:      Rate and Rhythm: Normal rate and regular rhythm.      Heart sounds: Normal heart sounds. No murmur heard.  Pulmonary:      Effort: Pulmonary effort is normal.      Breath sounds: Normal breath sounds. No wheezing or rales.   Abdominal:      General: Bowel sounds are normal. There is no distension.      Palpations: Abdomen is soft.      Tenderness: There is no abdominal tenderness.   Musculoskeletal:         General: No tenderness or deformity. Normal range of motion.      Cervical back: Normal range of motion and neck supple.      Lumbar back: Normal. No spasms.      Comments: Bends 90 degrees at  waist shoulders and knees good range of motion with minimal " crepitant knees, no pitting edema to lower extremities   Lymphadenopathy:      Cervical: No cervical adenopathy.   Skin:     General: Skin is warm and dry.      Findings: No rash.      Comments: Left lower quadrant of the abdomen has a 2 x 2 cm nodule versus cyst, it feels rounded and nontender   Neurological:      Mental Status: She is alert and oriented to person, place, and time.      Cranial Nerves: No cranial nerve deficit.      Coordination: Coordination normal.      Gait: Gait normal.   Psychiatric:         Mood and Affect: Mood normal.         Behavior: Behavior normal.         Thought Content: Thought content normal.         Judgment: Judgment normal.           Assessment:       1. Non-small cell carcinoma of left lung    2. Uncontrolled hypertension    3. DDD (degenerative disc disease), lumbar    4. Acquired hypothyroidism    5. Uncontrolled hypertension    6. Localized edema    7. Lumbar disc disease with radiculopathy    8. Cervical radiculopathy    9. Neuropathy    10. Anxiety    11. Panlobular emphysema    12. ACE-inhibitor cough    13. Primary hypertension    14. Abdominal aortic aneurysm (AAA) without rupture, unspecified part    15. Essential hypertension    16. Gastroesophageal reflux disease without esophagitis    17. Tobacco abuse    18. 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  Dr. Willis and Dr. Siegel following  Uncontrolled hypertension  -     amLODIPine (NORVASC) 5 MG tablet; Take 1 tablet (5 mg total) by mouth once daily. For blood pressure  Dispense: 30 tablet; Refill: 5  -     metoprolol tartrate (LOPRESSOR) 25 MG tablet; Take 1 tablet (25 mg total) by mouth 2 (two) times daily.  Dispense: 180 tablet; Refill: 3  Blood pressure well controlled  DDD (degenerative disc disease), lumbar  -     HYDROcodone-acetaminophen  (NORCO)  mg per tablet; Take 1 tablet by mouth every 12 (twelve) hours as needed for Pain.  Dispense: 50 tablet; Refill: 0  Refill hydrocodone for p.r.n. use  Acquired hypothyroidism    Orders:  -     levothyroxine (SYNTHROID) 88 MCG tablet; Take 1 tablet (88 mcg total) by mouth before breakfast.  Dispense: 30 tablet; Refill: 3    Uncontrolled hypertension    Orders:  -     amLODIPine (NORVASC) 5 MG tablet; Take 1 tablet (5 mg total) by mouth once daily. For blood pressure  Dispense: 30 tablet; Refill: 5  -     metoprolol tartrate (LOPRESSOR) 25 MG tablet; Take 1 tablet (25 mg total) by mouth 2 (two) times daily.  Dispense: 180 tablet; Refill: 3    Localized edema  -     furosemide (LASIX) 20 MG tablet; Take 1 tablet (20 mg total) by mouth daily as needed (edema).  Dispense: 30 tablet; Refill: 1    Lumbar disc disease with radiculopathy  Use hydrocodone p.r.n.  Cervical radiculopathy    Neuropathy    Anxiety    Panlobular emphysema    ACE-inhibitor cough    Primary hypertension    Abdominal aortic aneurysm (AAA) without rupture, unspecified part    Essential hypertension    Gastroesophageal reflux disease without esophagitis    Tobacco abuse  She now has quit smoking for several months  Sebaceous cyst  Advised patient to monitor the size of this cyst and if it is growing rapidly to let us do more testing.    Follow up in about 3 months (around 8/30/2024), or lung ca.        6/1/2024 Jasbir Graham

## 2024-06-05 ENCOUNTER — OFFICE VISIT (OUTPATIENT)
Facility: CLINIC | Age: 74
End: 2024-06-05
Payer: MEDICARE

## 2024-06-05 VITALS
RESPIRATION RATE: 17 BRPM | TEMPERATURE: 98 F | HEART RATE: 95 BPM | DIASTOLIC BLOOD PRESSURE: 89 MMHG | BODY MASS INDEX: 33.03 KG/M2 | WEIGHT: 174.81 LBS | SYSTOLIC BLOOD PRESSURE: 148 MMHG

## 2024-06-05 DIAGNOSIS — R22.9 SKIN NODULE: ICD-10-CM

## 2024-06-05 DIAGNOSIS — C34.92 NON-SMALL CELL CARCINOMA OF LEFT LUNG: Primary | ICD-10-CM

## 2024-06-05 PROCEDURE — 1159F MED LIST DOCD IN RCRD: CPT | Mod: CPTII,S$GLB,, | Performed by: INTERNAL MEDICINE

## 2024-06-05 PROCEDURE — 1101F PT FALLS ASSESS-DOCD LE1/YR: CPT | Mod: CPTII,S$GLB,, | Performed by: INTERNAL MEDICINE

## 2024-06-05 PROCEDURE — G2211 COMPLEX E/M VISIT ADD ON: HCPCS | Mod: S$GLB,,, | Performed by: INTERNAL MEDICINE

## 2024-06-05 PROCEDURE — 3077F SYST BP >= 140 MM HG: CPT | Mod: CPTII,S$GLB,, | Performed by: INTERNAL MEDICINE

## 2024-06-05 PROCEDURE — 99215 OFFICE O/P EST HI 40 MIN: CPT | Mod: S$GLB,,, | Performed by: INTERNAL MEDICINE

## 2024-06-05 PROCEDURE — 99999 PR PBB SHADOW E&M-EST. PATIENT-LVL III: CPT | Mod: PBBFAC,,, | Performed by: INTERNAL MEDICINE

## 2024-06-05 PROCEDURE — 1126F AMNT PAIN NOTED NONE PRSNT: CPT | Mod: CPTII,S$GLB,, | Performed by: INTERNAL MEDICINE

## 2024-06-05 PROCEDURE — 3079F DIAST BP 80-89 MM HG: CPT | Mod: CPTII,S$GLB,, | Performed by: INTERNAL MEDICINE

## 2024-06-05 PROCEDURE — 3288F FALL RISK ASSESSMENT DOCD: CPT | Mod: CPTII,S$GLB,, | Performed by: INTERNAL MEDICINE

## 2024-06-05 NOTE — ASSESSMENT & PLAN NOTE
Patient is doing well with chemotherapy but note is made of slight elevation of the creatinine.  Will continue to monitor very closely for renal changes and will continue therapy, labs allowing.  She is also on radiation as well.

## 2024-06-05 NOTE — PROGRESS NOTES
PROGRESS NOTE    Subjective:       Patient ID: Joslyn Dubon is a 73 y.o. female.    3/14/2024-PET:  2.4cm ELOY mass-SUV 14.7  Normal size MS nodes with no increased FDG activity    10mm soft tissue nodule in left parotid-see report    4/19/2024-EBUS:  ELOY-NSCLC--poorly differentiated-not further described  (Question of neuroendocrine differentiation)  Station 7 LN negative  [place]-negative for:  EGFR/KRAS/BRAF/ALK/ROS1/RET/MET/ERBB2  TMB: 10  MSI Stable  PDL1; <1--negative    4/26/2024-Seen by Thoracic oncology-not a surgical candidate due to pre-existing lung disease    SBRT to Lung lesion  5/7/2024--5/17/2024    Cisplatin/Etopiside Chemotherapy:  Cycle 1: 5/27/2024  Cycle 2: 6/17/2024-due        Chief Complaint:  No chief complaint on file.  Stage IB Lung cancer follow up    History of Present Illness:   Joslyn Dubon is a 73 y.o. female who presents for follow up of lung cancer     Patient is s/p cycle one of chemotherapy.  Does have significant fatigue but o/w doing ok.      She also pointed out a small nodule in her left lower abd wall.  Not painful.  No warmth or discharge or skin changes.     Family and Social history reviewed and is unchanged from 5/9/2024             Current Outpatient Medications:     amLODIPine (NORVASC) 5 MG tablet, Take 1 tablet (5 mg total) by mouth once daily. For blood pressure, Disp: 30 tablet, Rfl: 5    diphenhydrAMINE (BENADRYL) 25 mg capsule, Take 1 capsule (25 mg total) by mouth every 6 (six) hours as needed for Itching or Allergies., Disp: 20 capsule, Rfl: 0    EPINEPHrine (EPIPEN) 0.3 mg/0.3 mL AtIn, Inject 0.3 mLs (0.3 mg total) into the muscle as needed (Severe allergic reaction symptoms such as shortness of breath, tongue or throat swelling, weakness dizziness severe nausea vomiting)., Disp: 1 each, Rfl: 3    famotidine (PEPCID) 20 MG tablet, Take 1 tablet (20 mg total) by mouth 2 (two) times daily., Disp: 20  tablet, Rfl: 0    furosemide (LASIX) 20 MG tablet, Take 1 tablet (20 mg total) by mouth daily as needed (edema)., Disp: 30 tablet, Rfl: 1    HYDROcodone-acetaminophen (NORCO)  mg per tablet, Take 1 tablet by mouth every 12 (twelve) hours as needed for Pain., Disp: 50 tablet, Rfl: 0    levalbuterol (XOPENEX HFA) 45 mcg/actuation inhaler, Inhale 2 puffs into the lungs every 6 (six) hours as needed for Wheezing. Rescue, Disp: 15 g, Rfl: 4    levothyroxine (SYNTHROID) 88 MCG tablet, Take 1 tablet (88 mcg total) by mouth before breakfast., Disp: 30 tablet, Rfl: 3    LIDOcaine-prilocaine (EMLA) cream, Apply topically as needed., Disp: 30 g, Rfl: 5    metoprolol tartrate (LOPRESSOR) 25 MG tablet, Take 1 tablet (25 mg total) by mouth 2 (two) times daily., Disp: 180 tablet, Rfl: 3    omeprazole (PRILOSEC) 40 MG capsule, Take 1 capsule (40 mg total) by mouth every morning., Disp: 90 capsule, Rfl: 3    ondansetron (ZOFRAN) 4 MG tablet, Take 1 tablet (4 mg total) by mouth every 8 (eight) hours as needed for Nausea., Disp: 30 tablet, Rfl: 3    ondansetron (ZOFRAN) 8 MG tablet, Take 1 tablet (8 mg total) by mouth every 8 (eight) hours as needed for Nausea., Disp: 30 tablet, Rfl: 5    polyethylene glycol (GLYCOLAX) 17 gram/dose powder, Take 17 g by mouth once daily., Disp: 510 g, Rfl: 5    promethazine (PHENERGAN) 25 MG tablet, Take 1 tablet (25 mg total) by mouth every 6 (six) hours as needed for Nausea., Disp: 30 tablet, Rfl: 5    REFRESH OPTIVE 0.5-0.9 % Drop, Place 1 drop into both eyes 4 (four) times daily., Disp: , Rfl:     simethicone (GAS-X ORAL), Take 1 tablet by mouth as needed., Disp: , Rfl:     umeclidinium-vilanteroL (ANORO ELLIPTA) 62.5-25 mcg/actuation DsDv, Inhale 1 puff into the lungs once daily. Controller, Disp: 1 each, Rfl: 11        Objective:       Physical Examination:     BP (!) 148/89   Pulse 95   Temp 97.7 °F (36.5 °C)   Resp 17   Wt 79.3 kg (174 lb 12.8 oz)   BMI 33.03 kg/m²     Physical  Exam  Constitutional:       Appearance: Normal appearance.   HENT:      Head: Normocephalic and atraumatic.   Eyes:      General: No scleral icterus.     Conjunctiva/sclera: Conjunctivae normal.   Cardiovascular:      Rate and Rhythm: Normal rate.   Pulmonary:      Effort: Pulmonary effort is normal.   Abdominal:      General: Abdomen is flat.       Neurological:      General: No focal deficit present.      Mental Status: She is alert and oriented to person, place, and time.   Psychiatric:         Mood and Affect: Mood normal.         Behavior: Behavior normal.         Thought Content: Thought content normal.         Judgment: Judgment normal.         Labs:   Recent Results (from the past 336 hour(s))   CBC w/ DIFF    Collection Time: 05/30/24  8:33 AM   Result Value Ref Range    WBC 6.9 3.8 - 10.8 Thousand/uL    Hemoglobin 13.7 11.7 - 15.5 g/dL    Hematocrit 41.6 35.0 - 45.0 %    Platelets 184 140 - 400 Thousand/uL     CMP  Sodium   Date Value Ref Range Status   05/30/2024 142 135 - 146 mmol/L Final     Potassium   Date Value Ref Range Status   05/30/2024 5.2 3.5 - 5.3 mmol/L Final     Chloride   Date Value Ref Range Status   05/30/2024 104 98 - 110 mmol/L Final     CO2   Date Value Ref Range Status   05/30/2024 28 20 - 32 mmol/L Final     Glucose   Date Value Ref Range Status   05/30/2024 76 65 - 99 mg/dL Final     Comment:                   Fasting reference interval          BUN   Date Value Ref Range Status   05/30/2024 16 7 - 25 mg/dL Final     Creatinine   Date Value Ref Range Status   05/30/2024 1.11 (H) 0.60 - 1.00 mg/dL Final   03/19/2013 0.8 0.5 - 1.4 mg/dL Final     Calcium   Date Value Ref Range Status   05/30/2024 9.6 8.6 - 10.4 mg/dL Final   03/19/2013 9.9 8.7 - 10.5 mg/dL Final     Total Protein   Date Value Ref Range Status   05/30/2024 7.4 6.1 - 8.1 g/dL Final     Albumin   Date Value Ref Range Status   05/30/2024 4.2 3.6 - 5.1 g/dL Final     Total Bilirubin   Date Value Ref Range Status  "  05/30/2024 0.5 0.2 - 1.2 mg/dL Final     Alkaline Phosphatase   Date Value Ref Range Status   05/16/2024 95 55 - 135 U/L Final   08/31/2012 107 23 - 119 UNIT/L Final     AST   Date Value Ref Range Status   05/30/2024 20 10 - 35 U/L Final   08/31/2012 21 10 - 30 UNIT/L Final     ALT   Date Value Ref Range Status   05/30/2024 19 6 - 29 U/L Final     Anion Gap   Date Value Ref Range Status   05/16/2024 8 8 - 16 mmol/L Final   03/19/2013 12 5 - 15 meq/L Final     eGFR if    Date Value Ref Range Status   01/03/2022 >60.0 >60 mL/min/1.73 m^2 Final     eGFR if non    Date Value Ref Range Status   01/03/2022 >60.0 >60 mL/min/1.73 m^2 Final     Comment:     Calculation used to obtain the estimated glomerular filtration  rate (eGFR) is the CKD-EPI equation.        No results found for: "CEA"  No results found for: "PSA"        Assessment/Plan:     Problem List Items Addressed This Visit       Skin nodule     Discussed case with radiologist, Dr. Mesa.  Small nodule seen on pet.  ? Post-traumatic.  Does not appear malignant.  Will continue to follow this clinically.          Non-small cell carcinoma of left lung - Primary     Patient is doing well with chemotherapy but note is made of slight elevation of the creatinine.  Will continue to monitor very closely for renal changes and will continue therapy, labs allowing.  She is also on radiation as well.              Discussion:     Follow up in about 3 weeks (around 6/26/2024) for NP visit and 6 with me.      Electronically signed by Macario Willis      "

## 2024-06-05 NOTE — ASSESSMENT & PLAN NOTE
Discussed case with radiologist, Dr. Mesa.  Small nodule seen on pet.  ? Post-traumatic.  Does not appear malignant.  Will continue to follow this clinically.

## 2024-06-07 LAB
ACID FAST MOD KINY STN SPEC: NORMAL
MYCOBACTERIUM SPEC QL CULT: NORMAL

## 2024-06-08 LAB
ALBUMIN/CREAT UR: 7 MG/G CREAT
CHOLEST SERPL-MCNC: 198 MG/DL
CHOLEST/HDLC SERPL: 3.2 (CALC)
CREAT UR-MCNC: 145 MG/DL (ref 20–275)
HDLC SERPL-MCNC: 62 MG/DL
LDLC SERPL CALC-MCNC: 109 MG/DL (CALC)
MICROALBUMIN UR-MCNC: 1 MG/DL
NONHDLC SERPL-MCNC: 136 MG/DL (CALC)
T4 FREE SERPL-MCNC: 0.9 NG/DL (ref 0.8–1.8)
TRIGL SERPL-MCNC: 152 MG/DL
TSH SERPL-ACNC: 16.91 MIU/L (ref 0.4–4.5)

## 2024-06-08 NOTE — PROGRESS NOTES
Call pt, thyroid is underactive, if currently on 88 mcg daily,then we need to increase to 100 mcg  daily.chol good at 198. Recheck TSH again in 2  months

## 2024-06-10 ENCOUNTER — TELEPHONE (OUTPATIENT)
Dept: FAMILY MEDICINE | Facility: CLINIC | Age: 74
End: 2024-06-10
Payer: MEDICARE

## 2024-06-10 RX ORDER — LEVOTHYROXINE SODIUM 100 UG/1
100 TABLET ORAL
COMMUNITY

## 2024-06-10 NOTE — TELEPHONE ENCOUNTER
----- Message from Jasbir Graham MD sent at 6/8/2024  6:22 PM CDT -----  Call pt, thyroid is underactive, if currently on 88 mcg daily,then we need to increase to 100 mcg  daily.chol good at 198. Recheck TSH again in 2  months

## 2024-06-10 NOTE — TELEPHONE ENCOUNTER
Spoke to patient with results verbatim per Dr Graham. Said she saw results on portal and had 100mcg at home so she started on that. Adjusted on med list. Remind me for lab.

## 2024-06-11 ENCOUNTER — CLINICAL SUPPORT (OUTPATIENT)
Dept: RADIATION ONCOLOGY | Facility: CLINIC | Age: 74
End: 2024-06-11
Payer: MEDICARE

## 2024-06-11 DIAGNOSIS — C34.92 NON-SMALL CELL CARCINOMA OF LEFT LUNG: Primary | ICD-10-CM

## 2024-06-11 PROCEDURE — 99499 UNLISTED E&M SERVICE: CPT | Mod: 95,,, | Performed by: RADIOLOGY

## 2024-06-11 NOTE — PROGRESS NOTES
DIAGNOSIS: Stage IB  [[id number]]  NSCLC of ELOY    TREATMENT  Completed SBRT to left upper lobe, 50 Gy in 5 fractions ending May 17, 2000.  Treatment well tolerated.    Contacted patient today for 3 week checkup.  Patient endorses tenderness of scalp and constipation symptoms.  She has begun systemic therapy.  Denies fever, chills, chest pain.    A/P  1.  SBRT well tolerated.  Scalp tenderness may be proceeding hair loss due to systemic therapy.  Recommend addition of stool softener to laxatives and increased fluid intake; enema p.r.n.  2.  Follow-up with Dr. Iyer; on cis/etoposide; imaging schedule per med/onc  3.  Return to clinic 6mos.

## 2024-06-14 ENCOUNTER — PATIENT MESSAGE (OUTPATIENT)
Facility: CLINIC | Age: 74
End: 2024-06-14
Payer: MEDICARE

## 2024-06-14 ENCOUNTER — TELEPHONE (OUTPATIENT)
Facility: CLINIC | Age: 74
End: 2024-06-14
Payer: MEDICARE

## 2024-06-14 DIAGNOSIS — C34.92 NON-SMALL CELL CARCINOMA OF LEFT LUNG: Primary | ICD-10-CM

## 2024-06-15 LAB
ALBUMIN SERPL-MCNC: 4.1 G/DL (ref 3.6–5.1)
ALBUMIN/GLOB SERPL: 1.3 (CALC) (ref 1–2.5)
ALP SERPL-CCNC: 124 U/L (ref 37–153)
ALT SERPL-CCNC: 11 U/L (ref 6–29)
AST SERPL-CCNC: 16 U/L (ref 10–35)
BASOPHILS # BLD AUTO: 30 CELLS/UL (ref 0–200)
BASOPHILS NFR BLD AUTO: 1 %
BILIRUB SERPL-MCNC: 0.3 MG/DL (ref 0.2–1.2)
BUN SERPL-MCNC: 15 MG/DL (ref 7–25)
BUN/CREAT SERPL: 14 (CALC) (ref 6–22)
CALCIUM SERPL-MCNC: 9.2 MG/DL (ref 8.6–10.4)
CHLORIDE SERPL-SCNC: 103 MMOL/L (ref 98–110)
CO2 SERPL-SCNC: 29 MMOL/L (ref 20–32)
CREAT SERPL-MCNC: 1.1 MG/DL (ref 0.6–1)
EGFR: 52 ML/MIN/1.73M2
EOSINOPHIL # BLD AUTO: 0 CELLS/UL (ref 15–500)
EOSINOPHIL NFR BLD AUTO: 0 %
ERYTHROCYTE [DISTWIDTH] IN BLOOD BY AUTOMATED COUNT: 12.8 % (ref 11–15)
GLOBULIN SER CALC-MCNC: 3.2 G/DL (CALC) (ref 1.9–3.7)
GLUCOSE SERPL-MCNC: 94 MG/DL (ref 65–99)
HCT VFR BLD AUTO: 36.1 % (ref 35–45)
HGB BLD-MCNC: 11.7 G/DL (ref 11.7–15.5)
LYMPHOCYTES # BLD MANUAL: 1230 CELLS/UL (ref 850–3900)
LYMPHOCYTES NFR BLD AUTO: 41 %
MAGNESIUM SERPL-MCNC: 1.9 MG/DL (ref 1.5–2.5)
MCH RBC QN AUTO: 31.5 PG (ref 27–33)
MCHC RBC AUTO-ENTMCNC: 32.4 G/DL (ref 32–36)
MCV RBC AUTO: 97 FL (ref 80–100)
MONOCYTES # BLD AUTO: 540 CELLS/UL (ref 200–950)
MONOCYTES NFR BLD AUTO: 18 %
NEUTROPHILS # BLD AUTO: 960 CELLS/UL (ref 1500–7800)
NEUTROPHILS NFR BLD AUTO: 32 %
NOTE: ABNORMAL
PLATELET # BLD AUTO: 161 THOUSAND/UL (ref 140–400)
PMV BLD REES-ECKER: 8.1 FL (ref 7.5–12.5)
POTASSIUM SERPL-SCNC: 4.6 MMOL/L (ref 3.5–5.3)
PROT SERPL-MCNC: 7.3 G/DL (ref 6.1–8.1)
RBC # BLD AUTO: 3.72 MILLION/UL (ref 3.8–5.1)
SODIUM SERPL-SCNC: 140 MMOL/L (ref 135–146)
VARIANT LYMPHS # BLD MANUAL: 240 CELLS/UL
VARIANT LYMPHS NFR BLD: 8 % (ref 0–10)
WBC # BLD AUTO: 3 THOUSAND/UL (ref 3.8–10.8)

## 2024-06-16 RX ORDER — HEPARIN 100 UNIT/ML
500 SYRINGE INTRAVENOUS
Status: CANCELLED | OUTPATIENT
Start: 2024-06-17

## 2024-06-16 RX ORDER — HEPARIN 100 UNIT/ML
500 SYRINGE INTRAVENOUS
Status: CANCELLED | OUTPATIENT
Start: 2024-06-18

## 2024-06-16 RX ORDER — SODIUM CHLORIDE 0.9 % (FLUSH) 0.9 %
10 SYRINGE (ML) INJECTION
Status: CANCELLED | OUTPATIENT
Start: 2024-06-19

## 2024-06-16 RX ORDER — SODIUM CHLORIDE 0.9 % (FLUSH) 0.9 %
10 SYRINGE (ML) INJECTION
Status: CANCELLED | OUTPATIENT
Start: 2024-06-18

## 2024-06-16 RX ORDER — SODIUM CHLORIDE 0.9 % (FLUSH) 0.9 %
10 SYRINGE (ML) INJECTION
Status: CANCELLED | OUTPATIENT
Start: 2024-06-17

## 2024-06-16 RX ORDER — HEPARIN 100 UNIT/ML
500 SYRINGE INTRAVENOUS
Status: CANCELLED | OUTPATIENT
Start: 2024-06-19

## 2024-06-17 ENCOUNTER — INFUSION (OUTPATIENT)
Dept: INFUSION THERAPY | Facility: HOSPITAL | Age: 74
End: 2024-06-17
Attending: INTERNAL MEDICINE
Payer: MEDICARE

## 2024-06-17 ENCOUNTER — LAB VISIT (OUTPATIENT)
Dept: LAB | Facility: HOSPITAL | Age: 74
End: 2024-06-17
Attending: NURSE PRACTITIONER
Payer: MEDICARE

## 2024-06-17 VITALS
OXYGEN SATURATION: 96 % | HEIGHT: 61 IN | DIASTOLIC BLOOD PRESSURE: 78 MMHG | TEMPERATURE: 98 F | HEART RATE: 88 BPM | RESPIRATION RATE: 18 BRPM | BODY MASS INDEX: 33.32 KG/M2 | WEIGHT: 176.5 LBS | SYSTOLIC BLOOD PRESSURE: 130 MMHG

## 2024-06-17 DIAGNOSIS — C34.92 NON-SMALL CELL CARCINOMA OF LEFT LUNG: ICD-10-CM

## 2024-06-17 DIAGNOSIS — C34.92 NON-SMALL CELL CARCINOMA OF LEFT LUNG: Primary | ICD-10-CM

## 2024-06-17 LAB
ALBUMIN SERPL BCP-MCNC: 4 G/DL (ref 3.5–5.2)
ALP SERPL-CCNC: 104 U/L (ref 55–135)
ALT SERPL W/O P-5'-P-CCNC: 10 U/L (ref 10–44)
ANION GAP SERPL CALC-SCNC: 8 MMOL/L (ref 8–16)
AST SERPL-CCNC: 14 U/L (ref 10–40)
BASOPHILS # BLD AUTO: 0.04 K/UL (ref 0–0.2)
BASOPHILS NFR BLD: 0.7 % (ref 0–1.9)
BILIRUB SERPL-MCNC: 0.3 MG/DL (ref 0.1–1)
BUN SERPL-MCNC: 14 MG/DL (ref 8–23)
CALCIUM SERPL-MCNC: 9.2 MG/DL (ref 8.7–10.5)
CHLORIDE SERPL-SCNC: 101 MMOL/L (ref 95–110)
CO2 SERPL-SCNC: 29 MMOL/L (ref 23–29)
CREAT SERPL-MCNC: 1.1 MG/DL (ref 0.5–1.4)
DIFFERENTIAL METHOD BLD: ABNORMAL
EOSINOPHIL # BLD AUTO: 0 K/UL (ref 0–0.5)
EOSINOPHIL NFR BLD: 0.2 % (ref 0–8)
ERYTHROCYTE [DISTWIDTH] IN BLOOD BY AUTOMATED COUNT: 13.1 % (ref 11.5–14.5)
EST. GFR  (NO RACE VARIABLE): 52.4 ML/MIN/1.73 M^2
GLUCOSE SERPL-MCNC: 101 MG/DL (ref 70–110)
HCT VFR BLD AUTO: 35.6 % (ref 37–48.5)
HGB BLD-MCNC: 11.6 G/DL (ref 12–16)
IMM GRANULOCYTES # BLD AUTO: 0.17 K/UL (ref 0–0.04)
IMM GRANULOCYTES NFR BLD AUTO: 2.8 % (ref 0–0.5)
LYMPHOCYTES # BLD AUTO: 1.5 K/UL (ref 1–4.8)
LYMPHOCYTES NFR BLD: 24.8 % (ref 18–48)
MAGNESIUM SERPL-MCNC: 1.8 MG/DL (ref 1.6–2.6)
MCH RBC QN AUTO: 31.3 PG (ref 27–31)
MCHC RBC AUTO-ENTMCNC: 32.6 G/DL (ref 32–36)
MCV RBC AUTO: 96 FL (ref 82–98)
MONOCYTES # BLD AUTO: 1.2 K/UL (ref 0.3–1)
MONOCYTES NFR BLD: 18.9 % (ref 4–15)
NEUTROPHILS # BLD AUTO: 3.2 K/UL (ref 1.8–7.7)
NEUTROPHILS NFR BLD: 52.6 % (ref 38–73)
NRBC BLD-RTO: 0 /100 WBC
PLATELET # BLD AUTO: 190 K/UL (ref 150–450)
PMV BLD AUTO: 8.1 FL (ref 9.2–12.9)
POTASSIUM SERPL-SCNC: 4.5 MMOL/L (ref 3.5–5.1)
PROT SERPL-MCNC: 7.6 G/DL (ref 6–8.4)
RBC # BLD AUTO: 3.71 M/UL (ref 4–5.4)
SODIUM SERPL-SCNC: 138 MMOL/L (ref 136–145)
WBC # BLD AUTO: 6.14 K/UL (ref 3.9–12.7)

## 2024-06-17 PROCEDURE — 96366 THER/PROPH/DIAG IV INF ADDON: CPT

## 2024-06-17 PROCEDURE — 85025 COMPLETE CBC W/AUTO DIFF WBC: CPT | Performed by: NURSE PRACTITIONER

## 2024-06-17 PROCEDURE — 96417 CHEMO IV INFUS EACH ADDL SEQ: CPT

## 2024-06-17 PROCEDURE — A4216 STERILE WATER/SALINE, 10 ML: HCPCS | Performed by: INTERNAL MEDICINE

## 2024-06-17 PROCEDURE — 96415 CHEMO IV INFUSION ADDL HR: CPT

## 2024-06-17 PROCEDURE — 36415 COLL VENOUS BLD VENIPUNCTURE: CPT | Performed by: NURSE PRACTITIONER

## 2024-06-17 PROCEDURE — 96367 TX/PROPH/DG ADDL SEQ IV INF: CPT

## 2024-06-17 PROCEDURE — 25000003 PHARM REV CODE 250: Performed by: INTERNAL MEDICINE

## 2024-06-17 PROCEDURE — 96413 CHEMO IV INFUSION 1 HR: CPT

## 2024-06-17 PROCEDURE — 80053 COMPREHEN METABOLIC PANEL: CPT | Performed by: NURSE PRACTITIONER

## 2024-06-17 PROCEDURE — 83735 ASSAY OF MAGNESIUM: CPT | Performed by: NURSE PRACTITIONER

## 2024-06-17 PROCEDURE — 63600175 PHARM REV CODE 636 W HCPCS: Performed by: INTERNAL MEDICINE

## 2024-06-17 PROCEDURE — 63600175 PHARM REV CODE 636 W HCPCS: Performed by: NURSE PRACTITIONER

## 2024-06-17 PROCEDURE — 25000003 PHARM REV CODE 250: Performed by: NURSE PRACTITIONER

## 2024-06-17 PROCEDURE — 96375 TX/PRO/DX INJ NEW DRUG ADDON: CPT

## 2024-06-17 RX ORDER — METHYLPREDNISOLONE SOD SUCC 125 MG
125 VIAL (EA) INJECTION
Status: CANCELLED
Start: 2024-06-18

## 2024-06-17 RX ORDER — FAMOTIDINE 10 MG/ML
20 INJECTION INTRAVENOUS
Status: CANCELLED
Start: 2024-06-19

## 2024-06-17 RX ORDER — DIPHENHYDRAMINE HYDROCHLORIDE 50 MG/ML
25 INJECTION INTRAMUSCULAR; INTRAVENOUS
Status: CANCELLED
Start: 2024-06-18

## 2024-06-17 RX ORDER — DIPHENHYDRAMINE HYDROCHLORIDE 50 MG/ML
25 INJECTION INTRAMUSCULAR; INTRAVENOUS
Status: CANCELLED
Start: 2024-06-19

## 2024-06-17 RX ORDER — METHYLPREDNISOLONE SOD SUCC 125 MG
125 VIAL (EA) INJECTION
Status: CANCELLED
Start: 2024-06-19

## 2024-06-17 RX ORDER — FAMOTIDINE 10 MG/ML
20 INJECTION INTRAVENOUS
Status: CANCELLED
Start: 2024-06-18

## 2024-06-17 RX ORDER — SODIUM CHLORIDE 0.9 % (FLUSH) 0.9 %
10 SYRINGE (ML) INJECTION
Status: DISCONTINUED | OUTPATIENT
Start: 2024-06-17 | End: 2024-06-17 | Stop reason: HOSPADM

## 2024-06-17 RX ORDER — FAMOTIDINE 10 MG/ML
20 INJECTION INTRAVENOUS
Status: COMPLETED | OUTPATIENT
Start: 2024-06-17 | End: 2024-06-17

## 2024-06-17 RX ORDER — HEPARIN 100 UNIT/ML
500 SYRINGE INTRAVENOUS
Status: DISCONTINUED | OUTPATIENT
Start: 2024-06-17 | End: 2024-06-17 | Stop reason: HOSPADM

## 2024-06-17 RX ADMIN — CISPLATIN 140 MG: 1 INJECTION, SOLUTION INTRAVENOUS at 11:06

## 2024-06-17 RX ADMIN — DIPHENHYDRAMINE HYDROCHLORIDE 25 MG: 50 INJECTION, SOLUTION INTRAMUSCULAR; INTRAVENOUS at 12:06

## 2024-06-17 RX ADMIN — Medication 10 ML: at 03:06

## 2024-06-17 RX ADMIN — SODIUM CHLORIDE 186 MG: 9 INJECTION, SOLUTION INTRAVENOUS at 12:06

## 2024-06-17 RX ADMIN — METHYLPREDNISOLONE SODIUM SUCCINATE 125 MG: 125 INJECTION, POWDER, FOR SOLUTION INTRAMUSCULAR; INTRAVENOUS at 12:06

## 2024-06-17 RX ADMIN — HEPARIN 500 UNITS: 100 SYRINGE at 03:06

## 2024-06-17 RX ADMIN — FAMOTIDINE 20 MG: 10 INJECTION INTRAVENOUS at 02:06

## 2024-06-17 RX ADMIN — POTASSIUM CHLORIDE 500 ML/HR: 2 INJECTION, SOLUTION, CONCENTRATE INTRAVENOUS at 08:06

## 2024-06-17 RX ADMIN — FOSAPREPITANT 150 MG: 150 INJECTION, POWDER, LYOPHILIZED, FOR SOLUTION INTRAVENOUS at 10:06

## 2024-06-17 RX ADMIN — PALONOSETRON HYDROCHLORIDE 0.25 MG: 0.25 INJECTION INTRAVENOUS at 11:06

## 2024-06-17 NOTE — NURSING
Etoposide infusion started. Pt got up to go to the restroom about 10 minutes into infusion. She started having breathing issues, wheezing, became red in the face, felt extremely weak and hot, and short of breath. Pt was brought back to her chair, infusion was stopped, and she was placed on oxygen. Britany PAZ and Theron from pharmacy alerted. Blood pressure elevated.  Benadryl 25 mg and Solu-medrol 125 mg given per Britany. Pt took two puffs from her personal albuterol inhaler. Infusion re-started at 1340 at a slower rate. 15 minutes later, pt got back up to bathroom and started again with pains, wheezing and shortness of breath. Pepcid given. Etoposide started back again at 1445. This time pt was able to complete the infusion. She has been instructed to go to the Emergency Department should any symptoms come back. Pt discharged to home in wheelchair with .     Sonam Rouse RN

## 2024-06-17 NOTE — PLAN OF CARE
Problem: Fall Injury Risk  Goal: Absence of Fall and Fall-Related Injury  Outcome: Progressing  Intervention: Identify and Manage Contributors  Flowsheets (Taken 6/17/2024 0713)  Self-Care Promotion: independence encouraged  Medication Review/Management: medications reviewed  Intervention: Promote Injury-Free Environment  Flowsheets (Taken 6/17/2024 0713)  Safety Promotion/Fall Prevention: medications reviewed

## 2024-06-18 ENCOUNTER — INFUSION (OUTPATIENT)
Dept: INFUSION THERAPY | Facility: HOSPITAL | Age: 74
End: 2024-06-18
Attending: INTERNAL MEDICINE
Payer: MEDICARE

## 2024-06-18 VITALS
OXYGEN SATURATION: 97 % | SYSTOLIC BLOOD PRESSURE: 117 MMHG | WEIGHT: 179.38 LBS | HEIGHT: 62 IN | TEMPERATURE: 98 F | BODY MASS INDEX: 33.01 KG/M2 | HEART RATE: 77 BPM | RESPIRATION RATE: 18 BRPM | DIASTOLIC BLOOD PRESSURE: 75 MMHG

## 2024-06-18 DIAGNOSIS — C34.92 NON-SMALL CELL CARCINOMA OF LEFT LUNG: Primary | ICD-10-CM

## 2024-06-18 PROCEDURE — 96375 TX/PRO/DX INJ NEW DRUG ADDON: CPT

## 2024-06-18 PROCEDURE — 96415 CHEMO IV INFUSION ADDL HR: CPT

## 2024-06-18 PROCEDURE — 63600175 PHARM REV CODE 636 W HCPCS: Performed by: INTERNAL MEDICINE

## 2024-06-18 PROCEDURE — 96367 TX/PROPH/DG ADDL SEQ IV INF: CPT

## 2024-06-18 PROCEDURE — 25000003 PHARM REV CODE 250: Performed by: NURSE PRACTITIONER

## 2024-06-18 PROCEDURE — 96413 CHEMO IV INFUSION 1 HR: CPT

## 2024-06-18 PROCEDURE — 96361 HYDRATE IV INFUSION ADD-ON: CPT

## 2024-06-18 PROCEDURE — A4216 STERILE WATER/SALINE, 10 ML: HCPCS | Performed by: INTERNAL MEDICINE

## 2024-06-18 PROCEDURE — 25000003 PHARM REV CODE 250: Performed by: INTERNAL MEDICINE

## 2024-06-18 PROCEDURE — 63600175 PHARM REV CODE 636 W HCPCS: Performed by: NURSE PRACTITIONER

## 2024-06-18 RX ORDER — SODIUM CHLORIDE 0.9 % (FLUSH) 0.9 %
10 SYRINGE (ML) INJECTION
Status: DISCONTINUED | OUTPATIENT
Start: 2024-06-18 | End: 2024-06-18 | Stop reason: HOSPADM

## 2024-06-18 RX ORDER — HEPARIN 100 UNIT/ML
500 SYRINGE INTRAVENOUS
Status: DISCONTINUED | OUTPATIENT
Start: 2024-06-18 | End: 2024-06-18 | Stop reason: HOSPADM

## 2024-06-18 RX ORDER — FAMOTIDINE 10 MG/ML
20 INJECTION INTRAVENOUS
Status: COMPLETED | OUTPATIENT
Start: 2024-06-18 | End: 2024-06-18

## 2024-06-18 RX ORDER — METHYLPREDNISOLONE SOD SUCC 125 MG
125 VIAL (EA) INJECTION
Status: COMPLETED | OUTPATIENT
Start: 2024-06-18 | End: 2024-06-18

## 2024-06-18 RX ADMIN — FAMOTIDINE 20 MG: 10 INJECTION INTRAVENOUS at 09:06

## 2024-06-18 RX ADMIN — DIPHENHYDRAMINE HYDROCHLORIDE 25 MG: 50 INJECTION, SOLUTION INTRAMUSCULAR; INTRAVENOUS at 09:06

## 2024-06-18 RX ADMIN — SODIUM CHLORIDE 190 MG: 9 INJECTION, SOLUTION INTRAVENOUS at 09:06

## 2024-06-18 RX ADMIN — SODIUM CHLORIDE 1000 ML: 9 INJECTION, SOLUTION INTRAVENOUS at 09:06

## 2024-06-18 RX ADMIN — HEPARIN 500 UNITS: 100 SYRINGE at 11:06

## 2024-06-18 RX ADMIN — METHYLPREDNISOLONE SODIUM SUCCINATE 125 MG: 125 INJECTION, POWDER, FOR SOLUTION INTRAMUSCULAR; INTRAVENOUS at 09:06

## 2024-06-18 RX ADMIN — Medication 10 ML: at 11:06

## 2024-06-18 NOTE — PLAN OF CARE
Problem: Fall Injury Risk  Goal: Absence of Fall and Fall-Related Injury  Outcome: Progressing  Intervention: Identify and Manage Contributors  Flowsheets (Taken 6/18/2024 0849)  Self-Care Promotion: independence encouraged  Medication Review/Management: medications reviewed  Intervention: Promote Injury-Free Environment  Flowsheets (Taken 6/18/2024 0849)  Safety Promotion/Fall Prevention: medications reviewed

## 2024-06-19 ENCOUNTER — PATIENT MESSAGE (OUTPATIENT)
Facility: CLINIC | Age: 74
End: 2024-06-19
Payer: MEDICARE

## 2024-06-19 ENCOUNTER — TELEPHONE (OUTPATIENT)
Facility: CLINIC | Age: 74
End: 2024-06-19
Payer: MEDICARE

## 2024-06-19 ENCOUNTER — INFUSION (OUTPATIENT)
Dept: INFUSION THERAPY | Facility: HOSPITAL | Age: 74
End: 2024-06-19
Attending: INTERNAL MEDICINE
Payer: MEDICARE

## 2024-06-19 VITALS
TEMPERATURE: 97 F | OXYGEN SATURATION: 95 % | HEIGHT: 62 IN | HEART RATE: 77 BPM | SYSTOLIC BLOOD PRESSURE: 138 MMHG | WEIGHT: 181 LBS | RESPIRATION RATE: 18 BRPM | DIASTOLIC BLOOD PRESSURE: 83 MMHG | BODY MASS INDEX: 33.31 KG/M2

## 2024-06-19 DIAGNOSIS — C34.92 NON-SMALL CELL CARCINOMA OF LEFT LUNG: Primary | ICD-10-CM

## 2024-06-19 PROCEDURE — 25000003 PHARM REV CODE 250: Performed by: INTERNAL MEDICINE

## 2024-06-19 PROCEDURE — 96375 TX/PRO/DX INJ NEW DRUG ADDON: CPT

## 2024-06-19 PROCEDURE — 96367 TX/PROPH/DG ADDL SEQ IV INF: CPT

## 2024-06-19 PROCEDURE — 96415 CHEMO IV INFUSION ADDL HR: CPT

## 2024-06-19 PROCEDURE — 63600175 PHARM REV CODE 636 W HCPCS: Performed by: INTERNAL MEDICINE

## 2024-06-19 PROCEDURE — 63600175 PHARM REV CODE 636 W HCPCS: Performed by: NURSE PRACTITIONER

## 2024-06-19 PROCEDURE — 96413 CHEMO IV INFUSION 1 HR: CPT

## 2024-06-19 PROCEDURE — A4216 STERILE WATER/SALINE, 10 ML: HCPCS | Performed by: INTERNAL MEDICINE

## 2024-06-19 PROCEDURE — 25000003 PHARM REV CODE 250: Performed by: NURSE PRACTITIONER

## 2024-06-19 RX ORDER — SODIUM CHLORIDE 0.9 % (FLUSH) 0.9 %
10 SYRINGE (ML) INJECTION
Status: DISCONTINUED | OUTPATIENT
Start: 2024-06-19 | End: 2024-06-19 | Stop reason: HOSPADM

## 2024-06-19 RX ORDER — HEPARIN 100 UNIT/ML
500 SYRINGE INTRAVENOUS
Status: DISCONTINUED | OUTPATIENT
Start: 2024-06-19 | End: 2024-06-19 | Stop reason: HOSPADM

## 2024-06-19 RX ORDER — FAMOTIDINE 10 MG/ML
20 INJECTION INTRAVENOUS
Status: COMPLETED | OUTPATIENT
Start: 2024-06-19 | End: 2024-06-19

## 2024-06-19 RX ORDER — METHYLPREDNISOLONE SOD SUCC 125 MG
125 VIAL (EA) INJECTION
Status: COMPLETED | OUTPATIENT
Start: 2024-06-19 | End: 2024-06-19

## 2024-06-19 RX ADMIN — FAMOTIDINE 20 MG: 10 INJECTION INTRAVENOUS at 09:06

## 2024-06-19 RX ADMIN — DIPHENHYDRAMINE HYDROCHLORIDE 25 MG: 50 INJECTION, SOLUTION INTRAMUSCULAR; INTRAVENOUS at 09:06

## 2024-06-19 RX ADMIN — METHYLPREDNISOLONE SODIUM SUCCINATE 125 MG: 125 INJECTION, POWDER, FOR SOLUTION INTRAMUSCULAR; INTRAVENOUS at 09:06

## 2024-06-19 RX ADMIN — Medication 10 ML: at 11:06

## 2024-06-19 RX ADMIN — ETOPOSIDE 190 MG: 20 INJECTION, SOLUTION INTRAVENOUS at 09:06

## 2024-06-19 RX ADMIN — SODIUM CHLORIDE: 9 INJECTION, SOLUTION INTRAVENOUS at 09:06

## 2024-06-19 RX ADMIN — HEPARIN 500 UNITS: 100 SYRINGE at 11:06

## 2024-06-19 NOTE — TELEPHONE ENCOUNTER
Called patient regarding request for laboratory work to be switch to from [facility] to [facility], orders were switched to [facility] and patient stated understanding.

## 2024-06-19 NOTE — PLAN OF CARE
Problem: Fall Injury Risk  Goal: Absence of Fall and Fall-Related Injury  Outcome: Progressing  Intervention: Identify and Manage Contributors  Flowsheets (Taken 6/19/2024 0903)  Self-Care Promotion: independence encouraged  Medication Review/Management: medications reviewed  Intervention: Promote Injury-Free Environment  Flowsheets (Taken 6/19/2024 0903)  Safety Promotion/Fall Prevention: medications reviewed

## 2024-06-21 ENCOUNTER — HOSPITAL ENCOUNTER (EMERGENCY)
Facility: HOSPITAL | Age: 74
Discharge: HOME | End: 2024-06-21
Attending: EMERGENCY MEDICINE
Payer: MEDICARE

## 2024-06-21 VITALS
OXYGEN SATURATION: 96 % | HEART RATE: 20 BPM | RESPIRATION RATE: 15 BRPM | TEMPERATURE: 98 F | BODY MASS INDEX: 33.31 KG/M2 | HEIGHT: 62 IN | SYSTOLIC BLOOD PRESSURE: 169 MMHG | DIASTOLIC BLOOD PRESSURE: 74 MMHG | WEIGHT: 181 LBS

## 2024-06-21 DIAGNOSIS — R53.1 WEAKNESS: ICD-10-CM

## 2024-06-21 DIAGNOSIS — C34.90 MALIGNANT NEOPLASM OF LUNG, UNSPECIFIED LATERALITY, UNSPECIFIED PART OF LUNG: ICD-10-CM

## 2024-06-21 DIAGNOSIS — E86.0 DEHYDRATION: Primary | ICD-10-CM

## 2024-06-21 DIAGNOSIS — R11.0 NAUSEA: ICD-10-CM

## 2024-06-21 LAB
ALBUMIN SERPL BCP-MCNC: 3.8 G/DL (ref 3.5–5.2)
ALP SERPL-CCNC: 106 U/L (ref 55–135)
ALT SERPL W/O P-5'-P-CCNC: 23 U/L (ref 10–44)
ANION GAP SERPL CALC-SCNC: 9 MMOL/L (ref 8–16)
AST SERPL-CCNC: 21 U/L (ref 10–40)
BASOPHILS # BLD AUTO: 0 K/UL (ref 0–0.2)
BASOPHILS NFR BLD: 0 % (ref 0–1.9)
BILIRUB SERPL-MCNC: 0.3 MG/DL (ref 0.1–1)
BILIRUB UR QL STRIP: NEGATIVE
BNP SERPL-MCNC: 94 PG/ML (ref 0–99)
BUN SERPL-MCNC: 26 MG/DL (ref 8–23)
CALCIUM SERPL-MCNC: 9 MG/DL (ref 8.7–10.5)
CHLORIDE SERPL-SCNC: 101 MMOL/L (ref 95–110)
CLARITY UR: CLEAR
CO2 SERPL-SCNC: 30 MMOL/L (ref 23–29)
COLOR UR: COLORLESS
CREAT SERPL-MCNC: 1.2 MG/DL (ref 0.5–1.4)
DIFFERENTIAL METHOD BLD: ABNORMAL
EOSINOPHIL # BLD AUTO: 0 K/UL (ref 0–0.5)
EOSINOPHIL NFR BLD: 0 % (ref 0–8)
ERYTHROCYTE [DISTWIDTH] IN BLOOD BY AUTOMATED COUNT: 13.5 % (ref 11.5–14.5)
EST. GFR  (NO RACE VARIABLE): 47.2 ML/MIN/1.73 M^2
GLUCOSE SERPL-MCNC: 124 MG/DL (ref 70–110)
GLUCOSE UR QL STRIP: NEGATIVE
HCT VFR BLD AUTO: 33.5 % (ref 37–48.5)
HGB BLD-MCNC: 11.1 G/DL (ref 12–16)
HGB UR QL STRIP: NEGATIVE
IMM GRANULOCYTES # BLD AUTO: 0.03 K/UL (ref 0–0.04)
IMM GRANULOCYTES NFR BLD AUTO: 0.6 % (ref 0–0.5)
KETONES UR QL STRIP: NEGATIVE
LEUKOCYTE ESTERASE UR QL STRIP: NEGATIVE
LYMPHOCYTES # BLD AUTO: 1.3 K/UL (ref 1–4.8)
LYMPHOCYTES NFR BLD: 24 % (ref 18–48)
MAGNESIUM SERPL-MCNC: 1.7 MG/DL (ref 1.6–2.6)
MCH RBC QN AUTO: 30.8 PG (ref 27–31)
MCHC RBC AUTO-ENTMCNC: 33.1 G/DL (ref 32–36)
MCV RBC AUTO: 93 FL (ref 82–98)
MONOCYTES # BLD AUTO: 0.1 K/UL (ref 0.3–1)
MONOCYTES NFR BLD: 0.9 % (ref 4–15)
NEUTROPHILS # BLD AUTO: 4 K/UL (ref 1.8–7.7)
NEUTROPHILS NFR BLD: 74.5 % (ref 38–73)
NITRITE UR QL STRIP: NEGATIVE
NRBC BLD-RTO: 0 /100 WBC
PH UR STRIP: 5 [PH] (ref 5–8)
PLATELET # BLD AUTO: 282 K/UL (ref 150–450)
PLATELET BLD QL SMEAR: ABNORMAL
PMV BLD AUTO: 8.8 FL (ref 9.2–12.9)
POTASSIUM SERPL-SCNC: 3.6 MMOL/L (ref 3.5–5.1)
PROT SERPL-MCNC: 7.1 G/DL (ref 6–8.4)
PROT UR QL STRIP: NEGATIVE
RBC # BLD AUTO: 3.6 M/UL (ref 4–5.4)
SODIUM SERPL-SCNC: 140 MMOL/L (ref 136–145)
SP GR UR STRIP: 1.01 (ref 1–1.03)
T4 FREE SERPL-MCNC: 0.81 NG/DL (ref 0.71–1.51)
TROPONIN I SERPL HS-MCNC: 10.1 PG/ML (ref 0–14.9)
TSH SERPL DL<=0.005 MIU/L-ACNC: 9.13 UIU/ML (ref 0.34–5.6)
URN SPEC COLLECT METH UR: ABNORMAL
UROBILINOGEN UR STRIP-ACNC: NEGATIVE EU/DL
WBC # BLD AUTO: 5.38 K/UL (ref 3.9–12.7)

## 2024-06-21 PROCEDURE — 83880 ASSAY OF NATRIURETIC PEPTIDE: CPT | Performed by: EMERGENCY MEDICINE

## 2024-06-21 PROCEDURE — 85025 COMPLETE CBC W/AUTO DIFF WBC: CPT | Performed by: EMERGENCY MEDICINE

## 2024-06-21 PROCEDURE — 80053 COMPREHEN METABOLIC PANEL: CPT | Performed by: EMERGENCY MEDICINE

## 2024-06-21 PROCEDURE — 96365 THER/PROPH/DIAG IV INF INIT: CPT

## 2024-06-21 PROCEDURE — 36415 COLL VENOUS BLD VENIPUNCTURE: CPT | Performed by: EMERGENCY MEDICINE

## 2024-06-21 PROCEDURE — 93010 ELECTROCARDIOGRAM REPORT: CPT | Mod: ,,, | Performed by: INTERNAL MEDICINE

## 2024-06-21 PROCEDURE — 84484 ASSAY OF TROPONIN QUANT: CPT | Performed by: EMERGENCY MEDICINE

## 2024-06-21 PROCEDURE — 93005 ELECTROCARDIOGRAM TRACING: CPT | Performed by: INTERNAL MEDICINE

## 2024-06-21 PROCEDURE — 83735 ASSAY OF MAGNESIUM: CPT | Performed by: EMERGENCY MEDICINE

## 2024-06-21 PROCEDURE — 25000003 PHARM REV CODE 250: Performed by: EMERGENCY MEDICINE

## 2024-06-21 PROCEDURE — 84443 ASSAY THYROID STIM HORMONE: CPT | Performed by: EMERGENCY MEDICINE

## 2024-06-21 PROCEDURE — 96361 HYDRATE IV INFUSION ADD-ON: CPT

## 2024-06-21 PROCEDURE — 81003 URINALYSIS AUTO W/O SCOPE: CPT | Performed by: EMERGENCY MEDICINE

## 2024-06-21 PROCEDURE — 63600175 PHARM REV CODE 636 W HCPCS: Performed by: EMERGENCY MEDICINE

## 2024-06-21 PROCEDURE — 99285 EMERGENCY DEPT VISIT HI MDM: CPT | Mod: 25

## 2024-06-21 PROCEDURE — 84439 ASSAY OF FREE THYROXINE: CPT | Performed by: EMERGENCY MEDICINE

## 2024-06-21 RX ORDER — PROMETHAZINE HYDROCHLORIDE 25 MG/1
25 TABLET ORAL EVERY 6 HOURS PRN
Qty: 15 TABLET | Refills: 0 | Status: SHIPPED | OUTPATIENT
Start: 2024-06-21 | End: 2024-06-23 | Stop reason: SDUPTHER

## 2024-06-21 RX ADMIN — PROMETHAZINE HYDROCHLORIDE 12.5 MG: 25 INJECTION INTRAMUSCULAR; INTRAVENOUS at 09:06

## 2024-06-21 RX ADMIN — SODIUM CHLORIDE 1000 ML: 9 INJECTION, SOLUTION INTRAVENOUS at 10:06

## 2024-06-21 NOTE — DISCHARGE INSTRUCTIONS
Continue routine medication.  Encourage oral fluids for dehydration.  Continue Phenergan if needed for nausea.  Follow up with your primary physician's as needed

## 2024-06-21 NOTE — ED PROVIDER NOTES
Encounter Date: 6/21/2024       History     Chief Complaint   Patient presents with    GEN WEAKNESS     ONSET LASTNITE    Nausea    Flank Pain     RT     73-year-old female who has a history of lung cancer, GERD, hypertension, COPD and thyroid disease, presents with complaints of having onset of generalized weakness that began last night.  The patient states that she recently within last day received her 2nd chemo treatment and developed an allergic reaction which was thought to have been secondary to the fast  infusion.  The patient had a chemo stopped and was given Benadryl and steroids in the chemo re-initiated but at a much slower rate which she tolerated.  The patient admits to some nausea but has had no vomiting and no diarrhea.  No fever.  She did try taking Zofran last night without benefit.  Bowel habits have been normal.  She does use home O2 as needed.  Currently on 2 L the patient's oxygen saturation is 98%.  She stopped smoking in January 1, 2024 she admits to having a poor appetite.  No weight changes.  No trouble urinating.  She does though complain of right flank pain.      Review of patient's allergies indicates:   Allergen Reactions    Bisacodyl Nausea And Vomiting     Other reaction(s): Vomiting    Iodinated contrast media Hives and Shortness Of Breath     hypotension    Morphine Other (See Comments)     hypotension    Azithromycin Hives    Cipro [ciprofloxacin hcl]     Demerol [meperidine]      Nausea vomiting, visual problem    Doxycycline Hives    Flonase [fluticasone propionate] Hives    Morpholine analogues Other (See Comments)     hypotension     Past Medical History:   Diagnosis Date    Anxiety     Martin esophagus     Colitis     COPD (chronic obstructive pulmonary disease)     GERD (gastroesophageal reflux disease)     Hypertension     Lung cancer     Thyroid disease     Vocal cord polyps      Past Surgical History:   Procedure Laterality Date    ABDOMINAL AORTIC ANEURYSM REPAIR       BACK SURGERY      cervical    CATARACT EXTRACTION Right 2021    CHOLECYSTECTOMY  2013    Dr Albert CORLEY    ENDOBRONCHIAL ULTRASOUND N/A 2024    Procedure: ENDOBRONCHIAL ULTRASOUND (EBUS);  Surgeon: Kizzy Siegel MD;  Location: [facility] OR [address] [place] [address];  Service: Pulmonary;  Laterality: N/A;    FOOT SURGERY      HYSTERECTOMY  1979    AUGUSTINE for AUB with consevation ovaries    INSERTION OF TUNNELED CENTRAL VENOUS CATHETER (CVC) WITH SUBCUTANEOUS PORT Right 2024    Procedure: [id number];  Surgeon: Simon Wilson III, MD;  Location: [facility] OR;  Service: General;  Laterality: Right;    ROBOTIC BRONCHOSCOPY N/A 2024    Procedure: ROBOTIC BRONCHOSCOPY;  Surgeon: Kizzy Siegel MD;  Location: [facility] OR [address] [place] [address];  Service: Pulmonary;  Laterality: N/A;    SALIVARY GLAND SURGERY       Family History   Problem Relation Name Age of Onset    Bladder Cancer Mother      Heart failure Father      Emphysema Sister      Pancreatic cancer Sister      No Known Problems Brother       Social History     Tobacco Use    Smoking status: Former     Types: Cigarettes     Start date: 2024     Quit date: 1964     Years since quittin.5    Smokeless tobacco: Never    Tobacco comments:     Pt has smoked since 14 years old. Smokes around .5ppd. Inpatient smoking education done 10/20.     Pt quit smoking on 24.  She has never used smokeless tobacco   Substance Use Topics    Alcohol use: Never    Drug use: Never     Review of Systems   Constitutional:  Positive for appetite change and fatigue. Negative for diaphoresis and fever.   HENT:  Negative for trouble swallowing.    Respiratory:  Positive for shortness of breath. Negative for wheezing and stridor.    Cardiovascular:  Negative for chest pain, palpitations and leg swelling.   Gastrointestinal:  Negative for abdominal pain, diarrhea, nausea and vomiting.   Genitourinary:  Negative for difficulty urinating.   Musculoskeletal:  Negative for back pain.    Skin:  Negative for color change, pallor and rash.   Neurological:  Positive for weakness. Negative for dizziness and headaches.   All other systems reviewed and are negative.      Physical Exam     Initial Vitals [06/21/24 0752]   BP Pulse Resp Temp SpO2   (!) 141/90 70 20 98 °F (36.7 °C) 96 %      MAP       --         Physical Exam    Vitals reviewed.  Constitutional: She appears well-developed and well-nourished. She is not diaphoretic. No distress.   Obese   HENT:   Head: Normocephalic and atraumatic.   Nose: Nose normal.   Mouth/Throat: Oropharynx is clear and moist. No oropharyngeal exudate.   Eyes: Conjunctivae are normal. Pupils are equal, round, and reactive to light.   Neck: Neck supple.   Normal range of motion.  Cardiovascular:  Normal rate, regular rhythm, normal heart sounds and intact distal pulses.     Exam reveals no gallop and no friction rub.       No murmur heard.  Pulmonary/Chest: No respiratory distress. She has wheezes. She has no rhonchi. She has no rales. She exhibits no tenderness.   Abdominal: Abdomen is soft. Bowel sounds are normal. She exhibits no distension. There is no abdominal tenderness.   Right CVA tenderness There is no rebound and no guarding.   Musculoskeletal:         General: No tenderness or edema. Normal range of motion.      Cervical back: Normal range of motion and neck supple.     Lymphadenopathy:     She has no cervical adenopathy.   Neurological: She is alert and oriented to person, place, and time. She has normal strength. GCS score is 15. GCS eye subscore is 4. GCS verbal subscore is 5. GCS motor subscore is 6.   Skin: Skin is warm and dry. Capillary refill takes less than 2 seconds. No rash noted. No erythema. No pallor.   Psychiatric: She has a normal mood and affect. Her behavior is normal. Judgment and thought content normal.         ED Course   Procedures  Labs Reviewed   CBC W/ AUTO DIFFERENTIAL - Abnormal; Notable for the following components:       Result  Value    RBC 3.60 (*)     Hemoglobin 11.1 (*)     Hematocrit 33.5 (*)     MPV 8.8 (*)     Immature Granulocytes 0.6 (*)     Mono # 0.1 (*)     Gran % 74.5 (*)     Mono % 0.9 (*)     All other components within normal limits   COMPREHENSIVE METABOLIC PANEL - Abnormal; Notable for the following components:    CO2 30 (*)     Glucose 124 (*)     BUN 26 (*)     eGFR 47.2 (*)     All other components within normal limits   URINALYSIS, REFLEX TO URINE CULTURE - Abnormal; Notable for the following components:    Color, UA Colorless (*)     All other components within normal limits    Narrative:     Specimen Source->Urine   TSH - Abnormal; Notable for the following components:    TSH 9.128 (*)     All other components within normal limits   MAGNESIUM   TROPONIN I HIGH SENSITIVITY   B-TYPE NATRIURETIC PEPTIDE   TSH   T4, FREE        ECG Results              EKG 12-lead (In process)        Collection Time Result Time QRS Duration OHS QTC Calculation    06/21/24 07:50:40 06/21/24 11:12:32 70 449                     In process by Interface, Lab In [facility] (06/21/24 11:12:38)                   Narrative:    Test Reason : R11.0,    Vent. Rate : 070 BPM     Atrial Rate : 070 BPM     P-R Int : 152 ms          QRS Dur : 070 ms      QT Int : 416 ms       P-R-T Axes : 020 062 074 degrees     QTc Int : 449 ms    Normal sinus rhythm  Low voltage QRS  Borderline Abnormal ECG  When compared with ECG of 19-JAN-2024 14:56,  Premature ventricular complexes are no longer Present  Nonspecific T wave abnormality no longer evident in Anterior leads    Referred By: MICHAEL DOUGLAS           Confirmed By:                                   Imaging Results              X-Ray Chest AP Portable (Final result)  Result time 06/21/24 08:04:37      Final result by Phu Hicks MD (06/21/24 08:04:37)                   Impression:      1.  Pulmonary nodule in the left upper lobe subjectively decreased in size from the previous radiograph.    2.  No  acute cardiac or pulmonary process.      Electronically signed by: Phu Hicks  Date:    06/21/2024  Time:    08:04               Narrative:    CLINICAL HISTORY:  ([id number])74 y/o  (1950) F    Chest Pain;    TECHNIQUE:  (MARIUSZ#[id number], exam time 6/21/2024 8:03)    XR CHEST AP PORTABLE [id number]    COMPARISON:  Radiograph from 05/20/2024.    FINDINGS:  The lungs show a 2 cm nodule in the left upper lobe, otherwise clear.  Costophrenic angles are seen without effusion. No pneumothorax is identified. The heart is mildly enlarged.  There is a right-sided subclavian medical infusion port with tip at the level of the SVC.  The mediastinum is within normal limits. Osseous structures appear within normal limits. The visualized upper abdomen is unremarkable.                                       Medications   promethazine (PHENERGAN) 12.5 mg in 0.9% NaCl 50 mL IVPB (0 mg Intravenous Stopped 6/21/24 0933)   sodium chloride 0.9% bolus 1,000 mL 1,000 mL (0 mLs Intravenous Stopped 6/21/24 1118)     Medical Decision Making  Amount and/or Complexity of Data Reviewed  Labs: ordered.  Radiology: ordered.              Attending Attestation:             Attending ED Notes:   ED course and MDM:  This 73-year-old female who has a history of lung cancer for which she is currently undergoing chemo, presented with a history that she had had onset of some generalized weakness last night.  She did additionally complain of some right flank pain.  She has no urinary symptoms.  During the ED course a urinalysis obtained was clear.  Chemistries and CBC are unremarkable with the exception of having some prerenal azotemia.  The patient did receive a L of fluids and states that she felt better afterwards along with having also received Phenergan IV.  The patient states she felt much better and was able to be discharged to follow up with her treating physicians.  She is instructed to encourage fluids and continue Phenergan if needed for nausea  routine medications are to be continued.                             Clinical Impression:  Final diagnoses:  [R11.0] Nausea  [E86.0] Dehydration (Primary)  [C34.90] Malignant neoplasm of lung, unspecified laterality, unspecified part of lung  [R53.1] Weakness          ED Disposition Condition    Discharge Stable          ED Prescriptions       Medication Sig Dispense Start Date End Date Auth. Provider    promethazine (PHENERGAN) 25 MG tablet Take 1 tablet (25 mg total) by mouth every 6 (six) hours as needed for Nausea. 15 tablet 6/21/2024 -- Misael Valdez Jr., MD          Follow-up Information       Follow up With Specialties Details Why Contact Info    Jasbir Graham MD Family Medicine, Home Health Services, Hospice Services In 4 days For reassessment [address] [facility]  [address] [place] [id number]  [phone]               Misael Valdez Jr., MD  06/21/24 1528

## 2024-06-23 ENCOUNTER — HOSPITAL ENCOUNTER (INPATIENT)
Facility: HOSPITAL | Age: 74
LOS: 7 days | Discharge: HOME | DRG: 392 | End: 2024-07-01
Attending: STUDENT IN AN ORGANIZED HEALTH CARE EDUCATION/TRAINING PROGRAM | Admitting: INTERNAL MEDICINE
Payer: MEDICARE

## 2024-06-23 ENCOUNTER — HOSPITAL ENCOUNTER (OUTPATIENT)
Dept: RADIOLOGY | Facility: HOSPITAL | Age: 74
Discharge: HOME | End: 2024-06-23
Attending: STUDENT IN AN ORGANIZED HEALTH CARE EDUCATION/TRAINING PROGRAM | Admitting: INTERNAL MEDICINE
Payer: MEDICARE

## 2024-06-23 DIAGNOSIS — R50.81 FEBRILE NEUTROPENIA: ICD-10-CM

## 2024-06-23 DIAGNOSIS — R07.9 CHEST PAIN: ICD-10-CM

## 2024-06-23 DIAGNOSIS — C34.90 MALIGNANT NEOPLASM OF LUNG, UNSPECIFIED LATERALITY, UNSPECIFIED PART OF LUNG: Primary | ICD-10-CM

## 2024-06-23 DIAGNOSIS — R11.2 INTRACTABLE NAUSEA AND VOMITING: ICD-10-CM

## 2024-06-23 DIAGNOSIS — D70.1 CHEMOTHERAPY INDUCED NEUTROPENIA: ICD-10-CM

## 2024-06-23 DIAGNOSIS — D69.6 THROMBOCYTOPENIA: ICD-10-CM

## 2024-06-23 DIAGNOSIS — D70.9 FEBRILE NEUTROPENIA: ICD-10-CM

## 2024-06-23 DIAGNOSIS — D64.81 ANEMIA DUE TO CHEMOTHERAPY: ICD-10-CM

## 2024-06-23 DIAGNOSIS — K21.9 GASTROESOPHAGEAL REFLUX DISEASE WITHOUT ESOPHAGITIS: ICD-10-CM

## 2024-06-23 DIAGNOSIS — T45.1X5A CHEMOTHERAPY INDUCED NEUTROPENIA: ICD-10-CM

## 2024-06-23 DIAGNOSIS — C34.92 NON-SMALL CELL CARCINOMA OF LEFT LUNG: ICD-10-CM

## 2024-06-23 DIAGNOSIS — T45.1X5A ANEMIA DUE TO CHEMOTHERAPY: ICD-10-CM

## 2024-06-23 PROBLEM — R19.7 DIARRHEA: Status: ACTIVE | Noted: 2024-06-23

## 2024-06-23 PROBLEM — N17.9 AKI (ACUTE KIDNEY INJURY): Status: ACTIVE | Noted: 2024-06-23

## 2024-06-23 LAB
ALBUMIN SERPL BCP-MCNC: 4 G/DL (ref 3.5–5.2)
ALP SERPL-CCNC: 98 U/L (ref 55–135)
ALT SERPL W/O P-5'-P-CCNC: 23 U/L (ref 10–44)
ANION GAP SERPL CALC-SCNC: 14 MMOL/L (ref 8–16)
AST SERPL-CCNC: 20 U/L (ref 10–40)
BASOPHILS NFR BLD: 0 % (ref 0–1.9)
BILIRUB SERPL-MCNC: 0.6 MG/DL (ref 0.1–1)
BNP SERPL-MCNC: 38 PG/ML (ref 0–99)
BUN SERPL-MCNC: 27 MG/DL (ref 8–23)
CALCIUM SERPL-MCNC: 9.4 MG/DL (ref 8.7–10.5)
CHLORIDE SERPL-SCNC: 98 MMOL/L (ref 95–110)
CO2 SERPL-SCNC: 27 MMOL/L (ref 23–29)
CREAT SERPL-MCNC: 1.5 MG/DL (ref 0.5–1.4)
DIFFERENTIAL METHOD BLD: ABNORMAL
EOSINOPHIL NFR BLD: 0 % (ref 0–8)
ERYTHROCYTE [DISTWIDTH] IN BLOOD BY AUTOMATED COUNT: 13 % (ref 11.5–14.5)
EST. GFR  (NO RACE VARIABLE): 36.1 ML/MIN/1.73 M^2
GLUCOSE SERPL-MCNC: 153 MG/DL (ref 70–110)
HCT VFR BLD AUTO: 36.1 % (ref 37–48.5)
HGB BLD-MCNC: 12 G/DL (ref 12–16)
IMM GRANULOCYTES # BLD AUTO: ABNORMAL K/UL (ref 0–0.04)
IMM GRANULOCYTES NFR BLD AUTO: ABNORMAL % (ref 0–0.5)
LIPASE SERPL-CCNC: 30 U/L (ref 4–60)
LYMPHOCYTES NFR BLD: 37 % (ref 18–48)
MAGNESIUM SERPL-MCNC: 1.5 MG/DL (ref 1.6–2.6)
MCH RBC QN AUTO: 30.5 PG (ref 27–31)
MCHC RBC AUTO-ENTMCNC: 33.2 G/DL (ref 32–36)
MCV RBC AUTO: 92 FL (ref 82–98)
MONOCYTES NFR BLD: 0 % (ref 4–15)
NEUTROPHILS NFR BLD: 63 % (ref 38–73)
NRBC BLD-RTO: 0 /100 WBC
PLATELET # BLD AUTO: 217 K/UL (ref 150–450)
PLATELET BLD QL SMEAR: ABNORMAL
PMV BLD AUTO: 8.8 FL (ref 9.2–12.9)
POTASSIUM SERPL-SCNC: 3.3 MMOL/L (ref 3.5–5.1)
PROCALCITONIN SERPL IA-MCNC: 0.11 NG/ML (ref 0–0.5)
PROT SERPL-MCNC: 7.6 G/DL (ref 6–8.4)
RBC # BLD AUTO: 3.94 M/UL (ref 4–5.4)
SODIUM SERPL-SCNC: 139 MMOL/L (ref 136–145)
TROPONIN I SERPL HS-MCNC: 11.1 PG/ML (ref 0–14.9)
TROPONIN I SERPL HS-MCNC: 9.6 PG/ML (ref 0–14.9)
TROPONIN I SERPL HS-MCNC: 9.7 PG/ML (ref 0–14.9)
WBC # BLD AUTO: 3.01 K/UL (ref 3.9–12.7)

## 2024-06-23 PROCEDURE — 96375 TX/PRO/DX INJ NEW DRUG ADDON: CPT

## 2024-06-23 PROCEDURE — 94761 N-INVAS EAR/PLS OXIMETRY MLT: CPT

## 2024-06-23 PROCEDURE — 63600175 PHARM REV CODE 636 W HCPCS: Performed by: STUDENT IN AN ORGANIZED HEALTH CARE EDUCATION/TRAINING PROGRAM

## 2024-06-23 PROCEDURE — 99285 EMERGENCY DEPT VISIT HI MDM: CPT | Mod: 25

## 2024-06-23 PROCEDURE — A9540 TC99M MAA: HCPCS | Performed by: INTERNAL MEDICINE

## 2024-06-23 PROCEDURE — 25000242 PHARM REV CODE 250 ALT 637 W/ HCPCS: Performed by: INTERNAL MEDICINE

## 2024-06-23 PROCEDURE — G0378 HOSPITAL OBSERVATION PER HR: HCPCS

## 2024-06-23 PROCEDURE — 84484 ASSAY OF TROPONIN QUANT: CPT

## 2024-06-23 PROCEDURE — 93005 ELECTROCARDIOGRAM TRACING: CPT | Performed by: INTERNAL MEDICINE

## 2024-06-23 PROCEDURE — 96361 HYDRATE IV INFUSION ADD-ON: CPT

## 2024-06-23 PROCEDURE — 93010 ELECTROCARDIOGRAM REPORT: CPT | Mod: ,,, | Performed by: INTERNAL MEDICINE

## 2024-06-23 PROCEDURE — 63600175 PHARM REV CODE 636 W HCPCS: Performed by: INTERNAL MEDICINE

## 2024-06-23 PROCEDURE — 25000003 PHARM REV CODE 250: Performed by: STUDENT IN AN ORGANIZED HEALTH CARE EDUCATION/TRAINING PROGRAM

## 2024-06-23 PROCEDURE — 96367 TX/PROPH/DG ADDL SEQ IV INF: CPT

## 2024-06-23 PROCEDURE — 84145 PROCALCITONIN (PCT): CPT

## 2024-06-23 PROCEDURE — 94640 AIRWAY INHALATION TREATMENT: CPT | Mod: XB

## 2024-06-23 PROCEDURE — 96365 THER/PROPH/DIAG IV INF INIT: CPT

## 2024-06-23 PROCEDURE — 85007 BL SMEAR W/DIFF WBC COUNT: CPT

## 2024-06-23 PROCEDURE — 85027 COMPLETE CBC AUTOMATED: CPT

## 2024-06-23 PROCEDURE — 83880 ASSAY OF NATRIURETIC PEPTIDE: CPT

## 2024-06-23 PROCEDURE — 83735 ASSAY OF MAGNESIUM: CPT

## 2024-06-23 PROCEDURE — 80053 COMPREHEN METABOLIC PANEL: CPT

## 2024-06-23 PROCEDURE — 87040 BLOOD CULTURE FOR BACTERIA: CPT

## 2024-06-23 PROCEDURE — 36415 COLL VENOUS BLD VENIPUNCTURE: CPT

## 2024-06-23 PROCEDURE — 78580 LUNG PERFUSION IMAGING: CPT | Mod: TC

## 2024-06-23 PROCEDURE — 25000003 PHARM REV CODE 250

## 2024-06-23 PROCEDURE — 83690 ASSAY OF LIPASE: CPT

## 2024-06-23 PROCEDURE — 84484 ASSAY OF TROPONIN QUANT: CPT | Mod: 91

## 2024-06-23 RX ORDER — ALUMINUM HYDROXIDE, MAGNESIUM HYDROXIDE, AND SIMETHICONE 1200; 120; 1200 MG/30ML; MG/30ML; MG/30ML
30 SUSPENSION ORAL ONCE
Status: DISCONTINUED | OUTPATIENT
Start: 2024-06-23 | End: 2024-06-23

## 2024-06-23 RX ORDER — LEVOTHYROXINE SODIUM 100 UG/1
100 TABLET ORAL
Status: DISCONTINUED | OUTPATIENT
Start: 2024-06-24 | End: 2024-06-24

## 2024-06-23 RX ORDER — SODIUM CHLORIDE, SODIUM LACTATE, POTASSIUM CHLORIDE, CALCIUM CHLORIDE 600; 310; 30; 20 MG/100ML; MG/100ML; MG/100ML; MG/100ML
INJECTION, SOLUTION INTRAVENOUS CONTINUOUS
Status: DISCONTINUED | OUTPATIENT
Start: 2024-06-23 | End: 2024-06-29

## 2024-06-23 RX ORDER — LANOLIN ALCOHOL/MO/W.PET/CERES
400 CREAM (GRAM) TOPICAL ONCE
Status: COMPLETED | OUTPATIENT
Start: 2024-06-23 | End: 2024-06-23

## 2024-06-23 RX ORDER — POTASSIUM CHLORIDE 7.45 MG/ML
10 INJECTION INTRAVENOUS
Status: DISCONTINUED | OUTPATIENT
Start: 2024-06-23 | End: 2024-06-23

## 2024-06-23 RX ORDER — NALOXONE HCL 0.4 MG/ML
0.02 VIAL (ML) INJECTION
Status: DISCONTINUED | OUTPATIENT
Start: 2024-06-23 | End: 2024-07-01 | Stop reason: HOSPADM

## 2024-06-23 RX ORDER — AMOXICILLIN 250 MG
1 CAPSULE ORAL DAILY PRN
Status: DISCONTINUED | OUTPATIENT
Start: 2024-06-23 | End: 2024-07-01 | Stop reason: HOSPADM

## 2024-06-23 RX ORDER — LIDOCAINE HYDROCHLORIDE 20 MG/ML
15 SOLUTION OROPHARYNGEAL ONCE
Status: COMPLETED | OUTPATIENT
Start: 2024-06-23 | End: 2024-06-23

## 2024-06-23 RX ORDER — ACETAMINOPHEN 325 MG/1
650 TABLET ORAL EVERY 4 HOURS PRN
Status: DISCONTINUED | OUTPATIENT
Start: 2024-06-23 | End: 2024-07-01 | Stop reason: HOSPADM

## 2024-06-23 RX ORDER — IPRATROPIUM BROMIDE 0.5 MG/2.5ML
0.5 SOLUTION RESPIRATORY (INHALATION) EVERY 6 HOURS
Status: DISCONTINUED | OUTPATIENT
Start: 2024-06-23 | End: 2024-07-01 | Stop reason: HOSPADM

## 2024-06-23 RX ORDER — OXYCODONE HYDROCHLORIDE 5 MG/1
5 TABLET ORAL ONCE
Status: COMPLETED | OUTPATIENT
Start: 2024-06-23 | End: 2024-06-23

## 2024-06-23 RX ORDER — TALC
6 POWDER (GRAM) TOPICAL NIGHTLY PRN
Status: DISCONTINUED | OUTPATIENT
Start: 2024-06-23 | End: 2024-07-01 | Stop reason: HOSPADM

## 2024-06-23 RX ORDER — ALUMINUM HYDROXIDE, MAGNESIUM HYDROXIDE, AND SIMETHICONE 1200; 120; 1200 MG/30ML; MG/30ML; MG/30ML
30 SUSPENSION ORAL 4 TIMES DAILY PRN
Status: DISCONTINUED | OUTPATIENT
Start: 2024-06-23 | End: 2024-06-23

## 2024-06-23 RX ORDER — ALUMINUM HYDROXIDE, MAGNESIUM HYDROXIDE, AND SIMETHICONE 1200; 120; 1200 MG/30ML; MG/30ML; MG/30ML
30 SUSPENSION ORAL EVERY 6 HOURS PRN
Status: DISCONTINUED | OUTPATIENT
Start: 2024-06-23 | End: 2024-07-01 | Stop reason: HOSPADM

## 2024-06-23 RX ORDER — HYDROCODONE BITARTRATE AND ACETAMINOPHEN 5; 325 MG/1; MG/1
2 TABLET ORAL EVERY 6 HOURS PRN
Status: DISCONTINUED | OUTPATIENT
Start: 2024-06-23 | End: 2024-07-01 | Stop reason: HOSPADM

## 2024-06-23 RX ORDER — ONDANSETRON HYDROCHLORIDE 2 MG/ML
4 INJECTION, SOLUTION INTRAVENOUS EVERY 6 HOURS PRN
Status: DISCONTINUED | OUTPATIENT
Start: 2024-06-23 | End: 2024-07-01 | Stop reason: HOSPADM

## 2024-06-23 RX ORDER — METOPROLOL TARTRATE 25 MG/1
25 TABLET, FILM COATED ORAL 2 TIMES DAILY
Status: DISCONTINUED | OUTPATIENT
Start: 2024-06-23 | End: 2024-07-01 | Stop reason: HOSPADM

## 2024-06-23 RX ORDER — DROPERIDOL 2.5 MG/ML
0.62 INJECTION, SOLUTION INTRAMUSCULAR; INTRAVENOUS ONCE
Status: COMPLETED | OUTPATIENT
Start: 2024-06-23 | End: 2024-06-23

## 2024-06-23 RX ORDER — ACETAMINOPHEN 500 MG
1000 TABLET ORAL
Status: COMPLETED | OUTPATIENT
Start: 2024-06-23 | End: 2024-06-23

## 2024-06-23 RX ORDER — LANOLIN ALCOHOL/MO/W.PET/CERES
800 CREAM (GRAM) TOPICAL
Status: DISCONTINUED | OUTPATIENT
Start: 2024-06-23 | End: 2024-07-01 | Stop reason: HOSPADM

## 2024-06-23 RX ORDER — HYDROCODONE BITARTRATE AND ACETAMINOPHEN 5; 325 MG/1; MG/1
1 TABLET ORAL EVERY 6 HOURS PRN
Status: DISCONTINUED | OUTPATIENT
Start: 2024-06-23 | End: 2024-07-01 | Stop reason: HOSPADM

## 2024-06-23 RX ORDER — NITROGLYCERIN 0.4 MG/1
0.4 TABLET SUBLINGUAL EVERY 5 MIN PRN
Status: DISCONTINUED | OUTPATIENT
Start: 2024-06-23 | End: 2024-07-01 | Stop reason: HOSPADM

## 2024-06-23 RX ORDER — SODIUM CHLORIDE 0.9 % (FLUSH) 0.9 %
10 SYRINGE (ML) INJECTION EVERY 12 HOURS PRN
Status: DISCONTINUED | OUTPATIENT
Start: 2024-06-23 | End: 2024-07-01 | Stop reason: HOSPADM

## 2024-06-23 RX ORDER — ARFORMOTEROL TARTRATE 15 UG/2ML
15 SOLUTION RESPIRATORY (INHALATION) 2 TIMES DAILY
Status: DISCONTINUED | OUTPATIENT
Start: 2024-06-23 | End: 2024-07-01 | Stop reason: HOSPADM

## 2024-06-23 RX ORDER — PROMETHAZINE HYDROCHLORIDE 25 MG/1
25 TABLET ORAL
Status: COMPLETED | OUTPATIENT
Start: 2024-06-23 | End: 2024-06-23

## 2024-06-23 RX ORDER — HYDROMORPHONE HYDROCHLORIDE 1 MG/ML
0.5 INJECTION, SOLUTION INTRAMUSCULAR; INTRAVENOUS; SUBCUTANEOUS
Status: DISCONTINUED | OUTPATIENT
Start: 2024-06-23 | End: 2024-06-23

## 2024-06-23 RX ORDER — ONDANSETRON HYDROCHLORIDE 2 MG/ML
4 INJECTION, SOLUTION INTRAVENOUS
Status: DISCONTINUED | OUTPATIENT
Start: 2024-06-23 | End: 2024-06-23

## 2024-06-23 RX ORDER — FAMOTIDINE 10 MG/ML
20 INJECTION INTRAVENOUS 2 TIMES DAILY
Status: DISCONTINUED | OUTPATIENT
Start: 2024-06-23 | End: 2024-06-23

## 2024-06-23 RX ORDER — SODIUM,POTASSIUM PHOSPHATES 280-250MG
2 POWDER IN PACKET (EA) ORAL
Status: DISCONTINUED | OUTPATIENT
Start: 2024-06-23 | End: 2024-07-01 | Stop reason: HOSPADM

## 2024-06-23 RX ORDER — FAMOTIDINE 10 MG/ML
20 INJECTION INTRAVENOUS DAILY
Status: DISCONTINUED | OUTPATIENT
Start: 2024-06-24 | End: 2024-06-27

## 2024-06-23 RX ORDER — CARBOXYMETHYLCELLULOSE SODIUM 10 MG/ML
1 GEL OPHTHALMIC 4 TIMES DAILY PRN
Status: DISCONTINUED | OUTPATIENT
Start: 2024-06-23 | End: 2024-07-01 | Stop reason: HOSPADM

## 2024-06-23 RX ORDER — LIDOCAINE HYDROCHLORIDE 20 MG/ML
15 SOLUTION OROPHARYNGEAL ONCE
Status: DISCONTINUED | OUTPATIENT
Start: 2024-06-23 | End: 2024-06-23

## 2024-06-23 RX ORDER — ASPIRIN 81 MG/1
81 TABLET ORAL DAILY
Status: DISCONTINUED | OUTPATIENT
Start: 2024-06-24 | End: 2024-07-01 | Stop reason: HOSPADM

## 2024-06-23 RX ADMIN — PROMETHAZINE HYDROCHLORIDE 25 MG: 25 TABLET ORAL at 02:06

## 2024-06-23 RX ADMIN — SODIUM CHLORIDE, POTASSIUM CHLORIDE, SODIUM LACTATE AND CALCIUM CHLORIDE 1000 ML: 600; 310; 30; 20 INJECTION, SOLUTION INTRAVENOUS at 02:06

## 2024-06-23 RX ADMIN — METOPROLOL TARTRATE 25 MG: 25 TABLET, FILM COATED ORAL at 08:06

## 2024-06-23 RX ADMIN — PROMETHAZINE HYDROCHLORIDE 25 MG: 25 INJECTION INTRAMUSCULAR; INTRAVENOUS at 01:06

## 2024-06-23 RX ADMIN — SODIUM CHLORIDE, POTASSIUM CHLORIDE, SODIUM LACTATE AND CALCIUM CHLORIDE: 600; 310; 30; 20 INJECTION, SOLUTION INTRAVENOUS at 08:06

## 2024-06-23 RX ADMIN — IPRATROPIUM BROMIDE 0.5 MG: 0.5 SOLUTION RESPIRATORY (INHALATION) at 08:06

## 2024-06-23 RX ADMIN — ARFORMOTEROL TARTRATE 15 MCG: 15 SOLUTION RESPIRATORY (INHALATION) at 08:06

## 2024-06-23 RX ADMIN — OXYCODONE HYDROCHLORIDE 5 MG: 5 TABLET ORAL at 12:06

## 2024-06-23 RX ADMIN — Medication 400 MG: at 12:06

## 2024-06-23 RX ADMIN — DROPERIDOL 0.62 MG: 2.5 INJECTION, SOLUTION INTRAMUSCULAR; INTRAVENOUS at 12:06

## 2024-06-23 RX ADMIN — ALUMINUM HYDROXIDE, MAGNESIUM HYDROXIDE, AND SIMETHICONE 30 ML: 200; 200; 20 SUSPENSION ORAL at 08:06

## 2024-06-23 RX ADMIN — KIT FOR THE PREPARATION OF TECHNETIUM TC 99M ALBUMIN AGGREGATED 5.2 MILLICURIE: 2.5 INJECTION, POWDER, FOR SOLUTION INTRAVENOUS at 07:06

## 2024-06-23 RX ADMIN — LIDOCAINE HYDROCHLORIDE 15 ML: 20 SOLUTION ORAL at 08:06

## 2024-06-23 RX ADMIN — POTASSIUM CHLORIDE 10 MEQ: 7.46 INJECTION, SOLUTION INTRAVENOUS at 12:06

## 2024-06-23 RX ADMIN — ACETAMINOPHEN 1000 MG: 500 TABLET, FILM COATED ORAL at 12:06

## 2024-06-23 NOTE — PROGRESS NOTES
Automatic Inhaler to Nebulizer Interchange    Umeclidinium/Vilanterol (Anoro) 62.5 mcg/25 mcg changed to Ipratropium 0.5 mg every 6 hours AND Arformoterol 15 mcg BID per [facility] Automatic Therapeutic Substitutions Protocol.    Please contact pharmacy at extension [phone] with any questions.     Thank you,   Mariano Perez

## 2024-06-23 NOTE — Clinical Note
Diagnosis: Chest pain [996338]   Future Attending Provider: JAVON COLÓN [[id number]]   Place in Observation: [facility] [[id number]]

## 2024-06-23 NOTE — ED PROVIDER NOTES
Encounter Date: 6/23/2024       History     Chief Complaint   Patient presents with    Vomiting     X FEW DAYS    Chest Pain     HPI  73-year-old presenting with multiple symptoms.  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  had chemo on June 17th and reported feeling fine until chemo June 19.  That night she started having nausea vomiting and developed left-sided abdominal and chest pain.  She came to the ER 2 days later and symptoms resolved with Phenergan and then she went back home.  Yesterday her symptoms started again and she reports that the amount of nausea and vomiting she was having is unbearable and she would rather not continue with treatment.  She says that she was again having left-sided abdominal and chest pain.  Has end-stage COPD.  Not using oxygen currently.  Reports she would minor shortness of breath earlier but very mild, not even as bad as her baseline sob. Had radiation to L chest. Had severe diarrhea non bloody in the last few days but this resolved.   Review of patient's allergies indicates:   Allergen Reactions    Bisacodyl Nausea And Vomiting     Other reaction(s): Vomiting    Iodinated contrast media Hives and Shortness Of Breath     hypotension    Morphine Other (See Comments)     hypotension    Azithromycin Hives    Cipro [ciprofloxacin hcl]     Demerol [meperidine]      Nausea vomiting, visual problem    Doxycycline Hives    Flonase [fluticasone propionate] Hives    Morpholine analogues Other (See Comments)     hypotension     Past Medical History:   Diagnosis Date    Anxiety     Martin esophagus     Colitis     COPD (chronic obstructive pulmonary disease)     GERD (gastroesophageal reflux disease)     Hypertension     Intractable nausea and vomiting 6/23/2024    Lung cancer     Thyroid disease     Vocal cord polyps      Past Surgical History:   Procedure Laterality Date    ABDOMINAL AORTIC ANEURYSM REPAIR      BACK SURGERY      cervical    CATARACT  EXTRACTION Right 2021    CHOLECYSTECTOMY  2013    Dr Albert CORLEY    ENDOBRONCHIAL ULTRASOUND N/A 2024    Procedure: ENDOBRONCHIAL ULTRASOUND (EBUS);  Surgeon: Kizzy Siegel MD;  Location: [facility] OR [address] [place] [address];  Service: Pulmonary;  Laterality: N/A;    FOOT SURGERY      HYSTERECTOMY      AUGUSTINE for AUB with consevation ovaries    INSERTION OF TUNNELED CENTRAL VENOUS CATHETER (CVC) WITH SUBCUTANEOUS PORT Right 2024    Procedure: [id number];  Surgeon: Simon Wilson III, MD;  Location: [facility] OR;  Service: General;  Laterality: Right;    ROBOTIC BRONCHOSCOPY N/A 2024    Procedure: ROBOTIC BRONCHOSCOPY;  Surgeon: Kizzy Siegel MD;  [facility];  Service: Pulmonary;  Laterality: N/A;    SALIVARY GLAND SURGERY       Family History   Problem Relation Name Age of Onset    Bladder Cancer Mother      Heart failure Father      Emphysema Sister      Pancreatic cancer Sister      No Known Problems Brother       Social History     Tobacco Use    Smoking status: Former     Types: Cigarettes     Start date: 2024     Quit date: 1964     Years since quittin.5    Smokeless tobacco: Never    Tobacco comments:     Pt has smoked since 14 years old. Smokes around .5ppd. Inpatient smoking education done 10/20.     Pt quit smoking on 24.  She has never used smokeless tobacco   Substance Use Topics    Alcohol use: Never    Drug use: Never     Review of Systems   Constitutional:  Positive for appetite change. Negative for chills and fever.   HENT:  Negative for congestion and sore throat.    Eyes:  Negative for redness and visual disturbance.   Respiratory:  Negative for cough and shortness of breath.    Cardiovascular:  Positive for chest pain. Negative for palpitations and leg swelling.   Gastrointestinal:  Positive for abdominal pain, nausea and vomiting. Negative for blood in stool, constipation and diarrhea.   Genitourinary:  Negative for dysuria, frequency and  hematuria.   Musculoskeletal:  Negative for back pain, joint swelling, neck pain and neck stiffness.   Skin:  Negative for rash and wound.   Neurological:  Negative for weakness and numbness.   Psychiatric/Behavioral:  Negative for confusion.        Physical Exam     Initial Vitals [06/23/24 0858]   BP Pulse Resp Temp SpO2   104/83 94 18 98 °F (36.7 °C) 98 %      MAP       --         Physical Exam    Nursing note and vitals reviewed.  Constitutional: She appears well-developed. She is not diaphoretic.   Crying intermittently, reports this is the worst she has ever felt    HENT:   Head: Normocephalic.   Eyes: Right eye exhibits no discharge. Left eye exhibits no discharge. No scleral icterus.   Neck: Neck supple. No tracheal deviation present.   Cardiovascular:  Normal rate and regular rhythm.           Pulmonary/Chest: Breath sounds normal. No stridor. No respiratory distress. She has no wheezes. She has no rhonchi. She has no rales.   Abdominal: Abdomen is soft. She exhibits no distension. There is no abdominal tenderness. There is no rebound and no guarding.   Musculoskeletal:         General: No edema.      Cervical back: Neck supple.     Neurological: She is alert and oriented to person, place, and time.   Skin: Skin is warm and dry.         ED Course   Procedures  Labs Reviewed   MAGNESIUM - Abnormal; Notable for the following components:       Result Value    Magnesium 1.5 (*)     All other components within normal limits   CBC W/ AUTO DIFFERENTIAL - Abnormal; Notable for the following components:    WBC 3.01 (*)     RBC 3.94 (*)     Hematocrit 36.1 (*)     MPV 8.8 (*)     Mono % 0.0 (*)     All other components within normal limits   COMPREHENSIVE METABOLIC PANEL - Abnormal; Notable for the following components:    Potassium 3.3 (*)     Glucose 153 (*)     BUN 27 (*)     Creatinine 1.5 (*)     eGFR 36.1 (*)     All other components within normal limits   TROPONIN I HIGH SENSITIVITY   B-TYPE NATRIURETIC  PEPTIDE   TROPONIN I HIGH SENSITIVITY   TROPONIN I HIGH SENSITIVITY   PROCALCITONIN   LIPASE   URINALYSIS, REFLEX TO URINE CULTURE        ECG Results              EKG 12-lead (In process)        Collection Time Result Time QRS Duration OHS QTC Calculation    06/23/24 09:06:34 06/23/24 09:23:44 70 469                     In process by Interface, Lab In [facility] (06/23/24 09:23:49)                   Narrative:    Test Reason : R07.9,    Vent. Rate : 091 BPM     Atrial Rate : 091 BPM     P-R Int : 122 ms          QRS Dur : 070 ms      QT Int : 382 ms       P-R-T Axes : -19 031 083 degrees     QTc Int : 469 ms    Sinus rhythm with occasional Premature ventricular complexes  Otherwise normal ECG  When compared with ECG of 21-JUN-2024 07:50,  Premature ventricular complexes are now Present    Referred By: [email or web address]   SELF           Confirmed By:                                      EKG 12-lead (Final result)  Result time 06/24/24 13:00:55      Final result by Unknown User (06/24/24 13:00:55)                                      Imaging Results              NM Lung Scan Perfusion Particulate (Final result)  Result time 06/23/24 20:17:56   Procedure changed from NM Lung Scan Ventilation Perfusion     Final result by Ace Roman MD (06/23/24 20:17:56)                   Impression:      As above.      Electronically signed by: Ace Roman MD  Date:    06/23/2024  Time:    20:17               Narrative:    EXAMINATION:  NM LUNG SCAN PERFUSION    CLINICAL HISTORY:  Chest pain, nonspecific;Pulmonary embolism (PE) suspected, high prob;Chest pain, nonspecific; Pulmonary embolism (PE) suspected, high prob;    TECHNIQUE:  IV administration of 5.2 mCi of Tc-99m-MAA, multiple images of the thorax were obtained in various projections.    COMPARISON:  Chest x-ray dated 06/23/2024.    FINDINGS:  Only perfusion images are provided for review.  No perfusion defect identified.  Evaluation for mismatch is precluded in the absence of  ventilation portion of the examination.                                       CT Abdomen Pelvis  Without Contrast (Final result)  Result time 06/23/24 19:23:46      Final result by Bill Sands MD (06/23/24 19:23:46)                   Impression:      No acute findings evident by CT of the abdomen and pelvis in patient with aortic stent graft and abdominal aneurysm above the stent graft measuring 3.6 cm.    Small pericardial effusion without evidence of tamponade.    Subcutaneous nodules in the left lower anterior abdominal wall.  Correlate for sebaceous cyst.      Electronically signed by: Bill Sands  Date:    06/23/2024  Time:    19:23               Narrative:    EXAMINATION:  CT ABDOMEN PELVIS WITHOUT CONTRAST    CLINICAL HISTORY:  LLQ abdominal pain;Nausea/vomiting;    TECHNIQUE:  Low dose axial images, sagittal and coronal reformations were obtained from the lung bases to the pubic symphysis.  Oral contrast was not administered.    COMPARISON:  None    FINDINGS:  Aortic iliac stent graft is in position with no native aneurysm sac or Amber aortic fluid collection.  The proximal aorta above the stent is dilated to 3.6  Cm which appears stable since the prior exam.  The descending thoracic aorta visualized is unremarkable.    There is a small pericardial effusion without tamponade.  Otherwise the heart mediastinal contours visualized appear unremarkable.    The liver containing a right liver lobe cyst is stable.  Cholecystectomy changes again noted.  No biliary ductal dilatation.    The spleen with calcification, kidneys, pancreas and adrenal glands appear stable and unremarkable.  The loops of large and small intestines appear normal.  Abdominal wall is intact with sebaceous type cyst noted in the lateral left anterior abdominal wall.  Minimal diverticulosis.  No evidence of diverticulitis.  Urinary bladder unremarkable.  Hysterectomy changes.    Spondylosis with vacuum phenomenon at multiple lumbar  levels.  No acute fracture evident.                                       X-Ray Chest AP Portable (Final result)  Result time 06/23/24 09:26:41      Final result by Willian Barroso MD (06/23/24 09:26:41)                   Impression:      1. No interval change compared to June 21, 2024.      Electronically signed by: Willian Barroso  Date:    06/23/2024  Time:    09:26               Narrative:    EXAMINATION:  XR CHEST AP PORTABLE    CLINICAL HISTORY:  Chest Pain;    COMPARISON:  June 21, 2024    FINDINGS:  AP view demonstrates normal heart size.  The thoracic aorta is elongated.  Left upper lobe noncalcified pulmonary nodule is unchanged.  No infiltrates or effusions are identified.    Right-sided Port-A-Cath is unchanged in position with tip at the superior vena cava.                                       Medications   metoprolol tartrate (LOPRESSOR) tablet 25 mg (25 mg Oral Given 6/24/24 2004)   carboxymethylcellulose sodium 1 % DpGe 1 drop (has no administration in time range)   aluminum-magnesium hydroxide-simethicone 200-200-20 mg/5 mL suspension 30 mL (30 mLs Oral Given 6/23/24 2040)     And   LIDOcaine viscous HCl 2% oral solution 15 mL (15 mLs Oral Given 6/23/24 2041)   sodium chloride 0.9% flush 10 mL (has no administration in time range)   melatonin tablet 6 mg (has no administration in time range)   ondansetron injection 4 mg (4 mg Intravenous Given 6/24/24 0433)   senna-docusate 8.6-50 mg per tablet 1 tablet (has no administration in time range)   acetaminophen tablet 650 mg (has no administration in time range)   HYDROcodone-acetaminophen 5-325 mg per tablet 1 tablet (has no administration in time range)   naloxone 0.4 mg/mL injection 0.02 mg (has no administration in time range)   potassium bicarbonate disintegrating tablet 50 mEq (50 mEq Oral Given 6/25/24 0637)   potassium bicarbonate disintegrating tablet 35 mEq (35 mEq Oral Given 6/24/24 0944)   potassium bicarbonate disintegrating tablet 60  mEq (has no administration in time range)   magnesium oxide tablet 800 mg ( Oral Canceled Entry 6/25/24 0635)   magnesium oxide tablet 800 mg (800 mg Oral Given 6/25/24 0637)   potassium, sodium phosphates 280-160-250 mg packet 2 packet (has no administration in time range)   potassium, sodium phosphates 280-160-250 mg packet 2 packet (has no administration in time range)   potassium, sodium phosphates 280-160-250 mg packet 2 packet (has no administration in time range)   lactated ringers infusion ( Intravenous New Bag 6/23/24 2044)   HYDROcodone-acetaminophen 5-325 mg per tablet 2 tablet (has no administration in time range)   promethazine (PHENERGAN) 12.5 mg in 0.9% NaCl 50 mL IVPB (0 mg Intravenous Stopped 6/24/24 0823)   nitroGLYCERIN SL tablet 0.4 mg (has no administration in time range)   famotidine (PF) injection 20 mg (20 mg Intravenous Given 6/24/24 0945)   aspirin EC tablet 81 mg (81 mg Oral Given 6/24/24 0945)   ipratropium 0.02 % nebulizer solution 0.5 mg (0.5 mg Nebulization Given 6/25/24 0757)   arformoteroL nebulizer solution 15 mcg (15 mcg Nebulization Given 6/25/24 0758)   levothyroxine tablet 112 mcg (112 mcg Oral Given 6/25/24 0502)   loperamide capsule 2 mg (2 mg Oral Given 6/24/24 1525)   mupirocin 2 % ointment (1 g Nasal Given 6/24/24 2004)   droPERidol injection 0.625 mg (0.625 mg Intravenous Given 6/23/24 1234)   lactated ringers bolus 1,000 mL (0 mLs Intravenous Stopped 6/23/24 1500)   oxyCODONE immediate release tablet 5 mg (5 mg Oral Given 6/23/24 1232)   acetaminophen tablet 1,000 mg (1,000 mg Oral Given 6/23/24 1233)   magnesium oxide tablet 400 mg (400 mg Oral Given 6/23/24 1233)   promethazine (PHENERGAN) 25 mg in 0.9% NaCl 50 mL IVPB (0 mg Intravenous Stopped 6/23/24 1416)   promethazine tablet 25 mg (25 mg Oral Given 6/23/24 1442)   ALPRAZolam tablet 0.25 mg (0.25 mg Oral Given 6/24/24 9998)     Medical Decision Making  Risk  OTC drugs.  Prescription drug management.    On my  independent interpretation labs CBC, CMP, troponin x2, BNP, magnesium, EKG, chest x-ray within acceptable limits except magnesium 1.5, potassium 3.3 BUN 27, creatinine 1.5    On my independent interpretation EKG with rate of 91, normal intervals, no sign of acute occlusion myocardial infarction    Pt admitted for maliha, intractable nausea, vomiting pain. Pt refusing pain meds, nausea meds intiially that she thought would make her feel worse therefore difficult to control pt's symptoms at first. Eventually opening to trying promethazine again which worked for her last time as well as dilaudid for pain. No ttp at any point during er stay. Unclear what is causing her L chest and l abd pain but L chest is where she had radiadtion and pain like this in the past. More likely dehydration, sx from chemo. Considered ctPE and CT abd pelvis but low suspicion for etiology found on ct and therefore benefits did not outweight risk as pt had anaphylaxis with contrast a few months ago. Continue to monitor for need for ct, discussed with hospital med.     Carmen Pena MD  Emergency Medicine Staff Physician                                      Clinical Impression:  Final diagnoses:  [R07.9] Chest pain          ED Disposition Condition    Observation                 Carmen Pena MD  06/25/24 0931

## 2024-06-23 NOTE — H&P
[facility] - Emergency Dept  Hospital Medicine  History & Physical    Patient Name: Joslyn Dubon  MRN: [id number]  Patient Class: OP- Observation  Admission Date: 6/23/2024  Attending Physician: Jeffrey Cabello MD   Primary Care Provider: Jasbir Graham MD         Patient information was obtained from patient, past medical records, and ER records.     Subjective:     Principal Problem:Chest pain    Chief Complaint:   Chief Complaint   Patient presents with    Vomiting     X FEW DAYS    Chest Pain        HPI: 73-year-old female presented to ED for eval. Patient reported for the last few days she has had intractable nausea and vomiting, with associated LLQ abd pain and intermittent diarrhea. Denied melena, hematochezia, hematemesis. 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 Patient also reported over the last 2 days she has been having squeezing L sided intermittent chest pain that radiates to her back with shortness of breath. She does have chronic resp failure and COPD but reports shortness of breath is increased from baseline, denies use of home O2. Patient denies previous similar episodes of chest pain, denies hx CAD. She does have hx of AAA s/p repair. Noted to have WEN, hypomagnesemia, hypokalemia. CXR impression without acute abn. Of note, patient has contrast allergy with anaphylactic reaction, also reports severe hypotension with dilaudid and morphine, can tolerate norco. Admit to hospital medicine for further eval.     Past Medical History:   Diagnosis Date    Anxiety     Martin esophagus     Colitis     COPD (chronic obstructive pulmonary disease)     GERD (gastroesophageal reflux disease)     Hypertension     Lung cancer     Thyroid disease     Vocal cord polyps        Past Surgical History:   Procedure Laterality Date    ABDOMINAL AORTIC ANEURYSM REPAIR      BACK SURGERY       cervical    CATARACT EXTRACTION Right 02/2021    CHOLECYSTECTOMY  12/20/2013    Dr Albert CORLEY    ENDOBRONCHIAL ULTRASOUND N/A 4/19/2024    Procedure: ENDOBRONCHIAL ULTRASOUND (EBUS);  Surgeon: Kizzy Siegel MD;  [facility];  Service: Pulmonary;  Laterality: N/A;    FOOT SURGERY      HYSTERECTOMY  1979    AUGUSTINE for AUB with consevation ovaries    INSERTION OF TUNNELED CENTRAL VENOUS CATHETER (CVC) WITH SUBCUTANEOUS PORT Right 5/20/2024    Procedure: [id number];  Surgeon: Simon Wilson III, MD;  Location: [facility] OR;  Service: General;  Laterality: Right;    ROBOTIC BRONCHOSCOPY N/A 4/19/2024    Procedure: ROBOTIC BRONCHOSCOPY;  Surgeon: Kizzy Siegel MD;  [facility];  Service: Pulmonary;  Laterality: N/A;    SALIVARY GLAND SURGERY         Review of patient's allergies indicates:   Allergen Reactions    Bisacodyl Nausea And Vomiting     Other reaction(s): Vomiting    Iodinated contrast media Hives and Shortness Of Breath     hypotension    Morphine Other (See Comments)     hypotension    Azithromycin Hives    Cipro [ciprofloxacin hcl]     Demerol [meperidine]      Nausea vomiting, visual problem    Doxycycline Hives    Flonase [fluticasone propionate] Hives    Morpholine analogues Other (See Comments)     hypotension       No current facility-administered medications on file prior to encounter.     Current Outpatient Medications on File Prior to Encounter   Medication Sig    amLODIPine (NORVASC) 5 MG tablet Take 1 tablet (5 mg total) by mouth once daily. For blood pressure    diphenhydrAMINE (BENADRYL) 25 mg capsule Take 1 capsule (25 mg total) by mouth every 6 (six) hours as needed for Itching or Allergies.    EPINEPHrine (EPIPEN) 0.3 mg/0.3 mL AtIn Inject 0.3 mLs (0.3 mg total) into the muscle as needed (Severe allergic reaction symptoms such as shortness of breath, tongue or throat swelling, weakness dizziness severe nausea vomiting).    famotidine (PEPCID) 20 MG  tablet Take 1 tablet (20 mg total) by mouth 2 (two) times daily.    furosemide (LASIX) 20 MG tablet Take 1 tablet (20 mg total) by mouth daily as needed (edema).    HYDROcodone-acetaminophen (NORCO)  mg per tablet Take 1 tablet by mouth every 12 (twelve) hours as needed for Pain.    levalbuterol (XOPENEX HFA) 45 mcg/actuation inhaler Inhale 2 puffs into the lungs every 6 (six) hours as needed for Wheezing. Rescue    levothyroxine (SYNTHROID) 100 MCG tablet Take 100 mcg by mouth before breakfast.    LIDOcaine-prilocaine (EMLA) cream Apply topically as needed.    metoprolol tartrate (LOPRESSOR) 25 MG tablet Take 1 tablet (25 mg total) by mouth 2 (two) times daily.    omeprazole (PRILOSEC) 40 MG capsule Take 1 capsule (40 mg total) by mouth every morning.    ondansetron (ZOFRAN) 4 MG tablet Take 1 tablet (4 mg total) by mouth every 8 (eight) hours as needed for Nausea.    ondansetron (ZOFRAN) 8 MG tablet Take 1 tablet (8 mg total) by mouth every 8 (eight) hours as needed for Nausea.    polyethylene glycol (GLYCOLAX) 17 gram/dose powder Take 17 g by mouth once daily.    promethazine (PHENERGAN) 25 MG tablet Take 1 tablet (25 mg total) by mouth every 6 (six) hours as needed for Nausea.    promethazine (PHENERGAN) 25 MG tablet Take 1 tablet (25 mg total) by mouth every 6 (six) hours as needed for Nausea.    REFRESH OPTIVE 0.5-0.9 % Drop Place 1 drop into both eyes 4 (four) times daily.    simethicone (GAS-X ORAL) Take 1 tablet by mouth as needed.    umeclidinium-vilanteroL (ANORO ELLIPTA) 62.5-25 mcg/actuation DsDv Inhale 1 puff into the lungs once daily. Controller     Family History       Problem Relation (Age of Onset)    Bladder Cancer Mother    Emphysema Sister    Heart failure Father    No Known Problems Brother    Pancreatic cancer Sister          Tobacco Use    Smoking status: Former     Types: Cigarettes     Start date: 2024     Quit date: 1964     Years since quittin.5    Smokeless tobacco:  Never    Tobacco comments:     Pt has smoked since 14 years old. Smokes around .5ppd. Inpatient smoking education done 10/20.     Pt quit smoking on 01/01/24.  She has never used smokeless tobacco   Substance and Sexual Activity    Alcohol use: Never    Drug use: Never    Sexual activity: Yes     Partners: Male     Review of Systems   Constitutional:  Positive for fatigue. Negative for chills and fever.   Respiratory:  Positive for shortness of breath.    Cardiovascular:  Positive for chest pain.   Gastrointestinal:  Positive for abdominal pain, diarrhea, nausea and vomiting.   Musculoskeletal:  Positive for back pain.     Objective:     Vital Signs (Most Recent):  Temp: 98 °F (36.7 °C) (06/23/24 0858)  Pulse: 94 (06/23/24 0858)  Resp: 18 (06/23/24 1232)  BP: 104/83 (06/23/24 0858)  SpO2: 98 % (06/23/24 0858) Vital Signs (24h Range):  Temp:  [98 °F (36.7 °C)] 98 °F (36.7 °C)  Pulse:  [94] 94  Resp:  [18] 18  SpO2:  [98 %] 98 %  BP: (104)/(83) 104/83     Weight: 79.4 kg (175 lb)  Body mass index is 32.01 kg/m².     Physical Exam  Vitals reviewed.   Constitutional:       Appearance: She is ill-appearing.   HENT:      Head: Normocephalic and atraumatic.      Mouth/Throat:      Mouth: Mucous membranes are dry.   Eyes:      Conjunctiva/sclera: Conjunctivae normal.   Cardiovascular:      Rate and Rhythm: Normal rate and regular rhythm.   Pulmonary:      Effort: Pulmonary effort is normal. No respiratory distress.      Breath sounds: Decreased breath sounds present.   Abdominal:      Palpations: Abdomen is soft.      Tenderness: There is abdominal tenderness.   Musculoskeletal:         General: Normal range of motion.      Cervical back: Normal range of motion.      Right lower leg: No edema.      Left lower leg: No edema.   Skin:     General: Skin is warm and dry.      Coloration: Skin is pale.   Neurological:      Mental Status: She is alert and oriented to person, place, and time.   Psychiatric:         Mood and Affect:  "Mood normal.                Significant Labs: All pertinent labs within the past 24 hours have been reviewed.  Bilirubin:   Recent Labs   Lab 05/30/24  0833 06/14/24  0847 06/17/24  0739 06/21/24  0827 06/23/24  0910   BILITOT 0.5 0.3 0.3 0.3 0.6     Blood Culture: No results for input(s): "LABBLOO" in the last 48 hours.  CBC:   Recent Labs   Lab 06/23/24  0910   WBC 3.01*   HGB 12.0   HCT 36.1*        CMP:   Recent Labs   Lab 06/23/24  0910      K 3.3*   CL 98   CO2 27   *   BUN 27*   CREATININE 1.5*   CALCIUM 9.4   PROT 7.6   ALBUMIN 4.0   BILITOT 0.6   ALKPHOS 98   AST 20   ALT 23   ANIONGAP 14     Cardiac Markers:   Recent Labs   Lab 06/23/24  0910   BNP 38     Lactic Acid: No results for input(s): "LACTATE" in the last 48 hours.  Magnesium:   Recent Labs   Lab 06/23/24  0910   MG 1.5*     Troponin:   Recent Labs   Lab 06/23/24  0910 06/23/24  1202   TROPONINIHS 9.7 9.6     TSH:   Recent Labs   Lab 06/21/24  0827   TSH 9.128*     Urine Studies: No results for input(s): "COLORU", "APPEARANCEUA", "PHUR", "SPECGRAV", "PROTEINUA", "GLUCUA", "KETONESU", "BILIRUBINUA", "OCCULTUA", "NITRITE", "UROBILINOGEN", "LEUKOCYTESUR", "RBCUA", "WBCUA", "BACTERIA", "SQUAMEPITHEL", "HYALINECASTS" in the last 48 hours.    Invalid input(s): "WRIGHTSUR"    Significant Imaging: I have reviewed all pertinent imaging results/findings within the past 24 hours.  Assessment/Plan:     * Chest pain  Serial troponins x 3  EKG PRN  NTG PRN  ASA  Echo  Tele monitoring  VQ scan given clot risk and increased shortness of breath, anaphylactic contrast allergy  Consider cardiology consult    Intractable nausea and vomiting  With LLQ abd pain, on chemo  Lipase  Zofran, phenergan PRN NV  IVF  Lyte replacement per protocol  CT abd/pelvis given abd tenderness    WEN (acute kidney injury)  Patient with acute kidney injury/acute renal failure likely due to pre-renal azotemia due to dehydration WEN is currently stable. Baseline " creatinine  1  - Labs reviewed- Renal function/electrolytes with Estimated Creatinine Clearance: 31.6 mL/min (A) (based on SCr of 1.5 mg/dL (H)). according to latest data. Monitor urine output and serial CMP and adjust therapy as needed. Avoid nephrotoxins and renally dose meds for GFR listed above.    Diarrhea  Stool studies  IVF  Lyte replacement per protocol      Non-small cell carcinoma of left lung  Last chemo 6/19/24  RT assess and treat  Supplemental O2 PRN  Resume home inhalers  Consult hem/onc    History of AAA repair  Serial abd exams      VTE Risk Mitigation (From admission, onward)           Ordered     Reason for No Pharmacological VTE Prophylaxis  Once        Question:  Reasons:  Answer:  Risk of Bleeding    06/23/24 1743     IP VTE HIGH RISK PATIENT  Once         06/23/24 1743     Place sequential compression device  Until discontinued         06/23/24 1743                         On 06/23/2024, patient should be placed in hospital observation services under my care in collaboration with Dr. Cabello.      Automatic Inhaler to Nebulizer Interchange    Umeclidinium/Vilanterol (Anoro) 62.5 mcg/25 mcg changed to Ipratropium 0.5 mg every 6 hours AND Arformoterol 15 mcg BID per [facility] Automatic Therapeutic Substitutions Protocol.    Please contact pharmacy at extension [phone] with any questions.     Thank you,   Mariano Paul NP  Department of Hospital Medicine  [facility] - Emergency Dept

## 2024-06-23 NOTE — ASSESSMENT & PLAN NOTE
Serial troponins x 3  EKG PRN  NTG PRN  ASA  Echo  Tele monitoring  VQ scan given clot risk and increased shortness of breath, anaphylactic contrast allergy  Consider cardiology consult

## 2024-06-23 NOTE — PROGRESS NOTES
"Pharmacist Renal Dose Adjustment Note    Joslyn Dubon is a 73 y.o. female being treated with the medication Famotidine    Patient Data:    Vital Signs (Most Recent):  Temp: 98 °F (36.7 °C) (06/23/24 0858)  Pulse: 94 (06/23/24 0858)  Resp: 18 (06/23/24 1232)  BP: 104/83 (06/23/24 0858)  SpO2: 98 % (06/23/24 0858) Vital Signs (72h Range):  Temp:  [98 °F (36.7 °C)]   Pulse:  [94]   Resp:  [18]   BP: (104)/(83)   SpO2:  [98 %]        Ht: 5' 2" (1.575 m)  Wt: 79.4 kg (175 lb)  Estimated Creatinine Clearance: 31.6 mL/min (A) (based on SCr of 1.5 mg/dL (H)).  Body mass index is 32.01 kg/m².    Per [facility] renal dosing protocol:     Previous Order: Famotidine 20 mg BiD    Will be changed to:     New Order: Famotidine 20 mg Daily,    Due to: CrCl < 50 mL/min    Renal dose adjustments performed as noted above.    We will continue monitoring and adjusting as necessary.    Pharmacist: Mariano Perez PharmD  Ext: [phone]      "

## 2024-06-23 NOTE — ASSESSMENT & PLAN NOTE
Patient with acute kidney injury/acute renal failure likely due to pre-renal azotemia due to dehydration WEN is currently stable. Baseline creatinine  1  - Labs reviewed- Renal function/electrolytes with Estimated Creatinine Clearance: 31.6 mL/min (A) (based on SCr of 1.5 mg/dL (H)). according to latest data. Monitor urine output and serial CMP and adjust therapy as needed. Avoid nephrotoxins and renally dose meds for GFR listed above.

## 2024-06-23 NOTE — ASSESSMENT & PLAN NOTE
With LLQ abd pain, on chemo  Lipase  Zofran, phenergan PRN NV  IVF  Lyte replacement per protocol  CT abd/pelvis given abd tenderness

## 2024-06-23 NOTE — ASSESSMENT & PLAN NOTE
Last chemo 6/19/24  RT assess and treat  Supplemental O2 PRN  Resume home inhalers  Consult hem/onc

## 2024-06-23 NOTE — SUBJECTIVE & OBJECTIVE
Past Medical History:   Diagnosis Date    Anxiety     Martin esophagus     Colitis     COPD (chronic obstructive pulmonary disease)     GERD (gastroesophageal reflux disease)     Hypertension     Lung cancer     Thyroid disease     Vocal cord polyps        Past Surgical History:   Procedure Laterality Date    ABDOMINAL AORTIC ANEURYSM REPAIR      BACK SURGERY      cervical    CATARACT EXTRACTION Right 02/2021    CHOLECYSTECTOMY  12/20/2013    Dr Albert CORLEY    ENDOBRONCHIAL ULTRASOUND N/A 4/19/2024    Procedure: ENDOBRONCHIAL ULTRASOUND (EBUS);  Surgeon: Kizzy Siegel MD;  [facility];  Service: Pulmonary;  Laterality: N/A;    FOOT SURGERY      HYSTERECTOMY  1979    AUGUSTINE for AUB with consevation ovaries    INSERTION OF TUNNELED CENTRAL VENOUS CATHETER (CVC) WITH SUBCUTANEOUS PORT Right 5/20/2024    Procedure: [id number];  Surgeon: Simon Wilson III, MD;  [facility];  Service: General;  Laterality: Right;    ROBOTIC BRONCHOSCOPY N/A 4/19/2024    Procedure: ROBOTIC BRONCHOSCOPY;  Surgeon: Kizzy Siegel MD;  [facility];  Service: Pulmonary;  Laterality: N/A;    SALIVARY GLAND SURGERY         Review of patient's allergies indicates:   Allergen Reactions    Bisacodyl Nausea And Vomiting     Other reaction(s): Vomiting    Iodinated contrast media Hives and Shortness Of Breath     hypotension    Morphine Other (See Comments)     hypotension    Azithromycin Hives    Cipro [ciprofloxacin hcl]     Demerol [meperidine]      Nausea vomiting, visual problem    Doxycycline Hives    Flonase [fluticasone propionate] Hives    Morpholine analogues Other (See Comments)     hypotension       No current facility-administered medications on file prior to encounter.     Current Outpatient Medications on File Prior to Encounter   Medication Sig    amLODIPine (NORVASC) 5 MG tablet Take 1 tablet (5 mg total) by mouth once daily. For blood pressure    diphenhydrAMINE (BENADRYL) 25 mg  capsule Take 1 capsule (25 mg total) by mouth every 6 (six) hours as needed for Itching or Allergies.    EPINEPHrine (EPIPEN) 0.3 mg/0.3 mL AtIn Inject 0.3 mLs (0.3 mg total) into the muscle as needed (Severe allergic reaction symptoms such as shortness of breath, tongue or throat swelling, weakness dizziness severe nausea vomiting).    famotidine (PEPCID) 20 MG tablet Take 1 tablet (20 mg total) by mouth 2 (two) times daily.    furosemide (LASIX) 20 MG tablet Take 1 tablet (20 mg total) by mouth daily as needed (edema).    HYDROcodone-acetaminophen (NORCO)  mg per tablet Take 1 tablet by mouth every 12 (twelve) hours as needed for Pain.    levalbuterol (XOPENEX HFA) 45 mcg/actuation inhaler Inhale 2 puffs into the lungs every 6 (six) hours as needed for Wheezing. Rescue    levothyroxine (SYNTHROID) 100 MCG tablet Take 100 mcg by mouth before breakfast.    LIDOcaine-prilocaine (EMLA) cream Apply topically as needed.    metoprolol tartrate (LOPRESSOR) 25 MG tablet Take 1 tablet (25 mg total) by mouth 2 (two) times daily.    omeprazole (PRILOSEC) 40 MG capsule Take 1 capsule (40 mg total) by mouth every morning.    ondansetron (ZOFRAN) 4 MG tablet Take 1 tablet (4 mg total) by mouth every 8 (eight) hours as needed for Nausea.    ondansetron (ZOFRAN) 8 MG tablet Take 1 tablet (8 mg total) by mouth every 8 (eight) hours as needed for Nausea.    polyethylene glycol (GLYCOLAX) 17 gram/dose powder Take 17 g by mouth once daily.    promethazine (PHENERGAN) 25 MG tablet Take 1 tablet (25 mg total) by mouth every 6 (six) hours as needed for Nausea.    promethazine (PHENERGAN) 25 MG tablet Take 1 tablet (25 mg total) by mouth every 6 (six) hours as needed for Nausea.    REFRESH OPTIVE 0.5-0.9 % Drop Place 1 drop into both eyes 4 (four) times daily.    simethicone (GAS-X ORAL) Take 1 tablet by mouth as needed.    umeclidinium-vilanteroL (ANORO ELLIPTA) 62.5-25 mcg/actuation DsDv Inhale 1 puff into the lungs once daily.  Controller     Family History       Problem Relation (Age of Onset)    Bladder Cancer Mother    Emphysema Sister    Heart failure Father    No Known Problems Brother    Pancreatic cancer Sister          Tobacco Use    Smoking status: Former     Types: Cigarettes     Start date: 2024     Quit date: 1964     Years since quittin.5    Smokeless tobacco: Never    Tobacco comments:     Pt has smoked since 14 years old. Smokes around .5ppd. Inpatient smoking education done 10/20.     Pt quit smoking on 24.  She has never used smokeless tobacco   Substance and Sexual Activity    Alcohol use: Never    Drug use: Never    Sexual activity: Yes     Partners: Male     Review of Systems   Constitutional:  Positive for fatigue. Negative for chills and fever.   Respiratory:  Positive for shortness of breath.    Cardiovascular:  Positive for chest pain.   Gastrointestinal:  Positive for abdominal pain, diarrhea, nausea and vomiting.   Musculoskeletal:  Positive for back pain.     Objective:     Vital Signs (Most Recent):  Temp: 98 °F (36.7 °C) (24 0858)  Pulse: 94 (24 0858)  Resp: 18 (24 1232)  BP: 104/83 (24 0858)  SpO2: 98 % (24 0858) Vital Signs (24h Range):  Temp:  [98 °F (36.7 °C)] 98 °F (36.7 °C)  Pulse:  [94] 94  Resp:  [18] 18  SpO2:  [98 %] 98 %  BP: (104)/(83) 104/83     Weight: 79.4 kg (175 lb)  Body mass index is 32.01 kg/m².     Physical Exam  Vitals reviewed.   Constitutional:       Appearance: She is ill-appearing.   HENT:      Head: Normocephalic and atraumatic.      Mouth/Throat:      Mouth: Mucous membranes are dry.   Eyes:      Conjunctiva/sclera: Conjunctivae normal.   Cardiovascular:      Rate and Rhythm: Normal rate and regular rhythm.   Pulmonary:      Effort: Pulmonary effort is normal. No respiratory distress.      Breath sounds: Decreased breath sounds present.   Abdominal:      Palpations: Abdomen is soft.      Tenderness: There is abdominal tenderness.  "  Musculoskeletal:         General: Normal range of motion.      Cervical back: Normal range of motion.      Right lower leg: No edema.      Left lower leg: No edema.   Skin:     General: Skin is warm and dry.      Coloration: Skin is pale.   Neurological:      Mental Status: She is alert and oriented to person, place, and time.   Psychiatric:         Mood and Affect: Mood normal.                Significant Labs: All pertinent labs within the past 24 hours have been reviewed.  Bilirubin:   Recent Labs   Lab 05/30/24  0833 06/14/24  0847 06/17/24  0739 06/21/24  0827 06/23/24  0910   BILITOT 0.5 0.3 0.3 0.3 0.6     Blood Culture: No results for input(s): "LABBLOO" in the last 48 hours.  CBC:   Recent Labs   Lab 06/23/24  0910   WBC 3.01*   HGB 12.0   HCT 36.1*        CMP:   Recent Labs   Lab 06/23/24  0910      K 3.3*   CL 98   CO2 27   *   BUN 27*   CREATININE 1.5*   CALCIUM 9.4   PROT 7.6   ALBUMIN 4.0   BILITOT 0.6   ALKPHOS 98   AST 20   ALT 23   ANIONGAP 14     Cardiac Markers:   Recent Labs   Lab 06/23/24  0910   BNP 38     Lactic Acid: No results for input(s): "LACTATE" in the last 48 hours.  Magnesium:   Recent Labs   Lab 06/23/24  0910   MG 1.5*     Troponin:   Recent Labs   Lab 06/23/24  0910 06/23/24  1202   TROPONINIHS 9.7 9.6     TSH:   Recent Labs   Lab 06/21/24  0827   TSH 9.128*     Urine Studies: No results for input(s): "COLORU", "APPEARANCEUA", "PHUR", "SPECGRAV", "PROTEINUA", "GLUCUA", "KETONESU", "BILIRUBINUA", "OCCULTUA", "NITRITE", "UROBILINOGEN", "LEUKOCYTESUR", "RBCUA", "WBCUA", "BACTERIA", "SQUAMEPITHEL", "HYALINECASTS" in the last 48 hours.    Invalid input(s): "WRIGHTSUR"    Significant Imaging: I have reviewed all pertinent imaging results/findings within the past 24 hours.  "

## 2024-06-23 NOTE — HPI
73-year-old female presented to ED for eval. Patient reported for the last few days she has had intractable nausea and vomiting, with associated LLQ abd pain and intermittent diarrhea. Denied melena, hematochezia, hematemesis. 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 Patient also reported over the last 2 days she has been having squeezing L sided intermittent chest pain that radiates to her back with shortness of breath. She does have chronic resp failure and COPD but reports shortness of breath is increased from baseline, denies use of home O2. Patient denies previous similar episodes of chest pain, denies hx CAD. She does have hx of AAA s/p repair. Noted to have WEN, hypomagnesemia, hypokalemia. CXR impression without acute abn. Of note, patient has contrast allergy with anaphylactic reaction, also reports severe hypotension with dilaudid and morphine, can tolerate norco. Admit to hospital medicine for further eval.

## 2024-06-24 ENCOUNTER — CLINICAL SUPPORT (OUTPATIENT)
Dept: CARDIOLOGY | Facility: HOSPITAL | Age: 74
End: 2024-06-24
Attending: STUDENT IN AN ORGANIZED HEALTH CARE EDUCATION/TRAINING PROGRAM
Payer: MEDICARE

## 2024-06-24 VITALS — WEIGHT: 170 LBS | BODY MASS INDEX: 31.28 KG/M2 | HEIGHT: 62 IN

## 2024-06-24 PROBLEM — C34.90 MALIGNANT NEOPLASM OF LUNG: Status: ACTIVE | Noted: 2024-06-24

## 2024-06-24 PROBLEM — D70.1 CHEMOTHERAPY INDUCED NEUTROPENIA: Status: ACTIVE | Noted: 2024-06-24

## 2024-06-24 PROBLEM — T45.1X5A ANEMIA DUE TO CHEMOTHERAPY: Status: ACTIVE | Noted: 2024-06-24

## 2024-06-24 PROBLEM — D64.81 ANEMIA DUE TO CHEMOTHERAPY: Status: ACTIVE | Noted: 2024-06-24

## 2024-06-24 PROBLEM — T45.1X5A CHEMOTHERAPY INDUCED NEUTROPENIA: Status: ACTIVE | Noted: 2024-06-24

## 2024-06-24 LAB
ALBUMIN SERPL BCP-MCNC: 3.6 G/DL (ref 3.5–5.2)
ALP SERPL-CCNC: 84 U/L (ref 55–135)
ALT SERPL W/O P-5'-P-CCNC: 21 U/L (ref 10–44)
ANION GAP SERPL CALC-SCNC: 10 MMOL/L (ref 8–16)
AORTIC ROOT ANNULUS: 2.3 CM
AORTIC VALVE CUSP SEPERATION: 1.8 CM
APICAL FOUR CHAMBER EJECTION FRACTION: 59 %
AST SERPL-CCNC: 18 U/L (ref 10–40)
AV INDEX (PROSTH): 0.79
AV MEAN GRADIENT: 7 MMHG
AV PEAK GRADIENT: 12 MMHG
AV REGURGITATION PRESSURE HALF TIME: 423 MS
AV VALVE AREA BY VELOCITY RATIO: 2.46 CM²
AV VALVE AREA: 2.49 CM²
AV VELOCITY RATIO: 0.78
BASOPHILS NFR BLD: 0 % (ref 0–1.9)
BILIRUB SERPL-MCNC: 0.6 MG/DL (ref 0.1–1)
BSA FOR ECHO PROCEDURE: 1.84 M2
BUN SERPL-MCNC: 24 MG/DL (ref 8–23)
C DIFF GDH STL QL: NEGATIVE
C DIFF TOX A+B STL QL IA: NEGATIVE
CALCIUM SERPL-MCNC: 8.7 MG/DL (ref 8.7–10.5)
CHLORIDE SERPL-SCNC: 101 MMOL/L (ref 95–110)
CO2 SERPL-SCNC: 30 MMOL/L (ref 23–29)
CREAT SERPL-MCNC: 1.2 MG/DL (ref 0.5–1.4)
CV ECHO LV RWT: 0.5 CM
DIFFERENTIAL METHOD BLD: ABNORMAL
DOP CALC AO PEAK VEL: 1.75 M/S
DOP CALC AO VTI: 31 CM
DOP CALC LVOT AREA: 3.1 CM2
DOP CALC LVOT DIAMETER: 2 CM
DOP CALC LVOT PEAK VEL: 1.37 M/S
DOP CALC LVOT STROKE VOLUME: 77.24 CM3
DOP CALC MV VTI: 25.5 CM
DOP CALCLVOT PEAK VEL VTI: 24.6 CM
E WAVE DECELERATION TIME: 208 MSEC
E/A RATIO: 0.58
E/E' RATIO: 5.2 M/S
ECHO LV POSTERIOR WALL: 0.8 CM (ref 0.6–1.1)
EOSINOPHIL NFR BLD: 0 % (ref 0–8)
ERYTHROCYTE [DISTWIDTH] IN BLOOD BY AUTOMATED COUNT: 13.2 % (ref 11.5–14.5)
EST. GFR  (NO RACE VARIABLE): 47.2 ML/MIN/1.73 M^2
FRACTIONAL SHORTENING: 22 % (ref 28–44)
GLUCOSE SERPL-MCNC: 103 MG/DL (ref 70–110)
HCT VFR BLD AUTO: 32.5 % (ref 37–48.5)
HGB BLD-MCNC: 11 G/DL (ref 12–16)
IMM GRANULOCYTES # BLD AUTO: ABNORMAL K/UL (ref 0–0.04)
IMM GRANULOCYTES NFR BLD AUTO: ABNORMAL % (ref 0–0.5)
INTERVENTRICULAR SEPTUM: 1.2 CM (ref 0.6–1.1)
IVC DIAMETER: 0.9 CM
LEFT INTERNAL DIMENSION IN SYSTOLE: 2.5 CM (ref 2.1–4)
LEFT VENTRICLE DIASTOLIC VOLUME INDEX: 23.03 ML/M2
LEFT VENTRICLE DIASTOLIC VOLUME: 41 ML
LEFT VENTRICLE END DIASTOLIC VOLUME APICAL 4 CHAMBER: 29.1 ML
LEFT VENTRICLE MASS INDEX: 51 G/M2
LEFT VENTRICLE SYSTOLIC VOLUME INDEX: 12.5 ML/M2
LEFT VENTRICLE SYSTOLIC VOLUME: 22.3 ML
LEFT VENTRICULAR INTERNAL DIMENSION IN DIASTOLE: 3.2 CM (ref 3.5–6)
LEFT VENTRICULAR MASS: 90.32 G
LV LATERAL E/E' RATIO: 7.43 M/S
LV SEPTAL E/E' RATIO: 4 M/S
LVED V (TEICH): 41 ML
LVES V (TEICH): 22.3 ML
LVOT MG: 4 MMHG
LVOT MV: 0.91 CM/S
LYMPHOCYTES NFR BLD: 49 % (ref 18–48)
MAGNESIUM SERPL-MCNC: 1.6 MG/DL (ref 1.6–2.6)
MCH RBC QN AUTO: 31.6 PG (ref 27–31)
MCHC RBC AUTO-ENTMCNC: 33.8 G/DL (ref 32–36)
MCV RBC AUTO: 93 FL (ref 82–98)
MONOCYTES NFR BLD: 5 % (ref 4–15)
MV MEAN GRADIENT: 2 MMHG
MV PEAK A VEL: 0.9 M/S
MV PEAK E VEL: 0.52 M/S
MV PEAK GRADIENT: 5 MMHG
MV STENOSIS PRESSURE HALF TIME: 67 MS
MV VALVE AREA BY CONTINUITY EQUATION: 3.03 CM2
MV VALVE AREA P 1/2 METHOD: 3.28 CM2
NEUTROPHILS NFR BLD: 45 % (ref 38–73)
NEUTS BAND NFR BLD MANUAL: 1 %
NRBC BLD-RTO: 0 /100 WBC
OB PNL STL: POSITIVE
PISA AR MAX VEL: 4.87 M/S
PLATELET # BLD AUTO: 142 K/UL (ref 150–450)
PLATELET BLD QL SMEAR: ABNORMAL
PMV BLD AUTO: 8.1 FL (ref 9.2–12.9)
POTASSIUM SERPL-SCNC: 3.1 MMOL/L (ref 3.5–5.1)
PROT SERPL-MCNC: 6.6 G/DL (ref 6–8.4)
PV MV: 0.66 M/S
PV PEAK GRADIENT: 4 MMHG
PV PEAK VELOCITY: 1.06 M/S
RBC # BLD AUTO: 3.48 M/UL (ref 4–5.4)
SODIUM SERPL-SCNC: 141 MMOL/L (ref 136–145)
T4 FREE SERPL-MCNC: 0.74 NG/DL (ref 0.71–1.51)
TDI LATERAL: 0.07 M/S
TDI SEPTAL: 0.13 M/S
TDI: 0.1 M/S
TRICUSPID ANNULAR PLANE SYSTOLIC EXCURSION: 2.29 CM
TROPONIN I SERPL HS-MCNC: 10.8 PG/ML (ref 0–14.9)
TROPONIN I SERPL HS-MCNC: 11.4 PG/ML (ref 0–14.9)
TSH SERPL DL<=0.005 MIU/L-ACNC: 13.09 UIU/ML (ref 0.34–5.6)
WBC # BLD AUTO: 1.68 K/UL (ref 3.9–12.7)
WBC #/AREA STL HPF: NORMAL /[HPF]
Z-SCORE OF LEFT VENTRICULAR DIMENSION IN END DIASTOLE: -4.3
Z-SCORE OF LEFT VENTRICULAR DIMENSION IN END SYSTOLE: -1.55

## 2024-06-24 PROCEDURE — 87449 NOS EACH ORGANISM AG IA: CPT

## 2024-06-24 PROCEDURE — 25000003 PHARM REV CODE 250

## 2024-06-24 PROCEDURE — 94640 AIRWAY INHALATION TREATMENT: CPT | Mod: XB

## 2024-06-24 PROCEDURE — 21400001 HC TELEMETRY ROOM

## 2024-06-24 PROCEDURE — 93306 TTE W/DOPPLER COMPLETE: CPT | Mod: 26,,, | Performed by: INTERNAL MEDICINE

## 2024-06-24 PROCEDURE — 25000003 PHARM REV CODE 250: Performed by: INTERNAL MEDICINE

## 2024-06-24 PROCEDURE — 93306 TTE W/DOPPLER COMPLETE: CPT

## 2024-06-24 PROCEDURE — 85027 COMPLETE CBC AUTOMATED: CPT

## 2024-06-24 PROCEDURE — 82272 OCCULT BLD FECES 1-3 TESTS: CPT

## 2024-06-24 PROCEDURE — 99900035 HC TECH TIME PER 15 MIN (STAT)

## 2024-06-24 PROCEDURE — 87324 CLOSTRIDIUM AG IA: CPT

## 2024-06-24 PROCEDURE — 25000242 PHARM REV CODE 250 ALT 637 W/ HCPCS: Performed by: INTERNAL MEDICINE

## 2024-06-24 PROCEDURE — 89055 LEUKOCYTE ASSESSMENT FECAL: CPT

## 2024-06-24 PROCEDURE — 87046 STOOL CULTR AEROBIC BACT EA: CPT | Mod: 59

## 2024-06-24 PROCEDURE — 87045 FECES CULTURE AEROBIC BACT: CPT

## 2024-06-24 PROCEDURE — 80053 COMPREHEN METABOLIC PANEL: CPT

## 2024-06-24 PROCEDURE — 85007 BL SMEAR W/DIFF WBC COUNT: CPT

## 2024-06-24 PROCEDURE — 94799 UNLISTED PULMONARY SVC/PX: CPT

## 2024-06-24 PROCEDURE — 63600175 PHARM REV CODE 636 W HCPCS

## 2024-06-24 PROCEDURE — 36415 COLL VENOUS BLD VENIPUNCTURE: CPT

## 2024-06-24 PROCEDURE — 84443 ASSAY THYROID STIM HORMONE: CPT

## 2024-06-24 PROCEDURE — 99900031 HC PATIENT EDUCATION (STAT)

## 2024-06-24 PROCEDURE — 94761 N-INVAS EAR/PLS OXIMETRY MLT: CPT

## 2024-06-24 PROCEDURE — 84439 ASSAY OF FREE THYROXINE: CPT

## 2024-06-24 PROCEDURE — 99233 SBSQ HOSP IP/OBS HIGH 50: CPT | Mod: ,,, | Performed by: INTERNAL MEDICINE

## 2024-06-24 PROCEDURE — 84484 ASSAY OF TROPONIN QUANT: CPT

## 2024-06-24 PROCEDURE — 83735 ASSAY OF MAGNESIUM: CPT

## 2024-06-24 PROCEDURE — 87449 NOS EACH ORGANISM AG IA: CPT | Mod: 91

## 2024-06-24 RX ORDER — LOPERAMIDE HYDROCHLORIDE 2 MG/1
2 CAPSULE ORAL 4 TIMES DAILY PRN
Status: DISCONTINUED | OUTPATIENT
Start: 2024-06-24 | End: 2024-06-27

## 2024-06-24 RX ORDER — MUPIROCIN 20 MG/G
OINTMENT TOPICAL 2 TIMES DAILY
Status: COMPLETED | OUTPATIENT
Start: 2024-06-24 | End: 2024-06-29

## 2024-06-24 RX ORDER — ALPRAZOLAM 0.25 MG/1
0.25 TABLET ORAL ONCE
Status: COMPLETED | OUTPATIENT
Start: 2024-06-24 | End: 2024-06-24

## 2024-06-24 RX ORDER — LEVOTHYROXINE SODIUM 112 UG/1
112 TABLET ORAL
Status: DISCONTINUED | OUTPATIENT
Start: 2024-06-25 | End: 2024-07-01 | Stop reason: HOSPADM

## 2024-06-24 RX ADMIN — ASPIRIN 81 MG: 81 TABLET, COATED ORAL at 09:06

## 2024-06-24 RX ADMIN — ALPRAZOLAM 0.25 MG: 0.25 TABLET ORAL at 09:06

## 2024-06-24 RX ADMIN — PROMETHAZINE HYDROCHLORIDE 12.5 MG: 25 INJECTION INTRAMUSCULAR; INTRAVENOUS at 08:06

## 2024-06-24 RX ADMIN — ARFORMOTEROL TARTRATE 15 MCG: 15 SOLUTION RESPIRATORY (INHALATION) at 07:06

## 2024-06-24 RX ADMIN — METOPROLOL TARTRATE 25 MG: 25 TABLET, FILM COATED ORAL at 08:06

## 2024-06-24 RX ADMIN — LOPERAMIDE HYDROCHLORIDE 2 MG: 2 CAPSULE ORAL at 03:06

## 2024-06-24 RX ADMIN — ONDANSETRON 4 MG: 2 INJECTION INTRAMUSCULAR; INTRAVENOUS at 04:06

## 2024-06-24 RX ADMIN — IPRATROPIUM BROMIDE 0.5 MG: 0.5 SOLUTION RESPIRATORY (INHALATION) at 01:06

## 2024-06-24 RX ADMIN — POTASSIUM BICARBONATE 35 MEQ: 391 TABLET, EFFERVESCENT ORAL at 09:06

## 2024-06-24 RX ADMIN — METOPROLOL TARTRATE 25 MG: 25 TABLET, FILM COATED ORAL at 09:06

## 2024-06-24 RX ADMIN — IPRATROPIUM BROMIDE 0.5 MG: 0.5 SOLUTION RESPIRATORY (INHALATION) at 07:06

## 2024-06-24 RX ADMIN — LEVOTHYROXINE SODIUM 100 MCG: 0.1 TABLET ORAL at 05:06

## 2024-06-24 RX ADMIN — MUPIROCIN 1 G: 20 OINTMENT TOPICAL at 08:06

## 2024-06-24 RX ADMIN — FAMOTIDINE 20 MG: 10 INJECTION INTRAVENOUS at 09:06

## 2024-06-24 RX ADMIN — IPRATROPIUM BROMIDE 0.5 MG: 0.5 SOLUTION RESPIRATORY (INHALATION) at 08:06

## 2024-06-24 RX ADMIN — Medication 800 MG: at 09:06

## 2024-06-24 RX ADMIN — ARFORMOTEROL TARTRATE 15 MCG: 15 SOLUTION RESPIRATORY (INHALATION) at 08:06

## 2024-06-24 NOTE — SUBJECTIVE & OBJECTIVE
Interval History:     Patient was seen and examined  This is the 2nd round of chemotherapy for her lung cancer.  She had the same diarrhea illness with the 1st round.  Currently bowel movement 3 times since admission small amount.  No neutropenia.  WEN improved.  High TSH noted.  Negative procalcitonin  On exam abdominal is benign and CT abdomen with no acute  aortic stent graft and abdominal aneurysm above the stent graft measuring 3.6 cm.noted       Review of Systems  Objective:     Vital Signs (Most Recent):  Temp: 98.1 °F (36.7 °C) (06/24/24 1127)  Pulse: 77 (06/24/24 1127)  Resp: 18 (06/24/24 1127)  BP: 118/70 (90) (06/24/24 1127)  SpO2: 95 % (06/24/24 1127) Vital Signs (24h Range):  Temp:  [97.4 °F (36.3 °C)-98.1 °F (36.7 °C)] 98.1 °F (36.7 °C)  Pulse:  [65-93] 77  Resp:  [16-18] 18  SpO2:  [92 %-98 %] 95 %  BP: (103-139)/(65-89) 118/70     Weight: 77.3 kg (170 lb 6.4 oz)  Body mass index is 31.17 kg/m².    Intake/Output Summary (Last 24 hours) at 6/24/2024 1158  Last data filed at 6/24/2024 0444  Gross per 24 hour   Intake 680 ml   Output 400 ml   Net 280 ml         Physical Exam  Vitals and nursing note reviewed.   HENT:      Head: Normocephalic and atraumatic.      Nose: Nose normal.      Mouth/Throat:      Mouth: Mucous membranes are moist.   Eyes:      Conjunctiva/sclera: Conjunctivae normal.   Neck:      Vascular: No JVD.   Cardiovascular:      Rate and Rhythm: Normal rate.      Heart sounds: Normal heart sounds.   Pulmonary:      Effort: Pulmonary effort is normal.      Breath sounds: Normal breath sounds.   Abdominal:      Palpations: Abdomen is soft.   Musculoskeletal:         General: Normal range of motion.   Skin:     General: Skin is warm.   Neurological:      Mental Status: She is alert and oriented to person, place, and time.   Psychiatric:         Mood and Affect: Mood normal.             Significant Labs: All pertinent labs within the past 24 hours have been reviewed.  CBC:   Recent Labs   Lab  06/23/24  0910 06/24/24  0508   WBC 3.01* 1.68*   HGB 12.0 11.0*   HCT 36.1* 32.5*    142*     CMP:   Recent Labs   Lab 06/23/24  0910 06/24/24  0508    141   K 3.3* 3.1*   CL 98 101   CO2 27 30*   * 103   BUN 27* 24*   CREATININE 1.5* 1.2   CALCIUM 9.4 8.7   PROT 7.6 6.6   ALBUMIN 4.0 3.6   BILITOT 0.6 0.6   ALKPHOS 98 84   AST 20 18   ALT 23 21   ANIONGAP 14 10     Cardiac Markers:   Recent Labs   Lab 06/23/24  0910   BNP 38       Significant Imaging: I have reviewed all pertinent imaging results/findings within the past 24 hours.

## 2024-06-24 NOTE — ASSESSMENT & PLAN NOTE
Patient with acute kidney injury/acute renal failure likely due to pre-renal azotemia due to dehydration WEN is currently stable. Baseline creatinine  1  - Labs reviewed- Renal function/electrolytes with Estimated Creatinine Clearance: 39 mL/min (based on SCr of 1.2 mg/dL). according to latest data. Monitor urine output and serial CMP and adjust therapy as needed. Avoid nephrotoxins and renally dose meds for GFR listed above.  resolved

## 2024-06-24 NOTE — CARE UPDATE
06/24/24 0759   Patient Assessment/Suction   Level of Consciousness (AVPU) alert   Respiratory Effort Normal;Unlabored   Expansion/Accessory Muscles/Retractions no retractions;no use of accessory muscles   All Lung Fields Breath Sounds Anterior:;diminished   Rhythm/Pattern, Respiratory no shortness of breath reported;unlabored;pattern regular   Cough Frequency infrequent   Cough Type productive   Secretions Amount scant   Secretions Color white   Secretions Characteristics frothy   PRE-TX-O2   Device (Oxygen Therapy) room air   SpO2 96 %   Pulse Oximetry Type Intermittent   $ Pulse Oximetry - Multiple Charge Pulse Oximetry - Multiple   Pulse 81   Resp 18   Aerosol Therapy   $ Aerosol Therapy Charges Aerosol Treatment  (atrovent)   Daily Review of Necessity (SVN) completed   Respiratory Treatment Status (SVN) given   Treatment Route (SVN) mask;oxygen   Patient Position HOB elevated   Post Treatment Assessment (SVN) breath sounds improved;increased aeration   Signs of Intolerance (SVN) none   Breath Sounds Post-Respiratory Treatment   Throughout All Fields Post-Treatment All Fields   Throughout All Fields Post-Treatment aeration increased   Post-treatment Heart Rate (beats/min) 79   Post-treatment Resp Rate (breaths/min) 18   Education   $ Education Bronchodilator;15 min

## 2024-06-24 NOTE — ASSESSMENT & PLAN NOTE
Serial troponins x 3 all negative   ECHO to follow   Stress test when patient more stable   EKG PRN  NTG PRN  ASA  VQ scan negative at perfusion scan

## 2024-06-24 NOTE — PLAN OF CARE
Pt was explained GILBERT. Pt verbalized understanding of GILBERT and scanned to . Per Pt she stated she was approved for [facility] Financial assisstance   06/24/24 1135   GILBERT Message   Medicare Outpatient and Observation Notification regarding financial responsibility Given to patient/caregiver;Explained to patient/caregiver;Other (comments)   Date GILBERT was signed 06/24/24   Time GILBERT was signed [id number]

## 2024-06-24 NOTE — ASSESSMENT & PLAN NOTE
Last chemo 6/19/24  RT assess and treat  Supplemental O2 PRN  Resume home inhalers  Consult ed hem/onc

## 2024-06-24 NOTE — PLAN OF CARE
Critical access hospital  Initial Discharge Assessment       Primary Care Provider: Jasbir Graham MD    Admission Diagnosis: Chest pain [R07.9]    Admission Date: 6/23/2024  Expected Discharge Date: 6/25/2024  Social workermet with Pt at bedside to complete discharge assessment. Pt AAOx4s. Demographics, PCP, and insurance verified. No home health. No dialysis. Pt reports ability to complete ADLs without assistance. Pt verbalized plan to discharge home via family transport. Pt has no other needs to be addressed at this time.  Transition of Care Barriers: None    Payor: [employer] [facility] / Plan: [employer] / Product Type: Medicare Advantage /     Extended Emergency Contact Information  Primary Emergency Contact: Mark Titus  Address: [address]           [place] [address] [facility]  Home Phone: [phone]  Mobile Phone: [phone]  Relation: Spouse   needed? No  Secondary Emergency Contact: katie titus  Mobile Phone: [phone]  Relation: Daughter  Preferred language: English   needed? No    Discharge Plan A: Home with family  Discharge Plan B: Home with family      [id number] - [place], [place] - [address] AT [address]  [address]  [place] [address]  Phone: [phone] Fax: [phone]    [facility] - [place] - [address]  [address]  [place] [address]  Phone: [phone] Fax: [phone]      Initial Assessment (most recent)       Adult Discharge Assessment - 06/24/24 1138          Discharge Assessment    Assessment Type Discharge Planning Assessment     Confirmed/corrected address, phone number and insurance Yes     Confirmed Demographics Correct on Facesheet     Source of Information patient     Does patient/caregiver understand observation status Yes     Communicated TINO with patient/caregiver Date not available/Unable to determine      Reason For Admission Chest pain     People in Home spouse     Do you expect to return to your current living situation? Yes     Do you have help at home or someone to help you manage your care at home? Yes     Who are your caregiver(s) and their phone number(s)? Mark Dubon (Spouse)  [phone]     Prior to hospitilization cognitive status: Alert/Oriented     Current cognitive status: Alert/Oriented     Walking or Climbing Stairs Difficulty yes     Walking or Climbing Stairs ambulation difficulty, assistance 1 person;stair climbing difficulty, assistance 1 person     Dressing/Bathing Difficulty no     Home Accessibility wheelchair accessible     Home Layout Able to live on 1st floor     Equipment Currently Used at Home oxygen   [facility]L [facility]    Readmission within 30 days? No     Patient currently being followed by outpatient case management? No     Do you currently have service(s) that help you manage your care at home? No     Do you take prescription medications? Yes     Do you have prescription coverage? Yes     Coverage Payor: [employer] [facility] - [employer] -     Do you have any problems affording any of your prescribed medications? No     Is the patient taking medications as prescribed? yes     Who is going to help you get home at discharge? Mark Dubon (Spouse)  [phone]     How do you get to doctors appointments? family or friend will provide     Are you on dialysis? No     Do you take coumadin? No     Discharge Plan A Home with family     Discharge Plan B Home with family     DME Needed Upon Discharge  none     Discharge Plan discussed with: Patient     Transition of Care Barriers None

## 2024-06-24 NOTE — PLAN OF CARE
Problem: Adult Inpatient Plan of Care  Goal: Plan of Care Review  Outcome: Progressing     Problem: Adult Inpatient Plan of Care  Goal: Patient-Specific Goal (Individualized)  Outcome: Progressing     Problem: Adult Inpatient Plan of Care  Goal: Absence of Hospital-Acquired Illness or Injury  Outcome: Progressing

## 2024-06-24 NOTE — CONSULTS
[facility]   Hematology/Oncology  Inpatient Consult Note          Patient Name: Joslyn Dubon  MRN: [id number]  Admission Date: 6/23/2024  Hospital Length of Stay: 0 days  Code Status: Full Code   Attending Provider: Royce Monaco MD  Referring Provider: Jacinda  Consulting Provider: Neo Mariano MD  Primary Care Physician: Jasbir Graham MD  Principal Problem:Chest pain      Coverage for Dr Camden Iyer    Consults  Subjective:     Chief Complaint: Vomiting (X FEW DAYS) and Chest Pain          History Present Illness:  73 y.o. female known to the oncology service of my associate Dr Cadmen Iyer with diagnosis of lung cancer with neuroendocrine features who has been on platinum and etopside chemotherapy. She had her 2nd cycle on 6/19/2024. She presented to the ED with CP and N/V.  She has had diarrhea since Wednesday and is dehydrated. She reports that she is feeling much better since admit yesterday. No current N/V, CP or SOB. She is eating solid foods. I discussed giving her GCSF support to help with her leucopenia, but she wants to hold off for now. I discussed with her floor nurse. There were no family currently at bedside.         Past Medical/Surgical History:  Past Medical History:   Diagnosis Date    Anxiety     Martin esophagus     Colitis     COPD (chronic obstructive pulmonary disease)     GERD (gastroesophageal reflux disease)     Hypertension     Intractable nausea and vomiting 6/23/2024    Lung cancer     Thyroid disease     Vocal cord polyps      Past Surgical History:   Procedure Laterality Date    ABDOMINAL AORTIC ANEURYSM REPAIR      BACK SURGERY      cervical    CATARACT EXTRACTION Right 02/2021    CHOLECYSTECTOMY  12/20/2013    Dr Albert ROSENTHALS    ENDOBRONCHIAL ULTRASOUND N/A 4/19/2024    Procedure: ENDOBRONCHIAL ULTRASOUND (EBUS);  Surgeon: Kizzy Siegel MD;  Location: [facility] OR [address] [place] [address];  Service: Pulmonary;  Laterality: N/A;    FOOT SURGERY      HYSTERECTOMY       AUGUSTINE for AUB with consevation ovaries    INSERTION OF TUNNELED CENTRAL VENOUS CATHETER (CVC) WITH SUBCUTANEOUS PORT Right 2024    Procedure: [id number]-CATH;  Surgeon: Simon Wilson III, MD;  Location: [facility] OR;  Service: General;  Laterality: Right;    ROBOTIC BRONCHOSCOPY N/A 2024    Procedure: ROBOTIC BRONCHOSCOPY;  Surgeon: Kizzy Siegel MD;  Location: [facility] OR [address] [place] [address];  Service: Pulmonary;  Laterality: N/A;    SALIVARY GLAND SURGERY           Allergies:  Review of patient's allergies indicates:   Allergen Reactions    Bisacodyl Nausea And Vomiting     Other reaction(s): Vomiting    Iodinated contrast media Hives and Shortness Of Breath     hypotension    Morphine Other (See Comments)     hypotension    Azithromycin Hives    Cipro [ciprofloxacin hcl]     Demerol [meperidine]      Nausea vomiting, visual problem    Doxycycline Hives    Flonase [fluticasone propionate] Hives    Morpholine analogues Other (See Comments)     hypotension       Social/Family History:  Social History     Socioeconomic History    Marital status:    Tobacco Use    Smoking status: Former     Types: Cigarettes     Start date: 2024     Quit date: 1964     Years since quittin.5    Smokeless tobacco: Never    Tobacco comments:     Pt has smoked since 14 years old. Smokes around .5ppd. Inpatient smoking education done 10/20.     Pt quit smoking on 24.  She has never used smokeless tobacco   Substance and Sexual Activity    Alcohol use: Never    Drug use: Never    Sexual activity: Yes     Partners: Male     Social Determinants of Health     Financial Resource Strain: Low Risk  (3/15/2024)    Overall Financial Resource Strain (CARDIA)     Difficulty of Paying Living Expenses: Not hard at all   Food Insecurity: No Food Insecurity (3/15/2024)    Hunger Vital Sign     Worried About Running Out of Food in the Last Year: Never true     Ran Out of Food in the Last Year: Never true   Transportation  Needs: No Transportation Needs (3/15/2024)    PRAPARE - Transportation     Lack of Transportation (Medical): No     Lack of Transportation (Non-Medical): No   Physical Activity: Inactive (3/15/2024)    Exercise Vital Sign     Days of Exercise per Week: 0 days     Minutes of Exercise per Session: 0 min   Stress: Stress Concern Present (3/15/2024)    [employer] - Occupational Stress Questionnaire     Feeling of Stress : Very much   Housing Stability: Low Risk  (3/15/2024)    Housing Stability Vital Sign     Unable to Pay for Housing in the Last Year: No     Number of Places Lived in the Last Year: 1     Unstable Housing in the Last Year: No     Family History   Problem Relation Name Age of Onset    Bladder Cancer Mother      Heart failure Father      Emphysema Sister      Pancreatic cancer Sister      No Known Problems Brother           ROS:    GEN: general malaise, weakness, fatigue  HEENT: normal with no HA's, sore throat, stiff neck, changes in vision  CV: normal with no CP, SOB, PND, RAPHAEL or orthopnea  PULM: normal with no SOB, cough, hemoptysis, sputum or pleuritic pain  GI:  admitted with nausea/vomiting, has had diarrhea since last Wednesday  : normal with no hematuria, dysuria  BREAST: normal with no mass, discharge, pain  SKIN: normal with no rash, erythema, bruising, or swelling        Medications:  Continuous Infusions:   lactated ringers   Intravenous Continuous 125 mL/hr at 06/23/24 2044 New Bag at 06/23/24 2044     Scheduled Meds:   arformoteroL  15 mcg Nebulization BID    aspirin  81 mg Oral Daily    famotidine (PF)  20 mg Intravenous Daily    ipratropium  0.5 mg Nebulization Q6H    [START ON 6/25/2024] levothyroxine  112 mcg Oral Before breakfast    metoprolol tartrate  25 mg Oral BID     PRN Meds:  Current Facility-Administered Medications:     acetaminophen, 650 mg, Oral, Q4H PRN    aluminum-magnesium hydroxide-simethicone, 30 mL, Oral, Q6H PRN **AND** [COMPLETED]  "LIDOcaine viscous HCl 2%, 15 mL, Oral, Once    carboxymethylcellulose sodium, 1 drop, Both Eyes, QID PRN    HYDROcodone-acetaminophen, 1 tablet, Oral, Q6H PRN    HYDROcodone-acetaminophen, 2 tablet, Oral, Q6H PRN    magnesium oxide, 800 mg, Oral, PRN    magnesium oxide, 800 mg, Oral, PRN    melatonin, 6 mg, Oral, Nightly PRN    naloxone, 0.02 mg, Intravenous, PRN    nitroGLYCERIN, 0.4 mg, Sublingual, Q5 Min PRN    ondansetron, 4 mg, Intravenous, Q6H PRN    potassium bicarbonate, 35 mEq, Oral, PRN    potassium bicarbonate, 50 mEq, Oral, PRN    potassium bicarbonate, 60 mEq, Oral, PRN    potassium, sodium phosphates, 2 packet, Oral, PRN    potassium, sodium phosphates, 2 packet, Oral, PRN    potassium, sodium phosphates, 2 packet, Oral, PRN    promethazine (PHENERGAN) 12.5 mg in 0.9% NaCl 50 mL IVPB, 12.5 mg, Intravenous, Q6H PRN    senna-docusate 8.6-50 mg, 1 tablet, Oral, Daily PRN    sodium chloride 0.9%, 10 mL, Intravenous, Q12H PRN         Objective:       Physical Exam:    Vitals:  Blood pressure 118/70, pulse 77, temperature 98.1 °F (36.7 °C), temperature source Oral, resp. rate 18, height 5' 2" (1.575 m), weight 77.3 kg (170 lb 6.4 oz), SpO2 95%.    GEN: no apparent distress, comfortable; AAOx3; overweight  HEAD: atraumatic and normocephalic  EYES: no pallor, no icterus, PERRLA  ENT: OMM, no pharyngeal erythema, external ears WNL; no nasal discharge; no thrush  NECK: no masses, thyroid normal, trachea midline, no LAD/LN's, supple  CV: RRR with no murmur; normal pulse; normal S1 and S2; no pedal edema; Rght portacath  CHEST: Normal respiratory effort; CTAB; normal breath sounds; no wheeze or crackles  ABDOM: mild epigastric tender; nondistended; soft; normal bowel sounds; no rebound/guarding  MUSC/Skeletal: ROM normal; no crepitus; joints normal; no deformities or arthropathy  EXTREM: no clubbing, cyanosis, inflammation or swelling  SKIN: no rashes, lesions, ulcers, petechiae or subcutaneous nodules  : no " payan  NEURO: grossly intact; motor/sensory WNL; AAOx3; no tremors; generalized weakness  PSYCH: normal mood, affect and behavior  LYMPH: normal cervical, supraclavicular, axillary and groin LN's      Lab Review:   Lab Results   Component Value Date    WBC 1.68 (LL) 06/24/2024    HGB 11.0 (L) 06/24/2024    HCT 32.5 (L) 06/24/2024    MCV 93 06/24/2024     (L) 06/24/2024       CMP  Sodium   Date Value Ref Range Status   06/24/2024 141 136 - 145 mmol/L Final     Potassium   Date Value Ref Range Status   06/24/2024 3.1 (L) 3.5 - 5.1 mmol/L Final     Chloride   Date Value Ref Range Status   06/24/2024 101 95 - 110 mmol/L Final     CO2   Date Value Ref Range Status   06/24/2024 30 (H) 23 - 29 mmol/L Final     Glucose   Date Value Ref Range Status   06/24/2024 103 70 - 110 mg/dL Final     BUN   Date Value Ref Range Status   06/24/2024 24 (H) 8 - 23 mg/dL Final     Creatinine   Date Value Ref Range Status   06/24/2024 1.2 0.5 - 1.4 mg/dL Final   03/19/2013 0.8 0.5 - 1.4 mg/dL Final     Calcium   Date Value Ref Range Status   06/24/2024 8.7 8.7 - 10.5 mg/dL Final   03/19/2013 9.9 8.7 - 10.5 mg/dL Final     Total Protein   Date Value Ref Range Status   06/24/2024 6.6 6.0 - 8.4 g/dL Final     Albumin   Date Value Ref Range Status   06/24/2024 3.6 3.5 - 5.2 g/dL Final     Total Bilirubin   Date Value Ref Range Status   06/24/2024 0.6 0.1 - 1.0 mg/dL Final     Comment:     For infants and newborns, interpretation of results should be based  on gestational age, weight and in agreement with clinical  observations.    Premature Infant recommended reference ranges:  Up to 24 hours.............<8.0 mg/dL  Up to 48 hours............<12.0 mg/dL  3-5 days..................<15.0 mg/dL  6-29 days.................<15.0 mg/dL       Alkaline Phosphatase   Date Value Ref Range Status   06/24/2024 84 55 - 135 U/L Final   08/31/2012 107 23 - 119 UNIT/L Final     AST   Date Value Ref Range Status   06/24/2024 18 10 - 40 U/L Final    08/31/2012 21 10 - 30 UNIT/L Final     ALT   Date Value Ref Range Status   06/24/2024 21 10 - 44 U/L Final     Anion Gap   Date Value Ref Range Status   06/24/2024 10 8 - 16 mmol/L Final   03/19/2013 12 5 - 15 meq/L Final     eGFR   Date Value Ref Range Status   06/24/2024 47.2 (A) >60 mL/min/1.73 m^2 Final   06/14/2024 52 (L) > OR = 60 mL/min/1.73m2 Final         Diagnostic Results:  NM Lung Scan Perfusion Particulate [[id number]] Resulted: 06/23/24 2017   Order Status: Completed Updated: 06/23/24 2020   Narrative:     EXAMINATION:  NM LUNG SCAN PERFUSION    CLINICAL HISTORY:  Chest pain, nonspecific;Pulmonary embolism (PE) suspected, high prob;Chest pain, nonspecific; Pulmonary embolism (PE) suspected, high prob;    TECHNIQUE:  IV administration of 5.2 mCi of Tc-99m-MAA, multiple images of the thorax were obtained in various projections.    COMPARISON:  Chest x-ray dated 06/23/2024.    FINDINGS:  Only perfusion images are provided for review.  No perfusion defect identified.  Evaluation for mismatch is precluded in the absence of ventilation portion of the examination.   Impression:                X-Ray Chest AP Portable [[id number]] Resulted: 06/23/24 0926   Order Status: Completed Updated: 06/23/24 0929   Narrative:     EXAMINATION:  XR CHEST AP PORTABLE    CLINICAL HISTORY:  Chest Pain;    COMPARISON:  June 21, 2024    FINDINGS:  AP view demonstrates normal heart size.  The thoracic aorta is elongated.  Left upper lobe noncalcified pulmonary nodule is unchanged.  No infiltrates or effusions are identified.    Right-sided Port-A-Cath is unchanged in position with tip at the superior vena cava.   Impression:       1. No interval change compared to June 21, 2024.       Assessment/Plan:     IMPRESSION:  (1) 73 y.o. female  known to the oncology service of my associate Dr Camden Iyer with diagnosis of lung cancer with neuroendocrine features who has been on platinum and etopside chemotherapy. She presented to  the ED with CP, SOB and N/V.     6/24/2024  - last chemo was on 6/19  - wbc at 1,68, hgb 11.0, and plats 142,000  - discussed GCSF for her leucopenia but she wants to hold off for now  - she does not seem to be tolerating the current regimen  - consider GI consult if her GI symptoms do not improve  - monitor counts for now    (2) HTN    (3) COPD    (4) GERD; Martin's esophagus; hx/of colitis    (5) Thyroid disease    (6) Anxiety    (7) Former smoker          Active Diagnoses:    Diagnosis Date Noted POA    PRINCIPAL PROBLEM:  Chest pain [R07.9] 09/23/2013 Yes    Intractable nausea and vomiting [R11.2] 06/23/2024 Yes    Diarrhea [R19.7] 06/23/2024 Yes    WEN (acute kidney injury) [N17.9] 06/23/2024 Yes    Non-small cell carcinoma of left lung [C34.92] 05/01/2024 Yes    History of AAA repair [I71.40] 12/05/2020 Yes     Chronic      Problems Resolved During this Admission:           PLAN:   discussed GCSF for her leucopenia but she wants to hold off for now  Monitor labs daily  Conservative management for her nausea  Recommend consideration for GI consult if her GI symptoms persist  IVF's, electrolyte management as per primary team  Will follow with you while Dr Willis is on vacation           Thank you for your consult.      Neo Mariano MD  Hematology/Oncology  [facility]

## 2024-06-24 NOTE — PROGRESS NOTES
[facility]  Progress Note    Patient Name: Joslyn Dubon  MRN: [id number]  Patient Class: OP- Observation   Admission Date: 6/23/2024  Length of Stay: 0 days  Attending Physician: Royce Monaco MD  Primary Care Provider: Jasbir Graham MD        Subjective:     Principal Problem:Chest pain        HPI:  73-year-old female presented to ED for eval. Patient reported for the last few days she has had intractable nausea and vomiting, with associated LLQ abd pain and intermittent diarrhea. Denied melena, hematochezia, hematemesis. 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 Patient also reported over the last 2 days she has been having squeezing L sided intermittent chest pain that radiates to her back with shortness of breath. She does have chronic resp failure and COPD but reports shortness of breath is increased from baseline, denies use of home O2. Patient denies previous similar episodes of chest pain, denies hx CAD. She does have hx of AAA s/p repair. Noted to have WEN, hypomagnesemia, hypokalemia. CXR impression without acute abn. Of note, patient has contrast allergy with anaphylactic reaction, also reports severe hypotension with dilaudid and morphine, can tolerate norco. Admit to hospital medicine for further eval.     Overview/Hospital Course:  No notes on file    Interval History:     Patient was seen and examined  This is the 2nd round of chemotherapy for her lung cancer.  She had the same diarrhea illness with the 1st round.  Currently bowel movement 3 times since admission small amount.  No neutropenia.  WEN improved.  High TSH noted.  Negative procalcitonin  On exam abdominal is benign and CT abdomen with no acute  aortic stent graft and abdominal aneurysm above the stent graft measuring 3.6 cm.noted       Review of Systems  Objective:     Vital Signs (Most  Recent):  Temp: 98.1 °F (36.7 °C) (06/24/24 1127)  Pulse: 77 (06/24/24 1127)  Resp: 18 (06/24/24 1127)  BP: 118/70 (90) (06/24/24 1127)  SpO2: 95 % (06/24/24 1127) Vital Signs (24h Range):  Temp:  [97.4 °F (36.3 °C)-98.1 °F (36.7 °C)] 98.1 °F (36.7 °C)  Pulse:  [65-93] 77  Resp:  [16-18] 18  SpO2:  [92 %-98 %] 95 %  BP: (103-139)/(65-89) 118/70     Weight: 77.3 kg (170 lb 6.4 oz)  Body mass index is 31.17 kg/m².    Intake/Output Summary (Last 24 hours) at 6/24/2024 1158  Last data filed at 6/24/2024 0444  Gross per 24 hour   Intake 680 ml   Output 400 ml   Net 280 ml         Physical Exam  Vitals and nursing note reviewed.   HENT:      Head: Normocephalic and atraumatic.      Nose: Nose normal.      Mouth/Throat:      Mouth: Mucous membranes are moist.   Eyes:      Conjunctiva/sclera: Conjunctivae normal.   Neck:      Vascular: No JVD.   Cardiovascular:      Rate and Rhythm: Normal rate.      Heart sounds: Normal heart sounds.   Pulmonary:      Effort: Pulmonary effort is normal.      Breath sounds: Normal breath sounds.   Abdominal:      Palpations: Abdomen is soft.   Musculoskeletal:         General: Normal range of motion.   Skin:     General: Skin is warm.   Neurological:      Mental Status: She is alert and oriented to person, place, and time.   Psychiatric:         Mood and Affect: Mood normal.             Significant Labs: All pertinent labs within the past 24 hours have been reviewed.  CBC:   Recent Labs   Lab 06/23/24  0910 06/24/24  0508   WBC 3.01* 1.68*   HGB 12.0 11.0*   HCT 36.1* 32.5*    142*     CMP:   Recent Labs   Lab 06/23/24  0910 06/24/24  0508    141   K 3.3* 3.1*   CL 98 101   CO2 27 30*   * 103   BUN 27* 24*   CREATININE 1.5* 1.2   CALCIUM 9.4 8.7   PROT 7.6 6.6   ALBUMIN 4.0 3.6   BILITOT 0.6 0.6   ALKPHOS 98 84   AST 20 18   ALT 23 21   ANIONGAP 14 10     Cardiac Markers:   Recent Labs   Lab 06/23/24  0910   BNP 38       Significant Imaging: I have reviewed all pertinent  imaging results/findings within the past 24 hours.    Assessment/Plan:      * Chest pain  Serial troponins x 3 all negative   ECHO to follow   Stress test when patient more stable   EKG PRN  NTG PRN  ASA  VQ scan negative at perfusion scan     WEN (acute kidney injury)  Patient with acute kidney injury/acute renal failure likely due to pre-renal azotemia due to dehydration WEN is currently stable. Baseline creatinine  1  - Labs reviewed- Renal function/electrolytes with Estimated Creatinine Clearance: 39 mL/min (based on SCr of 1.2 mg/dL). according to latest data. Monitor urine output and serial CMP and adjust therapy as needed. Avoid nephrotoxins and renally dose meds for GFR listed above.  resolved    Diarrhea  Stool studies to follow   IVF  Lyte replacement per protocol      Intractable nausea and vomiting  Secondary to  chemo  Lipase negative   Zofran, phenergan PRN NV  IVF  Lyte replacement per protocol  CT abd/pelvis noted    Non-small cell carcinoma of left lung  Last chemo 6/19/24  RT assess and treat  Supplemental O2 PRN  Resume home inhalers  Consult ed hem/onc    History of AAA repair  Noted in CT  Non acute      VTE Risk Mitigation (From admission, onward)           Ordered     Reason for No Pharmacological VTE Prophylaxis  Once        Question:  Reasons:  Answer:  Risk of Bleeding    06/23/24 1743     IP VTE HIGH RISK PATIENT  Once         06/23/24 1743     Place sequential compression device  Until discontinued         06/23/24 1743                    Discharge Planning   TINO: 6/25/2024     Code Status: Full Code   Is the patient medically ready for discharge?:     Reason for patient still in hospital (select all that apply): Treatment  Discharge Plan A: Home with family                  Royce Monaco MD  Department of Hospital Medicine   Count includes the [facility]

## 2024-06-24 NOTE — ASSESSMENT & PLAN NOTE
Secondary to  chemo  Lipase negative   Zofran, phenergan PRN NV  IVF  Lyte replacement per protocol  CT abd/pelvis noted

## 2024-06-25 ENCOUNTER — TELEPHONE (OUTPATIENT)
Dept: FAMILY MEDICINE | Facility: CLINIC | Age: 74
End: 2024-06-25

## 2024-06-25 DIAGNOSIS — Z00.00 ENCOUNTER FOR MEDICARE ANNUAL WELLNESS EXAM: ICD-10-CM

## 2024-06-25 LAB
ALBUMIN SERPL BCP-MCNC: 3.2 G/DL (ref 3.5–5.2)
ALP SERPL-CCNC: 80 U/L (ref 55–135)
ALT SERPL W/O P-5'-P-CCNC: 18 U/L (ref 10–44)
ANION GAP SERPL CALC-SCNC: 5 MMOL/L (ref 8–16)
AST SERPL-CCNC: 15 U/L (ref 10–40)
BASOPHILS NFR BLD: 0 % (ref 0–1.9)
BILIRUB SERPL-MCNC: 0.3 MG/DL (ref 0.1–1)
BUN SERPL-MCNC: 18 MG/DL (ref 8–23)
CALCIUM SERPL-MCNC: 8.5 MG/DL (ref 8.7–10.5)
CHLORIDE SERPL-SCNC: 104 MMOL/L (ref 95–110)
CO2 SERPL-SCNC: 31 MMOL/L (ref 23–29)
CREAT SERPL-MCNC: 1 MG/DL (ref 0.5–1.4)
DIFFERENTIAL METHOD BLD: ABNORMAL
EOSINOPHIL NFR BLD: 0 % (ref 0–8)
ERYTHROCYTE [DISTWIDTH] IN BLOOD BY AUTOMATED COUNT: 13.1 % (ref 11.5–14.5)
EST. GFR  (NO RACE VARIABLE): 58.8 ML/MIN/1.73 M^2
GLUCOSE SERPL-MCNC: 123 MG/DL (ref 70–110)
HCT VFR BLD AUTO: 29.2 % (ref 37–48.5)
HGB BLD-MCNC: 9.7 G/DL (ref 12–16)
IMM GRANULOCYTES # BLD AUTO: ABNORMAL K/UL (ref 0–0.04)
IMM GRANULOCYTES NFR BLD AUTO: ABNORMAL % (ref 0–0.5)
LYMPHOCYTES NFR BLD: 58 % (ref 18–48)
MAGNESIUM SERPL-MCNC: 1.3 MG/DL (ref 1.6–2.6)
MCH RBC QN AUTO: 31.4 PG (ref 27–31)
MCHC RBC AUTO-ENTMCNC: 33.2 G/DL (ref 32–36)
MCV RBC AUTO: 95 FL (ref 82–98)
MONOCYTES NFR BLD: 0 % (ref 4–15)
NEUTROPHILS NFR BLD: 42 % (ref 38–73)
NRBC BLD-RTO: 0 /100 WBC
OHS QRS DURATION: 70 MS
OHS QTC CALCULATION: 449 MS
PLATELET # BLD AUTO: 104 K/UL (ref 150–450)
PLATELET BLD QL SMEAR: ABNORMAL
PMV BLD AUTO: 8.7 FL (ref 9.2–12.9)
POTASSIUM SERPL-SCNC: 3.5 MMOL/L (ref 3.5–5.1)
PROT SERPL-MCNC: 5.9 G/DL (ref 6–8.4)
RBC # BLD AUTO: 3.09 M/UL (ref 4–5.4)
SODIUM SERPL-SCNC: 140 MMOL/L (ref 136–145)
WBC # BLD AUTO: 1.21 K/UL (ref 3.9–12.7)

## 2024-06-25 PROCEDURE — 25000242 PHARM REV CODE 250 ALT 637 W/ HCPCS: Performed by: INTERNAL MEDICINE

## 2024-06-25 PROCEDURE — 63600175 PHARM REV CODE 636 W HCPCS

## 2024-06-25 PROCEDURE — 85027 COMPLETE CBC AUTOMATED: CPT

## 2024-06-25 PROCEDURE — 25000003 PHARM REV CODE 250: Performed by: INTERNAL MEDICINE

## 2024-06-25 PROCEDURE — 94640 AIRWAY INHALATION TREATMENT: CPT

## 2024-06-25 PROCEDURE — 99900031 HC PATIENT EDUCATION (STAT)

## 2024-06-25 PROCEDURE — 85007 BL SMEAR W/DIFF WBC COUNT: CPT

## 2024-06-25 PROCEDURE — 25000003 PHARM REV CODE 250

## 2024-06-25 PROCEDURE — 99233 SBSQ HOSP IP/OBS HIGH 50: CPT | Mod: ,,, | Performed by: INTERNAL MEDICINE

## 2024-06-25 PROCEDURE — 21400001 HC TELEMETRY ROOM

## 2024-06-25 PROCEDURE — 36415 COLL VENOUS BLD VENIPUNCTURE: CPT

## 2024-06-25 PROCEDURE — 94761 N-INVAS EAR/PLS OXIMETRY MLT: CPT

## 2024-06-25 PROCEDURE — 80053 COMPREHEN METABOLIC PANEL: CPT

## 2024-06-25 PROCEDURE — 83735 ASSAY OF MAGNESIUM: CPT

## 2024-06-25 RX ORDER — LOPERAMIDE HYDROCHLORIDE 2 MG/1
2 CAPSULE ORAL 2 TIMES DAILY PRN
Qty: 12 CAPSULE | Refills: 0 | Status: SHIPPED | OUTPATIENT
Start: 2024-06-25 | End: 2024-07-01

## 2024-06-25 RX ORDER — LEVOTHYROXINE SODIUM 112 UG/1
112 TABLET ORAL
Qty: 90 TABLET | Refills: 0 | Status: SHIPPED | OUTPATIENT
Start: 2024-06-26 | End: 2024-09-24

## 2024-06-25 RX ORDER — ALPRAZOLAM 0.25 MG/1
0.25 TABLET ORAL NIGHTLY PRN
Status: DISCONTINUED | OUTPATIENT
Start: 2024-06-25 | End: 2024-07-01 | Stop reason: HOSPADM

## 2024-06-25 RX ADMIN — LEVOTHYROXINE SODIUM 112 MCG: 0.11 TABLET ORAL at 05:06

## 2024-06-25 RX ADMIN — Medication 800 MG: at 06:06

## 2024-06-25 RX ADMIN — LOPERAMIDE HYDROCHLORIDE 2 MG: 2 CAPSULE ORAL at 01:06

## 2024-06-25 RX ADMIN — ONDANSETRON 4 MG: 2 INJECTION INTRAMUSCULAR; INTRAVENOUS at 01:06

## 2024-06-25 RX ADMIN — FAMOTIDINE 20 MG: 10 INJECTION INTRAVENOUS at 09:06

## 2024-06-25 RX ADMIN — MUPIROCIN 1 G: 20 OINTMENT TOPICAL at 09:06

## 2024-06-25 RX ADMIN — IPRATROPIUM BROMIDE 0.5 MG: 0.5 SOLUTION RESPIRATORY (INHALATION) at 01:06

## 2024-06-25 RX ADMIN — METOPROLOL TARTRATE 25 MG: 25 TABLET, FILM COATED ORAL at 08:06

## 2024-06-25 RX ADMIN — IPRATROPIUM BROMIDE 0.5 MG: 0.5 SOLUTION RESPIRATORY (INHALATION) at 08:06

## 2024-06-25 RX ADMIN — IPRATROPIUM BROMIDE 0.5 MG: 0.5 SOLUTION RESPIRATORY (INHALATION) at 07:06

## 2024-06-25 RX ADMIN — METOPROLOL TARTRATE 25 MG: 25 TABLET, FILM COATED ORAL at 09:06

## 2024-06-25 RX ADMIN — Medication 800 MG: at 09:06

## 2024-06-25 RX ADMIN — ASPIRIN 81 MG: 81 TABLET, COATED ORAL at 09:06

## 2024-06-25 RX ADMIN — ONDANSETRON 4 MG: 2 INJECTION INTRAMUSCULAR; INTRAVENOUS at 09:06

## 2024-06-25 RX ADMIN — ACETAMINOPHEN 650 MG: 325 TABLET ORAL at 07:06

## 2024-06-25 RX ADMIN — ARFORMOTEROL TARTRATE 15 MCG: 15 SOLUTION RESPIRATORY (INHALATION) at 08:06

## 2024-06-25 RX ADMIN — MUPIROCIN 1 G: 20 OINTMENT TOPICAL at 08:06

## 2024-06-25 RX ADMIN — POTASSIUM BICARBONATE 50 MEQ: 977.5 TABLET, EFFERVESCENT ORAL at 06:06

## 2024-06-25 RX ADMIN — ALPRAZOLAM 0.25 MG: 0.25 TABLET ORAL at 10:06

## 2024-06-25 RX ADMIN — ARFORMOTEROL TARTRATE 15 MCG: 15 SOLUTION RESPIRATORY (INHALATION) at 07:06

## 2024-06-25 NOTE — ASSESSMENT & PLAN NOTE
Last chemo 6/19/24  RT assess and treat  Supplemental O2 PRN  Resume home inhalers  Consult ed hem/onc and appreciate

## 2024-06-25 NOTE — RESPIRATORY THERAPY
06/24/24 2022   Patient Assessment/Suction   Level of Consciousness (AVPU) alert   Respiratory Effort Normal;Unlabored   Expansion/Accessory Muscles/Retractions no retractions;no use of accessory muscles   All Lung Fields Breath Sounds wheezes, expiratory;diminished   Rhythm/Pattern, Respiratory no shortness of breath reported   Cough Frequency infrequent   Cough Type good;nonproductive   PRE-TX-O2   Device (Oxygen Therapy) room air   SpO2 95 %   Pulse Oximetry Type Intermittent   $ Pulse Oximetry - Multiple Charge Pulse Oximetry - Multiple   Pulse 79   Resp 16   Aerosol Therapy   $ Aerosol Therapy Charges Aerosol Treatment   Daily Review of Necessity (SVN) completed   Respiratory Treatment Status (SVN) given   Treatment Route (SVN) mask;oxygen   Patient Position HOB elevated   Post Treatment Assessment (SVN) breath sounds unchanged   Signs of Intolerance (SVN) none   Education   $ Education Bronchodilator;15 min

## 2024-06-25 NOTE — PROGRESS NOTES
"[facility]  Hematology/Oncology  Inpatient Progress Note          Patient Name: Joslyn Dubon  MRN: [id number]  Admission Date: 6/23/2024  Hospital Length of Stay: 1 days  Code Status: Full Code   Attending Provider: Royce Monaco MD  Consulting Provider: Neo Mariano MD  Primary Care Physician: Jasbir Graham MD  Principal Problem:Chest pain      Coverage for Dr Camden Iyer       Subjective:       Patient ID: Joslyn Dubon is a 73 y.o. female.    Chief Complaint: Vomiting (X FEW DAYS) and Chest Pain        History Present Illness:    Patient was feeling much better and eating until earlier today when she had another bout of diarrhea and n/V. She is currently laying in bed, awake and alert; no CP, SOB, HA's; I discussed with floor nursing staff in person      Review of Systems:  GEN: general malaise, weakness, fatigue  HEENT: normal with no HA's, sore throat, stiff neck, changes in vision  CV: normal with no CP, SOB, PND, RAPHAEL or orthopnea  PULM: normal with no SOB, cough, hemoptysis, sputum or pleuritic pain  GI:  recurrent nausea/vomiting, and diarrhea   : normal with no hematuria, dysuria  BREAST: normal with no mass, discharge, pain  SKIN: normal with no rash, erythema, bruising, or swelling      Objective:     Vitals:  Blood pressure 130/80, pulse 80, temperature 97.3 °F (36.3 °C), temperature source Oral, resp. rate 19, height 5' 2" (1.575 m), weight 77.3 kg (170 lb 6.4 oz), SpO2 97%.    Physical Exam:  GEN: no apparent distress, comfortable; AAOx3; overweight  HEAD: atraumatic and normocephalic  EYES: no pallor, no icterus, PERRLA  ENT: OMM, no pharyngeal erythema, external ears WNL; no nasal discharge; no thrush  NECK: no masses, thyroid normal, trachea midline, no LAD/LN's, supple  CV: RRR with no murmur; normal pulse; normal S1 and S2; no pedal edema; Rght portacath  CHEST: Normal respiratory effort; CTAB; normal breath sounds; no wheeze or crackles  ABDOM: mild " epigastric tender; nondistended; soft; normal bowel sounds; no rebound/guarding  MUSC/Skeletal: ROM normal; no crepitus; joints normal; no deformities or arthropathy  EXTREM: no clubbing, cyanosis, inflammation or swelling  SKIN: no rashes, lesions, ulcers, petechiae or subcutaneous nodules  : no payan  NEURO: grossly intact; motor/sensory WNL; AAOx3; no tremors; generalized weakness  PSYCH: normal mood, affect and behavior  LYMPH: normal cervical, supraclavicular, axillary and groin LN's            Lab Review:   Lab Results   Component Value Date    WBC 1.21 (LL) 06/25/2024    HGB 9.7 (L) 06/25/2024    HCT 29.2 (L) 06/25/2024    MCV 95 06/25/2024     (L) 06/25/2024         CMP  Sodium   Date Value Ref Range Status   06/25/2024 140 136 - 145 mmol/L Final     Potassium   Date Value Ref Range Status   06/25/2024 3.5 3.5 - 5.1 mmol/L Final     Chloride   Date Value Ref Range Status   06/25/2024 104 95 - 110 mmol/L Final     CO2   Date Value Ref Range Status   06/25/2024 31 (H) 23 - 29 mmol/L Final     Glucose   Date Value Ref Range Status   06/25/2024 123 (H) 70 - 110 mg/dL Final     BUN   Date Value Ref Range Status   06/25/2024 18 8 - 23 mg/dL Final     Creatinine   Date Value Ref Range Status   06/25/2024 1.0 0.5 - 1.4 mg/dL Final   03/19/2013 0.8 0.5 - 1.4 mg/dL Final     Calcium   Date Value Ref Range Status   06/25/2024 8.5 (L) 8.7 - 10.5 mg/dL Final   03/19/2013 9.9 8.7 - 10.5 mg/dL Final     Total Protein   Date Value Ref Range Status   06/25/2024 5.9 (L) 6.0 - 8.4 g/dL Final     Albumin   Date Value Ref Range Status   06/25/2024 3.2 (L) 3.5 - 5.2 g/dL Final     Total Bilirubin   Date Value Ref Range Status   06/25/2024 0.3 0.1 - 1.0 mg/dL Final     Comment:     For infants and newborns, interpretation of results should be based  on gestational age, weight and in agreement with clinical  observations.    Premature Infant recommended reference ranges:  Up to 24 hours.............<8.0 mg/dL  Up to 48  hours............<12.0 mg/dL  3-5 days..................<15.0 mg/dL  6-29 days.................<15.0 mg/dL       Alkaline Phosphatase   Date Value Ref Range Status   06/25/2024 80 55 - 135 U/L Final   08/31/2012 107 23 - 119 UNIT/L Final     AST   Date Value Ref Range Status   06/25/2024 15 10 - 40 U/L Final   08/31/2012 21 10 - 30 UNIT/L Final     ALT   Date Value Ref Range Status   06/25/2024 18 10 - 44 U/L Final     Anion Gap   Date Value Ref Range Status   06/25/2024 5 (L) 8 - 16 mmol/L Final   03/19/2013 12 5 - 15 meq/L Final     eGFR   Date Value Ref Range Status   06/25/2024 58.8 (A) >60 mL/min/1.73 m^2 Final   06/14/2024 52 (L) > OR = 60 mL/min/1.73m2 Final              Radiology Diagnostic Studies:     NM Lung Scan Perfusion Particulate [[id number]] Resulted: 06/23/24 2017   Order Status: Completed Updated: 06/23/24 2020   Narrative:     EXAMINATION:  NM LUNG SCAN PERFUSION    CLINICAL HISTORY:  Chest pain, nonspecific;Pulmonary embolism (PE) suspected, high prob;Chest pain, nonspecific; Pulmonary embolism (PE) suspected, high prob;    TECHNIQUE:  IV administration of 5.2 mCi of Tc-99m-MAA, multiple images of the thorax were obtained in various projections.    COMPARISON:  Chest x-ray dated 06/23/2024.    FINDINGS:  Only perfusion images are provided for review.  No perfusion defect identified.  Evaluation for mismatch is precluded in the absence of ventilation portion of the examination.   Impression:                      X-Ray Chest AP Portable [[id number]] Resulted: 06/23/24 0926   Order Status: Completed Updated: 06/23/24 0929   Narrative:     EXAMINATION:  XR CHEST AP PORTABLE    CLINICAL HISTORY:  Chest Pain;    COMPARISON:  June 21, 2024    FINDINGS:  AP view demonstrates normal heart size.  The thoracic aorta is elongated.  Left upper lobe noncalcified pulmonary nodule is unchanged.  No infiltrates or effusions are identified.    Right-sided Port-A-Cath is unchanged in position with tip at the  superior vena cava.   Impression:       1. No interval change compared to June 21, 2024.          Assessment:     IMPRESSION:    (1) 73 y.o. female known to the oncology service of my associate Dr Camden Willis with diagnosis of lung cancer with neuroendocrine features who has been on platinum and etopside chemotherapy. She presented to the ED with CP, SOB and N/V.      6/24/2024  - last chemo was on 6/19  - wbc at 1,68, hgb 11.0, and plats 142,000  - discussed GCSF for her leucopenia but she wants to hold off for now  - she does not seem to be tolerating the current regimen  - consider GI consult if her GI symptoms do not improve  - monitor counts for now    6/25/2024:  - wbc 1.21, hgb 9.7 and plats 104,000  - recurrent diarrhea and n/v again today  - GI consulted for evaluation     (2) HTN     (3) COPD     (4) GERD; Martin's esophagus; hx/of colitis     (5) Thyroid disease     (6) Anxiety     (7) Former smoker        1. Malignant neoplasm of lung, unspecified laterality, unspecified part of lung    2. Chest pain    3. Anemia due to chemotherapy    4. Chemotherapy induced neutropenia    5. Non-small cell carcinoma of left lung [C34.92]           Plan:     PLAN:          discussed GCSF for her leucopenia but she wants to hold off for now  Monitor labs daily  Conservative management for her nausea  Recommend consideration for GI consult if her GI symptoms persist  IVF's, electrolyte management as per primary team  Will follow with you while Dr Willis is on vacation               Neo Mariano MD  Hematology/Oncology  [facility]

## 2024-06-25 NOTE — CARE UPDATE
06/25/24 0757   Patient Assessment/Suction   Level of Consciousness (AVPU) alert   Respiratory Effort Normal;Unlabored   Expansion/Accessory Muscles/Retractions no use of accessory muscles;no retractions;expansion symmetric   All Lung Fields Breath Sounds diminished   Rhythm/Pattern, Respiratory unlabored;pattern regular;depth regular;chest wiggle adequate;no shortness of breath reported   Cough Frequency infrequent   Cough Type good   PRE-TX-O2   Device (Oxygen Therapy) room air   SpO2 97 %   Pulse Oximetry Type Intermittent   $ Pulse Oximetry - Multiple Charge Pulse Oximetry - Multiple   Pulse 80   Resp 19   Aerosol Therapy   $ Aerosol Therapy Charges Aerosol Treatment   Daily Review of Necessity (SVN) completed   Respiratory Treatment Status (SVN) given   Treatment Route (SVN) mask;oxygen   Patient Position HOB elevated   Post Treatment Assessment (SVN) increased aeration   Signs of Intolerance (SVN) none   Breath Sounds Post-Respiratory Treatment   Throughout All Fields Post-Treatment All Fields   Throughout All Fields Post-Treatment aeration increased   Post-treatment Heart Rate (beats/min) 79   Post-treatment Resp Rate (breaths/min) 18   Education   $ Education Bronchodilator;15 min

## 2024-06-25 NOTE — NURSING
Pt was ready for discharge and began vomiting and had diarrhea.  Meds per emar.        1415 pt reports feeling some relief but is concerned about being discharged.  Dr Monaco made aware.  Discharge to be canceled at this time

## 2024-06-25 NOTE — PLAN OF CARE
Patient cleared for discharge by case management to home, no needs.       06/25/24 1259   Final Note   Anticipated Discharge Disposition Home   Hospital Resources/Appts/Education Provided Appointments scheduled and added to AVS

## 2024-06-25 NOTE — ASSESSMENT & PLAN NOTE
Current CBC reviewed-   Lab Results   Component Value Date    HGB 9.7 (L) 06/25/2024    HCT 29.2 (L) 06/25/2024     Monitor serial CBC and transfuse if patient becomes hemodynamically unstable, symptomatic or H/H drops below 7/21.

## 2024-06-25 NOTE — CONSULTS
GASTROENTEROLOGY INPATIENT CONSULT NOTE  Patient Name: Joslyn Dubon  Patient MRN: [id number]  Patient : 1950    Admit Date: 2024  Service date: 2024    Reason for Consult: n/v/d    PCP: Jasbir Graham MD    Chief Complaint   Patient presents with    Vomiting     X FEW DAYS    Chest Pain       HPI: Patient is a 73 y.o. female with PMHx GERD/Martin's, cholecystectomy, AAA repair, hysterectomy, hypothyroid, COPD/tobacco use, lung cancer on chemo w/ pancytopenia, diverticulosis presents for evaluation of n/v/d. Acute onset, constant, progressive after got 2nd round of chemo 4-5 days ago. Got similar symptoms after her 1st round but lasted only 1-2 days. No symptoms prior to starting chemo or in between doses. No signs of bleeding.     CHART REVIEW:   Cdiff  - negative.   CT Abd  - s/p AAA repair; Nml liver/panc/biliary s/p indio; tics; hysterectomy; nml bowels  Colon ' - polyps / tics    Past Medical History:  Past Medical History:   Diagnosis Date    Anxiety     Martin esophagus     Colitis     COPD (chronic obstructive pulmonary disease)     GERD (gastroesophageal reflux disease)     Hypertension     Intractable nausea and vomiting 2024    Lung cancer     Thyroid disease     Vocal cord polyps         Past Surgical History:  Past Surgical History:   Procedure Laterality Date    ABDOMINAL AORTIC ANEURYSM REPAIR      BACK SURGERY      cervical    CATARACT EXTRACTION Right 2021    CHOLECYSTECTOMY  2013    Dr Albert CORLEY    ENDOBRONCHIAL ULTRASOUND N/A 2024    Procedure: ENDOBRONCHIAL ULTRASOUND (EBUS);  Surgeon: Kizzy Siegel MD;  Location: [address];  Service: Pulmonary;  Laterality: N/A;    FOOT SURGERY      HYSTERECTOMY      AUGUSTINE for AUB with consevation ovaries    INSERTION OF TUNNELED CENTRAL VENOUS CATHETER (CVC) WITH SUBCUTANEOUS PORT Right 2024    Procedure: [id number]-CADB-N-CATH;  Surgeon: Simon Wilson III, MD;  Location: [facility] [place] OR;   Service: General;  Laterality: Right;    ROBOTIC BRONCHOSCOPY N/A 4/19/2024    Procedure: ROBOTIC BRONCHOSCOPY;  Surgeon: Kizzy Siegel MD;  Location: [facility] OR [address] [place] [address];  Service: Pulmonary;  Laterality: N/A;    SALIVARY GLAND SURGERY          Home Medications:  Medications Prior to Admission   Medication Sig Dispense Refill Last Dose    famotidine (PEPCID) 20 MG tablet Take 1 tablet (20 mg total) by mouth 2 (two) times daily. 20 tablet 0 Past Week    HYDROcodone-acetaminophen (NORCO)  mg per tablet Take 1 tablet by mouth every 12 (twelve) hours as needed for Pain. 50 tablet 0 Past Week    levalbuterol (XOPENEX HFA) 45 mcg/actuation inhaler Inhale 2 puffs into the lungs every 6 (six) hours as needed for Wheezing. Rescue 15 g 4 Past Week    LIDOcaine-prilocaine (EMLA) cream Apply topically as needed. (Patient taking differently: Apply 1 g topically as needed (Chemo port).) 30 g 5 Past Week    metoprolol tartrate (LOPRESSOR) 25 MG tablet Take 1 tablet (25 mg total) by mouth 2 (two) times daily. 180 tablet 3 6/22/2024 at 20:00    omeprazole (PRILOSEC) 40 MG capsule Take 1 capsule (40 mg total) by mouth every morning. 90 capsule 3 6/22/2024 at 08:00    ondansetron (ZOFRAN) 8 MG tablet Take 1 tablet (8 mg total) by mouth every 8 (eight) hours as needed for Nausea. 30 tablet 5 6/23/2024 at 06:00    polyethylene glycol (GLYCOLAX) 17 gram/dose powder Take 17 g by mouth once daily. 510 g 5 Past Week    promethazine (PHENERGAN) 25 MG tablet Take 1 tablet (25 mg total) by mouth every 6 (six) hours as needed for Nausea. 30 tablet 5 6/22/2024    REFRESH OPTIVE 0.5-0.9 % Drop Place 1 drop into both eyes 4 (four) times daily as needed (Dry Eyes).   6/22/2024 at 20:00    simethicone (GAS-X ORAL) Take 1 tablet by mouth as needed (Flatulence).   6/22/2024    umeclidinium-vilanteroL (ANORO ELLIPTA) 62.5-25 mcg/actuation DsDv Inhale 1 puff into the lungs once daily. Controller 1 each 11 Past Week    [DISCONTINUED] amLODIPine  (NORVASC) 5 MG tablet Take 1 tablet (5 mg total) by mouth once daily. For blood pressure 30 tablet 5 6/22/2024 at 08:00    [DISCONTINUED] levothyroxine (SYNTHROID) 100 MCG tablet Take 100 mcg by mouth before breakfast.   6/22/2024 at 08:00    EPINEPHrine (EPIPEN) 0.3 mg/0.3 mL AtIn Inject 0.3 mLs (0.3 mg total) into the muscle as needed (Severe allergic reaction symptoms such as shortness of breath, tongue or throat swelling, weakness dizziness severe nausea vomiting). 1 each 3     furosemide (LASIX) 20 MG tablet Take 1 tablet (20 mg total) by mouth daily as needed (edema). 30 tablet 1     [DISCONTINUED] diphenhydrAMINE (BENADRYL) 25 mg capsule Take 1 capsule (25 mg total) by mouth every 6 (six) hours as needed for Itching or Allergies. (Patient not taking: Reported on 6/23/2024) 20 capsule 0 Not Taking    [DISCONTINUED] ondansetron (ZOFRAN) 4 MG tablet Take 1 tablet (4 mg total) by mouth every 8 (eight) hours as needed for Nausea. (Patient not taking: Reported on 6/23/2024) 30 tablet 3 Not Taking       Inpatient Medications:   arformoteroL  15 mcg Nebulization BID    aspirin  81 mg Oral Daily    famotidine (PF)  20 mg Intravenous Daily    ipratropium  0.5 mg Nebulization Q6H    levothyroxine  112 mcg Oral Before breakfast    metoprolol tartrate  25 mg Oral BID    mupirocin   Nasal BID       Current Facility-Administered Medications:     acetaminophen, 650 mg, Oral, Q4H PRN    aluminum-magnesium hydroxide-simethicone, 30 mL, Oral, Q6H PRN **AND** [COMPLETED] LIDOcaine viscous HCl 2%, 15 mL, Oral, Once    carboxymethylcellulose sodium, 1 drop, Both Eyes, QID PRN    HYDROcodone-acetaminophen, 1 tablet, Oral, Q6H PRN    HYDROcodone-acetaminophen, 2 tablet, Oral, Q6H PRN    loperamide, 2 mg, Oral, QID PRN    magnesium oxide, 800 mg, Oral, PRN    magnesium oxide, 800 mg, Oral, PRN    melatonin, 6 mg, Oral, Nightly PRN    naloxone, 0.02 mg, Intravenous, PRN    nitroGLYCERIN, 0.4 mg, Sublingual, Q5 Min PRN    ondansetron,  4 mg, Intravenous, Q6H PRN    potassium bicarbonate, 35 mEq, Oral, PRN    potassium bicarbonate, 50 mEq, Oral, PRN    potassium bicarbonate, 60 mEq, Oral, PRN    potassium, sodium phosphates, 2 packet, Oral, PRN    potassium, sodium phosphates, 2 packet, Oral, PRN    potassium, sodium phosphates, 2 packet, Oral, PRN    promethazine (PHENERGAN) 12.5 mg in 0.9% NaCl 50 mL IVPB, 12.5 mg, Intravenous, Q6H PRN    senna-docusate 8.6-50 mg, 1 tablet, Oral, Daily PRN    sodium chloride 0.9%, 10 mL, Intravenous, Q12H PRN    Review of patient's allergies indicates:   Allergen Reactions    Bisacodyl Nausea And Vomiting     Other reaction(s): Vomiting    Iodinated contrast media Hives and Shortness Of Breath     hypotension    Morphine Other (See Comments)     hypotension    Azithromycin Hives    Cipro [ciprofloxacin hcl]     Demerol [meperidine]      Nausea vomiting, visual problem    Doxycycline Hives    Flonase [fluticasone propionate] Hives    Morpholine analogues Other (See Comments)     hypotension       Social History:   Social History     Occupational History    Not on file   Tobacco Use    Smoking status: Former     Types: Cigarettes     Start date: 2024     Quit date: 1964     Years since quittin.5    Smokeless tobacco: Never    Tobacco comments:     Pt has smoked since 14 years old. Smokes around .5ppd. Inpatient smoking education done 10/20.     Pt quit smoking on 24.  She has never used smokeless tobacco   Substance and Sexual Activity    Alcohol use: Never    Drug use: Never    Sexual activity: Yes     Partners: Male       Family History:   Family History   Problem Relation Name Age of Onset    Bladder Cancer Mother      Heart failure Father      Emphysema Sister      Pancreatic cancer Sister      No Known Problems Brother         Review of Systems:  A 10 point review of systems was performed and was normal, except as mentioned in the HPI, including constitutional, HEENT, heme, lymph,  "cardiovascular, respiratory, gastrointestinal, genitourinary, neurologic, endocrine, psychiatric and musculoskeletal.      OBJECTIVE:    Physical Exam:  24 Hour Vital Sign Ranges: Temp:  [97.3 °F (36.3 °C)-97.9 °F (36.6 °C)] 97.9 °F (36.6 °C)  Pulse:  [63-82] 79  Resp:  [15-19] 19  SpO2:  [93 %-97 %] 97 %  BP: (123-136)/(66-82) 136/82  Most recent vitals: /82   Pulse 79   Temp 97.9 °F (36.6 °C)   Resp 19   Ht 5' 2" (1.575 m)   Wt 77.3 kg (170 lb 6.4 oz)   SpO2 97%   BMI 31.17 kg/m²    GEN: well-developed, well-nourished, awake and alert, non-toxic appearing adult  HEENT: PERRL, sclera anicteric, oral mucosa pink and moist without lesion  NECK: trachea midline; Good ROM  CV: regular rate and rhythm, no murmurs or gallops  RESP: clear to auscultation bilaterally, no wheezes, rhonci or rales  ABD: soft, non-tender, non-distended, normal bowel sounds  EXT: no swelling or edema, 2+ pulses distally  SKIN: no rashes or jaundice  PSYCH: normal affect    Labs:   Recent Labs     06/23/24  0910 06/24/24  0508 06/25/24  0529   WBC 3.01* 1.68* 1.21*   MCV 92 93 95    142* 104*     Recent Labs     06/23/24  0910 06/24/24  0508 06/25/24  0529    141 140   K 3.3* 3.1* 3.5   CL 98 101 104   CO2 27 30* 31*   BUN 27* 24* 18   * 103 123*     No results for input(s): "ALB" in the last 72 hours.    Invalid input(s): "ALKP", "SGOT", "SGPT", "TBIL", "DBIL", "TPRO"  No results for input(s): "PT", "INR", "PTT" in the last 72 hours.      Radiology Review:  NM Lung Scan Perfusion Particulate   Final Result      As above.         Electronically signed by: Ace Roman MD   Date:    06/23/2024   Time:    20:17      CT Abdomen Pelvis  Without Contrast   Final Result      No acute findings evident by CT of the abdomen and pelvis in patient with aortic stent graft and abdominal aneurysm above the stent graft measuring 3.6 cm.      Small pericardial effusion without evidence of tamponade.      Subcutaneous nodules in " the left lower anterior abdominal wall.  Correlate for sebaceous cyst.         Electronically signed by: Bill Sands   Date:    06/23/2024   Time:    19:23      X-Ray Chest AP Portable   Final Result      1. No interval change compared to June 21, 2024.         Electronically signed by: Willian Barroso   Date:    06/23/2024   Time:    09:26            IMPRESSION / RECOMMENDATIONS:  73 y.o. female with PMHx GERD/Martin's, cholecystectomy, AAA repair, hysterectomy, hypothyroid, COPD/tobacco use, lung cancer on chemo w/ pancytopenia, diverticulosis presents for evaluation of n/v/d. Suspect all chemo induced given onset following 1st and 2nd dose w/o previous symptoms.     -Conservative for now  -Anti-emetics / anti-motility agents PRN  -If continued isssues w/ each administration, may need alternative onc agent.     Thank you for this consult.    Sean DIOP Dauterive III  6/25/2024  3:39 PM

## 2024-06-25 NOTE — ASSESSMENT & PLAN NOTE
Patient with acute kidney injury/acute renal failure likely due to pre-renal azotemia due to dehydration WEN is currently stable. Baseline creatinine  1  - Labs reviewed- Renal function/electrolytes with Estimated Creatinine Clearance: 46.8 mL/min (based on SCr of 1 mg/dL). according to latest data. Monitor urine output and serial CMP and adjust therapy as needed. Avoid nephrotoxins and renally dose meds for GFR listed above.  resolved

## 2024-06-25 NOTE — SUBJECTIVE & OBJECTIVE
Interval History:     Planned DC . Still having mild GE. No more CP . Offered stress test but patient declined       Review of Systems  Objective:     Vital Signs (Most Recent):  Temp: 97.9 °F (36.6 °C) (06/25/24 1117)  Pulse: 79 (06/25/24 1336)  Resp: 19 (06/25/24 1336)  BP: 136/82 (06/25/24 1117)  SpO2: 97 % (06/25/24 1336) Vital Signs (24h Range):  Temp:  [97.3 °F (36.3 °C)-97.9 °F (36.6 °C)] 97.9 °F (36.6 °C)  Pulse:  [63-82] 79  Resp:  [15-19] 19  SpO2:  [93 %-97 %] 97 %  BP: (123-136)/(66-82) 136/82     Weight: 77.3 kg (170 lb 6.4 oz)  Body mass index is 31.17 kg/m².    Intake/Output Summary (Last 24 hours) at 6/25/2024 1437  Last data filed at 6/25/2024 0418  Gross per 24 hour   Intake --   Output 800 ml   Net -800 ml         Physical Exam  Vitals and nursing note reviewed.   HENT:      Head: Normocephalic and atraumatic.   Eyes:      Conjunctiva/sclera: Conjunctivae normal.   Neck:      Vascular: No JVD.   Cardiovascular:      Pulses: Normal pulses.      Heart sounds: Normal heart sounds.   Pulmonary:      Effort: Pulmonary effort is normal.      Breath sounds: Normal breath sounds.   Abdominal:      General: Abdomen is flat.      Palpations: Abdomen is soft.   Musculoskeletal:         General: Normal range of motion.   Skin:     General: Skin is warm.   Neurological:      Mental Status: She is alert and oriented to person, place, and time.   Psychiatric:         Mood and Affect: Mood normal.             Significant Labs: All pertinent labs within the past 24 hours have been reviewed.  CBC:   Recent Labs   Lab 06/24/24  0508 06/25/24  0529   WBC 1.68* 1.21*   HGB 11.0* 9.7*   HCT 32.5* 29.2*   * 104*     CMP:   Recent Labs   Lab 06/24/24  0508 06/25/24  0529    140   K 3.1* 3.5    104   CO2 30* 31*    123*   BUN 24* 18   CREATININE 1.2 1.0   CALCIUM 8.7 8.5*   PROT 6.6 5.9*   ALBUMIN 3.6 3.2*   BILITOT 0.6 0.3   ALKPHOS 84 80   AST 18 15   ALT 21 18   ANIONGAP 10 5*     Cardiac  "Markers: No results for input(s): "CKMB", "MYOGLOBIN", "BNP", "TROPISTAT" in the last 48 hours.    Significant Imaging: I have reviewed all pertinent imaging results/findings within the past 24 hours.  "

## 2024-06-25 NOTE — PLAN OF CARE
Met with patient at bedside during provider rounds.     Per patient - when she discharges her  will pick her up via private vehicle.

## 2024-06-25 NOTE — ASSESSMENT & PLAN NOTE
Serial troponins x 3 all negative   ECHO to follow   Stress test when patient more stable   EKG PRN  NTG PRN  ASA  VQ scan negative at perfusion scan   Patient not keen to do stress test

## 2024-06-25 NOTE — PROGRESS NOTES
[facility] Medicine  Progress Note    Patient Name: Joslyn Dubon  MRN: [id number]  Patient Class: IP- Inpatient   Admission Date: 6/23/2024  Length of Stay: 1 days  Attending Physician: Royce Monaco MD  Primary Care Provider: Jasbir Graham MD        Subjective:     Principal Problem:Chest pain        HPI:  73-year-old female presented to ED for eval. Patient reported for the last few days she has had intractable nausea and vomiting, with associated LLQ abd pain and intermittent diarrhea. Denied melena, hematochezia, hematemesis. 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 Patient also reported over the last 2 days she has been having squeezing L sided intermittent chest pain that radiates to her back with shortness of breath. She does have chronic resp failure and COPD but reports shortness of breath is increased from baseline, denies use of home O2. Patient denies previous similar episodes of chest pain, denies hx CAD. She does have hx of AAA s/p repair. Noted to have WEN, hypomagnesemia, hypokalemia. CXR impression without acute abn. Of note, patient has contrast allergy with anaphylactic reaction, also reports severe hypotension with dilaudid and morphine, can tolerate norco. Admit to hospital medicine for further eval.     Overview/Hospital Course:    Patient was admitted to the hospital with  symptoms.  Patient was currently on 2nd round of chemotherapy for lung cancer.  CT of the abdomen was done with no acute except previous vascular stent.  C diff test is negative and all stool tests are negative and patient was started on anti diarrhea and gradually better.  Patient had chest pain episode during the admission and patient was offered stress test but she declined.  She was reviewed by the oncologist and offered G-CSF to increase the white cell but she declined that  as well  Interval History:     Planned DC . Still having mild GE. No more CP . Offered stress test but patient declined       Review of Systems  Objective:     Vital Signs (Most Recent):  Temp: 97.9 °F (36.6 °C) (06/25/24 1117)  Pulse: 79 (06/25/24 1336)  Resp: 19 (06/25/24 1336)  BP: 136/82 (06/25/24 1117)  SpO2: 97 % (06/25/24 1336) Vital Signs (24h Range):  Temp:  [97.3 °F (36.3 °C)-97.9 °F (36.6 °C)] 97.9 °F (36.6 °C)  Pulse:  [63-82] 79  Resp:  [15-19] 19  SpO2:  [93 %-97 %] 97 %  BP: (123-136)/(66-82) 136/82     Weight: 77.3 kg (170 lb 6.4 oz)  Body mass index is 31.17 kg/m².    Intake/Output Summary (Last 24 hours) at 6/25/2024 1437  Last data filed at 6/25/2024 0418  Gross per 24 hour   Intake --   Output 800 ml   Net -800 ml         Physical Exam  Vitals and nursing note reviewed.   HENT:      Head: Normocephalic and atraumatic.   Eyes:      Conjunctiva/sclera: Conjunctivae normal.   Neck:      Vascular: No JVD.   Cardiovascular:      Pulses: Normal pulses.      Heart sounds: Normal heart sounds.   Pulmonary:      Effort: Pulmonary effort is normal.      Breath sounds: Normal breath sounds.   Abdominal:      General: Abdomen is flat.      Palpations: Abdomen is soft.   Musculoskeletal:         General: Normal range of motion.   Skin:     General: Skin is warm.   Neurological:      Mental Status: She is alert and oriented to person, place, and time.   Psychiatric:         Mood and Affect: Mood normal.             Significant Labs: All pertinent labs within the past 24 hours have been reviewed.  CBC:   Recent Labs   Lab 06/24/24  0508 06/25/24  0529   WBC 1.68* 1.21*   HGB 11.0* 9.7*   HCT 32.5* 29.2*   * 104*     CMP:   Recent Labs   Lab 06/24/24  0508 06/25/24  0529    140   K 3.1* 3.5    104   CO2 30* 31*    123*   BUN 24* 18   CREATININE 1.2 1.0   CALCIUM 8.7 8.5*   PROT 6.6 5.9*   ALBUMIN 3.6 3.2*   BILITOT 0.6 0.3   ALKPHOS 84 80   AST 18 15   ALT 21 18   ANIONGAP 10 5*  "    Cardiac Markers: No results for input(s): "CKMB", "MYOGLOBIN", "BNP", "TROPISTAT" in the last 48 hours.    Significant Imaging: I have reviewed all pertinent imaging results/findings within the past 24 hours.    Assessment/Plan:      * Chest pain  Serial troponins x 3 all negative   ECHO to follow   Stress test when patient more stable   EKG PRN  NTG PRN  ASA  VQ scan negative at perfusion scan   Patient not keen to do stress test     Chemotherapy induced neutropenia  Patient declined G CSF      Anemia due to chemotherapy    Current CBC reviewed-   Lab Results   Component Value Date    HGB 9.7 (L) 06/25/2024    HCT 29.2 (L) 06/25/2024     Monitor serial CBC and transfuse if patient becomes hemodynamically unstable, symptomatic or H/H drops below 7/21.    Malignant neoplasm of lung  Aware  Follow dr lott       WEN (acute kidney injury)  Patient with acute kidney injury/acute renal failure likely due to pre-renal azotemia due to dehydration WEN is currently stable. Baseline creatinine  1  - Labs reviewed- Renal function/electrolytes with Estimated Creatinine Clearance: 46.8 mL/min (based on SCr of 1 mg/dL). according to latest data. Monitor urine output and serial CMP and adjust therapy as needed. Avoid nephrotoxins and renally dose meds for GFR listed above.  resolved    Diarrhea  Stool studies to follow   IVF  Lyte replacement per protocol  lomotil      Intractable nausea and vomiting  Secondary to  chemo  Lipase negative   Zofran, phenergan PRN NV  IVF  Lyte replacement per protocol  CT abd/pelvis noted. Non acute     Non-small cell carcinoma of left lung  Last chemo 6/19/24  RT assess and treat  Supplemental O2 PRN  Resume home inhalers  Consult ed hem/onc and appreciate    History of AAA repair  Noted in CT  Non acute      VTE Risk Mitigation (From admission, onward)           Ordered     Reason for No Pharmacological VTE Prophylaxis  Once        Question:  Reasons:  Answer:  Risk of Bleeding    06/23/24 " 1743     IP VTE HIGH RISK PATIENT  Once         06/23/24 1743     Place sequential compression device  Until discontinued         06/23/24 1743                    Discharge Planning   TINO: 6/25/2024     Code Status: Full Code   Is the patient medically ready for discharge?:     Reason for patient still in hospital (select all that apply): Treatment  Discharge Plan A: Home with family                  Royce Monaco MD  Department of Hospital Medicine   [facility]

## 2024-06-25 NOTE — ASSESSMENT & PLAN NOTE
Secondary to  chemo  Lipase negative   Zofran, phenergan PRN NV  IVF  Lyte replacement per protocol  CT abd/pelvis noted. Non acute

## 2024-06-25 NOTE — TELEPHONE ENCOUNTER
----- Message from Cheyenne Rutherford RN sent at 6/25/2024 12:40 PM CDT -----  Regarding: hospital follow up  Hi, patient is being discharged from hospital today and will need a follow up with PCP within 7 days. Please contact patient to schedule.     Thank you  Cheyenne rutherford rn

## 2024-06-26 LAB
ALBUMIN SERPL BCP-MCNC: 3.4 G/DL (ref 3.5–5.2)
ALP SERPL-CCNC: 85 U/L (ref 55–135)
ALT SERPL W/O P-5'-P-CCNC: 18 U/L (ref 10–44)
ANION GAP SERPL CALC-SCNC: 7 MMOL/L (ref 8–16)
AST SERPL-CCNC: 15 U/L (ref 10–40)
BACTERIA STL CULT: NORMAL
BACTERIA STL CULT: NORMAL
BASOPHILS # BLD AUTO: 0 K/UL (ref 0–0.2)
BASOPHILS NFR BLD: 0 % (ref 0–1.9)
BILIRUB SERPL-MCNC: 0.4 MG/DL (ref 0.1–1)
BUN SERPL-MCNC: 12 MG/DL (ref 8–23)
CALCIUM SERPL-MCNC: 8.9 MG/DL (ref 8.7–10.5)
CHLORIDE SERPL-SCNC: 103 MMOL/L (ref 95–110)
CO2 SERPL-SCNC: 30 MMOL/L (ref 23–29)
CREAT SERPL-MCNC: 1 MG/DL (ref 0.5–1.4)
DIFFERENTIAL METHOD BLD: ABNORMAL
EOSINOPHIL # BLD AUTO: 0 K/UL (ref 0–0.5)
EOSINOPHIL NFR BLD: 1.1 % (ref 0–8)
ERYTHROCYTE [DISTWIDTH] IN BLOOD BY AUTOMATED COUNT: 12.9 % (ref 11.5–14.5)
EST. GFR  (NO RACE VARIABLE): 58.8 ML/MIN/1.73 M^2
GLUCOSE SERPL-MCNC: 83 MG/DL (ref 70–110)
HCT VFR BLD AUTO: 31.2 % (ref 37–48.5)
HGB BLD-MCNC: 10.2 G/DL (ref 12–16)
IMM GRANULOCYTES # BLD AUTO: 0 K/UL (ref 0–0.04)
IMM GRANULOCYTES NFR BLD AUTO: 0 % (ref 0–0.5)
LYMPHOCYTES # BLD AUTO: 0.7 K/UL (ref 1–4.8)
LYMPHOCYTES NFR BLD: 78.5 % (ref 18–48)
MAGNESIUM SERPL-MCNC: 1.3 MG/DL (ref 1.6–2.6)
MCH RBC QN AUTO: 30.7 PG (ref 27–31)
MCHC RBC AUTO-ENTMCNC: 32.7 G/DL (ref 32–36)
MCV RBC AUTO: 94 FL (ref 82–98)
MONOCYTES # BLD AUTO: 0 K/UL (ref 0.3–1)
MONOCYTES NFR BLD: 3.2 % (ref 4–15)
NEUTROPHILS # BLD AUTO: 0.2 K/UL (ref 1.8–7.7)
NEUTROPHILS NFR BLD: 17.2 % (ref 38–73)
NRBC BLD-RTO: 0 /100 WBC
OHS QRS DURATION: 70 MS
OHS QTC CALCULATION: 469 MS
PLATELET # BLD AUTO: 90 K/UL (ref 150–450)
PLATELET BLD QL SMEAR: ABNORMAL
PMV BLD AUTO: 8.9 FL (ref 9.2–12.9)
POTASSIUM SERPL-SCNC: 3.7 MMOL/L (ref 3.5–5.1)
PROT SERPL-MCNC: 6.2 G/DL (ref 6–8.4)
RBC # BLD AUTO: 3.32 M/UL (ref 4–5.4)
SODIUM SERPL-SCNC: 140 MMOL/L (ref 136–145)
WBC # BLD AUTO: 0.93 K/UL (ref 3.9–12.7)

## 2024-06-26 PROCEDURE — 80053 COMPREHEN METABOLIC PANEL: CPT

## 2024-06-26 PROCEDURE — 99900031 HC PATIENT EDUCATION (STAT)

## 2024-06-26 PROCEDURE — 25000242 PHARM REV CODE 250 ALT 637 W/ HCPCS: Performed by: INTERNAL MEDICINE

## 2024-06-26 PROCEDURE — 94640 AIRWAY INHALATION TREATMENT: CPT

## 2024-06-26 PROCEDURE — 36415 COLL VENOUS BLD VENIPUNCTURE: CPT | Performed by: INTERNAL MEDICINE

## 2024-06-26 PROCEDURE — 99233 SBSQ HOSP IP/OBS HIGH 50: CPT | Mod: ,,, | Performed by: INTERNAL MEDICINE

## 2024-06-26 PROCEDURE — 25000003 PHARM REV CODE 250: Performed by: INTERNAL MEDICINE

## 2024-06-26 PROCEDURE — 85025 COMPLETE CBC W/AUTO DIFF WBC: CPT

## 2024-06-26 PROCEDURE — 63600175 PHARM REV CODE 636 W HCPCS: Performed by: INTERNAL MEDICINE

## 2024-06-26 PROCEDURE — 83735 ASSAY OF MAGNESIUM: CPT

## 2024-06-26 PROCEDURE — 87040 BLOOD CULTURE FOR BACTERIA: CPT | Performed by: INTERNAL MEDICINE

## 2024-06-26 PROCEDURE — 25000003 PHARM REV CODE 250

## 2024-06-26 PROCEDURE — 36415 COLL VENOUS BLD VENIPUNCTURE: CPT

## 2024-06-26 PROCEDURE — 63600175 PHARM REV CODE 636 W HCPCS: Mod: JZ,JB,JG | Performed by: INTERNAL MEDICINE

## 2024-06-26 PROCEDURE — 94761 N-INVAS EAR/PLS OXIMETRY MLT: CPT

## 2024-06-26 PROCEDURE — 21400001 HC TELEMETRY ROOM

## 2024-06-26 RX ORDER — ALPRAZOLAM 0.5 MG/1
0.5 TABLET ORAL ONCE
Status: COMPLETED | OUTPATIENT
Start: 2024-06-26 | End: 2024-06-26

## 2024-06-26 RX ORDER — IBUPROFEN 400 MG/1
400 TABLET ORAL EVERY 6 HOURS PRN
Status: DISCONTINUED | OUTPATIENT
Start: 2024-06-26 | End: 2024-06-27

## 2024-06-26 RX ORDER — CETIRIZINE HYDROCHLORIDE 10 MG/1
10 TABLET ORAL DAILY
Status: DISCONTINUED | OUTPATIENT
Start: 2024-06-26 | End: 2024-07-01 | Stop reason: HOSPADM

## 2024-06-26 RX ADMIN — IPRATROPIUM BROMIDE 0.5 MG: 0.5 SOLUTION RESPIRATORY (INHALATION) at 01:06

## 2024-06-26 RX ADMIN — ACETAMINOPHEN 650 MG: 325 TABLET ORAL at 08:06

## 2024-06-26 RX ADMIN — ARFORMOTEROL TARTRATE 15 MCG: 15 SOLUTION RESPIRATORY (INHALATION) at 07:06

## 2024-06-26 RX ADMIN — IBUPROFEN 400 MG: 400 TABLET ORAL at 05:06

## 2024-06-26 RX ADMIN — ARFORMOTEROL TARTRATE 15 MCG: 15 SOLUTION RESPIRATORY (INHALATION) at 08:06

## 2024-06-26 RX ADMIN — ASPIRIN 81 MG: 81 TABLET, COATED ORAL at 08:06

## 2024-06-26 RX ADMIN — CEFEPIME HYDROCHLORIDE 2 G: 2 INJECTION, POWDER, FOR SOLUTION INTRAVENOUS at 06:06

## 2024-06-26 RX ADMIN — SODIUM CHLORIDE, POTASSIUM CHLORIDE, SODIUM LACTATE AND CALCIUM CHLORIDE: 600; 310; 30; 20 INJECTION, SOLUTION INTRAVENOUS at 03:06

## 2024-06-26 RX ADMIN — ALPRAZOLAM 0.5 MG: 0.5 TABLET ORAL at 11:06

## 2024-06-26 RX ADMIN — MUPIROCIN 1 G: 20 OINTMENT TOPICAL at 08:06

## 2024-06-26 RX ADMIN — METOPROLOL TARTRATE 25 MG: 25 TABLET, FILM COATED ORAL at 09:06

## 2024-06-26 RX ADMIN — IPRATROPIUM BROMIDE 0.5 MG: 0.5 SOLUTION RESPIRATORY (INHALATION) at 07:06

## 2024-06-26 RX ADMIN — ALPRAZOLAM 0.25 MG: 0.25 TABLET ORAL at 09:06

## 2024-06-26 RX ADMIN — HYDROCODONE BITARTRATE AND ACETAMINOPHEN 1 TABLET: 5; 325 TABLET ORAL at 04:06

## 2024-06-26 RX ADMIN — VANCOMYCIN HYDROCHLORIDE 1500 MG: 1.5 INJECTION, POWDER, LYOPHILIZED, FOR SOLUTION INTRAVENOUS at 08:06

## 2024-06-26 RX ADMIN — FILGRASTIM-SNDZ 300 MCG: 300 INJECTION, SOLUTION INTRAVENOUS; SUBCUTANEOUS at 04:06

## 2024-06-26 RX ADMIN — METOPROLOL TARTRATE 25 MG: 25 TABLET, FILM COATED ORAL at 08:06

## 2024-06-26 RX ADMIN — MUPIROCIN 1 G: 20 OINTMENT TOPICAL at 09:06

## 2024-06-26 RX ADMIN — CETIRIZINE HYDROCHLORIDE 10 MG: 10 TABLET, FILM COATED ORAL at 04:06

## 2024-06-26 RX ADMIN — FAMOTIDINE 20 MG: 10 INJECTION INTRAVENOUS at 08:06

## 2024-06-26 RX ADMIN — IPRATROPIUM BROMIDE 0.5 MG: 0.5 SOLUTION RESPIRATORY (INHALATION) at 08:06

## 2024-06-26 RX ADMIN — LEVOTHYROXINE SODIUM 112 MCG: 0.11 TABLET ORAL at 05:06

## 2024-06-26 NOTE — PROGRESS NOTES
"Pharmacokinetic Assessment: IV Vancomycin    Indication & Goal: Neutropenic fever - Trough 10 - 15    NAME:   Joslyn Dubon                AGE:  73 y.o.  ..............................                .............................              Visit ID:   [id number]                  SEX:    female  ...........................  Med Rec:  [id number]                   Ht: 5' 2" (1.575 m)    Patient Applicable Demographics:  Actual Body Weight:77.3 kg (170 lb 6.4 oz)  Estimated Creatinine Clearance: 46.8 mL/min (based on SCr of 1 mg/dL).    Recent Labs   Lab 06/26/24  0452   BUN 12   CREATININE 1.0   WBC 0.93*      Estimated Creatinine Clearance: 46.8 mL/min (based on SCr of 1 mg/dL).       Drug levels (last 2 results):  No results for input(s): "VANCOMYCINRA", "VANCOMYCINTR", "VANCOTROUGH" in the last 2160 hours.      Vancomycin Administrations:  vancomycin given in the last 96 hours        No antibiotic orders with administrations found.                    Assessment/Plan:  Initiate intravenous vancomycin with a dose of 1500mg x1 then 1250 mg administered every 24 hours.  Desired empiric serum trough concentration is: 10-15 mcg/mL  Vancomycin trough has been scheduled for 6/29 at 18:30, which is 30 minutes prior to 4th dose    Thank you,  Eduardo Leal PharmD 6/26/2024 6:19 PM     "

## 2024-06-26 NOTE — PROGRESS NOTES
[facility] Medicine  Progress Note    Patient Name: Joslyn Dubon  MRN: [id number]  Patient Class: IP- Inpatient   Admission Date: 6/23/2024  Length of Stay: 2 days  Attending Physician: Royce Monaco MD  Primary Care Provider: Jasbir Graham MD        Subjective:     Principal Problem:Chest pain        HPI:  73-year-old female presented to ED for eval. Patient reported for the last few days she has had intractable nausea and vomiting, with associated LLQ abd pain and intermittent diarrhea. Denied melena, hematochezia, hematemesis. 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 Patient also reported over the last 2 days she has been having squeezing L sided intermittent chest pain that radiates to her back with shortness of breath. She does have chronic resp failure and COPD but reports shortness of breath is increased from baseline, denies use of home O2. Patient denies previous similar episodes of chest pain, denies hx CAD. She does have hx of AAA s/p repair. Noted to have WEN, hypomagnesemia, hypokalemia. CXR impression without acute abn. Of note, patient has contrast allergy with anaphylactic reaction, also reports severe hypotension with dilaudid and morphine, can tolerate norco. Admit to hospital medicine for further eval.     Overview/Hospital Course:  Patient was admitted to the hospital with  symptoms.  Patient was currently on 2nd round of chemotherapy for lung cancer.  CT of the abdomen was done with no acute except previous vascular stent.  C diff test is negative and all stool tests are negative and patient was started on anti diarrhea and gradually better.  Patient had chest pain episode during the admission and patient was offered stress test but she declined.  She was reviewed by the oncologist and offered G-CSF to increase the white cell but she declined that as  well    Interval History:     She is doing well in terms of gastroenteritis.  But WBC continued to going down as well as platelet and later decided to stay (initially she wanted to go home vigorously)  Starting Neupogen    Review of Systems  Objective:     Vital Signs (Most Recent):  Temp: 98.2 °F (36.8 °C) (06/26/24 1526)  Pulse: 65 (06/26/24 1526)  Resp: 18 (06/26/24 1526)  BP: 134/84 (06/26/24 1526)  SpO2: 95 % (06/26/24 1526) Vital Signs (24h Range):  Temp:  [97.7 °F (36.5 °C)-98.5 °F (36.9 °C)] 98.2 °F (36.8 °C)  Pulse:  [65-91] 65  Resp:  [16-21] 18  SpO2:  [93 %-97 %] 95 %  BP: (102-158)/(71-85) 134/84     Weight: 77.3 kg (170 lb 6.4 oz)  Body mass index is 31.17 kg/m².    Intake/Output Summary (Last 24 hours) at 6/26/2024 1605  Last data filed at 6/26/2024 1230  Gross per 24 hour   Intake 600 ml   Output 1700 ml   Net -1100 ml         Physical Exam  Vitals and nursing note reviewed.   HENT:      Head: Normocephalic and atraumatic.   Eyes:      Conjunctiva/sclera: Conjunctivae normal.   Neck:      Vascular: No JVD.   Cardiovascular:      Heart sounds: Normal heart sounds.   Pulmonary:      Effort: Pulmonary effort is normal.      Breath sounds: Normal breath sounds.   Abdominal:      Palpations: Abdomen is soft.   Musculoskeletal:         General: Normal range of motion.   Skin:     General: Skin is warm.   Neurological:      Mental Status: She is alert and oriented to person, place, and time.   Psychiatric:         Mood and Affect: Mood normal.             Significant Labs: All pertinent labs within the past 24 hours have been reviewed.  CBC:   Recent Labs   Lab 06/25/24  0529 06/26/24  0452   WBC 1.21* 0.93*   HGB 9.7* 10.2*   HCT 29.2* 31.2*   * 90*     CMP:   Recent Labs   Lab 06/25/24  0529 06/26/24  0452    140   K 3.5 3.7    103   CO2 31* 30*   * 83   BUN 18 12   CREATININE 1.0 1.0   CALCIUM 8.5* 8.9   PROT 5.9* 6.2   ALBUMIN 3.2* 3.4*   BILITOT 0.3 0.4   ALKPHOS 80 85   AST  "15 15   ALT 18 18   ANIONGAP 5* 7*     Cardiac Markers: No results for input(s): "CKMB", "MYOGLOBIN", "BNP", "TROPISTAT" in the last 48 hours.    Significant Imaging: I have reviewed all pertinent imaging results/findings within the past 24 hours.    Assessment/Plan:      * Chest pain  Serial troponins x 3 all negative   ECHO to follow   Stress test when patient more stable   EKG PRN  NTG PRN  ASA  VQ scan negative at perfusion scan   Patient not keen to do stress test     Chemotherapy induced neutropenia  stasrting G CSF      Anemia due to chemotherapy    Current CBC reviewed-   Lab Results   Component Value Date    HGB 10.2 (L) 06/26/2024    HCT 31.2 (L) 06/26/2024     Monitor serial CBC and transfuse if patient becomes hemodynamically unstable, symptomatic or H/H drops below 7/21.    Malignant neoplasm of lung  Aware  Follow dr lott       WEN (acute kidney injury)  Patient with acute kidney injury/acute renal failure likely due to pre-renal azotemia due to dehydration WEN is currently stable. Baseline creatinine  1  - Labs reviewed- Renal function/electrolytes with Estimated Creatinine Clearance: 46.8 mL/min (based on SCr of 1 mg/dL). according to latest data. Monitor urine output and serial CMP and adjust therapy as needed. Avoid nephrotoxins and renally dose meds for GFR listed above.  resolved    Diarrhea  Stool studies to follow   IVF  Lyte replacement per protocol  Lomotil  and now improved       Intractable nausea and vomiting  Secondary to  chemo  Lipase negative   Zofran, phenergan PRN NV  IVF  Lyte replacement per protocol  CT abd/pelvis noted. Non acute     Non-small cell carcinoma of left lung  Last chemo 6/19/24  RT assess and treat  Supplemental O2 PRN  Resume home inhalers  Starting neupogen    History of AAA repair  Noted in CT  Non acute      VTE Risk Mitigation (From admission, onward)           Ordered     Reason for No Pharmacological VTE Prophylaxis  Once        Question:  Reasons:  " Answer:  Risk of Bleeding    06/23/24 1743     IP VTE HIGH RISK PATIENT  Once         06/23/24 1743     Place sequential compression device  Until discontinued         06/23/24 1743                    Discharge Planning   TINO: 6/26/2024     Code Status: Full Code   Is the patient medically ready for discharge?:     Reason for patient still in hospital (select all that apply): Treatment  Discharge Plan A: Home with family                  Royce Monaco MD  Department of Hospital Medicine   [facility]

## 2024-06-26 NOTE — HOSPITAL COURSE
Patient was admitted to the hospital with  symptoms.  Patient was currently on 2nd round of chemotherapy for lung cancer. CT of the abdomen was done with no acute except previous vascular stent.  C diff test is negative and all stool tests are negative and patient was started on anti diarrhea and gradually better. Patient had chest pain episode during the admission and patient was offered stress test but she declined.  She was reviewed by the oncologist and offered G-CSF to increase the white cell but she declined that as well. But later patient got fever and WBC and platelet go down and ANC 0 and patient agreed to get neupogen and septic work up was done .  All cultures are negative and later fever subside . IV antibiotics started and gradually weaned and later ID recommend to DC with augmentin. WBC increased. Patient is afebrile. OK from oncology to DC.

## 2024-06-26 NOTE — RESPIRATORY THERAPY
06/25/24 2034   Patient Assessment/Suction   Level of Consciousness (AVPU) alert   Respiratory Effort Normal;Unlabored   Expansion/Accessory Muscles/Retractions no retractions;no use of accessory muscles   All Lung Fields Breath Sounds diminished   Rhythm/Pattern, Respiratory pattern regular;unlabored   Cough Frequency no cough   PRE-TX-O2   Device (Oxygen Therapy) room air   SpO2 96 %   Pulse Oximetry Type Intermittent   $ Pulse Oximetry - Multiple Charge Pulse Oximetry - Multiple   Pulse 81   Resp 16   Aerosol Therapy   $ Aerosol Therapy Charges Aerosol Treatment   Daily Review of Necessity (SVN) completed   Respiratory Treatment Status (SVN) given   Treatment Route (SVN) mask;oxygen   Patient Position HOB elevated   Post Treatment Assessment (SVN) breath sounds unchanged   Signs of Intolerance (SVN) none   Education   $ Education Bronchodilator;15 min

## 2024-06-26 NOTE — PROGRESS NOTES
"[facility]  Hematology/Oncology  Inpatient Progress Note          Patient Name: Joslyn Dubon  MRN: [id number]  Admission Date: 6/23/2024  Hospital Length of Stay: 2 days  Code Status: Full Code   Attending Provider: Royce Monaco MD  Consulting Provider: Neo Mariano MD  Primary Care Physician: Jasbir Graham MD  Principal Problem:Chest pain      Coverage for Dr Camden Iyer       Subjective:       Patient ID: Joslyn Dubon is a 73 y.o. female.    Chief Complaint: Vomiting (X FEW DAYS) and Chest Pain        History Present Illness:    Patient remains in the hospital; her GI symptoms have improved/resolved; she was able to eat all day today without and N/V or diarrhea; WBC is still low; I discussed with her and her  about the risk of infection and she is now willing to start GSCF; I previously communicated with Dr Monaco; I discussed with her floor nurse in person      Review of Systems:  GEN: general malaise, weakness, fatigue  HEENT: normal with no HA's, sore throat, stiff neck, changes in vision  CV: normal with no CP, SOB, PND, RAPHAEL or orthopnea  PULM: normal with no SOB, cough, hemoptysis, sputum or pleuritic pain  GI:   nausea/vomiting, and diarrhea have resolved  : normal with no hematuria, dysuria  BREAST: normal with no mass, discharge, pain  SKIN: normal with no rash, erythema, bruising, or swelling      Objective:     Vitals:  Blood pressure 102/85, pulse 80, temperature 98.2 °F (36.8 °C), resp. rate 18, height 5' 2" (1.575 m), weight 77.3 kg (170 lb 6.4 oz), SpO2 96%.    Physical Exam:  GEN: no apparent distress, comfortable; AAOx3; overweight  HEAD: atraumatic and normocephalic  EYES: no pallor, no icterus, PERRLA  ENT: OMM, no pharyngeal erythema, external ears WNL; no nasal discharge; no thrush  NECK: no masses, thyroid normal, trachea midline, no LAD/LN's, supple  CV: RRR with no murmur; normal pulse; normal S1 and S2; no pedal edema; Rght " portacath  CHEST: Normal respiratory effort; CTAB; normal breath sounds; no wheeze or crackles  ABDOM: mild epigastric tender; nondistended; soft; normal bowel sounds; no rebound/guarding  MUSC/Skeletal: ROM normal; no crepitus; joints normal; no deformities or arthropathy  EXTREM: no clubbing, cyanosis, inflammation or swelling  SKIN: no rashes, lesions, ulcers, petechiae or subcutaneous nodules  : no payan  NEURO: grossly intact; motor/sensory WNL; AAOx3; no tremors; generalized weakness  PSYCH: normal mood, affect and behavior  LYMPH: normal cervical, supraclavicular, axillary and groin LN's            Lab Review:   Lab Results   Component Value Date    WBC 0.93 (LL) 06/26/2024    HGB 10.2 (L) 06/26/2024    HCT 31.2 (L) 06/26/2024    MCV 94 06/26/2024    PLT 90 (L) 06/26/2024         CMP  Sodium   Date Value Ref Range Status   06/26/2024 140 136 - 145 mmol/L Final     Potassium   Date Value Ref Range Status   06/26/2024 3.7 3.5 - 5.1 mmol/L Final     Chloride   Date Value Ref Range Status   06/26/2024 103 95 - 110 mmol/L Final     CO2   Date Value Ref Range Status   06/26/2024 30 (H) 23 - 29 mmol/L Final     Glucose   Date Value Ref Range Status   06/26/2024 83 70 - 110 mg/dL Final     BUN   Date Value Ref Range Status   06/26/2024 12 8 - 23 mg/dL Final     Creatinine   Date Value Ref Range Status   06/26/2024 1.0 0.5 - 1.4 mg/dL Final   03/19/2013 0.8 0.5 - 1.4 mg/dL Final     Calcium   Date Value Ref Range Status   06/26/2024 8.9 8.7 - 10.5 mg/dL Final   03/19/2013 9.9 8.7 - 10.5 mg/dL Final     Total Protein   Date Value Ref Range Status   06/26/2024 6.2 6.0 - 8.4 g/dL Final     Albumin   Date Value Ref Range Status   06/26/2024 3.4 (L) 3.5 - 5.2 g/dL Final     Total Bilirubin   Date Value Ref Range Status   06/26/2024 0.4 0.1 - 1.0 mg/dL Final     Comment:     For infants and newborns, interpretation of results should be based  on gestational age, weight and in agreement with  clinical  observations.    Premature Infant recommended reference ranges:  Up to 24 hours.............<8.0 mg/dL  Up to 48 hours............<12.0 mg/dL  3-5 days..................<15.0 mg/dL  6-29 days.................<15.0 mg/dL       Alkaline Phosphatase   Date Value Ref Range Status   06/26/2024 85 55 - 135 U/L Final   08/31/2012 107 23 - 119 UNIT/L Final     AST   Date Value Ref Range Status   06/26/2024 15 10 - 40 U/L Final   08/31/2012 21 10 - 30 UNIT/L Final     ALT   Date Value Ref Range Status   06/26/2024 18 10 - 44 U/L Final     Anion Gap   Date Value Ref Range Status   06/26/2024 7 (L) 8 - 16 mmol/L Final   03/19/2013 12 5 - 15 meq/L Final     eGFR   Date Value Ref Range Status   06/26/2024 58.8 (A) >60 mL/min/1.73 m^2 Final   06/14/2024 52 (L) > OR = 60 mL/min/1.73m2 Final              Radiology Diagnostic Studies:     NM Lung Scan Perfusion Particulate [[id number]] Resulted: 06/23/24 2017   Order Status: Completed Updated: 06/23/24 2020   Narrative:     EXAMINATION:  NM LUNG SCAN PERFUSION    CLINICAL HISTORY:  Chest pain, nonspecific;Pulmonary embolism (PE) suspected, high prob;Chest pain, nonspecific; Pulmonary embolism (PE) suspected, high prob;    TECHNIQUE:  IV administration of 5.2 mCi of Tc-99m-MAA, multiple images of the thorax were obtained in various projections.    COMPARISON:  Chest x-ray dated 06/23/2024.    FINDINGS:  Only perfusion images are provided for review.  No perfusion defect identified.  Evaluation for mismatch is precluded in the absence of ventilation portion of the examination.   Impression:                      X-Ray Chest AP Portable [[id number]] Resulted: 06/23/24 0926   Order Status: Completed Updated: 06/23/24 0929   Narrative:     EXAMINATION:  XR CHEST AP PORTABLE    CLINICAL HISTORY:  Chest Pain;    COMPARISON:  June 21, 2024    FINDINGS:  AP view demonstrates normal heart size.  The thoracic aorta is elongated.  Left upper lobe noncalcified pulmonary nodule is  unchanged.  No infiltrates or effusions are identified.    Right-sided Port-A-Cath is unchanged in position with tip at the superior vena cava.   Impression:       1. No interval change compared to June 21, 2024.          Assessment:     IMPRESSION:    (1) 73 y.o. female known to the oncology service of my associate Dr Camden Iyer with diagnosis of lung cancer with neuroendocrine features who has been on platinum and etopside chemotherapy. She presented to the ED with CP, SOB and N/V.      6/24/2024  - last chemo was on 6/19  - wbc at 1,68, hgb 11.0, and plats 142,000  - discussed GCSF for her leucopenia but she wants to hold off for now  - she does not seem to be tolerating the current regimen  - consider GI consult if her GI symptoms do not improve  - monitor counts for now    6/25/2024:  - wbc 1.21, hgb 9.7 and plats 104,000  - recurrent diarrhea and n/v again today  - GI consulted for evaluation    6/26/2024:  - wbc 0.93, hgb 10.2, plats 90,000  - her GI symptoms have improved/resolved; she was able to eat all day today without and N/V or diarrhea;   - WBC is still low; I discussed with her and her  about the risk of infection and she is now willing to start GSCF;  - I previously communicated with Dr Monaco; I discussed with her floor nurse in person     (2) HTN     (3) COPD     (4) GERD; Martin's esophagus; hx/of colitis     (5) Thyroid disease     (6) Anxiety     (7) Former smoker        1. Malignant neoplasm of lung, unspecified laterality, unspecified part of lung    2. Chest pain    3. Anemia due to chemotherapy    4. Chemotherapy induced neutropenia    5. Non-small cell carcinoma of left lung [C34.92]    6. Gastroesophageal reflux disease without esophagitis           Plan:     PLAN:          discussed GCSF for her leucopenia and the high risk for infection/sepsis - she is now agreeable to starting GCSF - will give her some Claritin and have motrin available prn  Monitor labs daily  Conservative  management for her nausea  Appreciate GI evaluation  IVF's, electrolyte management as per primary team  Will follow with you while Dr Willis is on vacation                 Neo Mariano MD  Hematology/Oncology  [facility]

## 2024-06-26 NOTE — PLAN OF CARE
Problem: Fever with Neutropenia  Goal: Absence of Infection  6/26/2024 1620 by Angel Bowser RN  Outcome: Progressing  6/26/2024 1616 by Angel Bowser RN  Outcome: Progressing  Intervention: Prevent Infection and Maximize Resistance  Flowsheets (Taken 6/26/2024 1616)  Infection Prevention:   hand hygiene promoted   equipment surfaces disinfected   personal protective equipment utilized  Oral Care: oral rinse provided     Problem: Oncology Care  Goal: Optimal Pain Control and Function  Outcome: Progressing  Intervention: Prevent or Manage Pain  Flowsheets (Taken 6/26/2024 1619)  Sensory Stimulation Regulation:   care clustered   lighting decreased   quiet environment promoted  Bowel Elimination Promotion:   adequate fluid intake promoted   diet adjusted  Medication Review/Management: medications reviewed     Problem: Infection  Goal: Absence of Infection Signs and Symptoms  Outcome: Progressing  Intervention: Prevent or Manage Infection  Flowsheets (Taken 6/26/2024 1620)  Infection Management: aseptic technique maintained  Isolation Precautions: (neutropenic)   precautions maintained   other (see comments)

## 2024-06-26 NOTE — SUBJECTIVE & OBJECTIVE
Interval History:     She is doing well in terms of gastroenteritis.  But WBC continued to going down as well as platelet and later decided to stay (initially she wanted to go home vigorously)  Starting Neupogen    Review of Systems  Objective:     Vital Signs (Most Recent):  Temp: 98.2 °F (36.8 °C) (06/26/24 1526)  Pulse: 65 (06/26/24 1526)  Resp: 18 (06/26/24 1526)  BP: 134/84 (06/26/24 1526)  SpO2: 95 % (06/26/24 1526) Vital Signs (24h Range):  Temp:  [97.7 °F (36.5 °C)-98.5 °F (36.9 °C)] 98.2 °F (36.8 °C)  Pulse:  [65-91] 65  Resp:  [16-21] 18  SpO2:  [93 %-97 %] 95 %  BP: (102-158)/(71-85) 134/84     Weight: 77.3 kg (170 lb 6.4 oz)  Body mass index is 31.17 kg/m².    Intake/Output Summary (Last 24 hours) at 6/26/2024 1605  Last data filed at 6/26/2024 1230  Gross per 24 hour   Intake 600 ml   Output 1700 ml   Net -1100 ml         Physical Exam  Vitals and nursing note reviewed.   HENT:      Head: Normocephalic and atraumatic.   Eyes:      Conjunctiva/sclera: Conjunctivae normal.   Neck:      Vascular: No JVD.   Cardiovascular:      Heart sounds: Normal heart sounds.   Pulmonary:      Effort: Pulmonary effort is normal.      Breath sounds: Normal breath sounds.   Abdominal:      Palpations: Abdomen is soft.   Musculoskeletal:         General: Normal range of motion.   Skin:     General: Skin is warm.   Neurological:      Mental Status: She is alert and oriented to person, place, and time.   Psychiatric:         Mood and Affect: Mood normal.             Significant Labs: All pertinent labs within the past 24 hours have been reviewed.  CBC:   Recent Labs   Lab 06/25/24  0529 06/26/24  0452   WBC 1.21* 0.93*   HGB 9.7* 10.2*   HCT 29.2* 31.2*   * 90*     CMP:   Recent Labs   Lab 06/25/24  0529 06/26/24  0452    140   K 3.5 3.7    103   CO2 31* 30*   * 83   BUN 18 12   CREATININE 1.0 1.0   CALCIUM 8.5* 8.9   PROT 5.9* 6.2   ALBUMIN 3.2* 3.4*   BILITOT 0.3 0.4   ALKPHOS 80 85   AST 15 15  "  ALT 18 18   ANIONGAP 5* 7*     Cardiac Markers: No results for input(s): "CKMB", "MYOGLOBIN", "BNP", "TROPISTAT" in the last 48 hours.    Significant Imaging: I have reviewed all pertinent imaging results/findings within the past 24 hours.  "

## 2024-06-26 NOTE — CARE UPDATE
06/26/24 0709   Patient Assessment/Suction   Level of Consciousness (AVPU) alert   Respiratory Effort Normal;Unlabored   Expansion/Accessory Muscles/Retractions expansion symmetric;no retractions;no use of accessory muscles   All Lung Fields Breath Sounds Anterior:;Lateral:;diminished;clear   Rhythm/Pattern, Respiratory pattern regular;unlabored   PRE-TX-O2   Device (Oxygen Therapy) room air   SpO2 (!) 93 %   $ Pulse Oximetry - Multiple Charge Pulse Oximetry - Multiple   Pulse 90   Resp 18   Aerosol Therapy   $ Aerosol Therapy Charges Aerosol Treatment   Daily Review of Necessity (SVN) completed   Respiratory Treatment Status (SVN) given   Treatment Route (SVN) mask;oxygen   Post Treatment Assessment (SVN) breath sounds improved   Signs of Intolerance (SVN) none   Education   $ Education Bronchodilator

## 2024-06-26 NOTE — PROGRESS NOTES
Renal Dosing of Medication    An order has been entered by a pharmacist to adjust the dose and/or frequency of the medication listed below based on the patient's renal function.    Objective:  73 y.o., female, Actual Body Weight = 77.3 kg (170 lb 6.4 oz)    Current Regimen:  Cefepime 2g q8h    Assessment:  Estimated Creatinine Clearance: 46.8 mL/min (based on SCr of 1 mg/dL).  Renal function is poor.    Plan:  Orders have been entered to adjust the dose and/or frequency based on the patient's weight and renal function:  Cefepime 2g IV every 12 hours.    Thank you for allowing us to participate in this patient's care.     Eduardo Leal 6/26/2024 6:13 PM  Department of Pharmacy  Ext [phone]

## 2024-06-26 NOTE — ASSESSMENT & PLAN NOTE
Current CBC reviewed-   Lab Results   Component Value Date    HGB 10.2 (L) 06/26/2024    HCT 31.2 (L) 06/26/2024     Monitor serial CBC and transfuse if patient becomes hemodynamically unstable, symptomatic or H/H drops below 7/21.

## 2024-06-26 NOTE — ASSESSMENT & PLAN NOTE
Last chemo 6/19/24  RT assess and treat  Supplemental O2 PRN  Resume home inhalers  Starting neupogen

## 2024-06-27 PROBLEM — R50.81 FEBRILE NEUTROPENIA: Status: ACTIVE | Noted: 2024-06-27

## 2024-06-27 PROBLEM — D70.9 FEBRILE NEUTROPENIA: Status: ACTIVE | Noted: 2024-06-27

## 2024-06-27 LAB
ALBUMIN SERPL BCP-MCNC: 3.4 G/DL (ref 3.5–5.2)
ALP SERPL-CCNC: 75 U/L (ref 55–135)
ALT SERPL W/O P-5'-P-CCNC: 13 U/L (ref 10–44)
ANION GAP SERPL CALC-SCNC: 8 MMOL/L (ref 8–16)
AST SERPL-CCNC: 12 U/L (ref 10–40)
BASOPHILS # BLD AUTO: 0 K/UL (ref 0–0.2)
BASOPHILS # BLD AUTO: 0 K/UL (ref 0–0.2)
BASOPHILS NFR BLD: 0 % (ref 0–1.9)
BASOPHILS NFR BLD: 0 % (ref 0–1.9)
BILIRUB SERPL-MCNC: 0.5 MG/DL (ref 0.1–1)
BILIRUB UR QL STRIP: NEGATIVE
BILIRUB UR QL STRIP: NEGATIVE
BUN SERPL-MCNC: 13 MG/DL (ref 8–23)
CALCIUM SERPL-MCNC: 8.7 MG/DL (ref 8.7–10.5)
CHLORIDE SERPL-SCNC: 99 MMOL/L (ref 95–110)
CLARITY UR: CLEAR
CLARITY UR: CLEAR
CO2 SERPL-SCNC: 27 MMOL/L (ref 23–29)
COLOR UR: COLORLESS
COLOR UR: COLORLESS
CREAT SERPL-MCNC: 1 MG/DL (ref 0.5–1.4)
DIFFERENTIAL METHOD BLD: ABNORMAL
DIFFERENTIAL METHOD BLD: ABNORMAL
EOSINOPHIL # BLD AUTO: 0 K/UL (ref 0–0.5)
EOSINOPHIL # BLD AUTO: 0 K/UL (ref 0–0.5)
EOSINOPHIL NFR BLD: 0 % (ref 0–8)
EOSINOPHIL NFR BLD: 0 % (ref 0–8)
ERYTHROCYTE [DISTWIDTH] IN BLOOD BY AUTOMATED COUNT: 12.8 % (ref 11.5–14.5)
ERYTHROCYTE [DISTWIDTH] IN BLOOD BY AUTOMATED COUNT: 12.8 % (ref 11.5–14.5)
EST. GFR  (NO RACE VARIABLE): 58.8 ML/MIN/1.73 M^2
GLUCOSE SERPL-MCNC: 103 MG/DL (ref 70–110)
GLUCOSE UR QL STRIP: NEGATIVE
GLUCOSE UR QL STRIP: NEGATIVE
HCT VFR BLD AUTO: 26.2 % (ref 37–48.5)
HCT VFR BLD AUTO: 26.2 % (ref 37–48.5)
HGB BLD-MCNC: 8.9 G/DL (ref 12–16)
HGB BLD-MCNC: 8.9 G/DL (ref 12–16)
HGB UR QL STRIP: NEGATIVE
HGB UR QL STRIP: NEGATIVE
IMM GRANULOCYTES # BLD AUTO: 0 K/UL (ref 0–0.04)
IMM GRANULOCYTES # BLD AUTO: 0 K/UL (ref 0–0.04)
IMM GRANULOCYTES NFR BLD AUTO: 0 % (ref 0–0.5)
IMM GRANULOCYTES NFR BLD AUTO: 0 % (ref 0–0.5)
KETONES UR QL STRIP: NEGATIVE
KETONES UR QL STRIP: NEGATIVE
LEUKOCYTE ESTERASE UR QL STRIP: NEGATIVE
LEUKOCYTE ESTERASE UR QL STRIP: NEGATIVE
LIPASE SERPL-CCNC: 26 U/L (ref 4–60)
LYMPHOCYTES # BLD AUTO: 0.5 K/UL (ref 1–4.8)
LYMPHOCYTES # BLD AUTO: 0.5 K/UL (ref 1–4.8)
LYMPHOCYTES NFR BLD: 85.5 % (ref 18–48)
LYMPHOCYTES NFR BLD: 85.5 % (ref 18–48)
MAGNESIUM SERPL-MCNC: 1 MG/DL (ref 1.6–2.6)
MCH RBC QN AUTO: 31.3 PG (ref 27–31)
MCH RBC QN AUTO: 31.3 PG (ref 27–31)
MCHC RBC AUTO-ENTMCNC: 34 G/DL (ref 32–36)
MCHC RBC AUTO-ENTMCNC: 34 G/DL (ref 32–36)
MCV RBC AUTO: 92 FL (ref 82–98)
MCV RBC AUTO: 92 FL (ref 82–98)
MONOCYTES # BLD AUTO: 0 K/UL (ref 0.3–1)
MONOCYTES # BLD AUTO: 0 K/UL (ref 0.3–1)
MONOCYTES NFR BLD: 5.5 % (ref 4–15)
MONOCYTES NFR BLD: 5.5 % (ref 4–15)
NEUTROPHILS # BLD AUTO: 0.1 K/UL (ref 1.8–7.7)
NEUTROPHILS # BLD AUTO: 0.1 K/UL (ref 1.8–7.7)
NEUTROPHILS NFR BLD: 9 % (ref 38–73)
NEUTROPHILS NFR BLD: 9 % (ref 38–73)
NITRITE UR QL STRIP: NEGATIVE
NITRITE UR QL STRIP: NEGATIVE
NRBC BLD-RTO: 0 /100 WBC
NRBC BLD-RTO: 0 /100 WBC
PH UR STRIP: 8 [PH] (ref 5–8)
PH UR STRIP: 8 [PH] (ref 5–8)
PLATELET # BLD AUTO: 68 K/UL (ref 150–450)
PLATELET # BLD AUTO: 68 K/UL (ref 150–450)
PMV BLD AUTO: 8.8 FL (ref 9.2–12.9)
PMV BLD AUTO: 8.8 FL (ref 9.2–12.9)
POTASSIUM SERPL-SCNC: 3.6 MMOL/L (ref 3.5–5.1)
POTASSIUM SERPL-SCNC: 3.9 MMOL/L (ref 3.5–5.1)
PROT SERPL-MCNC: 6.3 G/DL (ref 6–8.4)
PROT UR QL STRIP: NEGATIVE
PROT UR QL STRIP: NEGATIVE
RBC # BLD AUTO: 2.84 M/UL (ref 4–5.4)
RBC # BLD AUTO: 2.84 M/UL (ref 4–5.4)
SODIUM SERPL-SCNC: 134 MMOL/L (ref 136–145)
SP GR UR STRIP: 1.01 (ref 1–1.03)
SP GR UR STRIP: 1.01 (ref 1–1.03)
URN SPEC COLLECT METH UR: ABNORMAL
URN SPEC COLLECT METH UR: ABNORMAL
UROBILINOGEN UR STRIP-ACNC: NEGATIVE EU/DL
UROBILINOGEN UR STRIP-ACNC: NEGATIVE EU/DL
WBC # BLD AUTO: 0.55 K/UL (ref 3.9–12.7)
WBC # BLD AUTO: 0.55 K/UL (ref 3.9–12.7)

## 2024-06-27 PROCEDURE — 25000003 PHARM REV CODE 250: Performed by: INTERNAL MEDICINE

## 2024-06-27 PROCEDURE — 85025 COMPLETE CBC W/AUTO DIFF WBC: CPT

## 2024-06-27 PROCEDURE — 94761 N-INVAS EAR/PLS OXIMETRY MLT: CPT

## 2024-06-27 PROCEDURE — 83735 ASSAY OF MAGNESIUM: CPT

## 2024-06-27 PROCEDURE — 63600175 PHARM REV CODE 636 W HCPCS: Performed by: INTERNAL MEDICINE

## 2024-06-27 PROCEDURE — 25000242 PHARM REV CODE 250 ALT 637 W/ HCPCS: Performed by: INTERNAL MEDICINE

## 2024-06-27 PROCEDURE — 99900035 HC TECH TIME PER 15 MIN (STAT)

## 2024-06-27 PROCEDURE — C9113 INJ PANTOPRAZOLE SODIUM, VIA: HCPCS | Performed by: INTERNAL MEDICINE

## 2024-06-27 PROCEDURE — 63600175 PHARM REV CODE 636 W HCPCS: Mod: JZ,JB,JG | Performed by: INTERNAL MEDICINE

## 2024-06-27 PROCEDURE — 81003 URINALYSIS AUTO W/O SCOPE: CPT

## 2024-06-27 PROCEDURE — 63600175 PHARM REV CODE 636 W HCPCS

## 2024-06-27 PROCEDURE — 84132 ASSAY OF SERUM POTASSIUM: CPT | Performed by: INTERNAL MEDICINE

## 2024-06-27 PROCEDURE — 27000221 HC OXYGEN, UP TO 24 HOURS

## 2024-06-27 PROCEDURE — 25000003 PHARM REV CODE 250

## 2024-06-27 PROCEDURE — 36415 COLL VENOUS BLD VENIPUNCTURE: CPT

## 2024-06-27 PROCEDURE — 99233 SBSQ HOSP IP/OBS HIGH 50: CPT | Mod: ,,, | Performed by: INTERNAL MEDICINE

## 2024-06-27 PROCEDURE — 21400001 HC TELEMETRY ROOM

## 2024-06-27 PROCEDURE — 99900031 HC PATIENT EDUCATION (STAT)

## 2024-06-27 PROCEDURE — 99223 1ST HOSP IP/OBS HIGH 75: CPT | Mod: ,,, | Performed by: INTERNAL MEDICINE

## 2024-06-27 PROCEDURE — 83690 ASSAY OF LIPASE: CPT | Performed by: INTERNAL MEDICINE

## 2024-06-27 PROCEDURE — 80053 COMPREHEN METABOLIC PANEL: CPT

## 2024-06-27 PROCEDURE — 94640 AIRWAY INHALATION TREATMENT: CPT

## 2024-06-27 RX ORDER — LANOLIN ALCOHOL/MO/W.PET/CERES
800 CREAM (GRAM) TOPICAL ONCE
Status: COMPLETED | OUTPATIENT
Start: 2024-06-27 | End: 2024-06-27

## 2024-06-27 RX ORDER — HYDROMORPHONE HYDROCHLORIDE 1 MG/ML
1 INJECTION, SOLUTION INTRAMUSCULAR; INTRAVENOUS; SUBCUTANEOUS ONCE
Status: COMPLETED | OUTPATIENT
Start: 2024-06-27 | End: 2024-06-27

## 2024-06-27 RX ORDER — DICYCLOMINE HYDROCHLORIDE 10 MG/1
10 CAPSULE ORAL 4 TIMES DAILY
Status: DISCONTINUED | OUTPATIENT
Start: 2024-06-27 | End: 2024-07-01 | Stop reason: HOSPADM

## 2024-06-27 RX ORDER — PANTOPRAZOLE SODIUM 40 MG/10ML
40 INJECTION, POWDER, LYOPHILIZED, FOR SOLUTION INTRAVENOUS DAILY
Status: DISCONTINUED | OUTPATIENT
Start: 2024-06-27 | End: 2024-06-28

## 2024-06-27 RX ADMIN — ASPIRIN 81 MG: 81 TABLET, COATED ORAL at 08:06

## 2024-06-27 RX ADMIN — CEFEPIME HYDROCHLORIDE 2 G: 2 INJECTION, POWDER, FOR SOLUTION INTRAVENOUS at 06:06

## 2024-06-27 RX ADMIN — HYDROMORPHONE HYDROCHLORIDE 1 MG: 1 INJECTION, SOLUTION INTRAMUSCULAR; INTRAVENOUS; SUBCUTANEOUS at 05:06

## 2024-06-27 RX ADMIN — FAMOTIDINE 20 MG: 10 INJECTION INTRAVENOUS at 08:06

## 2024-06-27 RX ADMIN — ACETAMINOPHEN 650 MG: 325 TABLET ORAL at 08:06

## 2024-06-27 RX ADMIN — SODIUM CHLORIDE, POTASSIUM CHLORIDE, SODIUM LACTATE AND CALCIUM CHLORIDE: 600; 310; 30; 20 INJECTION, SOLUTION INTRAVENOUS at 10:06

## 2024-06-27 RX ADMIN — PANTOPRAZOLE SODIUM 40 MG: 40 INJECTION, POWDER, FOR SOLUTION INTRAVENOUS at 01:06

## 2024-06-27 RX ADMIN — Medication 800 MG: at 06:06

## 2024-06-27 RX ADMIN — IPRATROPIUM BROMIDE 0.5 MG: 0.5 SOLUTION RESPIRATORY (INHALATION) at 08:06

## 2024-06-27 RX ADMIN — ALPRAZOLAM 0.25 MG: 0.25 TABLET ORAL at 08:06

## 2024-06-27 RX ADMIN — SODIUM CHLORIDE, POTASSIUM CHLORIDE, SODIUM LACTATE AND CALCIUM CHLORIDE: 600; 310; 30; 20 INJECTION, SOLUTION INTRAVENOUS at 07:06

## 2024-06-27 RX ADMIN — Medication 800 MG: at 02:06

## 2024-06-27 RX ADMIN — ARFORMOTEROL TARTRATE 15 MCG: 15 SOLUTION RESPIRATORY (INHALATION) at 08:06

## 2024-06-27 RX ADMIN — DICYCLOMINE HYDROCHLORIDE 10 MG: 10 CAPSULE ORAL at 01:06

## 2024-06-27 RX ADMIN — MUPIROCIN 1 G: 20 OINTMENT TOPICAL at 08:06

## 2024-06-27 RX ADMIN — Medication 800 MG: at 10:06

## 2024-06-27 RX ADMIN — DICYCLOMINE HYDROCHLORIDE 10 MG: 10 CAPSULE ORAL at 05:06

## 2024-06-27 RX ADMIN — POTASSIUM BICARBONATE 50 MEQ: 977.5 TABLET, EFFERVESCENT ORAL at 06:06

## 2024-06-27 RX ADMIN — METOPROLOL TARTRATE 25 MG: 25 TABLET, FILM COATED ORAL at 08:06

## 2024-06-27 RX ADMIN — FILGRASTIM-SNDZ 300 MCG: 300 INJECTION, SOLUTION INTRAVENOUS; SUBCUTANEOUS at 01:06

## 2024-06-27 RX ADMIN — LEVOTHYROXINE SODIUM 112 MCG: 0.11 TABLET ORAL at 06:06

## 2024-06-27 RX ADMIN — IPRATROPIUM BROMIDE 0.5 MG: 0.5 SOLUTION RESPIRATORY (INHALATION) at 12:06

## 2024-06-27 RX ADMIN — HYDROCODONE BITARTRATE AND ACETAMINOPHEN 1 TABLET: 5; 325 TABLET ORAL at 03:06

## 2024-06-27 RX ADMIN — ONDANSETRON 4 MG: 2 INJECTION INTRAMUSCULAR; INTRAVENOUS at 08:06

## 2024-06-27 RX ADMIN — CETIRIZINE HYDROCHLORIDE 10 MG: 10 TABLET, FILM COATED ORAL at 08:06

## 2024-06-27 RX ADMIN — DICYCLOMINE HYDROCHLORIDE 10 MG: 10 CAPSULE ORAL at 08:06

## 2024-06-27 RX ADMIN — IPRATROPIUM BROMIDE 0.5 MG: 0.5 SOLUTION RESPIRATORY (INHALATION) at 01:06

## 2024-06-27 RX ADMIN — VANCOMYCIN HYDROCHLORIDE 1250 MG: 1.25 INJECTION, POWDER, LYOPHILIZED, FOR SOLUTION INTRAVENOUS at 07:06

## 2024-06-27 RX ADMIN — SODIUM CHLORIDE, POTASSIUM CHLORIDE, SODIUM LACTATE AND CALCIUM CHLORIDE: 600; 310; 30; 20 INJECTION, SOLUTION INTRAVENOUS at 01:06

## 2024-06-27 NOTE — ASSESSMENT & PLAN NOTE
Serial troponins x 3 all negative   ECHO to follow   Stress test when patient more stable   EKG PRN  NTG PRN  ASA  VQ scan negative at perfusion scan   Patient not keen to do stress test initially but later agree

## 2024-06-27 NOTE — PROGRESS NOTES
[facility] Medicine  Progress Note    Patient Name: Joslyn Dubon  MRN: [id number]  Patient Class: IP- Inpatient   Admission Date: 6/23/2024  Length of Stay: 3 days  Attending Physician: Royce Monaco MD  Primary Care Provider: Jasbir Graham MD        Subjective:     Principal Problem:Chest pain        HPI:  73-year-old female presented to ED for eval. Patient reported for the last few days she has had intractable nausea and vomiting, with associated LLQ abd pain and intermittent diarrhea. Denied melena, hematochezia, hematemesis. 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 Patient also reported over the last 2 days she has been having squeezing L sided intermittent chest pain that radiates to her back with shortness of breath. She does have chronic resp failure and COPD but reports shortness of breath is increased from baseline, denies use of home O2. Patient denies previous similar episodes of chest pain, denies hx CAD. She does have hx of AAA s/p repair. Noted to have WEN, hypomagnesemia, hypokalemia. CXR impression without acute abn. Of note, patient has contrast allergy with anaphylactic reaction, also reports severe hypotension with dilaudid and morphine, can tolerate norco. Admit to hospital medicine for further eval.     Overview/Hospital Course:  Patient was admitted to the hospital with  symptoms.  Patient was currently on 2nd round of chemotherapy for lung cancer.  CT of the abdomen was done with no acute except previous vascular stent.  C diff test is negative and all stool tests are negative and patient was started on anti diarrhea and gradually better.  Patient had chest pain episode during the admission and patient was offered stress test but she declined.  She was reviewed by the oncologist and offered G-CSF to increase the white cell but she declined that as  well    Interval History:  Pt agreed neupogen . Had one episode of fever 101  And started antibiotics . BC neg, UA neg. CXR neg  Mild rib pain at left chest +    Review of Systems  Objective:     Vital Signs (Most Recent):  Temp: 98.2 °F (36.8 °C) (06/27/24 1100)  Pulse: 80 (06/27/24 1100)  Resp: 16 (06/27/24 1100)  BP: 124/77 (06/27/24 1100)  SpO2: 97 % (06/27/24 1100) Vital Signs (24h Range):  Temp:  [98.1 °F (36.7 °C)-101.7 °F (38.7 °C)] 98.2 °F (36.8 °C)  Pulse:  [] 80  Resp:  [16-20] 16  SpO2:  [92 %-97 %] 97 %  BP: (116-164)/() 124/77     Weight: 77.3 kg (170 lb 6.4 oz)  Body mass index is 31.17 kg/m².    Intake/Output Summary (Last 24 hours) at 6/27/2024 1147  Last data filed at 6/27/2024 0901  Gross per 24 hour   Intake 240 ml   Output 1750 ml   Net -1510 ml         Physical Exam  Vitals and nursing note reviewed.   HENT:      Head: Normocephalic and atraumatic.   Eyes:      Conjunctiva/sclera: Conjunctivae normal.   Neck:      Vascular: No JVD.   Cardiovascular:      Rate and Rhythm: Normal rate.      Heart sounds: Normal heart sounds.   Pulmonary:      Effort: Pulmonary effort is normal.      Breath sounds: Normal breath sounds.   Abdominal:      General: Bowel sounds are normal.      Palpations: Abdomen is soft.   Musculoskeletal:         General: Normal range of motion.   Skin:     General: Skin is warm.   Neurological:      Mental Status: She is alert and oriented to person, place, and time.   Psychiatric:         Mood and Affect: Mood normal.             Significant Labs: All pertinent labs within the past 24 hours have been reviewed.  CBC:   Recent Labs   Lab 06/26/24  0452 06/27/24  0414   WBC 0.93* 0.55*  0.55*   HGB 10.2* 8.9*  8.9*   HCT 31.2* 26.2*  26.2*   PLT 90* 68*  68*     CMP:   Recent Labs   Lab 06/26/24  0452 06/27/24  0414    134*   K 3.7 3.6    99   CO2 30* 27   GLU 83 103   BUN 12 13   CREATININE 1.0 1.0   CALCIUM 8.9 8.7   PROT 6.2 6.3   ALBUMIN 3.4* 3.4*  "  BILITOT 0.4 0.5   ALKPHOS 85 75   AST 15 12   ALT 18 13   ANIONGAP 7* 8     Cardiac Markers: No results for input(s): "CKMB", "MYOGLOBIN", "BNP", "TROPISTAT" in the last 48 hours.    Significant Imaging: I have reviewed all pertinent imaging results/findings within the past 24 hours.    Assessment/Plan:      * Chest pain  Serial troponins x 3 all negative   ECHO to follow   Stress test when patient more stable   EKG PRN  NTG PRN  ASA  VQ scan negative at perfusion scan   Patient not keen to do stress test initially but later agree    Febrile neutropenia  Unsure cause   Follow BC  Continue antibiotics for now      Chemotherapy induced neutropenia  S/p G CSF  Follow WBC      Anemia due to chemotherapy    Current CBC reviewed-   Lab Results   Component Value Date    HGB 8.9 (L) 06/27/2024    HGB 8.9 (L) 06/27/2024    HCT 26.2 (L) 06/27/2024    HCT 26.2 (L) 06/27/2024     Monitor serial CBC and transfuse if patient becomes hemodynamically unstable, symptomatic or H/H drops below 7/21.    Malignant neoplasm of lung  Aware  Follow dr lott       WEN (acute kidney injury)  Patient with acute kidney injury/acute renal failure likely due to pre-renal azotemia due to dehydration WEN is currently stable. Baseline creatinine  1  - Labs reviewed- Renal function/electrolytes with Estimated Creatinine Clearance: 46.8 mL/min (based on SCr of 1 mg/dL). according to latest data. Monitor urine output and serial CMP and adjust therapy as needed. Avoid nephrotoxins and renally dose meds for GFR listed above.  resolved    Diarrhea  Stool studies  neg  Lyte replacement per protocol  Lomotil  and now improved  and DC      Intractable nausea and vomiting  Secondary to  chemo  Lipase negative   Zofran, phenergan PRN NV  IVF  Lyte replacement per protocol  CT abd/pelvis noted. Non acute     Non-small cell carcinoma of left lung  Last chemo 6/19/24  RT assess and treat  Supplemental O2 PRN  Resume home inhalers  Starting " neupogen    History of AAA repair  Noted in CT  Non acute      VTE Risk Mitigation (From admission, onward)           Ordered     Reason for No Pharmacological VTE Prophylaxis  Once        Question:  Reasons:  Answer:  Risk of Bleeding    06/23/24 1743     IP VTE HIGH RISK PATIENT  Once         06/23/24 1743     Place sequential compression device  Until discontinued         06/23/24 1743                    Discharge Planning   TINO: 7/1/2024     Code Status: Full Code   Is the patient medically ready for discharge?:     Reason for patient still in hospital (select all that apply): {HMREASONPATIENTINHOSP:14660}  Discharge Plan A: Home with family                  Royce Monaco MD  Department of Hospital Medicine   [facility]

## 2024-06-27 NOTE — PROGRESS NOTES
[facility] Medicine  Progress Note    Patient Name: Joslyn Dubon  MRN: [id number]  Patient Class: IP- Inpatient   Admission Date: 6/23/2024  Length of Stay: 3 days  Attending Physician: Royce Monaco MD  Primary Care Provider: Jasbir Graham MD        Subjective:     Principal Problem:Chest pain        HPI:  73-year-old female presented to ED for eval. Patient reported for the last few days she has had intractable nausea and vomiting, with associated LLQ abd pain and intermittent diarrhea. Denied melena, hematochezia, hematemesis. 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 Patient also reported over the last 2 days she has been having squeezing L sided intermittent chest pain that radiates to her back with shortness of breath. She does have chronic resp failure and COPD but reports shortness of breath is increased from baseline, denies use of home O2. Patient denies previous similar episodes of chest pain, denies hx CAD. She does have hx of AAA s/p repair. Noted to have WEN, hypomagnesemia, hypokalemia. CXR impression without acute abn. Of note, patient has contrast allergy with anaphylactic reaction, also reports severe hypotension with dilaudid and morphine, can tolerate norco. Admit to hospital medicine for further eval.     Overview/Hospital Course:  Patient was admitted to the hospital with  symptoms.  Patient was currently on 2nd round of chemotherapy for lung cancer.  CT of the abdomen was done with no acute except previous vascular stent.  C diff test is negative and all stool tests are negative and patient was started on anti diarrhea and gradually better.  Patient had chest pain episode during the admission and patient was offered stress test but she declined.  She was reviewed by the oncologist and offered G-CSF to increase the white cell but she declined that as  well    Interval History:  Pt agreed neupogen . Had one episode of fever 101  And started antibiotics . BC neg, UA neg. CXR neg  Mild rib pain at left chest +    Review of Systems  Objective:     Vital Signs (Most Recent):  Temp: 98.2 °F (36.8 °C) (06/27/24 1100)  Pulse: 80 (06/27/24 1100)  Resp: 16 (06/27/24 1100)  BP: 124/77 (06/27/24 1100)  SpO2: 97 % (06/27/24 1100) Vital Signs (24h Range):  Temp:  [98.1 °F (36.7 °C)-101.7 °F (38.7 °C)] 98.2 °F (36.8 °C)  Pulse:  [] 80  Resp:  [16-20] 16  SpO2:  [92 %-97 %] 97 %  BP: (116-164)/() 124/77     Weight: 77.3 kg (170 lb 6.4 oz)  Body mass index is 31.17 kg/m².    Intake/Output Summary (Last 24 hours) at 6/27/2024 1147  Last data filed at 6/27/2024 0901  Gross per 24 hour   Intake 240 ml   Output 1750 ml   Net -1510 ml         Physical Exam  Vitals and nursing note reviewed.   HENT:      Head: Normocephalic and atraumatic.   Eyes:      Conjunctiva/sclera: Conjunctivae normal.   Neck:      Vascular: No JVD.   Cardiovascular:      Rate and Rhythm: Normal rate.      Heart sounds: Normal heart sounds.   Pulmonary:      Effort: Pulmonary effort is normal.      Breath sounds: Normal breath sounds.   Abdominal:      Palpations: Abdomen is soft.   Musculoskeletal:         General: Normal range of motion.   Skin:     General: Skin is warm.   Neurological:      Mental Status: She is alert and oriented to person, place, and time.   Psychiatric:         Mood and Affect: Mood normal.             Significant Labs: All pertinent labs within the past 24 hours have been reviewed.  CBC:   Recent Labs   Lab 06/26/24  0452 06/27/24  0414   WBC 0.93* 0.55*  0.55*   HGB 10.2* 8.9*  8.9*   HCT 31.2* 26.2*  26.2*   PLT 90* 68*  68*     CMP:   Recent Labs   Lab 06/26/24  0452 06/27/24  0414    134*   K 3.7 3.6    99   CO2 30* 27   GLU 83 103   BUN 12 13   CREATININE 1.0 1.0   CALCIUM 8.9 8.7   PROT 6.2 6.3   ALBUMIN 3.4* 3.4*   BILITOT 0.4 0.5   ALKPHOS 85 75   AST  "15 12   ALT 18 13   ANIONGAP 7* 8     Cardiac Markers: No results for input(s): "CKMB", "MYOGLOBIN", "BNP", "TROPISTAT" in the last 48 hours.    Significant Imaging: I have reviewed all pertinent imaging results/findings within the past 24 hours.    Assessment/Plan:      * Chest pain  Serial troponins x 3 all negative   ECHO to follow   Stress test when patient more stable   EKG PRN  NTG PRN  ASA  VQ scan negative at perfusion scan   Patient not keen to do stress test initially but later agree    Febrile neutropenia  Unsure cause   Follow BC  Continue antibiotics for now      Chemotherapy induced neutropenia  S/p G CSF  Follow WBC      Anemia due to chemotherapy    Current CBC reviewed-   Lab Results   Component Value Date    HGB 8.9 (L) 06/27/2024    HGB 8.9 (L) 06/27/2024    HCT 26.2 (L) 06/27/2024    HCT 26.2 (L) 06/27/2024     Monitor serial CBC and transfuse if patient becomes hemodynamically unstable, symptomatic or H/H drops below 7/21.    Malignant neoplasm of lung  Aware  Follow dr lott       WEN (acute kidney injury)  Patient with acute kidney injury/acute renal failure likely due to pre-renal azotemia due to dehydration WEN is currently stable. Baseline creatinine  1  - Labs reviewed- Renal function/electrolytes with Estimated Creatinine Clearance: 46.8 mL/min (based on SCr of 1 mg/dL). according to latest data. Monitor urine output and serial CMP and adjust therapy as needed. Avoid nephrotoxins and renally dose meds for GFR listed above.  resolved    Diarrhea  Stool studies  neg  Lyte replacement per protocol  Lomotil  and now improved  and DC      Intractable nausea and vomiting  Secondary to  chemo  Lipase negative   Zofran, phenergan PRN NV  IVF  Lyte replacement per protocol  CT abd/pelvis noted. Non acute     Non-small cell carcinoma of left lung  Last chemo 6/19/24  RT assess and treat  Supplemental O2 PRN  Resume home inhalers  Starting neupogen    History of AAA repair  Noted in CT  Non " acute      VTE Risk Mitigation (From admission, onward)           Ordered     Reason for No Pharmacological VTE Prophylaxis  Once        Question:  Reasons:  Answer:  Risk of Bleeding    06/23/24 1743     IP VTE HIGH RISK PATIENT  Once         06/23/24 1743     Place sequential compression device  Until discontinued         06/23/24 1743                    Discharge Planning   TINO: 7/1/2024     Code Status: Full Code   Is the patient medically ready for discharge?:     Reason for patient still in hospital (select all that apply): Treatment  Discharge Plan A: Home with family                  Royce Monaco MD  Department of Hospital Medicine   [facility]

## 2024-06-27 NOTE — PLAN OF CARE
Problem: Oncology Care  Goal: Optimal Pain Control and Function  Intervention: Prevent or Manage Pain  Flowsheets (Taken 6/27/2024 0054)  Sleep/Rest Enhancement: awakenings minimized  Sensory Stimulation Regulation: lighting decreased  Medication Review/Management: medications reviewed     Problem: Infection  Goal: Absence of Infection Signs and Symptoms  Intervention: Prevent or Manage Infection  Flowsheets (Taken 6/27/2024 0054)  Infection Management: aseptic technique maintained  Isolation Precautions:   precautions maintained   protective

## 2024-06-27 NOTE — ASSESSMENT & PLAN NOTE
Current CBC reviewed-   Lab Results   Component Value Date    HGB 8.9 (L) 06/27/2024    HGB 8.9 (L) 06/27/2024    HCT 26.2 (L) 06/27/2024    HCT 26.2 (L) 06/27/2024     Monitor serial CBC and transfuse if patient becomes hemodynamically unstable, symptomatic or H/H drops below 7/21.

## 2024-06-27 NOTE — CARE UPDATE
06/27/24 0805   Patient Assessment/Suction   Level of Consciousness (AVPU) alert   Respiratory Effort Unlabored   Expansion/Accessory Muscles/Retractions expansion symmetric;no retractions;no use of accessory muscles   All Lung Fields Breath Sounds Anterior:;Lateral:;diminished;wheezes, expiratory   Rhythm/Pattern, Respiratory depth regular;pattern regular;unlabored   PRE-TX-O2   Device (Oxygen Therapy) nasal cannula   $ Is the patient on Low Flow Oxygen? Yes   Flow (L/min) (Oxygen Therapy) 2   SpO2 97 %   Pulse Oximetry Type Intermittent   $ Pulse Oximetry - Multiple Charge Pulse Oximetry - Multiple   Pulse 88   Resp 20   Aerosol Therapy   $ Aerosol Therapy Charges Aerosol Treatment   Daily Review of Necessity (SVN) completed   Respiratory Treatment Status (SVN) given   Treatment Route (SVN) mask   Patient Position HOB elevated   Post Treatment Assessment (SVN) increased aeration   Signs of Intolerance (SVN) none   Breath Sounds Post-Respiratory Treatment   Throughout All Fields Post-Treatment All Fields   Throughout All Fields Post-Treatment Anterior:;Lateral:;aeration increased   Post-treatment Heart Rate (beats/min) 90   Post-treatment Resp Rate (breaths/min) 20   Education   $ Education Bronchodilator;15 min

## 2024-06-27 NOTE — CONSULTS
"[facility]   Department of Infectious Disease  Consult Note        PATIENT NAME: Joslyn Dubon  MRN: [id number]  TODAY'S DATE: 06/27/2024  ADMIT DATE: 6/23/2024  LOS: 3 days    CHIEF COMPLAINT: Vomiting (X FEW DAYS) and Chest Pain      PRINCIPLE PROBLEM: Chest pain    REASON FOR CONSULT:     ASSESSMENT and PLAN      1. Gastroenteritis in a patient who recently started chemotherapy.  Workup for infectious diarrhea so far negative.  This appears to be chemotherapy induced.  He has improved.  Continue symptomatic care.    2. Neutropenic fever.  Recently started cisplatin and etoposide and has pancytopenia.  Fever yesterday was about 1 hour after filgrastim infusion.  Follow repeat blood cultures.  Anticipate DC vancomycin tomorrow if blood cultures remain negative.  Maybe able to DC on oral Augmentin if no more fevers and cultures negative.    3. Lung cancer with neuroendocrine features.  She started chemotherapy 06/17/2024.  Management as per oncologist.    4. Anxiety disorder.      RECOMMENDATIONS:   Continue vancomycin and cefepime for now and follow cultures.  DC vancomycin tomorrow if no GPC in blood culture    Thank you for this consult. Please send [employer] secure chat with any questions.    Antibiotics (From admission, onward)      Start     Stop Route Frequency Ordered    06/27/24 1930  vancomycin 1.25 g in dextrose 5% 250 mL IVPB (ready to mix)        Placed in "Followed by" Linked Group    -- IV Every 24 hours (non-standard times) 06/26/24 1818    06/26/24 1908  vancomycin - pharmacy to dose  (vancomycin IVPB (PEDS and ADULTS))        Placed in "And" Linked Group    -- IV pharmacy to manage frequency 06/26/24 1811 06/26/24 1830  cefepime 2 g in dextrose 5% 100 mL IVPB (ready to mix)         -- IV Every 12 hours (non-standard times) 06/26/24 1814    06/24/24 2100  mupirocin 2 % ointment         06/29/24 2059 Nasl 2 times daily 06/24/24 1420          Antifungals (From admission, onward)      " None           Antivirals (From admission, onward)      None            HPI      Joslyn Dubon is a 73 y.o. female with history of COPD and newly diagnosed lung cancer with neuroendocrine features.  She was commenced on cisplatin and etoposide 06/17/2024.  Received 2nd cycle 06/19/2024 developed nausea with vomiting and also left side abdominal pain.  Seen in the ED 06/21/2024.  Managed conservatively, improved and was discharged home.      Back in ED 06/23/2024 with since symptoms and was admitted.  She had WEN with multiple electrolyte imbalance.  CT abdomen and pelvis with no acute abnormality but did show in known aortic stent used to repair AAA.  Chest x-ray with no acute abnormality.  V/Q scan unremarkable.  Stool workup included C diff and stool culture negative.  Blood culture negative.    She has been seen by hematologist.  Initially of G-CSF which she declined.  It was later started yesterday 06/26/2024 also been seen by GI service stool workup.  Had fever 06/26/2024 and was commenced on antibiotics repeat blood cultures so far negative.  She has improved overall clinically since hospitalization.    Microbiology   Blood culture 06/23/2024: NG CD   Stool culture 06/24/2024: Negative   Stool C diff 06/24/2024: Negative   Blood culture 06/26/2024:  In progress    Antibiotics   Cefepime: 06/26/2024-  Vancomycin: 06/26/2024-    Outdoor activities:  Not contributory  Travel:  No recent travel  Implants:  Right chest MediPort placed May 20, 2024  Antibiotic history:  As in HPI    Social History  Marital Status:   Alcohol History:  reports no history of alcohol use.  Tobacco History:  reports that she quit smoking about 60 years ago. Her smoking use included cigarettes. She started smoking about 5 months ago. She has never used smokeless tobacco.  Drug History:  reports no history of drug use.    Review of patient's allergies indicates:   Allergen Reactions    Bisacodyl Nausea And Vomiting     Other  reaction(s): Vomiting    Iodinated contrast media Hives and Shortness Of Breath     hypotension    Morphine Other (See Comments)     hypotension    Azithromycin Hives    Cipro [ciprofloxacin hcl]     Demerol [meperidine]      Nausea vomiting, visual problem    Doxycycline Hives    Flonase [fluticasone propionate] Hives    Morpholine analogues Other (See Comments)     hypotension     Past Medical History:   Diagnosis Date    Anxiety     Martin esophagus     Colitis     COPD (chronic obstructive pulmonary disease)     GERD (gastroesophageal reflux disease)     Hypertension     Intractable nausea and vomiting 6/23/2024    Lung cancer     Thyroid disease     Vocal cord polyps      Past Surgical History:   Procedure Laterality Date    ABDOMINAL AORTIC ANEURYSM REPAIR      BACK SURGERY      cervical    CATARACT EXTRACTION Right 02/2021    CHOLECYSTECTOMY  12/20/2013    Dr Price  [facility]    ENDOBRONCHIAL ULTRASOUND N/A 4/19/2024    Procedure: ENDOBRONCHIAL ULTRASOUND (EBUS);  Surgeon: Kizzy Siegel MD;  Location: [facility] OR [address] [place] [address];  Service: Pulmonary;  Laterality: N/A;    FOOT SURGERY      HYSTERECTOMY  1979    AUGUSTINE for AUB with consevation ovaries    INSERTION OF TUNNELED CENTRAL VENOUS CATHETER (CVC) WITH SUBCUTANEOUS PORT Right 5/20/2024    Procedure: [id number];  Surgeon: Simon Wilson III, MD;  Location: [facility];  Service: General;  Laterality: Right;    ROBOTIC BRONCHOSCOPY N/A 4/19/2024    Procedure: ROBOTIC BRONCHOSCOPY;  Surgeon: Kizzy Siegel MD;  Location: [address];  Service: Pulmonary;  Laterality: N/A;    SALIVARY GLAND SURGERY       Family History   Problem Relation Name Age of Onset    Bladder Cancer Mother      Heart failure Father      Emphysema Sister      Pancreatic cancer Sister      No Known Problems Brother         SUBJECTIVE     Review of systems: As in HPI     OBJECTIVE   Temp:  [98.1 °F (36.7 °C)-101.7 °F (38.7 °C)] 98.2 °F (36.8 °C)  Pulse:  [] 80  Resp:  [16-21]  16  SpO2:  [92 %-97 %] 97 %  BP: (102-164)/() 124/77  Temp:  [98.1 °F (36.7 °C)-101.7 °F (38.7 °C)]   Temp: 98.2 °F (36.8 °C) (06/27/24 1100)  Pulse: 80 (06/27/24 1100)  Resp: 16 (06/27/24 1100)  BP: 124/77 (06/27/24 1100)  SpO2: 97 % (06/27/24 1100)    Intake/Output Summary (Last 24 hours) at 6/27/2024 1108  Last data filed at 6/27/2024 0901  Gross per 24 hour   Intake 240 ml   Output 1750 ml   Net -1510 ml       Physical Exam  General:  Elderly woman lying quietly in bed.  In no acute distress   HEENT: No oral thrush   CVS:  S1 and 2 heard, no murmurs appreciated   Respiratory:  Clear to auscultation   Abdomen: Full, soft, nontender, no palpable organomegaly  Skin: No rash appreciated   CNS: No focal deficits   Musculoskeletal system:  No joint or bony abnormalities appreciated.    VAD:  Right MediPort inserted 05/20/2024  ISOLATION:  Neutropenic precautions    Wounds:  None    Significant Labs: All pertinent labs within the past 24 hours have been reviewed.    CBC LAST 7  Recent Labs   Lab 06/21/24  0827 06/23/24  0910 06/24/24  0508 06/25/24  0529 06/26/24  0452 06/27/24  0414   WBC 5.38 3.01* 1.68* 1.21* 0.93* 0.55*  0.55*   RBC 3.60* 3.94* 3.48* 3.09* 3.32* 2.84*  2.84*   HGB 11.1* 12.0 11.0* 9.7* 10.2* 8.9*  8.9*   HCT 33.5* 36.1* 32.5* 29.2* 31.2* 26.2*  26.2*   MCV 93 92 93 95 94 92  92   MCH 30.8 30.5 31.6* 31.4* 30.7 31.3*  31.3*   MCHC 33.1 33.2 33.8 33.2 32.7 34.0  34.0   RDW 13.5 13.0 13.2 13.1 12.9 12.8  12.8    217 142* 104* 90* 68*  68*   MPV 8.8* 8.8* 8.1* 8.7* 8.9* 8.8*  8.8*   GRAN 74.5*  4.0 63.0 45.0 42.0 17.2*  0.2* 9.0*  9.0*  0.1*  0.1*   LYMPH 24.0  1.3 37.0 49.0* 58.0* 78.5*  0.7* 85.5*  85.5*  0.5*  0.5*   MONO 0.9*  0.1* 0.0* 5.0 0.0* 3.2*  0.0* 5.5  5.5  0.0*  0.0*   BASO 0.00  --   --   --  0.00 0.00  0.00   NRBC 0 0 0 0 0 0  0       CHEMISTRY LAST 7  Recent Labs   Lab 06/21/24  0827 06/23/24  0910 06/24/24  0508 06/25/24  0529 06/26/24  0452  "06/27/24  0414    139 141 140 140 134*   K 3.6 3.3* 3.1* 3.5 3.7 3.6    98 101 104 103 99   CO2 30* 27 30* 31* 30* 27   ANIONGAP 9 14 10 5* 7* 8   BUN 26* 27* 24* 18 12 13   CREATININE 1.2 1.5* 1.2 1.0 1.0 1.0   * 153* 103 123* 83 103   CALCIUM 9.0 9.4 8.7 8.5* 8.9 8.7   MG 1.7 1.5* 1.6 1.3* 1.3* 1.0*   ALBUMIN 3.8 4.0 3.6 3.2* 3.4* 3.4*   PROT 7.1 7.6 6.6 5.9* 6.2 6.3   ALKPHOS 106 98 84 80 85 75   ALT 23 23 21 18 18 13   AST 21 20 18 15 15 12   BILITOT 0.3 0.6 0.6 0.3 0.4 0.5       Estimated Creatinine Clearance: 46.8 mL/min (based on SCr of 1 mg/dL).    INFLAMMATORY/PROCAL  LAST 7  Recent Labs   Lab 06/23/24 2040   PROCAL 0.108     No results found for: "ESR"  No results found for: "CRP"    PRIOR  MICROBIOLOGY:  Reviewed    Susceptibility data from last 90 days.  Collected Specimen Info Organism   04/19/24 Pleural Fluid from Lung, ELOY CUTIBACTERIUM ACNES         LAST 7 DAYS MICROBIOLOGY   Microbiology Results (last 7 days)       Procedure Component Value Units Date/Time    Blood culture [[id number]] Collected: 06/26/24 1840    Order Status: Completed Specimen: Blood from Peripheral, Hand, Left Updated: 06/27/24 0158     Blood Culture, Routine No Growth to date    Blood culture [[id number]] Collected: 06/26/24 1841    Order Status: Completed Specimen: Blood from Peripheral, Hand, Right Updated: 06/27/24 0158     Blood Culture, Routine No Growth to date    Blood culture (site 1) [[id number]] Collected: 06/23/24 1756    Order Status: Completed Specimen: Blood Updated: 06/26/24 2032     Blood Culture, Routine No Growth to date      No Growth to date      No Growth to date      No Growth to date    Narrative:      Site # 1, aerobic and anaerobic    Blood culture (site 2) [[id number]] Collected: 06/23/24 8766    Order Status: Completed Specimen: Blood Updated: 06/26/24 2032     Blood Culture, Routine No Growth to date      No Growth to date      No Growth to date      No Growth to date    Narrative: "      Site # 2, aerobic only    Stool culture [[id number]] Collected: 06/24/24 0421    Order Status: Completed Specimen: Stool Updated: 06/26/24 1442     Stool Culture No Salmonella,Shigella,Vibrio,Campylobacter.      No E coli 0157:H7 isolated.    Clostridium difficile EIA [[id number]] Collected: 06/24/24 0421    Order Status: Completed Specimen: Stool Updated: 06/24/24 0528     C. diff Antigen Negative     C difficile Toxins A+B, EIA Negative     Comment: Testing not recommended for children <24 months old.             CURRENT/PREVIOUS VISIT EKG  Results for orders placed or performed during the hospital encounter of 06/23/24   EKG 12-lead    Collection Time: 06/23/24  9:06 AM   Result Value Ref Range    QRS Duration 70 ms    [facility] QTC Calculation 469 ms    Narrative    Test Reason : R07.9,    Vent. Rate : 091 BPM     Atrial Rate : 091 BPM     P-R Int : 122 ms          QRS Dur : 070 ms      QT Int : 382 ms       P-R-T Axes : -19 031 083 degrees     QTc Int : 469 ms    Sinus rhythm with occasional Premature ventricular complexes  Otherwise normal ECG  Confirmed by Niko BONILLA, Kirstie Harp ([id number]) on 6/26/2024 12:05:00 AM    Referred By: [email or web address]   SELF           Confirmed By:Kirstie Pope MD     Significant Imaging: I have reviewed all relevant and available imaging results/findings within the past 24 hours.    I spent a total of 50 minutes on the day of the visit.This includes face to face time and non-face to face time preparing to see the patient (eg, review of tests), obtaining and/or reviewing separately obtained history, documenting clinical information in the electronic or other health record, independently interpreting results and communicating results to the patient/family/caregiver, or care coordinator.    Nic Costa MD  Date of Service: 06/27/2024      This note was created using [employer] voice recognition software that occasionally misinterpreted phrases or words.

## 2024-06-27 NOTE — SUBJECTIVE & OBJECTIVE
Interval History:  Pt agreed neupogen . Had one episode of fever 101  And started antibiotics . BC neg, UA neg. CXR neg  Mild rib pain at left chest +    Review of Systems  Objective:     Vital Signs (Most Recent):  Temp: 98.2 °F (36.8 °C) (06/27/24 1100)  Pulse: 80 (06/27/24 1100)  Resp: 16 (06/27/24 1100)  BP: 124/77 (06/27/24 1100)  SpO2: 97 % (06/27/24 1100) Vital Signs (24h Range):  Temp:  [98.1 °F (36.7 °C)-101.7 °F (38.7 °C)] 98.2 °F (36.8 °C)  Pulse:  [] 80  Resp:  [16-20] 16  SpO2:  [92 %-97 %] 97 %  BP: (116-164)/() 124/77     Weight: 77.3 kg (170 lb 6.4 oz)  Body mass index is 31.17 kg/m².    Intake/Output Summary (Last 24 hours) at 6/27/2024 1147  Last data filed at 6/27/2024 0901  Gross per 24 hour   Intake 240 ml   Output 1750 ml   Net -1510 ml         Physical Exam  Vitals and nursing note reviewed.   HENT:      Head: Normocephalic and atraumatic.   Eyes:      Conjunctiva/sclera: Conjunctivae normal.   Neck:      Vascular: No JVD.   Cardiovascular:      Rate and Rhythm: Normal rate.      Heart sounds: Normal heart sounds.   Pulmonary:      Effort: Pulmonary effort is normal.      Breath sounds: Normal breath sounds.   Abdominal:      Palpations: Abdomen is soft.   Musculoskeletal:         General: Normal range of motion.   Skin:     General: Skin is warm.   Neurological:      Mental Status: She is alert and oriented to person, place, and time.   Psychiatric:         Mood and Affect: Mood normal.             Significant Labs: All pertinent labs within the past 24 hours have been reviewed.  CBC:   Recent Labs   Lab 06/26/24  0452 06/27/24 0414   WBC 0.93* 0.55*  0.55*   HGB 10.2* 8.9*  8.9*   HCT 31.2* 26.2*  26.2*   PLT 90* 68*  68*     CMP:   Recent Labs   Lab 06/26/24  0452 06/27/24  0414    134*   K 3.7 3.6    99   CO2 30* 27   GLU 83 103   BUN 12 13   CREATININE 1.0 1.0   CALCIUM 8.9 8.7   PROT 6.2 6.3   ALBUMIN 3.4* 3.4*   BILITOT 0.4 0.5   ALKPHOS 85 75   AST 15 12  "  ALT 18 13   ANIONGAP 7* 8     Cardiac Markers: No results for input(s): "CKMB", "MYOGLOBIN", "BNP", "TROPISTAT" in the last 48 hours.    Significant Imaging: I have reviewed all pertinent imaging results/findings within the past 24 hours.  "

## 2024-06-27 NOTE — CARE UPDATE
06/26/24 2008   Patient Assessment/Suction   Level of Consciousness (AVPU) alert   Respiratory Effort Unlabored;Normal   Expansion/Accessory Muscles/Retractions no use of accessory muscles;no retractions;expansion symmetric   All Lung Fields Breath Sounds diminished;clear   Rhythm/Pattern, Respiratory unlabored;pattern regular;no shortness of breath reported   Cough Frequency no cough   PRE-TX-O2   Device (Oxygen Therapy) room air   SpO2 (!) 94 %   Pulse Oximetry Type Intermittent   Pulse 100   Resp 18   Aerosol Therapy   $ Aerosol Therapy Charges Aerosol Treatment  (brov)   Daily Review of Necessity (SVN) completed   Respiratory Treatment Status (SVN) given   Treatment Route (SVN) mask;oxygen   Patient Position HOB elevated   Post Treatment Assessment (SVN) increased aeration   Signs of Intolerance (SVN) none   Breath Sounds Post-Respiratory Treatment   Throughout All Fields Post-Treatment All Fields   Throughout All Fields Post-Treatment aeration increased   Post-treatment Heart Rate (beats/min) 102   Post-treatment Resp Rate (breaths/min) 18

## 2024-06-27 NOTE — PROGRESS NOTES
"[facility]  Hematology/Oncology  Inpatient Progress Note          Patient Name: Joslyn Dubon  MRN: [id number]  Admission Date: 6/23/2024  Hospital Length of Stay: 3 days  Code Status: Full Code   Attending Provider: Royce Monaco MD  Consulting Provider: Neo Mariano MD  Primary Care Physician: Jasbir Graham MD  Principal Problem:Chest pain      Coverage for Dr Camden Iyer       Subjective:       Patient ID: Joslyn Dubon is a 73 y.o. female.    Chief Complaint: Vomiting (X FEW DAYS) and Chest Pain        History Present Illness:    Patient remains in the hospital; she spiked a temp yesterday evening; no on antibiotics; ID consulted; she denies any N/v or diarrhea today; she has been experiencing chills;  is at bedside      Review of Systems:  GEN: general malaise, weakness, fatigue  HEENT: normal with no HA's, sore throat, stiff neck, changes in vision  CV: normal with no CP, SOB, PND, RAPHAEL or orthopnea  PULM: normal with no SOB, cough, hemoptysis, sputum or pleuritic pain  GI:   nausea/vomiting, and diarrhea have resolved  : normal with no hematuria, dysuria  BREAST: normal with no mass, discharge, pain  SKIN: normal with no rash, erythema, bruising, or swelling      Objective:     Vitals:  Blood pressure 124/77, pulse 80, temperature 98.2 °F (36.8 °C), temperature source Oral, resp. rate 16, height 5' 2" (1.575 m), weight 77.3 kg (170 lb 6.4 oz), SpO2 97%.    Physical Exam:  GEN: no apparent distress, comfortable; AAOx3; overweight  HEAD: atraumatic and normocephalic  EYES: no pallor, no icterus, PERRLA  ENT: OMM, no pharyngeal erythema, external ears WNL; no nasal discharge; no thrush  NECK: no masses, thyroid normal, trachea midline, no LAD/LN's, supple  CV: RRR with no murmur; normal pulse; normal S1 and S2; no pedal edema; Rght portacath  CHEST: Normal respiratory effort; CTAB; normal breath sounds; no wheeze or crackles  ABDOM: mild epigastric tender; " nondistended; soft; normal bowel sounds; no rebound/guarding  MUSC/Skeletal: ROM normal; no crepitus; joints normal; no deformities or arthropathy  EXTREM: no clubbing, cyanosis, inflammation or swelling  SKIN: no rashes, lesions, ulcers, petechiae or subcutaneous nodules  : no payan  NEURO: grossly intact; motor/sensory WNL; AAOx3; no tremors; generalized weakness  PSYCH: normal mood, affect and behavior  LYMPH: normal cervical, supraclavicular, axillary and groin LN's            Lab Review:   Lab Results   Component Value Date    WBC 0.55 (LL) 06/27/2024    WBC 0.55 (LL) 06/27/2024    HGB 8.9 (L) 06/27/2024    HGB 8.9 (L) 06/27/2024    HCT 26.2 (L) 06/27/2024    HCT 26.2 (L) 06/27/2024    MCV 92 06/27/2024    MCV 92 06/27/2024    PLT 68 (L) 06/27/2024    PLT 68 (L) 06/27/2024         CMP  Sodium   Date Value Ref Range Status   06/27/2024 134 (L) 136 - 145 mmol/L Final     Potassium   Date Value Ref Range Status   06/27/2024 3.6 3.5 - 5.1 mmol/L Final     Chloride   Date Value Ref Range Status   06/27/2024 99 95 - 110 mmol/L Final     CO2   Date Value Ref Range Status   06/27/2024 27 23 - 29 mmol/L Final     Glucose   Date Value Ref Range Status   06/27/2024 103 70 - 110 mg/dL Final     BUN   Date Value Ref Range Status   06/27/2024 13 8 - 23 mg/dL Final     Creatinine   Date Value Ref Range Status   06/27/2024 1.0 0.5 - 1.4 mg/dL Final   03/19/2013 0.8 0.5 - 1.4 mg/dL Final     Calcium   Date Value Ref Range Status   06/27/2024 8.7 8.7 - 10.5 mg/dL Final   03/19/2013 9.9 8.7 - 10.5 mg/dL Final     Total Protein   Date Value Ref Range Status   06/27/2024 6.3 6.0 - 8.4 g/dL Final     Albumin   Date Value Ref Range Status   06/27/2024 3.4 (L) 3.5 - 5.2 g/dL Final     Total Bilirubin   Date Value Ref Range Status   06/27/2024 0.5 0.1 - 1.0 mg/dL Final     Comment:     For infants and newborns, interpretation of results should be based  on gestational age, weight and in agreement with  clinical  observations.    Premature Infant recommended reference ranges:  Up to 24 hours.............<8.0 mg/dL  Up to 48 hours............<12.0 mg/dL  3-5 days..................<15.0 mg/dL  6-29 days.................<15.0 mg/dL       Alkaline Phosphatase   Date Value Ref Range Status   06/27/2024 75 55 - 135 U/L Final   08/31/2012 107 23 - 119 UNIT/L Final     AST   Date Value Ref Range Status   06/27/2024 12 10 - 40 U/L Final   08/31/2012 21 10 - 30 UNIT/L Final     ALT   Date Value Ref Range Status   06/27/2024 13 10 - 44 U/L Final     Anion Gap   Date Value Ref Range Status   06/27/2024 8 8 - 16 mmol/L Final   03/19/2013 12 5 - 15 meq/L Final     eGFR   Date Value Ref Range Status   06/27/2024 58.8 (A) >60 mL/min/1.73 m^2 Final   06/14/2024 52 (L) > OR = 60 mL/min/1.73m2 Final              Radiology Diagnostic Studies:     NM Lung Scan Perfusion Particulate [[id number]] Resulted: 06/23/24 2017   Order Status: Completed Updated: 06/23/24 2020   Narrative:     EXAMINATION:  NM LUNG SCAN PERFUSION    CLINICAL HISTORY:  Chest pain, nonspecific;Pulmonary embolism (PE) suspected, high prob;Chest pain, nonspecific; Pulmonary embolism (PE) suspected, high prob;    TECHNIQUE:  IV administration of 5.2 mCi of Tc-99m-MAA, multiple images of the thorax were obtained in various projections.    COMPARISON:  Chest x-ray dated 06/23/2024.    FINDINGS:  Only perfusion images are provided for review.  No perfusion defect identified.  Evaluation for mismatch is precluded in the absence of ventilation portion of the examination.   Impression:                      X-Ray Chest AP Portable [[id number]] Resulted: 06/23/24 0926   Order Status: Completed Updated: 06/23/24 0929   Narrative:     EXAMINATION:  XR CHEST AP PORTABLE    CLINICAL HISTORY:  Chest Pain;    COMPARISON:  June 21, 2024    FINDINGS:  AP view demonstrates normal heart size.  The thoracic aorta is elongated.  Left upper lobe noncalcified pulmonary nodule is unchanged.   No infiltrates or effusions are identified.    Right-sided Port-A-Cath is unchanged in position with tip at the superior vena cava.   Impression:       1. No interval change compared to June 21, 2024.          Assessment:     IMPRESSION:    (1) 73 y.o. female known to the oncology service of my associate Dr Camden Iyer with diagnosis of lung cancer with neuroendocrine features who has been on platinum and etopside chemotherapy. She presented to the ED with CP, SOB and N/V.      6/24/2024  - last chemo was on 6/19  - wbc at 1,68, hgb 11.0, and plats 142,000  - discussed GCSF for her leucopenia but she wants to hold off for now  - she does not seem to be tolerating the current regimen  - consider GI consult if her GI symptoms do not improve  - monitor counts for now    6/25/2024:  - wbc 1.21, hgb 9.7 and plats 104,000  - recurrent diarrhea and n/v again today  - GI consulted for evaluation    6/26/2024:  - wbc 0.93, hgb 10.2, plats 90,000  - her GI symptoms have improved/resolved; she was able to eat all day today without and N/V or diarrhea;   - WBC is still low; I discussed with her and her  about the risk of infection and she is now willing to start GSCF;  - I previously communicated with Dr Monaco; I discussed with her floor nurse in person    6/27/2024:  - she spiked temp yesterday evening  - cultures drawn and she was started on cefipime and vanc  - ID consulted  - started GCSF yesterday  - wbc 0.55, hgb 8.9, plats 68,000     (2) HTN     (3) COPD     (4) GERD; Martin's esophagus; hx/of colitis     (5) Thyroid disease     (6) Anxiety     (7) Former smoker        1. Malignant neoplasm of lung, unspecified laterality, unspecified part of lung    2. Chest pain    3. Anemia due to chemotherapy    4. Chemotherapy induced neutropenia    5. Non-small cell carcinoma of left lung [C34.92]    6. Gastroesophageal reflux disease without esophagitis    7. Non-small cell carcinoma of left lung           Plan:      PLAN:          Monitor labs daily and transfuse as needed  Continued on GCSF  ID consulted - cultures drawn, IV antibiotics  Conservative management for her nausea/diarrhea  Appreciate GI evaluation  IVF's, electrolyte management as per primary team  Will follow with you while Dr Iyer is on vacation                 Neo Mariano MD  Hematology/Oncology  [facility] [place]

## 2024-06-28 LAB
ALBUMIN SERPL BCP-MCNC: 3.3 G/DL (ref 3.5–5.2)
ALP SERPL-CCNC: 68 U/L (ref 55–135)
ALT SERPL W/O P-5'-P-CCNC: 9 U/L (ref 10–44)
ANION GAP SERPL CALC-SCNC: 7 MMOL/L (ref 8–16)
AST SERPL-CCNC: 11 U/L (ref 10–40)
BACTERIA BLD CULT: NORMAL
BACTERIA BLD CULT: NORMAL
BASOPHILS # BLD AUTO: 0.01 K/UL (ref 0–0.2)
BASOPHILS NFR BLD: 0.9 % (ref 0–1.9)
BILIRUB SERPL-MCNC: 0.4 MG/DL (ref 0.1–1)
BUN SERPL-MCNC: 11 MG/DL (ref 8–23)
CALCIUM SERPL-MCNC: 8.2 MG/DL (ref 8.7–10.5)
CHLORIDE SERPL-SCNC: 100 MMOL/L (ref 95–110)
CO2 SERPL-SCNC: 28 MMOL/L (ref 23–29)
CREAT SERPL-MCNC: 1 MG/DL (ref 0.5–1.4)
DIFFERENTIAL METHOD BLD: ABNORMAL
EOSINOPHIL # BLD AUTO: 0 K/UL (ref 0–0.5)
EOSINOPHIL NFR BLD: 0.9 % (ref 0–8)
ERYTHROCYTE [DISTWIDTH] IN BLOOD BY AUTOMATED COUNT: 13.1 % (ref 11.5–14.5)
EST. GFR  (NO RACE VARIABLE): 58.8 ML/MIN/1.73 M^2
GLUCOSE SERPL-MCNC: 100 MG/DL (ref 70–110)
HCT VFR BLD AUTO: 28.6 % (ref 37–48.5)
HGB BLD-MCNC: 9.6 G/DL (ref 12–16)
IMM GRANULOCYTES # BLD AUTO: 0.01 K/UL (ref 0–0.04)
IMM GRANULOCYTES NFR BLD AUTO: 0.9 % (ref 0–0.5)
LYMPHOCYTES # BLD AUTO: 0.9 K/UL (ref 1–4.8)
LYMPHOCYTES NFR BLD: 83 % (ref 18–48)
MAGNESIUM SERPL-MCNC: 1.2 MG/DL (ref 1.6–2.6)
MCH RBC QN AUTO: 31.4 PG (ref 27–31)
MCHC RBC AUTO-ENTMCNC: 33.6 G/DL (ref 32–36)
MCV RBC AUTO: 94 FL (ref 82–98)
MONOCYTES # BLD AUTO: 0.1 K/UL (ref 0.3–1)
MONOCYTES NFR BLD: 11.3 % (ref 4–15)
NEUTROPHILS # BLD AUTO: 0 K/UL (ref 1.8–7.7)
NEUTROPHILS NFR BLD: 3 % (ref 38–73)
NRBC BLD-RTO: 3 /100 WBC
PLATELET # BLD AUTO: 37 K/UL (ref 150–450)
PLATELET BLD QL SMEAR: ABNORMAL
PMV BLD AUTO: 9.7 FL (ref 9.2–12.9)
POTASSIUM SERPL-SCNC: 4.3 MMOL/L (ref 3.5–5.1)
PROT SERPL-MCNC: 6.2 G/DL (ref 6–8.4)
RBC # BLD AUTO: 3.06 M/UL (ref 4–5.4)
SODIUM SERPL-SCNC: 135 MMOL/L (ref 136–145)
WBC # BLD AUTO: 1.06 K/UL (ref 3.9–12.7)

## 2024-06-28 PROCEDURE — 63600175 PHARM REV CODE 636 W HCPCS: Performed by: INTERNAL MEDICINE

## 2024-06-28 PROCEDURE — 25000003 PHARM REV CODE 250: Performed by: INTERNAL MEDICINE

## 2024-06-28 PROCEDURE — 94761 N-INVAS EAR/PLS OXIMETRY MLT: CPT

## 2024-06-28 PROCEDURE — 99900031 HC PATIENT EDUCATION (STAT)

## 2024-06-28 PROCEDURE — 94640 AIRWAY INHALATION TREATMENT: CPT

## 2024-06-28 PROCEDURE — 83735 ASSAY OF MAGNESIUM: CPT

## 2024-06-28 PROCEDURE — 25000242 PHARM REV CODE 250 ALT 637 W/ HCPCS: Performed by: INTERNAL MEDICINE

## 2024-06-28 PROCEDURE — 21400001 HC TELEMETRY ROOM

## 2024-06-28 PROCEDURE — 99233 SBSQ HOSP IP/OBS HIGH 50: CPT | Mod: ,,, | Performed by: INTERNAL MEDICINE

## 2024-06-28 PROCEDURE — 80053 COMPREHEN METABOLIC PANEL: CPT

## 2024-06-28 PROCEDURE — 36415 COLL VENOUS BLD VENIPUNCTURE: CPT

## 2024-06-28 PROCEDURE — 25000003 PHARM REV CODE 250

## 2024-06-28 PROCEDURE — 85025 COMPLETE CBC W/AUTO DIFF WBC: CPT

## 2024-06-28 PROCEDURE — 27000221 HC OXYGEN, UP TO 24 HOURS

## 2024-06-28 PROCEDURE — 63600175 PHARM REV CODE 636 W HCPCS

## 2024-06-28 RX ORDER — FAMOTIDINE 20 MG/1
20 TABLET, FILM COATED ORAL DAILY
Status: DISCONTINUED | OUTPATIENT
Start: 2024-06-28 | End: 2024-07-01 | Stop reason: HOSPADM

## 2024-06-28 RX ORDER — HYDROMORPHONE HYDROCHLORIDE 1 MG/ML
0.5 INJECTION, SOLUTION INTRAMUSCULAR; INTRAVENOUS; SUBCUTANEOUS EVERY 6 HOURS PRN
Status: DISCONTINUED | OUTPATIENT
Start: 2024-06-28 | End: 2024-07-01 | Stop reason: HOSPADM

## 2024-06-28 RX ADMIN — HYDROMORPHONE HYDROCHLORIDE 0.5 MG: 0.5 INJECTION, SOLUTION INTRAMUSCULAR; INTRAVENOUS; SUBCUTANEOUS at 03:06

## 2024-06-28 RX ADMIN — IPRATROPIUM BROMIDE 0.5 MG: 0.5 SOLUTION RESPIRATORY (INHALATION) at 01:06

## 2024-06-28 RX ADMIN — CETIRIZINE HYDROCHLORIDE 10 MG: 10 TABLET, FILM COATED ORAL at 08:06

## 2024-06-28 RX ADMIN — FILGRASTIM-SNDZ 300 MCG: 300 INJECTION, SOLUTION INTRAVENOUS; SUBCUTANEOUS at 03:06

## 2024-06-28 RX ADMIN — DICYCLOMINE HYDROCHLORIDE 10 MG: 10 CAPSULE ORAL at 09:06

## 2024-06-28 RX ADMIN — IPRATROPIUM BROMIDE 0.5 MG: 0.5 SOLUTION RESPIRATORY (INHALATION) at 09:06

## 2024-06-28 RX ADMIN — DICYCLOMINE HYDROCHLORIDE 10 MG: 10 CAPSULE ORAL at 08:06

## 2024-06-28 RX ADMIN — LEVOTHYROXINE SODIUM 112 MCG: 0.11 TABLET ORAL at 05:06

## 2024-06-28 RX ADMIN — CEFEPIME HYDROCHLORIDE 2 G: 2 INJECTION, POWDER, FOR SOLUTION INTRAVENOUS at 05:06

## 2024-06-28 RX ADMIN — METOPROLOL TARTRATE 25 MG: 25 TABLET, FILM COATED ORAL at 09:06

## 2024-06-28 RX ADMIN — MUPIROCIN 1 G: 20 OINTMENT TOPICAL at 08:06

## 2024-06-28 RX ADMIN — ONDANSETRON 4 MG: 2 INJECTION INTRAMUSCULAR; INTRAVENOUS at 03:06

## 2024-06-28 RX ADMIN — CEFEPIME HYDROCHLORIDE 2 G: 2 INJECTION, POWDER, FOR SOLUTION INTRAVENOUS at 06:06

## 2024-06-28 RX ADMIN — METOPROLOL TARTRATE 25 MG: 25 TABLET, FILM COATED ORAL at 08:06

## 2024-06-28 RX ADMIN — IPRATROPIUM BROMIDE 0.5 MG: 0.5 SOLUTION RESPIRATORY (INHALATION) at 08:06

## 2024-06-28 RX ADMIN — ACETAMINOPHEN 650 MG: 325 TABLET ORAL at 08:06

## 2024-06-28 RX ADMIN — ASPIRIN 81 MG: 81 TABLET, COATED ORAL at 08:06

## 2024-06-28 RX ADMIN — MUPIROCIN 1 G: 20 OINTMENT TOPICAL at 09:06

## 2024-06-28 RX ADMIN — ALPRAZOLAM 0.25 MG: 0.25 TABLET ORAL at 09:06

## 2024-06-28 RX ADMIN — ARFORMOTEROL TARTRATE 15 MCG: 15 SOLUTION RESPIRATORY (INHALATION) at 08:06

## 2024-06-28 RX ADMIN — SODIUM CHLORIDE, POTASSIUM CHLORIDE, SODIUM LACTATE AND CALCIUM CHLORIDE: 600; 310; 30; 20 INJECTION, SOLUTION INTRAVENOUS at 05:06

## 2024-06-28 RX ADMIN — SODIUM CHLORIDE, POTASSIUM CHLORIDE, SODIUM LACTATE AND CALCIUM CHLORIDE: 600; 310; 30; 20 INJECTION, SOLUTION INTRAVENOUS at 03:06

## 2024-06-28 RX ADMIN — FAMOTIDINE 20 MG: 20 TABLET ORAL at 08:06

## 2024-06-28 RX ADMIN — DICYCLOMINE HYDROCHLORIDE 10 MG: 10 CAPSULE ORAL at 03:06

## 2024-06-28 NOTE — NURSING
Critical lab value noted: WBC 1.06, PLT 37,000.  Rounding completed with Dr. Monaco who reviewed lab values with RN at bedside.  Order received to adjust administration time of Zarxio to 1400 and pre-medicate with Zofran 4mg and Hydromorphone 0.5mg due to nausea and pain following administration.

## 2024-06-28 NOTE — ASSESSMENT & PLAN NOTE
Current CBC reviewed-   Lab Results   Component Value Date    HGB 9.6 (L) 06/28/2024    HCT 28.6 (L) 06/28/2024     Monitor serial CBC and transfuse if patient becomes hemodynamically unstable, symptomatic or H/H drops below 7/21.

## 2024-06-28 NOTE — CARE UPDATE
06/27/24 2055   Patient Assessment/Suction   Level of Consciousness (AVPU) alert   Respiratory Effort Normal   Expansion/Accessory Muscles/Retractions no use of accessory muscles   All Lung Fields Breath Sounds diminished   Rhythm/Pattern, Respiratory unlabored   Cough Frequency no cough   Skin Integrity   $ Wound Care Tech Time 15 min   Area Observed Left;Right;Cheek;Nares   Skin Appearance without discoloration   PRE-TX-O2   Device (Oxygen Therapy) nasal cannula   Flow (L/min) (Oxygen Therapy) 2   SpO2 96 %   Pulse Oximetry Type Intermittent   $ Pulse Oximetry - Multiple Charge Pulse Oximetry - Multiple   Pulse 82   Resp 19   Aerosol Therapy   $ Aerosol Therapy Charges Aerosol Treatment   Daily Review of Necessity (SVN) completed   Respiratory Treatment Status (SVN) given   Treatment Route (SVN) mask   Patient Position semi-Feliciano's   Post Treatment Assessment (SVN) breath sounds unchanged;vital signs unchanged   Signs of Intolerance (SVN) none   Education   $ Education Bronchodilator;15 min   Respiratory Evaluation   $ Care Plan Tech Time 15 min

## 2024-06-28 NOTE — PROGRESS NOTES
[facility] Medicine  Progress Note    Patient Name: Joslyn Dubon  MRN: [id number]  Patient Class: IP- Inpatient   Admission Date: 6/23/2024  Length of Stay: 4 days  Attending Physician: Royce Monaco MD  Primary Care Provider: Jasbir Graham MD        Subjective:     Principal Problem:Chest pain        HPI:  73-year-old female presented to ED for eval. Patient reported for the last few days she has had intractable nausea and vomiting, with associated LLQ abd pain and intermittent diarrhea. Denied melena, hematochezia, hematemesis. 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 Patient also reported over the last 2 days she has been having squeezing L sided intermittent chest pain that radiates to her back with shortness of breath. She does have chronic resp failure and COPD but reports shortness of breath is increased from baseline, denies use of home O2. Patient denies previous similar episodes of chest pain, denies hx CAD. She does have hx of AAA s/p repair. Noted to have WEN, hypomagnesemia, hypokalemia. CXR impression without acute abn. Of note, patient has contrast allergy with anaphylactic reaction, also reports severe hypotension with dilaudid and morphine, can tolerate norco. Admit to hospital medicine for further eval.     Overview/Hospital Course:  Patient was admitted to the hospital with  symptoms.  Patient was currently on 2nd round of chemotherapy for lung cancer.  CT of the abdomen was done with no acute except previous vascular stent.  C diff test is negative and all stool tests are negative and patient was started on anti diarrhea and gradually better.  Patient had chest pain episode during the admission and patient was offered stress test but she declined.  She was reviewed by the oncologist and offered G-CSF to increase the white cell but she declined that as  well    Interval History:    Afebrile . Abd pain resolved . Wbc up and platelet down and ANC 0  Lung clear . Abdomen non tender   Cultures neg    Review of Systems  Objective:     Vital Signs (Most Recent):  Temp: 99.2 °F (37.3 °C) (06/28/24 0734)  Pulse: 89 (06/28/24 0804)  Resp: 18 (06/28/24 0804)  BP: (!) 134/96 (06/28/24 0847)  SpO2: 97 % (06/28/24 0804) Vital Signs (24h Range):  Temp:  [97.8 °F (36.6 °C)-99.2 °F (37.3 °C)] 99.2 °F (37.3 °C)  Pulse:  [65-92] 89  Resp:  [16-21] 18  SpO2:  [95 %-99 %] 97 %  BP: (104-135)/(62-96) 134/96     Weight: 77.3 kg (170 lb 6.4 oz)  Body mass index is 31.17 kg/m².    Intake/Output Summary (Last 24 hours) at 6/28/2024 0940  Last data filed at 6/28/2024 0750  Gross per 24 hour   Intake --   Output 1650 ml   Net -1650 ml         Physical Exam  Vitals and nursing note reviewed.   HENT:      Head: Normocephalic and atraumatic.   Eyes:      Conjunctiva/sclera: Conjunctivae normal.   Neck:      Vascular: No JVD.   Cardiovascular:      Heart sounds: Normal heart sounds.   Pulmonary:      Effort: Pulmonary effort is normal.      Breath sounds: Normal breath sounds.   Abdominal:      Palpations: Abdomen is soft.   Musculoskeletal:         General: Normal range of motion.   Skin:     General: Skin is warm.   Neurological:      Mental Status: She is alert and oriented to person, place, and time.   Psychiatric:         Mood and Affect: Mood normal.             Significant Labs: All pertinent labs within the past 24 hours have been reviewed.  CBC:   Recent Labs   Lab 06/27/24  0414 06/28/24  0605   WBC 0.55*  0.55* 1.06*   HGB 8.9*  8.9* 9.6*   HCT 26.2*  26.2* 28.6*   PLT 68*  68* 37*     CMP:   Recent Labs   Lab 06/27/24  0414 06/27/24  1408 06/28/24  0605   *  --  135*   K 3.6 3.9 4.3   CL 99  --  100   CO2 27  --  28     --  100   BUN 13  --  11   CREATININE 1.0  --  1.0   CALCIUM 8.7  --  8.2*   PROT 6.3  --  6.2   ALBUMIN 3.4*  --  3.3*   BILITOT 0.5  --  0.4  "  ALKPHOS 75  --  68   AST 12  --  11   ALT 13  --  9*   ANIONGAP 8  --  7*     Cardiac Markers: No results for input(s): "CKMB", "MYOGLOBIN", "BNP", "TROPISTAT" in the last 48 hours.    Significant Imaging: I have reviewed all pertinent imaging results/findings within the past 24 hours.    Assessment/Plan:      * Chest pain  Serial troponins x 3 all negative   ECHO to follow   Stress test when patient more stable   EKG PRN  NTG PRN  ASA  VQ scan negative at perfusion scan   Patient not keen to do stress test     Febrile neutropenia  Unsure cause   Follow BC  Continue antibiotics for now, dc vanco  Follow final BC and so far neg   Continue cefepime     Chemotherapy induced neutropenia  S/p G CSF  Follow WBC and neutropenia noted  Reverse isolation   DC vanco   Continue cefepime       Anemia due to chemotherapy    Current CBC reviewed-   Lab Results   Component Value Date    HGB 9.6 (L) 06/28/2024    HCT 28.6 (L) 06/28/2024     Monitor serial CBC and transfuse if patient becomes hemodynamically unstable, symptomatic or H/H drops below 7/21.    Malignant neoplasm of lung  Aware  Follow dr lott   Chemo on hold      WEN (acute kidney injury)  Patient with acute kidney injury/acute renal failure likely due to pre-renal azotemia due to dehydration WEN is currently stable. Baseline creatinine  1  - Labs reviewed- Renal function/electrolytes with Estimated Creatinine Clearance: 46.8 mL/min (based on SCr of 1 mg/dL). according to latest data. Monitor urine output and serial CMP and adjust therapy as needed. Avoid nephrotoxins and renally dose meds for GFR listed above.  resolved    Diarrhea  Stool studies  neg  Lyte replacement per protocol  Lomotil  and now improved  and DC resolved       Intractable nausea and vomiting  Secondary to  chemo  Lipase negative   Zofran, phenergan PRN NV  IVF  Lyte replacement per protocol  CT abd/pelvis noted. Non acute   resolved    Non-small cell carcinoma of left lung  Last chemo " 6/19/24  RT assess and treat  Supplemental O2 PRN  Resume home inhalers  Starting neupogen    History of AAA repair  Noted in CT  Non acute      VTE Risk Mitigation (From admission, onward)           Ordered     Reason for No Pharmacological VTE Prophylaxis  Once        Question:  Reasons:  Answer:  Risk of Bleeding    06/23/24 1743     IP VTE HIGH RISK PATIENT  Once         06/23/24 1743     Place sequential compression device  Until discontinued         06/23/24 1743                    Discharge Planning   TINO: 7/1/2024     Code Status: Full Code   Is the patient medically ready for discharge?:     Reason for patient still in hospital (select all that apply): Treatment  Discharge Plan A: Home with family                  Royce Monaco MD  Department of Hospital Medicine   [facility]

## 2024-06-28 NOTE — ASSESSMENT & PLAN NOTE
Unsure cause   Follow BC  Continue antibiotics for now, dc vanco  Follow final BC and so far neg   Continue cefepime

## 2024-06-28 NOTE — SUBJECTIVE & OBJECTIVE
Interval History:    Afebrile . Abd pain resolved . Wbc up and platelet down and ANC 0  Lung clear . Abdomen non tender   Cultures neg    Review of Systems  Objective:     Vital Signs (Most Recent):  Temp: 99.2 °F (37.3 °C) (06/28/24 0734)  Pulse: 89 (06/28/24 0804)  Resp: 18 (06/28/24 0804)  BP: (!) 134/96 (06/28/24 0847)  SpO2: 97 % (06/28/24 0804) Vital Signs (24h Range):  Temp:  [97.8 °F (36.6 °C)-99.2 °F (37.3 °C)] 99.2 °F (37.3 °C)  Pulse:  [65-92] 89  Resp:  [16-21] 18  SpO2:  [95 %-99 %] 97 %  BP: (104-135)/(62-96) 134/96     Weight: 77.3 kg (170 lb 6.4 oz)  Body mass index is 31.17 kg/m².    Intake/Output Summary (Last 24 hours) at 6/28/2024 0940  Last data filed at 6/28/2024 0750  Gross per 24 hour   Intake --   Output 1650 ml   Net -1650 ml         Physical Exam  Vitals and nursing note reviewed.   HENT:      Head: Normocephalic and atraumatic.   Eyes:      Conjunctiva/sclera: Conjunctivae normal.   Neck:      Vascular: No JVD.   Cardiovascular:      Heart sounds: Normal heart sounds.   Pulmonary:      Effort: Pulmonary effort is normal.      Breath sounds: Normal breath sounds.   Abdominal:      Palpations: Abdomen is soft.   Musculoskeletal:         General: Normal range of motion.   Skin:     General: Skin is warm.   Neurological:      Mental Status: She is alert and oriented to person, place, and time.   Psychiatric:         Mood and Affect: Mood normal.             Significant Labs: All pertinent labs within the past 24 hours have been reviewed.  CBC:   Recent Labs   Lab 06/27/24  0414 06/28/24  0605   WBC 0.55*  0.55* 1.06*   HGB 8.9*  8.9* 9.6*   HCT 26.2*  26.2* 28.6*   PLT 68*  68* 37*     CMP:   Recent Labs   Lab 06/27/24  0414 06/27/24  1408 06/28/24  0605   *  --  135*   K 3.6 3.9 4.3   CL 99  --  100   CO2 27  --  28     --  100   BUN 13  --  11   CREATININE 1.0  --  1.0   CALCIUM 8.7  --  8.2*   PROT 6.3  --  6.2   ALBUMIN 3.4*  --  3.3*   BILITOT 0.5  --  0.4   ALKPHOS 75   "--  68   AST 12  --  11   ALT 13  --  9*   ANIONGAP 8  --  7*     Cardiac Markers: No results for input(s): "CKMB", "MYOGLOBIN", "BNP", "TROPISTAT" in the last 48 hours.    Significant Imaging: I have reviewed all pertinent imaging results/findings within the past 24 hours.  "

## 2024-06-28 NOTE — PLAN OF CARE
Problem: Fever with Neutropenia  Goal: Baseline Body Temperature  Outcome: Progressing  Goal: Absence of Infection  Outcome: Progressing     Problem: Oncology Care  Goal: Effective Coping  Outcome: Progressing  Goal: Optimal Pain Control and Function  Outcome: Progressing     Problem: Infection  Goal: Absence of Infection Signs and Symptoms  Outcome: Progressing

## 2024-06-28 NOTE — PROGRESS NOTES
Pharmacy Consult Discontinuation: Vancomycin    Joslyn Dubon [id number] is a 73 y.o. female was consulted for vancomycin pharmacotherapy management by pharmacy.    Pharmacy consult for vancomycin dosing is no longer required.  Vancomycin was discontinued 06/28/2024.    Thank you for allowing us to participate in this patient's care. Should you have any questions or concerns please feel free to contact the pharmacy department at [phone].    Luis Kumari, KatharineD

## 2024-06-28 NOTE — CARE UPDATE
06/28/24 0804   Patient Assessment/Suction   Level of Consciousness (AVPU) alert   Respiratory Effort Normal;Unlabored   Expansion/Accessory Muscles/Retractions no retractions;no use of accessory muscles;expansion symmetric   All Lung Fields Breath Sounds Anterior:;wheezes, high-pitched;diminished;wheezes, expiratory   Rhythm/Pattern, Respiratory pursed lip breathing;unlabored;pattern regular;depth regular   Cough Frequency infrequent   Cough Type good   Secretions Amount scant   Secretions Color white   Secretions Characteristics frothy   PRE-TX-O2   Device (Oxygen Therapy) nasal cannula   $ Is the patient on Low Flow Oxygen? Yes   Flow (L/min) (Oxygen Therapy) 2   SpO2 97 %   Pulse Oximetry Type Intermittent   $ Pulse Oximetry - Multiple Charge Pulse Oximetry - Multiple   Pulse 89   Resp 18   Positioning   Head of Bed (HOB) Positioning HOB elevated   Aerosol Therapy   $ Aerosol Therapy Charges Aerosol Treatment   Daily Review of Necessity (SVN) completed   Respiratory Treatment Status (SVN) given   Treatment Route (SVN) mask;oxygen   Patient Position HOB elevated   Post Treatment Assessment (SVN) breath sounds unchanged   Signs of Intolerance (SVN) none   Breath Sounds Post-Respiratory Treatment   Throughout All Fields Post-Treatment All Fields   Throughout All Fields Post-Treatment Anterior:;diminished   Post-treatment Heart Rate (beats/min) 76   Post-treatment Resp Rate (breaths/min) 20   Education   $ Education Bronchodilator;15 min

## 2024-06-28 NOTE — CARE UPDATE
06/28/24 1355   Patient Assessment/Suction   Level of Consciousness (AVPU) alert   Respiratory Effort Normal;Unlabored   Expansion/Accessory Muscles/Retractions no use of accessory muscles;no retractions;expansion symmetric   All Lung Fields Breath Sounds Anterior:;clear;diminished   Rhythm/Pattern, Respiratory patient-ventilator asynchrony;unlabored;pattern regular;depth regular   Cough Frequency infrequent   Cough Type good   PRE-TX-O2   Device (Oxygen Therapy) nasal cannula   $ Is the patient on Low Flow Oxygen? Yes   Flow (L/min) (Oxygen Therapy) 2   SpO2 98 %   Pulse Oximetry Type Intermittent   Pulse 82   Resp 18   Positioning   Head of Bed (HOB) Positioning HOB elevated   Aerosol Therapy   $ Aerosol Therapy Charges Aerosol Treatment   Respiratory Treatment Status (SVN) given   Treatment Route (SVN) mask;oxygen   Patient Position HOB elevated   Post Treatment Assessment (SVN) breath sounds unchanged   Signs of Intolerance (SVN) none   Breath Sounds Post-Respiratory Treatment   Throughout All Fields Post-Treatment All Fields   Throughout All Fields Post-Treatment Anterior:;diminished   Post-treatment Heart Rate (beats/min) 81   Post-treatment Resp Rate (breaths/min) 18   Education   $ Education Bronchodilator;15 min

## 2024-06-28 NOTE — PROGRESS NOTES
[facility]   Department of Infectious Disease  Progress Note        PATIENT NAME: Joslyn Dubon  MRN: [id number]  TODAY'S DATE: 06/28/2024  ADMIT DATE: 6/23/2024  LOS: 4 days    CHIEF COMPLAINT: Vomiting (X FEW DAYS) and Chest Pain      PRINCIPLE PROBLEM: Chest pain    INTERVAL HISTORY      06/28/2024:  Continues to improve and remains stable.  She had low-grade fever of 99.4 earlier today.  Cultures all remain negative so far.  States she feels bad after receiving filgrastim injection.  She gets tremulous and also has muscle aches primarily involving the lower extremities.      Antibiotics (From admission, onward)      Start     Stop Route Frequency Ordered    06/26/24 1830  cefepime 2 g in dextrose 5% 100 mL IVPB (ready to mix)         -- IV Every 12 hours (non-standard times) 06/26/24 1814    06/24/24 2100  mupirocin 2 % ointment         06/29/24 2059 Nasl 2 times daily 06/24/24 1420          Antifungals (From admission, onward)      None           Antivirals (From admission, onward)      None            ASSESSMENT and PLAN     1. Gastroenteritis in a patient who recently started chemotherapy.  Workup for infectious diarrhea so far negative.  This appears to be chemotherapy induced.  He has improved.  Continue symptomatic care.     2. Neutropenic fever.  Recently started cisplatin and etoposide and has pancytopenia.  Fever 06/26/2024 was about 1 hour after filgrastim infusion.  Workup for infection so far negative.  She feels well.  DC vancomycin today.  Anticipate switch cefepime to either Augmentin or ciprofloxacin p.o. tomorrow if cultures remain negative.    3. Lung cancer with neuroendocrine features.  She started chemotherapy 06/17/2024.  Management as per oncologist.     4. Anxiety disorder.      5. Leukopenia.  Improved on Filgastrim.  Plan for premedication to reduce side effects noted.    RECOMMENDATIONS:    DC vancomycin today   Follow pending cultures   Anticipate switch cefepime to  either Augmentin or ciprofloxacin p.o. tomorrow.    Please send Epic secure chat with any questions.      SUBJECTIVE    Joslyn Dubon is a 73 y.o. female with history of COPD and newly diagnosed lung cancer with neuroendocrine features.  She was commenced on cisplatin and etoposide 06/17/2024.  Received 2nd cycle 06/19/2024 developed nausea with vomiting and also left side abdominal pain.  Seen in the ED 06/21/2024.  Managed conservatively, improved and was discharged home.       Back in ED 06/23/2024 with since symptoms and was admitted.  She had WEN with multiple electrolyte imbalance.  CT abdomen and pelvis with no acute abnormality but did show in known aortic stent used to repair AAA.  Chest x-ray with no acute abnormality.  V/Q scan unremarkable.  Stool workup included C diff and stool culture negative.  Blood culture negative.     She has been seen by hematologist.  Initially of G-CSF which she declined.  It was later started yesterday 06/26/2024 also been seen by GI service stool workup.  Had fever 06/26/2024 and was commenced on antibiotics repeat blood cultures so far negative.  She has improved overall clinically since hospitalization.     Microbiology   Blood culture 06/23/2024: NG CD   Stool culture 06/24/2024: Negative   Stool C diff 06/24/2024: Negative   Blood culture 06/26/2024:  In progress     Antibiotics   Cefepime: 06/26/2024-  Vancomycin: 06/26/2024-    Review of Systems  Negative except as stated above in Interval History     OBJECTIVE   Temp:  [97.8 °F (36.6 °C)-99.2 °F (37.3 °C)] 99.2 °F (37.3 °C)  Pulse:  [65-92] 89  Resp:  [16-21] 18  SpO2:  [95 %-99 %] 97 %  BP: (104-135)/(62-96) 134/96  Temp:  [97.8 °F (36.6 °C)-99.2 °F (37.3 °C)]   Temp: 99.2 °F (37.3 °C) (06/28/24 0734)  Pulse: 89 (06/28/24 0804)  Resp: 18 (06/28/24 0804)  BP: (!) 134/96 (06/28/24 0847)  SpO2: 97 % (06/28/24 0804)    Intake/Output Summary (Last 24 hours) at 6/28/2024 0965  Last data filed at 6/28/2024 0750  Gross  per 24 hour   Intake --   Output 1650 ml   Net -1650 ml       Physical Exam  General:  Elderly woman lying quietly in bed in no acute distress   CVS: S1 and 2 heard   Respiratory: Clear to auscultation   Abdomen: Full, soft, nontender, no palpable organomegaly  Skin: No rash appreciated   CNS: No focal deficits   Musculoskeletal system: No joint or bony abnormalities appreciated    VAD:  Right chest MediPort  ISOLATION:  Neutropenic precautions    WOUNDS:  None    Significant Labs: All pertinent labs within the past 24 hours have been reviewed.    CBC LAST 7 DAYS  Recent Labs   Lab 06/23/24  0910 06/24/24  0508 06/25/24  0529 06/26/24  0452 06/27/24  0414 06/28/24  0605   WBC 3.01* 1.68* 1.21* 0.93* 0.55*  0.55* 1.06*   RBC 3.94* 3.48* 3.09* 3.32* 2.84*  2.84* 3.06*   HGB 12.0 11.0* 9.7* 10.2* 8.9*  8.9* 9.6*   HCT 36.1* 32.5* 29.2* 31.2* 26.2*  26.2* 28.6*   MCV 92 93 95 94 92  92 94   MCH 30.5 31.6* 31.4* 30.7 31.3*  31.3* 31.4*   MCHC 33.2 33.8 33.2 32.7 34.0  34.0 33.6   RDW 13.0 13.2 13.1 12.9 12.8  12.8 13.1    142* 104* 90* 68*  68* 37*   MPV 8.8* 8.1* 8.7* 8.9* 8.8*  8.8* 9.7   GRAN 63.0 45.0 42.0 17.2*  0.2* 9.0*  9.0*  0.1*  0.1* 3.0*  0.0*   LYMPH 37.0 49.0* 58.0* 78.5*  0.7* 85.5*  85.5*  0.5*  0.5* 83.0*  0.9*   MONO 0.0* 5.0 0.0* 3.2*  0.0* 5.5  5.5  0.0*  0.0* 11.3  0.1*   BASO  --   --   --  0.00 0.00  0.00 0.01   NRBC 0 0 0 0 0  0 3*       CHEMISTRY LAST 7 DAYS  Recent Labs   Lab 06/23/24  0910 06/24/24  0508 06/25/24  0529 06/26/24  0452 06/27/24  0414 06/27/24  1408 06/28/24  0605    141 140 140 134*  --  135*   K 3.3* 3.1* 3.5 3.7 3.6 3.9 4.3   CL 98 101 104 103 99  --  100   CO2 27 30* 31* 30* 27  --  28   ANIONGAP 14 10 5* 7* 8  --  7*   BUN 27* 24* 18 12 13  --  11   CREATININE 1.5* 1.2 1.0 1.0 1.0  --  1.0   * 103 123* 83 103  --  100   CALCIUM 9.4 8.7 8.5* 8.9 8.7  --  8.2*   MG 1.5* 1.6 1.3* 1.3* 1.0*  --  1.2*   ALBUMIN 4.0 3.6 3.2* 3.4* 3.4*   "--  3.3*   PROT 7.6 6.6 5.9* 6.2 6.3  --  6.2   ALKPHOS 98 84 80 85 75  --  68   ALT 23 21 18 18 13  --  9*   AST 20 18 15 15 12  --  11   BILITOT 0.6 0.6 0.3 0.4 0.5  --  0.4       Estimated Creatinine Clearance: 46.8 mL/min (based on SCr of 1 mg/dL).    INFLAMMATORY/PROCAL  LAST 7 DAYS  Recent Labs   Lab 06/23/24 2040   PROCAL 0.108     No results found for: "ESR"  No results found for: "CRP"    PRIOR  MICROBIOLOGY:    Susceptibility data from last 90 days.  Collected Specimen Info Organism   04/19/24 Pleural Fluid from Lung, ELOY CUTIBACTERIUM ACNES       LAST 7 DAYS MICROBIOLOGY   Microbiology Results (last 7 days)       Procedure Component Value Units Date/Time    Blood culture [[id number]] Collected: 06/26/24 1840    Order Status: Completed Specimen: Blood from Peripheral, Hand, Left Updated: 06/27/24 2032     Blood Culture, Routine No Growth to date      No Growth to date    Blood culture [[id number]] Collected: 06/26/24 1841    Order Status: Completed Specimen: Blood from Peripheral, Hand, Right Updated: 06/27/24 2032     Blood Culture, Routine No Growth to date      No Growth to date    Blood culture (site 1) [[id number]] Collected: 06/23/24 1756    Order Status: Completed Specimen: Blood Updated: 06/27/24 2032     Blood Culture, Routine No Growth to date      No Growth to date      No Growth to date      No Growth to date      No Growth to date    Narrative:      Site # 1, aerobic and anaerobic    Blood culture (site 2) [[id number]] Collected: 06/23/24 1756    Order Status: Completed Specimen: Blood Updated: 06/27/24 2032     Blood Culture, Routine No Growth to date      No Growth to date      No Growth to date      No Growth to date      No Growth to date    Narrative:      Site # 2, aerobic only    Stool culture [[id number]] Collected: 06/24/24 0421    Order Status: Completed Specimen: Stool Updated: 06/26/24 1442     Stool Culture No Salmonella,Shigella,Vibrio,Campylobacter.      No E coli 0157:H7 " isolated.    Clostridium difficile EIA [[id number]] Collected: 06/24/24 0421    Order Status: Completed Specimen: Stool Updated: 06/24/24 0528     C. diff Antigen Negative     C difficile Toxins A+B, EIA Negative     Comment: Testing not recommended for children <24 months old.             CURRENT/PREVIOUS VISIT EKG  Results for orders placed or performed during the hospital encounter of 06/23/24   EKG 12-lead    Collection Time: 06/23/24  9:06 AM   Result Value Ref Range    QRS Duration 70 ms    [facility] QTC Calculation 469 ms    Narrative    Test Reason : R07.9,    Vent. Rate : 091 BPM     Atrial Rate : 091 BPM     P-R Int : 122 ms          QRS Dur : 070 ms      QT Int : 382 ms       P-R-T Axes : -19 031 083 degrees     QTc Int : 469 ms    Sinus rhythm with occasional Premature ventricular complexes  Otherwise normal ECG  Confirmed by Niko BONILLA, Kirstie Harp ([id number]) on 6/26/2024 12:05:00 AM    Referred By: [email or web address]   SELF           Confirmed By:Kirstie Pope MD     Significant Imaging: I have reviewed all relevant and available imaging results/findings within the past 24 hours.    I spent a total of 35 minutes on the day of the visit.This includes face to face time and non-face to face time preparing to see the patient (eg, review of tests), obtaining and/or reviewing separately obtained history, documenting clinical information in the electronic or other health record, independently interpreting results and communicating results to the patient/family/caregiver, or care coordinator.    Nic Costa MD  Date of Service: 06/28/2024      This note was created using [employer] voice recognition software that occasionally misinterpreted phrases or words.

## 2024-06-28 NOTE — PROGRESS NOTES
" [facility]  Hematology/Oncology  Inpatient Progress Note          Patient Name: Joslyn Dubon  MRN: [id number]  Admission Date: 6/23/2024  Hospital Length of Stay: 4 days  Code Status: Full Code   Attending Provider: Royce Monaco MD  Consulting Provider: Neo Mariano MD  Primary Care Physician: Jasbir Graham MD  Principal Problem:Chest pain      Coverage for Dr Camden Iyer       Subjective:       Patient ID: Joslyn Dubon is a 73 y.o. female.    Chief Complaint: Vomiting (X FEW DAYS) and Chest Pain        History Present Illness:    Patient remains in the hospital; she is laying in bed awake and alert; she reports that she is feeling better; WBC improved and continued on GCSF; no CP, SOB, HA's or N/V;  is at bedside; discussed with floor nursing staff in person      Review of Systems:  GEN: general malaise, weakness, fatigue  HEENT: normal with no HA's, sore throat, stiff neck, changes in vision  CV: normal with no CP, SOB, PND, RAPHAEL or orthopnea  PULM: normal with no SOB, cough, hemoptysis, sputum or pleuritic pain  GI:   nausea/vomiting, and diarrhea have resolved  : normal with no hematuria, dysuria  BREAST: normal with no mass, discharge, pain  SKIN: normal with no rash, erythema, bruising, or swelling      Objective:     Vitals:  Blood pressure 127/81, pulse 95, temperature 98.1 °F (36.7 °C), temperature source Oral, resp. rate 18, height 5' 2" (1.575 m), weight 77.3 kg (170 lb 6.4 oz), SpO2 97%.    Physical Exam:  GEN: no apparent distress, comfortable; AAOx3; overweight  HEAD: atraumatic and normocephalic  EYES: no pallor, no icterus, PERRLA  ENT: OMM, no pharyngeal erythema, external ears WNL; no nasal discharge; no thrush  NECK: no masses, thyroid normal, trachea midline, no LAD/LN's, supple  CV: RRR with no murmur; normal pulse; normal S1 and S2; no pedal edema; Rght portacath  CHEST: Normal respiratory effort; CTAB; normal breath sounds; no wheeze or " crackles  ABDOM: nontender; nondistended; soft; normal bowel sounds; no rebound/guarding  MUSC/Skeletal: ROM normal; no crepitus; joints normal; no deformities or arthropathy  EXTREM: no clubbing, cyanosis, inflammation or swelling  SKIN: no rashes, lesions, ulcers, petechiae or subcutaneous nodules  : no payan  NEURO: grossly intact; motor/sensory WNL; AAOx3; no tremors; generalized weakness  PSYCH: normal mood, affect and behavior  LYMPH: normal cervical, supraclavicular, axillary and groin LN's            Lab Review:   Lab Results   Component Value Date    WBC 1.06 (LL) 06/28/2024    HGB 9.6 (L) 06/28/2024    HCT 28.6 (L) 06/28/2024    MCV 94 06/28/2024    PLT 37 (LL) 06/28/2024         CMP  Sodium   Date Value Ref Range Status   06/28/2024 135 (L) 136 - 145 mmol/L Final     Potassium   Date Value Ref Range Status   06/28/2024 4.3 3.5 - 5.1 mmol/L Final     Chloride   Date Value Ref Range Status   06/28/2024 100 95 - 110 mmol/L Final     CO2   Date Value Ref Range Status   06/28/2024 28 23 - 29 mmol/L Final     Glucose   Date Value Ref Range Status   06/28/2024 100 70 - 110 mg/dL Final     BUN   Date Value Ref Range Status   06/28/2024 11 8 - 23 mg/dL Final     Creatinine   Date Value Ref Range Status   06/28/2024 1.0 0.5 - 1.4 mg/dL Final   03/19/2013 0.8 0.5 - 1.4 mg/dL Final     Calcium   Date Value Ref Range Status   06/28/2024 8.2 (L) 8.7 - 10.5 mg/dL Final   03/19/2013 9.9 8.7 - 10.5 mg/dL Final     Total Protein   Date Value Ref Range Status   06/28/2024 6.2 6.0 - 8.4 g/dL Final     Albumin   Date Value Ref Range Status   06/28/2024 3.3 (L) 3.5 - 5.2 g/dL Final     Total Bilirubin   Date Value Ref Range Status   06/28/2024 0.4 0.1 - 1.0 mg/dL Final     Comment:     For infants and newborns, interpretation of results should be based  on gestational age, weight and in agreement with clinical  observations.    Premature Infant recommended reference ranges:  Up to 24 hours.............<8.0 mg/dL  Up to 48  hours............<12.0 mg/dL  3-5 days..................<15.0 mg/dL  6-29 days.................<15.0 mg/dL       Alkaline Phosphatase   Date Value Ref Range Status   06/28/2024 68 55 - 135 U/L Final   08/31/2012 107 23 - 119 UNIT/L Final     AST   Date Value Ref Range Status   06/28/2024 11 10 - 40 U/L Final   08/31/2012 21 10 - 30 UNIT/L Final     ALT   Date Value Ref Range Status   06/28/2024 9 (L) 10 - 44 U/L Final     Anion Gap   Date Value Ref Range Status   06/28/2024 7 (L) 8 - 16 mmol/L Final   03/19/2013 12 5 - 15 meq/L Final     eGFR   Date Value Ref Range Status   06/28/2024 58.8 (A) >60 mL/min/1.73 m^2 Final   06/14/2024 52 (L) > OR = 60 mL/min/1.73m2 Final              Radiology Diagnostic Studies:     NM Lung Scan Perfusion Particulate [[id number]] Resulted: 06/23/24 2017   Order Status: Completed Updated: 06/23/24 2020   Narrative:     EXAMINATION:  NM LUNG SCAN PERFUSION    CLINICAL HISTORY:  Chest pain, nonspecific;Pulmonary embolism (PE) suspected, high prob;Chest pain, nonspecific; Pulmonary embolism (PE) suspected, high prob;    TECHNIQUE:  IV administration of 5.2 mCi of Tc-99m-MAA, multiple images of the thorax were obtained in various projections.    COMPARISON:  Chest x-ray dated 06/23/2024.    FINDINGS:  Only perfusion images are provided for review.  No perfusion defect identified.  Evaluation for mismatch is precluded in the absence of ventilation portion of the examination.   Impression:                      X-Ray Chest AP Portable [[id number]] Resulted: 06/23/24 0926   Order Status: Completed Updated: 06/23/24 0929   Narrative:     EXAMINATION:  XR CHEST AP PORTABLE    CLINICAL HISTORY:  Chest Pain;    COMPARISON:  June 21, 2024    FINDINGS:  AP view demonstrates normal heart size.  The thoracic aorta is elongated.  Left upper lobe noncalcified pulmonary nodule is unchanged.  No infiltrates or effusions are identified.    Right-sided Port-A-Cath is unchanged in position with tip at the  superior vena cava.   Impression:       1. No interval change compared to June 21, 2024.          Assessment:     IMPRESSION:    (1) 73 y.o. female known to the oncology service of my associate Dr Camden Iyer with diagnosis of lung cancer with neuroendocrine features who has been on platinum and etopside chemotherapy. She presented to the ED with CP, SOB and N/V.      6/24/2024  - last chemo was on 6/19  - wbc at 1,68, hgb 11.0, and plats 142,000  - discussed GCSF for her leucopenia but she wants to hold off for now  - she does not seem to be tolerating the current regimen  - consider GI consult if her GI symptoms do not improve  - monitor counts for now    6/25/2024:  - wbc 1.21, hgb 9.7 and plats 104,000  - recurrent diarrhea and n/v again today  - GI consulted for evaluation    6/26/2024:  - wbc 0.93, hgb 10.2, plats 90,000  - her GI symptoms have improved/resolved; she was able to eat all day today without and N/V or diarrhea;   - WBC is still low; I discussed with her and her  about the risk of infection and she is now willing to start GSCF;  - I previously communicated with Dr Monaco; I discussed with her floor nurse in person    6/27/2024:  - she spiked temp yesterday evening  - cultures drawn and she was started on cefipime and vanc  - ID consulted  - started GCSF yesterday  - wbc 0.55, hgb 8.9, plats 68,000    6/28/2024:  - Wbc 1.06, hgb 9.6, plats 37,000  - on GCSF and IV antibiotics  - ID following     (2) HTN     (3) COPD     (4) GERD; Martin's esophagus; hx/of colitis     (5) Thyroid disease     (6) Anxiety     (7) Former smoker        1. Malignant neoplasm of lung, unspecified laterality, unspecified part of lung    2. Chest pain    3. Anemia due to chemotherapy    4. Chemotherapy induced neutropenia    5. Non-small cell carcinoma of left lung [C34.92]    6. Gastroesophageal reflux disease without esophagitis    7. Non-small cell carcinoma of left lung    8. Febrile neutropenia           Plan:      PLAN:          Monitor labs daily and transfuse as needed  Continued on GCSF  ID consulted - cultures drawn, IV antibiotics as per their directives  Conservative management for her nausea/diarrhea  Appreciate GI evaluation  IVF's, electrolyte management as per primary team  Will follow with you while Dr Iyer is on vacation  - Dr Velazquez is on call for us this weekend               Neo Mariano MD  Hematology/Oncology  [facility]

## 2024-06-28 NOTE — ASSESSMENT & PLAN NOTE
Secondary to  chemo  Lipase negative   Zofran, phenergan PRN NV  IVF  Lyte replacement per protocol  CT abd/pelvis noted. Non acute   resolved

## 2024-06-29 PROBLEM — D69.6 THROMBOCYTOPENIA: Status: ACTIVE | Noted: 2024-06-29

## 2024-06-29 LAB
ALBUMIN SERPL BCP-MCNC: 3.3 G/DL (ref 3.5–5.2)
ALP SERPL-CCNC: 71 U/L (ref 55–135)
ALT SERPL W/O P-5'-P-CCNC: 9 U/L (ref 10–44)
ANION GAP SERPL CALC-SCNC: 11 MMOL/L (ref 8–16)
AST SERPL-CCNC: 11 U/L (ref 10–40)
BASOPHILS NFR BLD: 0 % (ref 0–1.9)
BILIRUB SERPL-MCNC: 0.3 MG/DL (ref 0.1–1)
BUN SERPL-MCNC: 10 MG/DL (ref 8–23)
CALCIUM SERPL-MCNC: 8.2 MG/DL (ref 8.7–10.5)
CHLORIDE SERPL-SCNC: 102 MMOL/L (ref 95–110)
CO2 SERPL-SCNC: 26 MMOL/L (ref 23–29)
CREAT SERPL-MCNC: 0.9 MG/DL (ref 0.5–1.4)
DIFFERENTIAL METHOD BLD: ABNORMAL
EOSINOPHIL NFR BLD: 0 % (ref 0–8)
ERYTHROCYTE [DISTWIDTH] IN BLOOD BY AUTOMATED COUNT: 13 % (ref 11.5–14.5)
EST. GFR  (NO RACE VARIABLE): >60 ML/MIN/1.73 M^2
GLUCOSE SERPL-MCNC: 79 MG/DL (ref 70–110)
HCT VFR BLD AUTO: 27.2 % (ref 37–48.5)
HGB BLD-MCNC: 8.9 G/DL (ref 12–16)
IMM GRANULOCYTES # BLD AUTO: ABNORMAL K/UL (ref 0–0.04)
IMM GRANULOCYTES NFR BLD AUTO: ABNORMAL % (ref 0–0.5)
LDH SERPL L TO P-CCNC: 194 U/L (ref 110–260)
LYMPHOCYTES NFR BLD: 80 % (ref 18–48)
MAGNESIUM SERPL-MCNC: 1.4 MG/DL (ref 1.6–2.6)
MCH RBC QN AUTO: 31.4 PG (ref 27–31)
MCHC RBC AUTO-ENTMCNC: 32.7 G/DL (ref 32–36)
MCV RBC AUTO: 96 FL (ref 82–98)
MONOCYTES NFR BLD: 18 % (ref 4–15)
MYELOCYTES NFR BLD MANUAL: 2 %
NEUTROPHILS NFR BLD: 0 % (ref 38–73)
NRBC BLD-RTO: 2 /100 WBC
PLATELET # BLD AUTO: 40 K/UL (ref 150–450)
PMV BLD AUTO: 10.3 FL (ref 9.2–12.9)
POTASSIUM SERPL-SCNC: 3.9 MMOL/L (ref 3.5–5.1)
PROT SERPL-MCNC: 6.3 G/DL (ref 6–8.4)
RBC # BLD AUTO: 2.83 M/UL (ref 4–5.4)
SODIUM SERPL-SCNC: 139 MMOL/L (ref 136–145)
WBC # BLD AUTO: 1.06 K/UL (ref 3.9–12.7)

## 2024-06-29 PROCEDURE — 63600175 PHARM REV CODE 636 W HCPCS

## 2024-06-29 PROCEDURE — 94640 AIRWAY INHALATION TREATMENT: CPT

## 2024-06-29 PROCEDURE — 36415 COLL VENOUS BLD VENIPUNCTURE: CPT | Performed by: INTERNAL MEDICINE

## 2024-06-29 PROCEDURE — 36415 COLL VENOUS BLD VENIPUNCTURE: CPT

## 2024-06-29 PROCEDURE — 25000003 PHARM REV CODE 250: Performed by: INTERNAL MEDICINE

## 2024-06-29 PROCEDURE — 25000242 PHARM REV CODE 250 ALT 637 W/ HCPCS: Performed by: INTERNAL MEDICINE

## 2024-06-29 PROCEDURE — 63600175 PHARM REV CODE 636 W HCPCS: Performed by: INTERNAL MEDICINE

## 2024-06-29 PROCEDURE — 80053 COMPREHEN METABOLIC PANEL: CPT

## 2024-06-29 PROCEDURE — 83010 ASSAY OF HAPTOGLOBIN QUANT: CPT | Performed by: INTERNAL MEDICINE

## 2024-06-29 PROCEDURE — 99233 SBSQ HOSP IP/OBS HIGH 50: CPT | Mod: ,,, | Performed by: INTERNAL MEDICINE

## 2024-06-29 PROCEDURE — 83615 LACTATE (LD) (LDH) ENZYME: CPT | Performed by: INTERNAL MEDICINE

## 2024-06-29 PROCEDURE — 85027 COMPLETE CBC AUTOMATED: CPT

## 2024-06-29 PROCEDURE — 25000003 PHARM REV CODE 250

## 2024-06-29 PROCEDURE — 83735 ASSAY OF MAGNESIUM: CPT

## 2024-06-29 PROCEDURE — 85007 BL SMEAR W/DIFF WBC COUNT: CPT

## 2024-06-29 PROCEDURE — 94761 N-INVAS EAR/PLS OXIMETRY MLT: CPT

## 2024-06-29 PROCEDURE — 27000221 HC OXYGEN, UP TO 24 HOURS

## 2024-06-29 PROCEDURE — 99900031 HC PATIENT EDUCATION (STAT)

## 2024-06-29 PROCEDURE — 21400001 HC TELEMETRY ROOM

## 2024-06-29 RX ORDER — AMOXICILLIN AND CLAVULANATE POTASSIUM 875; 125 MG/1; MG/1
1 TABLET, FILM COATED ORAL EVERY 12 HOURS
Status: DISCONTINUED | OUTPATIENT
Start: 2024-06-29 | End: 2024-07-01 | Stop reason: HOSPADM

## 2024-06-29 RX ADMIN — DICYCLOMINE HYDROCHLORIDE 10 MG: 10 CAPSULE ORAL at 12:06

## 2024-06-29 RX ADMIN — ARFORMOTEROL TARTRATE 15 MCG: 15 SOLUTION RESPIRATORY (INHALATION) at 07:06

## 2024-06-29 RX ADMIN — IPRATROPIUM BROMIDE 0.5 MG: 0.5 SOLUTION RESPIRATORY (INHALATION) at 07:06

## 2024-06-29 RX ADMIN — HYDROMORPHONE HYDROCHLORIDE 0.5 MG: 0.5 INJECTION, SOLUTION INTRAMUSCULAR; INTRAVENOUS; SUBCUTANEOUS at 06:06

## 2024-06-29 RX ADMIN — HYDROCODONE BITARTRATE AND ACETAMINOPHEN 1 TABLET: 5; 325 TABLET ORAL at 11:06

## 2024-06-29 RX ADMIN — DICYCLOMINE HYDROCHLORIDE 10 MG: 10 CAPSULE ORAL at 04:06

## 2024-06-29 RX ADMIN — LEVOTHYROXINE SODIUM 112 MCG: 0.11 TABLET ORAL at 05:06

## 2024-06-29 RX ADMIN — Medication 800 MG: at 08:06

## 2024-06-29 RX ADMIN — Medication 800 MG: at 03:06

## 2024-06-29 RX ADMIN — ASPIRIN 81 MG: 81 TABLET, COATED ORAL at 09:06

## 2024-06-29 RX ADMIN — ONDANSETRON 4 MG: 2 INJECTION INTRAMUSCULAR; INTRAVENOUS at 12:06

## 2024-06-29 RX ADMIN — AMOXICILLIN AND CLAVULANATE POTASSIUM 1 TABLET: 875; 125 TABLET, FILM COATED ORAL at 12:06

## 2024-06-29 RX ADMIN — IPRATROPIUM BROMIDE 0.5 MG: 0.5 SOLUTION RESPIRATORY (INHALATION) at 01:06

## 2024-06-29 RX ADMIN — CETIRIZINE HYDROCHLORIDE 10 MG: 10 TABLET, FILM COATED ORAL at 09:06

## 2024-06-29 RX ADMIN — ARFORMOTEROL TARTRATE 15 MCG: 15 SOLUTION RESPIRATORY (INHALATION) at 08:06

## 2024-06-29 RX ADMIN — HYDROMORPHONE HYDROCHLORIDE 0.5 MG: 0.5 INJECTION, SOLUTION INTRAMUSCULAR; INTRAVENOUS; SUBCUTANEOUS at 12:06

## 2024-06-29 RX ADMIN — DICYCLOMINE HYDROCHLORIDE 10 MG: 10 CAPSULE ORAL at 09:06

## 2024-06-29 RX ADMIN — AMOXICILLIN AND CLAVULANATE POTASSIUM 1 TABLET: 875; 125 TABLET, FILM COATED ORAL at 08:06

## 2024-06-29 RX ADMIN — Medication 800 MG: at 12:06

## 2024-06-29 RX ADMIN — ONDANSETRON 4 MG: 2 INJECTION INTRAMUSCULAR; INTRAVENOUS at 06:06

## 2024-06-29 RX ADMIN — HYDROCODONE BITARTRATE AND ACETAMINOPHEN 1 TABLET: 5; 325 TABLET ORAL at 03:06

## 2024-06-29 RX ADMIN — IPRATROPIUM BROMIDE 0.5 MG: 0.5 SOLUTION RESPIRATORY (INHALATION) at 12:06

## 2024-06-29 RX ADMIN — DICYCLOMINE HYDROCHLORIDE 10 MG: 10 CAPSULE ORAL at 08:06

## 2024-06-29 RX ADMIN — FILGRASTIM-SNDZ 300 MCG: 300 INJECTION, SOLUTION INTRAVENOUS; SUBCUTANEOUS at 02:06

## 2024-06-29 RX ADMIN — MUPIROCIN 1 G: 20 OINTMENT TOPICAL at 09:06

## 2024-06-29 RX ADMIN — CEFEPIME HYDROCHLORIDE 2 G: 2 INJECTION, POWDER, FOR SOLUTION INTRAVENOUS at 05:06

## 2024-06-29 RX ADMIN — HYDROCODONE BITARTRATE AND ACETAMINOPHEN 1 TABLET: 5; 325 TABLET ORAL at 06:06

## 2024-06-29 RX ADMIN — FAMOTIDINE 20 MG: 20 TABLET ORAL at 09:06

## 2024-06-29 RX ADMIN — IPRATROPIUM BROMIDE 0.5 MG: 0.5 SOLUTION RESPIRATORY (INHALATION) at 08:06

## 2024-06-29 NOTE — ASSESSMENT & PLAN NOTE
-Hemoglobin 8.9g/dL on 6/29/24   -Will check ferritin, iron/TIBC, B12, folate, LDH, haptoglobin   -Transfuse for hemoglobin less than 7g/dL

## 2024-06-29 NOTE — ASSESSMENT & PLAN NOTE
-Due to chemo  -Pt did not receive Neulasta   -Continue granix until ANC is at least 1,000 on 2 consecutive days

## 2024-06-29 NOTE — ASSESSMENT & PLAN NOTE
Unsure cause   Follow BC  Continue antibiotics for now, dc vanco  Follow final BC and so far neg   replaced cefepime with cipro

## 2024-06-29 NOTE — ASSESSMENT & PLAN NOTE
-Likely due to chemo   -Would transfuse platelets for platelet count less than 10k, </=20k with petechiae or mucosal bleeding or </=50k with active bleeding or immediately prior to invasive procedures

## 2024-06-29 NOTE — ASSESSMENT & PLAN NOTE
-Pt with NSCLC in the [facility]  s/p 2 cycles of Cisplatin and etoposide with last cycle chemo on 6/19/24  -Pt s/p radiation to R[facility] 5/17/24   -Pt will need follow upw ith Dr Iyer on d/c

## 2024-06-29 NOTE — PROGRESS NOTES
[facility]   Department of Infectious Disease  Progress Note        PATIENT NAME: Joslyn Dubon  MRN: [id number]  TODAY'S DATE: 06/29/2024  ADMIT DATE: 6/23/2024  LOS: 5 days    CHIEF COMPLAINT: Vomiting (X FEW DAYS) and Chest Pain      PRINCIPLE PROBLEM: Chest pain    INTERVAL HISTORY      06/28/2024:  Continues to improve and remains stable.  She had low-grade fever of 99.4 earlier today.  Cultures all remain negative so far.  States she feels bad after receiving filgrastim injection.  She gets tremulous and also has muscle aches primarily involving the lower extremities.      06/29/2024:  No acute issues overnight.  Continues to improve.  Cultures remain negative.  WBC stable at 1.06 with 0% granulocytes today on automated differential.    Antibiotics (From admission, onward)      Start     Stop Route Frequency Ordered    06/26/24 1830  cefepime 2 g in dextrose 5% 100 mL IVPB (ready to mix)         -- IV Every 12 hours (non-standard times) 06/26/24 1814          Antifungals (From admission, onward)      None           Antivirals (From admission, onward)      None            ASSESSMENT and PLAN     1. Gastroenteritis in a patient who recently started chemotherapy.  Workup for infectious etiology negative.  Clinically resolved.  Monitor     2. Neutropenic fever.  Recently started cisplatin and etoposide and has pancytopenia.  Fever 06/26/2024 was about 1 hour after filgrastim infusion.  Workup for infection so far negative.  DC cefepime and start Augmentin p.o. for now. Treat for 7 days if continues to improve.    3. Lung cancer with neuroendocrine features.  She started chemotherapy 06/17/2024.  Management as per oncologist.     4. Anxiety disorder.      5. Leukopenia.  Improved on Filgastrim.  She is having side effects to the medication.  Defer to oncologist.    RECOMMENDATIONS:    DC cefepime today   Start Augmentin 875 mg b.i.d. p.o.    ID service will continue to follow with you.  Please call  with questions. Please send Epic secure chat with any questions.  Discussed with patient and her family at bedside.      SUBJECTIVE    Joslyn Dubon is a 73 y.o. female with history of COPD and newly diagnosed lung cancer with neuroendocrine features.  She was commenced on cisplatin and etoposide 06/17/2024.  Received 2nd cycle 06/19/2024 developed nausea with vomiting and also left side abdominal pain.  Seen in the ED 06/21/2024.  Managed conservatively, improved and was discharged home.       Back in ED 06/23/2024 with since symptoms and was admitted.  She had WEN with multiple electrolyte imbalance.  CT abdomen and pelvis with no acute abnormality but did show in known aortic stent used to repair AAA.  Chest x-ray with no acute abnormality.  V/Q scan unremarkable.  Stool workup included C diff and stool culture negative.  Blood culture negative.     She has been seen by hematologist.  Initially of G-CSF which she declined.  It was later started yesterday 06/26/2024 also been seen by GI service stool workup.  Had fever 06/26/2024 and was commenced on antibiotics repeat blood cultures so far negative.  She has improved overall clinically since hospitalization.     Microbiology   Blood culture 06/23/2024: NG CD   Stool culture 06/24/2024: Negative   Stool C diff 06/24/2024: Negative   Blood culture 06/26/2024:  In progress     Antibiotics   Cefepime: 06/26/2024-  Vancomycin: 06/26/2024-    Review of Systems  Negative except as stated above in Interval History     OBJECTIVE   Temp:  [97.8 °F (36.6 °C)-98.6 °F (37 °C)] 97.9 °F (36.6 °C)  Pulse:  [65-95] 68  Resp:  [16-19] 18  SpO2:  [94 %-98 %] 98 %  BP: ()/(69-86) 109/71  Temp:  [97.8 °F (36.6 °C)-98.6 °F (37 °C)]   Temp: 97.9 °F (36.6 °C) (06/29/24 0735)  Pulse: 68 (06/29/24 0800)  Resp: 18 (06/29/24 0800)  BP: 109/71 (06/29/24 0735)  SpO2: 98 % (06/29/24 0800)    Intake/Output Summary (Last 24 hours) at 6/29/2024 0957  Last data filed at 6/29/2024  0400  Gross per 24 hour   Intake 1950 ml   Output 1600 ml   Net 350 ml       Physical Exam  General:  Elderly woman lying quietly in bed in no acute distress   CVS: S1 and 2 heard   Respiratory: Clear to auscultation   Abdomen: Full, soft, nontender, no palpable organomegaly  Skin: No rash appreciated   CNS: No focal deficits   Musculoskeletal system: No joint or bony abnormalities appreciated    VAD:  Right chest MediPort  ISOLATION:  Neutropenic precautions    WOUNDS:  None    Significant Labs: All pertinent labs within the past 24 hours have been reviewed.    CBC LAST 7 DAYS  Recent Labs   Lab 06/23/24  0910 06/24/24  0508 06/25/24  0529 06/26/24  0452 06/27/24  0414 06/28/24  0605 06/29/24  0516   WBC 3.01* 1.68* 1.21* 0.93* 0.55*  0.55* 1.06* 1.06*   RBC 3.94* 3.48* 3.09* 3.32* 2.84*  2.84* 3.06* 2.83*   HGB 12.0 11.0* 9.7* 10.2* 8.9*  8.9* 9.6* 8.9*   HCT 36.1* 32.5* 29.2* 31.2* 26.2*  26.2* 28.6* 27.2*   MCV 92 93 95 94 92  92 94 96   MCH 30.5 31.6* 31.4* 30.7 31.3*  31.3* 31.4* 31.4*   MCHC 33.2 33.8 33.2 32.7 34.0  34.0 33.6 32.7   RDW 13.0 13.2 13.1 12.9 12.8  12.8 13.1 13.0    142* 104* 90* 68*  68* 37* 40*   MPV 8.8* 8.1* 8.7* 8.9* 8.8*  8.8* 9.7 10.3   GRAN 63.0 45.0 42.0 17.2*  0.2* 9.0*  9.0*  0.1*  0.1* 3.0*  0.0* 0.0*   LYMPH 37.0 49.0* 58.0* 78.5*  0.7* 85.5*  85.5*  0.5*  0.5* 83.0*  0.9* 80.0*   MONO 0.0* 5.0 0.0* 3.2*  0.0* 5.5  5.5  0.0*  0.0* 11.3  0.1* 18.0*   BASO  --   --   --  0.00 0.00  0.00 0.01  --    NRBC 0 0 0 0 0  0 3* 2*       CHEMISTRY LAST 7 DAYS  Recent Labs   Lab 06/23/24  0910 06/24/24  0508 06/25/24  0529 06/26/24  0452 06/27/24  0414 06/27/24  1408 06/28/24  0605 06/29/24  0516    141 140 140 134*  --  135* 139   K 3.3* 3.1* 3.5 3.7 3.6 3.9 4.3 3.9   CL 98 101 104 103 99  --  100 102   CO2 27 30* 31* 30* 27  --  28 26   ANIONGAP 14 10 5* 7* 8  --  7* 11   BUN 27* 24* 18 12 13  --  11 10   CREATININE 1.5* 1.2 1.0 1.0 1.0  --  1.0 0.9  "  * 103 123* 83 103  --  100 79   CALCIUM 9.4 8.7 8.5* 8.9 8.7  --  8.2* 8.2*   MG 1.5* 1.6 1.3* 1.3* 1.0*  --  1.2* 1.4*   ALBUMIN 4.0 3.6 3.2* 3.4* 3.4*  --  3.3* 3.3*   PROT 7.6 6.6 5.9* 6.2 6.3  --  6.2 6.3   ALKPHOS 98 84 80 85 75  --  68 71   ALT 23 21 18 18 13  --  9* 9*   AST 20 18 15 15 12  --  11 11   BILITOT 0.6 0.6 0.3 0.4 0.5  --  0.4 0.3       Estimated Creatinine Clearance: 52 mL/min (based on SCr of 0.9 mg/dL).    INFLAMMATORY/PROCAL  LAST 7 DAYS  Recent Labs   Lab 06/23/24 2040   PROCAL 0.108     No results found for: "ESR"  No results found for: "CRP"    PRIOR  MICROBIOLOGY:    Susceptibility data from last 90 days.  Collected Specimen Info Organism   06/23/24 Blood No growth after 5 days.   06/23/24 Blood No growth after 5 days.   04/19/24 Pleural Fluid from Lung, [facility] CUTIBACTERIUM ACNES       LAST 7 DAYS MICROBIOLOGY   Microbiology Results (last 7 days)       Procedure Component Value Units Date/Time    Blood culture [[id number]] Collected: 06/26/24 1840    Order Status: Completed Specimen: Blood from Peripheral, Hand, Left Updated: 06/28/24 2032     Blood Culture, Routine No Growth to date      No Growth to date      No Growth to date    Blood culture [[id number]] Collected: 06/26/24 1841    Order Status: Completed Specimen: Blood from Peripheral, Hand, Right Updated: 06/28/24 2032     Blood Culture, Routine No Growth to date      No Growth to date      No Growth to date    Blood culture (site 1) [[id number]] Collected: 06/23/24 1756    Order Status: Completed Specimen: Blood Updated: 06/28/24 2032     Blood Culture, Routine No growth after 5 days.    Narrative:      Site # 1, aerobic and anaerobic    Blood culture (site 2) [[id number]] Collected: 06/23/24 1756    Order Status: Completed Specimen: Blood Updated: 06/28/24 2032     Blood Culture, Routine No growth after 5 days.    Narrative:      Site # 2, aerobic only    Stool culture [[id number]] Collected: 06/24/24 0421    Order Status: " Completed Specimen: Stool Updated: 06/26/24 1442     Stool Culture No Salmonella,Shigella,Vibrio,Campylobacter.      No E coli 0157:H7 isolated.    Clostridium difficile EIA [[id number]] Collected: 06/24/24 0421    Order Status: Completed Specimen: Stool Updated: 06/24/24 0528     C. diff Antigen Negative     C difficile Toxins A+B, EIA Negative     Comment: Testing not recommended for children <24 months old.             CURRENT/PREVIOUS VISIT EKG  Results for orders placed or performed during the hospital encounter of 06/23/24   EKG 12-lead    Collection Time: 06/23/24  9:06 AM   Result Value Ref Range    QRS Duration 70 ms    [facility] QTC Calculation 469 ms    Narrative    Test Reason : R07.9,    Vent. Rate : 091 BPM     Atrial Rate : 091 BPM     P-R Int : 122 ms          QRS Dur : 070 ms      QT Int : 382 ms       P-R-T Axes : -19 031 083 degrees     QTc Int : 469 ms    Sinus rhythm with occasional Premature ventricular complexes  Otherwise normal ECG  Confirmed by Niko BONILLA, Kirstie Harp ([id number]) on 6/26/2024 12:05:00 AM    Referred By: JORGE   SELF           Confirmed By:Kirstie Pope MD     Significant Imaging: I have reviewed all relevant and available imaging results/findings within the past 24 hours.    I spent a total of 35 minutes on the day of the visit.This includes face to face time and non-face to face time preparing to see the patient (eg, review of tests), obtaining and/or reviewing separately obtained history, documenting clinical information in the electronic or other health record, independently interpreting results and communicating results to the patient/family/caregiver, or care coordinator.    Nic Costa MD  Date of Service: 06/29/2024      This note was created using [employer] voice recognition software that occasionally misinterpreted phrases or words.

## 2024-06-29 NOTE — CARE UPDATE
06/29/24 0800   Patient Assessment/Suction   Level of Consciousness (AVPU) alert   Respiratory Effort Normal;Unlabored   Expansion/Accessory Muscles/Retractions no use of accessory muscles;no retractions   All Lung Fields Breath Sounds diminished   Rhythm/Pattern, Respiratory unlabored;pattern regular   Cough Frequency infrequent   Cough Type dry   PRE-TX-O2   Device (Oxygen Therapy) nasal cannula   $ Is the patient on Low Flow Oxygen? Yes   Flow (L/min) (Oxygen Therapy) 2   SpO2 98 %   Pulse Oximetry Type Intermittent   $ Pulse Oximetry - Multiple Charge Pulse Oximetry - Multiple   Pulse 68   Resp 18   Aerosol Therapy   $ Aerosol Therapy Charges Aerosol Treatment   Daily Review of Necessity (SVN) completed   Respiratory Treatment Status (SVN) given   Treatment Route (SVN) oxygen;mask   Patient Position HOB elevated   Post Treatment Assessment (SVN) breath sounds unchanged   Signs of Intolerance (SVN) none   Breath Sounds Post-Respiratory Treatment   Throughout All Fields Post-Treatment All Fields   Throughout All Fields Post-Treatment diminished   Post-treatment Heart Rate (beats/min) 79   Post-treatment Resp Rate (breaths/min) 18   Education   $ Education 15 min;Bronchodilator

## 2024-06-29 NOTE — ASSESSMENT & PLAN NOTE
S/p G CSF  Follow WBC and neutropenia noted  Reverse isolation   DC vanco   Continue cefepime and possible DC today

## 2024-06-29 NOTE — PROGRESS NOTES
[facility] Medicine  Progress Note    Patient Name: Joslyn Dubon  MRN: [id number]  Patient Class: IP- Inpatient   Admission Date: 6/23/2024  Length of Stay: 5 days  Attending Physician: Royce Monaco MD  Primary Care Provider: Jasbir Graham MD        Subjective:     Principal Problem:Chest pain        HPI:  73-year-old female presented to ED for eval. Patient reported for the last few days she has had intractable nausea and vomiting, with associated LLQ abd pain and intermittent diarrhea. Denied melena, hematochezia, hematemesis. 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 Patient also reported over the last 2 days she has been having squeezing L sided intermittent chest pain that radiates to her back with shortness of breath. She does have chronic resp failure and COPD but reports shortness of breath is increased from baseline, denies use of home O2. Patient denies previous similar episodes of chest pain, denies hx CAD. She does have hx of AAA s/p repair. Noted to have WEN, hypomagnesemia, hypokalemia. CXR impression without acute abn. Of note, patient has contrast allergy with anaphylactic reaction, also reports severe hypotension with dilaudid and morphine, can tolerate norco. Admit to hospital medicine for further eval.     Overview/Hospital Course:  Patient was admitted to the hospital with  symptoms.  Patient was currently on 2nd round of chemotherapy for lung cancer.  CT of the abdomen was done with no acute except previous vascular stent.  C diff test is negative and all stool tests are negative and patient was started on anti diarrhea and gradually better.  Patient had chest pain episode during the admission and patient was offered stress test but she declined.  She was reviewed by the oncologist and offered G-CSF to increase the white cell but she declined that as  well.  But later patient got fever and WBC and platelet go down and ANC 0 and patient agreed to get neupogen and septic work up was done .  All cultures are negative and later fever subside . IV antibiotics started and gradually weaned and later ID recommend to DC with cipro and awaited for WBC recovery    Interval History:   Afebrile . WBC almost the same   Deny CP or SOB   Patient want to finish neupogen treatment and will hold back after  She got 3 doses.    Review of Systems  Objective:     Vital Signs (Most Recent):  Temp: 97.9 °F (36.6 °C) (06/29/24 0735)  Pulse: 68 (06/29/24 0800)  Resp: 18 (06/29/24 0800)  BP: 109/71 (06/29/24 0735)  SpO2: 98 % (06/29/24 0800) Vital Signs (24h Range):  Temp:  [97.8 °F (36.6 °C)-98.6 °F (37 °C)] 97.9 °F (36.6 °C)  Pulse:  [65-95] 68  Resp:  [16-19] 18  SpO2:  [94 %-98 %] 98 %  BP: ()/(69-86) 109/71     Weight: 77.3 kg (170 lb 6.4 oz)  Body mass index is 31.17 kg/m².    Intake/Output Summary (Last 24 hours) at 6/29/2024 1043  Last data filed at 6/29/2024 0400  Gross per 24 hour   Intake 1950 ml   Output 1600 ml   Net 350 ml         Physical Exam  Vitals and nursing note reviewed.   HENT:      Head: Normocephalic and atraumatic.   Eyes:      Conjunctiva/sclera: Conjunctivae normal.   Neck:      Vascular: No JVD.   Cardiovascular:      Heart sounds: Normal heart sounds.   Pulmonary:      Effort: Pulmonary effort is normal.      Breath sounds: Normal breath sounds.   Abdominal:      Palpations: Abdomen is soft.   Musculoskeletal:         General: Normal range of motion.      Cervical back: Normal range of motion.   Skin:     General: Skin is warm.   Neurological:      Mental Status: She is alert and oriented to person, place, and time.   Psychiatric:         Mood and Affect: Mood normal.             Significant Labs: All pertinent labs within the past 24 hours have been reviewed.  CBC:   Recent Labs   Lab 06/28/24  0605 06/29/24  0516   WBC 1.06* 1.06*   HGB 9.6* 8.9*   HCT  "28.6* 27.2*   PLT 37* 40*     CMP:   Recent Labs   Lab 06/27/24  1408 06/28/24  0605 06/29/24  0516   NA  --  135* 139   K 3.9 4.3 3.9   CL  --  100 102   CO2  --  28 26   GLU  --  100 79   BUN  --  11 10   CREATININE  --  1.0 0.9   CALCIUM  --  8.2* 8.2*   PROT  --  6.2 6.3   ALBUMIN  --  3.3* 3.3*   BILITOT  --  0.4 0.3   ALKPHOS  --  68 71   AST  --  11 11   ALT  --  9* 9*   ANIONGAP  --  7* 11     Cardiac Markers: No results for input(s): "CKMB", "MYOGLOBIN", "BNP", "TROPISTAT" in the last 48 hours.    Significant Imaging: I have reviewed all pertinent imaging results/findings within the past 24 hours.    Assessment/Plan:      * Chest pain  Serial troponins x 3 all negative   ECHO to follow   Stress test when patient more stable   EKG PRN  NTG PRN  ASA  VQ scan negative at perfusion scan   Patient not keen to do stress test     Febrile neutropenia  Unsure cause   Follow BC  Continue antibiotics for now, dc vanco  Follow final BC and so far neg   replaced cefepime with cipro    Chemotherapy induced neutropenia  S/p G CSF  Follow WBC and neutropenia noted  Reverse isolation   DC vanco   Continue cefepime and possible DC today       Anemia due to chemotherapy    Current CBC reviewed-   Lab Results   Component Value Date    HGB 8.9 (L) 06/29/2024    HCT 27.2 (L) 06/29/2024     Monitor serial CBC and transfuse if patient becomes hemodynamically unstable, symptomatic or H/H drops below 7/21.    Malignant neoplasm of lung  Aware  Follow dr lott   Chemo on hold      WEN (acute kidney injury)  Patient with acute kidney injury/acute renal failure likely due to pre-renal azotemia due to dehydration WEN is currently stable. Baseline creatinine  1  - Labs reviewed- Renal function/electrolytes with Estimated Creatinine Clearance: 52 mL/min (based on SCr of 0.9 mg/dL). according to latest data. Monitor urine output and serial CMP and adjust therapy as needed. Avoid nephrotoxins and renally dose meds for GFR listed " above.  resolved    Diarrhea  Stool studies  neg  Lyte replacement per protocol  Lomotil  and now improved  and DC resolved       Intractable nausea and vomiting  Secondary to  chemo  Lipase negative   Zofran, phenergan PRN NV  IVF  Lyte replacement per protocol  CT abd/pelvis noted. Non acute   resolved    Non-small cell carcinoma of left lung  Last chemo 6/19/24  RT assess and treat  Supplemental O2 PRN  Resume home inhalers  Starting neupogen    History of AAA repair  Noted in CT  Non acute      VTE Risk Mitigation (From admission, onward)           Ordered     Reason for No Pharmacological VTE Prophylaxis  Once        Question:  Reasons:  Answer:  Risk of Bleeding    06/23/24 1743     IP VTE HIGH RISK PATIENT  Once         06/23/24 1743     Place sequential compression device  Until discontinued         06/23/24 1743                    Discharge Planning   TINO: 7/1/2024     Code Status: Full Code   Is the patient medically ready for discharge?:     Reason for patient still in hospital (select all that apply): Treatment  Discharge Plan A: Home with family                  Royce Monaco MD  Department of Hospital Medicine   [facility]

## 2024-06-29 NOTE — SUBJECTIVE & OBJECTIVE
Interval History: Pt endorses SOB with exertion and continued N/V.  The patient denies CP, cough, abdominal pain, constipation, diarrhea, fever.      Oncology Treatment Plan:   OP CISPLATIN 75MG/M2 ETOPOSIDE 100MG/M2 Q3W    Medications:  Continuous Infusions:  Scheduled Meds:   amoxicillin-clavulanate 875-125mg  1 tablet Oral Q12H    arformoteroL  15 mcg Nebulization BID    aspirin  81 mg Oral Daily    cetirizine  10 mg Oral Daily    dicyclomine  10 mg Oral QID    famotidine  20 mg Oral Daily    filgrastim-sndz  300 mcg Subcutaneous Q24H    ipratropium  0.5 mg Nebulization Q6H    levothyroxine  112 mcg Oral Before breakfast    metoprolol tartrate  25 mg Oral BID     PRN Meds:  Current Facility-Administered Medications:     acetaminophen, 650 mg, Oral, Q4H PRN    ALPRAZolam, 0.25 mg, Oral, Nightly PRN    aluminum-magnesium hydroxide-simethicone, 30 mL, Oral, Q6H PRN **AND** [COMPLETED] LIDOcaine viscous HCl 2%, 15 mL, Oral, Once    carboxymethylcellulose sodium, 1 drop, Both Eyes, QID PRN    HYDROcodone-acetaminophen, 1 tablet, Oral, Q6H PRN    HYDROcodone-acetaminophen, 2 tablet, Oral, Q6H PRN    HYDROmorphone, 0.5 mg, Intravenous, Q6H PRN    magnesium oxide, 800 mg, Oral, PRN    magnesium oxide, 800 mg, Oral, PRN    melatonin, 6 mg, Oral, Nightly PRN    naloxone, 0.02 mg, Intravenous, PRN    nitroGLYCERIN, 0.4 mg, Sublingual, Q5 Min PRN    ondansetron, 4 mg, Intravenous, Q6H PRN    potassium bicarbonate, 35 mEq, Oral, PRN    potassium bicarbonate, 50 mEq, Oral, PRN    potassium bicarbonate, 60 mEq, Oral, PRN    potassium, sodium phosphates, 2 packet, Oral, PRN    potassium, sodium phosphates, 2 packet, Oral, PRN    potassium, sodium phosphates, 2 packet, Oral, PRN    promethazine (PHENERGAN) 12.5 mg in 0.9% NaCl 50 mL IVPB, 12.5 mg, Intravenous, Q6H PRN    senna-docusate 8.6-50 mg, 1 tablet, Oral, Daily PRN    sodium chloride 0.9%, 10 mL, Intravenous, Q12H PRN     Review of Systems   Constitutional:  Negative for  chills and fever.   Respiratory:  Positive for shortness of breath. Negative for cough.    Cardiovascular:  Negative for chest pain and palpitations.   Gastrointestinal:  Positive for nausea and vomiting. Negative for abdominal pain, constipation and diarrhea.   Skin:  Negative for rash.   Neurological:  Negative for headaches.     Objective:     Vital Signs (Most Recent):  Temp: 97.7 °F (36.5 °C) (06/29/24 1145)  Pulse: 88 (06/29/24 1319)  Resp: 15 (06/29/24 1319)  BP: 122/78 (06/29/24 1145)  SpO2: (!) 93 % (06/29/24 1319) Vital Signs (24h Range):  Temp:  [97.7 °F (36.5 °C)-98.6 °F (37 °C)] 97.7 °F (36.5 °C)  Pulse:  [65-97] 88  Resp:  [15-20] 15  SpO2:  [93 %-98 %] 93 %  BP: ()/(69-86) 122/78     Weight: 77.3 kg (170 lb 6.4 oz)  Body mass index is 31.17 kg/m².  Body surface area is 1.84 meters squared.      Intake/Output Summary (Last 24 hours) at 6/29/2024 1446  Last data filed at 6/29/2024 0400  Gross per 24 hour   Intake 1950 ml   Output 1600 ml   Net 350 ml        Physical Exam  Constitutional:       Appearance: She is well-developed.   Cardiovascular:      Rate and Rhythm: Normal rate and regular rhythm.      Heart sounds: Normal heart sounds. No murmur heard.     No friction rub. No gallop.   Pulmonary:      Effort: Pulmonary effort is normal. No respiratory distress.      Breath sounds: Wheezing present. No rales.   Abdominal:      General: Bowel sounds are normal. There is no distension.      Palpations: Abdomen is soft.      Tenderness: There is no abdominal tenderness. There is no guarding or rebound.   Neurological:      Mental Status: She is alert and oriented to person, place, and time.          Significant Labs:   Recent Results (from the past 24 hour(s))   Comprehensive Metabolic Panel (CMP)    Collection Time: 06/29/24  5:16 AM   Result Value Ref Range    Sodium 139 136 - 145 mmol/L    Potassium 3.9 3.5 - 5.1 mmol/L    Chloride 102 95 - 110 mmol/L    CO2 26 23 - 29 mmol/L    Glucose 79 70 -  110 mg/dL    BUN 10 8 - 23 mg/dL    Creatinine 0.9 0.5 - 1.4 mg/dL    Calcium 8.2 (L) 8.7 - 10.5 mg/dL    Total Protein 6.3 6.0 - 8.4 g/dL    Albumin 3.3 (L) 3.5 - 5.2 g/dL    Total Bilirubin 0.3 0.1 - 1.0 mg/dL    Alkaline Phosphatase 71 55 - 135 U/L    AST 11 10 - 40 U/L    ALT 9 (L) 10 - 44 U/L    eGFR >60.0 >60 mL/min/1.73 m^2    Anion Gap 11 8 - 16 mmol/L   Magnesium    Collection Time: 06/29/24  5:16 AM   Result Value Ref Range    Magnesium 1.4 (L) 1.6 - 2.6 mg/dL   CBC with Automated Differential    Collection Time: 06/29/24  5:16 AM   Result Value Ref Range    WBC 1.06 (LL) 3.90 - 12.70 K/uL    RBC 2.83 (L) 4.00 - 5.40 M/uL    Hemoglobin 8.9 (L) 12.0 - 16.0 g/dL    Hematocrit 27.2 (L) 37.0 - 48.5 %    MCV 96 82 - 98 fL    MCH 31.4 (H) 27.0 - 31.0 pg    MCHC 32.7 32.0 - 36.0 g/dL    RDW 13.0 11.5 - 14.5 %    Platelets 40 (LL) 150 - 450 K/uL    MPV 10.3 9.2 - 12.9 fL    Immature Granulocytes CANCELED 0.0 - 0.5 %    Immature Grans (Abs) CANCELED 0.00 - 0.04 K/uL    nRBC 2 (A) 0 /100 WBC    Gran % 0.0 (L) 38.0 - 73.0 %    Lymph % 80.0 (H) 18.0 - 48.0 %    Mono % 18.0 (H) 4.0 - 15.0 %    Eosinophil % 0.0 0.0 - 8.0 %    Basophil % 0.0 0.0 - 1.9 %    Myelocytes 2.0 %    Differential Method Manual      Microbiology Results (last 7 days)       Procedure Component Value Units Date/Time    Blood culture [[id number]] Collected: 06/26/24 3790    Order Status: Completed Specimen: Blood from Peripheral, Hand, Left Updated: 06/28/24 2032     Blood Culture, Routine No Growth to date      No Growth to date      No Growth to date    Blood culture [[id number]] Collected: 06/26/24 1841    Order Status: Completed Specimen: Blood from Peripheral, Hand, Right Updated: 06/28/24 2032     Blood Culture, Routine No Growth to date      No Growth to date      No Growth to date    Blood culture (site 1) [[id number]] Collected: 06/23/24 1756    Order Status: Completed Specimen: Blood Updated: 06/28/24 2032     Blood Culture, Routine No  growth after 5 days.    Narrative:      Site # 1, aerobic and anaerobic    Blood culture (site 2) [[id number]] Collected: 06/23/24 1756    Order Status: Completed Specimen: Blood Updated: 06/28/24 2032     Blood Culture, Routine No growth after 5 days.    Narrative:      Site # 2, aerobic only    Stool culture [[id number]] Collected: 06/24/24 0421    Order Status: Completed Specimen: Stool Updated: 06/26/24 1442     Stool Culture No Salmonella,Shigella,Vibrio,Campylobacter.      No E coli 0157:H7 isolated.    Clostridium difficile EIA [[id number]] Collected: 06/24/24 0421    Order Status: Completed Specimen: Stool Updated: 06/24/24 0528     C. diff Antigen Negative     C difficile Toxins A+B, EIA Negative     Comment: Testing not recommended for children <24 months old.                 Diagnostic Results:  I have reviewed all pertinent imaging results/findings within the past 24 hours.

## 2024-06-29 NOTE — HPI
73 y.o. female known to the oncology service of my associate Dr Camden Iyer with diagnosis of lung cancer with neuroendocrine features who has been on platinum and etopside chemotherapy. She had her 2nd cycle on 6/19/2024. She presented to the ED with CP and N/V.  She has had diarrhea since Wednesday and is dehydrated. She reports that she is feeling much better since admit yesterday. No current N/V, CP or SOB. She is eating solid foods. I discussed giving her GCSF support to help with her leucopenia, but she wants to hold off for now. I discussed with her floor nurse. There were no family currently at bedside.

## 2024-06-29 NOTE — ASSESSMENT & PLAN NOTE
-Pt afebrile in the last 24 hours  -ID transitioned pt to Augmentin   -Continue granix with ANC of 0

## 2024-06-29 NOTE — ASSESSMENT & PLAN NOTE
Current CBC reviewed-   Lab Results   Component Value Date    HGB 8.9 (L) 06/29/2024    HCT 27.2 (L) 06/29/2024     Monitor serial CBC and transfuse if patient becomes hemodynamically unstable, symptomatic or H/H drops below 7/21.

## 2024-06-29 NOTE — PROGRESS NOTES
[facility]  Hematology/Oncology  Progress Note    Patient Name: Joslyn Dubon  Admission Date: 6/23/2024  Hospital Length of Stay: 5 days  Code Status: Full Code     Subjective:     HPI:  73 y.o. female known to the oncology service of my associate Dr Camden Iyer with diagnosis of lung cancer with neuroendocrine features who has been on platinum and etopside chemotherapy. She had her 2nd cycle on 6/19/2024. She presented to the ED with CP and N/V.  She has had diarrhea since Wednesday and is dehydrated. She reports that she is feeling much better since admit yesterday. No current N/V, CP or SOB. She is eating solid foods. I discussed giving her GCSF support to help with her leucopenia, but she wants to hold off for now. I discussed with her floor nurse. There were no family currently at bedside.     Interval History: Pt endorses SOB with exertion and continued N/V.  The patient denies CP, cough, abdominal pain, constipation, diarrhea, fever.      Oncology Treatment Plan:   OP CISPLATIN 75MG/M2 ETOPOSIDE 100MG/M2 Q3W    Medications:  Continuous Infusions:  Scheduled Meds:   amoxicillin-clavulanate 875-125mg  1 tablet Oral Q12H    arformoteroL  15 mcg Nebulization BID    aspirin  81 mg Oral Daily    cetirizine  10 mg Oral Daily    dicyclomine  10 mg Oral QID    famotidine  20 mg Oral Daily    filgrastim-sndz  300 mcg Subcutaneous Q24H    ipratropium  0.5 mg Nebulization Q6H    levothyroxine  112 mcg Oral Before breakfast    metoprolol tartrate  25 mg Oral BID     PRN Meds:  Current Facility-Administered Medications:     acetaminophen, 650 mg, Oral, Q4H PRN    ALPRAZolam, 0.25 mg, Oral, Nightly PRN    aluminum-magnesium hydroxide-simethicone, 30 mL, Oral, Q6H PRN **AND** [COMPLETED] LIDOcaine viscous HCl 2%, 15 mL, Oral, Once    carboxymethylcellulose sodium, 1 drop, Both Eyes, QID PRN    HYDROcodone-acetaminophen, 1 tablet, Oral, Q6H PRN    HYDROcodone-acetaminophen, 2 tablet, Oral, Q6H PRN     HYDROmorphone, 0.5 mg, Intravenous, Q6H PRN    magnesium oxide, 800 mg, Oral, PRN    magnesium oxide, 800 mg, Oral, PRN    melatonin, 6 mg, Oral, Nightly PRN    naloxone, 0.02 mg, Intravenous, PRN    nitroGLYCERIN, 0.4 mg, Sublingual, Q5 Min PRN    ondansetron, 4 mg, Intravenous, Q6H PRN    potassium bicarbonate, 35 mEq, Oral, PRN    potassium bicarbonate, 50 mEq, Oral, PRN    potassium bicarbonate, 60 mEq, Oral, PRN    potassium, sodium phosphates, 2 packet, Oral, PRN    potassium, sodium phosphates, 2 packet, Oral, PRN    potassium, sodium phosphates, 2 packet, Oral, PRN    promethazine (PHENERGAN) 12.5 mg in 0.9% NaCl 50 mL IVPB, 12.5 mg, Intravenous, Q6H PRN    senna-docusate 8.6-50 mg, 1 tablet, Oral, Daily PRN    sodium chloride 0.9%, 10 mL, Intravenous, Q12H PRN     Review of Systems   Constitutional:  Negative for chills and fever.   Respiratory:  Positive for shortness of breath. Negative for cough.    Cardiovascular:  Negative for chest pain and palpitations.   Gastrointestinal:  Positive for nausea and vomiting. Negative for abdominal pain, constipation and diarrhea.   Skin:  Negative for rash.   Neurological:  Negative for headaches.     Objective:     Vital Signs (Most Recent):  Temp: 97.7 °F (36.5 °C) (06/29/24 1145)  Pulse: 88 (06/29/24 1319)  Resp: 15 (06/29/24 1319)  BP: 122/78 (06/29/24 1145)  SpO2: (!) 93 % (06/29/24 1319) Vital Signs (24h Range):  Temp:  [97.7 °F (36.5 °C)-98.6 °F (37 °C)] 97.7 °F (36.5 °C)  Pulse:  [65-97] 88  Resp:  [15-20] 15  SpO2:  [93 %-98 %] 93 %  BP: ()/(69-86) 122/78     Weight: 77.3 kg (170 lb 6.4 oz)  Body mass index is 31.17 kg/m².  Body surface area is 1.84 meters squared.      Intake/Output Summary (Last 24 hours) at 6/29/2024 1446  Last data filed at 6/29/2024 0400  Gross per 24 hour   Intake 1950 ml   Output 1600 ml   Net 350 ml        Physical Exam  Constitutional:       Appearance: She is well-developed.   Cardiovascular:      Rate and Rhythm: Normal rate  and regular rhythm.      Heart sounds: Normal heart sounds. No murmur heard.     No friction rub. No gallop.   Pulmonary:      Effort: Pulmonary effort is normal. No respiratory distress.      Breath sounds: Wheezing present. No rales.   Abdominal:      General: Bowel sounds are normal. There is no distension.      Palpations: Abdomen is soft.      Tenderness: There is no abdominal tenderness. There is no guarding or rebound.   Neurological:      Mental Status: She is alert and oriented to person, place, and time.          Significant Labs:   Recent Results (from the past 24 hour(s))   Comprehensive Metabolic Panel (CMP)    Collection Time: 06/29/24  5:16 AM   Result Value Ref Range    Sodium 139 136 - 145 mmol/L    Potassium 3.9 3.5 - 5.1 mmol/L    Chloride 102 95 - 110 mmol/L    CO2 26 23 - 29 mmol/L    Glucose 79 70 - 110 mg/dL    BUN 10 8 - 23 mg/dL    Creatinine 0.9 0.5 - 1.4 mg/dL    Calcium 8.2 (L) 8.7 - 10.5 mg/dL    Total Protein 6.3 6.0 - 8.4 g/dL    Albumin 3.3 (L) 3.5 - 5.2 g/dL    Total Bilirubin 0.3 0.1 - 1.0 mg/dL    Alkaline Phosphatase 71 55 - 135 U/L    AST 11 10 - 40 U/L    ALT 9 (L) 10 - 44 U/L    eGFR >60.0 >60 mL/min/1.73 m^2    Anion Gap 11 8 - 16 mmol/L   Magnesium    Collection Time: 06/29/24  5:16 AM   Result Value Ref Range    Magnesium 1.4 (L) 1.6 - 2.6 mg/dL   CBC with Automated Differential    Collection Time: 06/29/24  5:16 AM   Result Value Ref Range    WBC 1.06 (LL) 3.90 - 12.70 K/uL    RBC 2.83 (L) 4.00 - 5.40 M/uL    Hemoglobin 8.9 (L) 12.0 - 16.0 g/dL    Hematocrit 27.2 (L) 37.0 - 48.5 %    MCV 96 82 - 98 fL    MCH 31.4 (H) 27.0 - 31.0 pg    MCHC 32.7 32.0 - 36.0 g/dL    RDW 13.0 11.5 - 14.5 %    Platelets 40 (LL) 150 - 450 K/uL    MPV 10.3 9.2 - 12.9 fL    Immature Granulocytes CANCELED 0.0 - 0.5 %    Immature Grans (Abs) CANCELED 0.00 - 0.04 K/uL    nRBC 2 (A) 0 /100 WBC    Gran % 0.0 (L) 38.0 - 73.0 %    Lymph % 80.0 (H) 18.0 - 48.0 %    Mono % 18.0 (H) 4.0 - 15.0 %     Eosinophil % 0.0 0.0 - 8.0 %    Basophil % 0.0 0.0 - 1.9 %    Myelocytes 2.0 %    Differential Method Manual      Microbiology Results (last 7 days)       Procedure Component Value Units Date/Time    Blood culture [[id number]] Collected: 06/26/24 1840    Order Status: Completed Specimen: Blood from Peripheral, Hand, Left Updated: 06/28/24 2032     Blood Culture, Routine No Growth to date      No Growth to date      No Growth to date    Blood culture [[id number]] Collected: 06/26/24 1841    Order Status: Completed Specimen: Blood from Peripheral, Hand, Right Updated: 06/28/24 2032     Blood Culture, Routine No Growth to date      No Growth to date      No Growth to date    Blood culture (site 1) [[id number]] Collected: 06/23/24 1756    Order Status: Completed Specimen: Blood Updated: 06/28/24 2032     Blood Culture, Routine No growth after 5 days.    Narrative:      Site # 1, aerobic and anaerobic    Blood culture (site 2) [[id number]] Collected: 06/23/24 1756    Order Status: Completed Specimen: Blood Updated: 06/28/24 2032     Blood Culture, Routine No growth after 5 days.    Narrative:      Site # 2, aerobic only    Stool culture [[id number]] Collected: 06/24/24 0421    Order Status: Completed Specimen: Stool Updated: 06/26/24 1442     Stool Culture No Salmonella,Shigella,Vibrio,Campylobacter.      No E coli 0157:H7 isolated.    Clostridium difficile EIA [[id number]] Collected: 06/24/24 0421    Order Status: Completed Specimen: Stool Updated: 06/24/24 0528     C. diff Antigen Negative     C difficile Toxins A+B, EIA Negative     Comment: Testing not recommended for children <24 months old.                 Diagnostic Results:  I have reviewed all pertinent imaging results/findings within the past 24 hours.  Assessment/Plan:     Febrile neutropenia  -Pt afebrile in the last 24 hours  -ID transitioned pt to Augmentin   -Continue granix with ANC of 0    Non-small cell carcinoma of left lung  -Pt with NSCLC in the [facility]   s/p 2 cycles of Cisplatin and etoposide with last cycle chemo on 6/19/24  -Pt s/p radiation to [facility] 5/17/24   -Pt will need follow upw ith Dr Iyer on d/c    Intractable nausea and vomiting  -Pt with N/V likely from prior chemo   -Continue zofran and phenergan    Chemotherapy induced neutropenia  -Due to chemo  -Pt did not receive Neulasta   -Continue granix until ANC is at least 1,000 on 2 consecutive days    Anemia due to chemotherapy  -Hemoglobin 8.9g/dL on 6/29/24   -Will check ferritin, iron/TIBC, B12, folate, LDH, haptoglobin   -Transfuse for hemoglobin less than 7g/dL    Thrombocytopenia  -Likely due to chemo   -Would transfuse platelets for platelet count less than 10k, </=20k with petechiae or mucosal bleeding or </=50k with active bleeding or immediately prior to invasive procedures        Thank you for your consult. Hematology/Oncology service will follow-up with patient. Please contact us if you have any additional questions.       Justin Velazquez MD  Hematology/Oncology  [facility]

## 2024-06-29 NOTE — ASSESSMENT & PLAN NOTE
Patient with acute kidney injury/acute renal failure likely due to pre-renal azotemia due to dehydration WEN is currently stable. Baseline creatinine  1  - Labs reviewed- Renal function/electrolytes with Estimated Creatinine Clearance: 52 mL/min (based on SCr of 0.9 mg/dL). according to latest data. Monitor urine output and serial CMP and adjust therapy as needed. Avoid nephrotoxins and renally dose meds for GFR listed above.  resolved

## 2024-06-29 NOTE — SUBJECTIVE & OBJECTIVE
Interval History:   Afebrile . WBC almost the same   Deny CP or SOB   Patient want to finish neupogen treatment and will hold back after  She got 3 doses.    Review of Systems  Objective:     Vital Signs (Most Recent):  Temp: 97.9 °F (36.6 °C) (06/29/24 0735)  Pulse: 68 (06/29/24 0800)  Resp: 18 (06/29/24 0800)  BP: 109/71 (06/29/24 0735)  SpO2: 98 % (06/29/24 0800) Vital Signs (24h Range):  Temp:  [97.8 °F (36.6 °C)-98.6 °F (37 °C)] 97.9 °F (36.6 °C)  Pulse:  [65-95] 68  Resp:  [16-19] 18  SpO2:  [94 %-98 %] 98 %  BP: ()/(69-86) 109/71     Weight: 77.3 kg (170 lb 6.4 oz)  Body mass index is 31.17 kg/m².    Intake/Output Summary (Last 24 hours) at 6/29/2024 1043  Last data filed at 6/29/2024 0400  Gross per 24 hour   Intake 1950 ml   Output 1600 ml   Net 350 ml         Physical Exam  Vitals and nursing note reviewed.   HENT:      Head: Normocephalic and atraumatic.   Eyes:      Conjunctiva/sclera: Conjunctivae normal.   Neck:      Vascular: No JVD.   Cardiovascular:      Heart sounds: Normal heart sounds.   Pulmonary:      Effort: Pulmonary effort is normal.      Breath sounds: Normal breath sounds.   Abdominal:      Palpations: Abdomen is soft.   Musculoskeletal:         General: Normal range of motion.      Cervical back: Normal range of motion.   Skin:     General: Skin is warm.   Neurological:      Mental Status: She is alert and oriented to person, place, and time.   Psychiatric:         Mood and Affect: Mood normal.             Significant Labs: All pertinent labs within the past 24 hours have been reviewed.  CBC:   Recent Labs   Lab 06/28/24  0605 06/29/24  0516   WBC 1.06* 1.06*   HGB 9.6* 8.9*   HCT 28.6* 27.2*   PLT 37* 40*     CMP:   Recent Labs   Lab 06/27/24  1408 06/28/24  0605 06/29/24  0516   NA  --  135* 139   K 3.9 4.3 3.9   CL  --  100 102   CO2  --  28 26   GLU  --  100 79   BUN  --  11 10   CREATININE  --  1.0 0.9   CALCIUM  --  8.2* 8.2*   PROT  --  6.2 6.3   ALBUMIN  --  3.3* 3.3*  "  BILITOT  --  0.4 0.3   ALKPHOS  --  68 71   AST  --  11 11   ALT  --  9* 9*   ANIONGAP  --  7* 11     Cardiac Markers: No results for input(s): "CKMB", "MYOGLOBIN", "BNP", "TROPISTAT" in the last 48 hours.    Significant Imaging: I have reviewed all pertinent imaging results/findings within the past 24 hours.  "

## 2024-06-29 NOTE — RESPIRATORY THERAPY
06/28/24 2146   Patient Assessment/Suction   Level of Consciousness (AVPU) alert   Respiratory Effort Unlabored;Normal   Expansion/Accessory Muscles/Retractions no use of accessory muscles;no retractions   All Lung Fields Breath Sounds Anterior:;diminished   Rhythm/Pattern, Respiratory no shortness of breath reported   Cough Frequency infrequent   Cough Type good   PRE-TX-O2   Device (Oxygen Therapy) nasal cannula   Flow (L/min) (Oxygen Therapy) 2   SpO2 (!) 94 %   Pulse Oximetry Type Intermittent   $ Pulse Oximetry - Multiple Charge Pulse Oximetry - Multiple   Pulse 86   Resp 17   Positioning HOB elevated 30 degrees   Aerosol Therapy   $ Aerosol Therapy Charges Aerosol Treatment  (ATROVENT)   Daily Review of Necessity (SVN) completed   Respiratory Treatment Status (SVN) given   Treatment Route (SVN) mask;oxygen   Patient Position HOB elevated   Post Treatment Assessment (SVN) breath sounds unchanged   Signs of Intolerance (SVN) none   Education   $ Education Bronchodilator;15 min

## 2024-06-30 LAB
ALBUMIN SERPL BCP-MCNC: 3.3 G/DL (ref 3.5–5.2)
ALP SERPL-CCNC: 71 U/L (ref 55–135)
ALT SERPL W/O P-5'-P-CCNC: 10 U/L (ref 10–44)
ANION GAP SERPL CALC-SCNC: 12 MMOL/L (ref 8–16)
AST SERPL-CCNC: 13 U/L (ref 10–40)
BASOPHILS NFR BLD: 0 % (ref 0–1.9)
BILIRUB SERPL-MCNC: 0.3 MG/DL (ref 0.1–1)
BUN SERPL-MCNC: 9 MG/DL (ref 8–23)
CALCIUM SERPL-MCNC: 8.6 MG/DL (ref 8.7–10.5)
CHLORIDE SERPL-SCNC: 98 MMOL/L (ref 95–110)
CO2 SERPL-SCNC: 27 MMOL/L (ref 23–29)
CREAT SERPL-MCNC: 1 MG/DL (ref 0.5–1.4)
DIFFERENTIAL METHOD BLD: ABNORMAL
EOSINOPHIL NFR BLD: 0 % (ref 0–8)
ERYTHROCYTE [DISTWIDTH] IN BLOOD BY AUTOMATED COUNT: 13.2 % (ref 11.5–14.5)
EST. GFR  (NO RACE VARIABLE): 58.8 ML/MIN/1.73 M^2
FERRITIN SERPL-MCNC: 739.9 NG/ML (ref 20–300)
FOLATE SERPL-MCNC: 11.6 NG/ML (ref 4–24)
GLUCOSE SERPL-MCNC: 87 MG/DL (ref 70–110)
HCT VFR BLD AUTO: 26 % (ref 37–48.5)
HGB BLD-MCNC: 8.4 G/DL (ref 12–16)
IMM GRANULOCYTES # BLD AUTO: ABNORMAL K/UL (ref 0–0.04)
IMM GRANULOCYTES NFR BLD AUTO: ABNORMAL % (ref 0–0.5)
IRON SERPL-MCNC: 78 UG/DL (ref 30–160)
LYMPHOCYTES NFR BLD: 71 % (ref 18–48)
MAGNESIUM SERPL-MCNC: 1.5 MG/DL (ref 1.6–2.6)
MAGNESIUM SERPL-MCNC: 1.6 MG/DL (ref 1.6–2.6)
MCH RBC QN AUTO: 30.8 PG (ref 27–31)
MCHC RBC AUTO-ENTMCNC: 32.3 G/DL (ref 32–36)
MCV RBC AUTO: 95 FL (ref 82–98)
METAMYELOCYTES NFR BLD MANUAL: 7 %
MONOCYTES NFR BLD: 2 % (ref 4–15)
MYELOCYTES NFR BLD MANUAL: 2 %
NEUTROPHILS NFR BLD: 6 % (ref 38–73)
NEUTS BAND NFR BLD MANUAL: 12 %
NRBC BLD-RTO: 3 /100 WBC
PLATELET # BLD AUTO: 66 K/UL (ref 150–450)
PMV BLD AUTO: 9.5 FL (ref 9.2–12.9)
POTASSIUM SERPL-SCNC: 3.9 MMOL/L (ref 3.5–5.1)
PROT SERPL-MCNC: 6.6 G/DL (ref 6–8.4)
RBC # BLD AUTO: 2.73 M/UL (ref 4–5.4)
SATURATED IRON: 32 % (ref 20–50)
SODIUM SERPL-SCNC: 137 MMOL/L (ref 136–145)
TOTAL IRON BINDING CAPACITY: 245 UG/DL (ref 250–450)
TRANSFERRIN SERPL-MCNC: 175 MG/DL (ref 200–375)
VIT B12 SERPL-MCNC: 493 PG/ML (ref 210–950)
WBC # BLD AUTO: 3.22 K/UL (ref 3.9–12.7)

## 2024-06-30 PROCEDURE — 80053 COMPREHEN METABOLIC PANEL: CPT

## 2024-06-30 PROCEDURE — 25000003 PHARM REV CODE 250: Performed by: INTERNAL MEDICINE

## 2024-06-30 PROCEDURE — 83735 ASSAY OF MAGNESIUM: CPT

## 2024-06-30 PROCEDURE — 82607 VITAMIN B-12: CPT | Performed by: INTERNAL MEDICINE

## 2024-06-30 PROCEDURE — 99232 SBSQ HOSP IP/OBS MODERATE 35: CPT | Mod: ,,, | Performed by: INTERNAL MEDICINE

## 2024-06-30 PROCEDURE — 36415 COLL VENOUS BLD VENIPUNCTURE: CPT | Performed by: INTERNAL MEDICINE

## 2024-06-30 PROCEDURE — 94640 AIRWAY INHALATION TREATMENT: CPT

## 2024-06-30 PROCEDURE — 99233 SBSQ HOSP IP/OBS HIGH 50: CPT | Mod: ,,, | Performed by: INTERNAL MEDICINE

## 2024-06-30 PROCEDURE — 82746 ASSAY OF FOLIC ACID SERUM: CPT | Performed by: INTERNAL MEDICINE

## 2024-06-30 PROCEDURE — 83735 ASSAY OF MAGNESIUM: CPT | Mod: 91 | Performed by: INTERNAL MEDICINE

## 2024-06-30 PROCEDURE — 85007 BL SMEAR W/DIFF WBC COUNT: CPT

## 2024-06-30 PROCEDURE — 63600175 PHARM REV CODE 636 W HCPCS

## 2024-06-30 PROCEDURE — 85027 COMPLETE CBC AUTOMATED: CPT

## 2024-06-30 PROCEDURE — 99900031 HC PATIENT EDUCATION (STAT)

## 2024-06-30 PROCEDURE — 36415 COLL VENOUS BLD VENIPUNCTURE: CPT

## 2024-06-30 PROCEDURE — 21400001 HC TELEMETRY ROOM

## 2024-06-30 PROCEDURE — 63600175 PHARM REV CODE 636 W HCPCS: Mod: JZ,JB,JG | Performed by: INTERNAL MEDICINE

## 2024-06-30 PROCEDURE — 83540 ASSAY OF IRON: CPT | Performed by: INTERNAL MEDICINE

## 2024-06-30 PROCEDURE — 25000242 PHARM REV CODE 250 ALT 637 W/ HCPCS: Performed by: INTERNAL MEDICINE

## 2024-06-30 PROCEDURE — 27000221 HC OXYGEN, UP TO 24 HOURS

## 2024-06-30 PROCEDURE — 25000003 PHARM REV CODE 250

## 2024-06-30 PROCEDURE — 82728 ASSAY OF FERRITIN: CPT | Performed by: INTERNAL MEDICINE

## 2024-06-30 PROCEDURE — 94761 N-INVAS EAR/PLS OXIMETRY MLT: CPT

## 2024-06-30 RX ADMIN — METOPROLOL TARTRATE 25 MG: 25 TABLET, FILM COATED ORAL at 08:06

## 2024-06-30 RX ADMIN — DICYCLOMINE HYDROCHLORIDE 10 MG: 10 CAPSULE ORAL at 01:06

## 2024-06-30 RX ADMIN — DICYCLOMINE HYDROCHLORIDE 10 MG: 10 CAPSULE ORAL at 04:06

## 2024-06-30 RX ADMIN — ARFORMOTEROL TARTRATE 15 MCG: 15 SOLUTION RESPIRATORY (INHALATION) at 08:06

## 2024-06-30 RX ADMIN — IPRATROPIUM BROMIDE 0.5 MG: 0.5 SOLUTION RESPIRATORY (INHALATION) at 01:06

## 2024-06-30 RX ADMIN — FAMOTIDINE 20 MG: 20 TABLET ORAL at 08:06

## 2024-06-30 RX ADMIN — DICYCLOMINE HYDROCHLORIDE 10 MG: 10 CAPSULE ORAL at 08:06

## 2024-06-30 RX ADMIN — HYDROMORPHONE HYDROCHLORIDE 0.5 MG: 0.5 INJECTION, SOLUTION INTRAMUSCULAR; INTRAVENOUS; SUBCUTANEOUS at 01:06

## 2024-06-30 RX ADMIN — ALPRAZOLAM 0.25 MG: 0.25 TABLET ORAL at 08:06

## 2024-06-30 RX ADMIN — AMOXICILLIN AND CLAVULANATE POTASSIUM 1 TABLET: 875; 125 TABLET, FILM COATED ORAL at 08:06

## 2024-06-30 RX ADMIN — Medication 800 MG: at 08:06

## 2024-06-30 RX ADMIN — Medication 800 MG: at 01:06

## 2024-06-30 RX ADMIN — ASPIRIN 81 MG: 81 TABLET, COATED ORAL at 08:06

## 2024-06-30 RX ADMIN — IPRATROPIUM BROMIDE 0.5 MG: 0.5 SOLUTION RESPIRATORY (INHALATION) at 08:06

## 2024-06-30 RX ADMIN — CETIRIZINE HYDROCHLORIDE 10 MG: 10 TABLET, FILM COATED ORAL at 08:06

## 2024-06-30 RX ADMIN — LEVOTHYROXINE SODIUM 112 MCG: 0.11 TABLET ORAL at 06:06

## 2024-06-30 RX ADMIN — ONDANSETRON 4 MG: 2 INJECTION INTRAMUSCULAR; INTRAVENOUS at 01:06

## 2024-06-30 RX ADMIN — FILGRASTIM-SNDZ 300 MCG: 300 INJECTION, SOLUTION INTRAVENOUS; SUBCUTANEOUS at 02:06

## 2024-06-30 RX ADMIN — SENNOSIDES AND DOCUSATE SODIUM 1 TABLET: 50; 8.6 TABLET ORAL at 08:06

## 2024-06-30 NOTE — RESPIRATORY THERAPY
06/29/24 2039   Patient Assessment/Suction   Level of Consciousness (AVPU) alert   Respiratory Effort Normal;Unlabored   Expansion/Accessory Muscles/Retractions no use of accessory muscles;no retractions   All Lung Fields Breath Sounds Anterior:;diminished   Rhythm/Pattern, Respiratory no shortness of breath reported;unlabored   Cough Frequency infrequent   Cough Type dry   PRE-TX-O2   Device (Oxygen Therapy) nasal cannula   Flow (L/min) (Oxygen Therapy) 2   SpO2 98 %   Pulse Oximetry Type Intermittent   $ Pulse Oximetry - Multiple Charge Pulse Oximetry - Multiple   Pulse 72   Resp 19   Positioning HOB elevated 30 degrees   Aerosol Therapy   $ Aerosol Therapy Charges Aerosol Treatment  (BROVANA)   Daily Review of Necessity (SVN) completed   Respiratory Treatment Status (SVN) given   Treatment Route (SVN) mask;oxygen   Patient Position HOB elevated   Post Treatment Assessment (SVN) breath sounds unchanged   Signs of Intolerance (SVN) none   Education   $ Education 15 min;Other (see comment)

## 2024-06-30 NOTE — SUBJECTIVE & OBJECTIVE
Interval History: Pt endorses weakness, cough, left hip pain and LLQ pain.  The patient denies CP, SOB, nausea, vomiting, constipation, diarrhea, fever.    Oncology Treatment Plan:   OP CISPLATIN 75MG/M2 ETOPOSIDE 100MG/M2 Q3W    Medications:  Continuous Infusions:  Scheduled Meds:   amoxicillin-clavulanate 875-125mg  1 tablet Oral Q12H    arformoteroL  15 mcg Nebulization BID    aspirin  81 mg Oral Daily    cetirizine  10 mg Oral Daily    dicyclomine  10 mg Oral QID    famotidine  20 mg Oral Daily    filgrastim-sndz  300 mcg Subcutaneous Q24H    ipratropium  0.5 mg Nebulization Q6H    levothyroxine  112 mcg Oral Before breakfast    metoprolol tartrate  25 mg Oral BID     PRN Meds:  Current Facility-Administered Medications:     acetaminophen, 650 mg, Oral, Q4H PRN    ALPRAZolam, 0.25 mg, Oral, Nightly PRN    aluminum-magnesium hydroxide-simethicone, 30 mL, Oral, Q6H PRN **AND** [COMPLETED] LIDOcaine viscous HCl 2%, 15 mL, Oral, Once    carboxymethylcellulose sodium, 1 drop, Both Eyes, QID PRN    HYDROcodone-acetaminophen, 1 tablet, Oral, Q6H PRN    HYDROcodone-acetaminophen, 2 tablet, Oral, Q6H PRN    HYDROmorphone, 0.5 mg, Intravenous, Q6H PRN    magnesium oxide, 800 mg, Oral, PRN    magnesium oxide, 800 mg, Oral, PRN    melatonin, 6 mg, Oral, Nightly PRN    naloxone, 0.02 mg, Intravenous, PRN    nitroGLYCERIN, 0.4 mg, Sublingual, Q5 Min PRN    ondansetron, 4 mg, Intravenous, Q6H PRN    potassium bicarbonate, 35 mEq, Oral, PRN    potassium bicarbonate, 50 mEq, Oral, PRN    potassium bicarbonate, 60 mEq, Oral, PRN    potassium, sodium phosphates, 2 packet, Oral, PRN    potassium, sodium phosphates, 2 packet, Oral, PRN    potassium, sodium phosphates, 2 packet, Oral, PRN    promethazine (PHENERGAN) 12.5 mg in 0.9% NaCl 50 mL IVPB, 12.5 mg, Intravenous, Q6H PRN    senna-docusate 8.6-50 mg, 1 tablet, Oral, Daily PRN    sodium chloride 0.9%, 10 mL, Intravenous, Q12H PRN     Review of Systems   Constitutional:  Positive  for fatigue. Negative for chills and fever.   Respiratory:  Positive for cough. Negative for shortness of breath.    Cardiovascular:  Negative for chest pain and palpitations.   Gastrointestinal:  Positive for abdominal pain. Negative for constipation, diarrhea, nausea and vomiting.   Musculoskeletal:         Left hip pain   Skin:  Negative for rash.   Neurological:  Positive for weakness. Negative for headaches.     Objective:     Vital Signs (Most Recent):  Temp: 98.9 °F (37.2 °C) (06/30/24 1315)  Pulse: 84 (06/30/24 1312)  Resp: 18 (06/30/24 1318)  BP: 139/82 (06/30/24 1312)  SpO2: 97 % (06/30/24 1312) Vital Signs (24h Range):  Temp:  [98 °F (36.7 °C)-99.8 °F (37.7 °C)] 98.9 °F (37.2 °C)  Pulse:  [] 84  Resp:  [17-20] 18  SpO2:  [93 %-98 %] 97 %  BP: (107-139)/(69-96) 139/82     Weight: 77.3 kg (170 lb 6.4 oz)  Body mass index is 31.17 kg/m².  Body surface area is 1.84 meters squared.      Intake/Output Summary (Last 24 hours) at 6/30/2024 1339  Last data filed at 6/30/2024 1314  Gross per 24 hour   Intake 360 ml   Output 1750 ml   Net -1390 ml        Physical Exam  Constitutional:       Appearance: She is well-developed.   Cardiovascular:      Rate and Rhythm: Normal rate and regular rhythm.      Heart sounds: Normal heart sounds. No murmur heard.     No friction rub. No gallop.   Pulmonary:      Effort: Pulmonary effort is normal. No respiratory distress.      Breath sounds: No wheezing or rales.   Abdominal:      General: Bowel sounds are normal. There is no distension.      Palpations: Abdomen is soft.      Tenderness: There is no abdominal tenderness. There is no guarding or rebound.   Neurological:      Mental Status: She is alert and oriented to person, place, and time.          Significant Labs:   Recent Results (from the past 24 hour(s))   Lactate dehydrogenase    Collection Time: 06/29/24  3:06 PM   Result Value Ref Range     110 - 260 U/L   Comprehensive Metabolic Panel (CMP)    Collection  Time: 06/30/24  4:30 AM   Result Value Ref Range    Sodium 137 136 - 145 mmol/L    Potassium 3.9 3.5 - 5.1 mmol/L    Chloride 98 95 - 110 mmol/L    CO2 27 23 - 29 mmol/L    Glucose 87 70 - 110 mg/dL    BUN 9 8 - 23 mg/dL    Creatinine 1.0 0.5 - 1.4 mg/dL    Calcium 8.6 (L) 8.7 - 10.5 mg/dL    Total Protein 6.6 6.0 - 8.4 g/dL    Albumin 3.3 (L) 3.5 - 5.2 g/dL    Total Bilirubin 0.3 0.1 - 1.0 mg/dL    Alkaline Phosphatase 71 55 - 135 U/L    AST 13 10 - 40 U/L    ALT 10 10 - 44 U/L    eGFR 58.8 (A) >60 mL/min/1.73 m^2    Anion Gap 12 8 - 16 mmol/L   Magnesium    Collection Time: 06/30/24  4:30 AM   Result Value Ref Range    Magnesium 1.5 (L) 1.6 - 2.6 mg/dL   CBC with Automated Differential    Collection Time: 06/30/24  4:30 AM   Result Value Ref Range    WBC 3.22 (L) 3.90 - 12.70 K/uL    RBC 2.73 (L) 4.00 - 5.40 M/uL    Hemoglobin 8.4 (L) 12.0 - 16.0 g/dL    Hematocrit 26.0 (L) 37.0 - 48.5 %    MCV 95 82 - 98 fL    MCH 30.8 27.0 - 31.0 pg    MCHC 32.3 32.0 - 36.0 g/dL    RDW 13.2 11.5 - 14.5 %    Platelets 66 (L) 150 - 450 K/uL    MPV 9.5 9.2 - 12.9 fL    Immature Granulocytes CANCELED 0.0 - 0.5 %    Immature Grans (Abs) CANCELED 0.00 - 0.04 K/uL    nRBC 3 (A) 0 /100 WBC    Gran % 6.0 (L) 38.0 - 73.0 %    Lymph % 71.0 (H) 18.0 - 48.0 %    Mono % 2.0 (L) 4.0 - 15.0 %    Eosinophil % 0.0 0.0 - 8.0 %    Basophil % 0.0 0.0 - 1.9 %    Bands 12.0 %    Metamyelocytes 7.0 %    Myelocytes 2.0 %    Differential Method Manual    Ferritin    Collection Time: 06/30/24  4:30 AM   Result Value Ref Range    Ferritin 739.9 (H) 20.0 - 300.0 ng/mL   Iron and TIBC    Collection Time: 06/30/24  4:30 AM   Result Value Ref Range    Iron 78 30 - 160 ug/dL    Transferrin 175 (L) 200 - 375 mg/dL    TIBC 245 (L) 250 - 450 ug/dL    Saturated Iron 32 20 - 50 %   Vitamin B12    Collection Time: 06/30/24  4:30 AM   Result Value Ref Range    Vitamin B-12 493 210 - 950 pg/mL   Folate    Collection Time: 06/30/24  4:30 AM   Result Value Ref Range     Folate 11.6 4.0 - 24.0 ng/mL     Microbiology Results (last 7 days)       Procedure Component Value Units Date/Time    Blood culture [[id number]] Collected: 06/26/24 1840    Order Status: Completed Specimen: Blood from Peripheral, Hand, Left Updated: 06/29/24 2032     Blood Culture, Routine No Growth to date      No Growth to date      No Growth to date      No Growth to date    Blood culture [[id number]] Collected: 06/26/24 1841    Order Status: Completed Specimen: Blood from Peripheral, Hand, Right Updated: 06/29/24 2032     Blood Culture, Routine No Growth to date      No Growth to date      No Growth to date      No Growth to date    Blood culture (site 1) [[id number]] Collected: 06/23/24 1756    Order Status: Completed Specimen: Blood Updated: 06/28/24 2032     Blood Culture, Routine No growth after 5 days.    Narrative:      Site # 1, aerobic and anaerobic    Blood culture (site 2) [[id number]] Collected: 06/23/24 1756    Order Status: Completed Specimen: Blood Updated: 06/28/24 2032     Blood Culture, Routine No growth after 5 days.    Narrative:      Site # 2, aerobic only    Stool culture [[id number]] Collected: 06/24/24 0421    Order Status: Completed Specimen: Stool Updated: 06/26/24 1442     Stool Culture No Salmonella,Shigella,Vibrio,Campylobacter.      No E coli 0157:H7 isolated.    Clostridium difficile EIA [[id number]] Collected: 06/24/24 0421    Order Status: Completed Specimen: Stool Updated: 06/24/24 0528     C. diff Antigen Negative     C difficile Toxins A+B, EIA Negative     Comment: Testing not recommended for children <24 months old.                 Diagnostic Results:  I have reviewed all pertinent imaging results/findings within the past 24 hours.

## 2024-06-30 NOTE — PROGRESS NOTES
[facility]   Department of Infectious Disease  Progress Note        PATIENT NAME: Joslyn Dubon  MRN: [id number]  TODAY'S DATE: 06/30/2024  ADMIT DATE: 6/23/2024  LOS: 6 days    CHIEF COMPLAINT: Vomiting (X FEW DAYS) and Chest Pain      PRINCIPLE PROBLEM: Chest pain    INTERVAL HISTORY      06/28/2024:  Continues to improve and remains stable.  She had low-grade fever of 99.4 earlier today.  Cultures all remain negative so far.  States she feels bad after receiving filgrastim injection.  She gets tremulous and also has muscle aches primarily involving the lower extremities.      06/29/2024:  No acute issues overnight.  Continues to improve.  Cultures remain negative.  WBC stable at 1.06 with 0% granulocytes today on automated differential.    06/30/2024: No acute issues overnight.  Remains afebrile.  T-max 99.4°.  WBC increased to 3.22 with neutrophils 6% and bands 12%.    Antibiotics (From admission, onward)      Start     Stop Route Frequency Ordered    06/29/24 1115  amoxicillin-clavulanate 875-125mg per tablet 1 tablet         -- Oral Every 12 hours 06/29/24 1000          Antifungals (From admission, onward)      None           Antivirals (From admission, onward)      None            ASSESSMENT and PLAN     1. Gastroenteritis in a patient who recently started chemotherapy.  Workup for infectious etiology negative.  Clinically resolved.  Monitor     2. Neutropenic fever.  Recently started cisplatin and etoposide and has pancytopenia.  Fever 06/26/2024 was about 1 hour after filgrastim infusion.  Workup for infection so far negative.  Continue Augmentin for 7 days total.    3. Lung cancer with neuroendocrine features.  She started chemotherapy 06/17/2024.  Management as per oncologist.     4. Anxiety disorder.      5. Leukopenia.  Improved on Filgastrim.  She is having side effects to the medication.  Defer to oncologist.    RECOMMENDATIONS:    Continue Augmentin for 7 days total   Management of  leukopenia as per oncologist    Thank you for involving ID service in the care of this patient.  Please reconsult if needed.. Please send Epic secure chat with any questions.  Discussed with patient and her family at bedside.      SUBJECTIVE    Joslyn Dubon is a 73 y.o. female with history of COPD and newly diagnosed lung cancer with neuroendocrine features.  She was commenced on cisplatin and etoposide 06/17/2024.  Received 2nd cycle 06/19/2024 developed nausea with vomiting and also left side abdominal pain.  Seen in the ED 06/21/2024.  Managed conservatively, improved and was discharged home.       Back in ED 06/23/2024 with since symptoms and was admitted.  She had WEN with multiple electrolyte imbalance.  CT abdomen and pelvis with no acute abnormality but did show in known aortic stent used to repair AAA.  Chest x-ray with no acute abnormality.  V/Q scan unremarkable.  Stool workup included C diff and stool culture negative.  Blood culture negative.     She has been seen by hematologist.  Initially of G-CSF which she declined.  It was later started yesterday 06/26/2024 also been seen by GI service stool workup.  Had fever 06/26/2024 and was commenced on antibiotics repeat blood cultures so far negative.  She has improved overall clinically since hospitalization.     Microbiology   Blood culture 06/23/2024: NG CD   Stool culture 06/24/2024: Negative   Stool C diff 06/24/2024: Negative   Blood culture 06/26/2024:  In progress     Antibiotics   Cefepime: 06/26/2024-  Vancomycin: 06/26/2024-    Review of Systems  Negative except as stated above in Interval History     OBJECTIVE   Temp:  [98 °F (36.7 °C)-99.8 °F (37.7 °C)] 99.8 °F (37.7 °C)  Pulse:  [] 87  Resp:  [15-20] 18  SpO2:  [93 %-98 %] 95 %  BP: (107-134)/(69-96) 133/89  Temp:  [98 °F (36.7 °C)-99.8 °F (37.7 °C)]   Temp: 99.8 °F (37.7 °C) (06/30/24 1145)  Pulse: 87 (06/30/24 1145)  Resp: 18 (06/30/24 1145)  BP: 133/89 (06/30/24 1145)  SpO2: 95 %  (06/30/24 0813)    Intake/Output Summary (Last 24 hours) at 6/30/2024 1227  Last data filed at 6/30/2024 0845  Gross per 24 hour   Intake 760 ml   Output 1850 ml   Net -1090 ml       Physical Exam  General:  Elderly woman lying quietly in bed in no acute distress   CVS: S1 and 2 heard   Respiratory: Clear to auscultation   Abdomen: Full, soft, nontender, no palpable organomegaly  Skin: No rash appreciated   CNS: No focal deficits   Musculoskeletal system: No joint or bony abnormalities appreciated    VAD:  Right chest MediPort  ISOLATION:  Neutropenic precautions    WOUNDS:  None    Significant Labs: All pertinent labs within the past 24 hours have been reviewed.    CBC LAST 7 DAYS  Recent Labs   Lab 06/24/24  0508 06/25/24  0529 06/26/24  0452 06/27/24  0414 06/28/24  0605 06/29/24  0516 06/30/24  0430   WBC 1.68* 1.21* 0.93* 0.55*  0.55* 1.06* 1.06* 3.22*   RBC 3.48* 3.09* 3.32* 2.84*  2.84* 3.06* 2.83* 2.73*   HGB 11.0* 9.7* 10.2* 8.9*  8.9* 9.6* 8.9* 8.4*   HCT 32.5* 29.2* 31.2* 26.2*  26.2* 28.6* 27.2* 26.0*   MCV 93 95 94 92  92 94 96 95   MCH 31.6* 31.4* 30.7 31.3*  31.3* 31.4* 31.4* 30.8   MCHC 33.8 33.2 32.7 34.0  34.0 33.6 32.7 32.3   RDW 13.2 13.1 12.9 12.8  12.8 13.1 13.0 13.2   * 104* 90* 68*  68* 37* 40* 66*   MPV 8.1* 8.7* 8.9* 8.8*  8.8* 9.7 10.3 9.5   GRAN 45.0 42.0 17.2*  0.2* 9.0*  9.0*  0.1*  0.1* 3.0*  0.0* 0.0* 6.0*   LYMPH 49.0* 58.0* 78.5*  0.7* 85.5*  85.5*  0.5*  0.5* 83.0*  0.9* 80.0* 71.0*   MONO 5.0 0.0* 3.2*  0.0* 5.5  5.5  0.0*  0.0* 11.3  0.1* 18.0* 2.0*   BASO  --   --  0.00 0.00  0.00 0.01  --   --    NRBC 0 0 0 0  0 3* 2* 3*       CHEMISTRY LAST 7 DAYS  Recent Labs   Lab 06/24/24  0508 06/25/24  0529 06/26/24  0452 06/27/24  0414 06/27/24  1408 06/28/24  0605 06/29/24  0516 06/30/24  0430    140 140 134*  --  135* 139 137   K 3.1* 3.5 3.7 3.6 3.9 4.3 3.9 3.9    104 103 99  --  100 102 98   CO2 30* 31* 30* 27  --  28 26 27   ANIONGAP 10  "5* 7* 8  --  7* 11 12   BUN 24* 18 12 13  --  11 10 9   CREATININE 1.2 1.0 1.0 1.0  --  1.0 0.9 1.0    123* 83 103  --  100 79 87   CALCIUM 8.7 8.5* 8.9 8.7  --  8.2* 8.2* 8.6*   MG 1.6 1.3* 1.3* 1.0*  --  1.2* 1.4* 1.5*   ALBUMIN 3.6 3.2* 3.4* 3.4*  --  3.3* 3.3* 3.3*   PROT 6.6 5.9* 6.2 6.3  --  6.2 6.3 6.6   ALKPHOS 84 80 85 75  --  68 71 71   ALT 21 18 18 13  --  9* 9* 10   AST 18 15 15 12  --  11 11 13   BILITOT 0.6 0.3 0.4 0.5  --  0.4 0.3 0.3       Estimated Creatinine Clearance: 46.8 mL/min (based on SCr of 1 mg/dL).    INFLAMMATORY/PROCAL  LAST 7 DAYS  Recent Labs   Lab 06/23/24 2040   PROCAL 0.108     No results found for: "ESR"  No results found for: "CRP"    PRIOR  MICROBIOLOGY:    Susceptibility data from last 90 days.  Collected Specimen Info Organism   06/23/24 Blood No growth after 5 days.   06/23/24 Blood No growth after 5 days.   04/19/24 Pleural Fluid from Lung, ELOY CUTIBACTERIUM ACNES       LAST 7 DAYS MICROBIOLOGY   Microbiology Results (last 7 days)       Procedure Component Value Units Date/Time    Blood culture [[id number]] Collected: 06/26/24 1840    Order Status: Completed Specimen: Blood from Peripheral, Hand, Left Updated: 06/29/24 2032     Blood Culture, Routine No Growth to date      No Growth to date      No Growth to date      No Growth to date    Blood culture [[id number]] Collected: 06/26/24 1841    Order Status: Completed Specimen: Blood from Peripheral, Hand, Right Updated: 06/29/24 2032     Blood Culture, Routine No Growth to date      No Growth to date      No Growth to date      No Growth to date    Blood culture (site 1) [[id number]] Collected: 06/23/24 1756    Order Status: Completed Specimen: Blood Updated: 06/28/24 2032     Blood Culture, Routine No growth after 5 days.    Narrative:      Site # 1, aerobic and anaerobic    Blood culture (site 2) [[id number]] Collected: 06/23/24 1756    Order Status: Completed Specimen: Blood Updated: 06/28/24 2032     Blood Culture, " Routine No growth after 5 days.    Narrative:      Site # 2, aerobic only    Stool culture [[id number]] Collected: 06/24/24 0421    Order Status: Completed Specimen: Stool Updated: 06/26/24 1442     Stool Culture No Salmonella,Shigella,Vibrio,Campylobacter.      No E coli 0157:H7 isolated.    Clostridium difficile EIA [[id number]] Collected: 06/24/24 0421    Order Status: Completed Specimen: Stool Updated: 06/24/24 0528     C. diff Antigen Negative     C difficile Toxins A+B, EIA Negative     Comment: Testing not recommended for children <24 months old.             CURRENT/PREVIOUS VISIT EKG  Results for orders placed or performed during the hospital encounter of 06/23/24   EKG 12-lead    Collection Time: 06/23/24  9:06 AM   Result Value Ref Range    QRS Duration 70 ms    [facility] QTC Calculation 469 ms    Narrative    Test Reason : R07.9,    Vent. Rate : 091 BPM     Atrial Rate : 091 BPM     P-R Int : 122 ms          QRS Dur : 070 ms      QT Int : 382 ms       P-R-T Axes : -19 031 083 degrees     QTc Int : 469 ms    Sinus rhythm with occasional Premature ventricular complexes  Otherwise normal ECG  Confirmed by Niko BONILLA, Kirstie Harp ([id number]) on 6/26/2024 12:05:00 AM    Referred By: [email or web address]   SELF           Confirmed By:Kirstie Pope MD     Significant Imaging: I have reviewed all relevant and available imaging results/findings within the past 24 hours.    I spent a total of 35 minutes on the day of the visit.This includes face to face time and non-face to face time preparing to see the patient (eg, review of tests), obtaining and/or reviewing separately obtained history, documenting clinical information in the electronic or other health record, independently interpreting results and communicating results to the patient/family/caregiver, or care coordinator.    Nic Costa MD  Date of Service: 06/30/2024      This note was created using [employer] voice recognition software that occasionally misinterpreted  phrases or words.

## 2024-06-30 NOTE — ASSESSMENT & PLAN NOTE
-Due to chemo  -Pt did not receive Neulasta  -ANC is up to 193 from 0 yesterday   -Continue granix until ANC is at least 1,000 on 2 consecutive days

## 2024-06-30 NOTE — ASSESSMENT & PLAN NOTE
Unsure cause   Follow BC and all negative   Now antibiotics downgraded and now on Augmentin only   Follow final BC and so far neg

## 2024-06-30 NOTE — ASSESSMENT & PLAN NOTE
Current CBC reviewed-   Lab Results   Component Value Date    HGB 8.4 (L) 06/30/2024    HCT 26.0 (L) 06/30/2024     Monitor serial CBC and transfuse if patient becomes hemodynamically unstable, symptomatic or H/H drops below 7/21.

## 2024-06-30 NOTE — ASSESSMENT & PLAN NOTE
Continue  G CSF until tomorrow   Follow WBC and neutropenia noted. Follow ANC  Reverse isolation   DC vanco and  cefepime and changed to augmentin   Possible DC tomorrow

## 2024-06-30 NOTE — CARE UPDATE
06/30/24 0813   Patient Assessment/Suction   Level of Consciousness (AVPU) alert   Respiratory Effort Normal;Unlabored   Expansion/Accessory Muscles/Retractions no use of accessory muscles   All Lung Fields Breath Sounds clear;diminished   Rhythm/Pattern, Respiratory unlabored   Cough Frequency no cough   PRE-TX-O2   Device (Oxygen Therapy) nasal cannula   $ Is the patient on Low Flow Oxygen? Yes   Flow (L/min) (Oxygen Therapy) 2   SpO2 95 %   Pulse Oximetry Type Intermittent   $ Pulse Oximetry - Multiple Charge Pulse Oximetry - Multiple   Pulse 106   Resp 17   Aerosol Therapy   $ Aerosol Therapy Charges Aerosol Treatment   Daily Review of Necessity (SVN) completed   Respiratory Treatment Status (SVN) given   Treatment Route (SVN) mask;oxygen   Patient Position HOB elevated   Post Treatment Assessment (SVN) breath sounds unchanged   Signs of Intolerance (SVN) none   Breath Sounds Post-Respiratory Treatment   Throughout All Fields Post-Treatment All Fields   Throughout All Fields Post-Treatment no change   Post-treatment Heart Rate (beats/min) 102   Post-treatment Resp Rate (breaths/min) 18   Education   $ Education Bronchodilator;15 min

## 2024-06-30 NOTE — NURSING
Spoke with Dr. Velazquez during patient rounds at bedside of patient and situation, informing MD that patient has complaints of leg pain/cramping/pain after receiving her Zarxio injection. MD voiced understanding and stated that this is normal, and state that RN may premedicate patient 30 minutes before administration with zofran and dilaudid that is ordered PRN.

## 2024-06-30 NOTE — ASSESSMENT & PLAN NOTE
Stool studies  neg  Lyte replacement per protocol  Lomotil  and now improved  and DC resolved      Advancement-Rotation Flap Text: The defect edges were debeveled with a #15 scalpel blade.  Given the location of the defect, shape of the defect and the proximity to free margins an advancement-rotation flap was deemed most appropriate.  Using a sterile surgical marker, an appropriate flap was drawn incorporating the defect and placing the expected incisions within the relaxed skin tension lines where possible. The area thus outlined was incised deep to adipose tissue with a #15 scalpel blade.  The skin margins were undermined to an appropriate distance in all directions utilizing iris scissors.

## 2024-06-30 NOTE — PROGRESS NOTES
[facility]  Progress Note    Patient Name: Joslyn Dubon  MRN: [id number]  Patient Class: IP- Inpatient   Admission Date: 6/23/2024  Length of Stay: 6 days  Attending Physician: Royce Monaco MD  Primary Care Provider: Jasbir Graham MD        Subjective:     Principal Problem:Chest pain        HPI:  73-year-old female presented to ED for eval. Patient reported for the last few days she has had intractable nausea and vomiting, with associated LLQ abd pain and intermittent diarrhea. Denied melena, hematochezia, hematemesis. 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 Patient also reported over the last 2 days she has been having squeezing L sided intermittent chest pain that radiates to her back with shortness of breath. She does have chronic resp failure and COPD but reports shortness of breath is increased from baseline, denies use of home O2. Patient denies previous similar episodes of chest pain, denies hx CAD. She does have hx of AAA s/p repair. Noted to have WEN, hypomagnesemia, hypokalemia. CXR impression without acute abn. Of note, patient has contrast allergy with anaphylactic reaction, also reports severe hypotension with dilaudid and morphine, can tolerate norco. Admit to hospital medicine for further eval.     Overview/Hospital Course:  Patient was admitted to the hospital with  symptoms.  Patient was currently on 2nd round of chemotherapy for lung cancer.  CT of the abdomen was done with no acute except previous vascular stent.  C diff test is negative and all stool tests are negative and patient was started on anti diarrhea and gradually better.  Patient had chest pain episode during the admission and patient was offered stress test but she declined.  She was reviewed by the oncologist and offered G-CSF to increase the white cell but she declined that as  well.  But later patient got fever and WBC and platelet go down and ANC 0 and patient agreed to get neupogen and septic work up was done .  All cultures are negative and later fever subside . IV antibiotics started and gradually weaned and later ID recommend to DC with augmentin  and awaited for WBC recovery and now improving     Interval History:   Deny chest pain or abdominal pain . Diarrhea has resolved   Afebrile   WBC better   Continue neupogen until tomorrow . Possible DC tomorrow     Review of Systems  Objective:     Vital Signs (Most Recent):  Temp: 98.4 °F (36.9 °C) (06/30/24 0701)  Pulse: 99 (06/30/24 0850)  Resp: 17 (06/30/24 0813)  BP: 134/88 (06/30/24 0850)  SpO2: 95 % (06/30/24 0813) Vital Signs (24h Range):  Temp:  [97.7 °F (36.5 °C)-99.2 °F (37.3 °C)] 98.4 °F (36.9 °C)  Pulse:  [] 99  Resp:  [15-20] 17  SpO2:  [93 %-98 %] 95 %  BP: (107-134)/(69-96) 134/88     Weight: 77.3 kg (170 lb 6.4 oz)  Body mass index is 31.17 kg/m².    Intake/Output Summary (Last 24 hours) at 6/30/2024 1107  Last data filed at 6/30/2024 0845  Gross per 24 hour   Intake 760 ml   Output 1850 ml   Net -1090 ml         Physical Exam  Vitals and nursing note reviewed.   HENT:      Head: Normocephalic and atraumatic.   Eyes:      Conjunctiva/sclera: Conjunctivae normal.   Neck:      Vascular: No JVD.   Cardiovascular:      Rate and Rhythm: Normal rate.      Heart sounds: Normal heart sounds.   Pulmonary:      Effort: Pulmonary effort is normal.      Breath sounds: Normal breath sounds.   Abdominal:      General: Bowel sounds are normal.      Palpations: Abdomen is soft.   Skin:     General: Skin is warm.   Neurological:      Mental Status: She is alert and oriented to person, place, and time.   Psychiatric:         Mood and Affect: Mood normal.             Significant Labs: All pertinent labs within the past 24 hours have been reviewed.  BMP:   Recent Labs   Lab 06/30/24  0430   GLU 87      K 3.9   CL 98   CO2 27   BUN  9   CREATININE 1.0   CALCIUM 8.6*   MG 1.5*     CBC:   Recent Labs   Lab 06/29/24  0516 06/30/24  0430   WBC 1.06* 3.22*   HGB 8.9* 8.4*   HCT 27.2* 26.0*   PLT 40* 66*       Significant Imaging: I have reviewed all pertinent imaging results/findings within the past 24 hours.    Assessment/Plan:      * Chest pain  Serial troponins x 3 all negative   ECHO to follow   Stress test when patient more stable as OP  EKG PRN  NTG PRN  ASA  VQ scan negative at perfusion scan   Patient not keen to do stress test     Thrombocytopenia  Patient was found to have thrombocytopenia, the likely etiology is chemo Rx. will monitor the platelets Daily. Will transfuse if platelet count is <20k. Hold DVT prophylaxis if platelets are <50k. The patient's platelet results have been reviewed and are listed below.  Recent Labs   Lab 06/30/24  0430   PLT 66*         Febrile neutropenia  Unsure cause   Follow BC and all negative   Now antibiotics downgraded and now on Augmentin only   Follow final BC and so far neg       Chemotherapy induced neutropenia  Continue  G CSF until tomorrow   Follow WBC and neutropenia noted. Follow ANC  Reverse isolation   DC vanco and  cefepime and changed to augmentin   Possible DC tomorrow       Anemia due to chemotherapy    Current CBC reviewed-   Lab Results   Component Value Date    HGB 8.4 (L) 06/30/2024    HCT 26.0 (L) 06/30/2024     Monitor serial CBC and transfuse if patient becomes hemodynamically unstable, symptomatic or H/H drops below 7/21.    Malignant neoplasm of lung  Aware  Follow dr lott   Chemo on hold      WEN (acute kidney injury)  Patient with acute kidney injury/acute renal failure likely due to pre-renal azotemia due to dehydration WEN is currently stable. Baseline creatinine  1  - Labs reviewed- Renal function/electrolytes with Estimated Creatinine Clearance: 46.8 mL/min (based on SCr of 1 mg/dL). according to latest data. Monitor urine output and serial CMP and adjust therapy as  needed. Avoid nephrotoxins and renally dose meds for GFR listed above.  resolved    Diarrhea  Stool studies  neg  Lyte replacement per protocol  Lomotil  and now improved  and DC resolved       Intractable nausea and vomiting  Secondary to  chemo  Lipase negative   Zofran, phenergan PRN NV  IVF  Lyte replacement per protocol  CT abd/pelvis noted. Non acute   resolved    Non-small cell carcinoma of left lung  Last chemo 6/19/24  RT assess and treat  Supplemental O2 PRN  Resume home inhalers  Starting neupogen    History of AAA repair  Noted in CT  Non acute      VTE Risk Mitigation (From admission, onward)           Ordered     Reason for No Pharmacological VTE Prophylaxis  Once        Question:  Reasons:  Answer:  Risk of Bleeding    06/23/24 1743     IP VTE HIGH RISK PATIENT  Once         06/23/24 1743     Place sequential compression device  Until discontinued         06/23/24 1743                    Discharge Planning   TINO: 7/1/2024     Code Status: Full Code   Is the patient medically ready for discharge?:     Reason for patient still in hospital (select all that apply): Treatment  Discharge Plan A: Home with family                  Royce Monaco MD  Department of Hospital Medicine   [facility]

## 2024-06-30 NOTE — ASSESSMENT & PLAN NOTE
Serial troponins x 3 all negative   ECHO to follow   Stress test when patient more stable as OP  EKG PRN  NTG PRN  ASA  VQ scan negative at perfusion scan   Patient not keen to do stress test

## 2024-06-30 NOTE — ASSESSMENT & PLAN NOTE
-Likely due to chemo  -Platelets 66k 6/30/24   -Would transfuse platelets for platelet count less than 10k, </=20k with petechiae or mucosal bleeding or </=50k with active bleeding or immediately prior to invasive procedures

## 2024-06-30 NOTE — PROGRESS NOTES
[facility]  Hematology/Oncology  Progress Note    Patient Name: Joslyn Dubon  Admission Date: 6/23/2024  Hospital Length of Stay: 6 days  Code Status: Full Code     Subjective:     HPI:  73 y.o. female known to the oncology service of my associate Dr Camden Iyer with diagnosis of lung cancer with neuroendocrine features who has been on platinum and etopside chemotherapy. She had her 2nd cycle on 6/19/2024. She presented to the ED with CP and N/V.  She has had diarrhea since Wednesday and is dehydrated. She reports that she is feeling much better since admit yesterday. No current N/V, CP or SOB. She is eating solid foods. I discussed giving her GCSF support to help with her leucopenia, but she wants to hold off for now. I discussed with her floor nurse. There were no family currently at bedside.     Interval History: Pt endorses weakness, cough, left hip pain and LLQ pain.  The patient denies CP, SOB, nausea, vomiting, constipation, diarrhea, fever.    Oncology Treatment Plan:   OP CISPLATIN 75MG/M2 ETOPOSIDE 100MG/M2 Q3W    Medications:  Continuous Infusions:  Scheduled Meds:   amoxicillin-clavulanate 875-125mg  1 tablet Oral Q12H    arformoteroL  15 mcg Nebulization BID    aspirin  81 mg Oral Daily    cetirizine  10 mg Oral Daily    dicyclomine  10 mg Oral QID    famotidine  20 mg Oral Daily    filgrastim-sndz  300 mcg Subcutaneous Q24H    ipratropium  0.5 mg Nebulization Q6H    levothyroxine  112 mcg Oral Before breakfast    metoprolol tartrate  25 mg Oral BID     PRN Meds:  Current Facility-Administered Medications:     acetaminophen, 650 mg, Oral, Q4H PRN    ALPRAZolam, 0.25 mg, Oral, Nightly PRN    aluminum-magnesium hydroxide-simethicone, 30 mL, Oral, Q6H PRN **AND** [COMPLETED] LIDOcaine viscous HCl 2%, 15 mL, Oral, Once    carboxymethylcellulose sodium, 1 drop, Both Eyes, QID PRN    HYDROcodone-acetaminophen, 1 tablet, Oral, Q6H PRN    HYDROcodone-acetaminophen, 2 tablet, Oral, Q6H  PRN    HYDROmorphone, 0.5 mg, Intravenous, Q6H PRN    magnesium oxide, 800 mg, Oral, PRN    magnesium oxide, 800 mg, Oral, PRN    melatonin, 6 mg, Oral, Nightly PRN    naloxone, 0.02 mg, Intravenous, PRN    nitroGLYCERIN, 0.4 mg, Sublingual, Q5 Min PRN    ondansetron, 4 mg, Intravenous, Q6H PRN    potassium bicarbonate, 35 mEq, Oral, PRN    potassium bicarbonate, 50 mEq, Oral, PRN    potassium bicarbonate, 60 mEq, Oral, PRN    potassium, sodium phosphates, 2 packet, Oral, PRN    potassium, sodium phosphates, 2 packet, Oral, PRN    potassium, sodium phosphates, 2 packet, Oral, PRN    promethazine (PHENERGAN) 12.5 mg in 0.9% NaCl 50 mL IVPB, 12.5 mg, Intravenous, Q6H PRN    senna-docusate 8.6-50 mg, 1 tablet, Oral, Daily PRN    sodium chloride 0.9%, 10 mL, Intravenous, Q12H PRN     Review of Systems   Constitutional:  Positive for fatigue. Negative for chills and fever.   Respiratory:  Positive for cough. Negative for shortness of breath.    Cardiovascular:  Negative for chest pain and palpitations.   Gastrointestinal:  Positive for abdominal pain. Negative for constipation, diarrhea, nausea and vomiting.   Musculoskeletal:         Left hip pain   Skin:  Negative for rash.   Neurological:  Positive for weakness. Negative for headaches.     Objective:     Vital Signs (Most Recent):  Temp: 98.9 °F (37.2 °C) (06/30/24 1315)  Pulse: 84 (06/30/24 1312)  Resp: 18 (06/30/24 1318)  BP: 139/82 (06/30/24 1312)  SpO2: 97 % (06/30/24 1312) Vital Signs (24h Range):  Temp:  [98 °F (36.7 °C)-99.8 °F (37.7 °C)] 98.9 °F (37.2 °C)  Pulse:  [] 84  Resp:  [17-20] 18  SpO2:  [93 %-98 %] 97 %  BP: (107-139)/(69-96) 139/82     Weight: 77.3 kg (170 lb 6.4 oz)  Body mass index is 31.17 kg/m².  Body surface area is 1.84 meters squared.      Intake/Output Summary (Last 24 hours) at 6/30/2024 1339  Last data filed at 6/30/2024 1314  Gross per 24 hour   Intake 360 ml   Output 1750 ml   Net -1390 ml        Physical Exam  Constitutional:        Appearance: She is well-developed.   Cardiovascular:      Rate and Rhythm: Normal rate and regular rhythm.      Heart sounds: Normal heart sounds. No murmur heard.     No friction rub. No gallop.   Pulmonary:      Effort: Pulmonary effort is normal. No respiratory distress.      Breath sounds: No wheezing or rales.   Abdominal:      General: Bowel sounds are normal. There is no distension.      Palpations: Abdomen is soft.      Tenderness: There is no abdominal tenderness. There is no guarding or rebound.   Neurological:      Mental Status: She is alert and oriented to person, place, and time.          Significant Labs:   Recent Results (from the past 24 hour(s))   Lactate dehydrogenase    Collection Time: 06/29/24  3:06 PM   Result Value Ref Range     110 - 260 U/L   Comprehensive Metabolic Panel (CMP)    Collection Time: 06/30/24  4:30 AM   Result Value Ref Range    Sodium 137 136 - 145 mmol/L    Potassium 3.9 3.5 - 5.1 mmol/L    Chloride 98 95 - 110 mmol/L    CO2 27 23 - 29 mmol/L    Glucose 87 70 - 110 mg/dL    BUN 9 8 - 23 mg/dL    Creatinine 1.0 0.5 - 1.4 mg/dL    Calcium 8.6 (L) 8.7 - 10.5 mg/dL    Total Protein 6.6 6.0 - 8.4 g/dL    Albumin 3.3 (L) 3.5 - 5.2 g/dL    Total Bilirubin 0.3 0.1 - 1.0 mg/dL    Alkaline Phosphatase 71 55 - 135 U/L    AST 13 10 - 40 U/L    ALT 10 10 - 44 U/L    eGFR 58.8 (A) >60 mL/min/1.73 m^2    Anion Gap 12 8 - 16 mmol/L   Magnesium    Collection Time: 06/30/24  4:30 AM   Result Value Ref Range    Magnesium 1.5 (L) 1.6 - 2.6 mg/dL   CBC with Automated Differential    Collection Time: 06/30/24  4:30 AM   Result Value Ref Range    WBC 3.22 (L) 3.90 - 12.70 K/uL    RBC 2.73 (L) 4.00 - 5.40 M/uL    Hemoglobin 8.4 (L) 12.0 - 16.0 g/dL    Hematocrit 26.0 (L) 37.0 - 48.5 %    MCV 95 82 - 98 fL    MCH 30.8 27.0 - 31.0 pg    MCHC 32.3 32.0 - 36.0 g/dL    RDW 13.2 11.5 - 14.5 %    Platelets 66 (L) 150 - 450 K/uL    MPV 9.5 9.2 - 12.9 fL    Immature Granulocytes CANCELED 0.0 - 0.5 %     Immature Grans (Abs) CANCELED 0.00 - 0.04 K/uL    nRBC 3 (A) 0 /100 WBC    Gran % 6.0 (L) 38.0 - 73.0 %    Lymph % 71.0 (H) 18.0 - 48.0 %    Mono % 2.0 (L) 4.0 - 15.0 %    Eosinophil % 0.0 0.0 - 8.0 %    Basophil % 0.0 0.0 - 1.9 %    Bands 12.0 %    Metamyelocytes 7.0 %    Myelocytes 2.0 %    Differential Method Manual    Ferritin    Collection Time: 06/30/24  4:30 AM   Result Value Ref Range    Ferritin 739.9 (H) 20.0 - 300.0 ng/mL   Iron and TIBC    Collection Time: 06/30/24  4:30 AM   Result Value Ref Range    Iron 78 30 - 160 ug/dL    Transferrin 175 (L) 200 - 375 mg/dL    TIBC 245 (L) 250 - 450 ug/dL    Saturated Iron 32 20 - 50 %   Vitamin B12    Collection Time: 06/30/24  4:30 AM   Result Value Ref Range    Vitamin B-12 493 210 - 950 pg/mL   Folate    Collection Time: 06/30/24  4:30 AM   Result Value Ref Range    Folate 11.6 4.0 - 24.0 ng/mL     Microbiology Results (last 7 days)       Procedure Component Value Units Date/Time    Blood culture [[id number]] Collected: 06/26/24 1840    Order Status: Completed Specimen: Blood from Peripheral, Hand, Left Updated: 06/29/24 2032     Blood Culture, Routine No Growth to date      No Growth to date      No Growth to date      No Growth to date    Blood culture [[id number]] Collected: 06/26/24 1841    Order Status: Completed Specimen: Blood from Peripheral, Hand, Right Updated: 06/29/24 2032     Blood Culture, Routine No Growth to date      No Growth to date      No Growth to date      No Growth to date    Blood culture (site 1) [[id number]] Collected: 06/23/24 1756    Order Status: Completed Specimen: Blood Updated: 06/28/24 2032     Blood Culture, Routine No growth after 5 days.    Narrative:      Site # 1, aerobic and anaerobic    Blood culture (site 2) [[id number]] Collected: 06/23/24 1756    Order Status: Completed Specimen: Blood Updated: 06/28/24 2032     Blood Culture, Routine No growth after 5 days.    Narrative:      Site # 2, aerobic only    Stool culture  [[id number]] Collected: 06/24/24 0421    Order Status: Completed Specimen: Stool Updated: 06/26/24 1442     Stool Culture No Salmonella,Shigella,Vibrio,Campylobacter.      No E coli 0157:H7 isolated.    Clostridium difficile EIA [[id number]] Collected: 06/24/24 0421    Order Status: Completed Specimen: Stool Updated: 06/24/24 0528     C. diff Antigen Negative     C difficile Toxins A+B, EIA Negative     Comment: Testing not recommended for children <24 months old.                 Diagnostic Results:  I have reviewed all pertinent imaging results/findings within the past 24 hours.  Assessment/Plan:     Febrile neutropenia  -Pt afebrile in the last 48 hours  -ID transitioned pt to Augmentin   -Continue granix with ANC of 193    Non-small cell carcinoma of left lung  -Pt with NSCLC in the [facility]  s/p 2 cycles of Cisplatin and etoposide with last cycle chemo on 6/19/24  -Pt s/p radiation to [facility] 5/17/24   -Pt will need follow upw ith Dr Iyer on d/c    Intractable nausea and vomiting  -Nausea and vomiting improved today   -Continue zofran and phenergan    Chemotherapy induced neutropenia  -Due to chemo  -Pt did not receive Neulasta  -ANC is up to 193 from 0 yesterday   -Continue granix until ANC is at least 1,000 on 2 consecutive days    Anemia due to chemotherapy  -Hemoglobin 8.4g/dL on 6/30/24  -LDH normal indicating no hemolysis  -Haptoglobin pending  -B12 and folate normal  -Ferritin elevated indicating anemia of chronic disease   -Transfuse for hemoglobin less than 7g/dL   -Will monitor    Thrombocytopenia  -Likely due to chemo  -Platelets 66k 6/30/24   -Would transfuse platelets for platelet count less than 10k, </=20k with petechiae or mucosal bleeding or </=50k with active bleeding or immediately prior to invasive procedures      Hematology/Oncology service will follow-up with patient. Please contact us if you have any additional questions.       Justin Velazquez MD  Hematology/Oncology  [facility]  [facility]

## 2024-06-30 NOTE — PLAN OF CARE
Problem: Fever with Neutropenia  Goal: Baseline Body Temperature  Outcome: Progressing  Goal: Absence of Infection  Outcome: Progressing     Problem: Oncology Care  Goal: Effective Coping  Outcome: Progressing  Goal: Optimal Pain Control and Function  Outcome: Progressing     Problem: Infection  Goal: Absence of Infection Signs and Symptoms  Outcome: Progressing     Problem: Fall Injury Risk  Goal: Absence of Fall and Fall-Related Injury  Outcome: Progressing     Problem: Adult Inpatient Plan of Care  Goal: Plan of Care Review  Outcome: Progressing  Goal: Patient-Specific Goal (Individualized)  Outcome: Progressing  Goal: Absence of Hospital-Acquired Illness or Injury  Outcome: Progressing  Goal: Optimal Comfort and Wellbeing  Outcome: Progressing  Goal: Readiness for Transition of Care  Outcome: Progressing

## 2024-06-30 NOTE — ASSESSMENT & PLAN NOTE
-Pt afebrile in the last 48 hours  -ID transitioned pt to Augmentin   -Continue granix with ANC of 193

## 2024-06-30 NOTE — ASSESSMENT & PLAN NOTE
Patient was found to have thrombocytopenia, the likely etiology is chemo Rx. will monitor the platelets Daily. Will transfuse if platelet count is <20k. Hold DVT prophylaxis if platelets are <50k. The patient's platelet results have been reviewed and are listed below.  Recent Labs   Lab 06/30/24  0430   PLT 66*

## 2024-06-30 NOTE — SUBJECTIVE & OBJECTIVE
Interval History:   Deny chest pain or abdominal pain . Diarrhea has resolved   Afebrile   WBC better   Continue neupogen until tomorrow . Possible DC tomorrow     Review of Systems  Objective:     Vital Signs (Most Recent):  Temp: 98.4 °F (36.9 °C) (06/30/24 0701)  Pulse: 99 (06/30/24 0850)  Resp: 17 (06/30/24 0813)  BP: 134/88 (06/30/24 0850)  SpO2: 95 % (06/30/24 0813) Vital Signs (24h Range):  Temp:  [97.7 °F (36.5 °C)-99.2 °F (37.3 °C)] 98.4 °F (36.9 °C)  Pulse:  [] 99  Resp:  [15-20] 17  SpO2:  [93 %-98 %] 95 %  BP: (107-134)/(69-96) 134/88     Weight: 77.3 kg (170 lb 6.4 oz)  Body mass index is 31.17 kg/m².    Intake/Output Summary (Last 24 hours) at 6/30/2024 1107  Last data filed at 6/30/2024 0845  Gross per 24 hour   Intake 760 ml   Output 1850 ml   Net -1090 ml         Physical Exam  Vitals and nursing note reviewed.   HENT:      Head: Normocephalic and atraumatic.   Eyes:      Conjunctiva/sclera: Conjunctivae normal.   Neck:      Vascular: No JVD.   Cardiovascular:      Rate and Rhythm: Normal rate.      Heart sounds: Normal heart sounds.   Pulmonary:      Effort: Pulmonary effort is normal.      Breath sounds: Normal breath sounds.   Abdominal:      General: Bowel sounds are normal.      Palpations: Abdomen is soft.   Skin:     General: Skin is warm.   Neurological:      Mental Status: She is alert and oriented to person, place, and time.   Psychiatric:         Mood and Affect: Mood normal.             Significant Labs: All pertinent labs within the past 24 hours have been reviewed.  BMP:   Recent Labs   Lab 06/30/24  0430   GLU 87      K 3.9   CL 98   CO2 27   BUN 9   CREATININE 1.0   CALCIUM 8.6*   MG 1.5*     CBC:   Recent Labs   Lab 06/29/24  0516 06/30/24  0430   WBC 1.06* 3.22*   HGB 8.9* 8.4*   HCT 27.2* 26.0*   PLT 40* 66*       Significant Imaging: I have reviewed all pertinent imaging results/findings within the past 24 hours.

## 2024-07-01 VITALS
RESPIRATION RATE: 16 BRPM | HEIGHT: 62 IN | BODY MASS INDEX: 31.35 KG/M2 | OXYGEN SATURATION: 94 % | WEIGHT: 170.38 LBS | SYSTOLIC BLOOD PRESSURE: 119 MMHG | TEMPERATURE: 99 F | HEART RATE: 84 BPM | DIASTOLIC BLOOD PRESSURE: 68 MMHG

## 2024-07-01 PROBLEM — C34.90 MALIGNANT NEOPLASM OF LUNG: Status: RESOLVED | Noted: 2024-06-24 | Resolved: 2024-07-01

## 2024-07-01 LAB
ALBUMIN SERPL BCP-MCNC: 3.1 G/DL (ref 3.5–5.2)
ALP SERPL-CCNC: 73 U/L (ref 55–135)
ALT SERPL W/O P-5'-P-CCNC: 9 U/L (ref 10–44)
ANION GAP SERPL CALC-SCNC: 8 MMOL/L (ref 8–16)
ANISOCYTOSIS BLD QL SMEAR: SLIGHT
AST SERPL-CCNC: 16 U/L (ref 10–40)
BACTERIA BLD CULT: NORMAL
BACTERIA BLD CULT: NORMAL
BASOPHILS NFR BLD: 0 % (ref 0–1.9)
BILIRUB SERPL-MCNC: 0.3 MG/DL (ref 0.1–1)
BUN SERPL-MCNC: 8 MG/DL (ref 8–23)
CALCIUM SERPL-MCNC: 8.5 MG/DL (ref 8.7–10.5)
CHLORIDE SERPL-SCNC: 100 MMOL/L (ref 95–110)
CO2 SERPL-SCNC: 30 MMOL/L (ref 23–29)
CREAT SERPL-MCNC: 1 MG/DL (ref 0.5–1.4)
DIFFERENTIAL METHOD BLD: ABNORMAL
EOSINOPHIL NFR BLD: 0 % (ref 0–8)
ERYTHROCYTE [DISTWIDTH] IN BLOOD BY AUTOMATED COUNT: 13.5 % (ref 11.5–14.5)
EST. GFR  (NO RACE VARIABLE): 58.8 ML/MIN/1.73 M^2
GLUCOSE SERPL-MCNC: 92 MG/DL (ref 70–110)
HAPTOGLOB SERPL-MCNC: 473 MG/DL (ref 42–346)
HCT VFR BLD AUTO: 24.3 % (ref 37–48.5)
HGB BLD-MCNC: 7.7 G/DL (ref 12–16)
IMM GRANULOCYTES # BLD AUTO: ABNORMAL K/UL (ref 0–0.04)
IMM GRANULOCYTES NFR BLD AUTO: ABNORMAL % (ref 0–0.5)
LYMPHOCYTES NFR BLD: 28 % (ref 18–48)
MAGNESIUM SERPL-MCNC: 1.7 MG/DL (ref 1.6–2.6)
MCH RBC QN AUTO: 30.6 PG (ref 27–31)
MCHC RBC AUTO-ENTMCNC: 31.7 G/DL (ref 32–36)
MCV RBC AUTO: 96 FL (ref 82–98)
METAMYELOCYTES NFR BLD MANUAL: 4 %
MONOCYTES NFR BLD: 3 % (ref 4–15)
MYELOCYTES NFR BLD MANUAL: 2 %
NEUTROPHILS NFR BLD: 41 % (ref 38–73)
NEUTS BAND NFR BLD MANUAL: 22 %
NRBC BLD-RTO: 1 /100 WBC
PLATELET # BLD AUTO: 86 K/UL (ref 150–450)
PLATELET BLD QL SMEAR: ABNORMAL
PMV BLD AUTO: 9.5 FL (ref 9.2–12.9)
POLYCHROMASIA BLD QL SMEAR: ABNORMAL
POTASSIUM SERPL-SCNC: 4.7 MMOL/L (ref 3.5–5.1)
PROT SERPL-MCNC: 6 G/DL (ref 6–8.4)
RBC # BLD AUTO: 2.52 M/UL (ref 4–5.4)
SODIUM SERPL-SCNC: 138 MMOL/L (ref 136–145)
WBC # BLD AUTO: 14.26 K/UL (ref 3.9–12.7)

## 2024-07-01 PROCEDURE — 99232 SBSQ HOSP IP/OBS MODERATE 35: CPT | Mod: ,,, | Performed by: INTERNAL MEDICINE

## 2024-07-01 PROCEDURE — 99900031 HC PATIENT EDUCATION (STAT)

## 2024-07-01 PROCEDURE — 36415 COLL VENOUS BLD VENIPUNCTURE: CPT

## 2024-07-01 PROCEDURE — 25000242 PHARM REV CODE 250 ALT 637 W/ HCPCS: Performed by: INTERNAL MEDICINE

## 2024-07-01 PROCEDURE — 27000221 HC OXYGEN, UP TO 24 HOURS

## 2024-07-01 PROCEDURE — 94640 AIRWAY INHALATION TREATMENT: CPT

## 2024-07-01 PROCEDURE — 80053 COMPREHEN METABOLIC PANEL: CPT

## 2024-07-01 PROCEDURE — 85007 BL SMEAR W/DIFF WBC COUNT: CPT

## 2024-07-01 PROCEDURE — 83735 ASSAY OF MAGNESIUM: CPT

## 2024-07-01 PROCEDURE — 25000003 PHARM REV CODE 250: Performed by: INTERNAL MEDICINE

## 2024-07-01 PROCEDURE — 85027 COMPLETE CBC AUTOMATED: CPT

## 2024-07-01 PROCEDURE — 25000003 PHARM REV CODE 250

## 2024-07-01 PROCEDURE — 94761 N-INVAS EAR/PLS OXIMETRY MLT: CPT

## 2024-07-01 RX ORDER — AMOXICILLIN AND CLAVULANATE POTASSIUM 875; 125 MG/1; MG/1
1 TABLET, FILM COATED ORAL EVERY 12 HOURS
Qty: 14 TABLET | Refills: 0 | Status: SHIPPED | OUTPATIENT
Start: 2024-07-01 | End: 2024-07-08

## 2024-07-01 RX ADMIN — METOPROLOL TARTRATE 25 MG: 25 TABLET, FILM COATED ORAL at 08:07

## 2024-07-01 RX ADMIN — FAMOTIDINE 20 MG: 20 TABLET ORAL at 08:07

## 2024-07-01 RX ADMIN — DICYCLOMINE HYDROCHLORIDE 10 MG: 10 CAPSULE ORAL at 03:07

## 2024-07-01 RX ADMIN — IPRATROPIUM BROMIDE 0.5 MG: 0.5 SOLUTION RESPIRATORY (INHALATION) at 08:07

## 2024-07-01 RX ADMIN — CETIRIZINE HYDROCHLORIDE 10 MG: 10 TABLET, FILM COATED ORAL at 08:07

## 2024-07-01 RX ADMIN — ARFORMOTEROL TARTRATE 15 MCG: 15 SOLUTION RESPIRATORY (INHALATION) at 08:07

## 2024-07-01 RX ADMIN — AMOXICILLIN AND CLAVULANATE POTASSIUM 1 TABLET: 875; 125 TABLET, FILM COATED ORAL at 08:07

## 2024-07-01 RX ADMIN — ASPIRIN 81 MG: 81 TABLET, COATED ORAL at 08:07

## 2024-07-01 RX ADMIN — IPRATROPIUM BROMIDE 0.5 MG: 0.5 SOLUTION RESPIRATORY (INHALATION) at 01:07

## 2024-07-01 RX ADMIN — LEVOTHYROXINE SODIUM 112 MCG: 0.11 TABLET ORAL at 05:07

## 2024-07-01 RX ADMIN — HYDROCODONE BITARTRATE AND ACETAMINOPHEN 2 TABLET: 5; 325 TABLET ORAL at 09:07

## 2024-07-01 RX ADMIN — DICYCLOMINE HYDROCHLORIDE 10 MG: 10 CAPSULE ORAL at 08:07

## 2024-07-01 RX ADMIN — IPRATROPIUM BROMIDE 0.5 MG: 0.5 SOLUTION RESPIRATORY (INHALATION) at 02:07

## 2024-07-01 NOTE — PROGRESS NOTES
" [facility]  Hematology/Oncology  Inpatient Progress Note          Patient Name: Joslyn Dubon  MRN: [id number]  Admission Date: 6/23/2024  Hospital Length of Stay: 7 days  Code Status: Full Code   Attending Provider: Royce Monaco MD  Consulting Provider: Neo Mariano MD  Primary Care Physician: Jasbir Graham MD  Principal Problem:Chest pain      Coverage for Dr Camden Iyer       Subjective:       Patient ID: Joslyn Dubon is a 73 y.o. female.    Chief Complaint: Vomiting (X FEW DAYS) and Chest Pain        History Present Illness:    Patient remains in the hospital but is expected to be discharged today; she is anxious to go home; she was seen by Dr Velazquez over the weekend; GCSF discontinued and WBC has recovered; she is anxious to go home; discussed with floor nurse in person;  at bedside      Review of Systems:  GEN: general malaise, weakness, fatigue all improved  HEENT: normal with no HA's, sore throat, stiff neck, changes in vision  CV: normal with no CP, SOB, PND, RAPHAEL or orthopnea  PULM: normal with no SOB, cough, hemoptysis, sputum or pleuritic pain  GI:   nausea/vomiting, and diarrhea have since resolved  : normal with no hematuria, dysuria  BREAST: normal with no mass, discharge, pain  SKIN: normal with no rash, erythema, bruising, or swelling      Objective:     Vitals:  Blood pressure 137/76, pulse 86, temperature 99 °F (37.2 °C), temperature source Oral, resp. rate 20, height 5' 2" (1.575 m), weight 77.3 kg (170 lb 6.4 oz), SpO2 95%.    Physical Exam:  GEN: no apparent distress, comfortable; AAOx3; overweight  HEAD: atraumatic and normocephalic  EYES: no pallor, no icterus, PERRLA  ENT: OMM, no pharyngeal erythema, external ears WNL; no nasal discharge; no thrush  NECK: no masses, thyroid normal, trachea midline, no LAD/LN's, supple  CV: RRR with no murmur; normal pulse; normal S1 and S2; no pedal edema; Rght portacath  CHEST: Normal respiratory " effort; CTAB; normal breath sounds; no wheeze or crackles  ABDOM: nontender; nondistended; soft; normal bowel sounds; no rebound/guarding  MUSC/Skeletal: ROM normal; no crepitus; joints normal; no deformities or arthropathy  EXTREM: no clubbing, cyanosis, inflammation or swelling  SKIN: no rashes, lesions, ulcers, petechiae or subcutaneous nodules  : no payan  NEURO: grossly intact; motor/sensory WNL; AAOx3; no tremors; generalized weakness  PSYCH: normal mood, affect and behavior  LYMPH: normal cervical, supraclavicular, axillary and groin LN's            Lab Review:   Lab Results   Component Value Date    WBC 14.26 (H) 07/01/2024    HGB 7.7 (L) 07/01/2024    HCT 24.3 (L) 07/01/2024    MCV 96 07/01/2024    PLT 86 (L) 07/01/2024         CMP  Sodium   Date Value Ref Range Status   07/01/2024 138 136 - 145 mmol/L Final     Potassium   Date Value Ref Range Status   07/01/2024 4.7 3.5 - 5.1 mmol/L Final     Chloride   Date Value Ref Range Status   07/01/2024 100 95 - 110 mmol/L Final     CO2   Date Value Ref Range Status   07/01/2024 30 (H) 23 - 29 mmol/L Final     Glucose   Date Value Ref Range Status   07/01/2024 92 70 - 110 mg/dL Final     BUN   Date Value Ref Range Status   07/01/2024 8 8 - 23 mg/dL Final     Creatinine   Date Value Ref Range Status   07/01/2024 1.0 0.5 - 1.4 mg/dL Final   03/19/2013 0.8 0.5 - 1.4 mg/dL Final     Calcium   Date Value Ref Range Status   07/01/2024 8.5 (L) 8.7 - 10.5 mg/dL Final   03/19/2013 9.9 8.7 - 10.5 mg/dL Final     Total Protein   Date Value Ref Range Status   07/01/2024 6.0 6.0 - 8.4 g/dL Final     Albumin   Date Value Ref Range Status   07/01/2024 3.1 (L) 3.5 - 5.2 g/dL Final     Total Bilirubin   Date Value Ref Range Status   07/01/2024 0.3 0.1 - 1.0 mg/dL Final     Comment:     For infants and newborns, interpretation of results should be based  on gestational age, weight and in agreement with clinical  observations.    Premature Infant recommended reference ranges:  Up  to 24 hours.............<8.0 mg/dL  Up to 48 hours............<12.0 mg/dL  3-5 days..................<15.0 mg/dL  6-29 days.................<15.0 mg/dL       Alkaline Phosphatase   Date Value Ref Range Status   07/01/2024 73 55 - 135 U/L Final   08/31/2012 107 23 - 119 UNIT/L Final     AST   Date Value Ref Range Status   07/01/2024 16 10 - 40 U/L Final   08/31/2012 21 10 - 30 UNIT/L Final     ALT   Date Value Ref Range Status   07/01/2024 9 (L) 10 - 44 U/L Final     Anion Gap   Date Value Ref Range Status   07/01/2024 8 8 - 16 mmol/L Final   03/19/2013 12 5 - 15 meq/L Final     eGFR   Date Value Ref Range Status   07/01/2024 58.8 (A) >60 mL/min/1.73 m^2 Final   06/14/2024 52 (L) > OR = 60 mL/min/1.73m2 Final              Radiology Diagnostic Studies:     NM Lung Scan Perfusion Particulate [[id number]] Resulted: 06/23/24 2017   Order Status: Completed Updated: 06/23/24 2020   Narrative:     EXAMINATION:  NM LUNG SCAN PERFUSION    CLINICAL HISTORY:  Chest pain, nonspecific;Pulmonary embolism (PE) suspected, high prob;Chest pain, nonspecific; Pulmonary embolism (PE) suspected, high prob;    TECHNIQUE:  IV administration of 5.2 mCi of Tc-99m-MAA, multiple images of the thorax were obtained in various projections.    COMPARISON:  Chest x-ray dated 06/23/2024.    FINDINGS:  Only perfusion images are provided for review.  No perfusion defect identified.  Evaluation for mismatch is precluded in the absence of ventilation portion of the examination.   Impression:                      X-Ray Chest AP Portable [[id number]] Resulted: 06/23/24 0926   Order Status: Completed Updated: 06/23/24 0929   Narrative:     EXAMINATION:  XR CHEST AP PORTABLE    CLINICAL HISTORY:  Chest Pain;    COMPARISON:  June 21, 2024    FINDINGS:  AP view demonstrates normal heart size.  The thoracic aorta is elongated.  Left upper lobe noncalcified pulmonary nodule is unchanged.  No infiltrates or effusions are identified.    Right-sided Port-A-Cath is  unchanged in position with tip at the superior vena cava.   Impression:       1. No interval change compared to June 21, 2024.          Assessment:     IMPRESSION:    (1) 73 y.o. female known to the oncology service of my associate Dr Camden Iyer with diagnosis of lung cancer with neuroendocrine features who has been on platinum and etopside chemotherapy. She presented to the ED with CP, SOB and N/V.      6/24/2024  - last chemo was on 6/19  - wbc at 1,68, hgb 11.0, and plats 142,000  - discussed GCSF for her leucopenia but she wants to hold off for now  - she does not seem to be tolerating the current regimen  - consider GI consult if her GI symptoms do not improve  - monitor counts for now    6/25/2024:  - wbc 1.21, hgb 9.7 and plats 104,000  - recurrent diarrhea and n/v again today  - GI consulted for evaluation    6/26/2024:  - wbc 0.93, hgb 10.2, plats 90,000  - her GI symptoms have improved/resolved; she was able to eat all day today without and N/V or diarrhea;   - WBC is still low; I discussed with her and her  about the risk of infection and she is now willing to start GSCF;  - I previously communicated with Dr Monaco; I discussed with her floor nurse in person    6/27/2024:  - she spiked temp yesterday evening  - cultures drawn and she was started on cefipime and vanc  - ID consulted  - started GCSF yesterday  - wbc 0.55, hgb 8.9, plats 68,000    6/28/2024:  - Wbc 1.06, hgb 9.6, plats 37,000  - on GCSF and IV antibiotics  - ID following    7/1/2024:  - patient was seen by Dr Velazquez over the weekend  - WBC at 14.26 secondary to GCSF  - discontinued GCSF  - hgb at 7.7 and little lower today  - plats at 86,000 and better  - expected d/c to home today     (2) HTN     (3) COPD     (4) GERD; Martin's esophagus; hx/of colitis     (5) Thyroid disease     (6) Anxiety     (7) Former smoker        1. Malignant neoplasm of lung, unspecified laterality, unspecified part of lung    2. Chest pain    3. Anemia due  to chemotherapy    4. Chemotherapy induced neutropenia    5. Non-small cell carcinoma of left lung [C34.92]    6. Gastroesophageal reflux disease without esophagitis    7. Non-small cell carcinoma of left lung    8. Febrile neutropenia    9. Intractable nausea and vomiting    10. Thrombocytopenia           Plan:     PLAN:          Monitor labs daily and transfuse as needed  Discontinued GCSF  Antibiotics as per ID directives  Conservative management for her nausea/diarrhea  Appreciate GI evaluation  IVF's, electrolyte management as per primary team  Will follow with you - expected d/c to home today - follow-up in oncology clinic next week               Neo Mariano MD  Hematology/Oncology  [facility]

## 2024-07-01 NOTE — PLAN OF CARE
Problem: Fall Injury Risk  Goal: Absence of Fall and Fall-Related Injury  Outcome: Progressing  Intervention: Promote Injury-Free Environment  Flowsheets (Taken 7/1/2024 1127)  Safety Promotion/Fall Prevention:   assistive device/personal item within reach   lighting adjusted   nonskid shoes/socks when out of bed   side rails raised x 2     Problem: Adult Inpatient Plan of Care  Goal: Plan of Care Review  Outcome: Progressing  Goal: Absence of Hospital-Acquired Illness or Injury  Outcome: Progressing  Intervention: Prevent Infection  Flowsheets (Taken 7/1/2024 1127)  Infection Prevention:   equipment surfaces disinfected   hand hygiene promoted   rest/sleep promoted   personal protective equipment utilized   single patient room provided  Goal: Optimal Comfort and Wellbeing  Outcome: Progressing  Intervention: Monitor Pain and Promote Comfort  Flowsheets (Taken 7/1/2024 1127)  Pain Management Interventions:   care clustered   medication offered   pillow support provided   position adjusted   relaxation techniques promoted  Intervention: Provide Person-Centered Care  Flowsheets (Taken 7/1/2024 1127)  Trust Relationship/Rapport:   care explained   choices provided   questions answered   questions encouraged   reassurance provided   thoughts/feelings acknowledged

## 2024-07-01 NOTE — PLAN OF CARE
Problem: Adult Inpatient Plan of Care  Goal: Plan of Care Review  Outcome: Progressing  Goal: Patient-Specific Goal (Individualized)  Outcome: Progressing  Goal: Absence of Hospital-Acquired Illness or Injury  Outcome: Progressing  Goal: Optimal Comfort and Wellbeing  Outcome: Progressing  Goal: Readiness for Transition of Care  Outcome: Progressing     Problem: Infection  Goal: Absence of Infection Signs and Symptoms  Outcome: Progressing     Problem: Fall Injury Risk  Goal: Absence of Fall and Fall-Related Injury  Outcome: Progressing     Problem: Fever with Neutropenia  Goal: Baseline Body Temperature  Outcome: Progressing  Goal: Absence of Infection  Outcome: Progressing     Problem: Oncology Care  Goal: Effective Coping  Outcome: Progressing  Goal: Optimal Pain Control and Function  Outcome: Progressing

## 2024-07-01 NOTE — ASSESSMENT & PLAN NOTE
Patient was found to have thrombocytopenia, the likely etiology is chemo Rx.   Recent Labs   Lab 07/01/24  0414   PLT 86*

## 2024-07-01 NOTE — PLAN OF CARE
Patient to transport home via private vehicle with her spouse. Patient denies any needs at time of d/c assessment.  Discharge orders and chart reviewed with no further post-acute discharge needs identified at this time.  At this time, patient is cleared for discharge from Case Management standpoint.        07/01/24 1041   Final Note   Assessment Type Final Discharge Note   Anticipated Discharge Disposition Home   Hospital Resources/Appts/Education Provided Appointments scheduled and added to AVS   Post-Acute Status   Coverage [facility]   Discharge Delays None known at this time

## 2024-07-01 NOTE — NURSING
Patient medication and discharge instruction reviewed with patient. Patient  verbalizes understanding. Patient discharged  via  wheelchair  and in NAD.

## 2024-07-01 NOTE — ASSESSMENT & PLAN NOTE
Current CBC reviewed-   Lab Results   Component Value Date    HGB 7.7 (L) 07/01/2024    HCT 24.3 (L) 07/01/2024     Monitor serial CBC and transfuse if patient becomes hemodynamically unstable, symptomatic or H/H drops below 7/21.

## 2024-07-01 NOTE — ASSESSMENT & PLAN NOTE
Received G CSF   Leukopenia resolved   DC vanco and  cefepime and changed to augmentin for 7 days per ID

## 2024-07-01 NOTE — ASSESSMENT & PLAN NOTE
Serial troponins x 3 all negative   Echo shows normal EF and normal diastolic function   ASA  VQ scan negative at perfusion scan   Patient not keen to do stress test, consider outpatient

## 2024-07-01 NOTE — DISCHARGE SUMMARY
[facility]  Discharge Summary      Patient Name: Joslyn Dubon  MRN: [id number]  ROSSY: [id number]  Patient Class: IP- Inpatient  Admission Date: 6/23/2024  Hospital Length of Stay: 7 days  Discharge Date and Time:  07/01/2024   Attending Physician: Laura Sandy MD   Discharging Provider: Laura Sandy MD  Primary Care Provider: Jasbir Graham MD    Primary Care Team: Networked reference to record PCT     HPI:   73-year-old female presented to ED for eval. Patient reported for the last few days she has had intractable nausea and vomiting, with associated LLQ abd pain and intermittent diarrhea. Denied melena, hematochezia, hematemesis. 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 Patient also reported over the last 2 days she has been having squeezing L sided intermittent chest pain that radiates to her back with shortness of breath. She does have chronic resp failure and COPD but reports shortness of breath is increased from baseline, denies use of home O2. Patient denies previous similar episodes of chest pain, denies hx CAD. She does have hx of AAA s/p repair. Noted to have WEN, hypomagnesemia, hypokalemia. CXR impression without acute abn. Of note, patient has contrast allergy with anaphylactic reaction, also reports severe hypotension with dilaudid and morphine, can tolerate norco. Admit to hospital medicine for further eval.     * No surgery found *      Hospital Course:   Patient was admitted to the hospital with  symptoms.  Patient was currently on 2nd round of chemotherapy for lung cancer. CT of the abdomen was done with no acute except previous vascular stent.  C diff test is negative and all stool tests are negative and patient was started on anti diarrhea and gradually better. Patient had chest pain episode during the admission and patient was  offered stress test but she declined.  She was reviewed by the oncologist and offered G-CSF to increase the white cell but she declined that as well. But later patient got fever and WBC and platelet go down and ANC 0 and patient agreed to get neupogen and septic work up was done .  All cultures are negative and later fever subside . IV antibiotics started and gradually weaned and later ID recommend to DC with augmentin. WBC increased. Patient is afebrile. OK from oncology to DC.      Goals of Care Treatment Preferences:  Code Status: Full Code    Physical Exam  Vitals and nursing note reviewed.   HENT:      Head: Normocephalic and atraumatic.   Eyes:      Conjunctiva/sclera: Conjunctivae normal.   Neck:      Vascular: No JVD.   Cardiovascular:      Rate and Rhythm: Normal rate.      Heart sounds: Normal heart sounds.   Pulmonary:      Effort: Pulmonary effort is normal.      Breath sounds: Normal breath sounds.   Abdominal:      General: Bowel sounds are normal.      Palpations: Abdomen is soft.   Skin:     General: Skin is warm.   Neurological:      Mental Status: She is alert and oriented to person, place, and time.   Psychiatric:         Mood and Affect: Mood normal.     Consults:   Consults (From admission, onward)          Status Ordering Provider     Inpatient consult to Spiritual Care  Once        Provider:  (Not yet assigned)    Acknowledged LESLY RIVAS     Inpatient consult to Infectious Diseases  Once        Provider:  Nic Costa MD    Completed EVIN BEASLEY     Inpatient consult to Gastroenterology  Once        Provider:  Sean Martinez III, MD    Completed EVIN BEASLEY     Case Management/  Once        Provider:  (Not yet assigned)    Completed ROMMEL GIBSON     Inpatient consult to Hematology/Oncology  Once        Provider:  Macario Iyer MD    Acknowledged ROMMEL GIBSON            Cardiac/Vascular  * Chest pain  Serial troponins x 3 all  negative   Echo shows normal EF and normal diastolic function   ASA  VQ scan negative at perfusion scan   Patient not keen to do stress test, consider outpatient     History of AAA repair  Noted in CT  Non acute    Renal/  WEN (acute kidney injury)  Patient with acute kidney injury/acute renal failure likely due to pre-renal azotemia due to dehydration WEN is currently stable. Baseline creatinine  1  - Labs reviewed- Renal function/electrolytes with Estimated Creatinine Clearance: 46.8 mL/min (based on SCr of 1 mg/dL). according to latest data. Monitor urine output and serial CMP and adjust therapy as needed. Avoid nephrotoxins and renally dose meds for GFR listed above.  resolved    Oncology  Febrile neutropenia  Unsure cause   Blood culture all negative   Now antibiotics downgraded and now on Augmentin only       Chemotherapy induced neutropenia  Received G CSF   Leukopenia resolved   DC vanco and  cefepime and changed to augmentin for 7 days per ID       Anemia due to chemotherapy  Current CBC reviewed-   Lab Results   Component Value Date    HGB 7.7 (L) 07/01/2024    HCT 24.3 (L) 07/01/2024     Monitor serial CBC and transfuse if patient becomes hemodynamically unstable, symptomatic or H/H drops below 7/21.    Non-small cell carcinoma of left lung  Last chemo 6/19/24  RT assess and treat  Supplemental O2 PRN  Resume home inhalers  Starting neupogen    Malignant neoplasm of lung-resolved as of 7/1/2024  Aware  Outpatient follow with hematology oncology, has seen in the hospital. OK for DC       GI  Diarrhea  Stool studies  neg  Lyte replacement per protocol  Lomotil  and now resolved       Intractable nausea and vomiting  Resolved  Secondary to  chemo  Lipase negative   CT abd/pelvis noted. Non acute     Other  Thrombocytopenia  Patient was found to have thrombocytopenia, the likely etiology is chemo Rx.   Recent Labs   Lab 07/01/24  0414   PLT 86*             Final Active Diagnoses:    Diagnosis Date Noted POA     PRINCIPAL PROBLEM:  Chest pain [R07.9] 09/23/2013 Yes    Thrombocytopenia [D69.6] 06/29/2024 Yes    Febrile neutropenia [D70.9, R50.81] 06/27/2024 Yes    Anemia due to chemotherapy [D64.81, T45.1X5A] 06/24/2024 Yes    Chemotherapy induced neutropenia [D70.1, T45.1X5A] 06/24/2024 Yes    Intractable nausea and vomiting [R11.2] 06/23/2024 Yes    Diarrhea [R19.7] 06/23/2024 Yes    WEN (acute kidney injury) [N17.9] 06/23/2024 Yes    Non-small cell carcinoma of left lung [C34.92] 05/01/2024 Yes    History of AAA repair [I71.40] 12/05/2020 Yes     Chronic      Problems Resolved During this Admission:    Diagnosis Date Noted Date Resolved POA    Malignant neoplasm of lung [C34.90] 06/24/2024 07/01/2024 Yes       Discharged Condition: good    Disposition: Home or Self Care    Follow Up:   Follow-up Information       Jasbir Graham MD Follow up in 1 week(s).    Specialties: Family Medicine, Home Health Services, Hospice Services  Why: Office will contact patient to schedule appointment.  Contact information:  [address]  [address]  [place] [address]  [phone]               Neo Mariano MD Follow up in 1 week(s).    Specialties: Hematology, Hematology and Oncology, Oncology  Why: Office will contact patient to schedule follow up appointment.   with repeat cbc  Contact information:  [address]  [address]  [place] [address]  [phone]                           Patient Instructions:      CBC auto differential   Standing Status: Future Standing Exp. Date: 09/23/25     CBC auto differential   Standing Status: Future Standing Exp. Date: 09/24/25     Diet Adult Regular     Diet Cardiac     Activity as tolerated     Activity as tolerated       Significant Diagnostic Studies: Labs: CMP   Recent Labs   Lab 06/30/24  0430 07/01/24  0414    138   K 3.9 4.7   CL 98 100   CO2 27 30*   GLU 87 92   BUN 9 8   CREATININE 1.0 1.0   CALCIUM 8.6* 8.5*   PROT 6.6 6.0   ALBUMIN 3.3* 3.1*   BILITOT 0.3 0.3    ALKPHOS 71 73   AST 13 16   ALT 10 9*   ANIONGAP 12 8    and CBC   Recent Labs   Lab 06/30/24  0430 07/01/24  0414   WBC 3.22* 14.26*   HGB 8.4* 7.7*   HCT 26.0* 24.3*   PLT 66* 86*       Pending Diagnostic Studies:       Procedure Component Value Units Date/Time    Haptoglobin [[id number]] Collected: 06/29/24 1506    Order Status: Sent Lab Status: In process Updated: 06/29/24 1531    Specimen: Blood            Medications:  Reconciled Home Medications:      Medication List        START taking these medications      amoxicillin-clavulanate 875-125mg 875-125 mg per tablet  Commonly known as: AUGMENTIN  Take 1 tablet by mouth every 12 (twelve) hours. for 7 days     loperamide 2 mg capsule  Commonly known as: IMODIUM  Take 1 capsule (2 mg total) by mouth 2 (two) times daily as needed for Diarrhea.            CHANGE how you take these medications      levothyroxine 112 MCG tablet  Commonly known as: SYNTHROID  Take 1 tablet (112 mcg total) by mouth before breakfast.  What changed:   medication strength  how much to take     ondansetron 8 MG tablet  Commonly known as: ZOFRAN  Take 1 tablet (8 mg total) by mouth every 8 (eight) hours as needed for Nausea.  What changed: Another medication with the same name was removed. Continue taking this medication, and follow the directions you see here.            CONTINUE taking these medications      EPINEPHrine 0.3 mg/0.3 mL Atin  Commonly known as: EPIPEN  Inject 0.3 mLs (0.3 mg total) into the muscle as needed (Severe allergic reaction symptoms such as shortness of breath, tongue or throat swelling, weakness dizziness severe nausea vomiting).     famotidine 20 MG tablet  Commonly known as: PEPCID  Take 1 tablet (20 mg total) by mouth 2 (two) times daily.     furosemide 20 MG tablet  Commonly known as: LASIX  Take 1 tablet (20 mg total) by mouth daily as needed (edema).     GAS-X ORAL  Take 1 tablet by mouth as needed (Flatulence).     HYDROcodone-acetaminophen  mg per  tablet  Commonly known as: NORCO  Take 1 tablet by mouth every 12 (twelve) hours as needed for Pain.     levalbuterol 45 mcg/actuation inhaler  Commonly known as: XOPENEX HFA  Inhale 2 puffs into the lungs every 6 (six) hours as needed for Wheezing. Rescue     LIDOcaine-prilocaine cream  Commonly known as: EMLA  Apply topically as needed.     metoprolol tartrate 25 MG tablet  Commonly known as: LOPRESSOR  Take 1 tablet (25 mg total) by mouth 2 (two) times daily.     omeprazole 40 MG capsule  Commonly known as: PRILOSEC  Take 1 capsule (40 mg total) by mouth every morning.     polyethylene glycol 17 gram/dose powder  Commonly known as: GLYCOLAX  Take 17 g by mouth once daily.     promethazine 25 MG tablet  Commonly known as: PHENERGAN  Take 1 tablet (25 mg total) by mouth every 6 (six) hours as needed for Nausea.     REFRESH OPTIVE 0.5-0.9 % Drop  Generic drug: carboxymethylcellulose-glycern  Place 1 drop into both eyes 4 (four) times daily as needed (Dry Eyes).     umeclidinium-vilanteroL 62.5-25 mcg/actuation Dsdv  Commonly known as: ANORO ELLIPTA  Inhale 1 puff into the lungs once daily. Controller            STOP taking these medications      amLODIPine 5 MG tablet  Commonly known as: NORVASC     diphenhydrAMINE 25 mg capsule  Commonly known as: BENADRYL              Indwelling Lines/Drains at time of discharge:   Lines/Drains/Airways       Central Venous Catheter Line  Duration                  PowerPort A Cath Single Lumen 05/20/24 1038 Subclavian Right 42 days              Drain  Duration             Female External Urinary Catheter w/ Suction 06/30/24 1319 1 day                    Time spent on the discharge of patient: 40 minutes         Laura Sandy MD  Department of Hospital Medicine  [facility]

## 2024-07-01 NOTE — CARE UPDATE
07/01/24 0805   Patient Assessment/Suction   Level of Consciousness (AVPU) alert   Respiratory Effort Normal;Unlabored   Expansion/Accessory Muscles/Retractions no retractions;no use of accessory muscles   All Lung Fields Breath Sounds Anterior:;Posterior:;Lateral:;diminished   ELOY Breath Sounds diminished   LLL Breath Sounds diminished;coarse   RUL Breath Sounds diminished   RML Breath Sounds diminished   RLL Breath Sounds diminished;coarse   Rhythm/Pattern, Respiratory no shortness of breath reported;unlabored   Cough Frequency infrequent   Cough Type nonproductive;congested   PRE-TX-O2   Device (Oxygen Therapy) nasal cannula   $ Is the patient on Low Flow Oxygen? Yes   Flow (L/min) (Oxygen Therapy) 2   SpO2 95 %   Pulse Oximetry Type Intermittent   $ Pulse Oximetry - Multiple Charge Pulse Oximetry - Multiple   Pulse 86   Resp 20   Positioning HOB elevated 30 degrees   Aerosol Therapy   $ Aerosol Therapy Charges Aerosol Treatment   Daily Review of Necessity (SVN) completed   Respiratory Treatment Status (SVN) given   Treatment Route (SVN) mask;oxygen   Patient Position HOB elevated   Post Treatment Assessment (SVN) breath sounds improved   Signs of Intolerance (SVN) none   Breath Sounds Post-Respiratory Treatment   Throughout All Fields Post-Treatment All Fields   Throughout All Fields Post-Treatment aeration increased;wheezes, expiratory   Post-treatment Heart Rate (beats/min) 86   Post-treatment Resp Rate (breaths/min) 20   Education   $ Education Bronchodilator;15 min

## 2024-07-01 NOTE — RESPIRATORY THERAPY
06/30/24 2058   Patient Assessment/Suction   Level of Consciousness (AVPU) alert   Respiratory Effort Normal;Unlabored   Expansion/Accessory Muscles/Retractions no use of accessory muscles;no retractions   All Lung Fields Breath Sounds Anterior:;clear;diminished   Rhythm/Pattern, Respiratory no shortness of breath reported;unlabored   Cough Frequency no cough   PRE-TX-O2   Device (Oxygen Therapy) nasal cannula   Flow (L/min) (Oxygen Therapy) 2   SpO2 95 %   Pulse Oximetry Type Intermittent   $ Pulse Oximetry - Multiple Charge Pulse Oximetry - Multiple   Pulse 87   Resp 19   Positioning HOB elevated 30 degrees   Aerosol Therapy   $ Aerosol Therapy Charges Aerosol Treatment   Daily Review of Necessity (SVN) completed   Respiratory Treatment Status (SVN) given   Treatment Route (SVN) mask;oxygen   Patient Position HOB elevated   Post Treatment Assessment (SVN) breath sounds unchanged   Signs of Intolerance (SVN) none   Education   $ Education Bronchodilator;15 min

## 2024-07-01 NOTE — ASSESSMENT & PLAN NOTE
Unsure cause   Blood culture all negative   Now antibiotics downgraded and now on Augmentin only

## 2024-07-02 ENCOUNTER — TELEPHONE (OUTPATIENT)
Facility: CLINIC | Age: 74
End: 2024-07-02
Payer: MEDICARE

## 2024-07-02 ENCOUNTER — TELEPHONE (OUTPATIENT)
Dept: FAMILY MEDICINE | Facility: CLINIC | Age: 74
End: 2024-07-02
Payer: MEDICARE

## 2024-07-02 ENCOUNTER — PATIENT OUTREACH (OUTPATIENT)
Dept: ADMINISTRATIVE | Facility: CLINIC | Age: 74
End: 2024-07-02
Payer: MEDICARE

## 2024-07-02 ENCOUNTER — HOSPITAL ENCOUNTER (OUTPATIENT)
Dept: RADIOLOGY | Facility: HOSPITAL | Age: 74
Discharge: HOME | End: 2024-07-02
Attending: NURSE PRACTITIONER
Payer: MEDICARE

## 2024-07-02 DIAGNOSIS — C34.92 NON-SMALL CELL CARCINOMA OF LEFT LUNG: Primary | ICD-10-CM

## 2024-07-02 DIAGNOSIS — C34.92 NON-SMALL CELL CARCINOMA OF LEFT LUNG: ICD-10-CM

## 2024-07-02 DIAGNOSIS — R22.1 NECK SWELLING: ICD-10-CM

## 2024-07-02 PROCEDURE — 76536 US EXAM OF HEAD AND NECK: CPT | Mod: TC

## 2024-07-02 PROCEDURE — 76536 US EXAM OF HEAD AND NECK: CPT | Mod: 26,,, | Performed by: STUDENT IN AN ORGANIZED HEALTH CARE EDUCATION/TRAINING PROGRAM

## 2024-07-02 RX ORDER — FUROSEMIDE 10 MG/ML
20 INJECTION INTRAMUSCULAR; INTRAVENOUS ONCE
OUTPATIENT
Start: 2024-07-02

## 2024-07-02 RX ORDER — DIPHENHYDRAMINE HYDROCHLORIDE 50 MG/ML
25 INJECTION INTRAMUSCULAR; INTRAVENOUS ONCE
OUTPATIENT
Start: 2024-07-02

## 2024-07-02 RX ORDER — HYDROCODONE BITARTRATE AND ACETAMINOPHEN 500; 5 MG/1; MG/1
TABLET ORAL ONCE
OUTPATIENT
Start: 2024-07-02 | End: 2024-07-02

## 2024-07-02 RX ORDER — ACETAMINOPHEN 325 MG/1
650 TABLET ORAL ONCE
OUTPATIENT
Start: 2024-07-02

## 2024-07-02 NOTE — TELEPHONE ENCOUNTER
"Patient was trying to call Dr. Mariano's office. Hgb yesterday was 7.7 - note in hospital says "Monitor serial CBC and transfuse if patient becomes hemodynamically unstable, symptomatic or H/H drops below 7/21."    Patient is very short of breath and weak.      "
----- Message from Park Franco MA sent at 7/2/2024 12:36 PM CDT -----  Pt is calling to inform us she have trouble breathing and very weak     Pt # [phone]  
unintentional

## 2024-07-02 NOTE — TELEPHONE ENCOUNTER
Per Dr. Graham - needs to call Dr. Mariano's office. Hospital note says can transfuse as needed if symptomatic. Spoke with patient, she agrees to call their office now and let us know if she needs any help getting an appointment, etc.

## 2024-07-02 NOTE — TELEPHONE ENCOUNTER
Message received from KALEE Joyce, that patient hgb 7.7 and patient SOB and weak. Dr. Mariano made aware and per his verbal orders I attempted to reach out to patient to see how she is feeling and to see if she would like 1-2 units blood tomorrow. No answer, LVM to return my call to discuss.

## 2024-07-02 NOTE — TELEPHONE ENCOUNTER
----- Message from Brittanie Mata LPN sent at 6/27/2024  4:49 PM CDT -----  Regarding: HFu  Call patient - needs post-hospital phone call within 2 business days and hospital follow up visit scheduled within 7-14 days.

## 2024-07-02 NOTE — TELEPHONE ENCOUNTER
Spoke with patient, states she is not feeling great. States she needs Xopenex to the pharmacy, she is out. She also needs to tubing and supplies to go [facility]. She is audibly short of breath on the phone. Made her check her O2 sat while we were on the phone, 90%. States her body is just so tired from the chemo, states it was way too strong for her. Hemoglobin yesterday when she left the hospital was 7.7. OV scheduled with Dr Mariano but not until 7/11

## 2024-07-02 NOTE — TELEPHONE ENCOUNTER
Spoke to patient to inquire how she is feeling and to see if she would be interested in getting blood transfusion per Dr Mariano's orders, patient states she is feeling ok, SOB has gotten better but she is still feeling weak and has great concern about the swelling she is having in her feet, also states she is having some generalized swelling in her neck. Patient brittanie trouble breathing, or swallowing, she states she is urinating fine and brittanie any use of lasix. Scarlett made aware of above, per her verbal orders I informed patient that we are placing order for STAT US neck and that scheduling will call to schedule, also informed that we will be ordering 2 units of blood and again that scheduling will call to schedule this also. Patient instructed that if she starts to experience increased swelling, trouble breathing or swallowing that she will need to go immediately to the ER. Verbalized understanding. [facility] made aware of need for transfusion, states soonest opening is Friday as they are full tomorrow and are closed Thursday for holiday. Informed that Friday will be fine if this is soonest available. Attempted to return call to patient to make aware to hold off on type and screen until tomorrow as per [facility] this will not be good come Friday if done today. No answer, LVM for patient with these instructions. Scheduling made aware of need to schedule US neck.

## 2024-07-03 ENCOUNTER — PATIENT MESSAGE (OUTPATIENT)
Dept: FAMILY MEDICINE | Facility: CLINIC | Age: 74
End: 2024-07-03
Payer: MEDICARE

## 2024-07-03 ENCOUNTER — LAB VISIT (OUTPATIENT)
Dept: LAB | Facility: HOSPITAL | Age: 74
End: 2024-07-03
Attending: NURSE PRACTITIONER
Payer: MEDICARE

## 2024-07-03 ENCOUNTER — TELEPHONE (OUTPATIENT)
Facility: CLINIC | Age: 74
End: 2024-07-03
Payer: MEDICARE

## 2024-07-03 ENCOUNTER — TELEPHONE (OUTPATIENT)
Dept: FAMILY MEDICINE | Facility: CLINIC | Age: 74
End: 2024-07-03
Payer: MEDICARE

## 2024-07-03 ENCOUNTER — HOSPITAL ENCOUNTER (EMERGENCY)
Facility: HOSPITAL | Age: 74
Discharge: HOME | End: 2024-07-03
Attending: EMERGENCY MEDICINE
Payer: MEDICARE

## 2024-07-03 VITALS
WEIGHT: 170 LBS | DIASTOLIC BLOOD PRESSURE: 65 MMHG | OXYGEN SATURATION: 92 % | HEART RATE: 97 BPM | RESPIRATION RATE: 18 BRPM | HEIGHT: 62 IN | BODY MASS INDEX: 31.28 KG/M2 | TEMPERATURE: 98 F | SYSTOLIC BLOOD PRESSURE: 140 MMHG

## 2024-07-03 DIAGNOSIS — C34.92 NON-SMALL CELL CARCINOMA OF LEFT LUNG: ICD-10-CM

## 2024-07-03 DIAGNOSIS — C34.90 MALIGNANT NEOPLASM OF LUNG, UNSPECIFIED LATERALITY, UNSPECIFIED PART OF LUNG: ICD-10-CM

## 2024-07-03 DIAGNOSIS — R06.00 DYSPNEA: ICD-10-CM

## 2024-07-03 DIAGNOSIS — J44.1 COPD WITH ACUTE EXACERBATION: Primary | ICD-10-CM

## 2024-07-03 DIAGNOSIS — C34.92 NON-SMALL CELL CARCINOMA OF LEFT LUNG: Primary | ICD-10-CM

## 2024-07-03 DIAGNOSIS — F41.9 ANXIETY: ICD-10-CM

## 2024-07-03 LAB
ABO + RH BLD: NORMAL
ALBUMIN SERPL BCP-MCNC: 2.8 G/DL (ref 3.5–5.2)
ALP SERPL-CCNC: 116 U/L (ref 55–135)
ALT SERPL W/O P-5'-P-CCNC: 9 U/L (ref 10–44)
ANION GAP SERPL CALC-SCNC: 9 MMOL/L (ref 8–16)
ANISOCYTOSIS BLD QL SMEAR: SLIGHT
AST SERPL-CCNC: 18 U/L (ref 10–40)
BASOPHILS # BLD AUTO: ABNORMAL K/UL (ref 0–0.2)
BASOPHILS NFR BLD: 0 % (ref 0–1.9)
BILIRUB SERPL-MCNC: 0.2 MG/DL (ref 0.1–1)
BLD GP AB SCN CELLS X3 SERPL QL: NORMAL
BNP SERPL-MCNC: 127 PG/ML (ref 0–99)
BUN SERPL-MCNC: 8 MG/DL (ref 8–23)
CALCIUM SERPL-MCNC: 9.4 MG/DL (ref 8.7–10.5)
CHLORIDE SERPL-SCNC: 101 MMOL/L (ref 95–110)
CO2 SERPL-SCNC: 28 MMOL/L (ref 23–29)
CREAT SERPL-MCNC: 0.9 MG/DL (ref 0.5–1.4)
DIFFERENTIAL METHOD BLD: ABNORMAL
EOSINOPHIL # BLD AUTO: ABNORMAL K/UL (ref 0–0.5)
EOSINOPHIL NFR BLD: 0 % (ref 0–8)
ERYTHROCYTE [DISTWIDTH] IN BLOOD BY AUTOMATED COUNT: 13.5 % (ref 11.5–14.5)
EST. GFR  (NO RACE VARIABLE): >60 ML/MIN/1.73 M^2
GLUCOSE SERPL-MCNC: 95 MG/DL (ref 70–110)
HCT VFR BLD AUTO: 26.3 % (ref 37–48.5)
HGB BLD-MCNC: 8.6 G/DL (ref 12–16)
IMM GRANULOCYTES # BLD AUTO: ABNORMAL K/UL (ref 0–0.04)
IMM GRANULOCYTES NFR BLD AUTO: ABNORMAL % (ref 0–0.5)
LYMPHOCYTES # BLD AUTO: ABNORMAL K/UL (ref 1–4.8)
LYMPHOCYTES NFR BLD: 16 % (ref 18–48)
MAGNESIUM SERPL-MCNC: 1.7 MG/DL (ref 1.6–2.6)
MCH RBC QN AUTO: 31.7 PG (ref 27–31)
MCHC RBC AUTO-ENTMCNC: 32.7 G/DL (ref 32–36)
MCV RBC AUTO: 97 FL (ref 82–98)
METAMYELOCYTES NFR BLD MANUAL: 6 %
MONOCYTES # BLD AUTO: ABNORMAL K/UL (ref 0.3–1)
MONOCYTES NFR BLD: 4 % (ref 4–15)
NEUTROPHILS NFR BLD: 61 % (ref 38–73)
NEUTS BAND NFR BLD MANUAL: 13 %
NRBC BLD-RTO: 1 /100 WBC
OVALOCYTES BLD QL SMEAR: ABNORMAL
PLATELET # BLD AUTO: 82 K/UL (ref 150–450)
PLATELET BLD QL SMEAR: ABNORMAL
PMV BLD AUTO: 9 FL (ref 9.2–12.9)
POIKILOCYTOSIS BLD QL SMEAR: SLIGHT
POTASSIUM SERPL-SCNC: 4 MMOL/L (ref 3.5–5.1)
PROT SERPL-MCNC: 6.7 G/DL (ref 6–8.4)
RBC # BLD AUTO: 2.71 M/UL (ref 4–5.4)
SODIUM SERPL-SCNC: 138 MMOL/L (ref 136–145)
SPECIMEN OUTDATE: NORMAL
TROPONIN I SERPL DL<=0.01 NG/ML-MCNC: <0.006 NG/ML (ref 0–0.03)
WBC # BLD AUTO: 15.24 K/UL (ref 3.9–12.7)

## 2024-07-03 PROCEDURE — 83880 ASSAY OF NATRIURETIC PEPTIDE: CPT | Performed by: EMERGENCY MEDICINE

## 2024-07-03 PROCEDURE — 96374 THER/PROPH/DIAG INJ IV PUSH: CPT

## 2024-07-03 PROCEDURE — 25000242 PHARM REV CODE 250 ALT 637 W/ HCPCS: Performed by: EMERGENCY MEDICINE

## 2024-07-03 PROCEDURE — 85007 BL SMEAR W/DIFF WBC COUNT: CPT | Performed by: EMERGENCY MEDICINE

## 2024-07-03 PROCEDURE — C1751 CATH, INF, PER/CENT/MIDLINE: HCPCS

## 2024-07-03 PROCEDURE — 80053 COMPREHEN METABOLIC PANEL: CPT | Performed by: EMERGENCY MEDICINE

## 2024-07-03 PROCEDURE — 84484 ASSAY OF TROPONIN QUANT: CPT | Performed by: EMERGENCY MEDICINE

## 2024-07-03 PROCEDURE — 94640 AIRWAY INHALATION TREATMENT: CPT

## 2024-07-03 PROCEDURE — 27000221 HC OXYGEN, UP TO 24 HOURS

## 2024-07-03 PROCEDURE — 96375 TX/PRO/DX INJ NEW DRUG ADDON: CPT

## 2024-07-03 PROCEDURE — 25000003 PHARM REV CODE 250: Performed by: EMERGENCY MEDICINE

## 2024-07-03 PROCEDURE — 86901 BLOOD TYPING SEROLOGIC RH(D): CPT | Performed by: EMERGENCY MEDICINE

## 2024-07-03 PROCEDURE — 99285 EMERGENCY DEPT VISIT HI MDM: CPT | Mod: 25

## 2024-07-03 PROCEDURE — 36410 VNPNXR 3YR/> PHY/QHP DX/THER: CPT

## 2024-07-03 PROCEDURE — 93010 ELECTROCARDIOGRAM REPORT: CPT | Mod: ,,, | Performed by: INTERNAL MEDICINE

## 2024-07-03 PROCEDURE — 85027 COMPLETE CBC AUTOMATED: CPT | Performed by: EMERGENCY MEDICINE

## 2024-07-03 PROCEDURE — 83735 ASSAY OF MAGNESIUM: CPT | Performed by: EMERGENCY MEDICINE

## 2024-07-03 PROCEDURE — 63600175 PHARM REV CODE 636 W HCPCS: Performed by: EMERGENCY MEDICINE

## 2024-07-03 PROCEDURE — 94761 N-INVAS EAR/PLS OXIMETRY MLT: CPT

## 2024-07-03 PROCEDURE — 96376 TX/PRO/DX INJ SAME DRUG ADON: CPT

## 2024-07-03 PROCEDURE — 36415 COLL VENOUS BLD VENIPUNCTURE: CPT | Performed by: INTERNAL MEDICINE

## 2024-07-03 PROCEDURE — 93005 ELECTROCARDIOGRAM TRACING: CPT

## 2024-07-03 PROCEDURE — 76937 US GUIDE VASCULAR ACCESS: CPT

## 2024-07-03 RX ORDER — IPRATROPIUM BROMIDE AND ALBUTEROL SULFATE 2.5; .5 MG/3ML; MG/3ML
3 SOLUTION RESPIRATORY (INHALATION)
Status: COMPLETED | OUTPATIENT
Start: 2024-07-03 | End: 2024-07-03

## 2024-07-03 RX ORDER — METHYLPREDNISOLONE SOD SUCC 125 MG
125 VIAL (EA) INJECTION
Status: DISCONTINUED | OUTPATIENT
Start: 2024-07-03 | End: 2024-07-03

## 2024-07-03 RX ORDER — PREDNISONE 20 MG/1
40 TABLET ORAL DAILY
Qty: 8 TABLET | Refills: 0 | Status: SHIPPED | OUTPATIENT
Start: 2024-07-03 | End: 2024-07-07

## 2024-07-03 RX ORDER — LORAZEPAM 1 MG/1
1 TABLET ORAL
Status: COMPLETED | OUTPATIENT
Start: 2024-07-03 | End: 2024-07-03

## 2024-07-03 RX ORDER — IPRATROPIUM BROMIDE AND ALBUTEROL SULFATE 2.5; .5 MG/3ML; MG/3ML
3 SOLUTION RESPIRATORY (INHALATION) EVERY 6 HOURS PRN
Qty: 75 ML | Refills: 0 | Status: SHIPPED | OUTPATIENT
Start: 2024-07-03 | End: 2025-07-03

## 2024-07-03 RX ORDER — LEVALBUTEROL INHALATION SOLUTION 1.25 MG/3ML
1 SOLUTION RESPIRATORY (INHALATION) EVERY 4 HOURS PRN
Qty: 1 ML | Refills: 0 | Status: SHIPPED | OUTPATIENT
Start: 2024-07-03 | End: 2025-07-03

## 2024-07-03 RX ORDER — LORAZEPAM 1 MG/1
0.5 TABLET ORAL EVERY 6 HOURS PRN
Qty: 10 TABLET | Refills: 0 | Status: SHIPPED | OUTPATIENT
Start: 2024-07-03 | End: 2024-07-08 | Stop reason: SDUPTHER

## 2024-07-03 RX ORDER — METHYLPREDNISOLONE SOD SUCC 125 MG
125 VIAL (EA) INJECTION
Status: COMPLETED | OUTPATIENT
Start: 2024-07-03 | End: 2024-07-03

## 2024-07-03 RX ORDER — ONDANSETRON HYDROCHLORIDE 2 MG/ML
4 INJECTION, SOLUTION INTRAVENOUS
Status: COMPLETED | OUTPATIENT
Start: 2024-07-03 | End: 2024-07-03

## 2024-07-03 RX ORDER — DIPHENHYDRAMINE HYDROCHLORIDE 50 MG/ML
25 INJECTION INTRAMUSCULAR; INTRAVENOUS
Status: DISCONTINUED | OUTPATIENT
Start: 2024-07-03 | End: 2024-07-03

## 2024-07-03 RX ADMIN — ONDANSETRON 4 MG: 2 INJECTION INTRAMUSCULAR; INTRAVENOUS at 10:07

## 2024-07-03 RX ADMIN — IPRATROPIUM BROMIDE AND ALBUTEROL SULFATE 3 ML: .5; 3 SOLUTION RESPIRATORY (INHALATION) at 09:07

## 2024-07-03 RX ADMIN — METHYLPREDNISOLONE SODIUM SUCCINATE 125 MG: 125 INJECTION, POWDER, FOR SOLUTION INTRAMUSCULAR; INTRAVENOUS at 11:07

## 2024-07-03 RX ADMIN — LORAZEPAM 1 MG: 1 TABLET ORAL at 11:07

## 2024-07-03 NOTE — CONSULTS
18 gauge x 10cm Midline placed to patient's right basilic vein with the use of ultrasound guidance.     Ultrasound guidance: yes  Vessel Caliber: large and patent, compressibility normal  Needle advanced into vessel with real time Ultrasound guidance.  Guidewire confirmed in vessel.  Image recorded and saved.  Sterile sheath used.  Sterile dressing applied  Arm circumference- 33cm  Dressing dated

## 2024-07-03 NOTE — ED PROVIDER NOTES
Encounter Date: 7/3/2024       History     Chief Complaint   Patient presents with    Shortness of Breath    Fatigue     Due for blood transfusion in 2 days but reports feeling too weak and SOB     73-year-old female with past medical history of COPD, chronically wearing oxygen as needed.  Has been wearing it more over the last couple of days, non-small-cell lung cancer, vocal cord polyps, hypertension, anxiety, presents emergency department with shortness of breath and generalized weakness.  Patient says that she has been extremely weak and fatigued, has lack of energy, mild lack of appetite as well.  She says that she has had some swelling in his bilateral lower extremities.  She had an ultrasound 2 days ago which showed no evidence of DVT.  She discussed with her oncologist Dr. Andrew dawn who ordered 2 units of blood for her.  Scheduled for Friday.  Says that if she is feeling worse with more short of breath to come to the ER.  Patient presents to the ER today with generalized weakness and increasing shortness of breath have any use her oxygen 247 which she normally does not have to do over the last 2 days.  She does not have any fever or chills.  She denies any chest pain or tightness she does not have any abdominal pain, vomiting or any diarrhea but she does have nausea.      Review of patient's allergies indicates:   Allergen Reactions    Bisacodyl Nausea And Vomiting     Other reaction(s): Vomiting    Iodinated contrast media Anaphylaxis, Hives and Shortness Of Breath     Hypotension.  Pt states she has anaphylaxis with IV contrast.     Morphine Other (See Comments)     hypotension    Azithromycin Hives    Cipro [ciprofloxacin hcl]     Demerol [meperidine]      Nausea vomiting, visual problem    Doxycycline Hives    Flonase [fluticasone propionate] Hives    Morpholine analogues Other (See Comments)     hypotension     Past Medical History:   Diagnosis Date    Anxiety     Martin esophagus     Colitis      COPD (chronic obstructive pulmonary disease)     GERD (gastroesophageal reflux disease)     Hypertension     Intractable nausea and vomiting 2024    Lung cancer     Thyroid disease     Vocal cord polyps      Past Surgical History:   Procedure Laterality Date    ABDOMINAL AORTIC ANEURYSM REPAIR      BACK SURGERY      cervical    CATARACT EXTRACTION Right 2021    CHOLECYSTECTOMY  2013    Dr Albert CORLEY    ENDOBRONCHIAL ULTRASOUND N/A 2024    Procedure: ENDOBRONCHIAL ULTRASOUND (EBUS);  Surgeon: Kizzy Siegel MD;  [facility];  Service: Pulmonary;  Laterality: N/A;    FOOT SURGERY      HYSTERECTOMY      AUGUSTINE for AUB with consevation ovaries    INSERTION OF TUNNELED CENTRAL VENOUS CATHETER (CVC) WITH SUBCUTANEOUS PORT Right 2024    Procedure: [id number]-CATH;  Surgeon: Simon Wilson III, MD;  Location[facility];  Service: General;  Laterality: Right;    ROBOTIC BRONCHOSCOPY N/A 2024    Procedure: ROBOTIC BRONCHOSCOPY;  Surgeon: Kizzy Siegel MD;  [facility];  Service: Pulmonary;  Laterality: N/A;    SALIVARY GLAND SURGERY       Family History   Problem Relation Name Age of Onset    Bladder Cancer Mother      Heart failure Father      Emphysema Sister      Pancreatic cancer Sister      No Known Problems Brother       Social History     Tobacco Use    Smoking status: Former     Types: Cigarettes     Start date: 2024     Quit date: 1964     Years since quittin.5    Smokeless tobacco: Never    Tobacco comments:     Pt has smoked since 14 years old. Smokes around .5ppd. Inpatient smoking education done 10/20.     Pt quit smoking on 24.  She has never used smokeless tobacco   Substance Use Topics    Alcohol use: Never    Drug use: Never     Review of Systems   Constitutional:  Positive for fatigue. Negative for appetite change, chills and fever.   HENT:  Negative for sore throat.    Respiratory:  Positive for shortness of breath.     Cardiovascular:  Positive for leg swelling. Negative for chest pain.   Gastrointestinal:  Negative for abdominal pain, nausea and vomiting.   Genitourinary:  Negative for dysuria.   Musculoskeletal:  Negative for back pain.   Skin:  Negative for rash.   Neurological:  Positive for weakness (Generalized).   Hematological:  Does not bruise/bleed easily.   All other systems reviewed and are negative.      Physical Exam     Initial Vitals [07/03/24 0818]   BP Pulse Resp Temp SpO2   (!) 141/72 81 (!) 22 98.3 °F (36.8 °C) 98 %      MAP       --         Physical Exam    Nursing note and vitals reviewed.  Constitutional: She appears well-developed and well-nourished. No distress.   HENT:   Head: Normocephalic and atraumatic.   Mouth/Throat: No oropharyngeal exudate.   Eyes: Conjunctivae and EOM are normal. Pupils are equal, round, and reactive to light.   Neck: Neck supple. No tracheal deviation present.   Normal range of motion.  Cardiovascular:  Normal rate, regular rhythm, normal heart sounds and intact distal pulses.           No murmur heard.  Pulmonary/Chest: No stridor. No respiratory distress. She has wheezes (expiratory bilaterally, faint). She has no rhonchi. She has no rales.   Abdominal: Abdomen is soft. She exhibits no distension. There is no abdominal tenderness. There is no rebound and no guarding.   Musculoskeletal:         General: Edema (2+ pitting bilateral lower legs ankles and feet) present. No tenderness. Normal range of motion.      Cervical back: Normal range of motion and neck supple.     Neurological: She is alert and oriented to person, place, and time. She has normal strength. No cranial nerve deficit or sensory deficit.   Skin: Skin is warm and dry. Capillary refill takes less than 2 seconds. No rash noted. No erythema. No pallor.   Psychiatric: She has a normal mood and affect. Her behavior is normal. Judgment and thought content normal.         ED Course   Procedures  Labs Reviewed   CBC W/  AUTO DIFFERENTIAL - Abnormal; Notable for the following components:       Result Value    WBC 15.24 (*)     RBC 2.71 (*)     Hemoglobin 8.6 (*)     Hematocrit 26.3 (*)     MCH 31.7 (*)     Platelets 82 (*)     MPV 9.0 (*)     nRBC 1 (*)     Lymph % 16.0 (*)     Platelet Estimate Decreased (*)     All other components within normal limits   COMPREHENSIVE METABOLIC PANEL - Abnormal; Notable for the following components:    Albumin 2.8 (*)     ALT 9 (*)     All other components within normal limits   B-TYPE NATRIURETIC PEPTIDE - Abnormal; Notable for the following components:     (*)     All other components within normal limits   MAGNESIUM   TROPONIN I   TYPE & SCREEN          Imaging Results              X-Ray Chest 1 View (Final result)  Result time 07/03/24 09:47:22      Final result by Danielito Jackson MD (07/03/24 09:47:22)                   Impression:      No acute process.  Left upper lobe nodule.      Electronically signed by: Danielito Jackson MD  Date:    07/03/2024  Time:    09:47               Narrative:    EXAMINATION:  XR CHEST 1 VIEW    CLINICAL HISTORY:  Dyspnea, unspecified    TECHNIQUE:  Single frontal view of the chest was performed.    COMPARISON:  06/27/2024    FINDINGS:  A right subclavian Port-A-Cath has its tip in the superior vena cava.  The heart size is normal.  There is calcification of the aorta.  A 1.6 cm nodular opacity of the left upper lobe is unchanged.  No consolidation or pleural effusion.                                       Medications   albuterol-ipratropium 2.5 mg-0.5 mg/3 mL nebulizer solution 3 mL (3 mLs Nebulization Given 7/3/24 0919)   ondansetron injection 4 mg (4 mg Intravenous Given 7/3/24 1021)   LORazepam tablet 1 mg (1 mg Oral Given 7/3/24 1111)   methylPREDNISolone sodium succinate injection 125 mg (125 mg Intravenous Given 7/3/24 1111)     Medical Decision Making  Differential includes but not limited to symptomatic anemia, congestive heart failure,  "COPD exacerbation, electrolyte abnormality, dehydration, PE, ACS,    Emergent evaluation 73-year-old female presents emergency department with malaise, fatigue, concern for blood transfusion, shortness of breath.  Please see ED course for further details but overall impression is multifactorial dyspnea and fatigue in the setting of a patient with lung cancer.  Patient understands I can not rule out pulmonary embolus without further investigation and evaluation.  I offered to treat her empirically for this given her risk factors of lung cancer and increasing shortness of breath.  She declined any empiric Eliquis treatment and stated that she would "rather die" and patient says that she wants to go home.  She does not want to be admitted.  Will treat her for COPD exacerbation with steroids and with DuoNeb treatment.  Will recommend she follow up with her oncologist and pulmonologist.  I also believe there is a component of anxiety associated with the patient's dyspnea.  She did get some relief with benzos here in the ER.  Will prescribe a short course of benzos and she can follow up with the primary care provider for continued prescription of these.  Patient understands that I can not rule out life-threatening diagnosis of pulmonary embolus in chooses not to pursue this diagnosis further and or be treated for it.  She understands the limited evaluation here in the ER.   present at bedside for the entirety of the discussion.    A dictation software program was used for this note.  Please expect some simple typographical  errors in this note.    I had a detailed discussion with the patient and/or guardian regarding: The historical points, exam findings, and diagnostic results supporting the discharge diagnosis, lab results, pertinent radiology results, and the need for outpatient follow-up, for definitive care with a family practitioner and to return to the emergency department if symptoms worsen or persist or if " "there are any questions or concerns that arise at home. All questions have been answered in detail. Strict return to Emergency Department precautions have been provided.       Amount and/or Complexity of Data Reviewed  External Data Reviewed: labs and notes.  Labs: ordered. Decision-making details documented in ED Course.  Radiology: ordered and independent interpretation performed. Decision-making details documented in ED Course.  ECG/medicine tests: ordered and independent interpretation performed. Decision-making details documented in ED Course.    Risk  OTC drugs.  Prescription drug management.  Decision regarding hospitalization.               ED Course as of 07/03/24 1616   Wed Jul 03, 2024   0938 EKG 9:30 a.m. sinus rhythm rate of 74, occasional premature ventricular complexes.  Low voltage QRS.  No ST elevation or depression.  No STEMI.  EKG interpreted independently by me. [JR]   1039 Consult placed to Hematology/Oncology Dr. Mariano [JR]   1049 I discussed the case with Dr. Vargas who stated that it did not sound like the patient needed a blood transfusion.  He said that he would defer to pulmonology for any breathing issues.  Does not sound like the patient needs to be admitted however. [JR]   1057 I re-evaluated the patient, says she is feeling better after her breathing treatment.  I had a long discussion with the patient and  at bedside.  Patient understands I am unable to officially rule out PE as she has anaphylaxis to IV contrast she states.  V/Q scan would be abnormal in the setting of a lung mass.  I suggested that we presumptively treat possible PE with anticoagulation.  Patient says that she does not want to be on anticoagulation and she would "rather die".  She says that she just wants to go home.  Has been present at the bedside for the discussion.  I will give her medicine for anxiety as she does feel she is anxious.  She knows she needs to get this primarily prescribed from her " primary care provider [JR]      ED Course User Index  [JR] Tian Patel DO                           Clinical Impression:  Final diagnoses:  [R06.00] Dyspnea  [J44.1] COPD with acute exacerbation (Primary)  [C34.90] Malignant neoplasm of lung, unspecified laterality, unspecified part of lung  [F41.9] Anxiety          ED Disposition Condition    Discharge Stable          ED Prescriptions       Medication Sig Dispense Start Date End Date Auth. Provider    LORazepam (ATIVAN) 1 MG tablet Take 0.5 tablets (0.5 mg total) by mouth every 6 (six) hours as needed for Anxiety. 10 tablet 7/3/2024 8/2/2024 Tian Patel DO    albuterol-ipratropium (DUO-NEB) 2.5 mg-0.5 mg/3 mL nebulizer solution Take 3 mLs by nebulization every 6 (six) hours as needed for Wheezing. Rescue 75 mL 7/3/2024 7/3/2025 Tian Patel DO    predniSONE (DELTASONE) 20 MG tablet Take 2 tablets (40 mg total) by mouth once daily. for 4 days 8 tablet 7/3/2024 7/7/2024 Tian Patel DO    levalbuterol (XOPENEX) 1.25 mg/3 mL nebulizer solution Take 3 mLs (1.25 mg total) by nebulization every 4 (four) hours as needed for Wheezing. Rescue 1 mL 7/3/2024 7/3/2025 Tian Patel DO          Follow-up Information       Follow up With Specialties Details Why Contact Info Additional Information    Jasbir Graham MD Family Medicine, Home Health Services, Hospice Services In 3 days  [address]  [address]  [facility] [address]  [phone]       [facility] Emergency [facility]  If symptoms worsen [address] [place] [address] Dr [place] [address] 1st floor    Neo Mariano MD Hematology, Hematology and Oncology, Oncology In 1 week  [address]  [address]  [facility] [address]  [phone]                Tian Patel DO  07/03/24 3824

## 2024-07-03 NOTE — TELEPHONE ENCOUNTER
"Per Dr. Graham "if this Ativan helps her anxiety we can refill it next week."     LMOR - portal message sent.   "

## 2024-07-03 NOTE — TELEPHONE ENCOUNTER
----- Message from Danyelle Watts RN sent at 7/3/2024  2:01 PM CDT -----  This mutual patient's  called to say she was seen in ER today where she was told she was having some issues with anxiety and should reach out to PCP to see if can prescribe something to help with this.  said he hasn't been able to get in touch with Dr. Graham's office concerning this, are you able to reach out to patient or  to discuss please.

## 2024-07-03 NOTE — TELEPHONE ENCOUNTER
----- Message from Alyx Lechuga sent at 7/3/2024 11:17 AM CDT -----  - 10:59-pt needs to talk to chelly   [phone]

## 2024-07-03 NOTE — DISCHARGE INSTRUCTIONS
RETURN TO EMERGENCY DEPARTMENT WITHOUT FAIL, IF YOUR SYMPTOMS WORSEN, IF YOU GET NEW OR DIFFERENT SYMPTOMS, IF YOU ARE UNABLE TO FOLLOW UP AS DIRECTED, OR IF YOU HAVE ANY CONCERNS OR WORRIES.    Follow up with your lung doctor and with your cancer doctor as directed.  Take steroids and breathing treatments as directed.  Take anxiety medicine as we discussed.  Follow up with the primary care provider for refill on your anxiety medicine.

## 2024-07-03 NOTE — TELEPHONE ENCOUNTER
Per Dr. Mariano's verbal orders to do so, I spoke to  made aware that Dr. Mariano spoke to ER MD and patient's Hgb has improved to 8.6 and she is no longer in need to of the 2 units of blood scheduled on Friday so we are going to cancel that appt. Verbalized understanding. Per  they have been trying to contact Dr. Graham's office concerning anxiety medication but have been unable to get in touch with them. I sent message to Dr. Graham's staff concerning this with request to contact patient or .

## 2024-07-03 NOTE — TELEPHONE ENCOUNTER
Looks like they wrote her Ativan 1mg 1/2 tablet prn #10 in the hospital. Printed for Dr. Villa review.

## 2024-07-03 NOTE — TELEPHONE ENCOUNTER
Patient called and reported that she was experiencing heavy legs and SOB, but SOB is better as long as she has her oxygen on. Patient currently scheduled to get 2 units of blood. Patient instructed go to go the ER for evaluation. Patient stated she did not want to go to the ER at this time but will get type and screened as planned and get blood but will go to the ER if she starts to feel worse.

## 2024-07-05 ENCOUNTER — PATIENT OUTREACH (OUTPATIENT)
Dept: ADMINISTRATIVE | Facility: CLINIC | Age: 74
End: 2024-07-05
Payer: MEDICARE

## 2024-07-05 ENCOUNTER — PATIENT MESSAGE (OUTPATIENT)
Dept: FAMILY MEDICINE | Facility: CLINIC | Age: 74
End: 2024-07-05
Payer: MEDICARE

## 2024-07-05 ENCOUNTER — PATIENT OUTREACH (OUTPATIENT)
Dept: EMERGENCY MEDICINE | Facility: OTHER | Age: 74
End: 2024-07-05
Payer: MEDICARE

## 2024-07-05 DIAGNOSIS — F41.9 ANXIETY: ICD-10-CM

## 2024-07-07 LAB
OHS QRS DURATION: 74 MS
OHS QTC CALCULATION: 446 MS

## 2024-07-08 RX ORDER — LORAZEPAM 1 MG/1
0.5 TABLET ORAL EVERY 6 HOURS PRN
Qty: 60 TABLET | Refills: 0 | Status: SHIPPED | OUTPATIENT
Start: 2024-07-08

## 2024-07-09 ENCOUNTER — TELEPHONE (OUTPATIENT)
Facility: CLINIC | Age: 74
End: 2024-07-09
Payer: MEDICARE

## 2024-07-09 DIAGNOSIS — C34.92 NON-SMALL CELL CARCINOMA OF LEFT LUNG: Primary | ICD-10-CM

## 2024-07-09 NOTE — TELEPHONE ENCOUNTER
----- Message from Linn Hernandez sent at 7/9/2024  9:02 AM CDT -----  Pt coming in tomorrow for labs prior to hospital f/u. Orders are under Shireen and need to be switched to [facility].

## 2024-07-10 ENCOUNTER — HOSPITAL ENCOUNTER (EMERGENCY)
Facility: HOSPITAL | Age: 74
Discharge: HOME | End: 2024-07-10
Attending: EMERGENCY MEDICINE
Payer: MEDICARE

## 2024-07-10 ENCOUNTER — TELEPHONE (OUTPATIENT)
Facility: CLINIC | Age: 74
End: 2024-07-10
Payer: MEDICARE

## 2024-07-10 VITALS
HEART RATE: 78 BPM | SYSTOLIC BLOOD PRESSURE: 132 MMHG | BODY MASS INDEX: 31.09 KG/M2 | WEIGHT: 170 LBS | DIASTOLIC BLOOD PRESSURE: 91 MMHG | OXYGEN SATURATION: 97 % | RESPIRATION RATE: 20 BRPM | TEMPERATURE: 98 F

## 2024-07-10 DIAGNOSIS — R53.1 GENERALIZED WEAKNESS: ICD-10-CM

## 2024-07-10 DIAGNOSIS — R11.2 NAUSEA AND VOMITING, UNSPECIFIED VOMITING TYPE: Primary | ICD-10-CM

## 2024-07-10 LAB
ALBUMIN SERPL BCP-MCNC: 3.6 G/DL (ref 3.5–5.2)
ALP SERPL-CCNC: 88 U/L (ref 55–135)
ALT SERPL W/O P-5'-P-CCNC: 8 U/L (ref 10–44)
ANION GAP SERPL CALC-SCNC: 11 MMOL/L (ref 8–16)
AST SERPL-CCNC: 14 U/L (ref 10–40)
BASOPHILS # BLD AUTO: 0.05 K/UL (ref 0–0.2)
BASOPHILS NFR BLD: 0.4 % (ref 0–1.9)
BILIRUB SERPL-MCNC: 0.3 MG/DL (ref 0.1–1)
BUN SERPL-MCNC: 15 MG/DL (ref 8–23)
CALCIUM SERPL-MCNC: 9 MG/DL (ref 8.7–10.5)
CHLORIDE SERPL-SCNC: 102 MMOL/L (ref 95–110)
CO2 SERPL-SCNC: 23 MMOL/L (ref 23–29)
CREAT SERPL-MCNC: 0.8 MG/DL (ref 0.5–1.4)
DIFFERENTIAL METHOD BLD: ABNORMAL
EOSINOPHIL # BLD AUTO: 0 K/UL (ref 0–0.5)
EOSINOPHIL NFR BLD: 0 % (ref 0–8)
ERYTHROCYTE [DISTWIDTH] IN BLOOD BY AUTOMATED COUNT: 15 % (ref 11.5–14.5)
EST. GFR  (NO RACE VARIABLE): >60 ML/MIN/1.73 M^2
GLUCOSE SERPL-MCNC: 113 MG/DL (ref 70–110)
GLUCOSE SERPL-MCNC: 99 MG/DL (ref 70–110)
HCT VFR BLD AUTO: 26.2 % (ref 37–48.5)
HGB BLD-MCNC: 8.6 G/DL (ref 12–16)
IMM GRANULOCYTES # BLD AUTO: 0.13 K/UL (ref 0–0.04)
IMM GRANULOCYTES NFR BLD AUTO: 1.1 % (ref 0–0.5)
LIPASE SERPL-CCNC: 11 U/L (ref 4–60)
LYMPHOCYTES # BLD AUTO: 1 K/UL (ref 1–4.8)
LYMPHOCYTES NFR BLD: 8.1 % (ref 18–48)
MAGNESIUM SERPL-MCNC: 1.7 MG/DL (ref 1.6–2.6)
MCH RBC QN AUTO: 31.4 PG (ref 27–31)
MCHC RBC AUTO-ENTMCNC: 32.8 G/DL (ref 32–36)
MCV RBC AUTO: 96 FL (ref 82–98)
MONOCYTES # BLD AUTO: 1.1 K/UL (ref 0.3–1)
MONOCYTES NFR BLD: 9.6 % (ref 4–15)
NEUTROPHILS # BLD AUTO: 9.5 K/UL (ref 1.8–7.7)
NEUTROPHILS NFR BLD: 80.8 % (ref 38–73)
NRBC BLD-RTO: 0 /100 WBC
PLATELET # BLD AUTO: 286 K/UL (ref 150–450)
PMV BLD AUTO: 8.7 FL (ref 9.2–12.9)
POTASSIUM SERPL-SCNC: 4.2 MMOL/L (ref 3.5–5.1)
PROT SERPL-MCNC: 7.3 G/DL (ref 6–8.4)
RBC # BLD AUTO: 2.74 M/UL (ref 4–5.4)
SODIUM SERPL-SCNC: 136 MMOL/L (ref 136–145)
WBC # BLD AUTO: 11.8 K/UL (ref 3.9–12.7)

## 2024-07-10 PROCEDURE — 99285 EMERGENCY DEPT VISIT HI MDM: CPT | Mod: 25

## 2024-07-10 PROCEDURE — 96365 THER/PROPH/DIAG IV INF INIT: CPT

## 2024-07-10 PROCEDURE — 96361 HYDRATE IV INFUSION ADD-ON: CPT

## 2024-07-10 PROCEDURE — 96375 TX/PRO/DX INJ NEW DRUG ADDON: CPT

## 2024-07-10 PROCEDURE — 83735 ASSAY OF MAGNESIUM: CPT | Performed by: EMERGENCY MEDICINE

## 2024-07-10 PROCEDURE — 83690 ASSAY OF LIPASE: CPT | Performed by: NURSE PRACTITIONER

## 2024-07-10 PROCEDURE — 82962 GLUCOSE BLOOD TEST: CPT

## 2024-07-10 PROCEDURE — 25000003 PHARM REV CODE 250: Performed by: EMERGENCY MEDICINE

## 2024-07-10 PROCEDURE — 80053 COMPREHEN METABOLIC PANEL: CPT | Performed by: NURSE PRACTITIONER

## 2024-07-10 PROCEDURE — 63600175 PHARM REV CODE 636 W HCPCS: Performed by: EMERGENCY MEDICINE

## 2024-07-10 PROCEDURE — 85025 COMPLETE CBC W/AUTO DIFF WBC: CPT | Performed by: NURSE PRACTITIONER

## 2024-07-10 PROCEDURE — 93005 ELECTROCARDIOGRAM TRACING: CPT | Performed by: INTERNAL MEDICINE

## 2024-07-10 PROCEDURE — 93010 ELECTROCARDIOGRAM REPORT: CPT | Mod: ,,, | Performed by: INTERNAL MEDICINE

## 2024-07-10 RX ORDER — PROMETHAZINE HYDROCHLORIDE 25 MG/1
25 SUPPOSITORY RECTAL EVERY 6 HOURS PRN
Qty: 10 SUPPOSITORY | Refills: 0 | Status: SHIPPED | OUTPATIENT
Start: 2024-07-10

## 2024-07-10 RX ORDER — DICYCLOMINE HYDROCHLORIDE 10 MG/ML
20 INJECTION INTRAMUSCULAR
Status: DISCONTINUED | OUTPATIENT
Start: 2024-07-10 | End: 2024-07-10 | Stop reason: HOSPADM

## 2024-07-10 RX ORDER — ONDANSETRON HYDROCHLORIDE 2 MG/ML
4 INJECTION, SOLUTION INTRAVENOUS
Status: COMPLETED | OUTPATIENT
Start: 2024-07-10 | End: 2024-07-10

## 2024-07-10 RX ADMIN — PROMETHAZINE HYDROCHLORIDE 12.5 MG: 25 INJECTION INTRAMUSCULAR; INTRAVENOUS at 04:07

## 2024-07-10 RX ADMIN — ONDANSETRON 4 MG: 2 INJECTION INTRAMUSCULAR; INTRAVENOUS at 12:07

## 2024-07-10 RX ADMIN — SODIUM CHLORIDE, POTASSIUM CHLORIDE, SODIUM LACTATE AND CALCIUM CHLORIDE 500 ML: 600; 310; 30; 20 INJECTION, SOLUTION INTRAVENOUS at 02:07

## 2024-07-10 NOTE — FIRST PROVIDER EVALUATION
Emergency Department TeleTriage Encounter Note      CHIEF COMPLAINT    Chief Complaint   Patient presents with    Fatigue     Recently discharged from hospital but feeling weak and cannot walk - hx of lung cancer - has not seen PCP since discharge - oncologist sent her for blood work today but she states she is too weak to go       VITAL SIGNS   Initial Vitals [07/10/24 1007]   BP Pulse Resp Temp SpO2   128/81 81 19 97.7 °F (36.5 °C) 96 %      MAP       --            ALLERGIES    Review of patient's allergies indicates:   Allergen Reactions    Bisacodyl Nausea And Vomiting     Other reaction(s): Vomiting    Iodinated contrast media Anaphylaxis, Hives and Shortness Of Breath     Hypotension.  Pt states she has anaphylaxis with IV contrast.     Morphine Other (See Comments)     hypotension    Azithromycin Hives    Cipro [ciprofloxacin hcl]     Demerol [meperidine]      Nausea vomiting, visual problem    Doxycycline Hives    Flonase [fluticasone propionate] Hives    Morpholine analogues Other (See Comments)     hypotension       PROVIDER TRIAGE NOTE  Verbal consent for the teletriage evaluation was given by the patient at the start of the evaluation.  All efforts will be made to maintain patient's privacy during the evaluation.      This is a teletriage evaluation of a 74 y.o. female presenting to the ED with c/o abdominal cramping, vomiting, diarrhea, and back pain.  Has been feeling bad since last hospitalization. Limited physical exam via telehealth: The patient is awake, alert, answering questions appropriately and is not in respiratory distress.  As the Teletriage provider, I performed an initial assessment and ordered appropriate labs and imaging studies, if any, to facilitate the patient's care once placed in the ED. Once a room is available, care and a full evaluation will be completed by an alternate ED provider.  Any additional orders and the final disposition will be determined by that provider.  All  imaging and labs will not be followed-up by the Teletriage Team, including myself.           ORDERS  Labs Reviewed   CBC W/ AUTO DIFFERENTIAL   COMPREHENSIVE METABOLIC PANEL   URINALYSIS, REFLEX TO URINE CULTURE   LIPASE   POCT GLUCOSE MONITORING CONTINUOUS       ED Orders (720h ago, onward)      Start Ordered     Status Ordering Provider    07/10/24 1016 07/10/24 1015  POCT glucose  Once         Ordered AUDREY HAGEN    07/10/24 1016 07/10/24 1015  Urinalysis, Reflex to Urine Culture Urine, Clean Catch  STAT         Ordered XIAO AUDREY VEE    07/10/24 1016 07/10/24 1015  Lipase  STAT         Ordered XIAO AUDREY VEE    07/10/24 1016 07/10/24 1015  Diet NPO  Diet effective now         Ordered XIAO AUDREY VEE    07/10/24 1015 07/10/24 1015  Saline lock IV  Once         Ordered XIAO AUDREY VEE    07/10/24 1015 07/10/24 1015  Pulse Oximetry Continuous  Continuous         Ordered XIAO AUDREY VEE    07/10/24 1015 07/10/24 1015  Cardiac Monitoring - Adult  Continuous        Comments: Notify Physician If:    Ordered XIAO, AUDREY VEE    07/10/24 1015 07/10/24 1015  EKG 12-lead  Once         Ordered XIAO AUDREY VEE    07/10/24 1015 07/10/24 1015  CBC auto differential  STAT         Ordered XIAO AUDREY VEE    07/10/24 1015 07/10/24 1015  Comprehensive metabolic panel  STAT         Ordered AUDREY HAGEN              Virtual Visit Note: The provider triage portion of this emergency department evaluation and documentation was performed via [employer], a HIPAA-compliant telemedicine application, in concert with a tele-presenter in the room. A face to face patient evaluation with one of my colleagues will occur once the patient is placed in an emergency department room.      DISCLAIMER: This note was prepared with Ame[employer] voice recognition transcription software. Garbled syntax, mangled pronouns, and other bizarre constructions may be attributed to that software system.

## 2024-07-10 NOTE — PROGRESS NOTES
[facility]  Post-Hospitalization Note -  1st Follow-up Visit    Subjective:      Patient ID:   NAME: Joslyn Dubon : 1950     74 y.o. female    Referring Doc: Jasbir Graham  Other Physicians: Yobani/Robbin, Xavier, Michelle, Jennyfer    Chief Complaint: lung cancer      HPI:  74 y.o. female with diagnosis of lung cancer with neuroendocrine features who has been on platinum and etopside chemotherapy who was seen as a consult at [facility].. She has been followed by Dr Camden Iyer and she had asked to switch to myself after I had followed her during her recent hospitalization. She had her 2nd cycle chemotherapy on 2024 The patient returns for a hospital follow-up visit today to go over results of recently ordered tests, studies and/or labs. She is here with her  and son.     She is in wheelchair today. She has some residual Nausea and GI issues. She went to ER yesterday. She is currently breathing adequately. No current CP, HA's    Discussed quality of life as her priority goal and plan is to see if we can build her back up to some prior baseline. He ask palliative medicine to see her. Discussed having PRN orders for IVF's and antiemetics as needed.            ROS:   GEN: general malaise, weakness, fatigue  HEENT: normal with no HA's, sore throat, stiff neck, changes in vision  CV: normal with no CP, SOB, PND, RAPHAEL or orthopnea  PULM: normal with no SOB, cough, hemoptysis, sputum or pleuritic pain  GI:  residual nausea and GI upset  : normal with no hematuria, dysuria  BREAST: normal with no mass, discharge, pain  SKIN: normal with no rash, erythema, bruising, or swelling     Past Medical/Surgical History:  Past Medical History:   Diagnosis Date    Anxiety     Martin esophagus     Colitis     COPD (chronic obstructive pulmonary disease)     GERD (gastroesophageal reflux disease)     Hypertension     Intractable nausea and vomiting 2024    Lung cancer     Thyroid disease      Vocal cord polyps      Past Surgical History:   Procedure Laterality Date    ABDOMINAL AORTIC ANEURYSM REPAIR      BACK SURGERY      cervical    CATARACT EXTRACTION Right 2021    CHOLECYSTECTOMY  2013    Dr Albert CORLEY    ENDOBRONCHIAL ULTRASOUND N/A 2024    Procedure: ENDOBRONCHIAL ULTRASOUND (EBUS);  Surgeon: Kizzy Siegel MD;  Location: [facility] OR 2ND FLR;  Service: Pulmonary;  Laterality: N/A;    FOOT SURGERY      HYSTERECTOMY      AUGUSTINE for AUB with consevation ovaries    INSERTION OF TUNNELED CENTRAL VENOUS CATHETER (CVC) WITH SUBCUTANEOUS PORT Right 2024    Procedure: [id number];  Surgeon: Simon Wilson III, MD;  Location: [facility] OR;  Service: General;  Laterality: Right;    ROBOTIC BRONCHOSCOPY N/A 2024    Procedure: ROBOTIC BRONCHOSCOPY;  Surgeon: Kizzy Siegel MD;  Location: [facility] OR 2ND FLR;  Service: Pulmonary;  Laterality: N/A;    SALIVARY GLAND SURGERY           Allergies:  Review of patient's allergies indicates:   Allergen Reactions    Bisacodyl Nausea And Vomiting     Other reaction(s): Vomiting    Iodinated contrast media Anaphylaxis, Hives and Shortness Of Breath     Hypotension.  Pt states she has anaphylaxis with IV contrast.     Morphine Other (See Comments)     hypotension    Azithromycin Hives    Cipro [ciprofloxacin hcl]     Demerol [meperidine]      Nausea vomiting, visual problem    Doxycycline Hives    Flonase [fluticasone propionate] Hives    Morpholine analogues Other (See Comments)     hypotension       Social/Family History:  Social History     Socioeconomic History    Marital status:    Tobacco Use    Smoking status: Former     Types: Cigarettes     Start date: 2024     Quit date: 1964     Years since quittin.5    Smokeless tobacco: Never    Tobacco comments:     Pt has smoked since 14 years old. Smokes around .5ppd. Inpatient smoking education done 10/20.     Pt quit smoking on 24.  She has never used smokeless  tobacco   Substance and Sexual Activity    Alcohol use: Never    Drug use: Never    Sexual activity: Yes     Partners: Male     Social Determinants of Health     Financial Resource Strain: Low Risk  (7/5/2024)    Overall Financial Resource Strain (CARDIA)     Difficulty of Paying Living Expenses: Not hard at all   Food Insecurity: No Food Insecurity (7/5/2024)    Hunger Vital Sign     Worried About Running Out of Food in the Last Year: Never true     Ran Out of Food in the Last Year: Never true   Transportation Needs: No Transportation Needs (7/5/2024)    PRAPARE - Transportation     Lack of Transportation (Medical): No     Lack of Transportation (Non-Medical): No   Physical Activity: Inactive (3/15/2024)    Exercise Vital Sign     Days of Exercise per Week: 0 days     Minutes of Exercise per Session: 0 min   Stress: Stress Concern Present (3/15/2024)    [employer] - Occupational Stress Questionnaire     Feeling of Stress : Very much   Housing Stability: Low Risk  (7/5/2024)    Housing Stability Vital Sign     Unable to Pay for Housing in the Last Year: No     Homeless in the Last Year: No     Family History   Problem Relation Name Age of Onset    Bladder Cancer Mother      Heart failure Father      Emphysema Sister      Pancreatic cancer Sister      No Known Problems Brother           Medications:    Current Outpatient Medications:     albuterol-ipratropium (DUO-NEB) 2.5 mg-0.5 mg/3 mL nebulizer solution, Take 3 mLs by nebulization every 6 (six) hours as needed for Wheezing. Rescue, Disp: 75 mL, Rfl: 0    EPINEPHrine (EPIPEN) 0.3 mg/0.3 mL AtIn, Inject 0.3 mLs (0.3 mg total) into the muscle as needed (Severe allergic reaction symptoms such as shortness of breath, tongue or throat swelling, weakness dizziness severe nausea vomiting)., Disp: 1 each, Rfl: 3    famotidine (PEPCID) 20 MG tablet, Take 1 tablet (20 mg total) by mouth 2 (two) times daily., Disp: 20 tablet, Rfl: 0    furosemide  (LASIX) 20 MG tablet, Take 1 tablet (20 mg total) by mouth daily as needed (edema)., Disp: 30 tablet, Rfl: 1    HYDROcodone-acetaminophen (NORCO)  mg per tablet, Take 1 tablet by mouth every 12 (twelve) hours as needed for Pain., Disp: 50 tablet, Rfl: 0    levalbuterol (XOPENEX HFA) 45 mcg/actuation inhaler, Inhale 2 puffs into the lungs every 6 (six) hours as needed for Wheezing. Rescue, Disp: 15 g, Rfl: 4    levalbuterol (XOPENEX) 1.25 mg/3 mL nebulizer solution, Take 3 mLs (1.25 mg total) by nebulization every 4 (four) hours as needed for Wheezing. Rescue, Disp: 1 mL, Rfl: 0    levothyroxine (SYNTHROID) 112 MCG tablet, Take 1 tablet (112 mcg total) by mouth before breakfast., Disp: 90 tablet, Rfl: 0    LIDOcaine-prilocaine (EMLA) cream, Apply topically as needed. (Patient taking differently: Apply 1 g topically as needed (Chemo port).), Disp: 30 g, Rfl: 5    LORazepam (ATIVAN) 1 MG tablet, Take 0.5 tablets (0.5 mg total) by mouth every 6 (six) hours as needed for Anxiety., Disp: 60 tablet, Rfl: 0    metoprolol tartrate (LOPRESSOR) 25 MG tablet, Take 1 tablet (25 mg total) by mouth 2 (two) times daily., Disp: 180 tablet, Rfl: 3    omeprazole (PRILOSEC) 40 MG capsule, Take 1 capsule (40 mg total) by mouth every morning., Disp: 90 capsule, Rfl: 3    ondansetron (ZOFRAN) 8 MG tablet, Take 1 tablet (8 mg total) by mouth every 8 (eight) hours as needed for Nausea., Disp: 30 tablet, Rfl: 5    polyethylene glycol (GLYCOLAX) 17 gram/dose powder, Take 17 g by mouth once daily., Disp: 510 g, Rfl: 5    promethazine (PHENERGAN) 25 MG suppository, Place 1 suppository (25 mg total) rectally every 6 (six) hours as needed for Nausea., Disp: 10 suppository, Rfl: 0    promethazine (PHENERGAN) 25 MG tablet, Take 1 tablet (25 mg total) by mouth every 6 (six) hours as needed for Nausea., Disp: 30 tablet, Rfl: 5    REFRESH OPTIVE 0.5-0.9 % Drop, Place 1 drop into both eyes 4 (four) times daily as needed (Dry Eyes)., Disp: , Rfl:  "    simethicone (GAS-X ORAL), Take 1 tablet by mouth as needed (Flatulence)., Disp: , Rfl:     umeclidinium-vilanteroL (ANORO ELLIPTA) 62.5-25 mcg/actuation DsDv, Inhale 1 puff into the lungs once daily. Controller, Disp: 1 each, Rfl: 11  No current facility-administered medications for this visit.      Pathology:   Cancer Staging   Non-small cell carcinoma of left lung  Staging form: Lung, AJCC 8th Edition  - Clinical stage from 5/1/2024: Stage IB (cT2a, cN0, cM0) - Signed by Antony Rodriguez III, MD on 5/1/2024          Objective:   Vitals:  Blood pressure 94/67, pulse 89, temperature 97.2 °F (36.2 °C), resp. rate 20, height 5' 2" (1.575 m), weight 76.8 kg (169 lb 4.8 oz).    Physical Examination:   GEN: no apparent distress, comfortable; AAOx3; overweight  HEAD: atraumatic and normocephalic  EYES: no pallor, no icterus, PERRLA  ENT: OMM, no pharyngeal erythema, external ears WNL; no nasal discharge; no thrush  NECK: no masses, thyroid normal, trachea midline, no LAD/LN's, supple  CV: RRR with no murmur; normal pulse; normal S1 and S2; no pedal edema; Rght portacath  CHEST: Normal respiratory effort; CTAB; normal breath sounds; no wheeze or crackles  ABDOM:nontender; nondistended; soft; normal bowel sounds; no rebound/guarding  MUSC/Skeletal: ROM normal; no crepitus; joints normal; no deformities or arthropathy  EXTREM: no clubbing, cyanosis, inflammation or swelling  SKIN: no rashes, lesions, ulcers, petechiae or subcutaneous nodules  : no payan  NEURO: grossly intact; motor/sensory WNL; AAOx3; no tremors; generalized weakness; wheelchair today  PSYCH: normal mood, affect and behavior  LYMPH: normal cervical, supraclavicular, axillary and groin LN's      Labs:   Lab Results   Component Value Date    WBC 11.80 07/10/2024    HGB 8.6 (L) 07/10/2024    HCT 26.2 (L) 07/10/2024    MCV 96 07/10/2024     07/10/2024    CMP  Sodium   Date Value Ref Range Status   07/10/2024 136 136 - 145 mmol/L Final "     Potassium   Date Value Ref Range Status   07/10/2024 4.2 3.5 - 5.1 mmol/L Final     Chloride   Date Value Ref Range Status   07/10/2024 102 95 - 110 mmol/L Final     CO2   Date Value Ref Range Status   07/10/2024 23 23 - 29 mmol/L Final     Glucose   Date Value Ref Range Status   07/10/2024 99 70 - 110 mg/dL Final     BUN   Date Value Ref Range Status   07/10/2024 15 8 - 23 mg/dL Final     Creatinine   Date Value Ref Range Status   07/10/2024 0.8 0.5 - 1.4 mg/dL Final   03/19/2013 0.8 0.5 - 1.4 mg/dL Final     Calcium   Date Value Ref Range Status   07/10/2024 9.0 8.7 - 10.5 mg/dL Final   03/19/2013 9.9 8.7 - 10.5 mg/dL Final     Total Protein   Date Value Ref Range Status   07/10/2024 7.3 6.0 - 8.4 g/dL Final     Albumin   Date Value Ref Range Status   07/10/2024 3.6 3.5 - 5.2 g/dL Final     Total Bilirubin   Date Value Ref Range Status   07/10/2024 0.3 0.1 - 1.0 mg/dL Final     Comment:     For infants and newborns, interpretation of results should be based  on gestational age, weight and in agreement with clinical  observations.    Premature Infant recommended reference ranges:  Up to 24 hours.............<8.0 mg/dL  Up to 48 hours............<12.0 mg/dL  3-5 days..................<15.0 mg/dL  6-29 days.................<15.0 mg/dL       Alkaline Phosphatase   Date Value Ref Range Status   07/10/2024 88 55 - 135 U/L Final   08/31/2012 107 23 - 119 UNIT/L Final     AST   Date Value Ref Range Status   07/10/2024 14 10 - 40 U/L Final   08/31/2012 21 10 - 30 UNIT/L Final     ALT   Date Value Ref Range Status   07/10/2024 8 (L) 10 - 44 U/L Final     Anion Gap   Date Value Ref Range Status   07/10/2024 11 8 - 16 mmol/L Final   03/19/2013 12 5 - 15 meq/L Final     eGFR if    Date Value Ref Range Status   01/03/2022 >60.0 >60 mL/min/1.73 m^2 Final     eGFR if non    Date Value Ref Range Status   01/03/2022 >60.0 >60 mL/min/1.73 m^2 Final     Comment:     Calculation used to obtain the  estimated glomerular filtration  rate (eGFR) is the CKD-EPI equation.          I have reviewed all available lab results and radiology reports.    Radiology/Diagnostic Studies:    NM Lung Scan Perfusion Particulate [[id number]] Resulted: 06/23/24 2017   Order Status: Completed Updated: 06/23/24 2020   Narrative:     EXAMINATION:  NM LUNG SCAN PERFUSION    CLINICAL HISTORY:  Chest pain, nonspecific;Pulmonary embolism (PE) suspected, high prob;Chest pain, nonspecific; Pulmonary embolism (PE) suspected, high prob;    TECHNIQUE:  IV administration of 5.2 mCi of Tc-99m-MAA, multiple images of the thorax were obtained in various projections.    COMPARISON:  Chest x-ray dated 06/23/2024.    FINDINGS:  Only perfusion images are provided for review.  No perfusion defect identified.  Evaluation for mismatch is precluded in the absence of ventilation portion of the examination.   Impression:                      X-Ray Chest AP Portable [[id number]] Resulted: 06/23/24 0926   Order Status: Completed Updated: 06/23/24 0929   Narrative:     EXAMINATION:  XR CHEST AP PORTABLE    CLINICAL HISTORY:  Chest Pain;    COMPARISON:  June 21, 2024    FINDINGS:  AP view demonstrates normal heart size.  The thoracic aorta is elongated.  Left upper lobe noncalcified pulmonary nodule is unchanged.  No infiltrates or effusions are identified.    Right-sided Port-A-Cath is unchanged in position with tip at the superior vena cava.   Impression:       1. No interval change compared to June 21, 2024.          All lab results and imaging results have been reviewed and discussed with the patient    Assessment:   (1) 74 y.o. female with diagnosis of lung cancer with neuroendocrine features who has been on platinum and etopside chemotherapy who was seen as a consult at [facility].. She has been followed by Dr Camden Iyer and she had asked to switch to myself after I had followed her during her recent hospitalization. She had her 2nd cycle chemotherapy on  6/19/2024 The patient returns for a hospital follow-up visit today to go over results of recently ordered tests, studies and/or labs.       7/11/2024:  - pancytopenia due to prior chemotherapy  - WBC now back to normal at 11.8  - hgb still low but adequate at 8.6  - plats at 286,000  - Discussed quality of life as her priority goal and plan is to see if we can build her back up to some prior baseline. He ask palliative medicine to see her. Discussed having PRN orders for IVF's and antiemetics as needed.    (2) HTN     (3) COPD     (4) GERD; Martin's esophagus; hx/of colitis     (5) Thyroid disease     (6) Anxiety     (7) Former smoker      VISIT DIAGNOSES:      1. Mass of upper lobe of left lung    2. Non-small cell carcinoma of left lung    3. Thrombocytopenia    4. Anemia due to chemotherapy    5. Chemotherapy induced neutropenia    6. Tobacco abuse            Plan:     PLAN:  Check labs weekly and transfuse as needed  Discussed routine schedule of antiemetics  Discussed prn orders for [facility]F's here at [facility]  Goal of care transitioning to Quality of life  Consult palliative medicine  Consider repeat scans in 4 weeks   F/u with PCP, Pulm, GI, Rad/onc etc    RTC in  1 week with Kristen and 2 weeks with myself  Fax note to Santos; Shireen Martinez Mannina, [facility], Jennyfer, Gunn/Yobani              Mass of upper lobe of left lung    Non-small cell carcinoma of left lung    Thrombocytopenia    Anemia due to chemotherapy    Chemotherapy induced neutropenia    Tobacco abuse      Follow up in about 2 weeks (around 7/25/2024).      Pathology Discussion:    I reviewed and discussed the pathology report(s) and radiograph reports (if available) in as simple to understand and/or laymen's terms to the best of my ability. I had an indepth conversation with the patient and went over the patient's individual diagnosis based on the information that was currently available. I discussed the TNM staging process with regard to  "the patient's particular cancer type, and the calculated stage based on the currently available TNM data and literature. I discussed the available prognostic data with regard to the current staging information and how it relates to the prognosis of their particular neoplastic process.      NCCN Guidelines:    I discussed the available treatment option(s) in accordance with the latest literature from the NCCN Clinical Practice Guidelines for the patient's particular type of cancer disorder. The NCCN Guidelines provide a "document evidence-based (and) consensus-driven management" of the care of oncology patients. The treatment recommendations were made not only in accordance to the NCCN guidelines, but also factored in to account the patient's overall age, condition, performance status and their medical co-morbidities. I went over the risks and benefits of the the treatment options (if any could be made) with regard to their particular cancer type, their cancer stage, their age, and their co-morbidities.     Chemotherapy Discussion:      I discussed the available treatment option(s) in accordance with the latest/current national evidence-based guidelines (NCCN, UpToDate, NCI, ASCO, etc where applicable), their overall age/condition and their co-morbidities. I also went over the risks and benefits of the chemotherapy with regard to their particular cancer type, their cancer stage, their age/condition, and their co-morbidities. I provided literature on the chemotherapy regimen and discussed the chemotherapy side-effect profiles of the drug(s). I discussed the importance of compliance with obtaining and monitoring weekly lab work, and went over the potential hematopathology issues and risks with anemia, leucopenia and thrombocytopenia that can occur with chemotherapy. Discussed the potential risks of liver and kidney damage, which could be permanent and could necessitate dialysis long-term if kidney failure developed. " Discussed the risk of development of cardiomyopathy and/or CHF with certain chemotherapy agents, which could be chronic and/or permanent. Discussed the emetic and/or diarrheal potential of the regimen and the potential need for use of antiemetic and anti-diarrheal medications. Discussed the risk for development of anaphylactic shock, bronchospasm, dysrhythmia, and respiratory/cardiovascular arrest and/or failure. Discussed the potential risks for development of alopecia, cold sensory issues, ringing in ears, vertigo, cataracts, glaucoma, and neuropathy, all of which could end up being chronic and life-long. Discussed the risks of any N/V, diarrhea, mucositis, mouth ulcers, dry skin, itching, HA's, blurry vision, fatigue and other general malaise that can be associated with certain chemotherapy agents. Discussed the risks of hand-foot syndrome and rashes, and development of other autoimmune mediated processes such as pneumonitis, hepatitis, and colitis which could be life threatening. Discussed the risks of the potential development of a rare but fatal viral mediated disease known as PML (Progressive Multifocal Leukoencephalopathy), and risk of future development of leukemia and/or lymphoma from use of certain chemotherapy agents. Discussed the need for neutropenic precautions, basic hygiene/sanitation behaviors and dietary restrictions.    The patient's consent has been obtained to proceed with the chemotherapy.The patient referred to and underwent Chemotherapy School [facility] for training and education on chemotherapy, use of antiemetics and/or anti-diarrheals, use of NSAID's, potential chemotherapy side-effects, and any specific recommendations and precautions with the particular chemotherapy agents.      Answered all of the patient's (and family's, if applicable) questions to the best of my ability and to their complete satisfaction. The patient acknowledged full understanding of the risks,  recommendations and plan(s).     I have explained and the patient understands all of  the current recommendation(s). I have answered all of their questions to the best of my ability and to their complete satisfaction.             Thank you for allowing me to participate in this pleasant patient's care. Please call with any questions or concerns.      Electronically signed Neo Mariano MD

## 2024-07-10 NOTE — TELEPHONE ENCOUNTER
----- Message from Luz Maria Hitchcock sent at 7/10/2024  9:35 AM CDT -----  Pt called crying so weak that she cannot help herself and has not felt better since the 1st of July,  She is more than likely going to the ER... she did not get labs and will not make her next appointment tomorrow.  She cannot manage herself right now    She wanted staff to know and maybe give her a call back or check on her in hospital.    Pt [phone]

## 2024-07-10 NOTE — TELEPHONE ENCOUNTER
Dr. Mariano made aware, verbalized understanding, no new orders received at this time. I spoke to patient made aware that we will keep an eye on things, informed that as of now we will keep her scheduled appt incase she is dc'd and if we see she is still admitted tomorrow we will call to get her rescheduled. Verbalized understanding.    [facility]        [address] [place], [place] [id number]  Dept: [phone]          Consult Note      Patient:  Taras Baker 1944    Date of Service:  01/25/23     Requesting Physician:  Vianey Benitez MD    Reason for Consult:      Patient presents with complaints of LOW BACK PAIN    HISTORY OF PRESENT ILLNESS:      Ms. Jeremie Bran is a [de-identified] y.o. female presented today to [facility] for evaluation of  chronic low back pain for > 2 yrs. Pain predominantly axial in nature. Prior RFA in [place] > a year ago which had helped very well. She has relocated to [place] in Aug 2022. Has low back pain predominantly axial in nature. Denies LE radicular complaints. Pain is constant and is described as aching and throbbing. Pain does not radiate to both lower extremities. She  does not have numbness, tingling, weakness of the both lower extremities     Patient does not have  new bladder or bowel dysfunction. Alleviating factors include: rest.  Aggravating factors include: movement, bending, lifting. Pain causes functional limitations/ limits Adl's : Yes    Nursing notes and details of the pain history reviewed. Please see intake notes for details.     Previous treatments:   Physical Therapy : yes, continues HEP     Medications: - NSAID's : yes              Spine Surgeries: no    Interventional Pain procedures/ nerve blocks: yes, Excellent pain relief    She has not been on anticoagulation medications no     Imaging:   None recently    Past Medical History:   Diagnosis Date    Anxiety and depression     C. 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 Authorizing Provider   baclofen (LIORESAL) 10 MG tablet TAKE 1 TABLET BY MOUTH NIGHTLY 1/12/23  Yes Floridalma Del Cid MD   docusate sodium (COLACE) 100 MG capsule Take 1 capsule by mouth 2 times daily 1/10/23 2/9/23 Yes Brisa Penn MD   polyethylene glycol (GLYCOLAX) 17 GM/SCOOP powder Take 17 g by mouth daily 1/10/23 2/9/23 Yes Brisa Penn MD   escitalopram (LEXAPRO) 10 MG tablet Take 1 tablet by mouth daily 12/27/22  Yes Aileen Cespedes MD   tiotropium-olodaterol (STIOLTO) 2.5-2.5 MCG/ACT AERS Inhale 2 puffs into the lungs daily   Yes Historical Provider, MD   melatonin 3 MG TABS tablet Take 1 tablet by mouth nightly 11/22/22  Yes Frankey Slice, APRN - CNP   lubrifresh P.M. (ARTIFICIAL TEARS) ophthalmic ointment Place into both eyes nightly as needed (dry eyes) 11/22/22  Yes Frankey Slice, APRN - CNP   vitamin B-12 (CYANOCOBALAMIN) 1000 MCG tablet Take 1,000 mcg by mouth daily   Yes Historical Provider, MD   fluticasone (FLONASE) 50 MCG/ACT nasal spray SPRAY 2 SPRAYS INTO EACH NOSTRIL EVERY DAY 11/7/22  Yes Aileen Cespedes MD   potassium chloride (KLOR-CON M) 20 MEQ extended release tablet Take 1 tablet by mouth daily 9/23/22  Yes Aileen Cespedes MD   atorvastatin (LIPITOR) 20 MG tablet Take 20 mg by mouth in the morning. Yes Historical Provider, MD   levothyroxine (SYNTHROID) 88 MCG tablet Take 88 mcg by mouth in the morning.    Yes Historical Provider, MD   Multiple Vitamins-Minerals (WOMENS MULTIVITAMIN PO) Take by mouth   Yes Historical Provider, MD   fluticasone (FLOVENT HFA) 110 MCG/ACT inhaler Inhale 1 puff into the lungs every 12 hours   Yes Historical Provider, MD   omeprazole (PRILOSEC) 40 MG delayed release capsule Take 40 mg by mouth in the morning and at bedtime   Yes Historical Provider, MD       Allergies   Allergen Reactions    Clindamycin/Lincomycin Anaphylaxis     C-diff    Asa [Aspirin]      Hx of gastric ulcer    Nsaids      Gastric, Peptic, Duodenal Ulcers    Sulfa Antibiotics Nausea And Vomiting       Social History     Socioeconomic History    Marital status:      Spouse name: Not on file    Number of children: Not on file    Years of education: Not on file    Highest education level: Not on file   Occupational History    Not on file   Tobacco Use    Smoking status: Former     Packs/day: 0.50     Years: 5.00     Pack years: 2.50     Types: Cigarettes     Quit date: 16     Years since quittin.0     Passive exposure: Past    Smokeless tobacco: Never   Substance and Sexual Activity    Alcohol use: Never    Drug use: Yes     Types: Other     Comment: CBD, THC    Sexual activity: Not on file   Other Topics Concern    Not on file   Social History Narrative    Not on file     Social Determinants of Health     Financial Resource Strain: Low Risk     Difficulty of Paying Living Expenses: Not hard at all   Food Insecurity: No Food Insecurity    Worried About Running Out of Food in the Last Year: Never true    Ran Out of Food in the Last Year: Never true   Transportation Needs: Not on file   Physical Activity: Not on file   Stress: Not on file   Social Connections: Not on file   Intimate Partner Violence: Not on file   Housing Stability: Not on file       No family history on file. REVIEW OF SYSTEMS:     Patient specifically denies fever/chills, chest pain, shortness of breath, new bowel or bladder complaints. All other review of systems was negative. Review of Systems - documented reviewed.     PHYSICAL EXAMINATION:      /75   Pulse 69   Ht 5' 3\" (1.6 m)   Wt 137 lb (62.1 kg)   SpO2 96%   BMI 24.27 kg/m²     General:      General appearance:  Pleasant and well-hydrated, in no distress and A & O x 3  Build:Normal Weight  Function: Rises from seated position easily    HEENT:    Head:normocephalic, atraumatic  Pupils:regular, round, equal  Sclera: icterus absent    Lungs:    Breathing:normal breathing pattern     CVS:     RRR    Abdomen:    Shape:non-distended and normal    Cervical spine:    Inspection:normal  Palpation:tenderness paravertebral muscles, tenderness trapezium, left, right and positive. Range of motion:no pain    Thoracic spine:     Spine inspection:kyphosis   Palpation:No tenderness over the midline and paraspinal area, bilaterally    Lumbar spine:    Spine inspection: Normal   Palpation: Tenderness paravertebral muscles Yes bilaterally  Range of motion: Decreased, flexion Decreased, Lateral bending, extension and rotation bilaterally reduced is painful. Sacroiliac joint tenderness No bilaterally  PHAM test: negative bilaterally  Gaenslen's test:negative bilaterally   Piriformis tenderness: negative bilaterally  SLR : negative bilaterally    Musculoskeletal:    Trigger points no    Extremities:    Tremors:None bilaterally upper and lower  Edema:no    Neurological:    Sensory: Normal to light touch     Motor:   Right  5/5              Left  5/5               Right Bicep 5/5           Left Bicep 5/5              Right Triceps 5/5       Left Triceps 5/5          Right Deltoid 5/5     Left Deltoid 5/5                  Right Quadriceps 5/5          Left Quadriceps 5/5           Right Gastrocnemius 5/5    Left Gastrocnemius 5/5  Right Ant Tibialis 5/5  Left Ant Tibialis 5/5    Assessment/Plan:     Diagnosis Orders   1. DDD (degenerative disc disease), lumbar        2. Lumbosacral spondylosis without myelopathy        3. Spinal stenosis of lumbar region, unspecified whether neurogenic claudication present          [de-identified] y.o. female with H/o chronic low back pain for few years. Predominantly axial in nature- features of facet pain. Prior treatment in [place]- S/P RFA > a year ago with excellent pain relief. Will try to get records from her prior pain MD at [facility] spine    Physical therapy    Compound cream for local use.     Will consider repeat radiofrequency ablation of lumbar facet medial branch after reviewing the records form her prior MD.    F/U in 4-5 weeks. Counseling : Patient encouraged to stay active and to continue Regular home exercise program as tolerated - stretching / strengthening. Treatment plan discussed with the patient including medication and procedure side effects. Controlled Substances Monitoring: [place]ARRS reviewed    Loli Villar MD    Dear Dr. Layla Wise,   Thank you for referring Ms. Jose Tristan and allowing us to participate in her care. Please do not hesitate to contact me if you have any questions regarding her care.     Julia Benton MD    CC:    Kay Bonilla MD  [address]

## 2024-07-10 NOTE — ED PROVIDER NOTES
Encounter Date: 7/10/2024       History     Chief Complaint   Patient presents with    Fatigue     Recently discharged from hospital but feeling weak and cannot walk - hx of lung cancer - has not seen PCP since discharge - oncologist sent her for blood work today but she states she is too weak to go     74-year-old female with a past medical history of COPD, anxiety, and lung cancer presents for fatigue.  The patient reports that she has had fatigue and nausea/vomiting since her last chemotherapy appointment approximately 3 weeks ago.  She denies any associated fever, diarrhea, hematochezia, hematemesis, melena, dysuria, or hematuria.  Her symptoms are unrelieved with her home Phenergan and home Zofran.  There are no aggravating factors or alleviating factors.      Review of patient's allergies indicates:   Allergen Reactions    Bisacodyl Nausea And Vomiting     Other reaction(s): Vomiting    Iodinated contrast media Anaphylaxis, Hives and Shortness Of Breath     Hypotension.  Pt states she has anaphylaxis with IV contrast.     Morphine Other (See Comments)     hypotension    Azithromycin Hives    Cipro [ciprofloxacin hcl]     Demerol [meperidine]      Nausea vomiting, visual problem    Doxycycline Hives    Flonase [fluticasone propionate] Hives    Morpholine analogues Other (See Comments)     hypotension     Past Medical History:   Diagnosis Date    Anxiety     Martin esophagus     Colitis     COPD (chronic obstructive pulmonary disease)     GERD (gastroesophageal reflux disease)     Hypertension     Intractable nausea and vomiting 6/23/2024    Lung cancer     Thyroid disease     Vocal cord polyps      Past Surgical History:   Procedure Laterality Date    ABDOMINAL AORTIC ANEURYSM REPAIR      BACK SURGERY      cervical    CATARACT EXTRACTION Right 02/2021    CHOLECYSTECTOMY  12/20/2013    Dr Albert CORLEY    ENDOBRONCHIAL ULTRASOUND N/A 4/19/2024    Procedure: ENDOBRONCHIAL ULTRASOUND (EBUS);  Surgeon: Kizzy Siegel  MD ERENDIRA;  Location: [facility] OR 2ND FLR;  Service: Pulmonary;  Laterality: N/A;    FOOT SURGERY      HYSTERECTOMY      AUGUSTINE for AUB with consevation ovaries    INSERTION OF TUNNELED CENTRAL VENOUS CATHETER (CVC) WITH SUBCUTANEOUS PORT Right 2024    Procedure: [id number]-[id number];  Surgeon: Simon Wilson III, MD;  Location: [facility] OR;  Service: General;  Laterality: Right;    ROBOTIC BRONCHOSCOPY N/A 2024    Procedure: ROBOTIC BRONCHOSCOPY;  Surgeon: Kizzy Siegel MD;  Location: [facility] OR 2ND FLR;  Service: Pulmonary;  Laterality: N/A;    SALIVARY GLAND SURGERY       Family History   Problem Relation Name Age of Onset    Bladder Cancer Mother      Heart failure Father      Emphysema Sister      Pancreatic cancer Sister      No Known Problems Brother       Social History     Tobacco Use    Smoking status: Former     Types: Cigarettes     Start date: 2024     Quit date: 1964     Years since quittin.5    Smokeless tobacco: Never    Tobacco comments:     Pt has smoked since 14 years old. Smokes around .5ppd. Inpatient smoking education done 10/20.     Pt quit smoking on 24.  She has never used smokeless tobacco   Substance Use Topics    Alcohol use: Never    Drug use: Never     Review of Systems   Constitutional:  Positive for fatigue. Negative for chills and fever.   HENT:  Negative for congestion.    Respiratory:  Negative for cough and shortness of breath.    Cardiovascular:  Negative for chest pain.   Gastrointestinal:  Positive for nausea and vomiting. Negative for abdominal pain.   Genitourinary:  Negative for dysuria.   Musculoskeletal:  Negative for gait problem.   Skin:  Negative for color change.   Neurological:  Negative for dizziness and numbness.   Psychiatric/Behavioral:  Negative for agitation.        Physical Exam     Initial Vitals [07/10/24 1007]   BP Pulse Resp Temp SpO2   128/81 81 19 97.7 °F (36.5 °C) 96 %      MAP       --         Physical Exam    Nursing note and  vitals reviewed.  Constitutional: She appears well-developed and well-nourished.   HENT:   Head: Atraumatic.   Eyes: EOM are normal. Pupils are equal, round, and reactive to light.   Neck:   Normal range of motion.  Cardiovascular:  Normal rate and regular rhythm.           Pulmonary/Chest: Breath sounds normal.   Abdominal: Abdomen is soft. Bowel sounds are normal. She exhibits no distension. There is no abdominal tenderness. There is no rebound and no guarding.   Musculoskeletal:         General: Normal range of motion.      Right shoulder: Normal.      Left shoulder: Normal.      Cervical back: Normal range of motion.     Neurological: She is alert and oriented to person, place, and time.   Skin: Skin is warm and dry.   Psychiatric: She has a normal mood and affect.         ED Course   Procedures  Labs Reviewed   CBC W/ AUTO DIFFERENTIAL - Abnormal; Notable for the following components:       Result Value    RBC 2.74 (*)     Hemoglobin 8.6 (*)     Hematocrit 26.2 (*)     MCH 31.4 (*)     RDW 15.0 (*)     MPV 8.7 (*)     Immature Granulocytes 1.1 (*)     Gran # (ANC) 9.5 (*)     Immature Grans (Abs) 0.13 (*)     Mono # 1.1 (*)     Gran % 80.8 (*)     Lymph % 8.1 (*)     All other components within normal limits   COMPREHENSIVE METABOLIC PANEL - Abnormal; Notable for the following components:    ALT 8 (*)     All other components within normal limits   POCT GLUCOSE - Abnormal; Notable for the following components:    POC Glucose 113 (*)     All other components within normal limits   LIPASE   MAGNESIUM   URINALYSIS, REFLEX TO URINE CULTURE   POCT GLUCOSE MONITORING CONTINUOUS     EKG Readings: (Independently Interpreted)   Initial Reading: No STEMI. Rhythm: Normal Sinus Rhythm. Heart Rate: 73. Ectopy: No Ectopy PVCs. Conduction: Normal. ST Segments: Normal ST Segments. T Waves: Normal. Clinical Impression: Normal Sinus Rhythm with PVCs     ECG Results              EKG 12-lead (In process)        Collection Time  Result Time QRS Duration OHS QTC Calculation    07/10/24 11:04:43 07/10/24 11:17:00 68 453                     In process by Interface, Lab In [facility] (07/10/24 11:17:08)                   Narrative:    Test Reason : R53.1,    Vent. Rate : 073 BPM     Atrial Rate : 073 BPM     P-R Int : 126 ms          QRS Dur : 068 ms      QT Int : 412 ms       P-R-T Axes : 023 030 075 degrees     QTc Int : 453 ms    Sinus rhythm with occasional Premature ventricular complexes  Low voltage QRS  Nonspecific T wave abnormality  Abnormal ECG  When compared with ECG of 03-JUL-2024 09:30,  Nonspecific T wave abnormality now evident in Anterior leads    Referred By: [email or web address]   SELF           Confirmed By:                                   Imaging Results              CT Abdomen Pelvis  Without Contrast (Final result)  Result time 07/10/24 18:53:16      Final result by Bill Mayer MD (07/10/24 18:53:16)                   Impression:      1. No acute findings within the abdomen or pelvis or significant change from CT 06/23/2024.  2. Stable/incidental findings as discussed above.      Electronically signed by: Bill Mayer  Date:    07/10/2024  Time:    18:53               Narrative:    EXAMINATION:  CT ABDOMEN PELVIS WITHOUT CONTRAST    CLINICAL HISTORY:  Abdominal pain, acute, nonlocalized;    TECHNIQUE:  Low dose axial images, sagittal and coronal reformations were obtained from the lung bases to the pubic symphysis without contrast.    COMPARISON:  CT 06/23/2024.    FINDINGS:  Limited imaging of the heart demonstrates a small pericardial effusion with slightly decreased volume compared to the prior CT from 06/23/2024.  Visualized lung bases demonstrate no new consolidation or pleural effusion.  Normal noncontrast CT appearance of the liver aside from a known cyst anteriorly measuring approximately 2.9 cm.  Gallbladder is surgically absent.  No intra or extrahepatic biliary ductal dilatation.    Noncontrast CT imaging of the stomach,  spleen, pancreas, and adrenal glands are unremarkable.  Normal CT imaging of the kidneys.  No hydronephrosis.  Ureters and urinary bladder are within normal limits.  Patient is status post hysterectomy.    Aortoiliac stent graft redemonstrated.  Stable aneurysmal dilatation of the abdominal aorta above the graft measuring approximately 4.1 cm when measured similarly.    Diverticulosis coli without evidence of diverticulitis.  No bowel obstruction or focal inflammatory changes.  Mild-to-moderate stool within the colon.  No evidence of appendicitis.  No free air or free fluid identified.  No lymphadenopathy identified.    Degenerative changes of the spine including severe disc height loss at L2-L3, L4-L5, and L5-S1.  No acute bony process.    Presumed small sebaceous cysts within the left anterior abdominal wall.                                       X-Ray Chest AP Portable (Final result)  Result time 07/10/24 11:28:41      Final result by Ajit Ortiz DO (07/10/24 11:28:41)                   Impression:      Unchanged 1.6 cm nodule in the left upper lobe.  No new lung abnormalities.      Electronically signed by: Ajit Ortiz  Date:    07/10/2024  Time:    11:28               Narrative:    EXAMINATION:  XR CHEST AP PORTABLE    CLINICAL HISTORY:  vomiting;    FINDINGS:  Portable chest with comparison chest x-ray 07/03/2024.  Normal cardiomediastinal silhouette.Right subclavian Port-A-Cath again noted.  1.6 cm nodule in the left upper lung is unchanged.  Lungs are otherwise clear.  Pulmonary vasculature is normal. No acute osseous abnormality.                                       Medications   dicyclomine injection 20 mg (has no administration in time range)   ondansetron injection 4 mg (4 mg Intravenous Given 7/10/24 1237)   lactated ringers bolus 500 mL (0 mLs Intravenous Stopped 7/10/24 1637)   promethazine (PHENERGAN) 12.5 mg in 0.9% NaCl 50 mL IVPB (12.5 mg Intravenous New Bag 7/10/24 1637)     Medical  Decision Making  74-year-old female presents with fatigue and nausea/vomiting.    Initial differential diagnosis included but not limited to dehydration, intractable nausea and vomiting, and medication reaction.    Amount and/or Complexity of Data Reviewed  Labs: ordered.  Radiology: ordered.    Risk  Prescription drug management.  Risk Details: The patient was emergently evaluated in the emergency department, her evaluation was significant for an elderly female with a benign abdominal exam noted.  The patient's labs showed no acute abnormalities and were significant for her chronic anemia.  The patient's chest x-ray showed no acute processes.  I did treat the patient initially with IV Zofran and IV fluids.  She still had some continued nausea which was treated with a dose of IV Phenergan.  She does report significant improvement in her symptoms after the dose of IV Phenergan.  A CT scan done of her abdomen and pelvis shows no acute abnormalities per Radiology.  The etiology of her symptoms is likely secondary to her recent chemotherapy.  The patient is stable for discharge to home and does not require further care or workup at this time.  The patient already has follow-up with her oncologist in the morning.  Additionally, she will be discharged home with Phenergan suppositories.                                      Clinical Impression:  Final diagnoses:  [R53.1] Generalized weakness  [R11.2] Nausea and vomiting, unspecified vomiting type (Primary)          ED Disposition Condition    Discharge Stable          ED Prescriptions       Medication Sig Dispense Start Date End Date Auth. Provider    promethazine (PHENERGAN) 25 MG suppository Place 1 suppository (25 mg total) rectally every 6 (six) hours as needed for Nausea. 10 suppository 7/10/2024 -- Win Ford MD          Follow-up Information       Follow up With Specialties Details Why Contact Info    Neo Mariano MD Hematology, Hematology and  Oncology, Oncology In 1 day  [address] [facility]  [address]  [facility] [address]  [phone]               Win Ford MD  07/10/24 7943

## 2024-07-11 ENCOUNTER — OFFICE VISIT (OUTPATIENT)
Facility: CLINIC | Age: 74
End: 2024-07-11
Payer: MEDICARE

## 2024-07-11 VITALS
WEIGHT: 169.31 LBS | HEART RATE: 89 BPM | HEIGHT: 62 IN | TEMPERATURE: 97 F | SYSTOLIC BLOOD PRESSURE: 94 MMHG | RESPIRATION RATE: 20 BRPM | DIASTOLIC BLOOD PRESSURE: 67 MMHG | BODY MASS INDEX: 31.16 KG/M2

## 2024-07-11 DIAGNOSIS — D70.1 CHEMOTHERAPY INDUCED NEUTROPENIA: ICD-10-CM

## 2024-07-11 DIAGNOSIS — T45.1X5A ANEMIA DUE TO CHEMOTHERAPY: ICD-10-CM

## 2024-07-11 DIAGNOSIS — C34.92 NON-SMALL CELL CARCINOMA OF LEFT LUNG: ICD-10-CM

## 2024-07-11 DIAGNOSIS — T45.1X5A CHEMOTHERAPY INDUCED NEUTROPENIA: ICD-10-CM

## 2024-07-11 DIAGNOSIS — D64.81 ANEMIA DUE TO CHEMOTHERAPY: ICD-10-CM

## 2024-07-11 DIAGNOSIS — R91.8 MASS OF UPPER LOBE OF LEFT LUNG: Primary | ICD-10-CM

## 2024-07-11 DIAGNOSIS — D69.6 THROMBOCYTOPENIA: ICD-10-CM

## 2024-07-11 DIAGNOSIS — Z72.0 TOBACCO ABUSE: Chronic | ICD-10-CM

## 2024-07-11 PROCEDURE — 99999 PR PBB SHADOW E&M-EST. PATIENT-LVL III: CPT | Mod: PBBFAC,,, | Performed by: INTERNAL MEDICINE

## 2024-07-11 RX ORDER — ONDANSETRON 2 MG/ML
4 INJECTION INTRAMUSCULAR; INTRAVENOUS
Status: CANCELLED
Start: 2024-07-11

## 2024-07-11 RX ORDER — SODIUM CHLORIDE 0.9 % (FLUSH) 0.9 %
10 SYRINGE (ML) INJECTION
Status: CANCELLED | OUTPATIENT
Start: 2024-07-11

## 2024-07-11 RX ORDER — HEPARIN 100 UNIT/ML
500 SYRINGE INTRAVENOUS
Status: CANCELLED | OUTPATIENT
Start: 2024-07-11

## 2024-07-11 NOTE — PROGRESS NOTES
Orders placed for PRN fluids with zofran per Dr. Mariano's verbal orders to do so. Patient aware of orders placed and to call us if she is in need of fluid with nausea meds as she was seen in office today by Dr Mariano.

## 2024-07-12 ENCOUNTER — TELEPHONE (OUTPATIENT)
Facility: CLINIC | Age: 74
End: 2024-07-12
Payer: MEDICARE

## 2024-07-12 ENCOUNTER — INFUSION (OUTPATIENT)
Dept: INFUSION THERAPY | Facility: HOSPITAL | Age: 74
End: 2024-07-12
Attending: INTERNAL MEDICINE
Payer: MEDICARE

## 2024-07-12 VITALS
HEIGHT: 62 IN | SYSTOLIC BLOOD PRESSURE: 114 MMHG | WEIGHT: 169 LBS | HEART RATE: 80 BPM | DIASTOLIC BLOOD PRESSURE: 82 MMHG | RESPIRATION RATE: 18 BRPM | BODY MASS INDEX: 31.1 KG/M2 | TEMPERATURE: 97 F

## 2024-07-12 DIAGNOSIS — T45.1X5A CHEMOTHERAPY-INDUCED NAUSEA: Primary | ICD-10-CM

## 2024-07-12 DIAGNOSIS — R11.0 CHEMOTHERAPY-INDUCED NAUSEA: Primary | ICD-10-CM

## 2024-07-12 DIAGNOSIS — E86.0 DEHYDRATION: ICD-10-CM

## 2024-07-12 DIAGNOSIS — C34.92 NON-SMALL CELL CARCINOMA OF LEFT LUNG: Primary | ICD-10-CM

## 2024-07-12 PROCEDURE — 63600175 PHARM REV CODE 636 W HCPCS: Performed by: INTERNAL MEDICINE

## 2024-07-12 PROCEDURE — 96365 THER/PROPH/DIAG IV INF INIT: CPT

## 2024-07-12 PROCEDURE — A4216 STERILE WATER/SALINE, 10 ML: HCPCS | Performed by: INTERNAL MEDICINE

## 2024-07-12 PROCEDURE — 96361 HYDRATE IV INFUSION ADD-ON: CPT

## 2024-07-12 PROCEDURE — 25000003 PHARM REV CODE 250: Performed by: INTERNAL MEDICINE

## 2024-07-12 RX ORDER — SODIUM CHLORIDE 0.9 % (FLUSH) 0.9 %
10 SYRINGE (ML) INJECTION
Status: DISCONTINUED | OUTPATIENT
Start: 2024-07-12 | End: 2024-07-12 | Stop reason: HOSPADM

## 2024-07-12 RX ORDER — SODIUM CHLORIDE 0.9 % (FLUSH) 0.9 %
10 SYRINGE (ML) INJECTION
OUTPATIENT
Start: 2024-07-12

## 2024-07-12 RX ORDER — HEPARIN 100 UNIT/ML
500 SYRINGE INTRAVENOUS
Status: DISCONTINUED | OUTPATIENT
Start: 2024-07-12 | End: 2024-07-12 | Stop reason: HOSPADM

## 2024-07-12 RX ORDER — HEPARIN 100 UNIT/ML
500 SYRINGE INTRAVENOUS
OUTPATIENT
Start: 2024-07-12

## 2024-07-12 RX ORDER — ONDANSETRON 2 MG/ML
4 INJECTION INTRAMUSCULAR; INTRAVENOUS
Start: 2024-07-12 | End: 2024-07-12

## 2024-07-12 RX ADMIN — SODIUM CHLORIDE 1000 ML: 9 INJECTION, SOLUTION INTRAVENOUS at 01:07

## 2024-07-12 RX ADMIN — HEPARIN 500 UNITS: 100 SYRINGE at 03:07

## 2024-07-12 RX ADMIN — SODIUM CHLORIDE, PRESERVATIVE FREE 10 ML: 5 INJECTION INTRAVENOUS at 03:07

## 2024-07-12 RX ADMIN — ONDANSETRON HYDROCHLORIDE: 2 INJECTION, SOLUTION INTRAMUSCULAR; INTRAVENOUS at 01:07

## 2024-07-12 NOTE — TELEPHONE ENCOUNTER
Infusion center and auth dept made aware of need for fluids and zofran today. Per infusion patient can come in at 1pm today. Spoke to patient made aware of 1pm opening for fluids/zofran, patient accepted appt time. Infusion and auth dept made aware.

## 2024-07-12 NOTE — PLAN OF CARE
Problem: Nausea and Vomiting  Goal: Nausea and Vomiting Relief  7/12/2024 1428 by rPeethi Teresa, RN  Outcome: Met  7/12/2024 1428 by Preethi Teresa, RN  Outcome: Progressing

## 2024-07-15 ENCOUNTER — OFFICE VISIT (OUTPATIENT)
Dept: FAMILY MEDICINE | Facility: CLINIC | Age: 74
End: 2024-07-15
Payer: MEDICARE

## 2024-07-15 VITALS — HEART RATE: 76 BPM | SYSTOLIC BLOOD PRESSURE: 102 MMHG | DIASTOLIC BLOOD PRESSURE: 66 MMHG | OXYGEN SATURATION: 98 %

## 2024-07-15 DIAGNOSIS — I10 PRIMARY HYPERTENSION: ICD-10-CM

## 2024-07-15 DIAGNOSIS — E03.9 ACQUIRED HYPOTHYROIDISM: ICD-10-CM

## 2024-07-15 DIAGNOSIS — F32.1 MAJOR DEPRESSIVE DISORDER, SINGLE EPISODE, MODERATE: ICD-10-CM

## 2024-07-15 DIAGNOSIS — R19.7 DIARRHEA, UNSPECIFIED TYPE: ICD-10-CM

## 2024-07-15 DIAGNOSIS — R11.0 CHEMOTHERAPY-INDUCED NAUSEA: ICD-10-CM

## 2024-07-15 DIAGNOSIS — R91.8 MASS OF UPPER LOBE OF LEFT LUNG: ICD-10-CM

## 2024-07-15 DIAGNOSIS — I95.2 HYPOTENSION DUE TO DRUGS: ICD-10-CM

## 2024-07-15 DIAGNOSIS — T45.1X5A CHEMOTHERAPY-INDUCED NAUSEA: ICD-10-CM

## 2024-07-15 DIAGNOSIS — J96.11 CHRONIC HYPOXIC RESPIRATORY FAILURE: ICD-10-CM

## 2024-07-15 DIAGNOSIS — M51.16 LUMBAR DISC DISEASE WITH RADICULOPATHY: ICD-10-CM

## 2024-07-15 DIAGNOSIS — R11.2 INTRACTABLE NAUSEA AND VOMITING: ICD-10-CM

## 2024-07-15 DIAGNOSIS — K21.9 GASTROESOPHAGEAL REFLUX DISEASE WITHOUT ESOPHAGITIS: ICD-10-CM

## 2024-07-15 DIAGNOSIS — I71.40 ABDOMINAL AORTIC ANEURYSM (AAA) WITHOUT RUPTURE, UNSPECIFIED PART: Chronic | ICD-10-CM

## 2024-07-15 DIAGNOSIS — C34.92 NON-SMALL CELL CARCINOMA OF LEFT LUNG: Primary | ICD-10-CM

## 2024-07-15 DIAGNOSIS — L72.3 SEBACEOUS CYST: ICD-10-CM

## 2024-07-15 DIAGNOSIS — M51.36 DDD (DEGENERATIVE DISC DISEASE), LUMBAR: ICD-10-CM

## 2024-07-15 DIAGNOSIS — F41.9 ANXIETY: ICD-10-CM

## 2024-07-15 PROCEDURE — 1125F AMNT PAIN NOTED PAIN PRSNT: CPT | Mod: CPTII,S$GLB,, | Performed by: FAMILY MEDICINE

## 2024-07-15 PROCEDURE — 3288F FALL RISK ASSESSMENT DOCD: CPT | Mod: CPTII,S$GLB,, | Performed by: FAMILY MEDICINE

## 2024-07-15 PROCEDURE — 99214 OFFICE O/P EST MOD 30 MIN: CPT | Mod: S$GLB,,, | Performed by: FAMILY MEDICINE

## 2024-07-15 PROCEDURE — 1101F PT FALLS ASSESS-DOCD LE1/YR: CPT | Mod: CPTII,S$GLB,, | Performed by: FAMILY MEDICINE

## 2024-07-15 PROCEDURE — 1159F MED LIST DOCD IN RCRD: CPT | Mod: CPTII,S$GLB,, | Performed by: FAMILY MEDICINE

## 2024-07-15 PROCEDURE — 3074F SYST BP LT 130 MM HG: CPT | Mod: CPTII,S$GLB,, | Performed by: FAMILY MEDICINE

## 2024-07-15 PROCEDURE — 1111F DSCHRG MED/CURRENT MED MERGE: CPT | Mod: CPTII,S$GLB,, | Performed by: FAMILY MEDICINE

## 2024-07-15 PROCEDURE — 3078F DIAST BP <80 MM HG: CPT | Mod: CPTII,S$GLB,, | Performed by: FAMILY MEDICINE

## 2024-07-15 RX ORDER — AMLODIPINE BESYLATE 5 MG/1
5 TABLET ORAL DAILY
COMMUNITY

## 2024-07-15 RX ORDER — HYDROCODONE BITARTRATE AND ACETAMINOPHEN 10; 325 MG/1; MG/1
1 TABLET ORAL EVERY 12 HOURS PRN
Qty: 50 TABLET | Refills: 0 | Status: SHIPPED | OUTPATIENT
Start: 2024-07-15

## 2024-07-15 RX ORDER — LORAZEPAM 1 MG/1
0.5 TABLET ORAL EVERY 6 HOURS PRN
Qty: 60 TABLET | Refills: 3 | Status: SHIPPED | OUTPATIENT
Start: 2024-07-15

## 2024-07-15 RX ORDER — AMLODIPINE BESYLATE 5 MG/1
5 TABLET ORAL DAILY
Status: CANCELLED | OUTPATIENT
Start: 2024-07-15

## 2024-07-15 RX ORDER — LEVOTHYROXINE SODIUM 112 UG/1
112 TABLET ORAL
Qty: 90 TABLET | Refills: 0 | Status: SHIPPED | OUTPATIENT
Start: 2024-07-15 | End: 2024-10-13

## 2024-07-15 NOTE — PROGRESS NOTES
SUBJECTIVE:    Patient ID: Joslyn Dubon is a 74 y.o. female.    Chief Complaint: Hospital Follow Up (Bottles brought//refills needed//HFU N&V//denies dexa//-ERL)    This 74-year-old female has been very sick after receiving her 2nd round of chemotherapy 06/1924.  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  She has finished radiation treatments prior.  Unfortunately she developed severe nausea vomiting diarrhea and was unable to eat or drink.  She has had several emergency room visits for IV hydration testing.  She has switched from Dr. Willis to Dr. Mariano for further oncology treatment.  She stays in bed most of the day as she is so weak.  Dr. Mariano mentioned getting a PET scan in 1 month to see there is any active tumor  remaining.    GERD-has bitter acid reflux and bile in her stomach, on famotidine and omeprazole but still having difficulty.  She is able to keep down small portions of a meal.  She does feel better after a bag of IV fluid.    Hypotension-blood pressure running low around 100 systolic, still taking metoprolol 25 mg b.i.d. amlodipine 5 mg q.day.    Generalized pain, taking hydrocodone 10/325 mg 1/2 tablet Q 3 hours p.r.n.        Admission on 07/10/2024, Discharged on 07/10/2024   Component Date Value Ref Range Status    QRS Duration 07/10/2024 68  ms In process    [facility] QTC Calculation 07/10/2024 453  ms In process    WBC 07/10/2024 11.80  3.90 - 12.70 K/uL Final    RBC 07/10/2024 2.74 (L)  4.00 - 5.40 M/uL Final    Hemoglobin 07/10/2024 8.6 (L)  12.0 - 16.0 g/dL Final    Hematocrit 07/10/2024 26.2 (L)  37.0 - 48.5 % Final    MCV 07/10/2024 96  82 - 98 fL Final    MCH 07/10/2024 31.4 (H)  27.0 - 31.0 pg Final    MCHC 07/10/2024 32.8  32.0 - 36.0 g/dL Final    RDW 07/10/2024 15.0 (H)  11.5 - 14.5 % Final    Platelets 07/10/2024 286  150 - 450 K/uL Final    MPV 07/10/2024 8.7 (L)  9.2 - 12.9 fL Final    Immature Granulocytes 07/10/2024 1.1 (H)  0.0 - 0.5  % Final    Gran # (ANC) 07/10/2024 9.5 (H)  1.8 - 7.7 K/uL Final    Immature Grans (Abs) 07/10/2024 0.13 (H)  0.00 - 0.04 K/uL Final    Lymph # 07/10/2024 1.0  1.0 - 4.8 K/uL Final    Mono # 07/10/2024 1.1 (H)  0.3 - 1.0 K/uL Final    Eos # 07/10/2024 0.0  0.0 - 0.5 K/uL Final    Baso # 07/10/2024 0.05  0.00 - 0.20 K/uL Final    nRBC 07/10/2024 0  0 /100 WBC Final    Gran % 07/10/2024 80.8 (H)  38.0 - 73.0 % Final    Lymph % 07/10/2024 8.1 (L)  18.0 - 48.0 % Final    Mono % 07/10/2024 9.6  4.0 - 15.0 % Final    Eosinophil % 07/10/2024 0.0  0.0 - 8.0 % Final    Basophil % 07/10/2024 0.4  0.0 - 1.9 % Final    Differential Method 07/10/2024 Automated   Final    Sodium 07/10/2024 136  136 - 145 mmol/L Final    Potassium 07/10/2024 4.2  3.5 - 5.1 mmol/L Final    Chloride 07/10/2024 102  95 - 110 mmol/L Final    CO2 07/10/2024 23  23 - 29 mmol/L Final    Glucose 07/10/2024 99  70 - 110 mg/dL Final    BUN 07/10/2024 15  8 - 23 mg/dL Final    Creatinine 07/10/2024 0.8  0.5 - 1.4 mg/dL Final    Calcium 07/10/2024 9.0  8.7 - 10.5 mg/dL Final    Total Protein 07/10/2024 7.3  6.0 - 8.4 g/dL Final    Albumin 07/10/2024 3.6  3.5 - 5.2 g/dL Final    Total Bilirubin 07/10/2024 0.3  0.1 - 1.0 mg/dL Final    Alkaline Phosphatase 07/10/2024 88  55 - 135 U/L Final    AST 07/10/2024 14  10 - 40 U/L Final    ALT 07/10/2024 8 (L)  10 - 44 U/L Final    eGFR 07/10/2024 >60.0  >60 mL/min/1.73 m^2 Final    Anion Gap 07/10/2024 11  8 - 16 mmol/L Final    Lipase 07/10/2024 11  4 - 60 U/L Final    Magnesium 07/10/2024 1.7  1.6 - 2.6 mg/dL Final    POC Glucose 07/10/2024 113 (H)  70 - 110 Final   Appointment on 07/05/2024   Component Date Value Ref Range Status    UNIT NUMBER 07/03/2024 [id number]   Final    Product Code 07/03/2024 [id number]   Final    DISPENSE STATUS 07/03/2024 RETURNED   Final    CODING SYSTEM 07/03/2024 [id number]   Final    Unit Blood Type Code 07/03/2024 7300   Final    Unit Blood Type 07/03/2024 B POS   Final    Unit  Expiration 07/03/2024 [id number]   Final    CROSSMATCH INTERPRETATION 07/03/2024 Compatible   Final    UNIT NUMBER 07/03/2024 [id number]   Final    Product Code 07/03/2024 [id number]   Final    DISPENSE STATUS 07/03/2024 RETURNED   Final    CODING SYSTEM 07/03/2024 [id number]   Final    Unit Blood Type Code 07/03/2024 7300   Final    Unit Blood Type 07/03/2024 B POS   Final    Unit Expiration 07/03/2024 [id number]   Final    CROSSMATCH INTERPRETATION 07/03/2024 Compatible   Final   Admission on 07/03/2024, Discharged on 07/03/2024   Component Date Value Ref Range Status    WBC 07/03/2024 15.24 (H)  3.90 - 12.70 K/uL Final    RBC 07/03/2024 2.71 (L)  4.00 - 5.40 M/uL Final    Hemoglobin 07/03/2024 8.6 (L)  12.0 - 16.0 g/dL Final    Hematocrit 07/03/2024 26.3 (L)  37.0 - 48.5 % Final    MCV 07/03/2024 97  82 - 98 fL Final    MCH 07/03/2024 31.7 (H)  27.0 - 31.0 pg Final    MCHC 07/03/2024 32.7  32.0 - 36.0 g/dL Final    RDW 07/03/2024 13.5  11.5 - 14.5 % Final    Platelets 07/03/2024 82 (L)  150 - 450 K/uL Final    MPV 07/03/2024 9.0 (L)  9.2 - 12.9 fL Final    Immature Granulocytes 07/03/2024 CANCELED  0.0 - 0.5 % Final    Immature Grans (Abs) 07/03/2024 CANCELED  0.00 - 0.04 K/uL Final    Lymph # 07/03/2024 CANCELED  1.0 - 4.8 K/uL Final    Mono # 07/03/2024 CANCELED  0.3 - 1.0 K/uL Final    Eos # 07/03/2024 CANCELED  0.0 - 0.5 K/uL Final    Baso # 07/03/2024 CANCELED  0.00 - 0.20 K/uL Final    nRBC 07/03/2024 1 (A)  0 /100 WBC Final    Gran % 07/03/2024 61.0  38.0 - 73.0 % Final    Lymph % 07/03/2024 16.0 (L)  18.0 - 48.0 % Final    Mono % 07/03/2024 4.0  4.0 - 15.0 % Final    Eosinophil % 07/03/2024 0.0  0.0 - 8.0 % Final    Basophil % 07/03/2024 0.0  0.0 - 1.9 % Final    Bands 07/03/2024 13.0  % Final    Metamyelocytes 07/03/2024 6.0  % Final    Platelet Estimate 07/03/2024 Decreased (A)   Final    Aniso 07/03/2024 Slight   Final    Poik 07/03/2024 Slight   Final    Ovalocytes 07/03/2024 Occasional   Final     Differential Method 07/03/2024 Manual   Final    Sodium 07/03/2024 138  136 - 145 mmol/L Final    Potassium 07/03/2024 4.0  3.5 - 5.1 mmol/L Final    Chloride 07/03/2024 101  95 - 110 mmol/L Final    CO2 07/03/2024 28  23 - 29 mmol/L Final    Glucose 07/03/2024 95  70 - 110 mg/dL Final    BUN 07/03/2024 8  8 - 23 mg/dL Final    Creatinine 07/03/2024 0.9  0.5 - 1.4 mg/dL Final    Calcium 07/03/2024 9.4  8.7 - 10.5 mg/dL Final    Total Protein 07/03/2024 6.7  6.0 - 8.4 g/dL Final    Albumin 07/03/2024 2.8 (L)  3.5 - 5.2 g/dL Final    Total Bilirubin 07/03/2024 0.2  0.1 - 1.0 mg/dL Final    Alkaline Phosphatase 07/03/2024 116  55 - 135 U/L Final    AST 07/03/2024 18  10 - 40 U/L Final    ALT 07/03/2024 9 (L)  10 - 44 U/L Final    eGFR 07/03/2024 >60  >60 mL/min/1.73 m^2 Final    Anion Gap 07/03/2024 9  8 - 16 mmol/L Final    BNP 07/03/2024 127 (H)  0 - 99 pg/mL Final    Magnesium 07/03/2024 1.7  1.6 - 2.6 mg/dL Final    Troponin I 07/03/2024 <0.006  0.000 - 0.026 ng/mL Final    Group & Rh 07/03/2024 B POS   Final    Indirect Theodore 07/03/2024 NEG   Final    Specimen Outdate 07/03/2024 07/06/2024 23:59   Final    QRS Duration 07/03/2024 74  ms Final    [facility] QTC Calculation 07/03/2024 446  ms Final   No results displayed because visit has over 200 results.      Admission on 06/21/2024, Discharged on 06/21/2024   Component Date Value Ref Range Status    Magnesium 06/21/2024 1.7  1.6 - 2.6 mg/dL Final    QRS Duration 06/21/2024 70  ms Final    [facility] QTC Calculation 06/21/2024 449  ms Final    WBC 06/21/2024 5.38  3.90 - 12.70 K/uL Final    RBC 06/21/2024 3.60 (L)  4.00 - 5.40 M/uL Final    Hemoglobin 06/21/2024 11.1 (L)  12.0 - 16.0 g/dL Final    Hematocrit 06/21/2024 33.5 (L)  37.0 - 48.5 % Final    MCV 06/21/2024 93  82 - 98 fL Final    MCH 06/21/2024 30.8  27.0 - 31.0 pg Final    MCHC 06/21/2024 33.1  32.0 - 36.0 g/dL Final    RDW 06/21/2024 13.5  11.5 - 14.5 % Final    Platelets 06/21/2024 282  150 - 450 K/uL Final    MPV  06/21/2024 8.8 (L)  9.2 - 12.9 fL Final    Immature Granulocytes 06/21/2024 0.6 (H)  0.0 - 0.5 % Final    Gran # (ANC) 06/21/2024 4.0  1.8 - 7.7 K/uL Final    Immature Grans (Abs) 06/21/2024 0.03  0.00 - 0.04 K/uL Final    Lymph # 06/21/2024 1.3  1.0 - 4.8 K/uL Final    Mono # 06/21/2024 0.1 (L)  0.3 - 1.0 K/uL Final    Eos # 06/21/2024 0.0  0.0 - 0.5 K/uL Final    Baso # 06/21/2024 0.00  0.00 - 0.20 K/uL Final    nRBC 06/21/2024 0  0 /100 WBC Final    Gran % 06/21/2024 74.5 (H)  38.0 - 73.0 % Final    Lymph % 06/21/2024 24.0  18.0 - 48.0 % Final    Mono % 06/21/2024 0.9 (L)  4.0 - 15.0 % Final    Eosinophil % 06/21/2024 0.0  0.0 - 8.0 % Final    Basophil % 06/21/2024 0.0  0.0 - 1.9 % Final    Platelet Estimate 06/21/2024 Appears normal   Final    Differential Method 06/21/2024 Automated   Final    Sodium 06/21/2024 140  136 - 145 mmol/L Final    Potassium 06/21/2024 3.6  3.5 - 5.1 mmol/L Final    Chloride 06/21/2024 101  95 - 110 mmol/L Final    CO2 06/21/2024 30 (H)  23 - 29 mmol/L Final    Glucose 06/21/2024 124 (H)  70 - 110 mg/dL Final    BUN 06/21/2024 26 (H)  8 - 23 mg/dL Final    Creatinine 06/21/2024 1.2  0.5 - 1.4 mg/dL Final    Calcium 06/21/2024 9.0  8.7 - 10.5 mg/dL Final    Total Protein 06/21/2024 7.1  6.0 - 8.4 g/dL Final    Albumin 06/21/2024 3.8  3.5 - 5.2 g/dL Final    Total Bilirubin 06/21/2024 0.3  0.1 - 1.0 mg/dL Final    Alkaline Phosphatase 06/21/2024 106  55 - 135 U/L Final    AST 06/21/2024 21  10 - 40 U/L Final    ALT 06/21/2024 23  10 - 44 U/L Final    eGFR 06/21/2024 47.2 (A)  >60 mL/min/1.73 m^2 Final    Anion Gap 06/21/2024 9  8 - 16 mmol/L Final    Troponin I High Sensitivity 06/21/2024 10.1  0.0 - 14.9 pg/mL Final    BNP 06/21/2024 94  0 - 99 pg/mL Final    Specimen UA 06/21/2024 Urine, Clean Catch   Final    Color, UA 06/21/2024 Colorless (A)  Yellow, Straw, Priya Final    Appearance, UA 06/21/2024 Clear  Clear Final    pH, UA 06/21/2024 5.0  5.0 - 8.0 Final    Specific Gravity, UA  06/21/2024 1.010  1.005 - 1.030 Final    Protein, UA 06/21/2024 Negative  Negative Final    Glucose, UA 06/21/2024 Negative  Negative Final    Ketones, UA 06/21/2024 Negative  Negative Final    Bilirubin (UA) 06/21/2024 Negative  Negative Final    Occult Blood UA 06/21/2024 Negative  Negative Final    Nitrite, UA 06/21/2024 Negative  Negative Final    Urobilinogen, UA 06/21/2024 Negative  Negative EU/dL Final    Leukocytes, UA 06/21/2024 Negative  Negative Final    TSH 06/21/2024 9.128 (H)  0.340 - 5.600 uIU/mL Final    Free T4 06/21/2024 0.81  0.71 - 1.51 ng/dL Final   Lab Visit on 06/17/2024   Component Date Value Ref Range Status    WBC 06/17/2024 6.14  3.90 - 12.70 K/uL Final    RBC 06/17/2024 3.71 (L)  4.00 - 5.40 M/uL Final    Hemoglobin 06/17/2024 11.6 (L)  12.0 - 16.0 g/dL Final    Hematocrit 06/17/2024 35.6 (L)  37.0 - 48.5 % Final    MCV 06/17/2024 96  82 - 98 fL Final    MCH 06/17/2024 31.3 (H)  27.0 - 31.0 pg Final    MCHC 06/17/2024 32.6  32.0 - 36.0 g/dL Final    RDW 06/17/2024 13.1  11.5 - 14.5 % Final    Platelets 06/17/2024 190  150 - 450 K/uL Final    MPV 06/17/2024 8.1 (L)  9.2 - 12.9 fL Final    Immature Granulocytes 06/17/2024 2.8 (H)  0.0 - 0.5 % Final    Gran # (ANC) 06/17/2024 3.2  1.8 - 7.7 K/uL Final    Immature Grans (Abs) 06/17/2024 0.17 (H)  0.00 - 0.04 K/uL Final    Lymph # 06/17/2024 1.5  1.0 - 4.8 K/uL Final    Mono # 06/17/2024 1.2 (H)  0.3 - 1.0 K/uL Final    Eos # 06/17/2024 0.0  0.0 - 0.5 K/uL Final    Baso # 06/17/2024 0.04  0.00 - 0.20 K/uL Final    nRBC 06/17/2024 0  0 /100 WBC Final    Gran % 06/17/2024 52.6  38.0 - 73.0 % Final    Lymph % 06/17/2024 24.8  18.0 - 48.0 % Final    Mono % 06/17/2024 18.9 (H)  4.0 - 15.0 % Final    Eosinophil % 06/17/2024 0.2  0.0 - 8.0 % Final    Basophil % 06/17/2024 0.7  0.0 - 1.9 % Final    Differential Method 06/17/2024 Automated   Final    Sodium 06/17/2024 138  136 - 145 mmol/L Final    Potassium 06/17/2024 4.5  3.5 - 5.1 mmol/L Final     Chloride 06/17/2024 101  95 - 110 mmol/L Final    CO2 06/17/2024 29  23 - 29 mmol/L Final    Glucose 06/17/2024 101  70 - 110 mg/dL Final    BUN 06/17/2024 14  8 - 23 mg/dL Final    Creatinine 06/17/2024 1.1  0.5 - 1.4 mg/dL Final    Calcium 06/17/2024 9.2  8.7 - 10.5 mg/dL Final    Total Protein 06/17/2024 7.6  6.0 - 8.4 g/dL Final    Albumin 06/17/2024 4.0  3.5 - 5.2 g/dL Final    Total Bilirubin 06/17/2024 0.3  0.1 - 1.0 mg/dL Final    Alkaline Phosphatase 06/17/2024 104  55 - 135 U/L Final    AST 06/17/2024 14  10 - 40 U/L Final    ALT 06/17/2024 10  10 - 44 U/L Final    eGFR 06/17/2024 52.4 (A)  >60 mL/min/1.73 m^2 Final    Anion Gap 06/17/2024 8  8 - 16 mmol/L Final    Magnesium 06/17/2024 1.8  1.6 - 2.6 mg/dL Final   Telephone on 06/14/2024   Component Date Value Ref Range Status    WBC 06/14/2024 3.0 (L)  3.8 - 10.8 Thousand/uL Final    RBC 06/14/2024 3.72 (L)  3.80 - 5.10 Million/uL Final    Hemoglobin 06/14/2024 11.7  11.7 - 15.5 g/dL Final    Hematocrit 06/14/2024 36.1  35.0 - 45.0 % Final    MCV 06/14/2024 97.0  80.0 - 100.0 fL Final    MCH 06/14/2024 31.5  27.0 - 33.0 pg Final    MCHC 06/14/2024 32.4  32.0 - 36.0 g/dL Final    RDW 06/14/2024 12.8  11.0 - 15.0 % Final    Platelets 06/14/2024 161  140 - 400 Thousand/uL Final    MPV 06/14/2024 8.1  7.5 - 12.5 fL Final    Glucose 06/14/2024 94  65 - 99 mg/dL Final    BUN 06/14/2024 15  7 - 25 mg/dL Final    Creatinine 06/14/2024 1.10 (H)  0.60 - 1.00 mg/dL Final    eGFR 06/14/2024 52 (L)  > OR = 60 mL/min/1.73m2 Final    BUN/Creatinine Ratio 06/14/2024 14  6 - 22 (calc) Final    Sodium 06/14/2024 140  135 - 146 mmol/L Final    Potassium 06/14/2024 4.6  3.5 - 5.3 mmol/L Final    Chloride 06/14/2024 103  98 - 110 mmol/L Final    CO2 06/14/2024 29  20 - 32 mmol/L Final    Calcium 06/14/2024 9.2  8.6 - 10.4 mg/dL Final    Total Protein 06/14/2024 7.3  6.1 - 8.1 g/dL Final    Albumin 06/14/2024 4.1  3.6 - 5.1 g/dL Final    Globulin, Total 06/14/2024 3.2  1.9 -  3.7 g/dL (calc) Final    Albumin/Globulin Ratio 06/14/2024 1.3  1.0 - 2.5 (calc) Final    Total Bilirubin 06/14/2024 0.3  0.2 - 1.2 mg/dL Final    Alkaline Phosphatase 06/14/2024 124  37 - 153 U/L Final    AST 06/14/2024 16  10 - 35 U/L Final    ALT 06/14/2024 11  6 - 29 U/L Final    Magnesium 06/14/2024 1.9  1.5 - 2.5 mg/dL Final    Neutrophils, Abs 06/14/2024 960 (L)  1,500 - 7,800 cells/uL Final    Lymphocytes Manual 06/14/2024 1,230  850 - 3,900 cells/uL Final    Absolute Reactive Lymphs 06/14/2024 240 (H)  0 cells/uL Final    Mono # 06/14/2024 540  200 - 950 cells/uL Final    Eos # 06/14/2024 0 (L)  15 - 500 cells/uL Final    Baso # 06/14/2024 30  0 - 200 cells/uL Final    Neutrophils Relative 06/14/2024 32.0  % Final    Lymph % 06/14/2024 41.0  % Final    Atypical Lymphocytes Relative 06/14/2024 8.0  0 - 10 % Final    Mono % 06/14/2024 18.0  % Final    Eosinophil % 06/14/2024 0  % Final    Basophil % 06/14/2024 1.0  % Final    Note 06/14/2024 SEE COMMENT   Final   Telephone on 05/29/2024   Component Date Value Ref Range Status    WBC 05/30/2024 6.9  3.8 - 10.8 Thousand/uL Final    RBC 05/30/2024 4.31  3.80 - 5.10 Million/uL Final    Hemoglobin 05/30/2024 13.7  11.7 - 15.5 g/dL Final    Hematocrit 05/30/2024 41.6  35.0 - 45.0 % Final    MCV 05/30/2024 96.5  80.0 - 100.0 fL Final    MCH 05/30/2024 31.8  27.0 - 33.0 pg Final    MCHC 05/30/2024 32.9  32.0 - 36.0 g/dL Final    RDW 05/30/2024 13.1  11.0 - 15.0 % Final    Platelets 05/30/2024 184  140 - 400 Thousand/uL Final    MPV 05/30/2024 9.6  7.5 - 12.5 fL Final    Neutrophils, Abs 05/30/2024 5,237  1,500 - 7,800 cells/uL Final    Lymph # 05/30/2024 1,470  850 - 3,900 cells/uL Final    Mono # 05/30/2024 110 (L)  200 - 950 cells/uL Final    Eos # 05/30/2024 62  15 - 500 cells/uL Final    Baso # 05/30/2024 21  0 - 200 cells/uL Final    Neutrophils Relative 05/30/2024 75.9  % Final    Lymph % 05/30/2024 21.3  % Final    Mono % 05/30/2024 1.6  % Final    Eosinophil  % 05/30/2024 0.9  % Final    Basophil % 05/30/2024 0.3  % Final    Glucose 05/30/2024 76  65 - 99 mg/dL Final    BUN 05/30/2024 16  7 - 25 mg/dL Final    Creatinine 05/30/2024 1.11 (H)  0.60 - 1.00 mg/dL Final    eGFR 05/30/2024 52 (L)  > OR = 60 mL/min/1.73m2 Final    BUN/Creatinine Ratio 05/30/2024 14  6 - 22 (calc) Final    Sodium 05/30/2024 142  135 - 146 mmol/L Final    Potassium 05/30/2024 5.2  3.5 - 5.3 mmol/L Final    Chloride 05/30/2024 104  98 - 110 mmol/L Final    CO2 05/30/2024 28  20 - 32 mmol/L Final    Calcium 05/30/2024 9.6  8.6 - 10.4 mg/dL Final    Total Protein 05/30/2024 7.4  6.1 - 8.1 g/dL Final    Albumin 05/30/2024 4.2  3.6 - 5.1 g/dL Final    Globulin, Total 05/30/2024 3.2  1.9 - 3.7 g/dL (calc) Final    Albumin/Globulin Ratio 05/30/2024 1.3  1.0 - 2.5 (calc) Final    Total Bilirubin 05/30/2024 0.5  0.2 - 1.2 mg/dL Final    Alkaline Phosphatase 05/30/2024 104  37 - 153 U/L Final    AST 05/30/2024 20  10 - 35 U/L Final    ALT 05/30/2024 19  6 - 29 U/L Final    Magnesium 05/30/2024 2.0  1.5 - 2.5 mg/dL Final   Telephone on 05/22/2024   Component Date Value Ref Range Status    Creatinine, Urine 06/07/2024 145  20 - 275 mg/dL Final    Microalb, Ur 06/07/2024 1.0  See Note: mg/dL Final    Microalb/Creat Ratio 06/07/2024 7  <30 mg/g creat Final    TSH w/reflex to FT4 06/07/2024 16.91 (H)  0.40 - 4.50 mIU/L Final    T4, Free 06/07/2024 0.9  0.8 - 1.8 ng/dL Final    Cholesterol 06/07/2024 198  <200 mg/dL Final    HDL 06/07/2024 62  > OR = 50 mg/dL Final    Triglycerides 06/07/2024 152 (H)  <150 mg/dL Final    LDL Cholesterol 06/07/2024 109 (H)  mg/dL (calc) Final    HDL/Cholesterol Ratio 06/07/2024 3.2  <5.0 (calc) Final    Non HDL Chol. (LDL+VLDL) 06/07/2024 136 (H)  <130 mg/dL (calc) Final   Lab Visit on 05/16/2024   Component Date Value Ref Range Status    WBC 05/16/2024 4.87  3.90 - 12.70 K/uL Final    RBC 05/16/2024 4.43  4.00 - 5.40 M/uL Final    Hemoglobin 05/16/2024 13.8  12.0 - 16.0 g/dL  Final    Hematocrit 05/16/2024 42.0  37.0 - 48.5 % Final    MCV 05/16/2024 95  82 - 98 fL Final    MCH 05/16/2024 31.2 (H)  27.0 - 31.0 pg Final    MCHC 05/16/2024 32.9  32.0 - 36.0 g/dL Final    RDW 05/16/2024 13.2  11.5 - 14.5 % Final    Platelets 05/16/2024 204  150 - 450 K/uL Final    MPV 05/16/2024 8.6 (L)  9.2 - 12.9 fL Final    Immature Granulocytes 05/16/2024 0.4  0.0 - 0.5 % Final    Gran # (ANC) 05/16/2024 2.7  1.8 - 7.7 K/uL Final    Immature Grans (Abs) 05/16/2024 0.02  0.00 - 0.04 K/uL Final    Lymph # 05/16/2024 1.4  1.0 - 4.8 K/uL Final    Mono # 05/16/2024 0.6  0.3 - 1.0 K/uL Final    Eos # 05/16/2024 0.2  0.0 - 0.5 K/uL Final    Baso # 05/16/2024 0.02  0.00 - 0.20 K/uL Final    nRBC 05/16/2024 0  0 /100 WBC Final    Gran % 05/16/2024 55.5  38.0 - 73.0 % Final    Lymph % 05/16/2024 28.5  18.0 - 48.0 % Final    Mono % 05/16/2024 11.3  4.0 - 15.0 % Final    Eosinophil % 05/16/2024 3.9  0.0 - 8.0 % Final    Basophil % 05/16/2024 0.4  0.0 - 1.9 % Final    Differential Method 05/16/2024 Automated   Final    Sodium 05/16/2024 138  136 - 145 mmol/L Final    Potassium 05/16/2024 4.3  3.5 - 5.1 mmol/L Final    Chloride 05/16/2024 103  95 - 110 mmol/L Final    CO2 05/16/2024 27  23 - 29 mmol/L Final    Glucose 05/16/2024 92  70 - 110 mg/dL Final    BUN 05/16/2024 16  8 - 23 mg/dL Final    Creatinine 05/16/2024 1.0  0.5 - 1.4 mg/dL Final    Calcium 05/16/2024 9.8  8.7 - 10.5 mg/dL Final    Total Protein 05/16/2024 8.0  6.0 - 8.4 g/dL Final    Albumin 05/16/2024 4.2  3.5 - 5.2 g/dL Final    Total Bilirubin 05/16/2024 0.3  0.1 - 1.0 mg/dL Final    Alkaline Phosphatase 05/16/2024 95  55 - 135 U/L Final    AST 05/16/2024 19  10 - 40 U/L Final    ALT 05/16/2024 16  10 - 44 U/L Final    eGFR 05/16/2024 58.8 (A)  >60 mL/min/1.73 m^2 Final    Anion Gap 05/16/2024 8  8 - 16 mmol/L Final    Magnesium 05/16/2024 1.9  1.6 - 2.6 mg/dL Final   There may be more visits with results that are not included.       Past Medical  History:   Diagnosis Date    Anxiety     Martin esophagus     Chemotherapy-induced nausea 2024    Colitis     COPD (chronic obstructive pulmonary disease)     Dehydration 2024    GERD (gastroesophageal reflux disease)     Hypertension     Intractable nausea and vomiting 2024    Lung cancer     Thyroid disease     Vocal cord polyps      Social History     Socioeconomic History    Marital status:    Tobacco Use    Smoking status: Former     Types: Cigarettes     Start date: 2024     Quit date: 1964     Years since quittin.5    Smokeless tobacco: Never    Tobacco comments:     Pt has smoked since 14 years old. Smokes around .5ppd. Inpatient smoking education done 10/20.     Pt quit smoking on 24.  She has never used smokeless tobacco   Substance and Sexual Activity    Alcohol use: Never    Drug use: Never    Sexual activity: Yes     Partners: Male     Social Determinants of Health     Financial Resource Strain: Low Risk  (2024)    Overall Financial Resource Strain (CARDIA)     Difficulty of Paying Living Expenses: Not hard at all   Food Insecurity: No Food Insecurity (2024)    Hunger Vital Sign     Worried About Running Out of Food in the Last Year: Never true     Ran Out of Food in the Last Year: Never true   Transportation Needs: No Transportation Needs (2024)    PRAPARE - Transportation     Lack of Transportation (Medical): No     Lack of Transportation (Non-Medical): No   Physical Activity: Inactive (3/15/2024)    Exercise Vital Sign     Days of Exercise per Week: 0 days     Minutes of Exercise per Session: 0 min   Stress: Stress Concern Present (3/15/2024)    [employer] - Occupational Stress Questionnaire     Feeling of Stress : Very much   Housing Stability: Low Risk  (2024)    Housing Stability Vital Sign     Unable to Pay for Housing in the Last Year: No     Homeless in the Last Year: No     Past Surgical History:    Procedure Laterality Date    ABDOMINAL AORTIC ANEURYSM REPAIR      BACK SURGERY      cervical    CATARACT EXTRACTION Right 02/2021    CHOLECYSTECTOMY  12/20/2013    Dr Albert CORLEY    ENDOBRONCHIAL ULTRASOUND N/A 4/19/2024    Procedure: ENDOBRONCHIAL ULTRASOUND (EBUS);  Surgeon: Kizzy Siegel MD;  Location: [facility] OR [address] [place] [address];  Service: Pulmonary;  Laterality: N/A;    FOOT SURGERY      HYSTERECTOMY  1979    AUGUSTINE for AUB with consevation ovaries    INSERTION OF TUNNELED CENTRAL VENOUS CATHETER (CVC) WITH SUBCUTANEOUS PORT Right 5/20/2024    Procedure: [id number];  Surgeon: Simon Wilson III, MD;  Location[facility] OR;  Service: General;  Laterality: Right;    ROBOTIC BRONCHOSCOPY N/A 4/19/2024    Procedure: ROBOTIC BRONCHOSCOPY;  Surgeon: Kizzy Siegel MD;  [facility];  Service: Pulmonary;  Laterality: N/A;    SALIVARY GLAND SURGERY       Family History   Problem Relation Name Age of Onset    Bladder Cancer Mother      Heart failure Father      Emphysema Sister      Pancreatic cancer Sister      No Known Problems Brother         The CVD Risk score (KAREN'Agostino et al., 2008) failed to calculate for the following reasons:    The 2008 CVD risk score is only valid for ages 30 to 74    All of your core healthy metrics are met.      Review of patient's allergies indicates:   Allergen Reactions    Bisacodyl Nausea And Vomiting     Other reaction(s): Vomiting    Iodinated contrast media Anaphylaxis, Hives and Shortness Of Breath     Hypotension.  Pt states she has anaphylaxis with IV contrast.     Morphine Other (See Comments)     hypotension    Azithromycin Hives    Cipro [ciprofloxacin hcl]     Demerol [meperidine]      Nausea vomiting, visual problem    Doxycycline Hives    Flonase [fluticasone propionate] Hives    Morpholine analogues Other (See Comments)     hypotension       Current Outpatient Medications:     albuterol-ipratropium (DUO-NEB) 2.5 mg-0.5 mg/3 mL nebulizer solution, Take 3  mLs by nebulization every 6 (six) hours as needed for Wheezing. Rescue, Disp: 75 mL, Rfl: 0    amLODIPine (NORVASC) 5 MG tablet, Take 5 mg by mouth once daily., Disp: , Rfl:     EPINEPHrine (EPIPEN) 0.3 mg/0.3 mL AtIn, Inject 0.3 mLs (0.3 mg total) into the muscle as needed (Severe allergic reaction symptoms such as shortness of breath, tongue or throat swelling, weakness dizziness severe nausea vomiting)., Disp: 1 each, Rfl: 3    famotidine (PEPCID) 20 MG tablet, Take 1 tablet (20 mg total) by mouth 2 (two) times daily., Disp: 20 tablet, Rfl: 0    levalbuterol (XOPENEX HFA) 45 mcg/actuation inhaler, Inhale 2 puffs into the lungs every 6 (six) hours as needed for Wheezing. Rescue, Disp: 15 g, Rfl: 4    levalbuterol (XOPENEX) 1.25 mg/3 mL nebulizer solution, Take 3 mLs (1.25 mg total) by nebulization every 4 (four) hours as needed for Wheezing. Rescue, Disp: 1 mL, Rfl: 0    LIDOcaine-prilocaine (EMLA) cream, Apply topically as needed. (Patient taking differently: Apply 1 g topically as needed (Chemo port).), Disp: 30 g, Rfl: 5    metoprolol tartrate (LOPRESSOR) 25 MG tablet, Take 1 tablet (25 mg total) by mouth 2 (two) times daily., Disp: 180 tablet, Rfl: 3    omeprazole (PRILOSEC) 40 MG capsule, Take 1 capsule (40 mg total) by mouth every morning., Disp: 90 capsule, Rfl: 3    ondansetron (ZOFRAN) 8 MG tablet, Take 1 tablet (8 mg total) by mouth every 8 (eight) hours as needed for Nausea., Disp: 30 tablet, Rfl: 5    polyethylene glycol (GLYCOLAX) 17 gram/dose powder, Take 17 g by mouth once daily., Disp: 510 g, Rfl: 5    promethazine (PHENERGAN) 25 MG tablet, Take 1 tablet (25 mg total) by mouth every 6 (six) hours as needed for Nausea., Disp: 30 tablet, Rfl: 5    REFRESH OPTIVE 0.5-0.9 % Drop, Place 1 drop into both eyes 4 (four) times daily as needed (Dry Eyes)., Disp: , Rfl:     simethicone (GAS-X ORAL), Take 1 tablet by mouth as needed (Flatulence)., Disp: , Rfl:     umeclidinium-vilanteroL (ANORO ELLIPTA)  62.5-25 mcg/actuation DsDv, Inhale 1 puff into the lungs once daily. Controller, Disp: 1 each, Rfl: 11    furosemide (LASIX) 20 MG tablet, Take 1 tablet (20 mg total) by mouth daily as needed (edema). (Patient not taking: Reported on 7/15/2024), Disp: 30 tablet, Rfl: 1    HYDROcodone-acetaminophen (NORCO)  mg per tablet, Take 1 tablet by mouth every 12 (twelve) hours as needed for Pain., Disp: 50 tablet, Rfl: 0    levothyroxine (SYNTHROID) 112 MCG tablet, Take 1 tablet (112 mcg total) by mouth before breakfast., Disp: 90 tablet, Rfl: 0    LORazepam (ATIVAN) 1 MG tablet, Take 0.5 tablets (0.5 mg total) by mouth every 6 (six) hours as needed for Anxiety., Disp: 60 tablet, Rfl: 3    promethazine (PHENERGAN) 25 MG suppository, Place 1 suppository (25 mg total) rectally every 6 (six) hours as needed for Nausea. (Patient not taking: Reported on 7/15/2024), Disp: 10 suppository, Rfl: 0    Review of Systems   Constitutional:  Positive for activity change, appetite change and fatigue. Negative for chills, fever and unexpected weight change.   HENT:  Negative for ear discharge and ear pain.    Eyes:  Negative for pain, discharge and visual disturbance.   Respiratory:  Negative for apnea, cough, shortness of breath and wheezing.    Cardiovascular:  Negative for chest pain, palpitations and leg swelling.   Gastrointestinal:  Positive for diarrhea, nausea and reflux. Negative for abdominal pain, blood in stool, constipation and vomiting.   Endocrine: Negative for cold intolerance, heat intolerance and polydipsia.   Genitourinary:  Negative for bladder incontinence, dysuria, hematuria, nocturia and urgency.   Musculoskeletal:  Negative for gait problem, joint swelling and myalgias.   Neurological:  Negative for dizziness, seizures and numbness.   Psychiatric/Behavioral:  Negative for behavioral problems and hallucinations. The patient is not nervous/anxious.            Objective:      Vitals:    07/15/24 1201 07/15/24 1204    BP: 108/68 102/66   Pulse: 76    SpO2: 98%      Physical Exam  Vitals and nursing note reviewed.   Constitutional:       General: She is not in acute distress.     Appearance: She is well-developed. She is obese. She is ill-appearing. She is not toxic-appearing.   HENT:      Head: Normocephalic and atraumatic.      Right Ear: Tympanic membrane and external ear normal.      Left Ear: Tympanic membrane and external ear normal.      Nose: Nose normal.      Mouth/Throat:      Pharynx: Oropharynx is clear.   Eyes:      Pupils: Pupils are equal, round, and reactive to light.   Neck:      Thyroid: No thyromegaly.      Vascular: No carotid bruit.   Cardiovascular:      Rate and Rhythm: Normal rate and regular rhythm.      Heart sounds: Normal heart sounds. No murmur heard.  Pulmonary:      Effort: Pulmonary effort is normal.      Breath sounds: Normal breath sounds. No wheezing or rales.   Abdominal:      General: Bowel sounds are normal. There is no distension.      Palpations: Abdomen is soft.      Tenderness: There is no abdominal tenderness.   Musculoskeletal:         General: No tenderness or deformity. Normal range of motion.      Cervical back: Normal range of motion and neck supple.      Lumbar back: Normal. No spasms.      Comments: Bends 90 degrees at  waist   Lymphadenopathy:      Cervical: No cervical adenopathy.   Skin:     General: Skin is warm and dry.      Findings: No rash.   Neurological:      Mental Status: She is alert and oriented to person, place, and time.      Cranial Nerves: No cranial nerve deficit.      Coordination: Coordination normal.   Psychiatric:         Mood and Affect: Mood is anxious and depressed.         Behavior: Behavior normal.         Thought Content: Thought content normal.         Judgment: Judgment normal.           Assessment:       1. Non-small cell carcinoma of left lung    2. Anxiety    3. DDD (degenerative disc disease), lumbar    4. Acquired hypothyroidism    5. Lumbar  disc disease with radiculopathy    6. Sebaceous cyst    7. Mass of upper lobe of left lung    8. Chronic hypoxic respiratory failure    9. Primary hypertension    10. Abdominal aortic aneurysm (AAA) without rupture, unspecified part    11. Intractable nausea and vomiting    12. Gastroesophageal reflux disease without esophagitis    13. Chemotherapy-induced nausea    14. Diarrhea, unspecified type    15. Major depressive disorder, single episode, moderate    16. Hypotension due to drugs         Plan:       Non-small cell carcinoma of left lung  Patient having significant side effects do her 2nd round of chemotherapy.  Followed by Dr. Mariano.  He is holding off on any chemo for the last 4 weeks.  In 6 weeks he may repeat another PET scan.  Patient states she does not want to do anymore chemo if it makes her feel like the last 1 did.  Anxiety  -     LORazepam (ATIVAN) 1 MG tablet; Take 0.5 tablets (0.5 mg total) by mouth every 6 (six) hours as needed for Anxiety.  Dispense: 60 tablet; Refill: 3  Refill lorazepam for anxiety  DDD (degenerative disc disease), lumbar  -     HYDROcodone-acetaminophen (NORCO)  mg per tablet; Take 1 tablet by mouth every 12 (twelve) hours as needed for Pain.  Dispense: 50 tablet; Refill: 0  Refill hydrocodone  Acquired hypothyroidism  -     levothyroxine (SYNTHROID) 112 MCG tablet; Take 1 tablet (112 mcg total) by mouth before breakfast.  Dispense: 90 tablet; Refill: 0  Continue levothyroxine  Lumbar disc disease with radiculopathy    Sebaceous cyst  Left lower abdomen has a small 1 x 1 cm sebaceous cyst  Mass of upper lobe of left lung  Last chest x-ray showed a stable 1.6 cm mass in the lung  Chronic hypoxic respiratory failure    Primary hypertension    Abdominal aortic aneurysm (AAA) without rupture, unspecified part    Intractable nausea and vomiting    Gastroesophageal reflux disease without esophagitis    Chemotherapy-induced nausea  Keeping lift liquids and small portions of  meals down  Diarrhea, unspecified type    Major depressive disorder, single episode, moderate  Tired of fighting this cancer  Hypotension due to drugs  Blood pressure running low, advised them to hold off on amlodipine and metoprolol unless systolic blood pressure elio above 140    No follow-ups on file.        7/15/2024 Jasbir Graham

## 2024-07-17 ENCOUNTER — INFUSION (OUTPATIENT)
Dept: INFUSION THERAPY | Facility: HOSPITAL | Age: 74
End: 2024-07-17
Attending: INTERNAL MEDICINE
Payer: MEDICARE

## 2024-07-17 ENCOUNTER — TELEPHONE (OUTPATIENT)
Facility: CLINIC | Age: 74
End: 2024-07-17
Payer: MEDICARE

## 2024-07-17 VITALS
WEIGHT: 171.06 LBS | RESPIRATION RATE: 18 BRPM | TEMPERATURE: 98 F | HEART RATE: 70 BPM | BODY MASS INDEX: 31.29 KG/M2 | SYSTOLIC BLOOD PRESSURE: 132 MMHG | OXYGEN SATURATION: 92 % | DIASTOLIC BLOOD PRESSURE: 69 MMHG

## 2024-07-17 DIAGNOSIS — D69.6 THROMBOCYTOPENIA: ICD-10-CM

## 2024-07-17 DIAGNOSIS — T45.1X5A CHEMOTHERAPY-INDUCED NAUSEA: Primary | ICD-10-CM

## 2024-07-17 DIAGNOSIS — C34.92 NON-SMALL CELL CARCINOMA OF LEFT LUNG: ICD-10-CM

## 2024-07-17 DIAGNOSIS — R11.0 CHEMOTHERAPY-INDUCED NAUSEA: Primary | ICD-10-CM

## 2024-07-17 DIAGNOSIS — C34.92 NON-SMALL CELL CARCINOMA OF LEFT LUNG: Primary | ICD-10-CM

## 2024-07-17 LAB
ALBUMIN SERPL BCP-MCNC: 3.6 G/DL (ref 3.5–5.2)
ALP SERPL-CCNC: 94 U/L (ref 55–135)
ALT SERPL W/O P-5'-P-CCNC: 9 U/L (ref 10–44)
ANION GAP SERPL CALC-SCNC: 12 MMOL/L (ref 8–16)
AST SERPL-CCNC: 12 U/L (ref 10–40)
BASOPHILS # BLD AUTO: 0.02 K/UL (ref 0–0.2)
BASOPHILS NFR BLD: 0.3 % (ref 0–1.9)
BILIRUB SERPL-MCNC: 0.5 MG/DL (ref 0.1–1)
BUN SERPL-MCNC: 10 MG/DL (ref 8–23)
CALCIUM SERPL-MCNC: 9.2 MG/DL (ref 8.7–10.5)
CHLORIDE SERPL-SCNC: 103 MMOL/L (ref 95–110)
CO2 SERPL-SCNC: 25 MMOL/L (ref 23–29)
CREAT SERPL-MCNC: 0.9 MG/DL (ref 0.5–1.4)
DIFFERENTIAL METHOD BLD: ABNORMAL
EOSINOPHIL # BLD AUTO: 0.1 K/UL (ref 0–0.5)
EOSINOPHIL NFR BLD: 1.1 % (ref 0–8)
ERYTHROCYTE [DISTWIDTH] IN BLOOD BY AUTOMATED COUNT: 16.2 % (ref 11.5–14.5)
EST. GFR  (NO RACE VARIABLE): >60 ML/MIN/1.73 M^2
GLUCOSE SERPL-MCNC: 99 MG/DL (ref 70–110)
HCT VFR BLD AUTO: 26.8 % (ref 37–48.5)
HGB BLD-MCNC: 8.5 G/DL (ref 12–16)
IMM GRANULOCYTES # BLD AUTO: 0.03 K/UL (ref 0–0.04)
IMM GRANULOCYTES NFR BLD AUTO: 0.5 % (ref 0–0.5)
LYMPHOCYTES # BLD AUTO: 1.1 K/UL (ref 1–4.8)
LYMPHOCYTES NFR BLD: 17.3 % (ref 18–48)
MAGNESIUM SERPL-MCNC: 1.5 MG/DL (ref 1.6–2.6)
MCH RBC QN AUTO: 30.9 PG (ref 27–31)
MCHC RBC AUTO-ENTMCNC: 31.7 G/DL (ref 32–36)
MCV RBC AUTO: 98 FL (ref 82–98)
MONOCYTES # BLD AUTO: 0.9 K/UL (ref 0.3–1)
MONOCYTES NFR BLD: 13.9 % (ref 4–15)
NEUTROPHILS # BLD AUTO: 4.3 K/UL (ref 1.8–7.7)
NEUTROPHILS NFR BLD: 66.9 % (ref 38–73)
NRBC BLD-RTO: 0 /100 WBC
PLATELET # BLD AUTO: 257 K/UL (ref 150–450)
PMV BLD AUTO: 8 FL (ref 9.2–12.9)
POTASSIUM SERPL-SCNC: 3.8 MMOL/L (ref 3.5–5.1)
PROT SERPL-MCNC: 7.6 G/DL (ref 6–8.4)
RBC # BLD AUTO: 2.75 M/UL (ref 4–5.4)
SODIUM SERPL-SCNC: 140 MMOL/L (ref 136–145)
WBC # BLD AUTO: 6.42 K/UL (ref 3.9–12.7)

## 2024-07-17 PROCEDURE — 83735 ASSAY OF MAGNESIUM: CPT | Performed by: INTERNAL MEDICINE

## 2024-07-17 PROCEDURE — 96361 HYDRATE IV INFUSION ADD-ON: CPT

## 2024-07-17 PROCEDURE — 85025 COMPLETE CBC W/AUTO DIFF WBC: CPT | Performed by: INTERNAL MEDICINE

## 2024-07-17 PROCEDURE — 96365 THER/PROPH/DIAG IV INF INIT: CPT

## 2024-07-17 PROCEDURE — 25000003 PHARM REV CODE 250: Performed by: INTERNAL MEDICINE

## 2024-07-17 PROCEDURE — 63600175 PHARM REV CODE 636 W HCPCS: Performed by: INTERNAL MEDICINE

## 2024-07-17 PROCEDURE — 80053 COMPREHEN METABOLIC PANEL: CPT | Performed by: INTERNAL MEDICINE

## 2024-07-17 RX ORDER — SODIUM CHLORIDE 0.9 % (FLUSH) 0.9 %
10 SYRINGE (ML) INJECTION
Status: CANCELLED | OUTPATIENT
Start: 2024-07-17

## 2024-07-17 RX ORDER — HEPARIN 100 UNIT/ML
500 SYRINGE INTRAVENOUS
Status: DISCONTINUED | OUTPATIENT
Start: 2024-07-17 | End: 2024-07-17 | Stop reason: HOSPADM

## 2024-07-17 RX ORDER — ONDANSETRON 2 MG/ML
4 INJECTION INTRAMUSCULAR; INTRAVENOUS
Status: CANCELLED
Start: 2024-07-17 | End: 2024-07-17

## 2024-07-17 RX ORDER — HEPARIN 100 UNIT/ML
500 SYRINGE INTRAVENOUS
Status: CANCELLED | OUTPATIENT
Start: 2024-07-17

## 2024-07-17 RX ADMIN — SODIUM CHLORIDE 1000 ML: 9 INJECTION, SOLUTION INTRAVENOUS at 11:07

## 2024-07-17 RX ADMIN — ONDANSETRON HYDROCHLORIDE: 2 INJECTION, SOLUTION INTRAMUSCULAR; INTRAVENOUS at 11:07

## 2024-07-17 RX ADMIN — HEPARIN 500 UNITS: 100 SYRINGE at 01:07

## 2024-07-17 NOTE — TELEPHONE ENCOUNTER
Patient called to say that she is in need of fluids today I informed that I will send message to scheduling making them aware and they will reach out with appt. Patient to also get labs drawn when she comes in for fluids. Dr Mariano made aware of above, lab orders placed. Infusion made aware of need to schedule.

## 2024-07-17 NOTE — PLAN OF CARE
Problem: Fatigue  Goal: Improved Activity Tolerance  Outcome: Progressing  Intervention: Promote Improved Energy  Flowsheets (Taken 7/17/2024 1124)  Fatigue Management: frequent rest breaks encouraged

## 2024-07-18 ENCOUNTER — OFFICE VISIT (OUTPATIENT)
Facility: CLINIC | Age: 74
End: 2024-07-18
Payer: MEDICARE

## 2024-07-18 ENCOUNTER — LAB VISIT (OUTPATIENT)
Dept: LAB | Facility: HOSPITAL | Age: 74
End: 2024-07-18
Attending: NURSE PRACTITIONER
Payer: MEDICARE

## 2024-07-18 VITALS
TEMPERATURE: 97 F | BODY MASS INDEX: 31.29 KG/M2 | DIASTOLIC BLOOD PRESSURE: 70 MMHG | SYSTOLIC BLOOD PRESSURE: 157 MMHG | RESPIRATION RATE: 16 BRPM | HEIGHT: 62 IN | HEART RATE: 87 BPM

## 2024-07-18 DIAGNOSIS — T45.1X5A ANEMIA DUE TO CHEMOTHERAPY: ICD-10-CM

## 2024-07-18 DIAGNOSIS — C34.90 MALIGNANT NEOPLASM OF UNSPECIFIED PART OF UNSPECIFIED BRONCHUS OR LUNG: ICD-10-CM

## 2024-07-18 DIAGNOSIS — J43.1 PANLOBULAR EMPHYSEMA: ICD-10-CM

## 2024-07-18 DIAGNOSIS — D64.81 ANEMIA DUE TO CHEMOTHERAPY: ICD-10-CM

## 2024-07-18 DIAGNOSIS — C34.92 NON-SMALL CELL CARCINOMA OF LEFT LUNG: Primary | ICD-10-CM

## 2024-07-18 DIAGNOSIS — M25.572 ACUTE LEFT ANKLE PAIN: ICD-10-CM

## 2024-07-18 LAB
ABO GROUP BLD: NORMAL
BLD GP AB SCN CELLS X3 SERPL QL: NORMAL
FERRITIN SERPL-MCNC: 291 NG/ML (ref 20–300)
IRON SERPL-MCNC: 73 UG/DL (ref 30–160)
RH BLD: NORMAL
SATURATED IRON: 24 % (ref 20–50)
SPECIMEN OUTDATE: NORMAL
TOTAL IRON BINDING CAPACITY: 304 UG/DL (ref 250–450)
TRANSFERRIN SERPL-MCNC: 217 MG/DL (ref 200–375)

## 2024-07-18 PROCEDURE — G2211 COMPLEX E/M VISIT ADD ON: HCPCS | Mod: S$GLB,,, | Performed by: NURSE PRACTITIONER

## 2024-07-18 PROCEDURE — 1111F DSCHRG MED/CURRENT MED MERGE: CPT | Mod: CPTII,S$GLB,, | Performed by: NURSE PRACTITIONER

## 2024-07-18 PROCEDURE — 1159F MED LIST DOCD IN RCRD: CPT | Mod: CPTII,S$GLB,, | Performed by: NURSE PRACTITIONER

## 2024-07-18 PROCEDURE — 3078F DIAST BP <80 MM HG: CPT | Mod: CPTII,S$GLB,, | Performed by: NURSE PRACTITIONER

## 2024-07-18 PROCEDURE — 1101F PT FALLS ASSESS-DOCD LE1/YR: CPT | Mod: CPTII,S$GLB,, | Performed by: NURSE PRACTITIONER

## 2024-07-18 PROCEDURE — 99215 OFFICE O/P EST HI 40 MIN: CPT | Mod: S$GLB,,, | Performed by: NURSE PRACTITIONER

## 2024-07-18 PROCEDURE — 86901 BLOOD TYPING SEROLOGIC RH(D): CPT | Performed by: NURSE PRACTITIONER

## 2024-07-18 PROCEDURE — 3077F SYST BP >= 140 MM HG: CPT | Mod: CPTII,S$GLB,, | Performed by: NURSE PRACTITIONER

## 2024-07-18 PROCEDURE — 3288F FALL RISK ASSESSMENT DOCD: CPT | Mod: CPTII,S$GLB,, | Performed by: NURSE PRACTITIONER

## 2024-07-18 PROCEDURE — 86850 RBC ANTIBODY SCREEN: CPT | Performed by: NURSE PRACTITIONER

## 2024-07-18 PROCEDURE — 36415 COLL VENOUS BLD VENIPUNCTURE: CPT | Performed by: NURSE PRACTITIONER

## 2024-07-18 PROCEDURE — 86900 BLOOD TYPING SEROLOGIC ABO: CPT | Performed by: NURSE PRACTITIONER

## 2024-07-18 PROCEDURE — 99999 PR PBB SHADOW E&M-EST. PATIENT-LVL IV: CPT | Mod: PBBFAC,,, | Performed by: NURSE PRACTITIONER

## 2024-07-18 PROCEDURE — 83540 ASSAY OF IRON: CPT | Performed by: NURSE PRACTITIONER

## 2024-07-18 PROCEDURE — 1125F AMNT PAIN NOTED PAIN PRSNT: CPT | Mod: CPTII,S$GLB,, | Performed by: NURSE PRACTITIONER

## 2024-07-18 PROCEDURE — 82728 ASSAY OF FERRITIN: CPT | Performed by: NURSE PRACTITIONER

## 2024-07-18 RX ORDER — DIPHENHYDRAMINE HYDROCHLORIDE 50 MG/ML
25 INJECTION INTRAMUSCULAR; INTRAVENOUS ONCE
Status: CANCELLED | OUTPATIENT
Start: 2024-07-18

## 2024-07-18 RX ORDER — ACETAMINOPHEN 325 MG/1
650 TABLET ORAL ONCE
Status: CANCELLED | OUTPATIENT
Start: 2024-07-18

## 2024-07-18 RX ORDER — HYDROCODONE BITARTRATE AND ACETAMINOPHEN 500; 5 MG/1; MG/1
TABLET ORAL ONCE
Status: CANCELLED | OUTPATIENT
Start: 2024-07-18 | End: 2024-07-18

## 2024-07-18 RX ORDER — FUROSEMIDE 10 MG/ML
20 INJECTION INTRAMUSCULAR; INTRAVENOUS ONCE
Status: CANCELLED | OUTPATIENT
Start: 2024-07-18

## 2024-07-18 NOTE — PROGRESS NOTES
[facility] Hematology/Oncology   Follow-up Visit    Subjective:      Patient ID:   NAME: Joslyn Dubon : 1950     74 y.o. female    Referring Doc: Jasbir Graham  Other Physicians: Yobani/Robbin, Michelle Park Pettiford    Chief Complaint: lung cancer      HPI:  74 y.o. female with diagnosis of lung cancer with neuroendocrine features who has been on platinum and etopside chemotherapy who was seen as a consult at [facility] She has been followed by Dr Camden Iyer and she had asked to switch to myself after I had followed her during her recent hospitalization. She had her 2nd cycle chemotherapy on 2024. She had significant side effects.   She has transferred her care to Dr. Mariano.   She has been feeling poorly since that second cycle of chemotherapy. She is here by herself today.     She had IV fluids and nausea medications twice.     She completed SBRT 2024     She is in wheelchair today. She has some residual Nausea and GI issues.     Discussed quality of life as her priority goal and plan is to see if we can build her back up to some prior baseline. He ask palliative medicine to see her. Discussed having PRN orders for IVF's and antiemetics as needed.    ROS:   GEN: general malaise, weakness, fatigue  HEENT: normal with no HA's, sore throat, stiff neck, changes in vision  CV: normal with no CP, SOB, PND, RAPHAEL or orthopnea  PULM: normal with no SOB, cough, hemoptysis, sputum or pleuritic pain  GI:  residual nausea and GI upset  : normal with no hematuria, dysuria  BREAST: normal with no mass, discharge, pain  SKIN: normal with no rash, erythema, bruising, or swelling     Past Medical/Surgical History:  Past Medical History:   Diagnosis Date    Anxiety     Martin esophagus     Chemotherapy-induced nausea 2024    Colitis     COPD (chronic obstructive pulmonary disease)     Dehydration 2024    GERD (gastroesophageal reflux disease)     Hypertension     Intractable nausea and  vomiting 2024    Lung cancer     Thyroid disease     Vocal cord polyps      Past Surgical History:   Procedure Laterality Date    ABDOMINAL AORTIC ANEURYSM REPAIR      BACK SURGERY      cervical    CATARACT EXTRACTION Right 2021    CHOLECYSTECTOMY  2013    Dr Albert CORLEY    ENDOBRONCHIAL ULTRASOUND N/A 2024    Procedure: ENDOBRONCHIAL ULTRASOUND (EBUS);  Surgeon: Kizzy Siegel MD;  Location: [facility] OR 2ND FLR;  Service: Pulmonary;  Laterality: N/A;    FOOT SURGERY      HYSTERECTOMY      AUGUSTINE for AUB with consevation ovaries    INSERTION OF TUNNELED CENTRAL VENOUS CATHETER (CVC) WITH SUBCUTANEOUS PORT Right 2024    Procedure: [id number];  Surgeon: Simon Wilson III, MD;  Location: [facility] OR;  Service: General;  Laterality: Right;    ROBOTIC BRONCHOSCOPY N/A 2024    Procedure: ROBOTIC BRONCHOSCOPY;  Surgeon: Kizzy Siegel MD;  Location: [facility] OR 2ND FLR;  Service: Pulmonary;  Laterality: N/A;    SALIVARY GLAND SURGERY           Allergies:  Review of patient's allergies indicates:   Allergen Reactions    Bisacodyl Nausea And Vomiting     Other reaction(s): Vomiting    Iodinated contrast media Anaphylaxis, Hives and Shortness Of Breath     Hypotension.  Pt states she has anaphylaxis with IV contrast.     Morphine Other (See Comments)     hypotension    Azithromycin Hives    Cipro [ciprofloxacin hcl]     Demerol [meperidine]      Nausea vomiting, visual problem    Doxycycline Hives    Flonase [fluticasone propionate] Hives    Morpholine analogues Other (See Comments)     hypotension       Social/Family History:  Social History     Socioeconomic History    Marital status:    Tobacco Use    Smoking status: Former     Types: Cigarettes     Start date: 2024     Quit date: 1964     Years since quittin.5    Smokeless tobacco: Never    Tobacco comments:     Pt has smoked since 14 years old. Smokes around .5ppd. Inpatient smoking education done 10/20.     Pt  quit smoking on 01/01/24.  She has never used smokeless tobacco   Substance and Sexual Activity    Alcohol use: Never    Drug use: Never    Sexual activity: Yes     Partners: Male     Social Determinants of Health     Financial Resource Strain: Low Risk  (7/5/2024)    Overall Financial Resource Strain (CARDIA)     Difficulty of Paying Living Expenses: Not hard at all   Food Insecurity: No Food Insecurity (7/5/2024)    Hunger Vital Sign     Worried About Running Out of Food in the Last Year: Never true     Ran Out of Food in the Last Year: Never true   Transportation Needs: No Transportation Needs (7/5/2024)    PRAPARE - Transportation     Lack of Transportation (Medical): No     Lack of Transportation (Non-Medical): No   Physical Activity: Inactive (3/15/2024)    Exercise Vital Sign     Days of Exercise per Week: 0 days     Minutes of Exercise per Session: 0 min   Stress: Stress Concern Present (3/15/2024)    [employer] - Occupational Stress Questionnaire     Feeling of Stress : Very much   Housing Stability: Low Risk  (7/5/2024)    Housing Stability Vital Sign     Unable to Pay for Housing in the Last Year: No     Homeless in the Last Year: No     Family History   Problem Relation Name Age of Onset    Bladder Cancer Mother      Heart failure Father      Emphysema Sister      Pancreatic cancer Sister      No Known Problems Brother           Medications:    Current Outpatient Medications:     albuterol-ipratropium (DUO-NEB) 2.5 mg-0.5 mg/3 mL nebulizer solution, Take 3 mLs by nebulization every 6 (six) hours as needed for Wheezing. Rescue, Disp: 75 mL, Rfl: 0    amLODIPine (NORVASC) 5 MG tablet, Take 5 mg by mouth once daily., Disp: , Rfl:     EPINEPHrine (EPIPEN) 0.3 mg/0.3 mL AtIn, Inject 0.3 mLs (0.3 mg total) into the muscle as needed (Severe allergic reaction symptoms such as shortness of breath, tongue or throat swelling, weakness dizziness severe nausea vomiting)., Disp: 1 each,  Rfl: 3    famotidine (PEPCID) 20 MG tablet, Take 1 tablet (20 mg total) by mouth 2 (two) times daily., Disp: 20 tablet, Rfl: 0    HYDROcodone-acetaminophen (NORCO)  mg per tablet, Take 1 tablet by mouth every 12 (twelve) hours as needed for Pain., Disp: 50 tablet, Rfl: 0    levalbuterol (XOPENEX HFA) 45 mcg/actuation inhaler, Inhale 2 puffs into the lungs every 6 (six) hours as needed for Wheezing. Rescue, Disp: 15 g, Rfl: 4    levalbuterol (XOPENEX) 1.25 mg/3 mL nebulizer solution, Take 3 mLs (1.25 mg total) by nebulization every 4 (four) hours as needed for Wheezing. Rescue, Disp: 1 mL, Rfl: 0    levothyroxine (SYNTHROID) 112 MCG tablet, Take 1 tablet (112 mcg total) by mouth before breakfast., Disp: 90 tablet, Rfl: 0    LIDOcaine-prilocaine (EMLA) cream, Apply topically as needed. (Patient taking differently: Apply 1 g topically as needed (Chemo port).), Disp: 30 g, Rfl: 5    LORazepam (ATIVAN) 1 MG tablet, Take 0.5 tablets (0.5 mg total) by mouth every 6 (six) hours as needed for Anxiety., Disp: 60 tablet, Rfl: 3    metoprolol tartrate (LOPRESSOR) 25 MG tablet, Take 1 tablet (25 mg total) by mouth 2 (two) times daily., Disp: 180 tablet, Rfl: 3    omeprazole (PRILOSEC) 40 MG capsule, Take 1 capsule (40 mg total) by mouth every morning., Disp: 90 capsule, Rfl: 3    ondansetron (ZOFRAN) 8 MG tablet, Take 1 tablet (8 mg total) by mouth every 8 (eight) hours as needed for Nausea., Disp: 30 tablet, Rfl: 5    polyethylene glycol (GLYCOLAX) 17 gram/dose powder, Take 17 g by mouth once daily., Disp: 510 g, Rfl: 5    promethazine (PHENERGAN) 25 MG tablet, Take 1 tablet (25 mg total) by mouth every 6 (six) hours as needed for Nausea., Disp: 30 tablet, Rfl: 5    REFRESH OPTIVE 0.5-0.9 % Drop, Place 1 drop into both eyes 4 (four) times daily as needed (Dry Eyes)., Disp: , Rfl:     simethicone (GAS-X ORAL), Take 1 tablet by mouth as needed (Flatulence)., Disp: , Rfl:     umeclidinium-vilanteroL (ANORO ELLIPTA) 62.5-25  "mcg/actuation DsDv, Inhale 1 puff into the lungs once daily. Controller, Disp: 1 each, Rfl: 11    furosemide (LASIX) 20 MG tablet, Take 1 tablet (20 mg total) by mouth daily as needed (edema). (Patient not taking: Reported on 7/15/2024), Disp: 30 tablet, Rfl: 1    promethazine (PHENERGAN) 25 MG suppository, Place 1 suppository (25 mg total) rectally every 6 (six) hours as needed for Nausea. (Patient not taking: Reported on 7/15/2024), Disp: 10 suppository, Rfl: 0  No current facility-administered medications for this visit.      Pathology:   Cancer Staging   Non-small cell carcinoma of left lung  Staging form: Lung, AJCC 8th Edition  - Clinical stage from 5/1/2024: Stage IB (cT2a, cN0, cM0) - Signed by Antony Rodriguez III, MD on 5/1/2024          Objective:   Vitals:  Blood pressure (!) 157/70, pulse 87, temperature 97.4 °F (36.3 °C), resp. rate 16, height 5' 2" (1.575 m).    Physical Examination:   GEN: no apparent distress, comfortable; AAOx3; overweight  HEAD: atraumatic and normocephalic  EYES: no pallor, no icterus, PERRLA  ENT: OMM, no pharyngeal erythema, external ears WNL; no nasal discharge; no thrush  NECK: no masses, thyroid normal, trachea midline, no LAD/LN's, supple  CV: RRR with no murmur; normal pulse; normal S1 and S2; no pedal edema; Rght portacath  CHEST: Normal respiratory effort; CTAB; normal breath sounds; no wheeze or crackles  ABDOM:nontender; nondistended; soft; normal bowel sounds; no rebound/guarding  MUSC/Skeletal: ROM normal; no crepitus; joints normal; no deformities or arthropathy  EXTREM: no clubbing, cyanosis, inflammation or swelling  SKIN: no rashes, lesions, ulcers, petechiae or subcutaneous nodules  : no payan  NEURO: grossly intact; motor/sensory WNL; AAOx3; no tremors; generalized weakness; wheelchair today  PSYCH: normal mood, affect and behavior  LYMPH: normal cervical, supraclavicular, axillary and groin LN's      Labs:   Lab Results   Component Value Date    WBC 6.42 " 07/17/2024    HGB 8.5 (L) 07/17/2024    HCT 26.8 (L) 07/17/2024    MCV 98 07/17/2024     07/17/2024    CMP  Sodium   Date Value Ref Range Status   07/17/2024 140 136 - 145 mmol/L Final     Potassium   Date Value Ref Range Status   07/17/2024 3.8 3.5 - 5.1 mmol/L Final     Chloride   Date Value Ref Range Status   07/17/2024 103 95 - 110 mmol/L Final     CO2   Date Value Ref Range Status   07/17/2024 25 23 - 29 mmol/L Final     Glucose   Date Value Ref Range Status   07/17/2024 99 70 - 110 mg/dL Final     BUN   Date Value Ref Range Status   07/17/2024 10 8 - 23 mg/dL Final     Creatinine   Date Value Ref Range Status   07/17/2024 0.9 0.5 - 1.4 mg/dL Final   03/19/2013 0.8 0.5 - 1.4 mg/dL Final     Calcium   Date Value Ref Range Status   07/17/2024 9.2 8.7 - 10.5 mg/dL Final   03/19/2013 9.9 8.7 - 10.5 mg/dL Final     Total Protein   Date Value Ref Range Status   07/17/2024 7.6 6.0 - 8.4 g/dL Final     Albumin   Date Value Ref Range Status   07/17/2024 3.6 3.5 - 5.2 g/dL Final     Total Bilirubin   Date Value Ref Range Status   07/17/2024 0.5 0.1 - 1.0 mg/dL Final     Comment:     For infants and newborns, interpretation of results should be based  on gestational age, weight and in agreement with clinical  observations.    Premature Infant recommended reference ranges:  Up to 24 hours.............<8.0 mg/dL  Up to 48 hours............<12.0 mg/dL  3-5 days..................<15.0 mg/dL  6-29 days.................<15.0 mg/dL       Alkaline Phosphatase   Date Value Ref Range Status   07/17/2024 94 55 - 135 U/L Final   08/31/2012 107 23 - 119 UNIT/L Final     AST   Date Value Ref Range Status   07/17/2024 12 10 - 40 U/L Final   08/31/2012 21 10 - 30 UNIT/L Final     ALT   Date Value Ref Range Status   07/17/2024 9 (L) 10 - 44 U/L Final     Anion Gap   Date Value Ref Range Status   07/17/2024 12 8 - 16 mmol/L Final   03/19/2013 12 5 - 15 meq/L Final     eGFR if    Date Value Ref Range Status    01/03/2022 >60.0 >60 mL/min/1.73 m^2 Final     eGFR if non    Date Value Ref Range Status   01/03/2022 >60.0 >60 mL/min/1.73 m^2 Final     Comment:     Calculation used to obtain the estimated glomerular filtration  rate (eGFR) is the CKD-EPI equation.          I have reviewed all available lab results and radiology reports.    Radiology/Diagnostic Studies:    NM Lung Scan Perfusion Particulate [[id number]] Resulted: 06/23/24 2017   Order Status: Completed Updated: 06/23/24 2020   Narrative:     EXAMINATION:  NM LUNG SCAN PERFUSION    CLINICAL HISTORY:  Chest pain, nonspecific;Pulmonary embolism (PE) suspected, high prob;Chest pain, nonspecific; Pulmonary embolism (PE) suspected, high prob;    TECHNIQUE:  IV administration of 5.2 mCi of Tc-99m-MAA, multiple images of the thorax were obtained in various projections.    COMPARISON:  Chest x-ray dated 06/23/2024.    FINDINGS:  Only perfusion images are provided for review.  No perfusion defect identified.  Evaluation for mismatch is precluded in the absence of ventilation portion of the examination.   Impression:                      X-Ray Chest AP Portable [[id number]] Resulted: 06/23/24 0926   Order Status: Completed Updated: 06/23/24 0929   Narrative:     EXAMINATION:  XR CHEST AP PORTABLE    CLINICAL HISTORY:  Chest Pain;    COMPARISON:  June 21, 2024    FINDINGS:  AP view demonstrates normal heart size.  The thoracic aorta is elongated.  Left upper lobe noncalcified pulmonary nodule is unchanged.  No infiltrates or effusions are identified.    Right-sided Port-A-Cath is unchanged in position with tip at the superior vena cava.   Impression:       1. No interval change compared to June 21, 2024.          All lab results and imaging results have been reviewed and discussed with the patient    Assessment:   (1) 74 y.o. female with diagnosis of lung cancer with neuroendocrine features who has been on platinum and etopside chemotherapy who was seen as  a consult at [facility].. She has been followed by Dr Camden Iyer and she had asked to switch to myself after I had followed her during her recent hospitalization. She had her 2nd cycle chemotherapy on 6/19/2024 The patient returns for a hospital follow-up visit today to go over results of recently ordered tests, studies and/or labs.       7/11/2024:  - pancytopenia due to prior chemotherapy  - WBC now back to normal at 11.8  - hgb still low but adequate at 8.6  - plats at 286,000  - Discussed quality of life as her priority goal and plan is to see if we can build her back up to some prior baseline. He ask palliative medicine to see her. Discussed having PRN orders for IVF's and antiemetics as needed.    7/18/2024:   - one unit of PRBC due to hemoglobin 8.2 and patient is symptomatic   - iron panel to see if we can supplement with iron   - platelets stable   - IV fluids and needed   - PET scan due to new complaints of bone pain    - xray of left ankle pain    (2) HTN     (3) COPD     (4) GERD; Martin's esophagus; hx/of colitis     (5) Thyroid disease     (6) Anxiety     (7) Former smoker      VISIT DIAGNOSES:      1. Non-small cell carcinoma of left lung    2. Malignant neoplasm of unspecified part of unspecified bronchus or lung    3. Anemia due to chemotherapy    4. Panlobular emphysema    5. Acute left ankle pain            Plan:     PLAN:  Check labs weekly and transfuse as needed - 1 unit of PRBC to be given tomorrow   Discussed routine schedule of antiemetics  Discussed prn orders for IVF's here at [facility]  Goal of care transitioning to Quality of life  Consult palliative medicine  Repeat scans ordered   F/u with PCP, Pulm, GI, Rad/onc etc        Non-small cell carcinoma of left lung  -     NM PET CT FDG Skull Base to Mid Thigh; Future; Expected date: 07/18/2024    Malignant neoplasm of unspecified part of unspecified bronchus or lung  -     NM PET CT FDG Skull Base to Mid Thigh; Future; Expected date:  "07/18/2024    Anemia due to chemotherapy  -     Type & Screen; Future; Expected date: 07/18/2024  -     Iron and TIBC; Future; Expected date: 07/18/2024  -     FERRITIN; Future; Expected date: 07/18/2024    Panlobular emphysema    Acute left ankle pain  -     X-Ray Ankle Complete 3 View Left; Future; Expected date: 07/18/2024        Pathology Discussion:    I reviewed and discussed the pathology report(s) and radiograph reports (if available) in as simple to understand and/or laymen's terms to the best of my ability. I had an indepth conversation with the patient and went over the patient's individual diagnosis based on the information that was currently available. I discussed the TNM staging process with regard to the patient's particular cancer type, and the calculated stage based on the currently available TNM data and literature. I discussed the available prognostic data with regard to the current staging information and how it relates to the prognosis of their particular neoplastic process.      NCCN Guidelines:    I discussed the available treatment option(s) in accordance with the latest literature from the NCCN Clinical Practice Guidelines for the patient's particular type of cancer disorder. The NCCN Guidelines provide a "document evidence-based (and) consensus-driven management" of the care of oncology patients. The treatment recommendations were made not only in accordance to the NCCN guidelines, but also factored in to account the patient's overall age, condition, performance status and their medical co-morbidities. I went over the risks and benefits of the the treatment options (if any could be made) with regard to their particular cancer type, their cancer stage, their age, and their co-morbidities.     Chemotherapy Discussion:      I discussed the available treatment option(s) in accordance with the latest/current national evidence-based guidelines (NCCN, UpToDate, NCI, ASCO, etc where applicable), " their overall age/condition and their co-morbidities. I also went over the risks and benefits of the chemotherapy with regard to their particular cancer type, their cancer stage, their age/condition, and their co-morbidities. I provided literature on the chemotherapy regimen and discussed the chemotherapy side-effect profiles of the drug(s). I discussed the importance of compliance with obtaining and monitoring weekly lab work, and went over the potential hematopathology issues and risks with anemia, leucopenia and thrombocytopenia that can occur with chemotherapy. Discussed the potential risks of liver and kidney damage, which could be permanent and could necessitate dialysis long-term if kidney failure developed. Discussed the risk of development of cardiomyopathy and/or CHF with certain chemotherapy agents, which could be chronic and/or permanent. Discussed the emetic and/or diarrheal potential of the regimen and the potential need for use of antiemetic and anti-diarrheal medications. Discussed the risk for development of anaphylactic shock, bronchospasm, dysrhythmia, and respiratory/cardiovascular arrest and/or failure. Discussed the potential risks for development of alopecia, cold sensory issues, ringing in ears, vertigo, cataracts, glaucoma, and neuropathy, all of which could end up being chronic and life-long. Discussed the risks of any N/V, diarrhea, mucositis, mouth ulcers, dry skin, itching, HA's, blurry vision, fatigue and other general malaise that can be associated with certain chemotherapy agents. Discussed the risks of hand-foot syndrome and rashes, and development of other autoimmune mediated processes such as pneumonitis, hepatitis, and colitis which could be life threatening. Discussed the risks of the potential development of a rare but fatal viral mediated disease known as PML (Progressive Multifocal Leukoencephalopathy), and risk of future development of leukemia and/or lymphoma from use of  certain chemotherapy agents. Discussed the need for neutropenic precautions, basic hygiene/sanitation behaviors and dietary restrictions.    The patient's consent has been obtained to proceed with the chemotherapy.The patient referred to and underwent Chemotherapy [facility] for training and education on chemotherapy, use of antiemetics and/or anti-diarrheals, use of NSAID's, potential chemotherapy side-effects, and any specific recommendations and precautions with the particular chemotherapy agents.      Answered all of the patient's (and family's, if applicable) questions to the best of my ability and to their complete satisfaction. The patient acknowledged full understanding of the risks, recommendations and plan(s).     I have explained and the patient understands all of  the current recommendation(s). I have answered all of their questions to the best of my ability and to their complete satisfaction.             Thank you for allowing me to participate in this pleasant patient's care. Please call with any questions or concerns.      Electronically signed Scarlett Stoner NP    Answers submitted by the patient for this visit:  Review of Systems Questionnaire (Submitted on 7/17/2024)  appetite change : No  unexpected weight change: No  mouth sores: No  visual disturbance: Yes  cough: Yes  shortness of breath: Yes  chest pain: No  abdominal pain: Yes  diarrhea: No  frequency: No  back pain: Yes  rash: No  headaches: Yes  adenopathy: No  nervous/ anxious: Yes

## 2024-07-18 NOTE — PROGRESS NOTES
Orders placed for 1 unit blood with lasix to be given after transfusion. Patient aware and type and screened as she was seen in office today. [facility] made aware of need to schedule.

## 2024-07-19 ENCOUNTER — INFUSION (OUTPATIENT)
Dept: INFUSION THERAPY | Facility: HOSPITAL | Age: 74
End: 2024-07-19
Attending: INTERNAL MEDICINE
Payer: MEDICARE

## 2024-07-19 VITALS
WEIGHT: 171.06 LBS | HEART RATE: 85 BPM | BODY MASS INDEX: 31.48 KG/M2 | HEIGHT: 62 IN | DIASTOLIC BLOOD PRESSURE: 68 MMHG | SYSTOLIC BLOOD PRESSURE: 148 MMHG | RESPIRATION RATE: 16 BRPM | TEMPERATURE: 98 F | OXYGEN SATURATION: 93 %

## 2024-07-19 DIAGNOSIS — D64.81 ANEMIA DUE TO CHEMOTHERAPY: ICD-10-CM

## 2024-07-19 DIAGNOSIS — T45.1X5A ANEMIA DUE TO CHEMOTHERAPY: ICD-10-CM

## 2024-07-19 DIAGNOSIS — C34.92 NON-SMALL CELL CARCINOMA OF LEFT LUNG: Primary | ICD-10-CM

## 2024-07-19 LAB
BLD PROD TYP BPU: NORMAL
BLOOD UNIT EXPIRATION DATE: NORMAL
BLOOD UNIT TYPE CODE: 7300
BLOOD UNIT TYPE: NORMAL
CODING SYSTEM: NORMAL
CROSSMATCH INTERPRETATION: NORMAL
DISPENSE STATUS: NORMAL
NUM UNITS TRANS PACKED RBC: NORMAL

## 2024-07-19 PROCEDURE — P9016 RBC LEUKOCYTES REDUCED: HCPCS | Performed by: NURSE PRACTITIONER

## 2024-07-19 PROCEDURE — 25000003 PHARM REV CODE 250: Performed by: INTERNAL MEDICINE

## 2024-07-19 PROCEDURE — 25000003 PHARM REV CODE 250: Performed by: NURSE PRACTITIONER

## 2024-07-19 PROCEDURE — 63600175 PHARM REV CODE 636 W HCPCS: Performed by: NURSE PRACTITIONER

## 2024-07-19 PROCEDURE — 36430 TRANSFUSION BLD/BLD COMPNT: CPT

## 2024-07-19 PROCEDURE — 96375 TX/PRO/DX INJ NEW DRUG ADDON: CPT

## 2024-07-19 PROCEDURE — 63600175 PHARM REV CODE 636 W HCPCS: Performed by: INTERNAL MEDICINE

## 2024-07-19 PROCEDURE — 96374 THER/PROPH/DIAG INJ IV PUSH: CPT

## 2024-07-19 PROCEDURE — A4216 STERILE WATER/SALINE, 10 ML: HCPCS | Performed by: INTERNAL MEDICINE

## 2024-07-19 RX ORDER — HEPARIN 100 UNIT/ML
500 SYRINGE INTRAVENOUS
OUTPATIENT
Start: 2024-07-19

## 2024-07-19 RX ORDER — SODIUM CHLORIDE 0.9 % (FLUSH) 0.9 %
10 SYRINGE (ML) INJECTION
Status: DISCONTINUED | OUTPATIENT
Start: 2024-07-19 | End: 2024-07-19 | Stop reason: HOSPADM

## 2024-07-19 RX ORDER — SODIUM CHLORIDE 0.9 % (FLUSH) 0.9 %
10 SYRINGE (ML) INJECTION
OUTPATIENT
Start: 2024-07-19

## 2024-07-19 RX ORDER — ACETAMINOPHEN 325 MG/1
650 TABLET ORAL ONCE
Status: COMPLETED | OUTPATIENT
Start: 2024-07-19 | End: 2024-07-19

## 2024-07-19 RX ORDER — HEPARIN 100 UNIT/ML
500 SYRINGE INTRAVENOUS
Status: DISCONTINUED | OUTPATIENT
Start: 2024-07-19 | End: 2024-07-19 | Stop reason: HOSPADM

## 2024-07-19 RX ORDER — ONDANSETRON 2 MG/ML
4 INJECTION INTRAMUSCULAR; INTRAVENOUS
Start: 2024-07-19 | End: 2024-07-19

## 2024-07-19 RX ORDER — HYDROCODONE BITARTRATE AND ACETAMINOPHEN 500; 5 MG/1; MG/1
TABLET ORAL ONCE
Status: COMPLETED | OUTPATIENT
Start: 2024-07-19 | End: 2024-07-19

## 2024-07-19 RX ORDER — DIPHENHYDRAMINE HYDROCHLORIDE 50 MG/ML
25 INJECTION INTRAMUSCULAR; INTRAVENOUS ONCE
Status: COMPLETED | OUTPATIENT
Start: 2024-07-19 | End: 2024-07-19

## 2024-07-19 RX ORDER — FUROSEMIDE 10 MG/ML
20 INJECTION INTRAMUSCULAR; INTRAVENOUS ONCE
Status: COMPLETED | OUTPATIENT
Start: 2024-07-19 | End: 2024-07-19

## 2024-07-19 RX ADMIN — HEPARIN 500 UNITS: 100 SYRINGE at 12:07

## 2024-07-19 RX ADMIN — Medication 10 ML: at 12:07

## 2024-07-19 RX ADMIN — SODIUM CHLORIDE: 9 INJECTION, SOLUTION INTRAVENOUS at 12:07

## 2024-07-19 RX ADMIN — DIPHENHYDRAMINE HYDROCHLORIDE 25 MG: 50 INJECTION INTRAMUSCULAR; INTRAVENOUS at 10:07

## 2024-07-19 RX ADMIN — FUROSEMIDE 20 MG: 10 INJECTION, SOLUTION INTRAMUSCULAR; INTRAVENOUS at 12:07

## 2024-07-19 RX ADMIN — ACETAMINOPHEN 650 MG: 325 TABLET ORAL at 10:07

## 2024-07-19 NOTE — PLAN OF CARE
Problem: Adult Inpatient Plan of Care  Goal: Optimal Comfort and Wellbeing  Outcome: Met  Intervention: Provide Person-Centered Care  Flowsheets (Taken 7/19/2024 1042)  Trust Relationship/Rapport:   care explained   questions encouraged   choices provided   reassurance provided   emotional support provided   thoughts/feelings acknowledged   empathic listening provided

## 2024-07-21 LAB
OHS QRS DURATION: 68 MS
OHS QTC CALCULATION: 453 MS

## 2024-07-25 ENCOUNTER — TELEPHONE (OUTPATIENT)
Facility: CLINIC | Age: 74
End: 2024-07-25
Payer: MEDICARE

## 2024-07-25 ENCOUNTER — LAB VISIT (OUTPATIENT)
Dept: LAB | Facility: HOSPITAL | Age: 74
End: 2024-07-25
Attending: INTERNAL MEDICINE
Payer: MEDICARE

## 2024-07-25 DIAGNOSIS — D69.6 THROMBOCYTOPENIA: ICD-10-CM

## 2024-07-25 DIAGNOSIS — C34.92 NON-SMALL CELL CARCINOMA OF LEFT LUNG: ICD-10-CM

## 2024-07-25 LAB
ALBUMIN SERPL BCP-MCNC: 4.1 G/DL (ref 3.5–5.2)
ALP SERPL-CCNC: 105 U/L (ref 55–135)
ALT SERPL W/O P-5'-P-CCNC: 11 U/L (ref 10–44)
ANION GAP SERPL CALC-SCNC: 9 MMOL/L (ref 8–16)
AST SERPL-CCNC: 16 U/L (ref 10–40)
BASOPHILS # BLD AUTO: 0.04 K/UL (ref 0–0.2)
BASOPHILS NFR BLD: 0.7 % (ref 0–1.9)
BILIRUB SERPL-MCNC: 0.3 MG/DL (ref 0.1–1)
BUN SERPL-MCNC: 15 MG/DL (ref 8–23)
CALCIUM SERPL-MCNC: 9.6 MG/DL (ref 8.7–10.5)
CHLORIDE SERPL-SCNC: 105 MMOL/L (ref 95–110)
CO2 SERPL-SCNC: 27 MMOL/L (ref 23–29)
CREAT SERPL-MCNC: 1.2 MG/DL (ref 0.5–1.4)
DIFFERENTIAL METHOD BLD: ABNORMAL
EOSINOPHIL # BLD AUTO: 0.3 K/UL (ref 0–0.5)
EOSINOPHIL NFR BLD: 4.6 % (ref 0–8)
ERYTHROCYTE [DISTWIDTH] IN BLOOD BY AUTOMATED COUNT: 17.2 % (ref 11.5–14.5)
EST. GFR  (NO RACE VARIABLE): 46.9 ML/MIN/1.73 M^2
GLUCOSE SERPL-MCNC: 107 MG/DL (ref 70–110)
HCT VFR BLD AUTO: 37.2 % (ref 37–48.5)
HGB BLD-MCNC: 11.4 G/DL (ref 12–16)
IMM GRANULOCYTES # BLD AUTO: 0.02 K/UL (ref 0–0.04)
IMM GRANULOCYTES NFR BLD AUTO: 0.4 % (ref 0–0.5)
LYMPHOCYTES # BLD AUTO: 1.3 K/UL (ref 1–4.8)
LYMPHOCYTES NFR BLD: 22.5 % (ref 18–48)
MAGNESIUM SERPL-MCNC: 1.9 MG/DL (ref 1.6–2.6)
MCH RBC QN AUTO: 29.9 PG (ref 27–31)
MCHC RBC AUTO-ENTMCNC: 30.6 G/DL (ref 32–36)
MCV RBC AUTO: 98 FL (ref 82–98)
MONOCYTES # BLD AUTO: 0.6 K/UL (ref 0.3–1)
MONOCYTES NFR BLD: 10.5 % (ref 4–15)
NEUTROPHILS # BLD AUTO: 3.5 K/UL (ref 1.8–7.7)
NEUTROPHILS NFR BLD: 61.3 % (ref 38–73)
NRBC BLD-RTO: 0 /100 WBC
PLATELET # BLD AUTO: 228 K/UL (ref 150–450)
PMV BLD AUTO: 8.1 FL (ref 9.2–12.9)
POTASSIUM SERPL-SCNC: 4.3 MMOL/L (ref 3.5–5.1)
PROT SERPL-MCNC: 8.1 G/DL (ref 6–8.4)
RBC # BLD AUTO: 3.81 M/UL (ref 4–5.4)
SODIUM SERPL-SCNC: 141 MMOL/L (ref 136–145)
WBC # BLD AUTO: 5.7 K/UL (ref 3.9–12.7)

## 2024-07-25 PROCEDURE — 80053 COMPREHEN METABOLIC PANEL: CPT | Performed by: INTERNAL MEDICINE

## 2024-07-25 PROCEDURE — 85025 COMPLETE CBC W/AUTO DIFF WBC: CPT | Performed by: INTERNAL MEDICINE

## 2024-07-25 PROCEDURE — 36415 COLL VENOUS BLD VENIPUNCTURE: CPT | Performed by: INTERNAL MEDICINE

## 2024-07-25 PROCEDURE — 83735 ASSAY OF MAGNESIUM: CPT | Performed by: INTERNAL MEDICINE

## 2024-07-25 NOTE — TELEPHONE ENCOUNTER
----- Message from Scarlett Stoner NP sent at 7/25/2024  3:36 PM CDT -----  Tell her to hydrate, kidneys are looking dry  ----- Message -----  From: [facility]  Sent: 7/25/2024  10:40 AM CDT  To: Neo Mariano MD

## 2024-07-26 ENCOUNTER — PATIENT MESSAGE (OUTPATIENT)
Facility: CLINIC | Age: 74
End: 2024-07-26

## 2024-07-26 ENCOUNTER — OFFICE VISIT (OUTPATIENT)
Facility: CLINIC | Age: 74
End: 2024-07-26
Payer: MEDICARE

## 2024-07-26 VITALS
RESPIRATION RATE: 20 BRPM | TEMPERATURE: 97 F | BODY MASS INDEX: 31.4 KG/M2 | DIASTOLIC BLOOD PRESSURE: 82 MMHG | SYSTOLIC BLOOD PRESSURE: 134 MMHG | HEART RATE: 99 BPM | OXYGEN SATURATION: 95 % | WEIGHT: 170.63 LBS | HEIGHT: 62 IN

## 2024-07-26 DIAGNOSIS — T45.1X5A ANEMIA DUE TO CHEMOTHERAPY: ICD-10-CM

## 2024-07-26 DIAGNOSIS — M79.89 NODULE OF SOFT TISSUE: ICD-10-CM

## 2024-07-26 DIAGNOSIS — D64.81 ANEMIA DUE TO CHEMOTHERAPY: ICD-10-CM

## 2024-07-26 DIAGNOSIS — N28.9 FUNCTION KIDNEY DECREASED: ICD-10-CM

## 2024-07-26 DIAGNOSIS — C34.92 NON-SMALL CELL CARCINOMA OF LEFT LUNG: Primary | ICD-10-CM

## 2024-07-26 DIAGNOSIS — R82.90 BAD ODOR OF URINE: ICD-10-CM

## 2024-07-26 PROCEDURE — 99999 PR PBB SHADOW E&M-EST. PATIENT-LVL V: CPT | Mod: PBBFAC,,, | Performed by: NURSE PRACTITIONER

## 2024-07-26 NOTE — PROGRESS NOTES
[facility] Hematology/Oncology   Follow-up Visit    Subjective:      Patient ID:   NAME: Joslyn Dubon : 1950     74 y.o. female    Referring Doc: Jasbir Graham  Other Physicians: Yobani/Xavier Siegel Dauterive, [place]    Chief Complaint: lung cancer      HPI:  Patient is here today for a follow up visit.     She has a diagnosis of lung cancer with neuroendocrine features who has been on platinum and etopside chemotherapy who was seen as a consult at [facility] She has been followed by Dr Camden Iyer and she had asked to switch to myself after I had followed her during her recent hospitalization. She had her 2nd cycle chemotherapy on 2024. She had significant side effects.   She has transferred her care to Dr. Mariano.   She has been feeling poorly since that second cycle of chemotherapy. She is here by herself today.     She had IV fluids and nausea medications twice.   She did receive one unit of blood last week and immediately felt better.   She is still deconditioned, however she does feel better.     She completed SBRT 2024     She is in wheelchair today. She has some residual Nausea and GI issues.     Discussed quality of life as her priority goal and plan is to see if we can build her back up to some prior baseline.    ROS:   GEN: general malaise, weakness, fatigue - improved   HEENT: normal with no HA's, sore throat, stiff neck, changes in vision  CV: normal with no CP, SOB, PND, RAPHAEL or orthopnea  PULM: normal with no SOB, cough, hemoptysis, sputum or pleuritic pain  GI:  residual nausea and GI upset  : foul odor in the urine  BREAST: normal with no mass, discharge, pain  SKIN: nodule left lower abdomen       Answers submitted by the patient for this visit:  Review of Systems Questionnaire (Submitted on 2024)  appetite change : No  unexpected weight change: No  mouth sores: No  visual disturbance: No  cough: Yes  shortness of breath: Yes  chest pain: No  abdominal pain:  Yes  diarrhea: No  frequency: Yes  back pain: Yes  rash: No  headaches: No  adenopathy: No  nervous/ anxious: No      Past Medical/Surgical History:  Past Medical History:   Diagnosis Date    Anxiety     Martin esophagus     Chemotherapy-induced nausea 07/12/2024    Colitis     COPD (chronic obstructive pulmonary disease)     Dehydration 07/12/2024    GERD (gastroesophageal reflux disease)     Hypertension     Intractable nausea and vomiting 06/23/2024    Lung cancer     Thyroid disease     Vocal cord polyps      Past Surgical History:   Procedure Laterality Date    ABDOMINAL AORTIC ANEURYSM REPAIR      BACK SURGERY      cervical    CATARACT EXTRACTION Right 02/2021    CHOLECYSTECTOMY  12/20/2013    Dr Albert [facility]ENT[facility]    ENDOBRONCHIAL ULTRASOUND N/A 4/19/2024    Procedure: ENDOBRONCHIAL ULTRASOUND (EBUS);  Surgeon: Kizzy Siegel MD;  Location: [facility] OR [address] [place] [address];  Service: Pulmonary;  Laterality: N/A;    FOOT SURGERY      HYSTERECTOMY  1979    AUGUSTINE for AUB with consevation ovaries    INSERTION OF TUNNELED CENTRAL VENOUS CATHETER (CVC) WITH SUBCUTANEOUS PORT Right 5/20/2024    Procedure: [id number];  Surgeon: Simon Wilson III, MD;  Location: [facility] OR;  Service: General;  Laterality: Right;    ROBOTIC BRONCHOSCOPY N/A 4/19/2024    Procedure: ROBOTIC BRONCHOSCOPY;  Surgeon: Kizzy Siegel MD;  Location: [facility] OR [address] [place] [address];  Service: Pulmonary;  Laterality: N/A;    SALIVARY GLAND SURGERY           Allergies:  Review of patient's allergies indicates:   Allergen Reactions    Bisacodyl Nausea And Vomiting     Other reaction(s): Vomiting    Iodinated contrast media Anaphylaxis, Hives and Shortness Of Breath     Hypotension.  Pt states she has anaphylaxis with IV contrast.     Morphine Other (See Comments)     hypotension    Azithromycin Hives    Cipro [ciprofloxacin hcl]     Demerol [meperidine]      Nausea vomiting, visual problem    Doxycycline Hives    Flonase [fluticasone propionate] Hives    Morpholine  analogues Other (See Comments)     hypotension       Social/Family History:  Social History     Socioeconomic History    Marital status:    Tobacco Use    Smoking status: Former     Types: Cigarettes     Start date: 2024     Quit date: 1964     Years since quittin.6    Smokeless tobacco: Never    Tobacco comments:     Pt has smoked since 14 years old. Smokes around .5ppd. Inpatient smoking education done 10/20.     Pt quit smoking on 24.  She has never used smokeless tobacco   Substance and Sexual Activity    Alcohol use: Never    Drug use: Never    Sexual activity: Yes     Partners: Male     Social Determinants of Health     Financial Resource Strain: Low Risk  (2024)    Overall Financial Resource Strain (CARDIA)     Difficulty of Paying Living Expenses: Not hard at all   Food Insecurity: No Food Insecurity (2024)    Hunger Vital Sign     Worried About Running Out of Food in the Last Year: Never true     Ran Out of Food in the Last Year: Never true   Transportation Needs: No Transportation Needs (2024)    PRAPARE - Transportation     Lack of Transportation (Medical): No     Lack of Transportation (Non-Medical): No   Physical Activity: Inactive (3/15/2024)    Exercise Vital Sign     Days of Exercise per Week: 0 days     Minutes of Exercise per Session: 0 min   Stress: Stress Concern Present (3/15/2024)    [employer] [place] [employer] - Occupational Stress Questionnaire     Feeling of Stress : Very much   Housing Stability: Low Risk  (2024)    Housing Stability Vital Sign     Unable to Pay for Housing in the Last Year: No     Homeless in the Last Year: No     Family History   Problem Relation Name Age of Onset    Bladder Cancer Mother      Heart failure Father      Emphysema Sister      Pancreatic cancer Sister      No Known Problems Brother           Medications:    Current Outpatient Medications:     albuterol-ipratropium (DUO-NEB) 2.5 mg-0.5 mg/3 mL nebulizer  solution, Take 3 mLs by nebulization every 6 (six) hours as needed for Wheezing. Rescue, Disp: 75 mL, Rfl: 0    amLODIPine (NORVASC) 5 MG tablet, Take 5 mg by mouth once daily. Per pt only taking if bp is high, Disp: , Rfl:     EPINEPHrine (EPIPEN) 0.3 mg/0.3 mL AtIn, Inject 0.3 mLs (0.3 mg total) into the muscle as needed (Severe allergic reaction symptoms such as shortness of breath, tongue or throat swelling, weakness dizziness severe nausea vomiting)., Disp: 1 each, Rfl: 3    famotidine (PEPCID) 20 MG tablet, Take 1 tablet (20 mg total) by mouth 2 (two) times daily., Disp: 20 tablet, Rfl: 0    HYDROcodone-acetaminophen (NORCO)  mg per tablet, Take 1 tablet by mouth every 12 (twelve) hours as needed for Pain., Disp: 50 tablet, Rfl: 0    levalbuterol (XOPENEX HFA) 45 mcg/actuation inhaler, Inhale 2 puffs into the lungs every 6 (six) hours as needed for Wheezing. Rescue, Disp: 15 g, Rfl: 4    levalbuterol (XOPENEX) 1.25 mg/3 mL nebulizer solution, Take 3 mLs (1.25 mg total) by nebulization every 4 (four) hours as needed for Wheezing. Rescue, Disp: 1 mL, Rfl: 0    levothyroxine (SYNTHROID) 112 MCG tablet, Take 1 tablet (112 mcg total) by mouth before breakfast., Disp: 90 tablet, Rfl: 0    LIDOcaine-prilocaine (EMLA) cream, Apply topically as needed. (Patient taking differently: Apply 1 g topically as needed (Chemo port).), Disp: 30 g, Rfl: 5    LORazepam (ATIVAN) 1 MG tablet, Take 0.5 tablets (0.5 mg total) by mouth every 6 (six) hours as needed for Anxiety., Disp: 60 tablet, Rfl: 3    metoprolol tartrate (LOPRESSOR) 25 MG tablet, Take 1 tablet (25 mg total) by mouth 2 (two) times daily., Disp: 180 tablet, Rfl: 3    omeprazole (PRILOSEC) 40 MG capsule, Take 1 capsule (40 mg total) by mouth every morning., Disp: 90 capsule, Rfl: 3    ondansetron (ZOFRAN) 8 MG tablet, Take 1 tablet (8 mg total) by mouth every 8 (eight) hours as needed for Nausea., Disp: 30 tablet, Rfl: 5    polyethylene glycol (GLYCOLAX) 17  "gram/dose powder, Take 17 g by mouth once daily., Disp: 510 g, Rfl: 5    promethazine (PHENERGAN) 25 MG tablet, Take 1 tablet (25 mg total) by mouth every 6 (six) hours as needed for Nausea., Disp: 30 tablet, Rfl: 5    REFRESH OPTIVE 0.5-0.9 % Drop, Place 1 drop into both eyes 4 (four) times daily as needed (Dry Eyes)., Disp: , Rfl:     simethicone (GAS-X ORAL), Take 1 tablet by mouth as needed (Flatulence)., Disp: , Rfl:     umeclidinium-vilanteroL (ANORO ELLIPTA) 62.5-25 mcg/actuation DsDv, Inhale 1 puff into the lungs once daily. Controller, Disp: 1 each, Rfl: 11    furosemide (LASIX) 20 MG tablet, Take 1 tablet (20 mg total) by mouth daily as needed (edema). (Patient not taking: Reported on 7/26/2024), Disp: 30 tablet, Rfl: 1    promethazine (PHENERGAN) 25 MG suppository, Place 1 suppository (25 mg total) rectally every 6 (six) hours as needed for Nausea. (Patient not taking: Reported on 7/15/2024), Disp: 10 suppository, Rfl: 0      Pathology:   Cancer Staging   Non-small cell carcinoma of left lung  Staging form: Lung, AJCC 8th Edition  - Clinical stage from 5/1/2024: Stage IB (cT2a, cN0, cM0) - Signed by Antony Rodriguez III, MD on 5/1/2024          Objective:   Vitals:  Blood pressure 134/82, pulse 99, temperature 97.3 °F (36.3 °C), resp. rate 20, height 5' 2" (1.575 m), weight 77.4 kg (170 lb 9.6 oz), SpO2 95%.    Physical Examination:   GEN: no apparent distress, comfortable; AAOx3; overweight  HEAD: atraumatic and normocephalic  EYES: no pallor, no icterus, PERRLA  ENT: OMM, no pharyngeal erythema, external ears WNL; no nasal discharge; no thrush  NECK: no masses, thyroid normal, trachea midline, no LAD/LN's, supple  CV: RRR with no murmur; normal pulse; normal S1 and S2; no pedal edema; Rght portacath  CHEST: Normal respiratory effort; CTAB; normal breath sounds; no wheeze or crackles  ABDOM:nontender; nondistended; soft; normal bowel sounds; no rebound/guarding  MUSC/Skeletal: ROM normal; no crepitus; " joints normal; no deformities or arthropathy  EXTREM: no clubbing, cyanosis, inflammation or swelling  SKIN: no rashes, lesions, ulcers, petechiae or subcutaneous nodules  : no payan  NEURO: grossly intact; motor/sensory WNL; AAOx3; no tremors; generalized weakness; wheelchair today  PSYCH: normal mood, affect and behavior  LYMPH: normal cervical, supraclavicular, axillary and groin LN's      Labs:   Lab Results   Component Value Date    WBC 5.70 07/25/2024    HGB 11.4 (L) 07/25/2024    HCT 37.2 07/25/2024    MCV 98 07/25/2024     07/25/2024    CMP  Sodium   Date Value Ref Range Status   07/25/2024 141 136 - 145 mmol/L Final     Potassium   Date Value Ref Range Status   07/25/2024 4.3 3.5 - 5.1 mmol/L Final     Chloride   Date Value Ref Range Status   07/25/2024 105 95 - 110 mmol/L Final     CO2   Date Value Ref Range Status   07/25/2024 27 23 - 29 mmol/L Final     Glucose   Date Value Ref Range Status   07/25/2024 107 70 - 110 mg/dL Final     BUN   Date Value Ref Range Status   07/25/2024 15 8 - 23 mg/dL Final     Creatinine   Date Value Ref Range Status   07/25/2024 1.2 0.5 - 1.4 mg/dL Final   03/19/2013 0.8 0.5 - 1.4 mg/dL Final     Calcium   Date Value Ref Range Status   07/25/2024 9.6 8.7 - 10.5 mg/dL Final   03/19/2013 9.9 8.7 - 10.5 mg/dL Final     Total Protein   Date Value Ref Range Status   07/25/2024 8.1 6.0 - 8.4 g/dL Final     Albumin   Date Value Ref Range Status   07/25/2024 4.1 3.5 - 5.2 g/dL Final     Total Bilirubin   Date Value Ref Range Status   07/25/2024 0.3 0.1 - 1.0 mg/dL Final     Comment:     For infants and newborns, interpretation of results should be based  on gestational age, weight and in agreement with clinical  observations.    Premature Infant recommended reference ranges:  Up to 24 hours.............<8.0 mg/dL  Up to 48 hours............<12.0 mg/dL  3-5 days..................<15.0 mg/dL  6-29 days.................<15.0 mg/dL       Alkaline Phosphatase   Date Value Ref  Range Status   07/25/2024 105 55 - 135 U/L Final   08/31/2012 107 23 - 119 UNIT/L Final     AST   Date Value Ref Range Status   07/25/2024 16 10 - 40 U/L Final   08/31/2012 21 10 - 30 UNIT/L Final     ALT   Date Value Ref Range Status   07/25/2024 11 10 - 44 U/L Final     Anion Gap   Date Value Ref Range Status   07/25/2024 9 8 - 16 mmol/L Final   03/19/2013 12 5 - 15 meq/L Final     eGFR if    Date Value Ref Range Status   01/03/2022 >60.0 >60 mL/min/1.73 m^2 Final     eGFR if non    Date Value Ref Range Status   01/03/2022 >60.0 >60 mL/min/1.73 m^2 Final     Comment:     Calculation used to obtain the estimated glomerular filtration  rate (eGFR) is the CKD-EPI equation.          I have reviewed all available lab results and radiology reports.    Radiology/Diagnostic Studies:    NM Lung Scan Perfusion Particulate [[id number]] Resulted: 06/23/24 2017   Order Status: Completed Updated: 06/23/24 2020   Narrative:     EXAMINATION:  NM LUNG SCAN PERFUSION    CLINICAL HISTORY:  Chest pain, nonspecific;Pulmonary embolism (PE) suspected, high prob;Chest pain, nonspecific; Pulmonary embolism (PE) suspected, high prob;    TECHNIQUE:  IV administration of 5.2 mCi of Tc-99m-MAA, multiple images of the thorax were obtained in various projections.    COMPARISON:  Chest x-ray dated 06/23/2024.    FINDINGS:  Only perfusion images are provided for review.  No perfusion defect identified.  Evaluation for mismatch is precluded in the absence of ventilation portion of the examination.   Impression:                      X-Ray Chest AP Portable [[id number]] Resulted: 06/23/24 0926   Order Status: Completed Updated: 06/23/24 0929   Narrative:     EXAMINATION:  XR CHEST AP PORTABLE    CLINICAL HISTORY:  Chest Pain;    COMPARISON:  June 21, 2024    FINDINGS:  AP view demonstrates normal heart size.  The thoracic aorta is elongated.  Left upper lobe noncalcified pulmonary nodule is unchanged.  No infiltrates  or effusions are identified.    Right-sided Port-A-Cath is unchanged in position with tip at the superior vena cava.   Impression:       1. No interval change compared to June 21, 2024.          All lab results and imaging results have been reviewed and discussed with the patient    Assessment:   (1) 74 y.o. female with diagnosis of lung cancer with neuroendocrine features who has been on platinum and etopside chemotherapy who was seen as a consult at [facility].. She has been followed by Dr Camden Iyer and she had asked to switch to myself after I had followed her during her recent hospitalization. She had her 2nd cycle chemotherapy on 6/19/2024 The patient returns for a hospital follow-up visit today to go over results of recently ordered tests, studies and/or labs.       7/11/2024:  - pancytopenia due to prior chemotherapy  - WBC now back to normal at 11.8  - hgb still low but adequate at 8.6  - plats at 286,000  - Discussed quality of life as her priority goal and plan is to see if we can build her back up to some prior baseline. He ask palliative medicine to see her. Discussed having PRN orders for IVF's and antiemetics as needed.    7/18/2024:   - one unit of PRBC due to hemoglobin 8.2 and patient is symptomatic   - iron panel to see if we can supplement with iron   - platelets stable   - IV fluids and needed   - PET scan due to new complaints of bone pain    - xray of left ankle pain    7/26/2024:   - need PET scan moved up   -labs improved since blood transfusion   - decreased kidney function - US kidneys, nephrology referral, and UA   - UA with culture due to foul odor   - soft tissue US nodule left abdomen     (2) HTN     (3) COPD     (4) GERD; Martin's esophagus; hx/of colitis     (5) Thyroid disease     (6) Anxiety     (7) Former smoker      VISIT DIAGNOSES:      1. Non-small cell carcinoma of left lung    2. Anemia due to chemotherapy    3. Function kidney decreased    4. Nodule of soft tissue    5. Bad  "odor of urine              Plan:     PLAN:  Check labs weekly - post blood transfusion - doing well  Discussed routine schedule of antiemetics  Discussed prn orders for IVF's here at [facility]  Goal of care transitioning to Quality of life  Consult palliative medicine  Repeat scans ordered   US kidneys   Referral to nephrology   US soft tissue nodule   UA with culture         Non-small cell carcinoma of left lung    Anemia due to chemotherapy    Function kidney decreased  -     US Retroperitoneal Complete; Future; Expected date: 07/26/2024  -     Ambulatory referral/consult to Nephrology; Future; Expected date: 08/02/2024  -     Urinalysis, Reflex to Urine Culture Urine, Clean Catch; Future; Expected date: 07/26/2024    Nodule of soft tissue  -     US Soft Tissue Abdomen; Future; Expected date: 07/26/2024    Bad odor of urine  -     Urinalysis, Reflex to Urine Culture Urine, Clean Catch; Future; Expected date: 07/26/2024          Pathology Discussion:    I reviewed and discussed the pathology report(s) and radiograph reports (if available) in as simple to understand and/or laymen's terms to the best of my ability. I had an indepth conversation with the patient and went over the patient's individual diagnosis based on the information that was currently available. I discussed the TNM staging process with regard to the patient's particular cancer type, and the calculated stage based on the currently available TNM data and literature. I discussed the available prognostic data with regard to the current staging information and how it relates to the prognosis of their particular neoplastic process.      NCCN Guidelines:    I discussed the available treatment option(s) in accordance with the latest literature from the NCCN Clinical Practice Guidelines for the patient's particular type of cancer disorder. The NCCN Guidelines provide a "document evidence-based (and) consensus-driven management" of the care of oncology patients. " The treatment recommendations were made not only in accordance to the NCCN guidelines, but also factored in to account the patient's overall age, condition, performance status and their medical co-morbidities. I went over the risks and benefits of the the treatment options (if any could be made) with regard to their particular cancer type, their cancer stage, their age, and their co-morbidities.     Chemotherapy Discussion:      I discussed the available treatment option(s) in accordance with the latest/current national evidence-based guidelines (NCCN, UpToDate, NCI, ASCO, etc where applicable), their overall age/condition and their co-morbidities. I also went over the risks and benefits of the chemotherapy with regard to their particular cancer type, their cancer stage, their age/condition, and their co-morbidities. I provided literature on the chemotherapy regimen and discussed the chemotherapy side-effect profiles of the drug(s). I discussed the importance of compliance with obtaining and monitoring weekly lab work, and went over the potential hematopathology issues and risks with anemia, leucopenia and thrombocytopenia that can occur with chemotherapy. Discussed the potential risks of liver and kidney damage, which could be permanent and could necessitate dialysis long-term if kidney failure developed. Discussed the risk of development of cardiomyopathy and/or CHF with certain chemotherapy agents, which could be chronic and/or permanent. Discussed the emetic and/or diarrheal potential of the regimen and the potential need for use of antiemetic and anti-diarrheal medications. Discussed the risk for development of anaphylactic shock, bronchospasm, dysrhythmia, and respiratory/cardiovascular arrest and/or failure. Discussed the potential risks for development of alopecia, cold sensory issues, ringing in ears, vertigo, cataracts, glaucoma, and neuropathy, all of which could end up being chronic and life-long.  Discussed the risks of any N/V, diarrhea, mucositis, mouth ulcers, dry skin, itching, HA's, blurry vision, fatigue and other general malaise that can be associated with certain chemotherapy agents. Discussed the risks of hand-foot syndrome and rashes, and development of other autoimmune mediated processes such as pneumonitis, hepatitis, and colitis which could be life threatening. Discussed the risks of the potential development of a rare but fatal viral mediated disease known as PML (Progressive Multifocal Leukoencephalopathy), and risk of future development of leukemia and/or lymphoma from use of certain chemotherapy agents. Discussed the need for neutropenic precautions, basic hygiene/sanitation behaviors and dietary restrictions.    The patient's consent has been obtained to proceed with the chemotherapy.The patient referred to and underwent Chemotherapy School [facility] for training and education on chemotherapy, use of antiemetics and/or anti-diarrheals, use of NSAID's, potential chemotherapy side-effects, and any specific recommendations and precautions with the particular chemotherapy agents.      Answered all of the patient's (and family's, if applicable) questions to the best of my ability and to their complete satisfaction. The patient acknowledged full understanding of the risks, recommendations and plan(s).     I have explained and the patient understands all of  the current recommendation(s). I have answered all of their questions to the best of my ability and to their complete satisfaction.             Thank you for allowing me to participate in this pleasant patient's care. Please call with any questions or concerns.      Electronically signed Scarlett Stoner NP    Answers submitted by the patient for this visit:  Review of Systems Questionnaire (Submitted on 7/17/2024)  appetite change : No  unexpected weight change: No  mouth sores: No  visual disturbance: Yes  cough:  Yes  shortness of breath: Yes  chest pain: No  abdominal pain: Yes  diarrhea: No  frequency: No  back pain: Yes  rash: No  headaches: Yes  adenopathy: No  nervous/ anxious: Yes

## 2024-07-27 ENCOUNTER — LAB VISIT (OUTPATIENT)
Dept: LAB | Facility: HOSPITAL | Age: 74
End: 2024-07-27
Attending: NURSE PRACTITIONER
Payer: MEDICARE

## 2024-07-27 DIAGNOSIS — R82.90 BAD ODOR OF URINE: ICD-10-CM

## 2024-07-27 DIAGNOSIS — N28.9 FUNCTION KIDNEY DECREASED: ICD-10-CM

## 2024-07-27 LAB
BACTERIA #/AREA URNS HPF: ABNORMAL /HPF
BILIRUB UR QL STRIP: NEGATIVE
CLARITY UR: ABNORMAL
COLOR UR: YELLOW
GLUCOSE UR QL STRIP: NEGATIVE
HGB UR QL STRIP: ABNORMAL
HYALINE CASTS #/AREA URNS LPF: 1 /LPF
KETONES UR QL STRIP: NEGATIVE
LEUKOCYTE ESTERASE UR QL STRIP: ABNORMAL
MICROSCOPIC COMMENT: ABNORMAL
NITRITE UR QL STRIP: NEGATIVE
PH UR STRIP: 5 [PH] (ref 5–8)
PROT UR QL STRIP: NEGATIVE
RBC #/AREA URNS HPF: 3 /HPF (ref 0–4)
SP GR UR STRIP: 1.01 (ref 1–1.03)
SQUAMOUS #/AREA URNS HPF: 1 /HPF
URN SPEC COLLECT METH UR: ABNORMAL
UROBILINOGEN UR STRIP-ACNC: NEGATIVE EU/DL
WBC #/AREA URNS HPF: 22 /HPF (ref 0–5)
WBC CLUMPS URNS QL MICRO: ABNORMAL

## 2024-07-27 PROCEDURE — 87186 SC STD MICRODIL/AGAR DIL: CPT | Performed by: NURSE PRACTITIONER

## 2024-07-27 PROCEDURE — 87086 URINE CULTURE/COLONY COUNT: CPT | Performed by: NURSE PRACTITIONER

## 2024-07-27 PROCEDURE — 81001 URINALYSIS AUTO W/SCOPE: CPT | Performed by: NURSE PRACTITIONER

## 2024-07-29 ENCOUNTER — HOSPITAL ENCOUNTER (OUTPATIENT)
Dept: RADIOLOGY | Facility: HOSPITAL | Age: 74
Discharge: HOME | End: 2024-07-29
Attending: NURSE PRACTITIONER
Payer: MEDICARE

## 2024-07-29 ENCOUNTER — TELEPHONE (OUTPATIENT)
Facility: CLINIC | Age: 74
End: 2024-07-29
Payer: MEDICARE

## 2024-07-29 DIAGNOSIS — N39.0 URINARY TRACT INFECTION WITHOUT HEMATURIA, SITE UNSPECIFIED: Primary | ICD-10-CM

## 2024-07-29 DIAGNOSIS — C34.92 NON-SMALL CELL CARCINOMA OF LEFT LUNG: ICD-10-CM

## 2024-07-29 DIAGNOSIS — N28.9 FUNCTION KIDNEY DECREASED: ICD-10-CM

## 2024-07-29 LAB — BACTERIA UR CULT: ABNORMAL

## 2024-07-29 PROCEDURE — 76770 US EXAM ABDO BACK WALL COMP: CPT | Mod: TC

## 2024-07-29 PROCEDURE — 76770 US EXAM ABDO BACK WALL COMP: CPT | Mod: 26,,, | Performed by: RADIOLOGY

## 2024-07-29 NOTE — TELEPHONE ENCOUNTER
Patient made aware of need to start antibiotic. Verbalized understanding. Scarlett made aware of allergy to cipro, macrobid ordered per her orders to do so.

## 2024-07-29 NOTE — TELEPHONE ENCOUNTER
----- Message from Scarlett Stoner NP sent at 7/29/2024  8:30 AM CDT -----  Lets start her on cipro 500 BID x 7 days  ----- Message -----  From: [facility]  Sent: 7/27/2024  10:42 AM CDT  To: Scarlett Stoner NP

## 2024-07-29 NOTE — TELEPHONE ENCOUNTER
----- Message from Scarlett Stoner NP sent at 7/29/2024 11:00 AM CDT -----  Macrobid 100mg BID  ----- Message -----  From: Danyelle Watts RN  Sent: 7/29/2024  10:49 AM CDT  To: Scarlett Stoner NP    Looks like she has an allergy to cipro. Anything else you want to order?  ----- Message -----  From: Scarlett Buenrostro NP  Sent: 7/29/2024   8:30 AM CDT  To: Danyelle Watts RN    Lets start her on cipro 500 BID x 7 days  ----- Message -----  From: Gregory, [facility]  Sent: 7/27/2024  10:42 AM CDT  To: Scarlett Stoner NP

## 2024-07-29 NOTE — TELEPHONE ENCOUNTER
----- Message from Scarlett Stoner NP sent at 7/29/2024 11:00 AM CDT -----  Macrobid 100mg BID  ----- Message -----  From: Danyelle Watts RN  Sent: 7/29/2024  10:49 AM CDT  To: Scarlett Stoner NP    Looks like she has an allergy to cipro. Anything else you want to order?  ----- Message -----  From: Scarlett Buenrostro NP  Sent: 7/29/2024   8:30 AM CDT  To: Danyelle Watts RN    Lets start her on cipro 500 BID x 7 days  ----- Message -----  From: Gregory, [facility]  Sent: 7/27/2024  10:42 AM CDT  To: Scarlett Stoner NP

## 2024-07-30 ENCOUNTER — TELEPHONE (OUTPATIENT)
Dept: FAMILY MEDICINE | Facility: CLINIC | Age: 74
End: 2024-07-30
Payer: MEDICARE

## 2024-07-30 DIAGNOSIS — Z79.899 ENCOUNTER FOR LONG-TERM (CURRENT) USE OF OTHER MEDICATIONS: ICD-10-CM

## 2024-07-30 DIAGNOSIS — E03.9 ACQUIRED HYPOTHYROIDISM: Primary | ICD-10-CM

## 2024-07-30 RX ORDER — NITROFURANTOIN 25; 75 MG/1; MG/1
100 CAPSULE ORAL 2 TIMES DAILY
Qty: 14 CAPSULE | Refills: 0 | Status: SHIPPED | OUTPATIENT
Start: 2024-07-30

## 2024-07-30 NOTE — TELEPHONE ENCOUNTER
Spoke to patient that lab is due to recheck thyroid, said she is getting lab for Dr Mariano on Thursday at [facility]. Order pended there and she will let lab know that she needs Dr Graham and Dr Mariano orders. Updated remind me.

## 2024-07-30 NOTE — TELEPHONE ENCOUNTER
----- [facility]24  2:09 PM CDT -----  Regarding: Lab due    ----- Message from Jasbir Graham MD sent at 6/8/2024  6:22 PM CDT -----  Call pt, thyroid is underactive, if currently on 88 mcg daily,then we need to increase to 100 mcg  daily.chol good at 198. Recheck TSH again in 2  months

## 2024-07-31 ENCOUNTER — HOSPITAL ENCOUNTER (OUTPATIENT)
Dept: RADIOLOGY | Facility: HOSPITAL | Age: 74
Discharge: HOME | End: 2024-07-31
Attending: NURSE PRACTITIONER
Payer: MEDICARE

## 2024-07-31 DIAGNOSIS — M79.89 NODULE OF SOFT TISSUE: ICD-10-CM

## 2024-07-31 PROCEDURE — 76705 ECHO EXAM OF ABDOMEN: CPT | Mod: 26,,, | Performed by: RADIOLOGY

## 2024-07-31 PROCEDURE — 76705 ECHO EXAM OF ABDOMEN: CPT | Mod: TC

## 2024-08-01 ENCOUNTER — LAB VISIT (OUTPATIENT)
Dept: LAB | Facility: HOSPITAL | Age: 74
End: 2024-08-01
Attending: INTERNAL MEDICINE
Payer: MEDICARE

## 2024-08-01 DIAGNOSIS — C34.92 NON-SMALL CELL CARCINOMA OF LEFT LUNG: ICD-10-CM

## 2024-08-01 DIAGNOSIS — D69.6 THROMBOCYTOPENIA: ICD-10-CM

## 2024-08-01 LAB
ALBUMIN SERPL BCP-MCNC: 3.9 G/DL (ref 3.5–5.2)
ALP SERPL-CCNC: 90 U/L (ref 55–135)
ALT SERPL W/O P-5'-P-CCNC: 16 U/L (ref 10–44)
ANION GAP SERPL CALC-SCNC: 10 MMOL/L (ref 8–16)
AST SERPL-CCNC: 20 U/L (ref 10–40)
BASOPHILS # BLD AUTO: 0.03 K/UL (ref 0–0.2)
BASOPHILS NFR BLD: 0.6 % (ref 0–1.9)
BILIRUB SERPL-MCNC: 0.4 MG/DL (ref 0.1–1)
BUN SERPL-MCNC: 16 MG/DL (ref 8–23)
CALCIUM SERPL-MCNC: 9.6 MG/DL (ref 8.7–10.5)
CHLORIDE SERPL-SCNC: 103 MMOL/L (ref 95–110)
CO2 SERPL-SCNC: 29 MMOL/L (ref 23–29)
CREAT SERPL-MCNC: 0.9 MG/DL (ref 0.5–1.4)
DIFFERENTIAL METHOD BLD: ABNORMAL
EOSINOPHIL # BLD AUTO: 0.1 K/UL (ref 0–0.5)
EOSINOPHIL NFR BLD: 2.1 % (ref 0–8)
ERYTHROCYTE [DISTWIDTH] IN BLOOD BY AUTOMATED COUNT: 16.9 % (ref 11.5–14.5)
EST. GFR  (NO RACE VARIABLE): >60 ML/MIN/1.73 M^2
GLUCOSE SERPL-MCNC: 103 MG/DL (ref 70–110)
HCT VFR BLD AUTO: 36.2 % (ref 37–48.5)
HGB BLD-MCNC: 11.3 G/DL (ref 12–16)
IMM GRANULOCYTES # BLD AUTO: 0.02 K/UL (ref 0–0.04)
IMM GRANULOCYTES NFR BLD AUTO: 0.4 % (ref 0–0.5)
LYMPHOCYTES # BLD AUTO: 1.2 K/UL (ref 1–4.8)
LYMPHOCYTES NFR BLD: 23.3 % (ref 18–48)
MAGNESIUM SERPL-MCNC: 1.7 MG/DL (ref 1.6–2.6)
MCH RBC QN AUTO: 30.6 PG (ref 27–31)
MCHC RBC AUTO-ENTMCNC: 31.2 G/DL (ref 32–36)
MCV RBC AUTO: 98 FL (ref 82–98)
MONOCYTES # BLD AUTO: 0.5 K/UL (ref 0.3–1)
MONOCYTES NFR BLD: 9 % (ref 4–15)
NEUTROPHILS # BLD AUTO: 3.4 K/UL (ref 1.8–7.7)
NEUTROPHILS NFR BLD: 64.6 % (ref 38–73)
NRBC BLD-RTO: 0 /100 WBC
PLATELET # BLD AUTO: 146 K/UL (ref 150–450)
PMV BLD AUTO: 8.4 FL (ref 9.2–12.9)
POTASSIUM SERPL-SCNC: 3.9 MMOL/L (ref 3.5–5.1)
PROT SERPL-MCNC: 7.5 G/DL (ref 6–8.4)
RBC # BLD AUTO: 3.69 M/UL (ref 4–5.4)
SODIUM SERPL-SCNC: 142 MMOL/L (ref 136–145)
WBC # BLD AUTO: 5.39 K/UL (ref 3.9–12.7)

## 2024-08-01 PROCEDURE — 85025 COMPLETE CBC W/AUTO DIFF WBC: CPT | Performed by: INTERNAL MEDICINE

## 2024-08-01 PROCEDURE — 83735 ASSAY OF MAGNESIUM: CPT | Performed by: INTERNAL MEDICINE

## 2024-08-01 PROCEDURE — 36415 COLL VENOUS BLD VENIPUNCTURE: CPT | Performed by: INTERNAL MEDICINE

## 2024-08-01 PROCEDURE — 80053 COMPREHEN METABOLIC PANEL: CPT | Performed by: INTERNAL MEDICINE

## 2024-08-05 ENCOUNTER — TELEPHONE (OUTPATIENT)
Dept: FAMILY MEDICINE | Facility: CLINIC | Age: 74
End: 2024-08-05
Payer: MEDICARE

## 2024-08-07 ENCOUNTER — LAB VISIT (OUTPATIENT)
Dept: LAB | Facility: HOSPITAL | Age: 74
End: 2024-08-07
Attending: INTERNAL MEDICINE
Payer: MEDICARE

## 2024-08-07 DIAGNOSIS — C34.92 NON-SMALL CELL CARCINOMA OF LEFT LUNG: ICD-10-CM

## 2024-08-07 DIAGNOSIS — D69.6 THROMBOCYTOPENIA: ICD-10-CM

## 2024-08-07 LAB
ALBUMIN SERPL BCP-MCNC: 4.1 G/DL (ref 3.5–5.2)
ALP SERPL-CCNC: 85 U/L (ref 55–135)
ALT SERPL W/O P-5'-P-CCNC: 19 U/L (ref 10–44)
ANION GAP SERPL CALC-SCNC: 8 MMOL/L (ref 8–16)
AST SERPL-CCNC: 24 U/L (ref 10–40)
BASOPHILS # BLD AUTO: 0.03 K/UL (ref 0–0.2)
BASOPHILS NFR BLD: 0.6 % (ref 0–1.9)
BILIRUB SERPL-MCNC: 0.5 MG/DL (ref 0.1–1)
BUN SERPL-MCNC: 15 MG/DL (ref 8–23)
CALCIUM SERPL-MCNC: 9.7 MG/DL (ref 8.7–10.5)
CHLORIDE SERPL-SCNC: 104 MMOL/L (ref 95–110)
CO2 SERPL-SCNC: 28 MMOL/L (ref 23–29)
CREAT SERPL-MCNC: 1 MG/DL (ref 0.5–1.4)
DIFFERENTIAL METHOD BLD: ABNORMAL
EOSINOPHIL # BLD AUTO: 0.2 K/UL (ref 0–0.5)
EOSINOPHIL NFR BLD: 3.4 % (ref 0–8)
ERYTHROCYTE [DISTWIDTH] IN BLOOD BY AUTOMATED COUNT: 16.7 % (ref 11.5–14.5)
EST. GFR  (NO RACE VARIABLE): 58.4 ML/MIN/1.73 M^2
GLUCOSE SERPL-MCNC: 116 MG/DL (ref 70–110)
HCT VFR BLD AUTO: 38.3 % (ref 37–48.5)
HGB BLD-MCNC: 12.2 G/DL (ref 12–16)
IMM GRANULOCYTES # BLD AUTO: 0.01 K/UL (ref 0–0.04)
IMM GRANULOCYTES NFR BLD AUTO: 0.2 % (ref 0–0.5)
LYMPHOCYTES # BLD AUTO: 1.2 K/UL (ref 1–4.8)
LYMPHOCYTES NFR BLD: 24.8 % (ref 18–48)
MAGNESIUM SERPL-MCNC: 1.9 MG/DL (ref 1.6–2.6)
MCH RBC QN AUTO: 30.7 PG (ref 27–31)
MCHC RBC AUTO-ENTMCNC: 31.9 G/DL (ref 32–36)
MCV RBC AUTO: 96 FL (ref 82–98)
MONOCYTES # BLD AUTO: 0.4 K/UL (ref 0.3–1)
MONOCYTES NFR BLD: 9.3 % (ref 4–15)
NEUTROPHILS # BLD AUTO: 2.9 K/UL (ref 1.8–7.7)
NEUTROPHILS NFR BLD: 61.7 % (ref 38–73)
NRBC BLD-RTO: 0 /100 WBC
PLATELET # BLD AUTO: 111 K/UL (ref 150–450)
PMV BLD AUTO: 8.2 FL (ref 9.2–12.9)
POTASSIUM SERPL-SCNC: 4.2 MMOL/L (ref 3.5–5.1)
PROT SERPL-MCNC: 7.9 G/DL (ref 6–8.4)
RBC # BLD AUTO: 3.98 M/UL (ref 4–5.4)
SODIUM SERPL-SCNC: 140 MMOL/L (ref 136–145)
WBC # BLD AUTO: 4.72 K/UL (ref 3.9–12.7)

## 2024-08-07 PROCEDURE — 36415 COLL VENOUS BLD VENIPUNCTURE: CPT | Performed by: INTERNAL MEDICINE

## 2024-08-07 PROCEDURE — 85025 COMPLETE CBC W/AUTO DIFF WBC: CPT | Performed by: INTERNAL MEDICINE

## 2024-08-07 PROCEDURE — 83735 ASSAY OF MAGNESIUM: CPT | Performed by: INTERNAL MEDICINE

## 2024-08-07 PROCEDURE — 80053 COMPREHEN METABOLIC PANEL: CPT | Performed by: INTERNAL MEDICINE

## 2024-08-08 ENCOUNTER — OFFICE VISIT (OUTPATIENT)
Facility: CLINIC | Age: 74
End: 2024-08-08
Payer: MEDICARE

## 2024-08-08 VITALS
BODY MASS INDEX: 31.6 KG/M2 | DIASTOLIC BLOOD PRESSURE: 80 MMHG | WEIGHT: 171.69 LBS | RESPIRATION RATE: 22 BRPM | HEART RATE: 99 BPM | HEIGHT: 62 IN | TEMPERATURE: 97 F | SYSTOLIC BLOOD PRESSURE: 119 MMHG

## 2024-08-08 DIAGNOSIS — R91.8 MASS OF UPPER LOBE OF LEFT LUNG: Primary | ICD-10-CM

## 2024-08-08 DIAGNOSIS — D69.6 THROMBOCYTOPENIA: ICD-10-CM

## 2024-08-08 DIAGNOSIS — T45.1X5A ANEMIA DUE TO CHEMOTHERAPY: ICD-10-CM

## 2024-08-08 DIAGNOSIS — Z72.0 TOBACCO ABUSE: Chronic | ICD-10-CM

## 2024-08-08 DIAGNOSIS — C34.92 NON-SMALL CELL CARCINOMA OF LEFT LUNG: ICD-10-CM

## 2024-08-08 DIAGNOSIS — D64.81 ANEMIA DUE TO CHEMOTHERAPY: ICD-10-CM

## 2024-08-08 PROCEDURE — 99999 PR PBB SHADOW E&M-EST. PATIENT-LVL IV: CPT | Mod: PBBFAC,,, | Performed by: INTERNAL MEDICINE

## 2024-08-09 ENCOUNTER — PATIENT MESSAGE (OUTPATIENT)
Facility: CLINIC | Age: 74
End: 2024-08-09
Payer: MEDICARE

## 2024-08-09 RX ORDER — SODIUM CHLORIDE 0.9 % (FLUSH) 0.9 %
10 SYRINGE (ML) INJECTION
OUTPATIENT
Start: 2024-08-09

## 2024-08-09 RX ORDER — HEPARIN 100 UNIT/ML
500 SYRINGE INTRAVENOUS
OUTPATIENT
Start: 2024-08-09

## 2024-08-15 ENCOUNTER — TELEPHONE (OUTPATIENT)
Dept: FAMILY MEDICINE | Facility: CLINIC | Age: 74
End: 2024-08-15
Payer: MEDICARE

## 2024-08-19 ENCOUNTER — TELEPHONE (OUTPATIENT)
Facility: CLINIC | Age: 74
End: 2024-08-19
Payer: MEDICARE

## 2024-08-19 ENCOUNTER — INFUSION (OUTPATIENT)
Dept: INFUSION THERAPY | Facility: HOSPITAL | Age: 74
End: 2024-08-19
Attending: INTERNAL MEDICINE
Payer: MEDICARE

## 2024-08-19 VITALS
BODY MASS INDEX: 31.6 KG/M2 | HEIGHT: 62 IN | DIASTOLIC BLOOD PRESSURE: 81 MMHG | OXYGEN SATURATION: 92 % | TEMPERATURE: 97 F | HEART RATE: 93 BPM | WEIGHT: 171.69 LBS | RESPIRATION RATE: 18 BRPM | SYSTOLIC BLOOD PRESSURE: 133 MMHG

## 2024-08-19 DIAGNOSIS — C34.92 NON-SMALL CELL CARCINOMA OF LEFT LUNG: Primary | ICD-10-CM

## 2024-08-19 PROCEDURE — A4216 STERILE WATER/SALINE, 10 ML: HCPCS | Performed by: INTERNAL MEDICINE

## 2024-08-19 PROCEDURE — 63600175 PHARM REV CODE 636 W HCPCS: Performed by: INTERNAL MEDICINE

## 2024-08-19 PROCEDURE — 96523 IRRIG DRUG DELIVERY DEVICE: CPT

## 2024-08-19 PROCEDURE — 25000003 PHARM REV CODE 250: Performed by: INTERNAL MEDICINE

## 2024-08-19 RX ORDER — SODIUM CHLORIDE 0.9 % (FLUSH) 0.9 %
10 SYRINGE (ML) INJECTION
OUTPATIENT
Start: 2024-08-19

## 2024-08-19 RX ORDER — SODIUM CHLORIDE 0.9 % (FLUSH) 0.9 %
10 SYRINGE (ML) INJECTION
Status: DISCONTINUED | OUTPATIENT
Start: 2024-08-19 | End: 2024-08-19 | Stop reason: HOSPADM

## 2024-08-19 RX ORDER — HEPARIN 100 UNIT/ML
500 SYRINGE INTRAVENOUS
OUTPATIENT
Start: 2024-08-19

## 2024-08-19 RX ORDER — HEPARIN 100 UNIT/ML
500 SYRINGE INTRAVENOUS
Status: DISCONTINUED | OUTPATIENT
Start: 2024-08-19 | End: 2024-08-19 | Stop reason: HOSPADM

## 2024-08-19 RX ADMIN — SODIUM CHLORIDE, PRESERVATIVE FREE 10 ML: 5 INJECTION INTRAVENOUS at 11:08

## 2024-08-19 RX ADMIN — HEPARIN 500 UNITS: 100 SYRINGE at 11:08

## 2024-08-19 NOTE — PLAN OF CARE
Problem: Fatigue  Goal: Improved Activity Tolerance  Outcome: Progressing  Intervention: Promote Improved Energy  Flowsheets (Taken 8/19/2024 1137)  Fatigue Management: frequent rest breaks encouraged

## 2024-08-27 ENCOUNTER — TELEPHONE (OUTPATIENT)
Facility: CLINIC | Age: 74
End: 2024-08-27
Payer: MEDICARE

## 2024-08-27 ENCOUNTER — HOSPITAL ENCOUNTER (EMERGENCY)
Facility: HOSPITAL | Age: 74
Discharge: HOME | End: 2024-08-27
Attending: EMERGENCY MEDICINE
Payer: MEDICARE

## 2024-08-27 ENCOUNTER — INFUSION (OUTPATIENT)
Dept: INFUSION THERAPY | Facility: HOSPITAL | Age: 74
End: 2024-08-27
Attending: INTERNAL MEDICINE
Payer: MEDICARE

## 2024-08-27 ENCOUNTER — LAB VISIT (OUTPATIENT)
Dept: LAB | Facility: HOSPITAL | Age: 74
End: 2024-08-27
Attending: INTERNAL MEDICINE
Payer: MEDICARE

## 2024-08-27 VITALS
HEART RATE: 92 BPM | RESPIRATION RATE: 19 BRPM | DIASTOLIC BLOOD PRESSURE: 85 MMHG | BODY MASS INDEX: 31.28 KG/M2 | SYSTOLIC BLOOD PRESSURE: 125 MMHG | OXYGEN SATURATION: 92 % | HEIGHT: 62 IN | TEMPERATURE: 97 F | WEIGHT: 170 LBS

## 2024-08-27 VITALS
HEART RATE: 85 BPM | RESPIRATION RATE: 18 BRPM | TEMPERATURE: 97 F | SYSTOLIC BLOOD PRESSURE: 142 MMHG | OXYGEN SATURATION: 99 % | HEIGHT: 62 IN | DIASTOLIC BLOOD PRESSURE: 106 MMHG | BODY MASS INDEX: 31.28 KG/M2 | WEIGHT: 170 LBS

## 2024-08-27 DIAGNOSIS — C34.92 NON-SMALL CELL CARCINOMA OF LEFT LUNG: ICD-10-CM

## 2024-08-27 DIAGNOSIS — C34.92 NON-SMALL CELL CARCINOMA OF LEFT LUNG: Primary | ICD-10-CM

## 2024-08-27 DIAGNOSIS — D69.6 THROMBOCYTOPENIA: ICD-10-CM

## 2024-08-27 DIAGNOSIS — M54.50 ACUTE BILATERAL LOW BACK PAIN WITHOUT SCIATICA: Primary | ICD-10-CM

## 2024-08-27 DIAGNOSIS — R20.2 TINGLING OF LEFT UPPER EXTREMITY: ICD-10-CM

## 2024-08-27 LAB
ALBUMIN SERPL BCP-MCNC: 4.1 G/DL (ref 3.5–5.2)
ALP SERPL-CCNC: 82 U/L (ref 55–135)
ALT SERPL W/O P-5'-P-CCNC: 17 U/L (ref 10–44)
ANION GAP SERPL CALC-SCNC: 10 MMOL/L (ref 8–16)
AST SERPL-CCNC: 22 U/L (ref 10–40)
BASOPHILS # BLD AUTO: 0.03 K/UL (ref 0–0.2)
BASOPHILS NFR BLD: 0.6 % (ref 0–1.9)
BILIRUB SERPL-MCNC: 0.4 MG/DL (ref 0.1–1)
BUN SERPL-MCNC: 17 MG/DL (ref 8–23)
CALCIUM SERPL-MCNC: 9.4 MG/DL (ref 8.7–10.5)
CHLORIDE SERPL-SCNC: 106 MMOL/L (ref 95–110)
CO2 SERPL-SCNC: 24 MMOL/L (ref 23–29)
CREAT SERPL-MCNC: 0.9 MG/DL (ref 0.5–1.4)
DIFFERENTIAL METHOD BLD: ABNORMAL
EOSINOPHIL # BLD AUTO: 0.1 K/UL (ref 0–0.5)
EOSINOPHIL NFR BLD: 2.2 % (ref 0–8)
ERYTHROCYTE [DISTWIDTH] IN BLOOD BY AUTOMATED COUNT: 15.4 % (ref 11.5–14.5)
EST. GFR  (NO RACE VARIABLE): >60 ML/MIN/1.73 M^2
GLUCOSE SERPL-MCNC: 102 MG/DL (ref 70–110)
HCT VFR BLD AUTO: 38.5 % (ref 37–48.5)
HGB BLD-MCNC: 12.4 G/DL (ref 12–16)
IMM GRANULOCYTES # BLD AUTO: 0.02 K/UL (ref 0–0.04)
IMM GRANULOCYTES NFR BLD AUTO: 0.4 % (ref 0–0.5)
LYMPHOCYTES # BLD AUTO: 1.5 K/UL (ref 1–4.8)
LYMPHOCYTES NFR BLD: 30.3 % (ref 18–48)
MAGNESIUM SERPL-MCNC: 1.6 MG/DL (ref 1.6–2.6)
MCH RBC QN AUTO: 31.2 PG (ref 27–31)
MCHC RBC AUTO-ENTMCNC: 32.2 G/DL (ref 32–36)
MCV RBC AUTO: 97 FL (ref 82–98)
MONOCYTES # BLD AUTO: 0.4 K/UL (ref 0.3–1)
MONOCYTES NFR BLD: 8.6 % (ref 4–15)
NEUTROPHILS # BLD AUTO: 2.8 K/UL (ref 1.8–7.7)
NEUTROPHILS NFR BLD: 57.9 % (ref 38–73)
NRBC BLD-RTO: 0 /100 WBC
PLATELET # BLD AUTO: 112 K/UL (ref 150–450)
PMV BLD AUTO: 8.6 FL (ref 9.2–12.9)
POTASSIUM SERPL-SCNC: 3.9 MMOL/L (ref 3.5–5.1)
PROT SERPL-MCNC: 7.6 G/DL (ref 6–8.4)
RBC # BLD AUTO: 3.98 M/UL (ref 4–5.4)
SODIUM SERPL-SCNC: 140 MMOL/L (ref 136–145)
WBC # BLD AUTO: 4.91 K/UL (ref 3.9–12.7)

## 2024-08-27 PROCEDURE — 63600175 PHARM REV CODE 636 W HCPCS: Performed by: INTERNAL MEDICINE

## 2024-08-27 PROCEDURE — 36415 COLL VENOUS BLD VENIPUNCTURE: CPT | Performed by: INTERNAL MEDICINE

## 2024-08-27 PROCEDURE — A4216 STERILE WATER/SALINE, 10 ML: HCPCS | Performed by: INTERNAL MEDICINE

## 2024-08-27 PROCEDURE — 80053 COMPREHEN METABOLIC PANEL: CPT | Performed by: INTERNAL MEDICINE

## 2024-08-27 PROCEDURE — 96360 HYDRATION IV INFUSION INIT: CPT

## 2024-08-27 PROCEDURE — 25000003 PHARM REV CODE 250: Performed by: INTERNAL MEDICINE

## 2024-08-27 PROCEDURE — 85025 COMPLETE CBC W/AUTO DIFF WBC: CPT | Performed by: INTERNAL MEDICINE

## 2024-08-27 PROCEDURE — 96361 HYDRATE IV INFUSION ADD-ON: CPT

## 2024-08-27 PROCEDURE — 83735 ASSAY OF MAGNESIUM: CPT | Performed by: INTERNAL MEDICINE

## 2024-08-27 PROCEDURE — 99281 EMR DPT VST MAYX REQ PHY/QHP: CPT | Mod: 25

## 2024-08-27 RX ORDER — HEPARIN 100 UNIT/ML
500 SYRINGE INTRAVENOUS
OUTPATIENT
Start: 2024-08-27

## 2024-08-27 RX ORDER — HEPARIN 100 UNIT/ML
500 SYRINGE INTRAVENOUS
Status: DISCONTINUED | OUTPATIENT
Start: 2024-08-27 | End: 2024-08-27 | Stop reason: HOSPADM

## 2024-08-27 RX ORDER — SODIUM CHLORIDE 0.9 % (FLUSH) 0.9 %
10 SYRINGE (ML) INJECTION
Status: DISCONTINUED | OUTPATIENT
Start: 2024-08-27 | End: 2024-08-27 | Stop reason: HOSPADM

## 2024-08-27 RX ORDER — SODIUM CHLORIDE 0.9 % (FLUSH) 0.9 %
10 SYRINGE (ML) INJECTION
OUTPATIENT
Start: 2024-08-27

## 2024-08-27 RX ORDER — ONDANSETRON 2 MG/ML
4 INJECTION INTRAMUSCULAR; INTRAVENOUS
Start: 2024-08-27 | End: 2024-08-27

## 2024-08-27 RX ADMIN — SODIUM CHLORIDE 1000 ML: 9 INJECTION, SOLUTION INTRAVENOUS at 02:08

## 2024-08-27 RX ADMIN — SODIUM CHLORIDE, PRESERVATIVE FREE 10 ML: 5 INJECTION INTRAVENOUS at 04:08

## 2024-08-27 RX ADMIN — HEPARIN 500 UNITS: 100 SYRINGE at 04:08

## 2024-08-27 NOTE — ED NOTES
Patient refused bloodwork and states she will follow up with PCP.  Patient has discussed with Dr. Richards and will leave AMA

## 2024-08-27 NOTE — FIRST PROVIDER EVALUATION
" Emergency Department TeleTriage Encounter Note      CHIEF COMPLAINT    Chief Complaint   Patient presents with    Flank Pain    Pain     Pt complains of pain. Pt states bilat flank/pain x 4-5 days, lump in abd x 2-3 months (already had ct scan). L leg, arm, hand pain. Pt has lung cancer, last chemo in June 2024. Pt state "my kidneys are killing me."        VITAL SIGNS   Initial Vitals [08/27/24 1038]   BP Pulse Resp Temp SpO2   (!) 142/106 85 18 97.1 °F (36.2 °C) 99 %      MAP       --            ALLERGIES    Review of patient's allergies indicates:   Allergen Reactions    Bisacodyl Nausea And Vomiting     Other reaction(s): Vomiting    Iodinated contrast media Anaphylaxis, Hives and Shortness Of Breath     Hypotension.  Pt states she has anaphylaxis with IV contrast.     Morphine Other (See Comments)     hypotension    Azithromycin Hives    Cipro [ciprofloxacin hcl]     Demerol [meperidine]      Nausea vomiting, visual problem    Doxycycline Hives    Flonase [fluticasone propionate] Hives    Morpholine analogues Other (See Comments)     hypotension       PROVIDER TRIAGE NOTE  Verbal consent for the teletriage evaluation was given by the patient at the start of the evaluation.  All efforts will be made to maintain patient's privacy during the evaluation.      This is a teletriage evaluation of a 74 y.o. female presenting to the ED with c/o bilateral flank pain for 4-5 days.  Also reports left arm and left leg tingling for a while.  Has not felt right since radiation and chemo. Limited physical exam via telehealth: The patient is awake, alert, answering questions appropriately and is not in respiratory distress.  As the Teletriage provider, I performed an initial assessment and ordered appropriate labs and imaging studies, if any, to facilitate the patient's care once placed in the ED. Once a room is available, care and a full evaluation will be completed by an alternate ED provider.  Any additional orders and the " final disposition will be determined by that provider.  All imaging and labs will not be followed-up by the Teletriage Team, including myself.        ORDERS  Labs Reviewed   CBC W/ AUTO DIFFERENTIAL   COMPREHENSIVE METABOLIC PANEL   URINALYSIS, REFLEX TO URINE CULTURE       ED Orders (720h ago, onward)      Start Ordered     Status Ordering Provider    08/27/24 1047 08/27/24 1047  Saline lock IV  Once         Ordered AUDREY HAGEN    08/27/24 1047 08/27/24 1047  CBC auto differential  STAT         Ordered AUDREY HAGEN    08/27/24 1047 08/27/24 1047  Comprehensive metabolic panel  STAT         Ordered AUDREY HAGEN    08/27/24 1047 08/27/24 1047  Urinalysis, Reflex to Urine Culture Urine, Clean Catch  STAT         Ordered AUDREY HAGEN              Virtual Visit Note: The provider triage portion of this emergency department evaluation and documentation was performed via [employer], a HIPAA-compliant telemedicine application, in concert with a tele-presenter in the room. A face to face patient evaluation with one of my colleagues will occur once the patient is placed in an emergency department room.      DISCLAIMER: This note was prepared with [employer] voice recognition transcription software. Garbled syntax, mangled pronouns, and other bizarre constructions may be attributed to that software system.

## 2024-08-27 NOTE — ED PROVIDER NOTES
"Encounter Date: 8/27/2024       History     Chief Complaint   Patient presents with    Flank Pain    Pain     Pt complains of pain. Pt states bilat flank/pain x 4-5 days, lump in abd x 2-3 months (already had ct scan). L leg, arm, hand pain. Pt has lung cancer, last chemo in June 2024. Pt state "my kidneys are killing me."      74-year-old female with a history of anxiety, COPD, lung cancer presents to the ER with several complaints.  She presents complaining of low back pain but states she thinks it is her kidneys.  Pain has been there for about a week but worse over the last several days.  Had chemotherapy 2 months ago and states she has felt poorly ever since.  Denies fevers or chills.  Has a lump in her abdomen for several months that has been worked up by another physician without a diagnosis.  She had a recent soft tissue ultrasound.  Also complaining of left hand numbness and tingling intermittently for several months since the chemotherapy.  Complaining of numbness and tingling in the left leg as well for the same length of time since chemo in June.  No chest pain.  Chronic shortness of breath, she is on home oxygen.  Denies any urinary symptoms.  Describes the hand numbness as a tingling in her fingertips.  She has no neck pain.    The history is provided by the patient and the spouse.     Review of patient's allergies indicates:   Allergen Reactions    Bisacodyl Nausea And Vomiting     Other reaction(s): Vomiting    Iodinated contrast media Anaphylaxis, Hives and Shortness Of Breath     Hypotension.  Pt states she has anaphylaxis with IV contrast.     Morphine Other (See Comments)     hypotension    Azithromycin Hives    Cipro [ciprofloxacin hcl]     Demerol [meperidine]      Nausea vomiting, visual problem    Doxycycline Hives    Flonase [fluticasone propionate] Hives    Morpholine analogues Other (See Comments)     hypotension     Past Medical History:   Diagnosis Date    Anxiety     Martin esophagus  "    Chemotherapy-induced nausea 2024    Colitis     COPD (chronic obstructive pulmonary disease)     Dehydration 2024    GERD (gastroesophageal reflux disease)     Hypertension     Intractable nausea and vomiting 2024    Lung cancer     Thyroid disease     Vocal cord polyps      Past Surgical History:   Procedure Laterality Date    ABDOMINAL AORTIC ANEURYSM REPAIR      BACK SURGERY      cervical    CATARACT EXTRACTION Right 2021    CHOLECYSTECTOMY  2013    Dr Albert CORLEY    ENDOBRONCHIAL ULTRASOUND N/A 2024    Procedure: ENDOBRONCHIAL ULTRASOUND (EBUS);  Surgeon: Kizzy Siegel MD;  Location: [facility] OR [address] [place] [address];  Service: Pulmonary;  Laterality: N/A;    FOOT SURGERY      HYSTERECTOMY      AUGUTSINE for AUB with consevation ovaries    INSERTION OF TUNNELED CENTRAL VENOUS CATHETER (CVC) WITH SUBCUTANEOUS PORT Right 2024    Procedure: [id number];  Surgeon: Simon Wilson III, MD;  Location[facility] OR;  Service: General;  Laterality: Right;    ROBOTIC BRONCHOSCOPY N/A 2024    Procedure: ROBOTIC BRONCHOSCOPY;  Surgeon: Kizzy Siegel MD;  Location: [facility] OR [address] [place] [address];  Service: Pulmonary;  Laterality: N/A;    SALIVARY GLAND SURGERY       Family History   Problem Relation Name Age of Onset    Bladder Cancer Mother      Heart failure Father      Emphysema Sister      Pancreatic cancer Sister      No Known Problems Brother       Social History     Tobacco Use    Smoking status: Former     Types: Cigarettes     Start date: 2024     Quit date: 1964     Years since quittin.6    Smokeless tobacco: Never    Tobacco comments:     Pt has smoked since 14 years old. Smokes around .5ppd. Inpatient smoking education done 10/20.     Pt quit smoking on 24.  She has never used smokeless tobacco   Substance Use Topics    Alcohol use: Never    Drug use: Never     Review of Systems   Respiratory:  Positive for shortness of breath (chronic).    Gastrointestinal:  Negative  for anal bleeding.   Musculoskeletal:  Positive for back pain.   Skin:         Skin lump   Neurological:  Positive for numbness. Negative for weakness and headaches.       Physical Exam     Initial Vitals [08/27/24 1038]   BP Pulse Resp Temp SpO2   (!) 142/106 85 18 97.1 °F (36.2 °C) 99 %      MAP       --         Physical Exam    Nursing note and vitals reviewed.  Constitutional: She appears well-developed and well-nourished. She is not diaphoretic. No distress.   HENT:   Head: Normocephalic and atraumatic.   Eyes: EOM are normal.   Neck: Neck supple.   Normal range of motion.  Cardiovascular:  Normal rate, regular rhythm and normal heart sounds.     Exam reveals no gallop and no friction rub.       No murmur heard.  Pulmonary/Chest: Breath sounds normal. No respiratory distress. She has no wheezes. She has no rhonchi. She has no rales.   Abdominal:   Left abdominal subcutaneous tender nodule around 1 or 2 cm, mobile.  Not fixed.   Musculoskeletal:      Cervical back: Normal range of motion and neck supple.      Thoracic back: Normal.      Lumbar back: Tenderness and bony tenderness present. Decreased range of motion.        Back:      Neurological: She is alert and oriented to person, place, and time.   Decreased sensation to light touch left foot.  No weakness in the lower extremities.  No upper extremity weakness.   Skin: Skin is warm and dry.   Psychiatric: She has a normal mood and affect. Her behavior is normal. Judgment and thought content normal.         ED Course   Procedures  Labs Reviewed - No data to display       Imaging Results    None          Medications - No data to display  Medical Decision Making  74-year-old female with lung cancer presents to the emergency room with multiple complaints.  Primarily here for low back pain for about a week.  Thinks it is her kidneys but she has no flank pain at all.  Patient has tenderness in the lumbar spine and tenderness in the bilateral posterior superior  iliac spines.  Pain seems musculoskeletal.  The tingling in the hand and foot has been present since chemotherapy.  I did recommend at least a head CT lumbar x-ray and labs but she is refusing.  She wants to have her PET-CT done tomorrow and does not want to have any blood work done.  I spent an extensive amount of time in the room probably around 20-25 minutes discussing all of her symptoms, doing a physical exam, discussing recent labs recent tests and what we could offer in the emergency room.  She is adamantly refusing all tests.  Does not want any labs and does not want any imaging.   at the bedside.  She is not confused and not altered.  Abdomen is benign.  She has no obvious focal weakness.  I will discharge her against medical advice.  States she will get her blood work done either today or tomorrow with the cancer center and get her PET-CT tomorrow and she does not desire any testing at all in the emergency room.  I am unable to change her mind.    Amount and/or Complexity of Data Reviewed  Independent Historian: spouse  External Data Reviewed: labs, radiology and notes.               ED Course as of 08/27/24 1453   Tue Aug 27, 2024   1121 BP(!): 142/106 [EF]   1121 Temp: 97.1 °F (36.2 °C) [EF]   1121 Temp Source: Oral [EF]   1121 Pulse: 85 [EF]   1121 Resp: 18 [EF]   1121 SpO2: 99 % [EF]      ED Course User Index  [EF] Chris Richards MD                           Clinical Impression:  Final diagnoses:  [M54.50] Acute bilateral low back pain without sciatica (Primary)  [R20.2] Tingling of left upper extremity          ED Disposition Condition    AMA Stable                Chris Richards MD  08/27/24 1453

## 2024-08-27 NOTE — TELEPHONE ENCOUNTER
Patient called in reporting numbness and tingling to hands and legs, bilateral flank pain x multiple days, and a lump to her abdomen. Patient instructed to report to the ER immediately for evaluation. Patient verbalized understanding.

## 2024-08-28 ENCOUNTER — TELEPHONE (OUTPATIENT)
Facility: CLINIC | Age: 74
End: 2024-08-28
Payer: MEDICARE

## 2024-08-28 ENCOUNTER — HOSPITAL ENCOUNTER (OUTPATIENT)
Dept: RADIOLOGY | Facility: HOSPITAL | Age: 74
Discharge: HOME | End: 2024-08-28
Attending: NURSE PRACTITIONER
Payer: MEDICARE

## 2024-08-28 ENCOUNTER — PATIENT MESSAGE (OUTPATIENT)
Facility: CLINIC | Age: 74
End: 2024-08-28
Payer: MEDICARE

## 2024-08-28 VITALS — HEIGHT: 62 IN | BODY MASS INDEX: 31.28 KG/M2 | WEIGHT: 170 LBS

## 2024-08-28 DIAGNOSIS — M79.89 NODULE OF SOFT TISSUE: ICD-10-CM

## 2024-08-28 DIAGNOSIS — C34.92 NON-SMALL CELL CARCINOMA OF LEFT LUNG: ICD-10-CM

## 2024-08-28 DIAGNOSIS — C34.92 NON-SMALL CELL CARCINOMA OF LEFT LUNG: Primary | ICD-10-CM

## 2024-08-28 DIAGNOSIS — C34.90 MALIGNANT NEOPLASM OF UNSPECIFIED PART OF UNSPECIFIED BRONCHUS OR LUNG: ICD-10-CM

## 2024-08-28 LAB — GLUCOSE SERPL-MCNC: 84 MG/DL (ref 70–110)

## 2024-08-28 PROCEDURE — 78815 PET IMAGE W/CT SKULL-THIGH: CPT | Mod: TC,PO

## 2024-08-28 PROCEDURE — A9552 F18 FDG: HCPCS | Mod: PO | Performed by: NURSE PRACTITIONER

## 2024-08-28 PROCEDURE — 78815 PET IMAGE W/CT SKULL-THIGH: CPT | Mod: 26,PS,, | Performed by: RADIOLOGY

## 2024-08-28 PROCEDURE — 82962 GLUCOSE BLOOD TEST: CPT | Mod: PO

## 2024-08-28 RX ORDER — FLUDEOXYGLUCOSE F18 500 MCI/ML
13 INJECTION INTRAVENOUS
Status: COMPLETED | OUTPATIENT
Start: 2024-08-28 | End: 2024-08-28

## 2024-08-28 RX ADMIN — FLUDEOXYGLUCOSE F-18 13 MILLICURIE: 500 INJECTION INTRAVENOUS at 09:08

## 2024-08-29 ENCOUNTER — TELEPHONE (OUTPATIENT)
Dept: FAMILY MEDICINE | Facility: CLINIC | Age: 74
End: 2024-08-29

## 2024-08-29 ENCOUNTER — OFFICE VISIT (OUTPATIENT)
Dept: FAMILY MEDICINE | Facility: CLINIC | Age: 74
End: 2024-08-29
Payer: MEDICARE

## 2024-08-29 VITALS
SYSTOLIC BLOOD PRESSURE: 138 MMHG | DIASTOLIC BLOOD PRESSURE: 86 MMHG | BODY MASS INDEX: 31.47 KG/M2 | WEIGHT: 171 LBS | HEART RATE: 90 BPM | HEIGHT: 62 IN

## 2024-08-29 DIAGNOSIS — Z72.0 TOBACCO ABUSE: Chronic | ICD-10-CM

## 2024-08-29 DIAGNOSIS — F32.1 MAJOR DEPRESSIVE DISORDER, SINGLE EPISODE, MODERATE: ICD-10-CM

## 2024-08-29 DIAGNOSIS — I10 PRIMARY HYPERTENSION: ICD-10-CM

## 2024-08-29 DIAGNOSIS — E03.9 ACQUIRED HYPOTHYROIDISM: ICD-10-CM

## 2024-08-29 DIAGNOSIS — J43.1 PANLOBULAR EMPHYSEMA: ICD-10-CM

## 2024-08-29 DIAGNOSIS — M51.36 DDD (DEGENERATIVE DISC DISEASE), LUMBAR: ICD-10-CM

## 2024-08-29 DIAGNOSIS — D70.1 CHEMOTHERAPY INDUCED NEUTROPENIA: ICD-10-CM

## 2024-08-29 DIAGNOSIS — M51.16 LUMBAR DISC DISEASE WITH RADICULOPATHY: ICD-10-CM

## 2024-08-29 DIAGNOSIS — K21.00 GASTROESOPHAGEAL REFLUX DISEASE WITH ESOPHAGITIS WITHOUT HEMORRHAGE: ICD-10-CM

## 2024-08-29 DIAGNOSIS — R91.8 MASS OF UPPER LOBE OF LEFT LUNG: ICD-10-CM

## 2024-08-29 DIAGNOSIS — J96.11 CHRONIC HYPOXIC RESPIRATORY FAILURE: ICD-10-CM

## 2024-08-29 DIAGNOSIS — I10 ESSENTIAL HYPERTENSION: ICD-10-CM

## 2024-08-29 DIAGNOSIS — R91.1 COIN LESION OF LUNG: ICD-10-CM

## 2024-08-29 DIAGNOSIS — I71.40 ABDOMINAL AORTIC ANEURYSM (AAA) WITHOUT RUPTURE, UNSPECIFIED PART: Chronic | ICD-10-CM

## 2024-08-29 DIAGNOSIS — F41.9 ANXIETY: Chronic | ICD-10-CM

## 2024-08-29 DIAGNOSIS — G62.9 NEUROPATHY: ICD-10-CM

## 2024-08-29 DIAGNOSIS — L72.3 SEBACEOUS CYST: ICD-10-CM

## 2024-08-29 DIAGNOSIS — T45.1X5A CHEMOTHERAPY INDUCED NEUTROPENIA: ICD-10-CM

## 2024-08-29 DIAGNOSIS — C34.92 NON-SMALL CELL CARCINOMA OF LEFT LUNG: Primary | ICD-10-CM

## 2024-08-29 PROCEDURE — 3075F SYST BP GE 130 - 139MM HG: CPT | Mod: CPTII,S$GLB,, | Performed by: FAMILY MEDICINE

## 2024-08-29 PROCEDURE — 1159F MED LIST DOCD IN RCRD: CPT | Mod: CPTII,S$GLB,, | Performed by: FAMILY MEDICINE

## 2024-08-29 PROCEDURE — 3079F DIAST BP 80-89 MM HG: CPT | Mod: CPTII,S$GLB,, | Performed by: FAMILY MEDICINE

## 2024-08-29 PROCEDURE — 99214 OFFICE O/P EST MOD 30 MIN: CPT | Mod: S$GLB,,, | Performed by: FAMILY MEDICINE

## 2024-08-29 PROCEDURE — 3288F FALL RISK ASSESSMENT DOCD: CPT | Mod: CPTII,S$GLB,, | Performed by: FAMILY MEDICINE

## 2024-08-29 PROCEDURE — 1101F PT FALLS ASSESS-DOCD LE1/YR: CPT | Mod: CPTII,S$GLB,, | Performed by: FAMILY MEDICINE

## 2024-08-29 RX ORDER — OMEPRAZOLE 40 MG/1
40 CAPSULE, DELAYED RELEASE ORAL EVERY MORNING
Qty: 90 CAPSULE | Refills: 3 | Status: SHIPPED | OUTPATIENT
Start: 2024-08-29

## 2024-08-29 RX ORDER — ONDANSETRON HYDROCHLORIDE 8 MG/1
8 TABLET, FILM COATED ORAL EVERY 8 HOURS PRN
Qty: 30 TABLET | Refills: 5 | Status: SHIPPED | OUTPATIENT
Start: 2024-08-29

## 2024-08-29 RX ORDER — HYDROCODONE BITARTRATE AND ACETAMINOPHEN 10; 325 MG/1; MG/1
1 TABLET ORAL EVERY 12 HOURS PRN
Qty: 50 TABLET | Refills: 0 | Status: SHIPPED | OUTPATIENT
Start: 2024-08-29

## 2024-08-29 RX ORDER — LEVOTHYROXINE SODIUM 112 UG/1
112 TABLET ORAL
Qty: 90 TABLET | Refills: 3 | Status: SHIPPED | OUTPATIENT
Start: 2024-08-29 | End: 2024-11-27

## 2024-08-29 NOTE — PROGRESS NOTES
SUBJECTIVE:    Patient ID: Joslyn Dubon is a 74 y.o. female.    Chief Complaint: Follow-up (No bottles, review Lab-results, declined dexa, lung cancer, abc )    74-year-old female feeling very tired short of breath and exhausted.  She has some tinnitus.  Diagnosed with left upper lobe lung cancer.  Status post 2 rounds of chemo and radiation treatments.  She has been quite sick from this and has been hospitalized and had 3 ER visits recently.  Sees Dr. Mariano for Oncology at this time.  Chemo is on hold.  Patient refuses anymore chemo.  She wears an O2 concentrator as her O2 saturations dropped with any activity.  She is at 97% oxygen at room air.  Her weight is stable.    COPD, using Trelegy or Anoro daily.  Still rather short of breath no wheezing or cough reported    Recent PET scan shows decreased of the  left upper lobe lung mass down to 17 x 17 mm, the PET scan did light up some small masses in the left lower quadrant of her abdomen which are palpable through the skin.  Oncology has recommended a biopsy ultrasound-guided of these nodules    Thrombocytopenia-platelets are low at 112, hematocrit 38 white count 4.91    Lumbar disc disease, complains of chronic low back and leg pain.  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 Pertinent negatives include no abdominal pain, chest pain, chills, coughing, fever, joint swelling, myalgias, nausea, numbness or vomiting.       Hospital Outpatient Visit on 08/28/2024   Component Date Value Ref Range Status    POC Glucose 08/28/2024 84  70 - 110 Final   Lab Visit on 08/27/2024   Component Date Value Ref Range Status    WBC 08/27/2024 4.91  3.90 - 12.70 K/uL Final    RBC 08/27/2024 3.98 (L)  4.00 - 5.40 M/uL Final    Hemoglobin 08/27/2024 12.4  12.0 - 16.0 g/dL Final    Hematocrit 08/27/2024 38.5  37.0 - 48.5 % Final    MCV 08/27/2024 97  82 - 98 fL Final    MCH 08/27/2024 31.2 (H)  27.0 - 31.0 pg Final     MCHC 08/27/2024 32.2  32.0 - 36.0 g/dL Final    RDW 08/27/2024 15.4 (H)  11.5 - 14.5 % Final    Platelets 08/27/2024 112 (L)  150 - 450 K/uL Final    MPV 08/27/2024 8.6 (L)  9.2 - 12.9 fL Final    Immature Granulocytes 08/27/2024 0.4  0.0 - 0.5 % Final    Gran # (ANC) 08/27/2024 2.8  1.8 - 7.7 K/uL Final    Immature Grans (Abs) 08/27/2024 0.02  0.00 - 0.04 K/uL Final    Lymph # 08/27/2024 1.5  1.0 - 4.8 K/uL Final    Mono # 08/27/2024 0.4  0.3 - 1.0 K/uL Final    Eos # 08/27/2024 0.1  0.0 - 0.5 K/uL Final    Baso # 08/27/2024 0.03  0.00 - 0.20 K/uL Final    nRBC 08/27/2024 0  0 /100 WBC Final    Gran % 08/27/2024 57.9  38.0 - 73.0 % Final    Lymph % 08/27/2024 30.3  18.0 - 48.0 % Final    Mono % 08/27/2024 8.6  4.0 - 15.0 % Final    Eosinophil % 08/27/2024 2.2  0.0 - 8.0 % Final    Basophil % 08/27/2024 0.6  0.0 - 1.9 % Final    Differential Method 08/27/2024 Automated   Final    Sodium 08/27/2024 140  136 - 145 mmol/L Final    Potassium 08/27/2024 3.9  3.5 - 5.1 mmol/L Final    Chloride 08/27/2024 106  95 - 110 mmol/L Final    CO2 08/27/2024 24  23 - 29 mmol/L Final    Glucose 08/27/2024 102  70 - 110 mg/dL Final    BUN 08/27/2024 17  8 - 23 mg/dL Final    Creatinine 08/27/2024 0.9  0.5 - 1.4 mg/dL Final    Calcium 08/27/2024 9.4  8.7 - 10.5 mg/dL Final    Total Protein 08/27/2024 7.6  6.0 - 8.4 g/dL Final    Albumin 08/27/2024 4.1  3.5 - 5.2 g/dL Final    Total Bilirubin 08/27/2024 0.4  0.1 - 1.0 mg/dL Final    Alkaline Phosphatase 08/27/2024 82  55 - 135 U/L Final    AST 08/27/2024 22  10 - 40 U/L Final    ALT 08/27/2024 17  10 - 44 U/L Final    eGFR 08/27/2024 >60.0  >60 mL/min/1.73 m^2 Final    Anion Gap 08/27/2024 10  8 - 16 mmol/L Final    Magnesium 08/27/2024 1.6  1.6 - 2.6 mg/dL Final   Lab Visit on 08/07/2024   Component Date Value Ref Range Status    WBC 08/07/2024 4.72  3.90 - 12.70 K/uL Final    RBC 08/07/2024 3.98 (L)  4.00 - 5.40 M/uL Final    Hemoglobin 08/07/2024 12.2  12.0 - 16.0 g/dL Final     Hematocrit 08/07/2024 38.3  37.0 - 48.5 % Final    MCV 08/07/2024 96  82 - 98 fL Final    MCH 08/07/2024 30.7  27.0 - 31.0 pg Final    MCHC 08/07/2024 31.9 (L)  32.0 - 36.0 g/dL Final    RDW 08/07/2024 16.7 (H)  11.5 - 14.5 % Final    Platelets 08/07/2024 111 (L)  150 - 450 K/uL Final    MPV 08/07/2024 8.2 (L)  9.2 - 12.9 fL Final    Immature Granulocytes 08/07/2024 0.2  0.0 - 0.5 % Final    Gran # (ANC) 08/07/2024 2.9  1.8 - 7.7 K/uL Final    Immature Grans (Abs) 08/07/2024 0.01  0.00 - 0.04 K/uL Final    Lymph # 08/07/2024 1.2  1.0 - 4.8 K/uL Final    Mono # 08/07/2024 0.4  0.3 - 1.0 K/uL Final    Eos # 08/07/2024 0.2  0.0 - 0.5 K/uL Final    Baso # 08/07/2024 0.03  0.00 - 0.20 K/uL Final    nRBC 08/07/2024 0  0 /100 WBC Final    Gran % 08/07/2024 61.7  38.0 - 73.0 % Final    Lymph % 08/07/2024 24.8  18.0 - 48.0 % Final    Mono % 08/07/2024 9.3  4.0 - 15.0 % Final    Eosinophil % 08/07/2024 3.4  0.0 - 8.0 % Final    Basophil % 08/07/2024 0.6  0.0 - 1.9 % Final    Differential Method 08/07/2024 Automated   Final    Sodium 08/07/2024 140  136 - 145 mmol/L Final    Potassium 08/07/2024 4.2  3.5 - 5.1 mmol/L Final    Chloride 08/07/2024 104  95 - 110 mmol/L Final    CO2 08/07/2024 28  23 - 29 mmol/L Final    Glucose 08/07/2024 116 (H)  70 - 110 mg/dL Final    BUN 08/07/2024 15  8 - 23 mg/dL Final    Creatinine 08/07/2024 1.0  0.5 - 1.4 mg/dL Final    Calcium 08/07/2024 9.7  8.7 - 10.5 mg/dL Final    Total Protein 08/07/2024 7.9  6.0 - 8.4 g/dL Final    Albumin 08/07/2024 4.1  3.5 - 5.2 g/dL Final    Total Bilirubin 08/07/2024 0.5  0.1 - 1.0 mg/dL Final    Alkaline Phosphatase 08/07/2024 85  55 - 135 U/L Final    AST 08/07/2024 24  10 - 40 U/L Final    ALT 08/07/2024 19  10 - 44 U/L Final    eGFR 08/07/2024 58.4 (A)  >60 mL/min/1.73 m^2 Final    Anion Gap 08/07/2024 8  8 - 16 mmol/L Final    Magnesium 08/07/2024 1.9  1.6 - 2.6 mg/dL Final   Lab Visit on 08/01/2024   Component Date Value Ref Range Status    WBC  08/01/2024 5.39  3.90 - 12.70 K/uL Final    RBC 08/01/2024 3.69 (L)  4.00 - 5.40 M/uL Final    Hemoglobin 08/01/2024 11.3 (L)  12.0 - 16.0 g/dL Final    Hematocrit 08/01/2024 36.2 (L)  37.0 - 48.5 % Final    MCV 08/01/2024 98  82 - 98 fL Final    MCH 08/01/2024 30.6  27.0 - 31.0 pg Final    MCHC 08/01/2024 31.2 (L)  32.0 - 36.0 g/dL Final    RDW 08/01/2024 16.9 (H)  11.5 - 14.5 % Final    Platelets 08/01/2024 146 (L)  150 - 450 K/uL Final    MPV 08/01/2024 8.4 (L)  9.2 - 12.9 fL Final    Immature Granulocytes 08/01/2024 0.4  0.0 - 0.5 % Final    Gran # (ANC) 08/01/2024 3.4  1.8 - 7.7 K/uL Final    Immature Grans (Abs) 08/01/2024 0.02  0.00 - 0.04 K/uL Final    Lymph # 08/01/2024 1.2  1.0 - 4.8 K/uL Final    Mono # 08/01/2024 0.5  0.3 - 1.0 K/uL Final    Eos # 08/01/2024 0.1  0.0 - 0.5 K/uL Final    Baso # 08/01/2024 0.03  0.00 - 0.20 K/uL Final    nRBC 08/01/2024 0  0 /100 WBC Final    Gran % 08/01/2024 64.6  38.0 - 73.0 % Final    Lymph % 08/01/2024 23.3  18.0 - 48.0 % Final    Mono % 08/01/2024 9.0  4.0 - 15.0 % Final    Eosinophil % 08/01/2024 2.1  0.0 - 8.0 % Final    Basophil % 08/01/2024 0.6  0.0 - 1.9 % Final    Differential Method 08/01/2024 Automated   Final    Sodium 08/01/2024 142  136 - 145 mmol/L Final    Potassium 08/01/2024 3.9  3.5 - 5.1 mmol/L Final    Chloride 08/01/2024 103  95 - 110 mmol/L Final    CO2 08/01/2024 29  23 - 29 mmol/L Final    Glucose 08/01/2024 103  70 - 110 mg/dL Final    BUN 08/01/2024 16  8 - 23 mg/dL Final    Creatinine 08/01/2024 0.9  0.5 - 1.4 mg/dL Final    Calcium 08/01/2024 9.6  8.7 - 10.5 mg/dL Final    Total Protein 08/01/2024 7.5  6.0 - 8.4 g/dL Final    Albumin 08/01/2024 3.9  3.5 - 5.2 g/dL Final    Total Bilirubin 08/01/2024 0.4  0.1 - 1.0 mg/dL Final    Alkaline Phosphatase 08/01/2024 90  55 - 135 U/L Final    AST 08/01/2024 20  10 - 40 U/L Final    ALT 08/01/2024 16  10 - 44 U/L Final    eGFR 08/01/2024 >60.0  >60 mL/min/1.73 m^2 Final    Anion Gap 08/01/2024 10  8  - 16 mmol/L Final    Magnesium 08/01/2024 1.7  1.6 - 2.6 mg/dL Final   Lab Visit on 08/01/2024   Component Date Value Ref Range Status    TSH 08/01/2024 0.308 (L)  0.340 - 5.600 uIU/mL Final    Free T4 08/01/2024 1.50  0.71 - 1.51 ng/dL Final   Lab Visit on 07/27/2024   Component Date Value Ref Range Status    Specimen UA 07/27/2024 Urine, Clean Catch   Final    Color, UA 07/27/2024 Yellow  Yellow, Straw, Priya Final    Appearance, UA 07/27/2024 Hazy (A)  Clear Final    pH, UA 07/27/2024 5.0  5.0 - 8.0 Final    Specific Gravity, UA 07/27/2024 1.010  1.005 - 1.030 Final    Protein, UA 07/27/2024 Negative  Negative Final    Glucose, UA 07/27/2024 Negative  Negative Final    Ketones, UA 07/27/2024 Negative  Negative Final    Bilirubin (UA) 07/27/2024 Negative  Negative Final    Occult Blood UA 07/27/2024 2+ (A)  Negative Final    Nitrite, UA 07/27/2024 Negative  Negative Final    Urobilinogen, UA 07/27/2024 Negative  Negative EU/dL Final    Leukocytes, UA 07/27/2024 2+ (A)  Negative Final    RBC, UA 07/27/2024 3  0 - 4 /hpf Final    WBC, UA 07/27/2024 22 (H)  0 - 5 /hpf Final    WBC Clumps, UA 07/27/2024 Few (A)  None-Rare Final    Bacteria 07/27/2024 Occasional  None-Occ /hpf Final    Squam Epithel, UA 07/27/2024 1  /hpf Final    Hyaline Casts, UA 07/27/2024 1  0-1/lpf /lpf Final    Microscopic Comment 07/27/2024 SEE COMMENT   Final    Urine Culture, Routine 07/27/2024  (A)   Final                    Value:ESCHERICHIA COLI  >100,000 cfu/ml     Lab Visit on 07/25/2024   Component Date Value Ref Range Status    WBC 07/25/2024 5.70  3.90 - 12.70 K/uL Final    RBC 07/25/2024 3.81 (L)  4.00 - 5.40 M/uL Final    Hemoglobin 07/25/2024 11.4 (L)  12.0 - 16.0 g/dL Final    Hematocrit 07/25/2024 37.2  37.0 - 48.5 % Final    MCV 07/25/2024 98  82 - 98 fL Final    MCH 07/25/2024 29.9  27.0 - 31.0 pg Final    MCHC 07/25/2024 30.6 (L)  32.0 - 36.0 g/dL Final    RDW 07/25/2024 17.2 (H)  11.5 - 14.5 % Final    Platelets 07/25/2024 228   150 - 450 K/uL Final    MPV 07/25/2024 8.1 (L)  9.2 - 12.9 fL Final    Immature Granulocytes 07/25/2024 0.4  0.0 - 0.5 % Final    Gran # (ANC) 07/25/2024 3.5  1.8 - 7.7 K/uL Final    Immature Grans (Abs) 07/25/2024 0.02  0.00 - 0.04 K/uL Final    Lymph # 07/25/2024 1.3  1.0 - 4.8 K/uL Final    Mono # 07/25/2024 0.6  0.3 - 1.0 K/uL Final    Eos # 07/25/2024 0.3  0.0 - 0.5 K/uL Final    Baso # 07/25/2024 0.04  0.00 - 0.20 K/uL Final    nRBC 07/25/2024 0  0 /100 WBC Final    Gran % 07/25/2024 61.3  38.0 - 73.0 % Final    Lymph % 07/25/2024 22.5  18.0 - 48.0 % Final    Mono % 07/25/2024 10.5  4.0 - 15.0 % Final    Eosinophil % 07/25/2024 4.6  0.0 - 8.0 % Final    Basophil % 07/25/2024 0.7  0.0 - 1.9 % Final    Differential Method 07/25/2024 Automated   Final    Sodium 07/25/2024 141  136 - 145 mmol/L Final    Potassium 07/25/2024 4.3  3.5 - 5.1 mmol/L Final    Chloride 07/25/2024 105  95 - 110 mmol/L Final    CO2 07/25/2024 27  23 - 29 mmol/L Final    Glucose 07/25/2024 107  70 - 110 mg/dL Final    BUN 07/25/2024 15  8 - 23 mg/dL Final    Creatinine 07/25/2024 1.2  0.5 - 1.4 mg/dL Final    Calcium 07/25/2024 9.6  8.7 - 10.5 mg/dL Final    Total Protein 07/25/2024 8.1  6.0 - 8.4 g/dL Final    Albumin 07/25/2024 4.1  3.5 - 5.2 g/dL Final    Total Bilirubin 07/25/2024 0.3  0.1 - 1.0 mg/dL Final    Alkaline Phosphatase 07/25/2024 105  55 - 135 U/L Final    AST 07/25/2024 16  10 - 40 U/L Final    ALT 07/25/2024 11  10 - 44 U/L Final    eGFR 07/25/2024 46.9 (A)  >60 mL/min/1.73 m^2 Final    Anion Gap 07/25/2024 9  8 - 16 mmol/L Final    Magnesium 07/25/2024 1.9  1.6 - 2.6 mg/dL Final   Infusion on 07/19/2024   Component Date Value Ref Range Status    UNIT NUMBER 07/18/2024 [id number]   Final    Product Code 07/18/2024 [id number]   Final    DISPENSE STATUS 07/18/2024 TRANSFUSED   Final    CODING SYSTEM 07/18/2024 [id number]   Final    Unit Blood Type Code 07/18/2024 7300   Final    Unit Blood Type 07/18/2024 B POS   Final     Unit Expiration 07/18/2024 [id number]   Final    CROSSMATCH INTERPRETATION 07/18/2024 Compatible   Final   Lab Visit on 07/18/2024   Component Date Value Ref Range Status    Indirect Theodore 07/18/2024 NEG   Final    Specimen Outdate 07/18/2024 07/21/2024 23:59   Final    Iron 07/18/2024 73  30 - 160 ug/dL Final    Transferrin 07/18/2024 217  200 - 375 mg/dL Final    TIBC 07/18/2024 304  250 - 450 ug/dL Final    Saturated Iron 07/18/2024 24  20 - 50 % Final    Ferritin 07/18/2024 291.0  20.0 - 300.0 ng/mL Final    ABO 07/18/2024 B   Final    Rh Type 07/18/2024 POS   Final   Infusion on 07/17/2024   Component Date Value Ref Range Status    WBC 07/17/2024 6.42  3.90 - 12.70 K/uL Final    RBC 07/17/2024 2.75 (L)  4.00 - 5.40 M/uL Final    Hemoglobin 07/17/2024 8.5 (L)  12.0 - 16.0 g/dL Final    Hematocrit 07/17/2024 26.8 (L)  37.0 - 48.5 % Final    MCV 07/17/2024 98  82 - 98 fL Final    MCH 07/17/2024 30.9  27.0 - 31.0 pg Final    MCHC 07/17/2024 31.7 (L)  32.0 - 36.0 g/dL Final    RDW 07/17/2024 16.2 (H)  11.5 - 14.5 % Final    Platelets 07/17/2024 257  150 - 450 K/uL Final    MPV 07/17/2024 8.0 (L)  9.2 - 12.9 fL Final    Immature Granulocytes 07/17/2024 0.5  0.0 - 0.5 % Final    Gran # (ANC) 07/17/2024 4.3  1.8 - 7.7 K/uL Final    Immature Grans (Abs) 07/17/2024 0.03  0.00 - 0.04 K/uL Final    Lymph # 07/17/2024 1.1  1.0 - 4.8 K/uL Final    Mono # 07/17/2024 0.9  0.3 - 1.0 K/uL Final    Eos # 07/17/2024 0.1  0.0 - 0.5 K/uL Final    Baso # 07/17/2024 0.02  0.00 - 0.20 K/uL Final    nRBC 07/17/2024 0  0 /100 WBC Final    Gran % 07/17/2024 66.9  38.0 - 73.0 % Final    Lymph % 07/17/2024 17.3 (L)  18.0 - 48.0 % Final    Mono % 07/17/2024 13.9  4.0 - 15.0 % Final    Eosinophil % 07/17/2024 1.1  0.0 - 8.0 % Final    Basophil % 07/17/2024 0.3  0.0 - 1.9 % Final    Differential Method 07/17/2024 Automated   Final    Sodium 07/17/2024 140  136 - 145 mmol/L Final    Potassium 07/17/2024 3.8  3.5 - 5.1 mmol/L Final     Chloride 2024 103  95 - 110 mmol/L Final    CO2 2024 25  23 - 29 mmol/L Final    Glucose 2024 99  70 - 110 mg/dL Final    BUN 2024 10  8 - 23 mg/dL Final    Creatinine 2024 0.9  0.5 - 1.4 mg/dL Final    Calcium 2024 9.2  8.7 - 10.5 mg/dL Final    Total Protein 2024 7.6  6.0 - 8.4 g/dL Final    Albumin 2024 3.6  3.5 - 5.2 g/dL Final    Total Bilirubin 2024 0.5  0.1 - 1.0 mg/dL Final    Alkaline Phosphatase 2024 94  55 - 135 U/L Final    AST 2024 12  10 - 40 U/L Final    ALT 2024 9 (L)  10 - 44 U/L Final    eGFR 2024 >60.0  >60 mL/min/1.73 m^2 Final    Anion Gap 2024 12  8 - 16 mmol/L Final    Magnesium 2024 1.5 (L)  1.6 - 2.6 mg/dL Final   There may be more visits with results that are not included.       Past Medical History:   Diagnosis Date    Anxiety     Martin esophagus     Chemotherapy-induced nausea 2024    Colitis     COPD (chronic obstructive pulmonary disease)     Dehydration 2024    GERD (gastroesophageal reflux disease)     Hypertension     Intractable nausea and vomiting 2024    Lung cancer     Thyroid disease     Vocal cord polyps      Social History     Socioeconomic History    Marital status:    Tobacco Use    Smoking status: Former     Types: Cigarettes     Start date: 2024     Quit date: 1964     Years since quittin.7    Smokeless tobacco: Never    Tobacco comments:     Pt has smoked since 14 years old. Smokes around .5ppd. Inpatient smoking education done 10/20.     Pt quit smoking on 24.  She has never used smokeless tobacco   Substance and Sexual Activity    Alcohol use: Never    Drug use: Never    Sexual activity: Yes     Partners: Male     Social Determinants of Health     Financial Resource Strain: Low Risk  (2024)    Overall Financial Resource Strain (CARDIA)     Difficulty of Paying Living Expenses: Not hard at all   Food Insecurity: No Food Insecurity  (7/5/2024)    Hunger Vital Sign     Worried About Running Out of Food in the Last Year: Never true     Ran Out of Food in the Last Year: Never true   Transportation Needs: No Transportation Needs (7/5/2024)    PRAPARE - Transportation     Lack of Transportation (Medical): No     Lack of Transportation (Non-Medical): No   Physical Activity: Inactive (3/15/2024)    Exercise Vital Sign     Days of Exercise per Week: 0 days     Minutes of Exercise per Session: 0 min   Stress: Stress Concern Present (3/15/2024)    [employer] - Occupational Stress Questionnaire     Feeling of Stress : Very much   Housing Stability: Low Risk  (7/5/2024)    Housing Stability Vital Sign     Unable to Pay for Housing in the Last Year: No     Homeless in the Last Year: No     Past Surgical History:   Procedure Laterality Date    ABDOMINAL AORTIC ANEURYSM REPAIR      BACK SURGERY      cervical    CATARACT EXTRACTION Right 02/2021    CHOLECYSTECTOMY  12/20/2013    Dr Albert CORLEY    ENDOBRONCHIAL ULTRASOUND N/A 4/19/2024    Procedure: ENDOBRONCHIAL ULTRASOUND (EBUS);  Surgeon: Kizzy Siegel MD;  Location: [address];  Service: Pulmonary;  Laterality: N/A;    FOOT SURGERY      HYSTERECTOMY  1979    AUGUSTINE for AUB with consevation ovaries    INSERTION OF TUNNELED CENTRAL VENOUS CATHETER (CVC) WITH SUBCUTANEOUS PORT Right 5/20/2024    Procedure: [id number];  Surgeon: Simon Wilson III, MD;  Location: [facility] OR;  Service: General;  Laterality: Right;    ROBOTIC BRONCHOSCOPY N/A 4/19/2024    Procedure: ROBOTIC BRONCHOSCOPY;  Surgeon: Kizzy Siegel MD;  Location: [facility] OR [address] [place] [address];  Service: Pulmonary;  Laterality: N/A;    SALIVARY GLAND SURGERY       Family History   Problem Relation Name Age of Onset    Bladder Cancer Mother      Heart failure Father      Emphysema Sister      Pancreatic cancer Sister      No Known Problems Brother         The CVD Risk score (KAREN'Agostino et al., 2008) failed to  calculate for the following reasons:    The 2008 CVD risk score is only valid for ages 30 to 74    All of your core healthy metrics are met.      Review of patient's allergies indicates:   Allergen Reactions    Bisacodyl Nausea And Vomiting     Other reaction(s): Vomiting    Iodinated contrast media Anaphylaxis, Hives and Shortness Of Breath     Hypotension.  Pt states she has anaphylaxis with IV contrast.     Morphine Other (See Comments)     hypotension    Azithromycin Hives    Cipro [ciprofloxacin hcl]     Demerol [meperidine]      Nausea vomiting, visual problem    Doxycycline Hives    Flonase [fluticasone propionate] Hives    Morpholine analogues Other (See Comments)     hypotension       Current Outpatient Medications:     albuterol-ipratropium (DUO-NEB) 2.5 mg-0.5 mg/3 mL nebulizer solution, Take 3 mLs by nebulization every 6 (six) hours as needed for Wheezing. Rescue, Disp: 75 mL, Rfl: 0    amLODIPine (NORVASC) 5 MG tablet, Take 5 mg by mouth once daily. Per pt only taking if bp is high, Disp: , Rfl:     EPINEPHrine (EPIPEN) 0.3 mg/0.3 mL AtIn, Inject 0.3 mLs (0.3 mg total) into the muscle as needed (Severe allergic reaction symptoms such as shortness of breath, tongue or throat swelling, weakness dizziness severe nausea vomiting)., Disp: 1 each, Rfl: 3    famotidine (PEPCID) 20 MG tablet, Take 1 tablet (20 mg total) by mouth 2 (two) times daily., Disp: 20 tablet, Rfl: 0    furosemide (LASIX) 20 MG tablet, Take 1 tablet (20 mg total) by mouth daily as needed (edema)., Disp: 30 tablet, Rfl: 1    levalbuterol (XOPENEX HFA) 45 mcg/actuation inhaler, Inhale 2 puffs into the lungs every 6 (six) hours as needed for Wheezing. Rescue, Disp: 15 g, Rfl: 4    levalbuterol (XOPENEX) 1.25 mg/3 mL nebulizer solution, Take 3 mLs (1.25 mg total) by nebulization every 4 (four) hours as needed for Wheezing. Rescue, Disp: 1 mL, Rfl: 0    LIDOcaine-prilocaine (EMLA) cream, Apply topically as needed. (Patient taking differently:  Apply 1 g topically as needed (Chemo port).), Disp: 30 g, Rfl: 5    LORazepam (ATIVAN) 1 MG tablet, Take 0.5 tablets (0.5 mg total) by mouth every 6 (six) hours as needed for Anxiety., Disp: 60 tablet, Rfl: 3    metoprolol tartrate (LOPRESSOR) 25 MG tablet, Take 1 tablet (25 mg total) by mouth 2 (two) times daily. (Patient taking differently: Take 25 mg by mouth 2 (two) times daily. Per pt only prn), Disp: 180 tablet, Rfl: 3    polyethylene glycol (GLYCOLAX) 17 gram/dose powder, Take 17 g by mouth once daily., Disp: 510 g, Rfl: 5    promethazine (PHENERGAN) 25 MG tablet, Take 1 tablet (25 mg total) by mouth every 6 (six) hours as needed for Nausea., Disp: 30 tablet, Rfl: 5    REFRESH OPTIVE 0.5-0.9 % Drop, Place 1 drop into both eyes 4 (four) times daily as needed (Dry Eyes)., Disp: , Rfl:     simethicone (GAS-X ORAL), Take 1 tablet by mouth as needed (Flatulence)., Disp: , Rfl:     umeclidinium-vilanteroL (ANORO ELLIPTA) 62.5-25 mcg/actuation DsDv, Inhale 1 puff into the lungs once daily. Controller, Disp: 1 each, Rfl: 11    HYDROcodone-acetaminophen (NORCO)  mg per tablet, Take 1 tablet by mouth every 12 (twelve) hours as needed for Pain., Disp: 50 tablet, Rfl: 0    levothyroxine (SYNTHROID) 112 MCG tablet, Take 1 tablet (112 mcg total) by mouth before breakfast., Disp: 90 tablet, Rfl: 3    nitrofurantoin, macrocrystal-monohydrate, (MACROBID) 100 MG capsule, Take 1 capsule (100 mg total) by mouth 2 (two) times daily. (Patient not taking: Reported on 8/29/2024), Disp: 14 capsule, Rfl: 0    omeprazole (PRILOSEC) 40 MG capsule, Take 1 capsule (40 mg total) by mouth every morning., Disp: 90 capsule, Rfl: 3    ondansetron (ZOFRAN) 8 MG tablet, Take 1 tablet (8 mg total) by mouth every 8 (eight) hours as needed for Nausea., Disp: 30 tablet, Rfl: 5    promethazine (PHENERGAN) 25 MG suppository, Place 1 suppository (25 mg total) rectally every 6 (six) hours as needed for Nausea. (Patient not taking: Reported on  "7/15/2024), Disp: 10 suppository, Rfl: 0  No current facility-administered medications for this visit.    Review of Systems   Constitutional:  Positive for fatigue. Negative for appetite change, chills, fever and unexpected weight change.   HENT:  Negative for ear discharge and ear pain.    Eyes:  Negative for pain, discharge and visual disturbance.   Respiratory:  Positive for shortness of breath. Negative for apnea, cough and wheezing.    Cardiovascular:  Negative for chest pain, palpitations and leg swelling.   Gastrointestinal:  Negative for abdominal pain, blood in stool, constipation, diarrhea, nausea, vomiting and reflux.   Endocrine: Negative for cold intolerance, heat intolerance and polydipsia.   Genitourinary:  Negative for bladder incontinence, dysuria, hematuria, nocturia and urgency.   Musculoskeletal:  Negative for gait problem, joint swelling and myalgias.   Neurological:  Negative for dizziness, seizures and numbness.   Psychiatric/Behavioral:  Negative for behavioral problems and hallucinations. The patient is not nervous/anxious.            Objective:      Vitals:    08/29/24 0745   BP: 138/86   Pulse: 90   Weight: 77.6 kg (171 lb)   Height: 5' 2" (1.575 m)     Physical Exam  Vitals and nursing note reviewed.   Constitutional:       General: She is not in acute distress.     Appearance: She is well-developed. She is obese. She is ill-appearing. She is not toxic-appearing.   HENT:      Head: Normocephalic and atraumatic.      Right Ear: Tympanic membrane and external ear normal.      Left Ear: Tympanic membrane and external ear normal.      Nose: Nose normal.      Mouth/Throat:      Pharynx: Oropharynx is clear.   Eyes:      Pupils: Pupils are equal, round, and reactive to light.   Neck:      Thyroid: No thyromegaly.      Vascular: No carotid bruit.   Cardiovascular:      Rate and Rhythm: Normal rate and regular rhythm.      Heart sounds: Normal heart sounds. No murmur heard.  Pulmonary:      " Effort: Pulmonary effort is normal.      Breath sounds: Normal breath sounds. No wheezing or rales.   Abdominal:      General: Bowel sounds are normal. There is no distension.      Palpations: Abdomen is soft. There is mass (Small firm mass 2 x 2 cm palpable in the left lower quadrant).      Tenderness: There is no abdominal tenderness.   Musculoskeletal:         General: No tenderness or deformity. Normal range of motion.      Cervical back: Normal range of motion and neck supple.      Lumbar back: Normal. No spasms.      Comments: Bends 90 degrees at  waist   Lymphadenopathy:      Cervical: No cervical adenopathy.   Skin:     General: Skin is warm and dry.      Findings: No rash.      Comments: Left lower quadrant abdominal small mass 2 x 2 cm nontender and firm   Neurological:      Mental Status: She is alert and oriented to person, place, and time. Mental status is at baseline.      Cranial Nerves: No cranial nerve deficit.      Coordination: Coordination normal.   Psychiatric:         Mood and Affect: Mood normal.         Behavior: Behavior normal.         Thought Content: Thought content normal.         Judgment: Judgment normal.           Assessment:       1. Non-small cell carcinoma of left lung    2. DDD (degenerative disc disease), lumbar    3. Acquired hypothyroidism    4. Gastroesophageal reflux disease with esophagitis without hemorrhage    5. Neuropathy    6. Lumbar disc disease with radiculopathy    7. Major depressive disorder, single episode, moderate    8. Anxiety    9. Sebaceous cyst    10. Mass of upper lobe of left lung    11. Panlobular emphysema    12. [place] lesion of lung    13. Chronic hypoxic respiratory failure    14. Primary hypertension    15. Abdominal aortic aneurysm (AAA) without rupture, unspecified part    16. Essential hypertension    17. Chemotherapy induced neutropenia    18. Tobacco abuse         Plan:       Non-small cell carcinoma of left lung  -     ondansetron (ZOFRAN) 8 MG  tablet; Take 1 tablet (8 mg total) by mouth every 8 (eight) hours as needed for Nausea.  Dispense: 30 tablet; Refill: 5  Patient refusing any further chemo.  PET scan shows good reduction in size of the tumor down to 17 x 17 mm.  DDD (degenerative disc disease), lumbar  -     HYDROcodone-acetaminophen (NORCO)  mg per tablet; Take 1 tablet by mouth every 12 (twelve) hours as needed for Pain.  Dispense: 50 tablet; Refill: 0  Refill hydrocodone for lumbar pain  Acquired hypothyroidism  -     levothyroxine (SYNTHROID) 112 MCG tablet; Take 1 tablet (112 mcg total) by mouth before breakfast.  Dispense: 90 tablet; Refill: 3  Continue levothyroxine  Gastroesophageal reflux disease with esophagitis without hemorrhage  -     omeprazole (PRILOSEC) 40 MG capsule; Take 1 capsule (40 mg total) by mouth every morning.  Dispense: 90 capsule; Refill: 3  Stable on omeprazole  Neuropathy  This is related to her chemotherapy  Lumbar disc disease with radiculopathy    Major depressive disorder, single episode, moderate    Anxiety    Sebaceous cyst  Left lower quadrant abdominal mass was probably a sebaceous cyst but an ultrasound guided biopsy has been recommended of this mass, it did light up on a PET scan  Mass of upper lobe of left lung    Panlobular emphysema  Continue Trelegy or Anoro  [place] lesion of lung    Chronic hypoxic respiratory failure  Has portable oxygen as needed with activity  Primary hypertension  Pressure well controlled without much hypertension medication.  She only takes blood pressure medicines when her pressures over 140 systolic  Abdominal aortic aneurysm (AAA) without rupture, unspecified part  Status post stenting  Essential hypertension    Chemotherapy induced neutropenia    Tobacco abuse  No longer smoking    Follow up in about 3 months (around 11/29/2024), or Lung CA.        8/29/2024 Jasbir Graham

## 2024-08-29 NOTE — TELEPHONE ENCOUNTER
----- Message from Radha Garcia sent at 8/29/2024  8:27 AM CDT -----  Pt needs to schedule a 3 month f/u.    [phone]

## 2024-09-04 ENCOUNTER — TELEPHONE (OUTPATIENT)
Dept: RADIOLOGY | Facility: HOSPITAL | Age: 74
End: 2024-09-04

## 2024-09-04 ENCOUNTER — LAB VISIT (OUTPATIENT)
Dept: LAB | Facility: HOSPITAL | Age: 74
End: 2024-09-04
Attending: INTERNAL MEDICINE
Payer: MEDICARE

## 2024-09-04 DIAGNOSIS — D69.6 THROMBOCYTOPENIA: ICD-10-CM

## 2024-09-04 DIAGNOSIS — C34.92 NON-SMALL CELL CARCINOMA OF LEFT LUNG: ICD-10-CM

## 2024-09-04 DIAGNOSIS — M79.89 NODULE OF SOFT TISSUE: Primary | ICD-10-CM

## 2024-09-04 LAB
ALBUMIN SERPL BCP-MCNC: 3.9 G/DL (ref 3.5–5.2)
ALP SERPL-CCNC: 86 U/L (ref 55–135)
ALT SERPL W/O P-5'-P-CCNC: 13 U/L (ref 10–44)
ANION GAP SERPL CALC-SCNC: 10 MMOL/L (ref 8–16)
AST SERPL-CCNC: 17 U/L (ref 10–40)
BASOPHILS # BLD AUTO: 0.01 K/UL (ref 0–0.2)
BASOPHILS NFR BLD: 0.3 % (ref 0–1.9)
BILIRUB SERPL-MCNC: 0.4 MG/DL (ref 0.1–1)
BUN SERPL-MCNC: 16 MG/DL (ref 8–23)
CALCIUM SERPL-MCNC: 9.7 MG/DL (ref 8.7–10.5)
CHLORIDE SERPL-SCNC: 104 MMOL/L (ref 95–110)
CO2 SERPL-SCNC: 27 MMOL/L (ref 23–29)
CREAT SERPL-MCNC: 0.9 MG/DL (ref 0.5–1.4)
DIFFERENTIAL METHOD BLD: ABNORMAL
EOSINOPHIL # BLD AUTO: 0.1 K/UL (ref 0–0.5)
EOSINOPHIL NFR BLD: 2.9 % (ref 0–8)
ERYTHROCYTE [DISTWIDTH] IN BLOOD BY AUTOMATED COUNT: 14.8 % (ref 11.5–14.5)
EST. GFR  (NO RACE VARIABLE): >60 ML/MIN/1.73 M^2
GLUCOSE SERPL-MCNC: 85 MG/DL (ref 70–110)
HCT VFR BLD AUTO: 38.2 % (ref 37–48.5)
HGB BLD-MCNC: 12.3 G/DL (ref 12–16)
IMM GRANULOCYTES # BLD AUTO: 0.01 K/UL (ref 0–0.04)
IMM GRANULOCYTES NFR BLD AUTO: 0.3 % (ref 0–0.5)
LYMPHOCYTES # BLD AUTO: 1.2 K/UL (ref 1–4.8)
LYMPHOCYTES NFR BLD: 32.4 % (ref 18–48)
MAGNESIUM SERPL-MCNC: 1.7 MG/DL (ref 1.6–2.6)
MCH RBC QN AUTO: 31.4 PG (ref 27–31)
MCHC RBC AUTO-ENTMCNC: 32.2 G/DL (ref 32–36)
MCV RBC AUTO: 97 FL (ref 82–98)
MONOCYTES # BLD AUTO: 0.5 K/UL (ref 0.3–1)
MONOCYTES NFR BLD: 12.8 % (ref 4–15)
NEUTROPHILS # BLD AUTO: 1.9 K/UL (ref 1.8–7.7)
NEUTROPHILS NFR BLD: 51.3 % (ref 38–73)
NRBC BLD-RTO: 0 /100 WBC
PLATELET # BLD AUTO: 111 K/UL (ref 150–450)
PMV BLD AUTO: 7.8 FL (ref 9.2–12.9)
POTASSIUM SERPL-SCNC: 4.1 MMOL/L (ref 3.5–5.1)
PROT SERPL-MCNC: 7.3 G/DL (ref 6–8.4)
RBC # BLD AUTO: 3.92 M/UL (ref 4–5.4)
SODIUM SERPL-SCNC: 141 MMOL/L (ref 136–145)
WBC # BLD AUTO: 3.76 K/UL (ref 3.9–12.7)

## 2024-09-04 PROCEDURE — 85025 COMPLETE CBC W/AUTO DIFF WBC: CPT | Performed by: INTERNAL MEDICINE

## 2024-09-04 PROCEDURE — 83735 ASSAY OF MAGNESIUM: CPT | Performed by: INTERNAL MEDICINE

## 2024-09-04 PROCEDURE — 36415 COLL VENOUS BLD VENIPUNCTURE: CPT | Performed by: INTERNAL MEDICINE

## 2024-09-04 PROCEDURE — 80053 COMPREHEN METABOLIC PANEL: CPT | Performed by: INTERNAL MEDICINE

## 2024-09-04 NOTE — NURSING
Pt scheduled for US guided soft tissue bx.  Given arrival date and time of 9/10/24 at 0830.    Pt instructed to hold aspirin 5 days before procedure.   Pt does not have to fast for procedure.      Pt verbalized understanding of above instructions. Procedure explained and questions asked and answered.

## 2024-09-05 ENCOUNTER — OFFICE VISIT (OUTPATIENT)
Facility: CLINIC | Age: 74
End: 2024-09-05
Payer: MEDICARE

## 2024-09-05 VITALS
HEIGHT: 62 IN | SYSTOLIC BLOOD PRESSURE: 151 MMHG | WEIGHT: 172.63 LBS | HEART RATE: 88 BPM | RESPIRATION RATE: 20 BRPM | BODY MASS INDEX: 31.77 KG/M2 | TEMPERATURE: 97 F | OXYGEN SATURATION: 97 % | DIASTOLIC BLOOD PRESSURE: 88 MMHG

## 2024-09-05 DIAGNOSIS — C34.92 NON-SMALL CELL CARCINOMA OF LEFT LUNG: Primary | Chronic | ICD-10-CM

## 2024-09-05 DIAGNOSIS — R91.8 MASS OF UPPER LOBE OF LEFT LUNG: ICD-10-CM

## 2024-09-05 DIAGNOSIS — E03.9 HYPOTHYROIDISM, UNSPECIFIED TYPE: ICD-10-CM

## 2024-09-05 DIAGNOSIS — D69.6 THROMBOCYTOPENIA: ICD-10-CM

## 2024-09-05 DIAGNOSIS — E07.9 THYROID DISEASE: Chronic | ICD-10-CM

## 2024-09-05 PROCEDURE — G2211 COMPLEX E/M VISIT ADD ON: HCPCS | Mod: S$GLB,,, | Performed by: NURSE PRACTITIONER

## 2024-09-05 PROCEDURE — 1126F AMNT PAIN NOTED NONE PRSNT: CPT | Mod: CPTII,S$GLB,, | Performed by: NURSE PRACTITIONER

## 2024-09-05 PROCEDURE — 1159F MED LIST DOCD IN RCRD: CPT | Mod: CPTII,S$GLB,, | Performed by: NURSE PRACTITIONER

## 2024-09-05 PROCEDURE — 99215 OFFICE O/P EST HI 40 MIN: CPT | Mod: S$GLB,,, | Performed by: NURSE PRACTITIONER

## 2024-09-05 PROCEDURE — 3079F DIAST BP 80-89 MM HG: CPT | Mod: CPTII,S$GLB,, | Performed by: NURSE PRACTITIONER

## 2024-09-05 PROCEDURE — 3077F SYST BP >= 140 MM HG: CPT | Mod: CPTII,S$GLB,, | Performed by: NURSE PRACTITIONER

## 2024-09-05 PROCEDURE — 3288F FALL RISK ASSESSMENT DOCD: CPT | Mod: CPTII,S$GLB,, | Performed by: NURSE PRACTITIONER

## 2024-09-05 PROCEDURE — 99999 PR PBB SHADOW E&M-EST. PATIENT-LVL IV: CPT | Mod: PBBFAC,,, | Performed by: NURSE PRACTITIONER

## 2024-09-05 PROCEDURE — 1101F PT FALLS ASSESS-DOCD LE1/YR: CPT | Mod: CPTII,S$GLB,, | Performed by: NURSE PRACTITIONER

## 2024-09-05 RX ORDER — DIPHENHYDRAMINE HCL 25 MG
25 CAPSULE ORAL EVERY 6 HOURS PRN
COMMUNITY

## 2024-09-05 NOTE — PROGRESS NOTES
[facility]  Progress Note -   Follow-up Visit    Subjective:      Patient ID:   NAME: Joslyn Dubon : 1950     74 y.o. female    Referring Doc: Jasbir Grhaam  Other Physicians: Yobani/Robbin, Michelle Park, Jennyfer    Chief Complaint: lung cancer      HPI:   The patient returns for a  follow-up visit today to go over results of recently ordered tests, studies and/or labs. She is here with her  today..     She is feeling much better. Ambulating on her own now. Not in wheelchair today. No further N/V. She has some residual tingling in hands and feet.    She is followed by palliative medicine.    She had a PET scan      She is currently breathing adequately. No current CP, JOINER's    She does have a full body rash, and she states that it is from seafood.     Discussed quality of life as her priority goal      ROS:   GEN: general malaise, weakness, fatigue but much better  HEENT: normal with no HA's, sore throat, stiff neck, changes in vision  CV: normal with no CP, SOB, PND, RAPHAEL or orthopnea  PULM: normal with no SOB, cough, hemoptysis, sputum or pleuritic pain  GI:  no current nausea and GI upset  : normal with no hematuria, dysuria  BREAST: normal with no mass, discharge, pain  SKIN: normal with no rash, erythema, bruising, or swelling + nodules on abdomen     Past Medical/Surgical History:  Past Medical History:   Diagnosis Date    Anxiety     Martin esophagus     Chemotherapy-induced nausea 2024    Colitis     COPD (chronic obstructive pulmonary disease)     Dehydration 2024    GERD (gastroesophageal reflux disease)     Hypertension     Intractable nausea and vomiting 2024    Lung cancer     Thyroid disease     Vocal cord polyps      Past Surgical History:   Procedure Laterality Date    ABDOMINAL AORTIC ANEURYSM REPAIR      BACK SURGERY      cervical    CATARACT EXTRACTION Right 2021    CHOLECYSTECTOMY  2013    Dr Albert CORLEY     ENDOBRONCHIAL ULTRASOUND N/A 2024    Procedure: ENDOBRONCHIAL ULTRASOUND (EBUS);  Surgeon: Kizzy Siegel MD;  Location: [facility] OR 2ND FLR;  Service: Pulmonary;  Laterality: N/A;    FOOT SURGERY      HYSTERECTOMY      AUGUSTINE for AUB with consevation ovaries    INSERTION OF TUNNELED CENTRAL VENOUS CATHETER (CVC) WITH SUBCUTANEOUS PORT Right 2024    Procedure: [id number];  Surgeon: Simon Wilson III, MD;  Location: [facility] OR;  Service: General;  Laterality: Right;    ROBOTIC BRONCHOSCOPY N/A 2024    Procedure: ROBOTIC BRONCHOSCOPY;  Surgeon: Kizzy Siegel MD;  Location: [facility] OR 2ND FLR;  Service: Pulmonary;  Laterality: N/A;    SALIVARY GLAND SURGERY           Allergies:  Review of patient's allergies indicates:   Allergen Reactions    Bisacodyl Nausea And Vomiting     Other reaction(s): Vomiting    Iodinated contrast media Anaphylaxis, Hives and Shortness Of Breath     Hypotension.  Pt states she has anaphylaxis with IV contrast.     Morphine Other (See Comments)     hypotension    Azithromycin Hives    Cipro [ciprofloxacin hcl]     Demerol [meperidine]      Nausea vomiting, visual problem    Doxycycline Hives    Flonase [fluticasone propionate] Hives    Morpholine analogues Other (See Comments)     hypotension       Social/Family History:  Social History     Socioeconomic History    Marital status:    Tobacco Use    Smoking status: Former     Types: Cigarettes     Start date: 2024     Quit date: 1964     Years since quittin.7    Smokeless tobacco: Never    Tobacco comments:     Pt has smoked since 14 years old. Smokes around .5ppd. Inpatient smoking education done 10/20.     Pt quit smoking on 24.  She has never used smokeless tobacco   Substance and Sexual Activity    Alcohol use: Never    Drug use: Never    Sexual activity: Yes     Partners: Male     Social Determinants of Health     Financial Resource Strain: Low Risk  (2024)    Overall Financial  Resource Strain (CARDIA)     Difficulty of Paying Living Expenses: Not hard at all   Food Insecurity: No Food Insecurity (7/5/2024)    Hunger Vital Sign     Worried About Running Out of Food in the Last Year: Never true     Ran Out of Food in the Last Year: Never true   Transportation Needs: No Transportation Needs (7/5/2024)    PRAPARE - Transportation     Lack of Transportation (Medical): No     Lack of Transportation (Non-Medical): No   Physical Activity: Inactive (3/15/2024)    Exercise Vital Sign     Days of Exercise per Week: 0 days     Minutes of Exercise per Session: 0 min   Stress: Stress Concern Present (3/15/2024)    [place] [place] [employer] - Occupational Stress Questionnaire     Feeling of Stress : Very much   Housing Stability: Low Risk  (7/5/2024)    Housing Stability Vital Sign     Unable to Pay for Housing in the Last Year: No     Homeless in the Last Year: No     Family History   Problem Relation Name Age of Onset    Bladder Cancer Mother      Heart failure Father      Emphysema Sister      Pancreatic cancer Sister      No Known Problems Brother           Medications:    Current Outpatient Medications:     albuterol-ipratropium (DUO-NEB) 2.5 mg-0.5 mg/3 mL nebulizer solution, Take 3 mLs by nebulization every 6 (six) hours as needed for Wheezing. Rescue, Disp: 75 mL, Rfl: 0    amLODIPine (NORVASC) 5 MG tablet, Take 5 mg by mouth once daily. Per pt only taking if bp is high, Disp: , Rfl:     diphenhydrAMINE (BENADRYL) 25 mg capsule, Take 25 mg by mouth every 6 (six) hours as needed for Itching., Disp: , Rfl:     EPINEPHrine (EPIPEN) 0.3 mg/0.3 mL AtIn, Inject 0.3 mLs (0.3 mg total) into the muscle as needed (Severe allergic reaction symptoms such as shortness of breath, tongue or throat swelling, weakness dizziness severe nausea vomiting)., Disp: 1 each, Rfl: 3    famotidine (PEPCID) 20 MG tablet, Take 1 tablet (20 mg total) by mouth 2 (two) times daily., Disp: 20 tablet, Rfl: 0     furosemide (LASIX) 20 MG tablet, Take 1 tablet (20 mg total) by mouth daily as needed (edema)., Disp: 30 tablet, Rfl: 1    HYDROcodone-acetaminophen (NORCO)  mg per tablet, Take 1 tablet by mouth every 12 (twelve) hours as needed for Pain., Disp: 50 tablet, Rfl: 0    levalbuterol (XOPENEX HFA) 45 mcg/actuation inhaler, Inhale 2 puffs into the lungs every 6 (six) hours as needed for Wheezing. Rescue, Disp: 15 g, Rfl: 4    levalbuterol (XOPENEX) 1.25 mg/3 mL nebulizer solution, Take 3 mLs (1.25 mg total) by nebulization every 4 (four) hours as needed for Wheezing. Rescue, Disp: 1 mL, Rfl: 0    levothyroxine (SYNTHROID) 112 MCG tablet, Take 1 tablet (112 mcg total) by mouth before breakfast., Disp: 90 tablet, Rfl: 3    LIDOcaine-prilocaine (EMLA) cream, Apply topically as needed. (Patient taking differently: Apply 1 g topically as needed (Chemo port).), Disp: 30 g, Rfl: 5    LORazepam (ATIVAN) 1 MG tablet, Take 0.5 tablets (0.5 mg total) by mouth every 6 (six) hours as needed for Anxiety., Disp: 60 tablet, Rfl: 3    metoprolol tartrate (LOPRESSOR) 25 MG tablet, Take 1 tablet (25 mg total) by mouth 2 (two) times daily. (Patient taking differently: Take 25 mg by mouth 2 (two) times daily. Per pt only prn), Disp: 180 tablet, Rfl: 3    nitrofurantoin, macrocrystal-monohydrate, (MACROBID) 100 MG capsule, Take 1 capsule (100 mg total) by mouth 2 (two) times daily., Disp: 14 capsule, Rfl: 0    omeprazole (PRILOSEC) 40 MG capsule, Take 1 capsule (40 mg total) by mouth every morning., Disp: 90 capsule, Rfl: 3    ondansetron (ZOFRAN) 8 MG tablet, Take 1 tablet (8 mg total) by mouth every 8 (eight) hours as needed for Nausea., Disp: 30 tablet, Rfl: 5    polyethylene glycol (GLYCOLAX) 17 gram/dose powder, Take 17 g by mouth once daily., Disp: 510 g, Rfl: 5    promethazine (PHENERGAN) 25 MG tablet, Take 1 tablet (25 mg total) by mouth every 6 (six) hours as needed for Nausea., Disp: 30 tablet, Rfl: 5    REFRESH OPTIVE 0.5-0.9  "% Drop, Place 1 drop into both eyes 4 (four) times daily as needed (Dry Eyes)., Disp: , Rfl:     simethicone (GAS-X ORAL), Take 1 tablet by mouth as needed (Flatulence)., Disp: , Rfl:     umeclidinium-vilanteroL (ANORO ELLIPTA) 62.5-25 mcg/actuation DsDv, Inhale 1 puff into the lungs once daily. Controller, Disp: 1 each, Rfl: 11      Pathology:   Cancer Staging   Non-small cell carcinoma of left lung  Staging form: Lung, AJCC 8th Edition  - Clinical stage from 5/1/2024: Stage IB (cT2a, cN0, cM0) - Signed by Antony Rodriguez III, MD on 5/1/2024          Objective:   Vitals:  Blood pressure (!) 151/88, pulse 88, temperature 97.1 °F (36.2 °C), resp. rate 20, height 5' 2" (1.575 m), weight 78.3 kg (172 lb 9.6 oz), SpO2 97%.    Physical Examination:   GEN: no apparent distress, comfortable; AAOx3; overweight  HEAD: atraumatic and normocephalic  EYES: no pallor, no icterus, PERRLA  ENT: OMM, no pharyngeal erythema, external ears WNL; no nasal discharge; no thrush  NECK: no masses, thyroid normal, trachea midline, no LAD/LN's, supple  CV: RRR with no murmur; normal pulse; normal S1 and S2; no pedal edema; Rght portacath  CHEST: Normal respiratory effort; CTAB; normal breath sounds; no wheeze or crackles  ABDOM:nontender; nondistended; soft; normal bowel sounds; no rebound/guarding  MUSC/Skeletal: ROM normal; no crepitus; joints normal; no deformities or arthropathy  EXTREM: no clubbing, cyanosis, inflammation or swelling  SKIN: no rashes, lesions, ulcers, petechiae or subcutaneous nodules  : no payan  NEURO: grossly intact; motor/sensory WNL; AAOx3; no tremors;   PSYCH: normal mood, affect and behavior  LYMPH: normal cervical, supraclavicular, axillary and groin LN's      Labs:   Lab Results   Component Value Date    WBC 3.76 (L) 09/04/2024    HGB 12.3 09/04/2024    HCT 38.2 09/04/2024    MCV 97 09/04/2024     (L) 09/04/2024    CMP  Sodium   Date Value Ref Range Status   09/04/2024 141 136 - 145 mmol/L Final "     Potassium   Date Value Ref Range Status   09/04/2024 4.1 3.5 - 5.1 mmol/L Final     Chloride   Date Value Ref Range Status   09/04/2024 104 95 - 110 mmol/L Final     CO2   Date Value Ref Range Status   09/04/2024 27 23 - 29 mmol/L Final     Glucose   Date Value Ref Range Status   09/04/2024 85 70 - 110 mg/dL Final     BUN   Date Value Ref Range Status   09/04/2024 16 8 - 23 mg/dL Final     Creatinine   Date Value Ref Range Status   09/04/2024 0.9 0.5 - 1.4 mg/dL Final   03/19/2013 0.8 0.5 - 1.4 mg/dL Final     Calcium   Date Value Ref Range Status   09/04/2024 9.7 8.7 - 10.5 mg/dL Final   03/19/2013 9.9 8.7 - 10.5 mg/dL Final     Total Protein   Date Value Ref Range Status   09/04/2024 7.3 6.0 - 8.4 g/dL Final     Albumin   Date Value Ref Range Status   09/04/2024 3.9 3.5 - 5.2 g/dL Final     Total Bilirubin   Date Value Ref Range Status   09/04/2024 0.4 0.1 - 1.0 mg/dL Final     Comment:     For infants and newborns, interpretation of results should be based  on gestational age, weight and in agreement with clinical  observations.    Premature Infant recommended reference ranges:  Up to 24 hours.............<8.0 mg/dL  Up to 48 hours............<12.0 mg/dL  3-5 days..................<15.0 mg/dL  6-29 days.................<15.0 mg/dL       Alkaline Phosphatase   Date Value Ref Range Status   09/04/2024 86 55 - 135 U/L Final   08/31/2012 107 23 - 119 UNIT/L Final     AST   Date Value Ref Range Status   09/04/2024 17 10 - 40 U/L Final   08/31/2012 21 10 - 30 UNIT/L Final     ALT   Date Value Ref Range Status   09/04/2024 13 10 - 44 U/L Final     Anion Gap   Date Value Ref Range Status   09/04/2024 10 8 - 16 mmol/L Final   03/19/2013 12 5 - 15 meq/L Final     eGFR if    Date Value Ref Range Status   01/03/2022 >60.0 >60 mL/min/1.73 m^2 Final     eGFR if non    Date Value Ref Range Status   01/03/2022 >60.0 >60 mL/min/1.73 m^2 Final     Comment:     Calculation used to obtain the  estimated glomerular filtration  rate (eGFR) is the CKD-EPI equation.          I have reviewed all available lab results and radiology reports.    Radiology/Diagnostic Studies:    PET 8/28/2024:   Impression:     Favorable decrease in size and FDG avidity of left upper lobe pulmonary mass.     Increasing size of adjacent subcutaneous soft tissue nodules of the left lower quadrant, which show marked FDG avidity.     Ultrasound-guided tissue sampling is recommended to assess for malignancy.     Decreased FDG avidity of left parotid soft tissue nodule.     Diffuse hypermetabolism of the thyroid gland.        Electronically signed by:Darian Mesa  Date:                                            08/28/2024  Time:                                           11:51      US retroperitoneum  7/29/2024:      Impression:     Bilateral simple renal cysts.      US Soft Tissue Abdomen   7/31/2024  Impression:     Two subcutaneous nodules in the area of concern.  These findings do not reflect simple cysts, but could be complex cysts (sebaceous) or solid masses. Size is unchanged dating to 06/23/2024 but both have increased relative to the comparison PET-CT on 03/14/2024.    In light of neoplasm history, lesions would be amenable to pathologic sampling if warranted.      CT Abdomen Pelvis  Without Contrast  7/10/2024:    Impression:     1. No acute findings within the abdomen or pelvis or significant change from CT 06/23/2024.  2. Stable/incidental findings as discussed above.           NM Lung Scan Perfusion Particulate [[id number]] Resulted: 06/23/24 2017   Order Status: Completed Updated: 06/23/24 2020   Narrative:     EXAMINATION:  NM LUNG SCAN PERFUSION    CLINICAL HISTORY:  Chest pain, nonspecific;Pulmonary embolism (PE) suspected, high prob;Chest pain, nonspecific; Pulmonary embolism (PE) suspected, high prob;    TECHNIQUE:  IV administration of 5.2 mCi of Tc-99m-MAA, multiple images of the thorax were obtained in various  projections.    COMPARISON:  Chest x-ray dated 06/23/2024.    FINDINGS:  Only perfusion images are provided for review.  No perfusion defect identified.  Evaluation for mismatch is precluded in the absence of ventilation portion of the examination.   Impression:                      X-Ray Chest AP Portable [[id number]] Resulted: 06/23/24 0926   Order Status: Completed Updated: 06/23/24 0929   Narrative:     EXAMINATION:  XR CHEST AP PORTABLE    CLINICAL HISTORY:  Chest Pain;    COMPARISON:  June 21, 2024    FINDINGS:  AP view demonstrates normal heart size.  The thoracic aorta is elongated.  Left upper lobe noncalcified pulmonary nodule is unchanged.  No infiltrates or effusions are identified.    Right-sided Port-A-Cath is unchanged in position with tip at the superior vena cava.   Impression:       1. No interval change compared to June 21, 2024.          All lab results and imaging results have been reviewed and discussed with the patient    Assessment:   (1) 74 y.o. female with diagnosis of lung cancer with neuroendocrine features who has been on platinum and etopside chemotherapy who was seen as a consult at [facility].. She has been followed by Dr Camden Iyer and she had asked to switch to myself after I had followed her during her recent hospitalization. She had her 2nd cycle chemotherapy on 6/19/2024 The patient returns for a hospital follow-up visit today to go over results of recently ordered tests, studies and/or labs.       7/11/2024:  - pancytopenia due to prior chemotherapy  - WBC now back to normal at 11.8  - hgb still low but adequate at 8.6  - plats at 286,000  - Discussed quality of life as her priority goal and plan is to see if we can build her back up to some prior baseline. He ask palliative medicine to see her. Discussed having PRN orders for IVF's and antiemetics as needed.    8/8/2024:  - She is feeling much better. Ambulating on her own now. Not in wheelchair today. No further N/V. She has some  residual tingling in hands and feet.  - She is followed by palliative medicine.  - She has PEt scan ordered - set for 8/28  - she is still not sure whether she wants to get any further chemotherapy      9/5/2024:   - feeling okay   - PET on 8/28 showed a good response to therapy but abdomen nodules need to be biopsied   - biopsy next week   - ate seafood, has a full body rash   - start on prednisone and benadryl     (2) HTN     (3) COPD     (4) GERD; Martin's esophagus; hx/of colitis     (5) Thyroid disease  - tsh and t3 and t4      (6) Anxiety     (7) Former smoker      VISIT DIAGNOSES:      1. Non-small cell carcinoma of left lung    2. Mass of upper lobe of left lung    3. Hypothyroidism, unspecified type    4. Thrombocytopenia    5. Thyroid disease              Plan:     PLAN:  Check labs every other week for now PET reviewed, need a new biopsy of abdomen   Goal of care transitioning to Quality of life  F/u with palliative medicine  F/u with PCP, Pulm, GI, Rad/onc etc  Start on prednisone for rash   Proceed with biopsy next week     RTC in  2 weeks with Dr. Mariano         Pathology Discussion:    I reviewed and discussed the pathology report(s) and radiograph reports (if available) in as simple to understand and/or laymen's terms to the best of my ability. I had an indepth conversation with the patient and went over the patient's individual diagnosis based on the information that was currently available. I discussed the TNM staging process with regard to the patient's particular cancer type, and the calculated stage based on the currently available TNM data and literature. I discussed the available prognostic data with regard to the current staging information and how it relates to the prognosis of their particular neoplastic process.      NCCN Guidelines:    I discussed the available treatment option(s) in accordance with the latest literature from the NCCN Clinical Practice Guidelines for the patient's  "particular type of cancer disorder. The NCCN Guidelines provide a "document evidence-based (and) consensus-driven management" of the care of oncology patients. The treatment recommendations were made not only in accordance to the NCCN guidelines, but also factored in to account the patient's overall age, condition, performance status and their medical co-morbidities. I went over the risks and benefits of the the treatment options (if any could be made) with regard to their particular cancer type, their cancer stage, their age, and their co-morbidities.     Chemotherapy Discussion:      I discussed the available treatment option(s) in accordance with the latest/current national evidence-based guidelines (NCCN, UpToDate, NCI, ASCO, etc where applicable), their overall age/condition and their co-morbidities. I also went over the risks and benefits of the chemotherapy with regard to their particular cancer type, their cancer stage, their age/condition, and their co-morbidities. I provided literature on the chemotherapy regimen and discussed the chemotherapy side-effect profiles of the drug(s). I discussed the importance of compliance with obtaining and monitoring weekly lab work, and went over the potential hematopathology issues and risks with anemia, leucopenia and thrombocytopenia that can occur with chemotherapy. Discussed the potential risks of liver and kidney damage, which could be permanent and could necessitate dialysis long-term if kidney failure developed. Discussed the risk of development of cardiomyopathy and/or CHF with certain chemotherapy agents, which could be chronic and/or permanent. Discussed the emetic and/or diarrheal potential of the regimen and the potential need for use of antiemetic and anti-diarrheal medications. Discussed the risk for development of anaphylactic shock, bronchospasm, dysrhythmia, and respiratory/cardiovascular arrest and/or failure. Discussed the potential risks for " development of alopecia, cold sensory issues, ringing in ears, vertigo, cataracts, glaucoma, and neuropathy, all of which could end up being chronic and life-long. Discussed the risks of any N/V, diarrhea, mucositis, mouth ulcers, dry skin, itching, HA's, blurry vision, fatigue and other general malaise that can be associated with certain chemotherapy agents. Discussed the risks of hand-foot syndrome and rashes, and development of other autoimmune mediated processes such as pneumonitis, hepatitis, and colitis which could be life threatening. Discussed the risks of the potential development of a rare but fatal viral mediated disease known as PML (Progressive Multifocal Leukoencephalopathy), and risk of future development of leukemia and/or lymphoma from use of certain chemotherapy agents. Discussed the need for neutropenic precautions, basic hygiene/sanitation behaviors and dietary restrictions.    The patient's consent has been obtained to proceed with the chemotherapy.The patient referred to and underwent Chemotherapy School [facility] for training and education on chemotherapy, use of antiemetics and/or anti-diarrheals, use of NSAID's, potential chemotherapy side-effects, and any specific recommendations and precautions with the particular chemotherapy agents.      Answered all of the patient's (and family's, if applicable) questions to the best of my ability and to their complete satisfaction. The patient acknowledged full understanding of the risks, recommendations and plan(s).     I have explained and the patient understands all of  the current recommendation(s). I have answered all of their questions to the best of my ability and to their complete satisfaction.             Thank you for allowing me to participate in this pleasant patient's care. Please call with any questions or concerns.      Electronically signed Scarlett St. Nakita Buenrostro NP      Answers submitted by the patient for this  visit:  Review of Systems Questionnaire (Submitted on 8/7/2024)  appetite change : No  unexpected weight change: No  mouth sores: No  visual disturbance: No  cough: Yes  shortness of breath: Yes  chest pain: Yes  abdominal pain: Yes  diarrhea: No  frequency: Yes  back pain: Yes  rash: No  headaches: Yes  adenopathy: No  nervous/ anxious: No

## 2024-09-05 NOTE — PROGRESS NOTES
[facility]  Progress Note -   Follow-up Visit    Subjective:      Patient ID:   NAME: Joslyn Dubon : 1950     74 y.o. female    Referring Doc: Jasbir Graham  Other Physicians: Yobani/Robbin, Michelle Park, Jennyfer    Chief Complaint: lung cancer      HPI:   The patient returns for a  follow-up visit today to go over results of recently ordered tests, studies and/or labs. She is here with her  today..     She is feeling much better. Ambulating on her own now. Not in wheelchair today. No further N/V. She has some residual tingling in hands and feet.    She is followed by palliative medicine.    She has PEt scan ordered - set for     She is currently breathing adequately. No current CP, HA's    Discussed quality of life as her priority goal              ROS:   GEN: general malaise, weakness, fatigue but much better  HEENT: normal with no HA's, sore throat, stiff neck, changes in vision  CV: normal with no CP, SOB, PND, RAPHAEL or orthopnea  PULM: normal with no SOB, cough, hemoptysis, sputum or pleuritic pain  GI:  no current nausea and GI upset  : normal with no hematuria, dysuria  BREAST: normal with no mass, discharge, pain  SKIN: normal with no rash, erythema, bruising, or swelling     Past Medical/Surgical History:  Past Medical History:   Diagnosis Date    Anxiety     Martin esophagus     Chemotherapy-induced nausea 2024    Colitis     COPD (chronic obstructive pulmonary disease)     Dehydration 2024    GERD (gastroesophageal reflux disease)     Hypertension     Intractable nausea and vomiting 2024    Lung cancer     Thyroid disease     Vocal cord polyps      Past Surgical History:   Procedure Laterality Date    ABDOMINAL AORTIC ANEURYSM REPAIR      BACK SURGERY      cervical    CATARACT EXTRACTION Right 2021    CHOLECYSTECTOMY  2013    Dr Price  [facility]    ENDOBRONCHIAL ULTRASOUND N/A 2024    Procedure: ENDOBRONCHIAL ULTRASOUND  (EBUS);  Surgeon: Kizzy Siegel MD;  Location: [facility] OR 2ND FLR;  Service: Pulmonary;  Laterality: N/A;    FOOT SURGERY      HYSTERECTOMY      AUGUSTINE for AUB with consevation ovaries    INSERTION OF TUNNELED CENTRAL VENOUS CATHETER (CVC) WITH SUBCUTANEOUS PORT Right 2024    Procedure: [id number];  Surgeon: Simon Wilson III, MD;  Location: [facility] OR;  Service: General;  Laterality: Right;    ROBOTIC BRONCHOSCOPY N/A 2024    Procedure: ROBOTIC BRONCHOSCOPY;  Surgeon: Kizzy Siegel MD;  Location: [facility] OR 2ND FLR;  Service: Pulmonary;  Laterality: N/A;    SALIVARY GLAND SURGERY           Allergies:  Review of patient's allergies indicates:   Allergen Reactions    Bisacodyl Nausea And Vomiting     Other reaction(s): Vomiting    Iodinated contrast media Anaphylaxis, Hives and Shortness Of Breath     Hypotension.  Pt states she has anaphylaxis with IV contrast.     Morphine Other (See Comments)     hypotension    Azithromycin Hives    Cipro [ciprofloxacin hcl]     Demerol [meperidine]      Nausea vomiting, visual problem    Doxycycline Hives    Flonase [fluticasone propionate] Hives    Morpholine analogues Other (See Comments)     hypotension       Social/Family History:  Social History     Socioeconomic History    Marital status:    Tobacco Use    Smoking status: Former     Types: Cigarettes     Start date: 2024     Quit date: 1964     Years since quittin.7    Smokeless tobacco: Never    Tobacco comments:     Pt has smoked since 14 years old. Smokes around .5ppd. Inpatient smoking education done 10/20.     Pt quit smoking on 24.  She has never used smokeless tobacco   Substance and Sexual Activity    Alcohol use: Never    Drug use: Never    Sexual activity: Yes     Partners: Male     Social Determinants of Health     Financial Resource Strain: Low Risk  (2024)    Overall Financial Resource Strain (CARDIA)     Difficulty of Paying Living Expenses: Not hard at all    Food Insecurity: No Food Insecurity (7/5/2024)    Hunger Vital Sign     Worried About Running Out of Food in the Last Year: Never true     Ran Out of Food in the Last Year: Never true   Transportation Needs: No Transportation Needs (7/5/2024)    PRAPARE - Transportation     Lack of Transportation (Medical): No     Lack of Transportation (Non-Medical): No   Physical Activity: Inactive (3/15/2024)    Exercise Vital Sign     Days of Exercise per Week: 0 days     Minutes of Exercise per Session: 0 min   Stress: Stress Concern Present (3/15/2024)    [employer] - Occupational Stress Questionnaire     Feeling of Stress : Very much   Housing Stability: Low Risk  (7/5/2024)    Housing Stability Vital Sign     Unable to Pay for Housing in the Last Year: No     Homeless in the Last Year: No     Family History   Problem Relation Name Age of Onset    Bladder Cancer Mother      Heart failure Father      Emphysema Sister      Pancreatic cancer Sister      No Known Problems Brother           Medications:    Current Outpatient Medications:     albuterol-ipratropium (DUO-NEB) 2.5 mg-0.5 mg/3 mL nebulizer solution, Take 3 mLs by nebulization every 6 (six) hours as needed for Wheezing. Rescue, Disp: 75 mL, Rfl: 0    amLODIPine (NORVASC) 5 MG tablet, Take 5 mg by mouth once daily. Per pt only taking if bp is high, Disp: , Rfl:     EPINEPHrine (EPIPEN) 0.3 mg/0.3 mL AtIn, Inject 0.3 mLs (0.3 mg total) into the muscle as needed (Severe allergic reaction symptoms such as shortness of breath, tongue or throat swelling, weakness dizziness severe nausea vomiting)., Disp: 1 each, Rfl: 3    famotidine (PEPCID) 20 MG tablet, Take 1 tablet (20 mg total) by mouth 2 (two) times daily., Disp: 20 tablet, Rfl: 0    furosemide (LASIX) 20 MG tablet, Take 1 tablet (20 mg total) by mouth daily as needed (edema)., Disp: 30 tablet, Rfl: 1    HYDROcodone-acetaminophen (NORCO)  mg per tablet, Take 1 tablet by mouth every  12 (twelve) hours as needed for Pain., Disp: 50 tablet, Rfl: 0    levalbuterol (XOPENEX HFA) 45 mcg/actuation inhaler, Inhale 2 puffs into the lungs every 6 (six) hours as needed for Wheezing. Rescue, Disp: 15 g, Rfl: 4    levalbuterol (XOPENEX) 1.25 mg/3 mL nebulizer solution, Take 3 mLs (1.25 mg total) by nebulization every 4 (four) hours as needed for Wheezing. Rescue, Disp: 1 mL, Rfl: 0    levothyroxine (SYNTHROID) 112 MCG tablet, Take 1 tablet (112 mcg total) by mouth before breakfast., Disp: 90 tablet, Rfl: 3    LIDOcaine-prilocaine (EMLA) cream, Apply topically as needed. (Patient taking differently: Apply 1 g topically as needed (Chemo port).), Disp: 30 g, Rfl: 5    LORazepam (ATIVAN) 1 MG tablet, Take 0.5 tablets (0.5 mg total) by mouth every 6 (six) hours as needed for Anxiety., Disp: 60 tablet, Rfl: 3    metoprolol tartrate (LOPRESSOR) 25 MG tablet, Take 1 tablet (25 mg total) by mouth 2 (two) times daily. (Patient taking differently: Take 25 mg by mouth 2 (two) times daily. Per pt only prn), Disp: 180 tablet, Rfl: 3    nitrofurantoin, macrocrystal-monohydrate, (MACROBID) 100 MG capsule, Take 1 capsule (100 mg total) by mouth 2 (two) times daily. (Patient not taking: Reported on 8/29/2024), Disp: 14 capsule, Rfl: 0    omeprazole (PRILOSEC) 40 MG capsule, Take 1 capsule (40 mg total) by mouth every morning., Disp: 90 capsule, Rfl: 3    ondansetron (ZOFRAN) 8 MG tablet, Take 1 tablet (8 mg total) by mouth every 8 (eight) hours as needed for Nausea., Disp: 30 tablet, Rfl: 5    polyethylene glycol (GLYCOLAX) 17 gram/dose powder, Take 17 g by mouth once daily., Disp: 510 g, Rfl: 5    promethazine (PHENERGAN) 25 MG tablet, Take 1 tablet (25 mg total) by mouth every 6 (six) hours as needed for Nausea., Disp: 30 tablet, Rfl: 5    REFRESH OPTIVE 0.5-0.9 % Drop, Place 1 drop into both eyes 4 (four) times daily as needed (Dry Eyes)., Disp: , Rfl:     simethicone (GAS-X ORAL), Take 1 tablet by mouth as needed  (Flatulence)., Disp: , Rfl:     umeclidinium-vilanteroL (ANORO ELLIPTA) 62.5-25 mcg/actuation DsDv, Inhale 1 puff into the lungs once daily. Controller, Disp: 1 each, Rfl: 11      Pathology:   Cancer Staging   Non-small cell carcinoma of left lung  Staging form: Lung, AJCC 8th Edition  - Clinical stage from 5/1/2024: Stage IB (cT2a, cN0, cM0) - Signed by Antony Rodriguez III, MD on 5/1/2024          Objective:   Vitals:  There were no vitals taken for this visit.    Physical Examination:   GEN: no apparent distress, comfortable; AAOx3; overweight  HEAD: atraumatic and normocephalic  EYES: no pallor, no icterus, PERRLA  ENT: OMM, no pharyngeal erythema, external ears WNL; no nasal discharge; no thrush  NECK: no masses, thyroid normal, trachea midline, no LAD/LN's, supple  CV: RRR with no murmur; normal pulse; normal S1 and S2; no pedal edema; Rght portacath  CHEST: Normal respiratory effort; CTAB; normal breath sounds; no wheeze or crackles  ABDOM:nontender; nondistended; soft; normal bowel sounds; no rebound/guarding  MUSC/Skeletal: ROM normal; no crepitus; joints normal; no deformities or arthropathy  EXTREM: no clubbing, cyanosis, inflammation or swelling  SKIN: no rashes, lesions, ulcers, petechiae or subcutaneous nodules  : no payan  NEURO: grossly intact; motor/sensory WNL; AAOx3; no tremors;   PSYCH: normal mood, affect and behavior  LYMPH: normal cervical, supraclavicular, axillary and groin LN's      Labs:   Lab Results   Component Value Date    WBC 3.76 (L) 09/04/2024    HGB 12.3 09/04/2024    HCT 38.2 09/04/2024    MCV 97 09/04/2024     (L) 09/04/2024    CMP  Sodium   Date Value Ref Range Status   09/04/2024 141 136 - 145 mmol/L Final     Potassium   Date Value Ref Range Status   09/04/2024 4.1 3.5 - 5.1 mmol/L Final     Chloride   Date Value Ref Range Status   09/04/2024 104 95 - 110 mmol/L Final     CO2   Date Value Ref Range Status   09/04/2024 27 23 - 29 mmol/L Final     Glucose   Date Value  Ref Range Status   09/04/2024 85 70 - 110 mg/dL Final     BUN   Date Value Ref Range Status   09/04/2024 16 8 - 23 mg/dL Final     Creatinine   Date Value Ref Range Status   09/04/2024 0.9 0.5 - 1.4 mg/dL Final   03/19/2013 0.8 0.5 - 1.4 mg/dL Final     Calcium   Date Value Ref Range Status   09/04/2024 9.7 8.7 - 10.5 mg/dL Final   03/19/2013 9.9 8.7 - 10.5 mg/dL Final     Total Protein   Date Value Ref Range Status   09/04/2024 7.3 6.0 - 8.4 g/dL Final     Albumin   Date Value Ref Range Status   09/04/2024 3.9 3.5 - 5.2 g/dL Final     Total Bilirubin   Date Value Ref Range Status   09/04/2024 0.4 0.1 - 1.0 mg/dL Final     Comment:     For infants and newborns, interpretation of results should be based  on gestational age, weight and in agreement with clinical  observations.    Premature Infant recommended reference ranges:  Up to 24 hours.............<8.0 mg/dL  Up to 48 hours............<12.0 mg/dL  3-5 days..................<15.0 mg/dL  6-29 days.................<15.0 mg/dL       Alkaline Phosphatase   Date Value Ref Range Status   09/04/2024 86 55 - 135 U/L Final   08/31/2012 107 23 - 119 UNIT/L Final     AST   Date Value Ref Range Status   09/04/2024 17 10 - 40 U/L Final   08/31/2012 21 10 - 30 UNIT/L Final     ALT   Date Value Ref Range Status   09/04/2024 13 10 - 44 U/L Final     Anion Gap   Date Value Ref Range Status   09/04/2024 10 8 - 16 mmol/L Final   03/19/2013 12 5 - 15 meq/L Final     eGFR if    Date Value Ref Range Status   01/03/2022 >60.0 >60 mL/min/1.73 m^2 Final     eGFR if non    Date Value Ref Range Status   01/03/2022 >60.0 >60 mL/min/1.73 m^2 Final     Comment:     Calculation used to obtain the estimated glomerular filtration  rate (eGFR) is the CKD-EPI equation.          I have reviewed all available lab results and radiology reports.    Radiology/Diagnostic Studies:      US retroperitoneum  7/29/2024:      Impression:     Bilateral simple renal  cysts.      US Soft Tissue Abdomen   7/31/2024  Impression:     Two subcutaneous nodules in the area of concern.  These findings do not reflect simple cysts, but could be complex cysts (sebaceous) or solid masses. Size is unchanged dating to 06/23/2024 but both have increased relative to the comparison PET-CT on 03/14/2024.    In light of neoplasm history, lesions would be amenable to pathologic sampling if warranted.      CT Abdomen Pelvis  Without Contrast  7/10/2024:    Impression:     1. No acute findings within the abdomen or pelvis or significant change from CT 06/23/2024.  2. Stable/incidental findings as discussed above.           NM Lung Scan Perfusion Particulate [[id number]] Resulted: 06/23/24 2017   Order Status: Completed Updated: 06/23/24 2020   Narrative:     EXAMINATION:  NM LUNG SCAN PERFUSION    CLINICAL HISTORY:  Chest pain, nonspecific;Pulmonary embolism (PE) suspected, high prob;Chest pain, nonspecific; Pulmonary embolism (PE) suspected, high prob;    TECHNIQUE:  IV administration of 5.2 mCi of Tc-99m-MAA, multiple images of the thorax were obtained in various projections.    COMPARISON:  Chest x-ray dated 06/23/2024.    FINDINGS:  Only perfusion images are provided for review.  No perfusion defect identified.  Evaluation for mismatch is precluded in the absence of ventilation portion of the examination.   Impression:                      X-Ray Chest AP Portable [[id number]] Resulted: 06/23/24 0926   Order Status: Completed Updated: 06/23/24 0929   Narrative:     EXAMINATION:  XR CHEST AP PORTABLE    CLINICAL HISTORY:  Chest Pain;    COMPARISON:  June 21, 2024    FINDINGS:  AP view demonstrates normal heart size.  The thoracic aorta is elongated.  Left upper lobe noncalcified pulmonary nodule is unchanged.  No infiltrates or effusions are identified.    Right-sided Port-A-Cath is unchanged in position with tip at the superior vena cava.   Impression:       1. No interval change compared to June  21, 2024.          All lab results and imaging results have been reviewed and discussed with the patient    Assessment:   (1) 74 y.o. female with diagnosis of lung cancer with neuroendocrine features who has been on platinum and etopside chemotherapy who was seen as a consult at [facility].. She has been followed by Dr Camden Iyer and she had asked to switch to myself after I had followed her during her recent hospitalization. She had her 2nd cycle chemotherapy on 6/19/2024 The patient returns for a hospital follow-up visit today to go over results of recently ordered tests, studies and/or labs.       7/11/2024:  - pancytopenia due to prior chemotherapy  - WBC now back to normal at 11.8  - hgb still low but adequate at 8.6  - plats at 286,000  - Discussed quality of life as her priority goal and plan is to see if we can build her back up to some prior baseline. He ask palliative medicine to see her. Discussed having PRN orders for IVF's and antiemetics as needed.    8/8/2024:  - She is feeling much better. Ambulating on her own now. Not in wheelchair today. No further N/V. She has some residual tingling in hands and feet.  - She is followed by palliative medicine.  - She has PEt scan ordered - set for 8/28  - she is still not sure whether she wants to get any further chemotherapy    (2) HTN     (3) COPD     (4) GERD; Martin's esophagus; hx/of colitis     (5) Thyroid disease     (6) Anxiety     (7) Former smoker      VISIT DIAGNOSES:      No diagnosis found.          Plan:     PLAN:  Check labs monthly for now; proceed with PET on 8/28  Goal of care transitioning to Quality of life  F/u with palliative medicine  F/u with PCP, Pulm, GI, Rad/onc etc    RTC in  3 week with Kristen and 6 weeks with myself  Fax note to Santos; Shireen Martinez Mannina, [place], [place], Gunn/Yobani              There are no diagnoses linked to this encounter.    No follow-ups on file.      Pathology Discussion:    I reviewed and  "discussed the pathology report(s) and radiograph reports (if available) in as simple to understand and/or laymen's terms to the best of my ability. I had an indepth conversation with the patient and went over the patient's individual diagnosis based on the information that was currently available. I discussed the TNM staging process with regard to the patient's particular cancer type, and the calculated stage based on the currently available TNM data and literature. I discussed the available prognostic data with regard to the current staging information and how it relates to the prognosis of their particular neoplastic process.      NCCN Guidelines:    I discussed the available treatment option(s) in accordance with the latest literature from the NCCN Clinical Practice Guidelines for the patient's particular type of cancer disorder. The NCCN Guidelines provide a "document evidence-based (and) consensus-driven management" of the care of oncology patients. The treatment recommendations were made not only in accordance to the NCCN guidelines, but also factored in to account the patient's overall age, condition, performance status and their medical co-morbidities. I went over the risks and benefits of the the treatment options (if any could be made) with regard to their particular cancer type, their cancer stage, their age, and their co-morbidities.     Chemotherapy Discussion:      I discussed the available treatment option(s) in accordance with the latest/current national evidence-based guidelines (NCCN, UpToDate, NCI, ASCO, etc where applicable), their overall age/condition and their co-morbidities. I also went over the risks and benefits of the chemotherapy with regard to their particular cancer type, their cancer stage, their age/condition, and their co-morbidities. I provided literature on the chemotherapy regimen and discussed the chemotherapy side-effect profiles of the drug(s). I discussed the importance of " compliance with obtaining and monitoring weekly lab work, and went over the potential hematopathology issues and risks with anemia, leucopenia and thrombocytopenia that can occur with chemotherapy. Discussed the potential risks of liver and kidney damage, which could be permanent and could necessitate dialysis long-term if kidney failure developed. Discussed the risk of development of cardiomyopathy and/or CHF with certain chemotherapy agents, which could be chronic and/or permanent. Discussed the emetic and/or diarrheal potential of the regimen and the potential need for use of antiemetic and anti-diarrheal medications. Discussed the risk for development of anaphylactic shock, bronchospasm, dysrhythmia, and respiratory/cardiovascular arrest and/or failure. Discussed the potential risks for development of alopecia, cold sensory issues, ringing in ears, vertigo, cataracts, glaucoma, and neuropathy, all of which could end up being chronic and life-long. Discussed the risks of any N/V, diarrhea, mucositis, mouth ulcers, dry skin, itching, HA's, blurry vision, fatigue and other general malaise that can be associated with certain chemotherapy agents. Discussed the risks of hand-foot syndrome and rashes, and development of other autoimmune mediated processes such as pneumonitis, hepatitis, and colitis which could be life threatening. Discussed the risks of the potential development of a rare but fatal viral mediated disease known as PML (Progressive Multifocal Leukoencephalopathy), and risk of future development of leukemia and/or lymphoma from use of certain chemotherapy agents. Discussed the need for neutropenic precautions, basic hygiene/sanitation behaviors and dietary restrictions.    The patient's consent has been obtained to proceed with the chemotherapy.The patient referred to and underwent Chemotherapy School [facility] for training and education on chemotherapy, use of antiemetics and/or  anti-diarrheals, use of NSAID's, potential chemotherapy side-effects, and any specific recommendations and precautions with the particular chemotherapy agents.      Answered all of the patient's (and family's, if applicable) questions to the best of my ability and to their complete satisfaction. The patient acknowledged full understanding of the risks, recommendations and plan(s).     I have explained and the patient understands all of  the current recommendation(s). I have answered all of their questions to the best of my ability and to their complete satisfaction.             Thank you for allowing me to participate in this pleasant patient's care. Please call with any questions or concerns.      Electronically signed Scarlett Stoner NP      Answers submitted by the patient for this visit:  Review of Systems Questionnaire (Submitted on 8/7/2024)  appetite change : No  unexpected weight change: No  mouth sores: No  visual disturbance: No  cough: Yes  shortness of breath: Yes  chest pain: Yes  abdominal pain: Yes  diarrhea: No  frequency: Yes  back pain: Yes  rash: No  headaches: Yes  adenopathy: No  nervous/ anxious: No

## 2024-09-10 ENCOUNTER — HOSPITAL ENCOUNTER (OUTPATIENT)
Dept: RADIOLOGY | Facility: HOSPITAL | Age: 74
Discharge: HOME | End: 2024-09-10
Attending: NURSE PRACTITIONER
Payer: MEDICARE

## 2024-09-10 DIAGNOSIS — M79.89 NODULE OF SOFT TISSUE: ICD-10-CM

## 2024-09-10 PROCEDURE — 25000003 PHARM REV CODE 250: Performed by: RADIOLOGY

## 2024-09-10 PROCEDURE — 21550 BIOPSY OF NECK/CHEST: CPT

## 2024-09-10 PROCEDURE — 27200940 US BIOPSY SOFT TISSUE NECK THORAX (XPD)

## 2024-09-10 PROCEDURE — 76942 ECHO GUIDE FOR BIOPSY: CPT | Mod: TC | Performed by: RADIOLOGY

## 2024-09-10 PROCEDURE — 49180 BIOPSY ABDOMINAL MASS: CPT | Performed by: RADIOLOGY

## 2024-09-10 RX ORDER — LIDOCAINE HYDROCHLORIDE 10 MG/ML
INJECTION, SOLUTION EPIDURAL; INFILTRATION; INTRACAUDAL; PERINEURAL
Status: COMPLETED | OUTPATIENT
Start: 2024-09-10 | End: 2024-09-10

## 2024-09-10 RX ADMIN — LIDOCAINE HYDROCHLORIDE 9 ML: 10 INJECTION, SOLUTION EPIDURAL; INFILTRATION; INTRACAUDAL; PERINEURAL at 08:09

## 2024-09-10 NOTE — PLAN OF CARE
Arrived to US via w/c .  Id'd x 2 pt identifiers.  AAO x 3. DUGGAN x4. Resp REU.   Denies pain or nausea. All questions answered.

## 2024-09-10 NOTE — PLAN OF CARE
Tolerated well.  Dsg to [facility]- gauze/tegaderm  Denies pain or nausea. DUGGAN x 4.    Verbal and written d/c instructions given and understanding verbalized.  D/c'd to home via w/c with  in attendance.

## 2024-09-10 NOTE — DISCHARGE INSTRUCTIONS
Medical Imaging Discharge Instructions    Discharge Instructions After:  Soft Tissue Biopsy    Diet:  Resume diet as prescribed by your physician    Medications:  Resume medications as prescribed by your physician and tylenol only for pain x 24 hours    Activity:  Rest today and Avoid strenuous activity for 1 days    Care of Dressing and Incision:   keep dressing on x 24 hours with max of 48 hours    Report to M.D. any of the Following:  Any severe pain, new onset pain or worsening symptoms, Excessive bleeding, bruising or swelling, and Temperature of 101 degrees or above    Follow up with your physician regarding the results of this exam. Please feel free to call the radiology department at [phone] for any problems or questions. Ask to speak with the radiology nurse.    Deepti Villafana RN  Date of Service: 09/10/2024  8:59 AM

## 2024-09-13 ENCOUNTER — PATIENT MESSAGE (OUTPATIENT)
Facility: CLINIC | Age: 74
End: 2024-09-13
Payer: MEDICARE

## 2024-09-16 ENCOUNTER — TELEPHONE (OUTPATIENT)
Facility: CLINIC | Age: 74
End: 2024-09-16
Payer: MEDICARE

## 2024-09-16 NOTE — TELEPHONE ENCOUNTER
----- Message from Neo Mariano MD sent at 9/16/2024 12:14 PM CDT -----  Looks like cancer recurrent - let's look at her options

## 2024-09-18 ENCOUNTER — LAB VISIT (OUTPATIENT)
Dept: LAB | Facility: HOSPITAL | Age: 74
End: 2024-09-18
Attending: INTERNAL MEDICINE
Payer: MEDICARE

## 2024-09-18 DIAGNOSIS — C34.92 NON-SMALL CELL CARCINOMA OF LEFT LUNG: ICD-10-CM

## 2024-09-18 DIAGNOSIS — D69.6 THROMBOCYTOPENIA: ICD-10-CM

## 2024-09-18 DIAGNOSIS — E03.9 HYPOTHYROIDISM, UNSPECIFIED TYPE: ICD-10-CM

## 2024-09-18 LAB
ALBUMIN SERPL BCP-MCNC: 4 G/DL (ref 3.5–5.2)
ALP SERPL-CCNC: 90 U/L (ref 55–135)
ALT SERPL W/O P-5'-P-CCNC: 16 U/L (ref 10–44)
ANION GAP SERPL CALC-SCNC: 9 MMOL/L (ref 8–16)
AST SERPL-CCNC: 18 U/L (ref 10–40)
BASOPHILS # BLD AUTO: 0.02 K/UL (ref 0–0.2)
BASOPHILS NFR BLD: 0.4 % (ref 0–1.9)
BILIRUB SERPL-MCNC: 0.4 MG/DL (ref 0.1–1)
BUN SERPL-MCNC: 12 MG/DL (ref 8–23)
CALCIUM SERPL-MCNC: 9.4 MG/DL (ref 8.7–10.5)
CHLORIDE SERPL-SCNC: 104 MMOL/L (ref 95–110)
CO2 SERPL-SCNC: 27 MMOL/L (ref 23–29)
CREAT SERPL-MCNC: 0.9 MG/DL (ref 0.5–1.4)
DIFFERENTIAL METHOD BLD: ABNORMAL
EOSINOPHIL # BLD AUTO: 0.1 K/UL (ref 0–0.5)
EOSINOPHIL NFR BLD: 2 % (ref 0–8)
ERYTHROCYTE [DISTWIDTH] IN BLOOD BY AUTOMATED COUNT: 13.9 % (ref 11.5–14.5)
EST. GFR  (NO RACE VARIABLE): >60 ML/MIN/1.73 M^2
GLUCOSE SERPL-MCNC: 116 MG/DL (ref 70–110)
HCT VFR BLD AUTO: 40.3 % (ref 37–48.5)
HGB BLD-MCNC: 13 G/DL (ref 12–16)
IMM GRANULOCYTES # BLD AUTO: 0.01 K/UL (ref 0–0.04)
IMM GRANULOCYTES NFR BLD AUTO: 0.2 % (ref 0–0.5)
LYMPHOCYTES # BLD AUTO: 1.3 K/UL (ref 1–4.8)
LYMPHOCYTES NFR BLD: 27.7 % (ref 18–48)
MAGNESIUM SERPL-MCNC: 1.8 MG/DL (ref 1.6–2.6)
MCH RBC QN AUTO: 31.1 PG (ref 27–31)
MCHC RBC AUTO-ENTMCNC: 32.3 G/DL (ref 32–36)
MCV RBC AUTO: 96 FL (ref 82–98)
MONOCYTES # BLD AUTO: 0.5 K/UL (ref 0.3–1)
MONOCYTES NFR BLD: 10.4 % (ref 4–15)
NEUTROPHILS # BLD AUTO: 2.7 K/UL (ref 1.8–7.7)
NEUTROPHILS NFR BLD: 59.3 % (ref 38–73)
NRBC BLD-RTO: 0 /100 WBC
PLATELET # BLD AUTO: 133 K/UL (ref 150–450)
PMV BLD AUTO: 8.2 FL (ref 9.2–12.9)
POTASSIUM SERPL-SCNC: 4 MMOL/L (ref 3.5–5.1)
PROT SERPL-MCNC: 7.2 G/DL (ref 6–8.4)
RBC # BLD AUTO: 4.18 M/UL (ref 4–5.4)
SODIUM SERPL-SCNC: 140 MMOL/L (ref 136–145)
T4 FREE SERPL-MCNC: 1.42 NG/DL (ref 0.71–1.51)
TSH SERPL DL<=0.005 MIU/L-ACNC: 0.17 UIU/ML (ref 0.34–5.6)
WBC # BLD AUTO: 4.52 K/UL (ref 3.9–12.7)

## 2024-09-18 PROCEDURE — 85025 COMPLETE CBC W/AUTO DIFF WBC: CPT | Performed by: INTERNAL MEDICINE

## 2024-09-18 PROCEDURE — 83735 ASSAY OF MAGNESIUM: CPT | Performed by: INTERNAL MEDICINE

## 2024-09-18 PROCEDURE — 84443 ASSAY THYROID STIM HORMONE: CPT | Performed by: NURSE PRACTITIONER

## 2024-09-18 PROCEDURE — 84439 ASSAY OF FREE THYROXINE: CPT | Performed by: NURSE PRACTITIONER

## 2024-09-18 PROCEDURE — 84480 ASSAY TRIIODOTHYRONINE (T3): CPT | Performed by: NURSE PRACTITIONER

## 2024-09-18 PROCEDURE — 80053 COMPREHEN METABOLIC PANEL: CPT | Performed by: INTERNAL MEDICINE

## 2024-09-18 PROCEDURE — 36415 COLL VENOUS BLD VENIPUNCTURE: CPT | Performed by: INTERNAL MEDICINE

## 2024-09-18 NOTE — PROGRESS NOTES
[facility]  Progress Note -   Follow-up Visit    Subjective:      Patient ID:   NAME: Joslyn Dubon : 1950     74 y.o. female    Referring Doc: Jasbir Graham  Other Physicians: Yobani/Robbin, Michelle Park, Jennyfer    Chief Complaint: lung cancer      HPI:   The patient returns for a  follow-up visit today to go over results of recently ordered tests, studies and/or labs. She is here with her  and daughter today.     She saw Dr Graham on 2024. She is followed by palliative medicine.    She had PET on 2024 and saw Scarlett Pacheco NP on 2024; she subsequently underwent an US guided biopsy of the soft tissue nodule on 9/10/2024. The pathology from the biopsy has come back showing metastatic poorly differentiated large cell carcinoma.    She is feeling better in general. Ambulating on her own now.    She is currently breathing adequately. No current CP, HA's    She previously self-discontinued the chemotherapy after only two cycles. Quality of life is her priority goal. She is aware that whether this cancer is coming from the lung or is from somewhere else, that this will continue to plaque her without systemic therapy, and unfortunately regardless of anything may still eventually take her life.               ROS:   GEN: general malaise, weakness, fatigue but much better  HEENT: normal with no HA's, sore throat, stiff neck, changes in vision  CV: normal with no CP, SOB, PND, RAPHAEL or orthopnea  PULM: normal with no SOB, cough, hemoptysis, sputum or pleuritic pain  GI:  no current nausea and GI upset; enlarging LLQ abdominal wall cyst  : normal with no hematuria, dysuria  BREAST: normal with no mass, discharge, pain  SKIN: normal with no rash, erythema, bruising, or swelling     Past Medical/Surgical History:  Past Medical History:   Diagnosis Date    Anxiety     Martin esophagus     Chemotherapy-induced nausea 2024    Colitis     COPD (chronic obstructive  pulmonary disease)     Dehydration 2024    GERD (gastroesophageal reflux disease)     Hypertension     Intractable nausea and vomiting 2024    Lung cancer     Thyroid disease     Vocal cord polyps      Past Surgical History:   Procedure Laterality Date    ABDOMINAL AORTIC ANEURYSM REPAIR      BACK SURGERY      cervical    CATARACT EXTRACTION Right 2021    CHOLECYSTECTOMY  2013    Dr Albert CORLEY    ENDOBRONCHIAL ULTRASOUND N/A 2024    Procedure: ENDOBRONCHIAL ULTRASOUND (EBUS);  Surgeon: Kizzy Siegel MD;  [facility];  Service: Pulmonary;  Laterality: N/A;    FOOT SURGERY      HYSTERECTOMY      AUGUSTINE for AUB with consevation ovaries    INSERTION OF TUNNELED CENTRAL VENOUS CATHETER (CVC) WITH SUBCUTANEOUS PORT Right 2024    Procedure: [id number];  Surgeon: Simon Wilson III, MD;  Location: [facility];  Service: General;  Laterality: Right;    ROBOTIC BRONCHOSCOPY N/A 2024    Procedure: ROBOTIC BRONCHOSCOPY;  Surgeon: Kizzy Siegel MD;  [facility];  Service: Pulmonary;  Laterality: N/A;    SALIVARY GLAND SURGERY           Allergies:  Review of patient's allergies indicates:   Allergen Reactions    Bisacodyl Nausea And Vomiting     Other reaction(s): Vomiting    Iodinated contrast media Anaphylaxis, Hives and Shortness Of Breath     Hypotension.  Pt states she has anaphylaxis with IV contrast.     Morphine Other (See Comments)     hypotension    Azithromycin Hives    Cipro [ciprofloxacin hcl]     Demerol [meperidine]      Nausea vomiting, visual problem    Doxycycline Hives    Flonase [fluticasone propionate] Hives    Morpholine analogues Other (See Comments)     hypotension       Social/Family History:  Social History     Socioeconomic History    Marital status:    Tobacco Use    Smoking status: Former     Types: Cigarettes     Start date: 2024     Quit date: 1964     Years since quittin.7    Smokeless tobacco:  Never    Tobacco comments:     Pt has smoked since 14 years old. Smokes around .5ppd. Inpatient smoking education done 10/20.     Pt quit smoking on 01/01/24.  She has never used smokeless tobacco   Substance and Sexual Activity    Alcohol use: Never    Drug use: Never    Sexual activity: Yes     Partners: Male     Social Determinants of Health     Financial Resource Strain: Low Risk  (7/5/2024)    Overall Financial Resource Strain (CARDIA)     Difficulty of Paying Living Expenses: Not hard at all   Food Insecurity: No Food Insecurity (7/5/2024)    Hunger Vital Sign     Worried About Running Out of Food in the Last Year: Never true     Ran Out of Food in the Last Year: Never true   Transportation Needs: No Transportation Needs (7/5/2024)    PRAPARE - Transportation     Lack of Transportation (Medical): No     Lack of Transportation (Non-Medical): No   Physical Activity: Inactive (3/15/2024)    Exercise Vital Sign     Days of Exercise per Week: 0 days     Minutes of Exercise per Session: 0 min   Stress: Stress Concern Present (3/15/2024)    [employer] - Occupational Stress Questionnaire     Feeling of Stress : Very much   Housing Stability: Low Risk  (7/5/2024)    Housing Stability Vital Sign     Unable to Pay for Housing in the Last Year: No     Homeless in the Last Year: No     Family History   Problem Relation Name Age of Onset    Bladder Cancer Mother      Heart failure Father      Emphysema Sister      Pancreatic cancer Sister      No Known Problems Brother           Medications:    Current Outpatient Medications:     albuterol-ipratropium (DUO-NEB) 2.5 mg-0.5 mg/3 mL nebulizer solution, Take 3 mLs by nebulization every 6 (six) hours as needed for Wheezing. Rescue, Disp: 75 mL, Rfl: 0    amLODIPine (NORVASC) 5 MG tablet, Take 5 mg by mouth once daily. Per pt only taking if bp is high, Disp: , Rfl:     diphenhydrAMINE (BENADRYL) 25 mg capsule, Take 25 mg by mouth every 6 (six) hours  as needed for Itching., Disp: , Rfl:     EPINEPHrine (EPIPEN) 0.3 mg/0.3 mL AtIn, Inject 0.3 mLs (0.3 mg total) into the muscle as needed (Severe allergic reaction symptoms such as shortness of breath, tongue or throat swelling, weakness dizziness severe nausea vomiting)., Disp: 1 each, Rfl: 3    famotidine (PEPCID) 20 MG tablet, Take 1 tablet (20 mg total) by mouth 2 (two) times daily., Disp: 20 tablet, Rfl: 0    HYDROcodone-acetaminophen (NORCO)  mg per tablet, Take 1 tablet by mouth every 12 (twelve) hours as needed for Pain., Disp: 50 tablet, Rfl: 0    levalbuterol (XOPENEX HFA) 45 mcg/actuation inhaler, Inhale 2 puffs into the lungs every 6 (six) hours as needed for Wheezing. Rescue, Disp: 15 g, Rfl: 4    levalbuterol (XOPENEX) 1.25 mg/3 mL nebulizer solution, Take 3 mLs (1.25 mg total) by nebulization every 4 (four) hours as needed for Wheezing. Rescue, Disp: 1 mL, Rfl: 0    levothyroxine (SYNTHROID) 112 MCG tablet, Take 1 tablet (112 mcg total) by mouth before breakfast., Disp: 90 tablet, Rfl: 3    LIDOcaine-prilocaine (EMLA) cream, Apply topically as needed. (Patient taking differently: Apply 1 g topically as needed (Chemo port).), Disp: 30 g, Rfl: 5    LORazepam (ATIVAN) 1 MG tablet, Take 0.5 tablets (0.5 mg total) by mouth every 6 (six) hours as needed for Anxiety., Disp: 60 tablet, Rfl: 3    metoprolol tartrate (LOPRESSOR) 25 MG tablet, Take 1 tablet (25 mg total) by mouth 2 (two) times daily. (Patient taking differently: Take 25 mg by mouth 2 (two) times daily. Per pt only prn), Disp: 180 tablet, Rfl: 3    nitrofurantoin, macrocrystal-monohydrate, (MACROBID) 100 MG capsule, Take 1 capsule (100 mg total) by mouth 2 (two) times daily., Disp: 14 capsule, Rfl: 0    omeprazole (PRILOSEC) 40 MG capsule, Take 1 capsule (40 mg total) by mouth every morning., Disp: 90 capsule, Rfl: 3    ondansetron (ZOFRAN) 8 MG tablet, Take 1 tablet (8 mg total) by mouth every 8 (eight) hours as needed for Nausea., Disp:  "30 tablet, Rfl: 5    polyethylene glycol (GLYCOLAX) 17 gram/dose powder, Take 17 g by mouth once daily., Disp: 510 g, Rfl: 5    promethazine (PHENERGAN) 25 MG tablet, Take 1 tablet (25 mg total) by mouth every 6 (six) hours as needed for Nausea., Disp: 30 tablet, Rfl: 5    REFRESH OPTIVE 0.5-0.9 % Drop, Place 1 drop into both eyes 4 (four) times daily as needed (Dry Eyes)., Disp: , Rfl:     simethicone (GAS-X ORAL), Take 1 tablet by mouth as needed (Flatulence)., Disp: , Rfl:     umeclidinium-vilanteroL (ANORO ELLIPTA) 62.5-25 mcg/actuation DsDv, Inhale 1 puff into the lungs once daily. Controller, Disp: 1 each, Rfl: 11    furosemide (LASIX) 20 MG tablet, Take 1 tablet (20 mg total) by mouth daily as needed (edema). (Patient not taking: Reported on 9/19/2024), Disp: 30 tablet, Rfl: 1      Pathology:   Cancer Staging   Non-small cell carcinoma of left lung  Staging form: Lung, AJCC 8th Edition  - Clinical stage from 5/1/2024: Stage IB (cT2a, cN0, cM0) - Signed by Antony Rodriguez III, MD on 5/1/2024    LLQ Subcutaneous nodule biopsy:  9/10/2024:    LEFT LOWER QUADRANT SOFT TISSUE NODULE CORE BIOPSIES:     - METASTATIC, POORLY DIFFERENTIATED LARGE CELL CARCINOMA.     The results of immunohistochemical analysis, performed in the present  material, are not diagnostic of specific site of origin. Focal expression of CDX2 can be seen in tumor with intestinal differentiation from anatomic sites, to include but not limit to, upper and lower gastrointestinal tract, pancreatobiliary origin and lung mucionous adenocarcinoma among others (mucionous features are not seen in the present tumor examined). Clinicopathologic correlation is recommended.         Objective:   Vitals:  Blood pressure 138/81, pulse 94, temperature 97.8 °F (36.6 °C), temperature source Temporal, height 5' 2" (1.575 m), weight 78.3 kg (172 lb 9.6 oz), SpO2 (!) 93%.    Physical Examination:   GEN: no apparent distress, comfortable; AAOx3; overweight  HEAD: " atraumatic and normocephalic  EYES: no pallor, no icterus, PERRLA  ENT: OMM, no pharyngeal erythema, external ears WNL; no nasal discharge; no thrush  NECK: no masses, thyroid normal, trachea midline, no LAD/LN's, supple  CV: RRR with no murmur; normal pulse; normal S1 and S2; no pedal edema; Rght portacath  CHEST: Normal respiratory effort; CTAB; normal breath sounds; no wheeze or crackles  ABDOM:nontender; nondistended; soft; normal bowel sounds; no rebound/guarding  MUSC/Skeletal: ROM normal; no crepitus; joints normal; no deformities or arthropathy  EXTREM: no clubbing, cyanosis, inflammation or swelling  SKIN: no rashes, lesions, ulcers, petechiae or subcutaneous nodules  : no payan  NEURO: grossly intact; motor/sensory WNL; AAOx3; no tremors;   PSYCH: normal mood, affect and behavior  LYMPH: normal cervical, supraclavicular, axillary and groin LN's      Labs:   Lab Results   Component Value Date    WBC 4.52 09/18/2024    HGB 13.0 09/18/2024    HCT 40.3 09/18/2024    MCV 96 09/18/2024     (L) 09/18/2024    CMP  Sodium   Date Value Ref Range Status   09/18/2024 140 136 - 145 mmol/L Final     Potassium   Date Value Ref Range Status   09/18/2024 4.0 3.5 - 5.1 mmol/L Final     Chloride   Date Value Ref Range Status   09/18/2024 104 95 - 110 mmol/L Final     CO2   Date Value Ref Range Status   09/18/2024 27 23 - 29 mmol/L Final     Glucose   Date Value Ref Range Status   09/18/2024 116 (H) 70 - 110 mg/dL Final     BUN   Date Value Ref Range Status   09/18/2024 12 8 - 23 mg/dL Final     Creatinine   Date Value Ref Range Status   09/18/2024 0.9 0.5 - 1.4 mg/dL Final   03/19/2013 0.8 0.5 - 1.4 mg/dL Final     Calcium   Date Value Ref Range Status   09/18/2024 9.4 8.7 - 10.5 mg/dL Final   03/19/2013 9.9 8.7 - 10.5 mg/dL Final     Total Protein   Date Value Ref Range Status   09/18/2024 7.2 6.0 - 8.4 g/dL Final     Albumin   Date Value Ref Range Status   09/18/2024 4.0 3.5 - 5.2 g/dL Final     Total Bilirubin    Date Value Ref Range Status   09/18/2024 0.4 0.1 - 1.0 mg/dL Final     Comment:     For infants and newborns, interpretation of results should be based  on gestational age, weight and in agreement with clinical  observations.    Premature Infant recommended reference ranges:  Up to 24 hours.............<8.0 mg/dL  Up to 48 hours............<12.0 mg/dL  3-5 days..................<15.0 mg/dL  6-29 days.................<15.0 mg/dL       Alkaline Phosphatase   Date Value Ref Range Status   09/18/2024 90 55 - 135 U/L Final   08/31/2012 107 23 - 119 UNIT/L Final     AST   Date Value Ref Range Status   09/18/2024 18 10 - 40 U/L Final   08/31/2012 21 10 - 30 UNIT/L Final     ALT   Date Value Ref Range Status   09/18/2024 16 10 - 44 U/L Final     Anion Gap   Date Value Ref Range Status   09/18/2024 9 8 - 16 mmol/L Final   03/19/2013 12 5 - 15 meq/L Final     eGFR if    Date Value Ref Range Status   01/03/2022 >60.0 >60 mL/min/1.73 m^2 Final     eGFR if non    Date Value Ref Range Status   01/03/2022 >60.0 >60 mL/min/1.73 m^2 Final     Comment:     Calculation used to obtain the estimated glomerular filtration  rate (eGFR) is the CKD-EPI equation.          I have reviewed all available lab results and radiology reports.    Radiology/Diagnostic Studies:      PET 8/28/2024:    Impression:     Favorable decrease in size and FDG avidity of left upper lobe pulmonary mass.     Increasing size of adjacent subcutaneous soft tissue nodules of the left lower quadrant, which show marked FDG avidity.     Ultrasound-guided tissue sampling is recommended to assess for malignancy.     Decreased FDG avidity of left parotid soft tissue nodule.     Diffuse hypermetabolism of the thyroid gland.           US retroperitoneum  7/29/2024:      Impression:     Bilateral simple renal cysts.      US Soft Tissue Abdomen   7/31/2024  Impression:     Two subcutaneous nodules in the area of concern.  These findings do not  reflect simple cysts, but could be complex cysts (sebaceous) or solid masses. Size is unchanged dating to 06/23/2024 but both have increased relative to the comparison PET-CT on 03/14/2024.    In light of neoplasm history, lesions would be amenable to pathologic sampling if warranted.      CT Abdomen Pelvis  Without Contrast  7/10/2024:    Impression:     1. No acute findings within the abdomen or pelvis or significant change from CT 06/23/2024.  2. Stable/incidental findings as discussed above.           NM Lung Scan Perfusion Particulate [[id number]] Resulted: 06/23/24 2017   Order Status: Completed Updated: 06/23/24 2020   Narrative:     EXAMINATION:  NM LUNG SCAN PERFUSION    CLINICAL HISTORY:  Chest pain, nonspecific;Pulmonary embolism (PE) suspected, high prob;Chest pain, nonspecific; Pulmonary embolism (PE) suspected, high prob;    TECHNIQUE:  IV administration of 5.2 mCi of Tc-99m-MAA, multiple images of the thorax were obtained in various projections.    COMPARISON:  Chest x-ray dated 06/23/2024.    FINDINGS:  Only perfusion images are provided for review.  No perfusion defect identified.  Evaluation for mismatch is precluded in the absence of ventilation portion of the examination.   Impression:                      X-Ray Chest AP Portable [[id number]] Resulted: 06/23/24 0926   Order Status: Completed Updated: 06/23/24 0929   Narrative:     EXAMINATION:  XR CHEST AP PORTABLE    CLINICAL HISTORY:  Chest Pain;    COMPARISON:  June 21, 2024    FINDINGS:  AP view demonstrates normal heart size.  The thoracic aorta is elongated.  Left upper lobe noncalcified pulmonary nodule is unchanged.  No infiltrates or effusions are identified.    Right-sided Port-A-Cath is unchanged in position with tip at the superior vena cava.   Impression:       1. No interval change compared to June 21, 2024.          All lab results and imaging results have been reviewed and discussed with the patient    Assessment:   (1) 74 y.o.  female with diagnosis of lung cancer with neuroendocrine features who has been on platinum and etopside chemotherapy who was seen as a consult at [facility].. She has been followed by Dr Camden Iyer and she had asked to switch to myself after I had followed her during her recent hospitalization. She had her 2nd cycle chemotherapy on 6/19/2024 The patient returns for a hospital follow-up visit today to go over results of recently ordered tests, studies and/or labs.       7/11/2024:  - pancytopenia due to prior chemotherapy  - WBC now back to normal at 11.8  - hgb still low but adequate at 8.6  - plats at 286,000  - Discussed quality of life as her priority goal and plan is to see if we can build her back up to some prior baseline. He ask palliative medicine to see her. Discussed having PRN orders for IVF's and antiemetics as needed.    8/8/2024:  - She is feeling much better. Ambulating on her own now. Not in wheelchair today. No further N/V. She has some residual tingling in hands and feet.  - She is followed by palliative medicine.  - She has PEt scan ordered - set for 8/28  - she is still not sure whether she wants to get any further chemotherapy    9/19/2024:  - She saw Dr Graham on 8/29/2024. She is followed by palliative medicine.  - She had PET on 8/28/2024 and saw Scarlett Pacheco NP on 9/5/2024; she subsequently underwent an US guided biopsy of the soft tissue nodule on 9/10/2024. The pathology from the biopsy has come back showing metastatic poorly differentiated large cell carcinoma.  - She previously self-discontinued the chemotherapy after only two cycles.   - Quality of life is her priority goal. She is aware that whether this cancer is coming from the lung or is from somewhere else, that this will continue to plaque her without systemic therapy, and unfortunately regardless of anything may still eventually take her life.   - will refer to GenSurg for surgical options, and rad/onc for radiation options  - will  "ask for CARIS studies  - Present case at next tumor board    (2) HTN     (3) COPD     (4) GERD; Martin's esophagus; hx/of colitis     (5) Thyroid disease     (6) Anxiety     (7) Former smoker      VISIT DIAGNOSES:      1. Mass of upper lobe of left lung    2. Thrombocytopenia    3. Non-small cell carcinoma of left lung    4. Anemia due to chemotherapy    5. Tobacco abuse            Plan:     PLAN:  will refer to GenSurg for surgical options, and rad/onc for radiation options  will ask for CARIS studies  Present case at next tumor board  F/u with palliative medicine  F/u with PCP, Pulm, GI, Rad/onc etc    RTC in  2 weeks  Fax note to Santos; Michelle, Shireen, Xavier, Kristen Pacheco, Jennyfer, Robbin/Yobani              Mass of upper lobe of left lung    Thrombocytopenia    Non-small cell carcinoma of left lung    Anemia due to chemotherapy    Tobacco abuse      No follow-ups on file.      Pathology Discussion:    I reviewed and discussed the pathology report(s) and radiograph reports (if available) in as simple to understand and/or laymen's terms to the best of my ability. I had an indepth conversation with the patient and went over the patient's individual diagnosis based on the information that was currently available. I discussed the TNM staging process with regard to the patient's particular cancer type, and the calculated stage based on the currently available TNM data and literature. I discussed the available prognostic data with regard to the current staging information and how it relates to the prognosis of their particular neoplastic process.      NCCN Guidelines:    I discussed the available treatment option(s) in accordance with the latest literature from the NCCN Clinical Practice Guidelines for the patient's particular type of cancer disorder. The NCCN Guidelines provide a "document evidence-based (and) consensus-driven management" of the care of oncology patients. The treatment recommendations were " made not only in accordance to the NCCN guidelines, but also factored in to account the patient's overall age, condition, performance status and their medical co-morbidities. I went over the risks and benefits of the the treatment options (if any could be made) with regard to their particular cancer type, their cancer stage, their age, and their co-morbidities.     Chemotherapy Discussion:      I discussed the available treatment option(s) in accordance with the latest/current national evidence-based guidelines (NCCN, UpToDate, NCI, ASCO, etc where applicable), their overall age/condition and their co-morbidities. I also went over the risks and benefits of the chemotherapy with regard to their particular cancer type, their cancer stage, their age/condition, and their co-morbidities. I provided literature on the chemotherapy regimen and discussed the chemotherapy side-effect profiles of the drug(s). I discussed the importance of compliance with obtaining and monitoring weekly lab work, and went over the potential hematopathology issues and risks with anemia, leucopenia and thrombocytopenia that can occur with chemotherapy. Discussed the potential risks of liver and kidney damage, which could be permanent and could necessitate dialysis long-term if kidney failure developed. Discussed the risk of development of cardiomyopathy and/or CHF with certain chemotherapy agents, which could be chronic and/or permanent. Discussed the emetic and/or diarrheal potential of the regimen and the potential need for use of antiemetic and anti-diarrheal medications. Discussed the risk for development of anaphylactic shock, bronchospasm, dysrhythmia, and respiratory/cardiovascular arrest and/or failure. Discussed the potential risks for development of alopecia, cold sensory issues, ringing in ears, vertigo, cataracts, glaucoma, and neuropathy, all of which could end up being chronic and life-long. Discussed the risks of any N/V, diarrhea,  mucositis, mouth ulcers, dry skin, itching, HA's, blurry vision, fatigue and other general malaise that can be associated with certain chemotherapy agents. Discussed the risks of hand-foot syndrome and rashes, and development of other autoimmune mediated processes such as pneumonitis, hepatitis, and colitis which could be life threatening. Discussed the risks of the potential development of a rare but fatal viral mediated disease known as PML (Progressive Multifocal Leukoencephalopathy), and risk of future development of leukemia and/or lymphoma from use of certain chemotherapy agents. Discussed the need for neutropenic precautions, basic hygiene/sanitation behaviors and dietary restrictions.    The patient's consent has been obtained to proceed with the chemotherapy.The patient referred to and underwent Chemotherapy School [facility] for training and education on chemotherapy, use of antiemetics and/or anti-diarrheals, use of NSAID's, potential chemotherapy side-effects, and any specific recommendations and precautions with the particular chemotherapy agents.      Answered all of the patient's (and family's, if applicable) questions to the best of my ability and to their complete satisfaction. The patient acknowledged full understanding of the risks, recommendations and plan(s).     I have explained and the patient understands all of  the current recommendation(s). I have answered all of their questions to the best of my ability and to their complete satisfaction.             Thank you for allowing me to participate in this pleasant patient's care. Please call with any questions or concerns.      Electronically signed Neo Mariano MD      Answers submitted by the patient for this visit:  Review of Systems Questionnaire (Submitted on 8/7/2024)  appetite change : No  unexpected weight change: No  mouth sores: No  visual disturbance: No  cough: Yes  shortness of breath: Yes  chest pain: Yes  abdominal  pain: Yes  diarrhea: No  frequency: Yes  back pain: Yes  rash: No  headaches: Yes  adenopathy: No  nervous/ anxious: No

## 2024-09-19 ENCOUNTER — OFFICE VISIT (OUTPATIENT)
Facility: CLINIC | Age: 74
End: 2024-09-19
Payer: MEDICARE

## 2024-09-19 VITALS
WEIGHT: 172.63 LBS | DIASTOLIC BLOOD PRESSURE: 81 MMHG | SYSTOLIC BLOOD PRESSURE: 138 MMHG | BODY MASS INDEX: 31.77 KG/M2 | HEIGHT: 62 IN | TEMPERATURE: 98 F | OXYGEN SATURATION: 93 % | HEART RATE: 94 BPM

## 2024-09-19 DIAGNOSIS — R91.8 MASS OF UPPER LOBE OF LEFT LUNG: Primary | ICD-10-CM

## 2024-09-19 DIAGNOSIS — Z72.0 TOBACCO ABUSE: Chronic | ICD-10-CM

## 2024-09-19 DIAGNOSIS — T45.1X5A ANEMIA DUE TO CHEMOTHERAPY: ICD-10-CM

## 2024-09-19 DIAGNOSIS — C34.92 NON-SMALL CELL CARCINOMA OF LEFT LUNG: ICD-10-CM

## 2024-09-19 DIAGNOSIS — D64.81 ANEMIA DUE TO CHEMOTHERAPY: ICD-10-CM

## 2024-09-19 DIAGNOSIS — D69.6 THROMBOCYTOPENIA: ICD-10-CM

## 2024-09-19 LAB — T3 SERPL-MCNC: 154 NG/DL (ref 71–180)

## 2024-09-19 PROCEDURE — 1159F MED LIST DOCD IN RCRD: CPT | Mod: CPTII,S$GLB,, | Performed by: INTERNAL MEDICINE

## 2024-09-19 PROCEDURE — 1101F PT FALLS ASSESS-DOCD LE1/YR: CPT | Mod: CPTII,S$GLB,, | Performed by: INTERNAL MEDICINE

## 2024-09-19 PROCEDURE — G2211 COMPLEX E/M VISIT ADD ON: HCPCS | Mod: S$GLB,,, | Performed by: INTERNAL MEDICINE

## 2024-09-19 PROCEDURE — 1126F AMNT PAIN NOTED NONE PRSNT: CPT | Mod: CPTII,S$GLB,, | Performed by: INTERNAL MEDICINE

## 2024-09-19 PROCEDURE — 3075F SYST BP GE 130 - 139MM HG: CPT | Mod: CPTII,S$GLB,, | Performed by: INTERNAL MEDICINE

## 2024-09-19 PROCEDURE — 99999 PR PBB SHADOW E&M-EST. PATIENT-LVL IV: CPT | Mod: PBBFAC,,, | Performed by: INTERNAL MEDICINE

## 2024-09-19 PROCEDURE — 3288F FALL RISK ASSESSMENT DOCD: CPT | Mod: CPTII,S$GLB,, | Performed by: INTERNAL MEDICINE

## 2024-09-19 PROCEDURE — 99215 OFFICE O/P EST HI 40 MIN: CPT | Mod: S$GLB,,, | Performed by: INTERNAL MEDICINE

## 2024-09-19 PROCEDURE — 3079F DIAST BP 80-89 MM HG: CPT | Mod: CPTII,S$GLB,, | Performed by: INTERNAL MEDICINE

## 2024-09-20 ENCOUNTER — TELEPHONE (OUTPATIENT)
Dept: SURGERY | Facility: CLINIC | Age: 74
End: 2024-09-20
Payer: MEDICARE

## 2024-09-20 ENCOUNTER — OFFICE VISIT (OUTPATIENT)
Dept: RADIATION ONCOLOGY | Facility: CLINIC | Age: 74
End: 2024-09-20
Payer: MEDICARE

## 2024-09-20 VITALS
OXYGEN SATURATION: 93 % | DIASTOLIC BLOOD PRESSURE: 76 MMHG | WEIGHT: 179 LBS | RESPIRATION RATE: 18 BRPM | BODY MASS INDEX: 32.74 KG/M2 | HEART RATE: 82 BPM | SYSTOLIC BLOOD PRESSURE: 130 MMHG

## 2024-09-20 DIAGNOSIS — E03.9 ACQUIRED HYPOTHYROIDISM: ICD-10-CM

## 2024-09-20 DIAGNOSIS — R91.8 MASS OF UPPER LOBE OF LEFT LUNG: ICD-10-CM

## 2024-09-20 DIAGNOSIS — C34.92 NON-SMALL CELL CARCINOMA OF LEFT LUNG: ICD-10-CM

## 2024-09-20 RX ORDER — LEVOTHYROXINE SODIUM 112 UG/1
112 TABLET ORAL
Qty: 90 TABLET | Refills: 3 | Status: SHIPPED | OUTPATIENT
Start: 2024-09-20 | End: 2025-09-15

## 2024-09-20 NOTE — PROGRESS NOTES
Joslyn Dubon  [id number]  1950 9/20/2024    DIAGNOSIS: Recurrent oligometastatic NSCLC    REASON FOR VISIT: Routine scheduled follow-up.    HISTORY OF PRESENT ILLNESS:   Ms. Dubon is a 73F  former smoker (quit 3 months ago) with COPD (home oxygen), solorzano's esophagus, h/o AAA repair (2020), HTN, hypothyroidism, lumbar and cervical radiculopathy, and colitis who presents to clinic for evaluation of ELOY NSCLC - high grade/poorly differentiated carcinoma.      CT Chest 01/07/24 revealed a 1.8 cm spiculated ELOY nodule. Underwent CT-guided biopsy 02/14/24; pathology: ELOY negative. Follow up PET/CT 03/14/24 redemonstrated the known ELOY nodule measuring 2.4 x 2.2 cm with SUV max 14.7. EBUS 04/19/24 path - LEOY positive for high grade/poorly differentiated carcinoma. Most recent CT measuring 3.1cm. LN station 7 negative for malignancy.       She has been deemed medically inoperable and then completed SBRT to left upper lobe, 50 Gy in 5 fractions ending May 17, 2000. Treatment well tolerated.     Since her last visit, however, she was found on imaging to have developed a LLQ subcutaenous mass, initially deemed a cyst, but ultimately bx-proven as a large cell carcinoma, in keeping w/ a oligomet from her ELOY NSCLC.    Her tx'd ELOY NSCLC has regressed to a stable non-avid module/scar, indicative of cCR/DEWAYNE at that site, and PET shows no other LAD or DM besides the LLQ subQ finding.    She has been referred back to us for eval/discussion re: tx options.    She denies pain, wt loss, weakness, diaphresis, cough, f/c/n/v/d/cp/sob, or any other acute changes/issues.            Review of systems otherwise negative unless indicated in HPI/interval history.    Past Medical History:   Diagnosis Date    Anxiety     Solorzano esophagus     Chemotherapy-induced nausea 07/12/2024    Colitis     COPD (chronic obstructive pulmonary disease)     Dehydration 07/12/2024    GERD (gastroesophageal reflux disease)     Hypertension      Intractable nausea and vomiting 2024    Lung cancer     Thyroid disease     Vocal cord polyps      Past Surgical History:   Procedure Laterality Date    ABDOMINAL AORTIC ANEURYSM REPAIR      BACK SURGERY      cervical    CATARACT EXTRACTION Right 2021    CHOLECYSTECTOMY  2013    Dr Albert CORLEY    ENDOBRONCHIAL ULTRASOUND N/A 2024    Procedure: ENDOBRONCHIAL ULTRASOUND (EBUS);  Surgeon: Kizzy Siegel MD;  [facility];  Service: Pulmonary;  Laterality: N/A;    FOOT SURGERY      HYSTERECTOMY      AUGUSTINE for AUB with consevation ovaries    INSERTION OF TUNNELED CENTRAL VENOUS CATHETER (CVC) WITH SUBCUTANEOUS PORT Right 2024    Procedure: CNMREFJIG-SSEB-N-CATH;  Surgeon: Simon Wilson III, MD;  Location: [facility] OR;  Service: General;  Laterality: Right;    ROBOTIC BRONCHOSCOPY N/A 2024    Procedure: ROBOTIC BRONCHOSCOPY;  Surgeon: Kizzy Siegel MD;  [facility];  Service: Pulmonary;  Laterality: N/A;    SALIVARY GLAND SURGERY       Social History     Socioeconomic History    Marital status:    Tobacco Use    Smoking status: Former     Types: Cigarettes     Start date: 2024     Quit date: 1964     Years since quittin.7    Smokeless tobacco: Never    Tobacco comments:     Pt has smoked since 14 years old. Smokes around .5ppd. Inpatient smoking education done 10/20.     Pt quit smoking on 24.  She has never used smokeless tobacco   Substance and Sexual Activity    Alcohol use: Never    Drug use: Never    Sexual activity: Yes     Partners: Male     Social Determinants of Health     Financial Resource Strain: Low Risk  (2024)    Overall Financial Resource Strain (CARDIA)     Difficulty of Paying Living Expenses: Not hard at all   Food Insecurity: No Food Insecurity (2024)    Hunger Vital Sign     Worried About Running Out of Food in the Last Year: Never true     Ran Out of Food in the Last Year: Never true   Transportation  Needs: No Transportation Needs (7/5/2024)    PRAPARE - Transportation     Lack of Transportation (Medical): No     Lack of Transportation (Non-Medical): No   Physical Activity: Inactive (3/15/2024)    Exercise Vital Sign     Days of Exercise per Week: 0 days     Minutes of Exercise per Session: 0 min   Stress: Stress Concern Present (3/15/2024)    [employer] [place] [employer] - Occupational Stress Questionnaire     Feeling of Stress : Very much   Housing Stability: Low Risk  (7/5/2024)    Housing Stability Vital Sign     Unable to Pay for Housing in the Last Year: No     Homeless in the Last Year: No     Family History   Problem Relation Name Age of Onset    Bladder Cancer Mother      Heart failure Father      Emphysema Sister      Pancreatic cancer Sister      No Known Problems Brother       Medication List with Changes/Refills   Current Medications    ALBUTEROL-IPRATROPIUM (DUO-NEB) 2.5 MG-0.5 MG/3 ML NEBULIZER SOLUTION    Take 3 mLs by nebulization every 6 (six) hours as needed for Wheezing. Rescue    AMLODIPINE (NORVASC) 5 MG TABLET    Take 5 mg by mouth once daily. Per pt only taking if bp is high    DIPHENHYDRAMINE (BENADRYL) 25 MG CAPSULE    Take 25 mg by mouth every 6 (six) hours as needed for Itching.    EPINEPHRINE (EPIPEN) 0.3 MG/0.3 ML ATIN    Inject 0.3 mLs (0.3 mg total) into the muscle as needed (Severe allergic reaction symptoms such as shortness of breath, tongue or throat swelling, weakness dizziness severe nausea vomiting).    FAMOTIDINE (PEPCID) 20 MG TABLET    Take 1 tablet (20 mg total) by mouth 2 (two) times daily.    FUROSEMIDE (LASIX) 20 MG TABLET    Take 1 tablet (20 mg total) by mouth daily as needed (edema).    HYDROCODONE-ACETAMINOPHEN (NORCO)  MG PER TABLET    Take 1 tablet by mouth every 12 (twelve) hours as needed for Pain.    LEVALBUTEROL (XOPENEX HFA) 45 MCG/ACTUATION INHALER    Inhale 2 puffs into the lungs every 6 (six) hours as needed for Wheezing. Rescue     LEVALBUTEROL (XOPENEX) 1.25 MG/3 ML NEBULIZER SOLUTION    Take 3 mLs (1.25 mg total) by nebulization every 4 (four) hours as needed for Wheezing. Rescue    LEVOTHYROXINE (SYNTHROID) 112 MCG TABLET    Take 1 tablet (112 mcg total) by mouth before breakfast.    LIDOCAINE-PRILOCAINE (EMLA) CREAM    Apply topically as needed.    LORAZEPAM (ATIVAN) 1 MG TABLET    Take 0.5 tablets (0.5 mg total) by mouth every 6 (six) hours as needed for Anxiety.    METOPROLOL TARTRATE (LOPRESSOR) 25 MG TABLET    Take 1 tablet (25 mg total) by mouth 2 (two) times daily.    NITROFURANTOIN, MACROCRYSTAL-MONOHYDRATE, (MACROBID) 100 MG CAPSULE    Take 1 capsule (100 mg total) by mouth 2 (two) times daily.    OMEPRAZOLE (PRILOSEC) 40 MG CAPSULE    Take 1 capsule (40 mg total) by mouth every morning.    ONDANSETRON (ZOFRAN) 8 MG TABLET    Take 1 tablet (8 mg total) by mouth every 8 (eight) hours as needed for Nausea.    POLYETHYLENE GLYCOL (GLYCOLAX) 17 GRAM/DOSE POWDER    Take 17 g by mouth once daily.    PROMETHAZINE (PHENERGAN) 25 MG TABLET    Take 1 tablet (25 mg total) by mouth every 6 (six) hours as needed for Nausea.    REFRESH OPTIVE 0.5-0.9 % DROP    Place 1 drop into both eyes 4 (four) times daily as needed (Dry Eyes).    SIMETHICONE (GAS-X ORAL)    Take 1 tablet by mouth as needed (Flatulence).    UMECLIDINIUM-VILANTEROL (ANORO ELLIPTA) 62.5-25 MCG/ACTUATION DSDV    Inhale 1 puff into the lungs once daily. Controller     Review of patient's allergies indicates:   Allergen Reactions    Bisacodyl Nausea And Vomiting     Other reaction(s): Vomiting    Iodinated contrast media Anaphylaxis, Hives and Shortness Of Breath     Hypotension.  Pt states she has anaphylaxis with IV contrast.     Morphine Other (See Comments)     hypotension    Azithromycin Hives    Cipro [ciprofloxacin hcl]     Demerol [meperidine]      Nausea vomiting, visual problem    Doxycycline Hives    Flonase [fluticasone propionate] Hives    Morpholine analogues Other  (See Comments)     hypotension       QUALITY OF LIFE: 90%- Able to Carry on Normal Activity: Minor Symptoms of Disease    There were no vitals filed for this visit.  There is no height or weight on file to calculate BMI.    PHYSICAL EXAM:  GENERAL: alert; in no apparent distress.   HEAD: normocephalic, atraumatic.  EYES: pupils are equal, round, reactive to light and accommodation. Sclera anicteric. Conjunctiva not injected.   NOSE/THROAT: no nasal erythema or rhinorrhea. Oropharynx pink, without erythema, ulcerations or thrush.   NECK: no cervical motion rigidity; supple with no masses.  CHEST: Patient is speaking comfortably on room air with normal work of breathing without using accessory muscles of respiration.  CARDIOVASCULAR: regular rate and rhythm  ABDOMEN: soft, nontender, nondistended.   MUSCULOSKELETAL: no tenderness to palpation along the spine or scapulae. Normal range of motion.  NEUROLOGIC: cranial nerves II-XII intact bilaterally. Strength 5/5 in bilateral upper and lower extremities. No sensory deficits appreciated. Normal gait.      ASSESSMENT: Joslyn Dubon is a female with recurrent oligometastatic NSCLC    PLAN:   - pt meeting w/ Dr. Wilson next week w/ plan for WLE of LLQ subcutaneous oligomet  - may consider adj RT pending margin eval  - consider adj systemic/IO thereafter  - present at next TB  - RTC in 2 months    All questions answered and contact information provided. Patient understands free to call us anytime with any questions or concerns regarding radiation therapy.    I have personally seen and evaluated this patient with a moderate to high complexity diagnosis.     PHYSICIAN: Antony Rodriguez III, MD

## 2024-09-20 NOTE — TELEPHONE ENCOUNTER
----- Message from Alyx Daniel sent at 9/20/2024  9:09 AM CDT -----  Contact: self  Type:  Sooner Appointment Request    Caller is requesting a sooner appointment.  Caller declined first available appointment listed below.  Caller will not accept being placed on the waitlist and is requesting a message be sent to doctor.    Name of Caller:  pt  When is the first available appointment?  N/a  Symptoms:  R91.8 (ICD-10-CM) - Mass of upper lobe of left lung  C34.92 (ICD-10-CM) - Non-small cell carcinoma of left lung     Would the patient rather a call back or a response via [facility]? call  Best Call Back Number:  [phone]   Additional Information:  please call pt has a referral would like to be seen asap

## 2024-09-23 ENCOUNTER — TELEPHONE (OUTPATIENT)
Facility: CLINIC | Age: 74
End: 2024-09-23
Payer: MEDICARE

## 2024-09-23 PROBLEM — J96.11 CHRONIC HYPOXIC RESPIRATORY FAILURE: Status: RESOLVED | Noted: 2024-03-20 | Resolved: 2024-09-23

## 2024-09-23 PROBLEM — N17.9 AKI (ACUTE KIDNEY INJURY): Status: RESOLVED | Noted: 2024-06-23 | Resolved: 2024-09-23

## 2024-09-24 ENCOUNTER — TUMOR BOARD CONFERENCE (OUTPATIENT)
Facility: CLINIC | Age: 74
End: 2024-09-24
Payer: MEDICARE

## 2024-09-24 ENCOUNTER — OFFICE VISIT (OUTPATIENT)
Dept: SURGERY | Facility: CLINIC | Age: 74
End: 2024-09-24
Payer: MEDICARE

## 2024-09-24 VITALS — HEART RATE: 84 BPM | SYSTOLIC BLOOD PRESSURE: 144 MMHG | TEMPERATURE: 98 F | DIASTOLIC BLOOD PRESSURE: 87 MMHG

## 2024-09-24 DIAGNOSIS — R22.2 ABDOMINAL WALL MASS: Primary | ICD-10-CM

## 2024-09-24 PROCEDURE — 1159F MED LIST DOCD IN RCRD: CPT | Mod: CPTII,S$GLB,, | Performed by: SURGERY

## 2024-09-24 PROCEDURE — 99214 OFFICE O/P EST MOD 30 MIN: CPT | Mod: S$GLB,,, | Performed by: SURGERY

## 2024-09-24 PROCEDURE — 1125F AMNT PAIN NOTED PAIN PRSNT: CPT | Mod: CPTII,S$GLB,, | Performed by: SURGERY

## 2024-09-24 PROCEDURE — 3079F DIAST BP 80-89 MM HG: CPT | Mod: CPTII,S$GLB,, | Performed by: SURGERY

## 2024-09-24 PROCEDURE — 1160F RVW MEDS BY RX/DR IN RCRD: CPT | Mod: CPTII,S$GLB,, | Performed by: SURGERY

## 2024-09-24 PROCEDURE — 99999 PR PBB SHADOW E&M-EST. PATIENT-LVL IV: CPT | Mod: PBBFAC,,, | Performed by: SURGERY

## 2024-09-24 PROCEDURE — 3077F SYST BP >= 140 MM HG: CPT | Mod: CPTII,S$GLB,, | Performed by: SURGERY

## 2024-09-24 RX ORDER — CEFAZOLIN SODIUM 2 G/50ML
2 SOLUTION INTRAVENOUS
OUTPATIENT
Start: 2024-09-24

## 2024-09-24 NOTE — PROGRESS NOTES
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 NGS. Radiation if close surgical margins.

## 2024-09-24 NOTE — PROGRESS NOTES
Subjective:       Patient ID: Joslyn Dubon is a 74 y.o. female.    Chief Complaint: Lump      HPI:  74 year old female with history of no small cell lung cancer. She is s/p chemo and radiation. She was found to have a large subq mass in the left lower quadrant. It is PET positive. 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 PET is negative for any other sites of diease.    Hard in the office in consultation for excision of the mass.      Past Medical History:   Diagnosis Date    Anxiety     Martin esophagus     Chemotherapy-induced nausea 07/12/2024    Colitis     COPD (chronic obstructive pulmonary disease)     Dehydration 07/12/2024    GERD (gastroesophageal reflux disease)     Hypertension     Intractable nausea and vomiting 06/23/2024    Lung cancer     Thyroid disease     Vocal cord polyps      Past Surgical History:   Procedure Laterality Date    ABDOMINAL AORTIC ANEURYSM REPAIR      BACK SURGERY      cervical    CATARACT EXTRACTION Right 02/2021    CHOLECYSTECTOMY  12/20/2013    Dr Albert CORLEY    ENDOBRONCHIAL ULTRASOUND N/A 4/19/2024    Procedure: ENDOBRONCHIAL ULTRASOUND (EBUS);  Surgeon: Kizzy Siegel MD;  Location: [facility] OR [address] [place] [address];  Service: Pulmonary;  Laterality: N/A;    FOOT SURGERY      HYSTERECTOMY  1979    AUGUSTINE for AUB with consevation ovaries    INSERTION OF TUNNELED CENTRAL VENOUS CATHETER (CVC) WITH SUBCUTANEOUS PORT Right 5/20/2024    Procedure: [id number]-M-CATH;  Surgeon: Simon Wilson III, MD;  Location: [facility] OR;  Service: General;  Laterality: Right;    ROBOTIC BRONCHOSCOPY N/A 4/19/2024    Procedure: ROBOTIC BRONCHOSCOPY;  Surgeon: Kizzy Siegel MD;  Location: [facility] OR [address] [place] [address];  Service: Pulmonary;  Laterality: N/A;    SALIVARY GLAND SURGERY       Review of patient's allergies indicates:   Allergen Reactions    Bisacodyl Nausea And Vomiting     Other reaction(s): Vomiting    Iodinated contrast media Anaphylaxis, Hives and Shortness Of Breath      Hypotension.  Pt states she has anaphylaxis with IV contrast.     Morphine Other (See Comments)     hypotension    Azithromycin Hives    Cipro [ciprofloxacin hcl]     Demerol [meperidine]      Nausea vomiting, visual problem    Doxycycline Hives    Flonase [fluticasone propionate] Hives    Morpholine analogues Other (See Comments)     hypotension     Medication List with Changes/Refills   Current Medications    ALBUTEROL-IPRATROPIUM (DUO-NEB) 2.5 MG-0.5 MG/3 ML NEBULIZER SOLUTION    Take 3 mLs by nebulization every 6 (six) hours as needed for Wheezing. Rescue    AMLODIPINE (NORVASC) 5 MG TABLET    Take 5 mg by mouth once daily. Per pt only taking if bp is high    DIPHENHYDRAMINE (BENADRYL) 25 MG CAPSULE    Take 25 mg by mouth every 6 (six) hours as needed for Itching.    EPINEPHRINE (EPIPEN) 0.3 MG/0.3 ML ATIN    Inject 0.3 mLs (0.3 mg total) into the muscle as needed (Severe allergic reaction symptoms such as shortness of breath, tongue or throat swelling, weakness dizziness severe nausea vomiting).    FAMOTIDINE (PEPCID) 20 MG TABLET    Take 1 tablet (20 mg total) by mouth 2 (two) times daily.    FUROSEMIDE (LASIX) 20 MG TABLET    Take 1 tablet (20 mg total) by mouth daily as needed (edema).    HYDROCODONE-ACETAMINOPHEN (NORCO)  MG PER TABLET    Take 1 tablet by mouth every 12 (twelve) hours as needed for Pain.    LEVALBUTEROL (XOPENEX HFA) 45 MCG/ACTUATION INHALER    Inhale 2 puffs into the lungs every 6 (six) hours as needed for Wheezing. Rescue    LEVALBUTEROL (XOPENEX) 1.25 MG/3 ML NEBULIZER SOLUTION    Take 3 mLs (1.25 mg total) by nebulization every 4 (four) hours as needed for Wheezing. Rescue    LEVOTHYROXINE (SYNTHROID) 112 MCG TABLET    Take 1 tablet (112 mcg total) by mouth before breakfast.    LIDOCAINE-PRILOCAINE (EMLA) CREAM    Apply topically as needed.    LORAZEPAM (ATIVAN) 1 MG TABLET    Take 0.5 tablets (0.5 mg total) by mouth every 6 (six) hours as needed for Anxiety.    METOPROLOL  TARTRATE (LOPRESSOR) 25 MG TABLET    Take 1 tablet (25 mg total) by mouth 2 (two) times daily.    NITROFURANTOIN, MACROCRYSTAL-MONOHYDRATE, (MACROBID) 100 MG CAPSULE    Take 1 capsule (100 mg total) by mouth 2 (two) times daily.    OMEPRAZOLE (PRILOSEC) 40 MG CAPSULE    Take 1 capsule (40 mg total) by mouth every morning.    ONDANSETRON (ZOFRAN) 8 MG TABLET    Take 1 tablet (8 mg total) by mouth every 8 (eight) hours as needed for Nausea.    POLYETHYLENE GLYCOL (GLYCOLAX) 17 GRAM/DOSE POWDER    Take 17 g by mouth once daily.    PROMETHAZINE (PHENERGAN) 25 MG TABLET    Take 1 tablet (25 mg total) by mouth every 6 (six) hours as needed for Nausea.    REFRESH OPTIVE 0.5-0.9 % DROP    Place 1 drop into both eyes 4 (four) times daily as needed (Dry Eyes).    SIMETHICONE (GAS-X ORAL)    Take 1 tablet by mouth as needed (Flatulence).    UMECLIDINIUM-VILANTEROL (ANORO ELLIPTA) 62.5-25 MCG/ACTUATION DSDV    Inhale 1 puff into the lungs once daily. Controller     Family History   Problem Relation Name Age of Onset    Bladder Cancer Mother      Heart failure Father      Emphysema Sister      Pancreatic cancer Sister      No Known Problems Brother       Social History     Socioeconomic History    Marital status:    Tobacco Use    Smoking status: Former     Types: Cigarettes     Start date: 2024     Quit date: 1964     Years since quittin.7    Smokeless tobacco: Never    Tobacco comments:     Pt has smoked since 14 years old. Smokes around .5ppd. Inpatient smoking education done 10/20.     Pt quit smoking on 24.  She has never used smokeless tobacco   Substance and Sexual Activity    Alcohol use: Never    Drug use: Never    Sexual activity: Yes     Partners: Male     Social Determinants of Health     Financial Resource Strain: Low Risk  (2024)    Overall Financial Resource Strain (CARDIA)     Difficulty of Paying Living Expenses: Not hard at all   Food Insecurity: No Food Insecurity (2024)     Hunger Vital Sign     Worried About Running Out of Food in the Last Year: Never true     Ran Out of Food in the Last Year: Never true   Transportation Needs: No Transportation Needs (7/5/2024)    PRAPARE - Transportation     Lack of Transportation (Medical): No     Lack of Transportation (Non-Medical): No   Physical Activity: Inactive (3/15/2024)    Exercise Vital Sign     Days of Exercise per Week: 0 days     Minutes of Exercise per Session: 0 min   Stress: Stress Concern Present (3/15/2024)    [employer] [place] [employer] - Occupational Stress Questionnaire     Feeling of Stress : Very much   Housing Stability: Low Risk  (7/5/2024)    Housing Stability Vital Sign     Unable to Pay for Housing in the Last Year: No     Homeless in the Last Year: No         Review of Systems   Constitutional:  Negative for appetite change, chills, fever and unexpected weight change.   HENT:  Negative for hearing loss, rhinorrhea, sore throat and voice change.    Eyes:  Negative for photophobia and visual disturbance.   Respiratory:  Negative for cough, choking and shortness of breath.    Cardiovascular:  Negative for chest pain, palpitations and leg swelling.   Gastrointestinal:  Negative for abdominal pain, blood in stool, constipation, diarrhea, nausea and vomiting.   Endocrine: Negative for cold intolerance, heat intolerance, polydipsia and polyuria.   Musculoskeletal:  Negative for arthralgias, back pain, joint swelling and neck stiffness.   Skin:  Negative for color change, pallor and rash.        Abdominal wall mass   Neurological:  Negative for dizziness, seizures, syncope and headaches.   Hematological:  Negative for adenopathy. Does not bruise/bleed easily.   Psychiatric/Behavioral:  Negative for agitation, behavioral problems and confusion.        Objective:      Physical Exam  HENT:      Head: Normocephalic and atraumatic.   Pulmonary:      Effort: No tachypnea, bradypnea or respiratory distress.   Abdominal:       General: There is no distension.      Palpations: Abdomen is soft.          Comments: Abdominal wall mass. Fixed to the skin. Mobile. About 4 cm in size.    Neurological:      Mental Status: She is alert and oriented to person, place, and time.         Assessment/Plan:   Joslyn was seen today for lump.    Diagnoses and all orders for this visit:    Abdominal wall mass  -     Vital signs; Standing  -     Insert peripheral IV; Standing  -     Diet NPO; Standing  -     Pulse Oximetry Q4H; Standing  -     Case Request Operating Room: EXCISION, LESION, ABDOMINAL WALL  -     Full code; Standing  -     Place in Outpatient; Standing  -     Insert peripheral IV    Other orders  -     IP VTE HIGH RISK PATIENT; Standing  -     cefazolin (ANCEF) 2 gram in dextrose 5% 50 mL IVPB (premix)      Plan excision of the abdominal wall malignancy Thursday October 3rd.  Risks and benefits of surgery discussed with the patient.      I discussed the proposed procedures with the patient including risks, benefits, indications, alternatives and special concerns.  The patient appears to understand and agrees to go ahead with surgery.  I have made no promises, warranties or verbal agreements beyond what was discussed above.

## 2024-09-24 NOTE — H&P (VIEW-ONLY)
Subjective:       Patient ID: Joslyn Dubon is a 74 y.o. female.    Chief Complaint: Lump      HPI:  74 year old female with history of no small cell lung cancer. She is s/p chemo and radiation. She was found to have a large subq mass in the left lower quadrant. It is PET positive. 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 PET is negative for any other sites of diease.    Hard in the office in consultation for excision of the mass.      Past Medical History:   Diagnosis Date    Anxiety     Martin esophagus     Chemotherapy-induced nausea 07/12/2024    Colitis     COPD (chronic obstructive pulmonary disease)     Dehydration 07/12/2024    GERD (gastroesophageal reflux disease)     Hypertension     Intractable nausea and vomiting 06/23/2024    Lung cancer     Thyroid disease     Vocal cord polyps      Past Surgical History:   Procedure Laterality Date    ABDOMINAL AORTIC ANEURYSM REPAIR      BACK SURGERY      cervical    CATARACT EXTRACTION Right 02/2021    CHOLECYSTECTOMY  12/20/2013    Dr Albert CORLEY    ENDOBRONCHIAL ULTRASOUND N/A 4/19/2024    Procedure: ENDOBRONCHIAL ULTRASOUND (EBUS);  Surgeon: Kizzy Siegel MD;  Location: [facility] OR [address] [place] [address];  Service: Pulmonary;  Laterality: N/A;    FOOT SURGERY      HYSTERECTOMY  1979    AUGUSTINE for AUB with consevation ovaries    INSERTION OF TUNNELED CENTRAL VENOUS CATHETER (CVC) WITH SUBCUTANEOUS PORT Right 5/20/2024    Procedure: [id number];  Surgeon: Simon Wilson III, MD;  Location: [facility] OR;  Service: General;  Laterality: Right;    ROBOTIC BRONCHOSCOPY N/A 4/19/2024    Procedure: ROBOTIC BRONCHOSCOPY;  Surgeon: Kizzy Siegel MD;  Location: [facility] OR [address] [place] [address];  Service: Pulmonary;  Laterality: N/A;    SALIVARY GLAND SURGERY       Review of patient's allergies indicates:   Allergen Reactions    Bisacodyl Nausea And Vomiting     Other reaction(s): Vomiting    Iodinated contrast media Anaphylaxis, Hives and Shortness Of Breath      Hypotension.  Pt states she has anaphylaxis with IV contrast.     Morphine Other (See Comments)     hypotension    Azithromycin Hives    Cipro [ciprofloxacin hcl]     Demerol [meperidine]      Nausea vomiting, visual problem    Doxycycline Hives    Flonase [fluticasone propionate] Hives    Morpholine analogues Other (See Comments)     hypotension     Medication List with Changes/Refills   Current Medications    ALBUTEROL-IPRATROPIUM (DUO-NEB) 2.5 MG-0.5 MG/3 ML NEBULIZER SOLUTION    Take 3 mLs by nebulization every 6 (six) hours as needed for Wheezing. Rescue    AMLODIPINE (NORVASC) 5 MG TABLET    Take 5 mg by mouth once daily. Per pt only taking if bp is high    DIPHENHYDRAMINE (BENADRYL) 25 MG CAPSULE    Take 25 mg by mouth every 6 (six) hours as needed for Itching.    EPINEPHRINE (EPIPEN) 0.3 MG/0.3 ML ATIN    Inject 0.3 mLs (0.3 mg total) into the muscle as needed (Severe allergic reaction symptoms such as shortness of breath, tongue or throat swelling, weakness dizziness severe nausea vomiting).    FAMOTIDINE (PEPCID) 20 MG TABLET    Take 1 tablet (20 mg total) by mouth 2 (two) times daily.    FUROSEMIDE (LASIX) 20 MG TABLET    Take 1 tablet (20 mg total) by mouth daily as needed (edema).    HYDROCODONE-ACETAMINOPHEN (NORCO)  MG PER TABLET    Take 1 tablet by mouth every 12 (twelve) hours as needed for Pain.    LEVALBUTEROL (XOPENEX HFA) 45 MCG/ACTUATION INHALER    Inhale 2 puffs into the lungs every 6 (six) hours as needed for Wheezing. Rescue    LEVALBUTEROL (XOPENEX) 1.25 MG/3 ML NEBULIZER SOLUTION    Take 3 mLs (1.25 mg total) by nebulization every 4 (four) hours as needed for Wheezing. Rescue    LEVOTHYROXINE (SYNTHROID) 112 MCG TABLET    Take 1 tablet (112 mcg total) by mouth before breakfast.    LIDOCAINE-PRILOCAINE (EMLA) CREAM    Apply topically as needed.    LORAZEPAM (ATIVAN) 1 MG TABLET    Take 0.5 tablets (0.5 mg total) by mouth every 6 (six) hours as needed for Anxiety.    METOPROLOL  TARTRATE (LOPRESSOR) 25 MG TABLET    Take 1 tablet (25 mg total) by mouth 2 (two) times daily.    NITROFURANTOIN, MACROCRYSTAL-MONOHYDRATE, (MACROBID) 100 MG CAPSULE    Take 1 capsule (100 mg total) by mouth 2 (two) times daily.    OMEPRAZOLE (PRILOSEC) 40 MG CAPSULE    Take 1 capsule (40 mg total) by mouth every morning.    ONDANSETRON (ZOFRAN) 8 MG TABLET    Take 1 tablet (8 mg total) by mouth every 8 (eight) hours as needed for Nausea.    POLYETHYLENE GLYCOL (GLYCOLAX) 17 GRAM/DOSE POWDER    Take 17 g by mouth once daily.    PROMETHAZINE (PHENERGAN) 25 MG TABLET    Take 1 tablet (25 mg total) by mouth every 6 (six) hours as needed for Nausea.    REFRESH OPTIVE 0.5-0.9 % DROP    Place 1 drop into both eyes 4 (four) times daily as needed (Dry Eyes).    SIMETHICONE (GAS-X ORAL)    Take 1 tablet by mouth as needed (Flatulence).    UMECLIDINIUM-VILANTEROL (ANORO ELLIPTA) 62.5-25 MCG/ACTUATION DSDV    Inhale 1 puff into the lungs once daily. Controller     Family History   Problem Relation Name Age of Onset    Bladder Cancer Mother      Heart failure Father      Emphysema Sister      Pancreatic cancer Sister      No Known Problems Brother       Social History     Socioeconomic History    Marital status:    Tobacco Use    Smoking status: Former     Types: Cigarettes     Start date: 2024     Quit date: 1964     Years since quittin.7    Smokeless tobacco: Never    Tobacco comments:     Pt has smoked since 14 years old. Smokes around .5ppd. Inpatient smoking education done 10/20.     Pt quit smoking on 24.  She has never used smokeless tobacco   Substance and Sexual Activity    Alcohol use: Never    Drug use: Never    Sexual activity: Yes     Partners: Male     Social Determinants of Health     Financial Resource Strain: Low Risk  (2024)    Overall Financial Resource Strain (CARDIA)     Difficulty of Paying Living Expenses: Not hard at all   Food Insecurity: No Food Insecurity (2024)     Hunger Vital Sign     Worried About Running Out of Food in the Last Year: Never true     Ran Out of Food in the Last Year: Never true   Transportation Needs: No Transportation Needs (7/5/2024)    PRAPARE - Transportation     Lack of Transportation (Medical): No     Lack of Transportation (Non-Medical): No   Physical Activity: Inactive (3/15/2024)    Exercise Vital Sign     Days of Exercise per Week: 0 days     Minutes of Exercise per Session: 0 min   Stress: Stress Concern Present (3/15/2024)    [employer] - Occupational Stress Questionnaire     Feeling of Stress : Very much   Housing Stability: Low Risk  (7/5/2024)    Housing Stability Vital Sign     Unable to Pay for Housing in the Last Year: No     Homeless in the Last Year: No         Review of Systems   Constitutional:  Negative for appetite change, chills, fever and unexpected weight change.   HENT:  Negative for hearing loss, rhinorrhea, sore throat and voice change.    Eyes:  Negative for photophobia and visual disturbance.   Respiratory:  Negative for cough, choking and shortness of breath.    Cardiovascular:  Negative for chest pain, palpitations and leg swelling.   Gastrointestinal:  Negative for abdominal pain, blood in stool, constipation, diarrhea, nausea and vomiting.   Endocrine: Negative for cold intolerance, heat intolerance, polydipsia and polyuria.   Musculoskeletal:  Negative for arthralgias, back pain, joint swelling and neck stiffness.   Skin:  Negative for color change, pallor and rash.        Abdominal wall mass   Neurological:  Negative for dizziness, seizures, syncope and headaches.   Hematological:  Negative for adenopathy. Does not bruise/bleed easily.   Psychiatric/Behavioral:  Negative for agitation, behavioral problems and confusion.        Objective:      Physical Exam  HENT:      Head: Normocephalic and atraumatic.   Pulmonary:      Effort: No tachypnea, bradypnea or respiratory distress.   Abdominal:       General: There is no distension.      Palpations: Abdomen is soft.          Comments: Abdominal wall mass. Fixed to the skin. Mobile. About 4 cm in size.    Neurological:      Mental Status: She is alert and oriented to person, place, and time.         Assessment/Plan:   Joslyn was seen today for lump.    Diagnoses and all orders for this visit:    Abdominal wall mass  -     Vital signs; Standing  -     Insert peripheral IV; Standing  -     Diet NPO; Standing  -     Pulse Oximetry Q4H; Standing  -     Case Request Operating Room: EXCISION, LESION, ABDOMINAL WALL  -     Full code; Standing  -     Place in Outpatient; Standing  -     Insert peripheral IV    Other orders  -     IP VTE HIGH RISK PATIENT; Standing  -     cefazolin (ANCEF) 2 gram in dextrose 5% 50 mL IVPB (premix)      Plan excision of the abdominal wall malignancy Thursday October 3rd.  Risks and benefits of surgery discussed with the patient.      I discussed the proposed procedures with the patient including risks, benefits, indications, alternatives and special concerns.  The patient appears to understand and agrees to go ahead with surgery.  I have made no promises, warranties or verbal agreements beyond what was discussed above.

## 2024-09-25 ENCOUNTER — PATIENT MESSAGE (OUTPATIENT)
Facility: CLINIC | Age: 74
End: 2024-09-25
Payer: MEDICARE

## 2024-10-01 ENCOUNTER — PATIENT MESSAGE (OUTPATIENT)
Facility: CLINIC | Age: 74
End: 2024-10-01
Payer: MEDICARE

## 2024-10-01 ENCOUNTER — TELEPHONE (OUTPATIENT)
Dept: HEMATOLOGY/ONCOLOGY | Facility: CLINIC | Age: 74
End: 2024-10-01
Payer: MEDICARE

## 2024-10-01 DIAGNOSIS — R91.8 MASS OF UPPER LOBE OF LEFT LUNG: Primary | ICD-10-CM

## 2024-10-02 ENCOUNTER — ANESTHESIA EVENT (OUTPATIENT)
Dept: SURGERY | Facility: HOSPITAL | Age: 74
End: 2024-10-02
Payer: MEDICARE

## 2024-10-02 ENCOUNTER — TELEPHONE (OUTPATIENT)
Dept: SURGERY | Facility: CLINIC | Age: 74
End: 2024-10-02
Payer: MEDICARE

## 2024-10-02 ENCOUNTER — INFUSION (OUTPATIENT)
Dept: INFUSION THERAPY | Facility: HOSPITAL | Age: 74
End: 2024-10-02
Attending: INTERNAL MEDICINE
Payer: MEDICARE

## 2024-10-02 VITALS
TEMPERATURE: 98 F | HEART RATE: 83 BPM | RESPIRATION RATE: 18 BRPM | OXYGEN SATURATION: 95 % | HEIGHT: 62 IN | WEIGHT: 170 LBS | DIASTOLIC BLOOD PRESSURE: 98 MMHG | BODY MASS INDEX: 31.28 KG/M2 | SYSTOLIC BLOOD PRESSURE: 135 MMHG

## 2024-10-02 DIAGNOSIS — C34.92 NON-SMALL CELL CARCINOMA OF LEFT LUNG: Primary | ICD-10-CM

## 2024-10-02 PROCEDURE — 96523 IRRIG DRUG DELIVERY DEVICE: CPT

## 2024-10-02 PROCEDURE — 63600175 PHARM REV CODE 636 W HCPCS: Performed by: INTERNAL MEDICINE

## 2024-10-02 RX ORDER — SODIUM CHLORIDE 0.9 % (FLUSH) 0.9 %
10 SYRINGE (ML) INJECTION
OUTPATIENT
Start: 2024-10-02

## 2024-10-02 RX ORDER — HEPARIN 100 UNIT/ML
500 SYRINGE INTRAVENOUS
Status: DISCONTINUED | OUTPATIENT
Start: 2024-10-02 | End: 2024-10-02 | Stop reason: HOSPADM

## 2024-10-02 RX ORDER — SODIUM CHLORIDE 0.9 % (FLUSH) 0.9 %
10 SYRINGE (ML) INJECTION
Status: DISCONTINUED | OUTPATIENT
Start: 2024-10-02 | End: 2024-10-02 | Stop reason: HOSPADM

## 2024-10-02 RX ORDER — HEPARIN 100 UNIT/ML
500 SYRINGE INTRAVENOUS
OUTPATIENT
Start: 2024-10-02

## 2024-10-02 RX ADMIN — HEPARIN 500 UNITS: 100 SYRINGE at 01:10

## 2024-10-02 NOTE — ANESTHESIA PREPROCEDURE EVALUATION
10/02/2024  Joslyn Dubon is a 74 y.o., female.    Tobacco Use:  The patient  reports that she quit smoking about 60 years ago. Her smoking use included cigarettes. She started smoking about 9 months ago. She has never used smokeless tobacco.     Results for orders placed or performed during the hospital encounter of 07/10/24   EKG 12-lead    Collection Time: 07/10/24 11:04 AM   Result Value Ref Range    QRS Duration 68 ms    OHS QTC Calculation 453 ms    Narrative    Test Reason : R53.1,    Vent. Rate : 073 BPM     Atrial Rate : 073 BPM     P-R Int : 126 ms          QRS Dur : 068 ms      QT Int : 412 ms       P-R-T Axes : 023 030 075 degrees     QTc Int : 453 ms    Sinus rhythm with occasional Premature ventricular complexes  Low voltage QRS  Nonspecific T wave abnormality  Abnormal ECG  When compared with ECG of 03-JUL-2024 09:30,  Nonspecific T wave abnormality now evident in Anterior leads  Confirmed by Isaak Linton MD ([id number]) on 7/21/2024 8:44:44 AM    Referred By: [email or web address]   SELF           Confirmed By:Isaak Linton MD             Lab Results   Component Value Date    WBC 4.52 09/18/2024    HGB 13.0 09/18/2024    HCT 40.3 09/18/2024    MCV 96 09/18/2024     (L) 09/18/2024     BMP  Lab Results   Component Value Date     09/18/2024    K 4.0 09/18/2024     09/18/2024    CO2 27 09/18/2024    BUN 12 09/18/2024    CREATININE 0.9 09/18/2024    CALCIUM 9.4 09/18/2024    ANIONGAP 9 09/18/2024     (H) 09/18/2024    GLU 85 09/04/2024     08/27/2024       No results found for this or any previous visit.           Pre-op Assessment    I have reviewed the Patient Summary Reports.     I have reviewed the Nursing Notes. I have reviewed the NPO Status.   I have reviewed the Medications.     Review of Systems  Anesthesia Hx:  No problems with previous Anesthesia   Neg history  of prior surgery.          Denies Family Hx of Anesthesia complications.    Denies Personal Hx of Anesthesia complications.                    Social:  Former Smoker, No Alcohol Use       Hematology/Oncology:  Hematology Normal          --  Thrombocytopenia:               Current/Recent Cancer.  --  Cancer in past history:              chemotherapy and radiation   Oncology Comments: Non-small cell carcinoma of left lung     EENT/Dental:  EENT/Dental Normal  Hx of vocal cord polyps          Cardiovascular:     Hypertension          PVD    ECG has been reviewed. AAA repair                         Pulmonary:   COPD, severe      Home Oxygen Therapy 2 Liters / NC  Albuterol Inhaler use as needed   Albuterol Nebulizer use as needed   Duo neb use as needed                    Renal/:  Renal/ Normal                 Hepatic/GI:     GERD, well controlled             Musculoskeletal:  Arthritis   Radiculopathy       Spine Disorders: cervical and lumbar Disc disease, Degenerative disease and Chronic Pain           Neurological:    Neuromuscular Disease,             Peripheral Neuropathy                          Endocrine:   Hypothyroidism          Dermatological:  Skin Normal    Psych:  Psychiatric History anxiety                 Physical Exam  General: Well nourished, Alert, Cooperative and Oriented    Airway:  Mallampati: IV / III  Mouth Opening: Normal  TM Distance: Normal  Tongue: Normal  Neck ROM: Normal ROM    Dental:  Dentures, Partial Dentures    Chest/Lungs:  Clear to auscultation, Normal Respiratory Rate    Heart:  Rate: Normal  Rhythm: Regular Rhythm  Sounds: Normal        Anesthesia Plan  Type of Anesthesia, risks & benefits discussed:    Anesthesia Type: Gen ETT  Intra-op Monitoring Plan: Standard ASA Monitors  Post Op Pain Control Plan: multimodal analgesia  Induction:  IV and rapid sequence  Airway Plan: Video, Post-Induction  Informed Consent: Informed consent signed with the Patient and all parties  understand the risks and agree with anesthesia plan.  All questions answered. Patient consented to blood products? Yes  ASA Score: 3  Anesthesia Plan Notes:         GETA  RSI  Hold Versed   Minimal Fentanyl   Benadryl 6.25mg iv, Decadron 8mg, iv Zofran 4mg iv, Pepcid 20mg iv   Ofirmev 1000mg iv  Sugammadex        Ready For Surgery From Anesthesia Perspective.     .

## 2024-10-02 NOTE — TELEPHONE ENCOUNTER
Called patient back and she was concerned about her labs.  She said the hospital had already called her and told her they were a little low but nothing critical.  She already rec'd her time to come in --- I told her I would let Dr. Wilson know to review her labs prior to her surgery in the morning.        ----- Message from Med Assistant Benitez sent at 10/2/2024  2:00 PM CDT -----  Name of Who is Calling:YAIR DEL RIO [[id number]]                What is the request in detail: Pt is requesting a call back to discuss some lab results before her surgery on tomorrow. Please assist.                Can the clinic reply by [facility]: No                What Number to Call Back if not in [facility]: [phone]

## 2024-10-03 ENCOUNTER — TELEPHONE (OUTPATIENT)
Dept: SURGERY | Facility: CLINIC | Age: 74
End: 2024-10-03
Payer: MEDICARE

## 2024-10-03 ENCOUNTER — HOSPITAL ENCOUNTER (OUTPATIENT)
Facility: HOSPITAL | Age: 74
Discharge: HOME | End: 2024-10-03
Attending: SURGERY | Admitting: SURGERY
Payer: MEDICARE

## 2024-10-03 ENCOUNTER — ANESTHESIA (OUTPATIENT)
Dept: SURGERY | Facility: HOSPITAL | Age: 74
End: 2024-10-03
Payer: MEDICARE

## 2024-10-03 VITALS
SYSTOLIC BLOOD PRESSURE: 145 MMHG | WEIGHT: 170 LBS | RESPIRATION RATE: 16 BRPM | HEART RATE: 71 BPM | HEIGHT: 62 IN | BODY MASS INDEX: 31.28 KG/M2 | OXYGEN SATURATION: 97 % | TEMPERATURE: 97 F | DIASTOLIC BLOOD PRESSURE: 97 MMHG

## 2024-10-03 DIAGNOSIS — R22.2 ABDOMINAL WALL MASS: Primary | ICD-10-CM

## 2024-10-03 PROCEDURE — 63600175 PHARM REV CODE 636 W HCPCS: Performed by: ANESTHESIOLOGY

## 2024-10-03 PROCEDURE — 27201423 OPTIME MED/SURG SUP & DEVICES STERILE SUPPLY: Performed by: SURGERY

## 2024-10-03 PROCEDURE — 25000003 PHARM REV CODE 250: Performed by: NURSE ANESTHETIST, CERTIFIED REGISTERED

## 2024-10-03 PROCEDURE — 88307 TISSUE EXAM BY PATHOLOGIST: CPT | Mod: TC | Performed by: PATHOLOGY

## 2024-10-03 PROCEDURE — 71000015 HC POSTOP RECOV 1ST HR: Performed by: SURGERY

## 2024-10-03 PROCEDURE — 88305 TISSUE EXAM BY PATHOLOGIST: CPT | Mod: TC | Performed by: PATHOLOGY

## 2024-10-03 PROCEDURE — 37000008 HC ANESTHESIA 1ST 15 MINUTES: Performed by: SURGERY

## 2024-10-03 PROCEDURE — 36000706: Performed by: SURGERY

## 2024-10-03 PROCEDURE — C9290 INJ, BUPIVACAINE LIPOSOME: HCPCS | Performed by: SURGERY

## 2024-10-03 PROCEDURE — 71000033 HC RECOVERY, INTIAL HOUR: Performed by: SURGERY

## 2024-10-03 PROCEDURE — 63600175 PHARM REV CODE 636 W HCPCS: Performed by: NURSE ANESTHETIST, CERTIFIED REGISTERED

## 2024-10-03 PROCEDURE — 36000707: Performed by: SURGERY

## 2024-10-03 PROCEDURE — 37000009 HC ANESTHESIA EA ADD 15 MINS: Performed by: SURGERY

## 2024-10-03 PROCEDURE — 12032 INTMD RPR S/A/T/EXT 2.6-7.5: CPT | Mod: 51,,, | Performed by: SURGERY

## 2024-10-03 PROCEDURE — 63600175 PHARM REV CODE 636 W HCPCS: Performed by: SURGERY

## 2024-10-03 PROCEDURE — 11604 EXC TR-EXT MAL+MARG 3.1-4 CM: CPT | Mod: ,,, | Performed by: SURGERY

## 2024-10-03 PROCEDURE — 63600175 PHARM REV CODE 636 W HCPCS: Mod: JZ,JG | Performed by: SURGERY

## 2024-10-03 RX ORDER — BUPIVACAINE HYDROCHLORIDE 2.5 MG/ML
INJECTION, SOLUTION EPIDURAL; INFILTRATION; INTRACAUDAL
Status: DISCONTINUED | OUTPATIENT
Start: 2024-10-03 | End: 2024-10-03 | Stop reason: HOSPADM

## 2024-10-03 RX ORDER — DIPHENHYDRAMINE HYDROCHLORIDE 50 MG/ML
12.5 INJECTION INTRAMUSCULAR; INTRAVENOUS ONCE AS NEEDED
Status: COMPLETED | OUTPATIENT
Start: 2024-10-03 | End: 2024-10-03

## 2024-10-03 RX ORDER — ONDANSETRON HYDROCHLORIDE 2 MG/ML
4 INJECTION, SOLUTION INTRAVENOUS DAILY PRN
Status: DISCONTINUED | OUTPATIENT
Start: 2024-10-03 | End: 2024-10-03 | Stop reason: HOSPADM

## 2024-10-03 RX ORDER — CEFAZOLIN SODIUM 2 G/50ML
2 SOLUTION INTRAVENOUS
Status: COMPLETED | OUTPATIENT
Start: 2024-10-03 | End: 2024-10-03

## 2024-10-03 RX ORDER — GLUCAGON 1 MG
1 KIT INJECTION
Status: DISCONTINUED | OUTPATIENT
Start: 2024-10-03 | End: 2024-10-03 | Stop reason: HOSPADM

## 2024-10-03 RX ORDER — ROCURONIUM BROMIDE 10 MG/ML
INJECTION, SOLUTION INTRAVENOUS
Status: DISCONTINUED | OUTPATIENT
Start: 2024-10-03 | End: 2024-10-03

## 2024-10-03 RX ORDER — FENTANYL CITRATE 50 UG/ML
INJECTION, SOLUTION INTRAMUSCULAR; INTRAVENOUS
Status: DISCONTINUED | OUTPATIENT
Start: 2024-10-03 | End: 2024-10-03

## 2024-10-03 RX ORDER — ONDANSETRON HYDROCHLORIDE 2 MG/ML
INJECTION, SOLUTION INTRAVENOUS
Status: DISCONTINUED | OUTPATIENT
Start: 2024-10-03 | End: 2024-10-03

## 2024-10-03 RX ORDER — DEXAMETHASONE SODIUM PHOSPHATE 4 MG/ML
INJECTION, SOLUTION INTRA-ARTICULAR; INTRALESIONAL; INTRAMUSCULAR; INTRAVENOUS; SOFT TISSUE
Status: DISCONTINUED | OUTPATIENT
Start: 2024-10-03 | End: 2024-10-03

## 2024-10-03 RX ORDER — FAMOTIDINE 10 MG/ML
INJECTION INTRAVENOUS
Status: DISCONTINUED | OUTPATIENT
Start: 2024-10-03 | End: 2024-10-03

## 2024-10-03 RX ORDER — LIDOCAINE HYDROCHLORIDE 20 MG/ML
INJECTION, SOLUTION EPIDURAL; INFILTRATION; INTRACAUDAL; PERINEURAL
Status: DISCONTINUED | OUTPATIENT
Start: 2024-10-03 | End: 2024-10-03

## 2024-10-03 RX ORDER — HYDROCODONE BITARTRATE AND ACETAMINOPHEN 5; 325 MG/1; MG/1
1 TABLET ORAL EVERY 6 HOURS PRN
Qty: 30 TABLET | Refills: 0 | Status: SHIPPED | OUTPATIENT
Start: 2024-10-03

## 2024-10-03 RX ORDER — PROPOFOL 10 MG/ML
VIAL (ML) INTRAVENOUS
Status: DISCONTINUED | OUTPATIENT
Start: 2024-10-03 | End: 2024-10-03

## 2024-10-03 RX ORDER — SUCCINYLCHOLINE CHLORIDE 20 MG/ML
INJECTION INTRAMUSCULAR; INTRAVENOUS
Status: DISCONTINUED | OUTPATIENT
Start: 2024-10-03 | End: 2024-10-03

## 2024-10-03 RX ORDER — FENTANYL CITRATE 50 UG/ML
25 INJECTION, SOLUTION INTRAMUSCULAR; INTRAVENOUS EVERY 5 MIN PRN
Status: DISCONTINUED | OUTPATIENT
Start: 2024-10-03 | End: 2024-10-03 | Stop reason: HOSPADM

## 2024-10-03 RX ORDER — ACETAMINOPHEN 10 MG/ML
INJECTION, SOLUTION INTRAVENOUS
Status: DISCONTINUED | OUTPATIENT
Start: 2024-10-03 | End: 2024-10-03

## 2024-10-03 RX ADMIN — FENTANYL CITRATE 25 MCG: 50 INJECTION, SOLUTION INTRAMUSCULAR; INTRAVENOUS at 09:10

## 2024-10-03 RX ADMIN — LIDOCAINE HYDROCHLORIDE 50 MG: 20 INJECTION, SOLUTION INTRAVENOUS at 07:10

## 2024-10-03 RX ADMIN — ROCURONIUM BROMIDE 30 MG: 10 INJECTION, SOLUTION INTRAVENOUS at 07:10

## 2024-10-03 RX ADMIN — DEXAMETHASONE SODIUM PHOSPHATE 8 MG: 4 INJECTION, SOLUTION INTRAMUSCULAR; INTRAVENOUS at 07:10

## 2024-10-03 RX ADMIN — FAMOTIDINE 20 MG: 10 INJECTION, SOLUTION INTRAVENOUS at 07:10

## 2024-10-03 RX ADMIN — DIPHENHYDRAMINE HYDROCHLORIDE 6.25 MG: 50 INJECTION, SOLUTION INTRAMUSCULAR; INTRAVENOUS at 07:10

## 2024-10-03 RX ADMIN — PROPOFOL 130 MG: 10 INJECTION, EMULSION INTRAVENOUS at 07:10

## 2024-10-03 RX ADMIN — Medication 120 MG: at 07:10

## 2024-10-03 RX ADMIN — CEFAZOLIN SODIUM 2 G: 2 SOLUTION INTRAVENOUS at 07:10

## 2024-10-03 RX ADMIN — ONDANSETRON 4 MG: 2 INJECTION INTRAMUSCULAR; INTRAVENOUS at 09:10

## 2024-10-03 RX ADMIN — FENTANYL CITRATE 25 MCG: 50 INJECTION, SOLUTION INTRAMUSCULAR; INTRAVENOUS at 08:10

## 2024-10-03 RX ADMIN — SODIUM CHLORIDE, SODIUM LACTATE, POTASSIUM CHLORIDE, AND CALCIUM CHLORIDE: .6; .31; .03; .02 INJECTION, SOLUTION INTRAVENOUS at 07:10

## 2024-10-03 RX ADMIN — ONDANSETRON 4 MG: 2 INJECTION INTRAMUSCULAR; INTRAVENOUS at 07:10

## 2024-10-03 RX ADMIN — ROCURONIUM BROMIDE 10 MG: 10 INJECTION, SOLUTION INTRAVENOUS at 07:10

## 2024-10-03 RX ADMIN — SUGAMMADEX 200 MG: 100 INJECTION, SOLUTION INTRAVENOUS at 08:10

## 2024-10-03 RX ADMIN — ACETAMINOPHEN 1000 MG: 10 INJECTION, SOLUTION INTRAVENOUS at 07:10

## 2024-10-03 NOTE — ANESTHESIA PROCEDURE NOTES
Intubation    Date/Time: 10/3/2024 7:41 AM    Performed by: Dayana Granger CRNA  Authorized by: Paul Garduno MD    Intubation:     Induction:  Intravenous    Intubated:  Postinduction    Mask Ventilation:  Easy mask    Attempts:  1    Attempted By:  CRNA    Method of Intubation:  Video laryngoscopy and direct    Blade:  Dodd 3    Laryngeal View Grade: Grade I - full view of cords      Difficult Airway Encountered?: No      Complications:  None    Airway Device:  Oral endotracheal tube    Airway Device Size:  7.5    Style/Cuff Inflation:  Cuffed (inflated to minimal occlusive pressure)    Tube secured:  22    Secured at:  The lips    Placement Verified By:  Capnometry    Complicating Factors:  None    Findings Post-Intubation:  BS equal bilateral and atraumatic/condition of teeth unchanged

## 2024-10-03 NOTE — DISCHARGE INSTRUCTIONS
Call MD for severe bleeding, temp greater than 100.6, redness, swelling, foul drainage or odor, persistent nausea/vomiting, severe pain not relieved by pain medication.  Do not take pain medicine on an empty stomach.  May shower in 48 hours after dressing is removed.  No swimming, tub baths, hot tubs, etc.  Showers only until incision is completely healed.  Do not scrub at or attempt to remove surgical glue from your incision(s).  It will wear off on it's own as the incision heals.  No driving, operating machinery or signing legal documents for 24 hours.  You are at risk for falling for 24 hours due to anesthesia/sedation/pain medications received today.  Rest for the remainder of the day and make sure to wear nonskid socks or shoes when ambulating.  Use assistive devices to ambulate if applicable.

## 2024-10-03 NOTE — ANESTHESIA POSTPROCEDURE EVALUATION
Anesthesia Post Evaluation    Patient: Joslyn Dubon    Procedure(s) Performed: Procedure(s) (LRB):  EXCISION, LESION, ABDOMINAL WALL (Left)    Final Anesthesia Type: general      Patient location during evaluation: PACU  Patient participation: Yes- Able to Participate  Level of consciousness: awake and alert, oriented and awake  Post-procedure vital signs: reviewed and stable  Pain management: adequate  Airway patency: patent    PONV status at discharge: No PONV  Anesthetic complications: no      Cardiovascular status: blood pressure returned to baseline, hemodynamically stable and stable  Respiratory status: unassisted, spontaneous ventilation and nasal cannula  Hydration status: euvolemic  Follow-up not needed.              Vitals Value Taken Time   /64 10/03/24 0930   Temp 36.2 °C (97.2 °F) 10/03/24 0930   Pulse 72 10/03/24 0943   Resp 24 10/03/24 0943   SpO2 98 % 10/03/24 0943   Vitals shown include unfiled device data.      No case tracking events are documented in the log.      Pain/Traci Score: Pain Rating Prior to Med Admin: 5 (10/3/2024  9:23 AM)  Traci Score: 9 (10/3/2024  9:30 AM)

## 2024-10-03 NOTE — TRANSFER OF CARE
"Anesthesia Transfer of Care Note    Patient: Joslyn Dubon    Procedure(s) Performed: Procedure(s) (LRB):  EXCISION, LESION, ABDOMINAL WALL (Left)    Patient location: PACU    Anesthesia Type: general    Transport from OR: Transported from OR on room air with adequate spontaneous ventilation    Post pain: adequate analgesia    Post assessment: no apparent anesthetic complications    Post vital signs: stable    Level of consciousness: awake and alert    Nausea/Vomiting: no nausea/vomiting    Complications: none    Transfer of care protocol was followed    Last vitals: Visit Vitals  BP (!) 152/85 (BP Location: Left arm, Patient Position: Lying)   Pulse 78   Temp 36.7 °C (98 °F) (Oral)   Resp 16   Ht 5' 2" (1.575 m)   Wt 77.1 kg (170 lb)   SpO2 95%   Breastfeeding No   BMI 31.09 kg/m²     "

## 2024-10-03 NOTE — TELEPHONE ENCOUNTER
Called patient back.  She is having a burning, itching sensation where the prep was done.  The incision does not bother her at all.  Advised her to take Benadryl and if that does not help to call me back.  She indicated that her incision feels great - its that prep that she tried to clean off of her skin as soon as she got home.      ----- Message from Peecho sent at 10/3/2024 11:52 AM CDT -----  Regarding: advice  Type:  Needs Medical Advice    Who Called: pt    Best Call Back Number: [phone]      Additional Information: pt st that she is itching real bad think she's having an allergic reactions.  She's st that she's allergic to iodine  in think dr use iodine. please call to discuss.

## 2024-10-03 NOTE — BRIEF OP NOTE
Critical access hospital  Brief Operative Note    SUMMARY     Surgery Date: 10/3/2024     Surgeons and Role:     * Simon Wilson III, MD - Primary    Assisting Surgeon: None    Pre-op Diagnosis:  Abdominal wall metastatic deposit     Post-op Diagnosis:  Post-Op Diagnosis Codes:     * Abdominal wall metastatic deposit     Procedure(s) (LRB):  EXCISION of metastatic abdominal wall Deposit - 4 x 4 cm.       Anesthesia: Choice    Implants:  * No implants in log *    Operative Findings: Patient brought to the OR and transferred to the OR table in the supine position. She was given general anesthesia and intubated. Abdomen prepped and draped. The mass was located in the left lateral abdominal wall. An elliptical incision was made around the mass. There was skin involvement margins went outside the involvement. Dissection was carried down to below the mass. There was no invasion into the muscle. It was removed and specimen marked.mass 4 x 4 cm.  I felt we were too close to the medial margin. An addition margin was taken and also marked. Hemostasis obtained. Incision closed in two layers. Subq closed with interrupted vicryl. Skin closed with 4-0 Monocryl. Incision bandaged. She was extubated and brought to the RR in stable condition.   Instrument counts correct     Estimated Blood Loss: 10 mL  Complications none   Estimated Blood Loss has been documented.         Specimens:   Specimen (24h ago, onward)       Start     Ordered    10/03/24 0803  Specimen to Pathology - Surgery  Once        Comments: Pre-op Diagnosis: Abdominal wall mass [R22.2]Procedure(s):EXCISION, LESION, ABDOMINAL WALL Number of specimens: 2Name of specimens: 1. Abdominal Wall Metastatic Deposit - marking stitch@ short= superior   long = lateral2. Additional Medial Margin - marking stitch @  short = superior pole  long= medial margin     Question:  Release to patient  Answer:  Immediate    10/03/24 0887                    [id number]

## 2024-10-04 ENCOUNTER — TELEPHONE (OUTPATIENT)
Dept: SURGERY | Facility: CLINIC | Age: 74
End: 2024-10-04
Payer: MEDICARE

## 2024-10-04 NOTE — TELEPHONE ENCOUNTER
Called patient to advise that Dr. Wilson called in a Medrol pack for her which will likely stop her hibes and reactions she is having.  Patient did remember that when she was on Chemo and took her Norco that she was also on steroids which is most likely why she didn't have a problem with the Norco.  Patient feeling better and not taking Norco anymore - she feels better and doesn't think she really needs it - she will take Tylenol.

## 2024-10-05 NOTE — DISCHARGE SUMMARY
Discharge Summary  General Surgery      Admit Date: 10/3/2024    Discharge Date :10/3/2024    Attending Physician: Simon Wilson III     Discharge Physician: Simon Wilson III    Discharged Condition: good    Discharge Diagnosis: Abdominal wall mass [R22.2]    Treatments/Procedures: Procedure(s) (LRB):  EXCISION, LESION, ABDOMINAL WALL (Left)    Hospital Course: Uneventful; Discharged home from Recovery    Significant Diagnostic Studies: none    Disposition: Home or Self Care    Diet: Regular    Follow up: Office 10-14 days    Activity: No heavy lifting till seen in office.    Patient Instructions:   Discharge Medication List as of 10/3/2024 10:22 AM        START taking these medications    Details   HYDROcodone-acetaminophen (NORCO) 5-325 mg per tablet Take 1 tablet by mouth every 6 (six) hours as needed for Pain., Starting Thu 10/3/2024, Normal           CONTINUE these medications which have NOT CHANGED    Details   albuterol-ipratropium (DUO-NEB) 2.5 mg-0.5 mg/3 mL nebulizer solution Take 3 mLs by nebulization every 6 (six) hours as needed for Wheezing. Rescue, Starting Wed 7/3/2024, Until Thu 7/3/2025 at 2359, Normal      amLODIPine (NORVASC) 5 MG tablet Take 5 mg by mouth once daily. Per pt only taking if bp is high PRN SBP>140, Historical Med      diphenhydrAMINE (BENADRYL) 25 mg capsule Take 25 mg by mouth every 6 (six) hours as needed for Itching., Historical Med      EPINEPHrine (EPIPEN) 0.3 mg/0.3 mL AtIn Inject 0.3 mLs (0.3 mg total) into the muscle as needed (Severe allergic reaction symptoms such as shortness of breath, tongue or throat swelling, weakness dizziness severe nausea vomiting)., Starting Thu 10/12/2023, Until Fri 10/11/2024 at 2359, Print      famotidine (PEPCID) 20 MG tablet Take 20 mg by mouth 2 (two) times daily as needed for Heartburn., Historical Med      furosemide (LASIX) 20 MG tablet Take 1 tablet (20 mg total) by mouth daily as needed (edema)., Starting Thu 5/30/2024, Until  Fri 5/30/2025 at 2359, Normal      levalbuterol (XOPENEX HFA) 45 mcg/actuation inhaler Inhale 2 puffs into the lungs every 6 (six) hours as needed for Wheezing. Rescue, Starting Wed 10/25/2023, Until Thu 10/24/2024 at 2359, Normal      levalbuterol (XOPENEX) 1.25 mg/3 mL nebulizer solution Take 3 mLs (1.25 mg total) by nebulization every 4 (four) hours as needed for Wheezing. Rescue, Starting Wed 7/3/2024, Until Thu 7/3/2025 at 2359, Normal      levothyroxine (SYNTHROID) 112 MCG tablet Take 1 tablet (112 mcg total) by mouth before breakfast., Starting Fri 9/20/2024, Until Mon 9/15/2025, Normal      LIDOcaine-prilocaine (EMLA) cream Apply topically as needed., Starting Thu 5/16/2024, Normal      LORazepam (ATIVAN) 1 MG tablet Take 0.5 tablets (0.5 mg total) by mouth every 6 (six) hours as needed for Anxiety., Starting Mon 7/15/2024, Normal      metoprolol tartrate (LOPRESSOR) 25 MG tablet Take 1 tablet (25 mg total) by mouth 2 (two) times daily., Starting Thu 5/30/2024, Until Fri 5/30/2025, Normal      omeprazole (PRILOSEC) 40 MG capsule Take 1 capsule (40 mg total) by mouth every morning., Starting Thu 8/29/2024, Normal      ondansetron (ZOFRAN) 8 MG tablet Take 1 tablet (8 mg total) by mouth every 8 (eight) hours as needed for Nausea., Starting Thu 8/29/2024, Normal      polyethylene glycol (GLYCOLAX) 17 gram/dose powder Take 17 g by mouth once daily., Starting Thu 5/16/2024, Normal      promethazine (PHENERGAN) 25 MG tablet Take 1 tablet (25 mg total) by mouth every 6 (six) hours as needed for Nausea., Starting Thu 5/16/2024, Normal      REFRESH OPTIVE 0.5-0.9 % Drop Place 1 drop into both eyes 4 (four) times daily as needed (Dry Eyes)., Starting Mon 4/24/2023, Historical Med      umeclidinium-vilanteroL (ANORO ELLIPTA) 62.5-25 mcg/actuation DsDv Inhale 1 puff into the lungs once daily. Controller, Starting Wed 3/20/2024, Normal             Discharge Procedure Orders   Diet Adult Regular     Lifting restrictions    Order Comments: No lifting over twenty pounds for six weeks     Remove dressing in 48 hours

## 2024-10-05 NOTE — OP NOTE
Surgery Date: 10/3/2024     Surgeons and Role:     * Simon Wilson III, MD - Primary    Assisting Surgeon: None    Pre-op Diagnosis:  Abdominal wall metastatic deposit     Post-op Diagnosis:  Post-Op Diagnosis Codes:     * Abdominal wall metastatic deposit     Procedure(s) (LRB):  EXCISION of metastatic abdominal wall Deposit - 4 x 4 cm.       Anesthesia: Choice    Implants:  * No implants in log *    Operative Findings: Patient brought to the OR and transferred to the OR table in the supine position. She was given general anesthesia and intubated. Abdomen prepped and draped. The mass was located in the left lateral abdominal wall. An elliptical incision was made around the mass. There was skin involvement margins went outside the involvement. Dissection was carried down to below the mass. There was no invasion into the muscle. It was removed and specimen marked.mass 4 x 4 cm.  I felt we were too close to the medial margin. An addition margin was taken and also marked. Hemostasis obtained. Incision closed in two layers. Subq closed with interrupted vicryl. Skin closed with 4-0 Monocryl. Incision bandaged. She was extubated and brought to the RR in stable condition.   Instrument counts correct     Estimated Blood Loss: 10 mL  Complications none   Estimated Blood Loss has been documented.         Specimens:   Specimen (24h ago, onward)       Start     Ordered    10/03/24 0866  Specimen to Pathology - Surgery  Once        Comments: Pre-op Diagnosis: Abdominal wall mass [R22.2]Procedure(s):EXCISION, LESION, ABDOMINAL WALL Number of specimens: 2Name of specimens: 1. Abdominal Wall Metastatic Deposit - marking stitch@ short= superior   long = lateral2. Additional Medial Margin - marking stitch @  short = superior pole  long= medial margin     Question:  Release to patient  Answer:  Immediate    10/03/24 0844                    [id number]

## 2024-10-07 ENCOUNTER — PATIENT MESSAGE (OUTPATIENT)
Dept: SURGERY | Facility: CLINIC | Age: 74
End: 2024-10-07
Payer: MEDICARE

## 2024-10-21 NOTE — PROGRESS NOTES
SUBJECTIVE:    Patient ID: Joslyn Dubon is a 73 y.o. female.    Chief Complaint: Hospital Follow Up (Brought bottles, upper extremities bruises from the hospital,  CT-test results, EKG-result, review Lab-results, abc/)    HPI    Admission on 10/22/2023, Discharged on 10/22/2023   Component Date Value Ref Range Status    WBC 10/22/2023 10.20  3.90 - 12.70 K/uL Final    RBC 10/22/2023 4.47  4.00 - 5.40 M/uL Final    Hemoglobin 10/22/2023 14.3  12.0 - 16.0 g/dL Final    Hematocrit 10/22/2023 42.6  37.0 - 48.5 % Final    MCV 10/22/2023 95  82 - 98 fL Final    MCH 10/22/2023 32.0 (H)  27.0 - 31.0 pg Final    MCHC 10/22/2023 33.6  32.0 - 36.0 g/dL Final    RDW 10/22/2023 13.4  11.5 - 14.5 % Final    Platelets 10/22/2023 182  150 - 450 K/uL Final    MPV 10/22/2023 9.8  9.2 - 12.9 fL Final    Immature Granulocytes 10/22/2023 0.6 (H)  0.0 - 0.5 % Final    Gran # (ANC) 10/22/2023 8.4 (H)  1.8 - 7.7 K/uL Final    Immature Grans (Abs) 10/22/2023 0.06 (H)  0.00 - 0.04 K/uL Final    Lymph # 10/22/2023 1.2  1.0 - 4.8 K/uL Final    Mono # 10/22/2023 0.5  0.3 - 1.0 K/uL Final    Eos # 10/22/2023 0.0  0.0 - 0.5 K/uL Final    Baso # 10/22/2023 0.02  0.00 - 0.20 K/uL Final    nRBC 10/22/2023 0  0 /100 WBC Final    Gran % 10/22/2023 82.6 (H)  38.0 - 73.0 % Final    Lymph % 10/22/2023 12.0 (L)  18.0 - 48.0 % Final    Mono % 10/22/2023 4.5  4.0 - 15.0 % Final    Eosinophil % 10/22/2023 0.1  0.0 - 8.0 % Final    Basophil % 10/22/2023 0.2  0.0 - 1.9 % Final    Differential Method 10/22/2023 Automated   Final    Sodium 10/22/2023 137  136 - 145 mmol/L Final    Potassium 10/22/2023 3.7  3.5 - 5.1 mmol/L Final    Chloride 10/22/2023 107  95 - 110 mmol/L Final    CO2 10/22/2023 25  23 - 29 mmol/L Final    Glucose 10/22/2023 118 (H)  70 - 110 mg/dL Final    BUN 10/22/2023 17  8 - 23 mg/dL Final    Creatinine 10/22/2023 0.8  0.5 - 1.4 mg/dL Final    Calcium 10/22/2023 9.1  8.7 - 10.5 mg/dL Final    Total  I tried to call the patient but number in patient demographics is no longer in service. I am send out a letter.     -JUS Olivier Protein 10/22/2023 7.3  6.0 - 8.4 g/dL Final    Albumin 10/22/2023 4.0  3.5 - 5.2 g/dL Final    Total Bilirubin 10/22/2023 0.3  0.1 - 1.0 mg/dL Final    Alkaline Phosphatase 10/22/2023 80  55 - 135 U/L Final    AST 10/22/2023 22  10 - 40 U/L Final    ALT 10/22/2023 23  10 - 44 U/L Final    eGFR 10/22/2023 >60.0  >60 mL/min/1.73 m^2 Final    Anion Gap 10/22/2023 5 (L)  8 - 16 mmol/L Final    Specimen UA 10/22/2023 Urine, Clean Catch   Final    Color, UA 10/22/2023 Yellow  Yellow, Straw, Priya Final    Appearance, UA 10/22/2023 Clear  Clear Final    pH, UA 10/22/2023 6.0  5.0 - 8.0 Final    Specific [place], UA 10/22/2023 1.015  1.005 - 1.030 Final    Protein, UA 10/22/2023 Negative  Negative Final    Glucose, UA 10/22/2023 Negative  Negative Final    Ketones, UA 10/22/2023 Negative  Negative Final    Bilirubin (UA) 10/22/2023 Negative  Negative Final    Occult Blood UA 10/22/2023 1+ (A)  Negative Final    Nitrite, UA 10/22/2023 Negative  Negative Final    Urobilinogen, UA 10/22/2023 Negative  Negative EU/dL Final    Leukocytes, UA 10/22/2023 Negative  Negative Final    RBC, UA 10/22/2023 4  0 - 4 /hpf Final    WBC, UA 10/22/2023 1  0 - 5 /hpf Final    Bacteria 10/22/2023 Negative  None-Occ /hpf Final    Squam Epithel, UA 10/22/2023 3  /hpf Final    Hyaline Casts, UA 10/22/2023 5 (A)  0-1/lpf /lpf Final    Microscopic Comment 10/22/2023 SEE COMMENT   Final   Admission on 10/18/2023, Discharged on 10/20/2023   Component Date Value Ref Range Status    WBC 10/18/2023 7.43  3.90 - 12.70 K/uL Final    RBC 10/18/2023 4.91  4.00 - 5.40 M/uL Final    Hemoglobin 10/18/2023 16.0  12.0 - 16.0 g/dL Final    Hematocrit 10/18/2023 46.8  37.0 - 48.5 % Final    MCV 10/18/2023 95  82 - 98 fL Final    MCH 10/18/2023 32.6 (H)  27.0 - 31.0 pg Final    MCHC 10/18/2023 34.2  32.0 - 36.0 g/dL Final    RDW 10/18/2023 13.4  11.5 - 14.5 % Final    Platelets 10/18/2023 192  150 - 450 K/uL Final    MPV  10/18/2023 9.4  9.2 - 12.9 fL Final    Immature Granulocytes 10/18/2023 0.3  0.0 - 0.5 % Final    Gran # (ANC) 10/18/2023 4.4  1.8 - 7.7 K/uL Final    Immature Grans (Abs) 10/18/2023 0.02  0.00 - 0.04 K/uL Final    Lymph # 10/18/2023 2.3  1.0 - 4.8 K/uL Final    Mono # 10/18/2023 0.6  0.3 - 1.0 K/uL Final    Eos # 10/18/2023 0.1  0.0 - 0.5 K/uL Final    Baso # 10/18/2023 0.03  0.00 - 0.20 K/uL Final    nRBC 10/18/2023 0  0 /100 WBC Final    Gran % 10/18/2023 59.4  38.0 - 73.0 % Final    Lymph % 10/18/2023 30.3  18.0 - 48.0 % Final    Mono % 10/18/2023 8.5  4.0 - 15.0 % Final    Eosinophil % 10/18/2023 1.1  0.0 - 8.0 % Final    Basophil % 10/18/2023 0.4  0.0 - 1.9 % Final    Differential Method 10/18/2023 Automated   Final    Sodium 10/18/2023 136  136 - 145 mmol/L Final    Potassium 10/18/2023 4.2  3.5 - 5.1 mmol/L Final    Chloride 10/18/2023 106  95 - 110 mmol/L Final    CO2 10/18/2023 23  23 - 29 mmol/L Final    Glucose 10/18/2023 123 (H)  70 - 110 mg/dL Final    BUN 10/18/2023 12  8 - 23 mg/dL Final    Creatinine 10/18/2023 0.7  0.5 - 1.4 mg/dL Final    Calcium 10/18/2023 8.9  8.7 - 10.5 mg/dL Final    Total Protein 10/18/2023 6.4  6.0 - 8.4 g/dL Final    Albumin 10/18/2023 3.4 (L)  3.5 - 5.2 g/dL Final    Total Bilirubin 10/18/2023 0.6  0.1 - 1.0 mg/dL Final    Alkaline Phosphatase 10/18/2023 74  55 - 135 U/L Final    AST 10/18/2023 23  10 - 40 U/L Final    ALT 10/18/2023 17  10 - 44 U/L Final    eGFR 10/18/2023 >60.0  >60 mL/min/1.73 m^2 Final    Anion Gap 10/18/2023 7 (L)  8 - 16 mmol/L Final    Prothrombin Time 10/18/2023 10.4  9.0 - 12.5 sec Final    INR 10/18/2023 0.9  0.8 - 1.2 Final    TSH 10/18/2023 0.193 (L)  0.340 - 5.600 uIU/mL Final    Cholesterol 10/18/2023 151  120 - 199 mg/dL Final    Triglycerides 10/18/2023 111  30 - 150 mg/dL Final    HDL 10/18/2023 38 (L)  40 - 75 mg/dL Final    LDL Cholesterol 10/18/2023 90.8  63.0 - 159.0 mg/dL Final    HDL/Cholesterol  Ratio 10/18/2023 25.2  20.0 - 50.0 % Final    Total Cholesterol/HDL Ratio 10/18/2023 4.0  2.0 - 5.0 Final    Non-HDL Cholesterol 10/18/2023 113  mg/dL Final    Troponin I High Sensitivity 10/18/2023 7.4  0.0 - 14.9 pg/mL Final    POC Creatinine 10/18/2023 0.9  0.5 - 1.4 mg/dL Final    Sample 10/18/2023 VENOUS   Final    POC Glucose 10/18/2023 98  70 - 110 Final    POC PH 10/18/2023 7.352  7.35 - 7.45 Final    POC PCO2 10/18/2023 45.4 (H)  35 - 45 mmHg Final    POC PO2 10/18/2023 87 (HH)  40 - 60 mmHg Final    POC HCO3 10/18/2023 25.2  24 - 28 mmol/L Final    POC BE 10/18/2023 0  -2 to 2 mmol/L Final    POC SATURATED O2 10/18/2023 96  95 - 100 % Final    POC TCO2 10/18/2023 27  24 - 29 mmol/L Final    Rate 10/18/2023 18   Final    Sample 10/18/2023 VENOUS   Final    Site 10/18/2023 Other   Final    Allens Test 10/18/2023 N/A   Final    DelSys 10/18/2023 CPAP/BiPAP   Final    Mode 10/18/2023 BiPAP   Final    FiO2 10/18/2023 40   Final    Spont Rate 10/18/2023 21   Final    IP 10/18/2023 10   Final    EP 10/18/2023 5   Final    Tryptase 10/18/2023 26.7 (H)  2.2 - 13.2 ug/L Final    Free T4 10/18/2023 1.40  0.71 - 1.51 ng/dL Final    WBC 10/19/2023 6.06  3.90 - 12.70 K/uL Final    RBC 10/19/2023 4.23  4.00 - 5.40 M/uL Final    Hemoglobin 10/19/2023 13.8  12.0 - 16.0 g/dL Final    Hematocrit 10/19/2023 41.0  37.0 - 48.5 % Final    MCV 10/19/2023 97  82 - 98 fL Final    MCH 10/19/2023 32.6 (H)  27.0 - 31.0 pg Final    MCHC 10/19/2023 33.7  32.0 - 36.0 g/dL Final    RDW 10/19/2023 13.3  11.5 - 14.5 % Final    Platelets 10/19/2023 165  150 - 450 K/uL Final    MPV 10/19/2023 9.5  9.2 - 12.9 fL Final    Immature Granulocytes 10/19/2023 0.5  0.0 - 0.5 % Final    Gran # (ANC) 10/19/2023 5.4  1.8 - 7.7 K/uL Final    Immature Grans (Abs) 10/19/2023 0.03  0.00 - 0.04 K/uL Final    Lymph # 10/19/2023 0.6 (L)  1.0 - 4.8 K/uL Final    Mono # 10/19/2023 0.0 (L)  0.3 - 1.0 K/uL Final    Eos #  10/19/2023 0.0  0.0 - 0.5 K/uL Final    Baso # 10/19/2023 0.00  0.00 - 0.20 K/uL Final    nRBC 10/19/2023 0  0 /100 WBC Final    Gran % 10/19/2023 89.6 (H)  38.0 - 73.0 % Final    Lymph % 10/19/2023 9.2 (L)  18.0 - 48.0 % Final    Mono % 10/19/2023 0.7 (L)  4.0 - 15.0 % Final    Eosinophil % 10/19/2023 0.0  0.0 - 8.0 % Final    Basophil % 10/19/2023 0.0  0.0 - 1.9 % Final    Differential Method 10/19/2023 Automated   Final    Sodium 10/19/2023 137  136 - 145 mmol/L Final    Potassium 10/19/2023 4.0  3.5 - 5.1 mmol/L Final    Chloride 10/19/2023 108  95 - 110 mmol/L Final    CO2 10/19/2023 23  23 - 29 mmol/L Final    Glucose 10/19/2023 149 (H)  70 - 110 mg/dL Final    BUN 10/19/2023 10  8 - 23 mg/dL Final    Creatinine 10/19/2023 0.7  0.5 - 1.4 mg/dL Final    Calcium 10/19/2023 8.8  8.7 - 10.5 mg/dL Final    Total Protein 10/19/2023 6.5  6.0 - 8.4 g/dL Final    Albumin 10/19/2023 3.5  3.5 - 5.2 g/dL Final    Total Bilirubin 10/19/2023 0.5  0.1 - 1.0 mg/dL Final    Alkaline Phosphatase 10/19/2023 76  55 - 135 U/L Final    AST 10/19/2023 16  10 - 40 U/L Final    ALT 10/19/2023 15  10 - 44 U/L Final    eGFR 10/19/2023 >60.0  >60 mL/min/1.73 m^2 Final    Anion Gap 10/19/2023 6 (L)  8 - 16 mmol/L Final    WBC 10/20/2023 15.33 (H)  3.90 - 12.70 K/uL Final    RBC 10/20/2023 4.04  4.00 - 5.40 M/uL Final    Hemoglobin 10/20/2023 13.0  12.0 - 16.0 g/dL Final    Hematocrit 10/20/2023 38.8  37.0 - 48.5 % Final    MCV 10/20/2023 96  82 - 98 fL Final    MCH 10/20/2023 32.2 (H)  27.0 - 31.0 pg Final    MCHC 10/20/2023 33.5  32.0 - 36.0 g/dL Final    RDW 10/20/2023 13.6  11.5 - 14.5 % Final    Platelets 10/20/2023 177  150 - 450 K/uL Final    MPV 10/20/2023 9.7  9.2 - 12.9 fL Final    Immature Granulocytes 10/20/2023 0.4  0.0 - 0.5 % Final    Gran # (ANC) 10/20/2023 12.5 (H)  1.8 - 7.7 K/uL Final    Immature Grans (Abs) 10/20/2023 0.06 (H)  0.00 - 0.04 K/uL Final    Lymph # 10/20/2023 1.7  1.0 -  4.8 K/uL Final    Mono # 10/20/2023 1.1 (H)  0.3 - 1.0 K/uL Final    Eos # 10/20/2023 0.0  0.0 - 0.5 K/uL Final    Baso # 10/20/2023 0.02  0.00 - 0.20 K/uL Final    nRBC 10/20/2023 0  0 /100 WBC Final    Gran % 10/20/2023 81.9 (H)  38.0 - 73.0 % Final    Lymph % 10/20/2023 10.8 (L)  18.0 - 48.0 % Final    Mono % 10/20/2023 6.8  4.0 - 15.0 % Final    Eosinophil % 10/20/2023 0.0  0.0 - 8.0 % Final    Basophil % 10/20/2023 0.1  0.0 - 1.9 % Final    Differential Method 10/20/2023 Automated   Final    Sodium 10/20/2023 138  136 - 145 mmol/L Final    Potassium 10/20/2023 4.1  3.5 - 5.1 mmol/L Final    Chloride 10/20/2023 108  95 - 110 mmol/L Final    CO2 10/20/2023 24  23 - 29 mmol/L Final    Glucose 10/20/2023 120 (H)  70 - 110 mg/dL Final    BUN 10/20/2023 19  8 - 23 mg/dL Final    Creatinine 10/20/2023 0.8  0.5 - 1.4 mg/dL Final    Calcium 10/20/2023 9.3  8.7 - 10.5 mg/dL Final    Total Protein 10/20/2023 6.5  6.0 - 8.4 g/dL Final    Albumin 10/20/2023 3.5  3.5 - 5.2 g/dL Final    Total Bilirubin 10/20/2023 0.4  0.1 - 1.0 mg/dL Final    Alkaline Phosphatase 10/20/2023 77  55 - 135 U/L Final    AST 10/20/2023 28  10 - 40 U/L Final    ALT 10/20/2023 16  10 - 44 U/L Final    eGFR 10/20/2023 >60.0  >60 mL/min/1.73 m^2 Final    Anion Gap 10/20/2023 6 (L)  8 - 16 mmol/L Final   Admission on 09/05/2023, Discharged on 09/05/2023   Component Date Value Ref Range Status    WBC 09/05/2023 6.79  3.90 - 12.70 K/uL Final    RBC 09/05/2023 4.80  4.00 - 5.40 M/uL Final    Hemoglobin 09/05/2023 15.2  12.0 - 16.0 g/dL Final    Hematocrit 09/05/2023 45.3  37.0 - 48.5 % Final    MCV 09/05/2023 94  82 - 98 fL Final    MCH 09/05/2023 31.7 (H)  27.0 - 31.0 pg Final    MCHC 09/05/2023 33.6  32.0 - 36.0 g/dL Final    RDW 09/05/2023 13.2  11.5 - 14.5 % Final    Platelets 09/05/2023 243  150 - 450 K/uL Final    MPV 09/05/2023 9.1 (L)  9.2 - 12.9 fL Final    Immature Granulocytes 09/05/2023 0.3  0.0 - 0.5 %  Final    Gran # (ANC) 09/05/2023 4.4  1.8 - 7.7 K/uL Final    Immature Grans (Abs) 09/05/2023 0.02  0.00 - 0.04 K/uL Final    Lymph # 09/05/2023 1.7  1.0 - 4.8 K/uL Final    Mono # 09/05/2023 0.6  0.3 - 1.0 K/uL Final    Eos # 09/05/2023 0.1  0.0 - 0.5 K/uL Final    Baso # 09/05/2023 0.03  0.00 - 0.20 K/uL Final    nRBC 09/05/2023 0  0 /100 WBC Final    Gran % 09/05/2023 64.3  38.0 - 73.0 % Final    Lymph % 09/05/2023 24.6  18.0 - 48.0 % Final    Mono % 09/05/2023 9.4  4.0 - 15.0 % Final    Eosinophil % 09/05/2023 1.0  0.0 - 8.0 % Final    Basophil % 09/05/2023 0.4  0.0 - 1.9 % Final    Differential Method 09/05/2023 Automated   Final    Sodium 09/05/2023 138  136 - 145 mmol/L Final    Potassium 09/05/2023 4.2  3.5 - 5.1 mmol/L Final    Chloride 09/05/2023 108  95 - 110 mmol/L Final    CO2 09/05/2023 25  23 - 29 mmol/L Final    Glucose 09/05/2023 97  70 - 110 mg/dL Final    BUN 09/05/2023 14  8 - 23 mg/dL Final    Creatinine 09/05/2023 0.8  0.5 - 1.4 mg/dL Final    Calcium 09/05/2023 9.4  8.7 - 10.5 mg/dL Final    Total Protein 09/05/2023 8.1  6.0 - 8.4 g/dL Final    Albumin 09/05/2023 3.8  3.5 - 5.2 g/dL Final    Total Bilirubin 09/05/2023 0.6  0.1 - 1.0 mg/dL Final    Alkaline Phosphatase 09/05/2023 85  55 - 135 U/L Final    AST 09/05/2023 19  10 - 40 U/L Final    ALT 09/05/2023 23  10 - 44 U/L Final    eGFR 09/05/2023 >60.0  >60 mL/min/1.73 m^2 Final    Anion Gap 09/05/2023 5 (L)  8 - 16 mmol/L Final    BNP 09/05/2023 38  0 - 99 pg/mL Final    Troponin I High Sensitivity 09/05/2023 7.3  0.0 - 14.9 pg/mL Final    Prothrombin Time 09/05/2023 10.3  9.0 - 12.5 sec Final    INR 09/05/2023 0.9  0.8 - 1.2 Final    Magnesium 09/05/2023 2.0  1.6 - 2.6 mg/dL Final    BNP 09/05/2023 38  0 - 99 pg/mL Final   Office Visit on 08/22/2023   Component Date Value Ref Range Status    POC Rapid COVID 08/22/2023 Positive (A)  Negative Final     Acceptable 08/22/2023 Yes   Final    Admission on 06/22/2023, Discharged on 06/22/2023   Component Date Value Ref Range Status    WBC 06/22/2023 7.26  3.90 - 12.70 K/uL Final    RBC 06/22/2023 4.81  4.00 - 5.40 M/uL Final    Hemoglobin 06/22/2023 15.2  12.0 - 16.0 g/dL Final    Hematocrit 06/22/2023 45.8  37.0 - 48.5 % Final    MCV 06/22/2023 95  82 - 98 fL Final    MCH 06/22/2023 31.6 (H)  27.0 - 31.0 pg Final    MCHC 06/22/2023 33.2  32.0 - 36.0 g/dL Final    RDW 06/22/2023 13.3  11.5 - 14.5 % Final    Platelets 06/22/2023 189  150 - 450 K/uL Final    MPV 06/22/2023 9.0 (L)  9.2 - 12.9 fL Final    Immature Granulocytes 06/22/2023 0.3  0.0 - 0.5 % Final    Gran # (ANC) 06/22/2023 4.5  1.8 - 7.7 K/uL Final    Immature Grans (Abs) 06/22/2023 0.02  0.00 - 0.04 K/uL Final    Lymph # 06/22/2023 2.0  1.0 - 4.8 K/uL Final    Mono # 06/22/2023 0.6  0.3 - 1.0 K/uL Final    Eos # 06/22/2023 0.1  0.0 - 0.5 K/uL Final    Baso # 06/22/2023 0.02  0.00 - 0.20 K/uL Final    nRBC 06/22/2023 0  0 /100 WBC Final    Gran % 06/22/2023 61.8  38.0 - 73.0 % Final    Lymph % 06/22/2023 28.1  18.0 - 48.0 % Final    Mono % 06/22/2023 7.7  4.0 - 15.0 % Final    Eosinophil % 06/22/2023 1.8  0.0 - 8.0 % Final    Basophil % 06/22/2023 0.3  0.0 - 1.9 % Final    Differential Method 06/22/2023 Automated   Final    Sodium 06/22/2023 141  136 - 145 mmol/L Final    Potassium 06/22/2023 4.1  3.5 - 5.1 mmol/L Final    Chloride 06/22/2023 108  95 - 110 mmol/L Final    CO2 06/22/2023 26  23 - 29 mmol/L Final    Glucose 06/22/2023 100  70 - 110 mg/dL Final    BUN 06/22/2023 19  8 - 23 mg/dL Final    Creatinine 06/22/2023 0.9  0.5 - 1.4 mg/dL Final    Calcium 06/22/2023 9.5  8.7 - 10.5 mg/dL Final    Total Protein 06/22/2023 7.7  6.0 - 8.4 g/dL Final    Albumin 06/22/2023 3.9  3.5 - 5.2 g/dL Final    Total Bilirubin 06/22/2023 0.5  0.1 - 1.0 mg/dL Final    Alkaline Phosphatase 06/22/2023 81  55 - 135 U/L Final    AST 06/22/2023 23  10 - 40 U/L Final     ALT 06/22/2023 25  10 - 44 U/L Final    eGFR 06/22/2023 >60.0  >60 mL/min/1.73 m^2 Final    Anion Gap 06/22/2023 7 (L)  8 - 16 mmol/L Final    BNP 06/22/2023 23  0 - 99 pg/mL Final    Troponin I High Sensitivity 06/22/2023 9.1  0.0 - 14.9 pg/mL Final    Prothrombin Time 06/22/2023 10.5  9.0 - 12.5 sec Final    INR 06/22/2023 0.9  0.8 - 1.2 Final    Lipase 06/22/2023 48  4 - 60 U/L Final    Troponin I High Sensitivity 06/22/2023 7.8  0.0 - 14.9 pg/mL Final   Admission on 05/02/2023, Discharged on 05/02/2023   Component Date Value Ref Range Status    Specimen UA 05/02/2023 Urine, Clean Catch   Final    Color, UA 05/02/2023 Yellow  Yellow, Straw, Priya Final    Appearance, UA 05/02/2023 Hazy (A)  Clear Final    pH, UA 05/02/2023 8.0  5.0 - 8.0 Final    Specific Gravity, UA 05/02/2023 1.015  1.005 - 1.030 Final    Protein, UA 05/02/2023 Negative  Negative Final    Glucose, UA 05/02/2023 Negative  Negative Final    Ketones, UA 05/02/2023 Negative  Negative Final    Bilirubin (UA) 05/02/2023 Negative  Negative Final    Occult Blood UA 05/02/2023 1+ (A)  Negative Final    Nitrite, UA 05/02/2023 Negative  Negative Final    Urobilinogen, UA 05/02/2023 Negative  Negative EU/dL Final    Leukocytes, UA 05/02/2023 Negative  Negative Final    WBC 05/02/2023 5.44  3.90 - 12.70 K/uL Final    RBC 05/02/2023 4.76  4.00 - 5.40 M/uL Final    Hemoglobin 05/02/2023 15.1  12.0 - 16.0 g/dL Final    Hematocrit 05/02/2023 46.0  37.0 - 48.5 % Final    MCV 05/02/2023 97  82 - 98 fL Final    MCH 05/02/2023 31.7 (H)  27.0 - 31.0 pg Final    MCHC 05/02/2023 32.8  32.0 - 36.0 g/dL Final    RDW 05/02/2023 12.9  11.5 - 14.5 % Final    Platelets 05/02/2023 200  150 - 450 K/uL Final    MPV 05/02/2023 9.2  9.2 - 12.9 fL Final    Immature Granulocytes 05/02/2023 0.4  0.0 - 0.5 % Final    Gran # (ANC) 05/02/2023 2.9  1.8 - 7.7 K/uL Final    Immature Grans (Abs) 05/02/2023 0.02  0.00 - 0.04 K/uL Final    Lymph #  05/02/2023 1.9  1.0 - 4.8 K/uL Final    Mono # 05/02/2023 0.5  0.3 - 1.0 K/uL Final    Eos # 05/02/2023 0.1  0.0 - 0.5 K/uL Final    Baso # 05/02/2023 0.03  0.00 - 0.20 K/uL Final    nRBC 05/02/2023 0  0 /100 WBC Final    Gran % 05/02/2023 53.9  38.0 - 73.0 % Final    Lymph % 05/02/2023 35.1  18.0 - 48.0 % Final    Mono % 05/02/2023 8.5  4.0 - 15.0 % Final    Eosinophil % 05/02/2023 1.5  0.0 - 8.0 % Final    Basophil % 05/02/2023 0.6  0.0 - 1.9 % Final    Differential Method 05/02/2023 Automated   Final    Sodium 05/02/2023 139  136 - 145 mmol/L Final    Potassium 05/02/2023 4.3  3.5 - 5.1 mmol/L Final    Chloride 05/02/2023 106  95 - 110 mmol/L Final    CO2 05/02/2023 27  23 - 29 mmol/L Final    Glucose 05/02/2023 99  70 - 110 mg/dL Final    BUN 05/02/2023 13  8 - 23 mg/dL Final    Creatinine 05/02/2023 0.8  0.5 - 1.4 mg/dL Final    Calcium 05/02/2023 9.7  8.7 - 10.5 mg/dL Final    Total Protein 05/02/2023 8.0  6.0 - 8.4 g/dL Final    Albumin 05/02/2023 3.9  3.5 - 5.2 g/dL Final    Total Bilirubin 05/02/2023 0.8  0.1 - 1.0 mg/dL Final    Alkaline Phosphatase 05/02/2023 79  55 - 135 U/L Final    AST 05/02/2023 26  10 - 40 U/L Final    ALT 05/02/2023 23  10 - 44 U/L Final    Anion Gap 05/02/2023 6 (L)  8 - 16 mmol/L Final    eGFR 05/02/2023 >60.0  >60 mL/min/1.73 m^2 Final    RBC, UA 05/02/2023 12 (H)  0 - 4 /hpf Final    WBC, UA 05/02/2023 2  0 - 5 /hpf Final    Bacteria 05/02/2023 Negative  None-Occ /hpf Final    Squam Epithel, UA 05/02/2023 9  /hpf Final    Hyaline Casts, UA 05/02/2023 8 (A)  0-1/lpf /lpf Final    Microscopic Comment 05/02/2023 SEE COMMENT   Final       Past Medical History:   Diagnosis Date    Anxiety     Martin esophagus     Colitis     COPD (chronic obstructive pulmonary disease)     GERD (gastroesophageal reflux disease)     Hypertension     Thyroid disease     Vocal cord polyps      Social History     Socioeconomic History    Marital status:     Tobacco Use    Smoking status: Every Day     Current packs/day: 0.50     Types: Cigarettes    Smokeless tobacco: Former     Quit date: 5/1/2016    Tobacco comments:     Pt has smoked since 14 years old. Smokes around .5ppd. Inpatient smoking education done 10/20.   Substance and Sexual Activity    Alcohol use: Never    Drug use: Never    Sexual activity: Yes     Partners: Male     Social Determinants of Health     Stress: No Stress Concern Present (9/19/2019)    [employer] - Occupational Stress Questionnaire     Feeling of Stress : Not at all     Past Surgical History:   Procedure Laterality Date    ABDOMINAL AORTIC ANEURYSM REPAIR      BACK SURGERY      cervical    CATARACT EXTRACTION Right 02/2021    CHOLECYSTECTOMY  12-    Dr Price  [facility]    FOOT SURGERY      HYSTERECTOMY      SALIVARY GLAND SURGERY       Family History   Problem Relation Age of Onset    Cancer Mother     Heart failure Father     Emphysema Sister     No Known Problems Brother     Cancer Sister        The CVD Risk score (Betsey, et al., 2008) failed to calculate for the following reasons:    The 2008 CVD risk score is only valid for ages 30 to 74    Tests to Keep You Healthy    Colon Cancer Screening: ORDERED  Last Blood Pressure <= 139/89 (10/25/2023): NO  Tobacco Cessation: NO      Review of patient's allergies indicates:   Allergen Reactions    Bisacodyl Nausea And Vomiting     Other reaction(s): Vomiting    Cipro [ciprofloxacin hcl]     Demerol [meperidine]      Nausea vomiting, visual problem    Doxycycline Hives    Flonase [fluticasone propionate] Hives    Iodinated contrast media Hives     hypotension    Morphine     Morpholine analogues Other (See Comments)     hypotension       Current Outpatient Medications:     aluminum-magnesium hydroxide-simethicone (MAALOX) 200-200-20 mg/5 mL Susp, Take 30 mLs by mouth once as needed., Disp: , Rfl:     cetirizine (ZYRTEC) 10 MG  tablet, Take 1 tablet (10 mg total) by mouth once daily., Disp: , Rfl: 0    clobetasoL (TEMOVATE) 0.05 % cream, Apply topically 2 (two) times daily. (Patient taking differently: Apply 1 g topically 2 (two) times daily.), Disp: 45 g, Rfl: 2    cloNIDine (CATAPRES) 0.1 MG tablet, Take one tablet as needed up to 3 times a day if blood pressure is greater than 170/110 (Patient taking differently: Take 0.1 mg by mouth every 8 (eight) hours as needed. Take one tablet as needed up to 3 times a day if blood pressure is greater than 170/110), Disp: 30 tablet, Rfl: 3    famotidine (PEPCID) 20 MG tablet, Take 1 tablet (20 mg total) by mouth every evening., Disp: 90 tablet, Rfl: 3    hydroCHLOROthiazide (HYDRODIURIL) 25 MG tablet, Take 1 tablet (25 mg total) by mouth daily as needed (fluid)., Disp: 30 tablet, Rfl: 3    HYDROcodone-acetaminophen (NORCO)  mg per tablet, Take 1 tablet by mouth every 12 (twelve) hours as needed. (Patient taking differently: Take 1 tablet by mouth every 12 (twelve) hours as needed for Pain.), Disp: 50 tablet, Rfl: 0    hydrOXYzine HCL (ATARAX) 25 MG tablet, Take 1 tablet (25 mg total) by mouth 2 (two) times daily as needed for Itching., Disp: 30 tablet, Rfl: 0    levocetirizine (XYZAL) 5 MG tablet, Take 5 mg by mouth every evening., Disp: , Rfl:     levothyroxine (SYNTHROID) 100 MCG tablet, Take 1 tablet (100 mcg total) by mouth before breakfast., Disp: 90 tablet, Rfl: 3    losartan (COZAAR) 100 MG tablet, Take 1 tablet (100 mg total) by mouth once daily., Disp: 90 tablet, Rfl: 3    metoprolol tartrate (LOPRESSOR) 25 MG tablet, Take 1 tablet (25 mg total) by mouth 2 (two) times daily., Disp: 180 tablet, Rfl: 3    nirmatrelvir-ritonavir (PAXLOVID) 300 mg (150 mg x 2)-100 mg copackaged tablets (EUA), Take 3 tablets by mouth 2 (two) times daily. Each dose contains 2 nirmatrelvir (pink tablets) and 1 ritonavir (white tablet). Take all 3 tablets together, Disp: 30 tablet, Rfl: 0     omeprazole (PRILOSEC) 40 MG capsule, Take 1 capsule (40 mg total) by mouth every morning., Disp: 90 capsule, Rfl: 3    ondansetron (ZOFRAN) 4 MG tablet, Take 1 tablet (4 mg total) by mouth every 8 (eight) hours as needed for Nausea., Disp: 30 tablet, Rfl: 3    REFRESH OPTIVE 0.5-0.9 % Drop, Place 1 drop into both eyes 4 (four) times daily., Disp: , Rfl:     simethicone (GAS-X ORAL), Take 1 tablet by mouth as needed., Disp: , Rfl:     ALPRAZolam (XANAX) 0.25 MG tablet, Take 1 tablet (0.25 mg total) by mouth 2 (two) times daily as needed for Anxiety., Disp: 30 tablet, Rfl: 1    EPINEPHrine (EPIPEN) 0.3 mg/0.3 mL AtIn, Inject 0.3 mLs (0.3 mg total) into the muscle as needed (Severe allergic reaction symptoms such as shortness of breath, tongue or throat swelling, weakness dizziness severe nausea vomiting). (Patient not taking: Reported on 10/18/2023), Disp: 1 each, Rfl: 3    predniSONE (DELTASONE) 20 MG tablet, Take 20 mg by mouth 2 (two) times daily., Disp: , Rfl:     Review of Systems        Objective:      Vitals:    10/25/23 1152 10/25/23 1153   BP: (!) 170/100 (!) 166/96   Pulse: 80    Weight: 71.7 kg (158 lb)    Height: 5' (1.524 m)      Physical Exam      Assessment:       No diagnosis found.     Plan:       There are no diagnoses linked to this encounter.  No follow-ups on file.        10/25/2023 Jasbir Graham

## 2024-10-22 ENCOUNTER — OFFICE VISIT (OUTPATIENT)
Dept: SURGERY | Facility: CLINIC | Age: 74
End: 2024-10-22
Payer: MEDICARE

## 2024-10-22 ENCOUNTER — LAB VISIT (OUTPATIENT)
Dept: LAB | Facility: HOSPITAL | Age: 74
End: 2024-10-22
Attending: INTERNAL MEDICINE
Payer: MEDICARE

## 2024-10-22 VITALS
BODY MASS INDEX: 33.13 KG/M2 | DIASTOLIC BLOOD PRESSURE: 84 MMHG | HEIGHT: 62 IN | SYSTOLIC BLOOD PRESSURE: 137 MMHG | HEART RATE: 62 BPM | TEMPERATURE: 98 F | WEIGHT: 180 LBS

## 2024-10-22 DIAGNOSIS — R91.8 MASS OF UPPER LOBE OF LEFT LUNG: ICD-10-CM

## 2024-10-22 DIAGNOSIS — R22.2 ABDOMINAL WALL MASS: Primary | ICD-10-CM

## 2024-10-22 DIAGNOSIS — C34.90 STAGE 4 MALIGNANT NEOPLASM OF LUNG, UNSPECIFIED LATERALITY: ICD-10-CM

## 2024-10-22 LAB
ALBUMIN SERPL BCP-MCNC: 4.2 G/DL (ref 3.5–5.2)
ALP SERPL-CCNC: 86 U/L (ref 55–135)
ALT SERPL W/O P-5'-P-CCNC: 15 U/L (ref 10–44)
ANION GAP SERPL CALC-SCNC: 9 MMOL/L (ref 8–16)
AST SERPL-CCNC: 21 U/L (ref 10–40)
BASOPHILS # BLD AUTO: 0.02 K/UL (ref 0–0.2)
BASOPHILS NFR BLD: 0.4 % (ref 0–1.9)
BILIRUB SERPL-MCNC: 0.3 MG/DL (ref 0.1–1)
BUN SERPL-MCNC: 16 MG/DL (ref 8–23)
CALCIUM SERPL-MCNC: 10.1 MG/DL (ref 8.7–10.5)
CHLORIDE SERPL-SCNC: 101 MMOL/L (ref 95–110)
CO2 SERPL-SCNC: 30 MMOL/L (ref 23–29)
CREAT SERPL-MCNC: 1.1 MG/DL (ref 0.5–1.4)
DIFFERENTIAL METHOD BLD: ABNORMAL
EOSINOPHIL # BLD AUTO: 0.3 K/UL (ref 0–0.5)
EOSINOPHIL NFR BLD: 6.5 % (ref 0–8)
ERYTHROCYTE [DISTWIDTH] IN BLOOD BY AUTOMATED COUNT: 12.7 % (ref 11.5–14.5)
EST. GFR  (NO RACE VARIABLE): 52.1 ML/MIN/1.73 M^2
GLUCOSE SERPL-MCNC: 104 MG/DL (ref 70–110)
HCT VFR BLD AUTO: 42.4 % (ref 37–48.5)
HGB BLD-MCNC: 13.8 G/DL (ref 12–16)
IMM GRANULOCYTES # BLD AUTO: 0.02 K/UL (ref 0–0.04)
IMM GRANULOCYTES NFR BLD AUTO: 0.4 % (ref 0–0.5)
LYMPHOCYTES # BLD AUTO: 1.4 K/UL (ref 1–4.8)
LYMPHOCYTES NFR BLD: 30.2 % (ref 18–48)
MAGNESIUM SERPL-MCNC: 1.7 MG/DL (ref 1.6–2.6)
MCH RBC QN AUTO: 30.8 PG (ref 27–31)
MCHC RBC AUTO-ENTMCNC: 32.5 G/DL (ref 32–36)
MCV RBC AUTO: 95 FL (ref 82–98)
MONOCYTES # BLD AUTO: 0.5 K/UL (ref 0.3–1)
MONOCYTES NFR BLD: 11 % (ref 4–15)
NEUTROPHILS # BLD AUTO: 2.4 K/UL (ref 1.8–7.7)
NEUTROPHILS NFR BLD: 51.5 % (ref 38–73)
NRBC BLD-RTO: 0 /100 WBC
PLATELET # BLD AUTO: 144 K/UL (ref 150–450)
PMV BLD AUTO: 7.8 FL (ref 9.2–12.9)
POTASSIUM SERPL-SCNC: 4.4 MMOL/L (ref 3.5–5.1)
PROT SERPL-MCNC: 7.7 G/DL (ref 6–8.4)
RBC # BLD AUTO: 4.48 M/UL (ref 4–5.4)
SODIUM SERPL-SCNC: 140 MMOL/L (ref 136–145)
WBC # BLD AUTO: 4.63 K/UL (ref 3.9–12.7)

## 2024-10-22 PROCEDURE — 3075F SYST BP GE 130 - 139MM HG: CPT | Mod: CPTII,S$GLB,, | Performed by: SURGERY

## 2024-10-22 PROCEDURE — 1126F AMNT PAIN NOTED NONE PRSNT: CPT | Mod: CPTII,S$GLB,, | Performed by: SURGERY

## 2024-10-22 PROCEDURE — 99024 POSTOP FOLLOW-UP VISIT: CPT | Mod: S$GLB,,, | Performed by: SURGERY

## 2024-10-22 PROCEDURE — 1160F RVW MEDS BY RX/DR IN RCRD: CPT | Mod: CPTII,S$GLB,, | Performed by: SURGERY

## 2024-10-22 PROCEDURE — 80053 COMPREHEN METABOLIC PANEL: CPT | Performed by: INTERNAL MEDICINE

## 2024-10-22 PROCEDURE — 3079F DIAST BP 80-89 MM HG: CPT | Mod: CPTII,S$GLB,, | Performed by: SURGERY

## 2024-10-22 PROCEDURE — 1159F MED LIST DOCD IN RCRD: CPT | Mod: CPTII,S$GLB,, | Performed by: SURGERY

## 2024-10-22 PROCEDURE — 99999 PR PBB SHADOW E&M-EST. PATIENT-LVL IV: CPT | Mod: PBBFAC,,, | Performed by: SURGERY

## 2024-10-22 PROCEDURE — 83735 ASSAY OF MAGNESIUM: CPT | Performed by: INTERNAL MEDICINE

## 2024-10-22 PROCEDURE — 36415 COLL VENOUS BLD VENIPUNCTURE: CPT | Performed by: INTERNAL MEDICINE

## 2024-10-22 PROCEDURE — 85025 COMPLETE CBC W/AUTO DIFF WBC: CPT | Performed by: INTERNAL MEDICINE

## 2024-10-23 ENCOUNTER — OFFICE VISIT (OUTPATIENT)
Facility: CLINIC | Age: 74
End: 2024-10-23
Payer: MEDICARE

## 2024-10-23 VITALS
OXYGEN SATURATION: 94 % | SYSTOLIC BLOOD PRESSURE: 115 MMHG | BODY MASS INDEX: 31.52 KG/M2 | HEIGHT: 62 IN | RESPIRATION RATE: 24 BRPM | TEMPERATURE: 97 F | WEIGHT: 171.31 LBS | DIASTOLIC BLOOD PRESSURE: 73 MMHG

## 2024-10-23 DIAGNOSIS — D70.1 CHEMOTHERAPY INDUCED NEUTROPENIA: ICD-10-CM

## 2024-10-23 DIAGNOSIS — T45.1X5A ANEMIA DUE TO CHEMOTHERAPY: ICD-10-CM

## 2024-10-23 DIAGNOSIS — D64.81 ANEMIA DUE TO CHEMOTHERAPY: ICD-10-CM

## 2024-10-23 DIAGNOSIS — C34.90 NON-SMALL CELL LUNG CANCER WITH METASTASIS: ICD-10-CM

## 2024-10-23 DIAGNOSIS — D69.6 THROMBOCYTOPENIA: ICD-10-CM

## 2024-10-23 DIAGNOSIS — T45.1X5A CHEMOTHERAPY INDUCED NEUTROPENIA: ICD-10-CM

## 2024-10-23 DIAGNOSIS — C34.92 NON-SMALL CELL CARCINOMA OF LEFT LUNG: ICD-10-CM

## 2024-10-23 DIAGNOSIS — R91.8 MASS OF UPPER LOBE OF LEFT LUNG: Primary | ICD-10-CM

## 2024-10-23 DIAGNOSIS — Z72.0 TOBACCO ABUSE: Chronic | ICD-10-CM

## 2024-10-23 PROCEDURE — 99999 PR PBB SHADOW E&M-EST. PATIENT-LVL IV: CPT | Mod: PBBFAC,,, | Performed by: INTERNAL MEDICINE

## 2024-10-23 NOTE — PROGRESS NOTES
"[facility]  Progress Note -   Follow-up Visit    Subjective:      Patient ID:   NAME: Joslyn Dubon : 1950     74 y.o. female    Referring Doc: Jasbir Graham  Other Physicians: Yobani/Xavier Siegel Dauterive, Pettiford    Chief Complaint: lung cancer      HPI:   The patient returns for a  follow-up visit today to go over results of recently ordered tests, studies and/or labs. She is here with her  today.     She is feeling much better in general. Ambulating on her own now.    She is currently breathing adequately. No current CP, HA's    She had surgery with Dr Wilson on 10/3 with successful metastatectomy. She has healed well. The margins were all negative.      She previously self-discontinued the chemotherapy after only two cycles. Quality of life is her priority goal.     Discussed consideration for further systemic therapy options. She is very resistant to getting anymore chemotherapy but is willing to have immunotherapy school with Scarlett Pacheco NP and willing to consider immunotherapy options.        She sees Dr Rodriguez with rad/onc again on     She saw Dr Graham on 2024. She is followed by palliative medicine.    She had PET on 2024 and saw Scarlett Pacheco NP on 2024; she subsequently underwent an US guided biopsy of the soft tissue nodule on 9/10/2024. The pathology from the biopsy has come back showing metastatic poorly differentiated large cell carcinoma.                    ROS:   GEN: feeling "100x" better  HEENT: normal with no HA's, sore throat, stiff neck, changes in vision  CV: normal with no CP, SOB, PND, RAPHAEL   PULM: normal with no SOB, cough, hemoptysis, sputum or pleuritic pain  GI:  no current nausea and GI upset; s/p excision of abdom wall lesion  : normal with no hematuria, dysuria  BREAST: normal with no mass, discharge, pain  SKIN: normal with no rash, erythema, bruising, or swelling     Past Medical/Surgical History:  Past " Medical History:   Diagnosis Date    Anxiety     Martin esophagus     Chemotherapy-induced nausea 07/12/2024    Colitis     COPD (chronic obstructive pulmonary disease)     Dehydration 07/12/2024    GERD (gastroesophageal reflux disease)     Hypertension     Intractable nausea and vomiting 06/23/2024    Lung cancer     Non-small cell lung cancer with metastasis 10/23/2024    On home oxygen therapy     2L as needed    Thyroid disease     Vocal cord polyps      Past Surgical History:   Procedure Laterality Date    ABDOMINAL AORTIC ANEURYSM REPAIR      BACK SURGERY      cervical    CATARACT EXTRACTION Right 02/2021    CHOLECYSTECTOMY  12/20/2013    Dr Albert CORLEY    ENDOBRONCHIAL ULTRASOUND N/A 4/19/2024    Procedure: ENDOBRONCHIAL ULTRASOUND (EBUS);  Surgeon: Kizzy Siegel MD;  [facility];  Service: Pulmonary;  Laterality: N/A;    FOOT SURGERY      HYSTERECTOMY  1979    AUGUSTINE for AUB with consevation ovaries    INSERTION OF TUNNELED CENTRAL VENOUS CATHETER (CVC) WITH SUBCUTANEOUS PORT Right 5/20/2024    Procedure: [id number];  Surgeon: Simon Wilson III, MD;  Location: [facility] OR;  Service: General;  Laterality: Right;    ROBOTIC BRONCHOSCOPY N/A 4/19/2024    Procedure: ROBOTIC BRONCHOSCOPY;  Surgeon: Kizzy Siegel MD;  [facility];  Service: Pulmonary;  Laterality: N/A;    SALIVARY GLAND SURGERY      SURGICAL REMOVAL OF LESION OF ABDOMINAL WALL Left 10/3/2024    Procedure: EXCISION, LESION, ABDOMINAL WALL;  Surgeon: Simon Wilson III, MD;  Location: [facility] OR;  Service: General;  Laterality: Left;         Allergies:  Review of patient's allergies indicates:   Allergen Reactions    Bisacodyl Nausea And Vomiting     Other reaction(s): Vomiting    Iodinated contrast media Anaphylaxis, Hives and Shortness Of Breath     Hypotension.  Pt states she has anaphylaxis with IV contrast.     Morphine Other (See Comments)     hypotension    Azithromycin Hives    Cipro  [ciprofloxacin hcl]     Demerol [meperidine]      Nausea vomiting, visual problem    Doxycycline Hives    Flonase [fluticasone propionate] Hives       Social/Family History:  Social History     Socioeconomic History    Marital status:    Tobacco Use    Smoking status: Former     Types: Cigarettes     Start date: 2024     Quit date: 1964     Years since quittin.8    Smokeless tobacco: Never    Tobacco comments:     Pt has smoked since 14 years old. Smokes around .5ppd. Inpatient smoking education done 10/20.     Pt quit smoking on 24.  She has never used smokeless tobacco   Substance and Sexual Activity    Alcohol use: Never    Drug use: Never    Sexual activity: Yes     Partners: Male     Social Drivers of Health     Financial Resource Strain: Low Risk  (2024)    Overall Financial Resource Strain (CARDIA)     Difficulty of Paying Living Expenses: Not hard at all   Food Insecurity: No Food Insecurity (2024)    Hunger Vital Sign     Worried About Running Out of Food in the Last Year: Never true     Ran Out of Food in the Last Year: Never true   Transportation Needs: No Transportation Needs (2024)    PRAPARE - Transportation     Lack of Transportation (Medical): No     Lack of Transportation (Non-Medical): No   Physical Activity: Inactive (3/15/2024)    Exercise Vital Sign     Days of Exercise per Week: 0 days     Minutes of Exercise per Session: 0 min   Stress: Stress Concern Present (3/15/2024)    [employer] - Occupational Stress Questionnaire     Feeling of Stress : Very much   Housing Stability: Low Risk  (2024)    Housing Stability Vital Sign     Unable to Pay for Housing in the Last Year: No     Homeless in the Last Year: No     Family History   Problem Relation Name Age of Onset    Bladder Cancer Mother      Heart failure Father      Emphysema Sister      Pancreatic cancer Sister      No Known Problems Brother            Medications:    Current Outpatient Medications:     albuterol-ipratropium (DUO-NEB) 2.5 mg-0.5 mg/3 mL nebulizer solution, Take 3 mLs by nebulization every 6 (six) hours as needed for Wheezing. Rescue, Disp: 75 mL, Rfl: 0    amLODIPine (NORVASC) 5 MG tablet, Take 5 mg by mouth once daily. Per pt only taking if bp is high PRN SBP>140, Disp: , Rfl:     diphenhydrAMINE (BENADRYL) 25 mg capsule, Take 25 mg by mouth every 6 (six) hours as needed for Itching., Disp: , Rfl:     famotidine (PEPCID) 20 MG tablet, Take 20 mg by mouth 2 (two) times daily as needed for Heartburn., Disp: , Rfl:     furosemide (LASIX) 20 MG tablet, Take 1 tablet (20 mg total) by mouth daily as needed (edema). (Patient taking differently: Take 20 mg by mouth daily as needed (edema). Sob OR EDEMA), Disp: 30 tablet, Rfl: 1    HYDROcodone-acetaminophen (NORCO) 5-325 mg per tablet, Take 1 tablet by mouth every 6 (six) hours as needed for Pain., Disp: 30 tablet, Rfl: 0    levalbuterol (XOPENEX HFA) 45 mcg/actuation inhaler, Inhale 2 puffs into the lungs every 6 (six) hours as needed for Wheezing. Rescue, Disp: 15 g, Rfl: 4    levalbuterol (XOPENEX) 1.25 mg/3 mL nebulizer solution, Take 3 mLs (1.25 mg total) by nebulization every 4 (four) hours as needed for Wheezing. Rescue, Disp: 1 mL, Rfl: 0    levothyroxine (SYNTHROID) 112 MCG tablet, Take 1 tablet (112 mcg total) by mouth before breakfast., Disp: 90 tablet, Rfl: 3    LIDOcaine-prilocaine (EMLA) cream, Apply topically as needed., Disp: 30 g, Rfl: 5    LORazepam (ATIVAN) 1 MG tablet, Take 0.5 tablets (0.5 mg total) by mouth every 6 (six) hours as needed for Anxiety., Disp: 60 tablet, Rfl: 3    methylPREDNISolone (MEDROL DOSEPACK) 4 mg tablet, use as directed, Disp: 21 each, Rfl: 0    metoprolol tartrate (LOPRESSOR) 25 MG tablet, Take 1 tablet (25 mg total) by mouth 2 (two) times daily., Disp: 180 tablet, Rfl: 3    omeprazole (PRILOSEC) 40 MG capsule, Take 1 capsule (40 mg total) by mouth  every morning. (Patient taking differently: Take 40 mg by mouth every other day.), Disp: 90 capsule, Rfl: 3    ondansetron (ZOFRAN) 8 MG tablet, Take 1 tablet (8 mg total) by mouth every 8 (eight) hours as needed for Nausea., Disp: 30 tablet, Rfl: 5    polyethylene glycol (GLYCOLAX) 17 gram/dose powder, Take 17 g by mouth once daily., Disp: 510 g, Rfl: 5    promethazine (PHENERGAN) 25 MG tablet, Take 1 tablet (25 mg total) by mouth every 6 (six) hours as needed for Nausea., Disp: 30 tablet, Rfl: 5    REFRESH OPTIVE 0.5-0.9 % Drop, Place 1 drop into both eyes 4 (four) times daily as needed (Dry Eyes)., Disp: , Rfl:     umeclidinium-vilanteroL (ANORO ELLIPTA) 62.5-25 mcg/actuation DsDv, Inhale 1 puff into the lungs once daily. Controller, Disp: 1 each, Rfl: 11    EPINEPHrine (EPIPEN) 0.3 mg/0.3 mL AtIn, Inject 0.3 mLs (0.3 mg total) into the muscle as needed (Severe allergic reaction symptoms such as shortness of breath, tongue or throat swelling, weakness dizziness severe nausea vomiting)., Disp: 1 each, Rfl: 3      Pathology:   Cancer Staging   Non-small cell carcinoma of left lung  Staging form: Lung, AJCC 8th Edition  - Clinical stage from 5/1/2024: Stage IB (cT2a, cN0, cM0) - Signed by Antony Rodriguez III, MD on 5/1/2024    Left abdominal wall excision 10/3/2024:      1. SOFT TISSUE, ABDOMINAL WALL METASTATIC DEPOSIT, EXCISION:     --POSITIVE FOR NON-SMALL CELL CARCINOMA.     --MARGINS NEGATIVE.     2. SOFT TISSUE, ADDITIONAL MEDIAL MARGIN, EXCISION: --NEGATIVE FOR    TUMOR.         LLQ Subcutaneous nodule biopsy:  9/10/2024:    LEFT LOWER QUADRANT SOFT TISSUE NODULE CORE BIOPSIES:     - METASTATIC, POORLY DIFFERENTIATED LARGE CELL CARCINOMA.     The results of immunohistochemical analysis, performed in the present  material, are not diagnostic of specific site of origin. Focal expression of CDX2 can be seen in tumor with intestinal differentiation from anatomic sites, to include but not limit to, upper and  "lower gastrointestinal tract, pancreatobiliary origin and lung mucionous adenocarcinoma among others (mucionous features are not seen in the present tumor examined). Clinicopathologic correlation is recommended.         Objective:   Vitals:  Blood pressure 115/73, temperature 97.4 °F (36.3 °C), resp. rate (!) 24, height 5' 2" (1.575 m), weight 77.7 kg (171 lb 4.8 oz), SpO2 (!) 94%.    Physical Examination:   GEN: no apparent distress, comfortable; AAOx3; overweight  HEAD: atraumatic and normocephalic  EYES: no pallor, no icterus, PERRLA  ENT: OMM, no pharyngeal erythema, external ears WNL; no nasal discharge; no thrush  NECK: no masses, thyroid normal, trachea midline, no LAD/LN's, supple  CV: RRR with no murmur; normal pulse; normal S1 and S2; no pedal edema; Rght portacath  CHEST: Normal respiratory effort; CTAB; normal breath sounds; no wheeze or crackles  ABDOM:nontender; nondistended; soft; normal bowel sounds; no rebound/guarding; s/p excision biopsy  MUSC/Skeletal: ROM normal; no crepitus; joints normal; no deformities or arthropathy  EXTREM: no clubbing, cyanosis, inflammation or swelling  SKIN: no rashes, lesions, ulcers, petechiae or subcutaneous nodules  : no payan  NEURO: grossly intact; motor/sensory WNL; AAOx3; no tremors;   PSYCH: normal mood, affect and behavior  LYMPH: normal cervical, supraclavicular, axillary and groin LN's      Labs:   Lab Results   Component Value Date    WBC 4.63 10/22/2024    HGB 13.8 10/22/2024    HCT 42.4 10/22/2024    MCV 95 10/22/2024     (L) 10/22/2024    CMP  Sodium   Date Value Ref Range Status   10/22/2024 140 136 - 145 mmol/L Final     Potassium   Date Value Ref Range Status   10/22/2024 4.4 3.5 - 5.1 mmol/L Final     Chloride   Date Value Ref Range Status   10/22/2024 101 95 - 110 mmol/L Final     CO2   Date Value Ref Range Status   10/22/2024 30 (H) 23 - 29 mmol/L Final     Glucose   Date Value Ref Range Status   10/22/2024 104 70 - 110 mg/dL Final     BUN "   Date Value Ref Range Status   10/22/2024 16 8 - 23 mg/dL Final     Creatinine   Date Value Ref Range Status   10/22/2024 1.1 0.5 - 1.4 mg/dL Final   03/19/2013 0.8 0.5 - 1.4 mg/dL Final     Calcium   Date Value Ref Range Status   10/22/2024 10.1 8.7 - 10.5 mg/dL Final   03/19/2013 9.9 8.7 - 10.5 mg/dL Final     Total Protein   Date Value Ref Range Status   10/22/2024 7.7 6.0 - 8.4 g/dL Final     Albumin   Date Value Ref Range Status   10/22/2024 4.2 3.5 - 5.2 g/dL Final     Total Bilirubin   Date Value Ref Range Status   10/22/2024 0.3 0.1 - 1.0 mg/dL Final     Comment:     For infants and newborns, interpretation of results should be based  on gestational age, weight and in agreement with clinical  observations.    Premature Infant recommended reference ranges:  Up to 24 hours.............<8.0 mg/dL  Up to 48 hours............<12.0 mg/dL  3-5 days..................<15.0 mg/dL  6-29 days.................<15.0 mg/dL       Alkaline Phosphatase   Date Value Ref Range Status   10/22/2024 86 55 - 135 U/L Final   08/31/2012 107 23 - 119 UNIT/L Final     AST   Date Value Ref Range Status   10/22/2024 21 10 - 40 U/L Final   08/31/2012 21 10 - 30 UNIT/L Final     ALT   Date Value Ref Range Status   10/22/2024 15 10 - 44 U/L Final     Anion Gap   Date Value Ref Range Status   10/22/2024 9 8 - 16 mmol/L Final   03/19/2013 12 5 - 15 meq/L Final     eGFR if    Date Value Ref Range Status   01/03/2022 >60.0 >60 mL/min/1.73 m^2 Final     eGFR if non    Date Value Ref Range Status   01/03/2022 >60.0 >60 mL/min/1.73 m^2 Final     Comment:     Calculation used to obtain the estimated glomerular filtration  rate (eGFR) is the CKD-EPI equation.          I have reviewed all available lab results and radiology reports.    Radiology/Diagnostic Studies:      PET 8/28/2024:    Impression:     Favorable decrease in size and FDG avidity of left upper lobe pulmonary mass.     Increasing size of adjacent  subcutaneous soft tissue nodules of the left lower quadrant, which show marked FDG avidity.     Ultrasound-guided tissue sampling is recommended to assess for malignancy.     Decreased FDG avidity of left parotid soft tissue nodule.     Diffuse hypermetabolism of the thyroid gland.           US retroperitoneum  7/29/2024:      Impression:     Bilateral simple renal cysts.      US Soft Tissue Abdomen   7/31/2024  Impression:     Two subcutaneous nodules in the area of concern.  These findings do not reflect simple cysts, but could be complex cysts (sebaceous) or solid masses. Size is unchanged dating to 06/23/2024 but both have increased relative to the comparison PET-CT on 03/14/2024.    In light of neoplasm history, lesions would be amenable to pathologic sampling if warranted.      CT Abdomen Pelvis  Without Contrast  7/10/2024:    Impression:     1. No acute findings within the abdomen or pelvis or significant change from CT 06/23/2024.  2. Stable/incidental findings as discussed above.           NM Lung Scan Perfusion Particulate [[id number]] Resulted: 06/23/24 2017   Order Status: Completed Updated: 06/23/24 2020   Narrative:     EXAMINATION:  NM LUNG SCAN PERFUSION    CLINICAL HISTORY:  Chest pain, nonspecific;Pulmonary embolism (PE) suspected, high prob;Chest pain, nonspecific; Pulmonary embolism (PE) suspected, high prob;    TECHNIQUE:  IV administration of 5.2 mCi of Tc-99m-MAA, multiple images of the thorax were obtained in various projections.    COMPARISON:  Chest x-ray dated 06/23/2024.    FINDINGS:  Only perfusion images are provided for review.  No perfusion defect identified.  Evaluation for mismatch is precluded in the absence of ventilation portion of the examination.   Impression:                      X-Ray Chest AP Portable [[id number]] Resulted: 06/23/24 0926   Order Status: Completed Updated: 06/23/24 0929   Narrative:     EXAMINATION:  XR CHEST AP PORTABLE    CLINICAL HISTORY:  Chest  Pain;    COMPARISON:  June 21, 2024    FINDINGS:  AP view demonstrates normal heart size.  The thoracic aorta is elongated.  Left upper lobe noncalcified pulmonary nodule is unchanged.  No infiltrates or effusions are identified.    Right-sided Port-A-Cath is unchanged in position with tip at the superior vena cava.   Impression:       1. No interval change compared to June 21, 2024.          All lab results and imaging results have been reviewed and discussed with the patient    Assessment:   (1) 74 y.o. female with diagnosis of lung cancer with neuroendocrine features who has been on platinum and etopside chemotherapy who was seen as a consult at [facility].. She has been followed by Dr Camden Iyer and she had asked to switch to myself after I had followed her during her recent hospitalization. She had her 2nd cycle chemotherapy on 6/19/2024 The patient returns for a hospital follow-up visit today to go over results of recently ordered tests, studies and/or labs.       7/11/2024:  - pancytopenia due to prior chemotherapy  - WBC now back to normal at 11.8  - hgb still low but adequate at 8.6  - plats at 286,000  - Discussed quality of life as her priority goal and plan is to see if we can build her back up to some prior baseline. He ask palliative medicine to see her. Discussed having PRN orders for IVF's and antiemetics as needed.    8/8/2024:  - She is feeling much better. Ambulating on her own now. Not in wheelchair today. No further N/V. She has some residual tingling in hands and feet.  - She is followed by palliative medicine.  - She has PEt scan ordered - set for 8/28  - she is still not sure whether she wants to get any further chemotherapy    9/19/2024:  - She saw Dr Graham on 8/29/2024. She is followed by palliative medicine.  - She had PET on 8/28/2024 and saw Scarlett Pacheco NP on 9/5/2024; she subsequently underwent an US guided biopsy of the soft tissue nodule on 9/10/2024. The pathology from the biopsy has  come back showing metastatic poorly differentiated large cell carcinoma.  - She previously self-discontinued the chemotherapy after only two cycles.   - Quality of life is her priority goal. She is aware that whether this cancer is coming from the lung or is from somewhere else, that this will continue to plaque her without systemic therapy, and unfortunately regardless of anything may still eventually take her life.   - will refer to GenSurg for surgical options, and rad/onc for radiation options  - will ask for CARIS studies  - Present case at next tumor board    10/23/2024:  - She had surgery with Dr Hernández on 10/3 with successful metastatectomy. She has healed well. The margins were all negative.  -She previously self-discontinued the chemotherapy after only two cycles. Quality of life is her priority goal.   - Discussed consideration for further systemic therapy options. She is very resistant to getting anymore chemotherapy but is willing to have immunotherapy school with Scarlett Whyte Nakita NP and willing to consider immunotherapy options.    - She sees Dr Rodriguez with rad/onc again on 11/22    (2) HTN     (3) COPD     (4) GERD; Martin's esophagus; hx/of colitis     (5) Thyroid disease     (6) Anxiety     (7) Former smoker      VISIT DIAGNOSES:      1. Mass of upper lobe of left lung    2. Non-small cell carcinoma of left lung    3. Thrombocytopenia    4. Anemia due to chemotherapy    5. Chemotherapy induced neutropenia    6. Tobacco abuse    7. Non-small cell lung cancer with metastasis            Plan:     PLAN:  Set up with immunotherapy school and await her decision about considering systemic therapy with an immunologic at least  Check CARIS study results  F/u with pulmonary about her breathing and RAPHAEL  F/u with palliative medicine  F/u with PCP, Pulm, GI, Rad/onc etc    RTC in  3-4 weeks  Fax note to Santos; Shireen Martinez Mannina, [facility] [place] [facility], Robbin Burger/Boby hernández               Mass of  "upper lobe of left lung    Non-small cell carcinoma of left lung    Thrombocytopenia    Anemia due to chemotherapy    Chemotherapy induced neutropenia    Tobacco abuse    Non-small cell lung cancer with metastasis      No follow-ups on file.      Pathology Discussion:    I reviewed and discussed the pathology report(s) and radiograph reports (if available) in as simple to understand and/or laymen's terms to the best of my ability. I had an indepth conversation with the patient and went over the patient's individual diagnosis based on the information that was currently available. I discussed the TNM staging process with regard to the patient's particular cancer type, and the calculated stage based on the currently available TNM data and literature. I discussed the available prognostic data with regard to the current staging information and how it relates to the prognosis of their particular neoplastic process.      NCCN Guidelines:    I discussed the available treatment option(s) in accordance with the latest literature from the NCCN Clinical Practice Guidelines for the patient's particular type of cancer disorder. The NCCN Guidelines provide a "document evidence-based (and) consensus-driven management" of the care of oncology patients. The treatment recommendations were made not only in accordance to the NCCN guidelines, but also factored in to account the patient's overall age, condition, performance status and their medical co-morbidities. I went over the risks and benefits of the the treatment options (if any could be made) with regard to their particular cancer type, their cancer stage, their age, and their co-morbidities.     Chemotherapy Discussion:      I discussed the available treatment option(s) in accordance with the latest/current national evidence-based guidelines (NCCN, UpToDate, NCI, ASCO, etc where applicable), their overall age/condition and their co-morbidities. I also went over the risks and " benefits of the chemotherapy with regard to their particular cancer type, their cancer stage, their age/condition, and their co-morbidities. I provided literature on the chemotherapy regimen and discussed the chemotherapy side-effect profiles of the drug(s). I discussed the importance of compliance with obtaining and monitoring weekly lab work, and went over the potential hematopathology issues and risks with anemia, leucopenia and thrombocytopenia that can occur with chemotherapy. Discussed the potential risks of liver and kidney damage, which could be permanent and could necessitate dialysis long-term if kidney failure developed. Discussed the risk of development of cardiomyopathy and/or CHF with certain chemotherapy agents, which could be chronic and/or permanent. Discussed the emetic and/or diarrheal potential of the regimen and the potential need for use of antiemetic and anti-diarrheal medications. Discussed the risk for development of anaphylactic shock, bronchospasm, dysrhythmia, and respiratory/cardiovascular arrest and/or failure. Discussed the potential risks for development of alopecia, cold sensory issues, ringing in ears, vertigo, cataracts, glaucoma, and neuropathy, all of which could end up being chronic and life-long. Discussed the risks of any N/V, diarrhea, mucositis, mouth ulcers, dry skin, itching, HA's, blurry vision, fatigue and other general malaise that can be associated with certain chemotherapy agents. Discussed the risks of hand-foot syndrome and rashes, and development of other autoimmune mediated processes such as pneumonitis, hepatitis, and colitis which could be life threatening. Discussed the risks of the potential development of a rare but fatal viral mediated disease known as PML (Progressive Multifocal Leukoencephalopathy), and risk of future development of leukemia and/or lymphoma from use of certain chemotherapy agents. Discussed the need for neutropenic precautions, basic  hygiene/sanitation behaviors and dietary restrictions.    The patient's consent has been obtained to proceed with the chemotherapy.The patient referred to and underwent Chemotherapy School [facility] for training and education on chemotherapy, use of antiemetics and/or anti-diarrheals, use of NSAID's, potential chemotherapy side-effects, and any specific recommendations and precautions with the particular chemotherapy agents.      Answered all of the patient's (and family's, if applicable) questions to the best of my ability and to their complete satisfaction. The patient acknowledged full understanding of the risks, recommendations and plan(s).     I have explained and the patient understands all of  the current recommendation(s). I have answered all of their questions to the best of my ability and to their complete satisfaction.             Thank you for allowing me to participate in this pleasant patient's care. Please call with any questions or concerns.      Electronically signed Neo Mariano MD               Answers submitted by the patient for this visit:  Review of Systems Questionnaire (Submitted on 10/2/2024)  appetite change : No  unexpected weight change: Yes  mouth sores: No  visual disturbance: No  cough: No  shortness of breath: Yes  chest pain: No  abdominal pain: Yes  diarrhea: No  frequency: Yes  back pain: Yes  rash: Yes  headaches: No  adenopathy: No  nervous/ anxious: Yes

## 2024-10-24 NOTE — PROGRESS NOTES
Subjective:       Patient ID: Joslyn Dubon is a 74 y.o. female.    Chief Complaint: Post-op Evaluation      HPI:  S/p excision of abdominal wall metastatic deposit. Feeling well. Incision healing well. No fever. No drainage. No erythema.     1. SOFT TISSUE, ABDOMINAL WALL METASTATIC DEPOSIT, EXCISION:     --POSITIVE FOR NON-SMALL CELL CARCINOMA.     --MARGINS NEGATIVE.     2. SOFT TISSUE, ADDITIONAL MEDIAL MARGIN, EXCISION: --NEGATIVE FOR    TUMOR.     Review of Systems    Objective:      Physical Exam  Constitutional:       General: She is not in acute distress.  Pulmonary:      Effort: Pulmonary effort is normal. No respiratory distress.   Abdominal:      General: There is no distension.      Palpations: Abdomen is soft.      Tenderness: There is no abdominal tenderness. There is no guarding or rebound.      Hernia: No hernia is present.   Skin:     Comments: Incision is clean, dry and intact  There is no evidence of infection, hematoma or seroma    Neurological:      Mental Status: She is alert and oriented to person, place, and time.   Psychiatric:         Behavior: Behavior is cooperative.         Assessment/Plan:   Abdominal wall mass    Stage 4 malignant neoplasm of lung, unspecified laterality      Metastatic deposit excised. Margins negative. No evidence of complications. Follow up PRN

## 2024-10-24 NOTE — TELEPHONE ENCOUNTER
Patient wants Dr. Graham's input about recent x-ray and if she needs to do anything. Informed patient Dr. Graham is out of the office today and we will get her message to him first thing Monday morning.    Never smoker

## 2024-11-13 ENCOUNTER — PATIENT MESSAGE (OUTPATIENT)
Dept: FAMILY MEDICINE | Facility: CLINIC | Age: 74
End: 2024-11-13
Payer: MEDICARE

## 2024-11-13 DIAGNOSIS — M51.360 DEGENERATION OF INTERVERTEBRAL DISC OF LUMBAR REGION WITH DISCOGENIC BACK PAIN: ICD-10-CM

## 2024-11-13 DIAGNOSIS — M51.360 DEGENERATION OF INTERVERTEBRAL DISC OF LUMBAR REGION WITH DISCOGENIC BACK PAIN: Primary | ICD-10-CM

## 2024-11-13 RX ORDER — HYDROCODONE BITARTRATE AND ACETAMINOPHEN 10; 325 MG/1; MG/1
1 TABLET ORAL EVERY 6 HOURS PRN
Qty: 60 TABLET | Refills: 0 | Status: SHIPPED | OUTPATIENT
Start: 2024-11-13 | End: 2024-11-13 | Stop reason: SDUPTHER

## 2024-11-13 NOTE — TELEPHONE ENCOUNTER
ran - last filled 10/3. Next ov scheduled in December.     She's asking for 10-325mg, last filled 5-325 from Dr. Wilson.

## 2024-11-14 ENCOUNTER — INFUSION (OUTPATIENT)
Dept: INFUSION THERAPY | Facility: HOSPITAL | Age: 74
End: 2024-11-14
Attending: INTERNAL MEDICINE
Payer: MEDICARE

## 2024-11-14 ENCOUNTER — OFFICE VISIT (OUTPATIENT)
Facility: CLINIC | Age: 74
End: 2024-11-14
Payer: MEDICARE

## 2024-11-14 VITALS
SYSTOLIC BLOOD PRESSURE: 132 MMHG | BODY MASS INDEX: 32.09 KG/M2 | DIASTOLIC BLOOD PRESSURE: 89 MMHG | HEART RATE: 86 BPM | WEIGHT: 174 LBS | TEMPERATURE: 98 F | WEIGHT: 174.38 LBS | OXYGEN SATURATION: 95 % | TEMPERATURE: 98 F | SYSTOLIC BLOOD PRESSURE: 132 MMHG | HEART RATE: 89 BPM | RESPIRATION RATE: 20 BRPM | BODY MASS INDEX: 31.83 KG/M2 | HEIGHT: 62 IN | OXYGEN SATURATION: 95 % | DIASTOLIC BLOOD PRESSURE: 89 MMHG

## 2024-11-14 DIAGNOSIS — C34.90 NON-SMALL CELL LUNG CANCER WITH METASTASIS: ICD-10-CM

## 2024-11-14 DIAGNOSIS — C34.92 NON-SMALL CELL CARCINOMA OF LEFT LUNG: Primary | ICD-10-CM

## 2024-11-14 PROCEDURE — 63600175 PHARM REV CODE 636 W HCPCS: Performed by: INTERNAL MEDICINE

## 2024-11-14 PROCEDURE — 96523 IRRIG DRUG DELIVERY DEVICE: CPT

## 2024-11-14 PROCEDURE — A4216 STERILE WATER/SALINE, 10 ML: HCPCS | Performed by: INTERNAL MEDICINE

## 2024-11-14 PROCEDURE — 99999 PR PBB SHADOW E&M-EST. PATIENT-LVL V: CPT | Mod: PBBFAC,,, | Performed by: NURSE PRACTITIONER

## 2024-11-14 PROCEDURE — 25000003 PHARM REV CODE 250: Performed by: INTERNAL MEDICINE

## 2024-11-14 RX ORDER — SODIUM CHLORIDE 0.9 % (FLUSH) 0.9 %
10 SYRINGE (ML) INJECTION
OUTPATIENT
Start: 2024-11-14

## 2024-11-14 RX ORDER — HEPARIN 100 UNIT/ML
500 SYRINGE INTRAVENOUS
OUTPATIENT
Start: 2024-11-14

## 2024-11-14 RX ORDER — SODIUM CHLORIDE 0.9 % (FLUSH) 0.9 %
10 SYRINGE (ML) INJECTION
Status: DISCONTINUED | OUTPATIENT
Start: 2024-11-14 | End: 2024-11-14 | Stop reason: HOSPADM

## 2024-11-14 RX ORDER — HEPARIN 100 UNIT/ML
500 SYRINGE INTRAVENOUS
Status: DISCONTINUED | OUTPATIENT
Start: 2024-11-14 | End: 2024-11-14 | Stop reason: HOSPADM

## 2024-11-14 RX ORDER — HYDROCODONE BITARTRATE AND ACETAMINOPHEN 10; 325 MG/1; MG/1
1 TABLET ORAL EVERY 6 HOURS PRN
Qty: 60 TABLET | Refills: 0 | Status: SHIPPED | OUTPATIENT
Start: 2024-11-14

## 2024-11-14 RX ADMIN — SODIUM CHLORIDE, PRESERVATIVE FREE 10 ML: 5 INJECTION INTRAVENOUS at 08:11

## 2024-11-14 RX ADMIN — HEPARIN 500 UNITS: 100 SYRINGE at 08:11

## 2024-11-14 NOTE — PLAN OF CARE
Problem: Fatigue  Goal: Improved Activity Tolerance  Outcome: Progressing  Intervention: Promote Improved Energy  Flowsheets (Taken 11/14/2024 2582)  Fatigue Management: frequent rest breaks encouraged  Sleep/Rest Enhancement: regular sleep/rest pattern promoted  Activity Management: Ambulated -L4  Environmental Support: calm environment promoted

## 2024-11-14 NOTE — PROGRESS NOTES
[facility] HEMATOLGY ONCOLOGY     Subjective:       Patient ID: Joslyn Dubon is a 74 y.o. female presents today for Immunotherapy education.      Chief Complaint: [employer]    HPI  The patient is here today with her  for immunotherapy education.   She was previously diagnosed with  lung cancer with neuroendocrine features who received XRT and two cycles of Cisplatin and etoposide. She was severely debilitated by this and refused anymore treatment.     She has been deemed medically inoperable and then completed SBRT to left upper lobe, 50 Gy in 5 fractions ending May 17      She was found to have a oligometastatic deposit on her abdomen. That was surgically removed and margins were negative.     [facility] reports suggest she did have TMB and would benefit from Keytruda.   She meets with XRT next week.     She is due for another PET early December.       Oncology History   Non-small cell carcinoma of left lung   5/1/2024 Initial Diagnosis    Non-small cell carcinoma of left lung     5/1/2024 Cancer Staged    Staging form: Lung, AJCC 8th Edition  - Clinical stage from 5/1/2024: Stage IB (cT2a, cN0, cM0)     5/27/2024 - 6/19/2024 Chemotherapy    Treatment Summary   Plan Name: OP CISPLATIN 75MG/M2 ETOPOSIDE 100MG/M2 Q3W  Treatment Goal: Curative  Status: Inactive  Start Date: 5/27/2024  End Date: 6/19/2024  Provider: Macario Iyer MD  Chemotherapy: CISplatin (Platinol) 75 mg/m2 = 140 mg in sodium chloride 0.9% 705 mL chemo infusion, 75 mg/m2 = 140 mg, Intravenous, Clinic/HOD 1 time, 2 of 6 cycles  Administration: 140 mg (5/27/2024), 140 mg (6/17/2024)  etoposide (VEPESID) 100 mg/m2 = 188 mg in sodium chloride 0.9% 574.4 mL chemo infusion, 100 mg/m2 = 188 mg, Intravenous, Clinic/HOD 1 time, 2 of 6 cycles  Administration: 188 mg (5/27/2024), 190 mg (5/28/2024), 190 mg (5/29/2024), 186 mg (6/17/2024), 190 mg (6/18/2024), 190 mg (6/19/2024)         Past Medical History:   Diagnosis Date    Anxiety     Martin  esophagus     Chemotherapy-induced nausea 2024    Colitis     COPD (chronic obstructive pulmonary disease)     Dehydration 2024    GERD (gastroesophageal reflux disease)     Hypertension     Intractable nausea and vomiting 2024    Lung cancer     Non-small cell lung cancer with metastasis 10/23/2024    On home oxygen therapy     2L as needed    Thyroid disease     Vocal cord polyps        Past Surgical History:   Procedure Laterality Date    ABDOMINAL AORTIC ANEURYSM REPAIR      BACK SURGERY      cervical    CATARACT EXTRACTION Right 2021    CHOLECYSTECTOMY  2013    Dr Price  CNS    ENDOBRONCHIAL ULTRASOUND N/A 2024    Procedure: ENDOBRONCHIAL ULTRASOUND (EBUS);  Surgeon: Kizzy Siegel MD;  Location: [facility] OR 2ND FLR;  Service: Pulmonary;  Laterality: N/A;    FOOT SURGERY      HYSTERECTOMY      AUGUSTINE for AUB with consevation ovaries    INSERTION OF TUNNELED CENTRAL VENOUS CATHETER (CVC) WITH SUBCUTANEOUS PORT Right 2024    Procedure: [id number];  Surgeon: Simon Wilson III, MD;  Location: [facility] OR;  Service: General;  Laterality: Right;    ROBOTIC BRONCHOSCOPY N/A 2024    Procedure: ROBOTIC BRONCHOSCOPY;  Surgeon: Kizzy Siegel MD;  [facility];  Service: Pulmonary;  Laterality: N/A;    SALIVARY GLAND SURGERY      SURGICAL REMOVAL OF LESION OF ABDOMINAL WALL Left 10/3/2024    Procedure: EXCISION, LESION, ABDOMINAL WALL;  Surgeon: Simon Wilson III, MD;  Location: [facility] OR;  Service: General;  Laterality: Left;       Social History     Socioeconomic History    Marital status:    Tobacco Use    Smoking status: Former     Types: Cigarettes     Start date: 2024     Quit date: 1964     Years since quittin.9    Smokeless tobacco: Never    Tobacco comments:     Pt has smoked since 14 years old. Smokes around .5ppd. Inpatient smoking education done 10/20.     Pt quit smoking on 24.  She has never used smokeless  tobacco   Substance and Sexual Activity    Alcohol use: Never    Drug use: Never    Sexual activity: Yes     Partners: Male     Social Drivers of Health     Financial Resource Strain: Low Risk  (7/5/2024)    Overall Financial Resource Strain (CARDIA)     Difficulty of Paying Living Expenses: Not hard at all   Food Insecurity: No Food Insecurity (7/5/2024)    Hunger Vital Sign     Worried About Running Out of Food in the Last Year: Never true     Ran Out of Food in the Last Year: Never true   Transportation Needs: No Transportation Needs (7/5/2024)    PRAPARE - Transportation     Lack of Transportation (Medical): No     Lack of Transportation (Non-Medical): No   Physical Activity: Inactive (3/15/2024)    Exercise Vital Sign     Days of Exercise per Week: 0 days     Minutes of Exercise per Session: 0 min   Stress: Stress Concern Present (3/15/2024)    [employer] - Occupational Stress Questionnaire     Feeling of Stress : Very much   Housing Stability: Low Risk  (7/5/2024)    Housing Stability Vital Sign     Unable to Pay for Housing in the Last Year: No     Homeless in the Last Year: No       Family History   Problem Relation Name Age of Onset    Bladder Cancer Mother      Heart failure Father      Emphysema Sister      Pancreatic cancer Sister      No Known Problems Brother         Review of patient's allergies indicates:   Allergen Reactions    Bisacodyl Nausea And Vomiting     Other reaction(s): Vomiting    Iodinated contrast media Anaphylaxis, Hives and Shortness Of Breath     Hypotension.  Pt states she has anaphylaxis with IV contrast.     Morphine Other (See Comments)     hypotension    Azithromycin Hives    Cipro [ciprofloxacin hcl]     Demerol [meperidine]      Nausea vomiting, visual problem    Doxycycline Hives    Flonase [fluticasone propionate] Hives         Current Outpatient Medications:     albuterol-ipratropium (DUO-NEB) 2.5 mg-0.5 mg/3 mL nebulizer solution, Take 3 mLs  by nebulization every 6 (six) hours as needed for Wheezing. Rescue, Disp: 75 mL, Rfl: 0    amLODIPine (NORVASC) 5 MG tablet, Take 5 mg by mouth once daily. Per pt only taking if bp is high PRN SBP>140, Disp: , Rfl:     diphenhydrAMINE (BENADRYL) 25 mg capsule, Take 25 mg by mouth every 6 (six) hours as needed for Itching., Disp: , Rfl:     famotidine (PEPCID) 20 MG tablet, Take 20 mg by mouth 2 (two) times daily as needed for Heartburn., Disp: , Rfl:     furosemide (LASIX) 20 MG tablet, Take 1 tablet (20 mg total) by mouth daily as needed (edema). (Patient taking differently: Take 20 mg by mouth daily as needed (edema). Sob OR EDEMA), Disp: 30 tablet, Rfl: 1    HYDROcodone-acetaminophen (NORCO)  mg per tablet, Take 1 tablet by mouth every 6 (six) hours as needed for Pain., Disp: 60 tablet, Rfl: 0    levalbuterol (XOPENEX) 1.25 mg/3 mL nebulizer solution, Take 3 mLs (1.25 mg total) by nebulization every 4 (four) hours as needed for Wheezing. Rescue, Disp: 1 mL, Rfl: 0    levothyroxine (SYNTHROID) 112 MCG tablet, Take 1 tablet (112 mcg total) by mouth before breakfast., Disp: 90 tablet, Rfl: 3    LIDOcaine-prilocaine (EMLA) cream, Apply topically as needed., Disp: 30 g, Rfl: 5    LORazepam (ATIVAN) 1 MG tablet, Take 0.5 tablets (0.5 mg total) by mouth every 6 (six) hours as needed for Anxiety., Disp: 60 tablet, Rfl: 3    metoprolol tartrate (LOPRESSOR) 25 MG tablet, Take 1 tablet (25 mg total) by mouth 2 (two) times daily., Disp: 180 tablet, Rfl: 3    omeprazole (PRILOSEC) 40 MG capsule, Take 1 capsule (40 mg total) by mouth every morning. (Patient taking differently: Take 40 mg by mouth every other day.), Disp: 90 capsule, Rfl: 3    ondansetron (ZOFRAN) 8 MG tablet, Take 1 tablet (8 mg total) by mouth every 8 (eight) hours as needed for Nausea., Disp: 30 tablet, Rfl: 5    polyethylene glycol (GLYCOLAX) 17 gram/dose powder, Take 17 g by mouth once daily., Disp: 510 g, Rfl: 5    promethazine (PHENERGAN) 25 MG  tablet, Take 1 tablet (25 mg total) by mouth every 6 (six) hours as needed for Nausea., Disp: 30 tablet, Rfl: 5    REFRESH OPTIVE 0.5-0.9 % Drop, Place 1 drop into both eyes 4 (four) times daily as needed (Dry Eyes)., Disp: , Rfl:     umeclidinium-vilanteroL (ANORO ELLIPTA) 62.5-25 mcg/actuation DsDv, Inhale 1 puff into the lungs once daily. Controller, Disp: 1 each, Rfl: 11    EPINEPHrine (EPIPEN) 0.3 mg/0.3 mL AtIn, Inject 0.3 mLs (0.3 mg total) into the muscle as needed (Severe allergic reaction symptoms such as shortness of breath, tongue or throat swelling, weakness dizziness severe nausea vomiting)., Disp: 1 each, Rfl: 3    levalbuterol (XOPENEX HFA) 45 mcg/actuation inhaler, Inhale 2 puffs into the lungs every 6 (six) hours as needed for Wheezing. Rescue, Disp: 15 g, Rfl: 4  No current facility-administered medications for this visit.    All medications and past history have been reviewed.    Review of Systems   Constitutional:  Negative for appetite change and unexpected weight change.   HENT:  Negative for mouth sores.    Eyes:  Negative for visual disturbance.   Respiratory:  Positive for shortness of breath. Negative for cough.    Cardiovascular:  Negative for chest pain.   Gastrointestinal:  Negative for abdominal pain and diarrhea.   Genitourinary:  Negative for frequency.   Musculoskeletal:  Positive for back pain.   Skin:  Negative for rash.   Neurological:  Positive for headaches.   Hematological:  Negative for adenopathy.   Psychiatric/Behavioral:  The patient is not nervous/anxious.        Objective:        Physcial Examination  VITAL SIGNS:    Body surface area is 1.86 meters squared.   Pain Assessment  Vitals:    11/14/24 0836   BP: 132/89   Pulse: 89   Temp: 98.1 °F (36.7 °C)   SpO2: 95%   Weight: 78.9 kg (174 lb)   PainSc: 0-No pain          Wt Readings from Last 5 Encounters:   11/14/24 78.9 kg (174 lb)   11/14/24 79.1 kg (174 lb 6.4 oz)   10/23/24 77.7 kg (171 lb 4.8 oz)   10/22/24 81.6 kg  (180 lb)   10/03/24 77.1 kg (170 lb)       Physical Exam  Constitutional:       Appearance: Normal appearance.   HENT:      Head: Normocephalic and atraumatic.      Mouth/Throat:      Mouth: Mucous membranes are moist.   Cardiovascular:      Rate and Rhythm: Normal rate and regular rhythm.      Pulses: Normal pulses.      Heart sounds: Normal heart sounds.   Pulmonary:      Effort: Pulmonary effort is normal. No respiratory distress.      Breath sounds: Normal breath sounds. No wheezing.   Abdominal:      General: There is no distension.      Palpations: Abdomen is soft. There is no mass.      Tenderness: There is no abdominal tenderness.   Musculoskeletal:         General: No swelling.      Right lower leg: No edema.      Left lower leg: No edema.      Comments: ROM impaired      Skin:     General: Skin is warm and dry.      Capillary Refill: Capillary refill takes 2 to 3 seconds.      Findings: No bruising or rash.   Neurological:      Mental Status: She is alert and oriented to person, place, and time. Mental status is at baseline.      Motor: No weakness.   Psychiatric:         Mood and Affect: Mood normal.         Behavior: Behavior normal.              Laboratory and Radiology   Lab Results   Component Value Date    WBC 4.63 10/22/2024    RBC 4.48 10/22/2024    HGB 13.8 10/22/2024    HCT 42.4 10/22/2024    MCV 95 10/22/2024    MCH 30.8 10/22/2024    MCHC 32.5 10/22/2024    RDW 12.7 10/22/2024     (L) 10/22/2024    MPV 7.8 (L) 10/22/2024    GRAN 2.4 10/22/2024    GRAN 51.5 10/22/2024    LYMPH 1.4 10/22/2024    LYMPH 30.2 10/22/2024    MONO 0.5 10/22/2024    MONO 11.0 10/22/2024    EOS 0.3 10/22/2024    BASO 0.02 10/22/2024    EOSINOPHIL 6.5 10/22/2024    BASOPHIL 0.4 10/22/2024     BMP  Lab Results   Component Value Date     10/22/2024    K 4.4 10/22/2024     10/22/2024    CO2 30 (H) 10/22/2024    BUN 16 10/22/2024    CREATININE 1.1 10/22/2024    CALCIUM 10.1 10/22/2024    ANIONGAP 9  10/22/2024    ESTGFRAFRICA >60.0 01/03/2022    EGFRNONAA >60.0 01/03/2022     Lab Results   Component Value Date    ALT 15 10/22/2024    AST 21 10/22/2024    ALKPHOS 86 10/22/2024    BILITOT 0.3 10/22/2024     No results found. However, due to the size of the patient record, not all encounters were searched. Please check Results Review for a complete set of results.  Results for orders placed or performed during the hospital encounter of 08/28/24 (from the past 2160 hours)   NM PET CT FDG Skull Base to Mid Thigh    Impression    Favorable decrease in size and FDG avidity of left upper lobe pulmonary mass.    Increasing size of adjacent subcutaneous soft tissue nodules of the left lower quadrant, which show marked FDG avidity.    Ultrasound-guided tissue sampling is recommended to assess for malignancy.    Decreased FDG avidity of left parotid soft tissue nodule.    Diffuse hypermetabolism of the thyroid gland.      Electronically signed by: Darian Mesa  Date:    08/28/2024  Time:    11:51     No results found for this or any previous visit (from the past 2160 hours).    Pathology  Pathology Results  (Last 10 years)      None          Immunologic Therapy Discussion:    I discussed the available treatment option(s) in accordance with the latest/current national evidence-based guidelines (NCCN, UpToDate, NCI, ASCO, etc where applicable), their overall age/condition and their co-morbidities. I went over the risks and benefits of the immunotherapy with regard to their particular cancer type, their cancer stage, their age/condition, and their co-morbidities. I provided literature on the immunotherapy regimen and discussed the immunotherapy side-effect profiles of the drug(s). I discussed the importance of compliance with obtaining and monitoring weekly lab work, and went over the potential hematopathology issues and risks of hemato-pathologic issues with anemia, leucopenia and/or thrombocytopenia and effects on thyroid  function that can occur with immunotherapy. The patient will most likely need to have there thyroid functions monitored by their PCP and may need to take thyroid medication while on the immunotherapy. I discussed the potential risks of liver and kidney damage, which could be permanent and could necessitate dialysis long-term if kidney failure developed. I discussed the emetic and/or diarrheal potential of the regimen and the potential need for use of antiemetic and anti-diarrheal medications. I discussed the risk for development of anaphylactic shock, bronchospasm, dysrhythmia, and respiratory/cardiovascular arrest and/or failure. I discussed the potential risks for development of alopecia, cold sensory issues, ringing in ears, vertigo and neuropathy, all of which could end up being chronic and life-long. I discussed the risks of hand-foot syndrome and rashes, and development of other autoimmune mediated processes such as pneumonitis, hepatitis and colitis which could potentially be life threatening. I discussed the risks of the potential development of a rare but fatal viral mediated disease known as PML (Progressive Multifocal Leukoencephalopathy), and risk of future development of leukemia and/or lymphoma from use of certain immunotherapy agents. I discussed the possibility that immunologic therapy cold worsen or promote progression of any underlying autoimmune diseases such as sarcoidosis, ulcerative colitis, Crohn's disease, psoriasis, rheumatoid disorders, scleroderma, autoimmune nephritis disorders, Hashimoto's thyroiditis, and Lupus among others. I discussed the need for neutropenic precautions, basic hygiene/sanitation behaviors and dietary restrictions.    The patient's consent has been obtained to proceed with the immunotherapy.The patient will be referred to [employer] /[facility] for training and education on immunotherapy, use of antiemetics and/or anti-diarrheals, use of NSAID's,  potential immunotherapy side-effects, and any specific recommendations and precautions with the particular immunotherapy agents.      I answered all of the patient's (and family's, if applicable) questions to the best of my ability and to their complete satisfaction. The patient acknowledged full understanding of the risks, recommendations and plan(s).      Patient states that she will talk with Rad/onc and then decide on whether or not to go forward with immunotherapy.     All lab results and imaging results have been reviewed and discussed with the patient.      Electronically signed by: Electronically signed by: Scarlett Mai, MSN, APRN, AGNP-C

## 2024-11-20 DIAGNOSIS — Z78.0 MENOPAUSE: ICD-10-CM

## 2024-11-21 ENCOUNTER — HOSPITAL ENCOUNTER (EMERGENCY)
Facility: HOSPITAL | Age: 74
Discharge: HOME | End: 2024-11-21
Attending: EMERGENCY MEDICINE
Payer: MEDICARE

## 2024-11-21 VITALS
WEIGHT: 171 LBS | OXYGEN SATURATION: 94 % | DIASTOLIC BLOOD PRESSURE: 70 MMHG | BODY MASS INDEX: 31.47 KG/M2 | RESPIRATION RATE: 16 BRPM | SYSTOLIC BLOOD PRESSURE: 170 MMHG | HEIGHT: 62 IN | TEMPERATURE: 98 F | HEART RATE: 88 BPM

## 2024-11-21 DIAGNOSIS — R10.12 LUQ ABDOMINAL PAIN: Primary | ICD-10-CM

## 2024-11-21 DIAGNOSIS — K76.9 LIVER LESION: ICD-10-CM

## 2024-11-21 LAB
ALBUMIN SERPL BCP-MCNC: 4 G/DL (ref 3.5–5.2)
ALP SERPL-CCNC: 108 U/L (ref 55–135)
ALT SERPL W/O P-5'-P-CCNC: 8 U/L (ref 10–44)
ANION GAP SERPL CALC-SCNC: 7 MMOL/L (ref 8–16)
AST SERPL-CCNC: 27 U/L (ref 10–40)
BACTERIA #/AREA URNS HPF: ABNORMAL /HPF
BASOPHILS # BLD AUTO: 0.02 K/UL (ref 0–0.2)
BASOPHILS NFR BLD: 0.4 % (ref 0–1.9)
BILIRUB SERPL-MCNC: 0.9 MG/DL (ref 0.1–1)
BILIRUB UR QL STRIP: NEGATIVE
BUN SERPL-MCNC: 18 MG/DL (ref 8–23)
CALCIUM SERPL-MCNC: 9.5 MG/DL (ref 8.7–10.5)
CHLORIDE SERPL-SCNC: 103 MMOL/L (ref 95–110)
CLARITY UR: ABNORMAL
CO2 SERPL-SCNC: 27 MMOL/L (ref 23–29)
COLOR UR: YELLOW
CREAT SERPL-MCNC: 1 MG/DL (ref 0.5–1.4)
DIFFERENTIAL METHOD BLD: ABNORMAL
EOSINOPHIL # BLD AUTO: 0.1 K/UL (ref 0–0.5)
EOSINOPHIL NFR BLD: 1.4 % (ref 0–8)
ERYTHROCYTE [DISTWIDTH] IN BLOOD BY AUTOMATED COUNT: 13.4 % (ref 11.5–14.5)
EST. GFR  (NO RACE VARIABLE): 58.4 ML/MIN/1.73 M^2
GLUCOSE SERPL-MCNC: 92 MG/DL (ref 70–110)
GLUCOSE UR QL STRIP: NEGATIVE
HCT VFR BLD AUTO: 40.9 % (ref 37–48.5)
HGB BLD-MCNC: 12.8 G/DL (ref 12–16)
HGB UR QL STRIP: ABNORMAL
IMM GRANULOCYTES # BLD AUTO: 0.02 K/UL (ref 0–0.04)
IMM GRANULOCYTES NFR BLD AUTO: 0.4 % (ref 0–0.5)
KETONES UR QL STRIP: NEGATIVE
LEUKOCYTE ESTERASE UR QL STRIP: NEGATIVE
LIPASE SERPL-CCNC: 17 U/L (ref 4–60)
LYMPHOCYTES # BLD AUTO: 1.1 K/UL (ref 1–4.8)
LYMPHOCYTES NFR BLD: 19.4 % (ref 18–48)
MAGNESIUM SERPL-MCNC: 1.8 MG/DL (ref 1.6–2.6)
MCH RBC QN AUTO: 29.6 PG (ref 27–31)
MCHC RBC AUTO-ENTMCNC: 31.3 G/DL (ref 32–36)
MCV RBC AUTO: 95 FL (ref 82–98)
MICROSCOPIC COMMENT: ABNORMAL
MONOCYTES # BLD AUTO: 0.7 K/UL (ref 0.3–1)
MONOCYTES NFR BLD: 12.3 % (ref 4–15)
NEUTROPHILS # BLD AUTO: 3.8 K/UL (ref 1.8–7.7)
NEUTROPHILS NFR BLD: 66.1 % (ref 38–73)
NITRITE UR QL STRIP: NEGATIVE
NRBC BLD-RTO: 0 /100 WBC
PH UR STRIP: 5 [PH] (ref 5–8)
PLATELET # BLD AUTO: 186 K/UL (ref 150–450)
PMV BLD AUTO: 8.9 FL (ref 9.2–12.9)
POTASSIUM SERPL-SCNC: 4.8 MMOL/L (ref 3.5–5.1)
PROT SERPL-MCNC: 7.5 G/DL (ref 6–8.4)
PROT UR QL STRIP: NEGATIVE
RBC # BLD AUTO: 4.32 M/UL (ref 4–5.4)
RBC #/AREA URNS HPF: 4 /HPF (ref 0–4)
SODIUM SERPL-SCNC: 137 MMOL/L (ref 136–145)
SP GR UR STRIP: 1.02 (ref 1–1.03)
SQUAMOUS #/AREA URNS HPF: 15 /HPF
URN SPEC COLLECT METH UR: ABNORMAL
UROBILINOGEN UR STRIP-ACNC: NEGATIVE EU/DL
WBC # BLD AUTO: 5.68 K/UL (ref 3.9–12.7)
WBC #/AREA URNS HPF: 2 /HPF (ref 0–5)

## 2024-11-21 PROCEDURE — 96375 TX/PRO/DX INJ NEW DRUG ADDON: CPT

## 2024-11-21 PROCEDURE — 63600175 PHARM REV CODE 636 W HCPCS: Performed by: EMERGENCY MEDICINE

## 2024-11-21 PROCEDURE — 85025 COMPLETE CBC W/AUTO DIFF WBC: CPT | Performed by: EMERGENCY MEDICINE

## 2024-11-21 PROCEDURE — 81001 URINALYSIS AUTO W/SCOPE: CPT | Performed by: EMERGENCY MEDICINE

## 2024-11-21 PROCEDURE — 83690 ASSAY OF LIPASE: CPT | Performed by: EMERGENCY MEDICINE

## 2024-11-21 PROCEDURE — 96374 THER/PROPH/DIAG INJ IV PUSH: CPT

## 2024-11-21 PROCEDURE — 83735 ASSAY OF MAGNESIUM: CPT | Performed by: EMERGENCY MEDICINE

## 2024-11-21 PROCEDURE — 25000003 PHARM REV CODE 250: Performed by: EMERGENCY MEDICINE

## 2024-11-21 PROCEDURE — 80053 COMPREHEN METABOLIC PANEL: CPT | Performed by: EMERGENCY MEDICINE

## 2024-11-21 PROCEDURE — 99285 EMERGENCY DEPT VISIT HI MDM: CPT | Mod: 25

## 2024-11-21 PROCEDURE — 96361 HYDRATE IV INFUSION ADD-ON: CPT

## 2024-11-21 RX ORDER — HYDROMORPHONE HYDROCHLORIDE 1 MG/ML
0.5 INJECTION, SOLUTION INTRAMUSCULAR; INTRAVENOUS; SUBCUTANEOUS
Status: COMPLETED | OUTPATIENT
Start: 2024-11-21 | End: 2024-11-21

## 2024-11-21 RX ORDER — DIPHENHYDRAMINE HYDROCHLORIDE 50 MG/ML
12.5 INJECTION INTRAMUSCULAR; INTRAVENOUS
Status: COMPLETED | OUTPATIENT
Start: 2024-11-21 | End: 2024-11-21

## 2024-11-21 RX ORDER — DICLOFENAC SODIUM 50 MG/1
50 TABLET, DELAYED RELEASE ORAL 2 TIMES DAILY PRN
Qty: 15 TABLET | Refills: 0 | Status: SHIPPED | OUTPATIENT
Start: 2024-11-21

## 2024-11-21 RX ORDER — KETOROLAC TROMETHAMINE 30 MG/ML
15 INJECTION, SOLUTION INTRAMUSCULAR; INTRAVENOUS
Status: COMPLETED | OUTPATIENT
Start: 2024-11-21 | End: 2024-11-21

## 2024-11-21 RX ORDER — METHOCARBAMOL 500 MG/1
1000 TABLET, FILM COATED ORAL 3 TIMES DAILY PRN
Qty: 20 TABLET | Refills: 0 | Status: SHIPPED | OUTPATIENT
Start: 2024-11-21 | End: 2024-11-26

## 2024-11-21 RX ORDER — ONDANSETRON HYDROCHLORIDE 2 MG/ML
4 INJECTION, SOLUTION INTRAVENOUS
Status: COMPLETED | OUTPATIENT
Start: 2024-11-21 | End: 2024-11-21

## 2024-11-21 RX ADMIN — ONDANSETRON 4 MG: 2 INJECTION INTRAMUSCULAR; INTRAVENOUS at 07:11

## 2024-11-21 RX ADMIN — HYDROMORPHONE HYDROCHLORIDE 0.5 MG: 1 INJECTION, SOLUTION INTRAMUSCULAR; INTRAVENOUS; SUBCUTANEOUS at 07:11

## 2024-11-21 RX ADMIN — SODIUM CHLORIDE 1000 ML: 9 INJECTION, SOLUTION INTRAVENOUS at 08:11

## 2024-11-21 RX ADMIN — KETOROLAC TROMETHAMINE 15 MG: 30 INJECTION, SOLUTION INTRAMUSCULAR at 10:11

## 2024-11-21 RX ADMIN — DIPHENHYDRAMINE HYDROCHLORIDE 12.5 MG: 50 INJECTION INTRAMUSCULAR; INTRAVENOUS at 08:11

## 2024-11-21 NOTE — ED PROVIDER NOTES
Encounter Date: 11/21/2024       History     Chief Complaint   Patient presents with    Abdominal Pain     Pt reports pain in her LUQ since yesterday     74-year-old female with a past medical history of COPD, reflux disease, colitis, and lung cancer presents for left-sided abdominal pain.  She reports that it started last night and has been persistent since that time.  She denies any associated nausea/vomiting, diarrhea, hematochezia, melena, dysuria, hematuria, or fever/chills.  There are no alleviating or aggravating factors.  She reports the pain radiates to her left flank.      Review of patient's allergies indicates:   Allergen Reactions    Bisacodyl Nausea And Vomiting     Other reaction(s): Vomiting    Iodinated contrast media Anaphylaxis, Hives and Shortness Of Breath     Hypotension.  Pt states she has anaphylaxis with IV contrast.     Morphine Other (See Comments)     hypotension    Azithromycin Hives    Cipro [ciprofloxacin hcl]     Demerol [meperidine]      Nausea vomiting, visual problem    Doxycycline Hives    Flonase [fluticasone propionate] Hives     Past Medical History:   Diagnosis Date    Anxiety     Martin esophagus     Chemotherapy-induced nausea 07/12/2024    Colitis     COPD (chronic obstructive pulmonary disease)     Dehydration 07/12/2024    GERD (gastroesophageal reflux disease)     Hypertension     Intractable nausea and vomiting 06/23/2024    Lung cancer     Non-small cell lung cancer with metastasis 10/23/2024    On home oxygen therapy     2L as needed    Thyroid disease     Vocal cord polyps      Past Surgical History:   Procedure Laterality Date    ABDOMINAL AORTIC ANEURYSM REPAIR      BACK SURGERY      cervical    CATARACT EXTRACTION Right 02/2021    CHOLECYSTECTOMY  12/20/2013    Dr Albert CORLEY    ENDOBRONCHIAL ULTRASOUND N/A 4/19/2024    Procedure: ENDOBRONCHIAL ULTRASOUND (EBUS);  Surgeon: Kizzy Siegel MD;  Location: [facility] OR [address] [place] [address];  Service: Pulmonary;  Laterality: N/A;     FOOT SURGERY      HYSTERECTOMY      AUGUSTINE for AUB with consevation ovaries    INSERTION OF TUNNELED CENTRAL VENOUS CATHETER (CVC) WITH SUBCUTANEOUS PORT Right 2024    Procedure: [id number];  Surgeon: Simon Wilson III, MD;  Location: [facility] OR;  Service: General;  Laterality: Right;    ROBOTIC BRONCHOSCOPY N/A 2024    Procedure: ROBOTIC BRONCHOSCOPY;  Surgeon: Kizzy Siegel MD;  Location: [facility] OR [address] [place] [address];  Service: Pulmonary;  Laterality: N/A;    SALIVARY GLAND SURGERY      SURGICAL REMOVAL OF LESION OF ABDOMINAL WALL Left 10/3/2024    Procedure: EXCISION, LESION, ABDOMINAL WALL;  Surgeon: Simon Wilosn III, MD;  Location: [facility] OR;  Service: General;  Laterality: Left;     Family History   Problem Relation Name Age of Onset    Bladder Cancer Mother      Heart failure Father      Emphysema Sister      Pancreatic cancer Sister      No Known Problems Brother       Social History     Tobacco Use    Smoking status: Former     Types: Cigarettes     Start date: 2024     Quit date: 1964     Years since quittin.9    Smokeless tobacco: Never    Tobacco comments:     Pt has smoked since 14 years old. Smokes around .5ppd. Inpatient smoking education done 10/20.     Pt quit smoking on 24.  She has never used smokeless tobacco   Substance Use Topics    Alcohol use: Never    Drug use: Never     Review of Systems   Constitutional:  Negative for chills and fever.   HENT:  Negative for congestion.    Respiratory:  Negative for cough and shortness of breath.    Cardiovascular:  Negative for chest pain.   Gastrointestinal:  Positive for abdominal pain. Negative for nausea and vomiting.   Genitourinary:  Negative for dysuria.   Musculoskeletal:  Negative for gait problem.   Skin:  Negative for color change.   Neurological:  Negative for dizziness and numbness.   Psychiatric/Behavioral:  Negative for agitation.        Physical Exam     Initial Vitals [24 0634]   BP Pulse Resp  Temp SpO2   122/87 92 18 97.7 °F (36.5 °C) 95 %      MAP       --         Physical Exam    Nursing note and vitals reviewed.  Constitutional: She appears well-developed and well-nourished.   HENT:   Head: Normocephalic and atraumatic.   Eyes: EOM are normal. Pupils are equal, round, and reactive to light.   Neck:   Normal range of motion.  Cardiovascular:  Normal rate and regular rhythm.           Pulmonary/Chest: Breath sounds normal.   Abdominal: Abdomen is soft. Bowel sounds are normal. She exhibits no distension. There is abdominal tenderness.   Tenderness to palpation of the left upper and lower quadrants.  No rebound or guarding noted. There is no rebound and no guarding.   Musculoskeletal:         General: Normal range of motion.      Right shoulder: Normal.      Left shoulder: Normal.      Cervical back: Normal range of motion.     Neurological: She is alert and oriented to person, place, and time.   Skin: Skin is warm and dry.   Psychiatric: She has a normal mood and affect.         ED Course   Procedures  Labs Reviewed   CBC W/ AUTO DIFFERENTIAL - Abnormal       Result Value    WBC 5.68      RBC 4.32      Hemoglobin 12.8      Hematocrit 40.9      MCV 95      MCH 29.6      MCHC 31.3 (*)     RDW 13.4      Platelets 186      MPV 8.9 (*)     Immature Granulocytes 0.4      Gran # (ANC) 3.8      Immature Grans (Abs) 0.02      Lymph # 1.1      Mono # 0.7      Eos # 0.1      Baso # 0.02      nRBC 0      Gran % 66.1      Lymph % 19.4      Mono % 12.3      Eosinophil % 1.4      Basophil % 0.4      Differential Method Automated     COMPREHENSIVE METABOLIC PANEL - Abnormal    Sodium 137      Potassium 4.8      Chloride 103      CO2 27      Glucose 92      BUN 18      Creatinine 1.0      Calcium 9.5      Total Protein 7.5      Albumin 4.0      Total Bilirubin 0.9      Alkaline Phosphatase 108      AST 27      ALT 8 (*)     eGFR 58.4 (*)     Anion Gap 7 (*)    URINALYSIS, REFLEX TO URINE CULTURE - Abnormal    Specimen  UA Urine, Clean Catch      Color, UA Yellow      Appearance, UA Hazy (*)     pH, UA 5.0      Specific Gravity, UA 1.020      Protein, UA Negative      Glucose, UA Negative      Ketones, UA Negative      Bilirubin (UA) Negative      Occult Blood UA 1+ (*)     Nitrite, UA Negative      Urobilinogen, UA Negative      Leukocytes, UA Negative      Narrative:     Specimen Source->Urine   URINALYSIS MICROSCOPIC - Abnormal    RBC, UA 4      WBC, UA 2      Bacteria Few (*)     Squam Epithel, UA 15      Microscopic Comment SEE COMMENT      Narrative:     Specimen Source->Urine   LIPASE    Lipase 17     MAGNESIUM    Magnesium 1.8            Imaging Results              CT Abdomen Pelvis  Without Contrast (Final result)  Result time 11/21/24 08:00:07      Final result by Yokasta Cowan MD (11/21/24 08:00:07)                   Impression:      Removal of the 2 cm subcutaneous nodule in the left lower abdominal wall with a 3.6 x 1.8 cm subcutaneous fluid collection within this region compatible with postoperative seroma.  Abscess cannot be completely excluded and correlation with clinical exam is recommended    Development of the vague 2.2 x 2.5 cm subcapsular vague hypodense mass within the lateral segment of the left lobe of the liver with heterogeneous attenuation of the right lobe suggestive of multiple hypodense masses.  Findings are suggestive of metastatic disease.  Ultrasound versus follow-up CT or MRI with contrast is recommended    Stable hepatic cysts    Prior cholecystectomy and hysterectomy    Endovascular stent within the infrarenal aorta and iliac arteries      Electronically signed by: Yokasta Cowan  Date:    11/21/2024  Time:    08:00               Narrative:      CMS MANDATED QUALITY DATA - CT RADIATION - 436    All CT scans at this facility utilize dose modulation, iterative reconstruction, and/or weight based dosing when appropriate to reduce radiation dose to as low as reasonably  achievable.    EXAMINATION:  CT ABDOMEN PELVIS WITHOUT CONTRAST    CLINICAL HISTORY:  LLQ abdominal pain;    TECHNIQUE:  CT abdomen and pelvis without IV or oral contrast. Study is tailored for detection of urinary tract calculi and evaluation of solid organs, hollow viscera, and vascular structures is limited.    COMPARISON:  07/10/2024    FINDINGS:  CT Abdomen:    The lung bases are clear.    Liver: The liver is stable in size..  There is a stable 2.8 cm cyst within the anterior segment of the right lobe of the liver.    There is heterogeneous attenuation of the liver with a new vague 2.4 x 2.2 cm hypodense nodule within the lateral segment the left lobe of the liver there are questionable hypodense masses within the right lobe of the liver.  Findings are suspicious for metastatic disease.  Follow-up liver protocol MRI or CT with contrast is recommended.    Gallbladder is absent.  There is no biliary duct dilatation.    Spleen is normal in size and attenuation with calcified granuloma    Pancreas is normal    Adrenal glands are normal    The kidneys are symmetric in size without hydronephrosis or calculi.    Bowel: There are no thick-walled or dilated bowel loops.    Aorta: There is a an endovascular stent within the infrarenal aorta and common iliac arteries which is stable.    Adenopathy: There is no mesenteric, retroperitoneal or pelvic adenopathy.    Abdominal wall: The previously noted 2 cm subcutaneous nodule in the left lower abdominal wall has been removed.  There are surgical clips in place.  There is a 3.6 by 1.8 cm subcutaneous fluid collection within this region which may represent postoperative seroma.  Abscess cannot be completely excluded and correlation with clinical exam is recommended.    There is no ascites or free air.    CT Pelvis:    The bladder is normal.  The uterus is absent.  There is no pelvic adenopathy.    There are degenerative changes of the spine with no lytic or blastic lesions.                                        Medications   HYDROmorphone injection 0.5 mg (0.5 mg Intravenous Given 11/21/24 0731)   ondansetron injection 4 mg (4 mg Intravenous Given 11/21/24 0734)   diphenhydrAMINE injection 12.5 mg (12.5 mg Intravenous Given 11/21/24 0815)   sodium chloride 0.9% bolus 1,000 mL 1,000 mL (0 mLs Intravenous Stopped 11/21/24 0924)   ketorolac injection 15 mg (15 mg Intravenous Given 11/21/24 1035)     Medical Decision Making  74-year-old female presents for evaluation of left-sided abdominal pain.    Initial differential diagnosis included but not limited to diverticulitis, colitis, and nephrolithiasis.    Amount and/or Complexity of Data Reviewed  Labs: ordered.  Radiology: ordered.    Risk  Prescription drug management.  Risk Details: The patient was emergently evaluated in the emergency department, her evaluation was significant for an elderly female with left-sided abdominal tenderness.  The patient's labs showed no acute processes.  The patient initially treated with dose of IV Dilaudid IV Zofran here in the emergency department.  She still complained of pain, was treated with a dose of IV Toradol, which significantly improved her pain.  The patient's CT scan shows a new lesion to the liver but no other acute abnormalities per Radiology.  I am unclear as to the etiology of her left upper quadrant/flank pain but this could be an early shingles versus musculoskeletal pain.  The patient is stable for discharge to home and does not require further care or workup at this time.  The patient will be discharged home with p.o. diclofenac and p.o. Robaxin.  She is to follow-up with primary care and her oncologist for further care.                                      Clinical Impression:  Final diagnoses:  [R10.12] LUQ abdominal pain (Primary)  [K76.9] Liver lesion          ED Disposition Condition    Discharge Stable          ED Prescriptions       Medication Sig Dispense Start Date End Date  Auth. Provider    diclofenac (VOLTAREN) 50 MG EC tablet Take 1 tablet (50 mg total) by mouth 2 (two) times daily as needed (pain). 15 tablet 11/21/2024 -- Win Ford MD    methocarbamoL (ROBAXIN) 500 MG Tab Take 2 tablets (1,000 mg total) by mouth 3 (three) times daily as needed (pain). 20 tablet 11/21/2024 11/26/2024 Win Ford MD          Follow-up Information       Follow up With Specialties Details Why Contact Info    Jasbir Graham MD Family Medicine, Home Health Services, Hospice Services Schedule an appointment as soon as possible for a visit   [address] [facility]  [address] [place] [id number]  [phone]               Wni Ford MD  11/21/24 2742

## 2024-11-22 ENCOUNTER — OFFICE VISIT (OUTPATIENT)
Dept: RADIATION ONCOLOGY | Facility: CLINIC | Age: 74
End: 2024-11-22
Payer: MEDICARE

## 2024-11-22 ENCOUNTER — PATIENT OUTREACH (OUTPATIENT)
Dept: EMERGENCY MEDICINE | Facility: HOSPITAL | Age: 74
End: 2024-11-22

## 2024-11-22 DIAGNOSIS — C34.92 NON-SMALL CELL CARCINOMA OF LEFT LUNG: Primary | ICD-10-CM

## 2024-11-22 DIAGNOSIS — C34.90 NON-SMALL CELL LUNG CANCER WITH METASTASIS: ICD-10-CM

## 2024-11-22 NOTE — PROGRESS NOTES
"Spoke with patient via telephone today, as she was in ED yesterday w/ LUQ abdominal pain. She reports continue pain but this has gradually moved lower in her abdomen throughout the day, and feels like "gas or a bowel movement making its way down."    She denies any fever or tenderness to her abdomen, but has been taking ATC narcotics analgesia since abdominal surgery last month.    She has taken one dose of miralax, but endorses decreased PO intake and decreased stool frequency lately.    Instructed patient to obtain senna and powerade/bodyarmour from drug store today, but to report back to ED if pain escalates, or she develops any f/c/n/v/cp/sob, bloating, weakness, or other concerns/changes.    CT done in ED also showed new liver lesion, and pt has PET scheduled for 12/11/24.    Discussed rescheduling pt's appt today due to pain/constipation, as well as to allow PET to be done to eval liver lesion for possible SBRT prior to appt.  Will reschedule after 12/11/24.    Pt also encouraged to consider recommended IO tx from [facility].    Will monitor closely until appt after PET.  "

## 2024-11-23 ENCOUNTER — NURSE TRIAGE (OUTPATIENT)
Dept: ADMINISTRATIVE | Facility: CLINIC | Age: 74
End: 2024-11-23
Payer: MEDICARE

## 2024-11-23 NOTE — TELEPHONE ENCOUNTER
Spoke with pt who reports that she was seen in ER a days ago due to abdominal pain. States that pain is now noted to middle to middle of abdomen, and to left side of waist.  Also reports nausea. Advised to call 911.    Reason for Disposition   Shock suspected (e.g., cold/pale/clammy skin, too weak to stand, low BP, rapid pulse)    Protocols used: Abdominal Pain - Female-A-AH

## 2024-11-24 ENCOUNTER — HOSPITAL ENCOUNTER (EMERGENCY)
Facility: HOSPITAL | Age: 74
Discharge: HOME | End: 2024-11-24
Attending: EMERGENCY MEDICINE
Payer: MEDICARE

## 2024-11-24 ENCOUNTER — NURSE TRIAGE (OUTPATIENT)
Dept: ADMINISTRATIVE | Facility: CLINIC | Age: 74
End: 2024-11-24
Payer: MEDICARE

## 2024-11-24 VITALS
BODY MASS INDEX: 31.09 KG/M2 | RESPIRATION RATE: 19 BRPM | HEART RATE: 69 BPM | SYSTOLIC BLOOD PRESSURE: 178 MMHG | OXYGEN SATURATION: 95 % | DIASTOLIC BLOOD PRESSURE: 76 MMHG | WEIGHT: 170 LBS | TEMPERATURE: 98 F

## 2024-11-24 DIAGNOSIS — R10.9 FLANK PAIN: Primary | ICD-10-CM

## 2024-11-24 LAB
ALBUMIN SERPL BCP-MCNC: 4 G/DL (ref 3.5–5.2)
ALP SERPL-CCNC: 124 U/L (ref 55–135)
ALT SERPL W/O P-5'-P-CCNC: 9 U/L (ref 10–44)
ANION GAP SERPL CALC-SCNC: 5 MMOL/L (ref 8–16)
AST SERPL-CCNC: 27 U/L (ref 10–40)
BASOPHILS # BLD AUTO: 0.03 K/UL (ref 0–0.2)
BASOPHILS NFR BLD: 0.5 % (ref 0–1.9)
BILIRUB SERPL-MCNC: 0.7 MG/DL (ref 0.1–1)
BUN SERPL-MCNC: 13 MG/DL (ref 8–23)
CALCIUM SERPL-MCNC: 9.6 MG/DL (ref 8.7–10.5)
CHLORIDE SERPL-SCNC: 102 MMOL/L (ref 95–110)
CO2 SERPL-SCNC: 30 MMOL/L (ref 23–29)
CREAT SERPL-MCNC: 0.9 MG/DL (ref 0.5–1.4)
DIFFERENTIAL METHOD BLD: ABNORMAL
EOSINOPHIL # BLD AUTO: 0.1 K/UL (ref 0–0.5)
EOSINOPHIL NFR BLD: 0.8 % (ref 0–8)
ERYTHROCYTE [DISTWIDTH] IN BLOOD BY AUTOMATED COUNT: 13.2 % (ref 11.5–14.5)
EST. GFR  (NO RACE VARIABLE): >60 ML/MIN/1.73 M^2
GLUCOSE SERPL-MCNC: 90 MG/DL (ref 70–110)
HCT VFR BLD AUTO: 37.8 % (ref 37–48.5)
HGB BLD-MCNC: 12.3 G/DL (ref 12–16)
IMM GRANULOCYTES # BLD AUTO: 0.01 K/UL (ref 0–0.04)
IMM GRANULOCYTES NFR BLD AUTO: 0.2 % (ref 0–0.5)
LIPASE SERPL-CCNC: 12 U/L (ref 4–60)
LYMPHOCYTES # BLD AUTO: 1 K/UL (ref 1–4.8)
LYMPHOCYTES NFR BLD: 15.8 % (ref 18–48)
MAGNESIUM SERPL-MCNC: 2 MG/DL (ref 1.6–2.6)
MCH RBC QN AUTO: 31.1 PG (ref 27–31)
MCHC RBC AUTO-ENTMCNC: 32.5 G/DL (ref 32–36)
MCV RBC AUTO: 96 FL (ref 82–98)
MONOCYTES # BLD AUTO: 0.6 K/UL (ref 0.3–1)
MONOCYTES NFR BLD: 10.3 % (ref 4–15)
NEUTROPHILS # BLD AUTO: 4.4 K/UL (ref 1.8–7.7)
NEUTROPHILS NFR BLD: 72.4 % (ref 38–73)
NRBC BLD-RTO: 0 /100 WBC
PLATELET # BLD AUTO: 173 K/UL (ref 150–450)
PMV BLD AUTO: 8.6 FL (ref 9.2–12.9)
POTASSIUM SERPL-SCNC: 4.2 MMOL/L (ref 3.5–5.1)
PROT SERPL-MCNC: 7.8 G/DL (ref 6–8.4)
RBC # BLD AUTO: 3.96 M/UL (ref 4–5.4)
SODIUM SERPL-SCNC: 137 MMOL/L (ref 136–145)
WBC # BLD AUTO: 6.02 K/UL (ref 3.9–12.7)

## 2024-11-24 PROCEDURE — 96375 TX/PRO/DX INJ NEW DRUG ADDON: CPT

## 2024-11-24 PROCEDURE — 83690 ASSAY OF LIPASE: CPT | Performed by: EMERGENCY MEDICINE

## 2024-11-24 PROCEDURE — 63600175 PHARM REV CODE 636 W HCPCS: Performed by: EMERGENCY MEDICINE

## 2024-11-24 PROCEDURE — 99285 EMERGENCY DEPT VISIT HI MDM: CPT | Mod: 25

## 2024-11-24 PROCEDURE — 85025 COMPLETE CBC W/AUTO DIFF WBC: CPT | Performed by: EMERGENCY MEDICINE

## 2024-11-24 PROCEDURE — 80053 COMPREHEN METABOLIC PANEL: CPT | Performed by: EMERGENCY MEDICINE

## 2024-11-24 PROCEDURE — 96374 THER/PROPH/DIAG INJ IV PUSH: CPT

## 2024-11-24 PROCEDURE — 83735 ASSAY OF MAGNESIUM: CPT | Performed by: EMERGENCY MEDICINE

## 2024-11-24 RX ORDER — ONDANSETRON HYDROCHLORIDE 2 MG/ML
4 INJECTION, SOLUTION INTRAVENOUS
Status: COMPLETED | OUTPATIENT
Start: 2024-11-24 | End: 2024-11-24

## 2024-11-24 RX ORDER — KETOROLAC TROMETHAMINE 30 MG/ML
15 INJECTION, SOLUTION INTRAMUSCULAR; INTRAVENOUS
Status: COMPLETED | OUTPATIENT
Start: 2024-11-24 | End: 2024-11-24

## 2024-11-24 RX ADMIN — ONDANSETRON 4 MG: 2 INJECTION INTRAMUSCULAR; INTRAVENOUS at 10:11

## 2024-11-24 RX ADMIN — KETOROLAC TROMETHAMINE 15 MG: 30 INJECTION, SOLUTION INTRAMUSCULAR at 10:11

## 2024-11-24 NOTE — ED PROVIDER NOTES
Encounter Date: 11/24/2024       History     Chief Complaint   Patient presents with    Flank Pain     Right-sided flank pain. Abdominal tumor removed five weeks ago. Told she has liver lesion.     74-year-old female with a past medical history of anxiety, colitis, COPD, hypertension, reflux disease, and lung cancer with metastasis presents for evaluation of right-sided abdominal pain.  The patient reports that her right-sided abdominal pain started 2 days ago and has been persistent since that time.  She reports some associated nausea and vomiting as well as constipation.  She reports taking multiple medications for constipation without improvement in it.  There is no reported fever, cough, congestion, chest pain, shortness of breath.  She reports improvement in her pain with a warm compress to the site and lying still.  She reports her pain is worse with movement.  The patient was seen here for left-sided abdominal pain 3 days ago as well.  The patient did have a new liver lesion noted on CT scan at that time as well.      Review of patient's allergies indicates:   Allergen Reactions    Bisacodyl Nausea And Vomiting     Other reaction(s): Vomiting    Iodinated contrast media Anaphylaxis, Hives and Shortness Of Breath     Hypotension.  Pt states she has anaphylaxis with IV contrast.     Morphine Other (See Comments)     hypotension    Azithromycin Hives    Cipro [ciprofloxacin hcl]     Demerol [meperidine]      Nausea vomiting, visual problem    Doxycycline Hives    Flonase [fluticasone propionate] Hives     Past Medical History:   Diagnosis Date    Anxiety     Martin esophagus     Chemotherapy-induced nausea 07/12/2024    Colitis     COPD (chronic obstructive pulmonary disease)     Dehydration 07/12/2024    GERD (gastroesophageal reflux disease)     Hypertension     Intractable nausea and vomiting 06/23/2024    Lung cancer     Non-small cell lung cancer with metastasis 10/23/2024    On home oxygen therapy     2L  as needed    Thyroid disease     Vocal cord polyps      Past Surgical History:   Procedure Laterality Date    ABDOMINAL AORTIC ANEURYSM REPAIR      BACK SURGERY      cervical    CATARACT EXTRACTION Right 2021    CHOLECYSTECTOMY  2013    Dr Albert CORLEY    ENDOBRONCHIAL ULTRASOUND N/A 2024    Procedure: ENDOBRONCHIAL ULTRASOUND (EBUS);  Surgeon: Kizzy Siegel MD;  [facility];  Service: Pulmonary;  Laterality: N/A;    FOOT SURGERY      HYSTERECTOMY      AUGUSTINE for AUB with consevation ovaries    INSERTION OF TUNNELED CENTRAL VENOUS CATHETER (CVC) WITH SUBCUTANEOUS PORT Right 2024    Procedure: AECYCDOXS-XGTQ-L-CATH;  Surgeon: Simon Wilson III, MD;  Location: [facility] OR;  Service: General;  Laterality: Right;    ROBOTIC BRONCHOSCOPY N/A 2024    Procedure: ROBOTIC BRONCHOSCOPY;  Surgeon: Kizzy Siegel MD;  [facility];  Service: Pulmonary;  Laterality: N/A;    SALIVARY GLAND SURGERY      SURGICAL REMOVAL OF LESION OF ABDOMINAL WALL Left 10/3/2024    Procedure: EXCISION, LESION, ABDOMINAL WALL;  Surgeon: Simon Wilson III, MD;  Location: [facility];  Service: General;  Laterality: Left;     Family History   Problem Relation Name Age of Onset    Bladder Cancer Mother      Heart failure Father      Emphysema Sister      Pancreatic cancer Sister      No Known Problems Brother       Social History     Tobacco Use    Smoking status: Former     Types: Cigarettes     Start date: 2024     Quit date: 1964     Years since quittin.9    Smokeless tobacco: Never    Tobacco comments:     Pt has smoked since 14 years old. Smokes around .5ppd. Inpatient smoking education done 10/20.     Pt quit smoking on 24.  She has never used smokeless tobacco   Substance Use Topics    Alcohol use: Never    Drug use: Never     Review of Systems   Constitutional:  Negative for chills and fever.   HENT:  Negative for congestion.    Respiratory:  Negative for cough and  shortness of breath.    Cardiovascular:  Negative for chest pain.   Gastrointestinal:  Positive for abdominal pain, constipation, nausea and vomiting.   Genitourinary:  Negative for dysuria.   Musculoskeletal:  Negative for gait problem.   Skin:  Negative for color change.   Neurological:  Negative for dizziness and numbness.   Psychiatric/Behavioral:  Negative for agitation.        Physical Exam     Initial Vitals [11/24/24 0842]   BP Pulse Resp Temp SpO2   124/68 85 18 98.1 °F (36.7 °C) (!) 92 %      MAP       --         Physical Exam    Nursing note and vitals reviewed.  Constitutional: She appears well-developed and well-nourished.   HENT:   Head: Atraumatic.   Eyes: EOM are normal. Pupils are equal, round, and reactive to light.   Neck:   Normal range of motion.  Cardiovascular:  Normal rate and regular rhythm.           Pulmonary/Chest: Breath sounds normal.   Abdominal: Abdomen is soft. Bowel sounds are normal. She exhibits no distension. There is abdominal tenderness.   Generalized abdominal tenderness to palpation.  No rebound or guarding noted. There is rebound. There is no guarding.   Musculoskeletal:         General: Normal range of motion.      Right shoulder: Normal.      Left shoulder: Normal.      Cervical back: Normal range of motion.     Neurological: She is alert and oriented to person, place, and time.   Skin: Skin is warm and dry.   Psychiatric:   Anxious appearing.         ED Course   Procedures  Labs Reviewed   CBC W/ AUTO DIFFERENTIAL - Abnormal       Result Value    WBC 6.02      RBC 3.96 (*)     Hemoglobin 12.3      Hematocrit 37.8      MCV 96      MCH 31.1 (*)     MCHC 32.5      RDW 13.2      Platelets 173      MPV 8.6 (*)     Immature Granulocytes 0.2      Gran # (ANC) 4.4      Immature Grans (Abs) 0.01      Lymph # 1.0      Mono # 0.6      Eos # 0.1      Baso # 0.03      nRBC 0      Gran % 72.4      Lymph % 15.8 (*)     Mono % 10.3      Eosinophil % 0.8      Basophil % 0.5       Differential Method Automated     COMPREHENSIVE METABOLIC PANEL - Abnormal    Sodium 137      Potassium 4.2      Chloride 102      CO2 30 (*)     Glucose 90      BUN 13      Creatinine 0.9      Calcium 9.6      Total Protein 7.8      Albumin 4.0      Total Bilirubin 0.7      Alkaline Phosphatase 124      AST 27      ALT 9 (*)     eGFR >60.0      Anion Gap 5 (*)    LIPASE    Lipase 12     MAGNESIUM    Magnesium 2.0     URINALYSIS, REFLEX TO URINE CULTURE          Imaging Results              CT Abdomen Pelvis  Without Contrast (Final result)  Result time 11/24/24 09:50:47      Final result by Ajit Ortiz DO (11/24/24 09:50:47)                   Impression:      1. No acute intra-abdominal abnormality observed.  2. Bilobed fusiform suprarenal abdominal aortic aneurysm and stent graph of infrarenal abdominal aortic aneurysm again noted and are unchanged.  3. Hepatic cyst in the right hepatic lobe measuring 2.8 cm is unchanged as well as protuberance in the subcapsular left hepatic lobe.  4. 1.8 x 3.6 fluid collection in the left abdominal subcutaneous tissues is unchanged.      Electronically signed by: Ajit Ortiz  Date:    11/24/2024  Time:    09:50               Narrative:      CMS MANDATED QUALITY DATA - CT RADIATION - 436    All CT scans at this facility utilize dose modulation, iterative reconstruction, and/or weight based dosing when appropriate to reduce radiation dose to as low as reasonably achievable.    EXAMINATION:  CT ABDOMEN PELVIS WITHOUT CONTRAST    CLINICAL HISTORY:  Abdominal pain, acute, nonlocalized;    TECHNIQUE:  CT abdomen and pelvis without IV or oral contrast.    COMPARISON:  CT abdomen pelvis 11/21/2024.    FINDINGS:  There is atelectasis versus scarring of the lung bases.  The heart is enlarged.    Liver is normal size.  Hepatic cyst in the right hepatic lobe measuring 2.8 cm is unchanged.  Protuberance in the subcapsular left hepatic lobe is unchanged.  There are no new gross  hepatic lesions observed.  Gallbladder has been removed.  Common bile duct is unremarkable.  Mild fatty atrophy of the pancreas.  The spleen and adrenal glands are unremarkable.  The kidneys are normal size.  There is no hydronephrosis or nephrolithiasis.  The ureters are normal caliber without obstructions.  The bladder is fluid filled with no focal wall abnormalities.  Uterus and adnexal structures are not identified.    The large and small bowel are normal caliber.  There is mild colonic diverticulosis.  The appendix is within normal limits.  There is no bowel wall thickening or inflammatory changes.  The stomach is decompressed and grossly unremarkable.    Stent graft of abdominal aortic aneurysm again noted with no significant change from prior exam.  Bilobed fusiform suprarenal abdominal aortic aneurysm is unchanged measuring 3.8 cm in diameter.  There are no enlarged intra-abdominal lymph nodes.  There is no new mesenteric fat stranding or free fluid.  Fluid collection in the left abdomen subcutaneous tissues with surrounding surgical clips measuring 1.8 x 3.6 cm is unchanged.  There is no acute osseous abnormality.                                       Medications   ketorolac injection 15 mg (15 mg Intravenous Given 11/24/24 1045)   ondansetron injection 4 mg (4 mg Intravenous Given 11/24/24 1045)     Medical Decision Making  74-year-old female presented for abdominal pain.    Initial differential diagnosis included but not limited to bowel obstruction, ileus, and constipation.      Amount and/or Complexity of Data Reviewed  Labs: ordered.  Radiology: ordered.    Risk  Prescription drug management.  Risk Details: The patient was emergently evaluated in the emergency department, her evaluation was significant for an elderly female with abdominal tenderness.  The patient's blood work showed no acute abnormalities.  The patient's CT scan shows no acute abnormalities per Radiology.  The patient's symptoms were  treated with IV Toradol and IV Zofran.  The patient did not want to give a urine specimen here in the emergency department and just wanted to go home.  I am unclear as to the etiology of her pain but do not believe that it is life threatening or emergent at this time.  She is stable for discharge to home and does not require further care workup at this time.  She is to otherwise follow up with her PCP and oncologist for further care.                                      Clinical Impression:  Final diagnoses:  [R10.9] Flank pain (Primary)          ED Disposition Condition    Discharge Stable          ED Prescriptions    None       Follow-up Information       Follow up With Specialties Details Why Contact Info    Jasbir Graham MD Family Medicine, Home Health Services, Hospice Services Schedule an appointment as soon as possible for a visit   [address] [facility]  [address] [place] [id number]  [phone]               Win Ford MD  11/24/24 2187

## 2024-11-24 NOTE — TELEPHONE ENCOUNTER
Joslyn c/o severe right sided abdominal pain. No BM in 3 days. Pain started in LUQ 3 days & moved to RLQ. Can hardly walk due to pain. Minimal relief with prescribed pain med. +vomiting Advised pt per triage protocol to go to nearest ED now for physician luis. Instructed to call  now if no immediate . V/u.   Reason for Disposition   [1] SEVERE pain (e.g., excruciating) AND [2] present > 1 hour    Additional Information   Negative: Shock suspected (e.g., cold/pale/clammy skin, too weak to stand, low BP, rapid pulse)   Negative: Difficult to awaken or acting confused (e.g., disoriented, slurred speech)   Negative: Passed out (e.g., fainted, lost consciousness, blacked out and was not responding)   Negative: Sounds like a life-threatening emergency to the triager    Protocols used: Abdominal Pain - Female-A-AH

## 2024-11-25 ENCOUNTER — TELEPHONE (OUTPATIENT)
Dept: FAMILY MEDICINE | Facility: CLINIC | Age: 74
End: 2024-11-25
Payer: MEDICARE

## 2024-11-25 ENCOUNTER — PATIENT MESSAGE (OUTPATIENT)
Dept: ADMINISTRATIVE | Facility: HOSPITAL | Age: 74
End: 2024-11-25
Payer: MEDICARE

## 2024-11-25 NOTE — TELEPHONE ENCOUNTER
----- Message from Alyx sent at 11/25/2024  8:08 AM CST -----  Pt is in excruciating pain by her navel. She has been to the er and they can not find anything. Pain is so severe. Would like a call asap   [phone]

## 2024-11-25 NOTE — TELEPHONE ENCOUNTER
Spoke with patient who states she is in severe pain in her abdomen, sharp pain. She states it started on Wednesday/Thursday in her left upper and has moved to the right middle. She has went to the ER twice and they are not able to find anything wrong but she states she knows something is wrong. She has norco at home that is barely touching the pain. They did recommend her seeing GI but she has not called to get scheduled.   I spoke with Dr. Graham about the issues and he wanted us to get her scheduled with GI. Spoke with their office, scheduled with the NP Dana at 11:30am. Spoke with patient and let her know.

## 2024-12-03 DIAGNOSIS — M51.360 DEGENERATION OF INTERVERTEBRAL DISC OF LUMBAR REGION WITH DISCOGENIC BACK PAIN: ICD-10-CM

## 2024-12-03 RX ORDER — HYDROCODONE BITARTRATE AND ACETAMINOPHEN 10; 325 MG/1; MG/1
1 TABLET ORAL EVERY 6 HOURS PRN
Qty: 60 TABLET | Refills: 0 | Status: CANCELLED | OUTPATIENT
Start: 2024-12-03

## 2024-12-04 ENCOUNTER — TELEPHONE (OUTPATIENT)
Dept: FAMILY MEDICINE | Facility: CLINIC | Age: 74
End: 2024-12-04

## 2024-12-04 ENCOUNTER — OFFICE VISIT (OUTPATIENT)
Dept: FAMILY MEDICINE | Facility: CLINIC | Age: 74
End: 2024-12-04
Payer: MEDICARE

## 2024-12-04 VITALS
SYSTOLIC BLOOD PRESSURE: 138 MMHG | OXYGEN SATURATION: 95 % | BODY MASS INDEX: 30.73 KG/M2 | HEART RATE: 93 BPM | HEIGHT: 62 IN | DIASTOLIC BLOOD PRESSURE: 82 MMHG | WEIGHT: 167 LBS

## 2024-12-04 DIAGNOSIS — C34.92 NON-SMALL CELL CARCINOMA OF LEFT LUNG: ICD-10-CM

## 2024-12-04 DIAGNOSIS — M51.360 DEGENERATION OF INTERVERTEBRAL DISC OF LUMBAR REGION WITH DISCOGENIC BACK PAIN: ICD-10-CM

## 2024-12-04 DIAGNOSIS — T45.1X5A ANEMIA DUE TO CHEMOTHERAPY: ICD-10-CM

## 2024-12-04 DIAGNOSIS — I10 PRIMARY HYPERTENSION: ICD-10-CM

## 2024-12-04 DIAGNOSIS — Z79.899 ENCOUNTER FOR LONG-TERM (CURRENT) USE OF MEDICATIONS: ICD-10-CM

## 2024-12-04 DIAGNOSIS — C34.92 NON-SMALL CELL CARCINOMA OF LEFT LUNG METASTATIC TO ABDOMEN: Primary | ICD-10-CM

## 2024-12-04 DIAGNOSIS — C79.89 NON-SMALL CELL CARCINOMA OF LEFT LUNG METASTATIC TO ABDOMEN: Primary | ICD-10-CM

## 2024-12-04 DIAGNOSIS — C34.90 NON-SMALL CELL LUNG CANCER WITH METASTASIS: ICD-10-CM

## 2024-12-04 DIAGNOSIS — Z51.81 ENCOUNTER FOR THERAPEUTIC DRUG MONITORING: ICD-10-CM

## 2024-12-04 DIAGNOSIS — D64.81 ANEMIA DUE TO CHEMOTHERAPY: ICD-10-CM

## 2024-12-04 DIAGNOSIS — R11.0 CHEMOTHERAPY-INDUCED NAUSEA: ICD-10-CM

## 2024-12-04 DIAGNOSIS — M51.16 LUMBAR DISC DISEASE WITH RADICULOPATHY: ICD-10-CM

## 2024-12-04 DIAGNOSIS — T45.1X5A CHEMOTHERAPY-INDUCED NAUSEA: ICD-10-CM

## 2024-12-04 PROCEDURE — 99214 OFFICE O/P EST MOD 30 MIN: CPT | Mod: S$GLB,,, | Performed by: FAMILY MEDICINE

## 2024-12-04 PROCEDURE — 1125F AMNT PAIN NOTED PAIN PRSNT: CPT | Mod: CPTII,S$GLB,, | Performed by: FAMILY MEDICINE

## 2024-12-04 PROCEDURE — 3075F SYST BP GE 130 - 139MM HG: CPT | Mod: CPTII,S$GLB,, | Performed by: FAMILY MEDICINE

## 2024-12-04 PROCEDURE — 1101F PT FALLS ASSESS-DOCD LE1/YR: CPT | Mod: CPTII,S$GLB,, | Performed by: FAMILY MEDICINE

## 2024-12-04 PROCEDURE — 3288F FALL RISK ASSESSMENT DOCD: CPT | Mod: CPTII,S$GLB,, | Performed by: FAMILY MEDICINE

## 2024-12-04 PROCEDURE — 3079F DIAST BP 80-89 MM HG: CPT | Mod: CPTII,S$GLB,, | Performed by: FAMILY MEDICINE

## 2024-12-04 RX ORDER — HYDROCODONE BITARTRATE AND ACETAMINOPHEN 10; 325 MG/1; MG/1
1 TABLET ORAL EVERY 4 HOURS PRN
Qty: 120 TABLET | Refills: 0 | Status: SHIPPED | OUTPATIENT
Start: 2024-12-04

## 2024-12-04 RX ORDER — ONDANSETRON HYDROCHLORIDE 8 MG/1
8 TABLET, FILM COATED ORAL EVERY 8 HOURS PRN
Qty: 60 TABLET | Refills: 5 | Status: SHIPPED | OUTPATIENT
Start: 2024-12-04

## 2024-12-04 NOTE — PROGRESS NOTES
SUBJECTIVE:    Patient ID: Joslyn Dubon is a 74 y.o. female.    Chief Complaint: Follow-up (No bottles//Pt here for 3 mo follow up//discuss severe right upper quadrant pain x 3weeks. Went to hospital and states they found nothing wrong//discuss flu vacc//JL)    Follow-up  Associated symptoms include abdominal pain, anorexia, myalgias and nausea. Pertinent negatives include no arthralgias, chest pain, chills, coughing, fatigue, fever, headaches, joint swelling, numbness or vomiting.   Abdominal Pain  This is a recurrent problem. The current episode started 1 to 4 weeks ago. The onset quality is sudden. The problem occurs daily. The most recent episode lasted 8 weeks. The problem has been gradually improving. The pain is at a severity of 10/10. The pain is severe. The quality of the pain is tearing. Associated symptoms include anorexia, myalgias and nausea. Pertinent negatives include no arthralgias, belching, constipation, diarrhea, dysuria, fever, flatus, frequency, headaches, hematochezia, hematuria, vomiting or weight loss. The pain is aggravated by coughing, deep breathing, movement and vomiting. The pain is relieved by Being still and sitting up. She has tried acetaminophen for the symptoms. The treatment provided mild relief. Prior diagnostic workup includes CT scan, GI consult and surgery. Her past medical history is significant for abdominal surgery, gallstones, GERD and irritable bowel syndrome. There is no history of colon cancer, Crohn's disease, pancreatitis, PUD or ulcerative colitis. Patient's medical history includes kidney stones. Patient's medical history does not include UTI.       History of Present Illness              Admission on 11/24/2024, Discharged on 11/24/2024   Component Date Value Ref Range Status    WBC 11/24/2024 6.02  3.90 - 12.70 K/uL Final    RBC 11/24/2024 3.96 (L)  4.00 - 5.40 M/uL Final    Hemoglobin 11/24/2024 12.3  12.0 - 16.0 g/dL Final    Hematocrit 11/24/2024 37.8   37.0 - 48.5 % Final    MCV 11/24/2024 96  82 - 98 fL Final    MCH 11/24/2024 31.1 (H)  27.0 - 31.0 pg Final    MCHC 11/24/2024 32.5  32.0 - 36.0 g/dL Final    RDW 11/24/2024 13.2  11.5 - 14.5 % Final    Platelets 11/24/2024 173  150 - 450 K/uL Final    MPV 11/24/2024 8.6 (L)  9.2 - 12.9 fL Final    Immature Granulocytes 11/24/2024 0.2  0.0 - 0.5 % Final    Gran # (ANC) 11/24/2024 4.4  1.8 - 7.7 K/uL Final    Immature Grans (Abs) 11/24/2024 0.01  0.00 - 0.04 K/uL Final    Lymph # 11/24/2024 1.0  1.0 - 4.8 K/uL Final    Mono # 11/24/2024 0.6  0.3 - 1.0 K/uL Final    Eos # 11/24/2024 0.1  0.0 - 0.5 K/uL Final    Baso # 11/24/2024 0.03  0.00 - 0.20 K/uL Final    nRBC 11/24/2024 0  0 /100 WBC Final    Gran % 11/24/2024 72.4  38.0 - 73.0 % Final    Lymph % 11/24/2024 15.8 (L)  18.0 - 48.0 % Final    Mono % 11/24/2024 10.3  4.0 - 15.0 % Final    Eosinophil % 11/24/2024 0.8  0.0 - 8.0 % Final    Basophil % 11/24/2024 0.5  0.0 - 1.9 % Final    Differential Method 11/24/2024 Automated   Final    Sodium 11/24/2024 137  136 - 145 mmol/L Final    Potassium 11/24/2024 4.2  3.5 - 5.1 mmol/L Final    Chloride 11/24/2024 102  95 - 110 mmol/L Final    CO2 11/24/2024 30 (H)  23 - 29 mmol/L Final    Glucose 11/24/2024 90  70 - 110 mg/dL Final    BUN 11/24/2024 13  8 - 23 mg/dL Final    Creatinine 11/24/2024 0.9  0.5 - 1.4 mg/dL Final    Calcium 11/24/2024 9.6  8.7 - 10.5 mg/dL Final    Total Protein 11/24/2024 7.8  6.0 - 8.4 g/dL Final    Albumin 11/24/2024 4.0  3.5 - 5.2 g/dL Final    Total Bilirubin 11/24/2024 0.7  0.1 - 1.0 mg/dL Final    Alkaline Phosphatase 11/24/2024 124  55 - 135 U/L Final    AST 11/24/2024 27  10 - 40 U/L Final    ALT 11/24/2024 9 (L)  10 - 44 U/L Final    eGFR 11/24/2024 >60.0  >60 mL/min/1.73 m^2 Final    Anion Gap 11/24/2024 5 (L)  8 - 16 mmol/L Final    Lipase 11/24/2024 12  4 - 60 U/L Final    Magnesium 11/24/2024 2.0  1.6 - 2.6 mg/dL Final   Admission on 11/21/2024, Discharged on 11/21/2024   Component  Date Value Ref Range Status    WBC 11/21/2024 5.68  3.90 - 12.70 K/uL Final    RBC 11/21/2024 4.32  4.00 - 5.40 M/uL Final    Hemoglobin 11/21/2024 12.8  12.0 - 16.0 g/dL Final    Hematocrit 11/21/2024 40.9  37.0 - 48.5 % Final    MCV 11/21/2024 95  82 - 98 fL Final    MCH 11/21/2024 29.6  27.0 - 31.0 pg Final    MCHC 11/21/2024 31.3 (L)  32.0 - 36.0 g/dL Final    RDW 11/21/2024 13.4  11.5 - 14.5 % Final    Platelets 11/21/2024 186  150 - 450 K/uL Final    MPV 11/21/2024 8.9 (L)  9.2 - 12.9 fL Final    Immature Granulocytes 11/21/2024 0.4  0.0 - 0.5 % Final    Gran # (ANC) 11/21/2024 3.8  1.8 - 7.7 K/uL Final    Immature Grans (Abs) 11/21/2024 0.02  0.00 - 0.04 K/uL Final    Lymph # 11/21/2024 1.1  1.0 - 4.8 K/uL Final    Mono # 11/21/2024 0.7  0.3 - 1.0 K/uL Final    Eos # 11/21/2024 0.1  0.0 - 0.5 K/uL Final    Baso # 11/21/2024 0.02  0.00 - 0.20 K/uL Final    nRBC 11/21/2024 0  0 /100 WBC Final    Gran % 11/21/2024 66.1  38.0 - 73.0 % Final    Lymph % 11/21/2024 19.4  18.0 - 48.0 % Final    Mono % 11/21/2024 12.3  4.0 - 15.0 % Final    Eosinophil % 11/21/2024 1.4  0.0 - 8.0 % Final    Basophil % 11/21/2024 0.4  0.0 - 1.9 % Final    Differential Method 11/21/2024 Automated   Final    Sodium 11/21/2024 137  136 - 145 mmol/L Final    Potassium 11/21/2024 4.8  3.5 - 5.1 mmol/L Final    Chloride 11/21/2024 103  95 - 110 mmol/L Final    CO2 11/21/2024 27  23 - 29 mmol/L Final    Glucose 11/21/2024 92  70 - 110 mg/dL Final    BUN 11/21/2024 18  8 - 23 mg/dL Final    Creatinine 11/21/2024 1.0  0.5 - 1.4 mg/dL Final    Calcium 11/21/2024 9.5  8.7 - 10.5 mg/dL Final    Total Protein 11/21/2024 7.5  6.0 - 8.4 g/dL Final    Albumin 11/21/2024 4.0  3.5 - 5.2 g/dL Final    Total Bilirubin 11/21/2024 0.9  0.1 - 1.0 mg/dL Final    Alkaline Phosphatase 11/21/2024 108  55 - 135 U/L Final    AST 11/21/2024 27  10 - 40 U/L Final    ALT 11/21/2024 8 (L)  10 - 44 U/L Final    eGFR 11/21/2024 58.4 (A)  >60 mL/min/1.73 m^2 Final     Anion Gap 11/21/2024 7 (L)  8 - 16 mmol/L Final    Lipase 11/21/2024 17  4 - 60 U/L Final    Specimen UA 11/21/2024 Urine, Clean Catch   Final    Color, UA 11/21/2024 Yellow  Yellow, Straw, Priya Final    Appearance, UA 11/21/2024 Hazy (A)  Clear Final    pH, UA 11/21/2024 5.0  5.0 - 8.0 Final    Specific Gravity, UA 11/21/2024 1.020  1.005 - 1.030 Final    Protein, UA 11/21/2024 Negative  Negative Final    Glucose, UA 11/21/2024 Negative  Negative Final    Ketones, UA 11/21/2024 Negative  Negative Final    Bilirubin (UA) 11/21/2024 Negative  Negative Final    Occult Blood UA 11/21/2024 1+ (A)  Negative Final    Nitrite, UA 11/21/2024 Negative  Negative Final    Urobilinogen, UA 11/21/2024 Negative  Negative EU/dL Final    Leukocytes, UA 11/21/2024 Negative  Negative Final    Magnesium 11/21/2024 1.8  1.6 - 2.6 mg/dL Final    RBC, UA 11/21/2024 4  0 - 4 /hpf Final    WBC, UA 11/21/2024 2  0 - 5 /hpf Final    Bacteria 11/21/2024 Few (A)  None-Occ /hpf Final    Squam Epithel, UA 11/21/2024 15  /hpf Final    Microscopic Comment 11/21/2024 SEE COMMENT   Final   Lab Visit on 10/22/2024   Component Date Value Ref Range Status    WBC 10/22/2024 4.63  3.90 - 12.70 K/uL Final    RBC 10/22/2024 4.48  4.00 - 5.40 M/uL Final    Hemoglobin 10/22/2024 13.8  12.0 - 16.0 g/dL Final    Hematocrit 10/22/2024 42.4  37.0 - 48.5 % Final    MCV 10/22/2024 95  82 - 98 fL Final    MCH 10/22/2024 30.8  27.0 - 31.0 pg Final    MCHC 10/22/2024 32.5  32.0 - 36.0 g/dL Final    RDW 10/22/2024 12.7  11.5 - 14.5 % Final    Platelets 10/22/2024 144 (L)  150 - 450 K/uL Final    MPV 10/22/2024 7.8 (L)  9.2 - 12.9 fL Final    Immature Granulocytes 10/22/2024 0.4  0.0 - 0.5 % Final    Gran # (ANC) 10/22/2024 2.4  1.8 - 7.7 K/uL Final    Immature Grans (Abs) 10/22/2024 0.02  0.00 - 0.04 K/uL Final    Lymph # 10/22/2024 1.4  1.0 - 4.8 K/uL Final    Mono # 10/22/2024 0.5  0.3 - 1.0 K/uL Final    Eos # 10/22/2024 0.3  0.0 - 0.5 K/uL Final    Baso #  10/22/2024 0.02  0.00 - 0.20 K/uL Final    nRBC 10/22/2024 0  0 /100 WBC Final    Gran % 10/22/2024 51.5  38.0 - 73.0 % Final    Lymph % 10/22/2024 30.2  18.0 - 48.0 % Final    Mono % 10/22/2024 11.0  4.0 - 15.0 % Final    Eosinophil % 10/22/2024 6.5  0.0 - 8.0 % Final    Basophil % 10/22/2024 0.4  0.0 - 1.9 % Final    Differential Method 10/22/2024 Automated   Final    Sodium 10/22/2024 140  136 - 145 mmol/L Final    Potassium 10/22/2024 4.4  3.5 - 5.1 mmol/L Final    Chloride 10/22/2024 101  95 - 110 mmol/L Final    CO2 10/22/2024 30 (H)  23 - 29 mmol/L Final    Glucose 10/22/2024 104  70 - 110 mg/dL Final    BUN 10/22/2024 16  8 - 23 mg/dL Final    Creatinine 10/22/2024 1.1  0.5 - 1.4 mg/dL Final    Calcium 10/22/2024 10.1  8.7 - 10.5 mg/dL Final    Total Protein 10/22/2024 7.7  6.0 - 8.4 g/dL Final    Albumin 10/22/2024 4.2  3.5 - 5.2 g/dL Final    Total Bilirubin 10/22/2024 0.3  0.1 - 1.0 mg/dL Final    Alkaline Phosphatase 10/22/2024 86  55 - 135 U/L Final    AST 10/22/2024 21  10 - 40 U/L Final    ALT 10/22/2024 15  10 - 44 U/L Final    eGFR 10/22/2024 52.1 (A)  >60 mL/min/1.73 m^2 Final    Anion Gap 10/22/2024 9  8 - 16 mmol/L Final    Magnesium 10/22/2024 1.7  1.6 - 2.6 mg/dL Final   Lab Visit on 10/02/2024   Component Date Value Ref Range Status    WBC 10/02/2024 4.51  3.90 - 12.70 K/uL Final    RBC 10/02/2024 3.57 (L)  4.00 - 5.40 M/uL Final    Hemoglobin 10/02/2024 11.3 (L)  12.0 - 16.0 g/dL Final    Hematocrit 10/02/2024 35.1 (L)  37.0 - 48.5 % Final    MCV 10/02/2024 98  82 - 98 fL Final    MCH 10/02/2024 31.7 (H)  27.0 - 31.0 pg Final    MCHC 10/02/2024 32.2  32.0 - 36.0 g/dL Final    RDW 10/02/2024 13.2  11.5 - 14.5 % Final    Platelets 10/02/2024 134 (L)  150 - 450 K/uL Final    MPV 10/02/2024 8.8 (L)  9.2 - 12.9 fL Final    Immature Granulocytes 10/02/2024 0.2  0.0 - 0.5 % Final    Gran # (ANC) 10/02/2024 2.5  1.8 - 7.7 K/uL Final    Immature Grans (Abs) 10/02/2024 0.01  0.00 - 0.04 K/uL Final     Lymph # 10/02/2024 1.3  1.0 - 4.8 K/uL Final    Mono # 10/02/2024 0.6  0.3 - 1.0 K/uL Final    Eos # 10/02/2024 0.1  0.0 - 0.5 K/uL Final    Baso # 10/02/2024 0.01  0.00 - 0.20 K/uL Final    nRBC 10/02/2024 0  0 /100 WBC Final    Gran % 10/02/2024 55.9  38.0 - 73.0 % Final    Lymph % 10/02/2024 28.6  18.0 - 48.0 % Final    Mono % 10/02/2024 12.2  4.0 - 15.0 % Final    Eosinophil % 10/02/2024 2.9  0.0 - 8.0 % Final    Basophil % 10/02/2024 0.2  0.0 - 1.9 % Final    Differential Method 10/02/2024 Automated   Final    Sodium 10/02/2024 141  136 - 145 mmol/L Final    Potassium 10/02/2024 3.6  3.5 - 5.1 mmol/L Final    Chloride 10/02/2024 108  95 - 110 mmol/L Final    CO2 10/02/2024 24  23 - 29 mmol/L Final    Glucose 10/02/2024 64 (L)  70 - 110 mg/dL Final    BUN 10/02/2024 14  8 - 23 mg/dL Final    Creatinine 10/02/2024 0.8  0.5 - 1.4 mg/dL Final    Calcium 10/02/2024 8.1 (L)  8.7 - 10.5 mg/dL Final    Total Protein 10/02/2024 6.4  6.0 - 8.4 g/dL Final    Albumin 10/02/2024 3.5  3.5 - 5.2 g/dL Final    Total Bilirubin 10/02/2024 0.3  0.1 - 1.0 mg/dL Final    Alkaline Phosphatase 10/02/2024 80  55 - 135 U/L Final    AST 10/02/2024 16  10 - 40 U/L Final    ALT 10/02/2024 11  10 - 44 U/L Final    eGFR 10/02/2024 >60.0  >60 mL/min/1.73 m^2 Final    Anion Gap 10/02/2024 9  8 - 16 mmol/L Final    Magnesium 10/02/2024 1.6  1.6 - 2.6 mg/dL Final   Lab Visit on 09/18/2024   Component Date Value Ref Range Status    WBC 09/18/2024 4.52  3.90 - 12.70 K/uL Final    RBC 09/18/2024 4.18  4.00 - 5.40 M/uL Final    Hemoglobin 09/18/2024 13.0  12.0 - 16.0 g/dL Final    Hematocrit 09/18/2024 40.3  37.0 - 48.5 % Final    MCV 09/18/2024 96  82 - 98 fL Final    MCH 09/18/2024 31.1 (H)  27.0 - 31.0 pg Final    MCHC 09/18/2024 32.3  32.0 - 36.0 g/dL Final    RDW 09/18/2024 13.9  11.5 - 14.5 % Final    Platelets 09/18/2024 133 (L)  150 - 450 K/uL Final    MPV 09/18/2024 8.2 (L)  9.2 - 12.9 fL Final    Immature Granulocytes 09/18/2024 0.2  0.0  - 0.5 % Final    Gran # (ANC) 09/18/2024 2.7  1.8 - 7.7 K/uL Final    Immature Grans (Abs) 09/18/2024 0.01  0.00 - 0.04 K/uL Final    Lymph # 09/18/2024 1.3  1.0 - 4.8 K/uL Final    Mono # 09/18/2024 0.5  0.3 - 1.0 K/uL Final    Eos # 09/18/2024 0.1  0.0 - 0.5 K/uL Final    Baso # 09/18/2024 0.02  0.00 - 0.20 K/uL Final    nRBC 09/18/2024 0  0 /100 WBC Final    Gran % 09/18/2024 59.3  38.0 - 73.0 % Final    Lymph % 09/18/2024 27.7  18.0 - 48.0 % Final    Mono % 09/18/2024 10.4  4.0 - 15.0 % Final    Eosinophil % 09/18/2024 2.0  0.0 - 8.0 % Final    Basophil % 09/18/2024 0.4  0.0 - 1.9 % Final    Differential Method 09/18/2024 Automated   Final    Sodium 09/18/2024 140  136 - 145 mmol/L Final    Potassium 09/18/2024 4.0  3.5 - 5.1 mmol/L Final    Chloride 09/18/2024 104  95 - 110 mmol/L Final    CO2 09/18/2024 27  23 - 29 mmol/L Final    Glucose 09/18/2024 116 (H)  70 - 110 mg/dL Final    BUN 09/18/2024 12  8 - 23 mg/dL Final    Creatinine 09/18/2024 0.9  0.5 - 1.4 mg/dL Final    Calcium 09/18/2024 9.4  8.7 - 10.5 mg/dL Final    Total Protein 09/18/2024 7.2  6.0 - 8.4 g/dL Final    Albumin 09/18/2024 4.0  3.5 - 5.2 g/dL Final    Total Bilirubin 09/18/2024 0.4  0.1 - 1.0 mg/dL Final    Alkaline Phosphatase 09/18/2024 90  55 - 135 U/L Final    AST 09/18/2024 18  10 - 40 U/L Final    ALT 09/18/2024 16  10 - 44 U/L Final    eGFR 09/18/2024 >60.0  >60 mL/min/1.73 m^2 Final    Anion Gap 09/18/2024 9  8 - 16 mmol/L Final    Magnesium 09/18/2024 1.8  1.6 - 2.6 mg/dL Final    TSH 09/18/2024 0.175 (L)  0.340 - 5.600 uIU/mL Final    T3, Total 09/18/2024 154  71 - 180 ng/dL Final    Free T4 09/18/2024 1.42  0.71 - 1.51 ng/dL Final   Lab Visit on 09/04/2024   Component Date Value Ref Range Status    WBC 09/04/2024 3.76 (L)  3.90 - 12.70 K/uL Final    RBC 09/04/2024 3.92 (L)  4.00 - 5.40 M/uL Final    Hemoglobin 09/04/2024 12.3  12.0 - 16.0 g/dL Final    Hematocrit 09/04/2024 38.2  37.0 - 48.5 % Final    MCV 09/04/2024 97  82 - 98  fL Final    MCH 09/04/2024 31.4 (H)  27.0 - 31.0 pg Final    MCHC 09/04/2024 32.2  32.0 - 36.0 g/dL Final    RDW 09/04/2024 14.8 (H)  11.5 - 14.5 % Final    Platelets 09/04/2024 111 (L)  150 - 450 K/uL Final    MPV 09/04/2024 7.8 (L)  9.2 - 12.9 fL Final    Immature Granulocytes 09/04/2024 0.3  0.0 - 0.5 % Final    Gran # (ANC) 09/04/2024 1.9  1.8 - 7.7 K/uL Final    Immature Grans (Abs) 09/04/2024 0.01  0.00 - 0.04 K/uL Final    Lymph # 09/04/2024 1.2  1.0 - 4.8 K/uL Final    Mono # 09/04/2024 0.5  0.3 - 1.0 K/uL Final    Eos # 09/04/2024 0.1  0.0 - 0.5 K/uL Final    Baso # 09/04/2024 0.01  0.00 - 0.20 K/uL Final    nRBC 09/04/2024 0  0 /100 WBC Final    Gran % 09/04/2024 51.3  38.0 - 73.0 % Final    Lymph % 09/04/2024 32.4  18.0 - 48.0 % Final    Mono % 09/04/2024 12.8  4.0 - 15.0 % Final    Eosinophil % 09/04/2024 2.9  0.0 - 8.0 % Final    Basophil % 09/04/2024 0.3  0.0 - 1.9 % Final    Differential Method 09/04/2024 Automated   Final    Sodium 09/04/2024 141  136 - 145 mmol/L Final    Potassium 09/04/2024 4.1  3.5 - 5.1 mmol/L Final    Chloride 09/04/2024 104  95 - 110 mmol/L Final    CO2 09/04/2024 27  23 - 29 mmol/L Final    Glucose 09/04/2024 85  70 - 110 mg/dL Final    BUN 09/04/2024 16  8 - 23 mg/dL Final    Creatinine 09/04/2024 0.9  0.5 - 1.4 mg/dL Final    Calcium 09/04/2024 9.7  8.7 - 10.5 mg/dL Final    Total Protein 09/04/2024 7.3  6.0 - 8.4 g/dL Final    Albumin 09/04/2024 3.9  3.5 - 5.2 g/dL Final    Total Bilirubin 09/04/2024 0.4  0.1 - 1.0 mg/dL Final    Alkaline Phosphatase 09/04/2024 86  55 - 135 U/L Final    AST 09/04/2024 17  10 - 40 U/L Final    ALT 09/04/2024 13  10 - 44 U/L Final    eGFR 09/04/2024 >60.0  >60 mL/min/1.73 m^2 Final    Anion Gap 09/04/2024 10  8 - 16 mmol/L Final    Magnesium 09/04/2024 1.7  1.6 - 2.6 mg/dL Final   Hospital Outpatient Visit on 08/28/2024   Component Date Value Ref Range Status    POC Glucose 08/28/2024 84  70 - 110 Final   Lab Visit on 08/27/2024   Component  Date Value Ref Range Status    WBC 08/27/2024 4.91  3.90 - 12.70 K/uL Final    RBC 08/27/2024 3.98 (L)  4.00 - 5.40 M/uL Final    Hemoglobin 08/27/2024 12.4  12.0 - 16.0 g/dL Final    Hematocrit 08/27/2024 38.5  37.0 - 48.5 % Final    MCV 08/27/2024 97  82 - 98 fL Final    MCH 08/27/2024 31.2 (H)  27.0 - 31.0 pg Final    MCHC 08/27/2024 32.2  32.0 - 36.0 g/dL Final    RDW 08/27/2024 15.4 (H)  11.5 - 14.5 % Final    Platelets 08/27/2024 112 (L)  150 - 450 K/uL Final    MPV 08/27/2024 8.6 (L)  9.2 - 12.9 fL Final    Immature Granulocytes 08/27/2024 0.4  0.0 - 0.5 % Final    Gran # (ANC) 08/27/2024 2.8  1.8 - 7.7 K/uL Final    Immature Grans (Abs) 08/27/2024 0.02  0.00 - 0.04 K/uL Final    Lymph # 08/27/2024 1.5  1.0 - 4.8 K/uL Final    Mono # 08/27/2024 0.4  0.3 - 1.0 K/uL Final    Eos # 08/27/2024 0.1  0.0 - 0.5 K/uL Final    Baso # 08/27/2024 0.03  0.00 - 0.20 K/uL Final    nRBC 08/27/2024 0  0 /100 WBC Final    Gran % 08/27/2024 57.9  38.0 - 73.0 % Final    Lymph % 08/27/2024 30.3  18.0 - 48.0 % Final    Mono % 08/27/2024 8.6  4.0 - 15.0 % Final    Eosinophil % 08/27/2024 2.2  0.0 - 8.0 % Final    Basophil % 08/27/2024 0.6  0.0 - 1.9 % Final    Differential Method 08/27/2024 Automated   Final    Sodium 08/27/2024 140  136 - 145 mmol/L Final    Potassium 08/27/2024 3.9  3.5 - 5.1 mmol/L Final    Chloride 08/27/2024 106  95 - 110 mmol/L Final    CO2 08/27/2024 24  23 - 29 mmol/L Final    Glucose 08/27/2024 102  70 - 110 mg/dL Final    BUN 08/27/2024 17  8 - 23 mg/dL Final    Creatinine 08/27/2024 0.9  0.5 - 1.4 mg/dL Final    Calcium 08/27/2024 9.4  8.7 - 10.5 mg/dL Final    Total Protein 08/27/2024 7.6  6.0 - 8.4 g/dL Final    Albumin 08/27/2024 4.1  3.5 - 5.2 g/dL Final    Total Bilirubin 08/27/2024 0.4  0.1 - 1.0 mg/dL Final    Alkaline Phosphatase 08/27/2024 82  55 - 135 U/L Final    AST 08/27/2024 22  10 - 40 U/L Final    ALT 08/27/2024 17  10 - 44 U/L Final    eGFR 08/27/2024 >60.0  >60 mL/min/1.73 m^2 Final     Anion Gap 08/27/2024 10  8 - 16 mmol/L Final    Magnesium 08/27/2024 1.6  1.6 - 2.6 mg/dL Final   Lab Visit on 08/07/2024   Component Date Value Ref Range Status    WBC 08/07/2024 4.72  3.90 - 12.70 K/uL Final    RBC 08/07/2024 3.98 (L)  4.00 - 5.40 M/uL Final    Hemoglobin 08/07/2024 12.2  12.0 - 16.0 g/dL Final    Hematocrit 08/07/2024 38.3  37.0 - 48.5 % Final    MCV 08/07/2024 96  82 - 98 fL Final    MCH 08/07/2024 30.7  27.0 - 31.0 pg Final    MCHC 08/07/2024 31.9 (L)  32.0 - 36.0 g/dL Final    RDW 08/07/2024 16.7 (H)  11.5 - 14.5 % Final    Platelets 08/07/2024 111 (L)  150 - 450 K/uL Final    MPV 08/07/2024 8.2 (L)  9.2 - 12.9 fL Final    Immature Granulocytes 08/07/2024 0.2  0.0 - 0.5 % Final    Gran # (ANC) 08/07/2024 2.9  1.8 - 7.7 K/uL Final    Immature Grans (Abs) 08/07/2024 0.01  0.00 - 0.04 K/uL Final    Lymph # 08/07/2024 1.2  1.0 - 4.8 K/uL Final    Mono # 08/07/2024 0.4  0.3 - 1.0 K/uL Final    Eos # 08/07/2024 0.2  0.0 - 0.5 K/uL Final    Baso # 08/07/2024 0.03  0.00 - 0.20 K/uL Final    nRBC 08/07/2024 0  0 /100 WBC Final    Gran % 08/07/2024 61.7  38.0 - 73.0 % Final    Lymph % 08/07/2024 24.8  18.0 - 48.0 % Final    Mono % 08/07/2024 9.3  4.0 - 15.0 % Final    Eosinophil % 08/07/2024 3.4  0.0 - 8.0 % Final    Basophil % 08/07/2024 0.6  0.0 - 1.9 % Final    Differential Method 08/07/2024 Automated   Final    Sodium 08/07/2024 140  136 - 145 mmol/L Final    Potassium 08/07/2024 4.2  3.5 - 5.1 mmol/L Final    Chloride 08/07/2024 104  95 - 110 mmol/L Final    CO2 08/07/2024 28  23 - 29 mmol/L Final    Glucose 08/07/2024 116 (H)  70 - 110 mg/dL Final    BUN 08/07/2024 15  8 - 23 mg/dL Final    Creatinine 08/07/2024 1.0  0.5 - 1.4 mg/dL Final    Calcium 08/07/2024 9.7  8.7 - 10.5 mg/dL Final    Total Protein 08/07/2024 7.9  6.0 - 8.4 g/dL Final    Albumin 08/07/2024 4.1  3.5 - 5.2 g/dL Final    Total Bilirubin 08/07/2024 0.5  0.1 - 1.0 mg/dL Final    Alkaline Phosphatase 08/07/2024 85  55 - 135 U/L  Final    AST 08/07/2024 24  10 - 40 U/L Final    ALT 08/07/2024 19  10 - 44 U/L Final    eGFR 08/07/2024 58.4 (A)  >60 mL/min/1.73 m^2 Final    Anion Gap 08/07/2024 8  8 - 16 mmol/L Final    Magnesium 08/07/2024 1.9  1.6 - 2.6 mg/dL Final   Lab Visit on 08/01/2024   Component Date Value Ref Range Status    WBC 08/01/2024 5.39  3.90 - 12.70 K/uL Final    RBC 08/01/2024 3.69 (L)  4.00 - 5.40 M/uL Final    Hemoglobin 08/01/2024 11.3 (L)  12.0 - 16.0 g/dL Final    Hematocrit 08/01/2024 36.2 (L)  37.0 - 48.5 % Final    MCV 08/01/2024 98  82 - 98 fL Final    MCH 08/01/2024 30.6  27.0 - 31.0 pg Final    MCHC 08/01/2024 31.2 (L)  32.0 - 36.0 g/dL Final    RDW 08/01/2024 16.9 (H)  11.5 - 14.5 % Final    Platelets 08/01/2024 146 (L)  150 - 450 K/uL Final    MPV 08/01/2024 8.4 (L)  9.2 - 12.9 fL Final    Immature Granulocytes 08/01/2024 0.4  0.0 - 0.5 % Final    Gran # (ANC) 08/01/2024 3.4  1.8 - 7.7 K/uL Final    Immature Grans (Abs) 08/01/2024 0.02  0.00 - 0.04 K/uL Final    Lymph # 08/01/2024 1.2  1.0 - 4.8 K/uL Final    Mono # 08/01/2024 0.5  0.3 - 1.0 K/uL Final    Eos # 08/01/2024 0.1  0.0 - 0.5 K/uL Final    Baso # 08/01/2024 0.03  0.00 - 0.20 K/uL Final    nRBC 08/01/2024 0  0 /100 WBC Final    Gran % 08/01/2024 64.6  38.0 - 73.0 % Final    Lymph % 08/01/2024 23.3  18.0 - 48.0 % Final    Mono % 08/01/2024 9.0  4.0 - 15.0 % Final    Eosinophil % 08/01/2024 2.1  0.0 - 8.0 % Final    Basophil % 08/01/2024 0.6  0.0 - 1.9 % Final    Differential Method 08/01/2024 Automated   Final    Sodium 08/01/2024 142  136 - 145 mmol/L Final    Potassium 08/01/2024 3.9  3.5 - 5.1 mmol/L Final    Chloride 08/01/2024 103  95 - 110 mmol/L Final    CO2 08/01/2024 29  23 - 29 mmol/L Final    Glucose 08/01/2024 103  70 - 110 mg/dL Final    BUN 08/01/2024 16  8 - 23 mg/dL Final    Creatinine 08/01/2024 0.9  0.5 - 1.4 mg/dL Final    Calcium 08/01/2024 9.6  8.7 - 10.5 mg/dL Final    Total Protein 08/01/2024 7.5  6.0 - 8.4 g/dL Final    Albumin  2024 3.9  3.5 - 5.2 g/dL Final    Total Bilirubin 2024 0.4  0.1 - 1.0 mg/dL Final    Alkaline Phosphatase 2024 90  55 - 135 U/L Final    AST 2024 20  10 - 40 U/L Final    ALT 2024 16  10 - 44 U/L Final    eGFR 2024 >60.0  >60 mL/min/1.73 m^2 Final    Anion Gap 2024 10  8 - 16 mmol/L Final    Magnesium 2024 1.7  1.6 - 2.6 mg/dL Final   There may be more visits with results that are not included.       Past Medical History:   Diagnosis Date    Anxiety     Martin esophagus     Chemotherapy-induced nausea 2024    Colitis     COPD (chronic obstructive pulmonary disease)     Dehydration 2024    GERD (gastroesophageal reflux disease)     Hypertension     Intractable nausea and vomiting 2024    Lung cancer     Non-small cell lung cancer with metastasis 10/23/2024    On home oxygen therapy     2L as needed    Thyroid disease     Vocal cord polyps      Social History     Socioeconomic History    Marital status:    Tobacco Use    Smoking status: Former     Types: Cigarettes     Start date: 2024     Quit date: 1964     Years since quittin.9    Smokeless tobacco: Never    Tobacco comments:     Pt has smoked since 14 years old. Smokes around .5ppd. Inpatient smoking education done 10/20.     Pt quit smoking on 24.  She has never used smokeless tobacco   Substance and Sexual Activity    Alcohol use: Never    Drug use: Never    Sexual activity: Yes     Partners: Male     Social Drivers of Health     Financial Resource Strain: Low Risk  (2024)    Overall Financial Resource Strain (CARDIA)     Difficulty of Paying Living Expenses: Not hard at all   Food Insecurity: No Food Insecurity (2024)    Hunger Vital Sign     Worried About Running Out of Food in the Last Year: Never true     Ran Out of Food in the Last Year: Never true   Transportation Needs: No Transportation Needs (2024)    PRAPARE - Transportation     Lack of  Transportation (Medical): No     Lack of Transportation (Non-Medical): No   Physical Activity: Inactive (3/15/2024)    Exercise Vital Sign     Days of Exercise per Week: 0 days     Minutes of Exercise per Session: 0 min   Stress: Stress Concern Present (3/15/2024)    [employer] - Occupational Stress Questionnaire     Feeling of Stress : Very much   Housing Stability: Low Risk  (7/5/2024)    Housing Stability Vital Sign     Unable to Pay for Housing in the Last Year: No     Homeless in the Last Year: No     Past Surgical History:   Procedure Laterality Date    ABDOMINAL AORTIC ANEURYSM REPAIR      BACK SURGERY      cervical    CATARACT EXTRACTION Right 02/2021    CHOLECYSTECTOMY  12/20/2013    Dr Albert CORLEY    ENDOBRONCHIAL ULTRASOUND N/A 4/19/2024    Procedure: ENDOBRONCHIAL ULTRASOUND (EBUS);  Surgeon: Kizzy Siegel MD;  Location: [address];  Service: Pulmonary;  Laterality: N/A;    FOOT SURGERY      HYSTERECTOMY  1979    AUGUSTINE for AUB with consevation ovaries    INSERTION OF TUNNELED CENTRAL VENOUS CATHETER (CVC) WITH SUBCUTANEOUS PORT Right 5/20/2024    Procedure: [id number];  Surgeon: Simon Wilson III, MD;  Location: [facility];  Service: General;  Laterality: Right;    ROBOTIC BRONCHOSCOPY N/A 4/19/2024    Procedure: ROBOTIC BRONCHOSCOPY;  Surgeon: Kizzy Siegel MD;  Location: [address];  Service: Pulmonary;  Laterality: N/A;    SALIVARY GLAND SURGERY      SURGICAL REMOVAL OF LESION OF ABDOMINAL WALL Left 10/3/2024    Procedure: EXCISION, LESION, ABDOMINAL WALL;  Surgeon: Simon Wilson III, MD;  Location: [facility];  Service: General;  Laterality: Left;     Family History   Problem Relation Name Age of Onset    Bladder Cancer Mother      Heart failure Father      Emphysema Sister      Pancreatic cancer Sister      No Known Problems Brother         The CVD Risk score (KAREN'Agoino, et al., 2008) failed to calculate for the following reasons:    The  2008 CVD risk score is only valid for ages 30 to 74    All of your core healthy metrics are met.      Review of patient's allergies indicates:   Allergen Reactions    Bisacodyl Nausea And Vomiting     Other reaction(s): Vomiting    Iodinated contrast media Anaphylaxis, Hives and Shortness Of Breath     Hypotension.  Pt states she has anaphylaxis with IV contrast.     Morphine Other (See Comments)     hypotension    Azithromycin Hives    Cipro [ciprofloxacin hcl]     Demerol [meperidine]      Nausea vomiting, visual problem    Doxycycline Hives    Flonase [fluticasone propionate] Hives       Current Outpatient Medications:     albuterol-ipratropium (DUO-NEB) 2.5 mg-0.5 mg/3 mL nebulizer solution, Take 3 mLs by nebulization every 6 (six) hours as needed for Wheezing. Rescue, Disp: 75 mL, Rfl: 0    amLODIPine (NORVASC) 5 MG tablet, Take 5 mg by mouth once daily. Per pt only taking if bp is high PRN SBP>140, Disp: , Rfl:     diclofenac (VOLTAREN) 50 MG EC tablet, Take 1 tablet (50 mg total) by mouth 2 (two) times daily as needed (pain)., Disp: 15 tablet, Rfl: 0    diphenhydrAMINE (BENADRYL) 25 mg capsule, Take 25 mg by mouth every 6 (six) hours as needed for Itching., Disp: , Rfl:     EPINEPHrine (EPIPEN) 0.3 mg/0.3 mL AtIn, Inject 0.3 mLs (0.3 mg total) into the muscle as needed (Severe allergic reaction symptoms such as shortness of breath, tongue or throat swelling, weakness dizziness severe nausea vomiting)., Disp: 1 each, Rfl: 3    famotidine (PEPCID) 20 MG tablet, Take 20 mg by mouth 2 (two) times daily as needed for Heartburn., Disp: , Rfl:     furosemide (LASIX) 20 MG tablet, Take 1 tablet (20 mg total) by mouth daily as needed (edema). (Patient taking differently: Take 20 mg by mouth daily as needed (edema). Sob OR EDEMA), Disp: 30 tablet, Rfl: 1    HYDROcodone-acetaminophen (NORCO)  mg per tablet, Take 1 tablet by mouth every 4 (four) hours as needed for Pain., Disp: 120 tablet, Rfl: 0    levalbuterol  (XOPENEX HFA) 45 mcg/actuation inhaler, Inhale 2 puffs into the lungs every 6 (six) hours as needed for Wheezing. Rescue, Disp: 15 g, Rfl: 4    levalbuterol (XOPENEX) 1.25 mg/3 mL nebulizer solution, Take 3 mLs (1.25 mg total) by nebulization every 4 (four) hours as needed for Wheezing. Rescue, Disp: 1 mL, Rfl: 0    levothyroxine (SYNTHROID) 112 MCG tablet, Take 1 tablet (112 mcg total) by mouth before breakfast., Disp: 90 tablet, Rfl: 3    LIDOcaine-prilocaine (EMLA) cream, Apply topically as needed., Disp: 30 g, Rfl: 5    LORazepam (ATIVAN) 1 MG tablet, Take 0.5 tablets (0.5 mg total) by mouth every 6 (six) hours as needed for Anxiety., Disp: 60 tablet, Rfl: 3    metoprolol tartrate (LOPRESSOR) 25 MG tablet, Take 1 tablet (25 mg total) by mouth 2 (two) times daily., Disp: 180 tablet, Rfl: 3    omeprazole (PRILOSEC) 40 MG capsule, Take 1 capsule (40 mg total) by mouth every morning. (Patient taking differently: Take 40 mg by mouth every other day.), Disp: 90 capsule, Rfl: 3    ondansetron (ZOFRAN) 8 MG tablet, Take 1 tablet (8 mg total) by mouth every 8 (eight) hours as needed for Nausea., Disp: 60 tablet, Rfl: 5    polyethylene glycol (GLYCOLAX) 17 gram/dose powder, Take 17 g by mouth once daily., Disp: 510 g, Rfl: 5    promethazine (PHENERGAN) 25 MG tablet, Take 1 tablet (25 mg total) by mouth every 6 (six) hours as needed for Nausea., Disp: 30 tablet, Rfl: 5    REFRESH OPTIVE 0.5-0.9 % Drop, Place 1 drop into both eyes 4 (four) times daily as needed (Dry Eyes)., Disp: , Rfl:     umeclidinium-vilanteroL (ANORO ELLIPTA) 62.5-25 mcg/actuation DsDv, Inhale 1 puff into the lungs once daily. Controller, Disp: 1 each, Rfl: 11    Review of Systems   Constitutional:  Negative for appetite change, chills, fatigue, fever, unexpected weight change and weight loss.   HENT:  Negative for ear discharge and ear pain.    Eyes:  Negative for pain, discharge and visual disturbance.   Respiratory:  Positive for shortness of breath.  "Negative for apnea, cough and wheezing.    Cardiovascular:  Negative for chest pain, palpitations and leg swelling.   Gastrointestinal:  Positive for abdominal pain, anorexia and nausea. Negative for blood in stool, constipation, diarrhea, flatus, hematochezia, vomiting and reflux.   Endocrine: Negative for cold intolerance, heat intolerance and polydipsia.   Genitourinary:  Negative for bladder incontinence, dysuria, frequency, hematuria, nocturia and urgency.   Musculoskeletal:  Positive for myalgias. Negative for arthralgias, gait problem and joint swelling.   Neurological:  Negative for dizziness, seizures, numbness and headaches.   Psychiatric/Behavioral:  Negative for behavioral problems and hallucinations. The patient is not nervous/anxious.            Objective:      Vitals:    12/04/24 0817   BP: 138/82   Pulse: 93   SpO2: 95%   Weight: 75.8 kg (167 lb)   Height: 5' 2" (1.575 m)     Physical Exam  Vitals and nursing note reviewed.   Constitutional:       General: She is in acute distress (having significant right upper quadrant and pleuritic chest pain.).      Appearance: Normal appearance. She is well-developed. She is ill-appearing. She is not toxic-appearing.   HENT:      Head: Normocephalic and atraumatic.      Right Ear: Tympanic membrane and external ear normal.      Left Ear: Tympanic membrane and external ear normal.      Nose: Nose normal.      Mouth/Throat:      Pharynx: Oropharynx is clear. No posterior oropharyngeal erythema.   Eyes:      Pupils: Pupils are equal, round, and reactive to light.   Neck:      Thyroid: No thyromegaly.      Vascular: No carotid bruit.   Cardiovascular:      Rate and Rhythm: Normal rate and regular rhythm.      Heart sounds: Normal heart sounds. No murmur heard.     Comments: Tender to palpation right posterior wall and right flank.  Pulmonary:      Effort: Pulmonary effort is normal.      Breath sounds: Normal breath sounds. No wheezing or rales.   Abdominal:      " General: Bowel sounds are normal. There is no distension.      Palpations: Abdomen is soft.      Tenderness: There is abdominal tenderness (Right upper quadrant abdominal tenderness but no rebound).   Musculoskeletal:         General: No tenderness or deformity. Normal range of motion.      Cervical back: Normal range of motion and neck supple.      Lumbar back: Normal. No spasms.      Comments: Bends 90 degrees at  waist, shoulders and knees have full range of motion, no pitting edema to lower extremities   Lymphadenopathy:      Cervical: No cervical adenopathy.   Skin:     General: Skin is warm and dry.      Findings: No rash.   Neurological:      General: No focal deficit present.      Mental Status: She is alert and oriented to person, place, and time. Mental status is at baseline.      Cranial Nerves: No cranial nerve deficit.      Coordination: Coordination normal.   Psychiatric:         Mood and Affect: Mood normal.         Behavior: Behavior normal.         Thought Content: Thought content normal.         Judgment: Judgment normal.       Physical Exam                  Assessment:       1. 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 Lumbar disc disease with radiculopathy    3. Encounter for long-term (current) use of medications    4. Encounter for therapeutic drug monitoring    5. Degeneration of intervertebral disc of lumbar region with discogenic back pain    6. Non-small cell carcinoma of left lung    7. Non-small cell lung cancer with metastasis    8. Primary hypertension    9. Anemia due to chemotherapy    10. 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  She has a PET scan planned as well as an ultrasound of the liver in the next week or so.  Will increase her pain controlled with  Norco 10/325 q.4 hours.  She is allergic to morphine.  She states she does not want any more chemo.  Is willing to consider radiation if effective.  Lumbar disc disease with radiculopathy  Chronic low back pain  Encounter for long-term (current) use of medications    Encounter for therapeutic drug monitoring    Degeneration of intervertebral disc of lumbar region with discogenic back pain  -     HYDROcodone-acetaminophen (NORCO)  mg per tablet; Take 1 tablet by mouth every 4 (four) hours as needed for Pain.  Dispense: 120 tablet; Refill: 0  Increase hydrocodone to q.4 hours as needed  Non-small cell carcinoma of left lung  -     ondansetron (ZOFRAN) 8 MG tablet; Take 1 tablet (8 mg total) by mouth every 8 (eight) hours as needed for Nausea.  Dispense: 60 tablet; Refill: 5  Refill nausea medicine  Non-small cell lung cancer with metastass  Dr. Wilson removed a left lower quadrant abdominal lesion in October.  Pathology returned metastatic non-small cell carcinoma of the lung.  Primary hypertension  Blood pressure well controlled at this time  Anemia due to chemotherapy  Labs followed by Oncology  Chemotherapy-induced nausea  Refill Zofran    Assessment & Plan              Follow up in about 4 weeks (around 1/1/2025), or metastatic  cancer.        This note was generated with the assistance of ambient listening technology. Verbal consent was obtained by the patient and accompanying visitor(s) for the recording of patient appointment to facilitate this note. I attest to having reviewed and edited the generated note for accuracy, though some syntax or spelling errors may persist. Please contact the author of this note for any clarification.      12/4/2024 Jasbir Graham

## 2024-12-04 NOTE — TELEPHONE ENCOUNTER
----- Message from Bety sent at 12/4/2024  8:49 AM CST -----  Patient needs a 4 week appointment please call [phone].

## 2024-12-11 ENCOUNTER — TELEPHONE (OUTPATIENT)
Facility: CLINIC | Age: 74
End: 2024-12-11
Payer: MEDICARE

## 2024-12-11 ENCOUNTER — PATIENT MESSAGE (OUTPATIENT)
Dept: RADIATION ONCOLOGY | Facility: CLINIC | Age: 74
End: 2024-12-11

## 2024-12-11 ENCOUNTER — HOSPITAL ENCOUNTER (OUTPATIENT)
Dept: RADIOLOGY | Facility: HOSPITAL | Age: 74
Discharge: HOME | End: 2024-12-11
Attending: NURSE PRACTITIONER
Payer: MEDICARE

## 2024-12-11 DIAGNOSIS — C34.92 NON-SMALL CELL CARCINOMA OF LEFT LUNG: ICD-10-CM

## 2024-12-11 DIAGNOSIS — R09.1 PLEURISY: ICD-10-CM

## 2024-12-11 DIAGNOSIS — C34.92 NON-SMALL CELL CARCINOMA OF LEFT LUNG: Primary | ICD-10-CM

## 2024-12-11 DIAGNOSIS — C34.90 NON-SMALL CELL LUNG CANCER WITH METASTASIS: ICD-10-CM

## 2024-12-11 NOTE — TELEPHONE ENCOUNTER
----- Message from PIERRE Camp sent at 12/11/2024 11:18 AM CST -----  Regarding: RE: pet  Whit     She told me  She stated they dont do anything for her there  I explained that they will not do PET inpatient    Zoë  ----- Message -----  From: Scarlett Buenrostro NP  Sent: 12/11/2024  11:05 AM CST  To: Zoë Navarrete RN; Antony Rodriguez III, MD  Subject: RE: pet                                          We ordered her to go to ER for severe pain.  ----- Message -----  From: Zoë Navarrete RN  Sent: 12/11/2024  10:48 AM CST  To: Scarlett Cedar FortNakita Buenrostro NP; #  Subject: pet                                              Pt called crying in pain stating she could not do PET today due to the nausea  Pt states pain is bad  Pt states she goes to the er and they dont do anything to find out what's going on  I informed her the next step would be the PET to see what is going on  Instructed her on taking zofran and pain meds around the clock as ordered  Discussed constipation as well   Called and got PET rescheduled in [place] for 12/19      Zoë

## 2024-12-11 NOTE — TELEPHONE ENCOUNTER
Talked to patient and she is in a lot of pain and SOB.   Instructed her to go to ER for eval.   She declines ER eval.     CT abdomen does not reveal any issues.   CT chest ordered.

## 2024-12-11 NOTE — TELEPHONE ENCOUNTER
Patient called crying to report that she has been having 10/10 excruciating pain to right side that started about 3 weeks ago and has gotten worse now hurts/hard to breath. Patient states she went to ER a couple of weeks ago and was sent home with nothing being done, states she also f/u with her PCP. I instructed patient to go to ER for eval as they would be able to assess/treat much faster than anything that we would be able to do here from the office.patient hesitant as she states they are just going to send her home. I educated on importance of going to ER for eval as she last went 3 weeks ago and things, including scans/labs could have changed greatly in that time. I informed that I will call ER to make them aware that she will be coming. Verbalized understanding.

## 2024-12-12 ENCOUNTER — TELEPHONE (OUTPATIENT)
Dept: FAMILY MEDICINE | Facility: CLINIC | Age: 74
End: 2024-12-12
Payer: MEDICARE

## 2024-12-12 ENCOUNTER — TELEPHONE (OUTPATIENT)
Facility: CLINIC | Age: 74
End: 2024-12-12
Payer: MEDICARE

## 2024-12-12 ENCOUNTER — HOSPITAL ENCOUNTER (OUTPATIENT)
Dept: RADIOLOGY | Facility: HOSPITAL | Age: 74
Discharge: HOME | End: 2024-12-12
Attending: NURSE PRACTITIONER
Payer: MEDICARE

## 2024-12-12 DIAGNOSIS — R09.1 PLEURISY: ICD-10-CM

## 2024-12-12 DIAGNOSIS — C34.92 NON-SMALL CELL CARCINOMA OF LEFT LUNG: Primary | ICD-10-CM

## 2024-12-12 DIAGNOSIS — C34.90 NON-SMALL CELL LUNG CANCER WITH METASTASIS: ICD-10-CM

## 2024-12-12 DIAGNOSIS — C34.92 NON-SMALL CELL CARCINOMA OF LEFT LUNG: ICD-10-CM

## 2024-12-12 PROCEDURE — 71250 CT THORAX DX C-: CPT | Mod: 26,,, | Performed by: RADIOLOGY

## 2024-12-12 PROCEDURE — 71250 CT THORAX DX C-: CPT | Mod: TC

## 2024-12-12 NOTE — TELEPHONE ENCOUNTER
----- Message from Alyx sent at 12/12/2024  2:02 PM CST -----  - 1:45- pt would like to talk to chelly   [phone]

## 2024-12-12 NOTE — TELEPHONE ENCOUNTER
"Spoke with patient, states she got her test results back and she is "full of cancer" "has spread everywhere". She is not going to be taking any treatment. She wants a referral to hospice and someone who can do a nerve block.   "

## 2024-12-12 NOTE — TELEPHONE ENCOUNTER
Talked to patient, she wishes to not move forward with chemo.     She is going to call back for the hospice referral.

## 2024-12-12 NOTE — TELEPHONE ENCOUNTER
----- Message from PIERRE Bassett sent at 12/12/2024  1:25 PM CST -----  You were waiting for Dr BOYER to review. Just let me know if anything I can do concerning  ----- Message -----  From: Yadira Boone  Sent: 12/12/2024  12:49 PM CST  To: García Larson Staff    Pt calling in regards to the CT Scan she had done today. Pt asking for nurse to give her a call back. Pt said it's important.     #  [phone]

## 2024-12-12 NOTE — TELEPHONE ENCOUNTER
----- Message from Neo Mariano MD sent at 12/12/2024  2:09 PM CST -----  She has progressive disease; can we get PET and also should get MRI brain

## 2024-12-12 NOTE — TELEPHONE ENCOUNTER
----- Message from Yadira sent at 12/12/2024 12:48 PM CST -----  Pt calling in regards to the CT Scan she had done today. Pt asking for nurse to give her a call back. Pt said it's important.     #  [phone]

## 2024-12-12 NOTE — TELEPHONE ENCOUNTER
"Per Dr. Graham "I recommend [facility], they can help with pain control. We can send referral." Spoke with patient and let her know.   "

## 2024-12-17 ENCOUNTER — PATIENT OUTREACH (OUTPATIENT)
Dept: EMERGENCY MEDICINE | Facility: HOSPITAL | Age: 74
End: 2024-12-17

## 2025-05-07 NOTE — CARE UPDATE
01/06/24 0704   Patient Assessment/Suction   Level of Consciousness (AVPU) alert   Respiratory Effort Unlabored;Normal   Expansion/Accessory Muscles/Retractions no use of accessory muscles   All Lung Fields Breath Sounds wheezes, expiratory   Rhythm/Pattern, Respiratory shortness of breath   Cough Frequency frequent   Cough Type nonproductive   PRE-TX-O2   Device (Oxygen Therapy) nasal cannula   Flow (L/min) 2   SpO2 (!) 94 %   Pulse Oximetry Type Continuous   $ Pulse Oximetry - Multiple Charge Pulse Oximetry - Multiple   Pulse 82   Resp 18   Aerosol Therapy   $ Aerosol Therapy Charges Aerosol Treatment   Respiratory Treatment Status (SVN) given   Treatment Route (SVN) mask   Patient Position (SVN) Feliciano's   Signs of Intolerance (SVN) none   Breath Sounds Post-Respiratory Treatment   Throughout All Fields Post-Treatment All Fields   Throughout All Fields Post-Treatment aeration increased   Post-treatment Heart Rate (beats/min) 88   Post-treatment Resp Rate (breaths/min) 23   Respiratory Evaluation   $ Care Plan Tech Time 15 min   Evaluation For New Orders   Admitting Diagnosis flu   Home Oxygen   Has Home Oxygen? No   Home Aerosol, MDI, DPI, and Other Treatments/Therapies   Home Respiratory Therapy Per Patient/Review of Chart Yes   Aerosol Home Meds/Freq Q6   Oxygen Care Plan   Oxygen Care Plan Per Protocol   SPO2 Goal (%) 92% non-cardiac   Rationale SpO2 is <92% (Non-cardiac Pt.)   Bronchodilator Care Plan   Bronchodilator Care Plan Aerosol   Aerosol Meds w/ frequency Albuterol - Ipratropium (DUO-NEB) 0.5mg-3mg(2.5ml) 3ml Nebulizer Solution2.5mg Q 6Hr        Rx for water therapy

## (undated) DEVICE — SYR 10CC LUER LOCK

## (undated) DEVICE — BOWL STERILE LARGE 32OZ

## (undated) DEVICE — TUBING SUC UNIV W/CONN 12FT

## (undated) DEVICE — NDL FLEXISION BIOPSY 21G

## (undated) DEVICE — SOL NACL IRR 1000ML BTL

## (undated) DEVICE — YANKAUER OPEN TIP W/O VENT

## (undated) DEVICE — DECANTER FLUID TRNSF WHITE 9IN

## (undated) DEVICE — TRAY MINOR SMH

## (undated) DEVICE — DRAPE T TRNSVRS LAP 102X78X121

## (undated) DEVICE — SPONGE COTTON TRAY 4X4IN

## (undated) DEVICE — PENCIL GOLF STERILE

## (undated) DEVICE — DRAPE FLUID WARMER ORS 44X44IN

## (undated) DEVICE — SUT SILK 2-0 PS 18IN BLACK

## (undated) DEVICE — SUT PROLENE 2-0 SH 36IN BLU

## (undated) DEVICE — DRAPE C ARM 42 X 120 10/BX

## (undated) DEVICE — SYR SLIP TIP 20CC

## (undated) DEVICE — ELECTRODE REM PLYHSV RETURN 9

## (undated) DEVICE — GLOVE BIOGEL PI MICRO SZ 7.5

## (undated) DEVICE — CATH BRONCHOSCOPE F/BF

## (undated) DEVICE — ADAPTER SWIVEL

## (undated) DEVICE — NDL ASPIRATING VIZISHOT 20-40M

## (undated) DEVICE — KIT ANTIFOG W/SPONG & FLUID

## (undated) DEVICE — ALCOHOL BLEND 95%

## (undated) DEVICE — GUIDEWIRE ANGLED .035 150CM

## (undated) DEVICE — NDL VIZISHOT 2 FLEX 22G

## (undated) DEVICE — DRAPE THREE-QTR REINF 53X77IN

## (undated) DEVICE — APPLIER CLIP LIAGCLIP 9.375IN

## (undated) DEVICE — LUBRICANT SURGILUBE 2 OZ

## (undated) DEVICE — PACK ECLIPSE SET-UP W/O DRAPE

## (undated) DEVICE — GOWN POLY REINF BRTH SLV XL

## (undated) DEVICE — SUT VICRYL PLUS 3-0 SH 18IN

## (undated) DEVICE — TRAY GENERAL SURGERY SMH

## (undated) DEVICE — GLOVE BIOGEL PI MICRO INDIC 8

## (undated) DEVICE — SYS LABEL CORRECT MED

## (undated) DEVICE — APPLICATOR CHLORAPREP ORN 26ML

## (undated) DEVICE — NDL ECLIPSE SAFETY 23G 1.5IN

## (undated) DEVICE — CONTAINER SPECIMEN OR STER 4OZ

## (undated) DEVICE — ADHESIVE DERMABOND MINI HV

## (undated) DEVICE — ADHESIVE DERMABOND ADVANCED

## (undated) DEVICE — SUT MCRYL PLUS 4-0 PS2 27IN

## (undated) DEVICE — BLADE SURG CARBON STEEL SZ11

## (undated) DEVICE — CYTOSPIN COLLECTION FLUID BLT

## (undated) DEVICE — NDL ECLIPSE SAF REG 25GX1.5IN